# Patient Record
Sex: FEMALE | Race: WHITE | NOT HISPANIC OR LATINO | Employment: UNEMPLOYED | URBAN - METROPOLITAN AREA
[De-identification: names, ages, dates, MRNs, and addresses within clinical notes are randomized per-mention and may not be internally consistent; named-entity substitution may affect disease eponyms.]

---

## 2024-01-01 ENCOUNTER — APPOINTMENT (EMERGENCY)
Dept: RADIOLOGY | Facility: HOSPITAL | Age: 64
DRG: 252 | End: 2024-01-01
Payer: COMMERCIAL

## 2024-01-01 ENCOUNTER — APPOINTMENT (INPATIENT)
Dept: RADIOLOGY | Facility: HOSPITAL | Age: 64
DRG: 252 | End: 2024-01-01
Attending: FAMILY MEDICINE
Payer: COMMERCIAL

## 2024-01-01 ENCOUNTER — APPOINTMENT (INPATIENT)
Dept: NON INVASIVE DIAGNOSTICS | Facility: HOSPITAL | Age: 64
DRG: 252 | End: 2024-01-01
Payer: COMMERCIAL

## 2024-01-01 ENCOUNTER — APPOINTMENT (INPATIENT)
Dept: RADIOLOGY | Facility: HOSPITAL | Age: 64
DRG: 252 | End: 2024-01-01
Attending: STUDENT IN AN ORGANIZED HEALTH CARE EDUCATION/TRAINING PROGRAM
Payer: COMMERCIAL

## 2024-01-01 ENCOUNTER — APPOINTMENT (INPATIENT)
Dept: RADIOLOGY | Facility: HOSPITAL | Age: 64
DRG: 252 | End: 2024-01-01
Payer: COMMERCIAL

## 2024-01-01 ENCOUNTER — APPOINTMENT (INPATIENT)
Dept: GASTROENTEROLOGY | Facility: HOSPITAL | Age: 64
DRG: 252 | End: 2024-01-01
Payer: COMMERCIAL

## 2024-01-01 ENCOUNTER — APPOINTMENT (INPATIENT)
Dept: RADIOLOGY | Facility: HOSPITAL | Age: 64
DRG: 252 | End: 2024-01-01
Attending: INTERNAL MEDICINE
Payer: COMMERCIAL

## 2024-01-01 ENCOUNTER — HOSPITAL ENCOUNTER (INPATIENT)
Facility: HOSPITAL | Age: 64
LOS: 27 days | DRG: 252 | End: 2025-01-05
Attending: EMERGENCY MEDICINE | Admitting: INTERNAL MEDICINE
Payer: COMMERCIAL

## 2024-01-01 DIAGNOSIS — R60.1 ANASARCA: ICD-10-CM

## 2024-01-01 DIAGNOSIS — I47.20 V-TACH (HCC): ICD-10-CM

## 2024-01-01 DIAGNOSIS — Z78.9 ENCOUNTER FOR GASTROJEJUNAL (GJ) TUBE PLACEMENT: Primary | ICD-10-CM

## 2024-01-01 DIAGNOSIS — K56.609 SBO (SMALL BOWEL OBSTRUCTION) (HCC): ICD-10-CM

## 2024-01-01 DIAGNOSIS — L98.9 SKIN LESION: ICD-10-CM

## 2024-01-01 DIAGNOSIS — J90 PLEURAL EFFUSION: ICD-10-CM

## 2024-01-01 DIAGNOSIS — J96.01 ACUTE RESPIRATORY FAILURE WITH HYPOXIA (HCC): ICD-10-CM

## 2024-01-01 DIAGNOSIS — N17.9 AKI (ACUTE KIDNEY INJURY) (HCC): ICD-10-CM

## 2024-01-01 DIAGNOSIS — R10.84 GENERALIZED ABDOMINAL PAIN: ICD-10-CM

## 2024-01-01 DIAGNOSIS — C54.1 ENDOMETRIAL CANCER (HCC): ICD-10-CM

## 2024-01-01 DIAGNOSIS — B02.9 HERPES ZOSTER WITHOUT COMPLICATION: ICD-10-CM

## 2024-01-01 DIAGNOSIS — Z51.5 END OF LIFE CARE: ICD-10-CM

## 2024-01-01 DIAGNOSIS — Z78.9 ENCOUNTER FOR GASTROJEJUNAL (GJ) TUBE PLACEMENT: ICD-10-CM

## 2024-01-01 DIAGNOSIS — Z71.89 GOALS OF CARE, COUNSELING/DISCUSSION: ICD-10-CM

## 2024-01-01 DIAGNOSIS — L03.90 CELLULITIS: ICD-10-CM

## 2024-01-01 LAB
ABO GROUP BLD BPU: NORMAL
ABO GROUP BLD: NORMAL
ALBUMIN FLD-MCNC: 1.6 G/DL
ALBUMIN SERPL BCG-MCNC: 2.2 G/DL (ref 3.5–5)
ALBUMIN SERPL BCG-MCNC: 2.5 G/DL (ref 3.5–5)
ALBUMIN SERPL BCG-MCNC: 2.8 G/DL (ref 3.5–5)
ALBUMIN SERPL BCG-MCNC: 3 G/DL (ref 3.5–5)
ALP SERPL-CCNC: 42 U/L (ref 34–104)
ALP SERPL-CCNC: 45 U/L (ref 34–104)
ALP SERPL-CCNC: 63 U/L (ref 34–104)
ALP SERPL-CCNC: 66 U/L (ref 34–104)
ALP SERPL-CCNC: 66 U/L (ref 34–104)
ALP SERPL-CCNC: 70 U/L (ref 34–104)
ALT SERPL W P-5'-P-CCNC: 13 U/L (ref 7–52)
ALT SERPL W P-5'-P-CCNC: 5 U/L (ref 7–52)
ALT SERPL W P-5'-P-CCNC: 8 U/L (ref 7–52)
ANION GAP SERPL CALCULATED.3IONS-SCNC: 2 MMOL/L (ref 4–13)
ANION GAP SERPL CALCULATED.3IONS-SCNC: 3 MMOL/L (ref 4–13)
ANION GAP SERPL CALCULATED.3IONS-SCNC: 4 MMOL/L (ref 4–13)
ANION GAP SERPL CALCULATED.3IONS-SCNC: 5 MMOL/L (ref 4–13)
ANION GAP SERPL CALCULATED.3IONS-SCNC: 6 MMOL/L (ref 4–13)
ANION GAP SERPL CALCULATED.3IONS-SCNC: 7 MMOL/L (ref 4–13)
ANION GAP SERPL CALCULATED.3IONS-SCNC: 7 MMOL/L (ref 4–13)
ANION GAP SERPL CALCULATED.3IONS-SCNC: 8 MMOL/L (ref 4–13)
ANISOCYTOSIS BLD QL SMEAR: PRESENT
AORTIC ROOT: 2.7 CM
AORTIC VALVE MEAN VELOCITY: 15.9 M/S
APICAL FOUR CHAMBER EJECTION FRACTION: 63 %
APPEARANCE FLD: CLEAR
APPEARANCE FLD: CLEAR
ASCENDING AORTA: 3 CM
AST SERPL W P-5'-P-CCNC: 11 U/L (ref 13–39)
AST SERPL W P-5'-P-CCNC: 12 U/L (ref 13–39)
AST SERPL W P-5'-P-CCNC: 6 U/L (ref 13–39)
AST SERPL W P-5'-P-CCNC: 8 U/L (ref 13–39)
ATRIAL RATE: 112 BPM
ATRIAL RATE: 78 BPM
AV AREA BY CONTINUOUS VTI: 1.2 CM2
AV AREA PEAK VELOCITY: 1.2 CM2
AV LVOT MEAN GRADIENT: 1 MMHG
AV LVOT PEAK GRADIENT: 2 MMHG
AV MEAN GRADIENT: 11 MMHG
AV PEAK GRADIENT: 19 MMHG
AV VALVE AREA: 1.54 CM2
AV VELOCITY RATIO: 0.41
BACTERIA SPEC BFLD CULT: NO GROWTH
BACTERIA WND AEROBE CULT: ABNORMAL
BASE EX.OXY STD BLDV CALC-SCNC: 75.5 % (ref 60–80)
BASE EXCESS BLDA CALC-SCNC: -2 MMOL/L (ref -2–3)
BASE EXCESS BLDV CALC-SCNC: 10.3 MMOL/L
BASOPHILS # BLD AUTO: 0.01 THOUSANDS/ÂΜL (ref 0–0.1)
BASOPHILS # BLD AUTO: 0.02 THOUSANDS/ÂΜL (ref 0–0.1)
BASOPHILS # BLD AUTO: 0.02 THOUSANDS/ΜL (ref 0–0.1)
BASOPHILS # BLD AUTO: 0.05 THOUSAND/UL (ref 0–0.1)
BASOPHILS # BLD MANUAL: 0 THOUSAND/UL (ref 0–0.1)
BASOPHILS # BLD MANUAL: 0 THOUSAND/UL (ref 0–0.1)
BASOPHILS NFR BLD AUTO: 0 % (ref 0–1)
BASOPHILS NFR MAR MANUAL: 0 % (ref 0–1)
BASOPHILS NFR MAR MANUAL: 0 % (ref 0–1)
BASOPHILS NFR MAR MANUAL: 1 % (ref 0–1)
BILIRUB DIRECT SERPL-MCNC: 0.14 MG/DL (ref 0–0.2)
BILIRUB DIRECT SERPL-MCNC: 0.14 MG/DL (ref 0–0.2)
BILIRUB DIRECT SERPL-MCNC: 0.16 MG/DL (ref 0–0.2)
BILIRUB SERPL-MCNC: 0.31 MG/DL (ref 0.2–1)
BILIRUB SERPL-MCNC: 0.35 MG/DL (ref 0.2–1)
BILIRUB SERPL-MCNC: 0.36 MG/DL (ref 0.2–1)
BILIRUB SERPL-MCNC: 0.37 MG/DL (ref 0.2–1)
BILIRUB SERPL-MCNC: 0.48 MG/DL (ref 0.2–1)
BILIRUB SERPL-MCNC: 0.53 MG/DL (ref 0.2–1)
BLD GP AB SCN SERPL QL: NEGATIVE
BPU ID: NORMAL
BSA FOR ECHO PROCEDURE: 2.1 M2
BUN SERPL-MCNC: 11 MG/DL (ref 5–25)
BUN SERPL-MCNC: 14 MG/DL (ref 5–25)
BUN SERPL-MCNC: 19 MG/DL (ref 5–25)
BUN SERPL-MCNC: 21 MG/DL (ref 5–25)
BUN SERPL-MCNC: 25 MG/DL (ref 5–25)
BUN SERPL-MCNC: 27 MG/DL (ref 5–25)
BUN SERPL-MCNC: 27 MG/DL (ref 5–25)
BUN SERPL-MCNC: 28 MG/DL (ref 5–25)
BUN SERPL-MCNC: 29 MG/DL (ref 5–25)
BUN SERPL-MCNC: 29 MG/DL (ref 5–25)
BUN SERPL-MCNC: 30 MG/DL (ref 5–25)
BUN SERPL-MCNC: 31 MG/DL (ref 5–25)
BUN SERPL-MCNC: 31 MG/DL (ref 5–25)
BUN SERPL-MCNC: 32 MG/DL (ref 5–25)
BUN SERPL-MCNC: 32 MG/DL (ref 5–25)
BUN SERPL-MCNC: 35 MG/DL (ref 5–25)
BUN SERPL-MCNC: 41 MG/DL (ref 5–25)
BUN SERPL-MCNC: 47 MG/DL (ref 5–25)
BUN SERPL-MCNC: 60 MG/DL (ref 5–25)
BUN SERPL-MCNC: 63 MG/DL (ref 5–25)
BUN SERPL-MCNC: 72 MG/DL (ref 5–25)
BUN SERPL-MCNC: 75 MG/DL (ref 5–25)
BUN SERPL-MCNC: 81 MG/DL (ref 5–25)
BUN SERPL-MCNC: 86 MG/DL (ref 5–25)
BUN SERPL-MCNC: 88 MG/DL (ref 5–25)
BUN SERPL-MCNC: 92 MG/DL (ref 5–25)
CA-I BLD-SCNC: 1.27 MMOL/L (ref 1.12–1.32)
CALCIUM ALBUM COR SERPL-MCNC: 8.7 MG/DL (ref 8.3–10.1)
CALCIUM ALBUM COR SERPL-MCNC: 8.9 MG/DL (ref 8.3–10.1)
CALCIUM ALBUM COR SERPL-MCNC: 9.7 MG/DL (ref 8.3–10.1)
CALCIUM SERPL-MCNC: 7.4 MG/DL (ref 8.4–10.2)
CALCIUM SERPL-MCNC: 7.4 MG/DL (ref 8.4–10.2)
CALCIUM SERPL-MCNC: 7.5 MG/DL (ref 8.4–10.2)
CALCIUM SERPL-MCNC: 7.6 MG/DL (ref 8.4–10.2)
CALCIUM SERPL-MCNC: 7.7 MG/DL (ref 8.4–10.2)
CALCIUM SERPL-MCNC: 7.8 MG/DL (ref 8.4–10.2)
CALCIUM SERPL-MCNC: 7.8 MG/DL (ref 8.4–10.2)
CALCIUM SERPL-MCNC: 7.9 MG/DL (ref 8.4–10.2)
CALCIUM SERPL-MCNC: 8 MG/DL (ref 8.4–10.2)
CALCIUM SERPL-MCNC: 8.1 MG/DL (ref 8.4–10.2)
CALCIUM SERPL-MCNC: 8.2 MG/DL (ref 8.4–10.2)
CALCIUM SERPL-MCNC: 8.3 MG/DL (ref 8.4–10.2)
CALCIUM SERPL-MCNC: 8.4 MG/DL (ref 8.4–10.2)
CALCIUM SERPL-MCNC: 8.5 MG/DL (ref 8.4–10.2)
CALCIUM SERPL-MCNC: 8.5 MG/DL (ref 8.4–10.2)
CALCIUM SERPL-MCNC: 8.6 MG/DL (ref 8.4–10.2)
CALCIUM SERPL-MCNC: 8.8 MG/DL (ref 8.4–10.2)
CALCIUM SERPL-MCNC: 8.9 MG/DL (ref 8.4–10.2)
CALCIUM SERPL-MCNC: 9 MG/DL (ref 8.4–10.2)
CHLORIDE SERPL-SCNC: 100 MMOL/L (ref 96–108)
CHLORIDE SERPL-SCNC: 102 MMOL/L (ref 96–108)
CHLORIDE SERPL-SCNC: 102 MMOL/L (ref 96–108)
CHLORIDE SERPL-SCNC: 85 MMOL/L (ref 96–108)
CHLORIDE SERPL-SCNC: 85 MMOL/L (ref 96–108)
CHLORIDE SERPL-SCNC: 86 MMOL/L (ref 96–108)
CHLORIDE SERPL-SCNC: 88 MMOL/L (ref 96–108)
CHLORIDE SERPL-SCNC: 89 MMOL/L (ref 96–108)
CHLORIDE SERPL-SCNC: 91 MMOL/L (ref 96–108)
CHLORIDE SERPL-SCNC: 92 MMOL/L (ref 96–108)
CHLORIDE SERPL-SCNC: 92 MMOL/L (ref 96–108)
CHLORIDE SERPL-SCNC: 93 MMOL/L (ref 96–108)
CHLORIDE SERPL-SCNC: 93 MMOL/L (ref 96–108)
CHLORIDE SERPL-SCNC: 94 MMOL/L (ref 96–108)
CHLORIDE SERPL-SCNC: 95 MMOL/L (ref 96–108)
CHLORIDE SERPL-SCNC: 96 MMOL/L (ref 96–108)
CHLORIDE SERPL-SCNC: 96 MMOL/L (ref 96–108)
CHLORIDE SERPL-SCNC: 97 MMOL/L (ref 96–108)
CHLORIDE SERPL-SCNC: 98 MMOL/L (ref 96–108)
CO2 SERPL-SCNC: 22 MMOL/L (ref 21–32)
CO2 SERPL-SCNC: 23 MMOL/L (ref 21–32)
CO2 SERPL-SCNC: 24 MMOL/L (ref 21–32)
CO2 SERPL-SCNC: 25 MMOL/L (ref 21–32)
CO2 SERPL-SCNC: 25 MMOL/L (ref 21–32)
CO2 SERPL-SCNC: 26 MMOL/L (ref 21–32)
CO2 SERPL-SCNC: 27 MMOL/L (ref 21–32)
CO2 SERPL-SCNC: 28 MMOL/L (ref 21–32)
CO2 SERPL-SCNC: 28 MMOL/L (ref 21–32)
CO2 SERPL-SCNC: 29 MMOL/L (ref 21–32)
CO2 SERPL-SCNC: 29 MMOL/L (ref 21–32)
CO2 SERPL-SCNC: 32 MMOL/L (ref 21–32)
CO2 SERPL-SCNC: 33 MMOL/L (ref 21–32)
CO2 SERPL-SCNC: 35 MMOL/L (ref 21–32)
CO2 SERPL-SCNC: 36 MMOL/L (ref 21–32)
CO2 SERPL-SCNC: 39 MMOL/L (ref 21–32)
COLOR FLD: YELLOW
COLOR FLD: YELLOW
CREAT SERPL-MCNC: 0.39 MG/DL (ref 0.6–1.3)
CREAT SERPL-MCNC: 0.4 MG/DL (ref 0.6–1.3)
CREAT SERPL-MCNC: 0.5 MG/DL (ref 0.6–1.3)
CREAT SERPL-MCNC: 0.55 MG/DL (ref 0.6–1.3)
CREAT SERPL-MCNC: 0.56 MG/DL (ref 0.6–1.3)
CREAT SERPL-MCNC: 0.59 MG/DL (ref 0.6–1.3)
CREAT SERPL-MCNC: 0.6 MG/DL (ref 0.6–1.3)
CREAT SERPL-MCNC: 0.62 MG/DL (ref 0.6–1.3)
CREAT SERPL-MCNC: 0.63 MG/DL (ref 0.6–1.3)
CREAT SERPL-MCNC: 0.64 MG/DL (ref 0.6–1.3)
CREAT SERPL-MCNC: 0.67 MG/DL (ref 0.6–1.3)
CREAT SERPL-MCNC: 0.68 MG/DL (ref 0.6–1.3)
CREAT SERPL-MCNC: 0.68 MG/DL (ref 0.6–1.3)
CREAT SERPL-MCNC: 0.69 MG/DL (ref 0.6–1.3)
CREAT SERPL-MCNC: 0.7 MG/DL (ref 0.6–1.3)
CREAT SERPL-MCNC: 0.75 MG/DL (ref 0.6–1.3)
CREAT SERPL-MCNC: 0.75 MG/DL (ref 0.6–1.3)
CREAT SERPL-MCNC: 0.76 MG/DL (ref 0.6–1.3)
CREAT SERPL-MCNC: 0.76 MG/DL (ref 0.6–1.3)
CREAT SERPL-MCNC: 0.79 MG/DL (ref 0.6–1.3)
CREAT SERPL-MCNC: 0.81 MG/DL (ref 0.6–1.3)
CREAT SERPL-MCNC: 0.82 MG/DL (ref 0.6–1.3)
CREAT SERPL-MCNC: 0.83 MG/DL (ref 0.6–1.3)
CREAT SERPL-MCNC: 0.83 MG/DL (ref 0.6–1.3)
CREAT SERPL-MCNC: 0.88 MG/DL (ref 0.6–1.3)
CREAT SERPL-MCNC: 0.88 MG/DL (ref 0.6–1.3)
CREAT SERPL-MCNC: 1.06 MG/DL (ref 0.6–1.3)
CREAT SERPL-MCNC: 1.26 MG/DL (ref 0.6–1.3)
CREAT SERPL-MCNC: 1.31 MG/DL (ref 0.6–1.3)
CREAT SERPL-MCNC: 1.33 MG/DL (ref 0.6–1.3)
CREAT SERPL-MCNC: 1.43 MG/DL (ref 0.6–1.3)
CREAT UR-MCNC: 26.3 MG/DL
CROSSMATCH: NORMAL
DACRYOCYTES BLD QL SMEAR: PRESENT
DACRYOCYTES BLD QL SMEAR: PRESENT
DOP CALC AO PEAK VEL: 2.18 M/S
DOP CALC AO VTI: 56.14 CM
DOP CALC LVOT AREA: 3.46 CM2
DOP CALC LVOT CARDIAC INDEX: 2.34 L/MIN/M2
DOP CALC LVOT CARDIAC OUTPUT: 4.92 L/MIN
DOP CALC LVOT DIAMETER: 2.1 CM
DOP CALC LVOT PEAK VEL VTI: 25 CM
DOP CALC LVOT PEAK VEL: 0.9 M/S
DOP CALC LVOT STROKE INDEX: 32.9 ML/M2
DOP CALC LVOT STROKE VOLUME: 86.55 CM3
E WAVE DECELERATION TIME: 128 MS
E/A RATIO: 0.67
EOSINOPHIL # BLD AUTO: 0.04 THOUSAND/ÂΜL (ref 0–0.61)
EOSINOPHIL # BLD AUTO: 0.04 THOUSAND/ÂΜL (ref 0–0.61)
EOSINOPHIL # BLD AUTO: 0.06 THOUSAND/UL (ref 0–0.61)
EOSINOPHIL # BLD AUTO: 0.09 THOUSAND/ÂΜL (ref 0–0.61)
EOSINOPHIL # BLD AUTO: 0.13 THOUSAND/ÂΜL (ref 0–0.61)
EOSINOPHIL # BLD AUTO: 0.2 THOUSAND/ΜL (ref 0–0.61)
EOSINOPHIL # BLD MANUAL: 0 THOUSAND/UL (ref 0–0.4)
EOSINOPHIL # BLD MANUAL: 0.09 THOUSAND/UL (ref 0–0.4)
EOSINOPHIL NFR BLD AUTO: 0 % (ref 0–6)
EOSINOPHIL NFR BLD AUTO: 0 % (ref 0–6)
EOSINOPHIL NFR BLD AUTO: 2 % (ref 0–6)
EOSINOPHIL NFR BLD AUTO: 2 % (ref 0–6)
EOSINOPHIL NFR BLD AUTO: 3 % (ref 0–6)
EOSINOPHIL NFR BLD MANUAL: 0 % (ref 0–6)
EOSINOPHIL NFR BLD MANUAL: 1 % (ref 0–6)
EOSINOPHIL NFR BLD MANUAL: 1 % (ref 0–6)
ERYTHROCYTE [DISTWIDTH] IN BLOOD BY AUTOMATED COUNT: 16.7 % (ref 11.6–15.1)
ERYTHROCYTE [DISTWIDTH] IN BLOOD BY AUTOMATED COUNT: 16.8 % (ref 11.6–15.1)
ERYTHROCYTE [DISTWIDTH] IN BLOOD BY AUTOMATED COUNT: 16.9 % (ref 11.6–15.1)
ERYTHROCYTE [DISTWIDTH] IN BLOOD BY AUTOMATED COUNT: 17.1 % (ref 11.6–15.1)
ERYTHROCYTE [DISTWIDTH] IN BLOOD BY AUTOMATED COUNT: 17.2 % (ref 11.6–15.1)
ERYTHROCYTE [DISTWIDTH] IN BLOOD BY AUTOMATED COUNT: 17.2 % (ref 11.6–15.1)
ERYTHROCYTE [DISTWIDTH] IN BLOOD BY AUTOMATED COUNT: 17.6 % (ref 11.6–15.1)
ERYTHROCYTE [DISTWIDTH] IN BLOOD BY AUTOMATED COUNT: 17.6 % (ref 11.6–15.1)
ERYTHROCYTE [DISTWIDTH] IN BLOOD BY AUTOMATED COUNT: 17.7 % (ref 11.6–15.1)
ERYTHROCYTE [DISTWIDTH] IN BLOOD BY AUTOMATED COUNT: 17.9 % (ref 11.6–15.1)
ERYTHROCYTE [DISTWIDTH] IN BLOOD BY AUTOMATED COUNT: 19.6 % (ref 11.6–15.1)
ERYTHROCYTE [DISTWIDTH] IN BLOOD BY AUTOMATED COUNT: 19.7 % (ref 11.6–15.1)
ERYTHROCYTE [DISTWIDTH] IN BLOOD BY AUTOMATED COUNT: 19.8 % (ref 11.6–15.1)
ERYTHROCYTE [DISTWIDTH] IN BLOOD BY AUTOMATED COUNT: 19.8 % (ref 11.6–15.1)
ERYTHROCYTE [DISTWIDTH] IN BLOOD BY AUTOMATED COUNT: 19.9 % (ref 11.6–15.1)
FERRITIN SERPL-MCNC: 371 NG/ML (ref 11–307)
FIO2 GAS DIL.REBREATH: 80 L
FOLATE SERPL-MCNC: 4.5 NG/ML
FRACTIONAL SHORTENING: 35 (ref 28–44)
GFR SERPL CREATININE-BSD FRML MDRD: 100 ML/MIN/1.73SQ M
GFR SERPL CREATININE-BSD FRML MDRD: 100 ML/MIN/1.73SQ M
GFR SERPL CREATININE-BSD FRML MDRD: 104 ML/MIN/1.73SQ M
GFR SERPL CREATININE-BSD FRML MDRD: 111 ML/MIN/1.73SQ M
GFR SERPL CREATININE-BSD FRML MDRD: 112 ML/MIN/1.73SQ M
GFR SERPL CREATININE-BSD FRML MDRD: 39 ML/MIN/1.73SQ M
GFR SERPL CREATININE-BSD FRML MDRD: 42 ML/MIN/1.73SQ M
GFR SERPL CREATININE-BSD FRML MDRD: 43 ML/MIN/1.73SQ M
GFR SERPL CREATININE-BSD FRML MDRD: 45 ML/MIN/1.73SQ M
GFR SERPL CREATININE-BSD FRML MDRD: 56 ML/MIN/1.73SQ M
GFR SERPL CREATININE-BSD FRML MDRD: 70 ML/MIN/1.73SQ M
GFR SERPL CREATININE-BSD FRML MDRD: 70 ML/MIN/1.73SQ M
GFR SERPL CREATININE-BSD FRML MDRD: 75 ML/MIN/1.73SQ M
GFR SERPL CREATININE-BSD FRML MDRD: 75 ML/MIN/1.73SQ M
GFR SERPL CREATININE-BSD FRML MDRD: 76 ML/MIN/1.73SQ M
GFR SERPL CREATININE-BSD FRML MDRD: 78 ML/MIN/1.73SQ M
GFR SERPL CREATININE-BSD FRML MDRD: 80 ML/MIN/1.73SQ M
GFR SERPL CREATININE-BSD FRML MDRD: 84 ML/MIN/1.73SQ M
GFR SERPL CREATININE-BSD FRML MDRD: 84 ML/MIN/1.73SQ M
GFR SERPL CREATININE-BSD FRML MDRD: 85 ML/MIN/1.73SQ M
GFR SERPL CREATININE-BSD FRML MDRD: 85 ML/MIN/1.73SQ M
GFR SERPL CREATININE-BSD FRML MDRD: 93 ML/MIN/1.73SQ M
GFR SERPL CREATININE-BSD FRML MDRD: 93 ML/MIN/1.73SQ M
GFR SERPL CREATININE-BSD FRML MDRD: 94 ML/MIN/1.73SQ M
GFR SERPL CREATININE-BSD FRML MDRD: 95 ML/MIN/1.73SQ M
GFR SERPL CREATININE-BSD FRML MDRD: 96 ML/MIN/1.73SQ M
GFR SERPL CREATININE-BSD FRML MDRD: 96 ML/MIN/1.73SQ M
GFR SERPL CREATININE-BSD FRML MDRD: 98 ML/MIN/1.73SQ M
GFR SERPL CREATININE-BSD FRML MDRD: 98 ML/MIN/1.73SQ M
GIANT PLATELETS BLD QL SMEAR: PRESENT
GLUCOSE FLD-MCNC: 138 MG/DL
GLUCOSE FLD-MCNC: 142 MG/DL
GLUCOSE SERPL-MCNC: 102 MG/DL (ref 65–140)
GLUCOSE SERPL-MCNC: 109 MG/DL (ref 65–140)
GLUCOSE SERPL-MCNC: 117 MG/DL (ref 65–140)
GLUCOSE SERPL-MCNC: 121 MG/DL (ref 65–140)
GLUCOSE SERPL-MCNC: 122 MG/DL (ref 65–140)
GLUCOSE SERPL-MCNC: 122 MG/DL (ref 65–140)
GLUCOSE SERPL-MCNC: 123 MG/DL (ref 65–140)
GLUCOSE SERPL-MCNC: 125 MG/DL (ref 65–140)
GLUCOSE SERPL-MCNC: 133 MG/DL (ref 65–140)
GLUCOSE SERPL-MCNC: 133 MG/DL (ref 65–140)
GLUCOSE SERPL-MCNC: 134 MG/DL (ref 65–140)
GLUCOSE SERPL-MCNC: 135 MG/DL (ref 65–140)
GLUCOSE SERPL-MCNC: 135 MG/DL (ref 65–140)
GLUCOSE SERPL-MCNC: 137 MG/DL (ref 65–140)
GLUCOSE SERPL-MCNC: 144 MG/DL (ref 65–140)
GLUCOSE SERPL-MCNC: 145 MG/DL (ref 65–140)
GLUCOSE SERPL-MCNC: 145 MG/DL (ref 65–140)
GLUCOSE SERPL-MCNC: 146 MG/DL (ref 65–140)
GLUCOSE SERPL-MCNC: 147 MG/DL (ref 65–140)
GLUCOSE SERPL-MCNC: 148 MG/DL (ref 65–140)
GLUCOSE SERPL-MCNC: 148 MG/DL (ref 65–140)
GLUCOSE SERPL-MCNC: 150 MG/DL (ref 65–140)
GLUCOSE SERPL-MCNC: 151 MG/DL (ref 65–140)
GLUCOSE SERPL-MCNC: 152 MG/DL (ref 65–140)
GLUCOSE SERPL-MCNC: 153 MG/DL (ref 65–140)
GLUCOSE SERPL-MCNC: 155 MG/DL (ref 65–140)
GLUCOSE SERPL-MCNC: 155 MG/DL (ref 65–140)
GLUCOSE SERPL-MCNC: 158 MG/DL (ref 65–140)
GLUCOSE SERPL-MCNC: 158 MG/DL (ref 65–140)
GLUCOSE SERPL-MCNC: 159 MG/DL (ref 65–140)
GLUCOSE SERPL-MCNC: 160 MG/DL (ref 65–140)
GLUCOSE SERPL-MCNC: 160 MG/DL (ref 65–140)
GLUCOSE SERPL-MCNC: 161 MG/DL (ref 65–140)
GLUCOSE SERPL-MCNC: 162 MG/DL (ref 65–140)
GLUCOSE SERPL-MCNC: 162 MG/DL (ref 65–140)
GLUCOSE SERPL-MCNC: 164 MG/DL (ref 65–140)
GLUCOSE SERPL-MCNC: 164 MG/DL (ref 65–140)
GLUCOSE SERPL-MCNC: 165 MG/DL (ref 65–140)
GLUCOSE SERPL-MCNC: 166 MG/DL (ref 65–140)
GLUCOSE SERPL-MCNC: 168 MG/DL (ref 65–140)
GLUCOSE SERPL-MCNC: 169 MG/DL (ref 65–140)
GLUCOSE SERPL-MCNC: 171 MG/DL (ref 65–140)
GLUCOSE SERPL-MCNC: 172 MG/DL (ref 65–140)
GLUCOSE SERPL-MCNC: 173 MG/DL (ref 65–140)
GLUCOSE SERPL-MCNC: 176 MG/DL (ref 65–140)
GLUCOSE SERPL-MCNC: 177 MG/DL (ref 65–140)
GLUCOSE SERPL-MCNC: 178 MG/DL (ref 65–140)
GLUCOSE SERPL-MCNC: 179 MG/DL (ref 65–140)
GLUCOSE SERPL-MCNC: 179 MG/DL (ref 65–140)
GLUCOSE SERPL-MCNC: 185 MG/DL (ref 65–140)
GLUCOSE SERPL-MCNC: 185 MG/DL (ref 65–140)
GLUCOSE SERPL-MCNC: 186 MG/DL (ref 65–140)
GLUCOSE SERPL-MCNC: 187 MG/DL (ref 65–140)
GLUCOSE SERPL-MCNC: 190 MG/DL (ref 65–140)
GLUCOSE SERPL-MCNC: 191 MG/DL (ref 65–140)
GLUCOSE SERPL-MCNC: 195 MG/DL (ref 65–140)
GLUCOSE SERPL-MCNC: 197 MG/DL (ref 65–140)
GLUCOSE SERPL-MCNC: 198 MG/DL (ref 65–140)
GLUCOSE SERPL-MCNC: 200 MG/DL (ref 65–140)
GLUCOSE SERPL-MCNC: 200 MG/DL (ref 65–140)
GLUCOSE SERPL-MCNC: 201 MG/DL (ref 65–140)
GLUCOSE SERPL-MCNC: 201 MG/DL (ref 65–140)
GLUCOSE SERPL-MCNC: 202 MG/DL (ref 65–140)
GLUCOSE SERPL-MCNC: 202 MG/DL (ref 65–140)
GLUCOSE SERPL-MCNC: 203 MG/DL (ref 65–140)
GLUCOSE SERPL-MCNC: 203 MG/DL (ref 65–140)
GLUCOSE SERPL-MCNC: 205 MG/DL (ref 65–140)
GLUCOSE SERPL-MCNC: 205 MG/DL (ref 65–140)
GLUCOSE SERPL-MCNC: 208 MG/DL (ref 65–140)
GLUCOSE SERPL-MCNC: 209 MG/DL (ref 65–140)
GLUCOSE SERPL-MCNC: 210 MG/DL (ref 65–140)
GLUCOSE SERPL-MCNC: 210 MG/DL (ref 65–140)
GLUCOSE SERPL-MCNC: 214 MG/DL (ref 65–140)
GLUCOSE SERPL-MCNC: 215 MG/DL (ref 65–140)
GLUCOSE SERPL-MCNC: 217 MG/DL (ref 65–140)
GLUCOSE SERPL-MCNC: 217 MG/DL (ref 65–140)
GLUCOSE SERPL-MCNC: 220 MG/DL (ref 65–140)
GLUCOSE SERPL-MCNC: 222 MG/DL (ref 65–140)
GLUCOSE SERPL-MCNC: 227 MG/DL (ref 65–140)
GLUCOSE SERPL-MCNC: 228 MG/DL (ref 65–140)
GLUCOSE SERPL-MCNC: 229 MG/DL (ref 65–140)
GLUCOSE SERPL-MCNC: 231 MG/DL (ref 65–140)
GLUCOSE SERPL-MCNC: 232 MG/DL (ref 65–140)
GLUCOSE SERPL-MCNC: 232 MG/DL (ref 65–140)
GLUCOSE SERPL-MCNC: 234 MG/DL (ref 65–140)
GLUCOSE SERPL-MCNC: 234 MG/DL (ref 65–140)
GLUCOSE SERPL-MCNC: 235 MG/DL (ref 65–140)
GLUCOSE SERPL-MCNC: 236 MG/DL (ref 65–140)
GLUCOSE SERPL-MCNC: 241 MG/DL (ref 65–140)
GLUCOSE SERPL-MCNC: 244 MG/DL (ref 65–140)
GLUCOSE SERPL-MCNC: 247 MG/DL (ref 65–140)
GLUCOSE SERPL-MCNC: 248 MG/DL (ref 65–140)
GLUCOSE SERPL-MCNC: 250 MG/DL (ref 65–140)
GLUCOSE SERPL-MCNC: 251 MG/DL (ref 65–140)
GLUCOSE SERPL-MCNC: 252 MG/DL (ref 65–140)
GLUCOSE SERPL-MCNC: 253 MG/DL (ref 65–140)
GLUCOSE SERPL-MCNC: 255 MG/DL (ref 65–140)
GLUCOSE SERPL-MCNC: 258 MG/DL (ref 65–140)
GLUCOSE SERPL-MCNC: 259 MG/DL (ref 65–140)
GLUCOSE SERPL-MCNC: 268 MG/DL (ref 65–140)
GLUCOSE SERPL-MCNC: 273 MG/DL (ref 65–140)
GLUCOSE SERPL-MCNC: 276 MG/DL (ref 65–140)
GLUCOSE SERPL-MCNC: 298 MG/DL (ref 65–140)
GLUCOSE SERPL-MCNC: 299 MG/DL (ref 65–140)
GLUCOSE SERPL-MCNC: 302 MG/DL (ref 65–140)
GLUCOSE SERPL-MCNC: 306 MG/DL (ref 65–140)
GLUCOSE SERPL-MCNC: 314 MG/DL (ref 65–140)
GLUCOSE SERPL-MCNC: 314 MG/DL (ref 65–140)
GLUCOSE SERPL-MCNC: 317 MG/DL (ref 65–140)
GLUCOSE SERPL-MCNC: 335 MG/DL (ref 65–140)
GLUCOSE SERPL-MCNC: 341 MG/DL (ref 65–140)
GLUCOSE SERPL-MCNC: 345 MG/DL (ref 65–140)
GLUCOSE SERPL-MCNC: 371 MG/DL (ref 65–140)
GLUCOSE SERPL-MCNC: 88 MG/DL (ref 65–140)
GLUCOSE SERPL-MCNC: 89 MG/DL (ref 65–140)
GLUCOSE SERPL-MCNC: 89 MG/DL (ref 65–140)
GLUCOSE SERPL-MCNC: 92 MG/DL (ref 65–140)
GLUCOSE SERPL-MCNC: 96 MG/DL (ref 65–140)
GRAM STN SPEC: ABNORMAL
GRAM STN SPEC: NORMAL
GRAM STN SPEC: NORMAL
HAPTOGLOB SERPL-MCNC: 195 MG/DL (ref 37–355)
HCO3 BLDA-SCNC: 24.3 MMOL/L (ref 22–28)
HCO3 BLDV-SCNC: 36 MMOL/L (ref 24–30)
HCT VFR BLD AUTO: 20.4 % (ref 34.8–46.1)
HCT VFR BLD AUTO: 21 % (ref 34.8–46.1)
HCT VFR BLD AUTO: 21.2 % (ref 34.8–46.1)
HCT VFR BLD AUTO: 21.3 % (ref 34.8–46.1)
HCT VFR BLD AUTO: 21.9 % (ref 34.8–46.1)
HCT VFR BLD AUTO: 23.6 % (ref 34.8–46.1)
HCT VFR BLD AUTO: 23.7 % (ref 34.8–46.1)
HCT VFR BLD AUTO: 24.7 % (ref 34.8–46.1)
HCT VFR BLD AUTO: 24.8 % (ref 34.8–46.1)
HCT VFR BLD AUTO: 25.9 % (ref 34.8–46.1)
HCT VFR BLD AUTO: 26 % (ref 34.8–46.1)
HCT VFR BLD AUTO: 26 % (ref 34.8–46.1)
HCT VFR BLD AUTO: 26.8 % (ref 34.8–46.1)
HCT VFR BLD AUTO: 27 % (ref 34.8–46.1)
HCT VFR BLD AUTO: 27.5 % (ref 34.8–46.1)
HCT VFR BLD AUTO: 27.8 % (ref 34.8–46.1)
HCT VFR BLD CALC: 33 % (ref 34.8–46.1)
HGB BLD-MCNC: 6.5 G/DL (ref 11.5–15.4)
HGB BLD-MCNC: 6.5 G/DL (ref 11.5–15.4)
HGB BLD-MCNC: 6.7 G/DL (ref 11.5–15.4)
HGB BLD-MCNC: 6.7 G/DL (ref 11.5–15.4)
HGB BLD-MCNC: 6.8 G/DL (ref 11.5–15.4)
HGB BLD-MCNC: 6.9 G/DL (ref 11.5–15.4)
HGB BLD-MCNC: 6.9 G/DL (ref 11.5–15.4)
HGB BLD-MCNC: 7.4 G/DL (ref 11.5–15.4)
HGB BLD-MCNC: 7.5 G/DL (ref 11.5–15.4)
HGB BLD-MCNC: 7.7 G/DL (ref 11.5–15.4)
HGB BLD-MCNC: 8 G/DL (ref 11.5–15.4)
HGB BLD-MCNC: 8.3 G/DL (ref 11.5–15.4)
HGB BLD-MCNC: 8.6 G/DL (ref 11.5–15.4)
HGB BLD-MCNC: 8.8 G/DL (ref 11.5–15.4)
HGB BLD-MCNC: 8.9 G/DL (ref 11.5–15.4)
HGB BLDA-MCNC: 11.2 G/DL (ref 11.5–15.4)
HISTIOCYTES NFR FLD: 14 %
HYPERCHROMIA BLD QL SMEAR: PRESENT
IMM GRANULOCYTES # BLD AUTO: 0.04 THOUSAND/UL (ref 0–0.2)
IMM GRANULOCYTES # BLD AUTO: 0.1 THOUSAND/UL (ref 0–0.2)
IMM GRANULOCYTES # BLD AUTO: 0.14 THOUSAND/UL (ref 0–0.2)
IMM GRANULOCYTES # BLD AUTO: 0.17 THOUSAND/UL (ref 0–0.2)
IMM GRANULOCYTES # BLD AUTO: 0.22 THOUSAND/UL (ref 0–0.2)
IMM GRANULOCYTES NFR BLD AUTO: 1 % (ref 0–2)
IMM GRANULOCYTES NFR BLD AUTO: 2 % (ref 0–2)
INTERVENTRICULAR SEPTUM IN DIASTOLE (PARASTERNAL SHORT AXIS VIEW): 1.4 CM
INTERVENTRICULAR SEPTUM: 1.4 CM (ref 0.6–1.1)
IRON SATN MFR SERPL: 24 % (ref 15–50)
IRON SERPL-MCNC: 36 UG/DL (ref 50–212)
IVC: 19 MM
LAAS-AP2: 26.2 CM2
LAAS-AP4: 30.3 CM2
LDH FLD L TO P-CCNC: 52 U/L
LDH SERPL-CCNC: 60 U/L (ref 140–271)
LEFT ATRIUM SIZE: 4.6 CM
LEFT ATRIUM VOLUME (MOD BIPLANE): 105 ML
LEFT ATRIUM VOLUME INDEX (MOD BIPLANE): 50 ML/M2
LEFT INTERNAL DIMENSION IN SYSTOLE: 3.3 CM (ref 2.1–4)
LEFT VENTRICLE DIASTOLIC VOLUME (MOD BIPLANE): 97 ML
LEFT VENTRICLE DIASTOLIC VOLUME INDEX (MOD BIPLANE): 46.2 ML/M2
LEFT VENTRICLE SYSTOLIC VOLUME (MOD BIPLANE): 40 ML
LEFT VENTRICLE SYSTOLIC VOLUME INDEX (MOD BIPLANE): 19 ML/M2
LEFT VENTRICULAR INTERNAL DIMENSION IN DIASTOLE: 5.1 CM (ref 3.5–6)
LEFT VENTRICULAR POSTERIOR WALL IN END DIASTOLE: 1.4 CM
LEFT VENTRICULAR STROKE VOLUME: 82 ML
LG PLATELETS BLD QL SMEAR: PRESENT
LIPASE SERPL-CCNC: <6 U/L (ref 11–82)
LV EF: 59 %
LVSV (TEICH): 82 ML
LYMPHOCYTES # BLD AUTO: 0 % (ref 14–44)
LYMPHOCYTES # BLD AUTO: 0.05 THOUSAND/UL (ref 0.6–4.47)
LYMPHOCYTES # BLD AUTO: 0.06 THOUSAND/UL (ref 0.6–4.47)
LYMPHOCYTES # BLD AUTO: 0.09 THOUSAND/UL (ref 0.6–4.47)
LYMPHOCYTES # BLD AUTO: 0.36 THOUSANDS/ΜL (ref 0.6–4.47)
LYMPHOCYTES # BLD AUTO: 0.39 THOUSANDS/ÂΜL (ref 0.6–4.47)
LYMPHOCYTES # BLD AUTO: 0.42 THOUSANDS/ÂΜL (ref 0.6–4.47)
LYMPHOCYTES # BLD AUTO: 0.43 THOUSANDS/ÂΜL (ref 0.6–4.47)
LYMPHOCYTES # BLD AUTO: 0.45 THOUSANDS/ÂΜL (ref 0.6–4.47)
LYMPHOCYTES # BLD AUTO: 0.57 THOUSAND/UL (ref 0.6–4.47)
LYMPHOCYTES # BLD AUTO: 1 %
LYMPHOCYTES # BLD AUTO: 1 %
LYMPHOCYTES # BLD AUTO: 1 % (ref 14–44)
LYMPHOCYTES NFR BLD AUTO: 16 %
LYMPHOCYTES NFR BLD AUTO: 27 %
LYMPHOCYTES NFR BLD AUTO: 4 % (ref 14–44)
LYMPHOCYTES NFR BLD AUTO: 5 % (ref 14–44)
LYMPHOCYTES NFR BLD AUTO: 6 % (ref 14–44)
LYMPHOCYTES NFR BLD AUTO: 7 % (ref 14–44)
LYMPHOCYTES NFR BLD AUTO: 8 % (ref 14–44)
MACROCYTES BLD QL AUTO: PRESENT
MAGNESIUM SERPL-MCNC: 1.5 MG/DL (ref 1.9–2.7)
MAGNESIUM SERPL-MCNC: 1.8 MG/DL (ref 1.9–2.7)
MAGNESIUM SERPL-MCNC: 1.9 MG/DL (ref 1.9–2.7)
MAGNESIUM SERPL-MCNC: 1.9 MG/DL (ref 1.9–2.7)
MAGNESIUM SERPL-MCNC: 2 MG/DL (ref 1.9–2.7)
MAGNESIUM SERPL-MCNC: 2 MG/DL (ref 1.9–2.7)
MAGNESIUM SERPL-MCNC: 2.1 MG/DL (ref 1.9–2.7)
MAGNESIUM SERPL-MCNC: 2.1 MG/DL (ref 1.9–2.7)
MAGNESIUM SERPL-MCNC: 2.2 MG/DL (ref 1.9–2.7)
MAGNESIUM SERPL-MCNC: 2.4 MG/DL (ref 1.9–2.7)
MAGNESIUM SERPL-MCNC: 2.5 MG/DL (ref 1.9–2.7)
MAGNESIUM SERPL-MCNC: 2.5 MG/DL (ref 1.9–2.7)
MAGNESIUM SERPL-MCNC: 3.7 MG/DL (ref 1.9–2.7)
MCH RBC QN AUTO: 29.7 PG (ref 26.8–34.3)
MCH RBC QN AUTO: 30 PG (ref 26.8–34.3)
MCH RBC QN AUTO: 30 PG (ref 26.8–34.3)
MCH RBC QN AUTO: 30.2 PG (ref 26.8–34.3)
MCH RBC QN AUTO: 30.5 PG (ref 26.8–34.3)
MCH RBC QN AUTO: 30.7 PG (ref 26.8–34.3)
MCH RBC QN AUTO: 30.9 PG (ref 26.8–34.3)
MCH RBC QN AUTO: 31 PG (ref 26.8–34.3)
MCH RBC QN AUTO: 31.2 PG (ref 26.8–34.3)
MCH RBC QN AUTO: 31.4 PG (ref 26.8–34.3)
MCH RBC QN AUTO: 31.4 PG (ref 26.8–34.3)
MCHC RBC AUTO-ENTMCNC: 30.7 G/DL (ref 31.4–37.4)
MCHC RBC AUTO-ENTMCNC: 31 G/DL (ref 31.4–37.4)
MCHC RBC AUTO-ENTMCNC: 31 G/DL (ref 31.4–37.4)
MCHC RBC AUTO-ENTMCNC: 31.4 G/DL (ref 31.4–37.4)
MCHC RBC AUTO-ENTMCNC: 31.5 G/DL (ref 31.4–37.4)
MCHC RBC AUTO-ENTMCNC: 31.5 G/DL (ref 31.4–37.4)
MCHC RBC AUTO-ENTMCNC: 31.6 G/DL (ref 31.4–37.4)
MCHC RBC AUTO-ENTMCNC: 31.7 G/DL (ref 31.4–37.4)
MCHC RBC AUTO-ENTMCNC: 31.9 G/DL (ref 31.4–37.4)
MCHC RBC AUTO-ENTMCNC: 32 G/DL (ref 31.4–37.4)
MCHC RBC AUTO-ENTMCNC: 32.1 G/DL (ref 31.4–37.4)
MCHC RBC AUTO-ENTMCNC: 32.4 G/DL (ref 31.4–37.4)
MCHC RBC AUTO-ENTMCNC: 32.4 G/DL (ref 31.4–37.4)
MCV RBC AUTO: 101 FL (ref 82–98)
MCV RBC AUTO: 101 FL (ref 82–98)
MCV RBC AUTO: 95 FL (ref 82–98)
MCV RBC AUTO: 96 FL (ref 82–98)
MCV RBC AUTO: 97 FL (ref 82–98)
MCV RBC AUTO: 99 FL (ref 82–98)
MCV RBC AUTO: 99 FL (ref 82–98)
METAMYELOCYTES # BLD MANUAL: 0.06 THOUSAND/UL (ref 0–0.1)
METAMYELOCYTES NFR BLD MANUAL: 1 % (ref 0–1)
MICROALBUMIN UR-MCNC: 62 MG/L
MICROALBUMIN/CREAT 24H UR: 236 MG/G CREATININE (ref 0–30)
MONOCYTES # BLD AUTO: 0.1 THOUSAND/UL (ref 0–1.22)
MONOCYTES # BLD AUTO: 0.17 THOUSAND/UL (ref 0–1.22)
MONOCYTES # BLD AUTO: 0.28 THOUSAND/UL (ref 0–1.22)
MONOCYTES # BLD AUTO: 0.32 THOUSAND/ÂΜL (ref 0.17–1.22)
MONOCYTES # BLD AUTO: 0.34 THOUSAND/UL (ref 0–1.22)
MONOCYTES # BLD AUTO: 0.38 THOUSAND/ΜL (ref 0.17–1.22)
MONOCYTES # BLD AUTO: 0.54 THOUSAND/ÂΜL (ref 0.17–1.22)
MONOCYTES # BLD AUTO: 0.56 THOUSAND/ÂΜL (ref 0.17–1.22)
MONOCYTES # BLD AUTO: 0.61 THOUSAND/ÂΜL (ref 0.17–1.22)
MONOCYTES NFR BLD AUTO: 2 % (ref 4–12)
MONOCYTES NFR BLD AUTO: 3 % (ref 4–12)
MONOCYTES NFR BLD AUTO: 36 %
MONOCYTES NFR BLD AUTO: 51 %
MONOCYTES NFR BLD AUTO: 6 % (ref 4–12)
MONOCYTES NFR BLD AUTO: 7 % (ref 4–12)
MONOCYTES NFR BLD AUTO: 9 % (ref 4–12)
MONOCYTES NFR BLD: 3 % (ref 4–12)
MONOCYTES NFR BLD: 3 % (ref 4–12)
MRSA NOSE QL CULT: NORMAL
MV E'TISSUE VEL-SEP: 5 CM/S
MV PEAK A VEL: 1 M/S
MV PEAK E VEL: 67 CM/S
MV STENOSIS PRESSURE HALF TIME: 37 MS
MV VALVE AREA P 1/2 METHOD: 5.95
NEUTROPHILS # BLD AUTO: 4.35 THOUSANDS/ÂΜL (ref 1.85–7.62)
NEUTROPHILS # BLD AUTO: 4.83 THOUSANDS/ΜL (ref 1.85–7.62)
NEUTROPHILS # BLD AUTO: 5.11 THOUSANDS/ÂΜL (ref 1.85–7.62)
NEUTROPHILS # BLD AUTO: 8.08 THOUSANDS/ÂΜL (ref 1.85–7.62)
NEUTROPHILS # BLD AUTO: 8.61 THOUSANDS/ÂΜL (ref 1.85–7.62)
NEUTROPHILS # BLD MANUAL: 10.53 THOUSAND/UL (ref 1.85–7.62)
NEUTROPHILS # BLD MANUAL: 8.82 THOUSAND/UL (ref 1.85–7.62)
NEUTS BAND NFR BLD MANUAL: 1 % (ref 0–8)
NEUTS BAND NFR BLD MANUAL: 2 % (ref 0–8)
NEUTS BAND NFR BLD MANUAL: 5 % (ref 0–8)
NEUTS BAND NFR BLD MANUAL: 9 % (ref 0–8)
NEUTS SEG # BLD: 4.96 THOUSAND/UL (ref 1.81–6.82)
NEUTS SEG # BLD: 5.43 THOUSAND/UL (ref 1.81–6.82)
NEUTS SEG NFR BLD AUTO: 22 %
NEUTS SEG NFR BLD AUTO: 34 %
NEUTS SEG NFR BLD AUTO: 80 % (ref 43–75)
NEUTS SEG NFR BLD AUTO: 83 % (ref 43–75)
NEUTS SEG NFR BLD AUTO: 83 % (ref 43–75)
NEUTS SEG NFR BLD AUTO: 86 % (ref 43–75)
NEUTS SEG NFR BLD AUTO: 86 % (ref 43–75)
NEUTS SEG NFR BLD AUTO: 88 % (ref 43–75)
NEUTS SEG NFR BLD AUTO: 89 %
NEUTS SEG NFR BLD AUTO: 91 % (ref 43–75)
NEUTS SEG NFR BLD AUTO: 94 %
NRBC BLD AUTO-RTO: 0 /100 WBCS
O2 CT BLDV-SCNC: 8.6 ML/DL
OSMOLALITY UR/SERPL-RTO: 278 MMOL/KG (ref 282–298)
OSMOLALITY UR: 288 MMOL/KG (ref 250–900)
OSMOLALITY UR: 311 MMOL/KG (ref 250–900)
P AXIS: 54 DEGREES
P AXIS: 64 DEGREES
PCO2 BLD: 26 MMOL/L (ref 21–32)
PCO2 BLD: 46.8 MM HG (ref 36–44)
PCO2 BLDV: 56.5 MM HG (ref 42–50)
PH BLD: 7.32 [PH] (ref 7.35–7.45)
PH BLDV: 7.42 [PH] (ref 7.3–7.4)
PHOSPHATE SERPL-MCNC: 2.2 MG/DL (ref 2.3–4.1)
PHOSPHATE SERPL-MCNC: 2.6 MG/DL (ref 2.3–4.1)
PHOSPHATE SERPL-MCNC: 2.7 MG/DL (ref 2.3–4.1)
PHOSPHATE SERPL-MCNC: 2.9 MG/DL (ref 2.3–4.1)
PHOSPHATE SERPL-MCNC: 2.9 MG/DL (ref 2.3–4.1)
PHOSPHATE SERPL-MCNC: 3.2 MG/DL (ref 2.3–4.1)
PHOSPHATE SERPL-MCNC: 3.2 MG/DL (ref 2.3–4.1)
PHOSPHATE SERPL-MCNC: 3.3 MG/DL (ref 2.3–4.1)
PHOSPHATE SERPL-MCNC: 3.7 MG/DL (ref 2.3–4.1)
PHOSPHATE SERPL-MCNC: 4.2 MG/DL (ref 2.3–4.1)
PLATELET # BLD AUTO: 100 THOUSANDS/UL (ref 149–390)
PLATELET # BLD AUTO: 101 THOUSANDS/UL (ref 149–390)
PLATELET # BLD AUTO: 127 THOUSANDS/UL (ref 149–390)
PLATELET # BLD AUTO: 136 THOUSANDS/UL (ref 149–390)
PLATELET # BLD AUTO: 143 THOUSANDS/UL (ref 149–390)
PLATELET # BLD AUTO: 147 THOUSANDS/UL (ref 149–390)
PLATELET # BLD AUTO: 148 THOUSANDS/UL (ref 149–390)
PLATELET # BLD AUTO: 151 THOUSANDS/UL (ref 149–390)
PLATELET # BLD AUTO: 152 THOUSANDS/UL (ref 149–390)
PLATELET # BLD AUTO: 75 THOUSANDS/UL (ref 149–390)
PLATELET # BLD AUTO: 76 THOUSANDS/UL (ref 149–390)
PLATELET # BLD AUTO: 77 THOUSANDS/UL (ref 149–390)
PLATELET # BLD AUTO: 79 THOUSANDS/UL (ref 149–390)
PLATELET # BLD AUTO: 86 THOUSANDS/UL (ref 149–390)
PLATELET # BLD AUTO: 96 THOUSANDS/UL (ref 149–390)
PLATELET BLD QL SMEAR: ABNORMAL
PMV BLD AUTO: 10.1 FL (ref 8.9–12.7)
PMV BLD AUTO: 10.1 FL (ref 8.9–12.7)
PMV BLD AUTO: 10.2 FL (ref 8.9–12.7)
PMV BLD AUTO: 10.2 FL (ref 8.9–12.7)
PMV BLD AUTO: 10.5 FL (ref 8.9–12.7)
PMV BLD AUTO: 10.6 FL (ref 8.9–12.7)
PMV BLD AUTO: 10.6 FL (ref 8.9–12.7)
PMV BLD AUTO: 10.8 FL (ref 8.9–12.7)
PMV BLD AUTO: 10.9 FL (ref 8.9–12.7)
PMV BLD AUTO: 10.9 FL (ref 8.9–12.7)
PMV BLD AUTO: 11 FL (ref 8.9–12.7)
PMV BLD AUTO: 9.9 FL (ref 8.9–12.7)
PMV BLD AUTO: 9.9 FL (ref 8.9–12.7)
PO2 BLD: 59 MM HG (ref 75–129)
PO2 BLDV: 40 MM HG (ref 35–45)
POIKILOCYTOSIS BLD QL SMEAR: PRESENT
POLYCHROMASIA BLD QL SMEAR: PRESENT
POTASSIUM BLD-SCNC: 5.1 MMOL/L (ref 3.5–5.3)
POTASSIUM SERPL-SCNC: 3.8 MMOL/L (ref 3.5–5.3)
POTASSIUM SERPL-SCNC: 3.9 MMOL/L (ref 3.5–5.3)
POTASSIUM SERPL-SCNC: 4.1 MMOL/L (ref 3.5–5.3)
POTASSIUM SERPL-SCNC: 4.5 MMOL/L (ref 3.5–5.3)
POTASSIUM SERPL-SCNC: 4.6 MMOL/L (ref 3.5–5.3)
POTASSIUM SERPL-SCNC: 4.8 MMOL/L (ref 3.5–5.3)
POTASSIUM SERPL-SCNC: 4.8 MMOL/L (ref 3.5–5.3)
POTASSIUM SERPL-SCNC: 4.9 MMOL/L (ref 3.5–5.3)
POTASSIUM SERPL-SCNC: 5 MMOL/L (ref 3.5–5.3)
POTASSIUM SERPL-SCNC: 5.1 MMOL/L (ref 3.5–5.3)
POTASSIUM SERPL-SCNC: 5.2 MMOL/L (ref 3.5–5.3)
POTASSIUM SERPL-SCNC: 5.3 MMOL/L (ref 3.5–5.3)
POTASSIUM SERPL-SCNC: 5.4 MMOL/L (ref 3.5–5.3)
POTASSIUM SERPL-SCNC: 5.4 MMOL/L (ref 3.5–5.3)
POTASSIUM SERPL-SCNC: 5.5 MMOL/L (ref 3.5–5.3)
POTASSIUM SERPL-SCNC: 5.7 MMOL/L (ref 3.5–5.3)
POTASSIUM SERPL-SCNC: 5.8 MMOL/L (ref 3.5–5.3)
POTASSIUM SERPL-SCNC: 5.9 MMOL/L (ref 3.5–5.3)
PR INTERVAL: 182 MS
PR INTERVAL: 190 MS
PROT FLD-MCNC: 3.6 G/DL
PROT FLD-MCNC: <3 G/DL
PROT SERPL-MCNC: 3.9 G/DL (ref 6.4–8.4)
PROT SERPL-MCNC: 3.9 G/DL (ref 6.4–8.4)
PROT SERPL-MCNC: 4.4 G/DL (ref 6.4–8.4)
PROT SERPL-MCNC: 4.4 G/DL (ref 6.4–8.4)
PROT SERPL-MCNC: 4.5 G/DL (ref 6.4–8.4)
PROT SERPL-MCNC: 4.9 G/DL (ref 6.4–8.4)
QRS AXIS: -10 DEGREES
QRS AXIS: -2 DEGREES
QRSD INTERVAL: 86 MS
QRSD INTERVAL: 88 MS
QT INTERVAL: 320 MS
QT INTERVAL: 390 MS
QTC INTERVAL: 437 MS
QTC INTERVAL: 445 MS
RA PRESSURE ESTIMATED: 8 MMHG
RBC # BLD AUTO: 2.07 MILLION/UL (ref 3.81–5.12)
RBC # BLD AUTO: 2.15 MILLION/UL (ref 3.81–5.12)
RBC # BLD AUTO: 2.23 MILLION/UL (ref 3.81–5.12)
RBC # BLD AUTO: 2.23 MILLION/UL (ref 3.81–5.12)
RBC # BLD AUTO: 2.45 MILLION/UL (ref 3.81–5.12)
RBC # BLD AUTO: 2.5 MILLION/UL (ref 3.81–5.12)
RBC # BLD AUTO: 2.55 MILLION/UL (ref 3.81–5.12)
RBC # BLD AUTO: 2.59 MILLION/UL (ref 3.81–5.12)
RBC # BLD AUTO: 2.68 MILLION/UL (ref 3.81–5.12)
RBC # BLD AUTO: 2.7 MILLION/UL (ref 3.81–5.12)
RBC # BLD AUTO: 2.78 MILLION/UL (ref 3.81–5.12)
RBC # BLD AUTO: 2.82 MILLION/UL (ref 3.81–5.12)
RBC # BLD AUTO: 2.85 MILLION/UL (ref 3.81–5.12)
RBC MORPH BLD: PRESENT
RETICS # AUTO: ABNORMAL 10*3/UL (ref 14097–95744)
RETICS # CALC: 4.01 % (ref 0.37–1.87)
RH BLD: POSITIVE
RIGHT ATRIUM AREA SYSTOLE A4C: 13.6 CM2
RIGHT VENTRICLE ID DIMENSION: 3.7 CM
SAO2 % BLD FROM PO2: 88 % (ref 60–85)
SITE: NORMAL
SITE: NORMAL
SL CV LEFT ATRIUM LENGTH A2C: 5.9 CM
SL CV LV EF: 65
SL CV PED ECHO LEFT VENTRICLE DIASTOLIC VOLUME (MOD BIPLANE) 2D: 125 ML
SL CV PED ECHO LEFT VENTRICLE SYSTOLIC VOLUME (MOD BIPLANE) 2D: 43 ML
SODIUM 24H UR-SCNC: 61 MOL/L
SODIUM 24H UR-SCNC: 68 MOL/L
SODIUM BLD-SCNC: 128 MMOL/L (ref 136–145)
SODIUM SERPL-SCNC: 124 MMOL/L (ref 135–147)
SODIUM SERPL-SCNC: 124 MMOL/L (ref 135–147)
SODIUM SERPL-SCNC: 125 MMOL/L (ref 135–147)
SODIUM SERPL-SCNC: 126 MMOL/L (ref 135–147)
SODIUM SERPL-SCNC: 127 MMOL/L (ref 135–147)
SODIUM SERPL-SCNC: 128 MMOL/L (ref 135–147)
SODIUM SERPL-SCNC: 129 MMOL/L (ref 135–147)
SODIUM SERPL-SCNC: 130 MMOL/L (ref 135–147)
SODIUM SERPL-SCNC: 131 MMOL/L (ref 135–147)
SODIUM SERPL-SCNC: 132 MMOL/L (ref 135–147)
SODIUM SERPL-SCNC: 133 MMOL/L (ref 135–147)
SPECIMEN EXPIRATION DATE: NORMAL
SPECIMEN SOURCE: ABNORMAL
T WAVE AXIS: 49 DEGREES
T WAVE AXIS: 88 DEGREES
T4 FREE SERPL-MCNC: 0.65 NG/DL (ref 0.61–1.12)
T4 FREE SERPL-MCNC: 0.7 NG/DL (ref 0.61–1.12)
TIBC SERPL-MCNC: 152.6 UG/DL (ref 250–450)
TOTAL CELLS COUNTED SPEC: 100
TOTAL PROTEIN FLUID: 2.2 G/DL
TRANSFERRIN SERPL-MCNC: 109 MG/DL (ref 203–362)
TRICUSPID ANNULAR PLANE SYSTOLIC EXCURSION: 2.5 CM
TSH SERPL DL<=0.05 MIU/L-ACNC: 11.16 UIU/ML (ref 0.45–4.5)
TSH SERPL DL<=0.05 MIU/L-ACNC: 21.12 UIU/ML (ref 0.45–4.5)
UIBC SERPL-MCNC: 117 UG/DL (ref 155–355)
UNIT DISPENSE STATUS: NORMAL
UNIT PRODUCT CODE: NORMAL
UNIT PRODUCT VOLUME: 350 ML
UNIT RH: NORMAL
VANCOMYCIN SERPL-MCNC: 36.7 UG/ML (ref 10–20)
VANCOMYCIN SERPL-MCNC: 9.2 UG/ML (ref 10–20)
VARIANT LYMPHS # BLD AUTO: 5 %
VENTRICULAR RATE: 112 BPM
VENTRICULAR RATE: 78 BPM
VIT B12 SERPL-MCNC: 1722 PG/ML (ref 180–914)
VZV DNA SPEC QL NAA+PROBE: NEGATIVE
WBC # BLD AUTO: 11.45 THOUSAND/UL (ref 4.31–10.16)
WBC # BLD AUTO: 4.41 THOUSAND/UL (ref 4.31–10.16)
WBC # BLD AUTO: 4.82 THOUSAND/UL (ref 4.31–10.16)
WBC # BLD AUTO: 5.17 THOUSAND/UL (ref 4.31–10.16)
WBC # BLD AUTO: 5.2 THOUSAND/UL (ref 4.31–10.16)
WBC # BLD AUTO: 5.4 THOUSAND/UL (ref 4.31–10.16)
WBC # BLD AUTO: 5.78 THOUSAND/UL (ref 4.31–10.16)
WBC # BLD AUTO: 5.93 THOUSAND/UL (ref 4.31–10.16)
WBC # BLD AUTO: 6.06 THOUSAND/UL (ref 4.31–10.16)
WBC # BLD AUTO: 6.1 THOUSAND/UL (ref 4.31–10.16)
WBC # BLD AUTO: 6.26 THOUSAND/UL (ref 4.31–10.16)
WBC # BLD AUTO: 6.34 THOUSAND/UL (ref 4.31–10.16)
WBC # BLD AUTO: 9.28 THOUSAND/UL (ref 4.31–10.16)
WBC # BLD AUTO: 9.34 THOUSAND/UL (ref 4.31–10.16)
WBC # BLD AUTO: 9.87 THOUSAND/UL (ref 4.31–10.16)
WBC # FLD MANUAL: 131 /UL
WBC # FLD MANUAL: 310 /UL

## 2024-01-01 PROCEDURE — 82043 UR ALBUMIN QUANTITATIVE: CPT | Performed by: INTERNAL MEDICINE

## 2024-01-01 PROCEDURE — 82948 REAGENT STRIP/BLOOD GLUCOSE: CPT

## 2024-01-01 PROCEDURE — 85025 COMPLETE CBC W/AUTO DIFF WBC: CPT

## 2024-01-01 PROCEDURE — 43246 EGD PLACE GASTROSTOMY TUBE: CPT | Performed by: INTERNAL MEDICINE

## 2024-01-01 PROCEDURE — 97163 PT EVAL HIGH COMPLEX 45 MIN: CPT

## 2024-01-01 PROCEDURE — 83010 ASSAY OF HAPTOGLOBIN QUANT: CPT | Performed by: INTERNAL MEDICINE

## 2024-01-01 PROCEDURE — 93005 ELECTROCARDIOGRAM TRACING: CPT

## 2024-01-01 PROCEDURE — 87186 SC STD MICRODIL/AGAR DIL: CPT | Performed by: FAMILY MEDICINE

## 2024-01-01 PROCEDURE — 82746 ASSAY OF FOLIC ACID SERUM: CPT | Performed by: INTERNAL MEDICINE

## 2024-01-01 PROCEDURE — 85014 HEMATOCRIT: CPT

## 2024-01-01 PROCEDURE — 97110 THERAPEUTIC EXERCISES: CPT

## 2024-01-01 PROCEDURE — 92526 ORAL FUNCTION THERAPY: CPT

## 2024-01-01 PROCEDURE — 99232 SBSQ HOSP IP/OBS MODERATE 35: CPT | Performed by: INTERNAL MEDICINE

## 2024-01-01 PROCEDURE — 80048 BASIC METABOLIC PNL TOTAL CA: CPT | Performed by: INTERNAL MEDICINE

## 2024-01-01 PROCEDURE — 99291 CRITICAL CARE FIRST HOUR: CPT | Performed by: INTERNAL MEDICINE

## 2024-01-01 PROCEDURE — 86850 RBC ANTIBODY SCREEN: CPT | Performed by: PHYSICIAN ASSISTANT

## 2024-01-01 PROCEDURE — 87070 CULTURE OTHR SPECIMN AEROBIC: CPT | Performed by: FAMILY MEDICINE

## 2024-01-01 PROCEDURE — 82042 OTHER SOURCE ALBUMIN QUAN EA: CPT | Performed by: INTERNAL MEDICINE

## 2024-01-01 PROCEDURE — 99222 1ST HOSP IP/OBS MODERATE 55: CPT | Performed by: INTERNAL MEDICINE

## 2024-01-01 PROCEDURE — P9040 RBC LEUKOREDUCED IRRADIATED: HCPCS

## 2024-01-01 PROCEDURE — 96374 THER/PROPH/DIAG INJ IV PUSH: CPT

## 2024-01-01 PROCEDURE — 84100 ASSAY OF PHOSPHORUS: CPT | Performed by: INTERNAL MEDICINE

## 2024-01-01 PROCEDURE — 83615 LACTATE (LD) (LDH) ENZYME: CPT | Performed by: INTERNAL MEDICINE

## 2024-01-01 PROCEDURE — 99232 SBSQ HOSP IP/OBS MODERATE 35: CPT | Performed by: FAMILY MEDICINE

## 2024-01-01 PROCEDURE — 49083 ABD PARACENTESIS W/IMAGING: CPT

## 2024-01-01 PROCEDURE — 82607 VITAMIN B-12: CPT | Performed by: INTERNAL MEDICINE

## 2024-01-01 PROCEDURE — 99233 SBSQ HOSP IP/OBS HIGH 50: CPT | Performed by: INTERNAL MEDICINE

## 2024-01-01 PROCEDURE — 85007 BL SMEAR W/DIFF WBC COUNT: CPT | Performed by: INTERNAL MEDICINE

## 2024-01-01 PROCEDURE — 82945 GLUCOSE OTHER FLUID: CPT | Performed by: INTERNAL MEDICINE

## 2024-01-01 PROCEDURE — 84100 ASSAY OF PHOSPHORUS: CPT

## 2024-01-01 PROCEDURE — 93010 ELECTROCARDIOGRAM REPORT: CPT | Performed by: INTERNAL MEDICINE

## 2024-01-01 PROCEDURE — 83540 ASSAY OF IRON: CPT | Performed by: INTERNAL MEDICINE

## 2024-01-01 PROCEDURE — 83735 ASSAY OF MAGNESIUM: CPT | Performed by: INTERNAL MEDICINE

## 2024-01-01 PROCEDURE — 99233 SBSQ HOSP IP/OBS HIGH 50: CPT | Performed by: FAMILY MEDICINE

## 2024-01-01 PROCEDURE — 97530 THERAPEUTIC ACTIVITIES: CPT

## 2024-01-01 PROCEDURE — 80076 HEPATIC FUNCTION PANEL: CPT | Performed by: INTERNAL MEDICINE

## 2024-01-01 PROCEDURE — 3E0G76Z INTRODUCTION OF NUTRITIONAL SUBSTANCE INTO UPPER GI, VIA NATURAL OR ARTIFICIAL OPENING: ICD-10-PCS | Performed by: INTERNAL MEDICINE

## 2024-01-01 PROCEDURE — 74177 CT ABD & PELVIS W/CONTRAST: CPT

## 2024-01-01 PROCEDURE — 80048 BASIC METABOLIC PNL TOTAL CA: CPT | Performed by: FAMILY MEDICINE

## 2024-01-01 PROCEDURE — 99233 SBSQ HOSP IP/OBS HIGH 50: CPT

## 2024-01-01 PROCEDURE — 87077 CULTURE AEROBIC IDENTIFY: CPT | Performed by: FAMILY MEDICINE

## 2024-01-01 PROCEDURE — 88305 TISSUE EXAM BY PATHOLOGIST: CPT | Performed by: PATHOLOGY

## 2024-01-01 PROCEDURE — 85025 COMPLETE CBC W/AUTO DIFF WBC: CPT | Performed by: INTERNAL MEDICINE

## 2024-01-01 PROCEDURE — 86920 COMPATIBILITY TEST SPIN: CPT

## 2024-01-01 PROCEDURE — 80048 BASIC METABOLIC PNL TOTAL CA: CPT

## 2024-01-01 PROCEDURE — 97112 NEUROMUSCULAR REEDUCATION: CPT

## 2024-01-01 PROCEDURE — 0D20XUZ CHANGE FEEDING DEVICE IN UPPER INTESTINAL TRACT, EXTERNAL APPROACH: ICD-10-PCS | Performed by: INTERNAL MEDICINE

## 2024-01-01 PROCEDURE — 84132 ASSAY OF SERUM POTASSIUM: CPT

## 2024-01-01 PROCEDURE — 82728 ASSAY OF FERRITIN: CPT | Performed by: INTERNAL MEDICINE

## 2024-01-01 PROCEDURE — 88112 CYTOPATH CELL ENHANCE TECH: CPT | Performed by: PATHOLOGY

## 2024-01-01 PROCEDURE — 44373 SMALL BOWEL ENDOSCOPY: CPT | Performed by: INTERNAL MEDICINE

## 2024-01-01 PROCEDURE — 99285 EMERGENCY DEPT VISIT HI MDM: CPT | Performed by: EMERGENCY MEDICINE

## 2024-01-01 PROCEDURE — 99024 POSTOP FOLLOW-UP VISIT: CPT | Performed by: STUDENT IN AN ORGANIZED HEALTH CARE EDUCATION/TRAINING PROGRAM

## 2024-01-01 PROCEDURE — 85014 HEMATOCRIT: CPT | Performed by: PHYSICIAN ASSISTANT

## 2024-01-01 PROCEDURE — 94003 VENT MGMT INPAT SUBQ DAY: CPT

## 2024-01-01 PROCEDURE — 99024 POSTOP FOLLOW-UP VISIT: CPT | Performed by: SURGERY

## 2024-01-01 PROCEDURE — 86900 BLOOD TYPING SEROLOGIC ABO: CPT | Performed by: PHYSICIAN ASSISTANT

## 2024-01-01 PROCEDURE — 85018 HEMOGLOBIN: CPT | Performed by: PHYSICIAN ASSISTANT

## 2024-01-01 PROCEDURE — 97535 SELF CARE MNGMENT TRAINING: CPT

## 2024-01-01 PROCEDURE — NC001 PR NO CHARGE: Performed by: INTERNAL MEDICINE

## 2024-01-01 PROCEDURE — 85025 COMPLETE CBC W/AUTO DIFF WBC: CPT | Performed by: FAMILY MEDICINE

## 2024-01-01 PROCEDURE — 94664 DEMO&/EVAL PT USE INHALER: CPT

## 2024-01-01 PROCEDURE — 85027 COMPLETE CBC AUTOMATED: CPT | Performed by: INTERNAL MEDICINE

## 2024-01-01 PROCEDURE — 85018 HEMOGLOBIN: CPT

## 2024-01-01 PROCEDURE — 94760 N-INVAS EAR/PLS OXIMETRY 1: CPT

## 2024-01-01 PROCEDURE — 87205 SMEAR GRAM STAIN: CPT | Performed by: FAMILY MEDICINE

## 2024-01-01 PROCEDURE — 80053 COMPREHEN METABOLIC PANEL: CPT | Performed by: FAMILY MEDICINE

## 2024-01-01 PROCEDURE — 80053 COMPREHEN METABOLIC PANEL: CPT | Performed by: INTERNAL MEDICINE

## 2024-01-01 PROCEDURE — 87798 DETECT AGENT NOS DNA AMP: CPT

## 2024-01-01 PROCEDURE — 88342 IMHCHEM/IMCYTCHM 1ST ANTB: CPT | Performed by: STUDENT IN AN ORGANIZED HEALTH CARE EDUCATION/TRAINING PROGRAM

## 2024-01-01 PROCEDURE — P9016 RBC LEUKOCYTES REDUCED: HCPCS

## 2024-01-01 PROCEDURE — 88313 SPECIAL STAINS GROUP 2: CPT | Performed by: STUDENT IN AN ORGANIZED HEALTH CARE EDUCATION/TRAINING PROGRAM

## 2024-01-01 PROCEDURE — 83690 ASSAY OF LIPASE: CPT

## 2024-01-01 PROCEDURE — 88305 TISSUE EXAM BY PATHOLOGIST: CPT | Performed by: STUDENT IN AN ORGANIZED HEALTH CARE EDUCATION/TRAINING PROGRAM

## 2024-01-01 PROCEDURE — 83935 ASSAY OF URINE OSMOLALITY: CPT | Performed by: INTERNAL MEDICINE

## 2024-01-01 PROCEDURE — 84300 ASSAY OF URINE SODIUM: CPT | Performed by: INTERNAL MEDICINE

## 2024-01-01 PROCEDURE — 86901 BLOOD TYPING SEROLOGIC RH(D): CPT | Performed by: STUDENT IN AN ORGANIZED HEALTH CARE EDUCATION/TRAINING PROGRAM

## 2024-01-01 PROCEDURE — 74018 RADEX ABDOMEN 1 VIEW: CPT

## 2024-01-01 PROCEDURE — 97167 OT EVAL HIGH COMPLEX 60 MIN: CPT

## 2024-01-01 PROCEDURE — 89051 BODY FLUID CELL COUNT: CPT | Performed by: INTERNAL MEDICINE

## 2024-01-01 PROCEDURE — 85045 AUTOMATED RETICULOCYTE COUNT: CPT | Performed by: INTERNAL MEDICINE

## 2024-01-01 PROCEDURE — 80202 ASSAY OF VANCOMYCIN: CPT | Performed by: INTERNAL MEDICINE

## 2024-01-01 PROCEDURE — 82805 BLOOD GASES W/O2 SATURATION: CPT | Performed by: FAMILY MEDICINE

## 2024-01-01 PROCEDURE — 87106 FUNGI IDENTIFICATION YEAST: CPT | Performed by: FAMILY MEDICINE

## 2024-01-01 PROCEDURE — 86850 RBC ANTIBODY SCREEN: CPT | Performed by: STUDENT IN AN ORGANIZED HEALTH CARE EDUCATION/TRAINING PROGRAM

## 2024-01-01 PROCEDURE — 86850 RBC ANTIBODY SCREEN: CPT | Performed by: FAMILY MEDICINE

## 2024-01-01 PROCEDURE — 83550 IRON BINDING TEST: CPT | Performed by: INTERNAL MEDICINE

## 2024-01-01 PROCEDURE — 36415 COLL VENOUS BLD VENIPUNCTURE: CPT

## 2024-01-01 PROCEDURE — 84439 ASSAY OF FREE THYROXINE: CPT | Performed by: INTERNAL MEDICINE

## 2024-01-01 PROCEDURE — 93306 TTE W/DOPPLER COMPLETE: CPT

## 2024-01-01 PROCEDURE — 85018 HEMOGLOBIN: CPT | Performed by: INTERNAL MEDICINE

## 2024-01-01 PROCEDURE — 0W9G3ZZ DRAINAGE OF PERITONEAL CAVITY, PERCUTANEOUS APPROACH: ICD-10-PCS | Performed by: INTERNAL MEDICINE

## 2024-01-01 PROCEDURE — 86901 BLOOD TYPING SEROLOGIC RH(D): CPT | Performed by: PHYSICIAN ASSISTANT

## 2024-01-01 PROCEDURE — 83735 ASSAY OF MAGNESIUM: CPT | Performed by: FAMILY MEDICINE

## 2024-01-01 PROCEDURE — 88341 IMHCHEM/IMCYTCHM EA ADD ANTB: CPT | Performed by: STUDENT IN AN ORGANIZED HEALTH CARE EDUCATION/TRAINING PROGRAM

## 2024-01-01 PROCEDURE — 86900 BLOOD TYPING SEROLOGIC ABO: CPT | Performed by: STUDENT IN AN ORGANIZED HEALTH CARE EDUCATION/TRAINING PROGRAM

## 2024-01-01 PROCEDURE — 99223 1ST HOSP IP/OBS HIGH 75: CPT | Performed by: INTERNAL MEDICINE

## 2024-01-01 PROCEDURE — 49083 ABD PARACENTESIS W/IMAGING: CPT | Performed by: PHYSICIAN ASSISTANT

## 2024-01-01 PROCEDURE — 84157 ASSAY OF PROTEIN OTHER: CPT | Performed by: INTERNAL MEDICINE

## 2024-01-01 PROCEDURE — 94760 N-INVAS EAR/PLS OXIMETRY 1: CPT | Performed by: SOCIAL WORKER

## 2024-01-01 PROCEDURE — 83735 ASSAY OF MAGNESIUM: CPT

## 2024-01-01 PROCEDURE — 82330 ASSAY OF CALCIUM: CPT

## 2024-01-01 PROCEDURE — 89051 BODY FLUID CELL COUNT: CPT | Performed by: FAMILY MEDICINE

## 2024-01-01 PROCEDURE — 82570 ASSAY OF URINE CREATININE: CPT | Performed by: INTERNAL MEDICINE

## 2024-01-01 PROCEDURE — 92610 EVALUATE SWALLOWING FUNCTION: CPT

## 2024-01-01 PROCEDURE — 85027 COMPLETE CBC AUTOMATED: CPT | Performed by: FAMILY MEDICINE

## 2024-01-01 PROCEDURE — 87205 SMEAR GRAM STAIN: CPT | Performed by: INTERNAL MEDICINE

## 2024-01-01 PROCEDURE — 99232 SBSQ HOSP IP/OBS MODERATE 35: CPT | Performed by: PHYSICIAN ASSISTANT

## 2024-01-01 PROCEDURE — 82803 BLOOD GASES ANY COMBINATION: CPT

## 2024-01-01 PROCEDURE — 85007 BL SMEAR W/DIFF WBC COUNT: CPT

## 2024-01-01 PROCEDURE — 85027 COMPLETE CBC AUTOMATED: CPT

## 2024-01-01 PROCEDURE — 87252 VIRUS INOCULATION TISSUE: CPT

## 2024-01-01 PROCEDURE — 30233N1 TRANSFUSION OF NONAUTOLOGOUS RED BLOOD CELLS INTO PERIPHERAL VEIN, PERCUTANEOUS APPROACH: ICD-10-PCS | Performed by: FAMILY MEDICINE

## 2024-01-01 PROCEDURE — 86923 COMPATIBILITY TEST ELECTRIC: CPT

## 2024-01-01 PROCEDURE — 85014 HEMATOCRIT: CPT | Performed by: INTERNAL MEDICINE

## 2024-01-01 PROCEDURE — 94640 AIRWAY INHALATION TREATMENT: CPT

## 2024-01-01 PROCEDURE — 82947 ASSAY GLUCOSE BLOOD QUANT: CPT

## 2024-01-01 PROCEDURE — 82945 GLUCOSE OTHER FLUID: CPT | Performed by: FAMILY MEDICINE

## 2024-01-01 PROCEDURE — 87075 CULTR BACTERIA EXCEPT BLOOD: CPT | Performed by: FAMILY MEDICINE

## 2024-01-01 PROCEDURE — 0DJ08ZZ INSPECTION OF UPPER INTESTINAL TRACT, VIA NATURAL OR ARTIFICIAL OPENING ENDOSCOPIC: ICD-10-PCS | Performed by: INTERNAL MEDICINE

## 2024-01-01 PROCEDURE — 44372 SMALL BOWEL ENDOSCOPY: CPT | Performed by: INTERNAL MEDICINE

## 2024-01-01 PROCEDURE — 87070 CULTURE OTHR SPECIMN AEROBIC: CPT | Performed by: INTERNAL MEDICINE

## 2024-01-01 PROCEDURE — 86901 BLOOD TYPING SEROLOGIC RH(D): CPT | Performed by: FAMILY MEDICINE

## 2024-01-01 PROCEDURE — C1729 CATH, DRAINAGE: HCPCS | Performed by: PHYSICIAN ASSISTANT

## 2024-01-01 PROCEDURE — 84443 ASSAY THYROID STIM HORMONE: CPT | Performed by: INTERNAL MEDICINE

## 2024-01-01 PROCEDURE — 88342 IMHCHEM/IMCYTCHM 1ST ANTB: CPT | Performed by: PATHOLOGY

## 2024-01-01 PROCEDURE — NC001 PR NO CHARGE: Performed by: REGISTERED NURSE

## 2024-01-01 PROCEDURE — 87081 CULTURE SCREEN ONLY: CPT | Performed by: FAMILY MEDICINE

## 2024-01-01 PROCEDURE — 99285 EMERGENCY DEPT VISIT HI MDM: CPT

## 2024-01-01 PROCEDURE — 84157 ASSAY OF PROTEIN OTHER: CPT | Performed by: FAMILY MEDICINE

## 2024-01-01 PROCEDURE — 71045 X-RAY EXAM CHEST 1 VIEW: CPT

## 2024-01-01 PROCEDURE — 36569 INSJ PICC 5 YR+ W/O IMAGING: CPT

## 2024-01-01 PROCEDURE — 93306 TTE W/DOPPLER COMPLETE: CPT | Performed by: INTERNAL MEDICINE

## 2024-01-01 PROCEDURE — 84295 ASSAY OF SERUM SODIUM: CPT

## 2024-01-01 PROCEDURE — 80053 COMPREHEN METABOLIC PANEL: CPT

## 2024-01-01 PROCEDURE — 88341 IMHCHEM/IMCYTCHM EA ADD ANTB: CPT | Performed by: PATHOLOGY

## 2024-01-01 PROCEDURE — 94002 VENT MGMT INPAT INIT DAY: CPT | Performed by: SOCIAL WORKER

## 2024-01-01 PROCEDURE — 83930 ASSAY OF BLOOD OSMOLALITY: CPT | Performed by: INTERNAL MEDICINE

## 2024-01-01 PROCEDURE — 86900 BLOOD TYPING SEROLOGIC ABO: CPT | Performed by: FAMILY MEDICINE

## 2024-01-01 RX ORDER — CARVEDILOL 12.5 MG/1
12.5 TABLET ORAL 2 TIMES DAILY WITH MEALS
Status: DISCONTINUED | OUTPATIENT
Start: 2024-01-01 | End: 2024-01-01

## 2024-01-01 RX ORDER — ALBUMIN (HUMAN) 12.5 G/50ML
12.5 SOLUTION INTRAVENOUS 2 TIMES DAILY
Status: COMPLETED | OUTPATIENT
Start: 2024-01-01 | End: 2024-01-01

## 2024-01-01 RX ORDER — FUROSEMIDE 10 MG/ML
40 INJECTION INTRAMUSCULAR; INTRAVENOUS
Status: DISCONTINUED | OUTPATIENT
Start: 2024-01-01 | End: 2024-01-01

## 2024-01-01 RX ORDER — OXYCODONE HCL 5 MG/5 ML
2.5 SOLUTION, ORAL ORAL EVERY 4 HOURS PRN
Refills: 0 | Status: DISCONTINUED | OUTPATIENT
Start: 2024-01-01 | End: 2024-01-01

## 2024-01-01 RX ORDER — NITROGLYCERIN 0.4 MG/1
0.4 TABLET SUBLINGUAL
Status: DISCONTINUED | OUTPATIENT
Start: 2024-01-01 | End: 2025-01-01

## 2024-01-01 RX ORDER — ACETAMINOPHEN 325 MG/1
975 TABLET ORAL EVERY 8 HOURS SCHEDULED
Status: DISCONTINUED | OUTPATIENT
Start: 2024-01-01 | End: 2025-01-01

## 2024-01-01 RX ORDER — METHYLPREDNISOLONE SODIUM SUCCINATE 40 MG/ML
40 INJECTION, POWDER, LYOPHILIZED, FOR SOLUTION INTRAMUSCULAR; INTRAVENOUS EVERY 8 HOURS SCHEDULED
Status: DISCONTINUED | OUTPATIENT
Start: 2024-01-01 | End: 2024-01-01

## 2024-01-01 RX ORDER — OXYCODONE HCL 5 MG/5 ML
5 SOLUTION, ORAL ORAL EVERY 4 HOURS PRN
Refills: 0 | Status: DISCONTINUED | OUTPATIENT
Start: 2024-01-01 | End: 2025-01-01

## 2024-01-01 RX ORDER — ACETAMINOPHEN 325 MG/1
650 TABLET ORAL EVERY 6 HOURS PRN
Status: DISCONTINUED | OUTPATIENT
Start: 2024-01-01 | End: 2024-01-01

## 2024-01-01 RX ORDER — ALBUMIN (HUMAN) 12.5 G/50ML
25 SOLUTION INTRAVENOUS
Status: DISCONTINUED | OUTPATIENT
Start: 2024-01-01 | End: 2024-01-01

## 2024-01-01 RX ORDER — ONDANSETRON 2 MG/ML
4 INJECTION INTRAMUSCULAR; INTRAVENOUS EVERY 6 HOURS PRN
Status: DISCONTINUED | OUTPATIENT
Start: 2024-01-01 | End: 2025-01-01 | Stop reason: HOSPADM

## 2024-01-01 RX ORDER — HYDROMORPHONE HCL/PF 1 MG/ML
1 SYRINGE (ML) INJECTION
Status: DISCONTINUED | OUTPATIENT
Start: 2024-01-01 | End: 2024-01-01

## 2024-01-01 RX ORDER — CYANOCOBALAMIN 1000 UG/ML
1000 INJECTION, SOLUTION INTRAMUSCULAR; SUBCUTANEOUS ONCE
Status: COMPLETED | OUTPATIENT
Start: 2024-01-01 | End: 2024-01-01

## 2024-01-01 RX ORDER — FUROSEMIDE 10 MG/ML
40 INJECTION INTRAMUSCULAR; INTRAVENOUS ONCE
Status: COMPLETED | OUTPATIENT
Start: 2024-01-01 | End: 2024-01-01

## 2024-01-01 RX ORDER — BISACODYL 10 MG
10 SUPPOSITORY, RECTAL RECTAL DAILY
Status: DISCONTINUED | OUTPATIENT
Start: 2024-01-01 | End: 2024-01-01

## 2024-01-01 RX ORDER — HYDROMORPHONE HCL/PF 1 MG/ML
0.5 SYRINGE (ML) INJECTION ONCE
Refills: 0 | Status: DISCONTINUED | OUTPATIENT
Start: 2024-01-01 | End: 2024-01-01

## 2024-01-01 RX ORDER — VANCOMYCIN HYDROCHLORIDE 750 MG/150ML
750 INJECTION, SOLUTION INTRAVENOUS EVERY 12 HOURS
Status: DISCONTINUED | OUTPATIENT
Start: 2024-01-01 | End: 2024-01-01

## 2024-01-01 RX ORDER — FOLIC ACID 1 MG/1
1 TABLET ORAL DAILY
Status: DISCONTINUED | OUTPATIENT
Start: 2024-01-01 | End: 2025-01-01

## 2024-01-01 RX ORDER — OXYCODONE HYDROCHLORIDE 10 MG/1
10 TABLET ORAL EVERY 6 HOURS
Refills: 0 | Status: DISCONTINUED | OUTPATIENT
Start: 2024-01-01 | End: 2024-01-01

## 2024-01-01 RX ORDER — LIDOCAINE HYDROCHLORIDE 10 MG/ML
INJECTION, SOLUTION EPIDURAL; INFILTRATION; INTRACAUDAL; PERINEURAL AS NEEDED
Status: COMPLETED | OUTPATIENT
Start: 2024-01-01 | End: 2024-01-01

## 2024-01-01 RX ORDER — SODIUM CHLORIDE FOR INHALATION 3 %
4 VIAL, NEBULIZER (ML) INHALATION
Status: DISCONTINUED | OUTPATIENT
Start: 2024-01-01 | End: 2024-01-01

## 2024-01-01 RX ORDER — TOLVAPTAN 15 MG/1
15 TABLET ORAL ONCE
Status: COMPLETED | OUTPATIENT
Start: 2024-01-01 | End: 2024-01-01

## 2024-01-01 RX ORDER — CEFAZOLIN SODIUM 1 G/50ML
1000 SOLUTION INTRAVENOUS EVERY 8 HOURS
Status: DISCONTINUED | OUTPATIENT
Start: 2024-01-01 | End: 2024-01-01

## 2024-01-01 RX ORDER — PANTOPRAZOLE SODIUM 40 MG/1
40 TABLET, DELAYED RELEASE ORAL
Status: DISCONTINUED | OUTPATIENT
Start: 2024-01-01 | End: 2024-01-01

## 2024-01-01 RX ORDER — ASPIRIN 81 MG/1
81 TABLET ORAL DAILY
Status: DISCONTINUED | OUTPATIENT
Start: 2024-01-01 | End: 2025-01-01

## 2024-01-01 RX ORDER — MAGNESIUM SULFATE HEPTAHYDRATE 40 MG/ML
2 INJECTION, SOLUTION INTRAVENOUS ONCE
Status: COMPLETED | OUTPATIENT
Start: 2024-01-01 | End: 2024-01-01

## 2024-01-01 RX ORDER — LEVOTHYROXINE SODIUM 125 UG/1
125 TABLET ORAL DAILY
Status: DISCONTINUED | OUTPATIENT
Start: 2024-01-01 | End: 2024-01-01

## 2024-01-01 RX ORDER — AMLODIPINE BESYLATE 5 MG/1
5 TABLET ORAL DAILY
Status: DISCONTINUED | OUTPATIENT
Start: 2024-01-01 | End: 2024-01-01

## 2024-01-01 RX ORDER — SODIUM CHLORIDE, SODIUM LACTATE, POTASSIUM CHLORIDE, CALCIUM CHLORIDE 600; 310; 30; 20 MG/100ML; MG/100ML; MG/100ML; MG/100ML
125 INJECTION, SOLUTION INTRAVENOUS CONTINUOUS
Status: DISCONTINUED | OUTPATIENT
Start: 2024-01-01 | End: 2024-01-01

## 2024-01-01 RX ORDER — ALBUTEROL SULFATE 90 UG/1
2 INHALANT RESPIRATORY (INHALATION) EVERY 6 HOURS PRN
Status: DISCONTINUED | OUTPATIENT
Start: 2024-01-01 | End: 2025-01-01

## 2024-01-01 RX ORDER — OXYCODONE HCL 5 MG/5 ML
5 SOLUTION, ORAL ORAL EVERY 4 HOURS PRN
Refills: 0 | Status: DISCONTINUED | OUTPATIENT
Start: 2024-01-01 | End: 2024-01-01

## 2024-01-01 RX ORDER — ALBUMIN (HUMAN) 12.5 G/50ML
25 SOLUTION INTRAVENOUS ONCE
Status: COMPLETED | OUTPATIENT
Start: 2024-01-01 | End: 2024-01-01

## 2024-01-01 RX ORDER — ENOXAPARIN SODIUM 150 MG/ML
1 INJECTION SUBCUTANEOUS ONCE
Status: COMPLETED | OUTPATIENT
Start: 2024-01-01 | End: 2024-01-01

## 2024-01-01 RX ORDER — HYDROMORPHONE HCL IN WATER/PF 6 MG/30 ML
0.2 PATIENT CONTROLLED ANALGESIA SYRINGE INTRAVENOUS EVERY 4 HOURS PRN
Status: DISCONTINUED | OUTPATIENT
Start: 2024-01-01 | End: 2024-01-01

## 2024-01-01 RX ORDER — DICYCLOMINE HYDROCHLORIDE 10 MG/1
10 CAPSULE ORAL 2 TIMES DAILY PRN
Status: DISCONTINUED | OUTPATIENT
Start: 2024-01-01 | End: 2024-01-01

## 2024-01-01 RX ORDER — OXYCODONE HYDROCHLORIDE 5 MG/1
5 TABLET ORAL EVERY 4 HOURS PRN
Refills: 0 | Status: DISCONTINUED | OUTPATIENT
Start: 2024-01-01 | End: 2024-01-01

## 2024-01-01 RX ORDER — INSULIN LISPRO 100 [IU]/ML
5 INJECTION, SOLUTION INTRAVENOUS; SUBCUTANEOUS
Status: DISCONTINUED | OUTPATIENT
Start: 2024-01-01 | End: 2024-01-01

## 2024-01-01 RX ORDER — SODIUM CHLORIDE, SODIUM LACTATE, POTASSIUM CHLORIDE, CALCIUM CHLORIDE 600; 310; 30; 20 MG/100ML; MG/100ML; MG/100ML; MG/100ML
125 INJECTION, SOLUTION INTRAVENOUS CONTINUOUS
Status: CANCELLED | OUTPATIENT
Start: 2024-01-01

## 2024-01-01 RX ORDER — SENNOSIDES 8.8 MG/5ML
17.6 LIQUID ORAL 2 TIMES DAILY
Status: DISCONTINUED | OUTPATIENT
Start: 2024-01-01 | End: 2025-01-01

## 2024-01-01 RX ORDER — CARVEDILOL 25 MG/1
25 TABLET ORAL 2 TIMES DAILY WITH MEALS
Status: DISCONTINUED | OUTPATIENT
Start: 2024-01-01 | End: 2024-01-01

## 2024-01-01 RX ORDER — FENTANYL CITRATE/PF 50 MCG/ML
50 SYRINGE (ML) INJECTION
Status: DISCONTINUED | OUTPATIENT
Start: 2024-01-01 | End: 2024-01-01

## 2024-01-01 RX ORDER — FUROSEMIDE 40 MG/1
40 TABLET ORAL
Status: DISCONTINUED | OUTPATIENT
Start: 2024-01-01 | End: 2024-01-01

## 2024-01-01 RX ORDER — FUROSEMIDE 20 MG/1
20 TABLET ORAL DAILY
Status: DISCONTINUED | OUTPATIENT
Start: 2024-01-01 | End: 2024-01-01

## 2024-01-01 RX ORDER — VALACYCLOVIR HYDROCHLORIDE 1 G/1
1000 TABLET, FILM COATED ORAL EVERY 8 HOURS SCHEDULED
Status: DISCONTINUED | OUTPATIENT
Start: 2024-01-01 | End: 2024-01-01

## 2024-01-01 RX ORDER — LEVOTHYROXINE SODIUM 75 UG/1
150 TABLET ORAL DAILY
Status: DISCONTINUED | OUTPATIENT
Start: 2024-01-01 | End: 2024-01-01

## 2024-01-01 RX ORDER — MAGNESIUM SULFATE HEPTAHYDRATE 40 MG/ML
4 INJECTION, SOLUTION INTRAVENOUS ONCE
Status: COMPLETED | OUTPATIENT
Start: 2024-01-01 | End: 2024-01-01

## 2024-01-01 RX ORDER — AMOXICILLIN 250 MG
2 CAPSULE ORAL 2 TIMES DAILY
Status: DISCONTINUED | OUTPATIENT
Start: 2024-01-01 | End: 2024-01-01

## 2024-01-01 RX ORDER — INSULIN LISPRO 100 [IU]/ML
5 INJECTION, SOLUTION INTRAVENOUS; SUBCUTANEOUS
Status: DISCONTINUED | OUTPATIENT
Start: 2024-01-01 | End: 2025-01-01

## 2024-01-01 RX ORDER — ENOXAPARIN SODIUM 150 MG/ML
1 INJECTION SUBCUTANEOUS EVERY 12 HOURS SCHEDULED
Status: DISCONTINUED | OUTPATIENT
Start: 2024-01-01 | End: 2025-01-01

## 2024-01-01 RX ORDER — INSULIN GLARGINE 100 [IU]/ML
10 INJECTION, SOLUTION SUBCUTANEOUS
Status: DISCONTINUED | OUTPATIENT
Start: 2024-01-01 | End: 2024-01-01

## 2024-01-01 RX ORDER — HYDROMORPHONE HCL/PF 1 MG/ML
0.5 SYRINGE (ML) INJECTION EVERY 4 HOURS PRN
Refills: 0 | Status: DISCONTINUED | OUTPATIENT
Start: 2024-01-01 | End: 2024-01-01

## 2024-01-01 RX ORDER — ALBUMIN (HUMAN) 12.5 G/50ML
50 SOLUTION INTRAVENOUS ONCE
Status: COMPLETED | OUTPATIENT
Start: 2024-01-01 | End: 2024-01-01

## 2024-01-01 RX ORDER — SODIUM CHLORIDE, SODIUM GLUCONATE, SODIUM ACETATE, POTASSIUM CHLORIDE, MAGNESIUM CHLORIDE, SODIUM PHOSPHATE, DIBASIC, AND POTASSIUM PHOSPHATE .53; .5; .37; .037; .03; .012; .00082 G/100ML; G/100ML; G/100ML; G/100ML; G/100ML; G/100ML; G/100ML
75 INJECTION, SOLUTION INTRAVENOUS CONTINUOUS
Status: DISCONTINUED | OUTPATIENT
Start: 2024-01-01 | End: 2024-01-01

## 2024-01-01 RX ORDER — LEVOTHYROXINE SODIUM 75 UG/1
150 TABLET ORAL DAILY
Status: DISCONTINUED | OUTPATIENT
Start: 2024-01-01 | End: 2025-01-01

## 2024-01-01 RX ORDER — INSULIN GLARGINE 100 [IU]/ML
20 INJECTION, SOLUTION SUBCUTANEOUS
Status: DISCONTINUED | OUTPATIENT
Start: 2024-01-01 | End: 2025-01-01

## 2024-01-01 RX ORDER — HYDROMORPHONE HCL/PF 1 MG/ML
1 SYRINGE (ML) INJECTION ONCE
Status: COMPLETED | OUTPATIENT
Start: 2024-01-01 | End: 2024-01-01

## 2024-01-01 RX ORDER — TOLVAPTAN 30 MG/1
30 TABLET ORAL ONCE
Status: COMPLETED | OUTPATIENT
Start: 2024-01-01 | End: 2024-01-01

## 2024-01-01 RX ORDER — FENTANYL 12.5 UG/1
12 PATCH TRANSDERMAL
Status: DISCONTINUED | OUTPATIENT
Start: 2024-01-01 | End: 2024-01-01

## 2024-01-01 RX ORDER — HYDROMORPHONE HCL IN WATER/PF 6 MG/30 ML
0.2 PATIENT CONTROLLED ANALGESIA SYRINGE INTRAVENOUS EVERY 6 HOURS PRN
Status: DISCONTINUED | OUTPATIENT
Start: 2024-01-01 | End: 2024-01-01

## 2024-01-01 RX ORDER — ALBUMIN (HUMAN) 12.5 G/50ML
25 SOLUTION INTRAVENOUS EVERY 6 HOURS
Status: COMPLETED | OUTPATIENT
Start: 2024-01-01 | End: 2024-01-01

## 2024-01-01 RX ORDER — VANCOMYCIN HYDROCHLORIDE 1 G/200ML
1000 INJECTION, SOLUTION INTRAVENOUS EVERY 12 HOURS
Status: DISCONTINUED | OUTPATIENT
Start: 2024-01-01 | End: 2024-01-01

## 2024-01-01 RX ORDER — OXYCODONE HYDROCHLORIDE 5 MG/1
5 TABLET ORAL EVERY 4 HOURS PRN
Status: DISCONTINUED | OUTPATIENT
Start: 2024-01-01 | End: 2024-01-01

## 2024-01-01 RX ORDER — POLYETHYLENE GLYCOL 3350 17 G/17G
17 POWDER, FOR SOLUTION ORAL DAILY
Status: DISCONTINUED | OUTPATIENT
Start: 2024-01-01 | End: 2024-01-01

## 2024-01-01 RX ORDER — LEVALBUTEROL INHALATION SOLUTION 1.25 MG/3ML
1.25 SOLUTION RESPIRATORY (INHALATION)
Status: DISCONTINUED | OUTPATIENT
Start: 2024-01-01 | End: 2024-01-01

## 2024-01-01 RX ORDER — POTASSIUM CHLORIDE 1500 MG/1
20 TABLET, EXTENDED RELEASE ORAL DAILY
Status: DISCONTINUED | OUTPATIENT
Start: 2024-01-01 | End: 2024-01-01

## 2024-01-01 RX ORDER — LIDOCAINE HYDROCHLORIDE 10 MG/ML
20 INJECTION, SOLUTION EPIDURAL; INFILTRATION; INTRACAUDAL; PERINEURAL ONCE
Status: COMPLETED | OUTPATIENT
Start: 2024-01-01 | End: 2024-01-01

## 2024-01-01 RX ORDER — BUMETANIDE 0.25 MG/ML
2 INJECTION, SOLUTION INTRAMUSCULAR; INTRAVENOUS 3 TIMES DAILY
Status: DISCONTINUED | OUTPATIENT
Start: 2024-01-01 | End: 2024-01-01

## 2024-01-01 RX ORDER — LEVALBUTEROL INHALATION SOLUTION 0.63 MG/3ML
0.31 SOLUTION RESPIRATORY (INHALATION)
Status: DISCONTINUED | OUTPATIENT
Start: 2024-01-01 | End: 2024-01-01

## 2024-01-01 RX ORDER — GABAPENTIN 100 MG/1
100 CAPSULE ORAL DAILY
Status: DISCONTINUED | OUTPATIENT
Start: 2024-01-01 | End: 2024-01-01

## 2024-01-01 RX ORDER — BUMETANIDE 0.25 MG/ML
2 INJECTION, SOLUTION INTRAMUSCULAR; INTRAVENOUS 2 TIMES DAILY
Status: DISCONTINUED | OUTPATIENT
Start: 2024-01-01 | End: 2024-01-01

## 2024-01-01 RX ORDER — FENTANYL CITRATE/PF 50 MCG/ML
25 SYRINGE (ML) INJECTION
Status: DISCONTINUED | OUTPATIENT
Start: 2024-01-01 | End: 2024-01-01

## 2024-01-01 RX ORDER — BUMETANIDE 0.25 MG/ML
2 INJECTION, SOLUTION INTRAMUSCULAR; INTRAVENOUS ONCE
Status: COMPLETED | OUTPATIENT
Start: 2024-01-01 | End: 2024-01-01

## 2024-01-01 RX ORDER — HYDROMORPHONE HCL/PF 1 MG/ML
0.5 SYRINGE (ML) INJECTION
Status: DISCONTINUED | OUTPATIENT
Start: 2024-01-01 | End: 2025-01-01

## 2024-01-01 RX ORDER — CARVEDILOL 3.12 MG/1
3.12 TABLET ORAL 2 TIMES DAILY WITH MEALS
Status: DISCONTINUED | OUTPATIENT
Start: 2024-01-01 | End: 2025-01-01

## 2024-01-01 RX ORDER — BISACODYL 10 MG
10 SUPPOSITORY, RECTAL RECTAL DAILY PRN
Status: DISCONTINUED | OUTPATIENT
Start: 2024-01-01 | End: 2025-01-01 | Stop reason: HOSPADM

## 2024-01-01 RX ORDER — ALBUMIN (HUMAN) 12.5 G/50ML
12.5 SOLUTION INTRAVENOUS ONCE
Status: CANCELLED | OUTPATIENT
Start: 2024-01-01 | End: 2024-01-01

## 2024-01-01 RX ORDER — MAGNESIUM HYDROXIDE/ALUMINUM HYDROXICE/SIMETHICONE 120; 1200; 1200 MG/30ML; MG/30ML; MG/30ML
30 SUSPENSION ORAL EVERY 6 HOURS PRN
Status: DISCONTINUED | OUTPATIENT
Start: 2024-01-01 | End: 2025-01-01

## 2024-01-01 RX ORDER — POLYETHYLENE GLYCOL 3350 17 G/17G
17 POWDER, FOR SOLUTION ORAL DAILY
Status: DISCONTINUED | OUTPATIENT
Start: 2024-01-01 | End: 2025-01-01

## 2024-01-01 RX ORDER — DICYCLOMINE HYDROCHLORIDE 10 MG/1
10 CAPSULE ORAL 2 TIMES DAILY PRN
Status: DISCONTINUED | OUTPATIENT
Start: 2024-01-01 | End: 2025-01-01

## 2024-01-01 RX ORDER — ALBUMIN (HUMAN) 12.5 G/50ML
12.5 SOLUTION INTRAVENOUS 3 TIMES DAILY
Status: DISPENSED | OUTPATIENT
Start: 2024-01-01 | End: 2024-01-01

## 2024-01-01 RX ORDER — METOCLOPRAMIDE 10 MG/1
10 TABLET ORAL 4 TIMES DAILY
Status: DISPENSED | OUTPATIENT
Start: 2024-01-01 | End: 2024-01-01

## 2024-01-01 RX ORDER — HYDROMORPHONE HCL/PF 1 MG/ML
1 SYRINGE (ML) INJECTION EVERY 4 HOURS PRN
Status: DISCONTINUED | OUTPATIENT
Start: 2024-01-01 | End: 2024-01-01

## 2024-01-01 RX ORDER — LIDOCAINE HYDROCHLORIDE 10 MG/ML
50 INJECTION, SOLUTION EPIDURAL; INFILTRATION; INTRACAUDAL; PERINEURAL ONCE
Status: COMPLETED | OUTPATIENT
Start: 2024-01-01 | End: 2024-01-01

## 2024-01-01 RX ORDER — INSULIN LISPRO 100 [IU]/ML
1-5 INJECTION, SOLUTION INTRAVENOUS; SUBCUTANEOUS
Status: DISCONTINUED | OUTPATIENT
Start: 2024-01-01 | End: 2025-01-01

## 2024-01-01 RX ORDER — METHYLPREDNISOLONE SODIUM SUCCINATE 125 MG/2ML
125 INJECTION, POWDER, LYOPHILIZED, FOR SOLUTION INTRAMUSCULAR; INTRAVENOUS ONCE
Status: COMPLETED | OUTPATIENT
Start: 2024-01-01 | End: 2024-01-01

## 2024-01-01 RX ORDER — SODIUM CHLORIDE FOR INHALATION 3 %
4 VIAL, NEBULIZER (ML) INHALATION ONCE
Status: COMPLETED | OUTPATIENT
Start: 2024-01-01 | End: 2024-01-01

## 2024-01-01 RX ORDER — GABAPENTIN 300 MG/1
300 CAPSULE ORAL
Status: DISCONTINUED | OUTPATIENT
Start: 2024-01-01 | End: 2025-01-01

## 2024-01-01 RX ORDER — FENTANYL 25 UG/1
25 PATCH TRANSDERMAL
Status: DISCONTINUED | OUTPATIENT
Start: 2024-01-01 | End: 2025-01-01 | Stop reason: HOSPADM

## 2024-01-01 RX ORDER — ALBUMIN (HUMAN) 12.5 G/50ML
25 SOLUTION INTRAVENOUS EVERY 6 HOURS
Status: COMPLETED | OUTPATIENT
Start: 2024-01-01 | End: 2025-01-01

## 2024-01-01 RX ORDER — IPRATROPIUM BROMIDE AND ALBUTEROL SULFATE 2.5; .5 MG/3ML; MG/3ML
3 SOLUTION RESPIRATORY (INHALATION) ONCE
Status: COMPLETED | OUTPATIENT
Start: 2024-01-01 | End: 2024-01-01

## 2024-01-01 RX ORDER — INSULIN LISPRO 100 [IU]/ML
10 INJECTION, SOLUTION INTRAVENOUS; SUBCUTANEOUS
Status: DISCONTINUED | OUTPATIENT
Start: 2024-01-01 | End: 2024-01-01

## 2024-01-01 RX ORDER — OXYCODONE HCL 5 MG/5 ML
10 SOLUTION, ORAL ORAL EVERY 4 HOURS PRN
Refills: 0 | Status: DISCONTINUED | OUTPATIENT
Start: 2024-01-01 | End: 2025-01-01

## 2024-01-01 RX ADMIN — CARVEDILOL 25 MG: 25 TABLET, FILM COATED ORAL at 07:32

## 2024-01-01 RX ADMIN — CARVEDILOL 25 MG: 25 TABLET, FILM COATED ORAL at 15:31

## 2024-01-01 RX ADMIN — AMLODIPINE BESYLATE 5 MG: 5 TABLET ORAL at 08:12

## 2024-01-01 RX ADMIN — CEFTRIAXONE SODIUM 2000 MG: 10 INJECTION, POWDER, FOR SOLUTION INTRAVENOUS at 05:32

## 2024-01-01 RX ADMIN — ASPIRIN 81 MG: 81 TABLET, COATED ORAL at 08:07

## 2024-01-01 RX ADMIN — INSULIN LISPRO 3 UNITS: 100 INJECTION, SOLUTION INTRAVENOUS; SUBCUTANEOUS at 07:26

## 2024-01-01 RX ADMIN — OXYCODONE HYDROCHLORIDE 10 MG: 5 SOLUTION ORAL at 18:16

## 2024-01-01 RX ADMIN — HYDROMORPHONE HYDROCHLORIDE 1 MG: 1 INJECTION, SOLUTION INTRAMUSCULAR; INTRAVENOUS; SUBCUTANEOUS at 14:15

## 2024-01-01 RX ADMIN — AMLODIPINE BESYLATE 5 MG: 5 TABLET ORAL at 08:49

## 2024-01-01 RX ADMIN — ALBUMIN (HUMAN) 12.5 G: 0.25 INJECTION, SOLUTION INTRAVENOUS at 16:34

## 2024-01-01 RX ADMIN — SENNOSIDES 17.6 MG: 8.8 LIQUID ORAL at 08:30

## 2024-01-01 RX ADMIN — INSULIN GLARGINE 20 UNITS: 100 INJECTION, SOLUTION SUBCUTANEOUS at 21:14

## 2024-01-01 RX ADMIN — CEFAZOLIN SODIUM 1000 MG: 1 SOLUTION INTRAVENOUS at 03:04

## 2024-01-01 RX ADMIN — LEVOTHYROXINE SODIUM 150 MCG: 75 TABLET ORAL at 05:37

## 2024-01-01 RX ADMIN — INSULIN LISPRO 1 UNITS: 100 INJECTION, SOLUTION INTRAVENOUS; SUBCUTANEOUS at 10:41

## 2024-01-01 RX ADMIN — HYDROMORPHONE HYDROCHLORIDE 0.5 MG: 1 INJECTION, SOLUTION INTRAMUSCULAR; INTRAVENOUS; SUBCUTANEOUS at 06:12

## 2024-01-01 RX ADMIN — ACETAMINOPHEN 975 MG: 325 TABLET, FILM COATED ORAL at 05:22

## 2024-01-01 RX ADMIN — INSULIN LISPRO 1 UNITS: 100 INJECTION, SOLUTION INTRAVENOUS; SUBCUTANEOUS at 12:01

## 2024-01-01 RX ADMIN — OXYCODONE HYDROCHLORIDE 10 MG: 5 SOLUTION ORAL at 09:07

## 2024-01-01 RX ADMIN — CARVEDILOL 25 MG: 25 TABLET, FILM COATED ORAL at 16:40

## 2024-01-01 RX ADMIN — SENNOSIDES 17.6 MG: 8.8 LIQUID ORAL at 09:08

## 2024-01-01 RX ADMIN — HYDROMORPHONE HYDROCHLORIDE 0.2 MG: 0.2 INJECTION, SOLUTION INTRAMUSCULAR; INTRAVENOUS; SUBCUTANEOUS at 10:25

## 2024-01-01 RX ADMIN — CARVEDILOL 25 MG: 25 TABLET, FILM COATED ORAL at 17:23

## 2024-01-01 RX ADMIN — ACETAMINOPHEN 975 MG: 325 TABLET, FILM COATED ORAL at 13:21

## 2024-01-01 RX ADMIN — ALBUMIN (HUMAN) 12.5 G: 0.25 INJECTION, SOLUTION INTRAVENOUS at 10:10

## 2024-01-01 RX ADMIN — HYDROMORPHONE HYDROCHLORIDE 0.5 MG: 1 INJECTION, SOLUTION INTRAMUSCULAR; INTRAVENOUS; SUBCUTANEOUS at 01:21

## 2024-01-01 RX ADMIN — CEFTRIAXONE SODIUM 2000 MG: 10 INJECTION, POWDER, FOR SOLUTION INTRAVENOUS at 05:54

## 2024-01-01 RX ADMIN — OXYCODONE HYDROCHLORIDE 5 MG: 5 SOLUTION ORAL at 01:42

## 2024-01-01 RX ADMIN — VANCOMYCIN HYDROCHLORIDE 2000 MG: 1 INJECTION, POWDER, LYOPHILIZED, FOR SOLUTION INTRAVENOUS at 10:35

## 2024-01-01 RX ADMIN — ACETAMINOPHEN 975 MG: 325 TABLET, FILM COATED ORAL at 05:35

## 2024-01-01 RX ADMIN — GABAPENTIN 100 MG: 100 CAPSULE ORAL at 08:12

## 2024-01-01 RX ADMIN — INSULIN GLARGINE 10 UNITS: 100 INJECTION, SOLUTION SUBCUTANEOUS at 21:29

## 2024-01-01 RX ADMIN — GABAPENTIN 100 MG: 100 CAPSULE ORAL at 08:49

## 2024-01-01 RX ADMIN — FOLIC ACID 1 MG: 1 TABLET ORAL at 09:36

## 2024-01-01 RX ADMIN — GABAPENTIN 300 MG: 300 CAPSULE ORAL at 21:55

## 2024-01-01 RX ADMIN — ENOXAPARIN SODIUM 120 MG: 120 INJECTION SUBCUTANEOUS at 21:29

## 2024-01-01 RX ADMIN — INSULIN GLARGINE 20 UNITS: 100 INJECTION, SOLUTION SUBCUTANEOUS at 22:42

## 2024-01-01 RX ADMIN — OXYCODONE HYDROCHLORIDE 10 MG: 5 SOLUTION ORAL at 00:17

## 2024-01-01 RX ADMIN — OXYCODONE HYDROCHLORIDE 5 MG: 5 TABLET ORAL at 20:19

## 2024-01-01 RX ADMIN — CARVEDILOL 25 MG: 25 TABLET, FILM COATED ORAL at 07:35

## 2024-01-01 RX ADMIN — ALBUMIN (HUMAN) 25 G: 0.25 INJECTION, SOLUTION INTRAVENOUS at 16:26

## 2024-01-01 RX ADMIN — ACETAMINOPHEN 975 MG: 325 TABLET, FILM COATED ORAL at 15:34

## 2024-01-01 RX ADMIN — ENOXAPARIN SODIUM 120 MG: 120 INJECTION SUBCUTANEOUS at 21:22

## 2024-01-01 RX ADMIN — OXYCODONE HYDROCHLORIDE 10 MG: 5 SOLUTION ORAL at 22:16

## 2024-01-01 RX ADMIN — INSULIN LISPRO 3 UNITS: 100 INJECTION, SOLUTION INTRAVENOUS; SUBCUTANEOUS at 15:51

## 2024-01-01 RX ADMIN — HYDROMORPHONE HYDROCHLORIDE 0.5 MG: 1 INJECTION, SOLUTION INTRAMUSCULAR; INTRAVENOUS; SUBCUTANEOUS at 11:14

## 2024-01-01 RX ADMIN — CARVEDILOL 25 MG: 25 TABLET, FILM COATED ORAL at 09:46

## 2024-01-01 RX ADMIN — HYDROMORPHONE HYDROCHLORIDE 1 MG: 1 INJECTION, SOLUTION INTRAMUSCULAR; INTRAVENOUS; SUBCUTANEOUS at 07:27

## 2024-01-01 RX ADMIN — HYDROMORPHONE HYDROCHLORIDE 0.5 MG: 1 INJECTION, SOLUTION INTRAMUSCULAR; INTRAVENOUS; SUBCUTANEOUS at 17:50

## 2024-01-01 RX ADMIN — CARVEDILOL 25 MG: 25 TABLET, FILM COATED ORAL at 18:26

## 2024-01-01 RX ADMIN — CYANOCOBALAMIN TAB 500 MCG 1000 MCG: 500 TAB at 09:37

## 2024-01-01 RX ADMIN — LEVOTHYROXINE SODIUM 150 MCG: 75 TABLET ORAL at 05:35

## 2024-01-01 RX ADMIN — POTASSIUM CHLORIDE 20 MEQ: 1500 TABLET, EXTENDED RELEASE ORAL at 20:18

## 2024-01-01 RX ADMIN — MAGNESIUM SULFATE HEPTAHYDRATE 2 G: 40 INJECTION, SOLUTION INTRAVENOUS at 09:20

## 2024-01-01 RX ADMIN — SENNOSIDES 17.6 MG: 8.8 LIQUID ORAL at 08:34

## 2024-01-01 RX ADMIN — ACETAMINOPHEN 975 MG: 325 TABLET, FILM COATED ORAL at 15:06

## 2024-01-01 RX ADMIN — INSULIN GLARGINE 20 UNITS: 100 INJECTION, SOLUTION SUBCUTANEOUS at 21:45

## 2024-01-01 RX ADMIN — INSULIN LISPRO 2 UNITS: 100 INJECTION, SOLUTION INTRAVENOUS; SUBCUTANEOUS at 11:34

## 2024-01-01 RX ADMIN — OXYCODONE HYDROCHLORIDE 5 MG: 5 SOLUTION ORAL at 09:04

## 2024-01-01 RX ADMIN — Medication 40 MG: at 07:43

## 2024-01-01 RX ADMIN — VANCOMYCIN HYDROCHLORIDE 1000 MG: 1 INJECTION, SOLUTION INTRAVENOUS at 21:14

## 2024-01-01 RX ADMIN — CEFAZOLIN SODIUM 1000 MG: 1 SOLUTION INTRAVENOUS at 19:50

## 2024-01-01 RX ADMIN — INSULIN LISPRO 2 UNITS: 100 INJECTION, SOLUTION INTRAVENOUS; SUBCUTANEOUS at 18:05

## 2024-01-01 RX ADMIN — INSULIN LISPRO 1 UNITS: 100 INJECTION, SOLUTION INTRAVENOUS; SUBCUTANEOUS at 17:55

## 2024-01-01 RX ADMIN — ALBUMIN (HUMAN) 25 G: 0.25 INJECTION, SOLUTION INTRAVENOUS at 16:43

## 2024-01-01 RX ADMIN — INSULIN GLARGINE 20 UNITS: 100 INJECTION, SOLUTION SUBCUTANEOUS at 21:47

## 2024-01-01 RX ADMIN — FUROSEMIDE 40 MG: 40 TABLET ORAL at 18:00

## 2024-01-01 RX ADMIN — ENOXAPARIN SODIUM 120 MG: 120 INJECTION SUBCUTANEOUS at 21:56

## 2024-01-01 RX ADMIN — HYDROMORPHONE HYDROCHLORIDE 0.2 MG: 0.2 INJECTION, SOLUTION INTRAMUSCULAR; INTRAVENOUS; SUBCUTANEOUS at 12:42

## 2024-01-01 RX ADMIN — INSULIN LISPRO 3 UNITS: 100 INJECTION, SOLUTION INTRAVENOUS; SUBCUTANEOUS at 11:01

## 2024-01-01 RX ADMIN — LEVOTHYROXINE SODIUM 150 MCG: 75 TABLET ORAL at 06:01

## 2024-01-01 RX ADMIN — LIDOCAINE HYDROCHLORIDE 10 ML: 10 INJECTION, SOLUTION EPIDURAL; INFILTRATION; INTRACAUDAL; PERINEURAL at 11:24

## 2024-01-01 RX ADMIN — INSULIN LISPRO 10 UNITS: 100 INJECTION, SOLUTION INTRAVENOUS; SUBCUTANEOUS at 16:48

## 2024-01-01 RX ADMIN — TOLVAPTAN 45 MG: 15 TABLET ORAL at 13:06

## 2024-01-01 RX ADMIN — BUMETANIDE 2 MG: 0.25 INJECTION INTRAMUSCULAR; INTRAVENOUS at 21:28

## 2024-01-01 RX ADMIN — LEVOTHYROXINE SODIUM 150 MCG: 75 TABLET ORAL at 06:23

## 2024-01-01 RX ADMIN — OXYCODONE HYDROCHLORIDE 10 MG: 5 SOLUTION ORAL at 05:34

## 2024-01-01 RX ADMIN — VANCOMYCIN HYDROCHLORIDE 1000 MG: 1 INJECTION, SOLUTION INTRAVENOUS at 09:21

## 2024-01-01 RX ADMIN — Medication 40 MG: at 05:56

## 2024-01-01 RX ADMIN — VANCOMYCIN HYDROCHLORIDE 2000 MG: 10 INJECTION, POWDER, LYOPHILIZED, FOR SOLUTION INTRAVENOUS at 14:15

## 2024-01-01 RX ADMIN — ASPIRIN 81 MG: 81 TABLET, COATED ORAL at 09:08

## 2024-01-01 RX ADMIN — BUMETANIDE 2 MG: 0.25 INJECTION INTRAMUSCULAR; INTRAVENOUS at 18:24

## 2024-01-01 RX ADMIN — GABAPENTIN 100 MG: 100 CAPSULE ORAL at 09:21

## 2024-01-01 RX ADMIN — OXYCODONE HYDROCHLORIDE 10 MG: 5 SOLUTION ORAL at 08:39

## 2024-01-01 RX ADMIN — VALACYCLOVIR 1000 MG: 1 TABLET, FILM COATED ORAL at 21:44

## 2024-01-01 RX ADMIN — AMLODIPINE BESYLATE 5 MG: 5 TABLET ORAL at 09:03

## 2024-01-01 RX ADMIN — INSULIN LISPRO 2 UNITS: 100 INJECTION, SOLUTION INTRAVENOUS; SUBCUTANEOUS at 06:33

## 2024-01-01 RX ADMIN — INSULIN LISPRO 10 UNITS: 100 INJECTION, SOLUTION INTRAVENOUS; SUBCUTANEOUS at 09:06

## 2024-01-01 RX ADMIN — OXYCODONE HYDROCHLORIDE 10 MG: 5 SOLUTION ORAL at 13:05

## 2024-01-01 RX ADMIN — LIDOCAINE HYDROCHLORIDE 20 ML: 10 INJECTION, SOLUTION EPIDURAL; INFILTRATION; INTRACAUDAL; PERINEURAL at 17:20

## 2024-01-01 RX ADMIN — OXYCODONE HYDROCHLORIDE 10 MG: 10 TABLET ORAL at 21:35

## 2024-01-01 RX ADMIN — OXYCODONE HYDROCHLORIDE 5 MG: 5 SOLUTION ORAL at 22:19

## 2024-01-01 RX ADMIN — ACETAMINOPHEN 975 MG: 325 TABLET, FILM COATED ORAL at 21:22

## 2024-01-01 RX ADMIN — DIBASIC SODIUM PHOSPHATE, MONOBASIC POTASSIUM PHOSPHATE AND MONOBASIC SODIUM PHOSPHATE 1 TABLET: 852; 155; 130 TABLET ORAL at 17:26

## 2024-01-01 RX ADMIN — INSULIN LISPRO 5 UNITS: 100 INJECTION, SOLUTION INTRAVENOUS; SUBCUTANEOUS at 11:44

## 2024-01-01 RX ADMIN — FOLIC ACID 1 MG: 1 TABLET ORAL at 09:07

## 2024-01-01 RX ADMIN — INSULIN LISPRO 2 UNITS: 100 INJECTION, SOLUTION INTRAVENOUS; SUBCUTANEOUS at 10:51

## 2024-01-01 RX ADMIN — OXYCODONE HYDROCHLORIDE 10 MG: 5 SOLUTION ORAL at 03:24

## 2024-01-01 RX ADMIN — AMLODIPINE BESYLATE 5 MG: 5 TABLET ORAL at 09:04

## 2024-01-01 RX ADMIN — POLYETHYLENE GLYCOL 3350 17 G: 17 POWDER, FOR SOLUTION ORAL at 08:49

## 2024-01-01 RX ADMIN — OXYCODONE HYDROCHLORIDE 10 MG: 5 SOLUTION ORAL at 20:20

## 2024-01-01 RX ADMIN — CARVEDILOL 25 MG: 25 TABLET, FILM COATED ORAL at 09:21

## 2024-01-01 RX ADMIN — ACETAMINOPHEN 975 MG: 325 TABLET, FILM COATED ORAL at 16:08

## 2024-01-01 RX ADMIN — ACETAMINOPHEN 975 MG: 325 TABLET, FILM COATED ORAL at 14:00

## 2024-01-01 RX ADMIN — FOLIC ACID 1 MG: 1 TABLET ORAL at 08:13

## 2024-01-01 RX ADMIN — HYDROMORPHONE HYDROCHLORIDE 0.5 MG: 1 INJECTION, SOLUTION INTRAMUSCULAR; INTRAVENOUS; SUBCUTANEOUS at 19:39

## 2024-01-01 RX ADMIN — INSULIN LISPRO 1 UNITS: 100 INJECTION, SOLUTION INTRAVENOUS; SUBCUTANEOUS at 08:30

## 2024-01-01 RX ADMIN — OXYCODONE HYDROCHLORIDE 10 MG: 10 TABLET ORAL at 04:52

## 2024-01-01 RX ADMIN — DIBASIC SODIUM PHOSPHATE, MONOBASIC POTASSIUM PHOSPHATE AND MONOBASIC SODIUM PHOSPHATE 1 TABLET: 852; 155; 130 TABLET ORAL at 08:19

## 2024-01-01 RX ADMIN — PANTOPRAZOLE SODIUM 40 MG: 40 TABLET, DELAYED RELEASE ORAL at 05:04

## 2024-01-01 RX ADMIN — CEFTRIAXONE SODIUM 2000 MG: 10 INJECTION, POWDER, FOR SOLUTION INTRAVENOUS at 06:13

## 2024-01-01 RX ADMIN — ENOXAPARIN SODIUM 120 MG: 120 INJECTION SUBCUTANEOUS at 21:30

## 2024-01-01 RX ADMIN — OXYCODONE HYDROCHLORIDE 10 MG: 5 SOLUTION ORAL at 20:51

## 2024-01-01 RX ADMIN — HYDROMORPHONE HYDROCHLORIDE 1 MG: 1 INJECTION, SOLUTION INTRAMUSCULAR; INTRAVENOUS; SUBCUTANEOUS at 06:22

## 2024-01-01 RX ADMIN — ALBUMIN (HUMAN) 25 G: 0.25 INJECTION, SOLUTION INTRAVENOUS at 16:55

## 2024-01-01 RX ADMIN — ALBUMIN (HUMAN) 12.5 G: 0.25 INJECTION, SOLUTION INTRAVENOUS at 09:41

## 2024-01-01 RX ADMIN — CARVEDILOL 25 MG: 25 TABLET, FILM COATED ORAL at 15:36

## 2024-01-01 RX ADMIN — GABAPENTIN 100 MG: 100 CAPSULE ORAL at 09:04

## 2024-01-01 RX ADMIN — GABAPENTIN 300 MG: 300 CAPSULE ORAL at 21:39

## 2024-01-01 RX ADMIN — FOLIC ACID 1 MG: 1 TABLET ORAL at 08:12

## 2024-01-01 RX ADMIN — CARVEDILOL 3.12 MG: 3.12 TABLET, FILM COATED ORAL at 16:52

## 2024-01-01 RX ADMIN — FUROSEMIDE 40 MG: 10 INJECTION, SOLUTION INTRAMUSCULAR; INTRAVENOUS at 12:03

## 2024-01-01 RX ADMIN — LEVOTHYROXINE SODIUM 150 MCG: 75 TABLET ORAL at 05:22

## 2024-01-01 RX ADMIN — CARVEDILOL 25 MG: 25 TABLET, FILM COATED ORAL at 07:51

## 2024-01-01 RX ADMIN — OXYCODONE HYDROCHLORIDE 10 MG: 5 SOLUTION ORAL at 03:18

## 2024-01-01 RX ADMIN — FUROSEMIDE 40 MG: 10 INJECTION, SOLUTION INTRAMUSCULAR; INTRAVENOUS at 05:46

## 2024-01-01 RX ADMIN — ACETAMINOPHEN 975 MG: 325 TABLET, FILM COATED ORAL at 21:14

## 2024-01-01 RX ADMIN — CARVEDILOL 25 MG: 25 TABLET, FILM COATED ORAL at 16:47

## 2024-01-01 RX ADMIN — INSULIN LISPRO 2 UNITS: 100 INJECTION, SOLUTION INTRAVENOUS; SUBCUTANEOUS at 13:12

## 2024-01-01 RX ADMIN — CARVEDILOL 25 MG: 25 TABLET, FILM COATED ORAL at 17:18

## 2024-01-01 RX ADMIN — ENOXAPARIN SODIUM 120 MG: 120 INJECTION SUBCUTANEOUS at 09:43

## 2024-01-01 RX ADMIN — OXYCODONE HYDROCHLORIDE 10 MG: 5 SOLUTION ORAL at 21:14

## 2024-01-01 RX ADMIN — HYDROMORPHONE HYDROCHLORIDE 1 MG: 1 INJECTION, SOLUTION INTRAMUSCULAR; INTRAVENOUS; SUBCUTANEOUS at 21:34

## 2024-01-01 RX ADMIN — INSULIN LISPRO 10 UNITS: 100 INJECTION, SOLUTION INTRAVENOUS; SUBCUTANEOUS at 11:35

## 2024-01-01 RX ADMIN — OXYCODONE HYDROCHLORIDE 10 MG: 5 SOLUTION ORAL at 13:40

## 2024-01-01 RX ADMIN — FUROSEMIDE 20 MG: 20 TABLET ORAL at 12:51

## 2024-01-01 RX ADMIN — DICYCLOMINE HYDROCHLORIDE 10 MG: 10 CAPSULE ORAL at 12:06

## 2024-01-01 RX ADMIN — FUROSEMIDE 20 MG: 20 TABLET ORAL at 20:18

## 2024-01-01 RX ADMIN — OXYCODONE HYDROCHLORIDE 5 MG: 5 SOLUTION ORAL at 04:14

## 2024-01-01 RX ADMIN — METHYLPREDNISOLONE SODIUM SUCCINATE 125 MG: 125 INJECTION, POWDER, FOR SOLUTION INTRAMUSCULAR; INTRAVENOUS at 14:15

## 2024-01-01 RX ADMIN — ENOXAPARIN SODIUM 120 MG: 120 INJECTION SUBCUTANEOUS at 08:08

## 2024-01-01 RX ADMIN — OXYCODONE HYDROCHLORIDE 10 MG: 5 SOLUTION ORAL at 17:36

## 2024-01-01 RX ADMIN — HYDROMORPHONE HYDROCHLORIDE 0.5 MG: 1 INJECTION, SOLUTION INTRAMUSCULAR; INTRAVENOUS; SUBCUTANEOUS at 03:25

## 2024-01-01 RX ADMIN — GABAPENTIN 300 MG: 300 CAPSULE ORAL at 22:41

## 2024-01-01 RX ADMIN — FUROSEMIDE 40 MG: 10 INJECTION, SOLUTION INTRAMUSCULAR; INTRAVENOUS at 16:38

## 2024-01-01 RX ADMIN — CEFAZOLIN SODIUM 1000 MG: 1 SOLUTION INTRAVENOUS at 11:10

## 2024-01-01 RX ADMIN — GABAPENTIN 100 MG: 100 CAPSULE ORAL at 08:18

## 2024-01-01 RX ADMIN — OXYCODONE HYDROCHLORIDE 10 MG: 5 SOLUTION ORAL at 22:40

## 2024-01-01 RX ADMIN — INSULIN LISPRO 2 UNITS: 100 INJECTION, SOLUTION INTRAVENOUS; SUBCUTANEOUS at 12:46

## 2024-01-01 RX ADMIN — Medication 40 MG: at 05:32

## 2024-01-01 RX ADMIN — ENOXAPARIN SODIUM 120 MG: 120 INJECTION SUBCUTANEOUS at 08:26

## 2024-01-01 RX ADMIN — OXYCODONE HYDROCHLORIDE 10 MG: 5 SOLUTION ORAL at 00:52

## 2024-01-01 RX ADMIN — FENTANYL CITRATE 25 MCG: 50 INJECTION INTRAMUSCULAR; INTRAVENOUS at 16:15

## 2024-01-01 RX ADMIN — FUROSEMIDE 40 MG: 10 INJECTION, SOLUTION INTRAMUSCULAR; INTRAVENOUS at 05:37

## 2024-01-01 RX ADMIN — ASPIRIN 81 MG: 81 TABLET, COATED ORAL at 09:21

## 2024-01-01 RX ADMIN — ENOXAPARIN SODIUM 120 MG: 120 INJECTION SUBCUTANEOUS at 21:03

## 2024-01-01 RX ADMIN — INSULIN LISPRO 5 UNITS: 100 INJECTION, SOLUTION INTRAVENOUS; SUBCUTANEOUS at 12:07

## 2024-01-01 RX ADMIN — OXYCODONE HYDROCHLORIDE 10 MG: 5 SOLUTION ORAL at 05:55

## 2024-01-01 RX ADMIN — BUMETANIDE 2 MG: 0.25 INJECTION INTRAMUSCULAR; INTRAVENOUS at 17:56

## 2024-01-01 RX ADMIN — Medication 40 MG: at 08:25

## 2024-01-01 RX ADMIN — HYDROMORPHONE HYDROCHLORIDE 0.5 MG: 1 INJECTION, SOLUTION INTRAMUSCULAR; INTRAVENOUS; SUBCUTANEOUS at 20:22

## 2024-01-01 RX ADMIN — HYDROMORPHONE HYDROCHLORIDE 0.2 MG: 0.2 INJECTION, SOLUTION INTRAMUSCULAR; INTRAVENOUS; SUBCUTANEOUS at 16:45

## 2024-01-01 RX ADMIN — LEVOTHYROXINE SODIUM 150 MCG: 75 TABLET ORAL at 06:05

## 2024-01-01 RX ADMIN — HYDROMORPHONE HYDROCHLORIDE 1 MG: 1 INJECTION, SOLUTION INTRAMUSCULAR; INTRAVENOUS; SUBCUTANEOUS at 00:40

## 2024-01-01 RX ADMIN — INSULIN GLARGINE 20 UNITS: 100 INJECTION, SOLUTION SUBCUTANEOUS at 21:55

## 2024-01-01 RX ADMIN — Medication 40 MG: at 05:49

## 2024-01-01 RX ADMIN — INSULIN LISPRO 10 UNITS: 100 INJECTION, SOLUTION INTRAVENOUS; SUBCUTANEOUS at 08:28

## 2024-01-01 RX ADMIN — HYDROMORPHONE HYDROCHLORIDE 1 MG: 1 INJECTION, SOLUTION INTRAMUSCULAR; INTRAVENOUS; SUBCUTANEOUS at 20:26

## 2024-01-01 RX ADMIN — OXYCODONE HYDROCHLORIDE 10 MG: 5 SOLUTION ORAL at 18:04

## 2024-01-01 RX ADMIN — VALACYCLOVIR 1000 MG: 1 TABLET, FILM COATED ORAL at 21:47

## 2024-01-01 RX ADMIN — VANCOMYCIN HYDROCHLORIDE 1000 MG: 1 INJECTION, SOLUTION INTRAVENOUS at 14:20

## 2024-01-01 RX ADMIN — OXYCODONE HYDROCHLORIDE 10 MG: 5 SOLUTION ORAL at 21:29

## 2024-01-01 RX ADMIN — VANCOMYCIN HYDROCHLORIDE 1000 MG: 1 INJECTION, SOLUTION INTRAVENOUS at 02:22

## 2024-01-01 RX ADMIN — DICYCLOMINE HYDROCHLORIDE 10 MG: 10 CAPSULE ORAL at 08:18

## 2024-01-01 RX ADMIN — GABAPENTIN 100 MG: 100 CAPSULE ORAL at 09:03

## 2024-01-01 RX ADMIN — FUROSEMIDE 40 MG: 10 INJECTION, SOLUTION INTRAMUSCULAR; INTRAVENOUS at 08:22

## 2024-01-01 RX ADMIN — IOHEXOL 100 ML: 350 INJECTION, SOLUTION INTRAVENOUS at 21:55

## 2024-01-01 RX ADMIN — OXYCODONE HYDROCHLORIDE 5 MG: 5 SOLUTION ORAL at 02:40

## 2024-01-01 RX ADMIN — HYDROMORPHONE HYDROCHLORIDE 0.5 MG: 1 INJECTION, SOLUTION INTRAMUSCULAR; INTRAVENOUS; SUBCUTANEOUS at 12:15

## 2024-01-01 RX ADMIN — Medication 40 MG: at 05:13

## 2024-01-01 RX ADMIN — HYDROMORPHONE HYDROCHLORIDE 0.5 MG: 1 INJECTION, SOLUTION INTRAMUSCULAR; INTRAVENOUS; SUBCUTANEOUS at 18:47

## 2024-01-01 RX ADMIN — INSULIN LISPRO 1 UNITS: 100 INJECTION, SOLUTION INTRAVENOUS; SUBCUTANEOUS at 11:54

## 2024-01-01 RX ADMIN — INSULIN GLARGINE 20 UNITS: 100 INJECTION, SOLUTION SUBCUTANEOUS at 23:00

## 2024-01-01 RX ADMIN — CYANOCOBALAMIN TAB 500 MCG 1000 MCG: 500 TAB at 09:35

## 2024-01-01 RX ADMIN — FOLIC ACID 1 MG: 1 TABLET ORAL at 08:31

## 2024-01-01 RX ADMIN — IRON SUCROSE 200 MG: 20 INJECTION, SOLUTION INTRAVENOUS at 21:52

## 2024-01-01 RX ADMIN — HYDROMORPHONE HYDROCHLORIDE 0.5 MG: 1 INJECTION, SOLUTION INTRAMUSCULAR; INTRAVENOUS; SUBCUTANEOUS at 19:43

## 2024-01-01 RX ADMIN — FUROSEMIDE 40 MG: 10 INJECTION, SOLUTION INTRAMUSCULAR; INTRAVENOUS at 11:53

## 2024-01-01 RX ADMIN — ENOXAPARIN SODIUM 120 MG: 120 INJECTION SUBCUTANEOUS at 08:28

## 2024-01-01 RX ADMIN — LEVALBUTEROL HYDROCHLORIDE 1.25 MG: 1.25 SOLUTION RESPIRATORY (INHALATION) at 02:09

## 2024-01-01 RX ADMIN — Medication 40 MG: at 06:02

## 2024-01-01 RX ADMIN — IRON SUCROSE 200 MG: 20 INJECTION, SOLUTION INTRAVENOUS at 08:39

## 2024-01-01 RX ADMIN — Medication 40 MG: at 06:22

## 2024-01-01 RX ADMIN — ALBUMIN (HUMAN) 25 G: 0.25 INJECTION, SOLUTION INTRAVENOUS at 07:45

## 2024-01-01 RX ADMIN — ACETAMINOPHEN 975 MG: 325 TABLET, FILM COATED ORAL at 05:13

## 2024-01-01 RX ADMIN — CYANOCOBALAMIN TAB 500 MCG 1000 MCG: 500 TAB at 09:03

## 2024-01-01 RX ADMIN — LEVOTHYROXINE SODIUM 150 MCG: 75 TABLET ORAL at 06:18

## 2024-01-01 RX ADMIN — HYDROMORPHONE HYDROCHLORIDE 1 MG: 1 INJECTION, SOLUTION INTRAMUSCULAR; INTRAVENOUS; SUBCUTANEOUS at 07:51

## 2024-01-01 RX ADMIN — ENOXAPARIN SODIUM 120 MG: 120 INJECTION SUBCUTANEOUS at 08:35

## 2024-01-01 RX ADMIN — TOLVAPTAN 30 MG: 30 TABLET ORAL at 11:46

## 2024-01-01 RX ADMIN — ALBUMIN (HUMAN) 25 G: 0.25 INJECTION, SOLUTION INTRAVENOUS at 11:08

## 2024-01-01 RX ADMIN — FUROSEMIDE 40 MG: 10 INJECTION, SOLUTION INTRAMUSCULAR; INTRAVENOUS at 21:30

## 2024-01-01 RX ADMIN — Medication 40 MG: at 05:22

## 2024-01-01 RX ADMIN — OXYCODONE HYDROCHLORIDE 10 MG: 5 SOLUTION ORAL at 07:42

## 2024-01-01 RX ADMIN — FUROSEMIDE 40 MG: 40 TABLET ORAL at 07:36

## 2024-01-01 RX ADMIN — APIXABAN 5 MG: 5 TABLET, FILM COATED ORAL at 08:19

## 2024-01-01 RX ADMIN — HYDROMORPHONE HYDROCHLORIDE 0.5 MG: 1 INJECTION, SOLUTION INTRAMUSCULAR; INTRAVENOUS; SUBCUTANEOUS at 10:06

## 2024-01-01 RX ADMIN — AMLODIPINE BESYLATE 5 MG: 5 TABLET ORAL at 09:07

## 2024-01-01 RX ADMIN — HYDROMORPHONE HYDROCHLORIDE 0.5 MG: 1 INJECTION, SOLUTION INTRAMUSCULAR; INTRAVENOUS; SUBCUTANEOUS at 06:26

## 2024-01-01 RX ADMIN — ENOXAPARIN SODIUM 120 MG: 120 INJECTION SUBCUTANEOUS at 09:29

## 2024-01-01 RX ADMIN — SENNOSIDES AND DOCUSATE SODIUM 2 TABLET: 50; 8.6 TABLET ORAL at 17:26

## 2024-01-01 RX ADMIN — GABAPENTIN 100 MG: 100 CAPSULE ORAL at 08:24

## 2024-01-01 RX ADMIN — HYDROMORPHONE HYDROCHLORIDE 0.5 MG: 1 INJECTION, SOLUTION INTRAMUSCULAR; INTRAVENOUS; SUBCUTANEOUS at 07:16

## 2024-01-01 RX ADMIN — HYDROMORPHONE HYDROCHLORIDE 1 MG: 1 INJECTION, SOLUTION INTRAMUSCULAR; INTRAVENOUS; SUBCUTANEOUS at 15:15

## 2024-01-01 RX ADMIN — FUROSEMIDE 40 MG: 10 INJECTION, SOLUTION INTRAMUSCULAR; INTRAVENOUS at 14:13

## 2024-01-01 RX ADMIN — OXYCODONE HYDROCHLORIDE 10 MG: 5 SOLUTION ORAL at 13:39

## 2024-01-01 RX ADMIN — ENOXAPARIN SODIUM 120 MG: 120 INJECTION SUBCUTANEOUS at 20:51

## 2024-01-01 RX ADMIN — ENOXAPARIN SODIUM 120 MG: 120 INJECTION SUBCUTANEOUS at 09:39

## 2024-01-01 RX ADMIN — FUROSEMIDE 40 MG: 10 INJECTION, SOLUTION INTRAMUSCULAR; INTRAVENOUS at 18:19

## 2024-01-01 RX ADMIN — ALBUMIN (HUMAN) 50 G: 0.25 INJECTION, SOLUTION INTRAVENOUS at 17:22

## 2024-01-01 RX ADMIN — HYDROMORPHONE HYDROCHLORIDE 0.5 MG: 1 INJECTION, SOLUTION INTRAMUSCULAR; INTRAVENOUS; SUBCUTANEOUS at 14:47

## 2024-01-01 RX ADMIN — APIXABAN 5 MG: 5 TABLET, FILM COATED ORAL at 17:13

## 2024-01-01 RX ADMIN — OXYCODONE HYDROCHLORIDE 5 MG: 5 SOLUTION ORAL at 08:18

## 2024-01-01 RX ADMIN — HYDROMORPHONE HYDROCHLORIDE 0.2 MG: 0.2 INJECTION, SOLUTION INTRAMUSCULAR; INTRAVENOUS; SUBCUTANEOUS at 20:52

## 2024-01-01 RX ADMIN — BUMETANIDE 2 MG: 0.25 INJECTION INTRAMUSCULAR; INTRAVENOUS at 09:21

## 2024-01-01 RX ADMIN — ACETAMINOPHEN 975 MG: 325 TABLET, FILM COATED ORAL at 21:29

## 2024-01-01 RX ADMIN — BUMETANIDE 2 MG: 0.25 INJECTION INTRAMUSCULAR; INTRAVENOUS at 15:31

## 2024-01-01 RX ADMIN — ACETAMINOPHEN 975 MG: 325 TABLET, FILM COATED ORAL at 07:29

## 2024-01-01 RX ADMIN — ACETAMINOPHEN 975 MG: 325 TABLET, FILM COATED ORAL at 13:07

## 2024-01-01 RX ADMIN — OXYCODONE HYDROCHLORIDE 10 MG: 5 SOLUTION ORAL at 23:23

## 2024-01-01 RX ADMIN — HYDROMORPHONE HYDROCHLORIDE 0.5 MG: 1 INJECTION, SOLUTION INTRAMUSCULAR; INTRAVENOUS; SUBCUTANEOUS at 07:23

## 2024-01-01 RX ADMIN — OXYCODONE HYDROCHLORIDE 10 MG: 5 SOLUTION ORAL at 08:22

## 2024-01-01 RX ADMIN — OXYCODONE HYDROCHLORIDE 10 MG: 10 TABLET ORAL at 03:35

## 2024-01-01 RX ADMIN — INSULIN LISPRO 3 UNITS: 100 INJECTION, SOLUTION INTRAVENOUS; SUBCUTANEOUS at 07:34

## 2024-01-01 RX ADMIN — CYANOCOBALAMIN TAB 500 MCG 1000 MCG: 500 TAB at 08:12

## 2024-01-01 RX ADMIN — ALBUMIN (HUMAN) 25 G: 0.25 INJECTION, SOLUTION INTRAVENOUS at 12:43

## 2024-01-01 RX ADMIN — ASPIRIN 81 MG: 81 TABLET, COATED ORAL at 08:34

## 2024-01-01 RX ADMIN — GABAPENTIN 100 MG: 100 CAPSULE ORAL at 09:40

## 2024-01-01 RX ADMIN — ACETAMINOPHEN 975 MG: 325 TABLET, FILM COATED ORAL at 21:55

## 2024-01-01 RX ADMIN — INSULIN LISPRO 10 UNITS: 100 INJECTION, SOLUTION INTRAVENOUS; SUBCUTANEOUS at 11:03

## 2024-01-01 RX ADMIN — INSULIN LISPRO 2 UNITS: 100 INJECTION, SOLUTION INTRAVENOUS; SUBCUTANEOUS at 16:38

## 2024-01-01 RX ADMIN — ASPIRIN 81 MG: 81 TABLET, COATED ORAL at 08:12

## 2024-01-01 RX ADMIN — ALBUMIN (HUMAN) 12.5 G: 0.25 INJECTION, SOLUTION INTRAVENOUS at 21:28

## 2024-01-01 RX ADMIN — FUROSEMIDE 40 MG: 10 INJECTION, SOLUTION INTRAMUSCULAR; INTRAVENOUS at 11:38

## 2024-01-01 RX ADMIN — METHYLPREDNISOLONE SODIUM SUCCINATE 40 MG: 40 INJECTION, POWDER, FOR SOLUTION INTRAMUSCULAR; INTRAVENOUS at 06:13

## 2024-01-01 RX ADMIN — OXYCODONE HYDROCHLORIDE 5 MG: 5 SOLUTION ORAL at 21:21

## 2024-01-01 RX ADMIN — ASPIRIN 81 MG: 81 TABLET, COATED ORAL at 09:40

## 2024-01-01 RX ADMIN — ENOXAPARIN SODIUM 120 MG: 120 INJECTION SUBCUTANEOUS at 20:14

## 2024-01-01 RX ADMIN — ALBUMIN (HUMAN) 25 G: 0.25 INJECTION, SOLUTION INTRAVENOUS at 08:46

## 2024-01-01 RX ADMIN — VALACYCLOVIR 1000 MG: 1 TABLET, FILM COATED ORAL at 21:03

## 2024-01-01 RX ADMIN — OXYCODONE HYDROCHLORIDE 10 MG: 10 TABLET ORAL at 09:28

## 2024-01-01 RX ADMIN — ACETAMINOPHEN 975 MG: 325 TABLET, FILM COATED ORAL at 05:44

## 2024-01-01 RX ADMIN — METHYLPREDNISOLONE SODIUM SUCCINATE 40 MG: 40 INJECTION, POWDER, FOR SOLUTION INTRAMUSCULAR; INTRAVENOUS at 21:35

## 2024-01-01 RX ADMIN — FUROSEMIDE 40 MG: 10 INJECTION, SOLUTION INTRAMUSCULAR; INTRAVENOUS at 13:06

## 2024-01-01 RX ADMIN — MAGNESIUM SULFATE HEPTAHYDRATE 4 G: 40 INJECTION, SOLUTION INTRAVENOUS at 10:32

## 2024-01-01 RX ADMIN — VALACYCLOVIR 1000 MG: 1 TABLET, FILM COATED ORAL at 21:22

## 2024-01-01 RX ADMIN — INSULIN LISPRO 5 UNITS: 100 INJECTION, SOLUTION INTRAVENOUS; SUBCUTANEOUS at 16:39

## 2024-01-01 RX ADMIN — ACETAMINOPHEN 975 MG: 325 TABLET, FILM COATED ORAL at 06:01

## 2024-01-01 RX ADMIN — Medication 40 MG: at 05:36

## 2024-01-01 RX ADMIN — HYDROMORPHONE HYDROCHLORIDE 0.5 MG: 1 INJECTION, SOLUTION INTRAMUSCULAR; INTRAVENOUS; SUBCUTANEOUS at 13:54

## 2024-01-01 RX ADMIN — INSULIN GLARGINE 20 UNITS: 100 INJECTION, SOLUTION SUBCUTANEOUS at 21:21

## 2024-01-01 RX ADMIN — FOLIC ACID 1 MG: 1 TABLET ORAL at 08:07

## 2024-01-01 RX ADMIN — HYDROMORPHONE HYDROCHLORIDE 0.5 MG: 1 INJECTION, SOLUTION INTRAMUSCULAR; INTRAVENOUS; SUBCUTANEOUS at 06:15

## 2024-01-01 RX ADMIN — FENTANYL 12 MCG: 12 PATCH TRANSDERMAL at 12:44

## 2024-01-01 RX ADMIN — OXYCODONE HYDROCHLORIDE 10 MG: 5 SOLUTION ORAL at 03:53

## 2024-01-01 RX ADMIN — INSULIN GLARGINE 20 UNITS: 100 INJECTION, SOLUTION SUBCUTANEOUS at 21:09

## 2024-01-01 RX ADMIN — INSULIN LISPRO 5 UNITS: 100 INJECTION, SOLUTION INTRAVENOUS; SUBCUTANEOUS at 16:38

## 2024-01-01 RX ADMIN — OXYCODONE HYDROCHLORIDE 5 MG: 5 SOLUTION ORAL at 17:26

## 2024-01-01 RX ADMIN — CEFAZOLIN SODIUM 1000 MG: 1 SOLUTION INTRAVENOUS at 20:27

## 2024-01-01 RX ADMIN — INSULIN LISPRO 2 UNITS: 100 INJECTION, SOLUTION INTRAVENOUS; SUBCUTANEOUS at 16:31

## 2024-01-01 RX ADMIN — CYANOCOBALAMIN TAB 500 MCG 1000 MCG: 500 TAB at 08:14

## 2024-01-01 RX ADMIN — GABAPENTIN 100 MG: 100 CAPSULE ORAL at 08:22

## 2024-01-01 RX ADMIN — ONDANSETRON 4 MG: 2 INJECTION INTRAMUSCULAR; INTRAVENOUS at 23:34

## 2024-01-01 RX ADMIN — HYDROMORPHONE HYDROCHLORIDE 0.5 MG: 1 INJECTION, SOLUTION INTRAMUSCULAR; INTRAVENOUS; SUBCUTANEOUS at 16:08

## 2024-01-01 RX ADMIN — CARVEDILOL 25 MG: 25 TABLET, FILM COATED ORAL at 09:28

## 2024-01-01 RX ADMIN — ACETAMINOPHEN 975 MG: 325 TABLET, FILM COATED ORAL at 21:03

## 2024-01-01 RX ADMIN — INSULIN LISPRO 1 UNITS: 100 INJECTION, SOLUTION INTRAVENOUS; SUBCUTANEOUS at 14:19

## 2024-01-01 RX ADMIN — INSULIN LISPRO 1 UNITS: 100 INJECTION, SOLUTION INTRAVENOUS; SUBCUTANEOUS at 07:50

## 2024-01-01 RX ADMIN — OXYCODONE HYDROCHLORIDE 10 MG: 5 SOLUTION ORAL at 08:14

## 2024-01-01 RX ADMIN — INSULIN GLARGINE 20 UNITS: 100 INJECTION, SOLUTION SUBCUTANEOUS at 21:30

## 2024-01-01 RX ADMIN — HYDROMORPHONE HYDROCHLORIDE 1 MG: 1 INJECTION, SOLUTION INTRAMUSCULAR; INTRAVENOUS; SUBCUTANEOUS at 03:24

## 2024-01-01 RX ADMIN — CYANOCOBALAMIN TAB 500 MCG 1000 MCG: 500 TAB at 08:19

## 2024-01-01 RX ADMIN — INSULIN LISPRO 5 UNITS: 100 INJECTION, SOLUTION INTRAVENOUS; SUBCUTANEOUS at 13:07

## 2024-01-01 RX ADMIN — LIDOCAINE HYDROCHLORIDE 10 ML: 10 INJECTION, SOLUTION EPIDURAL; INFILTRATION; INTRACAUDAL; PERINEURAL at 16:25

## 2024-01-01 RX ADMIN — ENOXAPARIN SODIUM 120 MG: 120 INJECTION SUBCUTANEOUS at 22:09

## 2024-01-01 RX ADMIN — OXYCODONE HYDROCHLORIDE 5 MG: 5 TABLET ORAL at 21:57

## 2024-01-01 RX ADMIN — ENOXAPARIN SODIUM 120 MG: 120 INJECTION SUBCUTANEOUS at 23:00

## 2024-01-01 RX ADMIN — ACETAMINOPHEN 975 MG: 325 TABLET, FILM COATED ORAL at 06:05

## 2024-01-01 RX ADMIN — OXYCODONE HYDROCHLORIDE 10 MG: 5 SOLUTION ORAL at 03:14

## 2024-01-01 RX ADMIN — VANCOMYCIN HYDROCHLORIDE 1000 MG: 1 INJECTION, SOLUTION INTRAVENOUS at 21:30

## 2024-01-01 RX ADMIN — HYDROMORPHONE HYDROCHLORIDE 0.5 MG: 1 INJECTION, SOLUTION INTRAMUSCULAR; INTRAVENOUS; SUBCUTANEOUS at 14:50

## 2024-01-01 RX ADMIN — INSULIN LISPRO 2 UNITS: 100 INJECTION, SOLUTION INTRAVENOUS; SUBCUTANEOUS at 08:09

## 2024-01-01 RX ADMIN — INSULIN LISPRO 2 UNITS: 100 INJECTION, SOLUTION INTRAVENOUS; SUBCUTANEOUS at 06:32

## 2024-01-01 RX ADMIN — ACETAMINOPHEN 975 MG: 325 TABLET, FILM COATED ORAL at 21:45

## 2024-01-01 RX ADMIN — INSULIN LISPRO 2 UNITS: 100 INJECTION, SOLUTION INTRAVENOUS; SUBCUTANEOUS at 08:28

## 2024-01-01 RX ADMIN — OXYCODONE HYDROCHLORIDE 10 MG: 5 SOLUTION ORAL at 15:13

## 2024-01-01 RX ADMIN — ALBUMIN (HUMAN) 12.5 G: 0.25 INJECTION, SOLUTION INTRAVENOUS at 08:28

## 2024-01-01 RX ADMIN — AMLODIPINE BESYLATE 5 MG: 5 TABLET ORAL at 09:21

## 2024-01-01 RX ADMIN — INSULIN LISPRO 1 UNITS: 100 INJECTION, SOLUTION INTRAVENOUS; SUBCUTANEOUS at 16:52

## 2024-01-01 RX ADMIN — HYDROMORPHONE HYDROCHLORIDE 0.5 MG: 1 INJECTION, SOLUTION INTRAMUSCULAR; INTRAVENOUS; SUBCUTANEOUS at 18:18

## 2024-01-01 RX ADMIN — ASPIRIN 81 MG: 81 TABLET, COATED ORAL at 09:36

## 2024-01-01 RX ADMIN — ACETAMINOPHEN 975 MG: 325 TABLET, FILM COATED ORAL at 05:53

## 2024-01-01 RX ADMIN — INSULIN LISPRO 10 UNITS: 100 INJECTION, SOLUTION INTRAVENOUS; SUBCUTANEOUS at 18:28

## 2024-01-01 RX ADMIN — ONDANSETRON 4 MG: 2 INJECTION INTRAMUSCULAR; INTRAVENOUS at 05:00

## 2024-01-01 RX ADMIN — INSULIN LISPRO 5 UNITS: 100 INJECTION, SOLUTION INTRAVENOUS; SUBCUTANEOUS at 12:52

## 2024-01-01 RX ADMIN — BUMETANIDE 2 MG: 0.25 INJECTION INTRAMUSCULAR; INTRAVENOUS at 09:43

## 2024-01-01 RX ADMIN — CARVEDILOL 25 MG: 25 TABLET, FILM COATED ORAL at 08:22

## 2024-01-01 RX ADMIN — LEVALBUTEROL HYDROCHLORIDE 1.25 MG: 1.25 SOLUTION RESPIRATORY (INHALATION) at 20:13

## 2024-01-01 RX ADMIN — LEVOTHYROXINE SODIUM 150 MCG: 75 TABLET ORAL at 05:54

## 2024-01-01 RX ADMIN — CEFTRIAXONE SODIUM 2000 MG: 10 INJECTION, POWDER, FOR SOLUTION INTRAVENOUS at 06:11

## 2024-01-01 RX ADMIN — OXYCODONE HYDROCHLORIDE 10 MG: 5 SOLUTION ORAL at 09:46

## 2024-01-01 RX ADMIN — IPRATROPIUM BROMIDE AND ALBUTEROL SULFATE 3 ML: .5; 3 SOLUTION RESPIRATORY (INHALATION) at 11:39

## 2024-01-01 RX ADMIN — ASPIRIN 81 MG: 81 TABLET, COATED ORAL at 09:03

## 2024-01-01 RX ADMIN — VANCOMYCIN HYDROCHLORIDE 2000 MG: 10 INJECTION, POWDER, LYOPHILIZED, FOR SOLUTION INTRAVENOUS at 09:53

## 2024-01-01 RX ADMIN — INSULIN LISPRO 2 UNITS: 100 INJECTION, SOLUTION INTRAVENOUS; SUBCUTANEOUS at 08:37

## 2024-01-01 RX ADMIN — INSULIN LISPRO 2 UNITS: 100 INJECTION, SOLUTION INTRAVENOUS; SUBCUTANEOUS at 11:43

## 2024-01-01 RX ADMIN — OXYCODONE HYDROCHLORIDE 5 MG: 5 SOLUTION ORAL at 13:13

## 2024-01-01 RX ADMIN — HYDROMORPHONE HYDROCHLORIDE 0.2 MG: 0.2 INJECTION, SOLUTION INTRAMUSCULAR; INTRAVENOUS; SUBCUTANEOUS at 06:14

## 2024-01-01 RX ADMIN — INSULIN LISPRO 2 UNITS: 100 INJECTION, SOLUTION INTRAVENOUS; SUBCUTANEOUS at 13:06

## 2024-01-01 RX ADMIN — INSULIN LISPRO 1 UNITS: 100 INJECTION, SOLUTION INTRAVENOUS; SUBCUTANEOUS at 08:24

## 2024-01-01 RX ADMIN — METHYLPREDNISOLONE SODIUM SUCCINATE 40 MG: 40 INJECTION, POWDER, FOR SOLUTION INTRAMUSCULAR; INTRAVENOUS at 23:04

## 2024-01-01 RX ADMIN — CYANOCOBALAMIN TAB 500 MCG 1000 MCG: 500 TAB at 08:34

## 2024-01-01 RX ADMIN — ACETAMINOPHEN 975 MG: 325 TABLET, FILM COATED ORAL at 22:47

## 2024-01-01 RX ADMIN — HYDROMORPHONE HYDROCHLORIDE 0.5 MG: 1 INJECTION, SOLUTION INTRAMUSCULAR; INTRAVENOUS; SUBCUTANEOUS at 11:38

## 2024-01-01 RX ADMIN — CARVEDILOL 25 MG: 25 TABLET, FILM COATED ORAL at 08:25

## 2024-01-01 RX ADMIN — ALBUMIN (HUMAN) 12.5 G: 0.25 INJECTION, SOLUTION INTRAVENOUS at 18:23

## 2024-01-01 RX ADMIN — INSULIN GLARGINE 20 UNITS: 100 INJECTION, SOLUTION SUBCUTANEOUS at 21:44

## 2024-01-01 RX ADMIN — CARVEDILOL 25 MG: 25 TABLET, FILM COATED ORAL at 08:26

## 2024-01-01 RX ADMIN — HYDROMORPHONE HYDROCHLORIDE 0.2 MG: 0.2 INJECTION, SOLUTION INTRAMUSCULAR; INTRAVENOUS; SUBCUTANEOUS at 13:04

## 2024-01-01 RX ADMIN — VANCOMYCIN HYDROCHLORIDE 1000 MG: 1 INJECTION, SOLUTION INTRAVENOUS at 15:19

## 2024-01-01 RX ADMIN — HYDROMORPHONE HYDROCHLORIDE 0.5 MG: 1 INJECTION, SOLUTION INTRAMUSCULAR; INTRAVENOUS; SUBCUTANEOUS at 14:34

## 2024-01-01 RX ADMIN — INSULIN LISPRO 1 UNITS: 100 INJECTION, SOLUTION INTRAVENOUS; SUBCUTANEOUS at 09:50

## 2024-01-01 RX ADMIN — OXYCODONE HYDROCHLORIDE 10 MG: 10 TABLET ORAL at 16:40

## 2024-01-01 RX ADMIN — OXYCODONE HYDROCHLORIDE 10 MG: 5 SOLUTION ORAL at 09:48

## 2024-01-01 RX ADMIN — INSULIN LISPRO 2 UNITS: 100 INJECTION, SOLUTION INTRAVENOUS; SUBCUTANEOUS at 10:59

## 2024-01-01 RX ADMIN — HYDROMORPHONE HYDROCHLORIDE 0.5 MG: 1 INJECTION, SOLUTION INTRAMUSCULAR; INTRAVENOUS; SUBCUTANEOUS at 08:11

## 2024-01-01 RX ADMIN — OXYCODONE HYDROCHLORIDE 10 MG: 5 SOLUTION ORAL at 14:57

## 2024-01-01 RX ADMIN — Medication 40 MG: at 06:05

## 2024-01-01 RX ADMIN — FUROSEMIDE 40 MG: 40 TABLET ORAL at 17:18

## 2024-01-01 RX ADMIN — INSULIN LISPRO 1 UNITS: 100 INJECTION, SOLUTION INTRAVENOUS; SUBCUTANEOUS at 13:08

## 2024-01-01 RX ADMIN — ONDANSETRON 4 MG: 2 INJECTION INTRAMUSCULAR; INTRAVENOUS at 02:42

## 2024-01-01 RX ADMIN — HYDROMORPHONE HYDROCHLORIDE 0.5 MG: 1 INJECTION, SOLUTION INTRAMUSCULAR; INTRAVENOUS; SUBCUTANEOUS at 00:35

## 2024-01-01 RX ADMIN — INSULIN LISPRO 1 UNITS: 100 INJECTION, SOLUTION INTRAVENOUS; SUBCUTANEOUS at 06:45

## 2024-01-01 RX ADMIN — MAGNESIUM SULFATE HEPTAHYDRATE 2 G: 40 INJECTION, SOLUTION INTRAVENOUS at 09:26

## 2024-01-01 RX ADMIN — HYDROMORPHONE HYDROCHLORIDE 0.5 MG: 1 INJECTION, SOLUTION INTRAMUSCULAR; INTRAVENOUS; SUBCUTANEOUS at 16:11

## 2024-01-01 RX ADMIN — ACETAMINOPHEN 975 MG: 325 TABLET, FILM COATED ORAL at 14:47

## 2024-01-01 RX ADMIN — CEFAZOLIN SODIUM 1000 MG: 1 SOLUTION INTRAVENOUS at 11:35

## 2024-01-01 RX ADMIN — CEFTRIAXONE SODIUM 2000 MG: 10 INJECTION, POWDER, FOR SOLUTION INTRAVENOUS at 06:22

## 2024-01-01 RX ADMIN — ENOXAPARIN SODIUM 120 MG: 120 INJECTION SUBCUTANEOUS at 21:14

## 2024-01-01 RX ADMIN — VALACYCLOVIR 1000 MG: 1 TABLET, FILM COATED ORAL at 13:54

## 2024-01-01 RX ADMIN — CARVEDILOL 25 MG: 25 TABLET, FILM COATED ORAL at 08:12

## 2024-01-01 RX ADMIN — AMLODIPINE BESYLATE 5 MG: 5 TABLET ORAL at 09:40

## 2024-01-01 RX ADMIN — LEVOTHYROXINE SODIUM 150 MCG: 75 TABLET ORAL at 06:16

## 2024-01-01 RX ADMIN — ALBUMIN (HUMAN) 25 G: 0.25 INJECTION, SOLUTION INTRAVENOUS at 16:38

## 2024-01-01 RX ADMIN — HYDROMORPHONE HYDROCHLORIDE 0.5 MG: 1 INJECTION, SOLUTION INTRAMUSCULAR; INTRAVENOUS; SUBCUTANEOUS at 19:05

## 2024-01-01 RX ADMIN — ASPIRIN 81 MG: 81 TABLET, COATED ORAL at 09:37

## 2024-01-01 RX ADMIN — INSULIN LISPRO 1 UNITS: 100 INJECTION, SOLUTION INTRAVENOUS; SUBCUTANEOUS at 07:41

## 2024-01-01 RX ADMIN — OXYCODONE HYDROCHLORIDE 10 MG: 10 TABLET ORAL at 05:44

## 2024-01-01 RX ADMIN — INSULIN LISPRO 1 UNITS: 100 INJECTION, SOLUTION INTRAVENOUS; SUBCUTANEOUS at 16:12

## 2024-01-01 RX ADMIN — INSULIN LISPRO 1 UNITS: 100 INJECTION, SOLUTION INTRAVENOUS; SUBCUTANEOUS at 06:09

## 2024-01-01 RX ADMIN — Medication 40 MG: at 09:21

## 2024-01-01 RX ADMIN — ALBUMIN (HUMAN) 25 G: 0.25 INJECTION, SOLUTION INTRAVENOUS at 14:11

## 2024-01-01 RX ADMIN — Medication 40 MG: at 06:26

## 2024-01-01 RX ADMIN — OXYCODONE HYDROCHLORIDE 5 MG: 5 SOLUTION ORAL at 09:03

## 2024-01-01 RX ADMIN — OXYCODONE HYDROCHLORIDE 5 MG: 5 TABLET ORAL at 05:04

## 2024-01-01 RX ADMIN — APIXABAN 5 MG: 5 TABLET, FILM COATED ORAL at 07:36

## 2024-01-01 RX ADMIN — CEFAZOLIN SODIUM 1000 MG: 1 SOLUTION INTRAVENOUS at 11:51

## 2024-01-01 RX ADMIN — INSULIN LISPRO 5 UNITS: 100 INJECTION, SOLUTION INTRAVENOUS; SUBCUTANEOUS at 11:11

## 2024-01-01 RX ADMIN — LEVOTHYROXINE SODIUM 150 MCG: 75 TABLET ORAL at 05:13

## 2024-01-01 RX ADMIN — LEVOTHYROXINE SODIUM 150 MCG: 75 TABLET ORAL at 07:29

## 2024-01-01 RX ADMIN — ACETAMINOPHEN 975 MG: 325 TABLET, FILM COATED ORAL at 14:11

## 2024-01-01 RX ADMIN — ASPIRIN 81 MG: 81 TABLET, COATED ORAL at 09:04

## 2024-01-01 RX ADMIN — HYDROMORPHONE HYDROCHLORIDE 0.2 MG: 0.2 INJECTION, SOLUTION INTRAMUSCULAR; INTRAVENOUS; SUBCUTANEOUS at 17:27

## 2024-01-01 RX ADMIN — APIXABAN 5 MG: 5 TABLET, FILM COATED ORAL at 09:03

## 2024-01-01 RX ADMIN — SENNOSIDES 17.6 MG: 8.8 LIQUID ORAL at 09:38

## 2024-01-01 RX ADMIN — CARVEDILOL 25 MG: 25 TABLET, FILM COATED ORAL at 16:10

## 2024-01-01 RX ADMIN — FOLIC ACID 1 MG: 1 TABLET ORAL at 08:34

## 2024-01-01 RX ADMIN — OXYCODONE HYDROCHLORIDE 10 MG: 5 SOLUTION ORAL at 05:18

## 2024-01-01 RX ADMIN — SODIUM CHLORIDE 1000 ML: 0.9 INJECTION, SOLUTION INTRAVENOUS at 21:46

## 2024-01-01 RX ADMIN — HYDROMORPHONE HYDROCHLORIDE 1 MG: 1 INJECTION, SOLUTION INTRAMUSCULAR; INTRAVENOUS; SUBCUTANEOUS at 03:58

## 2024-01-01 RX ADMIN — METOCLOPRAMIDE 10 MG: 10 TABLET ORAL at 22:01

## 2024-01-01 RX ADMIN — INSULIN LISPRO 10 UNITS: 100 INJECTION, SOLUTION INTRAVENOUS; SUBCUTANEOUS at 16:38

## 2024-01-01 RX ADMIN — ACETAMINOPHEN 975 MG: 325 TABLET, FILM COATED ORAL at 06:24

## 2024-01-01 RX ADMIN — METHYLPREDNISOLONE SODIUM SUCCINATE 40 MG: 40 INJECTION, POWDER, FOR SOLUTION INTRAMUSCULAR; INTRAVENOUS at 05:41

## 2024-01-01 RX ADMIN — MAGNESIUM SULFATE HEPTAHYDRATE 4 G: 40 INJECTION, SOLUTION INTRAVENOUS at 14:29

## 2024-01-01 RX ADMIN — CEFAZOLIN SODIUM 1000 MG: 1 SOLUTION INTRAVENOUS at 03:51

## 2024-01-01 RX ADMIN — SODIUM CHLORIDE SOLN NEBU 3% 4 ML: 3 NEBU SOLN at 20:13

## 2024-01-01 RX ADMIN — BUMETANIDE 2 MG: 0.25 INJECTION INTRAMUSCULAR; INTRAVENOUS at 09:07

## 2024-01-01 RX ADMIN — ASPIRIN 81 MG: 81 TABLET, COATED ORAL at 08:22

## 2024-01-01 RX ADMIN — OXYCODONE HYDROCHLORIDE 10 MG: 10 TABLET ORAL at 09:35

## 2024-01-01 RX ADMIN — CARVEDILOL 25 MG: 25 TABLET, FILM COATED ORAL at 17:26

## 2024-01-01 RX ADMIN — FOLIC ACID 1 MG: 1 TABLET ORAL at 09:41

## 2024-01-01 RX ADMIN — BUMETANIDE 2 MG: 0.25 INJECTION INTRAMUSCULAR; INTRAVENOUS at 23:03

## 2024-01-01 RX ADMIN — INSULIN LISPRO 5 UNITS: 100 INJECTION, SOLUTION INTRAVENOUS; SUBCUTANEOUS at 09:50

## 2024-01-01 RX ADMIN — AMLODIPINE BESYLATE 5 MG: 5 TABLET ORAL at 08:25

## 2024-01-01 RX ADMIN — LIDOCAINE HYDROCHLORIDE 30 ML: 10 INJECTION, SOLUTION EPIDURAL; INFILTRATION; INTRACAUDAL; PERINEURAL at 17:20

## 2024-01-01 RX ADMIN — CYANOCOBALAMIN TAB 500 MCG 1000 MCG: 500 TAB at 09:21

## 2024-01-01 RX ADMIN — VANCOMYCIN HYDROCHLORIDE 750 MG: 750 INJECTION, SOLUTION INTRAVENOUS at 02:54

## 2024-01-01 RX ADMIN — ENOXAPARIN SODIUM 120 MG: 120 INJECTION SUBCUTANEOUS at 09:05

## 2024-01-01 RX ADMIN — ACETAMINOPHEN 975 MG: 325 TABLET, FILM COATED ORAL at 21:09

## 2024-01-01 RX ADMIN — CARVEDILOL 25 MG: 25 TABLET, FILM COATED ORAL at 08:07

## 2024-01-01 RX ADMIN — Medication 40 MG: at 05:45

## 2024-01-01 RX ADMIN — OXYCODONE HYDROCHLORIDE 10 MG: 10 TABLET ORAL at 05:36

## 2024-01-01 RX ADMIN — HYDROMORPHONE HYDROCHLORIDE 0.5 MG: 1 INJECTION, SOLUTION INTRAMUSCULAR; INTRAVENOUS; SUBCUTANEOUS at 10:18

## 2024-01-01 RX ADMIN — GABAPENTIN 100 MG: 100 CAPSULE ORAL at 09:36

## 2024-01-01 RX ADMIN — ACETAMINOPHEN 975 MG: 325 TABLET, FILM COATED ORAL at 21:30

## 2024-01-01 RX ADMIN — Medication 40 MG: at 06:00

## 2024-01-01 RX ADMIN — CARVEDILOL 25 MG: 25 TABLET, FILM COATED ORAL at 09:47

## 2024-01-01 RX ADMIN — CARVEDILOL 25 MG: 25 TABLET, FILM COATED ORAL at 16:55

## 2024-01-01 RX ADMIN — OXYCODONE HYDROCHLORIDE 10 MG: 5 SOLUTION ORAL at 17:47

## 2024-01-01 RX ADMIN — INSULIN LISPRO 10 UNITS: 100 INJECTION, SOLUTION INTRAVENOUS; SUBCUTANEOUS at 17:23

## 2024-01-01 RX ADMIN — ACETAMINOPHEN 975 MG: 325 TABLET, FILM COATED ORAL at 13:04

## 2024-01-01 RX ADMIN — APIXABAN 5 MG: 5 TABLET, FILM COATED ORAL at 17:26

## 2024-01-01 RX ADMIN — MAGNESIUM SULFATE HEPTAHYDRATE 2 G: 40 INJECTION, SOLUTION INTRAVENOUS at 12:49

## 2024-01-01 RX ADMIN — HYDROMORPHONE HYDROCHLORIDE 0.5 MG: 1 INJECTION, SOLUTION INTRAMUSCULAR; INTRAVENOUS; SUBCUTANEOUS at 17:24

## 2024-01-01 RX ADMIN — HYDROMORPHONE HYDROCHLORIDE 0.5 MG: 1 INJECTION, SOLUTION INTRAMUSCULAR; INTRAVENOUS; SUBCUTANEOUS at 08:25

## 2024-01-01 RX ADMIN — ALBUMIN (HUMAN) 25 G: 0.25 INJECTION, SOLUTION INTRAVENOUS at 08:25

## 2024-01-01 RX ADMIN — INSULIN GLARGINE 10 UNITS: 100 INJECTION, SOLUTION SUBCUTANEOUS at 21:39

## 2024-01-01 RX ADMIN — HYDROMORPHONE HYDROCHLORIDE 0.5 MG: 1 INJECTION, SOLUTION INTRAMUSCULAR; INTRAVENOUS; SUBCUTANEOUS at 15:17

## 2024-01-01 RX ADMIN — HYDROMORPHONE HYDROCHLORIDE 0.5 MG: 1 INJECTION, SOLUTION INTRAMUSCULAR; INTRAVENOUS; SUBCUTANEOUS at 18:42

## 2024-01-01 RX ADMIN — DICYCLOMINE HYDROCHLORIDE 10 MG: 10 CAPSULE ORAL at 17:27

## 2024-01-01 RX ADMIN — CEFAZOLIN SODIUM 1000 MG: 1 SOLUTION INTRAVENOUS at 04:18

## 2024-01-01 RX ADMIN — FOLIC ACID 1 MG: 1 TABLET ORAL at 09:37

## 2024-01-01 RX ADMIN — INSULIN GLARGINE 20 UNITS: 100 INJECTION, SOLUTION SUBCUTANEOUS at 21:03

## 2024-01-01 RX ADMIN — INSULIN GLARGINE 20 UNITS: 100 INJECTION, SOLUTION SUBCUTANEOUS at 21:28

## 2024-01-01 RX ADMIN — POLYETHYLENE GLYCOL 3350 17 G: 17 POWDER, FOR SOLUTION ORAL at 09:40

## 2024-01-01 RX ADMIN — CARVEDILOL 25 MG: 25 TABLET, FILM COATED ORAL at 07:23

## 2024-01-01 RX ADMIN — SENNOSIDES 17.6 MG: 8.8 LIQUID ORAL at 17:21

## 2024-01-01 RX ADMIN — OXYCODONE HYDROCHLORIDE 5 MG: 5 TABLET ORAL at 17:19

## 2024-01-01 RX ADMIN — INSULIN LISPRO 2 UNITS: 100 INJECTION, SOLUTION INTRAVENOUS; SUBCUTANEOUS at 18:31

## 2024-01-01 RX ADMIN — ACETAMINOPHEN 975 MG: 325 TABLET, FILM COATED ORAL at 06:15

## 2024-01-01 RX ADMIN — ACETAMINOPHEN 975 MG: 325 TABLET, FILM COATED ORAL at 21:47

## 2024-01-01 RX ADMIN — HYDROMORPHONE HYDROCHLORIDE 0.5 MG: 1 INJECTION, SOLUTION INTRAMUSCULAR; INTRAVENOUS; SUBCUTANEOUS at 07:04

## 2024-01-01 RX ADMIN — OXYCODONE HYDROCHLORIDE 10 MG: 10 TABLET ORAL at 10:31

## 2024-01-01 RX ADMIN — ACETAMINOPHEN 975 MG: 325 TABLET, FILM COATED ORAL at 22:07

## 2024-01-01 RX ADMIN — ENOXAPARIN SODIUM 120 MG: 120 INJECTION SUBCUTANEOUS at 21:39

## 2024-01-01 RX ADMIN — VALACYCLOVIR 1000 MG: 1 TABLET, FILM COATED ORAL at 13:04

## 2024-01-01 RX ADMIN — LEVOTHYROXINE SODIUM 150 MCG: 75 TABLET ORAL at 06:10

## 2024-01-01 RX ADMIN — ALBUMIN (HUMAN) 25 G: 0.25 INJECTION, SOLUTION INTRAVENOUS at 18:18

## 2024-01-01 RX ADMIN — OXYCODONE HYDROCHLORIDE 10 MG: 5 SOLUTION ORAL at 15:31

## 2024-01-01 RX ADMIN — OXYCODONE HYDROCHLORIDE 5 MG: 5 SOLUTION ORAL at 06:02

## 2024-01-01 RX ADMIN — INSULIN LISPRO 1 UNITS: 100 INJECTION, SOLUTION INTRAVENOUS; SUBCUTANEOUS at 06:17

## 2024-01-01 RX ADMIN — ENOXAPARIN SODIUM 120 MG: 120 INJECTION SUBCUTANEOUS at 23:03

## 2024-01-01 RX ADMIN — ASPIRIN 81 MG: 81 TABLET, COATED ORAL at 07:36

## 2024-01-01 RX ADMIN — HYDROMORPHONE HYDROCHLORIDE 0.2 MG: 0.2 INJECTION, SOLUTION INTRAMUSCULAR; INTRAVENOUS; SUBCUTANEOUS at 06:09

## 2024-01-01 RX ADMIN — INSULIN LISPRO 2 UNITS: 100 INJECTION, SOLUTION INTRAVENOUS; SUBCUTANEOUS at 17:23

## 2024-01-01 RX ADMIN — INSULIN LISPRO 5 UNITS: 100 INJECTION, SOLUTION INTRAVENOUS; SUBCUTANEOUS at 08:09

## 2024-01-01 RX ADMIN — CARVEDILOL 25 MG: 25 TABLET, FILM COATED ORAL at 17:56

## 2024-01-01 RX ADMIN — GABAPENTIN 300 MG: 300 CAPSULE ORAL at 21:14

## 2024-01-01 RX ADMIN — Medication 40 MG: at 06:18

## 2024-01-01 RX ADMIN — ENOXAPARIN SODIUM 120 MG: 120 INJECTION SUBCUTANEOUS at 22:42

## 2024-01-01 RX ADMIN — CYANOCOBALAMIN TAB 500 MCG 1000 MCG: 500 TAB at 09:40

## 2024-01-01 RX ADMIN — INSULIN LISPRO 1 UNITS: 100 INJECTION, SOLUTION INTRAVENOUS; SUBCUTANEOUS at 16:47

## 2024-01-01 RX ADMIN — ENOXAPARIN SODIUM 120 MG: 120 INJECTION SUBCUTANEOUS at 18:30

## 2024-01-01 RX ADMIN — CEFAZOLIN SODIUM 1000 MG: 1 SOLUTION INTRAVENOUS at 18:45

## 2024-01-01 RX ADMIN — ALBUMIN (HUMAN) 25 G: 0.25 INJECTION, SOLUTION INTRAVENOUS at 16:46

## 2024-01-01 RX ADMIN — CARVEDILOL 25 MG: 25 TABLET, FILM COATED ORAL at 15:12

## 2024-01-01 RX ADMIN — INSULIN LISPRO 1 UNITS: 100 INJECTION, SOLUTION INTRAVENOUS; SUBCUTANEOUS at 16:48

## 2024-01-01 RX ADMIN — INSULIN LISPRO 2 UNITS: 100 INJECTION, SOLUTION INTRAVENOUS; SUBCUTANEOUS at 17:15

## 2024-01-01 RX ADMIN — VANCOMYCIN HYDROCHLORIDE 1250 MG: 10 INJECTION, POWDER, LYOPHILIZED, FOR SOLUTION INTRAVENOUS at 12:08

## 2024-01-01 RX ADMIN — CYANOCOBALAMIN 1000 MCG: 1000 INJECTION, SOLUTION INTRAMUSCULAR; SUBCUTANEOUS at 13:56

## 2024-01-01 RX ADMIN — OXYCODONE HYDROCHLORIDE 5 MG: 5 SOLUTION ORAL at 15:55

## 2024-01-01 RX ADMIN — OXYCODONE HYDROCHLORIDE 10 MG: 5 SOLUTION ORAL at 09:52

## 2024-01-01 RX ADMIN — SENNOSIDES 17.6 MG: 8.8 LIQUID ORAL at 09:58

## 2024-01-01 RX ADMIN — CYANOCOBALAMIN TAB 500 MCG 1000 MCG: 500 TAB at 08:27

## 2024-01-01 RX ADMIN — OXYCODONE HYDROCHLORIDE 10 MG: 10 TABLET ORAL at 21:30

## 2024-01-01 RX ADMIN — BUMETANIDE 2 MG: 0.25 INJECTION INTRAMUSCULAR; INTRAVENOUS at 08:14

## 2024-01-01 RX ADMIN — POLYETHYLENE GLYCOL 3350 17 G: 17 POWDER, FOR SOLUTION ORAL at 09:52

## 2024-01-01 RX ADMIN — TOLVAPTAN 15 MG: 15 TABLET ORAL at 12:03

## 2024-01-01 RX ADMIN — ACETAMINOPHEN 975 MG: 325 TABLET, FILM COATED ORAL at 22:41

## 2024-01-01 RX ADMIN — FOLIC ACID 1 MG: 1 TABLET ORAL at 08:25

## 2024-01-01 RX ADMIN — OXYCODONE HYDROCHLORIDE 10 MG: 5 SOLUTION ORAL at 13:20

## 2024-01-01 RX ADMIN — OXYCODONE HYDROCHLORIDE 10 MG: 5 SOLUTION ORAL at 12:17

## 2024-01-01 RX ADMIN — INSULIN LISPRO 1 UNITS: 100 INJECTION, SOLUTION INTRAVENOUS; SUBCUTANEOUS at 07:16

## 2024-01-01 RX ADMIN — INSULIN LISPRO 5 UNITS: 100 INJECTION, SOLUTION INTRAVENOUS; SUBCUTANEOUS at 07:16

## 2024-01-01 RX ADMIN — ACETAMINOPHEN 975 MG: 325 TABLET, FILM COATED ORAL at 21:41

## 2024-01-01 RX ADMIN — LEVOTHYROXINE SODIUM 150 MCG: 75 TABLET ORAL at 05:53

## 2024-01-01 RX ADMIN — HYDROMORPHONE HYDROCHLORIDE 0.5 MG: 1 INJECTION, SOLUTION INTRAMUSCULAR; INTRAVENOUS; SUBCUTANEOUS at 23:34

## 2024-01-01 RX ADMIN — BUMETANIDE 2 MG: 0.25 INJECTION INTRAMUSCULAR; INTRAVENOUS at 16:51

## 2024-01-01 RX ADMIN — FUROSEMIDE 40 MG: 10 INJECTION, SOLUTION INTRAMUSCULAR; INTRAVENOUS at 08:38

## 2024-01-01 RX ADMIN — GABAPENTIN 300 MG: 300 CAPSULE ORAL at 22:47

## 2024-01-01 RX ADMIN — GABAPENTIN 300 MG: 300 CAPSULE ORAL at 21:29

## 2024-01-01 RX ADMIN — CEFAZOLIN SODIUM 1000 MG: 1 SOLUTION INTRAVENOUS at 03:27

## 2024-01-01 RX ADMIN — FUROSEMIDE 40 MG: 10 INJECTION, SOLUTION INTRAMUSCULAR; INTRAVENOUS at 15:12

## 2024-01-01 RX ADMIN — AMLODIPINE BESYLATE 5 MG: 5 TABLET ORAL at 08:22

## 2024-01-01 RX ADMIN — INSULIN LISPRO 5 UNITS: 100 INJECTION, SOLUTION INTRAVENOUS; SUBCUTANEOUS at 11:54

## 2024-01-01 RX ADMIN — CYANOCOBALAMIN TAB 500 MCG 1000 MCG: 500 TAB at 09:04

## 2024-01-01 RX ADMIN — INSULIN GLARGINE 20 UNITS: 100 INJECTION, SOLUTION SUBCUTANEOUS at 21:29

## 2024-01-01 RX ADMIN — LEVOTHYROXINE SODIUM 150 MCG: 75 TABLET ORAL at 05:31

## 2024-01-01 RX ADMIN — ACETAMINOPHEN 975 MG: 325 TABLET, FILM COATED ORAL at 06:10

## 2024-01-01 RX ADMIN — AMLODIPINE BESYLATE 5 MG: 5 TABLET ORAL at 08:26

## 2024-01-01 RX ADMIN — HYDROMORPHONE HYDROCHLORIDE 0.5 MG: 1 INJECTION, SOLUTION INTRAMUSCULAR; INTRAVENOUS; SUBCUTANEOUS at 11:51

## 2024-01-01 RX ADMIN — HYDROMORPHONE HYDROCHLORIDE 0.5 MG: 1 INJECTION, SOLUTION INTRAMUSCULAR; INTRAVENOUS; SUBCUTANEOUS at 10:31

## 2024-01-01 RX ADMIN — SENNOSIDES AND DOCUSATE SODIUM 2 TABLET: 50; 8.6 TABLET ORAL at 08:20

## 2024-01-01 RX ADMIN — ASPIRIN 81 MG: 81 TABLET, COATED ORAL at 08:13

## 2024-01-01 RX ADMIN — CARVEDILOL 25 MG: 25 TABLET, FILM COATED ORAL at 07:16

## 2024-01-01 RX ADMIN — OXYCODONE HYDROCHLORIDE 5 MG: 5 SOLUTION ORAL at 03:39

## 2024-01-01 RX ADMIN — CARVEDILOL 25 MG: 25 TABLET, FILM COATED ORAL at 16:51

## 2024-01-01 RX ADMIN — OXYCODONE HYDROCHLORIDE 10 MG: 10 TABLET ORAL at 16:46

## 2024-01-01 RX ADMIN — VALACYCLOVIR 1000 MG: 1 TABLET, FILM COATED ORAL at 15:13

## 2024-01-01 RX ADMIN — HYDROMORPHONE HYDROCHLORIDE 0.5 MG: 1 INJECTION, SOLUTION INTRAMUSCULAR; INTRAVENOUS; SUBCUTANEOUS at 10:16

## 2024-01-01 RX ADMIN — OXYCODONE HYDROCHLORIDE 5 MG: 5 SOLUTION ORAL at 12:06

## 2024-01-01 RX ADMIN — ALBUMIN (HUMAN) 12.5 G: 0.25 INJECTION, SOLUTION INTRAVENOUS at 14:37

## 2024-01-01 RX ADMIN — FUROSEMIDE 40 MG: 10 INJECTION, SOLUTION INTRAMUSCULAR; INTRAVENOUS at 06:07

## 2024-01-01 RX ADMIN — ONDANSETRON 4 MG: 2 INJECTION INTRAMUSCULAR; INTRAVENOUS at 07:42

## 2024-01-01 RX ADMIN — OXYCODONE HYDROCHLORIDE 10 MG: 10 TABLET ORAL at 22:07

## 2024-01-01 RX ADMIN — ALBUMIN (HUMAN) 12.5 G: 0.25 INJECTION, SOLUTION INTRAVENOUS at 17:57

## 2024-01-01 RX ADMIN — HYDROMORPHONE HYDROCHLORIDE 1 MG: 1 INJECTION, SOLUTION INTRAMUSCULAR; INTRAVENOUS; SUBCUTANEOUS at 19:28

## 2024-01-01 RX ADMIN — INSULIN LISPRO 1 UNITS: 100 INJECTION, SOLUTION INTRAVENOUS; SUBCUTANEOUS at 11:08

## 2024-01-01 RX ADMIN — OXYCODONE HYDROCHLORIDE 10 MG: 10 TABLET ORAL at 15:31

## 2024-01-01 RX ADMIN — LEVOTHYROXINE SODIUM 125 MCG: 125 TABLET ORAL at 06:21

## 2024-01-01 RX ADMIN — INSULIN LISPRO 10 UNITS: 100 INJECTION, SOLUTION INTRAVENOUS; SUBCUTANEOUS at 08:29

## 2024-01-01 RX ADMIN — INSULIN GLARGINE 20 UNITS: 100 INJECTION, SOLUTION SUBCUTANEOUS at 22:47

## 2024-01-01 RX ADMIN — BUMETANIDE 2 MG: 0.25 INJECTION INTRAMUSCULAR; INTRAVENOUS at 18:00

## 2024-01-01 RX ADMIN — INSULIN LISPRO 1 UNITS: 100 INJECTION, SOLUTION INTRAVENOUS; SUBCUTANEOUS at 15:39

## 2024-01-01 RX ADMIN — OXYCODONE HYDROCHLORIDE 10 MG: 5 SOLUTION ORAL at 12:03

## 2024-01-01 RX ADMIN — OXYCODONE HYDROCHLORIDE 10 MG: 5 SOLUTION ORAL at 20:12

## 2024-01-01 RX ADMIN — INSULIN LISPRO 10 UNITS: 100 INJECTION, SOLUTION INTRAVENOUS; SUBCUTANEOUS at 12:01

## 2024-01-01 RX ADMIN — ENOXAPARIN SODIUM 120 MG: 120 INJECTION SUBCUTANEOUS at 09:11

## 2024-01-01 RX ADMIN — HYDROMORPHONE HYDROCHLORIDE 1 MG: 1 INJECTION, SOLUTION INTRAMUSCULAR; INTRAVENOUS; SUBCUTANEOUS at 10:39

## 2024-01-01 RX ADMIN — ENOXAPARIN SODIUM 120 MG: 120 INJECTION SUBCUTANEOUS at 08:38

## 2024-01-01 RX ADMIN — BUMETANIDE 2 MG: 0.25 INJECTION INTRAMUSCULAR; INTRAVENOUS at 13:21

## 2024-01-01 RX ADMIN — SENNOSIDES AND DOCUSATE SODIUM 2 TABLET: 50; 8.6 TABLET ORAL at 10:58

## 2024-01-01 RX ADMIN — INSULIN LISPRO 1 UNITS: 100 INJECTION, SOLUTION INTRAVENOUS; SUBCUTANEOUS at 11:18

## 2024-01-01 RX ADMIN — HYDROMORPHONE HYDROCHLORIDE 0.2 MG: 0.2 INJECTION, SOLUTION INTRAMUSCULAR; INTRAVENOUS; SUBCUTANEOUS at 06:08

## 2024-01-01 RX ADMIN — OXYCODONE HYDROCHLORIDE 5 MG: 5 SOLUTION ORAL at 10:41

## 2024-01-01 RX ADMIN — ENOXAPARIN SODIUM 120 MG: 120 INJECTION SUBCUTANEOUS at 09:52

## 2024-01-01 RX ADMIN — VALACYCLOVIR 1000 MG: 1 TABLET, FILM COATED ORAL at 14:15

## 2024-01-01 RX ADMIN — CEFTRIAXONE SODIUM 1000 MG: 10 INJECTION, POWDER, FOR SOLUTION INTRAVENOUS at 13:14

## 2024-01-01 RX ADMIN — IOHEXOL 100 ML: 350 INJECTION, SOLUTION INTRAVENOUS at 13:50

## 2024-01-01 RX ADMIN — FENTANYL 25 MCG: 25 PATCH TRANSDERMAL at 12:45

## 2024-01-01 RX ADMIN — ONDANSETRON 4 MG: 2 INJECTION INTRAMUSCULAR; INTRAVENOUS at 07:41

## 2024-01-01 RX ADMIN — HYDROMORPHONE HYDROCHLORIDE 0.5 MG: 1 INJECTION, SOLUTION INTRAMUSCULAR; INTRAVENOUS; SUBCUTANEOUS at 19:28

## 2024-01-01 RX ADMIN — ACETAMINOPHEN 975 MG: 325 TABLET, FILM COATED ORAL at 13:06

## 2024-01-01 RX ADMIN — SODIUM CHLORIDE, SODIUM GLUCONATE, SODIUM ACETATE, POTASSIUM CHLORIDE, MAGNESIUM CHLORIDE, SODIUM PHOSPHATE, DIBASIC, AND POTASSIUM PHOSPHATE 75 ML/HR: .53; .5; .37; .037; .03; .012; .00082 INJECTION, SOLUTION INTRAVENOUS at 10:29

## 2024-01-01 RX ADMIN — METHYLPREDNISOLONE SODIUM SUCCINATE 40 MG: 40 INJECTION, POWDER, FOR SOLUTION INTRAMUSCULAR; INTRAVENOUS at 14:28

## 2024-01-01 RX ADMIN — ACETAMINOPHEN 975 MG: 325 TABLET, FILM COATED ORAL at 15:31

## 2024-01-01 RX ADMIN — FUROSEMIDE 40 MG: 10 INJECTION, SOLUTION INTRAMUSCULAR; INTRAVENOUS at 17:41

## 2024-01-01 RX ADMIN — CYANOCOBALAMIN TAB 500 MCG 1000 MCG: 500 TAB at 08:50

## 2024-01-01 RX ADMIN — IOHEXOL 100 ML: 350 INJECTION, SOLUTION INTRAVENOUS at 17:23

## 2024-01-01 RX ADMIN — POLYETHYLENE GLYCOL 3350 17 G: 17 POWDER, FOR SOLUTION ORAL at 11:36

## 2024-01-01 RX ADMIN — VANCOMYCIN HYDROCHLORIDE 1000 MG: 1 INJECTION, SOLUTION INTRAVENOUS at 09:01

## 2024-01-01 RX ADMIN — INSULIN LISPRO 1 UNITS: 100 INJECTION, SOLUTION INTRAVENOUS; SUBCUTANEOUS at 11:22

## 2024-01-01 RX ADMIN — VANCOMYCIN HYDROCHLORIDE 1000 MG: 1 INJECTION, SOLUTION INTRAVENOUS at 02:37

## 2024-01-01 RX ADMIN — HYDROMORPHONE HYDROCHLORIDE 1 MG: 1 INJECTION, SOLUTION INTRAMUSCULAR; INTRAVENOUS; SUBCUTANEOUS at 17:48

## 2024-01-01 RX ADMIN — INSULIN LISPRO 5 UNITS: 100 INJECTION, SOLUTION INTRAVENOUS; SUBCUTANEOUS at 07:35

## 2024-01-01 RX ADMIN — INSULIN LISPRO 5 UNITS: 100 INJECTION, SOLUTION INTRAVENOUS; SUBCUTANEOUS at 17:24

## 2024-01-01 RX ADMIN — AMLODIPINE BESYLATE 5 MG: 5 TABLET ORAL at 09:35

## 2024-01-01 RX ADMIN — FUROSEMIDE 40 MG: 10 INJECTION, SOLUTION INTRAMUSCULAR; INTRAVENOUS at 08:33

## 2024-01-01 RX ADMIN — CYANOCOBALAMIN TAB 500 MCG 1000 MCG: 500 TAB at 08:07

## 2024-01-01 RX ADMIN — INSULIN LISPRO 1 UNITS: 100 INJECTION, SOLUTION INTRAVENOUS; SUBCUTANEOUS at 08:00

## 2024-01-01 RX ADMIN — INSULIN LISPRO 10 UNITS: 100 INJECTION, SOLUTION INTRAVENOUS; SUBCUTANEOUS at 08:26

## 2024-01-01 RX ADMIN — FUROSEMIDE 40 MG: 40 TABLET ORAL at 09:05

## 2024-01-01 RX ADMIN — INSULIN GLARGINE 10 UNITS: 100 INJECTION, SOLUTION SUBCUTANEOUS at 23:30

## 2024-01-01 RX ADMIN — LEVOTHYROXINE SODIUM 150 MCG: 75 TABLET ORAL at 05:44

## 2024-01-01 RX ADMIN — HYDROMORPHONE HYDROCHLORIDE 0.5 MG: 1 INJECTION, SOLUTION INTRAMUSCULAR; INTRAVENOUS; SUBCUTANEOUS at 23:03

## 2024-01-01 RX ADMIN — MAGNESIUM SULFATE HEPTAHYDRATE 2 G: 40 INJECTION, SOLUTION INTRAVENOUS at 09:46

## 2024-01-01 RX ADMIN — BUMETANIDE 2 MG: 0.25 INJECTION INTRAMUSCULAR; INTRAVENOUS at 21:48

## 2024-01-01 RX ADMIN — APIXABAN 5 MG: 5 TABLET, FILM COATED ORAL at 16:55

## 2024-01-01 RX ADMIN — ALBUMIN (HUMAN) 12.5 G: 0.25 INJECTION, SOLUTION INTRAVENOUS at 17:09

## 2024-01-01 RX ADMIN — OXYCODONE HYDROCHLORIDE 10 MG: 5 SOLUTION ORAL at 16:55

## 2024-01-01 RX ADMIN — OXYCODONE HYDROCHLORIDE 10 MG: 5 SOLUTION ORAL at 08:34

## 2024-01-01 RX ADMIN — ALBUMIN (HUMAN) 12.5 G: 0.25 INJECTION, SOLUTION INTRAVENOUS at 20:36

## 2024-01-01 RX ADMIN — ACETAMINOPHEN 975 MG: 325 TABLET, FILM COATED ORAL at 17:30

## 2024-01-01 RX ADMIN — POLYETHYLENE GLYCOL 3350 17 G: 17 POWDER, FOR SOLUTION ORAL at 08:19

## 2024-01-01 RX ADMIN — ACETAMINOPHEN 975 MG: 325 TABLET, FILM COATED ORAL at 05:36

## 2024-01-01 RX ADMIN — HYDROMORPHONE HYDROCHLORIDE 0.5 MG: 1 INJECTION, SOLUTION INTRAMUSCULAR; INTRAVENOUS; SUBCUTANEOUS at 16:39

## 2024-01-01 RX ADMIN — SODIUM CHLORIDE SOLN NEBU 3% 4 ML: 3 NEBU SOLN at 12:04

## 2024-01-01 RX ADMIN — FOLIC ACID 1 MG: 1 TABLET ORAL at 17:36

## 2024-01-01 RX ADMIN — FENTANYL 25 MCG: 25 PATCH TRANSDERMAL at 12:11

## 2024-01-01 RX ADMIN — CARVEDILOL 25 MG: 25 TABLET, FILM COATED ORAL at 09:05

## 2024-01-01 RX ADMIN — ENOXAPARIN SODIUM 120 MG: 120 INJECTION SUBCUTANEOUS at 21:09

## 2024-01-01 RX ADMIN — ENOXAPARIN SODIUM 120 MG: 120 INJECTION SUBCUTANEOUS at 09:08

## 2024-01-01 RX ADMIN — Medication 40 MG: at 06:17

## 2024-01-01 RX ADMIN — POLYETHYLENE GLYCOL 3350 17 G: 17 POWDER, FOR SOLUTION ORAL at 08:28

## 2024-01-01 RX ADMIN — VALACYCLOVIR 1000 MG: 1 TABLET, FILM COATED ORAL at 06:17

## 2024-01-01 RX ADMIN — DICYCLOMINE HYDROCHLORIDE 10 MG: 10 CAPSULE ORAL at 09:47

## 2024-01-01 RX ADMIN — ACETAMINOPHEN 975 MG: 325 TABLET, FILM COATED ORAL at 21:35

## 2024-01-01 RX ADMIN — FUROSEMIDE 40 MG: 10 INJECTION, SOLUTION INTRAMUSCULAR; INTRAVENOUS at 09:47

## 2024-01-01 RX ADMIN — INSULIN LISPRO 5 UNITS: 100 INJECTION, SOLUTION INTRAVENOUS; SUBCUTANEOUS at 16:32

## 2024-01-01 RX ADMIN — HYDROMORPHONE HYDROCHLORIDE 1 MG: 1 INJECTION, SOLUTION INTRAMUSCULAR; INTRAVENOUS; SUBCUTANEOUS at 11:16

## 2024-01-01 RX ADMIN — TOLVAPTAN 30 MG: 30 TABLET ORAL at 12:04

## 2024-01-01 RX ADMIN — CYANOCOBALAMIN TAB 500 MCG 1000 MCG: 500 TAB at 08:25

## 2024-01-01 RX ADMIN — OXYCODONE HYDROCHLORIDE 5 MG: 5 SOLUTION ORAL at 14:48

## 2024-01-01 RX ADMIN — CEFAZOLIN SODIUM 1000 MG: 1 SOLUTION INTRAVENOUS at 12:07

## 2024-01-01 RX ADMIN — SODIUM CHLORIDE SOLN NEBU 3% 4 ML: 3 NEBU SOLN at 02:09

## 2024-01-01 RX ADMIN — FUROSEMIDE 40 MG: 10 INJECTION, SOLUTION INTRAMUSCULAR; INTRAVENOUS at 16:50

## 2024-01-01 RX ADMIN — VALACYCLOVIR 1000 MG: 1 TABLET, FILM COATED ORAL at 06:15

## 2024-01-01 RX ADMIN — ALTEPLASE 2 MG: 2.2 INJECTION, POWDER, LYOPHILIZED, FOR SOLUTION INTRAVENOUS at 07:54

## 2024-01-01 RX ADMIN — ENOXAPARIN SODIUM 120 MG: 120 INJECTION SUBCUTANEOUS at 08:15

## 2024-01-01 RX ADMIN — HYDROMORPHONE HYDROCHLORIDE 0.5 MG: 1 INJECTION, SOLUTION INTRAMUSCULAR; INTRAVENOUS; SUBCUTANEOUS at 14:28

## 2024-01-01 RX ADMIN — CARVEDILOL 25 MG: 25 TABLET, FILM COATED ORAL at 16:38

## 2024-01-01 RX ADMIN — OXYCODONE HYDROCHLORIDE 10 MG: 5 SOLUTION ORAL at 12:33

## 2024-01-01 RX ADMIN — Medication 40 MG: at 07:33

## 2024-01-01 RX ADMIN — CYANOCOBALAMIN TAB 500 MCG 1000 MCG: 500 TAB at 09:07

## 2024-01-01 RX ADMIN — ALBUMIN (HUMAN) 12.5 G: 0.25 INJECTION, SOLUTION INTRAVENOUS at 07:52

## 2024-01-01 RX ADMIN — GABAPENTIN 300 MG: 300 CAPSULE ORAL at 21:45

## 2024-01-01 RX ADMIN — ACETAMINOPHEN 975 MG: 325 TABLET, FILM COATED ORAL at 15:13

## 2024-01-01 RX ADMIN — OXYCODONE HYDROCHLORIDE 10 MG: 5 SOLUTION ORAL at 16:08

## 2024-01-01 RX ADMIN — ASPIRIN 81 MG: 81 TABLET, COATED ORAL at 08:27

## 2024-01-01 RX ADMIN — FOLIC ACID 1 MG: 1 TABLET ORAL at 08:22

## 2024-01-01 RX ADMIN — HYDROMORPHONE HYDROCHLORIDE 1 MG: 1 INJECTION, SOLUTION INTRAMUSCULAR; INTRAVENOUS; SUBCUTANEOUS at 17:06

## 2024-01-01 RX ADMIN — VALACYCLOVIR 1000 MG: 1 TABLET, FILM COATED ORAL at 06:11

## 2024-01-01 RX ADMIN — ALBUMIN (HUMAN) 12.5 G: 0.25 INJECTION, SOLUTION INTRAVENOUS at 21:27

## 2024-01-01 RX ADMIN — VALACYCLOVIR 1000 MG: 1 TABLET, FILM COATED ORAL at 05:13

## 2024-01-01 RX ADMIN — ASPIRIN 81 MG: 81 TABLET, COATED ORAL at 08:25

## 2024-01-01 RX ADMIN — CEFTRIAXONE SODIUM 2000 MG: 10 INJECTION, POWDER, FOR SOLUTION INTRAVENOUS at 06:09

## 2024-01-01 RX ADMIN — OXYCODONE HYDROCHLORIDE 10 MG: 5 SOLUTION ORAL at 19:47

## 2024-01-01 RX ADMIN — CEFAZOLIN SODIUM 1000 MG: 1 SOLUTION INTRAVENOUS at 19:41

## 2024-01-01 RX ADMIN — OXYCODONE HYDROCHLORIDE 10 MG: 10 TABLET ORAL at 17:56

## 2024-01-01 RX ADMIN — VANCOMYCIN HYDROCHLORIDE 750 MG: 750 INJECTION, SOLUTION INTRAVENOUS at 15:10

## 2024-01-01 RX ADMIN — APIXABAN 5 MG: 5 TABLET, FILM COATED ORAL at 20:18

## 2024-01-01 RX ADMIN — OXYCODONE HYDROCHLORIDE 10 MG: 10 TABLET ORAL at 21:39

## 2024-01-01 RX ADMIN — INSULIN GLARGINE 20 UNITS: 100 INJECTION, SOLUTION SUBCUTANEOUS at 21:39

## 2024-01-01 RX ADMIN — ALBUMIN (HUMAN) 12.5 G: 0.25 INJECTION, SOLUTION INTRAVENOUS at 08:31

## 2024-01-01 RX ADMIN — POLYETHYLENE GLYCOL 3350 17 G: 17 POWDER, FOR SOLUTION ORAL at 08:14

## 2024-01-01 RX ADMIN — CYANOCOBALAMIN TAB 500 MCG 1000 MCG: 500 TAB at 08:22

## 2024-11-05 ENCOUNTER — APPOINTMENT (EMERGENCY)
Dept: CT IMAGING | Facility: HOSPITAL | Age: 64
DRG: 247 | End: 2024-11-05
Payer: COMMERCIAL

## 2024-11-05 ENCOUNTER — HOSPITAL ENCOUNTER (INPATIENT)
Facility: HOSPITAL | Age: 64
LOS: 7 days | Discharge: HOME HEALTH | DRG: 247 | End: 2024-11-12
Attending: EMERGENCY MEDICINE | Admitting: SURGERY
Payer: COMMERCIAL

## 2024-11-05 ENCOUNTER — APPOINTMENT (INPATIENT)
Dept: RADIOLOGY | Facility: HOSPITAL | Age: 64
DRG: 247 | End: 2024-11-05
Payer: COMMERCIAL

## 2024-11-05 DIAGNOSIS — K59.00 CONSTIPATION, UNSPECIFIED CONSTIPATION TYPE: ICD-10-CM

## 2024-11-05 DIAGNOSIS — K56.609 SBO (SMALL BOWEL OBSTRUCTION) (HCC): ICD-10-CM

## 2024-11-05 DIAGNOSIS — I10 HYPERTENSION, UNSPECIFIED TYPE: ICD-10-CM

## 2024-11-05 DIAGNOSIS — E11.9 TYPE 2 DIABETES MELLITUS WITHOUT COMPLICATION, UNSPECIFIED WHETHER LONG TERM INSULIN USE (HCC): ICD-10-CM

## 2024-11-05 DIAGNOSIS — K56.609 SMALL BOWEL OBSTRUCTION (HCC): Primary | ICD-10-CM

## 2024-11-05 DIAGNOSIS — K31.84 GASTROPARESIS: ICD-10-CM

## 2024-11-05 LAB
ALBUMIN SERPL BCG-MCNC: 3.2 G/DL (ref 3.5–5)
ALP SERPL-CCNC: 80 U/L (ref 34–104)
ALT SERPL W P-5'-P-CCNC: 9 U/L (ref 7–52)
ANION GAP SERPL CALCULATED.3IONS-SCNC: 6 MMOL/L (ref 4–13)
AST SERPL W P-5'-P-CCNC: 11 U/L (ref 13–39)
BASOPHILS # BLD AUTO: 0.02 THOUSANDS/ÂΜL (ref 0–0.1)
BASOPHILS NFR BLD AUTO: 0 % (ref 0–1)
BILIRUB SERPL-MCNC: 0.42 MG/DL (ref 0.2–1)
BUN SERPL-MCNC: 22 MG/DL (ref 5–25)
CALCIUM ALBUM COR SERPL-MCNC: 8.8 MG/DL (ref 8.3–10.1)
CALCIUM SERPL-MCNC: 8.2 MG/DL (ref 8.4–10.2)
CHLORIDE SERPL-SCNC: 100 MMOL/L (ref 96–108)
CO2 SERPL-SCNC: 25 MMOL/L (ref 21–32)
CREAT SERPL-MCNC: 0.79 MG/DL (ref 0.6–1.3)
EOSINOPHIL # BLD AUTO: 0.13 THOUSAND/ÂΜL (ref 0–0.61)
EOSINOPHIL NFR BLD AUTO: 2 % (ref 0–6)
ERYTHROCYTE [DISTWIDTH] IN BLOOD BY AUTOMATED COUNT: 15.9 % (ref 11.6–15.1)
EST. AVERAGE GLUCOSE BLD GHB EST-MCNC: 151 MG/DL
GFR SERPL CREATININE-BSD FRML MDRD: 81 ML/MIN/1.73SQ M
GLUCOSE SERPL-MCNC: 132 MG/DL (ref 65–140)
GLUCOSE SERPL-MCNC: 162 MG/DL (ref 65–140)
GLUCOSE SERPL-MCNC: 177 MG/DL (ref 65–140)
HBA1C MFR BLD: 6.9 %
HCT VFR BLD AUTO: 29.5 % (ref 34.8–46.1)
HGB BLD-MCNC: 9.5 G/DL (ref 11.5–15.4)
IMM GRANULOCYTES # BLD AUTO: 0.04 THOUSAND/UL (ref 0–0.2)
IMM GRANULOCYTES NFR BLD AUTO: 1 % (ref 0–2)
LIPASE SERPL-CCNC: <6 U/L (ref 11–82)
LYMPHOCYTES # BLD AUTO: 0.57 THOUSANDS/ÂΜL (ref 0.6–4.47)
LYMPHOCYTES NFR BLD AUTO: 10 % (ref 14–44)
MCH RBC QN AUTO: 30.1 PG (ref 26.8–34.3)
MCHC RBC AUTO-ENTMCNC: 32.2 G/DL (ref 31.4–37.4)
MCV RBC AUTO: 93 FL (ref 82–98)
MONOCYTES # BLD AUTO: 0.54 THOUSAND/ÂΜL (ref 0.17–1.22)
MONOCYTES NFR BLD AUTO: 9 % (ref 4–12)
NEUTROPHILS # BLD AUTO: 4.61 THOUSANDS/ÂΜL (ref 1.85–7.62)
NEUTS SEG NFR BLD AUTO: 78 % (ref 43–75)
NRBC BLD AUTO-RTO: 0 /100 WBCS
PLATELET # BLD AUTO: 94 THOUSANDS/UL (ref 149–390)
PMV BLD AUTO: 9.6 FL (ref 8.9–12.7)
POTASSIUM SERPL-SCNC: 4.1 MMOL/L (ref 3.5–5.3)
PROT SERPL-MCNC: 5.4 G/DL (ref 6.4–8.4)
RBC # BLD AUTO: 3.16 MILLION/UL (ref 3.81–5.12)
SODIUM SERPL-SCNC: 131 MMOL/L (ref 135–147)
WBC # BLD AUTO: 5.91 THOUSAND/UL (ref 4.31–10.16)

## 2024-11-05 PROCEDURE — 83690 ASSAY OF LIPASE: CPT | Performed by: EMERGENCY MEDICINE

## 2024-11-05 PROCEDURE — 96376 TX/PRO/DX INJ SAME DRUG ADON: CPT

## 2024-11-05 PROCEDURE — 99223 1ST HOSP IP/OBS HIGH 75: CPT

## 2024-11-05 PROCEDURE — 99285 EMERGENCY DEPT VISIT HI MDM: CPT | Performed by: EMERGENCY MEDICINE

## 2024-11-05 PROCEDURE — 85025 COMPLETE CBC W/AUTO DIFF WBC: CPT | Performed by: EMERGENCY MEDICINE

## 2024-11-05 PROCEDURE — NC001 PR NO CHARGE

## 2024-11-05 PROCEDURE — 36415 COLL VENOUS BLD VENIPUNCTURE: CPT | Performed by: EMERGENCY MEDICINE

## 2024-11-05 PROCEDURE — 82948 REAGENT STRIP/BLOOD GLUCOSE: CPT

## 2024-11-05 PROCEDURE — 71045 X-RAY EXAM CHEST 1 VIEW: CPT

## 2024-11-05 PROCEDURE — 96374 THER/PROPH/DIAG INJ IV PUSH: CPT

## 2024-11-05 PROCEDURE — 83036 HEMOGLOBIN GLYCOSYLATED A1C: CPT

## 2024-11-05 PROCEDURE — 99284 EMERGENCY DEPT VISIT MOD MDM: CPT

## 2024-11-05 PROCEDURE — 74177 CT ABD & PELVIS W/CONTRAST: CPT

## 2024-11-05 PROCEDURE — 96375 TX/PRO/DX INJ NEW DRUG ADDON: CPT

## 2024-11-05 PROCEDURE — 80053 COMPREHEN METABOLIC PANEL: CPT | Performed by: EMERGENCY MEDICINE

## 2024-11-05 RX ORDER — ALBUTEROL SULFATE 90 UG/1
2 INHALANT RESPIRATORY (INHALATION) EVERY 6 HOURS PRN
Status: ON HOLD | COMMUNITY

## 2024-11-05 RX ORDER — TIRZEPATIDE 2.5 MG/.5ML
2.5 INJECTION, SOLUTION SUBCUTANEOUS
COMMUNITY
Start: 2024-08-20 | End: 2024-11-12

## 2024-11-05 RX ORDER — DIAZEPAM 10 MG/2ML
2.5 INJECTION, SOLUTION INTRAMUSCULAR; INTRAVENOUS ONCE
Status: COMPLETED | OUTPATIENT
Start: 2024-11-05 | End: 2024-11-05

## 2024-11-05 RX ORDER — NITROGLYCERIN 0.4 MG/1
0.4 TABLET SUBLINGUAL
Status: ON HOLD | COMMUNITY

## 2024-11-05 RX ORDER — METRONIDAZOLE 500 MG/1
500 TABLET ORAL
COMMUNITY
Start: 2024-09-24 | End: 2024-11-12

## 2024-11-05 RX ORDER — INSULIN LISPRO 100 [IU]/ML
1-6 INJECTION, SOLUTION INTRAVENOUS; SUBCUTANEOUS EVERY 6 HOURS SCHEDULED
Status: DISCONTINUED | OUTPATIENT
Start: 2024-11-05 | End: 2024-11-07

## 2024-11-05 RX ORDER — ENOXAPARIN SODIUM 100 MG/ML
40 INJECTION SUBCUTANEOUS DAILY
Status: DISCONTINUED | OUTPATIENT
Start: 2024-11-06 | End: 2024-11-12 | Stop reason: HOSPADM

## 2024-11-05 RX ORDER — CIPROFLOXACIN 500 MG/1
500 TABLET, FILM COATED ORAL
COMMUNITY
Start: 2024-09-24 | End: 2024-11-12

## 2024-11-05 RX ORDER — PANTOPRAZOLE SODIUM 40 MG/10ML
40 INJECTION, POWDER, LYOPHILIZED, FOR SOLUTION INTRAVENOUS
Status: DISCONTINUED | OUTPATIENT
Start: 2024-11-06 | End: 2024-11-09

## 2024-11-05 RX ORDER — LEVOTHYROXINE SODIUM 125 UG/1
125 TABLET ORAL DAILY
Status: ON HOLD | COMMUNITY
Start: 2024-09-25

## 2024-11-05 RX ORDER — GABAPENTIN 100 MG/1
100 CAPSULE ORAL
Status: ON HOLD | COMMUNITY
Start: 2024-09-25

## 2024-11-05 RX ORDER — ONDANSETRON 2 MG/ML
4 INJECTION INTRAMUSCULAR; INTRAVENOUS ONCE
Status: COMPLETED | OUTPATIENT
Start: 2024-11-05 | End: 2024-11-05

## 2024-11-05 RX ORDER — POTASSIUM CHLORIDE 1500 MG/1
1 TABLET, EXTENDED RELEASE ORAL DAILY
Status: ON HOLD | COMMUNITY
Start: 2024-09-15

## 2024-11-05 RX ORDER — HYDROMORPHONE HCL IN WATER/PF 6 MG/30 ML
0.2 PATIENT CONTROLLED ANALGESIA SYRINGE INTRAVENOUS EVERY 4 HOURS PRN
Status: DISCONTINUED | OUTPATIENT
Start: 2024-11-05 | End: 2024-11-09

## 2024-11-05 RX ORDER — ACETAMINOPHEN 10 MG/ML
1000 INJECTION, SOLUTION INTRAVENOUS EVERY 6 HOURS SCHEDULED
Status: DISCONTINUED | OUTPATIENT
Start: 2024-11-05 | End: 2024-11-09

## 2024-11-05 RX ORDER — ALPRAZOLAM 0.5 MG
0.5 TABLET ORAL 3 TIMES DAILY PRN
COMMUNITY
Start: 2024-05-10 | End: 2024-11-12

## 2024-11-05 RX ORDER — HYDROMORPHONE HCL/PF 1 MG/ML
1 SYRINGE (ML) INJECTION ONCE
Status: COMPLETED | OUTPATIENT
Start: 2024-11-05 | End: 2024-11-05

## 2024-11-05 RX ORDER — GLUCAGON 3 MG/1
3 POWDER NASAL
COMMUNITY
Start: 2024-08-20 | End: 2024-11-12

## 2024-11-05 RX ORDER — ASPIRIN 81 MG/1
81 TABLET ORAL
Status: ON HOLD | COMMUNITY

## 2024-11-05 RX ORDER — MECLIZINE HYDROCHLORIDE 25 MG/1
25 TABLET ORAL 3 TIMES DAILY PRN
COMMUNITY
Start: 2024-10-10 | End: 2024-11-12

## 2024-11-05 RX ORDER — METHOCARBAMOL 500 MG/1
750 TABLET, FILM COATED ORAL 4 TIMES DAILY PRN
COMMUNITY
Start: 2024-11-03 | End: 2024-11-12

## 2024-11-05 RX ORDER — HYDROMORPHONE HCL/PF 1 MG/ML
0.5 SYRINGE (ML) INJECTION EVERY 4 HOURS PRN
Status: DISCONTINUED | OUTPATIENT
Start: 2024-11-05 | End: 2024-11-10

## 2024-11-05 RX ORDER — PANTOPRAZOLE SODIUM 40 MG/1
40 TABLET, DELAYED RELEASE ORAL
COMMUNITY
Start: 2024-09-25 | End: 2024-11-12

## 2024-11-05 RX ORDER — LANOLIN ALCOHOL/MO/W.PET/CERES
1000 CREAM (GRAM) TOPICAL DAILY
Status: ON HOLD | COMMUNITY
Start: 2024-10-10

## 2024-11-05 RX ORDER — ONDANSETRON 2 MG/ML
4 INJECTION INTRAMUSCULAR; INTRAVENOUS EVERY 6 HOURS PRN
Status: DISCONTINUED | OUTPATIENT
Start: 2024-11-05 | End: 2024-11-12 | Stop reason: HOSPADM

## 2024-11-05 RX ORDER — LIDOCAINE 50 MG/G
1 PATCH TOPICAL EVERY 12 HOURS
COMMUNITY
Start: 2023-12-06 | End: 2024-11-12

## 2024-11-05 RX ORDER — PROCHLORPERAZINE MALEATE 5 MG/1
1 TABLET ORAL 4 TIMES DAILY
COMMUNITY
Start: 2024-09-24 | End: 2024-11-12

## 2024-11-05 RX ORDER — DAPAGLIFLOZIN 10 MG/1
10 TABLET, FILM COATED ORAL
Status: ON HOLD | COMMUNITY

## 2024-11-05 RX ORDER — OXYCODONE HYDROCHLORIDE 5 MG/1
5 TABLET ORAL EVERY 4 HOURS PRN
COMMUNITY
Start: 2024-10-10 | End: 2024-11-12

## 2024-11-05 RX ORDER — DIPHENHYDRAMINE HCL 25 MG
25 CAPSULE ORAL EVERY 6 HOURS PRN
Status: ON HOLD | COMMUNITY

## 2024-11-05 RX ORDER — FUROSEMIDE 20 MG/1
1 TABLET ORAL DAILY
Status: ON HOLD | COMMUNITY
Start: 2024-09-15

## 2024-11-05 RX ORDER — DICYCLOMINE HYDROCHLORIDE 10 MG/1
10 CAPSULE ORAL 2 TIMES DAILY PRN
Status: ON HOLD | COMMUNITY
Start: 2021-04-10 | End: 2025-03-12

## 2024-11-05 RX ORDER — PANTOPRAZOLE SODIUM 40 MG/10ML
40 INJECTION, POWDER, LYOPHILIZED, FOR SOLUTION INTRAVENOUS ONCE
Status: COMPLETED | OUTPATIENT
Start: 2024-11-05 | End: 2024-11-05

## 2024-11-05 RX ORDER — HYDROMORPHONE HCL/PF 1 MG/ML
0.5 SYRINGE (ML) INJECTION EVERY 4 HOURS PRN
Status: DISCONTINUED | OUTPATIENT
Start: 2024-11-05 | End: 2024-11-09

## 2024-11-05 RX ORDER — CEPHALEXIN 500 MG/1
500 CAPSULE ORAL
COMMUNITY
Start: 2024-10-10 | End: 2024-11-12

## 2024-11-05 RX ORDER — SODIUM CHLORIDE, SODIUM GLUCONATE, SODIUM ACETATE, POTASSIUM CHLORIDE, MAGNESIUM CHLORIDE, SODIUM PHOSPHATE, DIBASIC, AND POTASSIUM PHOSPHATE .53; .5; .37; .037; .03; .012; .00082 G/100ML; G/100ML; G/100ML; G/100ML; G/100ML; G/100ML; G/100ML
125 INJECTION, SOLUTION INTRAVENOUS CONTINUOUS
Status: DISCONTINUED | OUTPATIENT
Start: 2024-11-05 | End: 2024-11-08

## 2024-11-05 RX ADMIN — ACETAMINOPHEN 1000 MG: 10 INJECTION INTRAVENOUS at 23:11

## 2024-11-05 RX ADMIN — ACETAMINOPHEN 1000 MG: 10 INJECTION INTRAVENOUS at 18:24

## 2024-11-05 RX ADMIN — HYDROMORPHONE HYDROCHLORIDE 1 MG: 1 INJECTION, SOLUTION INTRAMUSCULAR; INTRAVENOUS; SUBCUTANEOUS at 14:11

## 2024-11-05 RX ADMIN — SODIUM CHLORIDE, SODIUM GLUCONATE, SODIUM ACETATE, POTASSIUM CHLORIDE, MAGNESIUM CHLORIDE, SODIUM PHOSPHATE, DIBASIC, AND POTASSIUM PHOSPHATE 125 ML/HR: .53; .5; .37; .037; .03; .012; .00082 INJECTION, SOLUTION INTRAVENOUS at 18:41

## 2024-11-05 RX ADMIN — PANTOPRAZOLE SODIUM 40 MG: 40 INJECTION, POWDER, LYOPHILIZED, FOR SOLUTION INTRAVENOUS at 12:48

## 2024-11-05 RX ADMIN — Medication 1 SPRAY: at 19:51

## 2024-11-05 RX ADMIN — ONDANSETRON 4 MG: 2 INJECTION INTRAMUSCULAR; INTRAVENOUS at 12:42

## 2024-11-05 RX ADMIN — IOHEXOL 100 ML: 350 INJECTION, SOLUTION INTRAVENOUS at 15:05

## 2024-11-05 RX ADMIN — DIAZEPAM 2.5 MG: 10 INJECTION, SOLUTION INTRAMUSCULAR; INTRAVENOUS at 17:36

## 2024-11-05 RX ADMIN — INSULIN LISPRO 1 UNITS: 100 INJECTION, SOLUTION INTRAVENOUS; SUBCUTANEOUS at 18:44

## 2024-11-05 RX ADMIN — ONDANSETRON 4 MG: 2 INJECTION INTRAMUSCULAR; INTRAVENOUS at 22:03

## 2024-11-05 RX ADMIN — HYDROMORPHONE HYDROCHLORIDE 1 MG: 1 INJECTION, SOLUTION INTRAMUSCULAR; INTRAVENOUS; SUBCUTANEOUS at 12:44

## 2024-11-05 RX ADMIN — HYDROMORPHONE HYDROCHLORIDE 1 MG: 1 INJECTION, SOLUTION INTRAMUSCULAR; INTRAVENOUS; SUBCUTANEOUS at 17:03

## 2024-11-05 RX ADMIN — HYDROMORPHONE HYDROCHLORIDE 0.5 MG: 1 INJECTION, SOLUTION INTRAMUSCULAR; INTRAVENOUS; SUBCUTANEOUS at 19:58

## 2024-11-05 NOTE — ED PROVIDER NOTES
Time reflects when diagnosis was documented in both MDM as applicable and the Disposition within this note       Time User Action Codes Description Comment    11/5/2024  4:28 PM Alexis Carl Add [K56.609] Small bowel obstruction (HCC)     11/5/2024  5:22 PM DayoOrlando Add [E11.9] Type 2 diabetes mellitus without complication, unspecified whether long term insulin use (HCC)     11/6/2024  8:21 AM Alfredo Wick Add [K56.609] SBO (small bowel obstruction) (HCC)           ED Disposition       ED Disposition   Admit    Condition   Stable    Date/Time   Tue Nov 5, 2024  4:49 PM    Comment   Case was discussed with Dr. Senior and the patient's admission status was agreed to be Admission Status: inpatient status to the service of Dr. Senior .               Assessment & Plan       Medical Decision Making  Amount and/or Complexity of Data Reviewed  Labs: ordered. Decision-making details documented in ED Course.  Radiology: ordered. Decision-making details documented in ED Course.    Risk  Prescription drug management.  Decision regarding hospitalization.        ED Course as of 11/06/24 1510   Tue Nov 05, 2024   1349 Hemoglobin(!): 9.5  Stable from recent baseline at [facility]   1627 CT abdomen pelvis with contrast  Imaging reviewed and interpreted myself and concur with radiologist:   called by Dr. Baptiste to report SBO.     1627 Reviewed results with patient at bedside and updated on the plan.  Pain has been improved, mild vomiting, also improving.  I have consult to general surgery       Medications   multi-electrolyte (PLASMALYTE-A/ISOLYTE-S PH 7.4) IV solution (has no administration in time range)   enoxaparin (LOVENOX) subcutaneous injection 40 mg (has no administration in time range)   ondansetron (ZOFRAN) injection 4 mg (has no administration in time range)   phenol (CHLORASEPTIC) 1.4 % mucosal liquid 1 spray (has no administration in time range)   acetaminophen (Ofirmev) injection 1,000 mg (has no  administration in time range)   HYDROmorphone HCl (DILAUDID) injection 0.2 mg (has no administration in time range)   HYDROmorphone (DILAUDID) injection 0.5 mg (has no administration in time range)   HYDROmorphone (DILAUDID) injection 0.5 mg (has no administration in time range)   insulin lispro (HumALOG/ADMELOG) 100 units/mL subcutaneous injection 1-6 Units (has no administration in time range)   pantoprazole (PROTONIX) injection 40 mg (has no administration in time range)   pantoprazole (PROTONIX) injection 40 mg (40 mg Intravenous Given 11/5/24 1248)   HYDROmorphone (DILAUDID) injection 1 mg (1 mg Intravenous Given 11/5/24 1244)   ondansetron (ZOFRAN) injection 4 mg (4 mg Intravenous Given 11/5/24 1242)   HYDROmorphone (DILAUDID) injection 1 mg (1 mg Intravenous Given 11/5/24 1411)   iohexol (OMNIPAQUE) 350 MG/ML injection (SINGLE-DOSE) 100 mL (100 mL Intravenous Given 11/5/24 1505)   HYDROmorphone (DILAUDID) injection 1 mg (1 mg Intravenous Given 11/5/24 1703)   diazepam (VALIUM) injection 2.5 mg (2.5 mg Intravenous Given 11/5/24 1736)       ED Risk Strat Scores                           SBIRT 20yo+      Flowsheet Row Most Recent Value   Initial Alcohol Screen: US AUDIT-C     1. How often do you have a drink containing alcohol? 0 Filed at: 11/05/2024 1206   2. How many drinks containing alcohol do you have on a typical day you are drinking?  0 Filed at: 11/05/2024 1206   3a. Male UNDER 65: How often do you have five or more drinks on one occasion? 0 Filed at: 11/05/2024 1206   3b. FEMALE Any Age, or MALE 65+: How often do you have 4 or more drinks on one occassion? 0 Filed at: 11/05/2024 1206   Audit-C Score 0 Filed at: 11/05/2024 1206   SAMIR: How many times in the past year have you...    Used an illegal drug or used a prescription medication for non-medical reasons? Never Filed at: 11/05/2024 1206                            History of Present Illness       Chief Complaint   Patient presents with    Abdominal  "Pain     Pt arrives via ems, from home. Pt reports LLQ 10/10 pain happening this morning, states feels like a SBO again. Pt was discharged from [facility] for a SBO yesterday. Took oxycodone half an hr ago.        Past Medical History:   Diagnosis Date    Diverticulitis     Endometrial cancer (HCC)     History of shingles     Right hemiabdomen (inactive)    Hypertension     IBS (irritable bowel syndrome)     Obesity     Small bowel obstruction (HCC)     Type 2 diabetes mellitus (HCC)       Past Surgical History:   Procedure Laterality Date    APPENDECTOMY      CHOLECYSTECTOMY      SIGMOIDECTOMY N/A       History reviewed. No pertinent family history.   Social History     Tobacco Use    Smoking status: Never    Smokeless tobacco: Never   Substance Use Topics    Alcohol use: Not Currently    Drug use: Never      E-Cigarette/Vaping      E-Cigarette/Vaping Substances      I have reviewed and agree with the history as documented.     62 yo F h/o HTN, CAD with stent, endometrial cancer, IBS   brought by ambulance from home, c/o abdominal pain, which she affirms she has been dealing with since \"end of September\", which she over the interval has been treated for diverticulitis, an omental infarct, and was just discharged from [facility] yesterday after being admitted for recurrent abdominal pain 10/18-11/4, attributed to SBO.  She denies requiring any surgery.  She said she was discharged but expressed frustration, \"they weren't listening to me about the pain.\"  She says pain today feels as bad it did since onset weeks ago.  She c/o recurrent vomiting, no diarrhea.  She denies fever.  She has h/o endometrial carcinoma, treated with S/P FLORECITA/BSO and XRT, and h/o appendectomy, cholecystectomy, and sigmoidectomy for past h/o diverticular disease.      I have independently reviewed external records are available to me to the level of detail possible within the time constraints of my patient care responsibilities in the ED .    [facility] 9/21 " "ascending diverticulitis, zoster on pannus documented, treated with cipro/flagyl  [facility] 10/3 recurrent abdominal pain, CT:  \"showing prominent abdominal wall pannus, with subcutaneous soft tissue stranding within soft tissues (on the right), as well as a 1.6 x 2.8 cm peripherally enhancing focus with central fat density, which could represent omental infarction (on the left).\", and CT angio \"CT angiogram of abdomen/pelvis (10/05): Which revealed no significant change in omental infarction, with diffuse mesenteric edema with trace fluid in the pelvis. Also noted to moderate to marked edema in the subcutaneous tissues of the right lateral abdominal wall. She was noted to have approximately 50% narrowing in the proximal aspect of the celiac axes with somewhat kinked appearance on sagittal imaging, which could be secondary to median arcuate ligament syndrome. Otherwise no significant stenosis\"  Pain treated with opioids, which were eventually weaned.  [facility] \"Evidence of a 25 cm dilated small bowel segments which can be a closed loop   small bowel obstruction\".  She had several CTs, 11/2 \"partial SBO\".  Documented to have return of bowel function and resolution of symptoms 11/4, and she was discharged.            Review of Systems        Objective       ED Triage Vitals [11/05/24 1202]   Temperature Pulse Blood Pressure Respirations SpO2 Patient Position - Orthostatic VS   97.5 °F (36.4 °C) 90 146/66 22 100 % Lying      Temp Source Heart Rate Source BP Location FiO2 (%) Pain Score    Oral Monitor Left arm -- 10 - Worst Possible Pain      Vitals      Date and Time Temp Pulse SpO2 Resp BP Pain Score FACES Pain Rating User   11/06/24 1101 98.1 °F (36.7 °C) 81 90 % 18 116/51 -- -- DII   11/06/24 1014 -- -- -- -- -- 9 -- DR   11/06/24 1013 -- -- -- -- -- 9 -- HD   11/06/24 0902 -- -- -- -- -- No Pain -- BM   11/06/24 0837 -- -- -- -- -- 8 -- NR   11/06/24 0732 -- -- -- 18 -- -- -- NR   11/06/24 0732 98.2 " °F (36.8 °C) 87 90 % -- 161/61 -- -- DII   11/06/24 0731 -- -- -- -- -- 10 - Worst Possible Pain -- NR   11/06/24 0242 97.7 °F (36.5 °C) 88 91 % 18 104/54 -- -- DII   11/06/24 0155 -- -- -- -- -- 9 -- SC   11/06/24 0008 -- -- -- -- -- 9 -- ES   11/05/24 2325 -- -- -- 18 -- 7 -- SC   11/05/24 2325 97.9 °F (36.6 °C) 89 94 % -- 140/59 -- -- DII   11/05/24 2324 97.9 °F (36.6 °C) 88 92 % -- 140/59 -- -- DII   11/05/24 1958 -- -- -- -- -- 8 -- SC   11/05/24 1905 98.2 °F (36.8 °C) 85 94 % 18 111/58 -- -- DII   11/05/24 1812 -- -- -- -- -- 10 - Worst Possible Pain -- AP   11/05/24 1809 97.8 °F (36.6 °C) 89 95 % 18 134/60 -- -- DII   11/05/24 1730 -- 91 95 % 16 141/63 -- -- CF   11/05/24 1703 -- -- -- -- -- 9 -- CF   11/05/24 1630 -- 92 95 % 16 170/72 -- -- CF   11/05/24 1600 -- 88 94 % 16 139/61 -- -- CF   11/05/24 1530 -- 88 94 % 16 142/67 -- -- CF   11/05/24 1430 -- 89 94 % 16 153/68 -- -- CF   11/05/24 1400 -- 90 99 % 16 169/71 -- -- CO   11/05/24 1330 -- 87 98 % 22 167/77 -- -- CF   11/05/24 1300 -- 89 97 % 22 140/70 -- -- CF   11/05/24 1244 -- -- -- -- -- 10 - Worst Possible Pain -- CF   11/05/24 1202 97.5 °F (36.4 °C) 90 100 % 22 146/66 10 - Worst Possible Pain -- SB            Physical Exam  Vitals and nursing note reviewed.   Constitutional:       General: She is not in acute distress.     Appearance: She is well-developed. She is not ill-appearing or diaphoretic.   HENT:      Head: Normocephalic and atraumatic.      Right Ear: External ear normal.      Left Ear: External ear normal.      Nose: Nose normal.      Mouth/Throat:      Mouth: Mucous membranes are moist.   Eyes:      Conjunctiva/sclera: Conjunctivae normal.      Pupils: Pupils are equal, round, and reactive to light.   Cardiovascular:      Rate and Rhythm: Normal rate and regular rhythm.      Pulses:           Dorsalis pedis pulses are 2+ on the right side.        Posterior tibial pulses are 2+ on the right side and 2+ on the left side.   Pulmonary:       Effort: Pulmonary effort is normal.   Abdominal:      General: Abdomen is protuberant.      Palpations: Abdomen is soft.      Tenderness: There is generalized abdominal tenderness.      Comments: Large protruberant abdominal pannus which she c/o diffuse pain.  There is a large red, crusting patch on the right lower pannus that was an area of shingles lesions that have been resolving for several weeks.     Musculoskeletal:         General: Normal range of motion.      Cervical back: Normal range of motion and neck supple.      Right lower leg: Edema present.      Left lower leg: Edema present.   Feet:      Right foot:      Skin integrity: No skin breakdown.      Left foot:      Skin integrity: No skin breakdown.   Skin:     General: Skin is warm and dry.      Capillary Refill: Capillary refill takes less than 2 seconds.      Findings: No rash.   Neurological:      Mental Status: She is alert and oriented to person, place, and time.      Gait: Gait normal.   Psychiatric:         Mood and Affect: Mood is anxious.         Behavior: Behavior normal.         Thought Content: Thought content normal.         Judgment: Judgment normal.         Results Reviewed       Procedure Component Value Units Date/Time    Hemoglobin A1c w/EAG Estimation (Prechecked if no A1C within 90 days) [[id number]]  (Abnormal) Collected: 11/05/24 1241    Lab Status: Final result Specimen: Blood from Arm, Left Updated: 11/05/24 2059     Hemoglobin A1C 6.9 %       mg/dl     Comprehensive metabolic panel [[id number]]  (Abnormal) Collected: 11/05/24 1241    Lab Status: Final result Specimen: Blood from Arm, Left Updated: 11/05/24 1314     Sodium 131 mmol/L      Potassium 4.1 mmol/L      Chloride 100 mmol/L      CO2 25 mmol/L      ANION GAP 6 mmol/L      BUN 22 mg/dL      Creatinine 0.79 mg/dL      Glucose 162 mg/dL      Calcium 8.2 mg/dL      Corrected Calcium 8.8 mg/dL      AST 11 U/L      ALT 9 U/L      Alkaline Phosphatase 80 U/L      Total  Protein 5.4 g/dL      Albumin 3.2 g/dL      Total Bilirubin 0.42 mg/dL      eGFR 81 ml/min/1.73sq m     Narrative:      National Kidney Disease Foundation guidelines for Chronic Kidney Disease (CKD):     Stage 1 with normal or high GFR (GFR > 90 mL/min/1.73 square meters)    Stage 2 Mild CKD (GFR = 60-89 mL/min/1.73 square meters)    Stage 3A Moderate CKD (GFR = 45-59 mL/min/1.73 square meters)    Stage 3B Moderate CKD (GFR = 30-44 mL/min/1.73 square meters)    Stage 4 Severe CKD (GFR = 15-29 mL/min/1.73 square meters)    Stage 5 End Stage CKD (GFR <15 mL/min/1.73 square meters)  Note: GFR calculation is accurate only with a steady state creatinine    Lipase [[id number]]  (Abnormal) Collected: 11/05/24 1241    Lab Status: Final result Specimen: Blood from Arm, Left Updated: 11/05/24 1314     Lipase <6 u/L     CBC and differential [[id number]]  (Abnormal) Collected: 11/05/24 1241    Lab Status: Final result Specimen: Blood from Arm, Left Updated: 11/05/24 1302     WBC 5.91 Thousand/uL      RBC 3.16 Million/uL      Hemoglobin 9.5 g/dL      Hematocrit 29.5 %      MCV 93 fL      MCH 30.1 pg      MCHC 32.2 g/dL      RDW 15.9 %      MPV 9.6 fL      Platelets 94 Thousands/uL      nRBC 0 /100 WBCs      Segmented % 78 %      Immature Grans % 1 %      Lymphocytes % 10 %      Monocytes % 9 %      Eosinophils Relative 2 %      Basophils Relative 0 %      Absolute Neutrophils 4.61 Thousands/µL      Absolute Immature Grans 0.04 Thousand/uL      Absolute Lymphocytes 0.57 Thousands/µL      Absolute Monocytes 0.54 Thousand/µL      Eosinophils Absolute 0.13 Thousand/µL      Basophils Absolute 0.02 Thousands/µL             XR chest portable   Final Interpretation by Andrea Anderson MD (11/06 1145)      Tip of enteric tube projects over the stomach.   No focal consolidation, pleural effusion or pneumothorax.            Resident: NELIA Mack I, the attending radiologist, have reviewed the images and agree with the final report above.       Workstation performed: [id number]         XR chest portable   Final Interpretation by Lorne Underwood MD (11/06 0749)   NG tube position as above.   No acute cardiopulmonary disease.            Workstation performed: [id number]         CT abdomen pelvis with contrast   Final Interpretation by Thee Baptiste MD (11/05 1606)      Proximal small bowel obstruction. Similar findings reported on prior outside CT of 11/2/2024.      Findings suggestive of small splenic infarct. Similar findings reported on prior outside CT of 11/2/2024.      Hepatosplenomegaly.      I personally discussed this study with WES ACEVEDO on 11/5/2024 4:06 PM.                  Workstation performed: [id number]         XR abdomen complete inc upright and/or decubitus    (Results Pending)       Procedures    ED Medication and Procedure Management   Prior to Admission Medications   Prescriptions Last Dose Informant Patient Reported? Taking?   ALPRAZolam (XANAX) 0.5 mg tablet   Yes Yes   Sig: Take 0.5 mg by mouth Three times daily as needed   Acetaminophen 325 MG CAPS   Yes No   Sig: Take 325 mg by mouth every 6 (six) hours as needed   Dulaglutide (TRULICITY SC)   Yes No   Sig: Inject 4.5 mg under the skin   Glucagon (Baqsimi One Pack) 3 MG/DOSE POWD   Yes Yes   Sig: 3 mg   Klor-Con M20 20 MEQ tablet   Yes Yes   Sig: Take 1 tablet by mouth in the morning   Tirzepatide (Mounjaro) 2.5 MG/0.5ML SOAJ   Yes Yes   Sig: Inject 2.5 mg under the skin   albuterol (PROVENTIL HFA,VENTOLIN HFA) 90 mcg/act inhaler   Yes No   Sig: Inhale 2 puffs every 6 (six) hours as needed   apixaban (ELIQUIS) 5 mg   Yes Yes   Sig: Take 5 mg by mouth 2 (two) times a day   aspirin (ECOTRIN LOW STRENGTH) 81 mg EC tablet   Yes No   Sig: Take 81 mg by mouth   cephalexin (KEFLEX) 500 mg capsule   Yes Yes   Sig: Take 500 mg by mouth   ciprofloxacin (CIPRO) 500 mg tablet   Yes Yes   Sig: Take 500 mg by mouth   dapagliflozin (Farxiga) 10 MG tablet   Yes No   Sig: Take  10 mg by mouth   dicyclomine (BENTYL) 10 mg capsule   Yes Yes   Sig: Take 10 mg by mouth 2 (two) times a day as needed   diphenhydrAMINE (BENADRYL) 25 mg capsule   Yes No   Sig: Take 25 mg by mouth every 6 (six) hours as needed   furosemide (LASIX) 20 mg tablet   Yes Yes   Sig: Take 1 tablet by mouth in the morning   gabapentin (NEURONTIN) 100 mg capsule   Yes Yes   Sig: Take 100 mg by mouth   levothyroxine 125 mcg tablet   Yes Yes   Sig: Take 125 mcg by mouth daily   lidocaine (LIDODERM) 5 %   Yes Yes   Sig: Place 1 patch on the skin every 12 (twelve) hours   meclizine (ANTIVERT) 25 mg tablet   Yes Yes   Sig: Take 25 mg by mouth Three times daily as needed   methocarbamol (ROBAXIN) 500 mg tablet   Yes Yes   Sig: Take 750 mg by mouth 4 (four) times a day as needed   metroNIDAZOLE (FLAGYL) 500 mg tablet   Yes Yes   Sig: Take 500 mg by mouth   nitroglycerin (NITROSTAT) 0.4 mg SL tablet   Yes No   Sig: Place 0.4 mg under the tongue   oxyCODONE (ROXICODONE) 5 immediate release tablet   Yes Yes   Sig: Take 5 mg by mouth every 4 (four) hours as needed   pantoprazole (PROTONIX) 40 mg tablet   Yes Yes   Sig: Take 40 mg by mouth   prochlorperazine (COMPAZINE) 5 mg tablet   Yes Yes   Sig: Take 1 tablet by mouth 4 times a day   vitamin B-12 (VITAMIN B-12) 1,000 mcg tablet   Yes Yes   Sig: Take 1,000 mcg by mouth daily      Facility-Administered Medications: None     Current Discharge Medication List        CONTINUE these medications which have NOT CHANGED    Details   ALPRAZolam (XANAX) 0.5 mg tablet Take 0.5 mg by mouth Three times daily as needed      apixaban (ELIQUIS) 5 mg Take 5 mg by mouth 2 (two) times a day      cephalexin (KEFLEX) 500 mg capsule Take 500 mg by mouth      ciprofloxacin (CIPRO) 500 mg tablet Take 500 mg by mouth      dicyclomine (BENTYL) 10 mg capsule Take 10 mg by mouth 2 (two) times a day as needed      furosemide (LASIX) 20 mg tablet Take 1 tablet by mouth in the morning      gabapentin (NEURONTIN)  100 mg capsule Take 100 mg by mouth      Glucagon (Baqsimi One Pack) 3 MG/DOSE POWD 3 mg      Klor-Con M20 20 MEQ tablet Take 1 tablet by mouth in the morning      levothyroxine 125 mcg tablet Take 125 mcg by mouth daily      lidocaine (LIDODERM) 5 % Place 1 patch on the skin every 12 (twelve) hours      meclizine (ANTIVERT) 25 mg tablet Take 25 mg by mouth Three times daily as needed      methocarbamol (ROBAXIN) 500 mg tablet Take 750 mg by mouth 4 (four) times a day as needed      metroNIDAZOLE (FLAGYL) 500 mg tablet Take 500 mg by mouth      oxyCODONE (ROXICODONE) 5 immediate release tablet Take 5 mg by mouth every 4 (four) hours as needed      pantoprazole (PROTONIX) 40 mg tablet Take 40 mg by mouth      prochlorperazine (COMPAZINE) 5 mg tablet Take 1 tablet by mouth 4 times a day      Tirzepatide (Mounjaro) 2.5 MG/0.5ML SOAJ Inject 2.5 mg under the skin      vitamin B-12 (VITAMIN B-12) 1,000 mcg tablet Take 1,000 mcg by mouth daily      Acetaminophen 325 MG CAPS Take 325 mg by mouth every 6 (six) hours as needed      albuterol (PROVENTIL HFA,VENTOLIN HFA) 90 mcg/act inhaler Inhale 2 puffs every 6 (six) hours as needed      aspirin (ECOTRIN LOW STRENGTH) 81 mg EC tablet Take 81 mg by mouth      dapagliflozin (Farxiga) 10 MG tablet Take 10 mg by mouth      diphenhydrAMINE (BENADRYL) 25 mg capsule Take 25 mg by mouth every 6 (six) hours as needed      Dulaglutide (TRULICITY SC) Inject 4.5 mg under the skin      nitroglycerin (NITROSTAT) 0.4 mg SL tablet Place 0.4 mg under the tongue           No discharge procedures on file.  ED SEPSIS DOCUMENTATION   Time reflects when diagnosis was documented in both MDM as applicable and the Disposition within this note       Time User Action Codes Description Comment    11/5/2024  4:28 PM Alexis Carl Add [K56.609] Small bowel obstruction (HCC)     11/5/2024  5:22 PM Orlando Oshea Add [E11.9] Type 2 diabetes mellitus without complication, unspecified whether long term  insulin use ([facility])     11/6/2024  8:21 AM Alfredo Wick Add [K56.609] SBO (small bowel obstruction) ([facility])                  Alexis Carl MD  11/06/24 6045

## 2024-11-05 NOTE — ED NOTES
Pt was stuck 3x times for an IV without success. KARLA Torres attempting for a line with ultrasound at the moment.     Christiane Lentz RN  11/05/24 4784

## 2024-11-05 NOTE — CASE MANAGEMENT
Case Management Assessment & Discharge Planning Note    Patient name Ltey Botello  Location ED-03/ED-03 MRN [id number]  : 1960 Date 2024       Current Admission Date: 2024  Current Admission Diagnosis:Abdominal pain   There are no problems to display for this patient.     LOS (days): 0  Geometric Mean LOS (GMLOS) (days):   Days to GMLOS:     OBJECTIVE:              Current admission status: Inpatient       Preferred Pharmacy: No Pharmacies Listed  Primary Care Provider: No primary care provider on file.    Primary Insurance: POPPY TSANG  Secondary Insurance:     ASSESSMENT:  Active Health Care Proxies    There are no active Health Care Proxies on file.       Advance Directives  Does patient have a Health Care POA?: No  Does patient have Advance Directives?: No         Readmission Root Cause  30 Day Readmission: No    Patient Information  Mental Status: Alert  During Assessment patient was accompanied by: Not accompanied during assessment  Assessment information provided by:: Patient  Primary Caregiver: Self  Support Systems: Self, Other (Comment) (Roommates )  County of Residence: [place]  What city do you live in?: [facility]  Home entry access options. Select all that apply.: Stairs  Number of steps to enter home.:  (2 flights )  Do the steps have railings?: Yes  Type of Current Residence: [address] home  Upon entering residence, is there a bedroom on the main floor (no further steps)?: No  A bedroom is located on the following floor levels of residence (select all that apply):: 2nd Floor  Upon entering residence, is there a bathroom on the main floor (no further steps)?: No  Indicate which floors of current residence have a bathroom (select all the apply):: 2nd Floor  Number of steps to 2nd floor from main floor: One Flight  Living Arrangements: Other (Comment) (Roommates )  Is patient a ?: No    Activities of Daily Living Prior to Admission  Functional Status:  Independent  Completes ADLs independently?: Yes  Ambulates independently?: Yes  Does patient use assisted devices?: No  Does patient currently own DME?: Yes  What DME does the patient currently own?: [employer]  Does patient have a history of Outpatient Therapy (PT/OT)?: Yes (For R hip LVN at [facility] years ago)  Does the patient have a history of Short-Term Rehab?: No  Does patient have a history of HHC?: Yes  Does patient currently have HHC?: No         Patient Information Continued  Does patient have prescription coverage?: Yes  Does patient receive dialysis treatments?: No  Does patient have a history of substance abuse?: No  Does patient have a history of Mental Health Diagnosis?: Yes (Anxiety and depression )  Is patient receiving treatment for mental health?: Yes (Medication)  Has patient received inpatient treatment related to mental health in the last 2 years?: No         Means of Transportation  Means of Transport to Appts:: Public Transportation - [employer]          DISCHARGE DETAILS:    Discharge planning discussed with:: Pt           Were Treatment Team discharge recommendations reviewed with patient/caregiver?: Yes

## 2024-11-06 ENCOUNTER — APPOINTMENT (INPATIENT)
Dept: RADIOLOGY | Facility: HOSPITAL | Age: 64
DRG: 247 | End: 2024-11-06
Payer: COMMERCIAL

## 2024-11-06 PROBLEM — J45.20 MILD INTERMITTENT ASTHMA: Status: ACTIVE | Noted: 2024-11-06

## 2024-11-06 PROBLEM — Z86.711 HISTORY OF PULMONARY EMBOLISM: Status: ACTIVE | Noted: 2024-11-06

## 2024-11-06 PROBLEM — E03.9 HYPOTHYROIDISM: Status: ACTIVE | Noted: 2024-11-06

## 2024-11-06 PROBLEM — E87.1 HYPONATREMIA: Status: ACTIVE | Noted: 2024-11-06

## 2024-11-06 LAB
ANION GAP SERPL CALCULATED.3IONS-SCNC: 6 MMOL/L (ref 4–13)
BASOPHILS # BLD AUTO: 0.04 THOUSANDS/ÂΜL (ref 0–0.1)
BASOPHILS NFR BLD AUTO: 1 % (ref 0–1)
BUN SERPL-MCNC: 22 MG/DL (ref 5–25)
CALCIUM SERPL-MCNC: 8.2 MG/DL (ref 8.4–10.2)
CHLORIDE SERPL-SCNC: 101 MMOL/L (ref 96–108)
CO2 SERPL-SCNC: 23 MMOL/L (ref 21–32)
CREAT SERPL-MCNC: 0.86 MG/DL (ref 0.6–1.3)
EOSINOPHIL # BLD AUTO: 0.1 THOUSAND/ÂΜL (ref 0–0.61)
EOSINOPHIL NFR BLD AUTO: 2 % (ref 0–6)
ERYTHROCYTE [DISTWIDTH] IN BLOOD BY AUTOMATED COUNT: 16 % (ref 11.6–15.1)
GFR SERPL CREATININE-BSD FRML MDRD: 73 ML/MIN/1.73SQ M
GLUCOSE SERPL-MCNC: 135 MG/DL (ref 65–140)
GLUCOSE SERPL-MCNC: 144 MG/DL (ref 65–140)
GLUCOSE SERPL-MCNC: 153 MG/DL (ref 65–140)
GLUCOSE SERPL-MCNC: 177 MG/DL (ref 65–140)
GLUCOSE SERPL-MCNC: 182 MG/DL (ref 65–140)
HCT VFR BLD AUTO: 30.6 % (ref 34.8–46.1)
HGB BLD-MCNC: 10 G/DL (ref 11.5–15.4)
IMM GRANULOCYTES # BLD AUTO: 0.04 THOUSAND/UL (ref 0–0.2)
IMM GRANULOCYTES NFR BLD AUTO: 1 % (ref 0–2)
LYMPHOCYTES # BLD AUTO: 0.5 THOUSANDS/ÂΜL (ref 0.6–4.47)
LYMPHOCYTES NFR BLD AUTO: 8 % (ref 14–44)
MAGNESIUM SERPL-MCNC: 1.7 MG/DL (ref 1.9–2.7)
MCH RBC QN AUTO: 30.4 PG (ref 26.8–34.3)
MCHC RBC AUTO-ENTMCNC: 32.7 G/DL (ref 31.4–37.4)
MCV RBC AUTO: 93 FL (ref 82–98)
MONOCYTES # BLD AUTO: 0.55 THOUSAND/ÂΜL (ref 0.17–1.22)
MONOCYTES NFR BLD AUTO: 9 % (ref 4–12)
NEUTROPHILS # BLD AUTO: 4.99 THOUSANDS/ÂΜL (ref 1.85–7.62)
NEUTS SEG NFR BLD AUTO: 79 % (ref 43–75)
NRBC BLD AUTO-RTO: 0 /100 WBCS
PHOSPHATE SERPL-MCNC: 3.5 MG/DL (ref 2.3–4.1)
PLATELET # BLD AUTO: 91 THOUSANDS/UL (ref 149–390)
PMV BLD AUTO: 10.6 FL (ref 8.9–12.7)
POTASSIUM SERPL-SCNC: 4.3 MMOL/L (ref 3.5–5.3)
RBC # BLD AUTO: 3.29 MILLION/UL (ref 3.81–5.12)
SODIUM SERPL-SCNC: 130 MMOL/L (ref 135–147)
WBC # BLD AUTO: 6.22 THOUSAND/UL (ref 4.31–10.16)

## 2024-11-06 PROCEDURE — 80048 BASIC METABOLIC PNL TOTAL CA: CPT

## 2024-11-06 PROCEDURE — 84100 ASSAY OF PHOSPHORUS: CPT

## 2024-11-06 PROCEDURE — 85025 COMPLETE CBC W/AUTO DIFF WBC: CPT

## 2024-11-06 PROCEDURE — 83735 ASSAY OF MAGNESIUM: CPT

## 2024-11-06 PROCEDURE — 99221 1ST HOSP IP/OBS SF/LOW 40: CPT | Performed by: INTERNAL MEDICINE

## 2024-11-06 PROCEDURE — 71045 X-RAY EXAM CHEST 1 VIEW: CPT

## 2024-11-06 PROCEDURE — 82948 REAGENT STRIP/BLOOD GLUCOSE: CPT

## 2024-11-06 PROCEDURE — 97167 OT EVAL HIGH COMPLEX 60 MIN: CPT

## 2024-11-06 PROCEDURE — 74019 RADEX ABDOMEN 2 VIEWS: CPT

## 2024-11-06 PROCEDURE — 97163 PT EVAL HIGH COMPLEX 45 MIN: CPT

## 2024-11-06 PROCEDURE — 99233 SBSQ HOSP IP/OBS HIGH 50: CPT | Performed by: SURGERY

## 2024-11-06 RX ORDER — HYDROMORPHONE HCL/PF 1 MG/ML
0.5 SYRINGE (ML) INJECTION ONCE
Status: COMPLETED | OUTPATIENT
Start: 2024-11-06 | End: 2024-11-06

## 2024-11-06 RX ORDER — DIATRIZOATE MEGLUMINE AND DIATRIZOATE SODIUM 660; 100 MG/ML; MG/ML
50 SOLUTION ORAL; RECTAL
Status: DISCONTINUED | OUTPATIENT
Start: 2024-11-06 | End: 2024-11-07

## 2024-11-06 RX ORDER — ALBUTEROL SULFATE 90 UG/1
2 INHALANT RESPIRATORY (INHALATION) EVERY 4 HOURS PRN
Status: DISCONTINUED | OUTPATIENT
Start: 2024-11-06 | End: 2024-11-12 | Stop reason: HOSPADM

## 2024-11-06 RX ADMIN — PANTOPRAZOLE SODIUM 40 MG: 40 INJECTION, POWDER, LYOPHILIZED, FOR SOLUTION INTRAVENOUS at 08:20

## 2024-11-06 RX ADMIN — HYDROMORPHONE HYDROCHLORIDE 0.5 MG: 1 INJECTION, SOLUTION INTRAMUSCULAR; INTRAVENOUS; SUBCUTANEOUS at 17:55

## 2024-11-06 RX ADMIN — ACETAMINOPHEN 1000 MG: 10 INJECTION INTRAVENOUS at 05:03

## 2024-11-06 RX ADMIN — HYDROMORPHONE HYDROCHLORIDE 0.5 MG: 1 INJECTION, SOLUTION INTRAMUSCULAR; INTRAVENOUS; SUBCUTANEOUS at 01:55

## 2024-11-06 RX ADMIN — Medication 1 SPRAY: at 10:04

## 2024-11-06 RX ADMIN — ENOXAPARIN SODIUM 40 MG: 40 INJECTION SUBCUTANEOUS at 08:21

## 2024-11-06 RX ADMIN — INSULIN LISPRO 1 UNITS: 100 INJECTION, SOLUTION INTRAVENOUS; SUBCUTANEOUS at 05:44

## 2024-11-06 RX ADMIN — Medication 1 SPRAY: at 05:44

## 2024-11-06 RX ADMIN — MAGNESIUM SULFATE HEPTAHYDRATE 3 G: 500 INJECTION, SOLUTION INTRAMUSCULAR; INTRAVENOUS at 08:21

## 2024-11-06 RX ADMIN — ACETAMINOPHEN 1000 MG: 10 INJECTION INTRAVENOUS at 17:27

## 2024-11-06 RX ADMIN — ONDANSETRON 4 MG: 2 INJECTION INTRAMUSCULAR; INTRAVENOUS at 11:08

## 2024-11-06 RX ADMIN — HYDROMORPHONE HYDROCHLORIDE 0.5 MG: 1 INJECTION, SOLUTION INTRAMUSCULAR; INTRAVENOUS; SUBCUTANEOUS at 08:37

## 2024-11-06 RX ADMIN — Medication 1 SPRAY: at 18:33

## 2024-11-06 RX ADMIN — SODIUM CHLORIDE, SODIUM GLUCONATE, SODIUM ACETATE, POTASSIUM CHLORIDE, MAGNESIUM CHLORIDE, SODIUM PHOSPHATE, DIBASIC, AND POTASSIUM PHOSPHATE 125 ML/HR: .53; .5; .37; .037; .03; .012; .00082 INJECTION, SOLUTION INTRAVENOUS at 17:28

## 2024-11-06 RX ADMIN — ACETAMINOPHEN 1000 MG: 10 INJECTION INTRAVENOUS at 11:09

## 2024-11-06 RX ADMIN — Medication 1 SPRAY: at 15:34

## 2024-11-06 RX ADMIN — HYDROMORPHONE HYDROCHLORIDE 0.5 MG: 1 INJECTION, SOLUTION INTRAMUSCULAR; INTRAVENOUS; SUBCUTANEOUS at 22:40

## 2024-11-06 RX ADMIN — HYDROMORPHONE HYDROCHLORIDE 0.5 MG: 1 INJECTION, SOLUTION INTRAMUSCULAR; INTRAVENOUS; SUBCUTANEOUS at 00:08

## 2024-11-06 RX ADMIN — ONDANSETRON 4 MG: 2 INJECTION INTRAMUSCULAR; INTRAVENOUS at 05:02

## 2024-11-06 RX ADMIN — SODIUM CHLORIDE, SODIUM GLUCONATE, SODIUM ACETATE, POTASSIUM CHLORIDE, MAGNESIUM CHLORIDE, SODIUM PHOSPHATE, DIBASIC, AND POTASSIUM PHOSPHATE 125 ML/HR: .53; .5; .37; .037; .03; .012; .00082 INJECTION, SOLUTION INTRAVENOUS at 05:04

## 2024-11-06 RX ADMIN — HYDROMORPHONE HYDROCHLORIDE 0.5 MG: 1 INJECTION, SOLUTION INTRAMUSCULAR; INTRAVENOUS; SUBCUTANEOUS at 07:31

## 2024-11-06 RX ADMIN — HYDROMORPHONE HYDROCHLORIDE 0.5 MG: 1 INJECTION, SOLUTION INTRAMUSCULAR; INTRAVENOUS; SUBCUTANEOUS at 15:28

## 2024-11-06 NOTE — ASSESSMENT & PLAN NOTE
Lab Results   Component Value Date    HGBA1C 6.9 (H) 11/05/2024       Recent Labs     11/05/24  2313 11/06/24  0541 11/06/24  1151 11/06/24  1755   POCGLU 132 177* 153* 144*       Blood Sugar Average: Last 72 hrs:  (P) 156.6  Home regimen is glargine 60 units in the morning and Humalog sliding scale.  CIGA 10 mg daily  Patient is currently n.p.o., continue insulin sliding scale every 6 hours

## 2024-11-06 NOTE — ASSESSMENT & PLAN NOTE
History of endometrial cancer grade 1 stage Ia s/p/BSO 6/2020 with recurrent disease in 12/2023 when PET scan revealed enlarged retroperitoneal nodes, underwent radiation which she completed 3/2024  Repeat CT scan in August showed no recurrence of disease  Follows [facility]

## 2024-11-06 NOTE — PLAN OF CARE
Problem: PHYSICAL THERAPY ADULT  Goal: Performs mobility at highest level of function for planned discharge setting.  See evaluation for individualized goals.  Description: Treatment/Interventions: Functional transfer training, LE strengthening/ROM, Elevations, Therapeutic exercise, Endurance training, Patient/family training, Bed mobility, Gait training, Spoke to nursing, OT  Equipment Recommended: Walker (pt has)       See flowsheet documentation for full assessment, interventions and recommendations.  Note: Prognosis: Good  Problem List: Decreased strength, Decreased endurance, Impaired balance, Decreased mobility, Pain  Assessment: Pt. 63 y.o.female admitted for SBO (small bowel obstruction) (HCC) w/ abdominal pain. (+) NGT.  Pt referred to PT for mobility assessment & D/C planning. Please see above for information re: home set-up & PLOF as well as objective findings during PT assessment. PTA, pt reports being I w/o AD inside home & w/ rollator when in the community. On eval, pt functioning below baseline hence will continue skilled PT to improve function & safety. Pt require minAx1 for bed mobility & S for transfers & amb w/ RW + cues for techniques & safety. Gait deviations as above but no gross LOB noted. Dec amb tolerance 2* to nausea & vomiting as well as limited to NGT line. (+) vomiting, RN aware. The patient's AM-PAC Basic Mobility Inpatient Short Form Raw Score is 18. A Raw score of greater than 16 suggests the patient may benefit from discharge to home. Please also refer to the recommendation of the Physical Therapist for safe discharge planning. From PT standpoint, will recommend Level III (minimum resource intensity) rehab services at D/C, pending progress. No SOB & dizziness reported t/o session. Nsg staff most recent vital signs as follows: /51   Pulse 81   Temp 98.1 °F (36.7 °C)   Resp 18   Ht 5' (1.524 m)   Wt 107 kg (236 lb 12.4 oz)   SpO2 90%   BMI 46.24 kg/m² . At end of  session, pt back in bed in stable condition, call bell & phone in reach, bed alarm activated, all lines intact. Fall precautions reinforced w/ good understanding. CM to follow. Nsg staff to continue to mobilized pt (OOB in chair for all meals & ambulate in room/unit) as tolerated to prevent further decline in function. Will also recommend [facility] for daily amb &/or daily OOB in chair as appropriate. Nsg notified. Co-eval was necessary to complete this PT eval for the pt's best interest given pt's medical acuity & complexity.        Rehab Resource Intensity Level, PT: III (Minimum Resource Intensity) (pending progress)    See flowsheet documentation for full assessment.

## 2024-11-06 NOTE — PLAN OF CARE
Problem: Potential for Falls  Goal: Patient will remain free of falls  Description: INTERVENTIONS:  - Educate patient/family on patient safety including physical limitations  - Instruct patient to call for assistance with activity   - Consult OT/PT to assist with strengthening/mobility   - Keep Call bell within reach  - Keep bed low and locked with side rails adjusted as appropriate  - Keep care items and personal belongings within reach  - Initiate and maintain comfort rounds  - Make Fall Risk Sign visible to staff  - Offer Toileting every 2 Hours, in advance of need  - Initiate/Maintain bedalarm  - Obtain necessary fall risk management equipment  - Apply yellow socks and bracelet for high fall risk patients  - Consider moving patient to room near nurses station  Outcome: Progressing     Problem: PAIN - ADULT  Goal: Verbalizes/displays adequate comfort level or baseline comfort level  Description: Interventions:  - Encourage patient to monitor pain and request assistance  - Assess pain using appropriate pain scale  - Administer analgesics based on type and severity of pain and evaluate response  - Implement non-pharmacological measures as appropriate and evaluate response  - Consider cultural and social influences on pain and pain management  - Notify physician/advanced practitioner if interventions unsuccessful or patient reports new pain  Outcome: Progressing     Problem: INFECTION - ADULT  Goal: Absence or prevention of progression during hospitalization  Description: INTERVENTIONS:  - Assess and monitor for signs and symptoms of infection  - Monitor lab/diagnostic results  - Monitor all insertion sites, i.e. indwelling lines, tubes, and drains  - Monitor endotracheal if appropriate and nasal secretions for changes in amount and color  - Burns appropriate cooling/warming therapies per order  - Administer medications as ordered  - Instruct and encourage patient and family to use good hand hygiene  technique  - Identify and instruct in appropriate isolation precautions for identified infection/condition  Outcome: Progressing  Goal: Absence of fever/infection during neutropenic period  Description: INTERVENTIONS:  - Monitor WBC    Outcome: Progressing     Problem: SAFETY ADULT  Goal: Patient will remain free of falls  Description: INTERVENTIONS:  - Educate patient/family on patient safety including physical limitations  - Instruct patient to call for assistance with activity   - Consult OT/PT to assist with strengthening/mobility   - Keep Call bell within reach  - Keep bed low and locked with side rails adjusted as appropriate  - Keep care items and personal belongings within reach  - Initiate and maintain comfort rounds  - Make Fall Risk Sign visible to staff  - Offer Toileting every 2 Hours, in advance of need  - Initiate/Maintain bedalarm  - Obtain necessary fall risk management equipment  - Apply yellow socks and bracelet for high fall risk patients  - Consider moving patient to room near nurses station  Outcome: Progressing  Goal: Maintain or return to baseline ADL function  Description: INTERVENTIONS:  -  Assess patient's ability to carry out ADLs; assess patient's baseline for ADL function and identify physical deficits which impact ability to perform ADLs (bathing, care of mouth/teeth, toileting, grooming, dressing, etc.)  - Assess/evaluate cause of self-care deficits   - Assess range of motion  - Assess patient's mobility; develop plan if impaired  - Assess patient's need for assistive devices and provide as appropriate  - Encourage maximum independence but intervene and supervise when necessary  - Involve family in performance of ADLs  - Assess for home care needs following discharge   - Consider OT consult to assist with ADL evaluation and planning for discharge  - Provide patient education as appropriate  Outcome: Progressing  Goal: Maintains/Returns to pre admission functional level  Description:  INTERVENTIONS:  - Perform AM-PAC 6 Click Basic Mobility/ Daily Activity assessment daily.  - Set and communicate daily mobility goal to care team and patient/family/caregiver.   - Collaborate with rehabilitation services on mobility goals if consulted  - Perform Range of Motion 3 times a day.  - Reposition patient every 2 hours.  - Dangle patient 3 times a day  - Stand patient 3 times a day  - Ambulate patient 3 times a day  - Out of bed to chair 3 times a day   - Out of bed for meals 3 times a day  - Out of bed for toileting  - Record patient progress and toleration of activity level   Outcome: Progressing     Problem: DISCHARGE PLANNING  Goal: Discharge to home or other facility with appropriate resources  Description: INTERVENTIONS:  - Identify barriers to discharge w/patient and caregiver  - Arrange for needed discharge resources and transportation as appropriate  - Identify discharge learning needs (meds, wound care, etc.)  - Arrange for interpretive services to assist at discharge as needed  - Refer to Case Management Department for coordinating discharge planning if the patient needs post-hospital services based on physician/advanced practitioner order or complex needs related to functional status, cognitive ability, or social support system  Outcome: Progressing     Problem: Knowledge Deficit  Goal: Patient/family/caregiver demonstrates understanding of disease process, treatment plan, medications, and discharge instructions  Description: Complete learning assessment and assess knowledge base.  Interventions:  - Provide teaching at level of understanding  - Provide teaching via preferred learning methods  Outcome: Progressing     Problem: Prexisting or High Potential for Compromised Skin Integrity  Goal: Skin integrity is maintained or improved  Description: INTERVENTIONS:  - Identify patients at risk for skin breakdown  - Assess and monitor skin integrity  - Assess and monitor nutrition and hydration  status  - Monitor labs   - Assess for incontinence   - Turn and reposition patient  - Assist with mobility/ambulation  - Relieve pressure over bony prominences  - Avoid friction and shearing  - Provide appropriate hygiene as needed including keeping skin clean and dry  - Evaluate need for skin moisturizer/barrier cream  - Collaborate with interdisciplinary team   - Patient/family teaching  - Consider wound care consult   Outcome: Progressing

## 2024-11-06 NOTE — CONSULTS
Consultation - Surgery-General   Name: Lety Botello 63 y.o. female I MRN: [id number]  Unit/Bed#: E5 -01 I Date of Admission: 11/5/2024   Date of Service: 11/5/2024 I Hospital Day: 0   Consult to surgery general  Consult performed by: Perry Granger PA-C  Consult ordered by: Alexis Carl MD        Physician Requesting Evaluation: Andrea Seinor MD   Reason for Evaluation / Principal Problem: SBO    Assessment & Plan  SBO (small bowel obstruction) (HCC)  63-year-old female with multiple comorbidities and abdominal surgeries with recurrent SBO.  Patient recently treated at [facility] without resolution of SBO, now returns to ED with similar abdominal symptoms.  CT scan of abdomen and pelvis showing proximal small bowel obstruction similar to findings reported on prior outside CT on 11/2.     Plan:  -NG tube  -N.p.o.  -Pain and nausea control as needed  -Maintenance IV fluids  -Encourage OOB, ambulation  -Continue to monitor strict I's and O's  -Follow-up CBC and CMP  -DVT prophylaxis  -Encourage incentive spirometry use  -If abdomen worsens, then discussed possibility of exploratory/diagnostic operation of abdomen.  Please contact the SecureChat role for the Surgery-General service with any questions/concerns.    History of Present Illness   Lety Botello is a 63 y.o. female who presents with x 3 weeks of abdominal pain.  Patient with past medical history of diverticulitis, endometrial cancer, hypertension, type 2 diabetes, morbid obesity and inactve shingles of right hemiabdomen, with extensive surgical history including appendectomy, sigmoidectomy, cholecystectomy presents with recurrent SBO.  Patient was previously treated at [facility] where she was trialed with nonoperative management due to multiple comorbidities and extensive surgical history.  Prior to discharge she felt better, but still had abdominal discomfort.  At the time, patient was tolerating diet, having BMs, and passing  flatus.  Upon arrival to the ED she describes her pain as sharp throughout the abdomen without radiation to her flanks.  Currently her pain level is a 6 out of 10 without any focal point tenderness.  Patient endorses nausea without vomiting.  She continues to have loose/diarrhea-like stools since this morning and passed gas.  Patient denies any fevers, chills, shortness of breath, or chest pain.  Patient states that she was deemed a nonoperative patient due to multiple comorbidities and extensive surgical history by [facility] and would not operate within the abdomen.    Review of Systems   Constitutional:  Negative for activity change, chills, fatigue, fever and unexpected weight change.   Respiratory:  Negative for apnea, cough and shortness of breath.    Cardiovascular:  Negative for chest pain, palpitations and leg swelling.   Gastrointestinal:  Positive for diarrhea and nausea. Negative for blood in stool, constipation and vomiting.   Genitourinary:  Negative for dysuria, flank pain and pelvic pain.   Neurological:  Negative for dizziness, light-headedness, numbness and headaches.     I have reviewed the patient's PMH, PSH, Social History, Family History, Meds, and Allergies  Historical Information   Past Medical History:   Diagnosis Date    Diverticulitis     Endometrial cancer (HCC)     History of shingles     Right hemiabdomen (inactive)    Hypertension     IBS (irritable bowel syndrome)     Obesity     Small bowel obstruction (HCC)     Type 2 diabetes mellitus (HCC)      Past Surgical History:   Procedure Laterality Date    APPENDECTOMY      CHOLECYSTECTOMY      SIGMOIDECTOMY N/A      Social History     Tobacco Use    Smoking status: Never    Smokeless tobacco: Never   Substance and Sexual Activity    Alcohol use: Not Currently    Drug use: Never    Sexual activity: Not on file     E-Cigarette/Vaping     E-Cigarette/Vaping Substances     History reviewed. No pertinent family history.  Social History     Tobacco  Use    Smoking status: Never    Smokeless tobacco: Never   Substance and Sexual Activity    Alcohol use: Not Currently    Drug use: Never    Sexual activity: Not on file       Current Facility-Administered Medications:     acetaminophen (Ofirmev) injection 1,000 mg, Q6H SAM, Last Rate: 1,000 mg (11/05/24 1824)    [START ON 11/6/2024] enoxaparin (LOVENOX) subcutaneous injection 40 mg, Daily    HYDROmorphone (DILAUDID) injection 0.5 mg, Q4H PRN    HYDROmorphone (DILAUDID) injection 0.5 mg, Q4H PRN    HYDROmorphone HCl (DILAUDID) injection 0.2 mg, Q4H PRN    insulin lispro (HumALOG/ADMELOG) 100 units/mL subcutaneous injection 1-6 Units, Q6H SAM **AND** Fingerstick Glucose (POCT), Q6H    multi-electrolyte (PLASMALYTE-A/ISOLYTE-S PH 7.4) IV solution, Continuous, Last Rate: 125 mL/hr (11/05/24 1841)    ondansetron (ZOFRAN) injection 4 mg, Q6H PRN    [START ON 11/6/2024] pantoprazole (PROTONIX) injection 40 mg, Q24H SAM    phenol (CHLORASEPTIC) 1.4 % mucosal liquid 1 spray, Q2H PRN  Prior to Admission Medications   Prescriptions Last Dose Informant Patient Reported? Taking?   ALPRAZolam (XANAX) 0.5 mg tablet   Yes Yes   Sig: Take 0.5 mg by mouth Three times daily as needed   Acetaminophen 325 MG CAPS   Yes No   Sig: Take 325 mg by mouth every 6 (six) hours as needed   Dulaglutide (TRULICITY SC)   Yes No   Sig: Inject 4.5 mg under the skin   Glucagon (Baqsimi One Pack) 3 MG/DOSE POWD   Yes Yes   Sig: 3 mg   Klor-Con M20 20 MEQ tablet   Yes Yes   Sig: Take 1 tablet by mouth in the morning   Tirzepatide (Mounjaro) 2.5 MG/0.5ML SOAJ   Yes Yes   Sig: Inject 2.5 mg under the skin   albuterol (PROVENTIL HFA,VENTOLIN HFA) 90 mcg/act inhaler   Yes No   Sig: Inhale 2 puffs every 6 (six) hours as needed   apixaban (ELIQUIS) 5 mg   Yes Yes   Sig: Take 5 mg by mouth 2 (two) times a day   aspirin (ECOTRIN LOW STRENGTH) 81 mg EC tablet   Yes No   Sig: Take 81 mg by mouth   cephalexin (KEFLEX) 500 mg capsule   Yes Yes   Sig: Take 500 mg  by mouth   ciprofloxacin (CIPRO) 500 mg tablet   Yes Yes   Sig: Take 500 mg by mouth   dapagliflozin (Farxiga) 10 MG tablet   Yes No   Sig: Take 10 mg by mouth   dicyclomine (BENTYL) 10 mg capsule   Yes Yes   Sig: Take 10 mg by mouth 2 (two) times a day as needed   diphenhydrAMINE (BENADRYL) 25 mg capsule   Yes No   Sig: Take 25 mg by mouth every 6 (six) hours as needed   furosemide (LASIX) 20 mg tablet   Yes Yes   Sig: Take 1 tablet by mouth in the morning   gabapentin (NEURONTIN) 100 mg capsule   Yes Yes   Sig: Take 100 mg by mouth   levothyroxine 125 mcg tablet   Yes Yes   Sig: Take 125 mcg by mouth daily   lidocaine (LIDODERM) 5 %   Yes Yes   Sig: Place 1 patch on the skin every 12 (twelve) hours   meclizine (ANTIVERT) 25 mg tablet   Yes Yes   Sig: Take 25 mg by mouth Three times daily as needed   methocarbamol (ROBAXIN) 500 mg tablet   Yes Yes   Sig: Take 750 mg by mouth 4 (four) times a day as needed   metroNIDAZOLE (FLAGYL) 500 mg tablet   Yes Yes   Sig: Take 500 mg by mouth   nitroglycerin (NITROSTAT) 0.4 mg SL tablet   Yes No   Sig: Place 0.4 mg under the tongue   oxyCODONE (ROXICODONE) 5 immediate release tablet   Yes Yes   Sig: Take 5 mg by mouth every 4 (four) hours as needed   pantoprazole (PROTONIX) 40 mg tablet   Yes Yes   Sig: Take 40 mg by mouth   prochlorperazine (COMPAZINE) 5 mg tablet   Yes Yes   Sig: Take 1 tablet by mouth 4 times a day   vitamin B-12 (VITAMIN B-12) 1,000 mcg tablet   Yes Yes   Sig: Take 1,000 mcg by mouth daily      Facility-Administered Medications: None     Bee pollen, Azithromycin, Gluten meal - food allergy, Metformin, Other, Pollen extract, Sulfamethizole, and Sulfamethoxazole-trimethoprim    Objective :  Temp:  [97.5 °F (36.4 °C)-98.2 °F (36.8 °C)] 98.2 °F (36.8 °C)  HR:  [85-92] 85  BP: (111-170)/(58-77) 111/58  Resp:  [16-22] 18  SpO2:  [94 %-100 %] 94 %  O2 Device: None (Room air)      Physical Exam  Constitutional:       General: She is not in acute distress.      Appearance: Normal appearance. She is obese. She is not ill-appearing, toxic-appearing or diaphoretic.   Cardiovascular:      Rate and Rhythm: Normal rate and regular rhythm.      Pulses: Normal pulses.      Heart sounds: Normal heart sounds. No murmur heard.     No gallop.   Pulmonary:      Effort: Pulmonary effort is normal. No respiratory distress.      Breath sounds: Normal breath sounds.   Abdominal:      General: There is no distension.      Palpations: Abdomen is soft. There is no mass.      Tenderness: There is abdominal tenderness (Diffuse tenderness to light and deep palpation throughout abdomen.  No rebound or guarding.). There is no right CVA tenderness, left CVA tenderness, guarding or rebound.      Hernia: No hernia is present.   Skin:     General: Skin is warm and dry.   Neurological:      General: No focal deficit present.      Mental Status: She is alert and oriented to person, place, and time.         Lab Results: I have reviewed the following results:  Recent Labs     11/04/24  0333 11/05/24  1241   WBC  --  5.91   HGB  --  9.5*   HCT  --  29.5*   PLT  --  94*   SODIUM 132* 131*   K 4.7 4.1    100   CO2 24 25   BUN 19 22   CREATININE 0.76 0.79   GLUC 98 162*   MG 1.8  --    PHOS 3.9  --    AST 9 11*   ALT 6 9   ALB 3.1* 3.2*   TBILI 0.4 0.42   ALKPHOS 71 80     11/5 CT abdomen and pelvis: Proximal small bowel obstruction. Similar findings reported on prior outside CT of 11/2/2024.     Other Study Results Review: No additional pertinent studies reviewed.    VTE Pharmacologic Prophylaxis: VTE covered by:  [START ON 11/6/2024] enoxaparin, Subcutaneous     VTE Mechanical Prophylaxis: sequential compression device    Administrative Statements   I have spent a total time of 30 minutes in caring for this patient on the day of the visit/encounter including Instructions for management, Reviewing / ordering tests, medicine, procedures  , Obtaining or reviewing history  , and Communicating with other  healthcare professionals .

## 2024-11-06 NOTE — NURSING NOTE
Called to room by primary RN. NGT not draining, was found to be at 35cm where it is marked at 65cm, advanced NGT and confirmed with auscultation. Advised primary RN Debi and Rene to notify surgery of misplacement and inquire if they would like a larger size ngt for decompression as output is very thick and bilious.

## 2024-11-06 NOTE — PLAN OF CARE
Problem: Potential for Falls  Goal: Patient will remain free of falls  Description: INTERVENTIONS:  - Educate patient/family on patient safety including physical limitations  - Instruct patient to call for assistance with activity   - Consult OT/PT to assist with strengthening/mobility   - Keep Call bell within reach  - Keep bed low and locked with side rails adjusted as appropriate  - Keep care items and personal belongings within reach  - Initiate and maintain comfort rounds  - Make Fall Risk Sign visible to staff  - Offer Toileting every 2 Hours, in advance of need  - Initiate/Maintain bedalarm  - Obtain necessary fall risk management equipment  - Apply yellow socks and bracelet for high fall risk patients  - Consider moving patient to room near nurses station  11/5/2024 2112 by Debi Lauren RN  Outcome: Progressing  11/5/2024 2111 by Debi Lauren RN  Outcome: Progressing  11/5/2024 2111 by Debi Lauren RN  Outcome: Progressing     Problem: PAIN - ADULT  Goal: Verbalizes/displays adequate comfort level or baseline comfort level  Description: Interventions:  - Encourage patient to monitor pain and request assistance  - Assess pain using appropriate pain scale  - Administer analgesics based on type and severity of pain and evaluate response  - Implement non-pharmacological measures as appropriate and evaluate response  - Consider cultural and social influences on pain and pain management  - Notify physician/advanced practitioner if interventions unsuccessful or patient reports new pain  11/5/2024 2112 by Debi Lauren RN  Outcome: Progressing  11/5/2024 2111 by Debi Lauren RN  Outcome: Progressing  11/5/2024 2111 by Debi Lauren RN  Outcome: Progressing     Problem: INFECTION - ADULT  Goal: Absence or prevention of progression during hospitalization  Description: INTERVENTIONS:  - Assess and monitor for signs and symptoms of infection  - Monitor lab/diagnostic results  - Monitor all insertion sites, i.e.  indwelling lines, tubes, and drains  - Monitor endotracheal if appropriate and nasal secretions for changes in amount and color  - [place] appropriate cooling/warming therapies per order  - Administer medications as ordered  - Instruct and encourage patient and family to use good hand hygiene technique  - Identify and instruct in appropriate isolation precautions for identified infection/condition  11/5/2024 2112 by Debi Lauren RN  Outcome: Progressing  11/5/2024 2111 by Debi Lauren RN  Outcome: Progressing  11/5/2024 2111 by Debi Lauren RN  Outcome: Progressing  Goal: Absence of fever/infection during neutropenic period  Description: INTERVENTIONS:  - Monitor WBC    11/5/2024 2112 by Debi Lauren RN  Outcome: Progressing  11/5/2024 2111 by Debi Lauren RN  Outcome: Progressing  11/5/2024 2111 by Debi Lauren RN  Outcome: Progressing     Problem: SAFETY ADULT  Goal: Patient will remain free of falls  Description: INTERVENTIONS:  - Educate patient/family on patient safety including physical limitations  - Instruct patient to call for assistance with activity   - Consult OT/PT to assist with strengthening/mobility   - Keep Call bell within reach  - Keep bed low and locked with side rails adjusted as appropriate  - Keep care items and personal belongings within reach  - Initiate and maintain comfort rounds  - Make Fall Risk Sign visible to staff  - Offer Toileting every 2 Hours, in advance of need  - Initiate/Maintain bedalarm  - Obtain necessary fall risk management equipment  - Apply yellow socks and bracelet for high fall risk patients  - Consider moving patient to room near nurses station  11/5/2024 2112 by Debi Lauren RN  Outcome: Progressing  11/5/2024 2111 by Debi Lauren RN  Outcome: Progressing  11/5/2024 2111 by Debi Lauren RN  Outcome: Progressing  Goal: Maintain or return to baseline ADL function  Description: INTERVENTIONS:  -  Assess patient's ability to carry out ADLs; assess patient's  baseline for ADL function and identify physical deficits which impact ability to perform ADLs (bathing, care of mouth/teeth, toileting, grooming, dressing, etc.)  - Assess/evaluate cause of self-care deficits   - Assess range of motion  - Assess patient's mobility; develop plan if impaired  - Assess patient's need for assistive devices and provide as appropriate  - Encourage maximum independence but intervene and supervise when necessary  - Involve family in performance of ADLs  - Assess for home care needs following discharge   - Consider OT consult to assist with ADL evaluation and planning for discharge  - Provide patient education as appropriate  11/5/2024 2112 by Debi Lauren RN  Outcome: Progressing  11/5/2024 2111 by Debi Lauren RN  Outcome: Progressing  11/5/2024 2111 by Debi Lauren RN  Outcome: Progressing  Goal: Maintains/Returns to pre admission functional level  Description: INTERVENTIONS:  - Perform AM-PAC 6 Click Basic Mobility/ Daily Activity assessment daily.  - Set and communicate daily mobility goal to care team and patient/family/caregiver.   - Collaborate with rehabilitation services on mobility goals if consulted  - Perform Range of Motion 3 times a day.  - Reposition patient every 2 hours.  - Dangle patient 3 times a day  - Stand patient 3 times a day  - Ambulate patient 3 times a day  - Out of bed to chair 3 times a day   - Out of bed for meals 3 times a day  - Out of bed for toileting  - Record patient progress and toleration of activity level   11/5/2024 2112 by Debi Lauren RN  Outcome: Progressing  11/5/2024 2111 by Debi Lauren, RN  Outcome: Progressing  11/5/2024 2111 by Debi Lauren RN  Outcome: Progressing     Problem: DISCHARGE PLANNING  Goal: Discharge to home or other facility with appropriate resources  Description: INTERVENTIONS:  - Identify barriers to discharge w/patient and caregiver  - Arrange for needed discharge resources and transportation as appropriate  - Identify  discharge learning needs (meds, wound care, etc.)  - Arrange for interpretive services to assist at discharge as needed  - Refer to Case Management Department for coordinating discharge planning if the patient needs post-hospital services based on physician/advanced practitioner order or complex needs related to functional status, cognitive ability, or social support system  11/5/2024 2112 by Debi Lauren RN  Outcome: Progressing  11/5/2024 2111 by Debi Lauren RN  Outcome: Progressing  11/5/2024 2111 by Debi Lauren RN  Outcome: Progressing     Problem: Knowledge Deficit  Goal: Patient/family/caregiver demonstrates understanding of disease process, treatment plan, medications, and discharge instructions  Description: Complete learning assessment and assess knowledge base.  Interventions:  - Provide teaching at level of understanding  - Provide teaching via preferred learning methods  11/5/2024 2112 by Debi Lauren RN  Outcome: Progressing  11/5/2024 2111 by Debi Lauren RN  Outcome: Progressing  11/5/2024 2111 by Debi Lauren RN  Outcome: Progressing     Problem: Prexisting or High Potential for Compromised Skin Integrity  Goal: Skin integrity is maintained or improved  Description: INTERVENTIONS:  - Identify patients at risk for skin breakdown  - Assess and monitor skin integrity  - Assess and monitor nutrition and hydration status  - Monitor labs   - Assess for incontinence   - Turn and reposition patient  - Assist with mobility/ambulation  - Relieve pressure over bony prominences  - Avoid friction and shearing  - Provide appropriate hygiene as needed including keeping skin clean and dry  - Evaluate need for skin moisturizer/barrier cream  - Collaborate with interdisciplinary team   - Patient/family teaching  - Consider wound care consult   11/5/2024 2112 by Debi Lauren RN  Outcome: Progressing  11/5/2024 2111 by Debi Lauren RN  Outcome: Progressing  11/5/2024 2111 by Debi Lauren RN  Outcome:  Progressing

## 2024-11-06 NOTE — ASSESSMENT & PLAN NOTE
Patient admitted to surgery service for recurrent small bowel obstruction  CT showed proximal small bowel obstruction, similar findings reported on prior outside CT on 11/2/2024.  Findings suggestive of small splenic infarct, similar findings reported on prior outside CT on 11/2/2024.  Hepatosplenomegaly  Continue NG tube  N.p.o.  IV fluids  Antiemetics and pain medications  Possible expiratory/diagnostic surgical intervention tomorrow

## 2024-11-06 NOTE — PLAN OF CARE
Problem: OCCUPATIONAL THERAPY ADULT  Goal: Performs self-care activities at highest level of function for planned discharge setting.  See evaluation for individualized goals.  Description: Treatment Interventions: ADL retraining, Functional transfer training, UE strengthening/ROM, Endurance training, Cognitive reorientation, Patient/family training, Equipment evaluation/education, Neuromuscular reeducation, Compensatory technique education, Continued evaluation, Energy conservation, Activityengagement          See flowsheet documentation for full assessment, interventions and recommendations.   Note: Limitation: Decreased ADL status, Decreased UE strength, Decreased Safe judgement during ADL, Decreased endurance, Decreased self-care trans, Decreased high-level ADLs (dec balance, functional reach, coordination)  Prognosis: Good  Assessment: Patient is a 63 y.o. year old female seen for OT eval s/p admit to [facility] on 11/5/2024 with SBO.  OT consulted to assess ADLs/IADLs/functional mobility and assist w/ D/C planning.  Patient demonstrates the following deficits impacting occupational performance: decreased strength , decreased balance, decreased activity tolerance, limited functional reach, decreased safety awareness, mood/behavioral limitations , increased body habitus , impaired coordination, decreased cardiovascular endurance, and decreased skin integrity . These impairments, as well at pt’s [place] home environment, steps within home environment, limited home support, difficulty performing ADLs, difficulty performing IADLs, difficulty performing transfers/mobility, limited insight into deficits, fall risk , functional decline , increased reliance on DME , and multiple admissions , limit pt’s ability to safely engage in all baseline areas of occupation. Pt's CLOF as follows: eating/grooming: Lucina and supervision , UB ADLs: supervision , LB ADLs: Darrell and modA, toileting: Darrell, bed mobility: Darrell, functional transfers:  supervision , functional mobility: supervision , sitting/standing tolerance: ~4 min. Pt would benefit from continued skilled OT while in acute setting to address deficits as defined above and to maximize (I) w/ ADLs/functional mobility. Occupational performance areas to address include: grooming, bathing/shower, toilet hygiene, dressing, medication management, health maintenance, functional mobility, community mobility, clothing management, cleaning, meal prep, and household maintenance. Based on the aforementioned evaluation, functional performance deficits, and assessments, pt has been identified as a high complexity evaluation. At this time, recommendation for pt to receive post-acute rehabilitation services at a Level III (minimum resource intensity) due to above deficits and CLOF. OT will continue to follow pt 2-5x/wk to address the goals listed below to  w/in 10-14 days.     Rehab Resource Intensity Level, OT: III (Minimum Resource Intensity)

## 2024-11-06 NOTE — UTILIZATION REVIEW
Initial Clinical Review    Admission: Date/Time/Statement:   Admission Orders (From admission, onward)       Ordered        11/05/24 1650  INPATIENT ADMISSION  Once                          Orders Placed This Encounter   Procedures    INPATIENT ADMISSION     Standing Status:   Standing     Number of Occurrences:   1     Order Specific Question:   Level of Care     Answer:   Med Surg [16]     Order Specific Question:   Estimated length of stay     Answer:   More than 2 Midnights     Order Specific Question:   Certification     Answer:   I certify that inpatient services are medically necessary for this patient for a duration of greater than two midnights. See H&P and MD Progress Notes for additional information about the patient's course of treatment.     ED Arrival Information       Expected   -    Arrival   11/5/2024 11:59    Acuity   Urgent              Means of arrival   Ambulance    Escorted by   [facility] ([facility])    Service   Surgery-General    Admission type   Emergency              Arrival complaint   abd pain             Chief Complaint   Patient presents with    Abdominal Pain     Pt arrives via ems, from home. Pt reports LLQ 10/10 pain happening this morning, states feels like a SBO again. Pt was discharged from [facility] for a SBO yesterday. Took oxycodone half an hr ago.        Initial Presentation: 63 y.o. female presents to ED via  EMS  from home  with abdominal pain  for past  3 weeks.  PMH  is  diverticulitis, endometrial cancer,  HTN,  DM2,  inactve shingles of right hemiabdomen, with extensive surgical history including appendectomy, sigmoidectomy, cholecystectomy presents with recurrent SBO. Pain level  6/10  on ED arrival.  + nausea   - vomiting   Has loose, diarrhea like stools since the  morning of admission.  Recently  treated at  [facility], nonoperative management. Admit  Ip with  SBO  and plan is  NPO, NGT, IVF, pain  control, antiemetics, strict  I & O  and  possibly  surgical  intervention if pain worsens.      Date:   11/6   Day 2:   NGT remains intact.   Very high  surgical risk.   May need gastrograffin  challenge.   No further nausea or vomiting.   Abdominal pain  9/10 throughout  entire abdomen.  NGT  drained   600 cc  light yellow bilious  output/24 hours.   Continue pain control.     ED Treatment-Medication Administration from 11/05/2024 1159 to 11/05/2024 1808         Date/Time Order Dose Route Action     11/05/2024 1248 pantoprazole (PROTONIX) injection 40 mg 40 mg Intravenous Given     11/05/2024 1244 HYDROmorphone (DILAUDID) injection 1 mg 1 mg Intravenous Given     11/05/2024 1242 ondansetron (ZOFRAN) injection 4 mg 4 mg Intravenous Given     11/05/2024 1411 HYDROmorphone (DILAUDID) injection 1 mg 1 mg Intravenous Given     11/05/2024 1505 iohexol (OMNIPAQUE) 350 MG/ML injection (SINGLE-DOSE) 100 mL 100 mL Intravenous Given     11/05/2024 1703 HYDROmorphone (DILAUDID) injection 1 mg 1 mg Intravenous Given     11/05/2024 1736 diazepam (VALIUM) injection 2.5 mg 2.5 mg Intravenous Given            Scheduled Medications:  acetaminophen, 1,000 mg, Intravenous, Q6H SAM  enoxaparin, 40 mg, Subcutaneous, Daily  insulin lispro, 1-6 Units, Subcutaneous, Q6H SAM  pantoprazole, 40 mg, Intravenous, Q24H SAM      Continuous IV Infusions:  multi-electrolyte, 125 mL/hr, Intravenous, Continuous      PRN Meds:  HYDROmorphone, 0.5 mg, Intravenous, Q4H PRN  ( x 1 11/5 and  X 2  11/6 thus far)    HYDROmorphone, 0.5 mg, Intravenous, Q4H PRN  HYDROmorphone, 0.2 mg, Intravenous, Q4H PRN  ondansetron, 4 mg, Intravenous, Q6H PRN  ( x1  11/5 and X 2  11/6 thus far)    phenol, 1 spray, Mouth/Throat, Q2H PRN      ED Triage Vitals [11/05/24 1202]   Temperature Pulse Respirations Blood Pressure SpO2 Pain Score   97.5 °F (36.4 °C) 90 22 146/66 100 % 10 - Worst Possible Pain     Weight (last 2 days)       Date/Time Weight    11/05/24 1202 107 (236.77)            Vital Signs (last 3 days)       Date/Time Temp  Pulse Resp BP MAP (mmHg) SpO2 O2 Device Patient Position - Orthostatic VS Pain    11/06/24 11:01:06 98.1 °F (36.7 °C) 81 18 116/51 73 90 % -- -- --    11/06/24 1013 -- -- -- -- -- -- -- -- 9    11/06/24 0902 -- -- -- -- -- -- -- -- No Pain    11/06/24 0837 -- -- -- -- -- -- -- -- 8    11/06/24 07:32:57 98.2 °F (36.8 °C) 87 18 161/61 94 90 % None (Room air) Lying --    11/06/24 0731 -- -- -- -- -- -- -- -- 10 - Worst Possible Pain    11/06/24 02:42:54 97.7 °F (36.5 °C) 88 18 104/54 71 91 % None (Room air) Lying --    11/06/24 0155 -- -- -- -- -- -- -- -- 9    11/06/24 0008 -- -- -- -- -- -- -- -- 9    11/05/24 23:25:17 97.9 °F (36.6 °C) 89 18 140/59 86 94 % None (Room air) Lying 7    11/05/24 23:24:02 97.9 °F (36.6 °C) 88 -- 140/59 86 92 % -- -- --    11/05/24 1958 -- -- -- -- -- -- -- -- 8    11/05/24 19:05:57 98.2 °F (36.8 °C) 85 18 111/58 76 94 % -- -- --    11/05/24 1812 -- -- -- -- -- -- -- -- 10 - Worst Possible Pain    11/05/24 18:09:58 97.8 °F (36.6 °C) 89 18 134/60 85 95 % -- -- --    11/05/24 1730 -- 91 16 141/63 91 95 % None (Room air) Lying --    11/05/24 1703 -- -- -- -- -- -- -- -- 9    11/05/24 1630 -- 92 16 170/72 103 95 % None (Room air) Lying --    11/05/24 1600 -- 88 16 139/61 88 94 % None (Room air) Lying --    11/05/24 1530 -- 88 16 142/67 96 94 % None (Room air) Lying --    11/05/24 1430 -- 89 16 153/68 98 94 % None (Room air) Lying --    11/05/24 1400 -- 90 16 169/71 102 99 % None (Room air) Lying --    11/05/24 1330 -- 87 22 167/77 111 98 % None (Room air) Lying --    11/05/24 1300 -- 89 22 140/70 99 97 % None (Room air) Lying --    11/05/24 1244 -- -- -- -- -- -- -- -- 10 - Worst Possible Pain    11/05/24 1202 97.5 °F (36.4 °C) 90 22 146/66 -- 100 % None (Room air) Lying 10 - Worst Possible Pain              Pertinent Labs/Diagnostic Test Results:   Radiology:  XR chest portable   Final Interpretation by Lorne Underwood MD (11/06 0749)   NG tube position as above.   No acute cardiopulmonary  disease.            Workstation performed: [id number]         CT abdomen pelvis with contrast   Final Interpretation by Thee Baptiste MD (11/05 1606)      Proximal small bowel obstruction. Similar findings reported on prior outside CT of 11/2/2024.      Findings suggestive of small splenic infarct. Similar findings reported on prior outside CT of 11/2/2024.      Hepatosplenomegaly.      I personally discussed this study with WES ACEVEDO on 11/5/2024 4:06 PM.                  Workstation performed: [id number]         XR chest portable    (Results Pending)         Results from last 7 days   Lab Units 11/06/24  0520 11/05/24  1241   WBC Thousand/uL 6.22 5.91   HEMOGLOBIN g/dL 10.0* 9.5*   HEMATOCRIT % 30.6* 29.5*   PLATELETS Thousands/uL 91* 94*   TOTAL NEUT ABS Thousands/µL 4.99 4.61         Results from last 7 days   Lab Units 11/06/24  0520 11/05/24  1241 11/04/24  0333 11/03/24 0412 11/02/24 0458 11/01/24  0343   SODIUM mmol/L 130* 131* 132* 130* 130* 133*   POTASSIUM mmol/L 4.3 4.1 4.7 4.1 4.3 4.3   CHLORIDE mmol/L 101 100 101 100 99* 99*   CO2 mmol/L 23 25 24 25 25 28   ANION GAP mmol/L 6 6 7 5 6 6   BUN mg/dL 22 22 19 17 15 13   CREATININE mg/dL 0.86 0.79 0.76 0.73 0.69 0.73   EGFR ml/min/1.73sq m 73 81 89 93 98 93   CALCIUM mg/dL 8.2* 8.2* 8.6 8.1* 8.5 9.0   XMAGNESIUM mg/dL  --   --  1.8 1.7 1.8 1.8   MAGNESIUM mg/dL 1.7*  --   --   --   --   --    PHOSPHORUS mg/dL 3.5  --  3.9 3.9 3.9 3.7     Results from last 7 days   Lab Units 11/05/24  1241 11/04/24  0333 11/03/24 0412 11/02/24 0458 11/01/24  0343   AST U/L 11* 9 10 17 10   ALT U/L 9 6 7 7 6   ALK PHOS U/L 80 71 72 80 74   TOTAL PROTEIN g/dL 5.4* 5.4* 5.0* 5.4* 5.9*   ALBUMIN g/dL 3.2* 3.1* 3.0* 3.0* 3.3*   TOTAL BILIRUBIN mg/dL 0.42 0.4 0.4 0.5 0.6     Results from last 7 days   Lab Units 11/06/24  0541 11/05/24  2313 11/05/24  1834   POC GLUCOSE mg/dl 177* 132 177*     Results from last 7 days   Lab Units 11/06/24  0520  11/05/24  1241 11/04/24  0333 11/03/24  0412 11/02/24  0458 11/01/24  0343 10/31/24  0435   GLUCOSE RANDOM mg/dL 182* 162* 98 73 113* 59* 98         Results from last 7 days   Lab Units 11/05/24  1241   HEMOGLOBIN A1C % 6.9*   EAG mg/dl 151           Results from last 7 days   Lab Units 11/05/24  1241   LIPASE u/L <6*                   Present on Admission:   SBO (small bowel obstruction) ([facility])      Admitting Diagnosis: Small bowel obstruction (HCC) [K56.609]  Abdominal pain [R10.9]  Type 2 diabetes mellitus without complication, unspecified whether long term insulin use (HCC) [E11.9]  Age/Sex: 63 y.o. female    Network Utilization Review Department  ATTENTION: Please call with any questions or concerns to [phone] and carefully listen to the prompts so that you are directed to the right person. All voicemails are confidential.   For Discharge needs, contact Care Management DC Support Team at [phone] opt. 2  Send all requests for admission clinical reviews, approved or denied determinations and any other requests to dedicated fax number below belonging to the campus where the patient is receiving treatment. List of dedicated fax numbers for the Facilities:  FACILITY NAME UR FAX NUMBER   ADMISSION DENIALS (Administrative/Medical Necessity) [phone]   DISCHARGE SUPPORT TEAM (NETWORK) [phone]   PARENT CHILD HEALTH (Maternity/NICU/Pediatrics) [phone]   [facility] [phone]   [facility] [phone]   [facility] [phone]   [facility] [phone]   [facility] [phone]   [facility] [phone]   [facility] [phone]   [facility] [phone]   [facility] [phone]   [facility] [phone]   [facility]  [facility] [phone]   [facility] [phone]

## 2024-11-06 NOTE — ASSESSMENT & PLAN NOTE
Home regimen is Coreg 12.5 mg twice daily, lisinopril 10 mg daily--hold she is n.p.o.  Blood pressure intermittently elevated due to pain and discomfort  Will treat pain

## 2024-11-06 NOTE — ASSESSMENT & PLAN NOTE
63-year-old female with multiple comorbidities and abdominal surgeries with recurrent SBO.  Patient recently treated at [facility] without resolution of SBO, now returns to ED with similar abdominal symptoms.  CT scan of abdomen and pelvis showing proximal small bowel obstruction similar to findings reported on prior outside CT on 11/2.  Patient with episode of vomiting overnight.  NG tube was replaced with larger 18 French NG tube.  24-hour NG tube output: 600 cc light yellow bilious.    Plan:  -Continue NG tube  -N.p.o.  -PO and IV CT scan today of abdomen   -Pain and nausea control as needed  -Maintenance IV fluids  -Encourage OOB, ambulation  -Continue to monitor strict I's and O's  -Follow-up CBC and CMP  -DVT prophylaxis  -Encourage incentive spirometry use  -If abdomen worsens, then discussed possibility of exploratory/diagnostic operation of abdomen.

## 2024-11-06 NOTE — OCCUPATIONAL THERAPY NOTE
Occupational Therapy Evaluation     Patient Name: Lety Botello  Today's Date: 11/6/2024  Problem List  Principal Problem:    SBO (small bowel obstruction) (HCC)    Past Medical History  Past Medical History:   Diagnosis Date    Diverticulitis     Endometrial cancer (HCC)     History of shingles     Right hemiabdomen (inactive)    Hypertension     IBS (irritable bowel syndrome)     Obesity     Small bowel obstruction (HCC)     Type 2 diabetes mellitus (HCC)      Past Surgical History  Past Surgical History:   Procedure Laterality Date    APPENDECTOMY      CHOLECYSTECTOMY      SIGMOIDECTOMY N/A          11/06/24 1013   OT Last Visit   OT Visit Date 11/06/24   Note Type   Note type Evaluation   Pain Assessment   Pain Assessment Tool 0-10   Pain Score 9   Pain Location/Orientation Location: Abdomen   Hospital Pain Intervention(s) Repositioned;Ambulation/increased activity;Emotional support;Rest   Restrictions/Precautions   Weight Bearing Precautions Per Order No   Other Precautions Multiple lines;Fall Risk;Pain  (NGT)   Home Living   Type of Home House   Home Layout Multi-level;Bed/bath upstairs;Stairs to enter with rails  (6 VIRA w/ b/l HR)   Bathroom Shower/Tub Tub/shower unit   Bathroom Toilet Standard   Home Equipment Walker   Additional Comments RW in community, no AD in home.   Prior Function   Level of [place] Independent with ADLs;Independent with functional mobility;Independent with IADLS   Lives With Other (Comment)  (2 roommates)   Receives Help From Family   IADLs Independent with driving;Independent with medication management;Independent with meal prep  ([employer] meals.)   Falls in the last 6 months 0   Vocational On disability   Lifestyle   Autonomy PTA, was (I) with ADLs and was (I) with IADLs. Patient lives in a multi-story home w/ 6 VIRA and b/l HR. Bed/bath on second floor w tub/shower and standard toilet. Lives w/ 2 roommates. Denies falls. RW use in community, no AD use in home.   Reciprocal  "Relationships Roommates   General   Additional Pertinent History Comorbidities affecting pt’s functional performance include a significant PMH of: cardiomyopathy, CHF, CAD, DM, HTN, HLD, DVT; h/o appendectomy, cholecystectomy, ventral hernia repair, diverticulitis s/p sigmoidectomy, endometrial CA s/p FLORECITA/BSO.  Patient with active OT orders.   Family/Caregiver Present No   Subjective   Subjective \"Was that black? (referring to vomit) I'm f*cked\" Agreeable to OT/PT co-eval.   ADL   Where Assessed Edge of bed   Eating Assistance 6  Modified independent   Grooming Assistance 5  Supervision/Setup   UB Bathing Assistance 5  Supervision/Setup   LB Bathing Assistance 4  Minimal Assistance   UB Dressing Assistance 5  Supervision/Setup   LB Dressing Assistance 3  Moderate Assistance   Toileting Assistance  4  Minimal Assistance   Bed Mobility   Supine to Sit 4  Minimal assistance   Additional items Assist x 1;HOB elevated;Bedrails;Increased time required;Verbal cues;LE management   Transfers   Sit to Stand 5  Supervision   Additional items Increased time required;Verbal cues;Bedrails;Other  (RW)   Stand to Sit 5  Supervision   Additional items Increased time required;Verbal cues;Bedrails;Other  (RW)   Toilet transfer 5  Supervision   Additional items Increased time required;Verbal cues;Standard toilet;Other  (grab bar, RW)   Functional Mobility   Functional Mobility 5  Supervision   Additional Comments Ax1; limited household distance due to episode of vomiting.   Additional items Rolling walker   Balance   Static Sitting Good   Dynamic Sitting Fair +   Static Standing Fair   Dynamic Standing Fair -   Ambulatory Fair -   Activity Tolerance   Activity Tolerance Patient tolerated treatment well;Treatment limited secondary to medical complications (Comment)  (Vomiting episodes)   Medical Staff Made Aware Ravi PT   Nurse Made Aware Yes   RUE Assessment   RUE Assessment WFL   LUE Assessment   LUE Assessment WFL   Hand Function "   Gross Motor Coordination Functional   Fine Motor Coordination Functional   Sensation   Light Touch No apparent deficits   Proprioception   Proprioception No apparent deficits   Vision-Basic Assessment   Current Vision No visual deficits   Vision - Complex Assessment   Ocular Range of Motion Intact   Acuity Able to read clock/calendar on wall without difficulty;Able to read employee name badge without difficulty   Perception   Inattention/Neglect Appears intact   Cognition   Overall Cognitive Status WFL   Arousal/Participation Alert;Responsive;Cooperative   Attention Within functional limits   Orientation Level Oriented to person;Oriented to place;Oriented to time   Memory Within functional limits   Following Commands Follows one step commands without difficulty   Assessment   Limitation Decreased ADL status;Decreased UE strength;Decreased Safe judgement during ADL;Decreased endurance;Decreased self-care trans;Decreased high-level ADLs  (dec balance, functional reach, coordination)   Prognosis Good   Assessment Patient is a 63 y.o. year old female seen for OT eval s/p admit to [facility] on 11/5/2024 with SBO.  OT consulted to assess ADLs/IADLs/functional mobility and assist w/ D/C planning.  Patient demonstrates the following deficits impacting occupational performance: decreased strength , decreased balance, decreased activity tolerance, limited functional reach, decreased safety awareness, mood/behavioral limitations , increased body habitus , impaired coordination, decreased cardiovascular endurance, and decreased skin integrity . These impairments, as well at pt’s VIRA home environment, steps within home environment, limited home support, difficulty performing ADLs, difficulty performing IADLs, difficulty performing transfers/mobility, limited insight into deficits, fall risk , functional decline , increased reliance on DME , and multiple admissions , limit pt’s ability to safely engage in all baseline areas of  occupation. Pt's CLOF as follows: eating/grooming: Lucina and supervision , UB ADLs: supervision , LB ADLs: Darrell and modA, toileting: Darrell, bed mobility: Darrell, functional transfers: supervision , functional mobility: supervision , sitting/standing tolerance: ~4 min. Pt would benefit from continued skilled OT while in acute setting to address deficits as defined above and to maximize (I) w/ ADLs/functional mobility. Occupational performance areas to address include: grooming, bathing/shower, toilet hygiene, dressing, medication management, health maintenance, functional mobility, community mobility, clothing management, cleaning, meal prep, and household maintenance. Based on the aforementioned evaluation, functional performance deficits, and assessments, pt has been identified as a high complexity evaluation. At this time, recommendation for pt to receive post-acute rehabilitation services at a Level III (minimum resource intensity) due to above deficits and CLOF. OT will continue to follow pt 2-5x/wk to address the goals listed below to  w/in 10-14 days.   Goals   Patient Goals feel better   LTG Time Frame 10-14   Plan   Treatment Interventions ADL retraining;Functional transfer training;UE strengthening/ROM;Endurance training;Cognitive reorientation;Patient/family training;Equipment evaluation/education;Neuromuscular reeducation;Compensatory technique education;Continued evaluation;Energy conservation;Activityengagement   Goal Expiration Date 24   OT Treatment Day 0   OT Frequency 3-5x/wk   Discharge Recommendation   Rehab Resource Intensity Level, OT III (Minimum Resource Intensity)   Additional Comments  Co treatment with PT secondary to complex medical condition of pt, possible A of 2 required to achieve and maintain transitional movements, requiring the need of skilled therapeutic intervention of 2 therapists to achieve delivery of services.   AM-PAC Daily Activity Inpatient   Lower Body Dressing 2    Bathing 3   Toileting 3   Upper Body Dressing 3   Grooming 3   Eating 4   Daily Activity Raw Score 18   Daily Activity Standardized Score (Calc for Raw Score >=11) 38.66   AM-PAC Applied Cognition Inpatient   Following a Speech/Presentation 4   Understanding Ordinary Conversation 4   Taking Medications 4   Remembering Where Things Are Placed or Put Away 4   Remembering List of 4-5 Errands 4   Taking Care of Complicated Tasks 4   Applied Cognition Raw Score 24   Applied Cognition Standardized Score 62.21     Occupational Therapy goals: In 7-14 days:     1- Patient will verbalize and demonstrate use of energy conservation/deep breathing technique and work simplification skills during functional activity with no verbal cues.   2- Patient will verbalize and demonstrate good body mechanics and joint protection techniques during ADLs/IADLs with no verbal cues   3- Pt will complete bed mobility at a Mod I level w/ G balance/safety demonstrated to decrease caregiver assistance required   4- Patient will increase OOB/ sitting tolerance to 2-4 hours per day for increased participation in self care and leisure tasks with no s/s of exertion.   5-Patient will increase standing tolerance time to 5 minutes with unilateral UE support to complete sink level ADLs@ mod I level    6- Pt will improve functional transfers to Mod I on/off all surfaces using DME as needed w/ G balance/safety   7- Patient will complete UB ADLs with Silvia utilizing appropriate DME/AE PRN   8- Patient will complete LB ADLs with Silvia utilizing appropriate DME/AE PRN   9- Patient will complete toileting tasks with Silvia with G hygiene/thoroughness utilizing appropriate DME/AE PRN   10- Pt will improve functional mobility during ADL/IADL/leisure tasks to Mod I using DME as needed w/ G balance/safety    11- Pt will be attentive 100% of the time during ongoing cognitive assessment w/ G participation to assist w/ safe d/c planning/recommendations   12- Pt will  participate in simulated IADL management task to increase independence to Mod I w/ G safety and endurance   13- Pt will increase BUE strength by 1MM grade via AROM/AAROM/PROM exercises to increase independence in ADLs and transfers       Kena Roche, OTR/L

## 2024-11-06 NOTE — PHYSICAL THERAPY NOTE
PT EVALUATION    Pt. Name: Lety Botello  Pt. Age: 63 y.o.  MRN: [id number]  LENGTH OF STAY: 1      Admitting Diagnoses:   Small bowel obstruction (HCC) [K56.609]  Abdominal pain [R10.9]  Type 2 diabetes mellitus without complication, unspecified whether long term insulin use (HCC) [E11.9]    Past Medical History:   Diagnosis Date    Diverticulitis     Endometrial cancer (HCC)     History of shingles     Right hemiabdomen (inactive)    Hypertension     IBS (irritable bowel syndrome)     Obesity     Small bowel obstruction (HCC)     Type 2 diabetes mellitus (HCC)        Past Surgical History:   Procedure Laterality Date    APPENDECTOMY      CHOLECYSTECTOMY      SIGMOIDECTOMY N/A        Imaging Studies:  XR chest portable   Final Result by Andrea Anderson MD (11/06 1145)      Tip of enteric tube projects over the stomach.   No focal consolidation, pleural effusion or pneumothorax.            Resident: NELIA Mack I, the attending radiologist, have reviewed the images and agree with the final report above.      Workstation performed: [id number]         XR chest portable   Final Result by Lorne Underwood MD (11/06 0787)   NG tube position as above.   No acute cardiopulmonary disease.            Workstation performed: [id number]         CT abdomen pelvis with contrast   Final Result by Thee Baptiste MD (11/05 1606)      Proximal small bowel obstruction. Similar findings reported on prior outside CT of 11/2/2024.      Findings suggestive of small splenic infarct. Similar findings reported on prior outside CT of 11/2/2024.      Hepatosplenomegaly.      I personally discussed this study with WES ACEVEDO on 11/5/2024 4:06 PM.                  Workstation performed: [id number]         XR abdomen complete inc upright and/or decubitus    (Results Pending)         11/06/24 1014   PT Last Visit   PT Visit Date 11/06/24   Note Type   Note type Evaluation   Pain Assessment   Pain Score 9   Pain  Location/Orientation Orientation: Lower;Location: Abdomen   Hospital Pain Intervention(s) Repositioned;Ambulation/increased activity;Emotional support;Rest   Restrictions/Precautions   Weight Bearing Precautions Per Order No   Other Precautions Chair Alarm;Bed Alarm;Multiple lines;Fall Risk;Pain  (NGT)   Home Living   Type of Home House   Home Layout Two level;Bed/bath upstairs;Stairs to enter with rails  (6STE w/ B/L HR; FOS to 2nd floor bed/bath)   Bathroom Shower/Tub Tub/shower unit   Bathroom Toilet Standard   Home Equipment Walker  (rollator)   Prior Function   Level of [place] Independent with functional mobility;Independent with ADLs;Independent with IADLS  (w/o AD inside home; w/ rollator in the community)   Lives With Other (Comment)  (2 roommates)   Falls in the last 6 months 0   Comments (-)    General   Family/Caregiver Present No   Cognition   Overall Cognitive Status WFL   Arousal/Participation Alert   Orientation Level Oriented to person;Oriented to place;Oriented to time   Following Commands Follows one step commands without difficulty   Comments pleasant & cooperative; pt had difficulty verbalizing 2* to NGT & pain in throat   Subjective   Subjective Pt agreeable to PT/OT evals.   RUE Assessment   RUE Assessment   (refer to OT)   LUE Assessment   LUE Assessment   (refer to OT)   RLE Assessment   RLE Assessment WFL  (as observed functionally)   LLE Assessment   LLE Assessment WFL  (as observed functionally)        11/06/24 1100   Bed Mobility   Supine to Sit 4  Minimal assistance   Additional items Assist x 1;HOB elevated;Bedrails;Increased time required;Verbal cues;LE management   Additional Comments cues for techniques & safety   Transfers   Sit to Stand 5  Supervision   Additional items Increased time required;Verbal cues;Bedrails   Stand to Sit 5  Supervision   Additional items Armrests;Increased time required;Verbal cues   Toilet transfer 5  Supervision   Additional items Increased  time required;Verbal cues;Standard toilet;Other  (grab bar)   Additional Comments cues for techniques & safety   Ambulation/Elevation   Gait pattern Wide DA;Decreased foot clearance;Excessively slow   Gait Assistance 5  Supervision   Additional items Verbal cues   Assistive Device Rolling walker   Distance 10'x2   Ambulation/Elevation Additional Comments no gross LOB noted; dec amb tolerance 2* to nausea + limited to NGT line   Balance   Static Sitting Good   Dynamic Sitting Fair +   Static Standing Fair  (w/ RW)   Dynamic Standing Fair -  (w/. RW)   Ambulatory Fair -  (w/ RW)   Activity Tolerance   Activity Tolerance Patient tolerated treatment well   Medical Staff Made Aware OTR Kena   Nurse Made Aware yes   Assessment   Prognosis Good   Problem List Decreased strength;Decreased endurance;Impaired balance;Decreased mobility;Pain   Assessment Pt. 63 y.o.female admitted for SBO (small bowel obstruction) (HCC) w/ abdominal pain. (+) NGT.  Pt referred to PT for mobility assessment & D/C planning. Please see above for information re: home set-up & PLOF as well as objective findings during PT assessment. PTA, pt reports being I w/o AD inside home & w/ rollator when in the community. On eval, pt functioning below baseline hence will continue skilled PT to improve function & safety. Pt require minAx1 for bed mobility & S for transfers & amb w/ RW + cues for techniques & safety. Gait deviations as above but no gross LOB noted. Dec amb tolerance 2* to nausea & vomiting as well as limited to NGT line. (+) vomiting, RN aware. The patient's AM-PAC Basic Mobility Inpatient Short Form Raw Score is 18. A Raw score of greater than 16 suggests the patient may benefit from discharge to home. Please also refer to the recommendation of the Physical Therapist for safe discharge planning. From PT standpoint, will recommend Level III (minimum resource intensity) rehab services at D/C, pending progress. No SOB & dizziness reported t/o  session. Nsg staff most recent vital signs as follows: /51   Pulse 81   Temp 98.1 °F (36.7 °C)   Resp 18   Ht 5' (1.524 m)   Wt 107 kg (236 lb 12.4 oz)   SpO2 90%   BMI 46.24 kg/m² . At end of session, pt back in bed in stable condition, call bell & phone in reach, bed alarm activated, all lines intact. Fall precautions reinforced w/ good understanding. CM to follow. Nsg staff to continue to mobilized pt (OOB in chair for all meals & ambulate in room/unit) as tolerated to prevent further decline in function. Will also recommend Restorative for daily amb &/or daily OOB in chair as appropriate. Nsg notified. Co-eval was necessary to complete this PT eval for the pt's best interest given pt's medical acuity & complexity.   Goals   Patient Goals to get better   STG Expiration Date 11/16/24   Short Term Goal #1 Goals to be met in 10 days; pt will be able to: 1) inc strength & balance by 1/2 grade to improve overall functional mobility & dec fall risk; 2) inc bed mobility to modified I for pt to be able to get in/OOB safely w/ proper techniques 100% of the time, to dec caregiver burden & safely function at home; 3) inc transfers to modified I for pt to transition safely from one surface to another w/o % of the time, to dec caregiver burden & safely function at home; 4) inc amb w/ RW approx. >150' w/ modified I for pt to ambulate household distances w/o any % of the time, to dec caregiver burden & safely function at home; 5) negotiate stairs w/ modified I for inc safety during stair mgt inside/outside of home & dec caregiver burden; 6) pt/caregiver ed   PT Treatment Day 0   Plan   Treatment/Interventions Functional transfer training;LE strengthening/ROM;Elevations;Therapeutic exercise;Endurance training;Patient/family training;Bed mobility;Gait training;Spoke to nursing;OT   PT Frequency 3-5x/wk   Discharge Recommendation   Rehab Resource Intensity Level, PT III (Minimum Resource  Intensity)  (pending progress)   Equipment Recommended Walker  (pt has)   Additional Comments restorative for daily amb   AM-PAC Basic Mobility Inpatient   Turning in Flat Bed Without Bedrails 3   Lying on Back to Sitting on Edge of Flat Bed Without Bedrails 3   Moving Bed to Chair 3   Standing Up From Chair Using Arms 3   Walk in Room 3   Climb 3-5 Stairs With Railing 3   Basic Mobility Inpatient Raw Score 18   Basic Mobility Standardized Score 41.05   [facility] Highest Level Of Mobility   -HL Goal 6: Walk 10 steps or more   -HL Achieved 6: Walk 10 steps or more   End of Consult   Patient Position at End of Consult Supine;Bed/Chair alarm activated;All needs within reach   End of Consult Comments Pt in stable condition. All needs in reach. All lines intact. Bed alarm activated.     Hx/personal factors: co-morbidities, inaccessible home, dec caregiver support, mutliple lines, use of AD, pain, fall risk, assist w/ ADL's, and NGT  Examination: dec mobility, dec balance, dec endurance, dec amb, risk for falls, pain  Clinical: unpredictable (ongoing medical status, abnormal lab values, risk for falls, and pain mgt)  Complexity: high    Ravi Jade

## 2024-11-06 NOTE — PROGRESS NOTES
Progress Note - Surgery-General   Name: Lety Botello 63 y.o. female I MRN: [id number]  Unit/Bed#: E5 -01 I Date of Admission: 11/5/2024   Date of Service: 11/5/2024 I Hospital Day: 0     Assessment & Plan  SBO (small bowel obstruction) (HCC)  63-year-old female with multiple comorbidities and abdominal surgeries with recurrent SBO.  Patient recently treated at [facility] without resolution of SBO, now returns to ED with similar abdominal symptoms.  CT scan of abdomen and pelvis showing proximal small bowel obstruction similar to findings reported on prior outside CT on 11/2.  Patient with episode of vomiting overnight.  NG tube was replaced with larger 18 French NG tube.  24-hour NG tube output: 600 cc light yellow bilious.    Plan:  -Continue NG tube  -N.p.o.  -PO and IV CT scan today of abdomen   -Pain and nausea control as needed  -Maintenance IV fluids  -Encourage OOB, ambulation  -Continue to monitor strict I's and O's  -Follow-up CBC and CMP  -DVT prophylaxis  -Encourage incentive spirometry use  -If abdomen worsens, then discussed possibility of exploratory/diagnostic operation of abdomen.    24 Hour Events : Patient had nausea and an episode of emesis overnight.  Subjective : This morning patient feels better without any nausea or vomiting.  Patient states her abdominal pain right now is a 9 out of 10 throughout her whole abdomen.  She denies any fevers or chills.    Objective :  Visit Vitals  /54 (BP Location: Right arm)   Pulse 88   Temp 97.7 °F (36.5 °C) (Oral)   Resp 18   Ht 5' (1.524 m)   Wt 107 kg (236 lb 12.4 oz)   SpO2 91%   BMI 46.24 kg/m²   Smoking Status Never   BSA 2 m²        Physical Exam  Constitutional:       Appearance: Normal appearance. She is obese. She is not ill-appearing or diaphoretic.   Cardiovascular:      Rate and Rhythm: Normal rate and regular rhythm.      Pulses: Normal pulses.      Heart sounds: Normal heart sounds. No murmur heard.     No gallop.    Pulmonary:      Effort: Pulmonary effort is normal. No respiratory distress.      Breath sounds: Normal breath sounds. No wheezing.   Abdominal:      General: There is distension.      Palpations: Abdomen is soft. There is no mass.      Tenderness: There is abdominal tenderness (Diffuse abdominal tenderness to palpation). There is no right CVA tenderness, left CVA tenderness, guarding or rebound.      Hernia: No hernia is present.   Neurological:      Mental Status: She is alert.         Recent Labs     11/04/24  0333 11/05/24  1241 11/06/24  0520   WBC  --  5.91 6.22   HGB  --  9.5* 10.0*   PLT  --  94* 91*   SODIUM 132* 131* 130*   K 4.7 4.1 4.3    100 101   CO2 24 25 23   BUN 19 22 22   CREATININE 0.76 0.79 0.86   GLUC 98 162* 182*   CALCIUM 8.6 8.2* 8.2*   MG 1.8  --  1.7*   PHOS 3.9  --  3.5   AST 9 11*  --    ALT 6 9  --    ALKPHOS 71 80  --    TBILI 0.4 0.42  --            Imaging Results Review: No pertinent imaging studies reviewed.  Other Study Results Review: No additional pertinent studies reviewed.    VTE Pharmacologic Prophylaxis: VTE covered by:  [START ON 11/6/2024] enoxaparin, Subcutaneous     VTE Mechanical Prophylaxis: sequential compression device

## 2024-11-06 NOTE — ASSESSMENT & PLAN NOTE
63-year-old female with multiple comorbidities and abdominal surgeries with recurrent SBO.  Patient recently treated at [facility] without resolution of SBO, now returns to ED with similar abdominal symptoms.  CT scan of abdomen and pelvis showing proximal small bowel obstruction similar to findings reported on prior outside CT on 11/2.     Plan:  -NG tube  -N.p.o.  -Pain and nausea control as needed  -Maintenance IV fluids  -Encourage OOB, ambulation  -Continue to monitor strict I's and O's  -Follow-up CBC and CMP  -DVT prophylaxis  -Encourage incentive spirometry use  -If abdomen worsens, then discussed possibility of exploratory/diagnostic operation of abdomen.

## 2024-11-06 NOTE — CASE MANAGEMENT
Case Management Discharge Planning Note    Patient name Lety Botello  Location [facility] /E5 -* MRN [id number]  : 1960 Date 2024       Current Admission Date: 2024  Current Admission Diagnosis:SBO (small bowel obstruction) (HCC)   Patient Active Problem List    Diagnosis Date Noted Date Diagnosed    SBO (small bowel obstruction) (HCC) 2024       LOS (days): 1  Geometric Mean LOS (GMLOS) (days): 3  Days to GMLOS:2.1     OBJECTIVE:  Risk of Unplanned Readmission Score: 16.07         Current admission status: Inpatient   Preferred Pharmacy:   [id number] [place] - [address]  [address] [address]  Phone: [phone] Fax: [phone]    Primary Care Provider: No primary care provider on file.    Primary Insurance: [employer] [place] GABE  Secondary Insurance:     DISCHARGE DETAILS:    Requested Home Health Care         Is the patient interested in HHC at discharge?: Yes  Home Health Discipline requested:: Nursing, Occupational Therapy, Physical Therapy  Home Health Follow-Up Provider:: PCP  Home Health Services Needed:: Evaluate Functional Status and Safety, Gait/ADL Training, Strengthening/Theraputic Exercises to Improve Function  Homebound Criteria Met:: Uses an Assist Device (i.e. cane, walker, etc)  Supporting Clincal Findings:: Limited Endurance    Other Referral/Resources/Interventions Provided:  Interventions: HHC  Referral Comments: HHC referrals sent via [facility].    Additional Comments: CM met with pt at bedside to review level 3 recommendation by therapy, pt requesting referrals for HHC at this time. Referrals placed via [facility], [facility] department to follow for accepting agencies.

## 2024-11-07 PROBLEM — K31.89 GASTRIC DILATION: Status: ACTIVE | Noted: 2024-11-07

## 2024-11-07 PROBLEM — K31.84 GASTROPARESIS: Status: ACTIVE | Noted: 2024-11-07

## 2024-11-07 LAB
ANION GAP SERPL CALCULATED.3IONS-SCNC: 8 MMOL/L (ref 4–13)
ATRIAL RATE: 80 BPM
BASOPHILS # BLD AUTO: 0.02 THOUSANDS/ÂΜL (ref 0–0.1)
BASOPHILS NFR BLD AUTO: 1 % (ref 0–1)
BUN SERPL-MCNC: 27 MG/DL (ref 5–25)
CALCIUM SERPL-MCNC: 8 MG/DL (ref 8.4–10.2)
CHLORIDE SERPL-SCNC: 102 MMOL/L (ref 96–108)
CO2 SERPL-SCNC: 22 MMOL/L (ref 21–32)
CREAT SERPL-MCNC: 0.9 MG/DL (ref 0.6–1.3)
EOSINOPHIL # BLD AUTO: 0.1 THOUSAND/ÂΜL (ref 0–0.61)
EOSINOPHIL NFR BLD AUTO: 3 % (ref 0–6)
ERYTHROCYTE [DISTWIDTH] IN BLOOD BY AUTOMATED COUNT: 16 % (ref 11.6–15.1)
GFR SERPL CREATININE-BSD FRML MDRD: 69 ML/MIN/1.73SQ M
GLUCOSE SERPL-MCNC: 111 MG/DL (ref 65–140)
GLUCOSE SERPL-MCNC: 130 MG/DL (ref 65–140)
GLUCOSE SERPL-MCNC: 132 MG/DL (ref 65–140)
GLUCOSE SERPL-MCNC: 150 MG/DL (ref 65–140)
GLUCOSE SERPL-MCNC: 158 MG/DL (ref 65–140)
HCT VFR BLD AUTO: 28.2 % (ref 34.8–46.1)
HGB BLD-MCNC: 8.9 G/DL (ref 11.5–15.4)
IMM GRANULOCYTES # BLD AUTO: 0.02 THOUSAND/UL (ref 0–0.2)
IMM GRANULOCYTES NFR BLD AUTO: 1 % (ref 0–2)
LYMPHOCYTES # BLD AUTO: 0.54 THOUSANDS/ÂΜL (ref 0.6–4.47)
LYMPHOCYTES NFR BLD AUTO: 13 % (ref 14–44)
MAGNESIUM SERPL-MCNC: 2.7 MG/DL (ref 1.9–2.7)
MCH RBC QN AUTO: 29.8 PG (ref 26.8–34.3)
MCHC RBC AUTO-ENTMCNC: 31.6 G/DL (ref 31.4–37.4)
MCV RBC AUTO: 94 FL (ref 82–98)
MONOCYTES # BLD AUTO: 0.53 THOUSAND/ÂΜL (ref 0.17–1.22)
MONOCYTES NFR BLD AUTO: 13 % (ref 4–12)
NEUTROPHILS # BLD AUTO: 2.83 THOUSANDS/ÂΜL (ref 1.85–7.62)
NEUTS SEG NFR BLD AUTO: 69 % (ref 43–75)
NRBC BLD AUTO-RTO: 0 /100 WBCS
P AXIS: 48 DEGREES
PHOSPHATE SERPL-MCNC: 3.9 MG/DL (ref 2.3–4.1)
PLATELET # BLD AUTO: 84 THOUSANDS/UL (ref 149–390)
PMV BLD AUTO: 10 FL (ref 8.9–12.7)
POTASSIUM SERPL-SCNC: 4.2 MMOL/L (ref 3.5–5.3)
PR INTERVAL: 188 MS
QRS AXIS: -30 DEGREES
QRSD INTERVAL: 104 MS
QT INTERVAL: 400 MS
QTC INTERVAL: 462 MS
RBC # BLD AUTO: 2.99 MILLION/UL (ref 3.81–5.12)
SODIUM SERPL-SCNC: 132 MMOL/L (ref 135–147)
T WAVE AXIS: 85 DEGREES
VENTRICULAR RATE: 80 BPM
WBC # BLD AUTO: 4.04 THOUSAND/UL (ref 4.31–10.16)

## 2024-11-07 PROCEDURE — 85025 COMPLETE CBC W/AUTO DIFF WBC: CPT

## 2024-11-07 PROCEDURE — 93010 ELECTROCARDIOGRAM REPORT: CPT | Performed by: STUDENT IN AN ORGANIZED HEALTH CARE EDUCATION/TRAINING PROGRAM

## 2024-11-07 PROCEDURE — 93005 ELECTROCARDIOGRAM TRACING: CPT

## 2024-11-07 PROCEDURE — 82948 REAGENT STRIP/BLOOD GLUCOSE: CPT

## 2024-11-07 PROCEDURE — 99232 SBSQ HOSP IP/OBS MODERATE 35: CPT | Performed by: SURGERY

## 2024-11-07 PROCEDURE — 99232 SBSQ HOSP IP/OBS MODERATE 35: CPT | Performed by: INTERNAL MEDICINE

## 2024-11-07 PROCEDURE — 99222 1ST HOSP IP/OBS MODERATE 55: CPT | Performed by: INTERNAL MEDICINE

## 2024-11-07 PROCEDURE — 84100 ASSAY OF PHOSPHORUS: CPT

## 2024-11-07 PROCEDURE — 83735 ASSAY OF MAGNESIUM: CPT

## 2024-11-07 PROCEDURE — 80048 BASIC METABOLIC PNL TOTAL CA: CPT

## 2024-11-07 RX ORDER — INSULIN LISPRO 100 [IU]/ML
1-6 INJECTION, SOLUTION INTRAVENOUS; SUBCUTANEOUS
Status: DISCONTINUED | OUTPATIENT
Start: 2024-11-07 | End: 2024-11-12 | Stop reason: HOSPADM

## 2024-11-07 RX ORDER — METOCLOPRAMIDE HYDROCHLORIDE 5 MG/ML
10 INJECTION INTRAMUSCULAR; INTRAVENOUS EVERY 6 HOURS SCHEDULED
Status: DISCONTINUED | OUTPATIENT
Start: 2024-11-07 | End: 2024-11-10

## 2024-11-07 RX ORDER — SODIUM CHLORIDE, SODIUM LACTATE, POTASSIUM CHLORIDE, CALCIUM CHLORIDE 600; 310; 30; 20 MG/100ML; MG/100ML; MG/100ML; MG/100ML
125 INJECTION, SOLUTION INTRAVENOUS CONTINUOUS
Status: CANCELLED | OUTPATIENT
Start: 2024-11-07

## 2024-11-07 RX ADMIN — HYDROMORPHONE HYDROCHLORIDE 0.5 MG: 1 INJECTION, SOLUTION INTRAMUSCULAR; INTRAVENOUS; SUBCUTANEOUS at 07:31

## 2024-11-07 RX ADMIN — ACETAMINOPHEN 1000 MG: 10 INJECTION INTRAVENOUS at 06:09

## 2024-11-07 RX ADMIN — HYDROMORPHONE HYDROCHLORIDE 0.5 MG: 1 INJECTION, SOLUTION INTRAMUSCULAR; INTRAVENOUS; SUBCUTANEOUS at 09:57

## 2024-11-07 RX ADMIN — METOCLOPRAMIDE 10 MG: 5 INJECTION, SOLUTION INTRAMUSCULAR; INTRAVENOUS at 07:31

## 2024-11-07 RX ADMIN — HYDROMORPHONE HYDROCHLORIDE 0.5 MG: 1 INJECTION, SOLUTION INTRAMUSCULAR; INTRAVENOUS; SUBCUTANEOUS at 01:15

## 2024-11-07 RX ADMIN — METOCLOPRAMIDE 10 MG: 5 INJECTION, SOLUTION INTRAMUSCULAR; INTRAVENOUS at 11:33

## 2024-11-07 RX ADMIN — HYDROMORPHONE HYDROCHLORIDE 0.5 MG: 1 INJECTION, SOLUTION INTRAMUSCULAR; INTRAVENOUS; SUBCUTANEOUS at 03:24

## 2024-11-07 RX ADMIN — ACETAMINOPHEN 1000 MG: 10 INJECTION INTRAVENOUS at 17:29

## 2024-11-07 RX ADMIN — METOCLOPRAMIDE 10 MG: 5 INJECTION, SOLUTION INTRAMUSCULAR; INTRAVENOUS at 17:29

## 2024-11-07 RX ADMIN — ACETAMINOPHEN 1000 MG: 10 INJECTION INTRAVENOUS at 01:15

## 2024-11-07 RX ADMIN — SODIUM CHLORIDE, SODIUM GLUCONATE, SODIUM ACETATE, POTASSIUM CHLORIDE, MAGNESIUM CHLORIDE, SODIUM PHOSPHATE, DIBASIC, AND POTASSIUM PHOSPHATE 125 ML/HR: .53; .5; .37; .037; .03; .012; .00082 INJECTION, SOLUTION INTRAVENOUS at 21:01

## 2024-11-07 RX ADMIN — PANTOPRAZOLE SODIUM 40 MG: 40 INJECTION, POWDER, LYOPHILIZED, FOR SOLUTION INTRAVENOUS at 09:36

## 2024-11-07 RX ADMIN — HYDROMORPHONE HYDROCHLORIDE 0.5 MG: 1 INJECTION, SOLUTION INTRAMUSCULAR; INTRAVENOUS; SUBCUTANEOUS at 21:01

## 2024-11-07 RX ADMIN — ACETAMINOPHEN 1000 MG: 10 INJECTION INTRAVENOUS at 11:32

## 2024-11-07 RX ADMIN — SODIUM CHLORIDE, SODIUM GLUCONATE, SODIUM ACETATE, POTASSIUM CHLORIDE, MAGNESIUM CHLORIDE, SODIUM PHOSPHATE, DIBASIC, AND POTASSIUM PHOSPHATE 125 ML/HR: .53; .5; .37; .037; .03; .012; .00082 INJECTION, SOLUTION INTRAVENOUS at 11:37

## 2024-11-07 RX ADMIN — SODIUM CHLORIDE, SODIUM GLUCONATE, SODIUM ACETATE, POTASSIUM CHLORIDE, MAGNESIUM CHLORIDE, SODIUM PHOSPHATE, DIBASIC, AND POTASSIUM PHOSPHATE 125 ML/HR: .53; .5; .37; .037; .03; .012; .00082 INJECTION, SOLUTION INTRAVENOUS at 03:10

## 2024-11-07 RX ADMIN — INSULIN LISPRO 1 UNITS: 100 INJECTION, SOLUTION INTRAVENOUS; SUBCUTANEOUS at 06:30

## 2024-11-07 RX ADMIN — ENOXAPARIN SODIUM 40 MG: 40 INJECTION SUBCUTANEOUS at 09:36

## 2024-11-07 RX ADMIN — HYDROMORPHONE HYDROCHLORIDE 0.5 MG: 1 INJECTION, SOLUTION INTRAMUSCULAR; INTRAVENOUS; SUBCUTANEOUS at 14:10

## 2024-11-07 RX ADMIN — ONDANSETRON 4 MG: 2 INJECTION INTRAMUSCULAR; INTRAVENOUS at 03:24

## 2024-11-07 NOTE — ASSESSMENT & PLAN NOTE
Previously and being managed for suspected SBO, with admission to [facility] leading up to this hospitalization  GGC showed gastroparesis with EGD and colon  Multiple BMs overnight  Glycemic index well-controlled this hospitalization.  Recent A1c 6.9  Continue NGT with high bilious output (550 in last 24 hours)  Consider consult of GI

## 2024-11-07 NOTE — PROGRESS NOTES
Progress Note - Surgery-General   Name: Lety Botello 63 y.o. female I MRN: [id number]  Unit/Bed#: E5 -01 I Date of Admission: 11/5/2024   Date of Service: 11/7/2024 I Hospital Day: 2    Assessment & Plan  Gastroparesis  Previously and being managed for suspected SBO, with admission to [facility] leading up to this hospitalization  GGC showed gastroparesis with EGD and colon  Multiple BMs overnight  Glycemic index well-controlled this hospitalization.  Recent A1c 6.9  Continue NGT with high bilious output (550 in last 24 hours)  Consider consult of GI  SBO (small bowel obstruction) (HCC)  GGC through colon 11/6  Multiple comorbidities and abdominal surgeries with recurrent SBO.  Patient recently [facility] without resolution of SBO, now returns to ED with similar abdominal symptoms.  CT scan of abdomen and pelvis showing proximal small bowel obstruction similar to findings reported on prior outside CT on 11/2.  Patient with episode of vomiting overnight.  NG tube was replaced with larger 18 Uzbek NG tube.  24-hour NG tube output: 600 cc light yellow bilious.    Hypertension    Type 2 diabetes mellitus (HCC)  Lab Results   Component Value Date    HGBA1C 6.9 (H) 11/05/2024       Recent Labs     11/06/24  0541 11/06/24  1151 11/06/24  1755 11/06/24  2354   POCGLU 177* 153* 144* 135       Blood Sugar Average: Last 72 hrs:  (P) 153    Endometrial cancer (HCC)    Obesity    History of pulmonary embolism    Hyponatremia    Hypothyroidism    Mild intermittent asthma      Subjective   Feeling pain overnight, reports nausea, no vomiting.  Multiple odorous bowel movements, post GGC. Bilious NGT output    Objective :  Temp:  [96 °F (35.6 °C)-98.2 °F (36.8 °C)] 97.9 °F (36.6 °C)  HR:  [81-87] 84  BP: (108-161)/(51-66) 108/62  Resp:  [16-18] 16  SpO2:  [90 %-94 %] 91 %  O2 Device: None (Room air)    I/O         11/05 0701 11/06 0700 11/06 0701 11/07 0700    NG/GT  130    Total Intake(mL/kg)   130 (1.2)    Urine (mL/kg/hr) 0 100 (0)    Emesis/NG output 600 600    Stool  0    Total Output 600 700    Net -600 -570          Unmeasured Urine Occurrence 2 x     Unmeasured Stool Occurrence  2 x          Lines/Drains/Airways       Active Status       Name Placement date Placement time Site Days    NG/OG/Enteral Tube Nasogastric 18 Fr Left nare 11/06/24  0216  Left nare  1                  Physical Exam  Constitutional:       Appearance: Normal appearance.   HENT:      Head: Normocephalic and atraumatic.      Mouth/Throat:      Mouth: Mucous membranes are moist.   Eyes:      Extraocular Movements: Extraocular movements intact.   Cardiovascular:      Rate and Rhythm: Normal rate and regular rhythm.   Pulmonary:      Effort: Pulmonary effort is normal.   Abdominal:      General: Abdomen is flat. There is no distension.      Palpations: Abdomen is soft.      Tenderness: There is abdominal tenderness. There is no guarding or rebound.      Comments: Suprapubic tenderness   Musculoskeletal:         General: Normal range of motion.      Cervical back: Normal range of motion and neck supple.   Skin:     General: Skin is warm and dry.   Neurological:      General: No focal deficit present.      Mental Status: She is alert and oriented to person, place, and time.

## 2024-11-07 NOTE — ASSESSMENT & PLAN NOTE
Multiple admissions for small bowel obstruction in the past.  Currently admitted for recurrent SBO.  Status post NG tube placement.  NG tube removed this morning.  Additional management per surgical team.

## 2024-11-07 NOTE — CONSULTS
Consultation - Gastroenterology   Name: Lety Botello 63 y.o. female I MRN: [id number]  Unit/Bed#: E5 -01 I Date of Admission: 11/5/2024   Date of Service: 11/7/2024 I Hospital Day: 2       Inpatient consult to gastroenterology     Date/Time  11/5/2024 11:59 AM     Performed by  Crissy Bowling DO   Authorized by  Orlando Oshea MD           Physician Requesting Evaluation: Andrea Senior MD   Reason for Evaluation / Principal Problem: Gastroparesis  Assessment & Plan  Gastric dilation  Patient presenting with abdominal pain accompanied by nausea/vomiting.  Multiple admissions in the past due to small bowel obstruction which have been primarily managed with conservative management.  Does have extensive surgical history.  Initially presented with small bowel obstruction that was treated with NG tube placement.  Follow-up imaging now demonstrating improvement in small bowel dilation but stomach continues to remain significantly distended.  Unclear etiology.  Possible that patient may have underlying gastroparesis given diabetes and opioid use.  Unable to fully rule out intrinsic obstruction/narrowing secondary to stricture, ulcer, or malignancy.  For this reason, we will plan to further evaluate with push enteroscopy tomorrow.  In the interim, continue ongoing plan per surgical team.    Plan:  Will plan for EGD with push tomorrow to further evaluate  Okay for diet today per surgical team, NPO at midnight  Okay to continue on Reglan 10 mg every 6 hours as ordered  Okay to continue on Protonix 40 mg every 24 hours as ordered  Strongly recommend avoidance of opioids and central modulators  Recommend increase oral hydration and correction of electrolyte abnormalities  Consider outpatient gastric emptying study after discharge  Lifestyle modifications:   Recommend small, frequent low fiber/residue meals  Encouraged increased daily activity and exercise  Optimize glycemic control; avoid acute  hyperglycemic episodes    SBO (small bowel obstruction) (HCC)  Multiple admissions for small bowel obstruction in the past.  Currently admitted for recurrent SBO.  Status post NG tube placement.  NG tube removed this morning.  Additional management per surgical team.      History of Present Illness   HPI:  Lety Botello is a 63 y.o. female with a past medical history of diabetes mellitus type 2, hypertension, hyperlipidemia, CAD status post PCI, history of DVT/PE, history of omental infarct, endometrial carcinoma status post FLORECITA/SBO with recurrence and status post RT to LAD, heart failure, IBS, and recurrent diverticulitis status post sigmoid resection. Patient presents with abdominal pain and was admitted for recurrent SBO.  GI now consulted for concern for gastroparesis.    Patient with multiple admissions in the past due to small bowel obstruction.  Patient previously admitted from 10/18 to 11/4 at [facility].  She was admitted for small bowel obstruction at that time was treated with bowel rest and NG tube decompression.  She did well and was discharged after tolerating diet and return of bowel function.  However, following discharge, she redeveloped pain prompting admission to [facility] on 11/5.  Imaging at that time revealed acute distention of the stomach and moderate diffuse distention of the proximal small bowel with a transition noted in the posterior lower abdomen.  Findings overall concerning for small bowel obstruction.  Patient treated with NG tube placement to allow for decompression.  Follow-up x-ray revealed improvement in small bowel dilation but continued to show significant gastric distention concerning for gastroparesis.    Patient seen evaluated bedside.  Sitting in bed comfortably no acute distress or visible discomfort.  NG tube removed early this morning.  Currently without nausea/vomiting.  Admits to abdominal pain but states that this is chronic.  Currently passing stool.  Offers no complaints  at this time.  States that she has never had an EGD but has had colonoscopies in the past.    Review of Systems   Constitutional:  Negative for chills, fatigue and fever.   HENT:  Negative for trouble swallowing.    Respiratory:  Negative for chest tightness, shortness of breath and wheezing.    Cardiovascular:  Negative for chest pain and palpitations.   Gastrointestinal:  Negative for abdominal pain, constipation, diarrhea, nausea and vomiting.   Endocrine: Negative for polydipsia, polyphagia and polyuria.   Musculoskeletal:  Negative for back pain, gait problem and joint swelling.   Neurological:  Negative for dizziness, weakness, light-headedness and headaches.   Psychiatric/Behavioral:  Negative for agitation and confusion.      I have reviewed the patient's PMH, PSH, Social History, Family History, Meds, and Allergies    Objective :  Temp:  [96 °F (35.6 °C)-98.6 °F (37 °C)] 98.5 °F (36.9 °C)  HR:  [83-85] 83  BP: (108-139)/(47-63) 116/47  Resp:  [14-18] 14  SpO2:  [90 %-94 %] 93 %  O2 Device: None (Room air)    Physical Exam  Constitutional:       General: She is not in acute distress.     Appearance: She is normal weight. She is not ill-appearing or toxic-appearing.   HENT:      Head: Normocephalic and atraumatic.      Nose: Nose normal.      Mouth/Throat:      Mouth: Mucous membranes are moist.   Eyes:      General: No scleral icterus.  Cardiovascular:      Rate and Rhythm: Normal rate.      Pulses: Normal pulses.   Pulmonary:      Effort: Pulmonary effort is normal. No respiratory distress.      Breath sounds: No wheezing.   Abdominal:      General: Abdomen is flat. Bowel sounds are normal. There is no distension.      Tenderness: There is no abdominal tenderness.      Hernia: No hernia is present.   Musculoskeletal:         General: Normal range of motion.      Cervical back: Normal range of motion.   Skin:     General: Skin is warm.      Coloration: Skin is not pale.   Neurological:      Mental Status:  She is alert and oriented to person, place, and time.   Psychiatric:         Mood and Affect: Mood normal.         Behavior: Behavior normal.           Lab Results: I have reviewed the following results:    Imaging Results Review: I reviewed radiology reports from this admission including: CT abdomen/pelvis and xray(s).  Other Study Results Review: No additional pertinent studies reviewed.

## 2024-11-07 NOTE — ASSESSMENT & PLAN NOTE
Patient admitted to surgery service for recurrent small bowel obstruction, also has a history of gastroparesis  CT showed proximal small bowel obstruction, similar findings reported on prior outside CT on 11/2/2024.  Findings suggestive of small splenic infarct, similar findings reported on prior outside CT on 11/2/2024.  Hepatosplenomegaly  NG tube was removed by surgery today, patient multiple bowel movements  Start diet once cleared by surgery  Pain meds, antiemetics  GI consult was placed by surgery

## 2024-11-07 NOTE — ASSESSMENT & PLAN NOTE
Lab Results   Component Value Date    HGBA1C 6.9 (H) 11/05/2024       Recent Labs     11/06/24  1151 11/06/24  1755 11/06/24  2354 11/07/24  0615   POCGLU 153* 144* 135 158*       Blood Sugar Average: Last 72 hrs:  (P) 153.3877067466068165  Home regimen is glargine 60 units in the morning and Humalog sliding scale.  Farxiga 10 mg daily  Patient is currently n.p.o., continue insulin sliding scale every 6 hours

## 2024-11-07 NOTE — ASSESSMENT & PLAN NOTE
Lab Results   Component Value Date    HGBA1C 6.9 (H) 11/05/2024       Recent Labs     11/06/24  0541 11/06/24  1151 11/06/24  1755 11/06/24  2354   POCGLU 177* 153* 144* 135       Blood Sugar Average: Last 72 hrs:  (P) 153

## 2024-11-07 NOTE — ASSESSMENT & PLAN NOTE
Patient was diagnosed with right leg DVT and pulmonary embolism 6 months ago in May 2024  Currently on Eliquis, restart once cleared by surgery

## 2024-11-07 NOTE — PHYSICAL THERAPY NOTE
Physical Therapy Cancellation Note      PT session cancelled as pt. Declined politely reporting she is very tired. Will continue to follow as able and appropriate.

## 2024-11-07 NOTE — CONSULTS
Consultation - Hospitalist   Name: Lety Botello 63 y.o. female I MRN: [id number]  Unit/Bed#: E5 -01 I Date of Admission: 11/5/2024   Date of Service: 11/6/2024 I Hospital Day: 1   Inpatient consult to Internal Medicine  Consult performed by: Josiane Rice MD  Consult ordered by: Orlando Oshea MD        Physician Requesting Evaluation: Andrea Senior MD   Reason for Evaluation / Principal Problem: Medical management    Assessment & Plan  SBO (small bowel obstruction) (HCC)  Patient admitted to surgery service for recurrent small bowel obstruction  CT showed proximal small bowel obstruction, similar findings reported on prior outside CT on 11/2/2024.  Findings suggestive of small splenic infarct, similar findings reported on prior outside CT on 11/2/2024.  Hepatosplenomegaly  Continue NG tube  N.p.o.  IV fluids  Antiemetics and pain medications  Possible expiratory/diagnostic surgical intervention tomorrow  Hypertension  Home regimen is Coreg 12.5 mg twice daily, lisinopril 10 mg daily--hold she is n.p.o.  Blood pressure intermittently elevated due to pain and discomfort  Will treat pain  Type 2 diabetes mellitus (HCC)  Lab Results   Component Value Date    HGBA1C 6.9 (H) 11/05/2024       Recent Labs     11/05/24  2313 11/06/24  0541 11/06/24  1151 11/06/24  1755   POCGLU 132 177* 153* 144*       Blood Sugar Average: Last 72 hrs:  (P) 156.6  Home regimen is glargine 60 units in the morning and Humalog sliding scale.  CIGA 10 mg daily  Patient is currently n.p.o., continue insulin sliding scale every 6 hours  Endometrial cancer (HCC)  History of endometrial cancer grade 1 stage Ia s/p/BSO 6/2020 with recurrent disease in 12/2023 when PET scan revealed enlarged retroperitoneal nodes, underwent radiation which she completed 3/2024  Repeat CT scan in August showed no recurrence of disease  Follows [facility] oncology  Obesity  Lifestyle modification  History of pulmonary embolism  Patient was  diagnosed with right leg DVT and pulmonary embolism 6 months ago in May 2024  Currently on Eliquis, restart once cleared by surgery    Hyponatremia  Chronic mild hyponatremia, sodium 130 today  Unclear cause  Continue IV fluids for now  Hypothyroidism  Home regimen levothyroxine 125 mcg daily, restart when able to take po  Mild intermittent asthma  Not in acute exacerbation  Continue albuterol inhaler as needed  Please contact the SecureChat role for the Hospitalist service with any questions/concerns.      VTE Pharmacologic Prophylaxis:   Moderate Risk (Score 3-4) - Pharmacological DVT Prophylaxis Ordered: enoxaparin (Lovenox).  Code Status: Level 1 - Full Code   Discussion with family:  patient.     Anticipated Length of Stay: Patient will be admitted on an inpatient basis with an anticipated length of stay of greater than 2 midnights secondary to small bowel obstruction.    History of Present Illness   Chief Complaint: Abdominal pain    Lety Botello is a 63 y.o. female with a PMH of hypertension, insulin-dependent type 2 diabetes, hypothyroidism, asthma, hyponatremia, recurrent small bowel obstruction, history of endometrial cancer, history of DVT and PE who presents with abdominal pain.  Patient has a history of multiple abdominal surgeries, she was recently admitted to [facility] with recurrent small bowel obstruction treated nonoperatively due to multiple comorbidities and multiple abdominal surgeries.  Initially she felt better but then she developed sharp pain nausea and vomiting at home and came to the ED    Review of Systems   Constitutional:  Positive for activity change and appetite change. Negative for fatigue.   Respiratory:  Negative for shortness of breath.    Cardiovascular:  Negative for chest pain, palpitations and leg swelling.   Gastrointestinal:  Positive for abdominal pain and nausea.   Genitourinary:  Negative for dysuria.   Musculoskeletal: Negative.    Neurological:  Negative  for dizziness.   Hematological: Negative.    Psychiatric/Behavioral: Negative.         Historical Information   Past Medical History:   Diagnosis Date    Diverticulitis     Endometrial cancer (HCC)     History of shingles     Right hemiabdomen (inactive)    Hypertension     IBS (irritable bowel syndrome)     Obesity     Small bowel obstruction (HCC)     Type 2 diabetes mellitus (HCC)      Past Surgical History:   Procedure Laterality Date    APPENDECTOMY      CHOLECYSTECTOMY      SIGMOIDECTOMY N/A      Social History     Tobacco Use    Smoking status: Never    Smokeless tobacco: Never   Substance and Sexual Activity    Alcohol use: Not Currently    Drug use: Never    Sexual activity: Not on file     E-Cigarette/Vaping     E-Cigarette/Vaping Substances     History reviewed. No pertinent family history.  Social History:  Marital Status: Unknown     Meds/Allergies   I have reviewed home medications with patient personally.  Prior to Admission medications    Medication Sig Start Date End Date Taking? Authorizing Provider   ALPRAZolam (XANAX) 0.5 mg tablet Take 0.5 mg by mouth Three times daily as needed 5/10/24 11/6/24 Yes Historical Provider, MD   apixaban (ELIQUIS) 5 mg Take 5 mg by mouth 2 (two) times a day 8/14/24  Yes Historical Provider, MD   cephalexin (KEFLEX) 500 mg capsule Take 500 mg by mouth 10/10/24  Yes Historical Provider, MD   ciprofloxacin (CIPRO) 500 mg tablet Take 500 mg by mouth 9/24/24  Yes Historical Provider, MD   dicyclomine (BENTYL) 10 mg capsule Take 10 mg by mouth 2 (two) times a day as needed 4/10/21 3/12/25 Yes Historical Provider, MD   furosemide (LASIX) 20 mg tablet Take 1 tablet by mouth in the morning 9/15/24  Yes Historical Provider, MD   gabapentin (NEURONTIN) 100 mg capsule Take 100 mg by mouth 9/25/24  Yes Historical Provider, MD   Glucagon (Baqsimi One Pack) 3 MG/DOSE POWD 3 mg 8/20/24  Yes Historical Provider, MD   Klor-Con M20 20 MEQ tablet Take 1 tablet by mouth in the morning  9/15/24  Yes Historical Provider, MD   levothyroxine 125 mcg tablet Take 125 mcg by mouth daily 9/25/24  Yes Historical Provider, MD   lidocaine (LIDODERM) 5 % Place 1 patch on the skin every 12 (twelve) hours 12/6/23 12/5/24 Yes Historical Provider, MD   meclizine (ANTIVERT) 25 mg tablet Take 25 mg by mouth Three times daily as needed 10/10/24 10/10/25 Yes Historical Provider, MD   methocarbamol (ROBAXIN) 500 mg tablet Take 750 mg by mouth 4 (four) times a day as needed 11/3/24 11/8/24 Yes Historical Provider, MD   metroNIDAZOLE (FLAGYL) 500 mg tablet Take 500 mg by mouth 9/24/24  Yes Historical Provider, MD   oxyCODONE (ROXICODONE) 5 immediate release tablet Take 5 mg by mouth every 4 (four) hours as needed 10/10/24  Yes Historical Provider, MD   pantoprazole (PROTONIX) 40 mg tablet Take 40 mg by mouth 9/25/24  Yes Historical Provider, MD   prochlorperazine (COMPAZINE) 5 mg tablet Take 1 tablet by mouth 4 times a day 9/24/24  Yes Historical Provider, MD   Tirzepatide (Mounjaro) 2.5 MG/0.5ML SOAJ Inject 2.5 mg under the skin 8/20/24  Yes Historical Provider, MD   vitamin B-12 (VITAMIN B-12) 1,000 mcg tablet Take 1,000 mcg by mouth daily 10/10/24  Yes Historical Provider, MD   Acetaminophen 325 MG CAPS Take 325 mg by mouth every 6 (six) hours as needed    Historical Provider, MD   albuterol (PROVENTIL HFA,VENTOLIN HFA) 90 mcg/act inhaler Inhale 2 puffs every 6 (six) hours as needed    Historical Provider, MD   aspirin (ECOTRIN LOW STRENGTH) 81 mg EC tablet Take 81 mg by mouth    Historical Provider, MD   dapagliflozin (Farxiga) 10 MG tablet Take 10 mg by mouth    Historical Provider, MD   diphenhydrAMINE (BENADRYL) 25 mg capsule Take 25 mg by mouth every 6 (six) hours as needed    Historical Provider, MD   Dulaglutide (TRULICITY SC) Inject 4.5 mg under the skin    Historical Provider, MD   nitroglycerin (NITROSTAT) 0.4 mg SL tablet Place 0.4 mg under the tongue    Historical Provider, MD     Allergies   Allergen  Reactions    Bee Pollen Anaphylaxis    Azithromycin Hives    Gluten Meal - Food Allergy Abdominal Pain     abd pain, diarrhea    Metformin Diarrhea    Other Other (See Comments)     hayfever with inhaler   hayfever with inhaler    Pollen Extract Other (See Comments)     hayfever with inhaler    Sulfamethizole Hives    Sulfamethoxazole-Trimethoprim Hives       Objective :  Temp:  [97.7 °F (36.5 °C)-98.2 °F (36.8 °C)] 98.1 °F (36.7 °C)  HR:  [81-89] 81  BP: (104-161)/(51-66) 141/66  Resp:  [18] 18  SpO2:  [90 %-94 %] 90 %  O2 Device: None (Room air)    Physical Exam  Constitutional:       General: She is not in acute distress.     Appearance: She is obese.   HENT:      Head: Atraumatic.      Nose:      Comments: NG tube  Cardiovascular:      Rate and Rhythm: Normal rate and regular rhythm.      Heart sounds: No murmur heard.  Pulmonary:      Effort: Pulmonary effort is normal. No respiratory distress.      Breath sounds: Normal breath sounds. No wheezing.   Abdominal:      General: There is distension.      Palpations: Abdomen is soft.      Tenderness: There is abdominal tenderness.      Comments: Decreased bowel sounds   Musculoskeletal:         General: No swelling.   Skin:     General: Skin is warm and dry.   Neurological:      General: No focal deficit present.      Mental Status: She is alert.   Psychiatric:         Mood and Affect: Mood normal.          Lines/Drains:  Lines/Drains/Airways       Active Status       Name Placement date Placement time Site Days    NG/OG/Enteral Tube Nasogastric 18 Fr Left nare 11/06/24  0216  Left nare  less than 1                          Lab Results: I have reviewed the following results:  Results from last 7 days   Lab Units 11/06/24  0520   WBC Thousand/uL 6.22   HEMOGLOBIN g/dL 10.0*   HEMATOCRIT % 30.6*   PLATELETS Thousands/uL 91*   SEGS PCT % 79*   LYMPHO PCT % 8*   MONO PCT % 9   EOS PCT % 2     Results from last 7 days   Lab Units 11/06/24  0520 11/05/24  1241   SODIUM  mmol/L 130* 131*   POTASSIUM mmol/L 4.3 4.1   CHLORIDE mmol/L 101 100   CO2 mmol/L 23 25   BUN mg/dL 22 22   CREATININE mg/dL 0.86 0.79   ANION GAP mmol/L 6 6   CALCIUM mg/dL 8.2* 8.2*   ALBUMIN g/dL  --  3.2*   TOTAL BILIRUBIN mg/dL  --  0.42   ALK PHOS U/L  --  80   ALT U/L  --  9   AST U/L  --  11*   GLUCOSE RANDOM mg/dL 182* 162*         Results from last 7 days   Lab Units 11/06/24  1755 11/06/24  1151 11/06/24  0541 11/05/24  2313 11/05/24  1834   POC GLUCOSE mg/dl 144* 153* 177* 132 177*     Lab Results   Component Value Date    HGBA1C 6.9 (H) 11/05/2024    HGBA1C 9.7 (H) 09/21/2024    HGBA1C 10.6 (H) 05/07/2024           Imaging Results Review: I reviewed radiology reports from this admission including: CT abdomen/pelvis.  Other Study Results Review: No additional pertinent studies reviewed.    Administrative Statements       ** Please Note: This note has been constructed using a voice recognition system. **

## 2024-11-07 NOTE — PLAN OF CARE
Problem: Potential for Falls  Goal: Patient will remain free of falls  Description: INTERVENTIONS:  - Educate patient/family on patient safety including physical limitations  - Instruct patient to call for assistance with activity   - Consult OT/PT to assist with strengthening/mobility   - Keep Call bell within reach  - Keep bed low and locked with side rails adjusted as appropriate  - Keep care items and personal belongings within reach  - Initiate and maintain comfort rounds  - Make Fall Risk Sign visible to staff  - Offer Toileting every 2 Hours, in advance of need  - Initiate/Maintain bedalarm  - Obtain necessary fall risk management equipment  - Apply yellow socks and bracelet for high fall risk patients  - Consider moving patient to room near nurses station  Outcome: Progressing     Problem: PAIN - ADULT  Goal: Verbalizes/displays adequate comfort level or baseline comfort level  Description: Interventions:  - Encourage patient to monitor pain and request assistance  - Assess pain using appropriate pain scale  - Administer analgesics based on type and severity of pain and evaluate response  - Implement non-pharmacological measures as appropriate and evaluate response  - Consider cultural and social influences on pain and pain management  - Notify physician/advanced practitioner if interventions unsuccessful or patient reports new pain  Outcome: Progressing     Problem: INFECTION - ADULT  Goal: Absence or prevention of progression during hospitalization  Description: INTERVENTIONS:  - Assess and monitor for signs and symptoms of infection  - Monitor lab/diagnostic results  - Monitor all insertion sites, i.e. indwelling lines, tubes, and drains  - Monitor endotracheal if appropriate and nasal secretions for changes in amount and color  - Gonzales appropriate cooling/warming therapies per order  - Administer medications as ordered  - Instruct and encourage patient and family to use good hand hygiene  technique  - Identify and instruct in appropriate isolation precautions for identified infection/condition  Outcome: Progressing  Goal: Absence of fever/infection during neutropenic period  Description: INTERVENTIONS:  - Monitor WBC    Outcome: Progressing     Problem: SAFETY ADULT  Goal: Patient will remain free of falls  Description: INTERVENTIONS:  - Educate patient/family on patient safety including physical limitations  - Instruct patient to call for assistance with activity   - Consult OT/PT to assist with strengthening/mobility   - Keep Call bell within reach  - Keep bed low and locked with side rails adjusted as appropriate  - Keep care items and personal belongings within reach  - Initiate and maintain comfort rounds  - Make Fall Risk Sign visible to staff  - Offer Toileting every 2 Hours, in advance of need  - Initiate/Maintain bedalarm  - Obtain necessary fall risk management equipment  - Apply yellow socks and bracelet for high fall risk patients  - Consider moving patient to room near nurses station  Outcome: Progressing  Goal: Maintain or return to baseline ADL function  Description: INTERVENTIONS:  -  Assess patient's ability to carry out ADLs; assess patient's baseline for ADL function and identify physical deficits which impact ability to perform ADLs (bathing, care of mouth/teeth, toileting, grooming, dressing, etc.)  - Assess/evaluate cause of self-care deficits   - Assess range of motion  - Assess patient's mobility; develop plan if impaired  - Assess patient's need for assistive devices and provide as appropriate  - Encourage maximum independence but intervene and supervise when necessary  - Involve family in performance of ADLs  - Assess for home care needs following discharge   - Consider OT consult to assist with ADL evaluation and planning for discharge  - Provide patient education as appropriate  Outcome: Progressing  Goal: Maintains/Returns to pre admission functional level  Description:  INTERVENTIONS:  - Perform AM-PAC 6 Click Basic Mobility/ Daily Activity assessment daily.  - Set and communicate daily mobility goal to care team and patient/family/caregiver.   - Collaborate with rehabilitation services on mobility goals if consulted  - Perform Range of Motion 3 times a day.  - Reposition patient every 2 hours.  - Dangle patient 3 times a day  - Stand patient 3 times a day  - Ambulate patient 3 times a day  - Out of bed to chair 3 times a day   - Out of bed for meals 3 times a day  - Out of bed for toileting  - Record patient progress and toleration of activity level   Outcome: Progressing     Problem: DISCHARGE PLANNING  Goal: Discharge to home or other facility with appropriate resources  Description: INTERVENTIONS:  - Identify barriers to discharge w/patient and caregiver  - Arrange for needed discharge resources and transportation as appropriate  - Identify discharge learning needs (meds, wound care, etc.)  - Arrange for interpretive services to assist at discharge as needed  - Refer to Case Management Department for coordinating discharge planning if the patient needs post-hospital services based on physician/advanced practitioner order or complex needs related to functional status, cognitive ability, or social support system  Outcome: Progressing     Problem: Knowledge Deficit  Goal: Patient/family/caregiver demonstrates understanding of disease process, treatment plan, medications, and discharge instructions  Description: Complete learning assessment and assess knowledge base.  Interventions:  - Provide teaching at level of understanding  - Provide teaching via preferred learning methods  Outcome: Progressing     Problem: Prexisting or High Potential for Compromised Skin Integrity  Goal: Skin integrity is maintained or improved  Description: INTERVENTIONS:  - Identify patients at risk for skin breakdown  - Assess and monitor skin integrity  - Assess and monitor nutrition and hydration  status  - Monitor labs   - Assess for incontinence   - Turn and reposition patient  - Assist with mobility/ambulation  - Relieve pressure over bony prominences  - Avoid friction and shearing  - Provide appropriate hygiene as needed including keeping skin clean and dry  - Evaluate need for skin moisturizer/barrier cream  - Collaborate with interdisciplinary team   - Patient/family teaching  - Consider wound care consult   Outcome: Progressing

## 2024-11-07 NOTE — PROGRESS NOTES
Progress Note - Hospitalist   Name: Lety Botello 63 y.o. female I MRN: [id number]  Unit/Bed#: E5 -01 I Date of Admission: 11/5/2024   Date of Service: 11/7/2024 I Hospital Day: 2    Assessment & Plan  SBO (small bowel obstruction) (HCC)  Patient admitted to surgery service for recurrent small bowel obstruction, also has a history of gastroparesis  CT showed proximal small bowel obstruction, similar findings reported on prior outside CT on 11/2/2024.  Findings suggestive of small splenic infarct, similar findings reported on prior outside CT on 11/2/2024.  Hepatosplenomegaly  NG tube was removed by surgery today, patient multiple bowel movements  Start diet once cleared by surgery  Pain meds, antiemetics  GI consult was placed by surgery  Hypertension  Home regimen is Coreg 12.5 mg twice daily, lisinopril 10 mg daily--hold she is n.p.o.  Blood pressure intermittently elevated due to pain and discomfort  Will treat pain  Type 2 diabetes mellitus (HCC)  Lab Results   Component Value Date    HGBA1C 6.9 (H) 11/05/2024       Recent Labs     11/06/24  1151 11/06/24  1755 11/06/24  2354 11/07/24  0615   POCGLU 153* 144* 135 158*       Blood Sugar Average: Last 72 hrs:  (P) 153.3317373021617573  Home regimen is glargine 60 units in the morning and Humalog sliding scale.  Farxiga 10 mg daily  Patient is currently n.p.o., continue insulin sliding scale every 6 hours  Endometrial cancer (HCC)  History of endometrial cancer grade 1 stage Ia s/p/BSO 6/2020 with recurrent disease in 12/2023 when PET scan revealed enlarged retroperitoneal nodes, underwent radiation which she completed 3/2024  Repeat CT scan in August showed no recurrence of disease  Follows [facility] oncology  Obesity  Lifestyle modification  History of pulmonary embolism  Patient was diagnosed with right leg DVT and pulmonary embolism 6 months ago in May 2024  Currently on Eliquis, restart once cleared by surgery    Hyponatremia  Chronic mild  hyponatremia, sodium 132 today  Unclear cause  Continue IV fluids for now  Hypothyroidism  Home regimen levothyroxine 125 mcg daily, restart when able to take po  Mild intermittent asthma  Not in acute exacerbation  Continue albuterol inhaler as needed  Gastroparesis  GI consult    VTE Pharmacologic Prophylaxis:   Moderate Risk (Score 3-4) - Pharmacological DVT Prophylaxis Ordered: enoxaparin (Lovenox).    Mobility:   Basic Mobility Inpatient Raw Score: 18  JH-HLM Goal: 6: Walk 10 steps or more  JH-HLM Achieved: 6: Walk 10 steps or more  JH-HLM Goal achieved. Continue to encourage appropriate mobility.    Patient Centered Rounds:  discussed with nursing    Discussions with Specialists or Other Care Team Provider: nursing, cm    Education and Discussions with Family / Patient:  patient.     Current Length of Stay: 2 day(s)  Current Patient Status: Inpatient   Certification Statement: The patient will continue to require additional inpatient hospital stay due to SBO, gastroparesis  Discharge Plan: [facility] is following this patient on consult. They are not yet medically stable for discharge secondary to SBO, gastroparesis.    Code Status: Level 1 - Full Code    Subjective   Patient was seen evaluated bedside, very tearful and thankful that she is feeling much better    Objective :  Temp:  [96 °F (35.6 °C)-98.1 °F (36.7 °C)] 97.4 °F (36.3 °C)  HR:  [81-85] 83  BP: (108-141)/(51-66) 139/63  Resp:  [16-18] 17  SpO2:  [90 %-94 %] 93 %  O2 Device: None (Room air)    Body mass index is 46.24 kg/m².     Input and Output Summary (last 24 hours):     Intake/Output Summary (Last 24 hours) at 11/7/2024 1042  Last data filed at 11/7/2024 0856  Gross per 24 hour   Intake 90 ml   Output 975 ml   Net -885 ml       Physical Exam  Constitutional:       General: She is not in acute distress.     Appearance: She is obese.   HENT:      Head: Atraumatic.   Cardiovascular:      Rate and Rhythm: Normal rate and regular rhythm.      Heart  sounds: No murmur heard.  Pulmonary:      Effort: Pulmonary effort is normal. No respiratory distress.      Breath sounds: Normal breath sounds. No wheezing.   Abdominal:      General: Bowel sounds are normal.      Palpations: Abdomen is soft.      Tenderness: There is abdominal tenderness.      Comments: Decreased breath sounds   Musculoskeletal:         General: No swelling.      Cervical back: Neck supple.   Skin:     General: Skin is warm and dry.   Neurological:      General: No focal deficit present.      Mental Status: She is alert.   Psychiatric:         Mood and Affect: Mood normal.           Lines/Drains:              Lab Results: I have reviewed the following results:   Results from last 7 days   Lab Units 11/07/24  0334   WBC Thousand/uL 4.04*   HEMOGLOBIN g/dL 8.9*   HEMATOCRIT % 28.2*   PLATELETS Thousands/uL 84*   SEGS PCT % 69   LYMPHO PCT % 13*   MONO PCT % 13*   EOS PCT % 3     Results from last 7 days   Lab Units 11/07/24  0334 11/06/24  0520 11/05/24  1241   SODIUM mmol/L 132*   < > 131*   POTASSIUM mmol/L 4.2   < > 4.1   CHLORIDE mmol/L 102   < > 100   CO2 mmol/L 22   < > 25   BUN mg/dL 27*   < > 22   CREATININE mg/dL 0.90   < > 0.79   ANION GAP mmol/L 8   < > 6   CALCIUM mg/dL 8.0*   < > 8.2*   ALBUMIN g/dL  --   --  3.2*   TOTAL BILIRUBIN mg/dL  --   --  0.42   ALK PHOS U/L  --   --  80   ALT U/L  --   --  9   AST U/L  --   --  11*   GLUCOSE RANDOM mg/dL 150*   < > 162*    < > = values in this interval not displayed.         Results from last 7 days   Lab Units 11/07/24  0615 11/06/24  2354 11/06/24  1755 11/06/24  1151 11/06/24  0541 11/05/24  2313 11/05/24  1834   POC GLUCOSE mg/dl 158* 135 144* 153* 177* 132 177*     Results from last 7 days   Lab Units 11/05/24  1241   HEMOGLOBIN A1C % 6.9*           Recent Cultures (last 7 days):         Imaging Results Review: I reviewed radiology reports from this admission including: xray(s).  Other Study Results Review: EKG was reviewed.     Last 24  Hours Medication List:     Current Facility-Administered Medications:     acetaminophen (Ofirmev) injection 1,000 mg, Q6H SAM, Last Rate: 1,000 mg (11/07/24 0609)    albuterol (PROVENTIL HFA,VENTOLIN HFA) inhaler 2 puff, Q4H PRN    enoxaparin (LOVENOX) subcutaneous injection 40 mg, Daily    HYDROmorphone (DILAUDID) injection 0.5 mg, Q4H PRN    HYDROmorphone (DILAUDID) injection 0.5 mg, Q4H PRN    HYDROmorphone HCl (DILAUDID) injection 0.2 mg, Q4H PRN    insulin lispro (HumALOG/ADMELOG) 100 units/mL subcutaneous injection 1-6 Units, Q6H SAM **AND** Fingerstick Glucose (POCT), Q6H    metoclopramide (REGLAN) injection 10 mg, Q6H SAM    multi-electrolyte (PLASMALYTE-A/ISOLYTE-S PH 7.4) IV solution, Continuous, Last Rate: 125 mL/hr (11/07/24 0310)    ondansetron (ZOFRAN) injection 4 mg, Q6H PRN    pantoprazole (PROTONIX) injection 40 mg, Q24H SAM    phenol (CHLORASEPTIC) 1.4 % mucosal liquid 1 spray, Q2H PRN    Administrative Statements   Today, Patient Was Seen By: Josiane Rice MD      **Please Note: This note may have been constructed using a voice recognition system.**

## 2024-11-07 NOTE — ASSESSMENT & PLAN NOTE
History of endometrial cancer grade 1 stage Ia s/p/BSO 6/2020 with recurrent disease in 12/2023 when PET scan revealed enlarged retroperitoneal nodes, underwent radiation which she completed 3/2024  Repeat CT scan in August showed no recurrence of disease  Follows [facility]

## 2024-11-07 NOTE — PLAN OF CARE
Problem: Potential for Falls  Goal: Patient will remain free of falls  Description: INTERVENTIONS:  - Educate patient/family on patient safety including physical limitations  - Instruct patient to call for assistance with activity   - Consult OT/PT to assist with strengthening/mobility   - Keep Call bell within reach  - Keep bed low and locked with side rails adjusted as appropriate  - Keep care items and personal belongings within reach  - Initiate and maintain comfort rounds  - Make Fall Risk Sign visible to staff  - Offer Toileting every 2 Hours, in advance of need  - Initiate/Maintain bedalarm  - Obtain necessary fall risk management equipment  - Apply yellow socks and bracelet for high fall risk patients  - Consider moving patient to room near nurses station  Outcome: Progressing     Problem: PAIN - ADULT  Goal: Verbalizes/displays adequate comfort level or baseline comfort level  Description: Interventions:  - Encourage patient to monitor pain and request assistance  - Assess pain using appropriate pain scale  - Administer analgesics based on type and severity of pain and evaluate response  - Implement non-pharmacological measures as appropriate and evaluate response  - Consider cultural and social influences on pain and pain management  - Notify physician/advanced practitioner if interventions unsuccessful or patient reports new pain  Outcome: Progressing     Problem: INFECTION - ADULT  Goal: Absence or prevention of progression during hospitalization  Description: INTERVENTIONS:  - Assess and monitor for signs and symptoms of infection  - Monitor lab/diagnostic results  - Monitor all insertion sites, i.e. indwelling lines, tubes, and drains  - Monitor endotracheal if appropriate and nasal secretions for changes in amount and color  - Maxbass appropriate cooling/warming therapies per order  - Administer medications as ordered  - Instruct and encourage patient and family to use good hand hygiene  technique  - Identify and instruct in appropriate isolation precautions for identified infection/condition  Outcome: Progressing  Goal: Absence of fever/infection during neutropenic period  Description: INTERVENTIONS:  - Monitor WBC    Outcome: Progressing     Problem: SAFETY ADULT  Goal: Patient will remain free of falls  Description: INTERVENTIONS:  - Educate patient/family on patient safety including physical limitations  - Instruct patient to call for assistance with activity   - Consult OT/PT to assist with strengthening/mobility   - Keep Call bell within reach  - Keep bed low and locked with side rails adjusted as appropriate  - Keep care items and personal belongings within reach  - Initiate and maintain comfort rounds  - Make Fall Risk Sign visible to staff  - Offer Toileting every 2 Hours, in advance of need  - Initiate/Maintain bedalarm  - Obtain necessary fall risk management equipment  - Apply yellow socks and bracelet for high fall risk patients  - Consider moving patient to room near nurses station  Outcome: Progressing  Goal: Maintain or return to baseline ADL function  Description: INTERVENTIONS:  -  Assess patient's ability to carry out ADLs; assess patient's baseline for ADL function and identify physical deficits which impact ability to perform ADLs (bathing, care of mouth/teeth, toileting, grooming, dressing, etc.)  - Assess/evaluate cause of self-care deficits   - Assess range of motion  - Assess patient's mobility; develop plan if impaired  - Assess patient's need for assistive devices and provide as appropriate  - Encourage maximum independence but intervene and supervise when necessary  - Involve family in performance of ADLs  - Assess for home care needs following discharge   - Consider OT consult to assist with ADL evaluation and planning for discharge  - Provide patient education as appropriate  Outcome: Progressing  Goal: Maintains/Returns to pre admission functional level  Description:  INTERVENTIONS:  - Perform AM-PAC 6 Click Basic Mobility/ Daily Activity assessment daily.  - Set and communicate daily mobility goal to care team and patient/family/caregiver.   - Collaborate with rehabilitation services on mobility goals if consulted  - Perform Range of Motion 3 times a day.  - Reposition patient every 2 hours.  - Dangle patient 3 times a day  - Stand patient 3 times a day  - Ambulate patient 3 times a day  - Out of bed to chair 3 times a day   - Out of bed for meals 3 times a day  - Out of bed for toileting  - Record patient progress and toleration of activity level   Outcome: Progressing     Problem: DISCHARGE PLANNING  Goal: Discharge to home or other facility with appropriate resources  Description: INTERVENTIONS:  - Identify barriers to discharge w/patient and caregiver  - Arrange for needed discharge resources and transportation as appropriate  - Identify discharge learning needs (meds, wound care, etc.)  - Arrange for interpretive services to assist at discharge as needed  - Refer to Case Management Department for coordinating discharge planning if the patient needs post-hospital services based on physician/advanced practitioner order or complex needs related to functional status, cognitive ability, or social support system  Outcome: Progressing     Problem: Knowledge Deficit  Goal: Patient/family/caregiver demonstrates understanding of disease process, treatment plan, medications, and discharge instructions  Description: Complete learning assessment and assess knowledge base.  Interventions:  - Provide teaching at level of understanding  - Provide teaching via preferred learning methods  Outcome: Progressing     Problem: Prexisting or High Potential for Compromised Skin Integrity  Goal: Skin integrity is maintained or improved  Description: INTERVENTIONS:  - Identify patients at risk for skin breakdown  - Assess and monitor skin integrity  - Assess and monitor nutrition and hydration  status  - Monitor labs   - Assess for incontinence   - Turn and reposition patient  - Assist with mobility/ambulation  - Relieve pressure over bony prominences  - Avoid friction and shearing  - Provide appropriate hygiene as needed including keeping skin clean and dry  - Evaluate need for skin moisturizer/barrier cream  - Collaborate with interdisciplinary team   - Patient/family teaching  - Consider wound care consult   Outcome: Progressing

## 2024-11-07 NOTE — CASE MANAGEMENT
Case Management Discharge Planning Note    Patient name Lety Botello  Location [facility] /E5 -* MRN [id number]  : 1960 Date 2024       Current Admission Date: 2024  Current Admission Diagnosis:Gastroparesis   Patient Active Problem List    Diagnosis Date Noted Date Diagnosed    Gastroparesis 2024     History of pulmonary embolism 2024     Hyponatremia 2024     Hypothyroidism 2024     Mild intermittent asthma 2024     Hypertension      Type 2 diabetes mellitus (HCC)      Endometrial cancer (HCC)      Obesity      SBO (small bowel obstruction) (HCC) 2024       LOS (days): 2  Geometric Mean LOS (GMLOS) (days): 3  Days to GMLOS:1.3     OBJECTIVE:  Risk of Unplanned Readmission Score: 27.09         Current admission status: Inpatient   Preferred Pharmacy:   [id number] [place] [place] [facility] [address]  [address] [address]  Phone: [phone] Fax: [phone]    Primary Care Provider: No primary care provider on file.    Primary Insurance: [employer] [place] GABE  Secondary Insurance:     DISCHARGE DETAILS:    Discharge planning discussed with:: patient  Freedom of Choice: Yes  Comments - Freedom of Choice: chose [facility]     Requested Home Health Care         Is the patient interested in HHC at discharge?: Yes  Home Health Discipline requested:: Nursing, Occupational Therapy, Physical Therapy  Home Health Agency Name:: [facility]  Home Health Follow-Up Provider:: PCP  Home Health Services Needed:: Evaluate Functional Status and Safety, Strengthening/Theraputic Exercises to Improve Function, Gait/ADL Training, Diabetes Management  Homebound Criteria Met:: Uses an Assist Device (i.e. cane, walker, etc)  Supporting Clincal Findings:: Limited Endurance    DME Referral Provided  Referral made for DME?: No    Treatment Team Recommendation: Home with Home Health Care  Discharge Destination Plan:: Home with Home Health Care

## 2024-11-07 NOTE — ASSESSMENT & PLAN NOTE
GGC through colon 11/6  Multiple comorbidities and abdominal surgeries with recurrent SBO.  Patient recently treated at [facility] without resolution of SBO, now returns to ED with similar abdominal symptoms.  CT scan of abdomen and pelvis showing proximal small bowel obstruction similar to findings reported on prior outside CT on 11/2.  Patient with episode of vomiting overnight.  NG tube was replaced with larger 18 [place] NG tube.  24-hour NG tube output: 600 cc light yellow bilious.

## 2024-11-07 NOTE — ASSESSMENT & PLAN NOTE
Patient presenting with abdominal pain accompanied by nausea/vomiting.  Multiple admissions in the past due to small bowel obstruction which have been primarily managed with conservative management.  Does have extensive surgical history.  Initially presented with small bowel obstruction that was treated with NG tube placement.  Follow-up imaging now demonstrating improvement in small bowel dilation but stomach continues to remain significantly distended.  Unclear etiology.  Possible that patient may have underlying gastroparesis given diabetes and opioid use.  Unable to fully rule out intrinsic obstruction/narrowing secondary to stricture, ulcer, or malignancy.  For this reason, we will plan to further evaluate with push enteroscopy tomorrow.  In the interim, continue ongoing plan per surgical team.    Plan:  Will plan for EGD with push tomorrow to further evaluate  Okay for diet today per surgical team, NPO at midnight  Okay to continue on Reglan 10 mg every 6 hours as ordered  Okay to continue on Protonix 40 mg every 24 hours as ordered  Strongly recommend avoidance of opioids and central modulators  Recommend increase oral hydration and correction of electrolyte abnormalities  Consider outpatient gastric emptying study after discharge  Lifestyle modifications:   Recommend small, frequent low fiber/residue meals  Encouraged increased daily activity and exercise  Optimize glycemic control; avoid acute hyperglycemic episodes

## 2024-11-08 ENCOUNTER — ANESTHESIA (INPATIENT)
Dept: PERIOP | Facility: HOSPITAL | Age: 64
DRG: 247 | End: 2024-11-08
Payer: COMMERCIAL

## 2024-11-08 ENCOUNTER — APPOINTMENT (OUTPATIENT)
Dept: PERIOP | Facility: HOSPITAL | Age: 64
DRG: 247 | End: 2024-11-08
Payer: COMMERCIAL

## 2024-11-08 ENCOUNTER — ANESTHESIA EVENT (INPATIENT)
Dept: PERIOP | Facility: HOSPITAL | Age: 64
DRG: 247 | End: 2024-11-08
Payer: COMMERCIAL

## 2024-11-08 PROBLEM — E66.01 MORBID OBESITY WITH BMI OF 45.0-49.9, ADULT (HCC): Status: ACTIVE | Noted: 2024-11-08

## 2024-11-08 LAB
GLUCOSE SERPL-MCNC: 119 MG/DL (ref 65–140)
GLUCOSE SERPL-MCNC: 152 MG/DL (ref 65–140)
GLUCOSE SERPL-MCNC: 186 MG/DL (ref 65–140)
GLUCOSE SERPL-MCNC: 226 MG/DL (ref 65–140)

## 2024-11-08 PROCEDURE — 99232 SBSQ HOSP IP/OBS MODERATE 35: CPT | Performed by: SURGERY

## 2024-11-08 PROCEDURE — 44360 SMALL BOWEL ENDOSCOPY: CPT | Performed by: INTERNAL MEDICINE

## 2024-11-08 PROCEDURE — 0DJ08ZZ INSPECTION OF UPPER INTESTINAL TRACT, VIA NATURAL OR ARTIFICIAL OPENING ENDOSCOPIC: ICD-10-PCS | Performed by: INTERNAL MEDICINE

## 2024-11-08 PROCEDURE — 97530 THERAPEUTIC ACTIVITIES: CPT

## 2024-11-08 PROCEDURE — 82948 REAGENT STRIP/BLOOD GLUCOSE: CPT

## 2024-11-08 PROCEDURE — 99232 SBSQ HOSP IP/OBS MODERATE 35: CPT | Performed by: INTERNAL MEDICINE

## 2024-11-08 PROCEDURE — 97116 GAIT TRAINING THERAPY: CPT

## 2024-11-08 RX ORDER — SODIUM CHLORIDE 9 MG/ML
125 INJECTION, SOLUTION INTRAVENOUS CONTINUOUS
Status: CANCELLED | OUTPATIENT
Start: 2024-11-08

## 2024-11-08 RX ORDER — PROPOFOL 10 MG/ML
INJECTION, EMULSION INTRAVENOUS AS NEEDED
Status: DISCONTINUED | OUTPATIENT
Start: 2024-11-08 | End: 2024-11-08

## 2024-11-08 RX ORDER — DEXAMETHASONE SODIUM PHOSPHATE 10 MG/ML
INJECTION, SOLUTION INTRAMUSCULAR; INTRAVENOUS AS NEEDED
Status: DISCONTINUED | OUTPATIENT
Start: 2024-11-08 | End: 2024-11-08

## 2024-11-08 RX ORDER — LEVOTHYROXINE SODIUM 125 UG/1
125 TABLET ORAL
Status: DISCONTINUED | OUTPATIENT
Start: 2024-11-09 | End: 2024-11-12 | Stop reason: HOSPADM

## 2024-11-08 RX ORDER — SUCCINYLCHOLINE/SOD CL,ISO/PF 100 MG/5ML
SYRINGE (ML) INTRAVENOUS AS NEEDED
Status: DISCONTINUED | OUTPATIENT
Start: 2024-11-08 | End: 2024-11-08

## 2024-11-08 RX ORDER — SODIUM CHLORIDE 9 MG/ML
INJECTION, SOLUTION INTRAVENOUS CONTINUOUS PRN
Status: DISCONTINUED | OUTPATIENT
Start: 2024-11-08 | End: 2024-11-08

## 2024-11-08 RX ORDER — MIDAZOLAM HYDROCHLORIDE 2 MG/2ML
INJECTION, SOLUTION INTRAMUSCULAR; INTRAVENOUS AS NEEDED
Status: DISCONTINUED | OUTPATIENT
Start: 2024-11-08 | End: 2024-11-08

## 2024-11-08 RX ORDER — ONDANSETRON 2 MG/ML
4 INJECTION INTRAMUSCULAR; INTRAVENOUS EVERY 6 HOURS PRN
Status: DISCONTINUED | OUTPATIENT
Start: 2024-11-08 | End: 2024-11-08 | Stop reason: HOSPADM

## 2024-11-08 RX ORDER — LIDOCAINE HCL/PF 100 MG/5ML
SYRINGE (ML) INJECTION AS NEEDED
Status: DISCONTINUED | OUTPATIENT
Start: 2024-11-08 | End: 2024-11-08

## 2024-11-08 RX ORDER — SODIUM CHLORIDE, SODIUM GLUCONATE, SODIUM ACETATE, POTASSIUM CHLORIDE, MAGNESIUM CHLORIDE, SODIUM PHOSPHATE, DIBASIC, AND POTASSIUM PHOSPHATE .53; .5; .37; .037; .03; .012; .00082 G/100ML; G/100ML; G/100ML; G/100ML; G/100ML; G/100ML; G/100ML
125 INJECTION, SOLUTION INTRAVENOUS CONTINUOUS
Status: DISCONTINUED | OUTPATIENT
Start: 2024-11-08 | End: 2024-11-09

## 2024-11-08 RX ORDER — ONDANSETRON 2 MG/ML
INJECTION INTRAMUSCULAR; INTRAVENOUS AS NEEDED
Status: DISCONTINUED | OUTPATIENT
Start: 2024-11-08 | End: 2024-11-08

## 2024-11-08 RX ADMIN — ONDANSETRON 4 MG: 2 INJECTION INTRAMUSCULAR; INTRAVENOUS at 03:33

## 2024-11-08 RX ADMIN — ACETAMINOPHEN 1000 MG: 10 INJECTION INTRAVENOUS at 00:24

## 2024-11-08 RX ADMIN — DEXAMETHASONE SODIUM PHOSPHATE 10 MG: 10 INJECTION INTRAMUSCULAR; INTRAVENOUS at 10:35

## 2024-11-08 RX ADMIN — HYDROMORPHONE HYDROCHLORIDE 0.5 MG: 1 INJECTION, SOLUTION INTRAMUSCULAR; INTRAVENOUS; SUBCUTANEOUS at 14:34

## 2024-11-08 RX ADMIN — ENOXAPARIN SODIUM 40 MG: 40 INJECTION SUBCUTANEOUS at 08:09

## 2024-11-08 RX ADMIN — Medication 100 MG: at 10:35

## 2024-11-08 RX ADMIN — MIDAZOLAM 2 MG: 1 INJECTION INTRAMUSCULAR; INTRAVENOUS at 10:22

## 2024-11-08 RX ADMIN — ACETAMINOPHEN 1000 MG: 10 INJECTION INTRAVENOUS at 17:01

## 2024-11-08 RX ADMIN — HYDROMORPHONE HYDROCHLORIDE 0.5 MG: 1 INJECTION, SOLUTION INTRAMUSCULAR; INTRAVENOUS; SUBCUTANEOUS at 03:32

## 2024-11-08 RX ADMIN — METOCLOPRAMIDE 10 MG: 5 INJECTION, SOLUTION INTRAMUSCULAR; INTRAVENOUS at 12:24

## 2024-11-08 RX ADMIN — ACETAMINOPHEN 1000 MG: 10 INJECTION INTRAVENOUS at 05:04

## 2024-11-08 RX ADMIN — INSULIN LISPRO 1 UNITS: 100 INJECTION, SOLUTION INTRAVENOUS; SUBCUTANEOUS at 08:09

## 2024-11-08 RX ADMIN — HYDROMORPHONE HYDROCHLORIDE 0.5 MG: 1 INJECTION, SOLUTION INTRAMUSCULAR; INTRAVENOUS; SUBCUTANEOUS at 08:21

## 2024-11-08 RX ADMIN — ACETAMINOPHEN 1000 MG: 10 INJECTION INTRAVENOUS at 12:24

## 2024-11-08 RX ADMIN — METOCLOPRAMIDE 10 MG: 5 INJECTION, SOLUTION INTRAMUSCULAR; INTRAVENOUS at 17:02

## 2024-11-08 RX ADMIN — METOCLOPRAMIDE 10 MG: 5 INJECTION, SOLUTION INTRAMUSCULAR; INTRAVENOUS at 00:26

## 2024-11-08 RX ADMIN — HYDROMORPHONE HYDROCHLORIDE 0.5 MG: 1 INJECTION, SOLUTION INTRAMUSCULAR; INTRAVENOUS; SUBCUTANEOUS at 00:25

## 2024-11-08 RX ADMIN — LIDOCAINE HYDROCHLORIDE 100 MG: 20 INJECTION INTRAVENOUS at 10:35

## 2024-11-08 RX ADMIN — SODIUM CHLORIDE, SODIUM GLUCONATE, SODIUM ACETATE, POTASSIUM CHLORIDE, MAGNESIUM CHLORIDE, SODIUM PHOSPHATE, DIBASIC, AND POTASSIUM PHOSPHATE 125 ML/HR: .53; .5; .37; .037; .03; .012; .00082 INJECTION, SOLUTION INTRAVENOUS at 08:12

## 2024-11-08 RX ADMIN — METOCLOPRAMIDE 10 MG: 5 INJECTION, SOLUTION INTRAMUSCULAR; INTRAVENOUS at 05:04

## 2024-11-08 RX ADMIN — PANTOPRAZOLE SODIUM 40 MG: 40 INJECTION, POWDER, LYOPHILIZED, FOR SOLUTION INTRAVENOUS at 08:09

## 2024-11-08 RX ADMIN — PHENYLEPHRINE HYDROCHLORIDE 50 MCG/MIN: 50 INJECTION INTRAVENOUS at 10:42

## 2024-11-08 RX ADMIN — PROPOFOL 170 MG: 10 INJECTION, EMULSION INTRAVENOUS at 10:35

## 2024-11-08 RX ADMIN — ONDANSETRON 4 MG: 2 INJECTION INTRAMUSCULAR; INTRAVENOUS at 10:35

## 2024-11-08 RX ADMIN — SODIUM CHLORIDE: 0.9 INJECTION, SOLUTION INTRAVENOUS at 10:19

## 2024-11-08 RX ADMIN — SODIUM CHLORIDE, SODIUM GLUCONATE, SODIUM ACETATE, POTASSIUM CHLORIDE, MAGNESIUM CHLORIDE, SODIUM PHOSPHATE, DIBASIC, AND POTASSIUM PHOSPHATE 125 ML/HR: .53; .5; .37; .037; .03; .012; .00082 INJECTION, SOLUTION INTRAVENOUS at 20:09

## 2024-11-08 RX ADMIN — SODIUM CHLORIDE, SODIUM GLUCONATE, SODIUM ACETATE, POTASSIUM CHLORIDE, MAGNESIUM CHLORIDE, SODIUM PHOSPHATE, DIBASIC, AND POTASSIUM PHOSPHATE 125 ML/HR: .53; .5; .37; .037; .03; .012; .00082 INJECTION, SOLUTION INTRAVENOUS at 05:04

## 2024-11-08 RX ADMIN — INSULIN LISPRO 1 UNITS: 100 INJECTION, SOLUTION INTRAVENOUS; SUBCUTANEOUS at 22:20

## 2024-11-08 RX ADMIN — INSULIN LISPRO 2 UNITS: 100 INJECTION, SOLUTION INTRAVENOUS; SUBCUTANEOUS at 17:02

## 2024-11-08 RX ADMIN — HYDROMORPHONE HYDROCHLORIDE 0.5 MG: 1 INJECTION, SOLUTION INTRAMUSCULAR; INTRAVENOUS; SUBCUTANEOUS at 20:09

## 2024-11-08 NOTE — PROGRESS NOTES
Progress Note - Hospitalist   Name: Lety Botello 63 y.o. female I MRN: [id number]  Unit/Bed#: E5 -01 I Date of Admission: 11/5/2024   Date of Service: 11/8/2024 I Hospital Day: 3    Assessment & Plan  SBO (small bowel obstruction) (HCC)  Patient admitted to surgery service for recurrent small bowel obstruction, also has a history of gastroparesis  CT showed proximal small bowel obstruction, similar findings reported on prior outside CT on 11/2/2024.  Findings suggestive of small splenic infarct, similar findings reported on prior outside CT on 11/2/2024.  Hepatosplenomegaly  NG tube was removed by surgery 11/7, patient had multiple bowel movements  Evaluated by GI underwent EGD with push enteroscopy 11/8.  EGD with push enteroscopy showed normal esophagus.  Showed gastritis. Duodenum appeared normal. Proximal  jejunum and middle jejunum normal. Large amount of liquid and debris throughout the duodenum and proximal jejunum that was suctioned out, no narrowing or increasing stricture identified finding could be concerning for ongoing partial small bowel obstruction  Continue pain meds, antiemetics  Diet per surgery  Gastroparesis  Underwent EGD with push enteroscopy today, it showed gastritis.  Large amount of liquid and impress throughout the duodenum and proximal jejunum which was suctioned out, no narrowing or intrinsic stricture identified but findings concerning for ongoing partial small bowel obstruction.  Continue Reglan 10 mg 3 times daily  Consider outpatient gastric emptying study when off opioids  Hypertension  Home regimen is Coreg 12.5 mg twice daily, lisinopril 10 mg daily--hold she is n.p.o.  Blood pressure intermittently elevated due to pain and discomfort  Will treat pain  Type 2 diabetes mellitus (HCC)  Lab Results   Component Value Date    HGBA1C 6.9 (H) 11/05/2024       Recent Labs     11/07/24  1734 11/07/24  2051 11/08/24  0758 11/08/24  1141   POCGLU 111 132 152* 119       Blood Sugar  Average: Last 72 hrs:  (P) 143.7011125129839854  Home regimen is glargine 60 units in the morning and Humalog sliding scale.  Farxiga 10 mg daily  Currently on clear liquid diet, continue sliding scale  Endometrial cancer (HCC)  History of endometrial cancer grade 1 stage Ia s/p/BSO 6/2020 with recurrent disease in 12/2023 when PET scan revealed enlarged retroperitoneal nodes, underwent radiation which she completed 3/2024  Repeat CT scan in August showed no recurrence of disease  Follows [facility] oncology  Obesity  Lifestyle modification  History of pulmonary embolism  Patient was diagnosed with right leg DVT and pulmonary embolism 6 months ago in May 2024  Currently on Eliquis, restart once cleared by surgery    Hyponatremia  Chronic mild hyponatremia, sodium 132 today  Unclear cause  Continue IV fluids for now  Hypothyroidism  Home regimen levothyroxine 125 mcg daily, restart  Mild intermittent asthma  Not in acute exacerbation  Continue albuterol inhaler as needed  Gastric dilation    Morbid obesity with BMI of 45.0-49.9, adult (HCC)      VTE Pharmacologic Prophylaxis:   Moderate Risk (Score 3-4) - Pharmacological DVT Prophylaxis Ordered: enoxaparin (Lovenox).    Mobility:   Basic Mobility Inpatient Raw Score: 18  JH-HLM Goal: 6: Walk 10 steps or more  JH-HLM Achieved: 7: Walk 25 feet or more  JH-HLM Goal achieved. Continue to encourage appropriate mobility.    Patient Centered Rounds:  discussed with nursing    Discussions with Specialists or Other Care Team Provider: nursing, cm    Education and Discussions with Family / Patient: Patient declined call to .     Current Length of Stay: 3 day(s)  Current Patient Status: Inpatient   Certification Statement: The patient will continue to require additional inpatient hospital stay due to small bowel obstruction, gastroparesis  Discharge Plan: [facility] is following this patient on consult. They are not yet medically stable for discharge secondary to  small bowel obstruction.    Code Status: Level 1 - Full Code    Subjective   Patient seen evaluated bedside, feeling better, still has abdominal pain.  Passing flatus    Objective :  Temp:  [97.2 °F (36.2 °C)-98.8 °F (37.1 °C)] 97.9 °F (36.6 °C)  HR:  [72-92] 90  BP: (116-149)/(47-77) 120/57  Resp:  [14-23] 23  SpO2:  [93 %-99 %] 93 %  O2 Device: None (Room air)  Nasal Cannula O2 Flow Rate (L/min):  [2 L/min] 2 L/min    Body mass index is 46.24 kg/m².     Input and Output Summary (last 24 hours):     Intake/Output Summary (Last 24 hours) at 11/8/2024 1439  Last data filed at 11/8/2024 1113  Gross per 24 hour   Intake 700 ml   Output 51 ml   Net 649 ml       Physical Exam  Constitutional:       General: She is not in acute distress.     Appearance: She is obese.   HENT:      Head: Atraumatic.   Cardiovascular:      Rate and Rhythm: Normal rate and regular rhythm.      Heart sounds: No murmur heard.  Pulmonary:      Effort: Pulmonary effort is normal. No respiratory distress.      Breath sounds: Normal breath sounds. No wheezing.   Abdominal:      General: There is distension.      Palpations: Abdomen is soft.      Tenderness: There is abdominal tenderness.      Comments: Decreased bowel sounds   Musculoskeletal:         General: No swelling.      Cervical back: Neck supple.   Skin:     General: Skin is warm and dry.   Neurological:      General: No focal deficit present.      Mental Status: She is alert.   Psychiatric:         Mood and Affect: Mood normal.           Lines/Drains:              Lab Results: I have reviewed the following results:   Results from last 7 days   Lab Units 11/07/24  0334   WBC Thousand/uL 4.04*   HEMOGLOBIN g/dL 8.9*   HEMATOCRIT % 28.2*   PLATELETS Thousands/uL 84*   SEGS PCT % 69   LYMPHO PCT % 13*   MONO PCT % 13*   EOS PCT % 3     Results from last 7 days   Lab Units 11/07/24  0334 11/06/24  0520 11/05/24  1241   SODIUM mmol/L 132*   < > 131*   POTASSIUM mmol/L 4.2   < > 4.1   CHLORIDE  mmol/L 102   < > 100   CO2 mmol/L 22   < > 25   BUN mg/dL 27*   < > 22   CREATININE mg/dL 0.90   < > 0.79   ANION GAP mmol/L 8   < > 6   CALCIUM mg/dL 8.0*   < > 8.2*   ALBUMIN g/dL  --   --  3.2*   TOTAL BILIRUBIN mg/dL  --   --  0.42   ALK PHOS U/L  --   --  80   ALT U/L  --   --  9   AST U/L  --   --  11*   GLUCOSE RANDOM mg/dL 150*   < > 162*    < > = values in this interval not displayed.         Results from last 7 days   Lab Units 11/08/24  1141 11/08/24  0758 11/07/24  2051 11/07/24  1734 11/07/24  1211 11/07/24  0615 11/06/24  2354 11/06/24  1755 11/06/24  1151 11/06/24  0541 11/05/24  2313 11/05/24  1834   POC GLUCOSE mg/dl 119 152* 132 111 130 158* 135 144* 153* 177* 132 177*     Results from last 7 days   Lab Units 11/05/24  1241   HEMOGLOBIN A1C % 6.9*           Recent Cultures (last 7 days):         Imaging Results Review: I reviewed radiology reports from this admission including: CT abdomen/pelvis, xray(s), and procedure reports.  Other Study Results Review: No additional pertinent studies reviewed.    Last 24 Hours Medication List:     Current Facility-Administered Medications:     acetaminophen (Ofirmev) injection 1,000 mg, Q6H SAM, Last Rate: 1,000 mg (11/08/24 1224)    albuterol (PROVENTIL HFA,VENTOLIN HFA) inhaler 2 puff, Q4H PRN    enoxaparin (LOVENOX) subcutaneous injection 40 mg, Daily    HYDROmorphone (DILAUDID) injection 0.5 mg, Q4H PRN    HYDROmorphone (DILAUDID) injection 0.5 mg, Q4H PRN    HYDROmorphone HCl (DILAUDID) injection 0.2 mg, Q4H PRN    insulin lispro (HumALOG/ADMELOG) 100 units/mL subcutaneous injection 1-6 Units, 4x Daily (AC & HS) **AND** Fingerstick Glucose (POCT), 4x Daily AC and at bedtime    [START ON 11/9/2024] levothyroxine tablet 125 mcg, Early Morning    metoclopramide (REGLAN) injection 10 mg, Q6H SAM    multi-electrolyte (PLASMALYTE-A/ISOLYTE-S PH 7.4) IV solution, Continuous, Last Rate: 125 mL/hr (11/08/24 0812)    ondansetron (ZOFRAN) injection 4 mg, Q6H PRN     pantoprazole (PROTONIX) injection 40 mg, Q24H SAM    phenol (CHLORASEPTIC) 1.4 % mucosal liquid 1 spray, Q2H PRN    Administrative Statements   Today, Patient Was Seen By: Josiane Rice MD      **Please Note: This note may have been constructed using a voice recognition system.**

## 2024-11-08 NOTE — ASSESSMENT & PLAN NOTE
Lab Results   Component Value Date    HGBA1C 6.9 (H) 11/05/2024       Recent Labs     11/07/24  1734 11/07/24  2051 11/08/24  0758 11/08/24  1141   POCGLU 111 132 152* 119       Blood Sugar Average: Last 72 hrs:  (P) 143.1119718537216810  Home regimen is glargine 60 units in the morning and Humalog sliding scale.  Farxiga 10 mg daily  Currently on clear liquid diet, continue sliding scale

## 2024-11-08 NOTE — PROGRESS NOTES
Progress Note - Surgery-General   Name: Lety Botello 63 y.o. female I MRN: [id number]  Unit/Bed#: E5 -01 I Date of Admission: 11/5/2024   Date of Service: 11/8/2024 I Hospital Day: 3    Assessment & Plan  Gastroparesis  Previously and being managed for suspected SBO, with admission to [facility] leading up to this hospitalization  GGC showed gastroparesis with EGD and colon  Multiple BMs overnight  Glycemic index well-controlled this hospitalization.  Recent A1c 6.9  GI consulted, appreciate recs, will follow up results of EGD  Scheduled reglan and protonix  Diet following GI EGD    Type 2 diabetes mellitus (HCC)  Lab Results   Component Value Date    HGBA1C 6.9 (H) 11/05/2024       Recent Labs     11/07/24  1211 11/07/24  1734 11/07/24 2051 11/08/24  0758   POCGLU 130 111 132 152*       Blood Sugar Average: Last 72 hrs:  (P) 145.3937133466467582      Please contact the SecureChat role for the Surgery-General service with any questions/concerns.    Subjective   No acute events overnight. Afebrile, hemodynamically stable. Endorsing nausea with one episode of emesis. Still passing flatus and having bowel movements.      Objective :  Temp:  [97.4 °F (36.3 °C)-98.8 °F (37.1 °C)] 97.9 °F (36.6 °C)  HR:  [72-76] 76  BP: (108-139)/(47-77) 126/69  Resp:  [14-19] 18  SpO2:  [96 %] 96 %  O2 Device: None (Room air)    I/O         11/06 0701 11/07 0700 11/07 0701 11/08 0700 11/08 0701 11/09 0700    NG/      Total Intake(mL/kg) 130 (1.2)      Urine (mL/kg/hr) 100 (0) 1 (0)     Emesis/NG output 825 100     Stool 0 0     Total Output 925 101     Net -795 -101            Unmeasured Urine Occurrence  1 x 1 x    Unmeasured Stool Occurrence 3 x 5 x 1 x              Physical Exam:  General: No acute distress, alert and oriented  CV: Well perfused, regular rate and rhythm  Lungs: Normal work of breathing, no increased respiratory effort  Abdomen: Soft, mildly-tender, mildly-distended.  Extremities: No edema, clubbing  or cyanosis  Skin: Warm, dry      Lab Results: I have reviewed the following results:  Recent Labs     11/05/24  1241 11/06/24  0520 11/07/24  0334   WBC 5.91   < > 4.04*   HGB 9.5*   < > 8.9*   HCT 29.5*   < > 28.2*   PLT 94*   < > 84*   SODIUM 131*   < > 132*   K 4.1   < > 4.2      < > 102   CO2 25   < > 22   BUN 22   < > 27*   CREATININE 0.79   < > 0.90   GLUC 162*   < > 150*   MG  --    < > 2.7   PHOS  --    < > 3.9   AST 11*  --   --    ALT 9  --   --    ALB 3.2*  --   --    TBILI 0.42  --   --    ALKPHOS 80  --   --     < > = values in this interval not displayed.       Imaging Results Review: No pertinent imaging studies reviewed.  Other Study Results Review: No additional pertinent studies reviewed.    VTE Pharmacologic Prophylaxis: VTE covered by:  enoxaparin, Subcutaneous, 40 mg at 11/08/24 0809     VTE Mechanical Prophylaxis: sequential compression device    Orlando Oshea MD  General Surgery   11/08/24

## 2024-11-08 NOTE — ANESTHESIA PREPROCEDURE EVALUATION
Procedure:  EGD WITH PUSH ENTEROSCOPY    Relevant Problems   CARDIO   (+) Hypertension      ENDO   (+) Hypothyroidism   (+) Type 2 diabetes mellitus (HCC)      GI/HEPATIC   (+) SBO (small bowel obstruction) (HCC)      GYN   (+) Endometrial cancer (HCC)      PULMONARY   (+) Mild intermittent asthma        Physical Exam    Airway    Mallampati score: I  TM Distance: >3 FB  Neck ROM: full     Dental       Cardiovascular  Cardiovascular exam normal    Pulmonary  Pulmonary exam normal     Other Findings  post-pubertal.      Anesthesia Plan  ASA Score- 3     Anesthesia Type- general with ASA Monitors.         Additional Monitors:     Airway Plan: ETT.           Plan Factors-Exercise tolerance (METS): >4 METS.    Chart reviewed.   Existing labs reviewed. Patient summary reviewed.    Patient is not a current smoker.      Obstructive sleep apnea risk education given perioperatively.        Induction- intravenous and rapid sequence induction.    Postoperative Plan- Plan for postoperative opioid use.     Perioperative Resuscitation Plan - Level 1 - Full Code.       Informed Consent- Anesthetic plan and risks discussed with patient.

## 2024-11-08 NOTE — ASSESSMENT & PLAN NOTE
Underwent EGD with push enteroscopy today, it showed gastritis.  Large amount of liquid and impress throughout the duodenum and proximal jejunum which was suctioned out, no narrowing or intrinsic stricture identified but findings concerning for ongoing partial small bowel obstruction.  Continue Reglan 10 mg 3 times daily  Consider outpatient gastric emptying study when off opioids

## 2024-11-08 NOTE — ANESTHESIA POSTPROCEDURE EVALUATION
Post-Op Assessment Note    CV Status:  Stable    Pain management: adequate       Mental Status:  Awake and sleepy   Hydration Status:  Euvolemic   PONV Controlled:  Controlled   Airway Patency:  Patent     Post Op Vitals Reviewed: Yes    No anethesia notable event occurred.    Staff: CRNA           Last Filed PACU Vitals:  Vitals Value Taken Time   Temp 98 °F (36.7 °C) 11/08/24 1126   Pulse 92 11/08/24 1128   /63 11/08/24 1126   Resp 30 11/08/24 1128   SpO2 97 % 11/08/24 1128   Vitals shown include unfiled device data.    Modified Fozia:  No data recorded

## 2024-11-08 NOTE — PHYSICAL THERAPY NOTE
Physical Therapy Treatment Note         11/08/24 0921   PT Last Visit   PT Visit Date 11/08/24   Note Type   Note Type Treatment   Pain Assessment   Pain Assessment Tool 0-10   Pain Location/Orientation Location: Abdomen   Restrictions/Precautions   Weight Bearing Precautions Per Order No   Other Precautions Fall Risk;Pain;Multiple lines   General   Chart Reviewed Yes   Family/Caregiver Present No   Subjective   Subjective Pt. agreeable to PT   Bed Mobility   Supine to Sit 4  Minimal assistance   Additional items Assist x 1;Increased time required;LE management;Verbal cues;HOB elevated;Bedrails   Transfers   Sit to Stand 5  Supervision   Additional items Increased time required   Stand to Sit 5  Supervision   Additional items Increased time required   Stand pivot 5  Supervision   Additional items Increased time required   Toilet transfer 5  Supervision   Additional items Increased time required;Standard toilet   Ambulation/Elevation   Gait pattern Decreased foot clearance;Short stride;Excessively slow   Gait Assistance 5  Supervision   Additional items Verbal cues   Assistive Device Rolling walker   Distance 15ft, 80ft   Balance   Static Sitting Good   Dynamic Sitting Fair +   Static Standing Fair   Dynamic Standing Fair -   Ambulatory Fair -   Activity Tolerance   Activity Tolerance Patient tolerated treatment well   Nurse Made Aware yes   Assessment   Prognosis Good   Problem List Decreased mobility;Decreased endurance;Obesity   Assessment Pt. noted with no LOB t/o session. Pt. needed Tylor for supine to sit transfer. Pt. able to ambualte longer distance this session however pt. became very emotional and was crying when in the hallway and wanted to go abck to the room. pt. very upset about the outcome of the  procedure in the p.m. Emotional support provided. pt. declined to ambualte anyu further or participate in activities. Will continue to follow per PT POC. Pt. seated on chair post session with all needs within  reach.   Barriers to Discharge None   Goals   Patient Goals None reproted   STG Expiration Date 11/16/24   PT Treatment Day 1   Plan   Treatment/Interventions Functional transfer training;Spoke to nursing;Gait training;Bed mobility;Equipment eval/education;Patient/family training   Progress Progressing toward goals   PT Frequency 3-5x/wk   Discharge Recommendation   Rehab Resource Intensity Level, PT No post-acute rehabilitation needs   Equipment Recommended Walker   AM-PAC Basic Mobility Inpatient   Turning in Flat Bed Without Bedrails 3   Lying on Back to Sitting on Edge of Flat Bed Without Bedrails 3   Moving Bed to Chair 4   Standing Up From Chair Using Arms 4   Walk in Room 4   Climb 3-5 Stairs With Railing 3   Basic Mobility Inpatient Raw Score 21   Basic Mobility Standardized Score 45.55   [facility] Highest Level Of Mobility   -HLM Goal 6: Walk 10 steps or more   -HLM Achieved 7: Walk 25 feet or more       Chinyere Driscoll PTA    An AM-PAC basic mobility standardized score less than 42.9 suggest the patient may benefit from discharge to post-acute rehab services.

## 2024-11-08 NOTE — PLAN OF CARE
Problem: PHYSICAL THERAPY ADULT  Goal: Performs mobility at highest level of function for planned discharge setting.  See evaluation for individualized goals.  Description: Treatment/Interventions: Functional transfer training, LE strengthening/ROM, Elevations, Therapeutic exercise, Endurance training, Patient/family training, Bed mobility, Gait training, Spoke to nursing, OT  Equipment Recommended: Walker (pt has)       See flowsheet documentation for full assessment, interventions and recommendations.  Outcome: Progressing  Note: Prognosis: Good  Problem List: Decreased mobility, Decreased endurance, Obesity  Assessment: Pt. noted with no LOB t/o session. Pt. needed Tylor for supine to sit transfer. Pt. able to ambualte longer distance this session however pt. became very emotional and was crying when in the hallway and wanted to go abck to the room. pt. very upset about the outcome of the  procedure in the p.m. Emotional support provided. pt. declined to ambualte anyu further or participate in activities. Will continue to follow per PT POC. Pt. seated on chair post session with all needs within reach.  Barriers to Discharge: None     Rehab Resource Intensity Level, PT: No post-acute rehabilitation needs    See flowsheet documentation for full assessment.

## 2024-11-08 NOTE — ASSESSMENT & PLAN NOTE
Patient admitted to surgery service for recurrent small bowel obstruction, also has a history of gastroparesis  CT showed proximal small bowel obstruction, similar findings reported on prior outside CT on 11/2/2024.  Findings suggestive of small splenic infarct, similar findings reported on prior outside CT on 11/2/2024.  Hepatosplenomegaly  NG tube was removed by surgery 11/7, patient had multiple bowel movements  Evaluated by GI underwent EGD with push enteroscopy 11/8.  EGD with push enteroscopy showed normal esophagus.  Showed gastritis. Duodenum appeared normal. Proximal  jejunum and middle jejunum normal. Large amount of liquid and debris throughout the duodenum and proximal jejunum that was suctioned out, no narrowing or increasing stricture identified finding could be concerning for ongoing partial small bowel obstruction  Continue pain meds, antiemetics  Diet per surgery

## 2024-11-08 NOTE — PLAN OF CARE
Problem: Potential for Falls  Goal: Patient will remain free of falls  Description: INTERVENTIONS:  - Educate patient/family on patient safety including physical limitations  - Instruct patient to call for assistance with activity   - Consult OT/PT to assist with strengthening/mobility   - Keep Call bell within reach  - Keep bed low and locked with side rails adjusted as appropriate  - Keep care items and personal belongings within reach  - Initiate and maintain comfort rounds  - Make Fall Risk Sign visible to staff  - Offer Toileting every 2 Hours, in advance of need  - Initiate/Maintain bedalarm  - Obtain necessary fall risk management equipment  - Apply yellow socks and bracelet for high fall risk patients  - Consider moving patient to room near nurses station  Outcome: Progressing     Problem: PAIN - ADULT  Goal: Verbalizes/displays adequate comfort level or baseline comfort level  Description: Interventions:  - Encourage patient to monitor pain and request assistance  - Assess pain using appropriate pain scale  - Administer analgesics based on type and severity of pain and evaluate response  - Implement non-pharmacological measures as appropriate and evaluate response  - Consider cultural and social influences on pain and pain management  - Notify physician/advanced practitioner if interventions unsuccessful or patient reports new pain  Outcome: Progressing     Problem: INFECTION - ADULT  Goal: Absence or prevention of progression during hospitalization  Description: INTERVENTIONS:  - Assess and monitor for signs and symptoms of infection  - Monitor lab/diagnostic results  - Monitor all insertion sites, i.e. indwelling lines, tubes, and drains  - Monitor endotracheal if appropriate and nasal secretions for changes in amount and color  - [place] appropriate cooling/warming therapies per order  - Administer medications as ordered  - Instruct and encourage patient and family to use good hand hygiene  technique  - Identify and instruct in appropriate isolation precautions for identified infection/condition  Outcome: Progressing  Goal: Absence of fever/infection during neutropenic period  Description: INTERVENTIONS:  - Monitor WBC    Outcome: Progressing     Problem: SAFETY ADULT  Goal: Patient will remain free of falls  Description: INTERVENTIONS:  - Educate patient/family on patient safety including physical limitations  - Instruct patient to call for assistance with activity   - Consult OT/PT to assist with strengthening/mobility   - Keep Call bell within reach  - Keep bed low and locked with side rails adjusted as appropriate  - Keep care items and personal belongings within reach  - Initiate and maintain comfort rounds  - Make Fall Risk Sign visible to staff  - Offer Toileting every 2 Hours, in advance of need  - Initiate/Maintain bedalarm  - Obtain necessary fall risk management equipment  - Apply yellow socks and bracelet for high fall risk patients  - Consider moving patient to room near nurses station  Outcome: Progressing  Goal: Maintain or return to baseline ADL function  Description: INTERVENTIONS:  -  Assess patient's ability to carry out ADLs; assess patient's baseline for ADL function and identify physical deficits which impact ability to perform ADLs (bathing, care of mouth/teeth, toileting, grooming, dressing, etc.)  - Assess/evaluate cause of self-care deficits   - Assess range of motion  - Assess patient's mobility; develop plan if impaired  - Assess patient's need for assistive devices and provide as appropriate  - Encourage maximum independence but intervene and supervise when necessary  - Involve family in performance of ADLs  - Assess for home care needs following discharge   - Consider OT consult to assist with ADL evaluation and planning for discharge  - Provide patient education as appropriate  Outcome: Progressing  Goal: Maintains/Returns to pre admission functional level  Description:  INTERVENTIONS:  - Perform AM-PAC 6 Click Basic Mobility/ Daily Activity assessment daily.  - Set and communicate daily mobility goal to care team and patient/family/caregiver.   - Collaborate with rehabilitation services on mobility goals if consulted  - Perform Range of Motion 3 times a day.  - Reposition patient every 2 hours.  - Dangle patient 3 times a day  - Stand patient 3 times a day  - Ambulate patient 3 times a day  - Out of bed to chair 3 times a day   - Out of bed for meals 3 times a day  - Out of bed for toileting  - Record patient progress and toleration of activity level   Outcome: Progressing     Problem: DISCHARGE PLANNING  Goal: Discharge to home or other facility with appropriate resources  Description: INTERVENTIONS:  - Identify barriers to discharge w/patient and caregiver  - Arrange for needed discharge resources and transportation as appropriate  - Identify discharge learning needs (meds, wound care, etc.)  - Arrange for interpretive services to assist at discharge as needed  - Refer to Case Management Department for coordinating discharge planning if the patient needs post-hospital services based on physician/advanced practitioner order or complex needs related to functional status, cognitive ability, or social support system  Outcome: Progressing     Problem: Knowledge Deficit  Goal: Patient/family/caregiver demonstrates understanding of disease process, treatment plan, medications, and discharge instructions  Description: Complete learning assessment and assess knowledge base.  Interventions:  - Provide teaching at level of understanding  - Provide teaching via preferred learning methods  Outcome: Progressing     Problem: Prexisting or High Potential for Compromised Skin Integrity  Goal: Skin integrity is maintained or improved  Description: INTERVENTIONS:  - Identify patients at risk for skin breakdown  - Assess and monitor skin integrity  - Assess and monitor nutrition and hydration  status  - Monitor labs   - Assess for incontinence   - Turn and reposition patient  - Assist with mobility/ambulation  - Relieve pressure over bony prominences  - Avoid friction and shearing  - Provide appropriate hygiene as needed including keeping skin clean and dry  - Evaluate need for skin moisturizer/barrier cream  - Collaborate with interdisciplinary team   - Patient/family teaching  - Consider wound care consult   Outcome: Progressing     Problem: Nutrition/Hydration-ADULT  Goal: Nutrient/Hydration intake appropriate for improving, restoring or maintaining nutritional needs  Description: Monitor and assess patient's nutrition/hydration status for malnutrition. Collaborate with interdisciplinary team and initiate plan and interventions as ordered.  Monitor patient's weight and dietary intake as ordered or per policy. Utilize nutrition screening tool and intervene as necessary. Determine patient's food preferences and provide high-protein, high-caloric foods as appropriate.     INTERVENTIONS:  - Monitor oral intake, urinary output, labs, and treatment plans  - Assess nutrition and hydration status and recommend course of action  - Evaluate amount of meals eaten  - Assist patient with eating if necessary   - Allow adequate time for meals  - Recommend/ encourage appropriate diets, oral nutritional supplements, and vitamin/mineral supplements  - Order, calculate, and assess calorie counts as needed  - Recommend, monitor, and adjust tube feedings and TPN/PPN based on assessed needs  - Assess need for intravenous fluids  - Provide specific nutrition/hydration education as appropriate  - Include patient/family/caregiver in decisions related to nutrition  Outcome: Progressing

## 2024-11-08 NOTE — OCCUPATIONAL THERAPY NOTE
Occupational Therapy         Patient Name: Lety Botello  Today's Date: 11/8/2024 11/08/24 1058   OT Last Visit   OT Visit Date 11/08/24   Note Type   Note type Cancelled Session   Cancel Reasons Patient to operating room     Kena Roche

## 2024-11-08 NOTE — RESTORATIVE TECHNICIAN NOTE
Restorative Technician Note      Patient Name: Lety Botello     Restorative Tech Visit Date: 11/08/24  Note Type: Mobility  Patient Position Upon Consult: Supine  Activity Performed: Ambulated  Assistive Device: Roller walker  Patient Position at End of Consult: Bedside chair; All needs within reach            ns

## 2024-11-08 NOTE — ASSESSMENT & PLAN NOTE
History of endometrial cancer grade 1 stage Ia s/p/BSO 6/2020 with recurrent disease in 12/2023 when PET scan revealed enlarged retroperitoneal nodes, underwent radiation which she completed 3/2024  Repeat CT scan in August showed no recurrence of disease  Follows [facility]

## 2024-11-08 NOTE — NURSING NOTE
Patient had an episode of throwing up. Messaged surgery and had no response yet.  I gave patient Zofran for nausea.

## 2024-11-08 NOTE — ASSESSMENT & PLAN NOTE
Previously and being managed for suspected SBO, with admission to [facility] leading up to this hospitalization  GGC showed gastroparesis with EGD and colon  Multiple BMs overnight  Glycemic index well-controlled this hospitalization.  Recent A1c 6.9  GI consulted, appreciate recs, will follow up results of EGD  Scheduled reglan and protonix  Diet following GI EGD

## 2024-11-08 NOTE — ANESTHESIA POSTPROCEDURE EVALUATION
Post-Op Assessment Note    CV Status:  Stable    Pain management: adequate       Mental Status:  Alert and awake   Hydration Status:  Euvolemic   PONV Controlled:  Controlled   Airway Patency:  Patent     Post Op Vitals Reviewed: Yes    No anethesia notable event occurred.    Staff: Anesthesiologist           Last Filed PACU Vitals:  Vitals Value Taken Time   Temp 97.6 °F (36.4 °C) 11/08/24 1156   Pulse 88 11/08/24 1207   /65 11/08/24 1156   Resp 23 11/08/24 1207   SpO2 92 % 11/08/24 1207   Vitals shown include unfiled device data.    Modified Fozia:  Activity: 2 (11/8/2024 11:41 AM)  Respiration: 2 (11/8/2024 11:41 AM)  Circulation: 2 (11/8/2024 11:41 AM)  Consciousness: 2 (11/8/2024 11:41 AM)  Oxygen Saturation: 2 (11/8/2024 11:41 AM)  Modified Fozia Score: 10 (11/8/2024 11:41 AM)

## 2024-11-08 NOTE — ASSESSMENT & PLAN NOTE
Lab Results   Component Value Date    HGBA1C 6.9 (H) 11/05/2024       Recent Labs     11/07/24  1211 11/07/24  1734 11/07/24 2051 11/08/24  0758   POCGLU 130 111 132 152*       Blood Sugar Average: Last 72 hrs:  (P) [id number]

## 2024-11-08 NOTE — PLAN OF CARE
Problem: Potential for Falls  Goal: Patient will remain free of falls  Description: INTERVENTIONS:  - Educate patient/family on patient safety including physical limitations  - Instruct patient to call for assistance with activity   - Consult OT/PT to assist with strengthening/mobility   - Keep Call bell within reach  - Keep bed low and locked with side rails adjusted as appropriate  - Keep care items and personal belongings within reach  - Initiate and maintain comfort rounds  - Make Fall Risk Sign visible to staff  - Offer Toileting every 2 Hours, in advance of need  - Initiate/Maintain bedalarm  - Obtain necessary fall risk management equipment  - Apply yellow socks and bracelet for high fall risk patients  - Consider moving patient to room near nurses station  Outcome: Progressing     Problem: PAIN - ADULT  Goal: Verbalizes/displays adequate comfort level or baseline comfort level  Description: Interventions:  - Encourage patient to monitor pain and request assistance  - Assess pain using appropriate pain scale  - Administer analgesics based on type and severity of pain and evaluate response  - Implement non-pharmacological measures as appropriate and evaluate response  - Consider cultural and social influences on pain and pain management  - Notify physician/advanced practitioner if interventions unsuccessful or patient reports new pain  Outcome: Progressing     Problem: INFECTION - ADULT  Goal: Absence or prevention of progression during hospitalization  Description: INTERVENTIONS:  - Assess and monitor for signs and symptoms of infection  - Monitor lab/diagnostic results  - Monitor all insertion sites, i.e. indwelling lines, tubes, and drains  - Monitor endotracheal if appropriate and nasal secretions for changes in amount and color  - [place] appropriate cooling/warming therapies per order  - Administer medications as ordered  - Instruct and encourage patient and family to use good hand hygiene  technique  - Identify and instruct in appropriate isolation precautions for identified infection/condition  Outcome: Progressing  Goal: Absence of fever/infection during neutropenic period  Description: INTERVENTIONS:  - Monitor WBC    Outcome: Progressing     Problem: SAFETY ADULT  Goal: Patient will remain free of falls  Description: INTERVENTIONS:  - Educate patient/family on patient safety including physical limitations  - Instruct patient to call for assistance with activity   - Consult OT/PT to assist with strengthening/mobility   - Keep Call bell within reach  - Keep bed low and locked with side rails adjusted as appropriate  - Keep care items and personal belongings within reach  - Initiate and maintain comfort rounds  - Make Fall Risk Sign visible to staff  - Offer Toileting every 2 Hours, in advance of need  - Initiate/Maintain bedalarm  - Obtain necessary fall risk management equipment  - Apply yellow socks and bracelet for high fall risk patients  - Consider moving patient to room near nurses station  Outcome: Progressing  Goal: Maintain or return to baseline ADL function  Description: INTERVENTIONS:  -  Assess patient's ability to carry out ADLs; assess patient's baseline for ADL function and identify physical deficits which impact ability to perform ADLs (bathing, care of mouth/teeth, toileting, grooming, dressing, etc.)  - Assess/evaluate cause of self-care deficits   - Assess range of motion  - Assess patient's mobility; develop plan if impaired  - Assess patient's need for assistive devices and provide as appropriate  - Encourage maximum independence but intervene and supervise when necessary  - Involve family in performance of ADLs  - Assess for home care needs following discharge   - Consider OT consult to assist with ADL evaluation and planning for discharge  - Provide patient education as appropriate  Outcome: Progressing  Goal: Maintains/Returns to pre admission functional level  Description:  INTERVENTIONS:  - Perform AM-PAC 6 Click Basic Mobility/ Daily Activity assessment daily.  - Set and communicate daily mobility goal to care team and patient/family/caregiver.   - Collaborate with rehabilitation services on mobility goals if consulted  - Perform Range of Motion 3 times a day.  - Reposition patient every 2 hours.  - Dangle patient 3 times a day  - Stand patient 3 times a day  - Ambulate patient 3 times a day  - Out of bed to chair 3 times a day   - Out of bed for meals 3 times a day  - Out of bed for toileting  - Record patient progress and toleration of activity level   Outcome: Progressing     Problem: DISCHARGE PLANNING  Goal: Discharge to home or other facility with appropriate resources  Description: INTERVENTIONS:  - Identify barriers to discharge w/patient and caregiver  - Arrange for needed discharge resources and transportation as appropriate  - Identify discharge learning needs (meds, wound care, etc.)  - Arrange for interpretive services to assist at discharge as needed  - Refer to Case Management Department for coordinating discharge planning if the patient needs post-hospital services based on physician/advanced practitioner order or complex needs related to functional status, cognitive ability, or social support system  Outcome: Progressing     Problem: Knowledge Deficit  Goal: Patient/family/caregiver demonstrates understanding of disease process, treatment plan, medications, and discharge instructions  Description: Complete learning assessment and assess knowledge base.  Interventions:  - Provide teaching at level of understanding  - Provide teaching via preferred learning methods  Outcome: Progressing     Problem: Prexisting or High Potential for Compromised Skin Integrity  Goal: Skin integrity is maintained or improved  Description: INTERVENTIONS:  - Identify patients at risk for skin breakdown  - Assess and monitor skin integrity  - Assess and monitor nutrition and hydration  status  - Monitor labs   - Assess for incontinence   - Turn and reposition patient  - Assist with mobility/ambulation  - Relieve pressure over bony prominences  - Avoid friction and shearing  - Provide appropriate hygiene as needed including keeping skin clean and dry  - Evaluate need for skin moisturizer/barrier cream  - Collaborate with interdisciplinary team   - Patient/family teaching  - Consider wound care consult   Outcome: Progressing     Problem: Nutrition/Hydration-ADULT  Goal: Nutrient/Hydration intake appropriate for improving, restoring or maintaining nutritional needs  Description: Monitor and assess patient's nutrition/hydration status for malnutrition. Collaborate with interdisciplinary team and initiate plan and interventions as ordered.  Monitor patient's weight and dietary intake as ordered or per policy. Utilize nutrition screening tool and intervene as necessary. Determine patient's food preferences and provide high-protein, high-caloric foods as appropriate.     INTERVENTIONS:  - Monitor oral intake, urinary output, labs, and treatment plans  - Assess nutrition and hydration status and recommend course of action  - Evaluate amount of meals eaten  - Assist patient with eating if necessary   - Allow adequate time for meals  - Recommend/ encourage appropriate diets, oral nutritional supplements, and vitamin/mineral supplements  - Order, calculate, and assess calorie counts as needed  - Recommend, monitor, and adjust tube feedings and TPN/PPN based on assessed needs  - Assess need for intravenous fluids  - Provide specific nutrition/hydration education as appropriate  - Include patient/family/caregiver in decisions related to nutrition  Outcome: Progressing

## 2024-11-09 LAB
ANION GAP SERPL CALCULATED.3IONS-SCNC: 6 MMOL/L (ref 4–13)
BUN SERPL-MCNC: 18 MG/DL (ref 5–25)
CALCIUM SERPL-MCNC: 7.4 MG/DL (ref 8.4–10.2)
CHLORIDE SERPL-SCNC: 103 MMOL/L (ref 96–108)
CO2 SERPL-SCNC: 25 MMOL/L (ref 21–32)
CREAT SERPL-MCNC: 0.73 MG/DL (ref 0.6–1.3)
ERYTHROCYTE [DISTWIDTH] IN BLOOD BY AUTOMATED COUNT: 15.6 % (ref 11.6–15.1)
GFR SERPL CREATININE-BSD FRML MDRD: 89 ML/MIN/1.73SQ M
GLUCOSE SERPL-MCNC: 118 MG/DL (ref 65–140)
GLUCOSE SERPL-MCNC: 124 MG/DL (ref 65–140)
GLUCOSE SERPL-MCNC: 124 MG/DL (ref 65–140)
GLUCOSE SERPL-MCNC: 131 MG/DL (ref 65–140)
GLUCOSE SERPL-MCNC: 135 MG/DL (ref 65–140)
HCT VFR BLD AUTO: 25.1 % (ref 34.8–46.1)
HGB BLD-MCNC: 8 G/DL (ref 11.5–15.4)
MCH RBC QN AUTO: 30.3 PG (ref 26.8–34.3)
MCHC RBC AUTO-ENTMCNC: 31.9 G/DL (ref 31.4–37.4)
MCV RBC AUTO: 95 FL (ref 82–98)
PLATELET # BLD AUTO: 94 THOUSANDS/UL (ref 149–390)
PMV BLD AUTO: 9.7 FL (ref 8.9–12.7)
POTASSIUM SERPL-SCNC: 4 MMOL/L (ref 3.5–5.3)
RBC # BLD AUTO: 2.64 MILLION/UL (ref 3.81–5.12)
SODIUM SERPL-SCNC: 134 MMOL/L (ref 135–147)
WBC # BLD AUTO: 3.66 THOUSAND/UL (ref 4.31–10.16)

## 2024-11-09 PROCEDURE — 80048 BASIC METABOLIC PNL TOTAL CA: CPT | Performed by: INTERNAL MEDICINE

## 2024-11-09 PROCEDURE — 85027 COMPLETE CBC AUTOMATED: CPT | Performed by: INTERNAL MEDICINE

## 2024-11-09 PROCEDURE — NC001 PR NO CHARGE: Performed by: SURGERY

## 2024-11-09 PROCEDURE — 99232 SBSQ HOSP IP/OBS MODERATE 35: CPT | Performed by: INTERNAL MEDICINE

## 2024-11-09 PROCEDURE — 82948 REAGENT STRIP/BLOOD GLUCOSE: CPT

## 2024-11-09 RX ORDER — PANTOPRAZOLE SODIUM 40 MG/1
40 TABLET, DELAYED RELEASE ORAL
Status: DISCONTINUED | OUTPATIENT
Start: 2024-11-10 | End: 2024-11-12 | Stop reason: HOSPADM

## 2024-11-09 RX ORDER — ACETAMINOPHEN 325 MG/1
975 TABLET ORAL EVERY 8 HOURS SCHEDULED
Status: DISCONTINUED | OUTPATIENT
Start: 2024-11-09 | End: 2024-11-12 | Stop reason: HOSPADM

## 2024-11-09 RX ORDER — CARVEDILOL 12.5 MG/1
12.5 TABLET ORAL 2 TIMES DAILY WITH MEALS
Status: DISCONTINUED | OUTPATIENT
Start: 2024-11-09 | End: 2024-11-12 | Stop reason: HOSPADM

## 2024-11-09 RX ORDER — OXYCODONE HYDROCHLORIDE 5 MG/1
5 TABLET ORAL EVERY 4 HOURS PRN
Status: DISCONTINUED | OUTPATIENT
Start: 2024-11-09 | End: 2024-11-12 | Stop reason: HOSPADM

## 2024-11-09 RX ORDER — BISACODYL 10 MG
10 SUPPOSITORY, RECTAL RECTAL DAILY
Status: DISCONTINUED | OUTPATIENT
Start: 2024-11-09 | End: 2024-11-12 | Stop reason: HOSPADM

## 2024-11-09 RX ADMIN — PANTOPRAZOLE SODIUM 40 MG: 40 INJECTION, POWDER, LYOPHILIZED, FOR SOLUTION INTRAVENOUS at 08:53

## 2024-11-09 RX ADMIN — ACETAMINOPHEN 1000 MG: 10 INJECTION INTRAVENOUS at 00:18

## 2024-11-09 RX ADMIN — HYDROMORPHONE HYDROCHLORIDE 0.5 MG: 1 INJECTION, SOLUTION INTRAMUSCULAR; INTRAVENOUS; SUBCUTANEOUS at 15:53

## 2024-11-09 RX ADMIN — HYDROMORPHONE HYDROCHLORIDE 0.5 MG: 1 INJECTION, SOLUTION INTRAMUSCULAR; INTRAVENOUS; SUBCUTANEOUS at 04:30

## 2024-11-09 RX ADMIN — ENOXAPARIN SODIUM 40 MG: 40 INJECTION SUBCUTANEOUS at 08:52

## 2024-11-09 RX ADMIN — HYDROMORPHONE HYDROCHLORIDE 0.5 MG: 1 INJECTION, SOLUTION INTRAMUSCULAR; INTRAVENOUS; SUBCUTANEOUS at 00:21

## 2024-11-09 RX ADMIN — SODIUM CHLORIDE, SODIUM GLUCONATE, SODIUM ACETATE, POTASSIUM CHLORIDE, MAGNESIUM CHLORIDE, SODIUM PHOSPHATE, DIBASIC, AND POTASSIUM PHOSPHATE 125 ML/HR: .53; .5; .37; .037; .03; .012; .00082 INJECTION, SOLUTION INTRAVENOUS at 04:59

## 2024-11-09 RX ADMIN — METOCLOPRAMIDE 10 MG: 5 INJECTION, SOLUTION INTRAMUSCULAR; INTRAVENOUS at 00:18

## 2024-11-09 RX ADMIN — LEVOTHYROXINE SODIUM 125 MCG: 125 TABLET ORAL at 05:01

## 2024-11-09 RX ADMIN — ACETAMINOPHEN 1000 MG: 10 INJECTION INTRAVENOUS at 17:37

## 2024-11-09 RX ADMIN — BISACODYL 10 MG: 10 SUPPOSITORY RECTAL at 11:53

## 2024-11-09 RX ADMIN — CARVEDILOL 12.5 MG: 12.5 TABLET, FILM COATED ORAL at 17:36

## 2024-11-09 RX ADMIN — METOCLOPRAMIDE 10 MG: 5 INJECTION, SOLUTION INTRAMUSCULAR; INTRAVENOUS at 11:53

## 2024-11-09 RX ADMIN — ACETAMINOPHEN 1000 MG: 10 INJECTION INTRAVENOUS at 12:40

## 2024-11-09 RX ADMIN — HYDROMORPHONE HYDROCHLORIDE 0.5 MG: 1 INJECTION, SOLUTION INTRAMUSCULAR; INTRAVENOUS; SUBCUTANEOUS at 12:03

## 2024-11-09 RX ADMIN — HYDROMORPHONE HYDROCHLORIDE 0.5 MG: 1 INJECTION, SOLUTION INTRAMUSCULAR; INTRAVENOUS; SUBCUTANEOUS at 19:55

## 2024-11-09 RX ADMIN — HYDROMORPHONE HYDROCHLORIDE 0.5 MG: 1 INJECTION, SOLUTION INTRAMUSCULAR; INTRAVENOUS; SUBCUTANEOUS at 08:53

## 2024-11-09 RX ADMIN — METOCLOPRAMIDE 10 MG: 5 INJECTION, SOLUTION INTRAMUSCULAR; INTRAVENOUS at 17:37

## 2024-11-09 RX ADMIN — HYDROMORPHONE HYDROCHLORIDE 0.5 MG: 1 INJECTION, SOLUTION INTRAMUSCULAR; INTRAVENOUS; SUBCUTANEOUS at 21:47

## 2024-11-09 RX ADMIN — METOCLOPRAMIDE 10 MG: 5 INJECTION, SOLUTION INTRAMUSCULAR; INTRAVENOUS at 05:01

## 2024-11-09 RX ADMIN — ACETAMINOPHEN 1000 MG: 10 INJECTION INTRAVENOUS at 05:12

## 2024-11-09 NOTE — PROGRESS NOTES
Progress Note - Hospitalist   Name: Lety Botello 63 y.o. female I MRN: [id number]  Unit/Bed#: E5 -01 I Date of Admission: 11/5/2024   Date of Service: 11/9/2024 I Hospital Day: 4    Assessment & Plan  SBO (small bowel obstruction) (HCC)  Patient admitted to surgery service for recurrent small bowel obstruction, also has a history of gastroparesis  CT showed proximal small bowel obstruction, similar findings reported on prior outside CT on 11/2/2024.  Findings suggestive of small splenic infarct, similar findings reported on prior outside CT on 11/2/2024.  Hepatosplenomegaly  NG tube was removed by surgery 11/7, patient had multiple bowel movements  Evaluated by GI underwent EGD with push enteroscopy 11/8.  EGD with push enteroscopy showed normal esophagus.  Showed gastritis. Duodenum appeared normal. Proximal  jejunum and middle jejunum normal. Large amount of liquid and debris throughout the duodenum and proximal jejunum that was suctioned out, no narrowing or increasing stricture identified finding could be concerning for ongoing partial small bowel obstruction  Continue pain meds, antiemetics  Diet per surgery  Passed flatus, no BM today.  Last BM yesterday  Gastroparesis  Underwent EGD with push enteroscopy today, it showed gastritis.  Large amount of liquid and impress throughout the duodenum and proximal jejunum which was suctioned out, no narrowing or intrinsic stricture identified but findings concerning for ongoing partial small bowel obstruction.  Continue Reglan 10 mg 3 times daily  Consider outpatient gastric emptying study when off opioids  Hypertension  Home regimen is Coreg 12.5 mg twice daily, lisinopril 10 mg daily--was on hold since admission, she is n.p.o.  Blood pressure intermittently elevated due to pain and discomfort  Restart Coreg 12.5 mg twice daily  Type 2 diabetes mellitus (HCC)  Lab Results   Component Value Date    HGBA1C 6.9 (H) 11/05/2024       Recent Labs     11/08/24  1141  11/08/24  1629 11/08/24 2054 11/09/24  0737   POCGLU 119 226* 186* 124       Blood Sugar Average: Last 72 hrs:  (P) [id number]  Home regimen is glargine 60 units in the morning and Humalog sliding scale.  Farxiga 10 mg daily  Currently on clear liquid diet, continue sliding scale  Endometrial cancer (HCC)  History of endometrial cancer grade 1 stage Ia s/p/BSO 6/2020 with recurrent disease in 12/2023 when PET scan revealed enlarged retroperitoneal nodes, underwent radiation which she completed 3/2024  Repeat CT scan in August showed no recurrence of disease  Follows [facility]  Obesity  Lifestyle modification  History of pulmonary embolism  Patient was diagnosed with right leg DVT and pulmonary embolism 6 months ago in May 2024  Currently on Eliquis, restart once cleared by surgery    Hyponatremia  Chronic mild hyponatremia, sodium 134 today  Unclear cause  Continue IV fluids for now  Hypothyroidism  Home regimen levothyroxine 125 mcg daily, restarted  Mild intermittent asthma  Not in acute exacerbation  Continue albuterol inhaler as needed  Gastric dilation    Morbid obesity with BMI of 45.0-49.9, adult (HCC)    Anemia: Reviewing care everywhere her recent baseline hemoglobin has been in the range of 8 and 9.  Hemoglobin 8.0 this morning, continue to monitor    VTE Pharmacologic Prophylaxis:   Moderate Risk (Score 3-4) - Pharmacological DVT Prophylaxis Ordered: enoxaparin (Lovenox).    Mobility:   Basic Mobility Inpatient Raw Score: 21  JH-HLM Goal: 6: Walk 10 steps or more  JH-HLM Achieved: 6: Walk 10 steps or more  JH-HLM Goal achieved. Continue to encourage appropriate mobility.    Patient Centered Rounds:  discussed with nursing    Discussions with Specialists or Other Care Team Provider: Surgery, nursing    Education and Discussions with Family / Patient:  Patient.     Current Length of Stay: 4 day(s)  Current Patient Status: Inpatient   Certification Statement: The patient will continue  to require additional inpatient hospital stay due to small bowel obstruction  Discharge Plan: [facility] is following this patient on consult. They are not yet medically stable for discharge secondary to small bowel obstruction.    Code Status: Level 1 - Full Code    Subjective   Patient seen evaluated bedside, passing gas, no BM today.  Last BM yesterday    Objective :  Temp:  [97.2 °F (36.2 °C)-98.7 °F (37.1 °C)] 98 °F (36.7 °C)  HR:  [] 108  BP: (116-160)/(44-66) 140/66  Resp:  [15-23] 17  SpO2:  [93 %-99 %] 96 %  O2 Device: None (Room air)  Nasal Cannula O2 Flow Rate (L/min):  [2 L/min] 2 L/min    Body mass index is 46.24 kg/m².     Input and Output Summary (last 24 hours):     Intake/Output Summary (Last 24 hours) at 11/9/2024 0959  Last data filed at 11/9/2024 0723  Gross per 24 hour   Intake 700 ml   Output 1200 ml   Net -500 ml       Physical Exam  Constitutional:       General: She is not in acute distress.     Appearance: She is obese.   HENT:      Head: Atraumatic.   Cardiovascular:      Rate and Rhythm: Normal rate and regular rhythm.      Heart sounds: No murmur heard.  Pulmonary:      Effort: Pulmonary effort is normal. No respiratory distress.      Breath sounds: Normal breath sounds. No wheezing.   Abdominal:      General: There is no distension.      Palpations: Abdomen is soft.      Tenderness: There is abdominal tenderness.      Comments: Decreased bowel sounds   Musculoskeletal:         General: No swelling.      Cervical back: Neck supple.   Skin:     General: Skin is warm and dry.   Neurological:      General: No focal deficit present.      Mental Status: She is alert.   Psychiatric:         Mood and Affect: Mood normal.           Lines/Drains:              Lab Results: I have reviewed the following results:   Results from last 7 days   Lab Units 11/09/24  0424 11/07/24  0334   WBC Thousand/uL 3.66* 4.04*   HEMOGLOBIN g/dL 8.0* 8.9*   HEMATOCRIT % 25.1* 28.2*   PLATELETS Thousands/uL 94* 84*    SEGS PCT %  --  69   LYMPHO PCT %  --  13*   MONO PCT %  --  13*   EOS PCT %  --  3     Results from last 7 days   Lab Units 11/09/24  0424 11/06/24  0520 11/05/24  1241   SODIUM mmol/L 134*   < > 131*   POTASSIUM mmol/L 4.0   < > 4.1   CHLORIDE mmol/L 103   < > 100   CO2 mmol/L 25   < > 25   BUN mg/dL 18   < > 22   CREATININE mg/dL 0.73   < > 0.79   ANION GAP mmol/L 6   < > 6   CALCIUM mg/dL 7.4*   < > 8.2*   ALBUMIN g/dL  --   --  3.2*   TOTAL BILIRUBIN mg/dL  --   --  0.42   ALK PHOS U/L  --   --  80   ALT U/L  --   --  9   AST U/L  --   --  11*   GLUCOSE RANDOM mg/dL 135   < > 162*    < > = values in this interval not displayed.         Results from last 7 days   Lab Units 11/09/24  0737 11/08/24  2054 11/08/24  1629 11/08/24  1141 11/08/24  0758 11/07/24  2051 11/07/24  1734 11/07/24  1211 11/07/24  0615 11/06/24  2354 11/06/24  1755 11/06/24  1151   POC GLUCOSE mg/dl 124 186* 226* 119 152* 132 111 130 158* 135 144* 153*     Results from last 7 days   Lab Units 11/05/24  1241   HEMOGLOBIN A1C % 6.9*           Recent Cultures (last 7 days):         Imaging Results Review: I reviewed radiology reports from this admission including: xray(s).  Other Study Results Review: No additional pertinent studies reviewed.    Last 24 Hours Medication List:     Current Facility-Administered Medications:     acetaminophen (Ofirmev) injection 1,000 mg, Q6H SAM, Last Rate: 1,000 mg (11/09/24 0512)    albuterol (PROVENTIL HFA,VENTOLIN HFA) inhaler 2 puff, Q4H PRN    carvedilol (COREG) tablet 12.5 mg, BID With Meals    enoxaparin (LOVENOX) subcutaneous injection 40 mg, Daily    HYDROmorphone (DILAUDID) injection 0.5 mg, Q4H PRN    HYDROmorphone (DILAUDID) injection 0.5 mg, Q4H PRN    HYDROmorphone HCl (DILAUDID) injection 0.2 mg, Q4H PRN    insulin lispro (HumALOG/ADMELOG) 100 units/mL subcutaneous injection 1-6 Units, 4x Daily (AC & HS) **AND** Fingerstick Glucose (POCT), 4x Daily AC and at bedtime    levothyroxine tablet 125  mcg, Early Morning    metoclopramide (REGLAN) injection 10 mg, Q6H SAM    ondansetron (ZOFRAN) injection 4 mg, Q6H PRN    pantoprazole (PROTONIX) injection 40 mg, Q24H SAM    phenol (CHLORASEPTIC) 1.4 % mucosal liquid 1 spray, Q2H PRN    Administrative Statements   Today, Patient Was Seen By: Josiane Rice MD      **Please Note: This note may have been constructed using a voice recognition system.**

## 2024-11-09 NOTE — ASSESSMENT & PLAN NOTE
Previously and being managed for suspected SBO, with admission to [facility] leading up to this hospitalization  GGC showed gastroparesis with EGD and colon  Multiple BMs overnight  Glycemic index well-controlled this hospitalization.  Recent A1c 6.9  GI consulted, appreciate recs, will follow up results of EGD  Scheduled reglan and protonix  Diet following GI EGD  Progression, with tolerating diet, improved appetite, resolved nausea and vomiting

## 2024-11-09 NOTE — PROGRESS NOTES
Patient Name: Lety Botello  Patient MRN: [id number]  Date: 11/09/24  Service: [facility]    Subjective   +bm yesterday; liquidy  +flatus today  Tolerated clears today  Still with lower abd pain    Vitals  Blood pressure 140/66, pulse (!) 108, temperature 98 °F (36.7 °C), temperature source Oral, resp. rate 17, height 5' (1.524 m), weight 107 kg (236 lb 12.4 oz), SpO2 96%.  No distress  Appears older than stated age  Abd is obese.+bs. lower abd pain no rebound.     Laboratory Studies  Results from last 7 days   Lab Units 11/09/24 0424 11/07/24  0334 11/06/24  0520 11/05/24  1241   WBC Thousand/uL 3.66* 4.04* 6.22 5.91   HEMOGLOBIN g/dL 8.0* 8.9* 10.0* 9.5*   HEMATOCRIT % 25.1* 28.2* 30.6* 29.5*   PLATELETS Thousands/uL 94* 84* 91* 94*     Results from last 7 days   Lab Units 11/09/24 0424 11/07/24 0334 11/06/24  0520 11/05/24  1241 11/04/24  0333 11/03/24  0412   SODIUM mmol/L 134* 132* 130* 131* 132* 130*   POTASSIUM mmol/L 4.0 4.2 4.3 4.1 4.7 4.1   CHLORIDE mmol/L 103 102 101 100 101 100   CO2 mmol/L 25 22 23 25 24 25   BUN mg/dL 18 27* 22 22 19 17   CREATININE mg/dL 0.73 0.90 0.86 0.79 0.76 0.73   CALCIUM mg/dL 7.4* 8.0* 8.2* 8.2* 8.6 8.1*   ALBUMIN g/dL  --   --   --  3.2* 3.1* 3.0*   TOTAL BILIRUBIN mg/dL  --   --   --  0.42 0.4 0.4   ALK PHOS U/L  --   --   --  80 71 72   ALT U/L  --   --   --  9 6 7   AST U/L  --   --   --  11* 9 10         Imaging and Other Studies  Egd/enteroscopy reviewed.    Inhouse Medications     Current Facility-Administered Medications:     acetaminophen (Ofirmev) injection 1,000 mg, 1,000 mg, Intravenous, Q6H SAM, 1,000 mg at 11/09/24 0512    albuterol (PROVENTIL HFA,VENTOLIN HFA) inhaler 2 puff, 2 puff, Inhalation, Q4H PRN    carvedilol (COREG) tablet 12.5 mg, 12.5 mg, Oral, BID With Meals    enoxaparin (LOVENOX) subcutaneous injection 40 mg, 40 mg, Subcutaneous, Daily, 40 mg at 11/09/24 0852    HYDROmorphone (DILAUDID) injection 0.5 mg, 0.5 mg, Intravenous, Q4H  PRN, 0.5 mg at 11/09/24 0430    HYDROmorphone (DILAUDID) injection 0.5 mg, 0.5 mg, Intravenous, Q4H PRN, 0.5 mg at 11/09/24 0853    HYDROmorphone HCl (DILAUDID) injection 0.2 mg, 0.2 mg, Intravenous, Q4H PRN    insulin lispro (HumALOG/ADMELOG) 100 units/mL subcutaneous injection 1-6 Units, 1-6 Units, Subcutaneous, 4x Daily (AC & HS), 1 Units at 11/08/24 2220 **AND** Fingerstick Glucose (POCT), , , 4x Daily AC and at bedtime    levothyroxine tablet 125 mcg, 125 mcg, Oral, Early Morning, 125 mcg at 11/09/24 0501    metoclopramide (REGLAN) injection 10 mg, 10 mg, Intravenous, Q6H SAM, 10 mg at 11/09/24 0501    ondansetron (ZOFRAN) injection 4 mg, 4 mg, Intravenous, Q6H PRN, 4 mg at 11/08/24 0333    pantoprazole (PROTONIX) injection 40 mg, 40 mg, Intravenous, Q24H SAM, 40 mg at 11/09/24 0853    phenol (CHLORASEPTIC) 1.4 % mucosal liquid 1 spray, 1 spray, Mouth/Throat, Q2H PRN, 1 spray at 11/06/24 1833      Assessment/Plan:    Pt with pSBO ; suspect from adhesive disease; she has responded to NGT suction; recent egd/enteroscopy did not discover an obstructive lesion but fluid filled bowel suggestive of partial blockage; she is tolerating clears     Continue with clears advance to soft mechanical diet  Antiemetics  If pt continues to have obstructive issues, will ask surgery to give input re: surgical approach (pt has had several prior abd surgeries in years past).  Hgb 8; drifting down but no evidence for bleeding per discussion          Magen Pryor MD

## 2024-11-09 NOTE — ASSESSMENT & PLAN NOTE
History of endometrial cancer grade 1 stage Ia s/p/BSO 6/2020 with recurrent disease in 12/2023 when PET scan revealed enlarged retroperitoneal nodes, underwent radiation which she completed 3/2024  Repeat CT scan in August showed no recurrence of disease  Follows [facility]

## 2024-11-09 NOTE — PLAN OF CARE
Problem: Potential for Falls  Goal: Patient will remain free of falls  Description: INTERVENTIONS:  - Educate patient/family on patient safety including physical limitations  - Instruct patient to call for assistance with activity   - Consult OT/PT to assist with strengthening/mobility   - Keep Call bell within reach  - Keep bed low and locked with side rails adjusted as appropriate  - Keep care items and personal belongings within reach  - Initiate and maintain comfort rounds  - Make Fall Risk Sign visible to staff  - Offer Toileting every 2 Hours, in advance of need  - Initiate/Maintain bedalarm  - Obtain necessary fall risk management equipment  - Apply yellow socks and bracelet for high fall risk patients  - Consider moving patient to room near nurses station  Outcome: Progressing     Problem: PAIN - ADULT  Goal: Verbalizes/displays adequate comfort level or baseline comfort level  Description: Interventions:  - Encourage patient to monitor pain and request assistance  - Assess pain using appropriate pain scale  - Administer analgesics based on type and severity of pain and evaluate response  - Implement non-pharmacological measures as appropriate and evaluate response  - Consider cultural and social influences on pain and pain management  - Notify physician/advanced practitioner if interventions unsuccessful or patient reports new pain  Outcome: Progressing     Problem: INFECTION - ADULT  Goal: Absence or prevention of progression during hospitalization  Description: INTERVENTIONS:  - Assess and monitor for signs and symptoms of infection  - Monitor lab/diagnostic results  - Monitor all insertion sites, i.e. indwelling lines, tubes, and drains  - Monitor endotracheal if appropriate and nasal secretions for changes in amount and color  - [place] appropriate cooling/warming therapies per order  - Administer medications as ordered  - Instruct and encourage patient and family to use good hand hygiene  technique  - Identify and instruct in appropriate isolation precautions for identified infection/condition  Outcome: Progressing  Goal: Absence of fever/infection during neutropenic period  Description: INTERVENTIONS:  - Monitor WBC    Outcome: Progressing     Problem: SAFETY ADULT  Goal: Patient will remain free of falls  Description: INTERVENTIONS:  - Educate patient/family on patient safety including physical limitations  - Instruct patient to call for assistance with activity   - Consult OT/PT to assist with strengthening/mobility   - Keep Call bell within reach  - Keep bed low and locked with side rails adjusted as appropriate  - Keep care items and personal belongings within reach  - Initiate and maintain comfort rounds  - Make Fall Risk Sign visible to staff  - Offer Toileting every 2 Hours, in advance of need  - Initiate/Maintain bedalarm  - Obtain necessary fall risk management equipment  - Apply yellow socks and bracelet for high fall risk patients  - Consider moving patient to room near nurses station  Outcome: Progressing  Goal: Maintain or return to baseline ADL function  Description: INTERVENTIONS:  -  Assess patient's ability to carry out ADLs; assess patient's baseline for ADL function and identify physical deficits which impact ability to perform ADLs (bathing, care of mouth/teeth, toileting, grooming, dressing, etc.)  - Assess/evaluate cause of self-care deficits   - Assess range of motion  - Assess patient's mobility; develop plan if impaired  - Assess patient's need for assistive devices and provide as appropriate  - Encourage maximum independence but intervene and supervise when necessary  - Involve family in performance of ADLs  - Assess for home care needs following discharge   - Consider OT consult to assist with ADL evaluation and planning for discharge  - Provide patient education as appropriate  Outcome: Progressing  Goal: Maintains/Returns to pre admission functional level  Description:  INTERVENTIONS:  - Perform AM-PAC 6 Click Basic Mobility/ Daily Activity assessment daily.  - Set and communicate daily mobility goal to care team and patient/family/caregiver.   - Collaborate with rehabilitation services on mobility goals if consulted  - Perform Range of Motion 3 times a day.  - Reposition patient every 2 hours.  - Dangle patient 3 times a day  - Stand patient 3 times a day  - Ambulate patient 3 times a day  - Out of bed to chair 3 times a day   - Out of bed for meals 3 times a day  - Out of bed for toileting  - Record patient progress and toleration of activity level   Outcome: Progressing     Problem: DISCHARGE PLANNING  Goal: Discharge to home or other facility with appropriate resources  Description: INTERVENTIONS:  - Identify barriers to discharge w/patient and caregiver  - Arrange for needed discharge resources and transportation as appropriate  - Identify discharge learning needs (meds, wound care, etc.)  - Arrange for interpretive services to assist at discharge as needed  - Refer to Case Management Department for coordinating discharge planning if the patient needs post-hospital services based on physician/advanced practitioner order or complex needs related to functional status, cognitive ability, or social support system  Outcome: Progressing     Problem: Knowledge Deficit  Goal: Patient/family/caregiver demonstrates understanding of disease process, treatment plan, medications, and discharge instructions  Description: Complete learning assessment and assess knowledge base.  Interventions:  - Provide teaching at level of understanding  - Provide teaching via preferred learning methods  Outcome: Progressing     Problem: Prexisting or High Potential for Compromised Skin Integrity  Goal: Skin integrity is maintained or improved  Description: INTERVENTIONS:  - Identify patients at risk for skin breakdown  - Assess and monitor skin integrity  - Assess and monitor nutrition and hydration  status  - Monitor labs   - Assess for incontinence   - Turn and reposition patient  - Assist with mobility/ambulation  - Relieve pressure over bony prominences  - Avoid friction and shearing  - Provide appropriate hygiene as needed including keeping skin clean and dry  - Evaluate need for skin moisturizer/barrier cream  - Collaborate with interdisciplinary team   - Patient/family teaching  - Consider wound care consult   Outcome: Progressing     Problem: Nutrition/Hydration-ADULT  Goal: Nutrient/Hydration intake appropriate for improving, restoring or maintaining nutritional needs  Description: Monitor and assess patient's nutrition/hydration status for malnutrition. Collaborate with interdisciplinary team and initiate plan and interventions as ordered.  Monitor patient's weight and dietary intake as ordered or per policy. Utilize nutrition screening tool and intervene as necessary. Determine patient's food preferences and provide high-protein, high-caloric foods as appropriate.     INTERVENTIONS:  - Monitor oral intake, urinary output, labs, and treatment plans  - Assess nutrition and hydration status and recommend course of action  - Evaluate amount of meals eaten  - Assist patient with eating if necessary   - Allow adequate time for meals  - Recommend/ encourage appropriate diets, oral nutritional supplements, and vitamin/mineral supplements  - Order, calculate, and assess calorie counts as needed  - Recommend, monitor, and adjust tube feedings and TPN/PPN based on assessed needs  - Assess need for intravenous fluids  - Provide specific nutrition/hydration education as appropriate  - Include patient/family/caregiver in decisions related to nutrition  Outcome: Progressing

## 2024-11-09 NOTE — ASSESSMENT & PLAN NOTE
Home regimen is Coreg 12.5 mg twice daily, lisinopril 10 mg daily--was on hold since admission, she is n.p.o.  Blood pressure intermittently elevated due to pain and discomfort  Restart Coreg 12.5 mg twice daily

## 2024-11-09 NOTE — ASSESSMENT & PLAN NOTE
Patient admitted to surgery service for recurrent small bowel obstruction, also has a history of gastroparesis  CT showed proximal small bowel obstruction, similar findings reported on prior outside CT on 11/2/2024.  Findings suggestive of small splenic infarct, similar findings reported on prior outside CT on 11/2/2024.  Hepatosplenomegaly  NG tube was removed by surgery 11/7, patient had multiple bowel movements  Evaluated by GI underwent EGD with push enteroscopy 11/8.  EGD with push enteroscopy showed normal esophagus.  Showed gastritis. Duodenum appeared normal. Proximal  jejunum and middle jejunum normal. Large amount of liquid and debris throughout the duodenum and proximal jejunum that was suctioned out, no narrowing or increasing stricture identified finding could be concerning for ongoing partial small bowel obstruction  Continue pain meds, antiemetics  Diet per surgery  Passed flatus, no BM today.  Last BM yesterday

## 2024-11-09 NOTE — ASSESSMENT & PLAN NOTE
Lab Results   Component Value Date    HGBA1C 6.9 (H) 11/05/2024       Recent Labs     11/08/24  1141 11/08/24  1629 11/08/24 2054 11/09/24  0737   POCGLU 119 226* 186* 124       Blood Sugar Average: Last 72 hrs:  (P) 149.4430136310816416  Home regimen is glargine 60 units in the morning and Humalog sliding scale.  Farxiga 10 mg daily  Currently on clear liquid diet, continue sliding scale

## 2024-11-09 NOTE — ASSESSMENT & PLAN NOTE
Lab Results   Component Value Date    HGBA1C 6.9 (H) 11/05/2024       Recent Labs     11/07/24  2051 11/08/24  0758 11/08/24  1141 11/08/24  1629   POCGLU 132 152* 119 226*       Blood Sugar Average: Last 72 hrs:  (P) 149.3451724460548552

## 2024-11-09 NOTE — PLAN OF CARE
Problem: Potential for Falls  Goal: Patient will remain free of falls  Description: INTERVENTIONS:  - Educate patient/family on patient safety including physical limitations  - Instruct patient to call for assistance with activity   - Consult OT/PT to assist with strengthening/mobility   - Keep Call bell within reach  - Keep bed low and locked with side rails adjusted as appropriate  - Keep care items and personal belongings within reach  - Initiate and maintain comfort rounds  - Make Fall Risk Sign visible to staff  - Offer Toileting every 2 Hours, in advance of need  - Initiate/Maintain bedalarm  - Obtain necessary fall risk management equipment  - Apply yellow socks and bracelet for high fall risk patients  - Consider moving patient to room near nurses station  Outcome: Progressing     Problem: PAIN - ADULT  Goal: Verbalizes/displays adequate comfort level or baseline comfort level  Description: Interventions:  - Encourage patient to monitor pain and request assistance  - Assess pain using appropriate pain scale  - Administer analgesics based on type and severity of pain and evaluate response  - Implement non-pharmacological measures as appropriate and evaluate response  - Consider cultural and social influences on pain and pain management  - Notify physician/advanced practitioner if interventions unsuccessful or patient reports new pain  Outcome: Progressing     Problem: INFECTION - ADULT  Goal: Absence or prevention of progression during hospitalization  Description: INTERVENTIONS:  - Assess and monitor for signs and symptoms of infection  - Monitor lab/diagnostic results  - Monitor all insertion sites, i.e. indwelling lines, tubes, and drains  - Monitor endotracheal if appropriate and nasal secretions for changes in amount and color  - Valentine appropriate cooling/warming therapies per order  - Administer medications as ordered  - Instruct and encourage patient and family to use good hand hygiene  technique  - Identify and instruct in appropriate isolation precautions for identified infection/condition  Outcome: Progressing  Goal: Absence of fever/infection during neutropenic period  Description: INTERVENTIONS:  - Monitor WBC    Outcome: Progressing     Problem: SAFETY ADULT  Goal: Patient will remain free of falls  Description: INTERVENTIONS:  - Educate patient/family on patient safety including physical limitations  - Instruct patient to call for assistance with activity   - Consult OT/PT to assist with strengthening/mobility   - Keep Call bell within reach  - Keep bed low and locked with side rails adjusted as appropriate  - Keep care items and personal belongings within reach  - Initiate and maintain comfort rounds  - Make Fall Risk Sign visible to staff  - Offer Toileting every 2 Hours, in advance of need  - Initiate/Maintain bedalarm  - Obtain necessary fall risk management equipment  - Apply yellow socks and bracelet for high fall risk patients  - Consider moving patient to room near nurses station  Outcome: Progressing  Goal: Maintain or return to baseline ADL function  Description: INTERVENTIONS:  -  Assess patient's ability to carry out ADLs; assess patient's baseline for ADL function and identify physical deficits which impact ability to perform ADLs (bathing, care of mouth/teeth, toileting, grooming, dressing, etc.)  - Assess/evaluate cause of self-care deficits   - Assess range of motion  - Assess patient's mobility; develop plan if impaired  - Assess patient's need for assistive devices and provide as appropriate  - Encourage maximum independence but intervene and supervise when necessary  - Involve family in performance of ADLs  - Assess for home care needs following discharge   - Consider OT consult to assist with ADL evaluation and planning for discharge  - Provide patient education as appropriate  Outcome: Progressing  Goal: Maintains/Returns to pre admission functional level  Description:  INTERVENTIONS:  - Perform AM-PAC 6 Click Basic Mobility/ Daily Activity assessment daily.  - Set and communicate daily mobility goal to care team and patient/family/caregiver.   - Collaborate with rehabilitation services on mobility goals if consulted  - Perform Range of Motion 3 times a day.  - Reposition patient every 2 hours.  - Dangle patient 3 times a day  - Stand patient 3 times a day  - Ambulate patient 3 times a day  - Out of bed to chair 3 times a day   - Out of bed for meals 3 times a day  - Out of bed for toileting  - Record patient progress and toleration of activity level   Outcome: Progressing     Problem: DISCHARGE PLANNING  Goal: Discharge to home or other facility with appropriate resources  Description: INTERVENTIONS:  - Identify barriers to discharge w/patient and caregiver  - Arrange for needed discharge resources and transportation as appropriate  - Identify discharge learning needs (meds, wound care, etc.)  - Arrange for interpretive services to assist at discharge as needed  - Refer to Case Management Department for coordinating discharge planning if the patient needs post-hospital services based on physician/advanced practitioner order or complex needs related to functional status, cognitive ability, or social support system  Outcome: Progressing     Problem: Knowledge Deficit  Goal: Patient/family/caregiver demonstrates understanding of disease process, treatment plan, medications, and discharge instructions  Description: Complete learning assessment and assess knowledge base.  Interventions:  - Provide teaching at level of understanding  - Provide teaching via preferred learning methods  Outcome: Progressing     Problem: Prexisting or High Potential for Compromised Skin Integrity  Goal: Skin integrity is maintained or improved  Description: INTERVENTIONS:  - Identify patients at risk for skin breakdown  - Assess and monitor skin integrity  - Assess and monitor nutrition and hydration  status  - Monitor labs   - Assess for incontinence   - Turn and reposition patient  - Assist with mobility/ambulation  - Relieve pressure over bony prominences  - Avoid friction and shearing  - Provide appropriate hygiene as needed including keeping skin clean and dry  - Evaluate need for skin moisturizer/barrier cream  - Collaborate with interdisciplinary team   - Patient/family teaching  - Consider wound care consult   Outcome: Progressing     Problem: Nutrition/Hydration-ADULT  Goal: Nutrient/Hydration intake appropriate for improving, restoring or maintaining nutritional needs  Description: Monitor and assess patient's nutrition/hydration status for malnutrition. Collaborate with interdisciplinary team and initiate plan and interventions as ordered.  Monitor patient's weight and dietary intake as ordered or per policy. Utilize nutrition screening tool and intervene as necessary. Determine patient's food preferences and provide high-protein, high-caloric foods as appropriate.     INTERVENTIONS:  - Monitor oral intake, urinary output, labs, and treatment plans  - Assess nutrition and hydration status and recommend course of action  - Evaluate amount of meals eaten  - Assist patient with eating if necessary   - Allow adequate time for meals  - Recommend/ encourage appropriate diets, oral nutritional supplements, and vitamin/mineral supplements  - Order, calculate, and assess calorie counts as needed  - Recommend, monitor, and adjust tube feedings and TPN/PPN based on assessed needs  - Assess need for intravenous fluids  - Provide specific nutrition/hydration education as appropriate  - Include patient/family/caregiver in decisions related to nutrition  Outcome: Progressing

## 2024-11-09 NOTE — PROGRESS NOTES
The pantoprazole IV has / have been converted to Oral per [facility] IV-to-PO Auto-Conversion Protocol for Adults as approved by the Pharmacy and Therapeutics Committee. The patient met all eligible criteria:  1) Age = > 18 years old   2) Received at least one dose of the IV form   3) Receiving at least one other scheduled oral/enteral medication   4) Tolerating an oral/enteral diet   and did not have any exclusions:   1) Critical care patient   2) Active GI bleed (IF assessing H2RAs or PPIs)   3) Continuous tube feeding (IF assessing cipro, doxycycline, levofloxacin, minocycline, rifampin, or voriconazole)   4) Receiving PO vancomycin (IF assessing metronidazole)   5) Persistent nausea and/or vomiting   6) Ileus or gastrointestinal obstruction   7) Alexus/nasogastric tube set for continuous suction   8) Specific order not to automatically convert to PO (in the order's comments or if discussed in the most recent Infectious Disease or primary team's progress notes).

## 2024-11-09 NOTE — PROGRESS NOTES
Progress Note - Surgery-General   Name: Lety Botello 63 y.o. female I MRN: [id number]  Unit/Bed#: E5 -01 I Date of Admission: 11/5/2024   Date of Service: 11/8/2024 I Hospital Day: 3    Assessment & Plan  Gastroparesis  Previously and being managed for suspected SBO, with admission to [facility] leading up to this hospitalization  GGC showed gastroparesis with EGD and colon  Multiple BMs overnight  Glycemic index well-controlled this hospitalization.  Recent A1c 6.9  GI consulted, appreciate recs, will follow up results of EGD  Scheduled reglan and protonix  Diet following GI EGD  Progression, with tolerating diet, improved appetite, resolved nausea and vomiting    Type 2 diabetes mellitus (HCC)  Lab Results   Component Value Date    HGBA1C 6.9 (H) 11/05/2024       Recent Labs     11/07/24  2051 11/08/24  0758 11/08/24  1141 11/08/24  1629   POCGLU 132 152* 119 226*       Blood Sugar Average: Last 72 hrs:  (P) [id number]    SBO (small bowel obstruction) (HCC)  GGC through colon 11/6  Multiple comorbidities and abdominal surgeries with recurrent SBO.  Patient recently treated at [facility] without resolution of SBO, now returns to ED with similar abdominal symptoms.  CT scan of abdomen and pelvis showing proximal small bowel obstruction similar to findings reported on prior outside CT on 11/2.  Patient with episode of vomiting overnight.  NG tube was replaced with larger 18 Latvian NG tube.  24-hour NG tube output: 600 cc light yellow bilious.    Hypertension    Endometrial cancer (HCC)    Obesity    History of pulmonary embolism    Hyponatremia    Hypothyroidism    Mild intermittent asthma    Gastric dilation    Morbid obesity with BMI of 45.0-49.9, adult (HCC)      Subjective   No nausea or vomiting.  Has an appetite.  Passing gas.  Walking the halls.    Objective :  Temp:  [97.2 °F (36.2 °C)-98.8 °F (37.1 °C)] 98.7 °F (37.1 °C)  HR:  [76-92] 90  BP: (120-150)/(53-77)  135/53  Resp:  [17-23] 17  SpO2:  [93 %-99 %] 93 %  O2 Device: None (Room air)  Nasal Cannula O2 Flow Rate (L/min):  [2 L/min] 2 L/min    I/O         11/07 0701 11/08 0700 11/08 0701 11/09 0700    I.V. (mL/kg)  700 (6.5)    NG/GT      Total Intake(mL/kg)  700 (6.5)    Urine (mL/kg/hr) 1 (0)     Emesis/NG output 100     Stool 0     Total Output 101     Net -101 +700          Unmeasured Urine Occurrence 1 x 1 x    Unmeasured Stool Occurrence 5 x 1 x            Physical Exam  Constitutional:       Appearance: Normal appearance.   HENT:      Head: Normocephalic and atraumatic.      Mouth/Throat:      Mouth: Mucous membranes are moist.   Eyes:      Extraocular Movements: Extraocular movements intact.   Cardiovascular:      Rate and Rhythm: Normal rate and regular rhythm.   Pulmonary:      Effort: Pulmonary effort is normal.   Abdominal:      General: Abdomen is flat.      Palpations: Abdomen is soft.      Tenderness: There is abdominal tenderness.      Comments: Mild suprapubic tenderness   Musculoskeletal:         General: Normal range of motion.      Cervical back: Normal range of motion and neck supple.   Skin:     General: Skin is warm and dry.   Neurological:      General: No focal deficit present.      Mental Status: She is alert and oriented to person, place, and time.

## 2024-11-09 NOTE — ASSESSMENT & PLAN NOTE
Anemia: Reviewing care everywhere her recent baseline hemoglobin has been in the range of 8 and 9.  Hemoglobin 8.0 this morning, continue to monitor

## 2024-11-09 NOTE — ASSESSMENT & PLAN NOTE
GGC through colon 11/6  Multiple comorbidities and abdominal surgeries with recurrent SBO.  Patient recently treated at [facility] without resolution of SBO, now returns to ED with similar abdominal symptoms.  CT scan of abdomen and pelvis showing proximal small bowel obstruction similar to findings reported on prior outside CT on 11/2.  Patient with episode of vomiting overnight.  NG tube was replaced with larger 18 Bangladeshi NG tube.  24-hour NG tube output: 600 cc light yellow bilious.

## 2024-11-10 PROBLEM — K59.00 CONSTIPATION: Status: ACTIVE | Noted: 2024-11-10

## 2024-11-10 LAB
ANION GAP SERPL CALCULATED.3IONS-SCNC: 4 MMOL/L (ref 4–13)
BASOPHILS # BLD AUTO: 0.02 THOUSANDS/ÂΜL (ref 0–0.1)
BASOPHILS NFR BLD AUTO: 0 % (ref 0–1)
BUN SERPL-MCNC: 13 MG/DL (ref 5–25)
CALCIUM SERPL-MCNC: 7.5 MG/DL (ref 8.4–10.2)
CHLORIDE SERPL-SCNC: 104 MMOL/L (ref 96–108)
CO2 SERPL-SCNC: 25 MMOL/L (ref 21–32)
CREAT SERPL-MCNC: 0.61 MG/DL (ref 0.6–1.3)
EOSINOPHIL # BLD AUTO: 0.1 THOUSAND/ÂΜL (ref 0–0.61)
EOSINOPHIL NFR BLD AUTO: 2 % (ref 0–6)
ERYTHROCYTE [DISTWIDTH] IN BLOOD BY AUTOMATED COUNT: 16 % (ref 11.6–15.1)
GFR SERPL CREATININE-BSD FRML MDRD: 98 ML/MIN/1.73SQ M
GLUCOSE SERPL-MCNC: 129 MG/DL (ref 65–140)
GLUCOSE SERPL-MCNC: 136 MG/DL (ref 65–140)
GLUCOSE SERPL-MCNC: 148 MG/DL (ref 65–140)
GLUCOSE SERPL-MCNC: 158 MG/DL (ref 65–140)
GLUCOSE SERPL-MCNC: 182 MG/DL (ref 65–140)
HCT VFR BLD AUTO: 27.9 % (ref 34.8–46.1)
HGB BLD-MCNC: 9 G/DL (ref 11.5–15.4)
IMM GRANULOCYTES # BLD AUTO: 0.05 THOUSAND/UL (ref 0–0.2)
IMM GRANULOCYTES NFR BLD AUTO: 1 % (ref 0–2)
LYMPHOCYTES # BLD AUTO: 0.42 THOUSANDS/ÂΜL (ref 0.6–4.47)
LYMPHOCYTES NFR BLD AUTO: 9 % (ref 14–44)
MAGNESIUM SERPL-MCNC: 2 MG/DL (ref 1.9–2.7)
MCH RBC QN AUTO: 31 PG (ref 26.8–34.3)
MCHC RBC AUTO-ENTMCNC: 32.3 G/DL (ref 31.4–37.4)
MCV RBC AUTO: 96 FL (ref 82–98)
MONOCYTES # BLD AUTO: 0.39 THOUSAND/ÂΜL (ref 0.17–1.22)
MONOCYTES NFR BLD AUTO: 9 % (ref 4–12)
NEUTROPHILS # BLD AUTO: 3.6 THOUSANDS/ÂΜL (ref 1.85–7.62)
NEUTS SEG NFR BLD AUTO: 79 % (ref 43–75)
NRBC BLD AUTO-RTO: 0 /100 WBCS
PLATELET # BLD AUTO: 100 THOUSANDS/UL (ref 149–390)
PMV BLD AUTO: 9.4 FL (ref 8.9–12.7)
POTASSIUM SERPL-SCNC: 3.7 MMOL/L (ref 3.5–5.3)
RBC # BLD AUTO: 2.9 MILLION/UL (ref 3.81–5.12)
SODIUM SERPL-SCNC: 133 MMOL/L (ref 135–147)
WBC # BLD AUTO: 4.58 THOUSAND/UL (ref 4.31–10.16)

## 2024-11-10 PROCEDURE — 85025 COMPLETE CBC W/AUTO DIFF WBC: CPT

## 2024-11-10 PROCEDURE — NC001 PR NO CHARGE: Performed by: SURGERY

## 2024-11-10 PROCEDURE — 99232 SBSQ HOSP IP/OBS MODERATE 35: CPT | Performed by: INTERNAL MEDICINE

## 2024-11-10 PROCEDURE — 82948 REAGENT STRIP/BLOOD GLUCOSE: CPT

## 2024-11-10 PROCEDURE — 83735 ASSAY OF MAGNESIUM: CPT

## 2024-11-10 PROCEDURE — 80048 BASIC METABOLIC PNL TOTAL CA: CPT

## 2024-11-10 RX ORDER — ONDANSETRON 4 MG/1
4 TABLET, FILM COATED ORAL EVERY 8 HOURS PRN
Qty: 20 TABLET | Refills: 0 | Status: ON HOLD | OUTPATIENT
Start: 2024-11-10

## 2024-11-10 RX ORDER — OXYCODONE HYDROCHLORIDE 5 MG/1
5 TABLET ORAL EVERY 4 HOURS PRN
Qty: 8 TABLET | Refills: 0 | Status: ON HOLD | OUTPATIENT
Start: 2024-11-10 | End: 2024-11-20

## 2024-11-10 RX ORDER — PANTOPRAZOLE SODIUM 40 MG/1
40 TABLET, DELAYED RELEASE ORAL
Qty: 90 TABLET | Refills: 0 | Status: ON HOLD | OUTPATIENT
Start: 2024-11-11 | End: 2025-02-09

## 2024-11-10 RX ORDER — BISACODYL 10 MG
10 SUPPOSITORY, RECTAL RECTAL DAILY
Qty: 12 SUPPOSITORY | Refills: 0 | Status: ON HOLD | OUTPATIENT
Start: 2024-11-11

## 2024-11-10 RX ORDER — POTASSIUM CHLORIDE 1500 MG/1
40 TABLET, EXTENDED RELEASE ORAL ONCE
Status: COMPLETED | OUTPATIENT
Start: 2024-11-10 | End: 2024-11-10

## 2024-11-10 RX ORDER — METOCLOPRAMIDE 10 MG/1
10 TABLET ORAL 4 TIMES DAILY
Qty: 120 TABLET | Refills: 0 | Status: ON HOLD | OUTPATIENT
Start: 2024-11-10 | End: 2024-12-10

## 2024-11-10 RX ORDER — CARVEDILOL 12.5 MG/1
12.5 TABLET ORAL 2 TIMES DAILY WITH MEALS
Qty: 120 TABLET | Refills: 0 | Status: ON HOLD | OUTPATIENT
Start: 2024-11-10 | End: 2025-01-09

## 2024-11-10 RX ORDER — METOCLOPRAMIDE 10 MG/1
10 TABLET ORAL
Status: DISCONTINUED | OUTPATIENT
Start: 2024-11-10 | End: 2024-11-12 | Stop reason: HOSPADM

## 2024-11-10 RX ADMIN — ACETAMINOPHEN 975 MG: 325 TABLET, FILM COATED ORAL at 13:39

## 2024-11-10 RX ADMIN — METOCLOPRAMIDE 10 MG: 10 TABLET ORAL at 16:37

## 2024-11-10 RX ADMIN — PANTOPRAZOLE SODIUM 40 MG: 40 TABLET, DELAYED RELEASE ORAL at 05:33

## 2024-11-10 RX ADMIN — ENOXAPARIN SODIUM 40 MG: 40 INJECTION SUBCUTANEOUS at 08:04

## 2024-11-10 RX ADMIN — OXYCODONE HYDROCHLORIDE 5 MG: 5 TABLET ORAL at 11:59

## 2024-11-10 RX ADMIN — OXYCODONE HYDROCHLORIDE 5 MG: 5 TABLET ORAL at 00:37

## 2024-11-10 RX ADMIN — INSULIN LISPRO 1 UNITS: 100 INJECTION, SOLUTION INTRAVENOUS; SUBCUTANEOUS at 16:37

## 2024-11-10 RX ADMIN — ACETAMINOPHEN 975 MG: 325 TABLET, FILM COATED ORAL at 21:10

## 2024-11-10 RX ADMIN — METOCLOPRAMIDE 10 MG: 5 INJECTION, SOLUTION INTRAMUSCULAR; INTRAVENOUS at 00:10

## 2024-11-10 RX ADMIN — OXYCODONE HYDROCHLORIDE 5 MG: 5 TABLET ORAL at 16:38

## 2024-11-10 RX ADMIN — INSULIN LISPRO 1 UNITS: 100 INJECTION, SOLUTION INTRAVENOUS; SUBCUTANEOUS at 11:59

## 2024-11-10 RX ADMIN — CARVEDILOL 12.5 MG: 12.5 TABLET, FILM COATED ORAL at 16:37

## 2024-11-10 RX ADMIN — LEVOTHYROXINE SODIUM 125 MCG: 125 TABLET ORAL at 05:33

## 2024-11-10 RX ADMIN — CARVEDILOL 12.5 MG: 12.5 TABLET, FILM COATED ORAL at 08:03

## 2024-11-10 RX ADMIN — METOCLOPRAMIDE 10 MG: 5 INJECTION, SOLUTION INTRAMUSCULAR; INTRAVENOUS at 05:33

## 2024-11-10 RX ADMIN — OXYCODONE HYDROCHLORIDE 5 MG: 5 TABLET ORAL at 08:03

## 2024-11-10 RX ADMIN — OXYCODONE HYDROCHLORIDE 5 MG: 5 TABLET ORAL at 04:01

## 2024-11-10 RX ADMIN — ACETAMINOPHEN 975 MG: 325 TABLET, FILM COATED ORAL at 05:32

## 2024-11-10 RX ADMIN — POTASSIUM CHLORIDE 40 MEQ: 1500 TABLET, EXTENDED RELEASE ORAL at 08:04

## 2024-11-10 RX ADMIN — METOCLOPRAMIDE 10 MG: 10 TABLET ORAL at 11:59

## 2024-11-10 NOTE — ASSESSMENT & PLAN NOTE
History of endometrial cancer grade 1 stage Ia s/p/BSO 6/2020 with recurrent disease in 12/2023 when PET scan revealed enlarged retroperitoneal nodes, underwent radiation which she completed 3/2024  Repeat CT scan in August showed no recurrence of disease  Follows [facility]

## 2024-11-10 NOTE — ASSESSMENT & PLAN NOTE
Previously managed for partial SBO.  Subsequently found to have gastroparesis.  GLP-1 was held, scheduled Reglan and Protonix were started  Outpatient gastric emptying study  Follow-up with GI and PCP  Plan was for DC yesterday. However, had weakness concerns for being able to navigate stairs to get generally. Unable to complete PT re-eval for stairs necessary to get into her home. Will have PT re-eval today  Resume eliquis (previous PE) on DC

## 2024-11-10 NOTE — PROGRESS NOTES
"Patient Name: Lety Botello  Patient MRN: [id number]  Date: 11/10/24  Service: [facility]    Subjective   Pt tolerating intake.  Sitting in bedside chair  Not wanting to go home today; per phyical therapy note; 'no barriers to d/c home\"    Vitals  Blood pressure 113/50, pulse 76, temperature 98.3 °F (36.8 °C), temperature source Oral, resp. rate 16, height 5' (1.524 m), weight 107 kg (236 lb 12.4 oz), SpO2 93%.  Somewhat upset with potentially going home today  No distress but tearful when talking about her son (getting treatment for addiction)  Abd is benign with obesity. And no significant tenderness.    Laboratory Studies  Results from last 7 days   Lab Units 11/10/24  0453 11/09/24  0424 11/07/24  0334 11/06/24  0520 11/05/24  1241   WBC Thousand/uL 4.58 3.66* 4.04* 6.22 5.91   HEMOGLOBIN g/dL 9.0* 8.0* 8.9* 10.0* 9.5*   HEMATOCRIT % 27.9* 25.1* 28.2* 30.6* 29.5*   PLATELETS Thousands/uL 100* 94* 84* 91* 94*     Results from last 7 days   Lab Units 11/10/24  0453 11/09/24  0424 11/07/24  0334 11/06/24  0520 11/05/24  1241 11/04/24  0333   SODIUM mmol/L 133* 134* 132* 130* 131* 132*   POTASSIUM mmol/L 3.7 4.0 4.2 4.3 4.1 4.7   CHLORIDE mmol/L 104 103 102 101 100 101   CO2 mmol/L 25 25 22 23 25 24   BUN mg/dL 13 18 27* 22 22 19   CREATININE mg/dL 0.61 0.73 0.90 0.86 0.79 0.76   CALCIUM mg/dL 7.5* 7.4* 8.0* 8.2* 8.2* 8.6   ALBUMIN g/dL  --   --   --   --  3.2* 3.1*   TOTAL BILIRUBIN mg/dL  --   --   --   --  0.42 0.4   ALK PHOS U/L  --   --   --   --  80 71   ALT U/L  --   --   --   --  9 6   AST U/L  --   --   --   --  11* 9         Imaging and Other Studies      Inhouse Medications     Current Facility-Administered Medications:     acetaminophen (TYLENOL) tablet 975 mg, 975 mg, Oral, Q8H SAM, 975 mg at 11/10/24 1339    albuterol (PROVENTIL HFA,VENTOLIN HFA) inhaler 2 puff, 2 puff, Inhalation, Q4H PRN    bisacodyl (DULCOLAX) rectal suppository 10 mg, 10 mg, Rectal, Daily, 10 mg at 11/09/24 1153    " carvedilol (COREG) tablet 12.5 mg, 12.5 mg, Oral, BID With Meals, 12.5 mg at 11/10/24 0803    enoxaparin (LOVENOX) subcutaneous injection 40 mg, 40 mg, Subcutaneous, Daily, 40 mg at 11/10/24 0804    insulin lispro (HumALOG/ADMELOG) 100 units/mL subcutaneous injection 1-6 Units, 1-6 Units, Subcutaneous, 4x Daily (AC & HS), 1 Units at 11/10/24 1159 **AND** Fingerstick Glucose (POCT), , , 4x Daily AC and at bedtime    levothyroxine tablet 125 mcg, 125 mcg, Oral, Early Morning, 125 mcg at 11/10/24 0533    metoclopramide (REGLAN) tablet 10 mg, 10 mg, Oral, TID AC, 10 mg at 11/10/24 1159    ondansetron (ZOFRAN) injection 4 mg, 4 mg, Intravenous, Q6H PRN, 4 mg at 11/08/24 0333    oxyCODONE (ROXICODONE) IR tablet 5 mg, 5 mg, Oral, Q4H PRN, 5 mg at 11/10/24 1159    oxyCODONE (ROXICODONE) split tablet 2.5 mg, 2.5 mg, Oral, Q4H PRN    pantoprazole (PROTONIX) EC tablet 40 mg, 40 mg, Oral, Early Morning, 40 mg at 11/10/24 0533      Assessment/Plan:    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 ; low residue diet suggested.  Discharge home suggested by surgery service; hopefully does not surgery for this going forward  Will sign off          Magen Pryor MD

## 2024-11-10 NOTE — PLAN OF CARE
Problem: Potential for Falls  Goal: Patient will remain free of falls  Description: INTERVENTIONS:  - Educate patient/family on patient safety including physical limitations  - Instruct patient to call for assistance with activity   - Consult OT/PT to assist with strengthening/mobility   - Keep Call bell within reach  - Keep bed low and locked with side rails adjusted as appropriate  - Keep care items and personal belongings within reach  - Initiate and maintain comfort rounds  - Make Fall Risk Sign visible to staff  - Offer Toileting every 2 Hours, in advance of need  - Initiate/Maintain bedalarm  - Obtain necessary fall risk management equipment  - Apply yellow socks and bracelet for high fall risk patients  - Consider moving patient to room near nurses station  Outcome: Progressing     Problem: PAIN - ADULT  Goal: Verbalizes/displays adequate comfort level or baseline comfort level  Description: Interventions:  - Encourage patient to monitor pain and request assistance  - Assess pain using appropriate pain scale  - Administer analgesics based on type and severity of pain and evaluate response  - Implement non-pharmacological measures as appropriate and evaluate response  - Consider cultural and social influences on pain and pain management  - Notify physician/advanced practitioner if interventions unsuccessful or patient reports new pain  Outcome: Progressing     Problem: INFECTION - ADULT  Goal: Absence or prevention of progression during hospitalization  Description: INTERVENTIONS:  - Assess and monitor for signs and symptoms of infection  - Monitor lab/diagnostic results  - Monitor all insertion sites, i.e. indwelling lines, tubes, and drains  - Monitor endotracheal if appropriate and nasal secretions for changes in amount and color  - [place] appropriate cooling/warming therapies per order  - Administer medications as ordered  - Instruct and encourage patient and family to use good hand hygiene  technique  - Identify and instruct in appropriate isolation precautions for identified infection/condition  Outcome: Progressing  Goal: Absence of fever/infection during neutropenic period  Description: INTERVENTIONS:  - Monitor WBC    Outcome: Progressing     Problem: SAFETY ADULT  Goal: Patient will remain free of falls  Description: INTERVENTIONS:  - Educate patient/family on patient safety including physical limitations  - Instruct patient to call for assistance with activity   - Consult OT/PT to assist with strengthening/mobility   - Keep Call bell within reach  - Keep bed low and locked with side rails adjusted as appropriate  - Keep care items and personal belongings within reach  - Initiate and maintain comfort rounds  - Make Fall Risk Sign visible to staff  - Offer Toileting every 2 Hours, in advance of need  - Initiate/Maintain bedalarm  - Obtain necessary fall risk management equipment  - Apply yellow socks and bracelet for high fall risk patients  - Consider moving patient to room near nurses station  Outcome: Progressing  Goal: Maintain or return to baseline ADL function  Description: INTERVENTIONS:  -  Assess patient's ability to carry out ADLs; assess patient's baseline for ADL function and identify physical deficits which impact ability to perform ADLs (bathing, care of mouth/teeth, toileting, grooming, dressing, etc.)  - Assess/evaluate cause of self-care deficits   - Assess range of motion  - Assess patient's mobility; develop plan if impaired  - Assess patient's need for assistive devices and provide as appropriate  - Encourage maximum independence but intervene and supervise when necessary  - Involve family in performance of ADLs  - Assess for home care needs following discharge   - Consider OT consult to assist with ADL evaluation and planning for discharge  - Provide patient education as appropriate  Outcome: Progressing  Goal: Maintains/Returns to pre admission functional level  Description:  INTERVENTIONS:  - Perform AM-PAC 6 Click Basic Mobility/ Daily Activity assessment daily.  - Set and communicate daily mobility goal to care team and patient/family/caregiver.   - Collaborate with rehabilitation services on mobility goals if consulted  - Perform Range of Motion 3 times a day.  - Reposition patient every 2 hours.  - Dangle patient 3 times a day  - Stand patient 3 times a day  - Ambulate patient 3 times a day  - Out of bed to chair 3 times a day   - Out of bed for meals 3 times a day  - Out of bed for toileting  - Record patient progress and toleration of activity level   Outcome: Progressing     Problem: DISCHARGE PLANNING  Goal: Discharge to home or other facility with appropriate resources  Description: INTERVENTIONS:  - Identify barriers to discharge w/patient and caregiver  - Arrange for needed discharge resources and transportation as appropriate  - Identify discharge learning needs (meds, wound care, etc.)  - Arrange for interpretive services to assist at discharge as needed  - Refer to Case Management Department for coordinating discharge planning if the patient needs post-hospital services based on physician/advanced practitioner order or complex needs related to functional status, cognitive ability, or social support system  Outcome: Progressing     Problem: Knowledge Deficit  Goal: Patient/family/caregiver demonstrates understanding of disease process, treatment plan, medications, and discharge instructions  Description: Complete learning assessment and assess knowledge base.  Interventions:  - Provide teaching at level of understanding  - Provide teaching via preferred learning methods  Outcome: Progressing     Problem: Prexisting or High Potential for Compromised Skin Integrity  Goal: Skin integrity is maintained or improved  Description: INTERVENTIONS:  - Identify patients at risk for skin breakdown  - Assess and monitor skin integrity  - Assess and monitor nutrition and hydration  status  - Monitor labs   - Assess for incontinence   - Turn and reposition patient  - Assist with mobility/ambulation  - Relieve pressure over bony prominences  - Avoid friction and shearing  - Provide appropriate hygiene as needed including keeping skin clean and dry  - Evaluate need for skin moisturizer/barrier cream  - Collaborate with interdisciplinary team   - Patient/family teaching  - Consider wound care consult   Outcome: Progressing     Problem: Nutrition/Hydration-ADULT  Goal: Nutrient/Hydration intake appropriate for improving, restoring or maintaining nutritional needs  Description: Monitor and assess patient's nutrition/hydration status for malnutrition. Collaborate with interdisciplinary team and initiate plan and interventions as ordered.  Monitor patient's weight and dietary intake as ordered or per policy. Utilize nutrition screening tool and intervene as necessary. Determine patient's food preferences and provide high-protein, high-caloric foods as appropriate.     INTERVENTIONS:  - Monitor oral intake, urinary output, labs, and treatment plans  - Assess nutrition and hydration status and recommend course of action  - Evaluate amount of meals eaten  - Assist patient with eating if necessary   - Allow adequate time for meals  - Recommend/ encourage appropriate diets, oral nutritional supplements, and vitamin/mineral supplements  - Order, calculate, and assess calorie counts as needed  - Recommend, monitor, and adjust tube feedings and TPN/PPN based on assessed needs  - Assess need for intravenous fluids  - Provide specific nutrition/hydration education as appropriate  - Include patient/family/caregiver in decisions related to nutrition  Outcome: Progressing

## 2024-11-10 NOTE — ASSESSMENT & PLAN NOTE
Patient admitted to surgery service for recurrent small bowel obstruction, also has a history of gastroparesis  CT showed proximal small bowel obstruction, similar findings reported on prior outside CT on 11/2/2024.  Findings suggestive of small splenic infarct, similar findings reported on prior outside CT on 11/2/2024.  Hepatosplenomegaly  NG tube was removed by surgery 11/7, patient had multiple bowel movements  Evaluated by GI underwent EGD with push enteroscopy 11/8.  EGD with push enteroscopy showed normal esophagus.  Showed gastritis. Duodenum appeared normal. Proximal  jejunum and middle jejunum normal. Large amount of liquid and debris throughout the duodenum and proximal jejunum that was suctioned out, no narrowing or increasing stricture identified finding could be concerning for ongoing partial small bowel obstruction  Continue pain meds, antiemetics  Diet per surgery  Passing flatus, had BM

## 2024-11-10 NOTE — DISCHARGE SUMMARY
Discharge Summary - Surgery-General   Name: Lety Jaimes y.o. female I MRN: [id number]  Unit/Bed#: E5 -01 I Date of Admission: 11/5/2024   Date of Service: 11/10/2024 I Hospital Day: 5        Discharge Summary - Surgery  Lety Jaimes y.o. female MRN: [id number]  Unit/Bed#: E5 -01 Encounter: [id number]    Admission Date: 11/5/2024     Discharge Date: 11/10/24    Admitting Diagnosis: Small bowel obstruction (HCC) [K56.609]  Abdominal pain [R10.9]  Type 2 diabetes mellitus without complication, unspecified whether long term insulin use (HCC) [E11.9]    Discharge Diagnosis: Principal Problem:    Gastroparesis  Active Problems:    SBO (small bowel obstruction) (HCC)    Hypertension    Type 2 diabetes mellitus (HCC)    Endometrial cancer (HCC)    Obesity    History of pulmonary embolism    Hyponatremia    Hypothyroidism    Mild intermittent asthma    Gastric dilation    Morbid obesity with BMI of 45.0-49.9, adult (HCC)  Resolved Problems:    * No resolved hospital problems. *      Attending and Service:   Andrea Senior MD;    Procedures Performed:   none    Hospital Course:   Lety Jaimes y.o. female with a hx of DM2, multiple SBOs, multiple abdominal surgeries including appendectomy, sigmoidectomy, cholecystectomy, radiation of endometrial cancer, who presented with a partial SBO. Her partial SBO resolved spontaneously and she was able to pass a gastrografin challenge. However, she was noted to have gastroparesis with intermittent abdominal symptoms. She will need a follow up with a gastric emptying study and further GI and primary care follow up.    Patient was discharged on HD#5. On the day of discharge, the patient was voiding spontaneously, tolerating nutrition, and pain was well controlled, no longer requiring inpatient hospitalization. She understood all instructions for discharge. She was also given the names and numbers of the providers as well as instructions for follow up  appointments.    Condition at Discharge: good    Discharge instructions/Information to patient and family:   See after visit summary for information provided to patient and family.    Provisions for Follow-Up Care:  See after visit summary for information related to follow-up care and any pertinent home health orders.      Disposition: home    Planned Readmission: No    Discharge Statement   I spent 30 minutes discharging the patient. This time was spent on the day of discharge. I had direct contact with the patient on the day of discharge. Additional documentation is required if more than 30 minutes were spent on discharge.     Discharge Medications:  See after visit summary for reconciled discharge medications provided to patient and family.      Arslan Glasgow MD  11/10/2024  9:28 AM

## 2024-11-10 NOTE — ASSESSMENT & PLAN NOTE
Underwent EGD with push enteroscopy today, it showed gastritis.  Large amount of liquid and impress throughout the duodenum and proximal jejunum which was suctioned out, no narrowing or intrinsic stricture identified but findings concerning for ongoing partial small bowel obstruction.  Continue Reglan 10 mg 3-4 times daily  Outpatient gastric emptying study

## 2024-11-10 NOTE — ASSESSMENT & PLAN NOTE
Lab Results   Component Value Date    HGBA1C 6.9 (H) 11/05/2024       Recent Labs     11/09/24 2057 11/10/24  0758 11/10/24  1104 11/10/24  1628   POCGLU 131 136 158* 182*       Blood Sugar Average: Last 72 hrs:  (P) 145.8

## 2024-11-10 NOTE — ASSESSMENT & PLAN NOTE
Home regimen is Coreg 12.5 mg twice daily, lisinopril 10 mg daily--continue on discharge  Recommend to check blood pressure daily and keep a blood pressure log and follow-up with PCP

## 2024-11-10 NOTE — ASSESSMENT & PLAN NOTE
Lab Results   Component Value Date    HGBA1C 6.9 (H) 11/05/2024       Recent Labs     11/09/24  1125 11/09/24  1514 11/09/24  2057 11/10/24  0758   POCGLU 118 124 131 136       Blood Sugar Average: Last 72 hrs:  (P) 142.8842821048781621  Home regimen is glargine 60 units in the morning and Humalog sliding scale.  Farxiga 10 mg daily  No GLP-1 on discharge.  On home medication list there is Trulicity and Mounjaro both discontinued  Currently patient is on sliding scale.  Patient states she knows how to manage her diabetes she has been a diabetic for a long time.  Continue sliding scale for now until oral intake improves.  Recommend to check blood sugar 4 times a day, if blood sugars above 180 slowly restart Lantus  Outpatient follow-up with endocrinology

## 2024-11-10 NOTE — PROGRESS NOTES
Progress Note - Surgery-General   Name: Lety Botello 63 y.o. female I MRN: [id number]  Unit/Bed#: E5 -01 I Date of Admission: 11/5/2024   Date of Service: 11/10/2024 I Hospital Day: 5     Assessment & Plan  Gastroparesis  Previously and being managed for suspected SBO, with admission to [facility] leading up to this hospitalization  GGC with evidence of gastroparesis  - Scheduled reglan and protonix  - Advance to post-gastrectomy diet  - GI consulted, appreciate recs  - Gastric emptying study  - Okay to resume eliquis    Type 2 diabetes mellitus (HCC)  Lab Results   Component Value Date    HGBA1C 6.9 (H) 11/05/2024       Recent Labs     11/09/24  1125 11/09/24  1514 11/09/24  2057 11/10/24  0758   POCGLU 118 124 131 136       Blood Sugar Average: Last 72 hrs:  (P) 142.6906302928418920    SBO (small bowel obstruction) (HCC)  - Resolved    History of pulmonary embolism  - Restart DOAC    Please contact the SecureChat role for the Surgery-General service with any questions/concerns.    Subjective   No acute events overnight. Afebrile, hemodynamically stable. Tolerating dinner. No nausea, or vomiting, fevers or chills over night.      Objective :  Temp:  [97.9 °F (36.6 °C)-98.3 °F (36.8 °C)] 98.3 °F (36.8 °C)  HR:  [73-80] 78  BP: (112-143)/(49-93) 112/49  Resp:  [15-18] 18  SpO2:  [91 %-97 %] 93 %  O2 Device: None (Room air)  Nasal Cannula O2 Flow Rate (L/min):  [2 L/min] 2 L/min    I/O         11/08 0701  11/09 0700 11/09 0701  11/10 0700 11/10 0701  11/11 0700    P.O.  340 240    I.V. (mL/kg) 700 (6.5)      Total Intake(mL/kg) 700 (6.5) 340 (3.2) 240 (2.2)    Urine (mL/kg/hr) 600 (0.2) 700 (0.3)     Emesis/NG output       Stool  0     Total Output 600 700     Net +100 -360 +240           Unmeasured Urine Occurrence 1 x 3 x     Unmeasured Stool Occurrence 1 x 3 x               Physical Exam:  General: No acute distress, alert and oriented  CV: Well perfused, regular rate and rhythm  Lungs: Normal work of  breathing, no increased respiratory effort  Abdomen: Soft, mildly tender, non-distended  Extremities: No edema, clubbing or cyanosis  Skin: Warm, dry      Lab Results: I have reviewed the following results:  Recent Labs     11/10/24  0453   WBC 4.58   HGB 9.0*   HCT 27.9*   *   SODIUM 133*   K 3.7      CO2 25   BUN 13   CREATININE 0.61   GLUC 129   MG 2.0       Imaging Results Review: No pertinent imaging studies reviewed.  Other Study Results Review: No additional pertinent studies reviewed.    VTE Pharmacologic Prophylaxis: VTE covered by:  enoxaparin, Subcutaneous, 40 mg at 11/10/24 0804     VTE Mechanical Prophylaxis: sequential compression device    Orlando Oshea MD  General Surgery   11/10/24

## 2024-11-10 NOTE — ASSESSMENT & PLAN NOTE
Patient was diagnosed with right leg DVT and pulmonary embolism 6 months ago in May 2024  Restart Eliquis 5 mg twice daily

## 2024-11-10 NOTE — ASSESSMENT & PLAN NOTE
Previously and being managed for suspected SBO, with admission to [facility] leading up to this hospitalization  GGC with evidence of gastroparesis  - Scheduled reglan and protonix  - Advance to post-gastrectomy diet  - GI consulted, appreciate recs  - Gastric emptying study  - Okay to resume eliquis

## 2024-11-10 NOTE — DISCHARGE INSTR - AVS FIRST PAGE
Acute Care Surgery Discharge Instructions    Please follow-up as instructed or on an as needed basis. If you need a follow-up appointment, please call the office when you leave to schedule an appointment.    Activity:  - You may resume activity as tolerated.    Return to work:    - You are clear to return to work on discharge.    Diet:    - You may resume your normal diet.    Medications:  - You should continue your current medication regimenafter discharge unless otherwise instructed. Please refer to your discharge medication list for further details.  - Please take the pain medications as directed.  - You are encouraged to use non-narcotic pain medications first and whenever possible. Reserve the use of narcotic pain medication for moderate to severe pain not controlled by non-narcotic medications.  - No driving while taking narcotic pain medications.  - You may become constipated, especially if taking pain medications. You may take any over the counter stool softeners or laxatives as needed. Examples: Milk of Magnesia, Colace, Senna.    Additional Instructions:  - May shower daily and/or bathe normally.  - If you have any questions or concerns after discharge please call the office.  - Call office or return to ER if fever greater than 101, chills, persistent nausea/vomiting, and/or worsening/uncontrollable pain.      For history of DVT and pulmonary embolism continue Eliquis 5 mg twice daily.  For blood pressure continue Coreg 12.5 mg twice daily and lisinopril 10 mg daily.  Recommend to check your blood pressure on a daily basis, keep a blood pressure log and follow-up with your primary care doctor.  Continue levothyroxine 125 mcg daily for hypothyroidism  Your home insulin regimen is Lantus 60 units at bedtime with Humalog 8 units 3 times a day with meal.  You were only on sliding scale while you were in the hospital.  On discharge please continue sliding scale for now, check your blood sugar 4 times a day.  If  your blood sugar is above 180 slowly restart Lantus 10 units and then increase by 10 units every day.  If your blood sugar is much higher in high 200s and 300s increase your Lantus more rapidly.  Please keep a blood sugar log and call your primary care doctor or endocrinologist for further adjustments.  Please do not take Trulicity or Mounjaro--since both can worsen your gastroparesis.  Please call your endocrinologist tomorrow and make a follow-up appointment

## 2024-11-10 NOTE — ASSESSMENT & PLAN NOTE
Lab Results   Component Value Date    HGBA1C 6.9 (H) 11/05/2024       Recent Labs     11/09/24  1125 11/09/24  1514 11/09/24  2057 11/10/24  0758   POCGLU 118 124 131 136       Blood Sugar Average: Last 72 hrs:  (P) 142.6917772779913956

## 2024-11-10 NOTE — RESTORATIVE TECHNICIAN NOTE
Restorative Technician Note      Patient Name: Lety Botello     Restorative Tech Visit Date: 11/10/24  Note Type: Mobility  Patient Position Upon Consult: Bedside chair  Activity Performed: Ambulated  Assistive Device: Roller walker  Patient Position at End of Consult: All needs within reach; Supine

## 2024-11-10 NOTE — PROGRESS NOTES
Progress Note - Hospitalist   Name: Lety Botello 63 y.o. female I MRN: [id number]  Unit/Bed#: E5 -01 I Date of Admission: 11/5/2024   Date of Service: 11/10/2024 I Hospital Day: 5    Assessment & Plan  SBO (small bowel obstruction) (HCC)  Patient admitted to surgery service for recurrent small bowel obstruction, also has a history of gastroparesis  CT showed proximal small bowel obstruction, similar findings reported on prior outside CT on 11/2/2024.  Findings suggestive of small splenic infarct, similar findings reported on prior outside CT on 11/2/2024.  Hepatosplenomegaly  NG tube was removed by surgery 11/7, patient had multiple bowel movements  Evaluated by GI underwent EGD with push enteroscopy 11/8.  EGD with push enteroscopy showed normal esophagus.  Showed gastritis. Duodenum appeared normal. Proximal  jejunum and middle jejunum normal. Large amount of liquid and debris throughout the duodenum and proximal jejunum that was suctioned out, no narrowing or increasing stricture identified finding could be concerning for ongoing partial small bowel obstruction  Continue pain meds, antiemetics  Diet per surgery  Passing flatus, had BM  Gastroparesis  Underwent EGD with push enteroscopy today, it showed gastritis.  Large amount of liquid and impress throughout the duodenum and proximal jejunum which was suctioned out, no narrowing or intrinsic stricture identified but findings concerning for ongoing partial small bowel obstruction.  Continue Reglan 10 mg 3-4 times daily  Outpatient gastric emptying study   Hypertension  Home regimen is Coreg 12.5 mg twice daily, lisinopril 10 mg daily--continue on discharge  Recommend to check blood pressure daily and keep a blood pressure log and follow-up with PCP   Type 2 diabetes mellitus (HCC)  Lab Results   Component Value Date    HGBA1C 6.9 (H) 11/05/2024       Recent Labs     11/09/24  1125 11/09/24  1514 11/09/24 2057 11/10/24  0758   POCGLU 118 124 131 136        Blood Sugar Average: Last 72 hrs:  (P) [id number]  Home regimen is glargine 60 units in the morning and Humalog sliding scale.  Farxiga 10 mg daily  No GLP-1 on discharge.  On home medication list there is Trulicity and Mounjaro both discontinued  Currently patient is on sliding scale.  Patient states she knows how to manage her diabetes she has been a diabetic for a long time.  Continue sliding scale for now until oral intake improves.  Recommend to check blood sugar 4 times a day, if blood sugars above 180 slowly restart Lantus  Outpatient follow-up with endocrinology    Endometrial cancer (HCC)  History of endometrial cancer grade 1 stage Ia s/p/BSO 6/2020 with recurrent disease in 12/2023 when PET scan revealed enlarged retroperitoneal nodes, underwent radiation which she completed 3/2024  Repeat CT scan in August showed no recurrence of disease  Follows [facility] oncology  Obesity  Lifestyle modification  History of pulmonary embolism  Patient was diagnosed with right leg DVT and pulmonary embolism 6 months ago in May 2024  Restart Eliquis 5 mg twice daily    Hyponatremia  Chronic mild hyponatremia, sodium 133  Encourage oral intake  Hypothyroidism  Home regimen levothyroxine 125 mcg daily, continue  Mild intermittent asthma  Not in acute exacerbation  Continue albuterol inhaler as needed  Gastric dilation    Constipation      VTE Pharmacologic Prophylaxis:   Moderate Risk (Score 3-4) - Pharmacological DVT Prophylaxis Ordered: enoxaparin (Lovenox).    Mobility:   Basic Mobility Inpatient Raw Score: 21  JH-HLM Goal: 6: Walk 10 steps or more  JH-HLM Achieved: 4: Move to chair/commode  JH-HLM Goal achieved. Continue to encourage appropriate mobility.    Patient Centered Rounds:  discussed with nursing    Discussions with Specialists or Other Care Team Provider: Surgery, nursing    Education and Discussions with Family / Patient:  Nursing.     Current Length of Stay: 5 day(s)  Current Patient  Status: Inpatient   Certification Statement:  Stable for discharge from medicine standpoint  Discharge Plan: [facility] is following this patient on consult. They are medically stable for discharge when deemed appropriate by primary service.    Code Status: Level 1 - Full Code    Subjective   Patient seen evaluated bedside, feeling better than yesterday, had a bowel movement    Objective :  Temp:  [97.9 °F (36.6 °C)-98.3 °F (36.8 °C)] 98.3 °F (36.8 °C)  HR:  [73-80] 78  BP: (112-143)/(49-93) 112/49  Resp:  [15-18] 18  SpO2:  [91 %-97 %] 93 %  O2 Device: None (Room air)  Nasal Cannula O2 Flow Rate (L/min):  [2 L/min] 2 L/min    Body mass index is 46.24 kg/m².     Input and Output Summary (last 24 hours):     Intake/Output Summary (Last 24 hours) at 11/10/2024 1023  Last data filed at 11/10/2024 0859  Gross per 24 hour   Intake 580 ml   Output 100 ml   Net 480 ml       Physical Exam  Constitutional:       General: She is not in acute distress.     Appearance: She is obese.   HENT:      Head: Atraumatic.   Cardiovascular:      Rate and Rhythm: Normal rate and regular rhythm.      Heart sounds: No murmur heard.  Pulmonary:      Effort: Pulmonary effort is normal.      Breath sounds: Normal breath sounds.   Abdominal:      General: Bowel sounds are normal. There is no distension.      Palpations: Abdomen is soft.      Tenderness: There is no abdominal tenderness.   Musculoskeletal:         General: No swelling.      Cervical back: Neck supple.   Skin:     General: Skin is warm and dry.   Neurological:      General: No focal deficit present.      Mental Status: She is alert.   Psychiatric:         Mood and Affect: Mood normal.           Lines/Drains:              Lab Results: I have reviewed the following results:   Results from last 7 days   Lab Units 11/10/24  0453   WBC Thousand/uL 4.58   HEMOGLOBIN g/dL 9.0*   HEMATOCRIT % 27.9*   PLATELETS Thousands/uL 100*   SEGS PCT % 79*   LYMPHO PCT % 9*   MONO PCT % 9   EOS PCT % 2      Results from last 7 days   Lab Units 11/10/24  0453 11/06/24  0520 11/05/24  1241   SODIUM mmol/L 133*   < > 131*   POTASSIUM mmol/L 3.7   < > 4.1   CHLORIDE mmol/L 104   < > 100   CO2 mmol/L 25   < > 25   BUN mg/dL 13   < > 22   CREATININE mg/dL 0.61   < > 0.79   ANION GAP mmol/L 4   < > 6   CALCIUM mg/dL 7.5*   < > 8.2*   ALBUMIN g/dL  --   --  3.2*   TOTAL BILIRUBIN mg/dL  --   --  0.42   ALK PHOS U/L  --   --  80   ALT U/L  --   --  9   AST U/L  --   --  11*   GLUCOSE RANDOM mg/dL 129   < > 162*    < > = values in this interval not displayed.         Results from last 7 days   Lab Units 11/10/24  0758 11/09/24 2057 11/09/24  1514 11/09/24  1125 11/09/24  0737 11/08/24  2054 11/08/24  1629 11/08/24  1141 11/08/24  0758 11/07/24  2051 11/07/24  1734 11/07/24  1211   POC GLUCOSE mg/dl 136 131 124 118 124 186* 226* 119 152* 132 111 130     Results from last 7 days   Lab Units 11/05/24  1241   HEMOGLOBIN A1C % 6.9*           Recent Cultures (last 7 days):         Imaging Results Review: I reviewed radiology reports from this admission including: CT abdomen/pelvis and xray(s).  Other Study Results Review: No additional pertinent studies reviewed.    Last 24 Hours Medication List:     Current Facility-Administered Medications:     acetaminophen (TYLENOL) tablet 975 mg, Q8H SAM    albuterol (PROVENTIL HFA,VENTOLIN HFA) inhaler 2 puff, Q4H PRN    bisacodyl (DULCOLAX) rectal suppository 10 mg, Daily    carvedilol (COREG) tablet 12.5 mg, BID With Meals    enoxaparin (LOVENOX) subcutaneous injection 40 mg, Daily    HYDROmorphone (DILAUDID) injection 0.5 mg, Q4H PRN    insulin lispro (HumALOG/ADMELOG) 100 units/mL subcutaneous injection 1-6 Units, 4x Daily (AC & HS) **AND** Fingerstick Glucose (POCT), 4x Daily AC and at bedtime    levothyroxine tablet 125 mcg, Early Morning    metoclopramide (REGLAN) injection 10 mg, Q6H SAM    ondansetron (ZOFRAN) injection 4 mg, Q6H PRN    oxyCODONE (ROXICODONE) IR tablet 5 mg, Q4H  PRN    oxyCODONE (ROXICODONE) split tablet 2.5 mg, Q4H PRN    pantoprazole (PROTONIX) EC tablet 40 mg, Early Morning    Administrative Statements   Today, Patient Was Seen By: Josiane Rice MD      **Please Note: This note may have been constructed using a voice recognition system.**

## 2024-11-10 NOTE — CASE MANAGEMENT
Case Management Discharge Planning Note    Patient name Lety Botello  Location [facility] /E5 -* MRN [id number]  : 1960 Date 11/10/2024       Current Admission Date: 2024  Current Admission Diagnosis:Gastroparesis   Patient Active Problem List    Diagnosis Date Noted Date Diagnosed    Constipation 11/10/2024     Morbid obesity with BMI of 45.0-49.9, adult (HCC) 2024     Gastroparesis 2024     Gastric dilation 2024     History of pulmonary embolism 2024     Hyponatremia 2024     Hypothyroidism 2024     Mild intermittent asthma 2024     Hypertension      Type 2 diabetes mellitus (HCC)      Endometrial cancer (HCC)      Obesity      SBO (small bowel obstruction) ([facility]) 2024       LOS (days): 5  Geometric Mean LOS (GMLOS) (days): 3  Days to GMLOS:-2     OBJECTIVE:  Risk of Unplanned Readmission Score: 24.59         Current admission status: Inpatient   Preferred Pharmacy:   [id number] [address] [address]  Phone: [phone] Fax: [phone]    Primary Care Provider: No primary care provider on file.    Primary Insurance: POPPY TSANG  Secondary Insurance:     DISCHARGE DETAILS:                                                          Transport at Discharge : Ride Share                                      Additional Comments: CM met with the pt.  She stated that she will be discharged in the am.  she is aware that [facility] will be following after she is discharged to home.  She stated that she will need assistance with transportation home.  she agreed to [employer] ride.

## 2024-11-10 NOTE — PROGRESS NOTES
Progress Note - Surgery-General   Name: Lety Botello 63 y.o. female I MRN: [id number]  Unit/Bed#: E5 -01 I Date of Admission: 11/5/2024   Date of Service: 11/10/2024 I Hospital Day: 5    Assessment & Plan  Gastroparesis  Previously managed for partial SBO.  Subsequently found to have gastroparesis.  GLP-1 was held, scheduled Reglan and Protonix were started  Outpatient gastric emptying study  Follow-up with GI and PCP  Plan was for DC yesterday. However, had weakness concerns for being able to navigate stairs to get generally. Unable to complete PT re-eval for stairs necessary to get into her home. Will have PT re-eval today  Resume eliquis (previous PE) on DC  Type 2 diabetes mellitus (HCC)  Lab Results   Component Value Date    HGBA1C 6.9 (H) 11/05/2024       Recent Labs     11/09/24  2057 11/10/24  0758 11/10/24  1104 11/10/24  1628   POCGLU 131 136 158* 182*       Blood Sugar Average: Last 72 hrs:  (P) 145.8    SBO (small bowel obstruction) (HCC)  - Resolved    History of pulmonary embolism  - Restart DOAC  Hypertension    Endometrial cancer (HCC)    Obesity    Hyponatremia    Hypothyroidism    Mild intermittent asthma    Gastric dilation    Constipation      Subjective   Feels some pain and nausea this morning.  Ate a feel full meal at lunch yesterday, had nausea after this with 1 episode of vomiting.  Discussed small meals spread out throughout the day, not eating the whole tray at once in the setting of gastroparesis. Reports no bowel function yesterday.    Objective :  Temp:  [98 °F (36.7 °C)-98.3 °F (36.8 °C)] 98.1 °F (36.7 °C)  HR:  [73-78] 76  BP: (112-143)/(49-93) 139/69  Resp:  [15-18] 16  SpO2:  [91 %-97 %] 97 %  O2 Device: None (Room air)  Nasal Cannula O2 Flow Rate (L/min):  [2 L/min] 2 L/min    I/O         11/08 0701  11/09 0700 11/09 0701  11/10 0700 11/10 0701  11/11 0700    P.O.  340 240    I.V. (mL/kg) 700 (6.5)      Total Intake(mL/kg) 700 (6.5) 340 (3.2) 240 (2.2)    Urine (mL/kg/hr) 600  (0.2) 700 (0.3)     Emesis/NG output       Stool  0     Total Output 600 700     Net +100 -360 +240           Unmeasured Urine Occurrence 1 x 3 x     Unmeasured Stool Occurrence 1 x 3 x             Physical Exam  Constitutional:       Appearance: Normal appearance.   HENT:      Head: Normocephalic and atraumatic.      Mouth/Throat:      Mouth: Mucous membranes are moist.   Eyes:      Extraocular Movements: Extraocular movements intact.   Cardiovascular:      Rate and Rhythm: Normal rate and regular rhythm.   Pulmonary:      Effort: Pulmonary effort is normal.   Abdominal:      General: Abdomen is flat. There is no distension.      Palpations: Abdomen is soft.      Tenderness: There is abdominal tenderness. There is no guarding or rebound.      Comments: Mild generalized abdominal tenderness   Musculoskeletal:         General: Normal range of motion.      Cervical back: Normal range of motion and neck supple.   Skin:     General: Skin is warm and dry.   Neurological:      General: No focal deficit present.      Mental Status: She is alert and oriented to person, place, and time.

## 2024-11-11 LAB
ANION GAP SERPL CALCULATED.3IONS-SCNC: 4 MMOL/L (ref 4–13)
BUN SERPL-MCNC: 13 MG/DL (ref 5–25)
CALCIUM SERPL-MCNC: 7.7 MG/DL (ref 8.4–10.2)
CHLORIDE SERPL-SCNC: 104 MMOL/L (ref 96–108)
CO2 SERPL-SCNC: 24 MMOL/L (ref 21–32)
CREAT SERPL-MCNC: 0.54 MG/DL (ref 0.6–1.3)
GFR SERPL CREATININE-BSD FRML MDRD: 102 ML/MIN/1.73SQ M
GLUCOSE SERPL-MCNC: 124 MG/DL (ref 65–140)
GLUCOSE SERPL-MCNC: 142 MG/DL (ref 65–140)
GLUCOSE SERPL-MCNC: 164 MG/DL (ref 65–140)
GLUCOSE SERPL-MCNC: 168 MG/DL (ref 65–140)
GLUCOSE SERPL-MCNC: 174 MG/DL (ref 65–140)
MAGNESIUM SERPL-MCNC: 2 MG/DL (ref 1.9–2.7)
PHOSPHATE SERPL-MCNC: 1.8 MG/DL (ref 2.3–4.1)
POTASSIUM SERPL-SCNC: 4.3 MMOL/L (ref 3.5–5.3)
SODIUM SERPL-SCNC: 132 MMOL/L (ref 135–147)

## 2024-11-11 PROCEDURE — 99233 SBSQ HOSP IP/OBS HIGH 50: CPT

## 2024-11-11 PROCEDURE — 82948 REAGENT STRIP/BLOOD GLUCOSE: CPT

## 2024-11-11 PROCEDURE — 97116 GAIT TRAINING THERAPY: CPT

## 2024-11-11 PROCEDURE — 97530 THERAPEUTIC ACTIVITIES: CPT

## 2024-11-11 PROCEDURE — NC001 PR NO CHARGE: Performed by: SURGERY

## 2024-11-11 PROCEDURE — 84100 ASSAY OF PHOSPHORUS: CPT

## 2024-11-11 PROCEDURE — 83735 ASSAY OF MAGNESIUM: CPT

## 2024-11-11 PROCEDURE — 80048 BASIC METABOLIC PNL TOTAL CA: CPT

## 2024-11-11 RX ADMIN — METOCLOPRAMIDE 10 MG: 10 TABLET ORAL at 11:42

## 2024-11-11 RX ADMIN — METOCLOPRAMIDE 10 MG: 10 TABLET ORAL at 16:27

## 2024-11-11 RX ADMIN — OXYCODONE HYDROCHLORIDE 5 MG: 5 TABLET ORAL at 11:44

## 2024-11-11 RX ADMIN — ENOXAPARIN SODIUM 40 MG: 40 INJECTION SUBCUTANEOUS at 07:55

## 2024-11-11 RX ADMIN — CARVEDILOL 12.5 MG: 12.5 TABLET, FILM COATED ORAL at 16:30

## 2024-11-11 RX ADMIN — INSULIN LISPRO 1 UNITS: 100 INJECTION, SOLUTION INTRAVENOUS; SUBCUTANEOUS at 07:00

## 2024-11-11 RX ADMIN — POTASSIUM & SODIUM PHOSPHATES POWDER PACK 280-160-250 MG 2 PACKET: 280-160-250 PACK at 09:43

## 2024-11-11 RX ADMIN — METOCLOPRAMIDE 10 MG: 10 TABLET ORAL at 06:30

## 2024-11-11 RX ADMIN — ONDANSETRON 4 MG: 2 INJECTION INTRAMUSCULAR; INTRAVENOUS at 20:04

## 2024-11-11 RX ADMIN — CARVEDILOL 12.5 MG: 12.5 TABLET, FILM COATED ORAL at 07:55

## 2024-11-11 RX ADMIN — LEVOTHYROXINE SODIUM 125 MCG: 125 TABLET ORAL at 04:38

## 2024-11-11 RX ADMIN — ACETAMINOPHEN 975 MG: 325 TABLET, FILM COATED ORAL at 14:11

## 2024-11-11 RX ADMIN — OXYCODONE HYDROCHLORIDE 5 MG: 5 TABLET ORAL at 04:38

## 2024-11-11 RX ADMIN — ACETAMINOPHEN 975 MG: 325 TABLET, FILM COATED ORAL at 23:11

## 2024-11-11 RX ADMIN — OXYCODONE HYDROCHLORIDE 5 MG: 5 TABLET ORAL at 16:31

## 2024-11-11 RX ADMIN — INSULIN LISPRO 1 UNITS: 100 INJECTION, SOLUTION INTRAVENOUS; SUBCUTANEOUS at 11:41

## 2024-11-11 RX ADMIN — PANTOPRAZOLE SODIUM 40 MG: 40 TABLET, DELAYED RELEASE ORAL at 04:37

## 2024-11-11 RX ADMIN — ACETAMINOPHEN 975 MG: 325 TABLET, FILM COATED ORAL at 04:37

## 2024-11-11 NOTE — ASSESSMENT & PLAN NOTE
Patient was diagnosed with right leg DVT and pulmonary embolism 6 months ago in May 2024  Restart Eliquis 5 mg twice daily-on hold by surgery with recommendation to restart on discharge

## 2024-11-11 NOTE — PLAN OF CARE
Problem: PHYSICAL THERAPY ADULT  Goal: Performs mobility at highest level of function for planned discharge setting.  See evaluation for individualized goals.  Description: Treatment/Interventions: Functional transfer training, LE strengthening/ROM, Elevations, Therapeutic exercise, Endurance training, Patient/family training, Bed mobility, Gait training, Spoke to nursing, OT  Equipment Recommended: Walker (pt has)       See flowsheet documentation for full assessment, interventions and recommendations.  Outcome: Progressing  Note: Prognosis: Good  Problem List: Decreased mobility, Decreased skin integrity, Pain  Assessment: Pt. progressing well with overall mobility. Pt. needed no hands on A for mobilty. Extended time taken to complete all tasks by patient. SpO2 stats noted to be 97-98% and HR 76-89 bpm during ambulation. Seated rests between ambulation reproting fatigue. Pt. noted with some self limiting behaviors. Pt. back in bed bed in semi reclined positioned post session. All needs within reach and bed alarm engaged. Will continue to follow per PT POC.  Barriers to Discharge: None     Rehab Resource Intensity Level, PT: No post-acute rehabilitation needs    See flowsheet documentation for full assessment.

## 2024-11-11 NOTE — UTILIZATION REVIEW
Continued Stay Review    Date:     for  11/9                          Current Patient Class: Inpatient  Current Level of Care:   med surg    HPI:63 y.o. female initially admitted on   11/5/24     Gastrograffin challenge showed gastroparesis    11/7   EGD    IMPRESSION:  The esophagus appeared normal.  Moderate erythematous mucosa with loss of vascular pattern, consistent with gastritis in the cardia, fundus of the stomach, body of the stomach, greater curve of the stomach, lesser curve of the stomach, incisura, antrum and prepyloric region  The duodenum appeared normal.  The proximal jejunum and middle jejunum appeared normal.  300 cc suctioned from the stomach. Large amount of liquid and debris throughout the duodenum and proximal jejunum that was meticulously suctioned out. No narrowing or intrinsic stricture identified but findings concerning for ongoing partial small bowel obstruction.    Assessment/Plan:   11/9      NGT     removed  11/7, had multiple  BM's  and tolerating cl liq diet.        +  flatus    Last  BM  11/8.    Remains on  O2  2L  NC.  Has mild  suprapubic tenderness.   Hemoglobin  8 this am.  Continue current meds.    Medications:   Scheduled Medications:  acetaminophen, 975 mg, Oral, Q8H SAM  bisacodyl, 10 mg, Rectal, Daily  carvedilol, 12.5 mg, Oral, BID With Meals  enoxaparin, 40 mg, Subcutaneous, Daily  insulin lispro, 1-6 Units, Subcutaneous, 4x Daily (AC & HS)  levothyroxine, 125 mcg, Oral, Early Morning  metoclopramide, 10 mg, Oral, TID AC  pantoprazole, 40 mg, Oral, Early Morning      Continuous IV Infusions:     PRN Meds:  albuterol, 2 puff, Inhalation, Q4H PRN  ondansetron, 4 mg, Intravenous, Q6H PRN  oxyCODONE, 5 mg, Oral, Q4H PRN  oxyCODONE, 2.5 mg, Oral, Q4H PRN      Discharge Plan:   TBD    Vital Signs (last 3 days)       Date/Time Temp Pulse Resp BP MAP (mmHg) SpO2 Calculated FIO2 (%) - Nasal Cannula Nasal Cannula O2 Flow Rate (L/min) O2 Device Patient Position - Orthostatic VS  Pain    11/11/24 0800 -- -- -- -- -- -- -- -- -- -- No Pain    11/11/24 07:03:19 98.3 °F (36.8 °C) 81 16 137/58 84 91 % -- -- None (Room air) Lying --    11/11/24 0437 -- -- -- -- -- -- -- -- -- -- 9    11/10/24 22:37:53 97.8 °F (36.6 °C) 79 -- 132/60 -- 93 % -- -- None (Room air) -- --    11/10/24 2110 -- -- -- -- -- -- -- -- -- -- No Pain    11/10/24 18:45:46 97.7 °F (36.5 °C) 74 -- 133/64 -- 93 % -- -- None (Room air) -- --    11/10/24 1638 -- -- -- -- -- -- -- -- -- -- 8    11/10/24 15:11:44 98.1 °F (36.7 °C) 76 -- 139/69 -- 97 % -- -- None (Room air) -- --    11/10/24 1339 -- -- -- -- -- -- -- -- -- -- 9    11/10/24 1159 -- -- -- -- -- -- -- -- -- -- 6    11/10/24 1109 98.3 °F (36.8 °C) 76 16 113/50 71 93 % -- -- None (Room air) Sitting --    11/10/24 0803 -- -- -- -- -- -- -- -- -- -- 6    11/10/24 0800 98.3 °F (36.8 °C) 78 18 112/49 70 93 % -- -- None (Room air) Sitting --    11/10/24 0532 -- -- -- -- -- -- -- -- -- -- 5    11/10/24 04:01:14 -- -- -- 141/63 89 -- -- -- -- -- --    11/10/24 0401 -- -- -- -- -- -- -- -- -- -- 8    11/10/24 0037 -- -- -- -- -- -- -- -- -- -- 9    11/10/24 00:34:08 -- -- -- 133/57 82 -- -- -- -- -- --    11/09/24 22:08:02 98.1 °F (36.7 °C) 73 15 116/54 75 97 % 28 2 L/min Nasal cannula Lying --    11/09/24 2156 -- -- -- -- -- 95 % 28 2 L/min Nasal cannula -- --    11/09/24 21:48:07 -- -- -- 126/51 76 -- -- -- -- -- --    11/09/24 2147 -- -- -- -- -- -- -- -- -- -- 9 11/09/24 2145 -- -- -- -- -- 91 % -- -- None (Room air) -- --    11/09/24 19:55:05 -- -- -- 118/55 76 -- -- -- -- -- --    11/09/24 1955 -- -- -- -- -- 95 % -- -- None (Room air) -- 8 11/09/24 1921 -- -- -- -- -- -- -- -- None (Room air) -- --    11/09/24 18:57:15 98 °F (36.7 °C) 77 17 143/93 110 94 % -- -- None (Room air) Lying --    11/09/24 1553 -- -- -- -- -- -- -- -- -- -- 10 - Worst Possible Pain    11/09/24 15:15:12 97.9 °F (36.6 °C) 80 16 132/49 77 97 % -- -- None (Room air) Lying --    11/09/24 1204 --  -- -- -- -- -- -- -- -- -- 10 - Worst Possible Pain    11/09/24 1203 -- -- -- -- -- -- -- -- -- -- 10 - Worst Possible Pain    11/09/24 11:21:05 98.2 °F (36.8 °C) -- 17 128/52 77 -- -- -- -- -- --    11/09/24 0853 -- -- -- -- -- -- -- -- -- -- 9    11/09/24 07:23:24 98 °F (36.7 °C) 108 17 140/66 91 96 % -- -- None (Room air) Lying --    11/09/24 05:04:32 98.4 °F (36.9 °C) 115 -- -- -- 97 % -- -- None (Room air) -- --    11/09/24 0430 -- -- -- -- -- -- -- -- -- -- 8    11/09/24 04:24:08 -- -- -- 160/66 97 -- -- -- -- -- --    11/09/24 0021 -- -- -- -- -- -- -- -- -- -- 7    11/09/24 00:18:39 -- -- -- 117/47 70 -- -- -- -- -- --    11/08/24 23:46:26 97.8 °F (36.6 °C) -- 15 116/44 68 -- -- -- None (Room air) Lying --    11/08/24 20:10:08 -- -- -- 135/53 80 -- -- -- -- -- --    11/08/24 2009 -- -- -- -- -- -- -- -- -- -- 10 - Worst Possible Pain    11/08/24 16:29:14 98.7 °F (37.1 °C) -- 17 150/64 93 -- -- -- None (Room air) Lying --    11/08/24 1434 -- -- -- -- -- -- -- -- -- -- 9    11/08/24 12:18:11 97.9 °F (36.6 °C) -- -- 120/57 78 -- -- -- -- -- --    11/08/24 1156 97.6 °F (36.4 °C) 90 23 149/65 94 93 % -- -- -- -- No Pain    11/08/24 1141 97.2 °F (36.2 °C) 86 23 122/57 85 95 % -- -- None (Room air) -- No Pain    11/08/24 1126 98 °F (36.7 °C) 92 21 135/63 90 99 % 28 2 L/min Nasal cannula -- No Pain    11/08/24 0821 -- -- -- -- -- -- -- -- -- -- 8    11/08/24 07:43:51 -- -- -- 126/69 88 -- -- -- -- -- --    11/08/24 0332 -- -- -- -- -- -- -- -- -- -- 10 - Worst Possible Pain    11/08/24 02:47:18 97.9 °F (36.6 °C) -- 18 132/77 95 -- -- -- -- -- --    11/08/24 0025 -- -- -- -- -- -- -- -- -- -- 10 - Worst Possible Pain          Pertinent Labs/Diagnostic Results:   Radiology:  XR abdomen complete inc upright and/or decubitus   Final Interpretation by Kimberly Graham MD (11/06 1921)   No findings to suspect small bowel obstruction.   Persistent dilatation of the stomach concerning for gastroparesis.         Workstation  performed: [id number]         XR chest portable   Final Interpretation by Andrea Anderson MD (11/06 5625)      Tip of enteric tube projects over the stomach.   No focal consolidation, pleural effusion or pneumothorax.            Resident: NELIA Mack I, the attending radiologist, have reviewed the images and agree with the final report above.      Workstation performed: [id number]         XR chest portable   Final Interpretation by Lorne Underwood MD (11/06 3815)   NG tube position as above.   No acute cardiopulmonary disease.            Workstation performed: [id number]         CT abdomen pelvis with contrast   Final Interpretation by Thee Baptiste MD (11/05 7797)      Proximal small bowel obstruction. Similar findings reported on prior outside CT of 11/2/2024.      Findings suggestive of small splenic infarct. Similar findings reported on prior outside CT of 11/2/2024.      Hepatosplenomegaly.      I personally discussed this study with WES ACEVEDO on 11/5/2024 4:06 PM.                  Workstation performed: [id number]         NM gastric emptying    (Results Pending)     Cardiology:  ECG 12 lead   Final Result by Riki Roach MD (11/07 0835)   Normal sinus rhythm   Minimal voltage criteria for LVH, may be normal variant   Borderline ECG   No previous ECGs available   Confirmed by Riki Roach ([id number]) on 11/7/2024 8:35:12 AM        GI:  EGD with Push Enteroscopy   Final Result by Darryn Herrera MD (11/08 2207)   The esophagus appeared normal.   Moderate erythematous mucosa with loss of vascular pattern, consistent    with gastritis in the cardia, fundus of the stomach, body of the stomach,    greater curve of the stomach, lesser curve of the stomach, incisura,    antrum and prepyloric region   The duodenum appeared normal.   The proximal jejunum and middle jejunum appeared normal.   300 cc suctioned from the stomach. Large amount of liquid and debris    throughout the duodenum  and proximal jejunum that was meticulously    suctioned out. No narrowing or intrinsic stricture identified but findings    concerning for ongoing partial small bowel obstruction.         RECOMMENDATION:   Findings concerning for possible ongoing partial small bowel obstruction   Return to medical surgical floors   Diet per surgery but would begin on clear liquids    Reinsert NG tube if patient with recurrent nausea/vomiting   Recommend repeat imaging if unable to tolerate diet   Continue on Reglan 10 mg TID   Strongly recommend avoidance of opioid therapy and GLP-1 medication   Can consider outpatient NM gastric emptying study when off opioid therapy    Additional management per primary surgical team                         Darryn Herrera MD               Results from last 7 days   Lab Units 11/10/24  0453 11/09/24  0424 11/07/24  0334 11/06/24  0520 11/05/24  1241   WBC Thousand/uL 4.58 3.66* 4.04* 6.22 5.91   HEMOGLOBIN g/dL 9.0* 8.0* 8.9* 10.0* 9.5*   HEMATOCRIT % 27.9* 25.1* 28.2* 30.6* 29.5*   PLATELETS Thousands/uL 100* 94* 84* 91* 94*   TOTAL NEUT ABS Thousands/µL 3.60  --  2.83 4.99 4.61         Results from last 7 days   Lab Units 11/11/24  0439 11/10/24  0453 11/09/24  0424 11/07/24  0334 11/06/24  0520   SODIUM mmol/L 132* 133* 134* 132* 130*   POTASSIUM mmol/L 4.3 3.7 4.0 4.2 4.3   CHLORIDE mmol/L 104 104 103 102 101   CO2 mmol/L 24 25 25 22 23   ANION GAP mmol/L 4 4 6 8 6   BUN mg/dL 13 13 18 27* 22   CREATININE mg/dL 0.54* 0.61 0.73 0.90 0.86   EGFR ml/min/1.73sq m 102 98 89 69 73   CALCIUM mg/dL 7.7* 7.5* 7.4* 8.0* 8.2*   MAGNESIUM mg/dL 2.0 2.0  --  2.7 1.7*   PHOSPHORUS mg/dL 1.8*  --   --  3.9 3.5     Results from last 7 days   Lab Units 11/05/24  1241   AST U/L 11*   ALT U/L 9   ALK PHOS U/L 80   TOTAL PROTEIN g/dL 5.4*   ALBUMIN g/dL 3.2*   TOTAL BILIRUBIN mg/dL 0.42     Results from last 7 days   Lab Units 11/11/24  0704 11/10/24  2107 11/10/24  1628 11/10/24  1104 11/10/24  0758  11/09/24  2057 11/09/24  1514 11/09/24  1125 11/09/24  0737 11/08/24  2054 11/08/24  1629 11/08/24  1141   POC GLUCOSE mg/dl 174* 148* 182* 158* 136 131 124 118 124 186* 226* 119     Results from last 7 days   Lab Units 11/11/24  0439 11/10/24  0453 11/09/24  0424 11/07/24  0334 11/06/24  0520 11/05/24  1241   GLUCOSE RANDOM mg/dL 164* 129 135 150* 182* 162*         Results from last 7 days   Lab Units 11/05/24  1241   HEMOGLOBIN A1C % 6.9*   EAG mg/dl 151       Results from last 7 days   Lab Units 11/05/24  1241   LIPASE u/L <6*             Network Utilization Review Department  ATTENTION: Please call with any questions or concerns to [phone] and carefully listen to the prompts so that you are directed to the right person. All voicemails are confidential.   For Discharge needs, contact Care Management DC Support Team at [phone] opt. [phone]  Send all requests for admission clinical reviews, approved or denied determinations and any other requests to dedicated fax number below belonging to the campus where the patient is receiving treatment. List of dedicated fax numbers for the Facilities:  FACILITY NAME UR FAX NUMBER   ADMISSION DENIALS (Administrative/Medical Necessity) [phone]   DISCHARGE SUPPORT TEAM (NETWORK) [phone]   PARENT CHILD HEALTH (Maternity/NICU/Pediatrics) [phone]   [facility] [phone]   [facility] [phone]   [facility] [phone]   [facility] [phone]   [facility] [phone]   [facility] [phone]   [facility] [phone]   [facility] [phone]   [facility] [phone]   [facility] [phone]   [facility] [phone]   [facility]  [facility] [phone]

## 2024-11-11 NOTE — PLAN OF CARE
Problem: Potential for Falls  Goal: Patient will remain free of falls  Description: INTERVENTIONS:  - Educate patient/family on patient safety including physical limitations  - Instruct patient to call for assistance with activity   - Consult OT/PT to assist with strengthening/mobility   - Keep Call bell within reach  - Keep bed low and locked with side rails adjusted as appropriate  - Keep care items and personal belongings within reach  - Initiate and maintain comfort rounds  - Make Fall Risk Sign visible to staff  - Offer Toileting every 2 Hours, in advance of need  - Initiate/Maintain bedalarm  - Obtain necessary fall risk management equipment  - Apply yellow socks and bracelet for high fall risk patients  - Consider moving patient to room near nurses station  Outcome: Progressing     Problem: PAIN - ADULT  Goal: Verbalizes/displays adequate comfort level or baseline comfort level  Description: Interventions:  - Encourage patient to monitor pain and request assistance  - Assess pain using appropriate pain scale  - Administer analgesics based on type and severity of pain and evaluate response  - Implement non-pharmacological measures as appropriate and evaluate response  - Consider cultural and social influences on pain and pain management  - Notify physician/advanced practitioner if interventions unsuccessful or patient reports new pain  Outcome: Progressing     Problem: INFECTION - ADULT  Goal: Absence or prevention of progression during hospitalization  Description: INTERVENTIONS:  - Assess and monitor for signs and symptoms of infection  - Monitor lab/diagnostic results  - Monitor all insertion sites, i.e. indwelling lines, tubes, and drains  - Monitor endotracheal if appropriate and nasal secretions for changes in amount and color  - [place] appropriate cooling/warming therapies per order  - Administer medications as ordered  - Instruct and encourage patient and family to use good hand hygiene  technique  - Identify and instruct in appropriate isolation precautions for identified infection/condition  Outcome: Progressing  Goal: Absence of fever/infection during neutropenic period  Description: INTERVENTIONS:  - Monitor WBC    Outcome: Progressing     Problem: SAFETY ADULT  Goal: Patient will remain free of falls  Description: INTERVENTIONS:  - Educate patient/family on patient safety including physical limitations  - Instruct patient to call for assistance with activity   - Consult OT/PT to assist with strengthening/mobility   - Keep Call bell within reach  - Keep bed low and locked with side rails adjusted as appropriate  - Keep care items and personal belongings within reach  - Initiate and maintain comfort rounds  - Make Fall Risk Sign visible to staff  - Offer Toileting every 2 Hours, in advance of need  - Initiate/Maintain bedalarm  - Obtain necessary fall risk management equipment  - Apply yellow socks and bracelet for high fall risk patients  - Consider moving patient to room near nurses station  Outcome: Progressing  Goal: Maintain or return to baseline ADL function  Description: INTERVENTIONS:  -  Assess patient's ability to carry out ADLs; assess patient's baseline for ADL function and identify physical deficits which impact ability to perform ADLs (bathing, care of mouth/teeth, toileting, grooming, dressing, etc.)  - Assess/evaluate cause of self-care deficits   - Assess range of motion  - Assess patient's mobility; develop plan if impaired  - Assess patient's need for assistive devices and provide as appropriate  - Encourage maximum independence but intervene and supervise when necessary  - Involve family in performance of ADLs  - Assess for home care needs following discharge   - Consider OT consult to assist with ADL evaluation and planning for discharge  - Provide patient education as appropriate  Outcome: Progressing  Goal: Maintains/Returns to pre admission functional level  Description:  INTERVENTIONS:  - Perform AM-PAC 6 Click Basic Mobility/ Daily Activity assessment daily.  - Set and communicate daily mobility goal to care team and patient/family/caregiver.   - Collaborate with rehabilitation services on mobility goals if consulted  - Perform Range of Motion 3 times a day.  - Reposition patient every 2 hours.  - Dangle patient 3 times a day  - Stand patient 3 times a day  - Ambulate patient 3 times a day  - Out of bed to chair 3 times a day   - Out of bed for meals 3 times a day  - Out of bed for toileting  - Record patient progress and toleration of activity level   Outcome: Progressing     Problem: DISCHARGE PLANNING  Goal: Discharge to home or other facility with appropriate resources  Description: INTERVENTIONS:  - Identify barriers to discharge w/patient and caregiver  - Arrange for needed discharge resources and transportation as appropriate  - Identify discharge learning needs (meds, wound care, etc.)  - Arrange for interpretive services to assist at discharge as needed  - Refer to Case Management Department for coordinating discharge planning if the patient needs post-hospital services based on physician/advanced practitioner order or complex needs related to functional status, cognitive ability, or social support system  Outcome: Progressing     Problem: Knowledge Deficit  Goal: Patient/family/caregiver demonstrates understanding of disease process, treatment plan, medications, and discharge instructions  Description: Complete learning assessment and assess knowledge base.  Interventions:  - Provide teaching at level of understanding  - Provide teaching via preferred learning methods  Outcome: Progressing     Problem: Prexisting or High Potential for Compromised Skin Integrity  Goal: Skin integrity is maintained or improved  Description: INTERVENTIONS:  - Identify patients at risk for skin breakdown  - Assess and monitor skin integrity  - Assess and monitor nutrition and hydration  status  - Monitor labs   - Assess for incontinence   - Turn and reposition patient  - Assist with mobility/ambulation  - Relieve pressure over bony prominences  - Avoid friction and shearing  - Provide appropriate hygiene as needed including keeping skin clean and dry  - Evaluate need for skin moisturizer/barrier cream  - Collaborate with interdisciplinary team   - Patient/family teaching  - Consider wound care consult   Outcome: Progressing     Problem: Nutrition/Hydration-ADULT  Goal: Nutrient/Hydration intake appropriate for improving, restoring or maintaining nutritional needs  Description: Monitor and assess patient's nutrition/hydration status for malnutrition. Collaborate with interdisciplinary team and initiate plan and interventions as ordered.  Monitor patient's weight and dietary intake as ordered or per policy. Utilize nutrition screening tool and intervene as necessary. Determine patient's food preferences and provide high-protein, high-caloric foods as appropriate.     INTERVENTIONS:  - Monitor oral intake, urinary output, labs, and treatment plans  - Assess nutrition and hydration status and recommend course of action  - Evaluate amount of meals eaten  - Assist patient with eating if necessary   - Allow adequate time for meals  - Recommend/ encourage appropriate diets, oral nutritional supplements, and vitamin/mineral supplements  - Order, calculate, and assess calorie counts as needed  - Recommend, monitor, and adjust tube feedings and TPN/PPN based on assessed needs  - Assess need for intravenous fluids  - Provide specific nutrition/hydration education as appropriate  - Include patient/family/caregiver in decisions related to nutrition  Outcome: Progressing

## 2024-11-11 NOTE — PLAN OF CARE
Problem: Potential for Falls  Goal: Patient will remain free of falls  Description: INTERVENTIONS:  - Educate patient/family on patient safety including physical limitations  - Instruct patient to call for assistance with activity   - Consult OT/PT to assist with strengthening/mobility   - Keep Call bell within reach  - Keep bed low and locked with side rails adjusted as appropriate  - Keep care items and personal belongings within reach  - Initiate and maintain comfort rounds  - Make Fall Risk Sign visible to staff  - Offer Toileting every 2 Hours, in advance of need  - Initiate/Maintain bedalarm  - Obtain necessary fall risk management equipment  - Apply yellow socks and bracelet for high fall risk patients  - Consider moving patient to room near nurses station  Outcome: Progressing     Problem: PAIN - ADULT  Goal: Verbalizes/displays adequate comfort level or baseline comfort level  Description: Interventions:  - Encourage patient to monitor pain and request assistance  - Assess pain using appropriate pain scale  - Administer analgesics based on type and severity of pain and evaluate response  - Implement non-pharmacological measures as appropriate and evaluate response  - Consider cultural and social influences on pain and pain management  - Notify physician/advanced practitioner if interventions unsuccessful or patient reports new pain  Outcome: Progressing     Problem: INFECTION - ADULT  Goal: Absence or prevention of progression during hospitalization  Description: INTERVENTIONS:  - Assess and monitor for signs and symptoms of infection  - Monitor lab/diagnostic results  - Monitor all insertion sites, i.e. indwelling lines, tubes, and drains  - Monitor endotracheal if appropriate and nasal secretions for changes in amount and color  - [place] appropriate cooling/warming therapies per order  - Administer medications as ordered  - Instruct and encourage patient and family to use good hand hygiene  technique  - Identify and instruct in appropriate isolation precautions for identified infection/condition  Outcome: Progressing  Goal: Absence of fever/infection during neutropenic period  Description: INTERVENTIONS:  - Monitor WBC    Outcome: Progressing     Problem: SAFETY ADULT  Goal: Patient will remain free of falls  Description: INTERVENTIONS:  - Educate patient/family on patient safety including physical limitations  - Instruct patient to call for assistance with activity   - Consult OT/PT to assist with strengthening/mobility   - Keep Call bell within reach  - Keep bed low and locked with side rails adjusted as appropriate  - Keep care items and personal belongings within reach  - Initiate and maintain comfort rounds  - Make Fall Risk Sign visible to staff  - Offer Toileting every 2 Hours, in advance of need  - Initiate/Maintain bedalarm  - Obtain necessary fall risk management equipment  - Apply yellow socks and bracelet for high fall risk patients  - Consider moving patient to room near nurses station  Outcome: Progressing  Goal: Maintain or return to baseline ADL function  Description: INTERVENTIONS:  -  Assess patient's ability to carry out ADLs; assess patient's baseline for ADL function and identify physical deficits which impact ability to perform ADLs (bathing, care of mouth/teeth, toileting, grooming, dressing, etc.)  - Assess/evaluate cause of self-care deficits   - Assess range of motion  - Assess patient's mobility; develop plan if impaired  - Assess patient's need for assistive devices and provide as appropriate  - Encourage maximum independence but intervene and supervise when necessary  - Involve family in performance of ADLs  - Assess for home care needs following discharge   - Consider OT consult to assist with ADL evaluation and planning for discharge  - Provide patient education as appropriate  Outcome: Progressing  Goal: Maintains/Returns to pre admission functional level  Description:  INTERVENTIONS:  - Perform AM-PAC 6 Click Basic Mobility/ Daily Activity assessment daily.  - Set and communicate daily mobility goal to care team and patient/family/caregiver.   - Collaborate with rehabilitation services on mobility goals if consulted  - Perform Range of Motion 3 times a day.  - Reposition patient every 2 hours.  - Dangle patient 3 times a day  - Stand patient 3 times a day  - Ambulate patient 3 times a day  - Out of bed to chair 3 times a day   - Out of bed for meals 3 times a day  - Out of bed for toileting  - Record patient progress and toleration of activity level   Outcome: Progressing     Problem: DISCHARGE PLANNING  Goal: Discharge to home or other facility with appropriate resources  Description: INTERVENTIONS:  - Identify barriers to discharge w/patient and caregiver  - Arrange for needed discharge resources and transportation as appropriate  - Identify discharge learning needs (meds, wound care, etc.)  - Arrange for interpretive services to assist at discharge as needed  - Refer to Case Management Department for coordinating discharge planning if the patient needs post-hospital services based on physician/advanced practitioner order or complex needs related to functional status, cognitive ability, or social support system  Outcome: Progressing     Problem: Knowledge Deficit  Goal: Patient/family/caregiver demonstrates understanding of disease process, treatment plan, medications, and discharge instructions  Description: Complete learning assessment and assess knowledge base.  Interventions:  - Provide teaching at level of understanding  - Provide teaching via preferred learning methods  Outcome: Progressing     Problem: Prexisting or High Potential for Compromised Skin Integrity  Goal: Skin integrity is maintained or improved  Description: INTERVENTIONS:  - Identify patients at risk for skin breakdown  - Assess and monitor skin integrity  - Assess and monitor nutrition and hydration  status  - Monitor labs   - Assess for incontinence   - Turn and reposition patient  - Assist with mobility/ambulation  - Relieve pressure over bony prominences  - Avoid friction and shearing  - Provide appropriate hygiene as needed including keeping skin clean and dry  - Evaluate need for skin moisturizer/barrier cream  - Collaborate with interdisciplinary team   - Patient/family teaching  - Consider wound care consult   Outcome: Progressing     Problem: Nutrition/Hydration-ADULT  Goal: Nutrient/Hydration intake appropriate for improving, restoring or maintaining nutritional needs  Description: Monitor and assess patient's nutrition/hydration status for malnutrition. Collaborate with interdisciplinary team and initiate plan and interventions as ordered.  Monitor patient's weight and dietary intake as ordered or per policy. Utilize nutrition screening tool and intervene as necessary. Determine patient's food preferences and provide high-protein, high-caloric foods as appropriate.     INTERVENTIONS:  - Monitor oral intake, urinary output, labs, and treatment plans  - Assess nutrition and hydration status and recommend course of action  - Evaluate amount of meals eaten  - Assist patient with eating if necessary   - Allow adequate time for meals  - Recommend/ encourage appropriate diets, oral nutritional supplements, and vitamin/mineral supplements  - Order, calculate, and assess calorie counts as needed  - Recommend, monitor, and adjust tube feedings and TPN/PPN based on assessed needs  - Assess need for intravenous fluids  - Provide specific nutrition/hydration education as appropriate  - Include patient/family/caregiver in decisions related to nutrition  Outcome: Progressing

## 2024-11-11 NOTE — ASSESSMENT & PLAN NOTE
Lab Results   Component Value Date    HGBA1C 6.9 (H) 11/05/2024       Recent Labs     11/10/24  1628 11/10/24  2107 11/11/24  0704 11/11/24  1104   POCGLU 182* 148* 174* 168*       Blood Sugar Average: Last 72 hrs:  (P) 153.8785373300388370  Home regimen is glargine 60 units in the morning and Humalog sliding scale.  Farxiga 10 mg daily  No GLP-1 on discharge.  On home medication list there is Trulicity and Mounjaro both discontinued  Currently patient is on sliding scale.  Patient states she knows how to manage her diabetes she has been a diabetic for a long time.  Continue sliding scale for now until oral intake improves.  Recommend to check blood sugar 4 times a day, if blood sugars above 180 slowly restart Lantus  Outpatient follow-up with endocrinology

## 2024-11-11 NOTE — PROGRESS NOTES
Progress Note - Hospitalist   Name: Lety Botello 63 y.o. female I MRN: [id number]  Unit/Bed#: E5 -01 I Date of Admission: 11/5/2024   Date of Service: 11/11/2024 I Hospital Day: 6    Assessment & Plan  SBO (small bowel obstruction) (HCC)  Patient admitted to surgery service for recurrent small bowel obstruction, also has a history of gastroparesis    CT showed proximal small bowel obstruction, similar findings reported on prior outside CT on 11/2/2024.  Findings suggestive of small splenic infarct, similar findings reported on prior outside CT on 11/2/2024.  Hepatosplenomegaly  NG tube was removed by surgery 11/7, patient had multiple bowel movements  Evaluated by GI underwent EGD with push enteroscopy 11/8.  EGD with push enteroscopy showed normal esophagus.  Showed gastritis. Duodenum appeared normal. Proximal  jejunum and middle jejunum normal. Large amount of liquid and debris throughout the duodenum and proximal jejunum that was suctioned out, no narrowing or increasing stricture identified finding could be concerning for ongoing partial small bowel obstruction  Continue pain meds, antiemetics  Diet per surgery  Passing flatus, had BM  Gastroparesis  Underwent EGD with push enteroscopy today, it showed gastritis.  Large amount of liquid and impress throughout the duodenum and proximal jejunum which was suctioned out, no narrowing or intrinsic stricture identified but findings concerning for ongoing partial small bowel obstruction.  Continue Reglan 10 mg 3-4 times daily  Outpatient gastric emptying study   Hypertension  Home regimen is Coreg 12.5 mg twice daily, lisinopril 10 mg daily--continue on discharge  Recommend to check blood pressure daily and keep a blood pressure log and follow-up with PCP   Type 2 diabetes mellitus (HCC)  Lab Results   Component Value Date    HGBA1C 6.9 (H) 11/05/2024       Recent Labs     11/10/24  1628 11/10/24  2107 11/11/24  0704 11/11/24  1104   POCGLU 182* 148* 174* 168*        Blood Sugar Average: Last 72 hrs:  (P) 153.3953845811126016  Home regimen is glargine 60 units in the morning and Humalog sliding scale.  Farxiga 10 mg daily  No GLP-1 on discharge.  On home medication list there is Trulicity and Mounjaro both discontinued  Currently patient is on sliding scale.  Patient states she knows how to manage her diabetes she has been a diabetic for a long time.  Continue sliding scale for now until oral intake improves.  Recommend to check blood sugar 4 times a day, if blood sugars above 180 slowly restart Lantus  Outpatient follow-up with endocrinology    Endometrial cancer (HCC)  History of endometrial cancer grade 1 stage Ia s/p/BSO 6/2020 with recurrent disease in 12/2023 when PET scan revealed enlarged retroperitoneal nodes, underwent radiation which she completed 3/2024  Repeat CT scan in August showed no recurrence of disease  Follows [facility] oncology  Obesity  Lifestyle modification  History of pulmonary embolism  Patient was diagnosed with right leg DVT and pulmonary embolism 6 months ago in May 2024  Restart Eliquis 5 mg twice daily-on hold by surgery with recommendation to restart on discharge    Hyponatremia  Chronic mild hyponatremia,   Encourage oral intake  Hypothyroidism  Home regimen levothyroxine 125 mcg daily, continue  Mild intermittent asthma  Not in acute exacerbation  Continue albuterol inhaler as needed    VTE Pharmacologic Prophylaxis:    Enoxaparin    Mobility:   Basic Mobility Inpatient Raw Score: 18  JH-HLM Goal: 6: Walk 10 steps or more  JH-HLM Achieved: 3: Sit at edge of bed        Education and Discussions with Family / Patient: Patient declined call to .     Current Length of Stay: 6 day(s)  Current Patient Status: Inpatient     Discharge Plan:  As per primary surgery team    Code Status: Level 1 - Full Code    Subjective   Patient was seen and examined bedside.  Patient stated that she is overall feeling okay, slightly neurology  but no abdominal pain nausea vomiting.  Patient stated that she feels that she is able to tolerate her diet well.    No acute concerns today.    Objective :  Temp:  [97.7 °F (36.5 °C)-98.3 °F (36.8 °C)] 98.3 °F (36.8 °C)  HR:  [74-81] 81  BP: (132-139)/(58-69) 137/58  Resp:  [16] 16  SpO2:  [91 %-97 %] 91 %  O2 Device: None (Room air)    Body mass index is 46.24 kg/m².     Input and Output Summary (last 24 hours):     Intake/Output Summary (Last 24 hours) at 11/11/2024 1436  Last data filed at 11/11/2024 0828  Gross per 24 hour   Intake 20 ml   Output 100 ml   Net -80 ml       Physical Exam  Vitals and nursing note reviewed.   Constitutional:       General: She is not in acute distress.     Appearance: She is well-developed. She is obese.   HENT:      Head: Normocephalic and atraumatic.   Eyes:      Conjunctiva/sclera: Conjunctivae normal.   Cardiovascular:      Rate and Rhythm: Normal rate and regular rhythm.      Heart sounds: No murmur heard.  Pulmonary:      Effort: Pulmonary effort is normal. No respiratory distress.      Breath sounds: Normal breath sounds.   Abdominal:      General: Bowel sounds are normal. There is distension.      Palpations: Abdomen is soft.      Tenderness: There is no abdominal tenderness.   Musculoskeletal:         General: No swelling.      Cervical back: Neck supple.   Skin:     General: Skin is warm and dry.      Capillary Refill: Capillary refill takes less than 2 seconds.   Neurological:      Mental Status: She is alert and oriented to person, place, and time.   Psychiatric:         Mood and Affect: Mood normal.           Lines/Drains:              Lab Results: I have reviewed the following results:   Results from last 7 days   Lab Units 11/10/24  0453   WBC Thousand/uL 4.58   HEMOGLOBIN g/dL 9.0*   HEMATOCRIT % 27.9*   PLATELETS Thousands/uL 100*   SEGS PCT % 79*   LYMPHO PCT % 9*   MONO PCT % 9   EOS PCT % 2     Results from last 7 days   Lab Units 11/11/24  0439 11/06/24  0520  11/05/24  1241   SODIUM mmol/L 132*   < > 131*   POTASSIUM mmol/L 4.3   < > 4.1   CHLORIDE mmol/L 104   < > 100   CO2 mmol/L 24   < > 25   BUN mg/dL 13   < > 22   CREATININE mg/dL 0.54*   < > 0.79   ANION GAP mmol/L 4   < > 6   CALCIUM mg/dL 7.7*   < > 8.2*   ALBUMIN g/dL  --   --  3.2*   TOTAL BILIRUBIN mg/dL  --   --  0.42   ALK PHOS U/L  --   --  80   ALT U/L  --   --  9   AST U/L  --   --  11*   GLUCOSE RANDOM mg/dL 164*   < > 162*    < > = values in this interval not displayed.         Results from last 7 days   Lab Units 11/11/24  1104 11/11/24  0704 11/10/24  2107 11/10/24  1628 11/10/24  1104 11/10/24  0758 11/09/24  2057 11/09/24  1514 11/09/24  1125 11/09/24  0737 11/08/24  2054 11/08/24  1629   POC GLUCOSE mg/dl 168* 174* 148* 182* 158* 136 131 124 118 124 186* 226*     Results from last 7 days   Lab Units 11/05/24  1241   HEMOGLOBIN A1C % 6.9*           Recent Cultures (last 7 days):         EGD with Push Enteroscopy    Result Date: 11/8/2024  Impression: The esophagus appeared normal. Moderate erythematous mucosa with loss of vascular pattern, consistent with gastritis in the cardia, fundus of the stomach, body of the stomach, greater curve of the stomach, lesser curve of the stomach, incisura, antrum and prepyloric region The duodenum appeared normal. The proximal jejunum and middle jejunum appeared normal. 300 cc suctioned from the stomach. Large amount of liquid and debris throughout the duodenum and proximal jejunum that was meticulously suctioned out. No narrowing or intrinsic stricture identified but findings concerning for ongoing partial small bowel obstruction. RECOMMENDATION: Findings concerning for possible ongoing partial small bowel obstruction Return to medical surgical floors Diet per surgery but would begin on clear liquids Reinsert NG tube if patient with recurrent nausea/vomiting Recommend repeat imaging if unable to tolerate diet Continue on Reglan 10 mg TID Strongly recommend  avoidance of opioid therapy and GLP-1 medication Can consider outpatient NM gastric emptying study when off opioid therapy Additional management per primary surgical team    Darryn Herrera MD     XR abdomen complete inc upright and/or decubitus    Result Date: 11/6/2024  Impression: No findings to suspect small bowel obstruction. Persistent dilatation of the stomach concerning for gastroparesis. Workstation performed: [id number]     XR chest portable    Result Date: 11/6/2024  Impression: Tip of enteric tube projects over the stomach. No focal consolidation, pleural effusion or pneumothorax. Resident: NELIA Mack I, the attending radiologist, have reviewed the images and agree with the final report above. Workstation performed: [id number]     XR chest portable    Result Date: 11/6/2024  Impression: NG tube position as above. No acute cardiopulmonary disease. Workstation performed: [id number]     CT abdomen pelvis with contrast    Result Date: 11/5/2024  Impression: Proximal small bowel obstruction. Similar findings reported on prior outside CT of 11/2/2024. Findings suggestive of small splenic infarct. Similar findings reported on prior outside CT of 11/2/2024. Hepatosplenomegaly. I personally discussed this study with EWS ACEVEDO on 11/5/2024 4:06 PM. Workstation performed: [id number]      Last 24 Hours Medication List:     Current Facility-Administered Medications:     acetaminophen (TYLENOL) tablet 975 mg, Q8H SAM    albuterol (PROVENTIL HFA,VENTOLIN HFA) inhaler 2 puff, Q4H PRN    bisacodyl (DULCOLAX) rectal suppository 10 mg, Daily    carvedilol (COREG) tablet 12.5 mg, BID With Meals    enoxaparin (LOVENOX) subcutaneous injection 40 mg, Daily    insulin lispro (HumALOG/ADMELOG) 100 units/mL subcutaneous injection 1-6 Units, 4x Daily (AC & HS) **AND** Fingerstick Glucose (POCT), 4x Daily AC and at bedtime    levothyroxine tablet 125 mcg, Early Morning    metoclopramide (REGLAN) tablet 10 mg, TID AC     ondansetron (ZOFRAN) injection 4 mg, Q6H PRN    oxyCODONE (ROXICODONE) IR tablet 5 mg, Q4H PRN    oxyCODONE (ROXICODONE) split tablet 2.5 mg, Q4H PRN    pantoprazole (PROTONIX) EC tablet 40 mg, Early Morning    Administrative Statements   Today, Patient Was Seen By: Shyanne Franks MD  I have spent a total time of >35 minutes in caring for this patient on the day of the visit/encounter including Diagnostic results, Prognosis, Risks and benefits of tx options, Instructions for management, Patient and family education, Importance of tx compliance, Risk factor reductions, Impressions, Counseling / Coordination of care, Documenting in the medical record, Reviewing / ordering tests, medicine, procedures  , Obtaining or reviewing history  , and Communicating with other healthcare professionals .    **Please Note: This note may have been constructed using a voice recognition system.**

## 2024-11-11 NOTE — PHYSICAL THERAPY NOTE
Physical Therapy Treatment Note     11/11/24 1436   PT Last Visit   PT Visit Date 11/11/24   Note Type   Note Type Treatment   Pain Assessment   Pain Assessment Tool 0-10   Pain Score 7   Pain Location/Orientation Location: Abdomen   Restrictions/Precautions   Weight Bearing Precautions Per Order No   Other Precautions Fall Risk;Pain;Telemetry   General   Chart Reviewed Yes   Family/Caregiver Present No   Subjective   Subjective Pt. agreeable to PT   Bed Mobility   Supine to Sit 5  Supervision   Additional items HOB elevated;Bedrails;Increased time required   Sit to Supine 5  Supervision   Additional items Bedrails;Increased time required   Transfers   Sit to Stand 6  Modified independent   Additional items Armrests;Increased time required   Stand to Sit 6  Modified independent   Additional items Armrests;Increased time required   Stand pivot 6  Modified independent   Ambulation/Elevation   Gait pattern Decreased foot clearance;Excessively slow;Short stride   Gait Assistance 5  Supervision   Additional items Assist x 1;Verbal cues   Assistive Device Rolling walker   Distance 110ft, 40ft, 100ft, 50ft   Stair Management Assistance 5  Supervision   Additional items Assist x 1;Increased time required   Stair Management Technique Two rails;Step to pattern;Nonreciprocal;Foreward   Number of Stairs 5   Balance   Static Sitting Good   Dynamic Sitting Fair +   Static Standing Fair   Dynamic Standing Fair -   Ambulatory Fair -   Activity Tolerance   Activity Tolerance Patient tolerated treatment well   Nurse Made Aware yes   Assessment   Prognosis Good   Problem List Decreased mobility;Decreased skin integrity;Pain   Assessment Pt. progressing well with overall mobility. Pt. needed no hands on A for mobilty. Extended time taken to complete all tasks by patient. SpO2 stats noted to be 97-98% and HR 76-89 bpm during ambulation. Seated rests between ambulation reproting fatigue. Pt. noted with some self limiting behaviors. Pt.  back in bed bed in semi reclined positioned post session. All needs within reach and bed alarm engaged. Will continue to follow per PT POC.   Barriers to Discharge None   Goals   Patient Goals None reported   STG Expiration Date 11/16/24   PT Treatment Day 2   Plan   Treatment/Interventions Functional transfer training;Elevations;Spoke to nursing;Bed mobility;Gait training;Equipment eval/education;Patient/family training;Spoke to case management   Progress Progressing toward goals   PT Frequency 3-5x/wk   Discharge Recommendation   Rehab Resource Intensity Level, PT No post-acute rehabilitation needs   Equipment Recommended Walker   AM-PAC Basic Mobility Inpatient   Turning in Flat Bed Without Bedrails 4   Lying on Back to Sitting on Edge of Flat Bed Without Bedrails 4   Moving Bed to Chair 4   Standing Up From Chair Using Arms 4   Walk in Room 4   Climb 3-5 Stairs With Railing 4   Basic Mobility Inpatient Raw Score 24   Basic Mobility Standardized Score 57.68   [facility] Highest Level Of Mobility   -HLM Goal 8: Walk 250 feet or more   JH-HLM Achieved 7: Walk 25 feet or more           Chinyere Driscoll PTA    An AM-PAC basic mobility standardized score less than 42.9 suggest the patient may benefit from discharge to post-acute rehab services.

## 2024-11-11 NOTE — ASSESSMENT & PLAN NOTE
History of endometrial cancer grade 1 stage Ia s/p/BSO 6/2020 with recurrent disease in 12/2023 when PET scan revealed enlarged retroperitoneal nodes, underwent radiation which she completed 3/2024  Repeat CT scan in August showed no recurrence of disease  Follow[facility]

## 2024-11-12 VITALS
SYSTOLIC BLOOD PRESSURE: 139 MMHG | HEIGHT: 60 IN | DIASTOLIC BLOOD PRESSURE: 61 MMHG | BODY MASS INDEX: 46.49 KG/M2 | HEART RATE: 85 BPM | OXYGEN SATURATION: 96 % | WEIGHT: 236.77 LBS | RESPIRATION RATE: 18 BRPM | TEMPERATURE: 97.5 F

## 2024-11-12 LAB
ANION GAP SERPL CALCULATED.3IONS-SCNC: 5 MMOL/L (ref 4–13)
BUN SERPL-MCNC: 12 MG/DL (ref 5–25)
CALCIUM SERPL-MCNC: 7.7 MG/DL (ref 8.4–10.2)
CHLORIDE SERPL-SCNC: 103 MMOL/L (ref 96–108)
CO2 SERPL-SCNC: 24 MMOL/L (ref 21–32)
CREAT SERPL-MCNC: 0.55 MG/DL (ref 0.6–1.3)
ERYTHROCYTE [DISTWIDTH] IN BLOOD BY AUTOMATED COUNT: 15.6 % (ref 11.6–15.1)
GFR SERPL CREATININE-BSD FRML MDRD: 101 ML/MIN/1.73SQ M
GLUCOSE SERPL-MCNC: 153 MG/DL (ref 65–140)
GLUCOSE SERPL-MCNC: 167 MG/DL (ref 65–140)
HCT VFR BLD AUTO: 29.1 % (ref 34.8–46.1)
HGB BLD-MCNC: 9.7 G/DL (ref 11.5–15.4)
MAGNESIUM SERPL-MCNC: 1.7 MG/DL (ref 1.9–2.7)
MCH RBC QN AUTO: 31.5 PG (ref 26.8–34.3)
MCHC RBC AUTO-ENTMCNC: 33.3 G/DL (ref 31.4–37.4)
MCV RBC AUTO: 95 FL (ref 82–98)
PHOSPHATE SERPL-MCNC: 2.5 MG/DL (ref 2.3–4.1)
PLATELET # BLD AUTO: 100 THOUSANDS/UL (ref 149–390)
PMV BLD AUTO: 10.2 FL (ref 8.9–12.7)
POTASSIUM SERPL-SCNC: 4.3 MMOL/L (ref 3.5–5.3)
RBC # BLD AUTO: 3.08 MILLION/UL (ref 3.81–5.12)
SODIUM SERPL-SCNC: 132 MMOL/L (ref 135–147)
WBC # BLD AUTO: 5.69 THOUSAND/UL (ref 4.31–10.16)

## 2024-11-12 PROCEDURE — 99232 SBSQ HOSP IP/OBS MODERATE 35: CPT

## 2024-11-12 PROCEDURE — 83735 ASSAY OF MAGNESIUM: CPT

## 2024-11-12 PROCEDURE — 99233 SBSQ HOSP IP/OBS HIGH 50: CPT

## 2024-11-12 PROCEDURE — 84100 ASSAY OF PHOSPHORUS: CPT

## 2024-11-12 PROCEDURE — 85027 COMPLETE CBC AUTOMATED: CPT

## 2024-11-12 PROCEDURE — 82948 REAGENT STRIP/BLOOD GLUCOSE: CPT

## 2024-11-12 PROCEDURE — 80048 BASIC METABOLIC PNL TOTAL CA: CPT

## 2024-11-12 RX ADMIN — INSULIN LISPRO 1 UNITS: 100 INJECTION, SOLUTION INTRAVENOUS; SUBCUTANEOUS at 08:11

## 2024-11-12 RX ADMIN — ONDANSETRON 4 MG: 2 INJECTION INTRAMUSCULAR; INTRAVENOUS at 06:27

## 2024-11-12 RX ADMIN — PANTOPRAZOLE SODIUM 40 MG: 40 TABLET, DELAYED RELEASE ORAL at 06:28

## 2024-11-12 RX ADMIN — ACETAMINOPHEN 975 MG: 325 TABLET, FILM COATED ORAL at 06:27

## 2024-11-12 RX ADMIN — ENOXAPARIN SODIUM 40 MG: 40 INJECTION SUBCUTANEOUS at 08:11

## 2024-11-12 RX ADMIN — LEVOTHYROXINE SODIUM 125 MCG: 125 TABLET ORAL at 06:27

## 2024-11-12 RX ADMIN — CARVEDILOL 12.5 MG: 12.5 TABLET, FILM COATED ORAL at 08:11

## 2024-11-12 RX ADMIN — METOCLOPRAMIDE 10 MG: 10 TABLET ORAL at 06:27

## 2024-11-12 NOTE — PLAN OF CARE
Problem: Potential for Falls  Goal: Patient will remain free of falls  Description: INTERVENTIONS:  - Educate patient/family on patient safety including physical limitations  - Instruct patient to call for assistance with activity   - Consult OT/PT to assist with strengthening/mobility   - Keep Call bell within reach  - Keep bed low and locked with side rails adjusted as appropriate  - Keep care items and personal belongings within reach  - Initiate and maintain comfort rounds  - Make Fall Risk Sign visible to staff  - Offer Toileting every 2 Hours, in advance of need  - Initiate/Maintain bedalarm  - Obtain necessary fall risk management equipment  - Apply yellow socks and bracelet for high fall risk patients  - Consider moving patient to room near nurses station  11/12/2024 0936 by Jaylin Vanessa RN  Outcome: Adequate for Discharge  11/12/2024 0724 by Jaylin Vanessa RN  Outcome: Progressing     Problem: PAIN - ADULT  Goal: Verbalizes/displays adequate comfort level or baseline comfort level  Description: Interventions:  - Encourage patient to monitor pain and request assistance  - Assess pain using appropriate pain scale  - Administer analgesics based on type and severity of pain and evaluate response  - Implement non-pharmacological measures as appropriate and evaluate response  - Consider cultural and social influences on pain and pain management  - Notify physician/advanced practitioner if interventions unsuccessful or patient reports new pain  11/12/2024 0936 by Jaylin Vanessa RN  Outcome: Adequate for Discharge  11/12/2024 0724 by Jaylin Vanessa RN  Outcome: Progressing     Problem: INFECTION - ADULT  Goal: Absence or prevention of progression during hospitalization  Description: INTERVENTIONS:  - Assess and monitor for signs and symptoms of infection  - Monitor lab/diagnostic results  - Monitor all insertion sites, i.e. indwelling lines, tubes, and drains  - Monitor endotracheal if appropriate  and nasal secretions for changes in amount and color  - [place] appropriate cooling/warming therapies per order  - Administer medications as ordered  - Instruct and encourage patient and family to use good hand hygiene technique  - Identify and instruct in appropriate isolation precautions for identified infection/condition  11/12/2024 0936 by Jaylin Vanessa RN  Outcome: Adequate for Discharge  11/12/2024 0724 by Jaylin Vanessa RN  Outcome: Progressing     Problem: SAFETY ADULT  Goal: Patient will remain free of falls  Description: INTERVENTIONS:  - Educate patient/family on patient safety including physical limitations  - Instruct patient to call for assistance with activity   - Consult OT/PT to assist with strengthening/mobility   - Keep Call bell within reach  - Keep bed low and locked with side rails adjusted as appropriate  - Keep care items and personal belongings within reach  - Initiate and maintain comfort rounds  - Make Fall Risk Sign visible to staff  - Offer Toileting every 2 Hours, in advance of need  - Initiate/Maintain bedalarm  - Obtain necessary fall risk management equipment  - Apply yellow socks and bracelet for high fall risk patients  - Consider moving patient to room near nurses station  11/12/2024 0936 by Jaylin Vanessa RN  Outcome: Adequate for Discharge  11/12/2024 0724 by Jaylin Vanessa RN  Outcome: Progressing  Goal: Maintain or return to baseline ADL function  Description: INTERVENTIONS:  -  Assess patient's ability to carry out ADLs; assess patient's baseline for ADL function and identify physical deficits which impact ability to perform ADLs (bathing, care of mouth/teeth, toileting, grooming, dressing, etc.)  - Assess/evaluate cause of self-care deficits   - Assess range of motion  - Assess patient's mobility; develop plan if impaired  - Assess patient's need for assistive devices and provide as appropriate  - Encourage maximum independence but intervene and supervise when  necessary  - Involve family in performance of ADLs  - Assess for home care needs following discharge   - Consider OT consult to assist with ADL evaluation and planning for discharge  - Provide patient education as appropriate  11/12/2024 0936 by Jaylin Vanessa RN  Outcome: Adequate for Discharge  11/12/2024 0724 by Jaylin Vanessa RN  Outcome: Progressing  Goal: Maintains/Returns to pre admission functional level  Description: INTERVENTIONS:  - Perform AM-PAC 6 Click Basic Mobility/ Daily Activity assessment daily.  - Set and communicate daily mobility goal to care team and patient/family/caregiver.   - Collaborate with rehabilitation services on mobility goals if consulted  - Perform Range of Motion 3 times a day.  - Reposition patient every 2 hours.  - Dangle patient 3 times a day  - Stand patient 3 times a day  - Ambulate patient 3 times a day  - Out of bed to chair 3 times a day   - Out of bed for meals 3 times a day  - Out of bed for toileting  - Record patient progress and toleration of activity level   11/12/2024 0936 by Jaylin Vanessa RN  Outcome: Adequate for Discharge  11/12/2024 0724 by Jaylin Vanessa RN  Outcome: Progressing     Problem: DISCHARGE PLANNING  Goal: Discharge to home or other facility with appropriate resources  Description: INTERVENTIONS:  - Identify barriers to discharge w/patient and caregiver  - Arrange for needed discharge resources and transportation as appropriate  - Identify discharge learning needs (meds, wound care, etc.)  - Arrange for interpretive services to assist at discharge as needed  - Refer to Case Management Department for coordinating discharge planning if the patient needs post-hospital services based on physician/advanced practitioner order or complex needs related to functional status, cognitive ability, or social support system  11/12/2024 0936 by Jaylin Vanessa RN  Outcome: Adequate for Discharge  11/12/2024 0724 by Jaylin Vanessa RN  Outcome:  Progressing     Problem: Knowledge Deficit  Goal: Patient/family/caregiver demonstrates understanding of disease process, treatment plan, medications, and discharge instructions  Description: Complete learning assessment and assess knowledge base.  Interventions:  - Provide teaching at level of understanding  - Provide teaching via preferred learning methods  11/12/2024 0936 by Jaylin Vanessa RN  Outcome: Adequate for Discharge  11/12/2024 0724 by Jaylin Vanessa RN  Outcome: Progressing     Problem: Prexisting or High Potential for Compromised Skin Integrity  Goal: Skin integrity is maintained or improved  Description: INTERVENTIONS:  - Identify patients at risk for skin breakdown  - Assess and monitor skin integrity  - Assess and monitor nutrition and hydration status  - Monitor labs   - Assess for incontinence   - Turn and reposition patient  - Assist with mobility/ambulation  - Relieve pressure over bony prominences  - Avoid friction and shearing  - Provide appropriate hygiene as needed including keeping skin clean and dry  - Evaluate need for skin moisturizer/barrier cream  - Collaborate with interdisciplinary team   - Patient/family teaching  - Consider wound care consult   11/12/2024 0936 by Jaylin Vanessa RN  Outcome: Adequate for Discharge  11/12/2024 0724 by Jaylin Vanessa RN  Outcome: Progressing     Problem: PHYSICAL THERAPY ADULT  Goal: Performs mobility at highest level of function for planned discharge setting.  See evaluation for individualized goals.  Description: Treatment/Interventions: Functional transfer training, LE strengthening/ROM, Elevations, Therapeutic exercise, Endurance training, Patient/family training, Bed mobility, Gait training, Spoke to nursing, OT  Equipment Recommended: Walker (pt has)       See flowsheet documentation for full assessment, interventions and recommendations.  Outcome: Adequate for Discharge     Problem: OCCUPATIONAL THERAPY ADULT  Goal: Performs  self-care activities at highest level of function for planned discharge setting.  See evaluation for individualized goals.  Description:   Outcome: Adequate for Discharge     Problem: Nutrition/Hydration-ADULT  Goal: Nutrient/Hydration intake appropriate for improving, restoring or maintaining nutritional needs  Description: Monitor and assess patient's nutrition/hydration status for malnutrition. Collaborate with interdisciplinary team and initiate plan and interventions as ordered.  Monitor patient's weight and dietary intake as ordered or per policy. Utilize nutrition screening tool and intervene as necessary. Determine patient's food preferences and provide high-protein, high-caloric foods as appropriate.     INTERVENTIONS:  - Monitor oral intake, urinary output, labs, and treatment plans  - Assess nutrition and hydration status and recommend course of action  - Evaluate amount of meals eaten  - Assist patient with eating if necessary   - Allow adequate time for meals  - Recommend/ encourage appropriate diets, oral nutritional supplements, and vitamin/mineral supplements  - Order, calculate, and assess calorie counts as needed  - Recommend, monitor, and adjust tube feedings and TPN/PPN based on assessed needs  - Assess need for intravenous fluids  - Provide specific nutrition/hydration education as appropriate  - Include patient/family/caregiver in decisions related to nutrition  11/12/2024 0936 by Jaylin Vanessa, RN  Outcome: Adequate for Discharge  11/12/2024 0724 by Jaylin Vanessa, RN  Outcome: Progressing

## 2024-11-12 NOTE — ASSESSMENT & PLAN NOTE
Lab Results   Component Value Date    HGBA1C 6.9 (H) 11/05/2024       Recent Labs     11/11/24  1104 11/11/24  1620 11/11/24  2123 11/12/24  0743   POCGLU 168* 142* 124 167*       Blood Sugar Average: Last 72 hrs:  (P) [id number]  Home regimen is glargine 60 units in the morning and Humalog sliding scale.  Farxiga 10 mg daily  No GLP-1 on discharge.  On home medication list there is Trulicity and Mounjaro both discontinued  Currently patient is on sliding scale.  Patient states she knows how to manage her diabetes she has been a diabetic for a long time.  Continue sliding scale for now until oral intake improves.  Recommend to check blood sugar 4 times a day, if blood sugars above 180 slowly restart Lantus  Outpatient follow-up with endocrinology

## 2024-11-12 NOTE — ASSESSMENT & PLAN NOTE
63 year old female previously managed for partial SBO.  Subsequently found to have gastroparesis.  GLP-1 was held, scheduled Reglan and Protonix were started.    Plan:  - Outpatient gastric emptying study  - Follow-up with GI and PCP  - Appreciate PT recs - no post-acute rehab needs  - Resume eliquis (previous PE) on DC  - D/C home today

## 2024-11-12 NOTE — PLAN OF CARE
Problem: Potential for Falls  Goal: Patient will remain free of falls  Description: INTERVENTIONS:  - Educate patient/family on patient safety including physical limitations  - Instruct patient to call for assistance with activity   - Consult OT/PT to assist with strengthening/mobility   - Keep Call bell within reach  - Keep bed low and locked with side rails adjusted as appropriate  - Keep care items and personal belongings within reach  - Initiate and maintain comfort rounds  - Make Fall Risk Sign visible to staff  - Offer Toileting every 2 Hours, in advance of need  - Initiate/Maintain bedalarm  - Obtain necessary fall risk management equipment  - Apply yellow socks and bracelet for high fall risk patients  - Consider moving patient to room near nurses station  Outcome: Progressing     Problem: PAIN - ADULT  Goal: Verbalizes/displays adequate comfort level or baseline comfort level  Description: Interventions:  - Encourage patient to monitor pain and request assistance  - Assess pain using appropriate pain scale  - Administer analgesics based on type and severity of pain and evaluate response  - Implement non-pharmacological measures as appropriate and evaluate response  - Consider cultural and social influences on pain and pain management  - Notify physician/advanced practitioner if interventions unsuccessful or patient reports new pain  Outcome: Progressing     Problem: INFECTION - ADULT  Goal: Absence or prevention of progression during hospitalization  Description: INTERVENTIONS:  - Assess and monitor for signs and symptoms of infection  - Monitor lab/diagnostic results  - Monitor all insertion sites, i.e. indwelling lines, tubes, and drains  - Monitor endotracheal if appropriate and nasal secretions for changes in amount and color  - [place] appropriate cooling/warming therapies per order  - Administer medications as ordered  - Instruct and encourage patient and family to use good hand hygiene  technique  - Identify and instruct in appropriate isolation precautions for identified infection/condition  Outcome: Progressing     Problem: SAFETY ADULT  Goal: Patient will remain free of falls  Description: INTERVENTIONS:  - Educate patient/family on patient safety including physical limitations  - Instruct patient to call for assistance with activity   - Consult OT/PT to assist with strengthening/mobility   - Keep Call bell within reach  - Keep bed low and locked with side rails adjusted as appropriate  - Keep care items and personal belongings within reach  - Initiate and maintain comfort rounds  - Make Fall Risk Sign visible to staff  - Offer Toileting every 2 Hours, in advance of need  - Initiate/Maintain bedalarm  - Obtain necessary fall risk management equipment  - Apply yellow socks and bracelet for high fall risk patients  - Consider moving patient to room near nurses station  Outcome: Progressing  Goal: Maintain or return to baseline ADL function  Description: INTERVENTIONS:  -  Assess patient's ability to carry out ADLs; assess patient's baseline for ADL function and identify physical deficits which impact ability to perform ADLs (bathing, care of mouth/teeth, toileting, grooming, dressing, etc.)  - Assess/evaluate cause of self-care deficits   - Assess range of motion  - Assess patient's mobility; develop plan if impaired  - Assess patient's need for assistive devices and provide as appropriate  - Encourage maximum independence but intervene and supervise when necessary  - Involve family in performance of ADLs  - Assess for home care needs following discharge   - Consider OT consult to assist with ADL evaluation and planning for discharge  - Provide patient education as appropriate  Outcome: Progressing  Goal: Maintains/Returns to pre admission functional level  Description: INTERVENTIONS:  - Perform AM-PAC 6 Click Basic Mobility/ Daily Activity assessment daily.  - Set and communicate daily mobility  goal to care team and patient/family/caregiver.   - Collaborate with rehabilitation services on mobility goals if consulted  - Perform Range of Motion 3 times a day.  - Reposition patient every 2 hours.  - Dangle patient 3 times a day  - Stand patient 3 times a day  - Ambulate patient 3 times a day  - Out of bed to chair 3 times a day   - Out of bed for meals 3 times a day  - Out of bed for toileting  - Record patient progress and toleration of activity level   Outcome: Progressing     Problem: DISCHARGE PLANNING  Goal: Discharge to home or other facility with appropriate resources  Description: INTERVENTIONS:  - Identify barriers to discharge w/patient and caregiver  - Arrange for needed discharge resources and transportation as appropriate  - Identify discharge learning needs (meds, wound care, etc.)  - Arrange for interpretive services to assist at discharge as needed  - Refer to Case Management Department for coordinating discharge planning if the patient needs post-hospital services based on physician/advanced practitioner order or complex needs related to functional status, cognitive ability, or social support system  Outcome: Progressing     Problem: Knowledge Deficit  Goal: Patient/family/caregiver demonstrates understanding of disease process, treatment plan, medications, and discharge instructions  Description: Complete learning assessment and assess knowledge base.  Interventions:  - Provide teaching at level of understanding  - Provide teaching via preferred learning methods  Outcome: Progressing     Problem: Prexisting or High Potential for Compromised Skin Integrity  Goal: Skin integrity is maintained or improved  Description: INTERVENTIONS:  - Identify patients at risk for skin breakdown  - Assess and monitor skin integrity  - Assess and monitor nutrition and hydration status  - Monitor labs   - Assess for incontinence   - Turn and reposition patient  - Assist with mobility/ambulation  - Relieve  pressure over bony prominences  - Avoid friction and shearing  - Provide appropriate hygiene as needed including keeping skin clean and dry  - Evaluate need for skin moisturizer/barrier cream  - Collaborate with interdisciplinary team   - Patient/family teaching  - Consider wound care consult   Outcome: Progressing     Problem: Nutrition/Hydration-ADULT  Goal: Nutrient/Hydration intake appropriate for improving, restoring or maintaining nutritional needs  Description: Monitor and assess patient's nutrition/hydration status for malnutrition. Collaborate with interdisciplinary team and initiate plan and interventions as ordered.  Monitor patient's weight and dietary intake as ordered or per policy. Utilize nutrition screening tool and intervene as necessary. Determine patient's food preferences and provide high-protein, high-caloric foods as appropriate.     INTERVENTIONS:  - Monitor oral intake, urinary output, labs, and treatment plans  - Assess nutrition and hydration status and recommend course of action  - Evaluate amount of meals eaten  - Assist patient with eating if necessary   - Allow adequate time for meals  - Recommend/ encourage appropriate diets, oral nutritional supplements, and vitamin/mineral supplements  - Order, calculate, and assess calorie counts as needed  - Recommend, monitor, and adjust tube feedings and TPN/PPN based on assessed needs  - Assess need for intravenous fluids  - Provide specific nutrition/hydration education as appropriate  - Include patient/family/caregiver in decisions related to nutrition  Outcome: Progressing

## 2024-11-12 NOTE — PROGRESS NOTES
Progress Note - Hospitalist   Name: Lety Botello 63 y.o. female I MRN: [id number]  Unit/Bed#: E5 -01 I Date of Admission: 11/5/2024   Date of Service: 11/12/2024 I Hospital Day: 7    Assessment & Plan  SBO (small bowel obstruction) (HCC)  Patient admitted to surgery service for recurrent small bowel obstruction, also has a history of gastroparesis    CT showed proximal small bowel obstruction, similar findings reported on prior outside CT on 11/2/2024.  Findings suggestive of small splenic infarct, similar findings reported on prior outside CT on 11/2/2024.  Hepatosplenomegaly  NG tube was removed by surgery 11/7, patient had multiple bowel movements  Evaluated by GI underwent EGD with push enteroscopy 11/8.  EGD with push enteroscopy showed normal esophagus.  Showed gastritis. Duodenum appeared normal. Proximal  jejunum and middle jejunum normal. Large amount of liquid and debris throughout the duodenum and proximal jejunum that was suctioned out, no narrowing or increasing stricture identified finding could be concerning for ongoing partial small bowel obstruction  Passing flatus, had BM  Pt getting discharged today as per primary team  Gastroparesis  Underwent EGD with push enteroscopy today, it showed gastritis.  Large amount of liquid and impress throughout the duodenum and proximal jejunum which was suctioned out, no narrowing or intrinsic stricture identified but findings concerning for ongoing partial small bowel obstruction.  Continue Reglan 10 mg 3-4 times daily  Outpatient gastric emptying study   Hypertension  Home regimen is Coreg 12.5 mg twice daily, lisinopril 10 mg daily--continue on discharge  Recommend to check blood pressure daily and keep a blood pressure log and follow-up with PCP   Type 2 diabetes mellitus (HCC)  Lab Results   Component Value Date    HGBA1C 6.9 (H) 11/05/2024       Recent Labs     11/11/24  1104 11/11/24  1620 11/11/24  2123 11/12/24  0743   POCGLU 168* 142* 124 167*        Blood Sugar Average: Last 72 hrs:  (P) 145.2522346107028379  Home regimen is glargine 60 units in the morning and Humalog sliding scale.  Farxiga 10 mg daily  No GLP-1 on discharge.  On home medication list there is Trulicity and Mounjaro both discontinued  Currently patient is on sliding scale.  Patient states she knows how to manage her diabetes she has been a diabetic for a long time.  Continue sliding scale for now until oral intake improves.  Recommend to check blood sugar 4 times a day, if blood sugars above 180 slowly restart Lantus  Outpatient follow-up with endocrinology    Endometrial cancer (HCC)  History of endometrial cancer grade 1 stage Ia s/p/BSO 6/2020 with recurrent disease in 12/2023 when PET scan revealed enlarged retroperitoneal nodes, underwent radiation which she completed 3/2024  Repeat CT scan in August showed no recurrence of disease  Follows [facility] oncology  Obesity  Lifestyle modification  History of pulmonary embolism  Patient was diagnosed with right leg DVT and pulmonary embolism 6 months ago in May 2024  Restart Eliquis 5 mg twice daily-on hold by surgery with recommendation to restart on discharge    Hyponatremia  Chronic mild hyponatremia,   Encourage oral intake  Hypothyroidism  Home regimen levothyroxine 125 mcg daily, continue  Mild intermittent asthma  Not in acute exacerbation  Continue albuterol inhaler as needed    VTE Pharmacologic Prophylaxis:    enoxaparin    Mobility:   Basic Mobility Inpatient Raw Score: 24  JH-HLM Goal: 8: Walk 250 feet or more  JH-HLM Achieved: 8: Walk 250 feet ot more        Current Length of Stay: 7 day(s)  Current Patient Status: Inpatient     Discharge Plan:  today    Code Status: Level 1 - Full Code    Subjective   Pt was seen and examined at bedside, stated that she feels much better and has no active concerns.    Objective :  Temp:  [97.5 °F (36.4 °C)-97.9 °F (36.6 °C)] 97.5 °F (36.4 °C)  HR:  [80-88] 85  BP: (128-156)/(61-69)  139/61  Resp:  [16-20] 18  SpO2:  [94 %-96 %] 96 %  O2 Device: None (Room air)    Body mass index is 46.24 kg/m².     Input and Output Summary (last 24 hours):   No intake or output data in the 24 hours ending 11/12/24 1019    Physical Exam  Vitals and nursing note reviewed.   Constitutional:       General: She is not in acute distress.     Appearance: She is well-developed. She is obese.   HENT:      Head: Normocephalic and atraumatic.   Eyes:      Conjunctiva/sclera: Conjunctivae normal.   Cardiovascular:      Rate and Rhythm: Normal rate and regular rhythm.      Heart sounds: No murmur heard.  Pulmonary:      Effort: Pulmonary effort is normal. No respiratory distress.      Breath sounds: Normal breath sounds.   Abdominal:      General: Bowel sounds are normal. There is distension.      Palpations: Abdomen is soft.      Tenderness: There is no abdominal tenderness.   Musculoskeletal:         General: No swelling.      Cervical back: Neck supple.   Skin:     General: Skin is warm and dry.      Capillary Refill: Capillary refill takes less than 2 seconds.   Neurological:      Mental Status: She is alert and oriented to person, place, and time.   Psychiatric:         Mood and Affect: Mood normal.           Lines/Drains:              Lab Results: I have reviewed the following results:   Results from last 7 days   Lab Units 11/12/24  0443 11/10/24  0453   WBC Thousand/uL 5.69 4.58   HEMOGLOBIN g/dL 9.7* 9.0*   HEMATOCRIT % 29.1* 27.9*   PLATELETS Thousands/uL 100* 100*   SEGS PCT %  --  79*   LYMPHO PCT %  --  9*   MONO PCT %  --  9   EOS PCT %  --  2     Results from last 7 days   Lab Units 11/12/24  0443 11/06/24  0520 11/05/24  1241   SODIUM mmol/L 132*   < > 131*   POTASSIUM mmol/L 4.3   < > 4.1   CHLORIDE mmol/L 103   < > 100   CO2 mmol/L 24   < > 25   BUN mg/dL 12   < > 22   CREATININE mg/dL 0.55*   < > 0.79   ANION GAP mmol/L 5   < > 6   CALCIUM mg/dL 7.7*   < > 8.2*   ALBUMIN g/dL  --   --  3.2*   TOTAL  BILIRUBIN mg/dL  --   --  0.42   ALK PHOS U/L  --   --  80   ALT U/L  --   --  9   AST U/L  --   --  11*   GLUCOSE RANDOM mg/dL 153*   < > 162*    < > = values in this interval not displayed.         Results from last 7 days   Lab Units 11/12/24  0743 11/11/24  2123 11/11/24  1620 11/11/24  1104 11/11/24  0704 11/10/24  2107 11/10/24  1628 11/10/24  1104 11/10/24  0758 11/09/24  2057 11/09/24  1514 11/09/24  1125   POC GLUCOSE mg/dl 167* 124 142* 168* 174* 148* 182* 158* 136 131 124 118     Results from last 7 days   Lab Units 11/05/24  1241   HEMOGLOBIN A1C % 6.9*           Recent Cultures (last 7 days):         EGD with Push Enteroscopy    Result Date: 11/8/2024  Impression: The esophagus appeared normal. Moderate erythematous mucosa with loss of vascular pattern, consistent with gastritis in the cardia, fundus of the stomach, body of the stomach, greater curve of the stomach, lesser curve of the stomach, incisura, antrum and prepyloric region The duodenum appeared normal. The proximal jejunum and middle jejunum appeared normal. 300 cc suctioned from the stomach. Large amount of liquid and debris throughout the duodenum and proximal jejunum that was meticulously suctioned out. No narrowing or intrinsic stricture identified but findings concerning for ongoing partial small bowel obstruction. RECOMMENDATION: Findings concerning for possible ongoing partial small bowel obstruction Return to medical surgical floors Diet per surgery but would begin on clear liquids Reinsert NG tube if patient with recurrent nausea/vomiting Recommend repeat imaging if unable to tolerate diet Continue on Reglan 10 mg TID Strongly recommend avoidance of opioid therapy and GLP-1 medication Can consider outpatient NM gastric emptying study when off opioid therapy Additional management per primary surgical team    Darryn Herrera MD     XR abdomen complete inc upright and/or decubitus    Result Date: 11/6/2024  Impression: No findings to  suspect small bowel obstruction. Persistent dilatation of the stomach concerning for gastroparesis. Workstation performed: [id number]     XR chest portable    Result Date: 11/6/2024  Impression: Tip of enteric tube projects over the stomach. No focal consolidation, pleural effusion or pneumothorax. Resident: NELIA Mack I, the attending radiologist, have reviewed the images and agree with the final report above. Workstation performed: [id number]     XR chest portable    Result Date: 11/6/2024  Impression: NG tube position as above. No acute cardiopulmonary disease. Workstation performed: [id number]     CT abdomen pelvis with contrast    Result Date: 11/5/2024  Impression: Proximal small bowel obstruction. Similar findings reported on prior outside CT of 11/2/2024. Findings suggestive of small splenic infarct. Similar findings reported on prior outside CT of 11/2/2024. Hepatosplenomegaly. I personally discussed this study with WES ACEVEDO on 11/5/2024 4:06 PM. Workstation performed: [id number]          Last 24 Hours Medication List:     Current Facility-Administered Medications:     acetaminophen (TYLENOL) tablet 975 mg, Q8H SAM    albuterol (PROVENTIL HFA,VENTOLIN HFA) inhaler 2 puff, Q4H PRN    bisacodyl (DULCOLAX) rectal suppository 10 mg, Daily    carvedilol (COREG) tablet 12.5 mg, BID With Meals    enoxaparin (LOVENOX) subcutaneous injection 40 mg, Daily    insulin lispro (HumALOG/ADMELOG) 100 units/mL subcutaneous injection 1-6 Units, 4x Daily (AC & HS) **AND** Fingerstick Glucose (POCT), 4x Daily AC and at bedtime    levothyroxine tablet 125 mcg, Early Morning    metoclopramide (REGLAN) tablet 10 mg, TID AC    ondansetron (ZOFRAN) injection 4 mg, Q6H PRN    oxyCODONE (ROXICODONE) IR tablet 5 mg, Q4H PRN    oxyCODONE (ROXICODONE) split tablet 2.5 mg, Q4H PRN    pantoprazole (PROTONIX) EC tablet 40 mg, Early Morning    Administrative Statements   Today, Patient Was Seen By: Shyanne Franks MD  I have  spent a total time of >35 minutes in caring for this patient on the day of the visit/encounter including Risks and benefits of tx options, Instructions for management, Patient and family education, and Importance of tx compliance.    **Please Note: This note may have been constructed using a voice recognition system.**

## 2024-11-12 NOTE — ASSESSMENT & PLAN NOTE
Patient admitted to surgery service for recurrent small bowel obstruction, also has a history of gastroparesis    CT showed proximal small bowel obstruction, similar findings reported on prior outside CT on 11/2/2024.  Findings suggestive of small splenic infarct, similar findings reported on prior outside CT on 11/2/2024.  Hepatosplenomegaly  NG tube was removed by surgery 11/7, patient had multiple bowel movements  Evaluated by GI underwent EGD with push enteroscopy 11/8.  EGD with push enteroscopy showed normal esophagus.  Showed gastritis. Duodenum appeared normal. Proximal  jejunum and middle jejunum normal. Large amount of liquid and debris throughout the duodenum and proximal jejunum that was suctioned out, no narrowing or increasing stricture identified finding could be concerning for ongoing partial small bowel obstruction  Passing flatus, had BM  Pt getting discharged today as per primary team

## 2024-11-12 NOTE — PROGRESS NOTES
Progress Note - Surgery-General   Name: Lety Botello 63 y.o. female I MRN: [id number]  Unit/Bed#: E5 -01 I Date of Admission: 11/5/2024   Date of Service: 11/11/2024 I Hospital Day: 6     Assessment & Plan  Gastroparesis  63 year old female previously managed for partial SBO.  Subsequently found to have gastroparesis.  GLP-1 was held, scheduled Reglan and Protonix were started.    Plan:  - Outpatient gastric emptying study  - Follow-up with GI and PCP  - Appreciate PT recs - no post-acute rehab needs  - Resume eliquis (previous PE) on DC  - D/C home today   SBO (small bowel obstruction) (HCC)  - Resolved      24 Hour Events : No acute overnight events  Subjective : Patient continues to have 7 out of 10 abdominal pain located diffusely throughout.  Patient continues to have bowel movements and pass gas.  She endorses some nausea and 2 episodes of vomiting overnight.  Currently she is doing fine without any nausea, vomiting, fevers or chills.    Objective :  Visit Vitals  /69   Pulse 80   Temp 97.6 °F (36.4 °C)   Resp 20   Ht 5' (1.524 m)   Wt 107 kg (236 lb 12.4 oz)   SpO2 95%   BMI 46.24 kg/m²   Smoking Status Never   BSA 2 m²        Physical Exam  Constitutional:       Appearance: Normal appearance. She is obese. She is not ill-appearing or diaphoretic.   Cardiovascular:      Rate and Rhythm: Normal rate and regular rhythm.      Pulses: Normal pulses.      Heart sounds: Normal heart sounds. No murmur heard.  Pulmonary:      Effort: Pulmonary effort is normal. No respiratory distress.      Breath sounds: Normal breath sounds.   Abdominal:      General: Bowel sounds are normal. There is no distension.      Palpations: Abdomen is soft. There is no mass.      Tenderness: There is no abdominal tenderness. There is no guarding or rebound.      Hernia: No hernia is present.   Skin:     General: Skin is warm and dry.   Neurological:      General: No focal deficit present.      Mental Status: She is alert and  oriented to person, place, and time.         Recent Labs     11/10/24  0453 11/11/24  0439 11/12/24  0443   WBC 4.58  --  5.69   HGB 9.0*  --  9.7*   *  --  100*   SODIUM 133* 132*  --    K 3.7 4.3  --     104  --    CO2 25 24  --    BUN 13 13  --    CREATININE 0.61 0.54*  --    GLUC 129 164*  --    CALCIUM 7.5* 7.7*  --    MG 2.0 2.0  --    PHOS  --  1.8*  --           VTE Pharmacologic Prophylaxis: VTE covered by:  enoxaparin, Subcutaneous, 40 mg at 11/11/24 0755     VTE Mechanical Prophylaxis: sequential compression device

## 2024-11-12 NOTE — PLAN OF CARE
Problem: Potential for Falls  Goal: Patient will remain free of falls  Description: INTERVENTIONS:  - Educate patient/family on patient safety including physical limitations  - Instruct patient to call for assistance with activity   - Consult OT/PT to assist with strengthening/mobility   - Keep Call bell within reach  - Keep bed low and locked with side rails adjusted as appropriate  - Keep care items and personal belongings within reach  - Initiate and maintain comfort rounds  - Make Fall Risk Sign visible to staff  - Offer Toileting every 2 Hours, in advance of need  - Initiate/Maintain bedalarm  - Obtain necessary fall risk management equipment  - Apply yellow socks and bracelet for high fall risk patients  - Consider moving patient to room near nurses station  Outcome: Progressing     Problem: PAIN - ADULT  Goal: Verbalizes/displays adequate comfort level or baseline comfort level  Description: Interventions:  - Encourage patient to monitor pain and request assistance  - Assess pain using appropriate pain scale  - Administer analgesics based on type and severity of pain and evaluate response  - Implement non-pharmacological measures as appropriate and evaluate response  - Consider cultural and social influences on pain and pain management  - Notify physician/advanced practitioner if interventions unsuccessful or patient reports new pain  Outcome: Progressing     Problem: INFECTION - ADULT  Goal: Absence or prevention of progression during hospitalization  Description: INTERVENTIONS:  - Assess and monitor for signs and symptoms of infection  - Monitor lab/diagnostic results  - Monitor all insertion sites, i.e. indwelling lines, tubes, and drains  - Monitor endotracheal if appropriate and nasal secretions for changes in amount and color  - Juliette appropriate cooling/warming therapies per order  - Administer medications as ordered  - Instruct and encourage patient and family to use good hand hygiene  technique  - Identify and instruct in appropriate isolation precautions for identified infection/condition  Outcome: Progressing  Goal: Absence of fever/infection during neutropenic period  Description: INTERVENTIONS:  - Monitor WBC    Outcome: Progressing     Problem: SAFETY ADULT  Goal: Patient will remain free of falls  Description: INTERVENTIONS:  - Educate patient/family on patient safety including physical limitations  - Instruct patient to call for assistance with activity   - Consult OT/PT to assist with strengthening/mobility   - Keep Call bell within reach  - Keep bed low and locked with side rails adjusted as appropriate  - Keep care items and personal belongings within reach  - Initiate and maintain comfort rounds  - Make Fall Risk Sign visible to staff  - Offer Toileting every 2 Hours, in advance of need  - Initiate/Maintain bedalarm  - Obtain necessary fall risk management equipment  - Apply yellow socks and bracelet for high fall risk patients  - Consider moving patient to room near nurses station  Outcome: Progressing  Goal: Maintain or return to baseline ADL function  Description: INTERVENTIONS:  -  Assess patient's ability to carry out ADLs; assess patient's baseline for ADL function and identify physical deficits which impact ability to perform ADLs (bathing, care of mouth/teeth, toileting, grooming, dressing, etc.)  - Assess/evaluate cause of self-care deficits   - Assess range of motion  - Assess patient's mobility; develop plan if impaired  - Assess patient's need for assistive devices and provide as appropriate  - Encourage maximum independence but intervene and supervise when necessary  - Involve family in performance of ADLs  - Assess for home care needs following discharge   - Consider OT consult to assist with ADL evaluation and planning for discharge  - Provide patient education as appropriate  Outcome: Progressing  Goal: Maintains/Returns to pre admission functional level  Description:  INTERVENTIONS:  - Perform AM-PAC 6 Click Basic Mobility/ Daily Activity assessment daily.  - Set and communicate daily mobility goal to care team and patient/family/caregiver.   - Collaborate with rehabilitation services on mobility goals if consulted  - Perform Range of Motion 3 times a day.  - Reposition patient every 2 hours.  - Dangle patient 3 times a day  - Stand patient 3 times a day  - Ambulate patient 3 times a day  - Out of bed to chair 3 times a day   - Out of bed for meals 3 times a day  - Out of bed for toileting  - Record patient progress and toleration of activity level   Outcome: Progressing     Problem: DISCHARGE PLANNING  Goal: Discharge to home or other facility with appropriate resources  Description: INTERVENTIONS:  - Identify barriers to discharge w/patient and caregiver  - Arrange for needed discharge resources and transportation as appropriate  - Identify discharge learning needs (meds, wound care, etc.)  - Arrange for interpretive services to assist at discharge as needed  - Refer to Case Management Department for coordinating discharge planning if the patient needs post-hospital services based on physician/advanced practitioner order or complex needs related to functional status, cognitive ability, or social support system  Outcome: Progressing     Problem: Knowledge Deficit  Goal: Patient/family/caregiver demonstrates understanding of disease process, treatment plan, medications, and discharge instructions  Description: Complete learning assessment and assess knowledge base.  Interventions:  - Provide teaching at level of understanding  - Provide teaching via preferred learning methods  Outcome: Progressing     Problem: Prexisting or High Potential for Compromised Skin Integrity  Goal: Skin integrity is maintained or improved  Description: INTERVENTIONS:  - Identify patients at risk for skin breakdown  - Assess and monitor skin integrity  - Assess and monitor nutrition and hydration  status  - Monitor labs   - Assess for incontinence   - Turn and reposition patient  - Assist with mobility/ambulation  - Relieve pressure over bony prominences  - Avoid friction and shearing  - Provide appropriate hygiene as needed including keeping skin clean and dry  - Evaluate need for skin moisturizer/barrier cream  - Collaborate with interdisciplinary team   - Patient/family teaching  - Consider wound care consult   Outcome: Progressing     Problem: Nutrition/Hydration-ADULT  Goal: Nutrient/Hydration intake appropriate for improving, restoring or maintaining nutritional needs  Description: Monitor and assess patient's nutrition/hydration status for malnutrition. Collaborate with interdisciplinary team and initiate plan and interventions as ordered.  Monitor patient's weight and dietary intake as ordered or per policy. Utilize nutrition screening tool and intervene as necessary. Determine patient's food preferences and provide high-protein, high-caloric foods as appropriate.     INTERVENTIONS:  - Monitor oral intake, urinary output, labs, and treatment plans  - Assess nutrition and hydration status and recommend course of action  - Evaluate amount of meals eaten  - Assist patient with eating if necessary   - Allow adequate time for meals  - Recommend/ encourage appropriate diets, oral nutritional supplements, and vitamin/mineral supplements  - Order, calculate, and assess calorie counts as needed  - Recommend, monitor, and adjust tube feedings and TPN/PPN based on assessed needs  - Assess need for intravenous fluids  - Provide specific nutrition/hydration education as appropriate  - Include patient/family/caregiver in decisions related to nutrition  Outcome: Progressing

## 2024-11-12 NOTE — ASSESSMENT & PLAN NOTE
History of endometrial cancer grade 1 stage Ia s/p/BSO 6/2020 with recurrent disease in 12/2023 when PET scan revealed enlarged retroperitoneal nodes, underwent radiation which she completed 3/2024  Repeat CT scan in August showed no recurrence of disease  Follows [facility]

## 2024-11-13 NOTE — UTILIZATION REVIEW
NOTIFICATION OF ADMISSION DISCHARGE   This is a Notification of Discharge from [facility]. Please be advised that this patient has been discharge from our facility. Below you will find the admission and discharge date and time including the patient’s disposition.   UTILIZATION REVIEW CONTACT:  Tova Whittaker  Utilization   Network Utilization Review Department  Phone: [phone] x carefully listen to the prompts. All voicemails are confidential.  Email: [email or web address]     ADMISSION INFORMATION  PRESENTATION DATE: 11/5/2024 11:59 AM  OBERVATION ADMISSION DATE: N/A  INPATIENT ADMISSION DATE: 11/5/24  4:50 PM   DISCHARGE DATE: 11/12/2024 10:44 AM   DISPOSITION:Home with Home Health Care    Network Utilization Review Department  ATTENTION: Please call with any questions or concerns to [phone] and carefully listen to the prompts so that you are directed to the right person. All voicemails are confidential.   For Discharge needs, contact Care Management DC Support Team at [phone] opt. 2  Send all requests for admission clinical reviews, approved or denied determinations and any other requests to dedicated fax number below belonging to the campus where the patient is receiving treatment. List of dedicated fax numbers for the Facilities:  FACILITY NAME UR FAX NUMBER   ADMISSION DENIALS (Administrative/Medical Necessity) [phone]   DISCHARGE SUPPORT TEAM ([facility]) [phone]   PARENT CHILD HEALTH (Maternity/NICU/Pediatrics) [phone]   [facility] [phone]   [facility] [phone]   [facility] [phone]   [facility] [phone]   [facility] [phone]   [facility] [phone]   [facility] [phone]   [facility]  [phone]   [facility] [phone]   [facility] [phone]   [facility] [phone]   [facility] [phone]

## 2024-11-14 ENCOUNTER — APPOINTMENT (EMERGENCY)
Dept: CT IMAGING | Facility: HOSPITAL | Age: 64
DRG: 230 | End: 2024-11-14
Payer: COMMERCIAL

## 2024-11-14 ENCOUNTER — HOSPITAL ENCOUNTER (INPATIENT)
Facility: HOSPITAL | Age: 64
LOS: 21 days | Discharge: NURSING, REHAB OR LONG-TERM CARE | DRG: 230 | End: 2024-12-05
Attending: EMERGENCY MEDICINE | Admitting: SPECIALIST
Payer: COMMERCIAL

## 2024-11-14 ENCOUNTER — HOME CARE VISIT (OUTPATIENT)
Dept: HOME HEALTH SERVICES | Facility: HOME HEALTHCARE | Age: 64
End: 2024-11-14

## 2024-11-14 DIAGNOSIS — Z79.4 TYPE 2 DIABETES MELLITUS WITH OTHER SPECIFIED COMPLICATION, WITH LONG-TERM CURRENT USE OF INSULIN (HCC): ICD-10-CM

## 2024-11-14 DIAGNOSIS — K56.609 SBO (SMALL BOWEL OBSTRUCTION) (HCC): ICD-10-CM

## 2024-11-14 DIAGNOSIS — R10.9 ABDOMINAL PAIN: ICD-10-CM

## 2024-11-14 DIAGNOSIS — E11.69 TYPE 2 DIABETES MELLITUS WITH OTHER SPECIFIED COMPLICATION, WITH LONG-TERM CURRENT USE OF INSULIN (HCC): ICD-10-CM

## 2024-11-14 DIAGNOSIS — C54.1 ENDOMETRIAL CANCER (HCC): ICD-10-CM

## 2024-11-14 DIAGNOSIS — R11.2 NAUSEA AND VOMITING, UNSPECIFIED VOMITING TYPE: ICD-10-CM

## 2024-11-14 DIAGNOSIS — K56.609 BOWEL OBSTRUCTION (HCC): Primary | ICD-10-CM

## 2024-11-14 DIAGNOSIS — K31.89: ICD-10-CM

## 2024-11-14 DIAGNOSIS — E11.69 TYPE 2 DIABETES MELLITUS WITH OTHER SPECIFIED COMPLICATION, UNSPECIFIED WHETHER LONG TERM INSULIN USE (HCC): ICD-10-CM

## 2024-11-14 DIAGNOSIS — K31.84 GASTROPARESIS: ICD-10-CM

## 2024-11-14 DIAGNOSIS — Z78.9 ON TOTAL PARENTERAL NUTRITION (TPN): ICD-10-CM

## 2024-11-14 LAB
ALBUMIN SERPL BCG-MCNC: 3.2 G/DL (ref 3.5–5)
ALP SERPL-CCNC: 86 U/L (ref 34–104)
ALT SERPL W P-5'-P-CCNC: 8 U/L (ref 7–52)
ANION GAP SERPL CALCULATED.3IONS-SCNC: 7 MMOL/L (ref 4–13)
ANION GAP SERPL CALCULATED.3IONS-SCNC: 9 MMOL/L (ref 4–13)
APTT PPP: 25 SECONDS (ref 23–34)
AST SERPL W P-5'-P-CCNC: 8 U/L (ref 13–39)
BASOPHILS # BLD AUTO: 0.04 THOUSANDS/ÂΜL (ref 0–0.1)
BASOPHILS NFR BLD AUTO: 0 % (ref 0–1)
BILIRUB SERPL-MCNC: 0.51 MG/DL (ref 0.2–1)
BUN SERPL-MCNC: 12 MG/DL (ref 5–25)
BUN SERPL-MCNC: 13 MG/DL (ref 5–25)
CALCIUM ALBUM COR SERPL-MCNC: 9 MG/DL (ref 8.3–10.1)
CALCIUM SERPL-MCNC: 7.9 MG/DL (ref 8.4–10.2)
CALCIUM SERPL-MCNC: 8.4 MG/DL (ref 8.4–10.2)
CHLORIDE SERPL-SCNC: 101 MMOL/L (ref 96–108)
CHLORIDE SERPL-SCNC: 101 MMOL/L (ref 96–108)
CO2 SERPL-SCNC: 23 MMOL/L (ref 21–32)
CO2 SERPL-SCNC: 24 MMOL/L (ref 21–32)
CREAT SERPL-MCNC: 0.59 MG/DL (ref 0.6–1.3)
CREAT SERPL-MCNC: 0.62 MG/DL (ref 0.6–1.3)
EOSINOPHIL # BLD AUTO: 0.11 THOUSAND/ÂΜL (ref 0–0.61)
EOSINOPHIL NFR BLD AUTO: 1 % (ref 0–6)
ERYTHROCYTE [DISTWIDTH] IN BLOOD BY AUTOMATED COUNT: 15.9 % (ref 11.6–15.1)
GFR SERPL CREATININE-BSD FRML MDRD: 97 ML/MIN/1.73SQ M
GFR SERPL CREATININE-BSD FRML MDRD: 99 ML/MIN/1.73SQ M
GLUCOSE SERPL-MCNC: 123 MG/DL (ref 65–140)
GLUCOSE SERPL-MCNC: 127 MG/DL (ref 65–140)
GLUCOSE SERPL-MCNC: 139 MG/DL (ref 65–140)
GLUCOSE SERPL-MCNC: 165 MG/DL (ref 65–140)
HCT VFR BLD AUTO: 35.6 % (ref 34.8–46.1)
HGB BLD-MCNC: 11.5 G/DL (ref 11.5–15.4)
IMM GRANULOCYTES # BLD AUTO: 0.14 THOUSAND/UL (ref 0–0.2)
IMM GRANULOCYTES NFR BLD AUTO: 2 % (ref 0–2)
INR PPP: 1.18 (ref 0.85–1.19)
LACTATE SERPL-SCNC: 1.5 MMOL/L (ref 0.5–2)
LIPASE SERPL-CCNC: <6 U/L (ref 11–82)
LYMPHOCYTES # BLD AUTO: 0.96 THOUSANDS/ÂΜL (ref 0.6–4.47)
LYMPHOCYTES NFR BLD AUTO: 10 % (ref 14–44)
MAGNESIUM SERPL-MCNC: 1.7 MG/DL (ref 1.9–2.7)
MCH RBC QN AUTO: 30.1 PG (ref 26.8–34.3)
MCHC RBC AUTO-ENTMCNC: 32.3 G/DL (ref 31.4–37.4)
MCV RBC AUTO: 93 FL (ref 82–98)
MONOCYTES # BLD AUTO: 0.74 THOUSAND/ÂΜL (ref 0.17–1.22)
MONOCYTES NFR BLD AUTO: 8 % (ref 4–12)
NEUTROPHILS # BLD AUTO: 7.44 THOUSANDS/ÂΜL (ref 1.85–7.62)
NEUTS SEG NFR BLD AUTO: 79 % (ref 43–75)
NRBC BLD AUTO-RTO: 0 /100 WBCS
PHOSPHATE SERPL-MCNC: 3.3 MG/DL (ref 2.3–4.1)
PLATELET # BLD AUTO: 100 THOUSANDS/UL (ref 149–390)
PMV BLD AUTO: 10.9 FL (ref 8.9–12.7)
POTASSIUM SERPL-SCNC: 3.9 MMOL/L (ref 3.5–5.3)
POTASSIUM SERPL-SCNC: 4.1 MMOL/L (ref 3.5–5.3)
PROT SERPL-MCNC: 5.5 G/DL (ref 6.4–8.4)
PROTHROMBIN TIME: 15.2 SECONDS (ref 12.3–15)
RBC # BLD AUTO: 3.82 MILLION/UL (ref 3.81–5.12)
SODIUM SERPL-SCNC: 132 MMOL/L (ref 135–147)
SODIUM SERPL-SCNC: 133 MMOL/L (ref 135–147)
WBC # BLD AUTO: 9.43 THOUSAND/UL (ref 4.31–10.16)

## 2024-11-14 PROCEDURE — 83605 ASSAY OF LACTIC ACID: CPT

## 2024-11-14 PROCEDURE — 85730 THROMBOPLASTIN TIME PARTIAL: CPT

## 2024-11-14 PROCEDURE — 83690 ASSAY OF LIPASE: CPT

## 2024-11-14 PROCEDURE — 96361 HYDRATE IV INFUSION ADD-ON: CPT

## 2024-11-14 PROCEDURE — 85025 COMPLETE CBC W/AUTO DIFF WBC: CPT

## 2024-11-14 PROCEDURE — 80053 COMPREHEN METABOLIC PANEL: CPT

## 2024-11-14 PROCEDURE — 99223 1ST HOSP IP/OBS HIGH 75: CPT | Performed by: SPECIALIST

## 2024-11-14 PROCEDURE — 36415 COLL VENOUS BLD VENIPUNCTURE: CPT

## 2024-11-14 PROCEDURE — 96376 TX/PRO/DX INJ SAME DRUG ADON: CPT

## 2024-11-14 PROCEDURE — 83735 ASSAY OF MAGNESIUM: CPT

## 2024-11-14 PROCEDURE — NC001 PR NO CHARGE: Performed by: PHYSICIAN ASSISTANT

## 2024-11-14 PROCEDURE — 85610 PROTHROMBIN TIME: CPT

## 2024-11-14 PROCEDURE — 84100 ASSAY OF PHOSPHORUS: CPT

## 2024-11-14 PROCEDURE — 96375 TX/PRO/DX INJ NEW DRUG ADDON: CPT

## 2024-11-14 PROCEDURE — 93005 ELECTROCARDIOGRAM TRACING: CPT

## 2024-11-14 PROCEDURE — 99222 1ST HOSP IP/OBS MODERATE 55: CPT | Performed by: INTERNAL MEDICINE

## 2024-11-14 PROCEDURE — 82948 REAGENT STRIP/BLOOD GLUCOSE: CPT

## 2024-11-14 PROCEDURE — 74177 CT ABD & PELVIS W/CONTRAST: CPT

## 2024-11-14 PROCEDURE — 99285 EMERGENCY DEPT VISIT HI MDM: CPT

## 2024-11-14 PROCEDURE — 96374 THER/PROPH/DIAG INJ IV PUSH: CPT

## 2024-11-14 PROCEDURE — 80048 BASIC METABOLIC PNL TOTAL CA: CPT

## 2024-11-14 RX ORDER — INSULIN LISPRO 100 [IU]/ML
1-6 INJECTION, SOLUTION INTRAVENOUS; SUBCUTANEOUS EVERY 6 HOURS SCHEDULED
Status: DISCONTINUED | OUTPATIENT
Start: 2024-11-14 | End: 2024-11-20

## 2024-11-14 RX ORDER — MAGNESIUM SULFATE HEPTAHYDRATE 40 MG/ML
2 INJECTION, SOLUTION INTRAVENOUS ONCE
Status: CANCELLED | OUTPATIENT
Start: 2024-11-14 | End: 2024-11-14

## 2024-11-14 RX ORDER — HYDROMORPHONE HCL IN WATER/PF 6 MG/30 ML
0.2 PATIENT CONTROLLED ANALGESIA SYRINGE INTRAVENOUS
Status: DISCONTINUED | OUTPATIENT
Start: 2024-11-14 | End: 2024-11-14

## 2024-11-14 RX ORDER — HYDROMORPHONE HCL IN WATER/PF 6 MG/30 ML
0.2 PATIENT CONTROLLED ANALGESIA SYRINGE INTRAVENOUS
Status: DISCONTINUED | OUTPATIENT
Start: 2024-11-14 | End: 2024-11-22

## 2024-11-14 RX ORDER — INSULIN GLARGINE 300 U/ML
60 INJECTION, SOLUTION SUBCUTANEOUS DAILY
Status: ON HOLD | COMMUNITY
End: 2024-11-14 | Stop reason: CLARIF

## 2024-11-14 RX ORDER — METOPROLOL TARTRATE 1 MG/ML
2.5 INJECTION, SOLUTION INTRAVENOUS EVERY 6 HOURS
Status: DISPENSED | OUTPATIENT
Start: 2024-11-14 | End: 2024-11-26

## 2024-11-14 RX ORDER — ONDANSETRON 2 MG/ML
1 INJECTION INTRAMUSCULAR; INTRAVENOUS ONCE
Status: COMPLETED | OUTPATIENT
Start: 2024-11-14 | End: 2024-11-14

## 2024-11-14 RX ORDER — ONDANSETRON 2 MG/ML
4 INJECTION INTRAMUSCULAR; INTRAVENOUS EVERY 6 HOURS PRN
Status: DISCONTINUED | OUTPATIENT
Start: 2024-11-14 | End: 2024-11-22

## 2024-11-14 RX ORDER — MAGNESIUM SULFATE HEPTAHYDRATE 40 MG/ML
2 INJECTION, SOLUTION INTRAVENOUS ONCE
Status: COMPLETED | OUTPATIENT
Start: 2024-11-14 | End: 2024-11-15

## 2024-11-14 RX ORDER — HYDROMORPHONE HCL/PF 1 MG/ML
0.5 SYRINGE (ML) INJECTION EVERY 4 HOURS PRN
Refills: 0 | Status: DISCONTINUED | OUTPATIENT
Start: 2024-11-14 | End: 2024-11-14

## 2024-11-14 RX ORDER — KETOROLAC TROMETHAMINE 30 MG/ML
15 INJECTION, SOLUTION INTRAMUSCULAR; INTRAVENOUS ONCE
Status: COMPLETED | OUTPATIENT
Start: 2024-11-14 | End: 2024-11-14

## 2024-11-14 RX ORDER — HYDROMORPHONE HCL/PF 1 MG/ML
0.5 SYRINGE (ML) INJECTION EVERY 4 HOURS PRN
Status: DISCONTINUED | OUTPATIENT
Start: 2024-11-14 | End: 2024-11-14

## 2024-11-14 RX ORDER — HYDROMORPHONE HCL IN WATER/PF 6 MG/30 ML
0.2 PATIENT CONTROLLED ANALGESIA SYRINGE INTRAVENOUS ONCE
Status: COMPLETED | OUTPATIENT
Start: 2024-11-14 | End: 2024-11-14

## 2024-11-14 RX ORDER — HEPARIN SODIUM 5000 [USP'U]/ML
5000 INJECTION, SOLUTION INTRAVENOUS; SUBCUTANEOUS EVERY 8 HOURS SCHEDULED
Status: DISCONTINUED | OUTPATIENT
Start: 2024-11-14 | End: 2024-11-23

## 2024-11-14 RX ORDER — SODIUM CHLORIDE, SODIUM GLUCONATE, SODIUM ACETATE, POTASSIUM CHLORIDE, MAGNESIUM CHLORIDE, SODIUM PHOSPHATE, DIBASIC, AND POTASSIUM PHOSPHATE .53; .5; .37; .037; .03; .012; .00082 G/100ML; G/100ML; G/100ML; G/100ML; G/100ML; G/100ML; G/100ML
125 INJECTION, SOLUTION INTRAVENOUS CONTINUOUS
Status: DISCONTINUED | OUTPATIENT
Start: 2024-11-14 | End: 2024-11-17

## 2024-11-14 RX ORDER — HYDROMORPHONE HCL/PF 1 MG/ML
0.5 SYRINGE (ML) INJECTION
Status: DISCONTINUED | OUTPATIENT
Start: 2024-11-14 | End: 2024-11-28

## 2024-11-14 RX ORDER — ONDANSETRON 2 MG/ML
4 INJECTION INTRAMUSCULAR; INTRAVENOUS ONCE
Status: COMPLETED | OUTPATIENT
Start: 2024-11-14 | End: 2024-11-14

## 2024-11-14 RX ADMIN — METOROPROLOL TARTRATE 2.5 MG: 5 INJECTION, SOLUTION INTRAVENOUS at 17:20

## 2024-11-14 RX ADMIN — HYDROMORPHONE HYDROCHLORIDE 0.2 MG: 0.2 INJECTION, SOLUTION INTRAMUSCULAR; INTRAVENOUS; SUBCUTANEOUS at 13:15

## 2024-11-14 RX ADMIN — Medication 1 SPRAY: at 23:18

## 2024-11-14 RX ADMIN — ONDANSETRON 4 MG: 2 INJECTION INTRAMUSCULAR; INTRAVENOUS at 13:17

## 2024-11-14 RX ADMIN — IOHEXOL 50 ML: 240 INJECTION, SOLUTION INTRATHECAL; INTRAVASCULAR; INTRAVENOUS; ORAL at 13:46

## 2024-11-14 RX ADMIN — HYDROMORPHONE HYDROCHLORIDE 0.2 MG: 0.2 INJECTION, SOLUTION INTRAMUSCULAR; INTRAVENOUS; SUBCUTANEOUS at 09:05

## 2024-11-14 RX ADMIN — HEPARIN SODIUM 5000 UNITS: 5000 INJECTION INTRAVENOUS; SUBCUTANEOUS at 17:20

## 2024-11-14 RX ADMIN — KETOROLAC TROMETHAMINE 15 MG: 30 INJECTION, SOLUTION INTRAMUSCULAR; INTRAVENOUS at 08:10

## 2024-11-14 RX ADMIN — MAGNESIUM SULFATE HEPTAHYDRATE 2 G: 40 INJECTION, SOLUTION INTRAVENOUS at 23:15

## 2024-11-14 RX ADMIN — METOROPROLOL TARTRATE 2.5 MG: 5 INJECTION, SOLUTION INTRAVENOUS at 21:57

## 2024-11-14 RX ADMIN — HEPARIN SODIUM 5000 UNITS: 5000 INJECTION INTRAVENOUS; SUBCUTANEOUS at 21:55

## 2024-11-14 RX ADMIN — HYDROMORPHONE HYDROCHLORIDE 0.5 MG: 1 INJECTION, SOLUTION INTRAMUSCULAR; INTRAVENOUS; SUBCUTANEOUS at 17:21

## 2024-11-14 RX ADMIN — SODIUM CHLORIDE 1000 ML: 0.9 INJECTION, SOLUTION INTRAVENOUS at 08:10

## 2024-11-14 RX ADMIN — SODIUM CHLORIDE, SODIUM GLUCONATE, SODIUM ACETATE, POTASSIUM CHLORIDE, MAGNESIUM CHLORIDE, SODIUM PHOSPHATE, DIBASIC, AND POTASSIUM PHOSPHATE 125 ML/HR: .53; .5; .37; .037; .03; .012; .00082 INJECTION, SOLUTION INTRAVENOUS at 16:09

## 2024-11-14 RX ADMIN — IOHEXOL 100 ML: 350 INJECTION, SOLUTION INTRAVENOUS at 13:46

## 2024-11-14 RX ADMIN — HYDROMORPHONE HYDROCHLORIDE 0.5 MG: 1 INJECTION, SOLUTION INTRAMUSCULAR; INTRAVENOUS; SUBCUTANEOUS at 21:29

## 2024-11-14 NOTE — ASSESSMENT & PLAN NOTE
Lab Results   Component Value Date    HGBA1C 6.9 (H) 11/05/2024     Recent Labs     11/11/24  2123 11/12/24  0743 11/14/24  1621 11/14/24  1757   POCGLU 124 167* 123 127     Previously on toujeo 60 units with lispro 8 units 3 times daily  Patient reports now only on sliding scale insulin.  Ordered every 6 hours.

## 2024-11-14 NOTE — PLAN OF CARE
Problem: Potential for Falls  Goal: Patient will remain free of falls  Description: INTERVENTIONS:  - Educate patient/family on patient safety including physical limitations  - Instruct patient to call for assistance with activity   - Consult OT/PT to assist with strengthening/mobility   - Keep Call bell within reach  - Keep bed low and locked with side rails adjusted as appropriate  - Keep care items and personal belongings within reach  - Initiate and maintain comfort rounds  - Make Fall Risk Sign visible to staff  - Offer Toileting every 3 Hours, in advance of need  - Initiate/Maintain bed alarm  - Obtain necessary fall risk management equipment  - Apply yellow socks and bracelet for high fall risk patients  - Consider moving patient to room near nurses station  Outcome: Progressing     Problem: PAIN - ADULT  Goal: Verbalizes/displays adequate comfort level or baseline comfort level  Description: Interventions:  - Encourage patient to monitor pain and request assistance  - Assess pain using appropriate pain scale  - Administer analgesics based on type and severity of pain and evaluate response  - Implement non-pharmacological measures as appropriate and evaluate response  - Consider cultural and social influences on pain and pain management  - Notify physician/advanced practitioner if interventions unsuccessful or patient reports new pain  Outcome: Progressing     Problem: SAFETY ADULT  Goal: Patient will remain free of falls  Description: INTERVENTIONS:  - Educate patient/family on patient safety including physical limitations  - Instruct patient to call for assistance with activity   - Consult OT/PT to assist with strengthening/mobility   - Keep Call bell within reach  - Keep bed low and locked with side rails adjusted as appropriate  - Keep care items and personal belongings within reach  - Initiate and maintain comfort rounds  - Make Fall Risk Sign visible to staff  - Offer Toileting every 3 Hours, in  advance of need  - Initiate/Maintain bed alarm  - Obtain necessary fall risk management equipment  - Apply yellow socks and bracelet for high fall risk patients  - Consider moving patient to room near nurses station  Outcome: Progressing  Goal: Maintain or return to baseline ADL function  Description: INTERVENTIONS:  -  Assess patient's ability to carry out ADLs; assess patient's baseline for ADL function and identify physical deficits which impact ability to perform ADLs (bathing, care of mouth/teeth, toileting, grooming, dressing, etc.)  - Assess/evaluate cause of self-care deficits   - Assess range of motion  - Assess patient's mobility; develop plan if impaired  - Assess patient's need for assistive devices and provide as appropriate  - Encourage maximum independence but intervene and supervise when necessary  - Involve family in performance of ADLs  - Assess for home care needs following discharge   - Consider OT consult to assist with ADL evaluation and planning for discharge  - Provide patient education as appropriate  Outcome: Progressing  Goal: Maintains/Returns to pre admission functional level  Description: INTERVENTIONS:  - Perform AM-PAC 6 Click Basic Mobility/ Daily Activity assessment daily.  - Set and communicate daily mobility goal to care team and patient/family/caregiver.   - Collaborate with rehabilitation services on mobility goals if consulted  - Perform Range of Motion 3 times a day.  - Reposition patient every 3 hours.  - Dangle patient 3 times a day  - Stand patient 3 times a day  - Ambulate patient 3 times a day  - Out of bed to chair 3 times a day   - Out of bed for meals 3 times a day  - Out of bed for toileting  - Record patient progress and toleration of activity level   Outcome: Progressing     Problem: DISCHARGE PLANNING  Goal: Discharge to home or other facility with appropriate resources  Description: INTERVENTIONS:  - Identify barriers to discharge w/patient and caregiver  -  Arrange for needed discharge resources and transportation as appropriate  - Identify discharge learning needs (meds, wound care, etc.)  - Arrange for interpretive services to assist at discharge as needed  - Refer to Case Management Department for coordinating discharge planning if the patient needs post-hospital services based on physician/advanced practitioner order or complex needs related to functional status, cognitive ability, or social support system  Outcome: Progressing     Problem: GASTROINTESTINAL - ADULT  Goal: Minimal or absence of nausea and/or vomiting  Description: INTERVENTIONS:  - Administer IV fluids if ordered to ensure adequate hydration  - Maintain NPO status until nausea and vomiting are resolved  - Nasogastric tube if ordered  - Administer ordered antiemetic medications as needed  - Provide nonpharmacologic comfort measures as appropriate  - Advance diet as tolerated, if ordered  - Consider nutrition services referral to assist patient with adequate nutrition and appropriate food choices  Outcome: Progressing  Goal: Maintains or returns to baseline bowel function  Description: INTERVENTIONS:  - Assess bowel function  - Encourage oral fluids to ensure adequate hydration  - Administer IV fluids if ordered to ensure adequate hydration  - Administer ordered medications as needed  - Encourage mobilization and activity  - Consider nutritional services referral to assist patient with adequate nutrition and appropriate food choices  Outcome: Progressing  Goal: Maintains adequate nutritional intake  Description: INTERVENTIONS:  - Monitor percentage of each meal consumed  - Identify factors contributing to decreased intake, treat as appropriate  - Assist with meals as needed  - Monitor I&O, weight, and lab values if indicated  - Obtain nutrition services referral as needed  Outcome: Progressing  Goal: Establish and maintain optimal ostomy function  Description: INTERVENTIONS:  - Assess bowel  function  - Encourage oral fluids to ensure adequate hydration  - Administer IV fluids if ordered to ensure adequate hydration   - Administer ordered medications as needed  - Encourage mobilization and activity  - Nutrition services referral to assist patient with appropriate food choices  - Assess stoma site  - Consider wound care consult   Outcome: Progressing  Goal: Oral mucous membranes remain intact  Description: INTERVENTIONS  - Assess oral mucosa and hygiene practices  - Implement preventative oral hygiene regimen  - Implement oral medicated treatments as ordered  - Initiate Nutrition services referral as needed  Outcome: Progressing

## 2024-11-14 NOTE — ASSESSMENT & PLAN NOTE
With associated abdominal pain nausea and vomiting    Afebrile, vital signs stable  No leukocytosis, WBC 9.43  Hemoglobin 11.5 from 9.7 on discharge 11/12  Creatinine at apparent baseline, 0.59  Lipase and lactic acid within normal limits    CTAP with IV and PO contrast and 4 hour imaging delay - read pending, but looks obstructed    On exam, abdomen is obese though nondistended, soft and moderately tender in the left hemiabdomen, no point tenderness or guarding or rigidity    Barring any significant findings which necessitate emergent surgical intervention on this pending CT abdomen pelvis, we have no plans to operate on this patient. But surgical service will manage with SLIM consultation for medical comorbidities.    Admit to surgical service under Dr. Langston  Consult SLIM  NPO/NGT 18 Fr  IVF  Analgesics and antiemetics as needed  Serial exams  Will plan for Gastrografin challenge in the next 24 to 48 hours

## 2024-11-14 NOTE — ED PROVIDER NOTES
Time reflects when diagnosis was documented in both MDM as applicable and the Disposition within this note       Time User Action Codes Description Comment    11/14/2024 12:31 PM Tai Villanueva [R10.9] Abdominal pain     11/14/2024  3:00 PM Tai Villanueva [K56.609] Bowel obstruction (HCC)     11/14/2024  3:03 PM Ruddyjarrett Jennifer Add [E11.69,  Z79.4] Type 2 diabetes mellitus with other specified complication, with long-term current use of insulin (HCC)           ED Disposition       ED Disposition   Admit    Condition   Stable    Date/Time   Thu Nov 14, 2024  3:02 PM    Comment   Case was discussed with Dr. Langston and the patient's admission status was agreed to be Admission Status: inpatient status to the service of Dr. Langston .               Assessment & Plan       Medical Decision Making  63-year-old female presented ED with a chief complaint of abdominal pain.  On exam tenderness over the left lower quadrant along with generalized tenderness over the abdomen.  Vitals unremarkable on presentation.  She has a significant history of multiple bowel obstructions.  She stated that this does feel similar.  Patient had nausea vomiting starting at 4:00 in the morning.  Patient nausea in ED.  Dilaudid Toradol and Zofran for symptom relief.  Baseline labs CBC unremarkable, slight hyponatremia on CMP patient receiving fluids.  Lactic acid is normal.  Reach out to general surgery as patient was recently admitted for this discharge on Tuesday.  Per general surgery p.o. IV contrast 4-hour delayed study.  Patient monitored during this time.  Vitals remained stable and being taken every 1/2 hour.  Before CT scan patient did vomit.  Zofran given again.  CT scan resulted.  On initial examination of CT scan there is to be some breaks throughout the bowel.  Reach out to general surgery who again agreed and advised admitting to Dr. Langston service.  NG tube placed before admitting.  Patient admitted to general surgery service in  "stable condition.  I advised patient of further hospital course in which she understood.  Patient remained stable underneath my care in the ED.  When CT scan did result did show small bowel obstruction.  Similar to previous studies.    Ddx-bowel obstruction, small bowel obstruction, diverticulitis, gastroenteritis, C. difficile, pancreatitis, bowel ischemia    Portions of the record may have been created with voice recognition software. Occasional wrong word or \"sound a like\" substitutions may have occurred due to the inherent limitations of voice recognition software. Read the chart carefully and recognize, using context, where substitutions have occurred.        Amount and/or Complexity of Data Reviewed  Labs: ordered.     Details: See MDM  Radiology: ordered.     Details: See MDM  Discussion of management or test interpretation with external provider(s): General surgery consulted.  Advised 4-hour delay study.  Agreed with bowel obstruction on preliminary read.  General surgery did see and evaluate the patient.  Patient admitted to general surgery service in stable condition.    Risk  Prescription drug management.  Decision regarding hospitalization.        ED Course as of 11/14/24 1630   Thu Nov 14, 2024   0938 Spoke with general surgery.  Advised to forward our delay study on contrast       Medications   multi-electrolyte (PLASMALYTE-A/ISOLYTE-S PH 7.4) IV solution (has no administration in time range)   ondansetron (ZOFRAN) injection 4 mg (has no administration in time range)   heparin (porcine) subcutaneous injection 5,000 Units (has no administration in time range)   HYDROmorphone (DILAUDID) injection 0.5 mg (has no administration in time range)   ondansetron (FOR EMS ONLY) (ZOFRAN) 4 mg/2 mL injection 4 mg (0 mg Does not apply Given to EMS 11/14/24 1126)   sodium chloride 0.9 % bolus 1,000 mL (0 mL Intravenous Stopped 11/14/24 0943)   ketorolac (TORADOL) injection 15 mg (15 mg Intravenous Given 11/14/24 " 0810)   HYDROmorphone HCl (DILAUDID) injection 0.2 mg (0.2 mg Intravenous Given 11/14/24 0905)   ondansetron (ZOFRAN) injection 4 mg (4 mg Intravenous Given 11/14/24 1317)   HYDROmorphone HCl (DILAUDID) injection 0.2 mg (0.2 mg Intravenous Given 11/14/24 1315)   iohexol (OMNIPAQUE) 350 MG/ML injection (SINGLE-DOSE) 100 mL (100 mL Intravenous Given 11/14/24 1346)   iohexol (OMNIPAQUE) 240 MG/ML solution 50 mL (50 mL Oral Given 11/14/24 1346)       ED Risk Strat Scores                           SBIRT 20yo+      Flowsheet Row Most Recent Value   Initial Alcohol Screen: US AUDIT-C     1. How often do you have a drink containing alcohol? 0 Filed at: 11/14/2024 0726   2. How many drinks containing alcohol do you have on a typical day you are drinking?  0 Filed at: 11/14/2024 0726   3b. FEMALE Any Age, or MALE 65+: How often do you have 4 or more drinks on one occassion? 0 Filed at: 11/14/2024 0726   Audit-C Score 0 Filed at: 11/14/2024 0726   SAMIR: How many times in the past year have you...    Used an illegal drug or used a prescription medication for non-medical reasons? Never Filed at: 11/14/2024 0726                            History of Present Illness       Chief Complaint   Patient presents with    Abdominal Pain     Pt reports 9/10 abdominal pain. Woke up at 0400 with N/V/D, feels lightheaded as well. Has a hx of SBO and reports symptoms feel the same as last time. Was seen last week for SBO, ate solid foods for first time yesterday.        Past Medical History:   Diagnosis Date    Diverticulitis     Endometrial cancer (HCC)     History of shingles     Right hemiabdomen (inactive)    Hypertension     IBS (irritable bowel syndrome)     Obesity     Small bowel obstruction (HCC)     Type 2 diabetes mellitus (HCC)       Past Surgical History:   Procedure Laterality Date    APPENDECTOMY      CHOLECYSTECTOMY      SIGMOIDECTOMY N/A       History reviewed. No pertinent family history.   Social History     Tobacco Use     Smoking status: Never    Smokeless tobacco: Never   Substance Use Topics    Alcohol use: Not Currently    Drug use: Never      E-Cigarette/Vaping      E-Cigarette/Vaping Substances      I have reviewed and agree with the history as documented.     63-year-old female presenting to the ED with a chief complaint of abdominal pain.  Patient recently discharged after small bowel obstruction.  She was discharged from the hospital on Tuesday.  Stated that she was feeling better.  States that this morning around 4:00 she had intense nausea vomiting and went to diarrhea.  She stated that the nausea and vomiting has continued.  Endorses left lower quadrant pain also generalized abdominal tenderness.  Stated that she has been unable to eat anything since the incident occurred at 4:00.  She denies any fevers chills diaphoresis.  Patient stated that up until this point she was feeling normal.  She does have a history of multiple abdominal surgeries along with multiple bowel obstructions.Patient denies any chest pain, shortness of breath, chills, diaphoresis, fevers, loss of consciousness, syncope, urinary, visual symptoms, vision loss, loss of function, loss of sensation, decreased oral intake, hemoptysis, hematochezia, hematemesis, melena, confusion.         Review of Systems   Constitutional:  Negative for activity change, appetite change, chills, diaphoresis, fatigue and fever.   HENT:  Negative for congestion, ear discharge, ear pain, postnasal drip, rhinorrhea, sinus pressure, sinus pain and sore throat.    Eyes:  Negative for photophobia and visual disturbance.   Respiratory:  Negative for cough, chest tightness, shortness of breath, wheezing and stridor.    Cardiovascular:  Negative for chest pain and palpitations.   Gastrointestinal:  Positive for abdominal distention, abdominal pain, diarrhea, nausea and vomiting. Negative for constipation.   Genitourinary:  Negative for difficulty urinating, dysuria, flank pain,  frequency and hematuria.   Musculoskeletal:  Negative for arthralgias, back pain, joint swelling, myalgias, neck pain and neck stiffness.   Skin:  Negative for rash and wound.   Neurological:  Negative for dizziness, tremors, syncope, facial asymmetry, weakness, light-headedness, numbness and headaches.           Objective       ED Triage Vitals   Temperature Pulse Blood Pressure Respirations SpO2 Patient Position - Orthostatic VS   11/14/24 0718 11/14/24 0718 11/14/24 0718 11/14/24 0718 11/14/24 0718 11/14/24 0718   98 °F (36.7 °C) 98 138/68 18 98 % Lying      Temp Source Heart Rate Source BP Location FiO2 (%) Pain Score    11/14/24 0718 11/14/24 0718 11/14/24 0718 -- 11/14/24 0720    Oral Monitor Right arm  9      Vitals      Date and Time Temp Pulse SpO2 Resp BP Pain Score FACES Pain Rating User   11/14/24 1608 -- -- -- -- -- 9 -- TG   11/14/24 1530 97.8 °F (36.6 °C) 104 97 % 16 111/51 -- -- TG   11/14/24 1445 -- 102 95 % -- 120/57 -- -- MLR   11/14/24 1330 -- 107 96 % -- 165/74 -- -- MLR   11/14/24 1245 -- 102 97 % 19 137/75 -- -- BA   11/14/24 1130 -- 101 98 % -- 129/80 -- -- MLR   11/14/24 1100 -- 95 96 % -- 143/68 -- -- MLR   11/14/24 1030 -- 98 98 % -- 152/75 -- -- MLR   11/14/24 1015 -- 95 98 % -- 157/75 -- -- MLR   11/14/24 1000 -- 92 98 % -- 169/79 -- -- MLR   11/14/24 0952 -- 90 99 % -- 174/78 -- -- MLR   11/14/24 0845 -- -- -- -- 149/72 -- -- MLR   11/14/24 0720 -- -- -- -- -- 9 -- MLR   11/14/24 0718 98 °F (36.7 °C) 98 98 % 18 138/68 -- -- MLR            Physical Exam  Constitutional:       General: She is in acute distress.      Appearance: Normal appearance. She is ill-appearing. She is not toxic-appearing or diaphoretic.   HENT:      Head: Normocephalic.      Right Ear: Tympanic membrane, ear canal and external ear normal.      Left Ear: Tympanic membrane, ear canal and external ear normal.      Nose: Nose normal. No congestion or rhinorrhea.      Mouth/Throat:      Mouth: Mucous membranes are  moist.      Pharynx: Oropharynx is clear. No oropharyngeal exudate or posterior oropharyngeal erythema.   Eyes:      General:         Right eye: No discharge.         Left eye: No discharge.      Extraocular Movements: Extraocular movements intact.      Conjunctiva/sclera: Conjunctivae normal.      Pupils: Pupils are equal, round, and reactive to light.   Cardiovascular:      Rate and Rhythm: Normal rate and regular rhythm.      Pulses: Normal pulses.   Pulmonary:      Effort: Pulmonary effort is normal. No respiratory distress.      Breath sounds: Normal breath sounds. No stridor. No wheezing, rhonchi or rales.   Chest:      Chest wall: No tenderness.   Abdominal:      General: Bowel sounds are normal. There is distension.      Palpations: Abdomen is soft. There is no mass.      Tenderness: There is abdominal tenderness. There is no right CVA tenderness, left CVA tenderness, guarding or rebound.      Hernia: No hernia is present.      Comments: Generalized tenderness over the abdomen patient guarding over the left lower quadrant.  Abdomen is distended.  Due to body habitus exam is limited.  No bowel sounds heard on exam.  Reaching out to surgery for further management as patient has had 4 CTs in the past month and a half.   Musculoskeletal:         General: No tenderness. Normal range of motion.      Cervical back: Neck supple. No rigidity or tenderness.   Lymphadenopathy:      Cervical: No cervical adenopathy.   Skin:     General: Skin is warm and dry.      Capillary Refill: Capillary refill takes less than 2 seconds.      Findings: No rash.   Neurological:      Mental Status: She is alert and oriented to person, place, and time.      Sensory: No sensory deficit.   Psychiatric:         Mood and Affect: Mood normal.         Results Reviewed       Procedure Component Value Units Date/Time    Platelet count [[id number]]     Lab Status: No result Specimen: Blood     Protime-INR [[id number]]  (Abnormal) Collected:  11/14/24 0854    Lab Status: Final result Specimen: Blood from Arm, Right Updated: 11/14/24 0919     Protime 15.2 seconds      INR 1.18    Narrative:      INR Therapeutic Range    Indication                                             INR Range      Atrial Fibrillation                                               2.0-3.0  Hypercoagulable State                                    2.0.2.3  Left Ventricular Asist Device                            2.0-3.0  Mechanical Heart Valve                                  -    Aortic(with afib, MI, embolism, HF, LA enlargement,    and/or coagulopathy)                                     2.0-3.0 (2.5-3.5)     Mitral                                                             2.5-3.5  Prosthetic/Bioprosthetic Heart Valve               2.0-3.0  Venous thromboembolism (VTE: VT, PE        2.0-3.0    APTT [[id number]]  (Normal) Collected: 11/14/24 0854    Lab Status: Final result Specimen: Blood from Arm, Right Updated: 11/14/24 0919     PTT 25 seconds     Comprehensive metabolic panel [[id number]]  (Abnormal) Collected: 11/14/24 0821    Lab Status: Final result Specimen: Blood from Arm, Left Updated: 11/14/24 0853     Sodium 132 mmol/L      Potassium 4.1 mmol/L      Chloride 101 mmol/L      CO2 24 mmol/L      ANION GAP 7 mmol/L      BUN 12 mg/dL      Creatinine 0.59 mg/dL      Glucose 165 mg/dL      Calcium 8.4 mg/dL      Corrected Calcium 9.0 mg/dL      AST 8 U/L      ALT 8 U/L      Alkaline Phosphatase 86 U/L      Total Protein 5.5 g/dL      Albumin 3.2 g/dL      Total Bilirubin 0.51 mg/dL      eGFR 99 ml/min/1.73sq m     Narrative:      [employer] guidelines for Chronic Kidney Disease (CKD):     Stage 1 with normal or high GFR (GFR > 90 mL/min/1.73 square meters)    Stage 2 Mild CKD (GFR = 60-89 mL/min/1.73 square meters)    Stage 3A Moderate CKD (GFR = 45-59 mL/min/1.73 square meters)    Stage 3B Moderate CKD (GFR = 30-44 mL/min/1.73 square meters)    Stage 4  Severe CKD (GFR = 15-29 mL/min/1.73 square meters)    Stage 5 End Stage CKD (GFR <15 mL/min/1.73 square meters)  Note: GFR calculation is accurate only with a steady state creatinine    Lipase [[id number]]  (Abnormal) Collected: 11/14/24 0821    Lab Status: Final result Specimen: Blood from Arm, Left Updated: 11/14/24 0853     Lipase <6 u/L     Lactic acid, plasma (w/reflex if result > 2.0) [[id number]]  (Normal) Collected: 11/14/24 0821    Lab Status: Final result Specimen: Blood from Arm, Left Updated: 11/14/24 0850     LACTIC ACID 1.5 mmol/L     Narrative:      Result may be elevated if tourniquet was used during collection.    CBC and differential [[id number]]  (Abnormal) Collected: 11/14/24 0821    Lab Status: Final result Specimen: Blood from Arm, Left Updated: 11/14/24 0832     WBC 9.43 Thousand/uL      RBC 3.82 Million/uL      Hemoglobin 11.5 g/dL      Hematocrit 35.6 %      MCV 93 fL      MCH 30.1 pg      MCHC 32.3 g/dL      RDW 15.9 %      MPV 10.9 fL      Platelets 100 Thousands/uL      nRBC 0 /100 WBCs      Segmented % 79 %      Immature Grans % 2 %      Lymphocytes % 10 %      Monocytes % 8 %      Eosinophils Relative 1 %      Basophils Relative 0 %      Absolute Neutrophils 7.44 Thousands/µL      Absolute Immature Grans 0.14 Thousand/uL      Absolute Lymphocytes 0.96 Thousands/µL      Absolute Monocytes 0.74 Thousand/µL      Eosinophils Absolute 0.11 Thousand/µL      Basophils Absolute 0.04 Thousands/µL             CT abdomen pelvis with contrast   Final Interpretation by E. Alec Schoenberger, MD (11/14 1673)   Small bowel obstruction with transition in the pelvis is again seen.   New small volume of ascites   Other stable findings as above.         Workstation performed: [id number]             Procedures    ED Medication and Procedure Management   Prior to Admission Medications   Prescriptions Last Dose Informant Patient Reported? Taking?   Acetaminophen 325 MG CAPS Unknown  Yes No   Sig: Take 325 mg by  mouth every 6 (six) hours as needed   Klor-Con M20 20 MEQ tablet Unknown  Yes No   Sig: Take 1 tablet by mouth in the morning   albuterol (PROVENTIL HFA,VENTOLIN HFA) 90 mcg/act inhaler Unknown  Yes No   Sig: Inhale 2 puffs every 6 (six) hours as needed   apixaban (ELIQUIS) 5 mg Unknown  Yes No   Sig: Take 5 mg by mouth 2 (two) times a day   aspirin (ECOTRIN LOW STRENGTH) 81 mg EC tablet Unknown  Yes No   Sig: Take 81 mg by mouth   bisacodyl (DULCOLAX) 10 mg suppository Unknown  No No   Sig: Insert 1 suppository (10 mg total) into the rectum daily   carvedilol (COREG) 12.5 mg tablet Unknown  No No   Sig: Take 1 tablet (12.5 mg total) by mouth 2 (two) times a day with meals   dapagliflozin (Farxiga) 10 MG tablet Unknown  Yes No   Sig: Take 10 mg by mouth   dicyclomine (BENTYL) 10 mg capsule Unknown  Yes No   Sig: Take 10 mg by mouth 2 (two) times a day as needed   diphenhydrAMINE (BENADRYL) 25 mg capsule Unknown  Yes No   Sig: Take 25 mg by mouth every 6 (six) hours as needed   furosemide (LASIX) 20 mg tablet Unknown  Yes No   Sig: Take 1 tablet by mouth in the morning   gabapentin (NEURONTIN) 100 mg capsule Unknown  Yes No   Sig: Take 100 mg by mouth   levothyroxine 125 mcg tablet Unknown  Yes No   Sig: Take 125 mcg by mouth daily   metoclopramide (Reglan) 10 mg tablet Unknown  No No   Sig: Take 1 tablet (10 mg total) by mouth 4 (four) times a day   nitroglycerin (NITROSTAT) 0.4 mg SL tablet Unknown  Yes No   Sig: Place 0.4 mg under the tongue   ondansetron (ZOFRAN) 4 mg tablet Unknown  No No   Sig: Take 1 tablet (4 mg total) by mouth every 8 (eight) hours as needed for nausea or vomiting   oxyCODONE (ROXICODONE) 5 immediate release tablet Unknown  No No   Sig: Take 1 tablet (5 mg total) by mouth every 4 (four) hours as needed for severe pain for up to 10 days Max Daily Amount: 30 mg   pantoprazole (PROTONIX) 40 mg tablet Unknown  No No   Sig: Take 1 tablet (40 mg total) by mouth daily in the early morning   vitamin  B-12 (VITAMIN B-12) 1,000 mcg tablet Unknown  Yes No   Sig: Take 1,000 mcg by mouth daily      Facility-Administered Medications: None     Current Discharge Medication List        CONTINUE these medications which have NOT CHANGED    Details   Acetaminophen 325 MG CAPS Take 325 mg by mouth every 6 (six) hours as needed      albuterol (PROVENTIL HFA,VENTOLIN HFA) 90 mcg/act inhaler Inhale 2 puffs every 6 (six) hours as needed      apixaban (ELIQUIS) 5 mg Take 5 mg by mouth 2 (two) times a day      aspirin (ECOTRIN LOW STRENGTH) 81 mg EC tablet Take 81 mg by mouth      bisacodyl (DULCOLAX) 10 mg suppository Insert 1 suppository (10 mg total) into the rectum daily  Qty: 12 suppository, Refills: 0    Associated Diagnoses: Constipation, unspecified constipation type      carvedilol (COREG) 12.5 mg tablet Take 1 tablet (12.5 mg total) by mouth 2 (two) times a day with meals  Qty: 120 tablet, Refills: 0    Associated Diagnoses: Hypertension, unspecified type      dapagliflozin (Farxiga) 10 MG tablet Take 10 mg by mouth      dicyclomine (BENTYL) 10 mg capsule Take 10 mg by mouth 2 (two) times a day as needed      diphenhydrAMINE (BENADRYL) 25 mg capsule Take 25 mg by mouth every 6 (six) hours as needed      furosemide (LASIX) 20 mg tablet Take 1 tablet by mouth in the morning      gabapentin (NEURONTIN) 100 mg capsule Take 100 mg by mouth      Klor-Con M20 20 MEQ tablet Take 1 tablet by mouth in the morning      levothyroxine 125 mcg tablet Take 125 mcg by mouth daily      metoclopramide (Reglan) 10 mg tablet Take 1 tablet (10 mg total) by mouth 4 (four) times a day  Qty: 120 tablet, Refills: 0    Associated Diagnoses: Gastroparesis      nitroglycerin (NITROSTAT) 0.4 mg SL tablet Place 0.4 mg under the tongue      ondansetron (ZOFRAN) 4 mg tablet Take 1 tablet (4 mg total) by mouth every 8 (eight) hours as needed for nausea or vomiting  Qty: 20 tablet, Refills: 0    Associated Diagnoses: Gastroparesis      oxyCODONE  (ROXICODONE) 5 immediate release tablet Take 1 tablet (5 mg total) by mouth every 4 (four) hours as needed for severe pain for up to 10 days Max Daily Amount: 30 mg  Qty: 8 tablet, Refills: 0    Associated Diagnoses: Small bowel obstruction (HCC)      pantoprazole (PROTONIX) 40 mg tablet Take 1 tablet (40 mg total) by mouth daily in the early morning  Qty: 90 tablet, Refills: 0    Associated Diagnoses: Gastroparesis      vitamin B-12 (VITAMIN B-12) 1,000 mcg tablet Take 1,000 mcg by mouth daily           No discharge procedures on file.  ED SEPSIS DOCUMENTATION   Time reflects when diagnosis was documented in both MDM as applicable and the Disposition within this note       Time User Action Codes Description Comment    11/14/2024 12:31 PM Tai Villanueva [R10.9] Abdominal pain     11/14/2024  3:00 PM Tai Villanueva [K56.609] Bowel obstruction (HCC)     11/14/2024  3:03 PM Jennifer Bearden [E11.69,  Z79.4] Type 2 diabetes mellitus with other specified complication, with long-term current use of insulin (HCC)                  Tai Villanueva PA-C  11/14/24 1720

## 2024-11-14 NOTE — ASSESSMENT & PLAN NOTE
History of DVT/PE May 2024.  Prior to admission on Eliquis.  Continue heparin for prophylaxis while NPO

## 2024-11-14 NOTE — CONSULTS
Consultation - Surgery-General   Name: Lety Botello 63 y.o. female I MRN: [id number]  Unit/Bed#: ED-26 I Date of Admission: 2024   Date of Service: 2024 I Hospital Day: 0   Consult to surgery general  Consult performed by: Jennifer Bearden PA-C  Consult ordered by: Tai Villanueva PA-C        Physician Requesting Evaluation: Patti Gambino DO   Reason for Evaluation / Principal Problem: abd pain, n/v, recent surgery admission    Assessment & Plan  SBO (small bowel obstruction) (HCC)    Gastroparesis  With associated abdominal pain nausea and vomiting    Afebrile, vital signs stable  No leukocytosis, WBC 9.43  Hemoglobin 11.5 from 9.7 on discharge   Creatinine at apparent baseline, 0.59  Lipase and lactic acid within normal limits    CTAP with IV and PO contrast and 4 hour imaging delay - read pending, but looks obstructed    On exam, abdomen is obese though nondistended, soft and moderately tender in the left hemiabdomen, no point tenderness or guarding or rigidity    Barring any significant findings which necessitate emergent surgical intervention on this pending CT abdomen pelvis, we have no plans to operate on this patient. But surgical service will manage with SLIM consultation for medical comorbidities.    Admit to surgical service under Dr. Langston  Consult SLIM  NPO/NGT 18 Fr  IVF  Analgesics and antiemetics as needed  Serial exams  Will plan for Gastrografin challenge in the next 24 to 48 hours    Abdominal pain  See above  Nausea & vomiting  See above      HPI:    Lety Botello is a 63 y.o. female with medical comorbidities including endometrial cancer, IBS, shingles, anxiety, GLP-1 induced gastroparesis, diabetes, hypertension with surgical history including appendectomy, cholecystectomy, sigmoidectomy, and multiple  sections who presents with abdominal pain, nausea and vomiting.  Pain present for 1 day, nonbloody nonbilious vomiting, multiple episodes, most recently at present in the  ED. Patient had recent admission (11/5/2024 -11/12/2024) for presumed partial small bowel obstruction which was ruled out by Gastrografin challenge.  Patient had EGD by GI only revealed gastritis and some retained stomach and duodenal contents, no narrowing or stricture was identified.  Patient had been taking a GLP-1 agonist for weight loss, which have known issues with nausea and vomiting gastroparesis.  She was recommended to stop taking this medication indefinitely.  Diet was slowly advanced as tolerated and patient was discharged home with a gastric emptying study ordered as an outpatient.    Patient returned this morning after abdominal pain began yesterday after eating a lunch of 8 pieces of steamed shrimp.  The abdominal pain was generalized, came and went, but persisted throughout the night.  At 4:00 in the morning she began vomiting a light pink-tinged emesis, no BRB or coffee ground emesis.  Endorses diarrhea without hematochezia or melena.  She denies fevers, chills, chest pain, shortness of breath.  Patient is aware that she is a very poor surgical candidate, she is very frustrated that the symptoms keep returning and is anxious to find a longer-lasting solution to her problems.    Review of Systems:  History obtained from chart review and the patient  Gastrointestinal ROS: see above   All other 12-point ROS negative    Historical Information   Past Medical History:   Diagnosis Date    Diverticulitis     Endometrial cancer (HCC)     History of shingles     Right hemiabdomen (inactive)    Hypertension     IBS (irritable bowel syndrome)     Obesity     Small bowel obstruction (HCC)     Type 2 diabetes mellitus (HCC)      Past Surgical History:   Procedure Laterality Date    APPENDECTOMY      CHOLECYSTECTOMY      SIGMOIDECTOMY N/A      Social History   Social History     Substance and Sexual Activity   Alcohol Use Not Currently     Social History     Substance and Sexual Activity   Drug Use Never      Social History     Tobacco Use   Smoking Status Never   Smokeless Tobacco Never     History reviewed. No pertinent family history.    Medications:  No current facility-administered medications for this encounter.    Allergies   Allergen Reactions    Bee Pollen Anaphylaxis    Azithromycin Hives    Metformin Diarrhea    Other Other (See Comments)     hayfever with inhaler   hayfever with inhaler    Pollen Extract Other (See Comments)     hayfever with inhaler    Sulfamethizole Hives    Sulfamethoxazole-Trimethoprim Hives       Physical Examination:  Vitals:   Vitals:    11/14/24 1030 11/14/24 1100 11/14/24 1130 11/14/24 1245   BP: 152/75 143/68 129/80 137/75   BP Location:    Right arm   Pulse: 98 95 101 102   Resp:    19   Temp:       TempSrc:       SpO2: 98% 96% 98% 97%   Weight:         Temp  Min: 98 °F (36.7 °C)  Max: 98 °F (36.7 °C)         General appearance: alert, cooperative, and tearful  Head: Normocephalic, without obvious abnormality, atraumatic  Eyes: Sclerae anicteric  Neck: supple, symmetrical, trachea midline  Lungs: clear to auscultation bilaterally  Heart: regular rate and rhythm, S1, S2 normal, no murmur, click, rub or gallop  Abdomen: As above  Extremities: extremities normal, warm and well-perfused; no cyanosis, clubbing, or edema  Skin: Skin color, texture, turgor normal. No rashes or lesions  Neurologic: Grossly normal    Diagnostic Data:  Lab: I have personally reviewed pertinent lab results., CBC:   Lab Results   Component Value Date    WBC 9.43 11/14/2024    HGB 11.5 11/14/2024    HCT 35.6 11/14/2024    MCV 93 11/14/2024     (L) 11/14/2024    RBC 3.82 11/14/2024    MCH 30.1 11/14/2024    MCHC 32.3 11/14/2024    RDW 15.9 (H) 11/14/2024    MPV 10.9 11/14/2024    NRBC 0 11/14/2024   , CMP:   Lab Results   Component Value Date    SODIUM 132 (L) 11/14/2024    K 4.1 11/14/2024     11/14/2024    CO2 24 11/14/2024    BUN 12 11/14/2024    CREATININE 0.59 (L) 11/14/2024    CALCIUM 8.4  11/14/2024    AST 8 (L) 11/14/2024    ALT 8 11/14/2024    ALKPHOS 86 11/14/2024    EGFR 99 11/14/2024   , PT/INR:   Lab Results   Component Value Date    INR 1.18 11/14/2024     Results from last 7 days   Lab Units 11/14/24  0821   WBC Thousand/uL 9.43   HEMOGLOBIN g/dL 11.5   HEMATOCRIT % 35.6   PLATELETS Thousands/uL 100*     Results from last 7 days   Lab Units 11/14/24  0821   POTASSIUM mmol/L 4.1   CHLORIDE mmol/L 101   CO2 mmol/L 24   BUN mg/dL 12   CREATININE mg/dL 0.59*   CALCIUM mg/dL 8.4   ALK PHOS U/L 86   ALT U/L 8   AST U/L 8*       Imaging: I have personally reviewed the pertinent imaging studies on the PACS system  No results found.    Active medications:  The patients active medications were reviewed and modified as appropriate      Code Status: Prior      Counseling / Coordination of Care  Total floor / unit time spent today 30 minutes. Greater than 50% of total time was spent with the patient and / or family counseling and / or coordination of care.       Jennifer Bearden PA-C   11/14/24

## 2024-11-14 NOTE — CASE MANAGEMENT
Case Management Assessment & Discharge Planning Note    Patient name Lety Botello  Location ED-26/ED-26 MRN [id number]  : 1960 Date 2024       Current Admission Date: 2024  Current Admission Diagnosis:SBO (small bowel obstruction) (HCC)   Patient Active Problem List    Diagnosis Date Noted Date Diagnosed    Abdominal pain 2024     Nausea & vomiting 2024     Constipation 11/10/2024     Morbid obesity with BMI of 45.0-49.9, adult (HCC) 2024     Gastroparesis 2024     Gastric dilation 2024     History of pulmonary embolism 2024     Hyponatremia 2024     Hypothyroidism 2024     Mild intermittent asthma 2024     Hypertension      Type 2 diabetes mellitus (HCC)      Endometrial cancer (HCC)      Obesity      SBO (small bowel obstruction) (HCC) 2024       LOS (days): 0  Geometric Mean LOS (GMLOS) (days):   Days to GMLOS:     OBJECTIVE:  PATIENT READMITTED TO HOSPITAL            Current admission status: Inpatient       Preferred Pharmacy:   [id number] - [place], [place] - [address] [place] [address]  Phone: [phone] Fax: [phone]    Primary Care Provider: Tai Martinez    Primary Insurance: POPPY TSANG  Secondary Insurance:     ASSESSMENT:  Active Health Care Proxies    There are no active Health Care Proxies on file.       Advance Directives  Does patient have a Health Care POA?: No  Does patient have Advance Directives?: No         Readmission Root Cause  30 Day Readmission: Yes  During your hospital stay, did someone (provider, nurse, ) explain your care to you in a way you could understand?: Yes  Did you feel medically stable to leave the hospital?: No  Were you able to pay for your medication at the pharmacy?: Yes  Did you have reliable transportation to take you to your appointments?: Yes  During previous admission, was a post-acute recommendation made?: Yes  What  post-acute resources were offered?: HHC  Patient was readmitted due to: SBO, gastroparesis with abdominal pain and n/v  Action Plan: Medical management, surgery following    Patient Information  Admitted from:: Home  Mental Status: Alert  During Assessment patient was accompanied by: Not accompanied during assessment  Assessment information provided by:: Patient  Primary Caregiver: Self  Support Systems: Self, Other (Comment) (Roommates)  County of Residence: [place]  What city do you live in?: [place]  Home entry access options. Select all that apply.: Stairs  Number of steps to enter home.: One Flight (2 flights)  Do the steps have railings?: Yes  Type of Current Residence: [address] home  Upon entering residence, is there a bedroom on the main floor (no further steps)?: No  A bedroom is located on the following floor levels of residence (select all that apply):: 2nd Floor  Number of steps to 2nd floor from main floor: One Flight  Living Arrangements: Other (Comment) (roommates)    Activities of Daily Living Prior to Admission  Functional Status: Independent  Completes ADLs independently?: Yes  Ambulates independently?: Yes  Does patient use assisted devices?: No  Does patient currently own DME?: Yes  What DME does the patient currently own?: Walker  Does patient have a history of Outpatient Therapy (PT/OT)?: Yes (R hip pain w LVN several years ago)  Does the patient have a history of Short-Term Rehab?: No  Does patient have a history of HHC?: Yes  Does patient currently have HHC?: Yes    Current Home Health Care  Type of Current Home Care Services: Home PT, Home OT, Nurse visit  [facility]  Current Home Health Follow-Up Provider:: PCP    Patient Information Continued  Does patient have prescription coverage?: Yes  Does patient receive dialysis treatments?: No  Does patient have a history of substance abuse?: No  Does patient have a history of Mental Health Diagnosis?: Yes (Depression  and anxiety)  Is patient receiving treatment for mental health?: Yes (Medication)  Has patient received inpatient treatment related to mental health in the last 2 years?: No         Means of Transportation  [facility] [employer]          DISCHARGE DETAILS:    Discharge planning discussed with:: Pt  Freedom of Choice: Yes  Comments - Freedom of Choice: ROLANDO HOLLINS  CM contacted family/caregiver?: No- see comments (Pt [id number])  Were Treatment Team discharge recommendations reviewed with patient/caregiver?: Yes  Did patient/caregiver verbalize understanding of patient care needs?: Yes            Requested Home Health Care         Is the patient interested in HHC at discharge?: Yes  Home Health Discipline requested:: Nursing, Occupational Therapy, Physical Therapy  Home Health Agency Name:: [facility]  Home Health Follow-Up Provider:: ZEFERINO  Home Health Services Needed:: Diabetes Management, Strengthening/Theraputic Exercises to Improve Function, Evaluate Functional Status and Safety, Gait/ADL Training  Homebound Criteria Met:: Uses an Assist Device (i.e. cane, walker, etc)  Supporting Clincal Findings:: Limited Endurance    DME Referral Provided  Referral made for DME?: No    Other Referral/Resources/Interventions Provided:  Interventions: HHC  Referral Comments: ROLANDO HOLLINS         Treatment Team Recommendation: Home with Home Health Care  Discharge Destination Plan:: Home with Home Health Care            Additional Comments: Assessment completed. Referral sent to [facility] for resumption of care (SN, PT, OT) via [facility]. CM continues following for additional needs.

## 2024-11-14 NOTE — ASSESSMENT & PLAN NOTE
Prior to admission on carvedilol  While n.p.o. we will schedule IV metoprolol 2.5 mg every 6 hours

## 2024-11-14 NOTE — CONSULTS
Consultation - Hospitalist   Name: Lety Botello 63 y.o. female I MRN: [id number]  Unit/Bed#: E5 -01 I Date of Admission: 11/14/2024   Date of Service: 11/14/2024 I Hospital Day: 0   Inpatient consult to Internal Medicine  Consult performed by: Obinna Wilson DO  Consult ordered by: Jennifer Bearden PA-C        Physician Requesting Evaluation: Riki Langston MD   Reason for Evaluation / Principal Problem: Medical management diabetes hypertension DVT/PE    Assessment & Plan  SBO (small bowel obstruction) (HCC)  History of endometrial cancer pulmonary embolism DVT hypothyroidism insulin-dependent diabetes hypertension and recent admission for pSBO presents to the hospital with abdominal pain nausea vomiting and diarrhea again  Admitted to surgical service after finding of pSBO again  Supportive care with bowel rest and NG tube decompression and IV fluids  Medical management of diabetes and hypertension as below.  Hypertension  Prior to admission on carvedilol  While n.p.o. we will schedule IV metoprolol 2.5 mg every 6 hours  Type 2 diabetes mellitus (HCC)  Lab Results   Component Value Date    HGBA1C 6.9 (H) 11/05/2024     Recent Labs     11/11/24  2123 11/12/24  0743 11/14/24  1621 11/14/24  1757   POCGLU 124 167* 123 127     Previously on toujeo 60 units with lispro 8 units 3 times daily  Patient reports now only on sliding scale insulin.  Ordered every 6 hours.  History of pulmonary embolism  History of DVT/PE May 2024.  Prior to admission on Eliquis.  Continue heparin for prophylaxis while NPO  Hypothyroidism  Prior to admission levothyroxine.  Held while NPO  Mild intermittent asthma  Prior to admission on albuterol only as needed.  No exacerbations    VTE Pharmacologic Prophylaxis:   Moderate Risk (Score 3-4) - Pharmacological DVT Prophylaxis Ordered: heparin.  Code Status: Level 1 - Full Code   Discussion with family:     Chief Complaint:     Abdominal Pain (Pt reports 9/10 abdominal pain. Woke up at 0400  with N/V/D, feels lightheaded as well. Has a hx of SBO and reports symptoms feel the same as last time. Was seen last week for SBO, ate solid foods for first time yesterday. )    History of Present Illness  Lety Botello is a 63 y.o. female with a PMH of hypothyroidism pulmonary embolism insulin-dependent diabetes hypertension and recent admission for SBO who presents with abdominal pain nausea vomiting and diarrhea again.  The patient was recently hospitalized and discharged here on 11/12/2024 for PSBO.  Her GLP-1 agonist was discontinued.  Yesterday was her first day eating solids and she had some shrimp.  Overnight she had nausea vomiting without blood.  In the ED she was found to have pSBO again and admitted to surgical service.  Due to medical comorbidities, internal medicine consulted.  On exam she denies any chest pain or shortness of breath.    Review of Systems   Constitutional:  Negative for fever.   HENT:  Negative for facial swelling.    Eyes:  Negative for visual disturbance.   Respiratory:  Negative for shortness of breath.    Cardiovascular:  Negative for chest pain and palpitations.   Gastrointestinal:  Positive for abdominal pain, diarrhea, nausea and vomiting.   Genitourinary:  Negative for hematuria.   Musculoskeletal:  Negative for myalgias.   Skin:  Negative for rash.   Neurological:  Negative for seizures and speech difficulty.   Psychiatric/Behavioral:  The patient is not nervous/anxious.    All other systems reviewed and are negative.      Past Medical and Surgical History:   Past Medical History:   Diagnosis Date    Diverticulitis     Endometrial cancer (HCC)     History of shingles     Right hemiabdomen (inactive)    Hypertension     IBS (irritable bowel syndrome)     Obesity     Small bowel obstruction (HCC)     Type 2 diabetes mellitus (HCC)      Past Surgical History:   Procedure Laterality Date    APPENDECTOMY      CHOLECYSTECTOMY      SIGMOIDECTOMY N/A      Meds/Allergies:  Allergies:    Allergies   Allergen Reactions    Bee Pollen Anaphylaxis    Azithromycin Hives    Metformin Diarrhea    Other Other (See Comments)     hayfever with inhaler   hayfever with inhaler    Pollen Extract Other (See Comments)     hayfever with inhaler    Sulfamethizole Hives    Sulfamethoxazole-Trimethoprim Hives     Prior to Admission Medications   Prescriptions Last Dose Informant Patient Reported? Taking?   Acetaminophen 325 MG CAPS Unknown  Yes No   Sig: Take 325 mg by mouth every 6 (six) hours as needed   Insulin Lispro (HUMALOG PEN SC)   Yes Yes   Sig: Inject under the skin 3 (three) times a day before meals Sliding scale.   Klor-Con M20 20 MEQ tablet Unknown  Yes No   Sig: Take 1 tablet by mouth in the morning   albuterol (PROVENTIL HFA,VENTOLIN HFA) 90 mcg/act inhaler Unknown  Yes No   Sig: Inhale 2 puffs every 6 (six) hours as needed   apixaban (ELIQUIS) 5 mg Unknown  Yes No   Sig: Take 5 mg by mouth 2 (two) times a day   aspirin (ECOTRIN LOW STRENGTH) 81 mg EC tablet Unknown  Yes No   Sig: Take 81 mg by mouth   bisacodyl (DULCOLAX) 10 mg suppository Unknown  No No   Sig: Insert 1 suppository (10 mg total) into the rectum daily   carvedilol (COREG) 12.5 mg tablet Unknown  No No   Sig: Take 1 tablet (12.5 mg total) by mouth 2 (two) times a day with meals   dapagliflozin (Farxiga) 10 MG tablet Unknown  Yes No   Sig: Take 10 mg by mouth   dicyclomine (BENTYL) 10 mg capsule Unknown  Yes No   Sig: Take 10 mg by mouth 2 (two) times a day as needed   diphenhydrAMINE (BENADRYL) 25 mg capsule Unknown  Yes No   Sig: Take 25 mg by mouth every 6 (six) hours as needed   furosemide (LASIX) 20 mg tablet Unknown  Yes No   Sig: Take 1 tablet by mouth in the morning   gabapentin (NEURONTIN) 100 mg capsule Unknown  Yes No   Sig: Take 100 mg by mouth   levothyroxine 125 mcg tablet Unknown  Yes No   Sig: Take 125 mcg by mouth daily   metoclopramide (Reglan) 10 mg tablet Unknown  No No   Sig: Take 1 tablet (10 mg total) by mouth 4  (four) times a day   nitroglycerin (NITROSTAT) 0.4 mg SL tablet Unknown  Yes No   Sig: Place 0.4 mg under the tongue   ondansetron (ZOFRAN) 4 mg tablet Unknown  No No   Sig: Take 1 tablet (4 mg total) by mouth every 8 (eight) hours as needed for nausea or vomiting   oxyCODONE (ROXICODONE) 5 immediate release tablet Unknown  No No   Sig: Take 1 tablet (5 mg total) by mouth every 4 (four) hours as needed for severe pain for up to 10 days Max Daily Amount: 30 mg   pantoprazole (PROTONIX) 40 mg tablet Unknown  No No   Sig: Take 1 tablet (40 mg total) by mouth daily in the early morning   vitamin B-12 (VITAMIN B-12) 1,000 mcg tablet Unknown  Yes No   Sig: Take 1,000 mcg by mouth daily      Facility-Administered Medications: None     Social History:     Social History     Socioeconomic History    Marital status: Unknown     Spouse name: Not on file    Number of children: Not on file    Years of education: Not on file    Highest education level: Not on file   Occupational History    Not on file   Tobacco Use    Smoking status: Never    Smokeless tobacco: Never   Substance and Sexual Activity    Alcohol use: Not Currently    Drug use: Never    Sexual activity: Not on file   Other Topics Concern    Not on file   Social History Narrative    Not on file     Social Drivers of Health     Financial Resource Strain: Medium Risk (10/18/2024)    Received from [facility]    Overall Financial Resource Strain (CARDIA)     Difficulty of Paying Living Expenses: Somewhat hard   Food Insecurity: Food Insecurity Present (11/14/2024)    Nursing - Inadequate Food Risk Classification     Worried About Running Out of Food in the Last Year: Not on file     Ran Out of Food in the Last Year: Not on file     Ran Out of Food in the Last Year: 2   Transportation Needs: No Transportation Needs (11/14/2024)    Nursing - Transportation Risk Classification     Lack of Transportation: Not on file     Lack of Transportation: 2   Recent  Concern: Transportation Needs - Unmet Transportation Needs (10/18/2024)    Received from [facility]    PRAPARE - Transportation     Lack of Transportation (Medical): Yes     Lack of Transportation (Non-Medical): Yes   Physical Activity: Not on file   Stress: Not on file   Social Connections: Unknown (2024)    Received from [facility]     How often do you feel lonely or isolated from those around you? (Adult - for ages 18 years and over): Not on file   Intimate Partner Violence: Unknown (2024)    Nursing IPS     Feels Physically and Emotionally Safe: Not on file     Physically Hurt by Someone: Not on file     Humiliated or Emotionally Abused by Someone: Not on file     Physically Hurt by Someone: 2     Hurt or Threatened by Someone: 2   Housing Stability: Unknown (2024)    Nursing: Inadequate Housing Risk Classification     Has Housing: Not on file     Worried About Losing Housing: Not on file     Unable to Get Utilities: Not on file     Unable to Pay for Housing in the Last Year: 2     Has Housin   Recent Concern: Housing Stability - High Risk (10/4/2024)    Received from [facility]    Housing Stability Vital Sign     Unable to Pay for Housing in the Last Year: Yes     Number of Times Moved in the Last Year: 0     Homeless in the Last Year: No     Patient Pre-hospital Living Situation:   Patient Pre-hospital Level of Mobility:   Patient Pre-hospital Diet Restrictions:     Objective   Vitals:   Blood Pressure: 111/51 (24 1530)  Pulse: 104 (24 1530)  Temperature: 97.8 °F (36.6 °C) (24 153)  Temp Source: Oral (24 1530)  Respirations: 16 (24 1530)  Height: 5' (152.4 cm) (24 1606)  Weight - Scale: 104 kg (230 lb) (24 1606)  SpO2: 97 % (24 1530)    Physical Exam  Vitals reviewed.   Constitutional:       General: She is not in acute distress.     Appearance: Normal appearance. She is obese.   HENT:       Head: Atraumatic.   Eyes:      General: No scleral icterus.     Conjunctiva/sclera: Conjunctivae normal.   Cardiovascular:      Rate and Rhythm: Regular rhythm.      Heart sounds: Normal heart sounds.   Pulmonary:      Breath sounds: Decreased breath sounds present. No wheezing.   Abdominal:      General: Bowel sounds are decreased.      Palpations: Abdomen is soft.      Tenderness: There is no guarding.   Musculoskeletal:         General: No swelling.   Skin:     General: Skin is warm.   Neurological:      General: No focal deficit present.      Mental Status: She is alert.      Motor: No weakness.   Psychiatric:         Mood and Affect: Mood normal.         Additional Data:   Lab Results: I have reviewed the following results:  Results from last 7 days   Lab Units 11/14/24  0821   WBC Thousand/uL 9.43   HEMOGLOBIN g/dL 11.5   HEMATOCRIT % 35.6   PLATELETS Thousands/uL 100*   SEGS PCT % 79*   LYMPHO PCT % 10*   MONO PCT % 8   EOS PCT % 1     Results from last 7 days   Lab Units 11/14/24  0821 11/12/24  0443 11/11/24  0439 11/10/24  0453 11/09/24  0424   SODIUM mmol/L 132* 132* 132* 133* 134*   POTASSIUM mmol/L 4.1 4.3 4.3 3.7 4.0   CHLORIDE mmol/L 101 103 104 104 103   CO2 mmol/L 24 24 24 25 25   ANION GAP mmol/L 7 5 4 4 6   BUN mg/dL 12 12 13 13 18   CREATININE mg/dL 0.59* 0.55* 0.54* 0.61 0.73   CALCIUM mg/dL 8.4 7.7* 7.7* 7.5* 7.4*   ALBUMIN g/dL 3.2*  --   --   --   --    TOTAL BILIRUBIN mg/dL 0.51  --   --   --   --    ALK PHOS U/L 86  --   --   --   --    ALT U/L 8  --   --   --   --    AST U/L 8*  --   --   --   --    EGFR ml/min/1.73sq m 99 101 102 98 89   GLUCOSE RANDOM mg/dL 165* 153* 164* 129 135     Results from last 7 days   Lab Units 11/14/24  0854   INR  1.18             Results from last 7 days   Lab Units 11/14/24  0821   LACTIC ACID mmol/L 1.5                              Lines/Drains  Invasive Devices       Peripheral Intravenous Line  Duration             Peripheral IV 11/14/24 Left;Ventral  (anterior) Forearm <1 day    Peripheral IV 11/14/24 Right;Ventral (anterior) Forearm <1 day              Drain  Duration             NG/OG/Enteral Tube Nasogastric 18 Fr Left nare <1 day                    Imaging:   Personally reviewed the following image studies in PACS and associated radiology reports:  CT abdomen pelvis with contrast  Result Date: 11/14/2024  Impression: Small bowel obstruction with transition in the pelvis is again seen. New small volume of ascites Other stable findings as above. Workstation performed: [id number]       EKG, Pathology, and Other Studies Reviewed on Admission:   Reviewed previous records and hospitalization    Administrative Statements       ** Please Note: This note has been constructed using a voice recognition system. **

## 2024-11-14 NOTE — ASSESSMENT & PLAN NOTE
History of endometrial cancer pulmonary embolism DVT hypothyroidism insulin-dependent diabetes hypertension and recent admission for pSBO presents to the hospital with abdominal pain nausea vomiting and diarrhea again  Admitted to surgical service after finding of pSBO again  Supportive care with bowel rest and NG tube decompression and IV fluids  Medical management of diabetes and hypertension as below.

## 2024-11-15 ENCOUNTER — APPOINTMENT (INPATIENT)
Dept: RADIOLOGY | Facility: HOSPITAL | Age: 64
DRG: 230 | End: 2024-11-15
Payer: COMMERCIAL

## 2024-11-15 LAB
ANION GAP SERPL CALCULATED.3IONS-SCNC: 6 MMOL/L (ref 4–13)
ATRIAL RATE: 122 BPM
BASOPHILS # BLD AUTO: 0.01 THOUSANDS/ÂΜL (ref 0–0.1)
BASOPHILS NFR BLD AUTO: 0 % (ref 0–1)
BUN SERPL-MCNC: 15 MG/DL (ref 5–25)
CALCIUM SERPL-MCNC: 8.3 MG/DL (ref 8.4–10.2)
CHLORIDE SERPL-SCNC: 99 MMOL/L (ref 96–108)
CO2 SERPL-SCNC: 25 MMOL/L (ref 21–32)
CREAT SERPL-MCNC: 0.7 MG/DL (ref 0.6–1.3)
EOSINOPHIL # BLD AUTO: 0.09 THOUSAND/ÂΜL (ref 0–0.61)
EOSINOPHIL NFR BLD AUTO: 1 % (ref 0–6)
ERYTHROCYTE [DISTWIDTH] IN BLOOD BY AUTOMATED COUNT: 16.2 % (ref 11.6–15.1)
GFR SERPL CREATININE-BSD FRML MDRD: 93 ML/MIN/1.73SQ M
GLUCOSE SERPL-MCNC: 108 MG/DL (ref 65–140)
GLUCOSE SERPL-MCNC: 139 MG/DL (ref 65–140)
GLUCOSE SERPL-MCNC: 143 MG/DL (ref 65–140)
GLUCOSE SERPL-MCNC: 144 MG/DL (ref 65–140)
GLUCOSE SERPL-MCNC: 146 MG/DL (ref 65–140)
HCT VFR BLD AUTO: 30.3 % (ref 34.8–46.1)
HGB BLD-MCNC: 9.9 G/DL (ref 11.5–15.4)
IMM GRANULOCYTES # BLD AUTO: 0.05 THOUSAND/UL (ref 0–0.2)
IMM GRANULOCYTES NFR BLD AUTO: 1 % (ref 0–2)
LYMPHOCYTES # BLD AUTO: 0.62 THOUSANDS/ÂΜL (ref 0.6–4.47)
LYMPHOCYTES NFR BLD AUTO: 8 % (ref 14–44)
MAGNESIUM SERPL-MCNC: 2.4 MG/DL (ref 1.9–2.7)
MCH RBC QN AUTO: 30.7 PG (ref 26.8–34.3)
MCHC RBC AUTO-ENTMCNC: 32.7 G/DL (ref 31.4–37.4)
MCV RBC AUTO: 94 FL (ref 82–98)
MONOCYTES # BLD AUTO: 0.53 THOUSAND/ÂΜL (ref 0.17–1.22)
MONOCYTES NFR BLD AUTO: 7 % (ref 4–12)
NEUTROPHILS # BLD AUTO: 6.58 THOUSANDS/ÂΜL (ref 1.85–7.62)
NEUTS SEG NFR BLD AUTO: 83 % (ref 43–75)
NRBC BLD AUTO-RTO: 0 /100 WBCS
P AXIS: 6 DEGREES
PHOSPHATE SERPL-MCNC: 3.7 MG/DL (ref 2.3–4.1)
PLATELET # BLD AUTO: 85 THOUSANDS/UL (ref 149–390)
PMV BLD AUTO: 10.1 FL (ref 8.9–12.7)
POTASSIUM SERPL-SCNC: 3.9 MMOL/L (ref 3.5–5.3)
PR INTERVAL: 170 MS
QRS AXIS: -35 DEGREES
QRSD INTERVAL: 86 MS
QT INTERVAL: 314 MS
QTC INTERVAL: 448 MS
RBC # BLD AUTO: 3.22 MILLION/UL (ref 3.81–5.12)
SODIUM SERPL-SCNC: 130 MMOL/L (ref 135–147)
T WAVE AXIS: 77 DEGREES
VENTRICULAR RATE: 122 BPM
WBC # BLD AUTO: 7.88 THOUSAND/UL (ref 4.31–10.16)

## 2024-11-15 PROCEDURE — 85025 COMPLETE CBC W/AUTO DIFF WBC: CPT | Performed by: PHYSICIAN ASSISTANT

## 2024-11-15 PROCEDURE — 84100 ASSAY OF PHOSPHORUS: CPT

## 2024-11-15 PROCEDURE — 71045 X-RAY EXAM CHEST 1 VIEW: CPT

## 2024-11-15 PROCEDURE — 74018 RADEX ABDOMEN 1 VIEW: CPT

## 2024-11-15 PROCEDURE — 80048 BASIC METABOLIC PNL TOTAL CA: CPT | Performed by: PHYSICIAN ASSISTANT

## 2024-11-15 PROCEDURE — 99232 SBSQ HOSP IP/OBS MODERATE 35: CPT | Performed by: INTERNAL MEDICINE

## 2024-11-15 PROCEDURE — 82948 REAGENT STRIP/BLOOD GLUCOSE: CPT

## 2024-11-15 PROCEDURE — 83735 ASSAY OF MAGNESIUM: CPT

## 2024-11-15 PROCEDURE — 93010 ELECTROCARDIOGRAM REPORT: CPT | Performed by: INTERNAL MEDICINE

## 2024-11-15 PROCEDURE — 99233 SBSQ HOSP IP/OBS HIGH 50: CPT | Performed by: SPECIALIST

## 2024-11-15 RX ADMIN — HEPARIN SODIUM 5000 UNITS: 5000 INJECTION INTRAVENOUS; SUBCUTANEOUS at 22:07

## 2024-11-15 RX ADMIN — METOROPROLOL TARTRATE 2.5 MG: 5 INJECTION, SOLUTION INTRAVENOUS at 22:07

## 2024-11-15 RX ADMIN — HYDROMORPHONE HYDROCHLORIDE 0.5 MG: 1 INJECTION, SOLUTION INTRAMUSCULAR; INTRAVENOUS; SUBCUTANEOUS at 03:08

## 2024-11-15 RX ADMIN — Medication 1 SPRAY: at 12:26

## 2024-11-15 RX ADMIN — HEPARIN SODIUM 5000 UNITS: 5000 INJECTION INTRAVENOUS; SUBCUTANEOUS at 13:28

## 2024-11-15 RX ADMIN — HYDROMORPHONE HYDROCHLORIDE 0.5 MG: 1 INJECTION, SOLUTION INTRAMUSCULAR; INTRAVENOUS; SUBCUTANEOUS at 13:28

## 2024-11-15 RX ADMIN — METOROPROLOL TARTRATE 2.5 MG: 5 INJECTION, SOLUTION INTRAVENOUS at 16:47

## 2024-11-15 RX ADMIN — METOROPROLOL TARTRATE 2.5 MG: 5 INJECTION, SOLUTION INTRAVENOUS at 09:08

## 2024-11-15 RX ADMIN — Medication 1 SPRAY: at 03:01

## 2024-11-15 RX ADMIN — SODIUM CHLORIDE, SODIUM GLUCONATE, SODIUM ACETATE, POTASSIUM CHLORIDE, MAGNESIUM CHLORIDE, SODIUM PHOSPHATE, DIBASIC, AND POTASSIUM PHOSPHATE 125 ML/HR: .53; .5; .37; .037; .03; .012; .00082 INJECTION, SOLUTION INTRAVENOUS at 09:37

## 2024-11-15 RX ADMIN — Medication 1 SPRAY: at 22:10

## 2024-11-15 RX ADMIN — SODIUM CHLORIDE, SODIUM GLUCONATE, SODIUM ACETATE, POTASSIUM CHLORIDE, MAGNESIUM CHLORIDE, SODIUM PHOSPHATE, DIBASIC, AND POTASSIUM PHOSPHATE 125 ML/HR: .53; .5; .37; .037; .03; .012; .00082 INJECTION, SOLUTION INTRAVENOUS at 17:37

## 2024-11-15 RX ADMIN — METOROPROLOL TARTRATE 2.5 MG: 5 INJECTION, SOLUTION INTRAVENOUS at 03:13

## 2024-11-15 RX ADMIN — HEPARIN SODIUM 5000 UNITS: 5000 INJECTION INTRAVENOUS; SUBCUTANEOUS at 05:13

## 2024-11-15 RX ADMIN — SODIUM CHLORIDE, SODIUM GLUCONATE, SODIUM ACETATE, POTASSIUM CHLORIDE, MAGNESIUM CHLORIDE, SODIUM PHOSPHATE, DIBASIC, AND POTASSIUM PHOSPHATE 125 ML/HR: .53; .5; .37; .037; .03; .012; .00082 INJECTION, SOLUTION INTRAVENOUS at 01:36

## 2024-11-15 RX ADMIN — ONDANSETRON 4 MG: 2 INJECTION INTRAMUSCULAR; INTRAVENOUS at 13:29

## 2024-11-15 RX ADMIN — HYDROMORPHONE HYDROCHLORIDE 0.5 MG: 1 INJECTION, SOLUTION INTRAMUSCULAR; INTRAVENOUS; SUBCUTANEOUS at 09:01

## 2024-11-15 RX ADMIN — HYDROMORPHONE HYDROCHLORIDE 0.2 MG: 0.2 INJECTION, SOLUTION INTRAMUSCULAR; INTRAVENOUS; SUBCUTANEOUS at 20:32

## 2024-11-15 RX ADMIN — ONDANSETRON 4 MG: 2 INJECTION INTRAMUSCULAR; INTRAVENOUS at 03:09

## 2024-11-15 RX ADMIN — HYDROMORPHONE HYDROCHLORIDE 0.2 MG: 0.2 INJECTION, SOLUTION INTRAMUSCULAR; INTRAVENOUS; SUBCUTANEOUS at 16:47

## 2024-11-15 NOTE — ASSESSMENT & PLAN NOTE
Lab Results   Component Value Date    HGBA1C 6.9 (H) 11/05/2024       Recent Labs     11/14/24  1621 11/14/24  1757 11/15/24  0001 11/15/24  0559   POCGLU 123 127 139 146*       Blood Sugar Average: Last 72 hrs:  (P) 133.75    Plan  -SSI  -Blood glucose goal 140-180

## 2024-11-15 NOTE — ASSESSMENT & PLAN NOTE
Lab Results   Component Value Date    HGBA1C 6.9 (H) 11/05/2024     Recent Labs     11/14/24  1757 11/15/24  0001 11/15/24  0559 11/15/24  1119   POCGLU 127 139 146* 143*     Previously on toujeo 60 units with lispro 8 units 3 times daily  Patient reports now only on sliding scale insulin and no longer on toujeo.    Monitor ordered every 6 hours.

## 2024-11-15 NOTE — ASSESSMENT & PLAN NOTE
Prior to admission on carvedilol  While n.p.o. continue scheduled IV metoprolol 2.5 mg every 6 hours

## 2024-11-15 NOTE — PROGRESS NOTES
Patient:    MRN:  [id number]    Dharmesh Request ID:  [id number]    Level of care reserved:  Home Health Agency    Partner Reserved:  [facility], [place] [address] [phone]    Clinical needs requested:    Geography searched:  [phone]    Start of Service:    Request sent:  3:41pm EST on 11/14/2024 by Mayco Haro    Partner reserved:  10:38am EST on 11/15/2024 by Paty Nam    Choice list shared:  10:38am EST on 11/15/2024 by Paty Nam

## 2024-11-15 NOTE — ASSESSMENT & PLAN NOTE
Lab Results   Component Value Date    HGBA1C 6.9 (H) 11/05/2024       Recent Labs     11/14/24  1757 11/15/24  0001 11/15/24  0559 11/15/24  1119   POCGLU 127 139 146* 143*       Blood Sugar Average: Last 72 hrs:  (P) 135.6    Plan  -SSI  -Blood glucose goal 140-180

## 2024-11-15 NOTE — PROGRESS NOTES
Progress Note - Surgery-General   Name: Lety Botello 63 y.o. female I MRN: [id number]  Unit/Bed#: E5 -01 I Date of Admission: 11/14/2024   Date of Service: 11/15/2024 I Hospital Day: 1     Assessment & Plan  SBO (small bowel obstruction) (HCC)  Presenting with recurrent SBO  11/14 CT A/P: Small bowel obstruction with transition in the pelvis is again seen  Physical Exam: soft, non distended, tenderness in the LLQ    Afebrile, tachycardia overnight, now improved     WBC 7.88 from 9.4  Hemoglobin 9.9 from 11.5  Creatinine 0.7 from 0.62    Plan  - NPO  - NG tube to low continuous suction   - Monitor NG output   - Gastrografin challenge at 24 hours   - Multimodal pain regimen   - Encourage ambulation/ out of bed three times daily   -Medicine onboard, appreciate recommendations     Gastroparesis  With associated abdominal pain nausea and vomiting  Abdominal pain  See above  Nausea & vomiting  See above  Type 2 diabetes mellitus (HCC)  Lab Results   Component Value Date    HGBA1C 6.9 (H) 11/05/2024       Recent Labs     11/14/24  1621 11/14/24  1757 11/15/24  0001 11/15/24  0559   POCGLU 123 127 139 146*       Blood Sugar Average: Last 72 hrs:  (P) 133.75    Plan  -SSI  -Blood glucose goal 140-180    Surgery-General service will follow.    Subjective   Patient seen and examined at bedside. Patient reports abdominal pain. Patient denies nausea, fevers and chills. No bowel function.     Objective :  Temp:  [97.6 °F (36.4 °C)-98.8 °F (37.1 °C)] 98.8 °F (37.1 °C)  HR:  [] 84  BP: (107-165)/(51-80) 135/70  Resp:  [16-20] 20  SpO2:  [93 %-98 %] 93 %  O2 Device: None (Room air)    I/O         11/13 0701 11/14 0700 11/14 0701  11/15 0700 11/15 0701 11/16 0700    I.V. (mL/kg)  1181.3 (11.4)     IV Piggyback  1000     Total Intake(mL/kg)  2181.3 (21)     Emesis/NG output  900     Total Output  900     Net  +1281.3                  Lines/Drains/Airways       Active Status       Name Placement date Placement time Site  Days    NG/OG/Enteral Tube Nasogastric 16 Fr Right nare 11/15/24  0357  Right nare  less than 1                  Physical Exam  Vitals and nursing note reviewed.   Constitutional:       Appearance: Normal appearance.   HENT:      Head: Normocephalic and atraumatic.      Nose: Nose normal.   Eyes:      General: No scleral icterus.     Conjunctiva/sclera: Conjunctivae normal.   Cardiovascular:      Rate and Rhythm: Normal rate.   Pulmonary:      Effort: Pulmonary effort is normal. No respiratory distress.   Abdominal:      General: Abdomen is flat. There is no distension.      Palpations: Abdomen is soft.      Tenderness: There is no abdominal tenderness. There is no guarding or rebound.   Musculoskeletal:      Right lower leg: Edema present.      Left lower leg: Edema present.   Skin:     General: Skin is warm and dry.      Capillary Refill: Capillary refill takes less than 2 seconds.   Neurological:      Mental Status: She is alert and oriented to person, place, and time.   Psychiatric:         Mood and Affect: Mood normal.         Behavior: Behavior normal.         Thought Content: Thought content normal.           Lab Results: I have reviewed the following results:  Recent Labs     11/14/24  0821 11/14/24  0854 11/14/24  2226 11/15/24  0837   WBC 9.43  --   --  7.88   HGB 11.5  --   --  9.9*   HCT 35.6  --   --  30.3*   *  --   --  85*   SODIUM 132*  --    < > 130*   K 4.1  --    < > 3.9     --    < > 99   CO2 24  --    < > 25   BUN 12  --    < > 15   CREATININE 0.59*  --    < > 0.70   GLUC 165*  --    < > 144*   MG  --   --    < > 2.4   PHOS  --   --    < > 3.7   AST 8*  --   --   --    ALT 8  --   --   --    ALB 3.2*  --   --   --    TBILI 0.51  --   --   --    ALKPHOS 86  --   --   --    PTT  --  25  --   --    INR  --  1.18  --   --    LACTICACID 1.5  --   --   --     < > = values in this interval not displayed.       Imaging Results Review: No pertinent imaging studies reviewed.  Other Study  Results Review: No additional pertinent studies reviewed.    VTE Pharmacologic Prophylaxis: VTE covered by:  heparin (porcine), Subcutaneous, 5,000 Units at 11/15/24 0527     VTE Mechanical Prophylaxis: sequential compression device

## 2024-11-15 NOTE — ASSESSMENT & PLAN NOTE
Presenting with recurrent SBO  11/14 CT A/P: Small bowel obstruction with transition in the pelvis is again seen  Physical Exam: soft, non distended, tenderness in the LLQ    Afebrile, tachycardia overnight, now improved     WBC 7.88 from 9.4  Hemoglobin 9.9 from 11.5  Creatinine 0.7 from 0.62    Plan  - NPO  - NG tube to low continuous suction   - Monitor NG output   - Gastrografin challenge at 24 hours   - Multimodal pain regimen   - Encourage ambulation/ out of bed three times daily   -Medicine onboard, appreciate recommendations

## 2024-11-15 NOTE — UTILIZATION REVIEW
Initial Clinical Review    Admission: Date/Time/Statement:   Admission Orders (From admission, onward)       Ordered        11/14/24 1505  Inpatient Admission  Once            11/14/24 1502  INPATIENT ADMISSION  Once                          Orders Placed This Encounter   Procedures    INPATIENT ADMISSION     Standing Status:   Standing     Number of Occurrences:   1     Level of Care:   Med Surg [16]     Estimated length of stay:   More than 2 Midnights     Certification:   I certify that inpatient services are medically necessary for this patient for a duration of greater than two midnights. See H&P and MD Progress Notes for additional information about the patient's course of treatment.    Inpatient Admission     Standing Status:   Standing     Number of Occurrences:   1     Level of Care:   Med Surg [16]     Estimated length of stay:   More than 2 Midnights     Certification:   I certify that inpatient services are medically necessary for this patient for a duration of greater than two midnights. See H&P and MD Progress Notes for additional information about the patient's course of treatment.     ED Arrival Information       Expected   -    Arrival   11/14/2024 07:16    Acuity   Emergent              Means of arrival   Ambulance    Escorted by   [facility] ([facility])    Service   Surgery-General    Admission type   Emergency              Arrival complaint   abd pain             Chief Complaint   Patient presents with    Abdominal Pain     Pt reports 9/10 abdominal pain. Woke up at 0400 with N/V/D, feels lightheaded as well. Has a hx of SBO and reports symptoms feel the same as last time. Was seen last week for SBO, ate solid foods for first time yesterday.        Initial Presentation: 63 y.o. female presents to ED via  EMS from home  with multiple episodes on  NBNB  vomiting, abdominal pain for 1 day.   Patient had recent admission (11/5/2024 -11/12/2024) for presumed partial small bowel obstruction  which was ruled out by Gastrografin challenge.    EGD   showed  gastritis.   Patient had been taking a GLP-1 agonist for weight loss, which have known issues with nausea and vomiting gastroparesis.  She was recommended to stop taking this medication indefinitely.    Gastric  emptying study planned  OP.  PMH  is   endometrial cancer, IBS, shingles, anxiety, GLP-1 induced gastroparesis, diabetes, hypertension with surgical history including appendectomy, cholecystectomy, sigmoidectomy, and multiple  sections.  Very frustrated with ongoing symptoms.  Ct abdomen shows  likely  obstruction.  Admit  Ip with SBO, Gastroparesis  and plan is   NPO/NGT, serial abdominal exams, IVF, pain  control, antiemetics as eneded  and  GGC.     Date:   11/15   Day 2:   Tachycardic  overnight, but now improved.  NGT intact and monitor output.  Remains  NPO.  Continue pain  control as needed.  No bowel function.    ED Treatment-Medication Administration from 2024 0716 to 2024 1545         Date/Time Order Dose Route Action     2024 0726 ondansetron (FOR EMS ONLY) (ZOFRAN) 4 mg/2 mL injection 4 mg 0 mg Does not apply Given to EMS     2024 0810 sodium chloride 0.9 % bolus 1,000 mL 1,000 mL Intravenous New Bag     2024 0810 ketorolac (TORADOL) injection 15 mg 15 mg Intravenous Given     2024 0905 HYDROmorphone HCl (DILAUDID) injection 0.2 mg 0.2 mg Intravenous Given     2024 1317 ondansetron (ZOFRAN) injection 4 mg 4 mg Intravenous Given     2024 1315 HYDROmorphone HCl (DILAUDID) injection 0.2 mg 0.2 mg Intravenous Given     2024 1346 iohexol (OMNIPAQUE) 350 MG/ML injection (SINGLE-DOSE) 100 mL 100 mL Intravenous Given     2024 1346 iohexol (OMNIPAQUE) 240 MG/ML solution 50 mL 50 mL Oral Given            Scheduled Medications:  heparin (porcine), 5,000 Units, Subcutaneous, Q8H SAM  insulin lispro, 1-6 Units, Subcutaneous, Q6H SAM  metoprolol, 2.5 mg, Intravenous,  Q6H      Continuous IV Infusions:  multi-electrolyte, 125 mL/hr, Intravenous, Continuous      PRN Meds:  HYDROmorphone, 0.5 mg, Intravenous, Q3H PRN  (   X 2  11/14  and  x3  11/15 thus far)    HYDROmorphone, 0.2 mg, Intravenous, Q3H PRN  ondansetron, 4 mg, Intravenous, Q6H PRN  ( x2  11/15 thus far)   phenol, 1 spray, Mouth/Throat, Q2H PRN  trimethobenzamide, 200 mg, Intramuscular, Q6H PRN      ED Triage Vitals   Temperature Pulse Respirations Blood Pressure SpO2 Pain Score   11/14/24 0718 11/14/24 0718 11/14/24 0718 11/14/24 0718 11/14/24 0718 11/14/24 0720   98 °F (36.7 °C) 98 18 138/68 98 % 9     Weight (last 2 days)       Date/Time Weight    11/14/24 1606 104 (230)    11/14/24 0720 104 (230.16)            Vital Signs (last 3 days)       Date/Time Temp Pulse Resp BP MAP (mmHg) SpO2 O2 Device Patient Position - Orthostatic VS Pain    11/15/24 1328 -- -- -- -- -- -- -- -- 10 - Worst Possible Pain    11/15/24 09:04:43 -- 84 -- 135/70 92 93 % -- -- --    11/15/24 0901 -- -- -- -- -- -- -- -- 10 - Worst Possible Pain    11/15/24 07:45:06 98.8 °F (37.1 °C) 82 20 127/67 87 95 % -- Lying --    11/15/24 03:09:21 -- 89 -- 138/63 88 94 % -- -- --    11/15/24 0308 -- -- -- -- -- -- -- -- 9    11/14/24 23:13:17 97.6 °F (36.4 °C) 118 17 115/73 87 95 % -- -- --    11/14/24 22:47:40 -- 116 -- 107/74 85 93 % -- -- --    11/14/24 2129 -- -- -- -- -- -- -- -- 9 11/14/24 21:23:19 98 °F (36.7 °C) 123 -- 134/77 96 95 % -- -- --    11/14/24 1945 -- -- -- -- -- -- -- -- 9 11/14/24 1608 -- -- -- -- -- -- -- -- 9 11/14/24 1530 97.8 °F (36.6 °C) 104 16 111/51 78 97 % None (Room air) Lying --    11/14/24 1445 -- 102 -- 120/57 82 95 % -- -- --    11/14/24 1330 -- 107 -- 165/74 106 96 % -- -- --    11/14/24 1245 -- 102 19 137/75 100 97 % None (Room air) Lying --    11/14/24 1130 -- 101 -- 129/80 102 98 % -- -- --    11/14/24 1100 -- 95 -- 143/68 98 96 % -- -- --    11/14/24 1030 -- 98 -- 152/75 106 98 % -- -- --    11/14/24 1015 --  95 -- 157/75 108 98 % -- -- --    11/14/24 1000 -- 92 -- 169/79 114 98 % -- -- --    11/14/24 0952 -- 90 -- 174/78 112 99 % -- -- --    11/14/24 0845 -- -- -- 149/72 104 -- -- -- --    11/14/24 0720 -- -- -- -- -- -- -- -- 9    11/14/24 0718 98 °F (36.7 °C) 98 18 138/68 95 98 % None (Room air) Lying --              Pertinent Labs/Diagnostic Test Results:   Radiology:  XR chest portable   Final Interpretation by Kalie Nova MD (11/15 0845)      Distal aspect of NG tube is coursing below the left hemidiaphragm.            Workstation performed: [id number]         CT abdomen pelvis with contrast   Final Interpretation by E. Alec Schoenberger, MD (11/14 2437)   Small bowel obstruction with transition in the pelvis is again seen.   New small volume of ascites   Other stable findings as above.         Workstation performed: [id number]         XR abdomen 1 view kub    (Results Pending)     Cardiology:  ECG 12 lead   Final Result by Eleazar Blanco DO (11/15 0702)   Sinus tachycardia   Left axis deviation   Minimal voltage criteria for LVH, may be normal variant   Abnormal ECG   When compared with ECG of 07-Nov-2024 08:00,   Vent. rate has increased by  42 bpm   Confirmed by Eleazar Blanco ([id number]) on 11/15/2024 7:02:14 AM          Results from last 7 days   Lab Units 11/15/24  0837 11/14/24  0821 11/12/24  0443 11/10/24  0453 11/09/24  0424   WBC Thousand/uL 7.88 9.43 5.69 4.58 3.66*   HEMOGLOBIN g/dL 9.9* 11.5 9.7* 9.0* 8.0*   HEMATOCRIT % 30.3* 35.6 29.1* 27.9* 25.1*   PLATELETS Thousands/uL 85* 100* 100* 100* 94*   TOTAL NEUT ABS Thousands/µL 6.58 7.44  --  3.60  --          Results from last 7 days   Lab Units 11/15/24  0837 11/14/24  2226 11/14/24  0821 11/12/24  0443 11/11/24  0439 11/10/24  0453   SODIUM mmol/L 130* 133* 132* 132* 132* 133*   POTASSIUM mmol/L 3.9 3.9 4.1 4.3 4.3 3.7   CHLORIDE mmol/L 99 101 101 103 104 104   CO2 mmol/L 25 23 24 24 24 25   ANION GAP mmol/L 6 9 7 5 4 4   BUN mg/dL 15 13 12  12 13 13   CREATININE mg/dL 0.70 0.62 0.59* 0.55* 0.54* 0.61   EGFR ml/min/1.73sq m 93 97 99 101 102 98   CALCIUM mg/dL 8.3* 7.9* 8.4 7.7* 7.7* 7.5*   MAGNESIUM mg/dL 2.4 1.7*  --  1.7* 2.0 2.0   PHOSPHORUS mg/dL 3.7 3.3  --  2.5 1.8*  --      Results from last 7 days   Lab Units 11/14/24  0821   AST U/L 8*   ALT U/L 8   ALK PHOS U/L 86   TOTAL PROTEIN g/dL 5.5*   ALBUMIN g/dL 3.2*   TOTAL BILIRUBIN mg/dL 0.51     Results from last 7 days   Lab Units 11/15/24  1119 11/15/24  0559 11/15/24  0001 11/14/24  1757 11/14/24  1621 11/12/24  0743 11/11/24  2123 11/11/24  1620 11/11/24  1104 11/11/24  0704 11/10/24  2107 11/10/24  1628   POC GLUCOSE mg/dl 143* 146* 139 127 123 167* 124 142* 168* 174* 148* 182*     Results from last 7 days   Lab Units 11/15/24  0837 11/14/24  2226 11/14/24  0821 11/12/24  0443 11/11/24  0439 11/10/24  0453 11/09/24  0424   GLUCOSE RANDOM mg/dL 144* 139 165* 153* 164* 129 135           Results from last 7 days   Lab Units 11/14/24  0854   PROTIME seconds 15.2*   INR  1.18   PTT seconds 25             Results from last 7 days   Lab Units 11/14/24  0821   LACTIC ACID mmol/L 1.5           Results from last 7 days   Lab Units 11/14/24  0821   LIPASE u/L <6*               Present on Admission:   Gastroparesis   Abdominal pain   Nausea & vomiting   SBO (small bowel obstruction) (HCC)   History of pulmonary embolism   Hypertension   Hypothyroidism   Mild intermittent asthma   Type 2 diabetes mellitus (HCC)   Hyponatremia      Admitting Diagnosis: Bowel obstruction (HCC) [K56.609]  Vomiting [R11.10]  Abdominal pain [R10.9]  Type 2 diabetes mellitus with other specified complication, with long-term current use of insulin (HCC) [E11.69, Z79.4]  Age/Sex: 63 y.o. female    Network Utilization Review Department  ATTENTION: Please call with any questions or concerns to [phone] and carefully listen to the prompts so that you are directed to the right person. All voicemails are confidential.   For  Discharge needs, contact Care Management DC Support Team at [phone] opt. [phone]  Send all requests for admission clinical reviews, approved or denied determinations and any other requests to dedicated fax number below belonging to the campus where the patient is receiving treatment. List of dedicated fax numbers for the Facilities:  FACILITY NAME UR FAX NUMBER   ADMISSION DENIALS (Administrative/Medical Necessity) [phone]   DISCHARGE SUPPORT TEAM (NETWORK) [phone]   PARENT CHILD HEALTH (Maternity/NICU/Pediatrics) [phone]   [facility] [phone]   [facility] [phone]   [facility] [phone]   [facility] [phone]   [facility] [phone]   [facility] [phone]   [facility] [phone]   [facility] [phone]   [facility] [phone]   [facility] [phone]   [facility] [phone]   [facility] [phone]

## 2024-11-15 NOTE — ASSESSMENT & PLAN NOTE
Presenting with recurrent SBO  11/14 CT A/P: Small bowel obstruction with transition in the pelvis is again seen    Plan  - NPO  - NG tube to low continuous suction  - Insert PICC for TPN  - May require operative intervention

## 2024-11-15 NOTE — ASSESSMENT & PLAN NOTE
History of endometrial cancer pulmonary embolism DVT hypothyroidism insulin-dependent diabetes hypertension and recent admission for pSBO presents to the hospital with abdominal pain nausea vomiting and diarrhea again  Admitted to surgical service after finding of pSBO again  Supportive care with bowel rest and NG tube decompression and IV fluids

## 2024-11-15 NOTE — PLAN OF CARE
Problem: Potential for Falls  Goal: Patient will remain free of falls  Description: INTERVENTIONS:  - Educate patient/family on patient safety including physical limitations  - Instruct patient to call for assistance with activity   - Consult OT/PT to assist with strengthening/mobility   - Keep Call bell within reach  - Keep bed low and locked with side rails adjusted as appropriate  - Keep care items and personal belongings within reach  - Initiate and maintain comfort rounds  - Make Fall Risk Sign visible to staff  - Offer Toileting every 3 Hours, in advance of need  - Initiate/Maintain bed alarm  - Obtain necessary fall risk management equipment  - Apply yellow socks and bracelet for high fall risk patients  - Consider moving patient to room near nurses station  Outcome: Progressing     Problem: PAIN - ADULT  Goal: Verbalizes/displays adequate comfort level or baseline comfort level  Description: Interventions:  - Encourage patient to monitor pain and request assistance  - Assess pain using appropriate pain scale  - Administer analgesics based on type and severity of pain and evaluate response  - Implement non-pharmacological measures as appropriate and evaluate response  - Consider cultural and social influences on pain and pain management  - Notify physician/advanced practitioner if interventions unsuccessful or patient reports new pain  Outcome: Progressing     Problem: SAFETY ADULT  Goal: Patient will remain free of falls  Description: INTERVENTIONS:  - Educate patient/family on patient safety including physical limitations  - Instruct patient to call for assistance with activity   - Consult OT/PT to assist with strengthening/mobility   - Keep Call bell within reach  - Keep bed low and locked with side rails adjusted as appropriate  - Keep care items and personal belongings within reach  - Initiate and maintain comfort rounds  - Make Fall Risk Sign visible to staff  - Offer Toileting every 3 Hours, in  advance of need  - Initiate/Maintain bed alarm  - Obtain necessary fall risk management equipment  - Apply yellow socks and bracelet for high fall risk patients  - Consider moving patient to room near nurses station  Outcome: Progressing  Goal: Maintain or return to baseline ADL function  Description: INTERVENTIONS:  -  Assess patient's ability to carry out ADLs; assess patient's baseline for ADL function and identify physical deficits which impact ability to perform ADLs (bathing, care of mouth/teeth, toileting, grooming, dressing, etc.)  - Assess/evaluate cause of self-care deficits   - Assess range of motion  - Assess patient's mobility; develop plan if impaired  - Assess patient's need for assistive devices and provide as appropriate  - Encourage maximum independence but intervene and supervise when necessary  - Involve family in performance of ADLs  - Assess for home care needs following discharge   - Consider OT consult to assist with ADL evaluation and planning for discharge  - Provide patient education as appropriate  Outcome: Progressing  Goal: Maintains/Returns to pre admission functional level  Description: INTERVENTIONS:  - Perform AM-PAC 6 Click Basic Mobility/ Daily Activity assessment daily.  - Set and communicate daily mobility goal to care team and patient/family/caregiver.   - Collaborate with rehabilitation services on mobility goals if consulted  - Perform Range of Motion 3 times a day.  - Reposition patient every 3 hours.  - Dangle patient 3 times a day  - Stand patient 3 times a day  - Ambulate patient 3 times a day  - Out of bed to chair 3 times a day   - Out of bed for meals 3 times a day  - Out of bed for toileting  - Record patient progress and toleration of activity level   Outcome: Progressing     Problem: DISCHARGE PLANNING  Goal: Discharge to home or other facility with appropriate resources  Description: INTERVENTIONS:  - Identify barriers to discharge w/patient and caregiver  -  Arrange for needed discharge resources and transportation as appropriate  - Identify discharge learning needs (meds, wound care, etc.)  - Arrange for interpretive services to assist at discharge as needed  - Refer to Case Management Department for coordinating discharge planning if the patient needs post-hospital services based on physician/advanced practitioner order or complex needs related to functional status, cognitive ability, or social support system  Outcome: Progressing     Problem: GASTROINTESTINAL - ADULT  Goal: Minimal or absence of nausea and/or vomiting  Description: INTERVENTIONS:  - Administer IV fluids if ordered to ensure adequate hydration  - Maintain NPO status until nausea and vomiting are resolved  - Nasogastric tube if ordered  - Administer ordered antiemetic medications as needed  - Provide nonpharmacologic comfort measures as appropriate  - Advance diet as tolerated, if ordered  - Consider nutrition services referral to assist patient with adequate nutrition and appropriate food choices  Outcome: Progressing  Goal: Maintains or returns to baseline bowel function  Description: INTERVENTIONS:  - Assess bowel function  - Encourage oral fluids to ensure adequate hydration  - Administer IV fluids if ordered to ensure adequate hydration  - Administer ordered medications as needed  - Encourage mobilization and activity  - Consider nutritional services referral to assist patient with adequate nutrition and appropriate food choices  Outcome: Progressing  Goal: Maintains adequate nutritional intake  Description: INTERVENTIONS:  - Monitor percentage of each meal consumed  - Identify factors contributing to decreased intake, treat as appropriate  - Assist with meals as needed  - Monitor I&O, weight, and lab values if indicated  - Obtain nutrition services referral as needed  Outcome: Progressing  Goal: Establish and maintain optimal ostomy function  Description: INTERVENTIONS:  - Assess bowel  function  - Encourage oral fluids to ensure adequate hydration  - Administer IV fluids if ordered to ensure adequate hydration   - Administer ordered medications as needed  - Encourage mobilization and activity  - Nutrition services referral to assist patient with appropriate food choices  - Assess stoma site  - Consider wound care consult   Outcome: Progressing  Goal: Oral mucous membranes remain intact  Description: INTERVENTIONS  - Assess oral mucosa and hygiene practices  - Implement preventative oral hygiene regimen  - Implement oral medicated treatments as ordered  - Initiate Nutrition services referral as needed  Outcome: Progressing     Problem: Prexisting or High Potential for Compromised Skin Integrity  Goal: Skin integrity is maintained or improved  Description: INTERVENTIONS:  - Identify patients at risk for skin breakdown  - Assess and monitor skin integrity  - Assess and monitor nutrition and hydration status  - Monitor labs   - Assess for incontinence   - Turn and reposition patient  - Assist with mobility/ambulation  - Relieve pressure over bony prominences  - Avoid friction and shearing  - Provide appropriate hygiene as needed including keeping skin clean and dry  - Evaluate need for skin moisturizer/barrier cream  - Collaborate with interdisciplinary team   - Patient/family teaching  - Consider wound care consult   Outcome: Progressing

## 2024-11-15 NOTE — PROGRESS NOTES
Progress Note - Surgery-General   Name: Lety Botello 63 y.o. female I MRN: [id number]  Unit/Bed#: E5 -01 I Date of Admission: 11/14/2024   Date of Service: 11/15/2024 I Hospital Day: 1    Assessment & Plan  SBO (small bowel obstruction) (HCC)  Presenting with recurrent SBO  11/14 CT A/P: Small bowel obstruction with transition in the pelvis is again seen    Plan  - NPO  - NG tube to low continuous suction  - Insert PICC for TPN  - May require operative intervention    Gastroparesis  With associated abdominal pain nausea and vomiting  Abdominal pain  See above  Nausea & vomiting  See above  Type 2 diabetes mellitus (HCC)  Lab Results   Component Value Date    HGBA1C 6.9 (H) 11/05/2024       Recent Labs     11/14/24  1757 11/15/24  0001 11/15/24  0559 11/15/24  1119   POCGLU 127 139 146* 143*       Blood Sugar Average: Last 72 hrs:  (P) 135.6    Plan  -SSI  -Blood glucose goal 140-180  Hypertension    History of pulmonary embolism    Hyponatremia    Hypothyroidism    Mild intermittent asthma    Morbid obesity (HCC)      Subjective   Feels ill, with N/V. Not tolerating diet. Feels weak. Not tolerating diet.    Objective :  Temp:  [97.6 °F (36.4 °C)-98.8 °F (37.1 °C)] 98.8 °F (37.1 °C)  HR:  [] 80  BP: (107-138)/(62-77) 117/62  Resp:  [17-20] 20  SpO2:  [93 %-95 %] 95 %    I/O         11/13 0701  11/14 0700 11/14 0701  11/15 0700 11/15 0701 11/16 0700    I.V. (mL/kg)  1181.3 (11.4)     NG/GT   0    IV Piggyback  1000     Total Intake(mL/kg)  2181.3 (21) 0 (0)    Emesis/NG output  900 150    Total Output  900 150    Net  +1281.3 -150                 Lines/Drains/Airways       Active Status       Name Placement date Placement time Site Days    NG/OG/Enteral Tube Nasogastric 16 Fr Right nare 11/15/24  0357  Right nare  less than 1                  Physical Exam  Constitutional:       Appearance: Normal appearance.   HENT:      Head: Normocephalic and atraumatic.      Mouth/Throat:      Mouth: Mucous membranes  are moist.   Eyes:      Extraocular Movements: Extraocular movements intact.   Cardiovascular:      Rate and Rhythm: Normal rate and regular rhythm.   Pulmonary:      Effort: Pulmonary effort is normal.   Abdominal:      General: Abdomen is flat.      Palpations: Abdomen is soft.      Tenderness: There is abdominal tenderness.      Comments: suprapubic   Musculoskeletal:         General: Normal range of motion.      Cervical back: Normal range of motion and neck supple.   Skin:     General: Skin is warm and dry.   Neurological:      General: No focal deficit present.      Mental Status: She is alert and oriented to person, place, and time.

## 2024-11-15 NOTE — ASSESSMENT & PLAN NOTE
Related to bowel obstruction/n.p.o. status.  Continue IV fluids    Results from last 7 days   Lab Units 11/15/24  0837 11/14/24  2226 11/14/24  0821 11/12/24  0443   SODIUM mmol/L 130* 133* 132* 132*

## 2024-11-15 NOTE — PROGRESS NOTES
Progress Note - Hospitalist   Name: Lety Botello 63 y.o. female I MRN: [id number]  Unit/Bed#: E5 -01 I Date of Admission: 11/14/2024   Date of Service: 11/15/2024 I Hospital Day: 1    Assessment & Plan  SBO (small bowel obstruction) (HCC)  History of endometrial cancer pulmonary embolism DVT hypothyroidism insulin-dependent diabetes hypertension and recent admission for pSBO presents to the hospital with abdominal pain nausea vomiting and diarrhea again  Admitted to surgical service after finding of pSBO again  Supportive care with bowel rest and NG tube decompression and IV fluids  Hypertension  Prior to admission on carvedilol  While n.p.o. continue scheduled IV metoprolol 2.5 mg every 6 hours  Type 2 diabetes mellitus (HCC)  Lab Results   Component Value Date    HGBA1C 6.9 (H) 11/05/2024     Recent Labs     11/14/24  1757 11/15/24  0001 11/15/24  0559 11/15/24  1119   POCGLU 127 139 146* 143*     Previously on toujeo 60 units with lispro 8 units 3 times daily  Patient reports now only on sliding scale insulin and no longer on toujeo.    Monitor ordered every 6 hours.  History of pulmonary embolism  History of DVT/PE May 2024.  Prior to admission on Eliquis.  Continue heparin for prophylaxis while NPO  Hypothyroidism  Prior to admission levothyroxine.  Held while NPO  Mild intermittent asthma  Prior to admission on albuterol only as needed.  No exacerbations  Hyponatremia  Related to bowel obstruction/n.p.o. status.  Continue IV fluids    Results from last 7 days   Lab Units 11/15/24  0837 11/14/24  2226 11/14/24  0821 11/12/24  0443   SODIUM mmol/L 130* 133* 132* 132*     Morbid obesity (HCC)  Body mass index is 44.92 kg/m².    VTE Pharmacologic Prophylaxis:   Moderate Risk (Score 3-4) - Pharmacological DVT Prophylaxis Ordered: heparin.    Mobility:   Basic Mobility Inpatient Raw Score: 18  JH-HLM Goal: 6: Walk 10 steps or more  JH-HLM Achieved: 2: Bed activities/Dependent transfer  JH-HLM Goal NOT  achieved. Continue with multidisciplinary rounding and encourage appropriate mobility to improve upon -[facility] goals.    Patient Centered Rounds: I have performed bedside rounds with nursing staff today.  Discussions with Specialists or Other Care Team Provider:     Education and Discussions with Family / Patient:     Current Length of Stay: 1 day(s)  Current Patient Status: Inpatient   Certification Statement:   Discharge Plan: [facility] is following this patient on consult. They are not yet medically stable for discharge secondary to NG tube/SBO.    Code Status: Level 1 - Full Code    Subjective   Patient seen and examined.  No new complaints.  Much output from NG tube.    Objective   Vitals:   Temp (24hrs), Av.1 °F (36.7 °C), Min:97.6 °F (36.4 °C), Max:98.8 °F (37.1 °C)    Temp:  [97.6 °F (36.4 °C)-98.8 °F (37.1 °C)] 98.8 °F (37.1 °C)  HR:  [] 84  Resp:  [16-20] 20  BP: (107-138)/(51-77) 135/70  SpO2:  [93 %-97 %] 93 %  Body mass index is 44.92 kg/m².     Input and Output Summary (last 24 hours):     Intake/Output Summary (Last 24 hours) at 11/15/2024 1414  Last data filed at 11/15/2024 1000  Gross per 24 hour   Intake 1181.25 ml   Output 1050 ml   Net 131.25 ml       Physical Exam  Vitals reviewed.   Constitutional:       General: She is not in acute distress.     Appearance: Normal appearance. She is obese.   HENT:      Head: Atraumatic.   Cardiovascular:      Rate and Rhythm: Regular rhythm.   Pulmonary:      Breath sounds: Decreased breath sounds present. No wheezing.   Abdominal:      General: Bowel sounds are normal.      Palpations: Abdomen is soft.      Tenderness: There is no guarding.   Musculoskeletal:         General: No swelling.   Skin:     General: Skin is warm.   Neurological:      General: No focal deficit present.      Mental Status: She is alert.      Motor: No weakness.   Psychiatric:         Mood and Affect: Affect is flat.       Lines/Drains:  Invasive Devices       Peripheral Intravenous  Line  Duration             Peripheral IV 11/14/24 Left;Ventral (anterior) Forearm 1 day    Peripheral IV 11/14/24 Right;Ventral (anterior) Forearm 1 day              Drain  Duration             NG/OG/Enteral Tube Nasogastric 16 Fr Right nare <1 day                            Lab Results: I have reviewed the following results:   Results from last 7 days   Lab Units 11/15/24  0837 11/14/24  0854 11/14/24  0821 11/12/24  0443   WBC Thousand/uL 7.88  --  9.43 5.69   HEMOGLOBIN g/dL 9.9*  --  11.5 9.7*   PLATELETS Thousands/uL 85*  --  100* 100*   MCV fL 94  --  93 95   INR   --  1.18  --   --      Results from last 7 days   Lab Units 11/15/24  0837 11/14/24 2226 11/14/24 0821   SODIUM mmol/L 130* 133* 132*   POTASSIUM mmol/L 3.9 3.9 4.1   CHLORIDE mmol/L 99 101 101   CO2 mmol/L 25 23 24   ANION GAP mmol/L 6 9 7   BUN mg/dL 15 13 12   CREATININE mg/dL 0.70 0.62 0.59*   CALCIUM mg/dL 8.3* 7.9* 8.4   ALBUMIN g/dL  --   --  3.2*   TOTAL BILIRUBIN mg/dL  --   --  0.51   ALK PHOS U/L  --   --  86   ALT U/L  --   --  8   AST U/L  --   --  8*   EGFR ml/min/1.73sq m 93 97 99   GLUCOSE RANDOM mg/dL 144* 139 165*     Results from last 7 days   Lab Units 11/15/24  0837 11/14/24  2226 11/12/24  0443 11/11/24  0439 11/10/24  0453   MAGNESIUM mg/dL 2.4 1.7* 1.7* 2.0 2.0   PHOSPHORUS mg/dL 3.7 3.3 2.5 1.8*  --                   Results from last 7 days   Lab Units 11/14/24 0821   LACTIC ACID mmol/L 1.5     Results from last 7 days   Lab Units 11/15/24  1119 11/15/24  0559 11/15/24  0001 11/14/24  1757 11/14/24  1621 11/12/24  0743 11/11/24 2123 11/11/24  1620 11/11/24  1104 11/11/24  0704 11/10/24  2107 11/10/24  1628   POC GLUCOSE mg/dl 143* 146* 139 127 123 167* 124 142* 168* 174* 148* 182*               Recent Cultures (last 7 days):         Imaging:  Reviewed radiology reports from this admission including:  XR chest portable  Result Date: 11/15/2024  Impression: Distal aspect of NG tube is coursing below the left  hemidiaphragm. Workstation performed: [id number]     CT abdomen pelvis with contrast  Result Date: 11/14/2024  Impression: Small bowel obstruction with transition in the pelvis is again seen. New small volume of ascites Other stable findings as above. Workstation performed: [id number]       Last 24 Hours Medication List:     Current Facility-Administered Medications:     heparin (porcine) subcutaneous injection 5,000 Units, Q8H SAM **AND** [CANCELED] Platelet count, Once    HYDROmorphone (DILAUDID) injection 0.5 mg, Q3H PRN    HYDROmorphone HCl (DILAUDID) injection 0.2 mg, Q3H PRN    insulin lispro (HumALOG/ADMELOG) 100 units/mL subcutaneous injection 1-6 Units, Q6H SAM **AND** Fingerstick Glucose (POCT), Q6H    metoprolol (LOPRESSOR) injection 2.5 mg, Q6H    multi-electrolyte (PLASMALYTE-A/ISOLYTE-S PH 7.4) IV solution, Continuous, Last Rate: 125 mL/hr (11/15/24 0937)    ondansetron (ZOFRAN) injection 4 mg, Q6H PRN    phenol (CHLORASEPTIC) 1.4 % mucosal liquid 1 spray, Q2H PRN    trimethobenzamide (TIGAN) IM injection 200 mg, Q6H PRN    Administrative Statements   Today, Patient Was Seen By: Obinna Wilson, DO  I have spent a total time of 35 minutes in caring for this patient on the day of the visit/encounter including Counseling / Coordination of care, Documenting in the medical record, Reviewing / ordering tests, medicine, procedures  , and Obtaining or reviewing history  .    **Please Note: This note may have been constructed using a voice recognition system.**

## 2024-11-15 NOTE — NURSING NOTE
NG Tube pulled back 4cm from 60cm at the nare, as requested by provider. Now marking 56 cm at the right nare. Patient offered no complaints of N/V or pain.

## 2024-11-16 ENCOUNTER — APPOINTMENT (INPATIENT)
Dept: RADIOLOGY | Facility: HOSPITAL | Age: 64
DRG: 230 | End: 2024-11-16
Payer: COMMERCIAL

## 2024-11-16 PROBLEM — G47.00 INSOMNIA: Status: ACTIVE | Noted: 2024-11-16

## 2024-11-16 LAB
ALBUMIN SERPL BCG-MCNC: 2.7 G/DL (ref 3.5–5)
ALP SERPL-CCNC: 75 U/L (ref 34–104)
ALT SERPL W P-5'-P-CCNC: 9 U/L (ref 7–52)
ANION GAP SERPL CALCULATED.3IONS-SCNC: 5 MMOL/L (ref 4–13)
AST SERPL W P-5'-P-CCNC: 11 U/L (ref 13–39)
BILIRUB SERPL-MCNC: 0.42 MG/DL (ref 0.2–1)
BUN SERPL-MCNC: 17 MG/DL (ref 5–25)
CALCIUM ALBUM COR SERPL-MCNC: 9 MG/DL (ref 8.3–10.1)
CALCIUM SERPL-MCNC: 8 MG/DL (ref 8.4–10.2)
CHLORIDE SERPL-SCNC: 100 MMOL/L (ref 96–108)
CO2 SERPL-SCNC: 30 MMOL/L (ref 21–32)
CREAT SERPL-MCNC: 0.85 MG/DL (ref 0.6–1.3)
ERYTHROCYTE [DISTWIDTH] IN BLOOD BY AUTOMATED COUNT: 16.3 % (ref 11.6–15.1)
GFR SERPL CREATININE-BSD FRML MDRD: 74 ML/MIN/1.73SQ M
GLUCOSE SERPL-MCNC: 107 MG/DL (ref 65–140)
GLUCOSE SERPL-MCNC: 115 MG/DL (ref 65–140)
GLUCOSE SERPL-MCNC: 115 MG/DL (ref 65–140)
GLUCOSE SERPL-MCNC: 126 MG/DL (ref 65–140)
GLUCOSE SERPL-MCNC: 126 MG/DL (ref 65–140)
GLUCOSE SERPL-MCNC: 99 MG/DL (ref 65–140)
HCT VFR BLD AUTO: 29 % (ref 34.8–46.1)
HGB BLD-MCNC: 9.3 G/DL (ref 11.5–15.4)
MCH RBC QN AUTO: 30.9 PG (ref 26.8–34.3)
MCHC RBC AUTO-ENTMCNC: 32.1 G/DL (ref 31.4–37.4)
MCV RBC AUTO: 96 FL (ref 82–98)
PLATELET # BLD AUTO: 84 THOUSANDS/UL (ref 149–390)
PMV BLD AUTO: 9.9 FL (ref 8.9–12.7)
POTASSIUM SERPL-SCNC: 4 MMOL/L (ref 3.5–5.3)
PROT SERPL-MCNC: 4.7 G/DL (ref 6.4–8.4)
RBC # BLD AUTO: 3.01 MILLION/UL (ref 3.81–5.12)
SODIUM SERPL-SCNC: 135 MMOL/L (ref 135–147)
WBC # BLD AUTO: 6.38 THOUSAND/UL (ref 4.31–10.16)

## 2024-11-16 PROCEDURE — 74018 RADEX ABDOMEN 1 VIEW: CPT

## 2024-11-16 PROCEDURE — 80053 COMPREHEN METABOLIC PANEL: CPT | Performed by: INTERNAL MEDICINE

## 2024-11-16 PROCEDURE — 97166 OT EVAL MOD COMPLEX 45 MIN: CPT

## 2024-11-16 PROCEDURE — 82948 REAGENT STRIP/BLOOD GLUCOSE: CPT

## 2024-11-16 PROCEDURE — 97163 PT EVAL HIGH COMPLEX 45 MIN: CPT

## 2024-11-16 PROCEDURE — 85027 COMPLETE CBC AUTOMATED: CPT | Performed by: INTERNAL MEDICINE

## 2024-11-16 PROCEDURE — 99232 SBSQ HOSP IP/OBS MODERATE 35: CPT | Performed by: SURGERY

## 2024-11-16 PROCEDURE — 99232 SBSQ HOSP IP/OBS MODERATE 35: CPT | Performed by: INTERNAL MEDICINE

## 2024-11-16 RX ORDER — LORAZEPAM 2 MG/ML
1 INJECTION INTRAMUSCULAR
Status: DISPENSED | OUTPATIENT
Start: 2024-11-16 | End: 2024-11-19

## 2024-11-16 RX ADMIN — HYDROMORPHONE HYDROCHLORIDE 0.5 MG: 1 INJECTION, SOLUTION INTRAMUSCULAR; INTRAVENOUS; SUBCUTANEOUS at 12:22

## 2024-11-16 RX ADMIN — Medication 1 SPRAY: at 16:23

## 2024-11-16 RX ADMIN — SODIUM CHLORIDE, SODIUM GLUCONATE, SODIUM ACETATE, POTASSIUM CHLORIDE, MAGNESIUM CHLORIDE, SODIUM PHOSPHATE, DIBASIC, AND POTASSIUM PHOSPHATE 125 ML/HR: .53; .5; .37; .037; .03; .012; .00082 INJECTION, SOLUTION INTRAVENOUS at 01:08

## 2024-11-16 RX ADMIN — ONDANSETRON 4 MG: 2 INJECTION INTRAMUSCULAR; INTRAVENOUS at 03:28

## 2024-11-16 RX ADMIN — HYDROMORPHONE HYDROCHLORIDE 0.2 MG: 0.2 INJECTION, SOLUTION INTRAMUSCULAR; INTRAVENOUS; SUBCUTANEOUS at 19:51

## 2024-11-16 RX ADMIN — METOROPROLOL TARTRATE 2.5 MG: 5 INJECTION, SOLUTION INTRAVENOUS at 16:23

## 2024-11-16 RX ADMIN — HEPARIN SODIUM 5000 UNITS: 5000 INJECTION INTRAVENOUS; SUBCUTANEOUS at 05:05

## 2024-11-16 RX ADMIN — METOROPROLOL TARTRATE 2.5 MG: 5 INJECTION, SOLUTION INTRAVENOUS at 10:27

## 2024-11-16 RX ADMIN — LORAZEPAM 1 MG: 2 INJECTION INTRAMUSCULAR; INTRAVENOUS at 21:14

## 2024-11-16 RX ADMIN — METOROPROLOL TARTRATE 2.5 MG: 5 INJECTION, SOLUTION INTRAVENOUS at 21:14

## 2024-11-16 RX ADMIN — HYDROMORPHONE HYDROCHLORIDE 0.2 MG: 0.2 INJECTION, SOLUTION INTRAMUSCULAR; INTRAVENOUS; SUBCUTANEOUS at 01:11

## 2024-11-16 RX ADMIN — METOROPROLOL TARTRATE 2.5 MG: 5 INJECTION, SOLUTION INTRAVENOUS at 05:04

## 2024-11-16 RX ADMIN — HYDROMORPHONE HYDROCHLORIDE 0.2 MG: 0.2 INJECTION, SOLUTION INTRAMUSCULAR; INTRAVENOUS; SUBCUTANEOUS at 16:23

## 2024-11-16 RX ADMIN — Medication 1 SPRAY: at 03:21

## 2024-11-16 RX ADMIN — SODIUM CHLORIDE, SODIUM GLUCONATE, SODIUM ACETATE, POTASSIUM CHLORIDE, MAGNESIUM CHLORIDE, SODIUM PHOSPHATE, DIBASIC, AND POTASSIUM PHOSPHATE 125 ML/HR: .53; .5; .37; .037; .03; .012; .00082 INJECTION, SOLUTION INTRAVENOUS at 09:29

## 2024-11-16 RX ADMIN — HEPARIN SODIUM 5000 UNITS: 5000 INJECTION INTRAVENOUS; SUBCUTANEOUS at 14:21

## 2024-11-16 RX ADMIN — HEPARIN SODIUM 5000 UNITS: 5000 INJECTION INTRAVENOUS; SUBCUTANEOUS at 21:14

## 2024-11-16 RX ADMIN — HYDROMORPHONE HYDROCHLORIDE 0.5 MG: 1 INJECTION, SOLUTION INTRAMUSCULAR; INTRAVENOUS; SUBCUTANEOUS at 03:20

## 2024-11-16 RX ADMIN — HYDROMORPHONE HYDROCHLORIDE 0.2 MG: 0.2 INJECTION, SOLUTION INTRAMUSCULAR; INTRAVENOUS; SUBCUTANEOUS at 06:50

## 2024-11-16 RX ADMIN — Medication 1 SPRAY: at 12:11

## 2024-11-16 RX ADMIN — HYDROMORPHONE HYDROCHLORIDE 0.5 MG: 1 INJECTION, SOLUTION INTRAMUSCULAR; INTRAVENOUS; SUBCUTANEOUS at 09:26

## 2024-11-16 NOTE — ASSESSMENT & PLAN NOTE
Prior to admission on carvedilol  Stable: While n.p.o. continue scheduled IV metoprolol 2.5 mg every 6 hours

## 2024-11-16 NOTE — PLAN OF CARE
Problem: PHYSICAL THERAPY ADULT  Goal: Performs mobility at highest level of function for planned discharge setting.  See evaluation for individualized goals.  Description: Treatment/Interventions: Functional transfer training, LE strengthening/ROM, Elevations, Therapeutic exercise, Endurance training, Patient/family training, Equipment eval/education, Bed mobility, Gait training, Continued evaluation, Spoke to nursing, OT  Equipment Recommended: Walker       See flowsheet documentation for full assessment, interventions and recommendations.  Note: Prognosis: Fair  Problem List: Decreased strength, Decreased endurance, Impaired balance, Decreased mobility, Decreased safety awareness, Obesity, Decreased skin integrity, Pain  Assessment: Pt is a 62 yo female [facility] 2* bowel obstruction, vomitting, ABD pain. Pt lives with x2 roommates in multilevel style home, (+)VIRA,owns RW,reports being (I) PTA and reports no recent falls. pt currently is not at functional mobility baseline, needs A for mobility with use of RW, ataxic and unsteady gait and movement patterns, pain, multiple lines and masimo monitoring. Pt demonstrates limited mobility and gait including dec endurance, dec balance, dec BLE strength, ataxic and unsteady gait and movement patterns, pain and needs S for GT with use of RW and TT. Pt would cont to benefit from skilled inpt PT services to maximize functional independence and to dec careigver burden upon being DC from the hospital.  Barriers to Discharge: Inaccessible home environment (multilevel style home and (+)VIRA)     Rehab Resource Intensity Level, PT: III (Minimum Resource Intensity)    See flowsheet documentation for full assessment.

## 2024-11-16 NOTE — ASSESSMENT & PLAN NOTE
Related to bowel obstruction/n.p.o. status.  Resolved IV fluids    Results from last 7 days   Lab Units 11/16/24  0547 11/15/24  0837 11/14/24  2226 11/14/24  0821   SODIUM mmol/L 135 130* 133* 132*

## 2024-11-16 NOTE — ASSESSMENT & PLAN NOTE
Lab Results   Component Value Date    HGBA1C 6.9 (H) 11/05/2024     Recent Labs     11/15/24  1815 11/16/24  0004 11/16/24  0546 11/16/24  1217   POCGLU 108 115 126 115     Previously on toujeo 60 units with lispro 8 units 3 times daily  Patient reports now only on sliding scale insulin and no longer on toujeo.    Monitor every 6 hours.

## 2024-11-16 NOTE — PHYSICAL THERAPY NOTE
Physical Therapy Evaluation:    2 forms of pt ID verified:name,birthdate and pt ID tripp    Patient's Name: Lety Botello    Admitting Diagnosis  Bowel obstruction (HCC) [K56.609]  Vomiting [R11.10]  Abdominal pain [R10.9]  Type 2 diabetes mellitus with other specified complication, with long-term current use of insulin (HCC) [E11.69, Z79.4]    Problem List  Patient Active Problem List   Diagnosis    SBO (small bowel obstruction) (HCC)    Hypertension    Type 2 diabetes mellitus (HCC)    Endometrial cancer (HCC)    Obesity    History of pulmonary embolism    Hyponatremia    Hypothyroidism    Mild intermittent asthma    Gastroparesis    Gastric dilation    Morbid obesity (HCC)    Constipation    Abdominal pain    Nausea & vomiting       Past Medical History  Past Medical History:   Diagnosis Date    Diverticulitis     Endometrial cancer (HCC)     History of shingles     Right hemiabdomen (inactive)    Hypertension     IBS (irritable bowel syndrome)     Obesity     Small bowel obstruction (HCC)     Type 2 diabetes mellitus (HCC)        Past Surgical History  Past Surgical History:   Procedure Laterality Date    APPENDECTOMY      CHOLECYSTECTOMY      SIGMOIDECTOMY N/A         11/16/24 1122   PT Last Visit   PT Visit Date 11/16/24   Note Type   Note type Evaluation   Pain Assessment   Pain Assessment Tool FLACC   Pain Rating: FLACC (Rest) - Face 0   Pain Rating: FLACC (Rest) - Legs 0   Pain Rating: FLACC (Rest) - Activity 0   Pain Rating: FLACC (Rest) - Cry 0   Pain Rating: FLACC (Rest) - Consolability 0   Score: FLACC (Rest) 0   Pain Rating: FLACC (Activity) - Face 1   Pain Rating: FLACC (Activity) - Legs 0   Pain Rating: FLACC (Activity) - Activity 1   Pain Rating: FLACC (Activity) - Cry 0   Pain Rating: FLACC (Activity) - Consolability 0   Score: FLACC (Activity) 2   Restrictions/Precautions   Other Precautions Multiple lines;Fall Risk;Impulsive;Pain  (NGT)   Home Living   Type of Home House   Home Layout  "Multi-level;Bed/bath upstairs;Stairs to enter with rails  (6 VIRA with B HR)   Home Equipment Walker   Additional Comments Pt lives with roommates in multilevel style home, (+)VIRA, reports no recent falls   Prior Function   Level of [place] Independent with ADLs;Independent with functional mobility;Independent with IADLS   Lives With Other (Comment)  (x2 roommates)   Receives Help From Friend(s)   IADLs Independent with driving;Independent with meal prep;Independent with medication management   Falls in the last 6 months 0   Vocational On disability   General   Additional Pertinent History bowel obstruction, vomitting, ABD pain, DM2, long term use of insulin   Family/Caregiver Present No   Cognition   Overall Cognitive Status WFL   Arousal/Participation Cooperative   Attention Within functional limits   Orientation Level Oriented X4   Following Commands Follows one step commands without difficulty   Subjective   Subjective Pt sitting in recliner chair resting comfortably; pt willing and agreeable to work with PT and to participate in therapy intervention; \"I'm just sick of all of this\". Tearful at times   RLE Assessment   RLE Assessment   (at least 4/5 grossly throughout)   LLE Assessment   LLE Assessment   (at least 4/5 grossly throughout)   Coordination   Sensation WFL   Light Touch   RLE Light Touch Grossly intact   LLE Light Touch Grossly intact   Bed Mobility   Supine to Sit Unable to assess  (pt in static sit pre and post mobility)   Transfers   Sit to Stand 5  Supervision   Additional items Assist x 1;Armrests;Verbal cues;Impulsive   Stand to Sit 5  Supervision   Additional items Assist x 1;Armrests;Impulsive;Verbal cues   Ambulation/Elevation   Gait pattern Poor UE support;Narrow DA;Forward Flexion;Foward flexed;Short stride   Gait Assistance 5  Supervision   Additional items Assist x 1;Verbal cues   Assistive Device Rolling walker   Distance 60 feet with use of RW on tile and hardwood emigdio; no LOB " noted and/or observed during upright mobility; pt requesting to return to room following first 30 feet of mobility   Balance   Static Sitting Good   Dynamic Sitting Fair   Static Standing Fair   Dynamic Standing Fair   Ambulatory Fair   Endurance Deficit   Endurance Deficit Yes   Endurance Deficit Description pain, dec activity tolerance,pt reports SOB and difficulty breathing following first 40 feet of mobility->SPo2 94% on RA   Activity Tolerance   Activity Tolerance Patient limited by fatigue;Patient limited by pain  (fair)   Medical Staff Made Aware OTR Kena   Nurse Made Aware yes   Assessment   Prognosis Fair   Problem List Decreased strength;Decreased endurance;Impaired balance;Decreased mobility;Decreased safety awareness;Obesity;Decreased skin integrity;Pain   Assessment Pt is a 64 yo female admitted to [facility] 2* bowel obstruction, vomitting, ABD pain. Pt lives with x2 roommates in multilevel style home, (+)VIRA,owns RW,reports being (I) PTA and reports no recent falls. pt currently is not at functional mobility baseline, needs A for mobility with use of RW, ataxic and unsteady gait and movement patterns, pain, multiple lines and masimo monitoring. Pt demonstrates limited mobility and gait including dec endurance, dec balance, dec BLE strength, ataxic and unsteady gait and movement patterns, pain and needs S for GT with use of RW and TT. Pt would cont to benefit from skilled inpt PT services to maximize functional independence and to dec careigver burden upon being DC from the hospital.   Barriers to Discharge Inaccessible home environment  (multilevel style home and (+)VIRA)   Goals   Patient Goals to feel better   [facility] Expiration Date 11/26/24   Short Term Goal #1 in 7-10 days: (1) Pt will be able to ambulate greater than 150 feet  with use of RW on various surfaces needing S to mod (I) level of A in order to A pt to return to OF, (2) activity tolerance:45 mins/45mins, (3) pt will be able to perform sit to  stand transfers needing mod (I) level of A to and from various surfaces consistently in order to return to PLOF, (4) pt will be able to perform BM needing mod (I) level of A to A pt to return to PLOF, (5) (I) with BLE therapeutic ex HEP in various positions to A pt to inc balance,strength,mobility,endurance and to A to dec pain, (6) inc balance 1/2 grade in order to dec fall risk, (7) pt will be able to go up and down 1 FF of steps needing S level of A in order to navigate VIRA multilevel home as able and as needed prior to D/C, (8) cont to provide pt and pt family education for safe D/C planning, (9) inc BLE strength 1/2 to 1 full grade in order to A pt to inc balance,strength,mobility,endurance and to A to dec pain   PT Treatment Day 0   Plan   Treatment/Interventions Functional transfer training;LE strengthening/ROM;Elevations;Therapeutic exercise;Endurance training;Patient/family training;Equipment eval/education;Bed mobility;Gait training;Continued evaluation;Spoke to nursing;OT   PT Frequency 3-5x/wk   Discharge Recommendation   Rehab Resource Intensity Level, PT III (Minimum Resource Intensity)   Equipment Recommended Walker   AM-PAC Basic Mobility Inpatient   Turning in Flat Bed Without Bedrails 3   Lying on Back to Sitting on Edge of Flat Bed Without Bedrails 3   Moving Bed to Chair 3   Standing Up From Chair Using Arms 3   Walk in Room 3   Climb 3-5 Stairs With Railing 2   Basic Mobility Inpatient Raw Score 17   Basic Mobility Standardized Score 39.67   [facility] Highest Level Of Mobility   -HLM Goal 5: Stand one or more mins   -HL Achieved 7: Walk 25 feet or more           @Bryanna Manuel, PT, DPT@

## 2024-11-16 NOTE — ASSESSMENT & PLAN NOTE
History of endometrial cancer pulmonary embolism DVT hypothyroidism insulin-dependent diabetes hypertension and recent admission for pSBO presents to the hospital with abdominal pain nausea vomiting and diarrhea again  Admitted to surgical service after finding of pSBO again  Supportive care with bowel rest and NG tube decompression and IV fluids  Surgery planning for PICC line/TPN.  Will need to monitor glucose closely

## 2024-11-16 NOTE — PROGRESS NOTES
Progress Note - Hospitalist   Name: Lety Botello 63 y.o. female I MRN: [id number]  Unit/Bed#: E5 -01 I Date of Admission: 11/14/2024   Date of Service: 11/16/2024 I Hospital Day: 2    Assessment & Plan  SBO (small bowel obstruction) (Formerly [facility])  History of endometrial cancer pulmonary embolism DVT hypothyroidism insulin-dependent diabetes hypertension and recent admission for pSBO presents to the hospital with abdominal pain nausea vomiting and diarrhea again  Admitted to surgical service after finding of pSBO again  Supportive care with bowel rest and NG tube decompression and IV fluids  Surgery planning for PICC line/TPN.  Will need to monitor glucose closely  Hypertension  Prior to admission on carvedilol  Stable: While n.p.o. continue scheduled IV metoprolol 2.5 mg every 6 hours  Type 2 diabetes mellitus (Formerly [facility])  Lab Results   Component Value Date    HGBA1C 6.9 (H) 11/05/2024     Recent Labs     11/15/24  1815 11/16/24  0004 11/16/24  0546 11/16/24  1217   POCGLU 108 115 126 115     Previously on toujeo 60 units with lispro 8 units 3 times daily  Patient reports now only on sliding scale insulin and no longer on toujeo.    Monitor every 6 hours.  History of pulmonary embolism  History of DVT/PE May 2024.  Prior to admission on Eliquis.  Continue heparin for prophylaxis while NPO  Hypothyroidism  Prior to admission levothyroxine.  Held while NPO  Mild intermittent asthma  Prior to admission on albuterol only as needed.  No exacerbations  Hyponatremia  Related to bowel obstruction/n.p.o. status.  Resolved IV fluids    Results from last 7 days   Lab Units 11/16/24  0547 11/15/24  0837 11/14/24  2226 11/14/24  0821   SODIUM mmol/L 135 130* 133* 132*     Morbid obesity (HCC)  Body mass index is 44.92 kg/m².  Gastroparesis    Abdominal pain    Nausea & vomiting      VTE Pharmacologic Prophylaxis:   Moderate Risk (Score 3-4) - Pharmacological DVT Prophylaxis Ordered: heparin.    Mobility:   Basic Mobility Inpatient Raw  Score: 17  JH-HLM Goal: 5: Stand one or more mins  JH-HLM Achieved: 7: Walk 25 feet or more  JH-HLM Goal achieved. Continue to encourage appropriate mobility.    Patient Centered Rounds: I have performed bedside rounds with nursing staff today.  Discussions with Specialists or Other Care Team Provider: Case management    Education and Discussions with Family / Patient:     Current Length of Stay: 2 day(s)  Current Patient Status: Inpatient   Certification Statement: The patient will continue to require additional inpatient hospital stay due to NG tube decompression  Discharge Plan:     Code Status: Level 1 - Full Code    Subjective   Patient seen and examined.  Walking halls with therapy.    Objective   Vitals:   Temp (24hrs), Av.6 °F (36.4 °C), Min:97.5 °F (36.4 °C), Max:97.7 °F (36.5 °C)    Temp:  [97.5 °F (36.4 °C)-97.7 °F (36.5 °C)] 97.5 °F (36.4 °C)  HR:  [76-78] 78  Resp:  [19] 19  BP: (125-154)/(56-68) 148/62  SpO2:  [92 %-93 %] 92 %  Body mass index is 44.92 kg/m².     Input and Output Summary (last 24 hours):     Intake/Output Summary (Last 24 hours) at 2024 1525  Last data filed at 2024 0929  Gross per 24 hour   Intake 1000 ml   Output 1350 ml   Net -350 ml       Physical Exam  Vitals reviewed.   Constitutional:       General: She is not in acute distress.     Appearance: Normal appearance.   HENT:      Head: Atraumatic.   Cardiovascular:      Rate and Rhythm: Regular rhythm.      Heart sounds: Normal heart sounds.   Pulmonary:      Breath sounds: Decreased breath sounds present. No wheezing.   Abdominal:      General: Bowel sounds are normal.      Palpations: Abdomen is soft.      Tenderness: There is abdominal tenderness. There is no guarding.   Musculoskeletal:         General: No swelling.   Skin:     General: Skin is warm.   Neurological:      General: No focal deficit present.      Mental Status: She is alert.      Motor: No weakness.   Psychiatric:         Mood and Affect: Mood  normal.       Lines/Drains:  Invasive Devices       Peripheral Intravenous Line  Duration             Peripheral IV 11/14/24 Left;Ventral (anterior) Forearm 2 days    Peripheral IV 11/14/24 Right;Ventral (anterior) Forearm 2 days              Drain  Duration             NG/OG/Enteral Tube Nasogastric 16 Fr Right nare 1 day                            Lab Results: I have reviewed the following results:   Results from last 7 days   Lab Units 11/16/24  0547 11/15/24  0837 11/14/24  0854 11/14/24  0821   WBC Thousand/uL 6.38 7.88  --  9.43   HEMOGLOBIN g/dL 9.3* 9.9*  --  11.5   PLATELETS Thousands/uL 84* 85*  --  100*   MCV fL 96 94  --  93   INR   --   --  1.18  --      Results from last 7 days   Lab Units 11/16/24  0547 11/15/24  0837 11/14/24 2226 11/14/24  0821   SODIUM mmol/L 135 130* 133* 132*   POTASSIUM mmol/L 4.0 3.9 3.9 4.1   CHLORIDE mmol/L 100 99 101 101   CO2 mmol/L 30 25 23 24   ANION GAP mmol/L 5 6 9 7   BUN mg/dL 17 15 13 12   CREATININE mg/dL 0.85 0.70 0.62 0.59*   CALCIUM mg/dL 8.0* 8.3* 7.9* 8.4   ALBUMIN g/dL 2.7*  --   --  3.2*   TOTAL BILIRUBIN mg/dL 0.42  --   --  0.51   ALK PHOS U/L 75  --   --  86   ALT U/L 9  --   --  8   AST U/L 11*  --   --  8*   EGFR ml/min/1.73sq m 74 93 97 99   GLUCOSE RANDOM mg/dL 126 144* 139 165*     Results from last 7 days   Lab Units 11/15/24  0837 11/14/24  2226 11/12/24  0443 11/11/24  0439 11/10/24  0453   MAGNESIUM mg/dL 2.4 1.7* 1.7* 2.0 2.0   PHOSPHORUS mg/dL 3.7 3.3 2.5 1.8*  --                   Results from last 7 days   Lab Units 11/14/24  0821   LACTIC ACID mmol/L 1.5     Results from last 7 days   Lab Units 11/16/24  1217 11/16/24  0546 11/16/24  0004 11/15/24  1815 11/15/24  1119 11/15/24  0559 11/15/24  0001 11/14/24  1757 11/14/24  1621 11/12/24  0743 11/11/24  2123 11/11/24  1620   POC GLUCOSE mg/dl 115 126 115 108 143* 146* 139 127 123 167* 124 142*               Recent Cultures (last 7 days):         Imaging:  Reviewed radiology reports from this  admission including:  XR abdomen 1 view kub  Result Date: 11/15/2024  Impression: Technically limited study, as discussed above. No dilated loops of bowel seen, however note fluid-filled loops of bowel as were seen on the 11/14/2024 CT may not be apparent with supine radiographs. Small amount of hyperdense material seen in the loop of presumably ascending colon. This may represent some administered oral contrast, although no oral contrast is seen elsewhere in the gastrointestinal tract, or possibly vicarious excretion of the IV contrast administered on 11/14/2024. Excreted IV contrast from the 11/14/2024 CT seen within the right collecting system, right ureter and the urinary bladder, indicating delayed renal clearance. Mild right-sided hydronephrosis. Workstation performed: [id number]     XR chest portable  Result Date: 11/15/2024  Impression: Distal aspect of NG tube is coursing below the left hemidiaphragm. Workstation performed: [id number]     CT abdomen pelvis with contrast  Result Date: 11/14/2024  Impression: Small bowel obstruction with transition in the pelvis is again seen. New small volume of ascites Other stable findings as above. Workstation performed: [id number]       Last 24 Hours Medication List:     Current Facility-Administered Medications:     heparin (porcine) subcutaneous injection 5,000 Units, Q8H SAM **AND** [CANCELED] Platelet count, Once    HYDROmorphone (DILAUDID) injection 0.5 mg, Q3H PRN    HYDROmorphone HCl (DILAUDID) injection 0.2 mg, Q3H PRN    insulin lispro (HumALOG/ADMELOG) 100 units/mL subcutaneous injection 1-6 Units, Q6H SAM **AND** Fingerstick Glucose (POCT), Q6H    metoprolol (LOPRESSOR) injection 2.5 mg, Q6H    multi-electrolyte (PLASMALYTE-A/ISOLYTE-S PH 7.4) IV solution, Continuous, Last Rate: 125 mL/hr (11/16/24 6278)    ondansetron (ZOFRAN) injection 4 mg, Q6H PRN    phenol (CHLORASEPTIC) 1.4 % mucosal liquid 1 spray, Q2H PRN    trimethobenzamide (TIGAN) IM injection 200  mg, Q6H PRN    Administrative Statements   Today, Patient Was Seen By: Obinna Wilson, DO  I have spent a total time of 35 minutes in caring for this patient on the day of the visit/encounter including Diagnostic results, Instructions for management, Documenting in the medical record, and Communicating with other healthcare professionals .    **Please Note: This note may have been constructed using a voice recognition system.**

## 2024-11-16 NOTE — OCCUPATIONAL THERAPY NOTE
Occupational Therapy Evaluation     Patient Name: Lety Botello  Today's Date: 11/16/2024  Problem List  Principal Problem:    SBO (small bowel obstruction) (HCC)  Active Problems:    Hypertension    Type 2 diabetes mellitus (HCC)    History of pulmonary embolism    Hyponatremia    Hypothyroidism    Mild intermittent asthma    Gastroparesis    Morbid obesity (HCC)    Abdominal pain    Nausea & vomiting    Past Medical History  Past Medical History:   Diagnosis Date    Diverticulitis     Endometrial cancer (HCC)     History of shingles     Right hemiabdomen (inactive)    Hypertension     IBS (irritable bowel syndrome)     Obesity     Small bowel obstruction (HCC)     Type 2 diabetes mellitus (HCC)      Past Surgical History  Past Surgical History:   Procedure Laterality Date    APPENDECTOMY      CHOLECYSTECTOMY      SIGMOIDECTOMY N/A         11/16/24 1105   OT Last Visit   OT Visit Date 11/16/24   Note Type   Note type Evaluation   Pain Assessment   Pain Assessment Tool FLACC   Pain Rating: FLACC (Rest) - Face 0   Pain Rating: FLACC (Rest) - Legs 0   Pain Rating: FLACC (Rest) - Activity 0   Pain Rating: FLACC (Rest) - Cry 0   Pain Rating: FLACC (Rest) - Consolability 0   Score: FLACC (Rest) 0   Pain Rating: FLACC (Activity) - Face 1   Pain Rating: FLACC (Activity) - Legs 0   Pain Rating: FLACC (Activity) - Activity 1   Pain Rating: FLACC (Activity) - Cry 0   Pain Rating: FLACC (Activity) - Consolability 0   Score: FLACC (Activity) 2   Restrictions/Precautions   Weight Bearing Precautions Per Order No   Other Precautions Multiple lines;Fall Risk;Pain  (NGT)   Home Living   Type of Home House   Home Layout Multi-level;Bed/bath upstairs;Stairs to enter with rails  (6 VIRA w/ b/l HR)   Bathroom Shower/Tub Tub/shower unit   Bathroom Toilet Standard   Home Equipment Walker   Prior Function   Level of [place] Independent with ADLs;Independent with functional mobility;Independent with IADLS   Lives With Other  "(Comment)  (2 roommates)   Receives Help From Friend(s)   IADLs Independent with driving;Independent with medication management;Independent with meal prep  ([employer] meals)   Falls in the last 6 months 0   Vocational On disability   Lifestyle   Autonomy PTA, was (I) with ADLs and was (I) with IADLs. Patient lives in a multi-story home w/ 6 VIRA and b/l HR. Bed/bath on second floor w tub/shower and standard toilet. Lives w/ 2 roommates. Denies falls. RW use in community, no AD use in home.   Reciprocal Relationships Roommates   General   Additional Pertinent History Comorbidities affecting pt’s functional performance include a significant PMH of: cardiomyopathy, CHF, CAD, DM, HTN, HLD, DVT; h/o appendectomy, cholecystectomy, ventral hernia repair, diverticulitis s/p sigmoidectomy, endometrial CA s/p FLORECITA/BSO.  Patient with active OT orders.   Family/Caregiver Present No   Subjective   Subjective Tearful. \"I wish this wasn't happening to me\" Agreeable to OT/PT co-eval w encouragement.   ADL   Where Assessed Edge of bed   Eating Assistance 6  Modified independent   Grooming Assistance 6  Modified Independent   UB Bathing Assistance 5  Supervision/Setup   LB Bathing Assistance 4  Minimal Assistance   UB Dressing Assistance 5  Supervision/Setup   LB Dressing Assistance 4  Minimal Assistance   Toileting Assistance  5  Supervision/Setup   Transfers   Sit to Stand 5  Supervision   Additional items Increased time required;Verbal cues;Other  (RW)   Stand to Sit 5  Supervision   Additional items Increased time required;Verbal cues;Other  (RW)   Functional Mobility   Functional Mobility 5  Supervision   Additional Comments No gross LOB noted. Household distances. Reports being \"winded\" after. O2 >94% on RA.   Additional items Rolling walker   Balance   Static Sitting Good   Dynamic Sitting Fair +   Static Standing Fair   Dynamic Standing Fair -   Ambulatory Fair -   Activity Tolerance   Activity Tolerance Patient tolerated " treatment well;Patient limited by fatigue   Medical Staff Made Aware PT Susan   Nurse Made Aware Yes   RUE Assessment   RUE Assessment WFL   LUE Assessment   LUE Assessment WFL   Hand Function   Gross Motor Coordination Functional   Fine Motor Coordination Functional   Sensation   Light Touch No apparent deficits   Proprioception   Proprioception No apparent deficits   Vision - Complex Assessment   Ocular Range of Motion Intact   Perception   Inattention/Neglect Appears intact   Cognition   Overall Cognitive Status WFL   Arousal/Participation Alert;Responsive;Cooperative   Attention Within functional limits   Orientation Level Oriented X4   Memory Within functional limits   Following Commands Follows all commands and directions without difficulty   Assessment   Limitation Decreased ADL status;Decreased UE strength;Decreased Safe judgement during ADL;Decreased endurance;Decreased self-care trans;Decreased high-level ADLs  (dec balance, functional reach, coordination)   Prognosis Good   Assessment Patient is a 63 y.o. year old female seen for OT eval s/p admit to [facility] on 11/14/2024 with SBO.  Patient with active OT orders. OT consulted to assess ADLs/IADLs/functional mobility and assist w/ D/C planning. Patient demonstrates the following deficits impacting occupational performance: decreased strength , decreased balance, decreased activity tolerance, limited functional reach, decreased safety awareness, increased pain, nausea, mood/behavioral limitations , increased body habitus , impaired coordination, decreased cardiovascular endurance, and decreased skin integrity . These impairments, as well at pt’s VIRA home environment, steps within home environment, difficulty performing ADLs, difficulty performing IADLs, difficulty performing transfers/mobility, limited insight into deficits, compliance, fall risk , functional decline , increased reliance on DME , and multiple admissions , limit pt’s ability to safely engage in  all baseline areas of occupation. Pt's CLOF as follows: eating/grooming: Lucina, UB ADLs: supervision , LB ADLs: Darrell, toileting: supervision , bed mobility: DNT, functional transfers: supervision , functional mobility: supervision , sitting/standing tolerance: ~4 min. Pt would benefit from continued skilled OT while in acute setting to address deficits as defined above and to maximize (I) w/ ADLs/functional mobility. Occupational performance areas to address include: grooming, bathing/shower, toilet hygiene, dressing, medication management, health maintenance, functional mobility, community mobility, clothing management, money management, and household maintenance. Based on the aforementioned evaluation, functional performance deficits, and assessments, pt has been identified as a moderate complexity evaluation. At this time, recommendation for pt to receive post-acute rehabilitation services at a Level III (minimum resource intensity) due to above deficits and CLOF. OT will continue to follow pt 2-3x/wk to address the goals listed below to  w/in 10-14 days.   Goals   Patient Goals to feel better   LTG Time Frame 10-14   Plan   Treatment Interventions ADL retraining;Functional transfer training;UE strengthening/ROM;Endurance training;Patient/family training;Cognitive reorientation;Equipment evaluation/education;Neuromuscular reeducation;Compensatory technique education;Continued evaluation;Activityengagement;Energy conservation   Goal Expiration Date 24   OT Treatment Day 0   OT Frequency 2-3x/wk   Discharge Recommendation   Rehab Resource Intensity Level, OT III (Minimum Resource Intensity)   Additional Comments  Co treatment with PT secondary to complex medical condition of pt, possible A of 2 required to achieve and maintain transitional movements, requiring the need of skilled therapeutic intervention of 2 therapists to achieve delivery of services.   AM-PAC Daily Activity Inpatient   Lower Body  Dressing 3   Bathing 3   Toileting 3   Upper Body Dressing 3   Grooming 3   Eating 4   Daily Activity Raw Score 19   Daily Activity Standardized Score (Calc for Raw Score >=11) 40.22   AM-PAC Applied Cognition Inpatient   Following a Speech/Presentation 4   Understanding Ordinary Conversation 4   Taking Medications 4   Remembering Where Things Are Placed or Put Away 4   Remembering List of 4-5 Errands 4   Taking Care of Complicated Tasks 4   Applied Cognition Raw Score 24   Applied Cognition Standardized Score 62.21     Occupational Therapy goals: In 7-14 days:     1- Patient will verbalize and demonstrate use of energy conservation/deep breathing technique and work simplification skills during functional activity with no verbal cues.   2- Patient will verbalize and demonstrate good body mechanics and joint protection techniques during ADLs/IADLs with no verbal cues   3- Pt will complete bed mobility at a Mod I level w/ G balance/safety demonstrated to decrease caregiver assistance required   4- Patient will increase OOB/ sitting tolerance to 2-4 hours per day for increased participation in self care and leisure tasks with no s/s of exertion.   5-Patient will increase standing tolerance time to 5 minutes with unilateral UE support to complete sink level ADLs@ mod I level    6- Pt will improve functional transfers to Mod I on/off all surfaces using DME as needed w/ G balance/safety   7- Patient will complete UB ADLs with Silvia utilizing appropriate DME/AE PRN   8- Patient will complete LB ADLs with Silvia utilizing appropriate DME/AE PRN   9- Patient will complete toileting tasks with Silvia with G hygiene/thoroughness utilizing appropriate DME/AE PRN   10- Pt will improve functional mobility during ADL/IADL/leisure tasks to Mod I using DME as needed w/ G balance/safety    11- Pt will be attentive 100% of the time during ongoing cognitive assessment w/ G participation to assist w/ safe d/c planning/recommendations   12-  Pt will participate in simulated IADL management task to increase independence to Mod I w/ G safety and endurance   13- Pt will increase BUE strength by 1MM grade via AROM/AAROM/PROM exercises to increase independence in ADLs and transfers       Kena Roche, OTR/L

## 2024-11-16 NOTE — PLAN OF CARE
Problem: OCCUPATIONAL THERAPY ADULT  Goal: Performs self-care activities at highest level of function for planned discharge setting.  See evaluation for individualized goals.  Description: Treatment Interventions: ADL retraining, Functional transfer training, UE strengthening/ROM, Endurance training, Patient/family training, Cognitive reorientation, Equipment evaluation/education, Neuromuscular reeducation, Compensatory technique education, Continued evaluation, Activityengagement, Energy conservation          See flowsheet documentation for full assessment, interventions and recommendations.   Note: Limitation: Decreased ADL status, Decreased UE strength, Decreased Safe judgement during ADL, Decreased endurance, Decreased self-care trans, Decreased high-level ADLs (dec balance, functional reach, coordination)  Prognosis: Good  Assessment: Patient is a 63 y.o. year old female seen for OT eval s/p admit to [facility] on 11/14/2024 with SBO.  Patient with active OT orders. OT consulted to assess ADLs/IADLs/functional mobility and assist w/ D/C planning. Patient demonstrates the following deficits impacting occupational performance: decreased strength , decreased balance, decreased activity tolerance, limited functional reach, decreased safety awareness, increased pain, nausea, mood/behavioral limitations , increased body habitus , impaired coordination, decreased cardiovascular endurance, and decreased skin integrity . These impairments, as well at pt’s VIRA home environment, steps within home environment, difficulty performing ADLs, difficulty performing IADLs, difficulty performing transfers/mobility, limited insight into deficits, compliance, fall risk , functional decline , increased reliance on DME , and multiple admissions , limit pt’s ability to safely engage in all baseline areas of occupation. Pt's CLOF as follows: eating/grooming: Lucina, UB ADLs: supervision , LB ADLs: Darrell, toileting: supervision , bed mobility:  DNT, functional transfers: supervision , functional mobility: supervision , sitting/standing tolerance: ~4 min. Pt would benefit from continued skilled OT while in acute setting to address deficits as defined above and to maximize (I) w/ ADLs/functional mobility. Occupational performance areas to address include: grooming, bathing/shower, toilet hygiene, dressing, medication management, health maintenance, functional mobility, community mobility, clothing management, money management, and household maintenance. Based on the aforementioned evaluation, functional performance deficits, and assessments, pt has been identified as a moderate complexity evaluation. At this time, recommendation for pt to receive post-acute rehabilitation services at a Level III (minimum resource intensity) due to above deficits and CLOF. OT will continue to follow pt 2-3x/wk to address the goals listed below to  w/in 10-14 days.     Rehab Resource Intensity Level, OT: III (Minimum Resource Intensity)

## 2024-11-16 NOTE — PLAN OF CARE
Problem: Potential for Falls  Goal: Patient will remain free of falls  Description: INTERVENTIONS:  - Educate patient/family on patient safety including physical limitations  - Instruct patient to call for assistance with activity   - Consult OT/PT to assist with strengthening/mobility   - Keep Call bell within reach  - Keep bed low and locked with side rails adjusted as appropriate  - Keep care items and personal belongings within reach  - Initiate and maintain comfort rounds  - Make Fall Risk Sign visible to staff  - Offer Toileting every 3 Hours, in advance of need  - Initiate/Maintain bed alarm  - Obtain necessary fall risk management equipment  - Apply yellow socks and bracelet for high fall risk patients  - Consider moving patient to room near nurses station  Outcome: Progressing     Problem: PAIN - ADULT  Goal: Verbalizes/displays adequate comfort level or baseline comfort level  Description: Interventions:  - Encourage patient to monitor pain and request assistance  - Assess pain using appropriate pain scale  - Administer analgesics based on type and severity of pain and evaluate response  - Implement non-pharmacological measures as appropriate and evaluate response  - Consider cultural and social influences on pain and pain management  - Notify physician/advanced practitioner if interventions unsuccessful or patient reports new pain  Outcome: Progressing     Problem: SAFETY ADULT  Goal: Patient will remain free of falls  Description: INTERVENTIONS:  - Educate patient/family on patient safety including physical limitations  - Instruct patient to call for assistance with activity   - Consult OT/PT to assist with strengthening/mobility   - Keep Call bell within reach  - Keep bed low and locked with side rails adjusted as appropriate  - Keep care items and personal belongings within reach  - Initiate and maintain comfort rounds  - Make Fall Risk Sign visible to staff  - Offer Toileting every 3 Hours, in  advance of need  - Initiate/Maintain bed alarm  - Obtain necessary fall risk management equipment  - Apply yellow socks and bracelet for high fall risk patients  - Consider moving patient to room near nurses station  Outcome: Progressing  Goal: Maintain or return to baseline ADL function  Description: INTERVENTIONS:  -  Assess patient's ability to carry out ADLs; assess patient's baseline for ADL function and identify physical deficits which impact ability to perform ADLs (bathing, care of mouth/teeth, toileting, grooming, dressing, etc.)  - Assess/evaluate cause of self-care deficits   - Assess range of motion  - Assess patient's mobility; develop plan if impaired  - Assess patient's need for assistive devices and provide as appropriate  - Encourage maximum independence but intervene and supervise when necessary  - Involve family in performance of ADLs  - Assess for home care needs following discharge   - Consider OT consult to assist with ADL evaluation and planning for discharge  - Provide patient education as appropriate  Outcome: Progressing  Goal: Maintains/Returns to pre admission functional level  Description: INTERVENTIONS:  - Perform AM-PAC 6 Click Basic Mobility/ Daily Activity assessment daily.  - Set and communicate daily mobility goal to care team and patient/family/caregiver.   - Collaborate with rehabilitation services on mobility goals if consulted  - Perform Range of Motion 3 times a day.  - Reposition patient every 3 hours.  - Dangle patient 3 times a day  - Stand patient 3 times a day  - Ambulate patient 3 times a day  - Out of bed to chair 3 times a day   - Out of bed for meals 3 times a day  - Out of bed for toileting  - Record patient progress and toleration of activity level   Outcome: Progressing     Problem: DISCHARGE PLANNING  Goal: Discharge to home or other facility with appropriate resources  Description: INTERVENTIONS:  - Identify barriers to discharge w/patient and caregiver  -  Arrange for needed discharge resources and transportation as appropriate  - Identify discharge learning needs (meds, wound care, etc.)  - Arrange for interpretive services to assist at discharge as needed  - Refer to Case Management Department for coordinating discharge planning if the patient needs post-hospital services based on physician/advanced practitioner order or complex needs related to functional status, cognitive ability, or social support system  Outcome: Progressing     Problem: GASTROINTESTINAL - ADULT  Goal: Minimal or absence of nausea and/or vomiting  Description: INTERVENTIONS:  - Administer IV fluids if ordered to ensure adequate hydration  - Maintain NPO status until nausea and vomiting are resolved  - Nasogastric tube if ordered  - Administer ordered antiemetic medications as needed  - Provide nonpharmacologic comfort measures as appropriate  - Advance diet as tolerated, if ordered  - Consider nutrition services referral to assist patient with adequate nutrition and appropriate food choices  Outcome: Progressing  Goal: Maintains or returns to baseline bowel function  Description: INTERVENTIONS:  - Assess bowel function  - Encourage oral fluids to ensure adequate hydration  - Administer IV fluids if ordered to ensure adequate hydration  - Administer ordered medications as needed  - Encourage mobilization and activity  - Consider nutritional services referral to assist patient with adequate nutrition and appropriate food choices  Outcome: Progressing  Goal: Maintains adequate nutritional intake  Description: INTERVENTIONS:  - Monitor percentage of each meal consumed  - Identify factors contributing to decreased intake, treat as appropriate  - Assist with meals as needed  - Monitor I&O, weight, and lab values if indicated  - Obtain nutrition services referral as needed  Outcome: Progressing  Goal: Establish and maintain optimal ostomy function  Description: INTERVENTIONS:  - Assess bowel  function  - Encourage oral fluids to ensure adequate hydration  - Administer IV fluids if ordered to ensure adequate hydration   - Administer ordered medications as needed  - Encourage mobilization and activity  - Nutrition services referral to assist patient with appropriate food choices  - Assess stoma site  - Consider wound care consult   Outcome: Progressing  Goal: Oral mucous membranes remain intact  Description: INTERVENTIONS  - Assess oral mucosa and hygiene practices  - Implement preventative oral hygiene regimen  - Implement oral medicated treatments as ordered  - Initiate Nutrition services referral as needed  Outcome: Progressing     Problem: Prexisting or High Potential for Compromised Skin Integrity  Goal: Skin integrity is maintained or improved  Description: INTERVENTIONS:  - Identify patients at risk for skin breakdown  - Assess and monitor skin integrity  - Assess and monitor nutrition and hydration status  - Monitor labs   - Assess for incontinence   - Turn and reposition patient  - Assist with mobility/ambulation  - Relieve pressure over bony prominences  - Avoid friction and shearing  - Provide appropriate hygiene as needed including keeping skin clean and dry  - Evaluate need for skin moisturizer/barrier cream  - Collaborate with interdisciplinary team   - Patient/family teaching  - Consider wound care consult   Outcome: Progressing     Problem: Nutrition/Hydration-ADULT  Goal: Nutrient/Hydration intake appropriate for improving, restoring or maintaining nutritional needs  Description: Monitor and assess patient's nutrition/hydration status for malnutrition. Collaborate with interdisciplinary team and initiate plan and interventions as ordered.  Monitor patient's weight and dietary intake as ordered or per policy. Utilize nutrition screening tool and intervene as necessary. Determine patient's food preferences and provide high-protein, high-caloric foods as appropriate.     INTERVENTIONS:  -  Monitor oral intake, urinary output, labs, and treatment plans  - Assess nutrition and hydration status and recommend course of action  - Evaluate amount of meals eaten  - Assist patient with eating if necessary   - Allow adequate time for meals  - Recommend/ encourage appropriate diets, oral nutritional supplements, and vitamin/mineral supplements  - Order, calculate, and assess calorie counts as needed  - Recommend, monitor, and adjust tube feedings and TPN/PPN based on assessed needs  - Assess need for intravenous fluids  - Provide specific nutrition/hydration education as appropriate  - Include patient/family/caregiver in decisions related to nutrition  Outcome: Progressing

## 2024-11-17 LAB
ANION GAP SERPL CALCULATED.3IONS-SCNC: 7 MMOL/L (ref 4–13)
BASOPHILS # BLD AUTO: 0.03 THOUSANDS/ÂΜL (ref 0–0.1)
BASOPHILS NFR BLD AUTO: 1 % (ref 0–1)
BUN SERPL-MCNC: 16 MG/DL (ref 5–25)
CALCIUM SERPL-MCNC: 8.2 MG/DL (ref 8.4–10.2)
CHLORIDE SERPL-SCNC: 98 MMOL/L (ref 96–108)
CO2 SERPL-SCNC: 32 MMOL/L (ref 21–32)
CREAT SERPL-MCNC: 0.69 MG/DL (ref 0.6–1.3)
CRP SERPL QL: 16.3 MG/L
EOSINOPHIL # BLD AUTO: 0.1 THOUSAND/ÂΜL (ref 0–0.61)
EOSINOPHIL NFR BLD AUTO: 2 % (ref 0–6)
ERYTHROCYTE [DISTWIDTH] IN BLOOD BY AUTOMATED COUNT: 16.1 % (ref 11.6–15.1)
GFR SERPL CREATININE-BSD FRML MDRD: 94 ML/MIN/1.73SQ M
GLUCOSE SERPL-MCNC: 109 MG/DL (ref 65–140)
GLUCOSE SERPL-MCNC: 117 MG/DL (ref 65–140)
GLUCOSE SERPL-MCNC: 121 MG/DL (ref 65–140)
GLUCOSE SERPL-MCNC: 132 MG/DL (ref 65–140)
GLUCOSE SERPL-MCNC: 158 MG/DL (ref 65–140)
HCT VFR BLD AUTO: 30.8 % (ref 34.8–46.1)
HGB BLD-MCNC: 10.1 G/DL (ref 11.5–15.4)
IMM GRANULOCYTES # BLD AUTO: 0.05 THOUSAND/UL (ref 0–0.2)
IMM GRANULOCYTES NFR BLD AUTO: 1 % (ref 0–2)
LYMPHOCYTES # BLD AUTO: 0.44 THOUSANDS/ÂΜL (ref 0.6–4.47)
LYMPHOCYTES NFR BLD AUTO: 7 % (ref 14–44)
MAGNESIUM SERPL-MCNC: 2.5 MG/DL (ref 1.9–2.7)
MCH RBC QN AUTO: 31.8 PG (ref 26.8–34.3)
MCHC RBC AUTO-ENTMCNC: 32.8 G/DL (ref 31.4–37.4)
MCV RBC AUTO: 97 FL (ref 82–98)
MONOCYTES # BLD AUTO: 0.33 THOUSAND/ÂΜL (ref 0.17–1.22)
MONOCYTES NFR BLD AUTO: 5 % (ref 4–12)
NEUTROPHILS # BLD AUTO: 5.32 THOUSANDS/ÂΜL (ref 1.85–7.62)
NEUTS SEG NFR BLD AUTO: 84 % (ref 43–75)
NRBC BLD AUTO-RTO: 0 /100 WBCS
PHOSPHATE SERPL-MCNC: 3.9 MG/DL (ref 2.3–4.1)
PLATELET # BLD AUTO: 97 THOUSANDS/UL (ref 149–390)
PMV BLD AUTO: 10.7 FL (ref 8.9–12.7)
POTASSIUM SERPL-SCNC: 3.7 MMOL/L (ref 3.5–5.3)
PREALB SERPL-MCNC: 8.8 MG/DL (ref 17–34)
RBC # BLD AUTO: 3.18 MILLION/UL (ref 3.81–5.12)
SODIUM SERPL-SCNC: 137 MMOL/L (ref 135–147)
WBC # BLD AUTO: 6.27 THOUSAND/UL (ref 4.31–10.16)

## 2024-11-17 PROCEDURE — 99233 SBSQ HOSP IP/OBS HIGH 50: CPT | Performed by: SURGERY

## 2024-11-17 PROCEDURE — 82948 REAGENT STRIP/BLOOD GLUCOSE: CPT

## 2024-11-17 PROCEDURE — 99232 SBSQ HOSP IP/OBS MODERATE 35: CPT | Performed by: INTERNAL MEDICINE

## 2024-11-17 PROCEDURE — 84100 ASSAY OF PHOSPHORUS: CPT

## 2024-11-17 PROCEDURE — 86140 C-REACTIVE PROTEIN: CPT

## 2024-11-17 PROCEDURE — 84134 ASSAY OF PREALBUMIN: CPT

## 2024-11-17 PROCEDURE — 83735 ASSAY OF MAGNESIUM: CPT

## 2024-11-17 PROCEDURE — 85025 COMPLETE CBC W/AUTO DIFF WBC: CPT

## 2024-11-17 PROCEDURE — 80048 BASIC METABOLIC PNL TOTAL CA: CPT

## 2024-11-17 RX ORDER — PANTOPRAZOLE SODIUM 40 MG/10ML
40 INJECTION, POWDER, LYOPHILIZED, FOR SOLUTION INTRAVENOUS
Status: DISCONTINUED | OUTPATIENT
Start: 2024-11-17 | End: 2024-11-28

## 2024-11-17 RX ORDER — DEXTROSE MONOHYDRATE, SODIUM CHLORIDE, AND POTASSIUM CHLORIDE 50; 1.49; 4.5 G/1000ML; G/1000ML; G/1000ML
150 INJECTION, SOLUTION INTRAVENOUS CONTINUOUS
Status: DISCONTINUED | OUTPATIENT
Start: 2024-11-17 | End: 2024-11-20

## 2024-11-17 RX ADMIN — Medication 1 SPRAY: at 15:01

## 2024-11-17 RX ADMIN — HEPARIN SODIUM 5000 UNITS: 5000 INJECTION INTRAVENOUS; SUBCUTANEOUS at 05:22

## 2024-11-17 RX ADMIN — METOROPROLOL TARTRATE 2.5 MG: 5 INJECTION, SOLUTION INTRAVENOUS at 21:27

## 2024-11-17 RX ADMIN — HYDROMORPHONE HYDROCHLORIDE 0.5 MG: 1 INJECTION, SOLUTION INTRAMUSCULAR; INTRAVENOUS; SUBCUTANEOUS at 05:22

## 2024-11-17 RX ADMIN — HYDROMORPHONE HYDROCHLORIDE 0.2 MG: 0.2 INJECTION, SOLUTION INTRAMUSCULAR; INTRAVENOUS; SUBCUTANEOUS at 21:27

## 2024-11-17 RX ADMIN — HYDROMORPHONE HYDROCHLORIDE 0.2 MG: 0.2 INJECTION, SOLUTION INTRAMUSCULAR; INTRAVENOUS; SUBCUTANEOUS at 00:06

## 2024-11-17 RX ADMIN — METOROPROLOL TARTRATE 2.5 MG: 5 INJECTION, SOLUTION INTRAVENOUS at 09:53

## 2024-11-17 RX ADMIN — ONDANSETRON 4 MG: 2 INJECTION INTRAMUSCULAR; INTRAVENOUS at 15:01

## 2024-11-17 RX ADMIN — DEXTROSE, SODIUM CHLORIDE, AND POTASSIUM CHLORIDE 125 ML/HR: 5; .45; .15 INJECTION INTRAVENOUS at 18:26

## 2024-11-17 RX ADMIN — LORAZEPAM 1 MG: 2 INJECTION INTRAMUSCULAR; INTRAVENOUS at 21:27

## 2024-11-17 RX ADMIN — METOROPROLOL TARTRATE 2.5 MG: 5 INJECTION, SOLUTION INTRAVENOUS at 15:04

## 2024-11-17 RX ADMIN — METOROPROLOL TARTRATE 2.5 MG: 5 INJECTION, SOLUTION INTRAVENOUS at 03:35

## 2024-11-17 RX ADMIN — HYDROMORPHONE HYDROCHLORIDE 0.2 MG: 0.2 INJECTION, SOLUTION INTRAMUSCULAR; INTRAVENOUS; SUBCUTANEOUS at 03:35

## 2024-11-17 RX ADMIN — HYDROMORPHONE HYDROCHLORIDE 0.2 MG: 0.2 INJECTION, SOLUTION INTRAMUSCULAR; INTRAVENOUS; SUBCUTANEOUS at 18:22

## 2024-11-17 RX ADMIN — PANTOPRAZOLE SODIUM 40 MG: 40 INJECTION, POWDER, LYOPHILIZED, FOR SOLUTION INTRAVENOUS at 10:11

## 2024-11-17 RX ADMIN — HYDROMORPHONE HYDROCHLORIDE 0.2 MG: 0.2 INJECTION, SOLUTION INTRAMUSCULAR; INTRAVENOUS; SUBCUTANEOUS at 08:20

## 2024-11-17 RX ADMIN — HEPARIN SODIUM 5000 UNITS: 5000 INJECTION INTRAVENOUS; SUBCUTANEOUS at 13:25

## 2024-11-17 RX ADMIN — DEXTROSE, SODIUM CHLORIDE, AND POTASSIUM CHLORIDE 125 ML/HR: 5; .45; .15 INJECTION INTRAVENOUS at 09:49

## 2024-11-17 RX ADMIN — HEPARIN SODIUM 5000 UNITS: 5000 INJECTION INTRAVENOUS; SUBCUTANEOUS at 21:27

## 2024-11-17 RX ADMIN — HYDROMORPHONE HYDROCHLORIDE 0.2 MG: 0.2 INJECTION, SOLUTION INTRAMUSCULAR; INTRAVENOUS; SUBCUTANEOUS at 13:25

## 2024-11-17 RX ADMIN — HYDROMORPHONE HYDROCHLORIDE 0.5 MG: 1 INJECTION, SOLUTION INTRAMUSCULAR; INTRAVENOUS; SUBCUTANEOUS at 10:11

## 2024-11-17 NOTE — PROGRESS NOTES
Progress Note - Hospitalist   Name: Lety Botello 63 y.o. female I MRN: [id number]  Unit/Bed#: E5 -01 I Date of Admission: 11/14/2024   Date of Service: 11/17/2024 I Hospital Day: 3    Assessment & Plan  SBO (small bowel obstruction) (HCC)  History of endometrial cancer pulmonary embolism DVT hypothyroidism insulin-dependent diabetes hypertension and recent admission for pSBO presents to the hospital with abdominal pain nausea vomiting and diarrhea again  Admitted to surgical service after finding of pSBO again  Supportive care with bowel rest and NG tube decompression and IV fluids  Surgery planning for PICC line/TPN.  Will need to monitor glucose closely when started  Hypertension  Prior to admission on carvedilol  Stable: While n.p.o. continue scheduled IV metoprolol 2.5 mg every 6 hours  Type 2 diabetes mellitus (HCC)  Lab Results   Component Value Date    HGBA1C 6.9 (H) 11/05/2024     Recent Labs     11/16/24  1217 11/16/24  1753 11/16/24  2352 11/17/24  0607   POCGLU 115 99 107 109     Previously on toujeo 60 units with lispro 8 units 3 times daily  Patient reports now only on sliding scale insulin and no longer on toujeo.    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  Prior to admission on Eliquis.  Continue heparin for prophylaxis while NPO  Hypothyroidism  Prior to admission levothyroxine.  Held while NPO  Mild intermittent asthma  Prior to admission on albuterol only as needed.  No exacerbations  Hyponatremia  Related to bowel obstruction/n.p.o. status.  Resolved IV fluids    Results from last 7 days   Lab Units 11/17/24  0510 11/16/24  0547 11/15/24  0837 11/14/24  2226   SODIUM mmol/L 137 135 130* 133*     Morbid obesity (HCC)  Body mass index is 44.92 kg/m².    VTE Pharmacologic Prophylaxis:   Moderate Risk (Score 3-4) - Pharmacological DVT Prophylaxis Ordered: heparin.    Mobility:    Basic Mobility Inpatient Raw Score: 17  JH-HLM Goal: 5: Stand one or more mins  JH-HLM Achieved: 5: Stand (1 or more minutes)  JH-HLM Goal achieved. Continue to encourage appropriate mobility.    Patient Centered Rounds: I have performed bedside rounds with nursing staff today.  Discussions with Specialists or Other Care Team Provider:     Education and Discussions with Family / Patient:     Current Length of Stay: 3 day(s)  Current Patient Status: Inpatient   Certification Statement:   Discharge Plan: [facility] is following this patient on consult. They are not yet medically stable for discharge secondary to bowel obstruction.    Code Status: Level 1 - Full Code    Subjective   Patient seen and examined.  Slept a little bit better overnight.  Still a lot of abdominal pain.    Objective   Vitals:   Temp (24hrs), Av.9 °F (36.6 °C), Min:97.3 °F (36.3 °C), Max:98.4 °F (36.9 °C)    Temp:  [97.3 °F (36.3 °C)-98.4 °F (36.9 °C)] 98.4 °F (36.9 °C)  HR:  [78-83] 83  Resp:  [14-16] 14  BP: (133-151)/(55-62) 151/60  SpO2:  [90 %-94 %] 91 %  Body mass index is 44.92 kg/m².     Input and Output Summary (last 24 hours):     Intake/Output Summary (Last 24 hours) at 2024 1021  Last data filed at 2024 1001  Gross per 24 hour   Intake 430 ml   Output 2500 ml   Net -2070 ml       Physical Exam  Vitals reviewed.   Constitutional:       General: She is not in acute distress.     Appearance: Normal appearance. She is obese.   HENT:      Head: Atraumatic.   Eyes:      General: No scleral icterus.  Cardiovascular:      Rate and Rhythm: Regular rhythm.      Heart sounds: Normal heart sounds.   Pulmonary:      Breath sounds: Decreased breath sounds present. No wheezing.   Abdominal:      General: Bowel sounds are normal.      Palpations: Abdomen is soft.      Tenderness: There is abdominal tenderness. There is no guarding.   Musculoskeletal:         General: No swelling or tenderness.   Skin:     General: Skin is warm.    Neurological:      General: No focal deficit present.      Mental Status: She is alert.      Motor: No weakness.   Psychiatric:         Mood and Affect: Mood normal.       Lines/Drains:  Invasive Devices       Peripheral Intravenous Line  Duration             Peripheral IV 11/14/24 Left;Ventral (anterior) Forearm 3 days    Peripheral IV 11/14/24 Right;Ventral (anterior) Forearm 3 days              Drain  Duration             NG/OG/Enteral Tube Nasogastric 16 Fr Right nare 2 days                            Lab Results: I have reviewed the following results:   Results from last 7 days   Lab Units 11/17/24  0510 11/16/24  0547 11/15/24  0837 11/14/24  0854   WBC Thousand/uL 6.27 6.38 7.88  --    HEMOGLOBIN g/dL 10.1* 9.3* 9.9*  --    PLATELETS Thousands/uL 97* 84* 85*  --    MCV fL 97 96 94  --    INR   --   --   --  1.18     Results from last 7 days   Lab Units 11/17/24  0510 11/16/24  0547 11/15/24  0837 11/14/24 2226 11/14/24  0821   SODIUM mmol/L 137 135 130*   < > 132*   POTASSIUM mmol/L 3.7 4.0 3.9   < > 4.1   CHLORIDE mmol/L 98 100 99   < > 101   CO2 mmol/L 32 30 25   < > 24   ANION GAP mmol/L 7 5 6   < > 7   BUN mg/dL 16 17 15   < > 12   CREATININE mg/dL 0.69 0.85 0.70   < > 0.59*   CALCIUM mg/dL 8.2* 8.0* 8.3*   < > 8.4   ALBUMIN g/dL  --  2.7*  --   --  3.2*   TOTAL BILIRUBIN mg/dL  --  0.42  --   --  0.51   ALK PHOS U/L  --  75  --   --  86   ALT U/L  --  9  --   --  8   AST U/L  --  11*  --   --  8*   EGFR ml/min/1.73sq m 94 74 93   < > 99   GLUCOSE RANDOM mg/dL 117 126 144*   < > 165*    < > = values in this interval not displayed.     Results from last 7 days   Lab Units 11/17/24  0510 11/15/24  0837 11/14/24 2226 11/12/24  0443 11/11/24  0439   MAGNESIUM mg/dL 2.5 2.4 1.7* 1.7* 2.0   PHOSPHORUS mg/dL 3.9 3.7 3.3 2.5 1.8*                  Results from last 7 days   Lab Units 11/14/24  0821   LACTIC ACID mmol/L 1.5     Results from last 7 days   Lab Units 11/17/24  0607 11/16/24  2352 11/16/24  1755  11/16/24  1217 11/16/24  0546 11/16/24  0004 11/15/24  1815 11/15/24  1119 11/15/24  0559 11/15/24  0001 11/14/24  1757 11/14/24  1621   POC GLUCOSE mg/dl 109 107 99 115 126 115 108 143* 146* 139 127 123               Recent Cultures (last 7 days):         Imaging:  Reviewed radiology reports from this admission including:  XR abdomen 1 view kub  Result Date: 11/17/2024  Impression: NGT partially retracted with tip in proximal stomach. Small bowel obstruction again suggested. Workstation performed: [id number]         Last 24 Hours Medication List:     Current Facility-Administered Medications:     dextrose 5 % and sodium chloride 0.45 % with KCl 20 mEq/L infusion, Continuous, Last Rate: 125 mL/hr (11/17/24 0949)    heparin (porcine) subcutaneous injection 5,000 Units, Q8H SAM **AND** [CANCELED] Platelet count, Once    HYDROmorphone (DILAUDID) injection 0.5 mg, Q3H PRN    HYDROmorphone HCl (DILAUDID) injection 0.2 mg, Q3H PRN    insulin lispro (HumALOG/ADMELOG) 100 units/mL subcutaneous injection 1-6 Units, Q6H SAM **AND** Fingerstick Glucose (POCT), Q6H    LORazepam (ATIVAN) injection 1 mg, HS    metoprolol (LOPRESSOR) injection 2.5 mg, Q6H    ondansetron (ZOFRAN) injection 4 mg, Q6H PRN    pantoprazole (PROTONIX) injection 40 mg, Q24H SAM    phenol (CHLORASEPTIC) 1.4 % mucosal liquid 1 spray, Q2H PRN    trimethobenzamide (TIGAN) IM injection 200 mg, Q6H PRN    Administrative Statements   Today, Patient Was Seen By: Obinna Wilson, DO  I have spent a total time of 35 minutes in caring for this patient on the day of the visit/encounter including Diagnostic results, Patient and family education, Impressions, Documenting in the medical record, and Reviewing / ordering tests, medicine, procedures  .    **Please Note: This note may have been constructed using a voice recognition system.**

## 2024-11-17 NOTE — ASSESSMENT & PLAN NOTE
History of endometrial cancer pulmonary embolism DVT hypothyroidism insulin-dependent diabetes hypertension and recent admission for pSBO presents to the hospital with abdominal pain nausea vomiting and diarrhea again  Admitted to surgical service after finding of pSBO again  Supportive care with bowel rest and NG tube decompression and IV fluids  Surgery planning for PICC line/TPN.  Will need to monitor glucose closely when started

## 2024-11-17 NOTE — ASSESSMENT & PLAN NOTE
Lab Results   Component Value Date    HGBA1C 6.9 (H) 11/05/2024     Recent Labs     11/16/24  1217 11/16/24  1753 11/16/24  2352 11/17/24  0607   POCGLU 115 99 107 109     Previously on toujeo 60 units with lispro 8 units 3 times daily  Patient reports now only on sliding scale insulin and no longer on toujeo.    Currently stable with every 6 hours checks

## 2024-11-17 NOTE — ASSESSMENT & PLAN NOTE
Lab Results   Component Value Date    HGBA1C 6.9 (H) 11/05/2024       Recent Labs     11/16/24  1217 11/16/24  1753 11/16/24  2352 11/17/24  0607   POCGLU 115 99 107 109       Blood Sugar Average: Last 72 hrs:  (P) 121.6933582562215576    Plan  -SSI  -Blood glucose goal 140-180

## 2024-11-17 NOTE — PROGRESS NOTES
Progress Note - Surgery-General   Name: Lety Botello 63 y.o. female I MRN: [id number]  Unit/Bed#: E5 -01 I Date of Admission: 11/14/2024   Date of Service: 11/17/2024 I Hospital Day: 3    Assessment & Plan  SBO (small bowel obstruction) (HCC)  Presenting with recurrent SBO  11/14 CT A/P: Small bowel obstruction with transition in the pelvis is again seen    Plan  - NPO  - NG tube to moderate continuous suction, NGT flushes  - mIVF @ 125 cc/hr  - Ok for ice chips  - PICC/TPN when able  - Nutrition consult for TPN recs  - Prehabilitation,encourage OOB and ambulation, will likely require surgical intervention  - DVT ppx  Gastroparesis  With associated abdominal pain nausea and vomiting  Abdominal pain  See above  Nausea & vomiting  See above  Type 2 diabetes mellitus (HCC)  Lab Results   Component Value Date    HGBA1C 6.9 (H) 11/05/2024       Recent Labs     11/16/24  1217 11/16/24  1753 11/16/24  2352 11/17/24  0607   POCGLU 115 99 107 109       Blood Sugar Average: Last 72 hrs:  (P) 121.9951245624967105    Plan  -SSI  -Blood glucose goal 140-180    Please contact the SecureChat role for the Surgery-General service with any questions/concerns.    Subjective   No acute events overnight. Afebrile, hemodynamically stable. No emesis. Passed small amount of flatus, no bowel movement. Endorsing abdominal pain.      Objective :  Temp:  [97.3 °F (36.3 °C)-98.4 °F (36.9 °C)] 98.4 °F (36.9 °C)  HR:  [78-83] 83  BP: (133-151)/(55-62) 151/60  Resp:  [14-16] 14  SpO2:  [90 %-94 %] 91 %  O2 Device: None (Room air)    I/O         11/15 0701  11/16 0700 11/16 0701 11/17 0700 11/17 0701 11/18 0700    I.V. (mL/kg)  1000 (9.6)     NG/GT 0      IV Piggyback       Total Intake(mL/kg) 0 (0) 1000 (9.6)     Emesis/NG output 1500 2000     Total Output 1500 2000     Net -1500 -1000                  Lines/Drains/Airways       Active Status       Name Placement date Placement time Site Days    NG/OG/Enteral Tube Nasogastric 16 Fr Right  nare 11/15/24  0357  Right nare  2                  Physical Exam:  General: No acute distress, alert and oriented  CV: Well perfused, regular rate and rhythm  Lungs: Normal work of breathing, no increased respiratory effort  Abdomen: Soft, tender, distended, no rebound or guarding  Extremities: No edema, clubbing or cyanosis  Skin: Warm, dry      Lab Results: I have reviewed the following results:  Recent Labs     11/16/24  0547 11/17/24  0510   WBC 6.38 6.27   HGB 9.3* 10.1*   HCT 29.0* 30.8*   PLT 84* 97*   SODIUM 135 137   K 4.0 3.7    98   CO2 30 32   BUN 17 16   CREATININE 0.85 0.69   GLUC 126 117   MG  --  2.5   PHOS  --  3.9   AST 11*  --    ALT 9  --    ALB 2.7*  --    TBILI 0.42  --    ALKPHOS 75  --        Imaging Results Review: No pertinent imaging studies reviewed.  Other Study Results Review: No additional pertinent studies reviewed.    VTE Pharmacologic Prophylaxis: VTE covered by:  heparin (porcine), Subcutaneous, 5,000 Units at 11/17/24 0522     VTE Mechanical Prophylaxis: sequential compression device    Orlando Oshea MD  General Surgery   11/17/24

## 2024-11-17 NOTE — ASSESSMENT & PLAN NOTE
Related to bowel obstruction/n.p.o. status.  Resolved IV fluids    Results from last 7 days   Lab Units 11/17/24  0510 11/16/24  0547 11/15/24  0837 11/14/24  2226   SODIUM mmol/L 137 135 130* 133*

## 2024-11-17 NOTE — ASSESSMENT & PLAN NOTE
Lab Results   Component Value Date    HGBA1C 6.9 (H) 11/05/2024       Recent Labs     11/16/24  2352 11/17/24  0607 11/17/24  1115 11/17/24  1729   POCGLU 107 109 121 132       Blood Sugar Average: Last 72 hrs:  (P) 122.6296125317767513    Plan  -SSI  -Blood glucose goal 140-180

## 2024-11-17 NOTE — PROGRESS NOTES
Progress Note - Surgery-General   Name: Lety Botello 63 y.o. female I MRN: [id number]  Unit/Bed#: E5 -01 I Date of Admission: 11/14/2024   Date of Service: 11/17/2024 I Hospital Day: 3    Assessment & Plan  SBO (small bowel obstruction) (HCC)  Presenting with recurrent SBO  11/14 CT A/P: Small bowel obstruction with transition in the pelvis is again seen  Pain corresponds with suprapubic pain  Had urinary retention overnight. Straight cath'ed with dark urine output. Bolus + increase in IVF added    Plan  - NPO  - NG tube to moderate continuous suction, NGT flushes  - mIVF @ 150 cc/hr  - Ok for ice chips  - PICC/TPN when able  - Nutrition consult for TPN recs  - Prehabilitation,encourage OOB and ambulation, will likely require surgical intervention  - DVT ppx  Gastroparesis  With associated abdominal pain nausea and vomiting  Abdominal pain  See above  Nausea & vomiting  See above  Type 2 diabetes mellitus (HCC)  Lab Results   Component Value Date    HGBA1C 6.9 (H) 11/05/2024       Recent Labs     11/16/24  2352 11/17/24  0607 11/17/24  1115 11/17/24  1729   POCGLU 107 109 121 132       Blood Sugar Average: Last 72 hrs:  (P) [id number]    Plan  -SSI  -Blood glucose goal 140-180  Hypertension    History of pulmonary embolism    Hyponatremia    Hypothyroidism    Mild intermittent asthma    Morbid obesity (HCC)    Insomnia      Subjective   Continues to feel nausea, weak, unwell. No further vomiting around the NGT. No bowel functoin    Objective :  Temp:  [97.3 °F (36.3 °C)-98.4 °F (36.9 °C)] 97.7 °F (36.5 °C)  HR:  [77-83] 77  BP: (117-155)/(46-62) 117/46  Resp:  [14-18] 18  SpO2:  [89 %-94 %] 89 %  O2 Device: None (Room air)    I/O         11/15 0701  11/16 0700 11/16 0701 11/17 0700 11/17 0701 11/18 0700    P.O.   0    I.V. (mL/kg)  1000 (9.6) 940 (9)    NG/GT 0  0    IV Piggyback       Total Intake(mL/kg) 0 (0) 1000 (9.6) 940 (9)    Urine (mL/kg/hr)   250 (0.2)    Emesis/NG output 1500 2000 700     Total Output 1500 2000 950    Net -1500 -1000 -10                 Lines/Drains/Airways       Active Status       Name Placement date Placement time Site Days    NG/OG/Enteral Tube Nasogastric 16 Fr Right nare 11/15/24  0357  Right nare  2                  Physical Exam  Constitutional:       Appearance: Normal appearance. She is ill-appearing.      Comments: Generalized pallor   HENT:      Head: Normocephalic and atraumatic.      Mouth/Throat:      Mouth: Mucous membranes are moist.   Eyes:      Extraocular Movements: Extraocular movements intact.   Cardiovascular:      Rate and Rhythm: Normal rate and regular rhythm.   Pulmonary:      Effort: Pulmonary effort is normal.   Abdominal:      General: Abdomen is flat. There is no distension.      Palpations: Abdomen is soft.      Tenderness: There is abdominal tenderness. There is no guarding or rebound.      Comments: Suprapubic tenderness   Musculoskeletal:         General: Normal range of motion.      Cervical back: Normal range of motion and neck supple.   Skin:     General: Skin is warm and dry.      Coloration: Skin is pale.   Neurological:      General: No focal deficit present.      Mental Status: She is alert and oriented to person, place, and time.

## 2024-11-17 NOTE — ASSESSMENT & PLAN NOTE
Presenting with recurrent SBO  11/14 CT A/P: Small bowel obstruction with transition in the pelvis is again seen  Pain corresponds with suprapubic pain  Had urinary retention overnight. Straight cath'ed with dark urine output. Bolus + increase in IVF added    Plan  - NPO  - NG tube to moderate continuous suction, NGT flushes  - mIVF @ 150 cc/hr  - Ok for ice chips  - PICC/TPN when able  - Nutrition consult for TPN recs  - Prehabilitation,encourage OOB and ambulation, will likely require surgical intervention  - DVT ppx

## 2024-11-17 NOTE — ASSESSMENT & PLAN NOTE
Presenting with recurrent SBO  11/14 CT A/P: Small bowel obstruction with transition in the pelvis is again seen    Plan  - NPO  - NG tube to moderate continuous suction, NGT flushes  - mIVF @ 125 cc/hr  - Ok for ice chips  - PICC/TPN when able  - Nutrition consult for TPN recs  - Prehabilitation,encourage OOB and ambulation, will likely require surgical intervention  - DVT ppx

## 2024-11-18 ENCOUNTER — APPOINTMENT (INPATIENT)
Dept: RADIOLOGY | Facility: HOSPITAL | Age: 64
DRG: 230 | End: 2024-11-18
Payer: COMMERCIAL

## 2024-11-18 LAB
ALBUMIN SERPL BCG-MCNC: 2.7 G/DL (ref 3.5–5)
ALP SERPL-CCNC: 95 U/L (ref 34–104)
ALT SERPL W P-5'-P-CCNC: 26 U/L (ref 7–52)
ANION GAP SERPL CALCULATED.3IONS-SCNC: 4 MMOL/L (ref 4–13)
AST SERPL W P-5'-P-CCNC: 32 U/L (ref 13–39)
BASOPHILS # BLD AUTO: 0.01 THOUSANDS/ÂΜL (ref 0–0.1)
BASOPHILS NFR BLD AUTO: 0 % (ref 0–1)
BILIRUB DIRECT SERPL-MCNC: 0.31 MG/DL (ref 0–0.2)
BILIRUB SERPL-MCNC: 0.87 MG/DL (ref 0.2–1)
BUN SERPL-MCNC: 11 MG/DL (ref 5–25)
CALCIUM SERPL-MCNC: 7.9 MG/DL (ref 8.4–10.2)
CHLORIDE SERPL-SCNC: 99 MMOL/L (ref 96–108)
CO2 SERPL-SCNC: 32 MMOL/L (ref 21–32)
CREAT SERPL-MCNC: 0.62 MG/DL (ref 0.6–1.3)
EOSINOPHIL # BLD AUTO: 0.1 THOUSAND/ÂΜL (ref 0–0.61)
EOSINOPHIL NFR BLD AUTO: 2 % (ref 0–6)
ERYTHROCYTE [DISTWIDTH] IN BLOOD BY AUTOMATED COUNT: 16.3 % (ref 11.6–15.1)
GFR SERPL CREATININE-BSD FRML MDRD: 96 ML/MIN/1.73SQ M
GLUCOSE SERPL-MCNC: 156 MG/DL (ref 65–140)
GLUCOSE SERPL-MCNC: 162 MG/DL (ref 65–140)
GLUCOSE SERPL-MCNC: 177 MG/DL (ref 65–140)
GLUCOSE SERPL-MCNC: 188 MG/DL (ref 65–140)
GLUCOSE SERPL-MCNC: 194 MG/DL (ref 65–140)
HCT VFR BLD AUTO: 31.3 % (ref 34.8–46.1)
HGB BLD-MCNC: 10 G/DL (ref 11.5–15.4)
IMM GRANULOCYTES # BLD AUTO: 0.04 THOUSAND/UL (ref 0–0.2)
IMM GRANULOCYTES NFR BLD AUTO: 1 % (ref 0–2)
LDH SERPL-CCNC: 135 U/L (ref 140–271)
LYMPHOCYTES # BLD AUTO: 0.39 THOUSANDS/ÂΜL (ref 0.6–4.47)
LYMPHOCYTES NFR BLD AUTO: 8 % (ref 14–44)
MAGNESIUM SERPL-MCNC: 2.2 MG/DL (ref 1.9–2.7)
MCH RBC QN AUTO: 30.6 PG (ref 26.8–34.3)
MCHC RBC AUTO-ENTMCNC: 31.9 G/DL (ref 31.4–37.4)
MCV RBC AUTO: 96 FL (ref 82–98)
MONOCYTES # BLD AUTO: 0.26 THOUSAND/ÂΜL (ref 0.17–1.22)
MONOCYTES NFR BLD AUTO: 5 % (ref 4–12)
NEUTROPHILS # BLD AUTO: 4.4 THOUSANDS/ÂΜL (ref 1.85–7.62)
NEUTS SEG NFR BLD AUTO: 84 % (ref 43–75)
NRBC BLD AUTO-RTO: 0 /100 WBCS
PHOSPHATE SERPL-MCNC: 2.7 MG/DL (ref 2.3–4.1)
PLATELET # BLD AUTO: 82 THOUSANDS/UL (ref 149–390)
PMV BLD AUTO: 10.3 FL (ref 8.9–12.7)
POTASSIUM SERPL-SCNC: 4.2 MMOL/L (ref 3.5–5.3)
PROT SERPL-MCNC: 4.9 G/DL (ref 6.4–8.4)
RBC # BLD AUTO: 3.27 MILLION/UL (ref 3.81–5.12)
SODIUM SERPL-SCNC: 135 MMOL/L (ref 135–147)
TRIGL SERPL-MCNC: 178 MG/DL (ref ?–150)
WBC # BLD AUTO: 5.2 THOUSAND/UL (ref 4.31–10.16)

## 2024-11-18 PROCEDURE — 74018 RADEX ABDOMEN 1 VIEW: CPT

## 2024-11-18 PROCEDURE — 80048 BASIC METABOLIC PNL TOTAL CA: CPT

## 2024-11-18 PROCEDURE — 02HV33Z INSERTION OF INFUSION DEVICE INTO SUPERIOR VENA CAVA, PERCUTANEOUS APPROACH: ICD-10-PCS | Performed by: SURGERY

## 2024-11-18 PROCEDURE — 85025 COMPLETE CBC W/AUTO DIFF WBC: CPT

## 2024-11-18 PROCEDURE — 36569 INSJ PICC 5 YR+ W/O IMAGING: CPT

## 2024-11-18 PROCEDURE — 84478 ASSAY OF TRIGLYCERIDES: CPT

## 2024-11-18 PROCEDURE — 83615 LACTATE (LD) (LDH) ENZYME: CPT

## 2024-11-18 PROCEDURE — 80076 HEPATIC FUNCTION PANEL: CPT

## 2024-11-18 PROCEDURE — 84100 ASSAY OF PHOSPHORUS: CPT

## 2024-11-18 PROCEDURE — 3E0436Z INTRODUCTION OF NUTRITIONAL SUBSTANCE INTO CENTRAL VEIN, PERCUTANEOUS APPROACH: ICD-10-PCS | Performed by: SURGERY

## 2024-11-18 PROCEDURE — 99232 SBSQ HOSP IP/OBS MODERATE 35: CPT

## 2024-11-18 PROCEDURE — 99232 SBSQ HOSP IP/OBS MODERATE 35: CPT | Performed by: SURGERY

## 2024-11-18 PROCEDURE — 83735 ASSAY OF MAGNESIUM: CPT

## 2024-11-18 PROCEDURE — C1751 CATH, INF, PER/CENT/MIDLINE: HCPCS

## 2024-11-18 PROCEDURE — 82948 REAGENT STRIP/BLOOD GLUCOSE: CPT

## 2024-11-18 RX ORDER — SODIUM CHLORIDE, SODIUM GLUCONATE, SODIUM ACETATE, POTASSIUM CHLORIDE, MAGNESIUM CHLORIDE, SODIUM PHOSPHATE, DIBASIC, AND POTASSIUM PHOSPHATE .53; .5; .37; .037; .03; .012; .00082 G/100ML; G/100ML; G/100ML; G/100ML; G/100ML; G/100ML; G/100ML
500 INJECTION, SOLUTION INTRAVENOUS ONCE
Status: COMPLETED | OUTPATIENT
Start: 2024-11-18 | End: 2024-11-18

## 2024-11-18 RX ADMIN — INSULIN LISPRO 1 UNITS: 100 INJECTION, SOLUTION INTRAVENOUS; SUBCUTANEOUS at 07:55

## 2024-11-18 RX ADMIN — HYDROMORPHONE HYDROCHLORIDE 0.2 MG: 0.2 INJECTION, SOLUTION INTRAMUSCULAR; INTRAVENOUS; SUBCUTANEOUS at 18:18

## 2024-11-18 RX ADMIN — HEPARIN SODIUM 5000 UNITS: 5000 INJECTION INTRAVENOUS; SUBCUTANEOUS at 06:15

## 2024-11-18 RX ADMIN — METOROPROLOL TARTRATE 2.5 MG: 5 INJECTION, SOLUTION INTRAVENOUS at 15:04

## 2024-11-18 RX ADMIN — METOROPROLOL TARTRATE 2.5 MG: 5 INJECTION, SOLUTION INTRAVENOUS at 03:39

## 2024-11-18 RX ADMIN — INSULIN LISPRO 1 UNITS: 100 INJECTION, SOLUTION INTRAVENOUS; SUBCUTANEOUS at 12:54

## 2024-11-18 RX ADMIN — HYDROMORPHONE HYDROCHLORIDE 0.2 MG: 0.2 INJECTION, SOLUTION INTRAMUSCULAR; INTRAVENOUS; SUBCUTANEOUS at 03:39

## 2024-11-18 RX ADMIN — ONDANSETRON 4 MG: 2 INJECTION INTRAMUSCULAR; INTRAVENOUS at 09:26

## 2024-11-18 RX ADMIN — INSULIN LISPRO 1 UNITS: 100 INJECTION, SOLUTION INTRAVENOUS; SUBCUTANEOUS at 00:18

## 2024-11-18 RX ADMIN — METOROPROLOL TARTRATE 2.5 MG: 5 INJECTION, SOLUTION INTRAVENOUS at 10:07

## 2024-11-18 RX ADMIN — Medication 1 SPRAY: at 12:55

## 2024-11-18 RX ADMIN — HYDROMORPHONE HYDROCHLORIDE 0.2 MG: 0.2 INJECTION, SOLUTION INTRAMUSCULAR; INTRAVENOUS; SUBCUTANEOUS at 11:04

## 2024-11-18 RX ADMIN — HYDROMORPHONE HYDROCHLORIDE 0.5 MG: 1 INJECTION, SOLUTION INTRAMUSCULAR; INTRAVENOUS; SUBCUTANEOUS at 22:12

## 2024-11-18 RX ADMIN — HYDROMORPHONE HYDROCHLORIDE 0.2 MG: 0.2 INJECTION, SOLUTION INTRAMUSCULAR; INTRAVENOUS; SUBCUTANEOUS at 15:04

## 2024-11-18 RX ADMIN — SODIUM CHLORIDE, SODIUM GLUCONATE, SODIUM ACETATE, POTASSIUM CHLORIDE, MAGNESIUM CHLORIDE, SODIUM PHOSPHATE, DIBASIC, AND POTASSIUM PHOSPHATE 500 ML: .53; .5; .37; .037; .03; .012; .00082 INJECTION, SOLUTION INTRAVENOUS at 04:56

## 2024-11-18 RX ADMIN — PANTOPRAZOLE SODIUM 40 MG: 40 INJECTION, POWDER, LYOPHILIZED, FOR SOLUTION INTRAVENOUS at 08:04

## 2024-11-18 RX ADMIN — INSULIN LISPRO 1 UNITS: 100 INJECTION, SOLUTION INTRAVENOUS; SUBCUTANEOUS at 18:18

## 2024-11-18 RX ADMIN — Medication: at 21:25

## 2024-11-18 RX ADMIN — ONDANSETRON 4 MG: 2 INJECTION INTRAMUSCULAR; INTRAVENOUS at 21:38

## 2024-11-18 RX ADMIN — HYDROMORPHONE HYDROCHLORIDE 0.2 MG: 0.2 INJECTION, SOLUTION INTRAMUSCULAR; INTRAVENOUS; SUBCUTANEOUS at 06:11

## 2024-11-18 RX ADMIN — HEPARIN SODIUM 5000 UNITS: 5000 INJECTION INTRAVENOUS; SUBCUTANEOUS at 13:30

## 2024-11-18 RX ADMIN — DEXTROSE, SODIUM CHLORIDE, AND POTASSIUM CHLORIDE 150 ML/HR: 5; .45; .15 INJECTION INTRAVENOUS at 10:13

## 2024-11-18 RX ADMIN — DEXTROSE, SODIUM CHLORIDE, AND POTASSIUM CHLORIDE 125 ML/HR: 5; .45; .15 INJECTION INTRAVENOUS at 01:42

## 2024-11-18 RX ADMIN — HYDROMORPHONE HYDROCHLORIDE 0.2 MG: 0.2 INJECTION, SOLUTION INTRAMUSCULAR; INTRAVENOUS; SUBCUTANEOUS at 00:18

## 2024-11-18 RX ADMIN — DEXTROSE, SODIUM CHLORIDE, AND POTASSIUM CHLORIDE 150 ML/HR: 5; .45; .15 INJECTION INTRAVENOUS at 16:51

## 2024-11-18 NOTE — PLAN OF CARE
Problem: INFECTION - ADULT  Goal: Absence or prevention of progression during hospitalization  Description: INTERVENTIONS:  Picc procedure and maintenance   - Monitor all insertion sites, i.e. indwelling lines, tubes, and drains  - Monitor endotracheal if appropriate and nasal secretions for changes in amount and color  - [place] [facility] appropriate cooling/warming therapies per order  - Administer medications as ordered  - Instruct and encourage patient and family to use good hand hygiene technique  - Identify and instruct in appropriate isolation precautions for identified infection/condition  Outcome: Progressing     Problem: Knowledge Deficit  Goal: Patient/family/caregiver demonstrates understanding of disease process, treatment plan, medications, and discharge instructions  Description: Complete learning assessment and assess knowledge base.  Interventions:  Picc procedure and maintenance  - Provide teaching at level of understanding  - Provide teaching via preferred learning methods  Outcome: Progressing

## 2024-11-18 NOTE — ASSESSMENT & PLAN NOTE
History of endometrial cancer pulmonary embolism DVT hypothyroidism insulin-dependent diabetes hypertension and recent admission for pSBO presents to the hospital with abdominal pain nausea vomiting and diarrhea again  Admitted to surgical service after finding of pSBO again. Undergoing gastrograffin study today  Supportive care with bowel rest and NG tube decompression and IV fluids  Antiemetics & analgesics prn  Surgery planning for PICC line/TPN.  Will need to monitor glucose closely when started  Dietitian consult for management   DISPLAY PLAN FREE TEXT

## 2024-11-18 NOTE — PROGRESS NOTES
Progress Note - Hospitalist   Name: Lety Botello 64 y.o. female I MRN: [id number]  Unit/Bed#: E5 -01 I Date of Admission: 11/14/2024   Date of Service: 11/18/2024 I Hospital Day: 4    Assessment & Plan  SBO (small bowel obstruction) (HCC)  History of endometrial cancer pulmonary embolism DVT hypothyroidism insulin-dependent diabetes hypertension and recent admission for pSBO presents to the hospital with abdominal pain nausea vomiting and diarrhea again  Admitted to surgical service after finding of pSBO again. Undergoing gastrograffin study today  Supportive care with bowel rest and NG tube decompression and IV fluids  Antiemetics & analgesics prn  Surgery planning for PICC line/TPN.  Will need to monitor glucose closely when started  Dietitian consult for management  Hypertension  Prior to admission on carvedilol  Stable: While n.p.o. continue scheduled IV metoprolol 2.5 mg every 6 hours  Type 2 diabetes mellitus (HCC)  Lab Results   Component Value Date    HGBA1C 6.9 (H) 11/05/2024     Recent Labs     11/17/24  2349 11/18/24  0701 11/18/24  0730 11/18/24  1247   POCGLU 158* 156* 162* 188*     Previously on toujeo 60 units with lispro 8 units 3 times daily  Patient reports now only on sliding scale insulin and no longer on toujeo.    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  Prior to admission on Eliquis.  Continue heparin for prophylaxis while NPO  Hypothyroidism  Prior to admission levothyroxine.  Held while NPO  Mild intermittent asthma  Prior to admission on albuterol only as needed.  No exacerbations  Hyponatremia  Related to bowel obstruction/n.p.o. status.  Resolved IV fluids    Results from last 7 days   Lab Units 11/18/24  0409 11/17/24  0510 11/16/24  0547 11/15/24  0837   SODIUM mmol/L 135 137 135 130*     Morbid obesity (HCC)  Body mass index is 44.92 kg/m².    VTE Pharmacologic  Prophylaxis:   heparin    Mobility:   Basic Mobility Inpatient Raw Score: 17  JH-HLM Goal: 5: Stand one or more mins  JH-HLM Achieved: 1: Laying in bed  JH-HLM Goal NOT achieved. Continue with multidisciplinary rounding and encourage appropriate mobility to improve upon JH-HLM goals.    Patient Centered Rounds: I performed bedside rounds with nursing staff today.   Discussions with Specialists or Other Care Team Provider: case management    Education and Discussions with Family / Patient: Patient declined call to .     Current Length of Stay: 4 day(s)  Current Patient Status: Inpatient   Certification Statement: The patient will continue to require additional inpatient hospital stay due to per primary, not medically clear with persistent SBO  Discharge Plan: Anticipate discharge in >72 hrs to discharge location to be determined pending rehab evaluations.    Code Status: Level 1 - Full Code    Subjective   Patient seen and examined. Reports ongoing abdominal pain & nausea. Not yet passing flatus. No bowel movement.    Objective :  Temp:  [97.7 °F (36.5 °C)-98.3 °F (36.8 °C)] 98.3 °F (36.8 °C)  HR:  [77-82] 82  BP: (117-159)/(46-72) 159/72  Resp:  [18] 18  SpO2:  [89 %-95 %] 95 %    Body mass index is 44.92 kg/m².     Input and Output Summary (last 24 hours):     Intake/Output Summary (Last 24 hours) at 11/18/2024 1354  Last data filed at 11/18/2024 1333  Gross per 24 hour   Intake 3287.08 ml   Output 2300 ml   Net 987.08 ml       Physical Exam  Constitutional:       General: She is not in acute distress.  HENT:      Head: Normocephalic and atraumatic.   Cardiovascular:      Rate and Rhythm: Normal rate and regular rhythm.   Pulmonary:      Effort: Pulmonary effort is normal. No respiratory distress.      Breath sounds: Normal breath sounds.   Abdominal:      General: There is distension.      Tenderness: There is abdominal tenderness.      Comments: Decreased bowel sounds   Musculoskeletal:      Right  lower leg: No edema.   Skin:     General: Skin is warm and dry.   Neurological:      General: No focal deficit present.      Mental Status: She is alert and oriented to person, place, and time.           Lines/Drains:  Lines/Drains/Airways       Active Status       Name Placement date Placement time Site Days    PICC Line 11/18/24 Right Basilic 11/18/24  1249  Basilic  less than 1    NG/OG/Enteral Tube Nasogastric 16 Fr Right nare 11/15/24  0357  Right nare  3                    Central Line:  Goal for removal: Continuing for TPN.                Lab Results: I have reviewed the following results:   Results from last 7 days   Lab Units 11/18/24  0435   WBC Thousand/uL 5.20   HEMOGLOBIN g/dL 10.0*   HEMATOCRIT % 31.3*   PLATELETS Thousands/uL 82*   SEGS PCT % 84*   LYMPHO PCT % 8*   MONO PCT % 5   EOS PCT % 2     Results from last 7 days   Lab Units 11/18/24  0409   SODIUM mmol/L 135   POTASSIUM mmol/L 4.2   CHLORIDE mmol/L 99   CO2 mmol/L 32   BUN mg/dL 11   CREATININE mg/dL 0.62   ANION GAP mmol/L 4   CALCIUM mg/dL 7.9*   ALBUMIN g/dL 2.7*   TOTAL BILIRUBIN mg/dL 0.87   ALK PHOS U/L 95   ALT U/L 26   AST U/L 32   GLUCOSE RANDOM mg/dL 194*     Results from last 7 days   Lab Units 11/14/24  0854   INR  1.18     Results from last 7 days   Lab Units 11/18/24  1247 11/18/24  0730 11/18/24  0701 11/17/24  2349 11/17/24  1729 11/17/24  1115 11/17/24  0607 11/16/24  2352 11/16/24  1753 11/16/24  1217 11/16/24  0546 11/16/24  0004   POC GLUCOSE mg/dl 188* 162* 156* 158* 132 121 109 107 99 115 126 115         Results from last 7 days   Lab Units 11/14/24  0821   LACTIC ACID mmol/L 1.5       Recent Cultures (last 7 days):         Imaging Results Review: I reviewed radiology reports from this admission including: CT abdomen/pelvis and xray(s).      Last 24 Hours Medication List:     Current Facility-Administered Medications:     dextrose 5 % and sodium chloride 0.45 % with KCl 20 mEq/L infusion, Continuous, Last Rate: 150 mL/hr  (11/18/24 1013)    heparin (porcine) subcutaneous injection 5,000 Units, Q8H SAM **AND** [CANCELED] Platelet count, Once    HYDROmorphone (DILAUDID) injection 0.5 mg, Q3H PRN    HYDROmorphone HCl (DILAUDID) injection 0.2 mg, Q3H PRN    insulin lispro (HumALOG/ADMELOG) 100 units/mL subcutaneous injection 1-6 Units, Q6H SAM **AND** Fingerstick Glucose (POCT), Q6H    LORazepam (ATIVAN) injection 1 mg, HS    metoprolol (LOPRESSOR) injection 2.5 mg, Q6H    ondansetron (ZOFRAN) injection 4 mg, Q6H PRN    pantoprazole (PROTONIX) injection 40 mg, Q24H SAM    phenol (CHLORASEPTIC) 1.4 % mucosal liquid 1 spray, Q2H PRN    trimethobenzamide (TIGAN) IM injection 200 mg, Q6H PRN    Administrative Statements   Today, Patient Was Seen By: Aminah Hebert PA-C      **Please Note: This note may have been constructed using a voice recognition system.**

## 2024-11-18 NOTE — PROGRESS NOTES
Complex venous access for picc/tpn.Chart reviewed and Plan of care discussed with surgery MARINA James PA-C. Pt scheduled for gastrografin challenge this morning. Picc on hold for results.

## 2024-11-18 NOTE — ASSESSMENT & PLAN NOTE
Related to bowel obstruction/n.p.o. status.  Resolved IV fluids    Results from last 7 days   Lab Units 11/18/24  0409 11/17/24  0510 11/16/24  0547 11/15/24  0837   SODIUM mmol/L 135 137 135 130*

## 2024-11-18 NOTE — UTILIZATION REVIEW
Continued Stay Review    Date: 11/18/24                          Current Patient Class: Inpatient  Current Level of Care: MS    HPI:64 y.o. female initially admitted on 11/14/24 - DX:  SBO (small bowel obstruction) / Gastroparesis / Type 2 diabetes mellitus       Assessment/Plan:   11/18/24: Continues to feel nausea, weak, unwell. No further vomiting around the NGT. No bowel functoin; Exam: abdominal tenderness -  (suprapubic area) ; pain 9-10/10  Plan: cont ivf with KCL; cont lopressor iv Q6; cont iv protonix; recd ivf bolus 500 x1; Accuchecks with ssic; monitor labs; NPO; cont NG tube to moderate continuous suction, NGT flushes       Medications:   Scheduled Medications:  heparin (porcine), 5,000 Units, Subcutaneous, Q8H SAM  insulin lispro, 1-6 Units, Subcutaneous, Q6H SAM  LORazepam, 1 mg, Intravenous, HS  metoprolol, 2.5 mg, Intravenous, Q6H  pantoprazole, 40 mg, Intravenous, Q24H SAM      multi-electrolyte (ISOLYTE-S PH 7.4) bolus 500 mL  Dose: 500 mL  Freq: Once Route: IV  Start: 11/18/24 0445 End: 11/18/24 0556    Continuous IV Infusions:  dextrose 5 % and sodium chloride 0.45 % with KCl 20 mEq/L, 150 mL/hr, Intravenous, Continuous      PRN Meds:  HYDROmorphone, 0.5 mg, Intravenous, Q3H PRN  HYDROmorphone, 0.2 mg, Intravenous, Q3H PRN  (11/18 recd x3 so far today)   ondansetron, 4 mg, Intravenous, Q6H PRN   (11/18 recd x1 so far today)   phenol, 1 spray, Mouth/Throat, Q2H PRN  trimethobenzamide, 200 mg, Intramuscular, Q6H PRN      Discharge Plan: TBD    Vital Signs (last 3 days)       Date/Time Temp Pulse Resp BP MAP (mmHg) SpO2 O2 Device Patient Position - Orthostatic VS Pain    11/18/24 10:04:04 -- 82 -- 159/72 101 95 % -- -- --    11/18/24 07:28:28 98.3 °F (36.8 °C) 80 18 155/70 98 95 % -- Lying --    11/18/24 0611 -- -- -- -- -- -- -- -- 9 11/18/24 0339 -- -- -- -- -- -- -- -- 10 - Worst Possible Pain    11/18/24 0018 -- -- -- -- -- -- -- -- 9 11/17/24 23:48:30 -- 82 -- 135/65 88 94 % -- -- --     11/17/24 23:47:15 -- 77 -- 135/65 88 93 % -- -- --    11/17/24 2127 -- -- -- -- -- -- -- -- 10 - Worst Possible Pain    11/17/24 1822 -- -- -- -- -- -- -- -- 7    11/17/24 14:51:39 97.7 °F (36.5 °C) 77 18 117/46 70 89 % -- Lying --    11/17/24 1325 -- -- -- -- -- -- -- -- 6    11/17/24 11:27:02 98 °F (36.7 °C) 80 -- 155/62 93 93 % -- -- --    11/17/24 1011 -- -- -- -- -- -- -- -- 8    11/17/24 09:50:45 -- 83 -- 151/60 90 91 % -- -- --    11/17/24 0820 -- -- -- -- -- -- -- -- 6    11/17/24 07:01:11 98.4 °F (36.9 °C) 82 14 134/57 83 94 % -- -- --    11/17/24 0522 -- -- -- -- -- -- -- -- 10 - Worst Possible Pain    11/17/24 0335 -- -- -- -- -- -- -- -- 10 - Worst Possible Pain    11/17/24 0006 -- -- -- -- -- -- -- -- 10 - Worst Possible Pain    11/16/24 23:43:50 97.3 °F (36.3 °C) 79 16 133/55 81 90 % None (Room air) Lying --    11/16/24 1951 -- -- -- -- -- -- -- -- 10 - Worst Possible Pain    11/16/24 1623 -- -- -- -- -- -- -- -- 9    11/16/24 15:45:24 -- 80 16 149/55 86 93 % -- -- --    11/16/24 1222 -- -- -- -- -- -- -- -- 9    11/16/24 10:24:50 -- 78 -- 148/62 91 92 % -- -- --    11/16/24 0926 -- 78 -- 154/59 91 93 % -- -- 9    11/16/24 07:11:32 97.5 °F (36.4 °C) 76 -- 125/56 79 92 % -- -- --    11/16/24 0650 -- -- -- -- -- -- -- -- 9    11/16/24 0320 -- -- -- -- -- -- -- -- 9    11/16/24 0111 -- -- -- -- -- -- -- -- 10 - Worst Possible Pain    11/15/24 22:54:56 97.7 °F (36.5 °C) -- 19 137/68 91 -- -- -- --    11/15/24 2032 -- -- -- -- -- -- -- -- 10 - Worst Possible Pain    11/15/24 1647 -- -- -- -- -- -- -- -- 6    11/15/24 15:23:09 -- 80 -- 117/62 80 95 % -- -- --    11/15/24 1328 -- -- -- -- -- -- -- -- 10 - Worst Possible Pain    11/15/24 09:04:43 -- 84 -- 135/70 92 93 % -- -- --    11/15/24 0901 -- -- -- -- -- -- -- -- 10 - Worst Possible Pain    11/15/24 07:45:06 98.8 °F (37.1 °C) 82 20 127/67 87 95 % -- Lying --    11/15/24 03:09:21 -- 89 -- 138/63 88 94 % -- -- --    11/15/24 0308 -- -- -- -- -- -- -- -- 9           Pertinent Labs/Diagnostic Results:   Radiology:  XR abdomen 1 view kub   Final Interpretation by Damian Mack MD (11/17 9507)      NGT partially retracted with tip in proximal stomach.      Small bowel obstruction again suggested.               Workstation performed: [id number]         XR abdomen 1 view kub   Final Interpretation by Jaylin Magaña MD (11/15 3617)      Technically limited study, as discussed above.      No dilated loops of bowel seen, however note fluid-filled loops of bowel as were seen on the 11/14/2024 CT may not be apparent with supine radiographs.      Small amount of hyperdense material seen in the loop of presumably ascending colon. This may represent some administered oral contrast, although no oral contrast is seen elsewhere in the gastrointestinal tract, or possibly vicarious excretion of the IV    contrast administered on 11/14/2024.      Excreted IV contrast from the 11/14/2024 CT seen within the right collecting system, right ureter and the urinary bladder, indicating delayed renal clearance. Mild right-sided hydronephrosis.               Workstation performed: [id number]         XR chest portable   Final Interpretation by Kalie Nova MD (11/15 7014)      Distal aspect of NG tube is coursing below the left hemidiaphragm.            Workstation performed: [id number]         CT abdomen pelvis with contrast   Final Interpretation by E. Alec Schoenberger, MD (11/14 8905)   Small bowel obstruction with transition in the pelvis is again seen.   New small volume of ascites   Other stable findings as above.         Workstation performed: [id number]         XR abdomen complete inc upright and/or decubitus    (Results Pending)     Cardiology:  ECG 12 lead   Final Result by Eleazar Blanco DO (11/15 0702)   Sinus tachycardia   Left axis deviation   Minimal voltage criteria for LVH, may be normal variant   Abnormal ECG   When compared with ECG of 07-Nov-2024 08:00,   Vent.  rate has increased by  42 bpm   Confirmed by Eleazar Blanco ([id number]) on 11/15/2024 7:02:14 AM          Results from last 7 days   Lab Units 11/18/24  0435 11/17/24  0510 11/16/24  0547 11/15/24  0837 11/14/24  0821   WBC Thousand/uL 5.20 6.27 6.38 7.88 9.43   HEMOGLOBIN g/dL 10.0* 10.1* 9.3* 9.9* 11.5   HEMATOCRIT % 31.3* 30.8* 29.0* 30.3* 35.6   PLATELETS Thousands/uL 82* 97* 84* 85* 100*   TOTAL NEUT ABS Thousands/µL 4.40 5.32  --  6.58 7.44        Results from last 7 days   Lab Units 11/18/24 0409 11/17/24  0510 11/16/24  0547 11/15/24  0837 11/14/24  2226 11/14/24  0821 11/12/24  0443   SODIUM mmol/L 135 137 135 130* 133*   < > 132*   POTASSIUM mmol/L 4.2 3.7 4.0 3.9 3.9   < > 4.3   CHLORIDE mmol/L 99 98 100 99 101   < > 103   CO2 mmol/L 32 32 30 25 23   < > 24   ANION GAP mmol/L 4 7 5 6 9   < > 5   BUN mg/dL 11 16 17 15 13   < > 12   CREATININE mg/dL 0.62 0.69 0.85 0.70 0.62   < > 0.55*   EGFR ml/min/1.73sq m 96 94 74 93 97   < > 101   CALCIUM mg/dL 7.9* 8.2* 8.0* 8.3* 7.9*   < > 7.7*   MAGNESIUM mg/dL 2.2 2.5  --  2.4 1.7*  --  1.7*   PHOSPHORUS mg/dL 2.7 3.9  --  3.7 3.3  --  2.5    < > = values in this interval not displayed.     Results from last 7 days   Lab Units 11/18/24 0409 11/16/24 0547 11/14/24  0821   AST U/L 32 11* 8*   ALT U/L 26 9 8   ALK PHOS U/L 95 75 86   TOTAL PROTEIN g/dL 4.9* 4.7* 5.5*   ALBUMIN g/dL 2.7* 2.7* 3.2*   TOTAL BILIRUBIN mg/dL 0.87 0.42 0.51   BILIRUBIN DIRECT mg/dL 0.31*  --   --      Results from last 7 days   Lab Units 11/18/24  0730 11/18/24  0701 11/17/24  2349 11/17/24  1729 11/17/24  1115 11/17/24  0607 11/16/24  2352 11/16/24  1753 11/16/24  1217 11/16/24  0546 11/16/24  0004 11/15/24  1815   POC GLUCOSE mg/dl 162* 156* 158* 132 121 109 107 99 115 126 115 108     Results from last 7 days   Lab Units 11/18/24  0409 11/17/24  0510 11/16/24  0547 11/15/24  0837 11/14/24  2226 11/14/24  0821 11/12/24  0443   GLUCOSE RANDOM mg/dL 194* 117 126 144* 139 165* 153*         Results from last 7 days   Lab Units 11/17/24  0510   CRP mg/L 16.3*          Network Utilization Review Department  ATTENTION: Please call with any questions or concerns to [phone] and carefully listen to the prompts so that you are directed to the right person. All voicemails are confidential.   For Discharge needs, contact Care Management DC Support Team at [phone] opt. [phone]  Send all requests for admission clinical reviews, approved or denied determinations and any other requests to dedicated fax number below belonging to the [facility] where the patient is receiving treatment. List of dedicated fax numbers for the Facilities:  FACILITY NAME UR FAX NUMBER   ADMISSION DENIALS (Administrative/Medical Necessity) [phone]   DISCHARGE SUPPORT TEAM (NETWORK) [phone]   PARENT CHILD HEALTH (Maternity/NICU/Pediatrics) [phone]   [facility] [phone]   [facility] [phone]   [facility] [phone]   [facility] [phone]   [facility] [phone]   [facility] [phone]   [facility] [phone]   [facility] [phone]   [facility] [phone]   [facility] [phone]   [facility] [phone]   [facility] [phone]

## 2024-11-18 NOTE — PROCEDURES
Insert Complex Venous Access Line    Date/Time: 11/18/2024 12:45 PM    Performed by: Misty Robles RN  Authorized by: Orlando Oshea MD    Patient location:  Bedside  Consent:     Consent obtained:  Written    Procedural risks discussed: consent obtained by Harvey James PA-C.  Universal protocol:     Procedure explained and questions answered to patient or proxy's satisfaction: yes      Immediately prior to procedure, a time out was called: yes      Relevant documents present and verified: yes      Test results available and properly labeled: yes      Radiology Images displayed and confirmed.  If images not available, report reviewed: yes      Required blood products, implants, devices, and special equipment available: yes      Site/side marked: yes      Patient identity confirmed:  Verbally with patient, arm band, provided demographic data and hospital-assigned identification number  Pre-procedure details:     Hand hygiene: Hand hygiene performed prior to insertion      Sterile barrier technique: All elements of maximal sterile technique followed      Skin preparation:  ChloraPrep    Skin preparation agent: Skin preparation agent completely dried prior to procedure    Procedure details:     Complex Venous Access Line Type: PICC      Complex Venous Access Line Indications: total parenteral nutrition      Catheter tip vessel location: atriocaval junction      Orientation:  Right    Location:  Basilic    Procedural supplies:  Double lumen (confirmed with Harvey James PA-C)    Catheter size:  5 Fr    Total catheter length (cm):  37    Catheter out on skin (cm):  0    Max flow rate:  999ml/hr    Arm circumference:  42cm    Patient evaluated for contraindications to access (i.e. fistula, thrombosis, etc): Yes      Approach: percutaneous technique used      Patient position:  Flat    Ultrasound image availability:  Not saved    Sterile ultrasound techniques: Sterile gel and sterile probe covers were used       Number of attempts:  1    Successful placement: yes      Landmarks identified: yes      Vessel of catheter tip end:  Sherlock 3CG confirmed (sherlock 3cg procedure record confirmed placement sent to medical records with consent, picc okay to be utilized)  Anesthesia (see MAR for exact dosages):     Anesthesia method:  Local infiltration (3ml)    Local anesthetic:  Lidocaine 1% w/o epi  Post-procedure details:     Post-procedure:  Dressing applied and securement device placed    Assessment:  Blood return through all ports and free fluid flow    Post-procedure complications: none      Patient tolerance of procedure:  Tolerated well, no immediate complications    Lot#[id number]

## 2024-11-18 NOTE — PROGRESS NOTES
Nutrition    Standard TPN for day 1 and day 2.   check TPN lab panel.  when electrolytes stable, increase to TPN goal:  950 mL D30;  600 mL 15%AA;  275 mL 20% lipids.  provides: 1879 Kcal, 90 g protein, 1825 mL fluids.

## 2024-11-18 NOTE — CASE MANAGEMENT
Case Management Progress Note    Patient name Lety Botello  [facility] /E5 -* MRN [id number]  : 1960 Date 2024       LOS (days): 4  Geometric Mean LOS (GMLOS) (days): 3  Days to GMLOS:-0.9        OBJECTIVE:     Current admission status: Inpatient  Preferred Pharmacy:   [id number] - [place] - [address]  [address] [facility] [address]  Phone: [phone] Fax: [phone]    Primary Care Provider: Tai Martinez    Primary Insurance: [employer]  Secondary Insurance:     PROGRESS NOTE:    Pt states her roommates would not be able to help her if she was on TPN. Cm attempted to discuss further but pt did not want to talk at that time.

## 2024-11-18 NOTE — ASSESSMENT & PLAN NOTE
Lab Results   Component Value Date    HGBA1C 6.9 (H) 11/05/2024     Recent Labs     11/17/24  2349 11/18/24  0701 11/18/24  0730 11/18/24  1247   POCGLU 158* 156* 162* 188*     Previously on toujeo 60 units with lispro 8 units 3 times daily  Patient reports now only on sliding scale insulin and no longer on toujeo.    Currently stable with every 6 hours checks

## 2024-11-19 ENCOUNTER — APPOINTMENT (INPATIENT)
Dept: RADIOLOGY | Facility: HOSPITAL | Age: 64
DRG: 230 | End: 2024-11-19
Payer: COMMERCIAL

## 2024-11-19 LAB
ANION GAP SERPL CALCULATED.3IONS-SCNC: 2 MMOL/L (ref 4–13)
BASOPHILS # BLD AUTO: 0.03 THOUSANDS/ÂΜL (ref 0–0.1)
BASOPHILS NFR BLD AUTO: 1 % (ref 0–1)
BUN SERPL-MCNC: 9 MG/DL (ref 5–25)
CALCIUM SERPL-MCNC: 7.9 MG/DL (ref 8.4–10.2)
CHLORIDE SERPL-SCNC: 101 MMOL/L (ref 96–108)
CO2 SERPL-SCNC: 33 MMOL/L (ref 21–32)
CREAT SERPL-MCNC: 0.56 MG/DL (ref 0.6–1.3)
EOSINOPHIL # BLD AUTO: 0.11 THOUSAND/ÂΜL (ref 0–0.61)
EOSINOPHIL NFR BLD AUTO: 3 % (ref 0–6)
ERYTHROCYTE [DISTWIDTH] IN BLOOD BY AUTOMATED COUNT: 16.3 % (ref 11.6–15.1)
GFR SERPL CREATININE-BSD FRML MDRD: 100 ML/MIN/1.73SQ M
GLUCOSE SERPL-MCNC: 176 MG/DL (ref 65–140)
GLUCOSE SERPL-MCNC: 179 MG/DL (ref 65–140)
GLUCOSE SERPL-MCNC: 181 MG/DL (ref 65–140)
GLUCOSE SERPL-MCNC: 192 MG/DL (ref 65–140)
GLUCOSE SERPL-MCNC: 192 MG/DL (ref 65–140)
HCT VFR BLD AUTO: 28.9 % (ref 34.8–46.1)
HGB BLD-MCNC: 9.1 G/DL (ref 11.5–15.4)
IMM GRANULOCYTES # BLD AUTO: 0.02 THOUSAND/UL (ref 0–0.2)
IMM GRANULOCYTES NFR BLD AUTO: 1 % (ref 0–2)
LYMPHOCYTES # BLD AUTO: 0.34 THOUSANDS/ÂΜL (ref 0.6–4.47)
LYMPHOCYTES NFR BLD AUTO: 8 % (ref 14–44)
MAGNESIUM SERPL-MCNC: 2.1 MG/DL (ref 1.9–2.7)
MCH RBC QN AUTO: 30.3 PG (ref 26.8–34.3)
MCHC RBC AUTO-ENTMCNC: 31.5 G/DL (ref 31.4–37.4)
MCV RBC AUTO: 96 FL (ref 82–98)
MONOCYTES # BLD AUTO: 0.28 THOUSAND/ÂΜL (ref 0.17–1.22)
MONOCYTES NFR BLD AUTO: 7 % (ref 4–12)
NEUTROPHILS # BLD AUTO: 3.4 THOUSANDS/ÂΜL (ref 1.85–7.62)
NEUTS SEG NFR BLD AUTO: 80 % (ref 43–75)
NRBC BLD AUTO-RTO: 0 /100 WBCS
PHOSPHATE SERPL-MCNC: 2.9 MG/DL (ref 2.3–4.1)
PLATELET # BLD AUTO: 74 THOUSANDS/UL (ref 149–390)
PMV BLD AUTO: 10.5 FL (ref 8.9–12.7)
POTASSIUM SERPL-SCNC: 4.4 MMOL/L (ref 3.5–5.3)
RBC # BLD AUTO: 3 MILLION/UL (ref 3.81–5.12)
SODIUM SERPL-SCNC: 136 MMOL/L (ref 135–147)
WBC # BLD AUTO: 4.18 THOUSAND/UL (ref 4.31–10.16)

## 2024-11-19 PROCEDURE — 99232 SBSQ HOSP IP/OBS MODERATE 35: CPT | Performed by: SURGERY

## 2024-11-19 PROCEDURE — 80048 BASIC METABOLIC PNL TOTAL CA: CPT

## 2024-11-19 PROCEDURE — 99232 SBSQ HOSP IP/OBS MODERATE 35: CPT | Performed by: PHYSICIAN ASSISTANT

## 2024-11-19 PROCEDURE — 74018 RADEX ABDOMEN 1 VIEW: CPT

## 2024-11-19 PROCEDURE — 85025 COMPLETE CBC W/AUTO DIFF WBC: CPT

## 2024-11-19 PROCEDURE — 82948 REAGENT STRIP/BLOOD GLUCOSE: CPT

## 2024-11-19 PROCEDURE — 83735 ASSAY OF MAGNESIUM: CPT

## 2024-11-19 PROCEDURE — 84100 ASSAY OF PHOSPHORUS: CPT

## 2024-11-19 RX ADMIN — METOROPROLOL TARTRATE 2.5 MG: 5 INJECTION, SOLUTION INTRAVENOUS at 07:31

## 2024-11-19 RX ADMIN — INSULIN LISPRO 1 UNITS: 100 INJECTION, SOLUTION INTRAVENOUS; SUBCUTANEOUS at 18:00

## 2024-11-19 RX ADMIN — HEPARIN SODIUM 5000 UNITS: 5000 INJECTION INTRAVENOUS; SUBCUTANEOUS at 21:41

## 2024-11-19 RX ADMIN — INSULIN LISPRO 1 UNITS: 100 INJECTION, SOLUTION INTRAVENOUS; SUBCUTANEOUS at 00:58

## 2024-11-19 RX ADMIN — INSULIN LISPRO 1 UNITS: 100 INJECTION, SOLUTION INTRAVENOUS; SUBCUTANEOUS at 12:41

## 2024-11-19 RX ADMIN — HYDROMORPHONE HYDROCHLORIDE 0.5 MG: 1 INJECTION, SOLUTION INTRAMUSCULAR; INTRAVENOUS; SUBCUTANEOUS at 17:07

## 2024-11-19 RX ADMIN — HYDROMORPHONE HYDROCHLORIDE 0.5 MG: 1 INJECTION, SOLUTION INTRAMUSCULAR; INTRAVENOUS; SUBCUTANEOUS at 07:32

## 2024-11-19 RX ADMIN — HYDROMORPHONE HYDROCHLORIDE 0.5 MG: 1 INJECTION, SOLUTION INTRAMUSCULAR; INTRAVENOUS; SUBCUTANEOUS at 23:04

## 2024-11-19 RX ADMIN — Medication: at 21:41

## 2024-11-19 RX ADMIN — METOROPROLOL TARTRATE 2.5 MG: 5 INJECTION, SOLUTION INTRAVENOUS at 19:58

## 2024-11-19 RX ADMIN — HEPARIN SODIUM 5000 UNITS: 5000 INJECTION INTRAVENOUS; SUBCUTANEOUS at 07:31

## 2024-11-19 RX ADMIN — PANTOPRAZOLE SODIUM 40 MG: 40 INJECTION, POWDER, LYOPHILIZED, FOR SOLUTION INTRAVENOUS at 09:21

## 2024-11-19 RX ADMIN — METOROPROLOL TARTRATE 2.5 MG: 5 INJECTION, SOLUTION INTRAVENOUS at 00:54

## 2024-11-19 RX ADMIN — METOROPROLOL TARTRATE 2.5 MG: 5 INJECTION, SOLUTION INTRAVENOUS at 12:41

## 2024-11-19 RX ADMIN — HYDROMORPHONE HYDROCHLORIDE 0.5 MG: 1 INJECTION, SOLUTION INTRAMUSCULAR; INTRAVENOUS; SUBCUTANEOUS at 20:06

## 2024-11-19 RX ADMIN — HEPARIN SODIUM 5000 UNITS: 5000 INJECTION INTRAVENOUS; SUBCUTANEOUS at 00:54

## 2024-11-19 RX ADMIN — HYDROMORPHONE HYDROCHLORIDE 0.5 MG: 1 INJECTION, SOLUTION INTRAMUSCULAR; INTRAVENOUS; SUBCUTANEOUS at 10:33

## 2024-11-19 RX ADMIN — HYDROMORPHONE HYDROCHLORIDE 0.5 MG: 1 INJECTION, SOLUTION INTRAMUSCULAR; INTRAVENOUS; SUBCUTANEOUS at 03:24

## 2024-11-19 RX ADMIN — INSULIN LISPRO 2 UNITS: 100 INJECTION, SOLUTION INTRAVENOUS; SUBCUTANEOUS at 07:32

## 2024-11-19 RX ADMIN — HEPARIN SODIUM 5000 UNITS: 5000 INJECTION INTRAVENOUS; SUBCUTANEOUS at 14:06

## 2024-11-19 RX ADMIN — HYDROMORPHONE HYDROCHLORIDE 0.5 MG: 1 INJECTION, SOLUTION INTRAMUSCULAR; INTRAVENOUS; SUBCUTANEOUS at 14:03

## 2024-11-19 NOTE — ASSESSMENT & PLAN NOTE
64 y.o. female presenting with recurrent SBO. 11/14 CT A/P: Small bowel obstruction with transition in the pelvis is again seen  - 11/18 KUB showed SBO and some contrast within bowel in the right side of the abdomen which seems similar to the prior study and might be residual colonic contrast from prior imaging work-up   -NG tube output: 2350 cc green bilious  -Abdomen: Soft, nondistended, lower hemiabdominal tenderness to palpation.    Plan  - F/u AM GGC KUB  - Pending KUB results, may need surgical intervention  - NPO  - NG tube to moderate continuous suction, NGT flushes  - mIVF @ 150 cc/hr  - Ok for ice chips  - Re-order TPN  - Nutrition consult for TPN recs  - Prehabilitation,encourage OOB and ambulation, will likely require surgical intervention  - DVT ppx

## 2024-11-19 NOTE — ASSESSMENT & PLAN NOTE
Lab Results   Component Value Date    HGBA1C 6.9 (H) 11/05/2024     Recent Labs     11/18/24  1247 11/18/24  1808 11/19/24  0004 11/19/24  0554   POCGLU 188* 177* 181* 192*   Previously on toujeo 60 units with lispro 8 units 3 times daily  Patient reports now only on sliding scale insulin and no longer on toujeo.    Currently stable with every 6 hours checks

## 2024-11-19 NOTE — PLAN OF CARE
Problem: Potential for Falls  Goal: Patient will remain free of falls  Description: INTERVENTIONS:  - Educate patient/family on patient safety including physical limitations  - Instruct patient to call for assistance with activity   - Consult OT/PT to assist with strengthening/mobility   - Keep Call bell within reach  - Keep bed low and locked with side rails adjusted as appropriate  - Keep care items and personal belongings within reach  - Initiate and maintain comfort rounds  - Make Fall Risk Sign visible to staff  - Offer Toileting every 3 Hours, in advance of need  - Initiate/Maintain bed alarm  - Obtain necessary fall risk management equipment  - Apply yellow socks and bracelet for high fall risk patients  - Consider moving patient to room near nurses station  Outcome: Progressing     Problem: PAIN - ADULT  Goal: Verbalizes/displays adequate comfort level or baseline comfort level  Description: Interventions:  - Encourage patient to monitor pain and request assistance  - Assess pain using appropriate pain scale  - Administer analgesics based on type and severity of pain and evaluate response  - Implement non-pharmacological measures as appropriate and evaluate response  - Consider cultural and social influences on pain and pain management  - Notify physician/advanced practitioner if interventions unsuccessful or patient reports new pain  Outcome: Progressing     Problem: SAFETY ADULT  Goal: Patient will remain free of falls  Description: INTERVENTIONS:  - Educate patient/family on patient safety including physical limitations  - Instruct patient to call for assistance with activity   - Consult OT/PT to assist with strengthening/mobility   - Keep Call bell within reach  - Keep bed low and locked with side rails adjusted as appropriate  - Keep care items and personal belongings within reach  - Initiate and maintain comfort rounds  - Make Fall Risk Sign visible to staff  - Offer Toileting every 3 Hours, in  advance of need  - Initiate/Maintain bed alarm  - Obtain necessary fall risk management equipment  - Apply yellow socks and bracelet for high fall risk patients  - Consider moving patient to room near nurses station  Outcome: Progressing  Goal: Maintain or return to baseline ADL function  Description: INTERVENTIONS:  -  Assess patient's ability to carry out ADLs; assess patient's baseline for ADL function and identify physical deficits which impact ability to perform ADLs (bathing, care of mouth/teeth, toileting, grooming, dressing, etc.)  - Assess/evaluate cause of self-care deficits   - Assess range of motion  - Assess patient's mobility; develop plan if impaired  - Assess patient's need for assistive devices and provide as appropriate  - Encourage maximum independence but intervene and supervise when necessary  - Involve family in performance of ADLs  - Assess for home care needs following discharge   - Consider OT consult to assist with ADL evaluation and planning for discharge  - Provide patient education as appropriate  Outcome: Progressing  Goal: Maintains/Returns to pre admission functional level  Description: INTERVENTIONS:  - Perform AM-PAC 6 Click Basic Mobility/ Daily Activity assessment daily.  - Set and communicate daily mobility goal to care team and patient/family/caregiver.   - Collaborate with rehabilitation services on mobility goals if consulted  - Perform Range of Motion 3 times a day.  - Reposition patient every 3 hours.  - Dangle patient 3 times a day  - Stand patient 3 times a day  - Ambulate patient 3 times a day  - Out of bed to chair 3 times a day   - Out of bed for meals 3 times a day  - Out of bed for toileting  - Record patient progress and toleration of activity level   Outcome: Progressing     Problem: DISCHARGE PLANNING  Goal: Discharge to home or other facility with appropriate resources  Description: INTERVENTIONS:  - Identify barriers to discharge w/patient and caregiver  -  Arrange for needed discharge resources and transportation as appropriate  - Identify discharge learning needs (meds, wound care, etc.)  - Arrange for interpretive services to assist at discharge as needed  - Refer to Case Management Department for coordinating discharge planning if the patient needs post-hospital services based on physician/advanced practitioner order or complex needs related to functional status, cognitive ability, or social support system  Outcome: Progressing     Problem: GASTROINTESTINAL - ADULT  Goal: Minimal or absence of nausea and/or vomiting  Description: INTERVENTIONS:  - Administer IV fluids if ordered to ensure adequate hydration  - Maintain NPO status until nausea and vomiting are resolved  - Nasogastric tube if ordered  - Administer ordered antiemetic medications as needed  - Provide nonpharmacologic comfort measures as appropriate  - Advance diet as tolerated, if ordered  - Consider nutrition services referral to assist patient with adequate nutrition and appropriate food choices  Outcome: Progressing  Goal: Maintains or returns to baseline bowel function  Description: INTERVENTIONS:  - Assess bowel function  - Encourage oral fluids to ensure adequate hydration  - Administer IV fluids if ordered to ensure adequate hydration  - Administer ordered medications as needed  - Encourage mobilization and activity  - Consider nutritional services referral to assist patient with adequate nutrition and appropriate food choices  Outcome: Progressing  Goal: Maintains adequate nutritional intake  Description: INTERVENTIONS:  - Monitor percentage of each meal consumed  - Identify factors contributing to decreased intake, treat as appropriate  - Assist with meals as needed  - Monitor I&O, weight, and lab values if indicated  - Obtain nutrition services referral as needed  Outcome: Progressing  Goal: Establish and maintain optimal ostomy function  Description: INTERVENTIONS:  - Assess bowel  function  - Encourage oral fluids to ensure adequate hydration  - Administer IV fluids if ordered to ensure adequate hydration   - Administer ordered medications as needed  - Encourage mobilization and activity  - Nutrition services referral to assist patient with appropriate food choices  - Assess stoma site  - Consider wound care consult   Outcome: Progressing  Goal: Oral mucous membranes remain intact  Description: INTERVENTIONS  - Assess oral mucosa and hygiene practices  - Implement preventative oral hygiene regimen  - Implement oral medicated treatments as ordered  - Initiate Nutrition services referral as needed  Outcome: Progressing     Problem: Prexisting or High Potential for Compromised Skin Integrity  Goal: Skin integrity is maintained or improved  Description: INTERVENTIONS:  - Identify patients at risk for skin breakdown  - Assess and monitor skin integrity  - Assess and monitor nutrition and hydration status  - Monitor labs   - Assess for incontinence   - Turn and reposition patient  - Assist with mobility/ambulation  - Relieve pressure over bony prominences  - Avoid friction and shearing  - Provide appropriate hygiene as needed including keeping skin clean and dry  - Evaluate need for skin moisturizer/barrier cream  - Collaborate with interdisciplinary team   - Patient/family teaching  - Consider wound care consult   Outcome: Progressing     Problem: Nutrition/Hydration-ADULT  Goal: Nutrient/Hydration intake appropriate for improving, restoring or maintaining nutritional needs  Description: Monitor and assess patient's nutrition/hydration status for malnutrition. Collaborate with interdisciplinary team and initiate plan and interventions as ordered.  Monitor patient's weight and dietary intake as ordered or per policy. Utilize nutrition screening tool and intervene as necessary. Determine patient's food preferences and provide high-protein, high-caloric foods as appropriate.     INTERVENTIONS:  -  Monitor oral intake, urinary output, labs, and treatment plans  - Assess nutrition and hydration status and recommend course of action  - Evaluate amount of meals eaten  - Assist patient with eating if necessary   - Allow adequate time for meals  - Recommend/ encourage appropriate diets, oral nutritional supplements, and vitamin/mineral supplements  - Order, calculate, and assess calorie counts as needed  - Recommend, monitor, and adjust tube feedings and TPN/PPN based on assessed needs  - Assess need for intravenous fluids  - Provide specific nutrition/hydration education as appropriate  - Include patient/family/caregiver in decisions related to nutrition  Outcome: Progressing     Problem: INFECTION - ADULT  Goal: Absence or prevention of progression during hospitalization  Description: INTERVENTIONS:  - Assess and monitor for signs and symptoms of infection  - Monitor lab/diagnostic results  - Monitor all insertion sites, i.e. indwelling lines, tubes, and drains  - Monitor endotracheal if appropriate and nasal secretions for changes in amount and color  - [place] appropriate cooling/warming therapies per order  - Administer medications as ordered  - Instruct and encourage patient and family to use good hand hygiene technique  - Identify and instruct in appropriate isolation precautions for identified infection/condition  Outcome: Progressing     Problem: Knowledge Deficit  Goal: Patient/family/caregiver demonstrates understanding of disease process, treatment plan, medications, and discharge instructions  Description: Complete learning assessment and assess knowledge base.  Interventions:  - Provide teaching at level of understanding  - Provide teaching via preferred learning methods  Outcome: Progressing

## 2024-11-19 NOTE — PROGRESS NOTES
Progress Note - Surgery-General   Name: Lety Botello 64 y.o. female I MRN: [id number]  Unit/Bed#: E5 -01 I Date of Admission: 11/14/2024   Date of Service: 11/18/2024 I Hospital Day: 4     Assessment & Plan  SBO (small bowel obstruction) (HCC)  64 y.o. female presenting with recurrent SBO. 11/14 CT A/P: Small bowel obstruction with transition in the pelvis is again seen  - 11/18 KUB showed SBO and some contrast within bowel in the right side of the abdomen which seems similar to the prior study and might be residual colonic contrast from prior imaging work-up   -NG tube output: 2350 cc green bilious  -Abdomen: Soft, nondistended, lower hemiabdominal tenderness to palpation.    Plan  - F/u AM GGC KUB  - Pending KUB results, may need surgical intervention  - NPO  - NG tube to moderate continuous suction, NGT flushes  - mIVF @ 150 cc/hr  - Ok for ice chips  - Re-order TPN  - Nutrition consult for TPN recs  - Prehabilitation,encourage OOB and ambulation, will likely require surgical intervention  - DVT ppx  Gastroparesis  With associated abdominal pain nausea and vomiting  Abdominal pain  See above    24 Hour Events : No acute overnight events  Subjective : Patient continues to have lower hemiabdominal pain.  She denies any nausea, vomiting, fevers or chills.  Patient states she has not had any bowel movements or passed gas overnight.    Objective :  Visit Vitals  /68 (BP Location: Right arm)   Pulse 84   Temp 97.5 °F (36.4 °C) (Oral)   Resp 18   Ht 5' (1.524 m)   Wt 104 kg (230 lb)   SpO2 92%   BMI 44.92 kg/m²   Smoking Status Never   BSA 1.98 m²   NG tube output: 2350 cc green bilious    Physical Exam  Constitutional:       Appearance: Normal appearance. She is obese. She is not ill-appearing or diaphoretic.   Cardiovascular:      Rate and Rhythm: Normal rate and regular rhythm.      Pulses: Normal pulses.      Heart sounds: Normal heart sounds. No murmur heard.  Pulmonary:      Effort: Pulmonary effort is  normal. No respiratory distress.      Breath sounds: Normal breath sounds. No wheezing or rales.   Abdominal:      Palpations: Abdomen is soft. There is no mass.      Tenderness: There is abdominal tenderness. There is no right CVA tenderness, left CVA tenderness, guarding or rebound.      Hernia: No hernia is present.   Neurological:      Mental Status: She is alert.         Recent Labs     11/18/24  0409 11/18/24  0435 11/19/24  0503   WBC  --  5.20 4.18*   HGB  --  10.0* 9.1*   PLT  --  82* 74*   SODIUM 135  --  136   K 4.2  --  4.4   CL 99  --  101   CO2 32  --  33*   BUN 11  --  9   CREATININE 0.62  --  0.56*   GLUC 194*  --  192*   CALCIUM 7.9*  --  7.9*   MG 2.2  --  2.1   PHOS 2.7  --  2.9   AST 32  --   --    ALT 26  --   --    ALKPHOS 95  --   --    TBILI 0.87  --   --             VTE Pharmacologic Prophylaxis: VTE covered by:  heparin (porcine), Subcutaneous, 5,000 Units at 11/18/24 1330     VTE Mechanical Prophylaxis: sequential compression device

## 2024-11-19 NOTE — ASSESSMENT & PLAN NOTE
Related to bowel obstruction/n.p.o. status.   Results from last 7 days   Lab Units 11/19/24  0503 11/18/24  0409 11/17/24  0510 11/16/24  0547   SODIUM mmol/L 136 135 137 135    Resolved on IV fluids

## 2024-11-19 NOTE — NURSING NOTE
Pt refusing to get OOB to attempt to void or use bedpan. Per pt she has not voided since yesterday evening. Bladder scanned for 269, though patient did not tolerate bladder scan well c/o pain. Previously bladder scanned for 34mL and straight cathed for 400. On call general surgery made aware. Per general surgery, do not straight cath at this time.

## 2024-11-19 NOTE — PROGRESS NOTES
Progress Note - Hospitalist   Name: Lety Botello 64 y.o. female I MRN: [id number]  Unit/Bed#: E5 -01 I Date of Admission: 11/14/2024   Date of Service: 11/19/2024 I Hospital Day: 5    Assessment & Plan  SBO (small bowel obstruction) (HCC)  History of endometrial cancer, pulmonary embolism, DVT, hypothyroidism, insulin-dependent diabetes, hypertension, and recent admission for SBO (11/5/24-11/12/24)  She represents to the hospital with abdominal pain nausea vomiting and diarrhea again  Admitted to surgical service after finding of SBO again.   Underwent gastrograffin study 11/18/24  Supportive care with bowel rest and NG tube decompression and IV fluids  Antiemetics & analgesics prn  Started on PICC line/TPN - monitoring glucose while on   Dietitian consulted for management  KUB pending to determine further surgical plan  Hypertension  Prior to admission on carvedilol 12.5 mg BID  Stable: While n.p.o. continue scheduled IV metoprolol 2.5 mg every 6 hours  /72, 140/68  Type 2 diabetes mellitus (HCC)  Lab Results   Component Value Date    HGBA1C 6.9 (H) 11/05/2024     Recent Labs     11/18/24  1247 11/18/24  1808 11/19/24  0004 11/19/24  0554   POCGLU 188* 177* 181* 192*   Previously on toujeo 60 units with lispro 8 units 3 times daily  Patient reports now only on sliding scale insulin and no longer on toujeo.    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    Prior to admission on Eliquis.  Continue heparin for prophylaxis while NPO  Hypothyroidism  Prior to admission levothyroxine.  Held while NPO  Mild intermittent asthma  Prior to admission on albuterol only as needed. No exacerbations  Breathing stable here  Hyponatremia  Related to bowel obstruction/n.p.o. status.   Results from last 7 days   Lab Units 11/19/24  0503 11/18/24  0409 11/17/24  0510 11/16/24  0547   SODIUM mmol/L 136 135 137  135    Resolved on IV fluids  Morbid obesity (HCC)  Body mass index is 44.92 kg/m².    VTE Pharmacologic Prophylaxis:   heparin    Mobility:   Basic Mobility Inpatient Raw Score: 17  -HL Goal: 5: Stand one or more mins  -HL Achieved: 1: Laying in bed    Patient Centered Rounds: I performed bedside rounds with nursing staff today.     Education and Discussions with Family / Patient: patient    Current Length of Stay: 5 day(s)  Current Patient Status: Inpatient   Discharge Plan: Per primary team, surgery    Code Status: Level 1 - Full Code    Subjective   Patient resting in bed however does complain of abdominal pain.  Tender to touch on exam.  Additionally with nausea but denies any vomiting.    Lives at home with 2 roommates and uses a walker to ambulate at baseline.    Objective :  Temp:  [97.5 °F (36.4 °C)-97.7 °F (36.5 °C)] 97.7 °F (36.5 °C)  HR:  [73-86] 73  BP: (123-162)/(54-74) 141/72  Resp:  [18] 18  SpO2:  [92 %-95 %] 94 %  O2 Device: None (Room air)    Body mass index is 44.92 kg/m².     Input and Output Summary (last 24 hours):     Intake/Output Summary (Last 24 hours) at 11/19/2024 1053  Last data filed at 11/19/2024 0447  Gross per 24 hour   Intake 1675 ml   Output 2350 ml   Net -675 ml       Physical Exam  Vitals and nursing note reviewed.   Constitutional:       General: She is not in acute distress.     Appearance: She is normal weight. She is not ill-appearing, toxic-appearing or diaphoretic.   HENT:      Head: Normocephalic and atraumatic.      Comments: TPN tube in place  Eyes:      General: No scleral icterus.  Cardiovascular:      Rate and Rhythm: Normal rate and regular rhythm.   Pulmonary:      Effort: Pulmonary effort is normal. No respiratory distress.      Breath sounds: Normal breath sounds. No stridor. No wheezing or rhonchi.   Abdominal:      General: Bowel sounds are normal. There is no distension.      Palpations: Abdomen is soft. There is no mass.      Tenderness: There is no  abdominal tenderness.      Hernia: No hernia is present.   Musculoskeletal:         General: No swelling.      Cervical back: Neck supple.   Skin:     General: Skin is warm and dry.   Neurological:      Mental Status: She is alert and oriented to person, place, and time. Mental status is at baseline.   Psychiatric:         Mood and Affect: Mood normal.         Behavior: Behavior normal.         Lines/Drains:  Lines/Drains/Airways       Active Status       Name Placement date Placement time Site Days    PICC Line 11/18/24 Right Basilic 11/18/24  1249  Basilic  less than 1    NG/OG/Enteral Tube Nasogastric 16 Fr Right nare 11/15/24  0357  Right nare  4                               Lab Results: I have reviewed the following results:   Results from last 7 days   Lab Units 11/19/24  0503   WBC Thousand/uL 4.18*   HEMOGLOBIN g/dL 9.1*   HEMATOCRIT % 28.9*   PLATELETS Thousands/uL 74*   SEGS PCT % 80*   LYMPHO PCT % 8*   MONO PCT % 7   EOS PCT % 3     Results from last 7 days   Lab Units 11/19/24  0503 11/18/24  0409   SODIUM mmol/L 136 135   POTASSIUM mmol/L 4.4 4.2   CHLORIDE mmol/L 101 99   CO2 mmol/L 33* 32   BUN mg/dL 9 11   CREATININE mg/dL 0.56* 0.62   ANION GAP mmol/L 2* 4   CALCIUM mg/dL 7.9* 7.9*   ALBUMIN g/dL  --  2.7*   TOTAL BILIRUBIN mg/dL  --  0.87   ALK PHOS U/L  --  95   ALT U/L  --  26   AST U/L  --  32   GLUCOSE RANDOM mg/dL 192* 194*     Results from last 7 days   Lab Units 11/14/24  0854   INR  1.18     Results from last 7 days   Lab Units 11/19/24  0554 11/19/24  0004 11/18/24  1808 11/18/24  1247 11/18/24  0730 11/18/24  0701 11/17/24  2349 11/17/24  1729 11/17/24  1115 11/17/24  0607 11/16/24  2352 11/16/24  1753   POC GLUCOSE mg/dl 192* 181* 177* 188* 162* 156* 158* 132 121 109 107 99         Results from last 7 days   Lab Units 11/14/24  0821   LACTIC ACID mmol/L 1.5       Recent Cultures (last 7 days):               Last 24 Hours Medication List:     Current Facility-Administered Medications:      Adult 3-in-1 TPN (standard base / standard electrolytes), Continuous TPN, Last Rate: 42 mL/hr at 11/18/24 2125    dextrose 5 % and sodium chloride 0.45 % with KCl 20 mEq/L infusion, Continuous, Last Rate: Stopped (11/18/24 2123)    heparin (porcine) subcutaneous injection 5,000 Units, Q8H SAM **AND** [CANCELED] Platelet count, Once    HYDROmorphone (DILAUDID) injection 0.5 mg, Q3H PRN    HYDROmorphone HCl (DILAUDID) injection 0.2 mg, Q3H PRN    insulin lispro (HumALOG/ADMELOG) 100 units/mL subcutaneous injection 1-6 Units, Q6H SAM **AND** Fingerstick Glucose (POCT), Q6H    LORazepam (ATIVAN) injection 1 mg, HS    metoprolol (LOPRESSOR) injection 2.5 mg, Q6H    ondansetron (ZOFRAN) injection 4 mg, Q6H PRN    pantoprazole (PROTONIX) injection 40 mg, Q24H SAM    phenol (CHLORASEPTIC) 1.4 % mucosal liquid 1 spray, Q2H PRN    trimethobenzamide (TIGAN) IM injection 200 mg, Q6H PRN    Administrative Statements   Today, Patient Was Seen By: Kimberly Del Toro PA-C      **Please Note: This note may have been constructed using a voice recognition system.**

## 2024-11-19 NOTE — ASSESSMENT & PLAN NOTE
Prior to admission on carvedilol 12.5 mg BID  Stable: While n.p.o. continue scheduled IV metoprolol 2.5 mg every 6 hours  /72, 140/68

## 2024-11-19 NOTE — ASSESSMENT & PLAN NOTE
History of endometrial cancer, pulmonary embolism, DVT, hypothyroidism, insulin-dependent diabetes, hypertension, and recent admission for SBO (11/5/24-11/12/24)  She represents to the hospital with abdominal pain nausea vomiting and diarrhea again  Admitted to surgical service after finding of SBO again.   Underwent gastrograffin study 11/18/24  Supportive care with bowel rest and NG tube decompression and IV fluids  Antiemetics & analgesics prn  Started on PICC line/TPN - monitoring glucose while on   Dietitian consulted for management  KUB pending to determine further surgical plan

## 2024-11-19 NOTE — NURSING NOTE
RN suggested pt shift weight while in bed to prevent PI. Pt refused and said she was fine. Approx 0300, RN suggested pt get OOB to go to the bathroom. PT agreed but then requested pain meds. Once meds were given, pt refused to go to the bathroom or use the bedpan. RN counseled pt about being on TPN and fluids and needing to relieve bladder.

## 2024-11-20 ENCOUNTER — APPOINTMENT (INPATIENT)
Dept: RADIOLOGY | Facility: HOSPITAL | Age: 64
DRG: 230 | End: 2024-11-20
Payer: COMMERCIAL

## 2024-11-20 ENCOUNTER — ANESTHESIA EVENT (INPATIENT)
Dept: PERIOP | Facility: HOSPITAL | Age: 64
DRG: 230 | End: 2024-11-20
Payer: COMMERCIAL

## 2024-11-20 LAB
ANION GAP SERPL CALCULATED.3IONS-SCNC: 3 MMOL/L (ref 4–13)
ANION GAP SERPL CALCULATED.3IONS-SCNC: 6 MMOL/L (ref 4–13)
BUN SERPL-MCNC: 13 MG/DL (ref 5–25)
BUN SERPL-MCNC: 14 MG/DL (ref 5–25)
CA-I BLD-SCNC: 1.17 MMOL/L (ref 1.12–1.32)
CALCIUM SERPL-MCNC: 7.9 MG/DL (ref 8.4–10.2)
CALCIUM SERPL-MCNC: 8 MG/DL (ref 8.4–10.2)
CHLORIDE SERPL-SCNC: 103 MMOL/L (ref 96–108)
CHLORIDE SERPL-SCNC: 99 MMOL/L (ref 96–108)
CO2 SERPL-SCNC: 28 MMOL/L (ref 21–32)
CO2 SERPL-SCNC: 31 MMOL/L (ref 21–32)
CREAT SERPL-MCNC: 0.54 MG/DL (ref 0.6–1.3)
CREAT SERPL-MCNC: 0.61 MG/DL (ref 0.6–1.3)
ERYTHROCYTE [DISTWIDTH] IN BLOOD BY AUTOMATED COUNT: 16.4 % (ref 11.6–15.1)
GFR SERPL CREATININE-BSD FRML MDRD: 101 ML/MIN/1.73SQ M
GFR SERPL CREATININE-BSD FRML MDRD: 97 ML/MIN/1.73SQ M
GLUCOSE SERPL-MCNC: 194 MG/DL (ref 65–140)
GLUCOSE SERPL-MCNC: 195 MG/DL (ref 65–140)
GLUCOSE SERPL-MCNC: 196 MG/DL (ref 65–140)
GLUCOSE SERPL-MCNC: 198 MG/DL (ref 65–140)
GLUCOSE SERPL-MCNC: 213 MG/DL (ref 65–140)
GLUCOSE SERPL-MCNC: 593 MG/DL (ref 65–140)
HCT VFR BLD AUTO: 30.5 % (ref 34.8–46.1)
HGB BLD-MCNC: 9.5 G/DL (ref 11.5–15.4)
MAGNESIUM SERPL-MCNC: 2.3 MG/DL (ref 1.9–2.7)
MCH RBC QN AUTO: 30.8 PG (ref 26.8–34.3)
MCHC RBC AUTO-ENTMCNC: 31.1 G/DL (ref 31.4–37.4)
MCV RBC AUTO: 99 FL (ref 82–98)
PHOSPHATE SERPL-MCNC: 5.7 MG/DL (ref 2.3–4.1)
PLATELET # BLD AUTO: 82 THOUSANDS/UL (ref 149–390)
PMV BLD AUTO: 10.7 FL (ref 8.9–12.7)
POTASSIUM SERPL-SCNC: 4.6 MMOL/L (ref 3.5–5.3)
POTASSIUM SERPL-SCNC: 6.1 MMOL/L (ref 3.5–5.3)
RBC # BLD AUTO: 3.08 MILLION/UL (ref 3.81–5.12)
SODIUM SERPL-SCNC: 133 MMOL/L (ref 135–147)
SODIUM SERPL-SCNC: 137 MMOL/L (ref 135–147)
WBC # BLD AUTO: 4.43 THOUSAND/UL (ref 4.31–10.16)

## 2024-11-20 PROCEDURE — 83735 ASSAY OF MAGNESIUM: CPT | Performed by: PHYSICIAN ASSISTANT

## 2024-11-20 PROCEDURE — 85027 COMPLETE CBC AUTOMATED: CPT | Performed by: PHYSICIAN ASSISTANT

## 2024-11-20 PROCEDURE — 82330 ASSAY OF CALCIUM: CPT | Performed by: SURGERY

## 2024-11-20 PROCEDURE — 99232 SBSQ HOSP IP/OBS MODERATE 35: CPT | Performed by: PHYSICIAN ASSISTANT

## 2024-11-20 PROCEDURE — 74018 RADEX ABDOMEN 1 VIEW: CPT

## 2024-11-20 PROCEDURE — 80048 BASIC METABOLIC PNL TOTAL CA: CPT | Performed by: PHYSICIAN ASSISTANT

## 2024-11-20 PROCEDURE — 80048 BASIC METABOLIC PNL TOTAL CA: CPT

## 2024-11-20 PROCEDURE — 99232 SBSQ HOSP IP/OBS MODERATE 35: CPT | Performed by: SURGERY

## 2024-11-20 PROCEDURE — 82948 REAGENT STRIP/BLOOD GLUCOSE: CPT

## 2024-11-20 PROCEDURE — 84100 ASSAY OF PHOSPHORUS: CPT | Performed by: PHYSICIAN ASSISTANT

## 2024-11-20 RX ORDER — INSULIN LISPRO 100 [IU]/ML
4-20 INJECTION, SOLUTION INTRAVENOUS; SUBCUTANEOUS EVERY 6 HOURS SCHEDULED
Status: DISCONTINUED | OUTPATIENT
Start: 2024-11-20 | End: 2024-11-22

## 2024-11-20 RX ORDER — METOCLOPRAMIDE HYDROCHLORIDE 5 MG/ML
10 INJECTION INTRAMUSCULAR; INTRAVENOUS EVERY 6 HOURS SCHEDULED
Status: COMPLETED | OUTPATIENT
Start: 2024-11-20 | End: 2024-11-21

## 2024-11-20 RX ADMIN — INSULIN LISPRO 2 UNITS: 100 INJECTION, SOLUTION INTRAVENOUS; SUBCUTANEOUS at 00:41

## 2024-11-20 RX ADMIN — HEPARIN SODIUM 5000 UNITS: 5000 INJECTION INTRAVENOUS; SUBCUTANEOUS at 21:15

## 2024-11-20 RX ADMIN — HYDROMORPHONE HYDROCHLORIDE 0.5 MG: 1 INJECTION, SOLUTION INTRAMUSCULAR; INTRAVENOUS; SUBCUTANEOUS at 05:04

## 2024-11-20 RX ADMIN — HEPARIN SODIUM 5000 UNITS: 5000 INJECTION INTRAVENOUS; SUBCUTANEOUS at 05:06

## 2024-11-20 RX ADMIN — Medication 1 SPRAY: at 12:25

## 2024-11-20 RX ADMIN — PANTOPRAZOLE SODIUM 40 MG: 40 INJECTION, POWDER, LYOPHILIZED, FOR SOLUTION INTRAVENOUS at 08:20

## 2024-11-20 RX ADMIN — METOROPROLOL TARTRATE 2.5 MG: 5 INJECTION, SOLUTION INTRAVENOUS at 21:05

## 2024-11-20 RX ADMIN — HYDROMORPHONE HYDROCHLORIDE 0.2 MG: 0.2 INJECTION, SOLUTION INTRAMUSCULAR; INTRAVENOUS; SUBCUTANEOUS at 08:21

## 2024-11-20 RX ADMIN — HYDROMORPHONE HYDROCHLORIDE 0.2 MG: 0.2 INJECTION, SOLUTION INTRAMUSCULAR; INTRAVENOUS; SUBCUTANEOUS at 16:34

## 2024-11-20 RX ADMIN — METOCLOPRAMIDE 10 MG: 5 INJECTION, SOLUTION INTRAMUSCULAR; INTRAVENOUS at 12:16

## 2024-11-20 RX ADMIN — HYDROMORPHONE HYDROCHLORIDE 0.5 MG: 1 INJECTION, SOLUTION INTRAMUSCULAR; INTRAVENOUS; SUBCUTANEOUS at 10:58

## 2024-11-20 RX ADMIN — METOROPROLOL TARTRATE 2.5 MG: 5 INJECTION, SOLUTION INTRAVENOUS at 00:41

## 2024-11-20 RX ADMIN — INSULIN LISPRO 2 UNITS: 100 INJECTION, SOLUTION INTRAVENOUS; SUBCUTANEOUS at 12:17

## 2024-11-20 RX ADMIN — HEPARIN SODIUM 5000 UNITS: 5000 INJECTION INTRAVENOUS; SUBCUTANEOUS at 13:11

## 2024-11-20 RX ADMIN — METOCLOPRAMIDE 10 MG: 5 INJECTION, SOLUTION INTRAMUSCULAR; INTRAVENOUS at 18:00

## 2024-11-20 RX ADMIN — HYDROMORPHONE HYDROCHLORIDE 0.2 MG: 0.2 INJECTION, SOLUTION INTRAMUSCULAR; INTRAVENOUS; SUBCUTANEOUS at 13:05

## 2024-11-20 RX ADMIN — ONDANSETRON 4 MG: 2 INJECTION INTRAMUSCULAR; INTRAVENOUS at 16:34

## 2024-11-20 RX ADMIN — METOROPROLOL TARTRATE 2.5 MG: 5 INJECTION, SOLUTION INTRAVENOUS at 08:20

## 2024-11-20 RX ADMIN — INSULIN LISPRO 2 UNITS: 100 INJECTION, SOLUTION INTRAVENOUS; SUBCUTANEOUS at 05:07

## 2024-11-20 RX ADMIN — INSULIN LISPRO 4 UNITS: 100 INJECTION, SOLUTION INTRAVENOUS; SUBCUTANEOUS at 19:43

## 2024-11-20 RX ADMIN — METOROPROLOL TARTRATE 2.5 MG: 5 INJECTION, SOLUTION INTRAVENOUS at 13:04

## 2024-11-20 RX ADMIN — HYDROMORPHONE HYDROCHLORIDE 0.5 MG: 1 INJECTION, SOLUTION INTRAMUSCULAR; INTRAVENOUS; SUBCUTANEOUS at 02:13

## 2024-11-20 RX ADMIN — HYDROMORPHONE HYDROCHLORIDE 0.5 MG: 1 INJECTION, SOLUTION INTRAMUSCULAR; INTRAVENOUS; SUBCUTANEOUS at 21:40

## 2024-11-20 RX ADMIN — Medication: at 21:14

## 2024-11-20 RX ADMIN — HYDROMORPHONE HYDROCHLORIDE 0.5 MG: 1 INJECTION, SOLUTION INTRAMUSCULAR; INTRAVENOUS; SUBCUTANEOUS at 18:01

## 2024-11-20 NOTE — ASSESSMENT & PLAN NOTE
Lab Results   Component Value Date    HGBA1C 6.9 (H) 11/05/2024     Recent Labs     11/19/24  1207 11/19/24  1748 11/20/24  0006 11/20/24  0506   POCGLU 179* 176* 195* 194*   Previously on toujeo 60 units with lispro 8 units 3 times daily  Patient reports now only on sliding scale insulin and no longer on toujeo.    Currently stable with every 6 hours checks

## 2024-11-20 NOTE — ASSESSMENT & PLAN NOTE
History of DVT/PE May 2024.    Prior to admission on Eliquis- currently held  Continue heparin for prophylaxis while NPO

## 2024-11-20 NOTE — PHYSICAL THERAPY NOTE
Physical Therapy Cancellation Note    PT session cancelled as pt. Was sleeping upon entry and later declined to participate in therapy reporting fatigue. Will continue to follow as able.

## 2024-11-20 NOTE — ASSESSMENT & PLAN NOTE
64 y.o. female presenting with recurrent SBO. 11/14 CT A/P: Small bowel obstruction with transition in the pelvis is again seen. 11/18 KUB showed SBO and some contrast within bowel in the right side of the abdomen which seems similar to the prior study and might be residual colonic contrast from prior imaging work-up  - 11/19 AM GGC: partial SBO some contrast in right colon  - Vitals: AVSS on RA  - WBC: 4.43  - Hgb: 9.5  - NG tube output: 650cc green bilious  - Abdomen: Soft, distended, lower hemiabdominal tenderness to palpation.  - Patient continues to ask for bedpan, although she is level III via PT/OT. Strongly encourage nursing staff and patient to continue to walk     Plan  - Re-scan with KUB today   - NPO  - NG tube to moderate continuous suction, NGT flushes  - mIVF @ 150 cc/hr  - Ok for ice chips  - Re-order TPN  - Nutrition consult for TPN recs  - Appreciate PT recs, continue to encourage patient to walk as well as nursing staff  - DVT ppx

## 2024-11-20 NOTE — PLAN OF CARE
Problem: Potential for Falls  Goal: Patient will remain free of falls  Description: INTERVENTIONS:  - Educate patient/family on patient safety including physical limitations  - Instruct patient to call for assistance with activity   - Consult OT/PT to assist with strengthening/mobility   - Keep Call bell within reach  - Keep bed low and locked with side rails adjusted as appropriate  - Keep care items and personal belongings within reach  - Initiate and maintain comfort rounds  - Make Fall Risk Sign visible to staff  - Offer Toileting every 3 Hours, in advance of need  - Initiate/Maintain bed alarm  - Obtain necessary fall risk management equipment  - Apply yellow socks and bracelet for high fall risk patients  - Consider moving patient to room near nurses station  Outcome: Progressing     Problem: PAIN - ADULT  Goal: Verbalizes/displays adequate comfort level or baseline comfort level  Description: Interventions:  - Encourage patient to monitor pain and request assistance  - Assess pain using appropriate pain scale  - Administer analgesics based on type and severity of pain and evaluate response  - Implement non-pharmacological measures as appropriate and evaluate response  - Consider cultural and social influences on pain and pain management  - Notify physician/advanced practitioner if interventions unsuccessful or patient reports new pain  Outcome: Progressing     Problem: SAFETY ADULT  Goal: Patient will remain free of falls  Description: INTERVENTIONS:  - Educate patient/family on patient safety including physical limitations  - Instruct patient to call for assistance with activity   - Consult OT/PT to assist with strengthening/mobility   - Keep Call bell within reach  - Keep bed low and locked with side rails adjusted as appropriate  - Keep care items and personal belongings within reach  - Initiate and maintain comfort rounds  - Make Fall Risk Sign visible to staff  - Offer Toileting every 3 Hours, in  advance of need  - Initiate/Maintain bed alarm  - Obtain necessary fall risk management equipment  - Apply yellow socks and bracelet for high fall risk patients  - Consider moving patient to room near nurses station  Outcome: Progressing  Goal: Maintain or return to baseline ADL function  Description: INTERVENTIONS:  -  Assess patient's ability to carry out ADLs; assess patient's baseline for ADL function and identify physical deficits which impact ability to perform ADLs (bathing, care of mouth/teeth, toileting, grooming, dressing, etc.)  - Assess/evaluate cause of self-care deficits   - Assess range of motion  - Assess patient's mobility; develop plan if impaired  - Assess patient's need for assistive devices and provide as appropriate  - Encourage maximum independence but intervene and supervise when necessary  - Involve family in performance of ADLs  - Assess for home care needs following discharge   - Consider OT consult to assist with ADL evaluation and planning for discharge  - Provide patient education as appropriate  Outcome: Progressing  Goal: Maintains/Returns to pre admission functional level  Description: INTERVENTIONS:  - Perform AM-PAC 6 Click Basic Mobility/ Daily Activity assessment daily.  - Set and communicate daily mobility goal to care team and patient/family/caregiver.   - Collaborate with rehabilitation services on mobility goals if consulted  - Perform Range of Motion 3 times a day.  - Reposition patient every 3 hours.  - Dangle patient 3 times a day  - Stand patient 3 times a day  - Ambulate patient 3 times a day  - Out of bed to chair 3 times a day   - Out of bed for meals 3 times a day  - Out of bed for toileting  - Record patient progress and toleration of activity level   Outcome: Progressing     Problem: DISCHARGE PLANNING  Goal: Discharge to home or other facility with appropriate resources  Description: INTERVENTIONS:  - Identify barriers to discharge w/patient and caregiver  -  Arrange for needed discharge resources and transportation as appropriate  - Identify discharge learning needs (meds, wound care, etc.)  - Arrange for interpretive services to assist at discharge as needed  - Refer to Case Management Department for coordinating discharge planning if the patient needs post-hospital services based on physician/advanced practitioner order or complex needs related to functional status, cognitive ability, or social support system  Outcome: Progressing     Problem: GASTROINTESTINAL - ADULT  Goal: Minimal or absence of nausea and/or vomiting  Description: INTERVENTIONS:  - Administer IV fluids if ordered to ensure adequate hydration  - Maintain NPO status until nausea and vomiting are resolved  - Nasogastric tube if ordered  - Administer ordered antiemetic medications as needed  - Provide nonpharmacologic comfort measures as appropriate  - Advance diet as tolerated, if ordered  - Consider nutrition services referral to assist patient with adequate nutrition and appropriate food choices  Outcome: Progressing  Goal: Maintains or returns to baseline bowel function  Description: INTERVENTIONS:  - Assess bowel function  - Encourage oral fluids to ensure adequate hydration  - Administer IV fluids if ordered to ensure adequate hydration  - Administer ordered medications as needed  - Encourage mobilization and activity  - Consider nutritional services referral to assist patient with adequate nutrition and appropriate food choices  Outcome: Progressing  Goal: Maintains adequate nutritional intake  Description: INTERVENTIONS:  - Monitor percentage of each meal consumed  - Identify factors contributing to decreased intake, treat as appropriate  - Assist with meals as needed  - Monitor I&O, weight, and lab values if indicated  - Obtain nutrition services referral as needed  Outcome: Progressing  Goal: Establish and maintain optimal ostomy function  Description: INTERVENTIONS:  - Assess bowel  function  - Encourage oral fluids to ensure adequate hydration  - Administer IV fluids if ordered to ensure adequate hydration   - Administer ordered medications as needed  - Encourage mobilization and activity  - Nutrition services referral to assist patient with appropriate food choices  - Assess stoma site  - Consider wound care consult   Outcome: Progressing  Goal: Oral mucous membranes remain intact  Description: INTERVENTIONS  - Assess oral mucosa and hygiene practices  - Implement preventative oral hygiene regimen  - Implement oral medicated treatments as ordered  - Initiate Nutrition services referral as needed  Outcome: Progressing     Problem: Prexisting or High Potential for Compromised Skin Integrity  Goal: Skin integrity is maintained or improved  Description: INTERVENTIONS:  - Identify patients at risk for skin breakdown  - Assess and monitor skin integrity  - Assess and monitor nutrition and hydration status  - Monitor labs   - Assess for incontinence   - Turn and reposition patient  - Assist with mobility/ambulation  - Relieve pressure over bony prominences  - Avoid friction and shearing  - Provide appropriate hygiene as needed including keeping skin clean and dry  - Evaluate need for skin moisturizer/barrier cream  - Collaborate with interdisciplinary team   - Patient/family teaching  - Consider wound care consult   Outcome: Progressing     Problem: Nutrition/Hydration-ADULT  Goal: Nutrient/Hydration intake appropriate for improving, restoring or maintaining nutritional needs  Description: Monitor and assess patient's nutrition/hydration status for malnutrition. Collaborate with interdisciplinary team and initiate plan and interventions as ordered.  Monitor patient's weight and dietary intake as ordered or per policy. Utilize nutrition screening tool and intervene as necessary. Determine patient's food preferences and provide high-protein, high-caloric foods as appropriate.     INTERVENTIONS:  -  Monitor oral intake, urinary output, labs, and treatment plans  - Assess nutrition and hydration status and recommend course of action  - Evaluate amount of meals eaten  - Assist patient with eating if necessary   - Allow adequate time for meals  - Recommend/ encourage appropriate diets, oral nutritional supplements, and vitamin/mineral supplements  - Order, calculate, and assess calorie counts as needed  - Recommend, monitor, and adjust tube feedings and TPN/PPN based on assessed needs  - Assess need for intravenous fluids  - Provide specific nutrition/hydration education as appropriate  - Include patient/family/caregiver in decisions related to nutrition  Outcome: Progressing     Problem: INFECTION - ADULT  Goal: Absence or prevention of progression during hospitalization  Description: INTERVENTIONS:  - Assess and monitor for signs and symptoms of infection  - Monitor lab/diagnostic results  - Monitor all insertion sites, i.e. indwelling lines, tubes, and drains  - Monitor endotracheal if appropriate and nasal secretions for changes in amount and color  - Rowan appropriate cooling/warming therapies per order  - Administer medications as ordered  - Instruct and encourage patient and family to use good hand hygiene technique  - Identify and instruct in appropriate isolation precautions for identified infection/condition  Outcome: Progressing     Problem: Knowledge Deficit  Goal: Patient/family/caregiver demonstrates understanding of disease process, treatment plan, medications, and discharge instructions  Description: Complete learning assessment and assess knowledge base.  Interventions:  - Provide teaching at level of understanding  - Provide teaching via preferred learning methods  Outcome: Progressing

## 2024-11-20 NOTE — PROGRESS NOTES
Progress Note - Hospitalist   Name: Lety Botello 64 y.o. female I MRN: [id number]  Unit/Bed#: E5 -01 I Date of Admission: 11/14/2024   Date of Service: 11/20/2024 I Hospital Day: 6    Assessment & Plan  SBO (small bowel obstruction) (HCC)  History of endometrial cancer, pulmonary embolism, DVT, hypothyroidism, insulin-dependent diabetes, hypertension, history of multiple abdominal surgeries, and recent admission for SBO (11/5/24-11/12/24)  She represents to the hospital with abdominal pain nausea vomiting and diarrhea again  Admitted to surgical service after finding of SBO  Underwent gastrograffin study 11/18/24  Supportive care with bowel rest and NG tube decompression and IV fluids  Antiemetics & analgesics prn  Started on PICC line/TPN - monitoring glucose while on   Dietitian consulted for management  Multiple KUBs is obtained  Most recent KUB with continued dilution and/or suction removal of enteric contrast from otherwise previously dilated small bowel centrally and abdomen.  Bowel loops are slightly less distended than prior KUB on the 18th.  Overall findings still concerning for possible small bowel obstruction.  NG tube in place.    Will defer to surgery I if additional repeat abdominopelvic CT scan is necessary at this time  Discussed with general surgery at bedside -continued monitoring versus NG tube clamped versus possible need for additional surgery.  GI has added reglan to regimen    Goal to focus on ambulating more today  Hypertension  Prior to admission on carvedilol 12.5 mg BID  Stable: While n.p.o. continue scheduled IV metoprolol 2.5 mg every 6 hours  /65  Type 2 diabetes mellitus (HCC)  Lab Results   Component Value Date    HGBA1C 6.9 (H) 11/05/2024     Recent Labs     11/19/24  1207 11/19/24  1748 11/20/24  0006 11/20/24  0506   POCGLU 179* 176* 195* 194*   Previously on toujeo 60 units with lispro 8 units 3 times daily  Patient reports now only on sliding scale insulin and no  longer on toujeo.    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    Prior to admission on Eliquis- currently held  Continue heparin for prophylaxis while NPO  Hypothyroidism  Prior to admission levothyroxine.  Held while NPO  Mild intermittent asthma  Prior to admission on albuterol only as needed. No exacerbations  Breathing stable here  Hyponatremia  Related to bowel obstruction/n.p.o. status.   Results from last 7 days   Lab Units 11/20/24  0618 11/20/24  0439 11/19/24  0503 11/18/24  0409   SODIUM mmol/L 137 133* 136 135    Resolved on IV fluids  Morbid obesity (HCC)  Body mass index is 44.92 kg/m².  Would benefit from weight loss    VTE Pharmacologic Prophylaxis:   heparin    Mobility:   Basic Mobility Inpatient Raw Score: 17  -[facility] Goal: 5: Stand one or more mins  -HL Achieved: 1: Laying in bed    Patient Centered Rounds: I performed bedside rounds with nursing staff today.   Discussions with Specialists or Other Care Team Provider: General surgery at bedside, nursing    Education and Discussions with Family / Patient: Patient    Current Length of Stay: 6 day(s)  Current Patient Status: Inpatient   Discharge Plan: Per primary team, surgery    Code Status: Level 1 - Full Code    Subjective   Patient seen in bed.  She has NG tube in place which is currently suctioning along with TPN.  She complains of ongoing lower abdominal pain.  Discussed with surgery at bedside.  Goal is for patient to start ambulating more today.  Personally discussed with nursing.    Objective :  Temp:  [96.2 °F (35.7 °C)-97.5 °F (36.4 °C)] 96.2 °F (35.7 °C)  HR:  [78-83] 78  BP: (125-169)/(65-76) 134/65  Resp:  [12-18] 18  SpO2:  [92 %-95 %] 92 %  O2 Device: None (Room air)    Body mass index is 44.92 kg/m².     Input and Output Summary (last 24 hours):     Intake/Output Summary (Last 24 hours) at 11/20/2024 1022  Last data  filed at 11/20/2024 0514  Gross per 24 hour   Intake 40 ml   Output 2350 ml   Net -2310 ml       Physical Exam  Vitals and nursing note reviewed.   Constitutional:       General: She is not in acute distress.     Appearance: She is obese. She is not ill-appearing, toxic-appearing or diaphoretic.   HENT:      Head: Normocephalic and atraumatic.      Comments: NG tube in place  Eyes:      General: No scleral icterus.  Cardiovascular:      Rate and Rhythm: Normal rate and regular rhythm.   Pulmonary:      Effort: Pulmonary effort is normal. No respiratory distress.      Breath sounds: Normal breath sounds. No stridor. No wheezing or rhonchi.   Abdominal:      General: Bowel sounds are normal. There is no distension.      Palpations: Abdomen is soft. There is no mass.      Tenderness: There is no abdominal tenderness.      Hernia: No hernia is present.   Musculoskeletal:         General: No swelling.      Cervical back: Neck supple.   Skin:     General: Skin is warm and dry.   Neurological:      Mental Status: She is alert and oriented to person, place, and time. Mental status is at baseline.   Psychiatric:         Mood and Affect: Mood normal.         Behavior: Behavior normal.         Lines/Drains:  Lines/Drains/Airways       Active Status       Name Placement date Placement time Site Days    PICC Line 11/18/24 Right Basilic 11/18/24  1249  Basilic  1    NG/OG/Enteral Tube Nasogastric 16 Fr Right nare 11/15/24  0357  Right nare  5                        Lab Results: I have reviewed the following results:   Results from last 7 days   Lab Units 11/20/24  0439 11/19/24  0503   WBC Thousand/uL 4.43 4.18*   HEMOGLOBIN g/dL 9.5* 9.1*   HEMATOCRIT % 30.5* 28.9*   PLATELETS Thousands/uL 82* 74*   SEGS PCT %  --  80*   LYMPHO PCT %  --  8*   MONO PCT %  --  7   EOS PCT %  --  3     Results from last 7 days   Lab Units 11/20/24  0618 11/19/24  0503 11/18/24  0409   SODIUM mmol/L 137   < > 135   POTASSIUM mmol/L 4.6   < > 4.2    CHLORIDE mmol/L 103   < > 99   CO2 mmol/L 31   < > 32   BUN mg/dL 14   < > 11   CREATININE mg/dL 0.54*   < > 0.62   ANION GAP mmol/L 3*   < > 4   CALCIUM mg/dL 7.9*   < > 7.9*   ALBUMIN g/dL  --   --  2.7*   TOTAL BILIRUBIN mg/dL  --   --  0.87   ALK PHOS U/L  --   --  95   ALT U/L  --   --  26   AST U/L  --   --  32   GLUCOSE RANDOM mg/dL 213*   < > 194*    < > = values in this interval not displayed.     Results from last 7 days   Lab Units 11/14/24  0854   INR  1.18     Results from last 7 days   Lab Units 11/20/24  0506 11/20/24  0006 11/19/24  1748 11/19/24  1207 11/19/24  0554 11/19/24  0004 11/18/24  1808 11/18/24  1247 11/18/24  0730 11/18/24  0701 11/17/24  2349 11/17/24  1729   POC GLUCOSE mg/dl 194* 195* 176* 179* 192* 181* 177* 188* 162* 156* 158* 132         Results from last 7 days   Lab Units 11/14/24  0821   LACTIC ACID mmol/L 1.5       Recent Cultures (last 7 days):               Last 24 Hours Medication List:     Current Facility-Administered Medications:     Adult 3-in-1 TPN (standard base / standard electrolytes), Continuous TPN, Last Rate: 42 mL/hr at 11/19/24 2141    Adult TPN (STANDARD BASE/STANDARD ELECTROLYTE), Continuous TPN    heparin (porcine) subcutaneous injection 5,000 Units, Q8H SAM **AND** [CANCELED] Platelet count, Once    HYDROmorphone (DILAUDID) injection 0.5 mg, Q3H PRN    HYDROmorphone HCl (DILAUDID) injection 0.2 mg, Q3H PRN    insulin lispro (HumALOG/ADMELOG) 100 units/mL subcutaneous injection 1-6 Units, Q6H SAM **AND** Fingerstick Glucose (POCT), Q6H    metoclopramide (REGLAN) injection 10 mg, Q6H SAM    metoprolol (LOPRESSOR) injection 2.5 mg, Q6H    ondansetron (ZOFRAN) injection 4 mg, Q6H PRN    pantoprazole (PROTONIX) injection 40 mg, Q24H SAM    phenol (CHLORASEPTIC) 1.4 % mucosal liquid 1 spray, Q2H PRN    trimethobenzamide (TIGAN) IM injection 200 mg, Q6H PRN    Administrative Statements   Today, Patient Was Seen By: Kimberly Del Toro PA-C      **Please Note: This note  may have been constructed using a voice recognition system.**

## 2024-11-20 NOTE — ASSESSMENT & PLAN NOTE
Prior to admission on carvedilol 12.5 mg BID  Stable: While n.p.o. continue scheduled IV metoprolol 2.5 mg every 6 hours  /65

## 2024-11-20 NOTE — ASSESSMENT & PLAN NOTE
History of DVT/PE May 2024-has been anticoagulated on Eliquis since  Eliquis currently on hold given n.p.o. status  Unclear etiology of thromboembolic event, has been 6 months since PE/DVT, most recent CT scan from September 2024 without evidence of PE  Continue heparin for prophylaxis while NPO with possible need for surgery

## 2024-11-20 NOTE — PLAN OF CARE
Problem: Potential for Falls  Goal: Patient will remain free of falls  Description: INTERVENTIONS:  - Educate patient/family on patient safety including physical limitations  - Instruct patient to call for assistance with activity   - Consult OT/PT to assist with strengthening/mobility   - Keep Call bell within reach  - Keep bed low and locked with side rails adjusted as appropriate  - Keep care items and personal belongings within reach  - Initiate and maintain comfort rounds  - Make Fall Risk Sign visible to staff  - Offer Toileting every 3 Hours, in advance of need  - Initiate/Maintain bed alarm  - Obtain necessary fall risk management equipment  - Apply yellow socks and bracelet for high fall risk patients  - Consider moving patient to room near nurses station  Outcome: Progressing     Problem: PAIN - ADULT  Goal: Verbalizes/displays adequate comfort level or baseline comfort level  Description: Interventions:  - Encourage patient to monitor pain and request assistance  - Assess pain using appropriate pain scale  - Administer analgesics based on type and severity of pain and evaluate response  - Implement non-pharmacological measures as appropriate and evaluate response  - Consider cultural and social influences on pain and pain management  - Notify physician/advanced practitioner if interventions unsuccessful or patient reports new pain  Outcome: Progressing     Problem: SAFETY ADULT  Goal: Patient will remain free of falls  Description: INTERVENTIONS:  - Educate patient/family on patient safety including physical limitations  - Instruct patient to call for assistance with activity   - Consult OT/PT to assist with strengthening/mobility   - Keep Call bell within reach  - Keep bed low and locked with side rails adjusted as appropriate  - Keep care items and personal belongings within reach  - Initiate and maintain comfort rounds  - Make Fall Risk Sign visible to staff  - Offer Toileting every 3 Hours, in  advance of need  - Initiate/Maintain bed alarm  - Obtain necessary fall risk management equipment  - Apply yellow socks and bracelet for high fall risk patients  - Consider moving patient to room near nurses station  Outcome: Progressing  Goal: Maintain or return to baseline ADL function  Description: INTERVENTIONS:  -  Assess patient's ability to carry out ADLs; assess patient's baseline for ADL function and identify physical deficits which impact ability to perform ADLs (bathing, care of mouth/teeth, toileting, grooming, dressing, etc.)  - Assess/evaluate cause of self-care deficits   - Assess range of motion  - Assess patient's mobility; develop plan if impaired  - Assess patient's need for assistive devices and provide as appropriate  - Encourage maximum independence but intervene and supervise when necessary  - Involve family in performance of ADLs  - Assess for home care needs following discharge   - Consider OT consult to assist with ADL evaluation and planning for discharge  - Provide patient education as appropriate  Outcome: Progressing  Goal: Maintains/Returns to pre admission functional level  Description: INTERVENTIONS:  - Perform AM-PAC 6 Click Basic Mobility/ Daily Activity assessment daily.  - Set and communicate daily mobility goal to care team and patient/family/caregiver.   - Collaborate with rehabilitation services on mobility goals if consulted  - Perform Range of Motion 3 times a day.  - Reposition patient every 3 hours.  - Dangle patient 3 times a day  - Stand patient 3 times a day  - Ambulate patient 3 times a day  - Out of bed to chair 3 times a day   - Out of bed for meals 3 times a day  - Out of bed for toileting  - Record patient progress and toleration of activity level   Outcome: Progressing     Problem: DISCHARGE PLANNING  Goal: Discharge to home or other facility with appropriate resources  Description: INTERVENTIONS:  - Identify barriers to discharge w/patient and caregiver  -  Arrange for needed discharge resources and transportation as appropriate  - Identify discharge learning needs (meds, wound care, etc.)  - Arrange for interpretive services to assist at discharge as needed  - Refer to Case Management Department for coordinating discharge planning if the patient needs post-hospital services based on physician/advanced practitioner order or complex needs related to functional status, cognitive ability, or social support system  Outcome: Progressing     Problem: INFECTION - ADULT  Goal: Absence or prevention of progression during hospitalization  Description: INTERVENTIONS:  - Assess and monitor for signs and symptoms of infection  - Monitor lab/diagnostic results  - Monitor all insertion sites, i.e. indwelling lines, tubes, and drains  - Monitor endotracheal if appropriate and nasal secretions for changes in amount and color  - [place] appropriate cooling/warming therapies per order  - Administer medications as ordered  - Instruct and encourage patient and family to use good hand hygiene technique  - Identify and instruct in appropriate isolation precautions for identified infection/condition  Outcome: Progressing     Problem: Knowledge Deficit  Goal: Patient/family/caregiver demonstrates understanding of disease process, treatment plan, medications, and discharge instructions  Description: Complete learning assessment and assess knowledge base.  Interventions:  - Provide teaching at level of understanding  - Provide teaching via preferred learning methods  Outcome: Progressing     Problem: GASTROINTESTINAL - ADULT  Goal: Minimal or absence of nausea and/or vomiting  Description: INTERVENTIONS:  - Administer IV fluids if ordered to ensure adequate hydration  - Maintain NPO status until nausea and vomiting are resolved  - Nasogastric tube if ordered  - Administer ordered antiemetic medications as needed  - Provide nonpharmacologic comfort measures as appropriate  - Advance diet as  tolerated, if ordered  - Consider nutrition services referral to assist patient with adequate nutrition and appropriate food choices  Outcome: Progressing  Goal: Maintains or returns to baseline bowel function  Description: INTERVENTIONS:  - Assess bowel function  - Encourage oral fluids to ensure adequate hydration  - Administer IV fluids if ordered to ensure adequate hydration  - Administer ordered medications as needed  - Encourage mobilization and activity  - Consider nutritional services referral to assist patient with adequate nutrition and appropriate food choices  Outcome: Progressing  Goal: Maintains adequate nutritional intake  Description: INTERVENTIONS:  - Monitor percentage of each meal consumed  - Identify factors contributing to decreased intake, treat as appropriate  - Assist with meals as needed  - Monitor I&O, weight, and lab values if indicated  - Obtain nutrition services referral as needed  Outcome: Progressing  Goal: Establish and maintain optimal ostomy function  Description: INTERVENTIONS:  - Assess bowel function  - Encourage oral fluids to ensure adequate hydration  - Administer IV fluids if ordered to ensure adequate hydration   - Administer ordered medications as needed  - Encourage mobilization and activity  - Nutrition services referral to assist patient with appropriate food choices  - Assess stoma site  - Consider wound care consult   Outcome: Progressing  Goal: Oral mucous membranes remain intact  Description: INTERVENTIONS  - Assess oral mucosa and hygiene practices  - Implement preventative oral hygiene regimen  - Implement oral medicated treatments as ordered  - Initiate Nutrition services referral as needed  Outcome: Progressing     Problem: Prexisting or High Potential for Compromised Skin Integrity  Goal: Skin integrity is maintained or improved  Description: INTERVENTIONS:  - Identify patients at risk for skin breakdown  - Assess and monitor skin integrity  - Assess and  monitor nutrition and hydration status  - Monitor labs   - Assess for incontinence   - Turn and reposition patient  - Assist with mobility/ambulation  - Relieve pressure over bony prominences  - Avoid friction and shearing  - Provide appropriate hygiene as needed including keeping skin clean and dry  - Evaluate need for skin moisturizer/barrier cream  - Collaborate with interdisciplinary team   - Patient/family teaching  - Consider wound care consult   Outcome: Progressing     Problem: Nutrition/Hydration-ADULT  Goal: Nutrient/Hydration intake appropriate for improving, restoring or maintaining nutritional needs  Description: Monitor and assess patient's nutrition/hydration status for malnutrition. Collaborate with interdisciplinary team and initiate plan and interventions as ordered.  Monitor patient's weight and dietary intake as ordered or per policy. Utilize nutrition screening tool and intervene as necessary. Determine patient's food preferences and provide high-protein, high-caloric foods as appropriate.     INTERVENTIONS:  - Monitor oral intake, urinary output, labs, and treatment plans  - Assess nutrition and hydration status and recommend course of action  - Evaluate amount of meals eaten  - Assist patient with eating if necessary   - Allow adequate time for meals  - Recommend/ encourage appropriate diets, oral nutritional supplements, and vitamin/mineral supplements  - Order, calculate, and assess calorie counts as needed  - Recommend, monitor, and adjust tube feedings and TPN/PPN based on assessed needs  - Assess need for intravenous fluids  - Provide specific nutrition/hydration education as appropriate  - Include patient/family/caregiver in decisions related to nutrition  Outcome: Progressing

## 2024-11-20 NOTE — ASSESSMENT & PLAN NOTE
History of endometrial cancer, pulmonary embolism, DVT, hypothyroidism, insulin-dependent diabetes, hypertension, history of multiple abdominal surgeries, and recent admission for SBO (11/5/24-11/12/24)  She represents to the hospital with abdominal pain nausea vomiting and diarrhea again  Admitted to surgical service after finding of SBO  Underwent gastrograffin study 11/18/24  Supportive care with bowel rest and NG tube decompression and IV fluids  Antiemetics & analgesics prn  Started on PICC line/TPN - monitoring glucose while on   Dietitian consulted for management  Multiple KUBs is obtained  Most recent KUB with continued dilution and/or suction removal of enteric contrast from otherwise previously dilated small bowel centrally and abdomen.  Bowel loops are slightly less distended than prior KUB on the 18th.  Overall findings still concerning for possible small bowel obstruction.  NG tube in place.    Will defer to surgery I if additional repeat abdominopelvic CT scan is necessary at this time  Discussed with general surgery at bedside -continued monitoring versus NG tube clamped versus possible need for additional surgery.  GI has added reglan to regimen    Goal to focus on ambulating more today

## 2024-11-20 NOTE — PROGRESS NOTES
"Progress Note - Surgery-General   Name: Lety Botello 64 y.o. female I MRN: [id number]  Unit/Bed#: E5 -01 I Date of Admission: 11/14/2024   Date of Service: 11/19/2024 I Hospital Day: 5     Assessment & Plan  SBO (small bowel obstruction) (HCC)  64 y.o. female presenting with recurrent SBO. 11/14 CT A/P: Small bowel obstruction with transition in the pelvis is again seen. 11/18 KUB showed SBO and some contrast within bowel in the right side of the abdomen which seems similar to the prior study and might be residual colonic contrast from prior imaging work-up  - 11/19 AM GGC: partial SBO some contrast in right colon  - Vitals: AVSS on RA  - WBC: 4.43  - Hgb: 9.5  - NG tube output: 650cc green bilious  - Abdomen: Soft, distended, lower hemiabdominal tenderness to palpation.  - Patient continues to ask for bedpan, although she is level III via PT/OT. Strongly encourage nursing staff and patient to continue to walk     Plan  - Re-scan with KUB today   - NPO  - NG tube to moderate continuous suction, NGT flushes  - mIVF @ 150 cc/hr  - Ok for ice chips  - Re-order TPN  - Nutrition consult for TPN recs  - Appreciate PT recs, continue to encourage patient to walk as well as nursing staff  - DVT ppx    24 Hour Events : No acute overnight events   Subjective : This morning patient complains of 8 out of 10 abdominal pain.  She says she feels \"crappy\".  Patient denies any nausea, vomiting, fevers or chills.  Patient states she has not passed gas or had any bowel movements overnight.  Patient continues to have high NG tube output.    Objective :  Visit Vitals  /65   Pulse 79   Temp 97.7 °F (36.5 °C) (Axillary)   Resp 12   Ht 5' (1.524 m)   Wt 104 kg (230 lb)   SpO2 93%   BMI 44.92 kg/m²   Smoking Status Never   BSA 1.98 m²        Physical Exam  Constitutional:       Appearance: Normal appearance. She is obese. She is not ill-appearing or diaphoretic.   Cardiovascular:      Rate and Rhythm: Normal rate and regular " rhythm.      Pulses: Normal pulses.      Heart sounds: Normal heart sounds. No murmur heard.     No gallop.   Pulmonary:      Effort: Pulmonary effort is normal. No respiratory distress.      Breath sounds: Normal breath sounds. No wheezing or rales.   Abdominal:      General: Abdomen is flat. There is distension.      Palpations: Abdomen is soft. There is no mass.      Tenderness: There is abdominal tenderness (Lower hemiabdominal tenderness to palpation). There is no right CVA tenderness, left CVA tenderness, guarding or rebound.      Hernia: No hernia is present.   Skin:     General: Skin is warm and dry.   Neurological:      General: No focal deficit present.      Mental Status: She is alert and oriented to person, place, and time.         Recent Labs     11/18/24  0409 11/18/24  0435 11/19/24  0503 11/20/24  0439   WBC  --    < > 4.18* 4.43   HGB  --    < > 9.1* 9.5*   PLT  --    < > 74* 82*   SODIUM 135  --  136  --    K 4.2  --  4.4  --    CL 99  --  101  --    CO2 32  --  33*  --    BUN 11  --  9  --    CREATININE 0.62  --  0.56*  --    GLUC 194*  --  192*  --    CALCIUM 7.9*  --  7.9*  --    MG 2.2  --  2.1  --    PHOS 2.7  --  2.9  --    AST 32  --   --   --    ALT 26  --   --   --    ALKPHOS 95  --   --   --    TBILI 0.87  --   --   --     < > = values in this interval not displayed.        Imaging Results Review: No pertinent imaging studies reviewed.  Other Study Results Review: No additional pertinent studies reviewed.    VTE Pharmacologic Prophylaxis: VTE covered by:  heparin (porcine), Subcutaneous, 5,000 Units at 11/19/24 1406     VTE Mechanical Prophylaxis: sequential compression device

## 2024-11-20 NOTE — ASSESSMENT & PLAN NOTE
Related to bowel obstruction/n.p.o. status.   Results from last 7 days   Lab Units 11/20/24  0618 11/20/24  0439 11/19/24  0503 11/18/24  0409   SODIUM mmol/L 137 133* 136 135    Resolved on IV fluids

## 2024-11-20 NOTE — PLAN OF CARE
Problem: GASTROINTESTINAL - ADULT  Goal: Minimal or absence of nausea and/or vomiting  Description: INTERVENTIONS:  - Administer IV fluids if ordered to ensure adequate hydration  - Maintain NPO status until nausea and vomiting are resolved  - Nasogastric tube if ordered  - Administer ordered antiemetic medications as needed  - Provide nonpharmacologic comfort measures as appropriate  - Advance diet as tolerated, if ordered  - Consider nutrition services referral to assist patient with adequate nutrition and appropriate food choices  11/20/2024 1134 by Misty Rowe RN  Outcome: Progressing  11/20/2024 1122 by Misty Rowe RN  Outcome: Progressing  Goal: Maintains or returns to baseline bowel function  Description: INTERVENTIONS:  - Assess bowel function  - Encourage oral fluids to ensure adequate hydration  - Administer IV fluids if ordered to ensure adequate hydration  - Administer ordered medications as needed  - Encourage mobilization and activity  - Consider nutritional services referral to assist patient with adequate nutrition and appropriate food choices  11/20/2024 1134 by Misty Rowe RN  Outcome: Progressing  11/20/2024 1122 by Misty Rowe RN  Outcome: Progressing  Goal: Maintains adequate nutritional intake  Description: INTERVENTIONS:  - Monitor percentage of each meal consumed  - Identify factors contributing to decreased intake, treat as appropriate  - Assist with meals as needed  - Monitor I&O, weight, and lab values if indicated  - Obtain nutrition services referral as needed  11/20/2024 1134 by Misty Rowe RN  Outcome: Progressing  11/20/2024 1122 by Misty Rowe RN  Outcome: Progressing  Goal: Establish and maintain optimal ostomy function  Description: INTERVENTIONS:  - Assess bowel function  - Encourage oral fluids to ensure adequate hydration  - Administer IV fluids if ordered to ensure adequate hydration   - Administer ordered medications as needed  -  Encourage mobilization and activity  - Nutrition services referral to assist patient with appropriate food choices  - Assess stoma site  - Consider wound care consult   11/20/2024 1134 by Misty Rowe RN  Outcome: Progressing  11/20/2024 1122 by Misty Rowe RN  Outcome: Progressing  Goal: Oral mucous membranes remain intact  Description: INTERVENTIONS  - Assess oral mucosa and hygiene practices  - Implement preventative oral hygiene regimen  - Implement oral medicated treatments as ordered  - Initiate Nutrition services referral as needed  11/20/2024 1134 by Misty Rowe RN  Outcome: Progressing  11/20/2024 1122 by Misty Rowe RN  Outcome: Progressing     Problem: Prexisting or High Potential for Compromised Skin Integrity  Goal: Skin integrity is maintained or improved  Description: INTERVENTIONS:  - Identify patients at risk for skin breakdown  - Assess and monitor skin integrity  - Assess and monitor nutrition and hydration status  - Monitor labs   - Assess for incontinence   - Turn and reposition patient  - Assist with mobility/ambulation  - Relieve pressure over bony prominences  - Avoid friction and shearing  - Provide appropriate hygiene as needed including keeping skin clean and dry  - Evaluate need for skin moisturizer/barrier cream  - Collaborate with interdisciplinary team   - Patient/family teaching  - Consider wound care consult   11/20/2024 1134 by Misty Rowe RN  Outcome: Progressing  11/20/2024 1122 by Misty Rowe RN  Outcome: Progressing     Problem: Nutrition/Hydration-ADULT  Goal: Nutrient/Hydration intake appropriate for improving, restoring or maintaining nutritional needs  Description: Monitor and assess patient's nutrition/hydration status for malnutrition. Collaborate with interdisciplinary team and initiate plan and interventions as ordered.  Monitor patient's weight and dietary intake as ordered or per policy. Utilize nutrition screening tool and  intervene as necessary. Determine patient's food preferences and provide high-protein, high-caloric foods as appropriate.     INTERVENTIONS:  - Monitor oral intake, urinary output, labs, and treatment plans  - Assess nutrition and hydration status and recommend course of action  - Evaluate amount of meals eaten  - Assist patient with eating if necessary   - Allow adequate time for meals  - Recommend/ encourage appropriate diets, oral nutritional supplements, and vitamin/mineral supplements  - Order, calculate, and assess calorie counts as needed  - Recommend, monitor, and adjust tube feedings and TPN/PPN based on assessed needs  - Assess need for intravenous fluids  - Provide specific nutrition/hydration education as appropriate  - Include patient/family/caregiver in decisions related to nutrition  11/20/2024 1134 by Misty Rowe RN  Outcome: Progressing  11/20/2024 1122 by Misty Rowe RN  Outcome: Progressing

## 2024-11-21 ENCOUNTER — ANESTHESIA (INPATIENT)
Dept: PERIOP | Facility: HOSPITAL | Age: 64
DRG: 230 | End: 2024-11-21
Payer: COMMERCIAL

## 2024-11-21 ENCOUNTER — APPOINTMENT (INPATIENT)
Dept: RADIOLOGY | Facility: HOSPITAL | Age: 64
DRG: 230 | End: 2024-11-21
Payer: COMMERCIAL

## 2024-11-21 LAB
ANION GAP SERPL CALCULATED.3IONS-SCNC: 5 MMOL/L (ref 4–13)
BASOPHILS # BLD AUTO: 0.02 THOUSANDS/ÂΜL (ref 0–0.1)
BASOPHILS NFR BLD AUTO: 0 % (ref 0–1)
BUN SERPL-MCNC: 14 MG/DL (ref 5–25)
CA-I BLD-SCNC: 1.15 MMOL/L (ref 1.12–1.32)
CALCIUM SERPL-MCNC: 8 MG/DL (ref 8.4–10.2)
CHLORIDE SERPL-SCNC: 103 MMOL/L (ref 96–108)
CO2 SERPL-SCNC: 28 MMOL/L (ref 21–32)
CREAT SERPL-MCNC: 0.54 MG/DL (ref 0.6–1.3)
EOSINOPHIL # BLD AUTO: 0.11 THOUSAND/ÂΜL (ref 0–0.61)
EOSINOPHIL NFR BLD AUTO: 2 % (ref 0–6)
ERYTHROCYTE [DISTWIDTH] IN BLOOD BY AUTOMATED COUNT: 16.4 % (ref 11.6–15.1)
GFR SERPL CREATININE-BSD FRML MDRD: 101 ML/MIN/1.73SQ M
GLUCOSE SERPL-MCNC: 165 MG/DL (ref 65–140)
GLUCOSE SERPL-MCNC: 174 MG/DL (ref 65–140)
GLUCOSE SERPL-MCNC: 175 MG/DL (ref 65–140)
GLUCOSE SERPL-MCNC: 181 MG/DL (ref 65–140)
GLUCOSE SERPL-MCNC: 185 MG/DL (ref 65–140)
GLUCOSE SERPL-MCNC: 190 MG/DL (ref 65–140)
GLUCOSE SERPL-MCNC: 190 MG/DL (ref 65–140)
HCT VFR BLD AUTO: 29.5 % (ref 34.8–46.1)
HGB BLD-MCNC: 9.4 G/DL (ref 11.5–15.4)
IMM GRANULOCYTES # BLD AUTO: 0.04 THOUSAND/UL (ref 0–0.2)
IMM GRANULOCYTES NFR BLD AUTO: 1 % (ref 0–2)
LYMPHOCYTES # BLD AUTO: 0.46 THOUSANDS/ÂΜL (ref 0.6–4.47)
LYMPHOCYTES NFR BLD AUTO: 10 % (ref 14–44)
MAGNESIUM SERPL-MCNC: 1.8 MG/DL (ref 1.9–2.7)
MCH RBC QN AUTO: 30.7 PG (ref 26.8–34.3)
MCHC RBC AUTO-ENTMCNC: 31.9 G/DL (ref 31.4–37.4)
MCV RBC AUTO: 96 FL (ref 82–98)
MONOCYTES # BLD AUTO: 0.31 THOUSAND/ÂΜL (ref 0.17–1.22)
MONOCYTES NFR BLD AUTO: 7 % (ref 4–12)
NEUTROPHILS # BLD AUTO: 3.66 THOUSANDS/ÂΜL (ref 1.85–7.62)
NEUTS SEG NFR BLD AUTO: 80 % (ref 43–75)
NRBC BLD AUTO-RTO: 0 /100 WBCS
PHOSPHATE SERPL-MCNC: 3.9 MG/DL (ref 2.3–4.1)
PLATELET # BLD AUTO: 80 THOUSANDS/UL (ref 149–390)
PMV BLD AUTO: 10.3 FL (ref 8.9–12.7)
POTASSIUM SERPL-SCNC: 4.9 MMOL/L (ref 3.5–5.3)
RBC # BLD AUTO: 3.06 MILLION/UL (ref 3.81–5.12)
SODIUM SERPL-SCNC: 136 MMOL/L (ref 135–147)
WBC # BLD AUTO: 4.6 THOUSAND/UL (ref 4.31–10.16)

## 2024-11-21 PROCEDURE — 84100 ASSAY OF PHOSPHORUS: CPT | Performed by: PHYSICIAN ASSISTANT

## 2024-11-21 PROCEDURE — 99232 SBSQ HOSP IP/OBS MODERATE 35: CPT | Performed by: SURGERY

## 2024-11-21 PROCEDURE — 82948 REAGENT STRIP/BLOOD GLUCOSE: CPT

## 2024-11-21 PROCEDURE — 80048 BASIC METABOLIC PNL TOTAL CA: CPT

## 2024-11-21 PROCEDURE — 99232 SBSQ HOSP IP/OBS MODERATE 35: CPT | Performed by: PHYSICIAN ASSISTANT

## 2024-11-21 PROCEDURE — 74018 RADEX ABDOMEN 1 VIEW: CPT

## 2024-11-21 PROCEDURE — 85025 COMPLETE CBC W/AUTO DIFF WBC: CPT

## 2024-11-21 PROCEDURE — 82330 ASSAY OF CALCIUM: CPT | Performed by: SURGERY

## 2024-11-21 PROCEDURE — 83735 ASSAY OF MAGNESIUM: CPT

## 2024-11-21 RX ORDER — SODIUM CHLORIDE, SODIUM GLUCONATE, SODIUM ACETATE, POTASSIUM CHLORIDE, MAGNESIUM CHLORIDE, SODIUM PHOSPHATE, DIBASIC, AND POTASSIUM PHOSPHATE .53; .5; .37; .037; .03; .012; .00082 G/100ML; G/100ML; G/100ML; G/100ML; G/100ML; G/100ML; G/100ML
100 INJECTION, SOLUTION INTRAVENOUS CONTINUOUS
Status: DISCONTINUED | OUTPATIENT
Start: 2024-11-21 | End: 2024-11-27

## 2024-11-21 RX ORDER — SODIUM CHLORIDE, SODIUM GLUCONATE, SODIUM ACETATE, POTASSIUM CHLORIDE, MAGNESIUM CHLORIDE, SODIUM PHOSPHATE, DIBASIC, AND POTASSIUM PHOSPHATE .53; .5; .37; .037; .03; .012; .00082 G/100ML; G/100ML; G/100ML; G/100ML; G/100ML; G/100ML; G/100ML
75 INJECTION, SOLUTION INTRAVENOUS CONTINUOUS
Status: DISCONTINUED | OUTPATIENT
Start: 2024-11-21 | End: 2024-11-21

## 2024-11-21 RX ORDER — MAGNESIUM SULFATE HEPTAHYDRATE 40 MG/ML
2 INJECTION, SOLUTION INTRAVENOUS ONCE
Status: COMPLETED | OUTPATIENT
Start: 2024-11-21 | End: 2024-11-21

## 2024-11-21 RX ORDER — NYSTATIN 100000 [USP'U]/G
POWDER TOPICAL 3 TIMES DAILY
Status: DISCONTINUED | OUTPATIENT
Start: 2024-11-21 | End: 2024-12-05 | Stop reason: HOSPADM

## 2024-11-21 RX ADMIN — HYDROMORPHONE HYDROCHLORIDE 0.5 MG: 1 INJECTION, SOLUTION INTRAMUSCULAR; INTRAVENOUS; SUBCUTANEOUS at 10:42

## 2024-11-21 RX ADMIN — INSULIN LISPRO 4 UNITS: 100 INJECTION, SOLUTION INTRAVENOUS; SUBCUTANEOUS at 19:02

## 2024-11-21 RX ADMIN — INSULIN LISPRO 4 UNITS: 100 INJECTION, SOLUTION INTRAVENOUS; SUBCUTANEOUS at 12:06

## 2024-11-21 RX ADMIN — METOROPROLOL TARTRATE 2.5 MG: 5 INJECTION, SOLUTION INTRAVENOUS at 19:32

## 2024-11-21 RX ADMIN — HYDROMORPHONE HYDROCHLORIDE 0.5 MG: 1 INJECTION, SOLUTION INTRAMUSCULAR; INTRAVENOUS; SUBCUTANEOUS at 03:23

## 2024-11-21 RX ADMIN — METOROPROLOL TARTRATE 2.5 MG: 5 INJECTION, SOLUTION INTRAVENOUS at 13:57

## 2024-11-21 RX ADMIN — METOROPROLOL TARTRATE 2.5 MG: 5 INJECTION, SOLUTION INTRAVENOUS at 01:05

## 2024-11-21 RX ADMIN — METOROPROLOL TARTRATE 2.5 MG: 5 INJECTION, SOLUTION INTRAVENOUS at 07:30

## 2024-11-21 RX ADMIN — HYDROMORPHONE HYDROCHLORIDE 0.5 MG: 1 INJECTION, SOLUTION INTRAMUSCULAR; INTRAVENOUS; SUBCUTANEOUS at 17:11

## 2024-11-21 RX ADMIN — HYDROMORPHONE HYDROCHLORIDE 0.5 MG: 1 INJECTION, SOLUTION INTRAMUSCULAR; INTRAVENOUS; SUBCUTANEOUS at 00:21

## 2024-11-21 RX ADMIN — SODIUM CHLORIDE, SODIUM GLUCONATE, SODIUM ACETATE, POTASSIUM CHLORIDE, MAGNESIUM CHLORIDE, SODIUM PHOSPHATE, DIBASIC, AND POTASSIUM PHOSPHATE 75 ML/HR: .53; .5; .37; .037; .03; .012; .00082 INJECTION, SOLUTION INTRAVENOUS at 05:32

## 2024-11-21 RX ADMIN — PANTOPRAZOLE SODIUM 40 MG: 40 INJECTION, POWDER, LYOPHILIZED, FOR SOLUTION INTRAVENOUS at 08:26

## 2024-11-21 RX ADMIN — HYDROMORPHONE HYDROCHLORIDE 0.5 MG: 1 INJECTION, SOLUTION INTRAMUSCULAR; INTRAVENOUS; SUBCUTANEOUS at 13:57

## 2024-11-21 RX ADMIN — HEPARIN SODIUM 5000 UNITS: 5000 INJECTION INTRAVENOUS; SUBCUTANEOUS at 13:57

## 2024-11-21 RX ADMIN — INSULIN LISPRO 4 UNITS: 100 INJECTION, SOLUTION INTRAVENOUS; SUBCUTANEOUS at 00:13

## 2024-11-21 RX ADMIN — METOCLOPRAMIDE 10 MG: 5 INJECTION, SOLUTION INTRAMUSCULAR; INTRAVENOUS at 00:15

## 2024-11-21 RX ADMIN — HEPARIN SODIUM 5000 UNITS: 5000 INJECTION INTRAVENOUS; SUBCUTANEOUS at 22:13

## 2024-11-21 RX ADMIN — Medication: at 22:23

## 2024-11-21 RX ADMIN — SODIUM CHLORIDE, SODIUM GLUCONATE, SODIUM ACETATE, POTASSIUM CHLORIDE, MAGNESIUM CHLORIDE, SODIUM PHOSPHATE, DIBASIC, AND POTASSIUM PHOSPHATE 125 ML/HR: .53; .5; .37; .037; .03; .012; .00082 INJECTION, SOLUTION INTRAVENOUS at 17:17

## 2024-11-21 RX ADMIN — NYSTATIN: 100000 POWDER TOPICAL at 17:11

## 2024-11-21 RX ADMIN — HYDROMORPHONE HYDROCHLORIDE 0.5 MG: 1 INJECTION, SOLUTION INTRAMUSCULAR; INTRAVENOUS; SUBCUTANEOUS at 07:30

## 2024-11-21 RX ADMIN — NYSTATIN: 100000 POWDER TOPICAL at 22:13

## 2024-11-21 RX ADMIN — MAGNESIUM SULFATE HEPTAHYDRATE 2 G: 40 INJECTION, SOLUTION INTRAVENOUS at 09:15

## 2024-11-21 RX ADMIN — HYDROMORPHONE HYDROCHLORIDE 0.5 MG: 1 INJECTION, SOLUTION INTRAMUSCULAR; INTRAVENOUS; SUBCUTANEOUS at 22:13

## 2024-11-21 RX ADMIN — METOCLOPRAMIDE 10 MG: 5 INJECTION, SOLUTION INTRAMUSCULAR; INTRAVENOUS at 05:27

## 2024-11-21 RX ADMIN — INSULIN LISPRO 4 UNITS: 100 INJECTION, SOLUTION INTRAVENOUS; SUBCUTANEOUS at 05:33

## 2024-11-21 NOTE — PLAN OF CARE
Problem: Potential for Falls  Goal: Patient will remain free of falls  Description: INTERVENTIONS:  - Educate patient/family on patient safety including physical limitations  - Instruct patient to call for assistance with activity   - Consult OT/PT to assist with strengthening/mobility   - Keep Call bell within reach  - Keep bed low and locked with side rails adjusted as appropriate  - Keep care items and personal belongings within reach  - Initiate and maintain comfort rounds  - Make Fall Risk Sign visible to staff  - Offer Toileting every 3 Hours, in advance of need  - Initiate/Maintain bed alarm  - Obtain necessary fall risk management equipment  - Apply yellow socks and bracelet for high fall risk patients  - Consider moving patient to room near nurses station  Outcome: Progressing     Problem: PAIN - ADULT  Goal: Verbalizes/displays adequate comfort level or baseline comfort level  Description: Interventions:  - Encourage patient to monitor pain and request assistance  - Assess pain using appropriate pain scale  - Administer analgesics based on type and severity of pain and evaluate response  - Implement non-pharmacological measures as appropriate and evaluate response  - Consider cultural and social influences on pain and pain management  - Notify physician/advanced practitioner if interventions unsuccessful or patient reports new pain  Outcome: Progressing     Problem: SAFETY ADULT  Goal: Patient will remain free of falls  Description: INTERVENTIONS:  - Educate patient/family on patient safety including physical limitations  - Instruct patient to call for assistance with activity   - Consult OT/PT to assist with strengthening/mobility   - Keep Call bell within reach  - Keep bed low and locked with side rails adjusted as appropriate  - Keep care items and personal belongings within reach  - Initiate and maintain comfort rounds  - Make Fall Risk Sign visible to staff  - Offer Toileting every 3 Hours, in  advance of need  - Initiate/Maintain bed alarm  - Obtain necessary fall risk management equipment  - Apply yellow socks and bracelet for high fall risk patients  - Consider moving patient to room near nurses station  Outcome: Progressing  Goal: Maintain or return to baseline ADL function  Description: INTERVENTIONS:  -  Assess patient's ability to carry out ADLs; assess patient's baseline for ADL function and identify physical deficits which impact ability to perform ADLs (bathing, care of mouth/teeth, toileting, grooming, dressing, etc.)  - Assess/evaluate cause of self-care deficits   - Assess range of motion  - Assess patient's mobility; develop plan if impaired  - Assess patient's need for assistive devices and provide as appropriate  - Encourage maximum independence but intervene and supervise when necessary  - Involve family in performance of ADLs  - Assess for home care needs following discharge   - Consider OT consult to assist with ADL evaluation and planning for discharge  - Provide patient education as appropriate  Outcome: Progressing  Goal: Maintains/Returns to pre admission functional level  Description: INTERVENTIONS:  - Perform AM-PAC 6 Click Basic Mobility/ Daily Activity assessment daily.  - Set and communicate daily mobility goal to care team and patient/family/caregiver.   - Collaborate with rehabilitation services on mobility goals if consulted  - Perform Range of Motion 3 times a day.  - Reposition patient every 3 hours.  - Dangle patient 3 times a day  - Stand patient 3 times a day  - Ambulate patient 3 times a day  - Out of bed to chair 3 times a day   - Out of bed for meals 3 times a day  - Out of bed for toileting  - Record patient progress and toleration of activity level   Outcome: Progressing     Problem: DISCHARGE PLANNING  Goal: Discharge to home or other facility with appropriate resources  Description: INTERVENTIONS:  - Identify barriers to discharge w/patient and caregiver  -  Arrange for needed discharge resources and transportation as appropriate  - Identify discharge learning needs (meds, wound care, etc.)  - Arrange for interpretive services to assist at discharge as needed  - Refer to Case Management Department for coordinating discharge planning if the patient needs post-hospital services based on physician/advanced practitioner order or complex needs related to functional status, cognitive ability, or social support system  Outcome: Progressing     Problem: INFECTION - ADULT  Goal: Absence or prevention of progression during hospitalization  Description: INTERVENTIONS:  - Assess and monitor for signs and symptoms of infection  - Monitor lab/diagnostic results  - Monitor all insertion sites, i.e. indwelling lines, tubes, and drains  - Monitor endotracheal if appropriate and nasal secretions for changes in amount and color  - [place] appropriate cooling/warming therapies per order  - Administer medications as ordered  - Instruct and encourage patient and family to use good hand hygiene technique  - Identify and instruct in appropriate isolation precautions for identified infection/condition  Outcome: Progressing     Problem: Knowledge Deficit  Goal: Patient/family/caregiver demonstrates understanding of disease process, treatment plan, medications, and discharge instructions  Description: Complete learning assessment and assess knowledge base.  Interventions:  - Provide teaching at level of understanding  - Provide teaching via preferred learning methods  Outcome: Progressing     Problem: GASTROINTESTINAL - ADULT  Goal: Minimal or absence of nausea and/or vomiting  Description: INTERVENTIONS:  - Administer IV fluids if ordered to ensure adequate hydration  - Maintain NPO status until nausea and vomiting are resolved  - Nasogastric tube if ordered  - Administer ordered antiemetic medications as needed  - Provide nonpharmacologic comfort measures as appropriate  - Advance diet as  tolerated, if ordered  - Consider nutrition services referral to assist patient with adequate nutrition and appropriate food choices  Outcome: Progressing  Goal: Maintains or returns to baseline bowel function  Description: INTERVENTIONS:  - Assess bowel function  - Encourage oral fluids to ensure adequate hydration  - Administer IV fluids if ordered to ensure adequate hydration  - Administer ordered medications as needed  - Encourage mobilization and activity  - Consider nutritional services referral to assist patient with adequate nutrition and appropriate food choices  Outcome: Progressing  Goal: Maintains adequate nutritional intake  Description: INTERVENTIONS:  - Monitor percentage of each meal consumed  - Identify factors contributing to decreased intake, treat as appropriate  - Assist with meals as needed  - Monitor I&O, weight, and lab values if indicated  - Obtain nutrition services referral as needed  Outcome: Progressing  Goal: Establish and maintain optimal ostomy function  Description: INTERVENTIONS:  - Assess bowel function  - Encourage oral fluids to ensure adequate hydration  - Administer IV fluids if ordered to ensure adequate hydration   - Administer ordered medications as needed  - Encourage mobilization and activity  - Nutrition services referral to assist patient with appropriate food choices  - Assess stoma site  - Consider wound care consult   Outcome: Progressing  Goal: Oral mucous membranes remain intact  Description: INTERVENTIONS  - Assess oral mucosa and hygiene practices  - Implement preventative oral hygiene regimen  - Implement oral medicated treatments as ordered  - Initiate Nutrition services referral as needed  Outcome: Progressing     Problem: Prexisting or High Potential for Compromised Skin Integrity  Goal: Skin integrity is maintained or improved  Description: INTERVENTIONS:  - Identify patients at risk for skin breakdown  - Assess and monitor skin integrity  - Assess and  monitor nutrition and hydration status  - Monitor labs   - Assess for incontinence   - Turn and reposition patient  - Assist with mobility/ambulation  - Relieve pressure over bony prominences  - Avoid friction and shearing  - Provide appropriate hygiene as needed including keeping skin clean and dry  - Evaluate need for skin moisturizer/barrier cream  - Collaborate with interdisciplinary team   - Patient/family teaching  - Consider wound care consult   Outcome: Progressing     Problem: Nutrition/Hydration-ADULT  Goal: Nutrient/Hydration intake appropriate for improving, restoring or maintaining nutritional needs  Description: Monitor and assess patient's nutrition/hydration status for malnutrition. Collaborate with interdisciplinary team and initiate plan and interventions as ordered.  Monitor patient's weight and dietary intake as ordered or per policy. Utilize nutrition screening tool and intervene as necessary. Determine patient's food preferences and provide high-protein, high-caloric foods as appropriate.     INTERVENTIONS:  - Monitor oral intake, urinary output, labs, and treatment plans  - Assess nutrition and hydration status and recommend course of action  - Evaluate amount of meals eaten  - Assist patient with eating if necessary   - Allow adequate time for meals  - Recommend/ encourage appropriate diets, oral nutritional supplements, and vitamin/mineral supplements  - Order, calculate, and assess calorie counts as needed  - Recommend, monitor, and adjust tube feedings and TPN/PPN based on assessed needs  - Assess need for intravenous fluids  - Provide specific nutrition/hydration education as appropriate  - Include patient/family/caregiver in decisions related to nutrition  Outcome: Progressing

## 2024-11-21 NOTE — ASSESSMENT & PLAN NOTE
Prior to admission on carvedilol 12.5 mg BID  While n.p.o. continue scheduled IV metoprolol 2.5 mg every 6 hours  Given elevated BPs in the 150s-160s may need to increase metoprolol dosage

## 2024-11-21 NOTE — ASSESSMENT & PLAN NOTE
Lab Results   Component Value Date    HGBA1C 6.9 (H) 11/05/2024     Recent Labs     11/21/24  0001 11/21/24  0529 11/21/24  0700 11/21/24  1107   POCGLU 181* 175* 185* 190*   Previously on toujeo 60 units with lispro 8 units 3 times daily  Patient reports now only on sliding scale insulin and no longer on toujeo.    Currently stable with every 6 hours checks

## 2024-11-21 NOTE — ASSESSMENT & PLAN NOTE
64 y.o. female presenting with recurrent SBO. 11/14 CT A/P: Small bowel obstruction with transition in the pelvis is again seen. 11/18 KUB showed SBO and some contrast within bowel in the right side of the abdomen which seems similar to the prior study and might be residual colonic contrast from prior imaging work-up    11/20 NGT removed after contrast seen in R colon    Plan  - NPO  - f/u AM KUB today   - tentative plan for OR today for ex lap pending discussion with Surgical Attending  - IVF  - cont PICC/TPN  - Nutrition consult for TPN recs  - Appreciate PT recs, continue to encourage patient to walk as well as nursing staff  - DVT ppx

## 2024-11-21 NOTE — PROGRESS NOTES
Progress Note - Hospitalist   Name: Lety Botello 64 y.o. female I MRN: [id number]  Unit/Bed#: E5 -01 I Date of Admission: 11/14/2024   Date of Service: 11/21/2024 I Hospital Day: 7    Assessment & Plan  SBO (small bowel obstruction) (HCC)  History of endometrial cancer, pulmonary embolism, DVT, hypothyroidism, insulin-dependent diabetes, hypertension, history of multiple abdominal surgeries, and recent admission for SBO (11/5/24-11/12/24)  She represents to the hospital with abdominal pain nausea vomiting and diarrhea again  Admitted to surgical service after finding of SBO  Supportive care with bowel rest and NG tube decompression and IV fluids attempted  Antiemetics & analgesics prn  Started on PICC line/TPN - monitoring glucose while on -Dietitian consulted for management  Multiple KUBs obtained  Most recent KUB with continued dilution and/or suction removal of enteric contrast from otherwise previously dilated small bowel centrally and abdomen.  Bowel loops are slightly less distended than prior KUB on the 18th.  Overall findings still concerning for possible small bowel obstruction.   NG tube was removed by surgery on 11/20/24  Patient will be going to the OR today with surgery for exploratory laparotomy  Hypertension  Prior to admission on carvedilol 12.5 mg BID  While n.p.o. continue scheduled IV metoprolol 2.5 mg every 6 hours  Given elevated BPs in the 150s-160s may need to increase metoprolol dosage  Type 2 diabetes mellitus (HCC)  Lab Results   Component Value Date    HGBA1C 6.9 (H) 11/05/2024     Recent Labs     11/21/24  0001 11/21/24  0529 11/21/24  0700 11/21/24  1107   POCGLU 181* 175* 185* 190*   Previously on toujeo 60 units with lispro 8 units 3 times daily  Patient reports now only on sliding scale insulin and no longer on toujeo.    Currently stable with every 6 hours checks  Insomnia  Lorazepam added with improvement overnight  History of pulmonary embolism  History of DVT/PE May 2024-has  been anticoagulated on Eliquis since  Eliquis currently on hold given n.p.o. status  Unclear etiology of thromboembolic event, has been 6 months since PE/DVT, most recent CT scan from September 2024 without evidence of PE  Continue heparin for prophylaxis while NPO with possible need for surgery  Hypothyroidism  Prior to admission levothyroxine.  Held while NPO  Mild intermittent asthma  Prior to admission on albuterol only as needed. No exacerbations  Breathing stable here  Hyponatremia  Related to bowel obstruction/n.p.o. status.   Results from last 7 days   Lab Units 11/21/24  0525 11/20/24  0618 11/20/24  0439 11/19/24  0503   SODIUM mmol/L 136 137 133* 136    Resolved on IV fluids  Morbid obesity (HCC)  Body mass index is 44.92 kg/m².  Would benefit from weight loss    VTE Pharmacologic Prophylaxis:   Heparin    Mobility:   Basic Mobility Inpatient Raw Score: 18  -HLM Goal: 6: Walk 10 steps or more  -HLM Achieved: 4: Move to chair/commode    Patient Centered Rounds: I performed bedside rounds with nursing staff today.   Discussions with Specialists or Other Care Team Provider: Nursing    Education and Discussions with Family / Patient: Patient    Current Length of Stay: 7 day(s)  Current Patient Status: Inpatient   Discharge Plan: Per primary, surgery    Code Status: Level 1 - Full Code    Subjective   With ongoing abdominal pain.  NG tube was pulled yesterday.  Frustrated as it got along going abdominal pain and now is eager to have surgery.  Reports multiple lesions on her abdomen were from a prior history of shingles months ago.    Objective :  Temp:  [96.8 °F (36 °C)-97.5 °F (36.4 °C)] 97.2 °F (36.2 °C)  HR:  [77-92] 77  BP: (149-164)/(66-84) 152/73  Resp:  [16-18] 16  SpO2:  [93 %-95 %] 95 %  O2 Device: None (Room air)    Body mass index is 44.92 kg/m².     Input and Output Summary (last 24 hours):     Intake/Output Summary (Last 24 hours) at 11/21/2024 1527  Last data filed at 11/21/2024 1224  Gross  per 24 hour   Intake --   Output 1070 ml   Net -1070 ml       Physical Exam  Vitals and nursing note reviewed.   Constitutional:       General: She is not in acute distress.     Appearance: She is obese. She is not ill-appearing, toxic-appearing or diaphoretic.   HENT:      Head: Normocephalic and atraumatic.   Eyes:      General: No scleral icterus.  Cardiovascular:      Rate and Rhythm: Normal rate and regular rhythm.   Pulmonary:      Effort: No respiratory distress.      Breath sounds: No wheezing.   Abdominal:      General: Bowel sounds are normal. There is no distension.      Palpations: Abdomen is soft. There is no mass.      Tenderness: There is no abdominal tenderness.      Hernia: No hernia is present.   Musculoskeletal:         General: No swelling.      Cervical back: Neck supple.   Skin:     General: Skin is warm and dry.      Findings: Lesion present.      Comments: To abdomen   Neurological:      Mental Status: She is alert and oriented to person, place, and time. Mental status is at baseline.   Psychiatric:         Mood and Affect: Mood normal.         Behavior: Behavior normal.         Lines/Drains:  Lines/Drains/Airways       Active Status       Name Placement date Placement time Site Days    PICC Line 11/18/24 Right Basilic 11/18/24  1249  Basilic  3                             Lab Results: I have reviewed the following results:   Results from last 7 days   Lab Units 11/21/24  0525   WBC Thousand/uL 4.60   HEMOGLOBIN g/dL 9.4*   HEMATOCRIT % 29.5*   PLATELETS Thousands/uL 80*   SEGS PCT % 80*   LYMPHO PCT % 10*   MONO PCT % 7   EOS PCT % 2     Results from last 7 days   Lab Units 11/21/24  0525 11/19/24  0503 11/18/24  0409   SODIUM mmol/L 136   < > 135   POTASSIUM mmol/L 4.9   < > 4.2   CHLORIDE mmol/L 103   < > 99   CO2 mmol/L 28   < > 32   BUN mg/dL 14   < > 11   CREATININE mg/dL 0.54*   < > 0.62   ANION GAP mmol/L 5   < > 4   CALCIUM mg/dL 8.0*   < > 7.9*   ALBUMIN g/dL  --   --  2.7*   TOTAL  BILIRUBIN mg/dL  --   --  0.87   ALK PHOS U/L  --   --  95   ALT U/L  --   --  26   AST U/L  --   --  32   GLUCOSE RANDOM mg/dL 190*   < > 194*    < > = values in this interval not displayed.         Results from last 7 days   Lab Units 11/21/24  1107 11/21/24  0700 11/21/24  0529 11/21/24  0001 11/20/24  1902 11/20/24  1141 11/20/24  0506 11/20/24  0006 11/19/24  1748 11/19/24  1207 11/19/24  0554 11/19/24  0004   POC GLUCOSE mg/dl 190* 185* 175* 181* 198* 196* 194* 195* 176* 179* 192* 181*               Recent Cultures (last 7 days):               Last 24 Hours Medication List:     Current Facility-Administered Medications:     Adult 3-in-1 TPN (custom base / custom electrolytes), Continuous TPN    Adult TPN (STANDARD BASE/STANDARD ELECTROLYTE), Continuous TPN, Last Rate: 41.8 mL/hr at 11/20/24 2114    heparin (porcine) subcutaneous injection 5,000 Units, Q8H SAM **AND** [CANCELED] Platelet count, Once    HYDROmorphone (DILAUDID) injection 0.5 mg, Q3H PRN    HYDROmorphone HCl (DILAUDID) injection 0.2 mg, Q3H PRN    insulin lispro (HumALOG/ADMELOG) 100 units/mL subcutaneous injection 4-20 Units, Q6H SAM **AND** Fingerstick Glucose (POCT), Q6H    metoprolol (LOPRESSOR) injection 2.5 mg, Q6H    multi-electrolyte (PLASMALYTE-A/ISOLYTE-S PH 7.4) IV solution, Continuous    ondansetron (ZOFRAN) injection 4 mg, Q6H PRN    pantoprazole (PROTONIX) injection 40 mg, Q24H SAM    phenol (CHLORASEPTIC) 1.4 % mucosal liquid 1 spray, Q2H PRN    trimethobenzamide (TIGAN) IM injection 200 mg, Q6H PRN    Administrative Statements   Today, Patient Was Seen By: Kimberly Del Toro PA-C      **Please Note: This note may have been constructed using a voice recognition system.**

## 2024-11-21 NOTE — ASSESSMENT & PLAN NOTE
History of endometrial cancer, pulmonary embolism, DVT, hypothyroidism, insulin-dependent diabetes, hypertension, history of multiple abdominal surgeries, and recent admission for SBO (11/5/24-11/12/24)  She represents to the hospital with abdominal pain nausea vomiting and diarrhea again  Admitted to surgical service after finding of SBO  Supportive care with bowel rest and NG tube decompression and IV fluids attempted  Antiemetics & analgesics prn  Started on PICC line/TPN - monitoring glucose while on -Dietitian consulted for management  Multiple KUBs obtained  Most recent KUB with continued dilution and/or suction removal of enteric contrast from otherwise previously dilated small bowel centrally and abdomen.  Bowel loops are slightly less distended than prior KUB on the 18th.  Overall findings still concerning for possible small bowel obstruction.   NG tube was removed by surgery on 11/20/24  Patient will be going to the OR today with surgery for exploratory laparotomy

## 2024-11-21 NOTE — ASSESSMENT & PLAN NOTE
Related to bowel obstruction/n.p.o. status.   Results from last 7 days   Lab Units 11/21/24  0525 11/20/24  0618 11/20/24  0439 11/19/24  0503   SODIUM mmol/L 136 137 133* 136    Resolved on IV fluids

## 2024-11-21 NOTE — RESTORATIVE TECHNICIAN NOTE
Restorative Technician Note      Patient Name: Lety Botello     Restorative Tech Visit Date: 11/21/24  Note Type: Mobility  Patient Position Upon Consult: Supine  Activity Performed: Ambulated  Assistive Device: Roller walker  Patient Position at End of Consult: All needs within reach; Supine            ns

## 2024-11-21 NOTE — PLAN OF CARE
Problem: Potential for Falls  Goal: Patient will remain free of falls  Description: INTERVENTIONS:  - Educate patient/family on patient safety including physical limitations  - Instruct patient to call for assistance with activity   - Consult OT/PT to assist with strengthening/mobility   - Keep Call bell within reach  - Keep bed low and locked with side rails adjusted as appropriate  - Keep care items and personal belongings within reach  - Initiate and maintain comfort rounds  - Make Fall Risk Sign visible to staff  - Offer Toileting every 3 Hours, in advance of need  - Initiate/Maintain bed alarm  - Obtain necessary fall risk management equipment  - Apply yellow socks and bracelet for high fall risk patients  - Consider moving patient to room near nurses station  Outcome: Progressing     Problem: PAIN - ADULT  Goal: Verbalizes/displays adequate comfort level or baseline comfort level  Description: Interventions:  - Encourage patient to monitor pain and request assistance  - Assess pain using appropriate pain scale  - Administer analgesics based on type and severity of pain and evaluate response  - Implement non-pharmacological measures as appropriate and evaluate response  - Consider cultural and social influences on pain and pain management  - Notify physician/advanced practitioner if interventions unsuccessful or patient reports new pain  Outcome: Progressing     Problem: SAFETY ADULT  Goal: Patient will remain free of falls  Description: INTERVENTIONS:  - Educate patient/family on patient safety including physical limitations  - Instruct patient to call for assistance with activity   - Consult OT/PT to assist with strengthening/mobility   - Keep Call bell within reach  - Keep bed low and locked with side rails adjusted as appropriate  - Keep care items and personal belongings within reach  - Initiate and maintain comfort rounds  - Make Fall Risk Sign visible to staff  - Offer Toileting every 3 Hours, in  advance of need  - Initiate/Maintain bed alarm  - Obtain necessary fall risk management equipment  - Apply yellow socks and bracelet for high fall risk patients  - Consider moving patient to room near nurses station  Outcome: Progressing  Goal: Maintain or return to baseline ADL function  Description: INTERVENTIONS:  -  Assess patient's ability to carry out ADLs; assess patient's baseline for ADL function and identify physical deficits which impact ability to perform ADLs (bathing, care of mouth/teeth, toileting, grooming, dressing, etc.)  - Assess/evaluate cause of self-care deficits   - Assess range of motion  - Assess patient's mobility; develop plan if impaired  - Assess patient's need for assistive devices and provide as appropriate  - Encourage maximum independence but intervene and supervise when necessary  - Involve family in performance of ADLs  - Assess for home care needs following discharge   - Consider OT consult to assist with ADL evaluation and planning for discharge  - Provide patient education as appropriate  Outcome: Progressing  Goal: Maintains/Returns to pre admission functional level  Description: INTERVENTIONS:  - Perform AM-PAC 6 Click Basic Mobility/ Daily Activity assessment daily.  - Set and communicate daily mobility goal to care team and patient/family/caregiver.   - Collaborate with rehabilitation services on mobility goals if consulted  - Perform Range of Motion 3 times a day.  - Reposition patient every 3 hours.  - Dangle patient 3 times a day  - Stand patient 3 times a day  - Ambulate patient 3 times a day  - Out of bed to chair 3 times a day   - Out of bed for meals 3 times a day  - Out of bed for toileting  - Record patient progress and toleration of activity level   Outcome: Progressing     Problem: DISCHARGE PLANNING  Goal: Discharge to home or other facility with appropriate resources  Description: INTERVENTIONS:  - Identify barriers to discharge w/patient and caregiver  -  Arrange for needed discharge resources and transportation as appropriate  - Identify discharge learning needs (meds, wound care, etc.)  - Arrange for interpretive services to assist at discharge as needed  - Refer to Case Management Department for coordinating discharge planning if the patient needs post-hospital services based on physician/advanced practitioner order or complex needs related to functional status, cognitive ability, or social support system  Outcome: Progressing     Problem: GASTROINTESTINAL - ADULT  Goal: Minimal or absence of nausea and/or vomiting  Description: INTERVENTIONS:  - Administer IV fluids if ordered to ensure adequate hydration  - Maintain NPO status until nausea and vomiting are resolved  - Nasogastric tube if ordered  - Administer ordered antiemetic medications as needed  - Provide nonpharmacologic comfort measures as appropriate  - Advance diet as tolerated, if ordered  - Consider nutrition services referral to assist patient with adequate nutrition and appropriate food choices  Outcome: Progressing  Goal: Maintains or returns to baseline bowel function  Description: INTERVENTIONS:  - Assess bowel function  - Encourage oral fluids to ensure adequate hydration  - Administer IV fluids if ordered to ensure adequate hydration  - Administer ordered medications as needed  - Encourage mobilization and activity  - Consider nutritional services referral to assist patient with adequate nutrition and appropriate food choices  Outcome: Progressing  Goal: Maintains adequate nutritional intake  Description: INTERVENTIONS:  - Monitor percentage of each meal consumed  - Identify factors contributing to decreased intake, treat as appropriate  - Assist with meals as needed  - Monitor I&O, weight, and lab values if indicated  - Obtain nutrition services referral as needed  Outcome: Progressing  Goal: Establish and maintain optimal ostomy function  Description: INTERVENTIONS:  - Assess bowel  function  - Encourage oral fluids to ensure adequate hydration  - Administer IV fluids if ordered to ensure adequate hydration   - Administer ordered medications as needed  - Encourage mobilization and activity  - Nutrition services referral to assist patient with appropriate food choices  - Assess stoma site  - Consider wound care consult   Outcome: Progressing  Goal: Oral mucous membranes remain intact  Description: INTERVENTIONS  - Assess oral mucosa and hygiene practices  - Implement preventative oral hygiene regimen  - Implement oral medicated treatments as ordered  - Initiate Nutrition services referral as needed  Outcome: Progressing     Problem: Prexisting or High Potential for Compromised Skin Integrity  Goal: Skin integrity is maintained or improved  Description: INTERVENTIONS:  - Identify patients at risk for skin breakdown  - Assess and monitor skin integrity  - Assess and monitor nutrition and hydration status  - Monitor labs   - Assess for incontinence   - Turn and reposition patient  - Assist with mobility/ambulation  - Relieve pressure over bony prominences  - Avoid friction and shearing  - Provide appropriate hygiene as needed including keeping skin clean and dry  - Evaluate need for skin moisturizer/barrier cream  - Collaborate with interdisciplinary team   - Patient/family teaching  - Consider wound care consult   Outcome: Progressing     Problem: Nutrition/Hydration-ADULT  Goal: Nutrient/Hydration intake appropriate for improving, restoring or maintaining nutritional needs  Description: Monitor and assess patient's nutrition/hydration status for malnutrition. Collaborate with interdisciplinary team and initiate plan and interventions as ordered.  Monitor patient's weight and dietary intake as ordered or per policy. Utilize nutrition screening tool and intervene as necessary. Determine patient's food preferences and provide high-protein, high-caloric foods as appropriate.     INTERVENTIONS:  -  Monitor oral intake, urinary output, labs, and treatment plans  - Assess nutrition and hydration status and recommend course of action  - Evaluate amount of meals eaten  - Assist patient with eating if necessary   - Allow adequate time for meals  - Recommend/ encourage appropriate diets, oral nutritional supplements, and vitamin/mineral supplements  - Order, calculate, and assess calorie counts as needed  - Recommend, monitor, and adjust tube feedings and TPN/PPN based on assessed needs  - Assess need for intravenous fluids  - Provide specific nutrition/hydration education as appropriate  - Include patient/family/caregiver in decisions related to nutrition  Outcome: Progressing     Problem: INFECTION - ADULT  Goal: Absence or prevention of progression during hospitalization  Description: INTERVENTIONS:  - Assess and monitor for signs and symptoms of infection  - Monitor lab/diagnostic results  - Monitor all insertion sites, i.e. indwelling lines, tubes, and drains  - Monitor endotracheal if appropriate and nasal secretions for changes in amount and color  - [place] appropriate cooling/warming therapies per order  - Administer medications as ordered  - Instruct and encourage patient and family to use good hand hygiene technique  - Identify and instruct in appropriate isolation precautions for identified infection/condition  Outcome: Progressing     Problem: Knowledge Deficit  Goal: Patient/family/caregiver demonstrates understanding of disease process, treatment plan, medications, and discharge instructions  Description: Complete learning assessment and assess knowledge base.  Interventions:  - Provide teaching at level of understanding  - Provide teaching via preferred learning methods  Outcome: Progressing

## 2024-11-21 NOTE — PROGRESS NOTES
Progress Note - Surgery-General   Name: Lety Botello 64 y.o. female I MRN: [id number]  Unit/Bed#: E5 -01 I Date of Admission: 11/14/2024   Date of Service: 11/20/2024 I Hospital Day: 6    Assessment & Plan  SBO (small bowel obstruction) (HCC)  64 y.o. female presenting with recurrent SBO. 11/14 CT A/P: Small bowel obstruction with transition in the pelvis is again seen. 11/18 KUB showed SBO and some contrast within bowel in the right side of the abdomen which seems similar to the prior study and might be residual colonic contrast from prior imaging work-up    11/20 NGT removed after contrast seen in R colon    Plan  - NPO  - f/u AM KUB today   - tentative plan for OR today for ex lap pending discussion with Surgical Attending  - IVF  - cont PICC/TPN  - Nutrition consult for TPN recs  - Appreciate PT recs, continue to encourage patient to walk as well as nursing staff  - DVT ppx        Subjective   NAEON. Patient reports feeling slightly better this AM. Continues to have lower abdominal pain, slight improvement from yesterday. Endorses nausea, no vomiting. Has not passed flatus or had bowel movement. Has been OOB ambulating a few times out to hallway. When asked, would like surgery as oppose to continuing conservative management.    Objective :  Temp:  [96.2 °F (35.7 °C)-97.5 °F (36.4 °C)] 96.8 °F (36 °C)  HR:  [78-86] 86  BP: (134-169)/(65-84) 155/73  Resp:  [18] 18  SpO2:  [92 %-95 %] 95 %  O2 Device: None (Room air)    I/O         11/19 0701  11/20 0700 11/20 0701  11/21 0700    I.V. (mL/kg)      NG/GT 40 20    Total Intake(mL/kg) 40 (0.4) 20 (0.2)    Urine (mL/kg/hr) 1700 (0.7) 350 (0.2)    Emesis/NG output 650 200    Total Output 2350 550    Net -2310 -530                Lines/Drains/Airways       Active Status       Name Placement date Placement time Site Days    PICC Line 11/18/24 Right Basilic 11/18/24  1249  Basilic  2                  Physical Exam  General: NAD  HENT: NCAT MMM  Neck: supple, no  JVD  CV: nl rate  Lungs: nl wob. No resp distress  ABD: Soft, tender to palpation over bilateral lower quadrants, protuberant/nondistended  Extrem: No CCE  Neuro: AAOx3      Lab Results: I have reviewed the following results:  Recent Labs     11/18/24 0409 11/18/24 0435 11/20/24 0439 11/20/24  0618   WBC  --    < > 4.43  --    HGB  --    < > 9.5*  --    HCT  --    < > 30.5*  --    PLT  --    < > 82*  --    SODIUM 135   < > 133* 137   K 4.2   < > 6.1* 4.6   CL 99   < > 99 103   CO2 32   < > 28 31   BUN 11   < > 13 14   CREATININE 0.62   < > 0.61 0.54*   GLUC 194*   < > 593* 213*   CAIONIZED  --   --  1.17  --    MG 2.2   < > 2.3  --    PHOS 2.7   < > 5.7*  --    AST 32  --   --   --    ALT 26  --   --   --    ALB 2.7*  --   --   --    TBILI 0.87  --   --   --    ALKPHOS 95  --   --   --     < > = values in this interval not displayed.             VTE Pharmacologic Prophylaxis: VTE covered by:  heparin (porcine), Subcutaneous, 5,000 Units at 11/20/24 2115     VTE Mechanical Prophylaxis: sequential compression device

## 2024-11-22 PROBLEM — B02.9 SHINGLES: Status: ACTIVE | Noted: 2024-11-22

## 2024-11-22 PROBLEM — Z78.9 ON TOTAL PARENTERAL NUTRITION (TPN): Status: ACTIVE | Noted: 2024-11-22

## 2024-11-22 LAB
ABO GROUP BLD: NORMAL
ABO GROUP BLD: NORMAL
ALBUMIN SERPL BCG-MCNC: 2.1 G/DL (ref 3.5–5)
ALBUMIN SERPL BCG-MCNC: 2.4 G/DL (ref 3.5–5)
ALP SERPL-CCNC: 109 U/L (ref 34–104)
ALP SERPL-CCNC: 135 U/L (ref 34–104)
ALT SERPL W P-5'-P-CCNC: 35 U/L (ref 7–52)
ALT SERPL W P-5'-P-CCNC: 41 U/L (ref 7–52)
ANION GAP SERPL CALCULATED.3IONS-SCNC: 3 MMOL/L (ref 4–13)
ANION GAP SERPL CALCULATED.3IONS-SCNC: 4 MMOL/L (ref 4–13)
AST SERPL W P-5'-P-CCNC: 27 U/L (ref 13–39)
AST SERPL W P-5'-P-CCNC: 40 U/L (ref 13–39)
BASE EXCESS BLDA CALC-SCNC: 0 MMOL/L (ref -2–3)
BASOPHILS # BLD AUTO: 0.02 THOUSANDS/ÂΜL (ref 0–0.1)
BASOPHILS NFR BLD AUTO: 0 % (ref 0–1)
BILIRUB SERPL-MCNC: 0.68 MG/DL (ref 0.2–1)
BILIRUB SERPL-MCNC: 0.75 MG/DL (ref 0.2–1)
BLD GP AB SCN SERPL QL: NEGATIVE
BUN SERPL-MCNC: 15 MG/DL (ref 5–25)
BUN SERPL-MCNC: 15 MG/DL (ref 5–25)
CA-I BLD-SCNC: 1.01 MMOL/L (ref 1.12–1.32)
CA-I BLD-SCNC: 1.13 MMOL/L (ref 1.12–1.32)
CALCIUM ALBUM COR SERPL-MCNC: 8.4 MG/DL (ref 8.3–10.1)
CALCIUM ALBUM COR SERPL-MCNC: 9.3 MG/DL (ref 8.3–10.1)
CALCIUM SERPL-MCNC: 6.9 MG/DL (ref 8.4–10.2)
CALCIUM SERPL-MCNC: 8 MG/DL (ref 8.4–10.2)
CHLORIDE SERPL-SCNC: 105 MMOL/L (ref 96–108)
CHLORIDE SERPL-SCNC: 99 MMOL/L (ref 96–108)
CO2 SERPL-SCNC: 24 MMOL/L (ref 21–32)
CO2 SERPL-SCNC: 28 MMOL/L (ref 21–32)
CREAT SERPL-MCNC: 0.54 MG/DL (ref 0.6–1.3)
CREAT SERPL-MCNC: 0.62 MG/DL (ref 0.6–1.3)
EOSINOPHIL # BLD AUTO: 0.04 THOUSAND/ÂΜL (ref 0–0.61)
EOSINOPHIL NFR BLD AUTO: 1 % (ref 0–6)
ERYTHROCYTE [DISTWIDTH] IN BLOOD BY AUTOMATED COUNT: 16.6 % (ref 11.6–15.1)
ERYTHROCYTE [DISTWIDTH] IN BLOOD BY AUTOMATED COUNT: 16.8 % (ref 11.6–15.1)
GFR SERPL CREATININE-BSD FRML MDRD: 101 ML/MIN/1.73SQ M
GFR SERPL CREATININE-BSD FRML MDRD: 96 ML/MIN/1.73SQ M
GLUCOSE SERPL-MCNC: 183 MG/DL (ref 65–140)
GLUCOSE SERPL-MCNC: 207 MG/DL (ref 65–140)
GLUCOSE SERPL-MCNC: 213 MG/DL (ref 65–140)
GLUCOSE SERPL-MCNC: 218 MG/DL (ref 65–140)
GLUCOSE SERPL-MCNC: 221 MG/DL (ref 65–140)
GLUCOSE SERPL-MCNC: 227 MG/DL (ref 65–140)
GLUCOSE SERPL-MCNC: 253 MG/DL (ref 65–140)
GLUCOSE SERPL-MCNC: 303 MG/DL (ref 65–140)
GLUCOSE SERPL-MCNC: 312 MG/DL (ref 65–140)
HCO3 BLDA-SCNC: 23.8 MMOL/L (ref 22–28)
HCT VFR BLD AUTO: 28.9 % (ref 34.8–46.1)
HCT VFR BLD AUTO: 30.4 % (ref 34.8–46.1)
HCT VFR BLD CALC: 25 % (ref 34.8–46.1)
HGB BLD-MCNC: 9.2 G/DL (ref 11.5–15.4)
HGB BLD-MCNC: 9.8 G/DL (ref 11.5–15.4)
HGB BLDA-MCNC: 8.5 G/DL (ref 11.5–15.4)
IMM GRANULOCYTES # BLD AUTO: 0.03 THOUSAND/UL (ref 0–0.2)
IMM GRANULOCYTES NFR BLD AUTO: 1 % (ref 0–2)
LACTATE SERPL-SCNC: 1.6 MMOL/L (ref 0.5–2)
LYMPHOCYTES # BLD AUTO: 0.59 THOUSANDS/ÂΜL (ref 0.6–4.47)
LYMPHOCYTES NFR BLD AUTO: 11 % (ref 14–44)
MAGNESIUM SERPL-MCNC: 1.5 MG/DL (ref 1.9–2.7)
MAGNESIUM SERPL-MCNC: 1.9 MG/DL (ref 1.9–2.7)
MCH RBC QN AUTO: 30.5 PG (ref 26.8–34.3)
MCH RBC QN AUTO: 30.8 PG (ref 26.8–34.3)
MCHC RBC AUTO-ENTMCNC: 31.8 G/DL (ref 31.4–37.4)
MCHC RBC AUTO-ENTMCNC: 32.2 G/DL (ref 31.4–37.4)
MCV RBC AUTO: 96 FL (ref 82–98)
MCV RBC AUTO: 96 FL (ref 82–98)
MONOCYTES # BLD AUTO: 0.45 THOUSAND/ÂΜL (ref 0.17–1.22)
MONOCYTES NFR BLD AUTO: 8 % (ref 4–12)
NEUTROPHILS # BLD AUTO: 4.43 THOUSANDS/ÂΜL (ref 1.85–7.62)
NEUTS SEG NFR BLD AUTO: 79 % (ref 43–75)
NRBC BLD AUTO-RTO: 0 /100 WBCS
PCO2 BLD: 25 MMOL/L (ref 21–32)
PCO2 BLD: 36 MM HG (ref 36–44)
PH BLD: 7.43 [PH] (ref 7.35–7.45)
PHOSPHATE SERPL-MCNC: 3.3 MG/DL (ref 2.3–4.1)
PLATELET # BLD AUTO: 83 THOUSANDS/UL (ref 149–390)
PLATELET # BLD AUTO: 88 THOUSANDS/UL (ref 149–390)
PMV BLD AUTO: 10.1 FL (ref 8.9–12.7)
PMV BLD AUTO: 9.7 FL (ref 8.9–12.7)
PO2 BLD: >400 MM HG (ref 75–129)
POTASSIUM BLD-SCNC: 4.6 MMOL/L (ref 3.5–5.3)
POTASSIUM SERPL-SCNC: 4.3 MMOL/L (ref 3.5–5.3)
POTASSIUM SERPL-SCNC: 4.9 MMOL/L (ref 3.5–5.3)
PROT SERPL-MCNC: 4 G/DL (ref 6.4–8.4)
PROT SERPL-MCNC: 4.8 G/DL (ref 6.4–8.4)
RBC # BLD AUTO: 3.02 MILLION/UL (ref 3.81–5.12)
RBC # BLD AUTO: 3.18 MILLION/UL (ref 3.81–5.12)
RH BLD: POSITIVE
RH BLD: POSITIVE
SODIUM BLD-SCNC: 131 MMOL/L (ref 136–145)
SODIUM SERPL-SCNC: 130 MMOL/L (ref 135–147)
SODIUM SERPL-SCNC: 133 MMOL/L (ref 135–147)
SPECIMEN EXPIRATION DATE: NORMAL
SPECIMEN SOURCE: ABNORMAL
WBC # BLD AUTO: 5.41 THOUSAND/UL (ref 4.31–10.16)
WBC # BLD AUTO: 5.56 THOUSAND/UL (ref 4.31–10.16)

## 2024-11-22 PROCEDURE — C9290 INJ, BUPIVACAINE LIPOSOME: HCPCS | Performed by: ANESTHESIOLOGY

## 2024-11-22 PROCEDURE — 82947 ASSAY GLUCOSE BLOOD QUANT: CPT

## 2024-11-22 PROCEDURE — 83735 ASSAY OF MAGNESIUM: CPT | Performed by: PHYSICIAN ASSISTANT

## 2024-11-22 PROCEDURE — 44120 REMOVAL OF SMALL INTESTINE: CPT | Performed by: SURGERY

## 2024-11-22 PROCEDURE — 94760 N-INVAS EAR/PLS OXIMETRY 1: CPT

## 2024-11-22 PROCEDURE — 88344 IMHCHEM/IMCYTCHM EA MLT ANTB: CPT | Performed by: PATHOLOGY

## 2024-11-22 PROCEDURE — 86850 RBC ANTIBODY SCREEN: CPT | Performed by: ANESTHESIOLOGY

## 2024-11-22 PROCEDURE — 84100 ASSAY OF PHOSPHORUS: CPT | Performed by: SURGERY

## 2024-11-22 PROCEDURE — 88307 TISSUE EXAM BY PATHOLOGIST: CPT | Performed by: PATHOLOGY

## 2024-11-22 PROCEDURE — 80053 COMPREHEN METABOLIC PANEL: CPT | Performed by: PHYSICIAN ASSISTANT

## 2024-11-22 PROCEDURE — 0DQ80ZZ REPAIR SMALL INTESTINE, OPEN APPROACH: ICD-10-PCS | Performed by: SURGERY

## 2024-11-22 PROCEDURE — 88341 IMHCHEM/IMCYTCHM EA ADD ANTB: CPT | Performed by: PATHOLOGY

## 2024-11-22 PROCEDURE — 86923 COMPATIBILITY TEST ELECTRIC: CPT

## 2024-11-22 PROCEDURE — 83605 ASSAY OF LACTIC ACID: CPT | Performed by: SURGERY

## 2024-11-22 PROCEDURE — 0DN80ZZ RELEASE SMALL INTESTINE, OPEN APPROACH: ICD-10-PCS | Performed by: SURGERY

## 2024-11-22 PROCEDURE — 84295 ASSAY OF SERUM SODIUM: CPT

## 2024-11-22 PROCEDURE — 85025 COMPLETE CBC W/AUTO DIFF WBC: CPT | Performed by: SURGERY

## 2024-11-22 PROCEDURE — NC001 PR NO CHARGE: Performed by: SURGERY

## 2024-11-22 PROCEDURE — 82330 ASSAY OF CALCIUM: CPT | Performed by: SURGERY

## 2024-11-22 PROCEDURE — 0DBU0ZZ EXCISION OF OMENTUM, OPEN APPROACH: ICD-10-PCS | Performed by: SURGERY

## 2024-11-22 PROCEDURE — 85027 COMPLETE CBC AUTOMATED: CPT | Performed by: PHYSICIAN ASSISTANT

## 2024-11-22 PROCEDURE — 83735 ASSAY OF MAGNESIUM: CPT | Performed by: SURGERY

## 2024-11-22 PROCEDURE — 99232 SBSQ HOSP IP/OBS MODERATE 35: CPT | Performed by: SURGERY

## 2024-11-22 PROCEDURE — 82803 BLOOD GASES ANY COMBINATION: CPT

## 2024-11-22 PROCEDURE — 80053 COMPREHEN METABOLIC PANEL: CPT | Performed by: SURGERY

## 2024-11-22 PROCEDURE — 82948 REAGENT STRIP/BLOOD GLUCOSE: CPT

## 2024-11-22 PROCEDURE — 85014 HEMATOCRIT: CPT

## 2024-11-22 PROCEDURE — 0DNA0ZZ RELEASE JEJUNUM, OPEN APPROACH: ICD-10-PCS | Performed by: SURGERY

## 2024-11-22 PROCEDURE — 99232 SBSQ HOSP IP/OBS MODERATE 35: CPT | Performed by: PHYSICIAN ASSISTANT

## 2024-11-22 PROCEDURE — 84132 ASSAY OF SERUM POTASSIUM: CPT

## 2024-11-22 PROCEDURE — 82330 ASSAY OF CALCIUM: CPT

## 2024-11-22 PROCEDURE — 88342 IMHCHEM/IMCYTCHM 1ST ANTB: CPT | Performed by: PATHOLOGY

## 2024-11-22 PROCEDURE — 99223 1ST HOSP IP/OBS HIGH 75: CPT | Performed by: INTERNAL MEDICINE

## 2024-11-22 PROCEDURE — 86901 BLOOD TYPING SEROLOGIC RH(D): CPT | Performed by: ANESTHESIOLOGY

## 2024-11-22 PROCEDURE — 49204 PR EXC/DESTRUCTION OPEN ABDMNL TUMORS 5.1-10.0 CM: CPT | Performed by: SURGERY

## 2024-11-22 PROCEDURE — 86900 BLOOD TYPING SEROLOGIC ABO: CPT | Performed by: ANESTHESIOLOGY

## 2024-11-22 PROCEDURE — 0DB80ZZ EXCISION OF SMALL INTESTINE, OPEN APPROACH: ICD-10-PCS | Performed by: SURGERY

## 2024-11-22 RX ORDER — MIDAZOLAM HYDROCHLORIDE 2 MG/2ML
INJECTION, SOLUTION INTRAMUSCULAR; INTRAVENOUS AS NEEDED
Status: DISCONTINUED | OUTPATIENT
Start: 2024-11-22 | End: 2024-11-22

## 2024-11-22 RX ORDER — FENTANYL CITRATE 50 UG/ML
50 INJECTION, SOLUTION INTRAMUSCULAR; INTRAVENOUS
Status: DISCONTINUED | OUTPATIENT
Start: 2024-11-22 | End: 2024-11-22 | Stop reason: HOSPADM

## 2024-11-22 RX ORDER — MAGNESIUM SULFATE HEPTAHYDRATE 40 MG/ML
2 INJECTION, SOLUTION INTRAVENOUS ONCE
Status: COMPLETED | OUTPATIENT
Start: 2024-11-22 | End: 2024-11-22

## 2024-11-22 RX ORDER — CHLORHEXIDINE GLUCONATE ORAL RINSE 1.2 MG/ML
15 SOLUTION DENTAL EVERY 12 HOURS SCHEDULED
Status: DISCONTINUED | OUTPATIENT
Start: 2024-11-22 | End: 2024-12-05 | Stop reason: HOSPADM

## 2024-11-22 RX ORDER — METRONIDAZOLE 500 MG/100ML
500 INJECTION, SOLUTION INTRAVENOUS EVERY 8 HOURS
Status: DISCONTINUED | OUTPATIENT
Start: 2024-11-22 | End: 2024-11-22

## 2024-11-22 RX ORDER — MAGNESIUM SULFATE HEPTAHYDRATE 40 MG/ML
0.5 INJECTION, SOLUTION INTRAVENOUS CONTINUOUS
Status: CANCELLED | OUTPATIENT
Start: 2024-11-22

## 2024-11-22 RX ORDER — ALBUMIN HUMAN 50 G/1000ML
SOLUTION INTRAVENOUS CONTINUOUS PRN
Status: DISCONTINUED | OUTPATIENT
Start: 2024-11-22 | End: 2024-11-22

## 2024-11-22 RX ORDER — ONDANSETRON 2 MG/ML
4 INJECTION INTRAMUSCULAR; INTRAVENOUS EVERY 6 HOURS PRN
Status: DISCONTINUED | OUTPATIENT
Start: 2024-11-22 | End: 2024-11-22 | Stop reason: HOSPADM

## 2024-11-22 RX ORDER — ROCURONIUM BROMIDE 10 MG/ML
INJECTION, SOLUTION INTRAVENOUS AS NEEDED
Status: DISCONTINUED | OUTPATIENT
Start: 2024-11-22 | End: 2024-11-22

## 2024-11-22 RX ORDER — PHENYLEPHRINE HCL IN 0.9% NACL 1 MG/10 ML
SYRINGE (ML) INTRAVENOUS AS NEEDED
Status: DISCONTINUED | OUTPATIENT
Start: 2024-11-22 | End: 2024-11-22

## 2024-11-22 RX ORDER — CEFAZOLIN SODIUM 2 G/50ML
2000 SOLUTION INTRAVENOUS ONCE
Status: COMPLETED | OUTPATIENT
Start: 2024-11-22 | End: 2024-11-22

## 2024-11-22 RX ORDER — SUCCINYLCHOLINE/SOD CL,ISO/PF 100 MG/5ML
SYRINGE (ML) INTRAVENOUS AS NEEDED
Status: DISCONTINUED | OUTPATIENT
Start: 2024-11-22 | End: 2024-11-22

## 2024-11-22 RX ORDER — SODIUM CHLORIDE, SODIUM GLUCONATE, SODIUM ACETATE, POTASSIUM CHLORIDE, MAGNESIUM CHLORIDE, SODIUM PHOSPHATE, DIBASIC, AND POTASSIUM PHOSPHATE .53; .5; .37; .037; .03; .012; .00082 G/100ML; G/100ML; G/100ML; G/100ML; G/100ML; G/100ML; G/100ML
1000 INJECTION, SOLUTION INTRAVENOUS ONCE
Status: COMPLETED | OUTPATIENT
Start: 2024-11-22 | End: 2024-11-22

## 2024-11-22 RX ORDER — BUPIVACAINE HYDROCHLORIDE 5 MG/ML
INJECTION, SOLUTION EPIDURAL; INTRACAUDAL AS NEEDED
Status: DISCONTINUED | OUTPATIENT
Start: 2024-11-22 | End: 2024-11-22

## 2024-11-22 RX ORDER — ACETAMINOPHEN 10 MG/ML
1000 INJECTION, SOLUTION INTRAVENOUS EVERY 6 HOURS SCHEDULED
Status: DISCONTINUED | OUTPATIENT
Start: 2024-11-22 | End: 2024-11-27

## 2024-11-22 RX ORDER — METRONIDAZOLE 500 MG/100ML
500 INJECTION, SOLUTION INTRAVENOUS EVERY 8 HOURS
Status: COMPLETED | OUTPATIENT
Start: 2024-11-22 | End: 2024-11-23

## 2024-11-22 RX ORDER — FENTANYL CITRATE 50 UG/ML
INJECTION, SOLUTION INTRAMUSCULAR; INTRAVENOUS AS NEEDED
Status: DISCONTINUED | OUTPATIENT
Start: 2024-11-22 | End: 2024-11-22

## 2024-11-22 RX ORDER — PROMETHAZINE HYDROCHLORIDE 25 MG/ML
12.5 INJECTION, SOLUTION INTRAMUSCULAR; INTRAVENOUS EVERY 4 HOURS PRN
Status: DISCONTINUED | OUTPATIENT
Start: 2024-11-22 | End: 2024-11-22 | Stop reason: HOSPADM

## 2024-11-22 RX ORDER — MAGNESIUM HYDROXIDE 1200 MG/15ML
LIQUID ORAL AS NEEDED
Status: DISCONTINUED | OUTPATIENT
Start: 2024-11-22 | End: 2024-11-22 | Stop reason: HOSPADM

## 2024-11-22 RX ORDER — CEFAZOLIN SODIUM 2 G/50ML
2000 SOLUTION INTRAVENOUS EVERY 8 HOURS
Status: COMPLETED | OUTPATIENT
Start: 2024-11-22 | End: 2024-11-23

## 2024-11-22 RX ORDER — ONDANSETRON 2 MG/ML
4 INJECTION INTRAMUSCULAR; INTRAVENOUS EVERY 4 HOURS PRN
Status: DISCONTINUED | OUTPATIENT
Start: 2024-11-22 | End: 2024-12-05 | Stop reason: HOSPADM

## 2024-11-22 RX ORDER — LIDOCAINE HYDROCHLORIDE 10 MG/ML
INJECTION, SOLUTION EPIDURAL; INFILTRATION; INTRACAUDAL; PERINEURAL AS NEEDED
Status: DISCONTINUED | OUTPATIENT
Start: 2024-11-22 | End: 2024-11-22

## 2024-11-22 RX ORDER — ONDANSETRON 2 MG/ML
INJECTION INTRAMUSCULAR; INTRAVENOUS AS NEEDED
Status: DISCONTINUED | OUTPATIENT
Start: 2024-11-22 | End: 2024-11-22

## 2024-11-22 RX ORDER — SODIUM CHLORIDE 9 MG/ML
125 INJECTION, SOLUTION INTRAVENOUS CONTINUOUS
Status: DISCONTINUED | OUTPATIENT
Start: 2024-11-22 | End: 2024-11-22

## 2024-11-22 RX ORDER — INSULIN LISPRO 100 [IU]/ML
5-25 INJECTION, SOLUTION INTRAVENOUS; SUBCUTANEOUS EVERY 6 HOURS SCHEDULED
Status: DISCONTINUED | OUTPATIENT
Start: 2024-11-22 | End: 2024-11-22

## 2024-11-22 RX ORDER — SODIUM CHLORIDE 9 MG/ML
INJECTION, SOLUTION INTRAVENOUS CONTINUOUS PRN
Status: DISCONTINUED | OUTPATIENT
Start: 2024-11-22 | End: 2024-11-22

## 2024-11-22 RX ORDER — HYDROMORPHONE HCL/PF 1 MG/ML
0.5 SYRINGE (ML) INJECTION
Status: DISCONTINUED | OUTPATIENT
Start: 2024-11-22 | End: 2024-11-22 | Stop reason: HOSPADM

## 2024-11-22 RX ORDER — PROPOFOL 10 MG/ML
INJECTION, EMULSION INTRAVENOUS AS NEEDED
Status: DISCONTINUED | OUTPATIENT
Start: 2024-11-22 | End: 2024-11-22

## 2024-11-22 RX ORDER — CALCIUM CHLORIDE 100 MG/ML
0.5 INJECTION INTRAVENOUS; INTRAVENTRICULAR ONCE
Status: CANCELLED | OUTPATIENT
Start: 2024-11-22 | End: 2024-11-22

## 2024-11-22 RX ORDER — ACETAMINOPHEN 10 MG/ML
1000 INJECTION, SOLUTION INTRAVENOUS ONCE
Status: COMPLETED | OUTPATIENT
Start: 2024-11-22 | End: 2024-11-22

## 2024-11-22 RX ORDER — CALCIUM GLUCONATE 20 MG/ML
2 INJECTION, SOLUTION INTRAVENOUS ONCE
Status: COMPLETED | OUTPATIENT
Start: 2024-11-22 | End: 2024-11-22

## 2024-11-22 RX ADMIN — MAGNESIUM SULFATE HEPTAHYDRATE 2 G: 40 INJECTION, SOLUTION INTRAVENOUS at 17:29

## 2024-11-22 RX ADMIN — Medication: at 15:54

## 2024-11-22 RX ADMIN — ROCURONIUM 10 MG: 50 INJECTION, SOLUTION INTRAVENOUS at 11:33

## 2024-11-22 RX ADMIN — Medication 200 MCG: at 09:59

## 2024-11-22 RX ADMIN — MIDAZOLAM 1 MG: 1 INJECTION INTRAMUSCULAR; INTRAVENOUS at 09:38

## 2024-11-22 RX ADMIN — FENTANYL CITRATE 50 MCG: 50 INJECTION INTRAMUSCULAR; INTRAVENOUS at 10:16

## 2024-11-22 RX ADMIN — CHLORHEXIDINE GLUCONATE 15 ML: 1.2 RINSE ORAL at 22:35

## 2024-11-22 RX ADMIN — ALBUMIN (HUMAN): 12.5 INJECTION, SOLUTION INTRAVENOUS at 09:37

## 2024-11-22 RX ADMIN — SODIUM CHLORIDE, SODIUM GLUCONATE, SODIUM ACETATE, POTASSIUM CHLORIDE, MAGNESIUM CHLORIDE, SODIUM PHOSPHATE, DIBASIC, AND POTASSIUM PHOSPHATE 75 ML/HR: .53; .5; .37; .037; .03; .012; .00082 INJECTION, SOLUTION INTRAVENOUS at 16:28

## 2024-11-22 RX ADMIN — SODIUM CHLORIDE: 0.9 INJECTION, SOLUTION INTRAVENOUS at 13:13

## 2024-11-22 RX ADMIN — FENTANYL CITRATE 25 MCG: 50 INJECTION INTRAMUSCULAR; INTRAVENOUS at 11:28

## 2024-11-22 RX ADMIN — HYDROMORPHONE HYDROCHLORIDE 0.5 MG: 1 INJECTION, SOLUTION INTRAMUSCULAR; INTRAVENOUS; SUBCUTANEOUS at 01:17

## 2024-11-22 RX ADMIN — ROCURONIUM 10 MG: 50 INJECTION, SOLUTION INTRAVENOUS at 12:37

## 2024-11-22 RX ADMIN — METOROPROLOL TARTRATE 2.5 MG: 5 INJECTION, SOLUTION INTRAVENOUS at 07:24

## 2024-11-22 RX ADMIN — Medication 100 MCG: at 13:36

## 2024-11-22 RX ADMIN — PHENYLEPHRINE HYDROCHLORIDE 20 MCG/MIN: 50 INJECTION INTRAVENOUS at 09:39

## 2024-11-22 RX ADMIN — NYSTATIN: 100000 POWDER TOPICAL at 18:42

## 2024-11-22 RX ADMIN — SODIUM CHLORIDE 6 UNITS/HR: 9 INJECTION, SOLUTION INTRAVENOUS at 23:42

## 2024-11-22 RX ADMIN — HEPARIN SODIUM 5000 UNITS: 5000 INJECTION INTRAVENOUS; SUBCUTANEOUS at 21:05

## 2024-11-22 RX ADMIN — BUPIVACAINE HYDROCHLORIDE 30 ML: 5 INJECTION, SOLUTION EPIDURAL; INTRACAUDAL; PERINEURAL at 14:12

## 2024-11-22 RX ADMIN — Medication: at 21:34

## 2024-11-22 RX ADMIN — Medication 100 MG: at 09:33

## 2024-11-22 RX ADMIN — NYSTATIN: 100000 POWDER TOPICAL at 21:05

## 2024-11-22 RX ADMIN — Medication 200 MCG: at 09:52

## 2024-11-22 RX ADMIN — INSULIN LISPRO 10 UNITS: 100 INJECTION, SOLUTION INTRAVENOUS; SUBCUTANEOUS at 18:43

## 2024-11-22 RX ADMIN — HYDROMORPHONE HYDROCHLORIDE 0.5 MG: 1 INJECTION, SOLUTION INTRAMUSCULAR; INTRAVENOUS; SUBCUTANEOUS at 04:28

## 2024-11-22 RX ADMIN — ACETAMINOPHEN 1000 MG: 10 INJECTION INTRAVENOUS at 23:43

## 2024-11-22 RX ADMIN — CALCIUM GLUCONATE 2 G: 20 INJECTION, SOLUTION INTRAVENOUS at 17:28

## 2024-11-22 RX ADMIN — SUGAMMADEX 400 MG: 100 INJECTION, SOLUTION INTRAVENOUS at 14:13

## 2024-11-22 RX ADMIN — Medication 100 MCG: at 10:46

## 2024-11-22 RX ADMIN — ACETAMINOPHEN 1000 MG: 10 INJECTION INTRAVENOUS at 12:50

## 2024-11-22 RX ADMIN — FENTANYL CITRATE 25 MCG: 50 INJECTION INTRAMUSCULAR; INTRAVENOUS at 11:33

## 2024-11-22 RX ADMIN — FENTANYL CITRATE 25 MCG: 50 INJECTION INTRAMUSCULAR; INTRAVENOUS at 11:14

## 2024-11-22 RX ADMIN — SODIUM CHLORIDE, SODIUM GLUCONATE, SODIUM ACETATE, POTASSIUM CHLORIDE, MAGNESIUM CHLORIDE, SODIUM PHOSPHATE, DIBASIC, AND POTASSIUM PHOSPHATE 75 ML/HR: .53; .5; .37; .037; .03; .012; .00082 INJECTION, SOLUTION INTRAVENOUS at 06:36

## 2024-11-22 RX ADMIN — INSULIN HUMAN 4 UNITS: 100 INJECTION, SOLUTION PARENTERAL at 18:46

## 2024-11-22 RX ADMIN — ONDANSETRON 4 MG: 2 INJECTION INTRAMUSCULAR; INTRAVENOUS at 04:28

## 2024-11-22 RX ADMIN — CEFAZOLIN SODIUM 2000 MG: 2 SOLUTION INTRAVENOUS at 09:20

## 2024-11-22 RX ADMIN — Medication 200 MCG: at 10:01

## 2024-11-22 RX ADMIN — LIDOCAINE HYDROCHLORIDE 100 MG: 10 INJECTION, SOLUTION EPIDURAL; INFILTRATION; INTRACAUDAL; PERINEURAL at 09:32

## 2024-11-22 RX ADMIN — HEPARIN SODIUM 5000 UNITS: 5000 INJECTION INTRAVENOUS; SUBCUTANEOUS at 05:27

## 2024-11-22 RX ADMIN — METOROPROLOL TARTRATE 2.5 MG: 5 INJECTION, SOLUTION INTRAVENOUS at 01:00

## 2024-11-22 RX ADMIN — SODIUM CHLORIDE, SODIUM GLUCONATE, SODIUM ACETATE, POTASSIUM CHLORIDE, MAGNESIUM CHLORIDE, SODIUM PHOSPHATE, DIBASIC, AND POTASSIUM PHOSPHATE 1000 ML: .53; .5; .37; .037; .03; .012; .00082 INJECTION, SOLUTION INTRAVENOUS at 15:21

## 2024-11-22 RX ADMIN — SODIUM CHLORIDE: 0.9 INJECTION, SOLUTION INTRAVENOUS at 09:20

## 2024-11-22 RX ADMIN — PROPOFOL 150 MG: 10 INJECTION, EMULSION INTRAVENOUS at 09:33

## 2024-11-22 RX ADMIN — BUPIVACAINE 20 ML: 13.3 INJECTION, SUSPENSION, LIPOSOMAL INFILTRATION at 14:12

## 2024-11-22 RX ADMIN — Medication 200 MCG: at 09:40

## 2024-11-22 RX ADMIN — FENTANYL CITRATE 25 MCG: 50 INJECTION INTRAMUSCULAR; INTRAVENOUS at 12:13

## 2024-11-22 RX ADMIN — Medication 200 MCG: at 09:45

## 2024-11-22 RX ADMIN — METOROPROLOL TARTRATE 2.5 MG: 5 INJECTION, SOLUTION INTRAVENOUS at 18:46

## 2024-11-22 RX ADMIN — Medication 100 MCG: at 12:48

## 2024-11-22 RX ADMIN — ROCURONIUM 20 MG: 50 INJECTION, SOLUTION INTRAVENOUS at 09:51

## 2024-11-22 RX ADMIN — METRONIDAZOLE: 500 INJECTION, SOLUTION INTRAVENOUS at 09:35

## 2024-11-22 RX ADMIN — FENTANYL CITRATE 50 MCG: 50 INJECTION INTRAMUSCULAR; INTRAVENOUS at 15:26

## 2024-11-22 RX ADMIN — CEFAZOLIN SODIUM 2000 MG: 2 SOLUTION INTRAVENOUS at 21:05

## 2024-11-22 RX ADMIN — ONDANSETRON 4 MG: 2 INJECTION INTRAMUSCULAR; INTRAVENOUS at 12:39

## 2024-11-22 RX ADMIN — INSULIN LISPRO 8 UNITS: 100 INJECTION, SOLUTION INTRAVENOUS; SUBCUTANEOUS at 06:36

## 2024-11-22 RX ADMIN — ACETAMINOPHEN 1000 MG: 10 INJECTION INTRAVENOUS at 20:33

## 2024-11-22 RX ADMIN — Medication 50 MCG: at 12:43

## 2024-11-22 RX ADMIN — MIDAZOLAM 1 MG: 1 INJECTION INTRAMUSCULAR; INTRAVENOUS at 10:46

## 2024-11-22 RX ADMIN — SODIUM CHLORIDE: 0.9 INJECTION, SOLUTION INTRAVENOUS at 09:37

## 2024-11-22 RX ADMIN — INSULIN LISPRO 8 UNITS: 100 INJECTION, SOLUTION INTRAVENOUS; SUBCUTANEOUS at 01:00

## 2024-11-22 RX ADMIN — FENTANYL CITRATE 50 MCG: 50 INJECTION INTRAMUSCULAR; INTRAVENOUS at 10:22

## 2024-11-22 RX ADMIN — CEFAZOLIN SODIUM 2000 MG: 2 SOLUTION INTRAVENOUS at 13:23

## 2024-11-22 RX ADMIN — METRONIDAZOLE 500 MG: 500 INJECTION, SOLUTION INTRAVENOUS at 17:28

## 2024-11-22 NOTE — ASSESSMENT & PLAN NOTE
Related to bowel obstruction/n.p.o. status.   Results from last 7 days   Lab Units 11/22/24  0437 11/21/24  0525 11/20/24  0618 11/20/24  0439   SODIUM mmol/L 130* 136 137 133*    Resolved on IV fluids

## 2024-11-22 NOTE — ASSESSMENT & PLAN NOTE
History of recurrent small bowel obstruction presented with abdominal pain, nausea, vomiting, and diarrhea  Last hospitalized on 11/5/2024-11/12/2024 for SBO.   S/P Ex Lap on 11/22/2024. During operation, found to have a stricture suspected to be related to prior history radiation for uterine carcinoma. No gross spillage.Strictures were resected and anastomosis on 11/22/2024  Had multiple NG decompression over the last few months due waxing/waning with obstructive pictur  11/14/2024 CT abd/pelvis: Small bowel obstruction with transition in the pelvis is again seen.New small volume of ascites  11/15/2024 KUB: fluid-filled loops of bowel   11/16/2024 KUB:Small bowel obstruction again suggested.   Extubated after procedure and got TAP blocks   NG tube, haley and A line in place    Plan:   Will keep NGT, HALEY and A-LINE overnight  24 Hour postop abx with Ancef   Frequent abdominal examination  TPN ordered   DVT ppx

## 2024-11-22 NOTE — PERIOPERATIVE NURSING NOTE
Pt found having vomited and still vomiting very large amounts yellow/green/brown foul smelling liquid. Pt mouth suctioned as she was vomiting and anesthesia notified immediately. CRNA brought NGT and placed same after pt ceased vomiting. Total of 500 ml yellow/green/brown fluid obtained via suction from NGT and oral suctioning.

## 2024-11-22 NOTE — CONSULTS
Nutrition consult received and appreciated      Recomemnd changing TPN RX to: 30% dextrose: 800ml, 15% amino acid: 760ml, 20% lipid: 250ml- provides 1772kcal, 50g fat, 240g dextrose, 114g protein;   Consider increasing Mg sulfate in TPN     Nutrition will continue to follow up as per policy    Jyoti Christian MS RD LDN CNSC

## 2024-11-22 NOTE — ASSESSMENT & PLAN NOTE
64 y.o. female presenting with recurrent SBO. 11/14 CT A/P: Small bowel obstruction with transition in the pelvis is again seen. 11/18 KUB showed SBO and some contrast within bowel in the right side of the abdomen which seems similar to the prior study and might be residual colonic contrast from prior imaging work-up    11/20 NGT removed after contrast seen in R colon    Plan  - plan for OR today for ex lap 9 am start time  - IVF  - cont PICC/TPN  - reorder TPN, plan for repletion of electrolytes  - Appreciate PT recs, continue to encourage patient to walk as well as nursing staff  - DVT ppx

## 2024-11-22 NOTE — ASSESSMENT & PLAN NOTE
History of DVT/PE May 2024-has been anticoagulated on Eliquis since  Eliquis currently on hold given n.p.o. status  Unclear etiology of thromboembolic event, has been 6 months since PE/DVT, most recent CT scan from September 2024 without evidence of PE  Continue heparin for prophylaxis while NPO with scheduled surgery

## 2024-11-22 NOTE — ANESTHESIA PROCEDURE NOTES
Arterial Line Insertion    Performed by: Sharon Mercado MD  Authorized by: Sharon Mercado MD  Consent: The procedure was performed in an emergent situation. Verbal consent not obtained. Written consent not obtained.  Required items: required blood products, implants, devices, and special equipment available  Patient identity confirmed: arm band  Preparation: Patient was prepped and draped in the usual sterile fashion.  Indications: hemodynamic monitoring  Orientation:  Right  Location: radial artery  Sedation:  Patient sedated: under GA.    Procedure Details:  Needle gauge: 20  Number of attempts: 1    Post-procedure:  Post-procedure: dressing applied  Waveform: good waveform  Patient tolerance: Patient tolerated the procedure well with no immediate complications  Comments: Ultrasound utilized

## 2024-11-22 NOTE — QUICK NOTE
Post Op Check:    64 y.o. female * Day of Surgery * s/p ex lap, SBR  Patients pain is well controlled. They denied any nausea, chest pain, or shortness of breath.    Temp:  [97.4 °F (36.3 °C)-98.1 °F (36.7 °C)] 97.5 °F (36.4 °C)  HR:  [64-93] 78  BP: ()/(34-78) 133/47  Resp:  [12-26] 19  SpO2:  [94 %-100 %] 99 %  O2 Device: Nasal cannula  Nasal Cannula O2 Flow Rate (L/min):  [2 L/min-3 L/min] 2 L/min      Physical Exam:  General: No acute distress, alert and oriented  CV: Well perfused, regular rate  Lungs: Normal work of breathing, no increased respiratory effort  Abdomen: Soft, appropriately tender, non-distended. Incision(s) clean, dry and intact.  Extremities: No edema, clubbing or cyanosis  Skin: Warm, dry      Plan:  Diet NPO  Adult 3-in-1 TPN (custom base / custom electrolytes)  Continue to monitor  Pain and nausea control PRN    Don Boo MD  General Surgery   11/22/24

## 2024-11-22 NOTE — ANESTHESIA POSTPROCEDURE EVALUATION
Post-Op Assessment Note    CV Status:  Stable    Pain management: adequate       Mental Status:  Alert and awake   Hydration Status:  Euvolemic   PONV Controlled:  Controlled   Airway Patency:  Patent     Post Op Vitals Reviewed: Yes    No anethesia notable event occurred.    Staff: Anesthesiologist           Last Filed PACU Vitals:  Vitals Value Taken Time   Temp 97.9 °F (36.6 °C) 11/22/24 1430   Pulse 66 11/22/24 1532   /60 11/22/24 1528   Resp 23 11/22/24 1532   SpO2 95 % 11/22/24 1532   Vitals shown include unfiled device data.    Modified [place]:  No data recorded

## 2024-11-22 NOTE — PROGRESS NOTES
Progress Note - Surgery-General   Name: Lety Botello 64 y.o. female I MRN: [id number]  Unit/Bed#: E5 -01 I Date of Admission: 11/14/2024   Date of Service: 11/22/2024 I Hospital Day: 8    Assessment & Plan  SBO (small bowel obstruction) (HCC)  64 y.o. female presenting with recurrent SBO. 11/14 CT A/P: Small bowel obstruction with transition in the pelvis is again seen. 11/18 KUB showed SBO and some contrast within bowel in the right side of the abdomen which seems similar to the prior study and might be residual colonic contrast from prior imaging work-up    11/20 NGT removed after contrast seen in R colon    Plan  - plan for OR today for ex lap 9 am start time  - IVF  - cont PICC/TPN  - reorder TPN, plan for repletion of electrolytes  - Appreciate PT recs, continue to encourage patient to walk as well as nursing staff  - DVT ppx        Subjective   Overnight pt w/ ongoing diffuse abd pain requiring prn analgesia and anti-emetics. Episode of small volume emesis this am. Feels worse than day prior. Not passing flatus or bm.     Objective :  Temp:  [97.2 °F (36.2 °C)-98.1 °F (36.7 °C)] 98.1 °F (36.7 °C)  HR:  [77-93] 93  BP: (147-165)/(72-78) 147/72  Resp:  [16] 16  SpO2:  [93 %-95 %] 94 %  O2 Device: None (Room air)    I/O         11/20 0701  11/21 0700 11/21 0701  11/22 0700 11/22 0701  11/23 0700    NG/GT 20      Total Intake(mL/kg) 20 (0.2)      Urine (mL/kg/hr) 650 (0.3) 1895 (0.8)     Emesis/NG output 200      Total Output 850 1895     Net -830 -1895                  Lines/Drains/Airways       Active Status       Name Placement date Placement time Site Days    PICC Line 11/18/24 Right Basilic 11/18/24  1249  Basilic  3                  Physical Exam  Physical Exam:  General: No acute distress, alert and oriented  CV: RRR  Lungs: Normal work of breathing   Abdomen: soft, distended, diffusely ttp, protuberant abd  Skin: Warm, dry      Lab Results: I have reviewed the following results:  Recent Labs      11/21/24  0525 11/22/24  0437   WBC 4.60 5.41   HGB 9.4* 9.8*   HCT 29.5* 30.4*   PLT 80* 88*   SODIUM 136 130*   K 4.9 4.9    99   CO2 28 28   BUN 14 15   CREATININE 0.54* 0.54*   GLUC 190* 218*   CAIONIZED 1.15  --    MG 1.8* 1.9   PHOS 3.9  --    AST  --  40*   ALT  --  41   ALB  --  2.4*   TBILI  --  0.68   ALKPHOS  --  135*             VTE Pharmacologic Prophylaxis: VTE covered by:  heparin (porcine), Subcutaneous, 5,000 Units at 11/22/24 0527      VTE Mechanical Prophylaxis: sequential compression device

## 2024-11-22 NOTE — CONSULTS
ICU Admission - Critical Care/ICU   Name: Lety Botello 64 y.o. female I MRN: [id number]  Unit/Bed#: OR POOL I Date of Admission: 11/14/2024   Date of Service: 11/22/2024 I Hospital Day: 8     Physician Requesting Evaluation: Riki Langston MD   Reason for Evaluation / Principal Problem:         Assessment & Plan  SBO (small bowel obstruction) (HCC)  History of recurrent small bowel obstruction presented with abdominal pain, nausea, vomiting, and diarrhea  Last hospitalized on 11/5/2024-11/12/2024 for SBO.   S/P Ex Lap on 11/22/2024. During operation, found to have a stricture suspected to be related to prior history radiation for uterine carcinoma. No gross spillage.Strictures were resected and anastomosis on 11/22/2024  Had multiple NG decompression over the last few months due waxing/waning with obstructive pictur  11/14/2024 CT abd/pelvis: Small bowel obstruction with transition in the pelvis is again seen.New small volume of ascites  11/15/2024 KUB: fluid-filled loops of bowel   11/16/2024 KUB:Small bowel obstruction again suggested.   Extubated after procedure and got TAP blocks   NG tube, haley and A line in place    Plan:   Will keep NGT, HALEY and A-LINE overnight  24 Hour postop abx with Ancef   Frequent abdominal examination  TPN ordered   DVT ppx      Hypertension  History of hypertension.  Was on carvedilol prior to admission  Scheduled IV metoprolol 2.5 mg every 6 hours   Type 2 diabetes mellitus (HCC)  Lab Results   Component Value Date    HGBA1C 6.9 (H) 11/05/2024       Recent Labs     11/21/24  1849 11/22/24  0004 11/22/24  0611 11/22/24  0909   POCGLU 174* 227* 253* 213*       Blood Sugar Average: Last 72 hrs:  (P) [id number]  TPN  Glucose checks and Insulin correction ACHS  Goal -180 while admitted, adjusting insulin regimen as appropriate  Monitor for hypoglycemia and treat per protocol   History of pulmonary embolism  History of DVT/ PE on Eliquis   Eliquis held for  procedure     Hypothyroidism  Plan to resume levothyroxine after starting diet   Morbid obesity (HCC)  Body mass index 44.92    Shingles  Lesion appear to be crusting over. Located on right lower abdomen. Recommend Gabapentin   Insomnia  Melatonin, avoid   Hyponatremia    Mild intermittent asthma    Gastroparesis    On total parenteral nutrition (TPN)  Started after surgery   Disposition: Critical care    History of Present Illness   Lety Botello is a 64 y.o. female with past medical history of endometrial cancer, pulmonary embolism, DVT, hypothyroidism , insuline dependent diabetes, and hypertension who presented with abdominal pain, nausea, vomiting and diarrhea. Admitted to surgical service for proximal SBO. Multiple admission for SBO at [facility] and [facility].  NG decompression and IV hydration were unsuccessful . She underwent Ex lab on 11/22/2024 where strictures were resected and anastomosis.Stricture were suspected to be related to prior radiation for uterine carcinoma. Transferred to ICU after procedure. Received TAP blocks after extubation.     History obtained from chart review.  Review of Systems: Review of Systems   Constitutional:  Negative for chills and fever.   HENT:  Negative for ear pain and sore throat.    Eyes:  Negative for pain and visual disturbance.   Respiratory:  Negative for cough and shortness of breath.    Cardiovascular:  Negative for chest pain and palpitations.   Gastrointestinal:  Positive for abdominal pain. Negative for nausea and vomiting.   Genitourinary:  Negative for dysuria and hematuria.   Musculoskeletal:  Negative for arthralgias and back pain.   Skin:  Positive for rash (Painful rash on lower right abdomen). Negative for color change.   Neurological:  Negative for seizures and syncope.   All other systems reviewed and are negative.      Historical Information   Past Medical History:  No date: Diverticulitis  No date: Endometrial cancer (HCC)  No date: History of shingles       Comment:  Right hemiabdomen (inactive)  No date: Hypertension  No date: IBS (irritable bowel syndrome)  No date: Obesity  No date: Small bowel obstruction (HCC)  No date: Type 2 diabetes mellitus (HCC) Past Surgical History:  No date: APPENDECTOMY  No date: CHOLECYSTECTOMY  No date: SIGMOIDECTOMY; N/A   Current Outpatient Medications   Medication Instructions    Acetaminophen 325 mg, Oral, Every 6 hours PRN    albuterol (PROVENTIL HFA,VENTOLIN HFA) 90 mcg/act inhaler 2 puffs, Inhalation, Every 6 hours PRN    apixaban (ELIQUIS) 5 mg, Oral, 2 times daily    aspirin (ECOTRIN LOW STRENGTH) 81 mg, Oral    bisacodyl (DULCOLAX) 10 mg, Rectal, Daily    carvedilol (COREG) 12.5 mg, Oral, 2 times daily with meals    dapagliflozin (FARXIGA) 10 mg, Oral    dicyclomine (BENTYL) 10 mg, Oral, 2 times daily PRN    diphenhydrAMINE (BENADRYL) 25 mg, Oral, Every 6 hours PRN    furosemide (LASIX) 20 mg tablet 1 tablet, Oral, Daily    gabapentin (NEURONTIN) 100 mg, Oral    Insulin Lispro (HUMALOG PEN SC) Subcutaneous, 3 times daily before meals, Sliding scale.    Klor-Con M20 20 MEQ tablet 1 tablet, Oral, Daily    levothyroxine 125 mcg, Oral, Daily    metoclopramide (REGLAN) 10 mg, Oral, 4 times daily    nitroglycerin (NITROSTAT) 0.4 mg, Sublingual    ondansetron (ZOFRAN) 4 mg, Oral, Every 8 hours PRN    pantoprazole (PROTONIX) 40 mg, Oral, Daily (early morning)    vitamin B-12 (VITAMIN B-12) 1,000 mcg, Oral, Daily    Allergies   Allergen Reactions    Bee Pollen Anaphylaxis    Azithromycin Hives    Metformin Diarrhea    Other Other (See Comments)     hayfever with inhaler   hayfever with inhaler    Pollen Extract Other (See Comments)     hayfever with inhaler    Sulfamethizole Hives    Sulfamethoxazole-Trimethoprim Hives      Social History     Tobacco Use    Smoking status: Never    Smokeless tobacco: Never   Substance Use Topics    Alcohol use: Not Currently    Drug use: Never    History reviewed. No pertinent family history.        Objective :                   Vitals I/O      Most Recent Min/Max in 24hrs   Temp 97.5 °F (36.4 °C) Temp  Min: 97.4 °F (36.3 °C)  Max: 98.1 °F (36.7 °C)   Pulse 74 Pulse  Min: 64  Max: 93   Resp (!) 24 Resp  Min: 12  Max: 26   /55 BP  Min: 78/46  Max: 165/78   O2 Sat 100 % SpO2  Min: 94 %  Max: 100 %      Intake/Output Summary (Last 24 hours) at 11/22/2024 1633  Last data filed at 11/22/2024 1600  Gross per 24 hour   Intake 3150 ml   Output 3300 ml   Net -150 ml       Diet NPO  Adult 3-in-1 TPN (custom base / custom electrolytes)  Adult 3-in-1 TPN (custom base / custom electrolytes)    Invasive Monitoring   Arterial Line  [place] BP    No data recorded   MAP (!) 8 mmHg  Arterial Line MAP (mmHg)  Min: 8 mmHg  Max: 8 mmHg           Physical Exam   Physical Exam  Skin:     General: Skin is warm and dry.   HENT:      Head: Normocephalic and atraumatic.   Cardiovascular:      Rate and Rhythm: Normal rate and regular rhythm.      Pulses: Normal pulses.      Heart sounds: Normal heart sounds, S1 normal and S2 normal.      Arteriovenous access: Right arteriovenous access is present.  Musculoskeletal:         General: Swelling present.      Right lower leg: Edema present.      Left lower leg: Edema present.   Abdominal:      Palpations: Abdomen is soft.      Tenderness: There is abdominal tenderness in the right lower quadrant.   Constitutional:       Appearance: She is morbidly obese. She is ill-appearing.   Pulmonary:      Effort: Pulmonary effort is normal.      Breath sounds: Normal breath sounds and air entry.   Neurological:      Mental Status: She is easily aroused. She is somnolent.          Diagnostic Studies        Lab Results: I have reviewed the following results:     Medications:  Scheduled PRN   acetaminophen, 1,000 mg, Q6H SAM  cefazolin, 2,000 mg, Q8H  heparin (porcine), 5,000 Units, Q8H SAM  insulin lispro, 5-25 Units, Q6H SAM  lactated ringers, 1,000 mL, Once  metoprolol, 2.5 mg,  Q6H  metroNIDAZOLE, 500 mg, Q8H  nystatin, , TID  pantoprazole, 40 mg, Q24H SAM      fentaNYL, 50 mcg, Q10 Min PRN  HYDROmorphone, 0.5 mg, Q3H PRN  HYDROmorphone, 0.5 mg, Q10 Min PRN  ondansetron, 4 mg, Q6H PRN  ondansetron, 4 mg, Q4H PRN  phenol, 1 spray, Q2H PRN  promethazine, 12.5 mg, Q4H PRN  trimethobenzamide, 200 mg, Q6H PRN       Continuous    Adult 3-in-1 TPN (custom base / custom electrolytes), , Last Rate: 78.8 mL/hr at 11/21/24 2223  Adult 3-in-1 TPN (custom base / custom electrolytes),   HYDROmorphone,   multi-electrolyte, 75 mL/hr, Last Rate: 75 mL/hr (11/22/24 1628)         Labs:   CBC    Recent Labs     11/22/24 0437 11/22/24  1015 11/22/24  1513   WBC 5.41  --  5.56   HGB 9.8* 8.5* 9.2*   HCT 30.4* 25* 28.9*   PLT 88*  --  83*     BMP    Recent Labs     11/22/24 0437 11/22/24  1015 11/22/24  1513   SODIUM 130*  --  133*   K 4.9  --  4.3   CL 99  --  105   CO2 28 25 24   AGAP 3*  --  4   BUN 15  --  15   CREATININE 0.54*  --  0.62   CALCIUM 8.0*  --  6.9*       Coags    No recent results     Additional Electrolytes  Recent Labs     11/21/24  0525 11/22/24  0437 11/22/24  1015 11/22/24  1513   MG 1.8* 1.9  --  1.5*   PHOS 3.9  --   --  3.3   CAIONIZED 1.15  --  1.13 1.01*          Blood Gas    No recent results  No recent results LFTs  Recent Labs     11/22/24  0437 11/22/24  1513   ALT 41 35   AST 40* 27   ALKPHOS 135* 109*   ALB 2.4* 2.1*   TBILI 0.68 0.75       Infectious  No recent results  Glucose  Recent Labs     11/21/24  0525 11/22/24  0437 11/22/24  1513   GLUC 190* 218* 207*

## 2024-11-22 NOTE — OP NOTE
OPERATIVE REPORT  PATIENT NAME: Lety Botello    :  1960  MRN: [id number]  Pt Location: [facility] OR ROOM 02    SURGERY DATE: 2024    Surgeons and Role:     * Alejo Ward MD - Primary     * Jennifer Bearden PA-C - Assisting     * Alfredo Wick MD - Assisting    Preop Diagnosis:  SBO (small bowel obstruction) (HCC) [K56.609]    Post-Op Diagnosis Codes:     * SBO (small bowel obstruction) (HCC) [K56.609]    Procedure(s):  LAPAROTOMY EXPLORATORY  RESECTION SMALL BOWEL WITH PRIMARY ANASTOMOSIS.  PARTIAL OMENTECTOMY  EXTENSIVE LYSIS OF ADHESIONS >90 MINUTES    Specimen(s):  ID Type Source Tests Collected by Time Destination   1 : Small bowel resection, suspected radiation stricture Tissue Small Bowel, NOS TISSUE EXAM Alejo Ward MD 2024 1215    2 : OMENTUM MASS Tissue Omentum TISSUE EXAM Alejo Ward MD 2024 1304        Estimated Blood Loss:   100 mL    Drains:  NG/OG/Enteral Tube Nasogastric 18 Fr Left nare (Active)   External Tube Length (cm) 58.5 cm 24 1430   Marking at Nare (cm) - For ongoing assessment of tube placement 59 cm 24 1430   Status Suction-low continuous 24 1500   Drainage Appearance Bile;Mucous shreds 24 1430   Number of days: 0       Urethral Catheter Non-latex 16 Fr. (Active)   Collection Container Standard drainage bag 24 1430   Securement Method Securing device (Describe) 24 1430   Output (mL) 50 mL 24 1600   Number of days: 0       [REMOVED] NG/OG/Enteral Tube Nasogastric Left nare (Removed)   Number of days: 0       [REMOVED] NG/OG/Enteral Tube Nasogastric 18 Fr Left nare (Removed)   Number of days: 1       [REMOVED] NG/OG/Enteral Tube Nasogastric 18 Fr Left nare (Removed)   Placement Reverification Aspiration 24 1516   Site Assessment Clean;Dry;Intact 11/15/24 0149   Marking at Nare (cm) - For ongoing assessment of tube placement 55 cm 24 1516   Status Suction-low intermittent 11/15/24 0149   Drainage  "Appearance Other (Comment) 11/15/24 0149   Output (mL) 350 mL 11/14/24 1516   Number of days: 1       [REMOVED] NG/OG/Enteral Tube Nasogastric 16 Fr Right nare (Removed)   Placement Reverification Auscultation 11/20/24 1001   Site Assessment Clean;Dry;Intact 11/20/24 1001   External Tube Length (cm) 57 cm 11/20/24 1001   Marking at Nare (cm) - For ongoing assessment of tube placement 56 cm 11/17/24 1401   Status Suction-low continuous 11/19/24 0901   Drainage Appearance Brown 11/20/24 1001   Intake (mL) 20 mL 11/20/24 1001   Output (mL) 200 mL 11/20/24 1115   Number of days: 5     Anesthesia Type:   Choice    Operative Indications:  SBO (small bowel obstruction) (HCC) [K56.609]  Prolonged failure of non-operative therapy    Operative Findings:  Dense, presumably radiation-induced mid-jejunal small bowel stricture  Clear transition point around the mid-jejunal stricture location  Suspicious mass within the left-sided, inferior omentum    Complications:   None    Procedure and Technique:  The patient was brought to the operating room after pre-operative evaluation and placement of NG tube prior to operative room transport. She was positively identified, and the purpose of the procedure was confirmed. She was then placed on the OR table in supine position and placed under general anesthesia with endotracheal intubation. Initially hypotensive, the patient required rapid volume restoration, along with appropriate vasopressor dosing of Neosynephrine to restore stability. Arterial line was placed for accurate blood pressure monitoring. Once this was completed, the patient was prepped and draped in the usual sterile fashion, followed by the safety \"Time Out\" procedures.  A generous upper midline incision was then created with Bovie electrocautery, with special attention paid to hemostasis (the patient's platelets were <100K). Once we entered peritoneum, we noted several fairly loose adhesive bands involving more superficial " small bowel. These were lyses easily, in a sharp fashion, within about 10-15 minutes. We then progressed to run the bowel from ligament-of-Treitz to the cecum. Here, we encountered very dense adhesive changes in the left mid-abdomen, involving mid-jejunum. In order to free up two distinct, closely co-located loops of bowel in that particular location, we spent approximately 90 minutes utilizing a combination of sharp dissection involving scissors and blunt dissection involving hydro-dissection technique. After this effort, we were able to freely mobilize the said loops, with clear bowel diameter change associated with what appeared to be a tight stricture involving the deepest / posterior aspect of the second mobilized loop.  At this point, we were able to freely run the entire bowel from the ligament-of-treitz to the cecum, without any additional adhesions noted.   Upon closer inspection of the strictured segment (approximately 20 cm) we identified two small unavoidable serosal injuries, which were repaired with interrupted vicryl sutures. We then checked for the patency of the stricture segment, suspecting that patency may not be present. Indeed, we were able to unable to milk proximal bowel contents into the post-stricture bowel, indicating complete or near-complete stricturing of the bowel. Because of this we proceeded to resect the above-identified segment of about 20 cm of small bowel, followed by the creation of a side-to-side functional end-to-end anastomosis. This was done with three LIVIA staple loads, followed by a TA staple load to close off the open end of the new common channel. This was then reinforced with 3-0 Silk sutures in Lembert fashion.   After that, we once again checked the entire length of the bowel, including the colon and no further areas of concern were identified. We irrigated the abdomen copiously and we proceeded to start our closure.   At this time, we did identified a very dense, round  mass-like lesion in the distal omentum. This was resected and sent to pathology for analysis. The fascia was then closed with interrupted, figure-of-eight Prolene sutures. Skin was then closed with skin staples. Surgical counts were correct / verified. I was present for the entire procedure.    Patient Disposition:  Critical Care Unit    This procedure was not performed to treat colon cancer through resection    SIGNATURE: Alejo Ward MD  DATE: November 22, 2024  TIME: 4:16 PM

## 2024-11-22 NOTE — ANESTHESIA PROCEDURE NOTES
Peripheral Block    Patient location during procedure: holding area  Start time: 11/22/2024 2:12 PM  Reason for block: at surgeon's request and post-op pain management  Staffing  Performed by: Sharon Mercado MD  Authorized by: Sharon Mercado MD    Preanesthetic Checklist  Completed: patient identified, IV checked, risks and benefits discussed, surgical consent, monitors and equipment checked, pre-op evaluation and timeout performed  Peripheral Block  Patient position: supine  Prep: ChloraPrep  Patient monitoring: frequent blood pressure checks, continuous pulse oximetry and heart rate  Block type: TAP  Laterality: bilateral  Injection technique: single-shot  Procedures: ultrasound guided, Ultrasound guidance required for the procedure to increase accuracy and safety of medication placement and decrease risk of complications.  Ultrasound permanent image saved    Needle  Needle type: Stimuplex   Needle gauge: 20 G  Needle length: 6 in  Needle localization: anatomical landmarks and ultrasound guidance  Needle insertion depth: 5 cm  Assessment  Injection assessment: incremental injection, frequent aspiration, injected with ease, negative aspiration, negative for heart rate change, no paresthesia on injection, no symptoms of intraneural/intravenous injection and needle tip visualized at all times  Paresthesia pain: none  Post-procedure:  site cleaned  patient tolerated the procedure well with no immediate complications

## 2024-11-22 NOTE — ASSESSMENT & PLAN NOTE
History of hypertension.  Was on carvedilol prior to admission  Scheduled IV metoprolol 2.5 mg every 6 hours

## 2024-11-22 NOTE — PROGRESS NOTES
Progress Note - Hospitalist   Name: Lety Botello 64 y.o. female I MRN: [id number]  Unit/Bed#: OR POOL I Date of Admission: 11/14/2024   Date of Service: 11/22/2024 I Hospital Day: 8    Assessment & Plan  SBO (small bowel obstruction) (HCC)  History of endometrial cancer, pulmonary embolism, DVT, hypothyroidism, insulin-dependent diabetes, hypertension, history of multiple abdominal surgeries, and recent admission for SBO (11/5/24-11/12/24)  She represents to the hospital with abdominal pain nausea vomiting and diarrhea again  Admitted to surgical service after finding of SBO  Supportive care with bowel rest and NG tube decompression and IV fluids attempted  Antiemetics & analgesics prn  Started on PICC line/TPN - monitoring glucose while on -Dietitian consulted for management  Multiple KUBs obtained  Most recent KUB with continued dilution and/or suction removal of enteric contrast from otherwise previously dilated small bowel centrally and abdomen.  Bowel loops are slightly less distended than prior KUB on the 18th.  Overall findings still concerning for possible small bowel obstruction.   NG tube was removed by surgery on 11/20/24 after contrast was seen in colon   Patient will be going to the OR today with surgery for exploratory laparotomy  Hypertension  Prior to admission on carvedilol 12.5 mg BID  While n.p.o. continue scheduled IV metoprolol 2.5 mg every 6 hours  Given elevated BPs in the 150s-160s may need to increase metoprolol dosage  Will continue to follow and make adjustments after surgery   Type 2 diabetes mellitus (HCC)  Lab Results   Component Value Date    HGBA1C 6.9 (H) 11/05/2024     Recent Labs     11/21/24  1849 11/22/24  0004 11/22/24  0611 11/22/24  0909   POCGLU 174* 227* 253* 213*   Previously on toujeo 60 units with lispro 8 units 3 times daily  Patient reports now only on sliding scale insulin and no longer on toujeo.    Currently stable with every 6 hours checks  Insomnia  Lorazepam added  with improvement overnight  History of pulmonary embolism  History of DVT/PE May 2024-has been anticoagulated on Eliquis since  Eliquis currently on hold given n.p.o. status  Unclear etiology of thromboembolic event, has been 6 months since PE/DVT, most recent CT scan from September 2024 without evidence of PE  Continue heparin for prophylaxis while NPO with scheduled surgery  Hypothyroidism  Prior to admission levothyroxine.  Held while NPO  Mild intermittent asthma  Prior to admission on albuterol only as needed. No exacerbations  Breathing stable here  Hyponatremia  Related to bowel obstruction/n.p.o. status.   Results from last 7 days   Lab Units 11/22/24  0437 11/21/24  0525 11/20/24  0618 11/20/24  0439   SODIUM mmol/L 130* 136 137 133*    Resolved on IV fluids  Morbid obesity (HCC)  Body mass index is 44.92 kg/m².  Would benefit from weight loss    VTE Pharmacologic Prophylaxis:   heparin    Mobility:   Basic Mobility Inpatient Raw Score: 18  -[facility] Goal: 6: Walk 10 steps or more  -HL Achieved: 6: Walk 10 steps or more    Patient Centered Rounds: I performed bedside rounds with nursing staff today.     Education and Discussions with Family / Patient: patient    Current Length of Stay: 8 day(s)  Current Patient Status: Inpatient   Discharge Plan: per primary team    Code Status: Level 1 - Full Code    Subjective   With ongoing pain. To go to OR today with surgery. Had episode of vomiting this am.     Objective :  Temp:  [97.2 °F (36.2 °C)-98.1 °F (36.7 °C)] 97.9 °F (36.6 °C)  HR:  [77-93] 78  BP: (116-165)/(57-78) 116/57  Resp:  [12-19] 12  SpO2:  [94 %-100 %] 100 %  O2 Device: None (Room air)    Body mass index is 44.92 kg/m².     Input and Output Summary (last 24 hours):     Intake/Output Summary (Last 24 hours) at 11/22/2024 1509  Last data filed at 11/22/2024 1413  Gross per 24 hour   Intake 3150 ml   Output 3250 ml   Net -100 ml       Physical Exam  Vitals and nursing note reviewed.   Constitutional:        General: She is not in acute distress.     Appearance: She is obese. She is not toxic-appearing.   HENT:      Head: Normocephalic and atraumatic.   Eyes:      General: No scleral icterus.  Cardiovascular:      Rate and Rhythm: Normal rate and regular rhythm.   Pulmonary:      Effort: Pulmonary effort is normal. No respiratory distress.      Breath sounds: Normal breath sounds. No stridor.   Abdominal:      Palpations: There is no mass.      Tenderness: There is abdominal tenderness.      Hernia: No hernia is present.   Musculoskeletal:         General: No swelling.   Skin:     General: Skin is warm and dry.   Neurological:      Mental Status: She is alert and oriented to person, place, and time. Mental status is at baseline.   Psychiatric:         Mood and Affect: Mood normal.         Lines/Drains:  Lines/Drains/Airways       Active Status       Name Placement date Placement time Site Days    PICC Line 11/18/24 Right Basilic 11/18/24  1249  Basilic  4    Urethral Catheter Non-latex 16 Fr. 11/22/24  1000  Non-latex  less than 1                                      Lab Results: I have reviewed the following results:   Results from last 7 days   Lab Units 11/22/24  1015 11/22/24  0437 11/21/24  0525   WBC Thousand/uL  --  5.41 4.60   HEMOGLOBIN g/dL  --  9.8* 9.4*   I STAT HEMOGLOBIN g/dl 8.5*  --   --    HEMATOCRIT %  --  30.4* 29.5*   HEMATOCRIT, ISTAT % 25*  --   --    PLATELETS Thousands/uL  --  88* 80*   SEGS PCT %  --   --  80*   LYMPHO PCT %  --   --  10*   MONO PCT %  --   --  7   EOS PCT %  --   --  2     Results from last 7 days   Lab Units 11/22/24  1015 11/22/24  0437   SODIUM mmol/L  --  130*   POTASSIUM mmol/L  --  4.9   CHLORIDE mmol/L  --  99   CO2 mmol/L  --  28   CO2, I-STAT mmol/L 25  --    BUN mg/dL  --  15   CREATININE mg/dL  --  0.54*   ANION GAP mmol/L  --  3*   CALCIUM mg/dL  --  8.0*   ALBUMIN g/dL  --  2.4*   TOTAL BILIRUBIN mg/dL  --  0.68   ALK PHOS U/L  --  135*   ALT U/L  --  41   AST  U/L  --  40*   GLUCOSE RANDOM mg/dL  --  218*         Results from last 7 days   Lab Units 11/22/24  0909 11/22/24  0611 11/22/24  0004 11/21/24  1849 11/21/24  1632 11/21/24  1107 11/21/24  0700 11/21/24  0529 11/21/24  0001 11/20/24  1902 11/20/24  1141 11/20/24  0506   POC GLUCOSE mg/dl 213* 253* 227* 174* 165* 190* 185* 175* 181* 198* 196* 194*               Recent Cultures (last 7 days):               Last 24 Hours Medication List:     Current Facility-Administered Medications:     acetaminophen (Ofirmev) injection 1,000 mg, Q6H SAM    Adult 3-in-1 TPN (custom base / custom electrolytes), Continuous TPN, Last Rate: 78.8 mL/hr at 11/21/24 2223    Adult 3-in-1 TPN (custom base / custom electrolytes), Continuous TPN    ceFAZolin (ANCEF) IVPB (premix in dextrose) 2,000 mg 50 mL, Q8H    fentaNYL injection 50 mcg, Q10 Min PRN    heparin (porcine) subcutaneous injection 5,000 Units, Q8H SAM **AND** [CANCELED] Platelet count, Once    HYDROmorphone (DILAUDID) 1 mg/mL 50 mL PCA, Continuous    HYDROmorphone (DILAUDID) injection 0.5 mg, Q3H PRN    HYDROmorphone (DILAUDID) injection 0.5 mg, Q10 Min PRN    insulin lispro (HumALOG/ADMELOG) 100 units/mL subcutaneous injection 5-25 Units, Q6H SAM **AND** Fingerstick Glucose (POCT), Q6H    metoprolol (LOPRESSOR) injection 2.5 mg, Q6H    metroNIDAZOLE (FLAGYL) IVPB (premix) 500 mg 100 mL, Q8H    multi-electrolyte (ISOLYTE-S PH 7.4) bolus 1,000 mL, Once    multi-electrolyte (PLASMALYTE-A/ISOLYTE-S PH 7.4) IV solution, Continuous, Last Rate: Stopped (11/22/24 0841)    nystatin (MYCOSTATIN) powder, TID    ondansetron (ZOFRAN) injection 4 mg, Q6H PRN    ondansetron (ZOFRAN) injection 4 mg, Q4H PRN    pantoprazole (PROTONIX) injection 40 mg, Q24H SAM    phenol (CHLORASEPTIC) 1.4 % mucosal liquid 1 spray, Q2H PRN    promethazine (PHENERGAN) injection 12.5 mg, Q4H PRN    trimethobenzamide (TIGAN) IM injection 200 mg, Q6H PRN    Administrative Statements   Today, Patient Was Seen By:  Kimberly Del Toro PA-C      **Please Note: This note may have been constructed using a voice recognition system.**

## 2024-11-22 NOTE — PLAN OF CARE
Problem: Potential for Falls  Goal: Patient will remain free of falls  Description: INTERVENTIONS:  - Educate patient/family on patient safety including physical limitations  - Instruct patient to call for assistance with activity   - Consult OT/PT to assist with strengthening/mobility   - Keep Call bell within reach  - Keep bed low and locked with side rails adjusted as appropriate  - Keep care items and personal belongings within reach  - Initiate and maintain comfort rounds  - Make Fall Risk Sign visible to staff  - Offer Toileting every 3 Hours, in advance of need  - Initiate/Maintain bed alarm  - Obtain necessary fall risk management equipment  - Apply yellow socks and bracelet for high fall risk patients  - Consider moving patient to room near nurses station  Outcome: Progressing     Problem: PAIN - ADULT  Goal: Verbalizes/displays adequate comfort level or baseline comfort level  Description: Interventions:  - Encourage patient to monitor pain and request assistance  - Assess pain using appropriate pain scale  - Administer analgesics based on type and severity of pain and evaluate response  - Implement non-pharmacological measures as appropriate and evaluate response  - Consider cultural and social influences on pain and pain management  - Notify physician/advanced practitioner if interventions unsuccessful or patient reports new pain  Outcome: Progressing     Problem: SAFETY ADULT  Goal: Patient will remain free of falls  Description: INTERVENTIONS:  - Educate patient/family on patient safety including physical limitations  - Instruct patient to call for assistance with activity   - Consult OT/PT to assist with strengthening/mobility   - Keep Call bell within reach  - Keep bed low and locked with side rails adjusted as appropriate  - Keep care items and personal belongings within reach  - Initiate and maintain comfort rounds  - Make Fall Risk Sign visible to staff  - Offer Toileting every 3 Hours, in  advance of need  - Initiate/Maintain bed alarm  - Obtain necessary fall risk management equipment  - Apply yellow socks and bracelet for high fall risk patients  - Consider moving patient to room near nurses station  Outcome: Progressing  Goal: Maintain or return to baseline ADL function  Description: INTERVENTIONS:  -  Assess patient's ability to carry out ADLs; assess patient's baseline for ADL function and identify physical deficits which impact ability to perform ADLs (bathing, care of mouth/teeth, toileting, grooming, dressing, etc.)  - Assess/evaluate cause of self-care deficits   - Assess range of motion  - Assess patient's mobility; develop plan if impaired  - Assess patient's need for assistive devices and provide as appropriate  - Encourage maximum independence but intervene and supervise when necessary  - Involve family in performance of ADLs  - Assess for home care needs following discharge   - Consider OT consult to assist with ADL evaluation and planning for discharge  - Provide patient education as appropriate  Outcome: Progressing  Goal: Maintains/Returns to pre admission functional level  Description: INTERVENTIONS:  - Perform AM-PAC 6 Click Basic Mobility/ Daily Activity assessment daily.  - Set and communicate daily mobility goal to care team and patient/family/caregiver.   - Collaborate with rehabilitation services on mobility goals if consulted  - Perform Range of Motion 3 times a day.  - Reposition patient every 3 hours.  - Dangle patient 3 times a day  - Stand patient 3 times a day  - Ambulate patient 3 times a day  - Out of bed to chair 3 times a day   - Out of bed for meals 3 times a day  - Out of bed for toileting  - Record patient progress and toleration of activity level   Outcome: Progressing     Problem: DISCHARGE PLANNING  Goal: Discharge to home or other facility with appropriate resources  Description: INTERVENTIONS:  - Identify barriers to discharge w/patient and caregiver  -  Arrange for needed discharge resources and transportation as appropriate  - Identify discharge learning needs (meds, wound care, etc.)  - Arrange for interpretive services to assist at discharge as needed  - Refer to Case Management Department for coordinating discharge planning if the patient needs post-hospital services based on physician/advanced practitioner order or complex needs related to functional status, cognitive ability, or social support system  Outcome: Progressing     Problem: GASTROINTESTINAL - ADULT  Goal: Minimal or absence of nausea and/or vomiting  Description: INTERVENTIONS:  - Administer IV fluids if ordered to ensure adequate hydration  - Maintain NPO status until nausea and vomiting are resolved  - Nasogastric tube if ordered  - Administer ordered antiemetic medications as needed  - Provide nonpharmacologic comfort measures as appropriate  - Advance diet as tolerated, if ordered  - Consider nutrition services referral to assist patient with adequate nutrition and appropriate food choices  Outcome: Progressing  Goal: Maintains or returns to baseline bowel function  Description: INTERVENTIONS:  - Assess bowel function  - Encourage oral fluids to ensure adequate hydration  - Administer IV fluids if ordered to ensure adequate hydration  - Administer ordered medications as needed  - Encourage mobilization and activity  - Consider nutritional services referral to assist patient with adequate nutrition and appropriate food choices  Outcome: Progressing  Goal: Maintains adequate nutritional intake  Description: INTERVENTIONS:  - Monitor percentage of each meal consumed  - Identify factors contributing to decreased intake, treat as appropriate  - Assist with meals as needed  - Monitor I&O, weight, and lab values if indicated  - Obtain nutrition services referral as needed  Outcome: Progressing  Goal: Establish and maintain optimal ostomy function  Description: INTERVENTIONS:  - Assess bowel  function  - Encourage oral fluids to ensure adequate hydration  - Administer IV fluids if ordered to ensure adequate hydration   - Administer ordered medications as needed  - Encourage mobilization and activity  - Nutrition services referral to assist patient with appropriate food choices  - Assess stoma site  - Consider wound care consult   Outcome: Progressing  Goal: Oral mucous membranes remain intact  Description: INTERVENTIONS  - Assess oral mucosa and hygiene practices  - Implement preventative oral hygiene regimen  - Implement oral medicated treatments as ordered  - Initiate Nutrition services referral as needed  Outcome: Progressing     Problem: Prexisting or High Potential for Compromised Skin Integrity  Goal: Skin integrity is maintained or improved  Description: INTERVENTIONS:  - Identify patients at risk for skin breakdown  - Assess and monitor skin integrity  - Assess and monitor nutrition and hydration status  - Monitor labs   - Assess for incontinence   - Turn and reposition patient  - Assist with mobility/ambulation  - Relieve pressure over bony prominences  - Avoid friction and shearing  - Provide appropriate hygiene as needed including keeping skin clean and dry  - Evaluate need for skin moisturizer/barrier cream  - Collaborate with interdisciplinary team   - Patient/family teaching  - Consider wound care consult   Outcome: Progressing     Problem: Nutrition/Hydration-ADULT  Goal: Nutrient/Hydration intake appropriate for improving, restoring or maintaining nutritional needs  Description: Monitor and assess patient's nutrition/hydration status for malnutrition. Collaborate with interdisciplinary team and initiate plan and interventions as ordered.  Monitor patient's weight and dietary intake as ordered or per policy. Utilize nutrition screening tool and intervene as necessary. Determine patient's food preferences and provide high-protein, high-caloric foods as appropriate.     INTERVENTIONS:  -  Monitor oral intake, urinary output, labs, and treatment plans  - Assess nutrition and hydration status and recommend course of action  - Evaluate amount of meals eaten  - Assist patient with eating if necessary   - Allow adequate time for meals  - Recommend/ encourage appropriate diets, oral nutritional supplements, and vitamin/mineral supplements  - Order, calculate, and assess calorie counts as needed  - Recommend, monitor, and adjust tube feedings and TPN/PPN based on assessed needs  - Assess need for intravenous fluids  - Provide specific nutrition/hydration education as appropriate  - Include patient/family/caregiver in decisions related to nutrition  Outcome: Progressing     Problem: INFECTION - ADULT  Goal: Absence or prevention of progression during hospitalization  Description: INTERVENTIONS:  - Assess and monitor for signs and symptoms of infection  - Monitor lab/diagnostic results  - Monitor all insertion sites, i.e. indwelling lines, tubes, and drains  - Monitor endotracheal if appropriate and nasal secretions for changes in amount and color  - [place] appropriate cooling/warming therapies per order  - Administer medications as ordered  - Instruct and encourage patient and family to use good hand hygiene technique  - Identify and instruct in appropriate isolation precautions for identified infection/condition  Outcome: Progressing     Problem: Knowledge Deficit  Goal: Patient/family/caregiver demonstrates understanding of disease process, treatment plan, medications, and discharge instructions  Description: Complete learning assessment and assess knowledge base.  Interventions:  - Provide teaching at level of understanding  - Provide teaching via preferred learning methods  Outcome: Progressing

## 2024-11-22 NOTE — WOUND OSTOMY CARE
Consult Note - Wound   Lety Botello 64 y.o. female MRN: [id number]  Unit/Bed#: OR [place] Encounter: [id number]      Virtual wound care consultation--attempted to see patient x 2 today.  However, she was off the unit in the OR.  Consultation based on review of EPIC documentation and wound images taken 11/20/24 at 2130.    History and Present Illness:  64 year old female presented to the hospital with abdominal pain, nausea, and vomiting.  Patient's history significant for endometrial cancer, shingles, gastroparesis, DM, HTN, obesity.  Patient is currently NPO, receiving TPN.    Assessment Findings:   Resolving shingles to right abdomen--appear dry with some brown crusting.  Katya-wound intact.  Recommend covering with ABD.    MASD to right abdominal fold--pink, intact.  Appears moist in photo.  Cannot rule out fungal component.  Nystatin powder is ordered for patient.    Sacrum--presents as maroon, intact area.  Nursing documenting area is non-blanchable.  Likely hospital acquired deep tissue pressure injury.  Katya-wound appears pink and intact.   Recommend application of silicone bordered foam dressing.      Right pretibial--unclear etiology.  Difficult to assess in photo.      See flowsheet for wound details.    Wound Care Plan:   1-Silicone Cream/Hydraguard lotion to bilateral heels twice daily and as needed.  2-Elevate heels off of bed/chair surface to offload pressure.  3-Offloading air cushion in chair when out of bed.  4-Apply moisturizing skin cream to body daily and as needed.  5-Turn/reposition every 2 hours while in bed and weight shift frequently while in chair for pressure re-distribution on skin.   6-Sacrum/buttocks--cleanse with soap and water, pat dry.  Apply silicone bordered foam dressing for treatment.  Change dressing every other day and as needed with soilage.  7-Right abdomen (shingles)--cover with ABD.  Change dressing daily and as needed.    Wound care team to follow.  Plan of care reviewed with  primary RN.    Joy Cameron RN, BSN, CWON

## 2024-11-22 NOTE — ASSESSMENT & PLAN NOTE
Lab Results   Component Value Date    HGBA1C 6.9 (H) 11/05/2024     Recent Labs     11/21/24  1849 11/22/24  0004 11/22/24  0611 11/22/24  0909   POCGLU 174* 227* 253* 213*   Previously on toujeo 60 units with lispro 8 units 3 times daily  Patient reports now only on sliding scale insulin and no longer on toujeo.    Currently stable with every 6 hours checks

## 2024-11-22 NOTE — OCCUPATIONAL THERAPY NOTE
Occupational Therapy Cancellation          Patient Name: Lety Botello  Today's Date: 11/22/2024 11/22/24 1045   OT Last Visit   OT Visit Date 11/22/24   Note Type   Note type Cancelled Session   Cancel Reasons Patient to operating room     Estela Conner, OT

## 2024-11-22 NOTE — ASSESSMENT & PLAN NOTE
History of endometrial cancer, pulmonary embolism, DVT, hypothyroidism, insulin-dependent diabetes, hypertension, history of multiple abdominal surgeries, and recent admission for SBO (11/5/24-11/12/24)  She represents to the hospital with abdominal pain nausea vomiting and diarrhea again  Admitted to surgical service after finding of SBO  Supportive care with bowel rest and NG tube decompression and IV fluids attempted  Antiemetics & analgesics prn  Started on PICC line/TPN - monitoring glucose while on -Dietitian consulted for management  Multiple KUBs obtained  Most recent KUB with continued dilution and/or suction removal of enteric contrast from otherwise previously dilated small bowel centrally and abdomen.  Bowel loops are slightly less distended than prior KUB on the 18th.  Overall findings still concerning for possible small bowel obstruction.   NG tube was removed by surgery on 11/20/24 after contrast was seen in colon   Patient will be going to the OR today with surgery for exploratory laparotomy

## 2024-11-22 NOTE — ASSESSMENT & PLAN NOTE
Prior to admission on carvedilol 12.5 mg BID  While n.p.o. continue scheduled IV metoprolol 2.5 mg every 6 hours  Given elevated BPs in the 150s-160s may need to increase metoprolol dosage  Will continue to follow and make adjustments after surgery

## 2024-11-22 NOTE — PHYSICAL THERAPY NOTE
Physical Therapy Cancellation Note       11/22/24 1017   PT Last Visit   PT Visit Date 11/22/24   Note Type   Note Type Cancelled Session   Cancel Reasons Patient to operating room     Maryjane Ibrahim, PT

## 2024-11-22 NOTE — ASSESSMENT & PLAN NOTE
Lab Results   Component Value Date    HGBA1C 6.9 (H) 11/05/2024       Recent Labs     11/21/24  1849 11/22/24  0004 11/22/24  0611 11/22/24  0909   POCGLU 174* 227* 253* 213*       Blood Sugar Average: Last 72 hrs:  (P) [id number]  TPN  Glucose checks and Insulin correction ACHS  Goal -180 while admitted, adjusting insulin regimen as appropriate  Monitor for hypoglycemia and treat per protocol

## 2024-11-23 PROBLEM — I95.81 HYPOTENSION AFTER PROCEDURE: Status: ACTIVE | Noted: 2024-11-23

## 2024-11-23 LAB
ANION GAP SERPL CALCULATED.3IONS-SCNC: 1 MMOL/L (ref 4–13)
ANION GAP SERPL CALCULATED.3IONS-SCNC: 1 MMOL/L (ref 4–13)
APTT PPP: 168 SECONDS (ref 23–34)
APTT PPP: 48 SECONDS (ref 23–34)
BASOPHILS # BLD AUTO: 0.02 THOUSANDS/ÂΜL (ref 0–0.1)
BASOPHILS # BLD AUTO: 0.02 THOUSANDS/ÂΜL (ref 0–0.1)
BASOPHILS NFR BLD AUTO: 1 % (ref 0–1)
BASOPHILS NFR BLD AUTO: 1 % (ref 0–1)
BUN SERPL-MCNC: 20 MG/DL (ref 5–25)
BUN SERPL-MCNC: 22 MG/DL (ref 5–25)
CA-I BLD-SCNC: 1.09 MMOL/L (ref 1.12–1.32)
CALCIUM SERPL-MCNC: 7 MG/DL (ref 8.4–10.2)
CALCIUM SERPL-MCNC: 7.2 MG/DL (ref 8.4–10.2)
CHLORIDE SERPL-SCNC: 104 MMOL/L (ref 96–108)
CHLORIDE SERPL-SCNC: 105 MMOL/L (ref 96–108)
CO2 SERPL-SCNC: 25 MMOL/L (ref 21–32)
CO2 SERPL-SCNC: 26 MMOL/L (ref 21–32)
CREAT SERPL-MCNC: 0.66 MG/DL (ref 0.6–1.3)
CREAT SERPL-MCNC: 0.68 MG/DL (ref 0.6–1.3)
EOSINOPHIL # BLD AUTO: 0.04 THOUSAND/ÂΜL (ref 0–0.61)
EOSINOPHIL # BLD AUTO: 0.1 THOUSAND/ÂΜL (ref 0–0.61)
EOSINOPHIL NFR BLD AUTO: 1 % (ref 0–6)
EOSINOPHIL NFR BLD AUTO: 3 % (ref 0–6)
ERYTHROCYTE [DISTWIDTH] IN BLOOD BY AUTOMATED COUNT: 17 % (ref 11.6–15.1)
ERYTHROCYTE [DISTWIDTH] IN BLOOD BY AUTOMATED COUNT: 17 % (ref 11.6–15.1)
ERYTHROCYTE [DISTWIDTH] IN BLOOD BY AUTOMATED COUNT: 17.1 % (ref 11.6–15.1)
GFR SERPL CREATININE-BSD FRML MDRD: 94 ML/MIN/1.73SQ M
GFR SERPL CREATININE-BSD FRML MDRD: 94 ML/MIN/1.73SQ M
GLUCOSE SERPL-MCNC: 131 MG/DL (ref 65–140)
GLUCOSE SERPL-MCNC: 147 MG/DL (ref 65–140)
GLUCOSE SERPL-MCNC: 153 MG/DL (ref 65–140)
GLUCOSE SERPL-MCNC: 162 MG/DL (ref 65–140)
GLUCOSE SERPL-MCNC: 170 MG/DL (ref 65–140)
GLUCOSE SERPL-MCNC: 170 MG/DL (ref 65–140)
GLUCOSE SERPL-MCNC: 171 MG/DL (ref 65–140)
GLUCOSE SERPL-MCNC: 172 MG/DL (ref 65–140)
GLUCOSE SERPL-MCNC: 197 MG/DL (ref 65–140)
GLUCOSE SERPL-MCNC: 215 MG/DL (ref 65–140)
GLUCOSE SERPL-MCNC: 239 MG/DL (ref 65–140)
GLUCOSE SERPL-MCNC: 239 MG/DL (ref 65–140)
GLUCOSE SERPL-MCNC: 264 MG/DL (ref 65–140)
HCT VFR BLD AUTO: 22.4 % (ref 34.8–46.1)
HCT VFR BLD AUTO: 25.2 % (ref 34.8–46.1)
HCT VFR BLD AUTO: 26 % (ref 34.8–46.1)
HGB BLD-MCNC: 7.1 G/DL (ref 11.5–15.4)
HGB BLD-MCNC: 7.9 G/DL (ref 11.5–15.4)
HGB BLD-MCNC: 8.2 G/DL (ref 11.5–15.4)
IMM GRANULOCYTES # BLD AUTO: 0.02 THOUSAND/UL (ref 0–0.2)
IMM GRANULOCYTES # BLD AUTO: 0.02 THOUSAND/UL (ref 0–0.2)
IMM GRANULOCYTES NFR BLD AUTO: 1 % (ref 0–2)
IMM GRANULOCYTES NFR BLD AUTO: 1 % (ref 0–2)
INR PPP: 1.49 (ref 0.85–1.19)
LACTATE SERPL-SCNC: 1 MMOL/L (ref 0.5–2)
LYMPHOCYTES # BLD AUTO: 0.33 THOUSANDS/ÂΜL (ref 0.6–4.47)
LYMPHOCYTES # BLD AUTO: 0.43 THOUSANDS/ÂΜL (ref 0.6–4.47)
LYMPHOCYTES NFR BLD AUTO: 11 % (ref 14–44)
LYMPHOCYTES NFR BLD AUTO: 8 % (ref 14–44)
MAGNESIUM SERPL-MCNC: 2 MG/DL (ref 1.9–2.7)
MAGNESIUM SERPL-MCNC: 2 MG/DL (ref 1.9–2.7)
MCH RBC QN AUTO: 30.4 PG (ref 26.8–34.3)
MCH RBC QN AUTO: 30.5 PG (ref 26.8–34.3)
MCH RBC QN AUTO: 31 PG (ref 26.8–34.3)
MCHC RBC AUTO-ENTMCNC: 31.3 G/DL (ref 31.4–37.4)
MCHC RBC AUTO-ENTMCNC: 31.5 G/DL (ref 31.4–37.4)
MCHC RBC AUTO-ENTMCNC: 31.7 G/DL (ref 31.4–37.4)
MCV RBC AUTO: 97 FL (ref 82–98)
MCV RBC AUTO: 97 FL (ref 82–98)
MCV RBC AUTO: 98 FL (ref 82–98)
MONOCYTES # BLD AUTO: 0.25 THOUSAND/ÂΜL (ref 0.17–1.22)
MONOCYTES # BLD AUTO: 0.36 THOUSAND/ÂΜL (ref 0.17–1.22)
MONOCYTES NFR BLD AUTO: 7 % (ref 4–12)
MONOCYTES NFR BLD AUTO: 9 % (ref 4–12)
NEUTROPHILS # BLD AUTO: 3.05 THOUSANDS/ÂΜL (ref 1.85–7.62)
NEUTROPHILS # BLD AUTO: 3.31 THOUSANDS/ÂΜL (ref 1.85–7.62)
NEUTS SEG NFR BLD AUTO: 77 % (ref 43–75)
NEUTS SEG NFR BLD AUTO: 80 % (ref 43–75)
NRBC BLD AUTO-RTO: 0 /100 WBCS
NRBC BLD AUTO-RTO: 0 /100 WBCS
PHOSPHATE SERPL-MCNC: 2.3 MG/DL (ref 2.3–4.1)
PLATELET # BLD AUTO: 70 THOUSANDS/UL (ref 149–390)
PLATELET # BLD AUTO: 72 THOUSANDS/UL (ref 149–390)
PLATELET # BLD AUTO: 76 THOUSANDS/UL (ref 149–390)
PMV BLD AUTO: 10.4 FL (ref 8.9–12.7)
PMV BLD AUTO: 10.4 FL (ref 8.9–12.7)
PMV BLD AUTO: 10.8 FL (ref 8.9–12.7)
POTASSIUM SERPL-SCNC: 4.1 MMOL/L (ref 3.5–5.3)
POTASSIUM SERPL-SCNC: 4.4 MMOL/L (ref 3.5–5.3)
PROTHROMBIN TIME: 18.1 SECONDS (ref 12.3–15)
RBC # BLD AUTO: 2.29 MILLION/UL (ref 3.81–5.12)
RBC # BLD AUTO: 2.6 MILLION/UL (ref 3.81–5.12)
RBC # BLD AUTO: 2.69 MILLION/UL (ref 3.81–5.12)
SODIUM SERPL-SCNC: 130 MMOL/L (ref 135–147)
SODIUM SERPL-SCNC: 132 MMOL/L (ref 135–147)
WBC # BLD AUTO: 3.87 THOUSAND/UL (ref 4.31–10.16)
WBC # BLD AUTO: 4.08 THOUSAND/UL (ref 4.31–10.16)
WBC # BLD AUTO: 4.1 THOUSAND/UL (ref 4.31–10.16)

## 2024-11-23 PROCEDURE — 83735 ASSAY OF MAGNESIUM: CPT

## 2024-11-23 PROCEDURE — 85025 COMPLETE CBC W/AUTO DIFF WBC: CPT | Performed by: SURGERY

## 2024-11-23 PROCEDURE — 82330 ASSAY OF CALCIUM: CPT | Performed by: SURGERY

## 2024-11-23 PROCEDURE — 80048 BASIC METABOLIC PNL TOTAL CA: CPT | Performed by: SURGERY

## 2024-11-23 PROCEDURE — 85025 COMPLETE CBC W/AUTO DIFF WBC: CPT

## 2024-11-23 PROCEDURE — 85610 PROTHROMBIN TIME: CPT | Performed by: NURSE PRACTITIONER

## 2024-11-23 PROCEDURE — 85730 THROMBOPLASTIN TIME PARTIAL: CPT | Performed by: NURSE PRACTITIONER

## 2024-11-23 PROCEDURE — 85730 THROMBOPLASTIN TIME PARTIAL: CPT | Performed by: INTERNAL MEDICINE

## 2024-11-23 PROCEDURE — 80048 BASIC METABOLIC PNL TOTAL CA: CPT

## 2024-11-23 PROCEDURE — 83735 ASSAY OF MAGNESIUM: CPT | Performed by: SURGERY

## 2024-11-23 PROCEDURE — NC001 PR NO CHARGE: Performed by: SPECIALIST

## 2024-11-23 PROCEDURE — 84100 ASSAY OF PHOSPHORUS: CPT

## 2024-11-23 PROCEDURE — 99232 SBSQ HOSP IP/OBS MODERATE 35: CPT | Performed by: INTERNAL MEDICINE

## 2024-11-23 PROCEDURE — 85027 COMPLETE CBC AUTOMATED: CPT | Performed by: NURSE PRACTITIONER

## 2024-11-23 PROCEDURE — 82948 REAGENT STRIP/BLOOD GLUCOSE: CPT

## 2024-11-23 PROCEDURE — 83605 ASSAY OF LACTIC ACID: CPT

## 2024-11-23 RX ORDER — ALBUMIN HUMAN 50 G/1000ML
25 SOLUTION INTRAVENOUS ONCE
Status: COMPLETED | OUTPATIENT
Start: 2024-11-23 | End: 2024-11-23

## 2024-11-23 RX ORDER — SODIUM CHLORIDE, SODIUM GLUCONATE, SODIUM ACETATE, POTASSIUM CHLORIDE, MAGNESIUM CHLORIDE, SODIUM PHOSPHATE, DIBASIC, AND POTASSIUM PHOSPHATE .53; .5; .37; .037; .03; .012; .00082 G/100ML; G/100ML; G/100ML; G/100ML; G/100ML; G/100ML; G/100ML
1000 INJECTION, SOLUTION INTRAVENOUS ONCE
Status: COMPLETED | OUTPATIENT
Start: 2024-11-23 | End: 2024-11-23

## 2024-11-23 RX ORDER — CALCIUM GLUCONATE 20 MG/ML
2 INJECTION, SOLUTION INTRAVENOUS ONCE
Status: COMPLETED | OUTPATIENT
Start: 2024-11-23 | End: 2024-11-23

## 2024-11-23 RX ORDER — HEPARIN SODIUM 10000 [USP'U]/100ML
3-20 INJECTION, SOLUTION INTRAVENOUS
Status: DISCONTINUED | OUTPATIENT
Start: 2024-11-23 | End: 2024-11-27

## 2024-11-23 RX ORDER — LEVALBUTEROL INHALATION SOLUTION 1.25 MG/3ML
1.25 SOLUTION RESPIRATORY (INHALATION) EVERY 6 HOURS PRN
Status: DISCONTINUED | OUTPATIENT
Start: 2024-11-23 | End: 2024-12-05 | Stop reason: HOSPADM

## 2024-11-23 RX ADMIN — METOROPROLOL TARTRATE 2.5 MG: 5 INJECTION, SOLUTION INTRAVENOUS at 19:56

## 2024-11-23 RX ADMIN — NYSTATIN: 100000 POWDER TOPICAL at 15:35

## 2024-11-23 RX ADMIN — ACETAMINOPHEN 1000 MG: 10 INJECTION INTRAVENOUS at 17:45

## 2024-11-23 RX ADMIN — CHLORHEXIDINE GLUCONATE 15 ML: 1.2 RINSE ORAL at 20:08

## 2024-11-23 RX ADMIN — CALCIUM GLUCONATE 2 G: 20 INJECTION, SOLUTION INTRAVENOUS at 05:46

## 2024-11-23 RX ADMIN — CEFAZOLIN SODIUM 2000 MG: 2 SOLUTION INTRAVENOUS at 05:10

## 2024-11-23 RX ADMIN — NYSTATIN: 100000 POWDER TOPICAL at 20:08

## 2024-11-23 RX ADMIN — ACETAMINOPHEN 1000 MG: 10 INJECTION INTRAVENOUS at 05:02

## 2024-11-23 RX ADMIN — ACETAMINOPHEN 1000 MG: 10 INJECTION INTRAVENOUS at 12:35

## 2024-11-23 RX ADMIN — HEPARIN SODIUM 5000 UNITS: 5000 INJECTION INTRAVENOUS; SUBCUTANEOUS at 05:03

## 2024-11-23 RX ADMIN — PANTOPRAZOLE SODIUM 40 MG: 40 INJECTION, POWDER, LYOPHILIZED, FOR SOLUTION INTRAVENOUS at 08:37

## 2024-11-23 RX ADMIN — ACETAMINOPHEN 1000 MG: 10 INJECTION INTRAVENOUS at 23:57

## 2024-11-23 RX ADMIN — SODIUM CHLORIDE, SODIUM LACTATE, POTASSIUM CHLORIDE, AND CALCIUM CHLORIDE 1000 ML: .6; .31; .03; .02 INJECTION, SOLUTION INTRAVENOUS at 01:15

## 2024-11-23 RX ADMIN — SODIUM CHLORIDE, SODIUM GLUCONATE, SODIUM ACETATE, POTASSIUM CHLORIDE, MAGNESIUM CHLORIDE, SODIUM PHOSPHATE, DIBASIC, AND POTASSIUM PHOSPHATE 1000 ML: .53; .5; .37; .037; .03; .012; .00082 INJECTION, SOLUTION INTRAVENOUS at 10:05

## 2024-11-23 RX ADMIN — SODIUM CHLORIDE, SODIUM GLUCONATE, SODIUM ACETATE, POTASSIUM CHLORIDE, MAGNESIUM CHLORIDE, SODIUM PHOSPHATE, DIBASIC, AND POTASSIUM PHOSPHATE 75 ML/HR: .53; .5; .37; .037; .03; .012; .00082 INJECTION, SOLUTION INTRAVENOUS at 05:03

## 2024-11-23 RX ADMIN — SODIUM CHLORIDE 3 UNITS/HR: 9 INJECTION, SOLUTION INTRAVENOUS at 16:15

## 2024-11-23 RX ADMIN — ALBUMIN (HUMAN) 25 G: 12.5 INJECTION, SOLUTION INTRAVENOUS at 17:29

## 2024-11-23 RX ADMIN — Medication: at 20:15

## 2024-11-23 RX ADMIN — METRONIDAZOLE 500 MG: 500 INJECTION, SOLUTION INTRAVENOUS at 10:43

## 2024-11-23 RX ADMIN — SODIUM CHLORIDE, SODIUM LACTATE, POTASSIUM CHLORIDE, AND CALCIUM CHLORIDE 500 ML: .6; .31; .03; .02 INJECTION, SOLUTION INTRAVENOUS at 15:35

## 2024-11-23 RX ADMIN — METRONIDAZOLE 500 MG: 500 INJECTION, SOLUTION INTRAVENOUS at 01:18

## 2024-11-23 RX ADMIN — HEPARIN SODIUM 11.1 UNITS/KG/HR: 10000 INJECTION, SOLUTION INTRAVENOUS at 10:34

## 2024-11-23 RX ADMIN — CHLORHEXIDINE GLUCONATE 15 ML: 1.2 RINSE ORAL at 08:37

## 2024-11-23 RX ADMIN — CEFAZOLIN SODIUM 2000 MG: 2 SOLUTION INTRAVENOUS at 14:25

## 2024-11-23 RX ADMIN — NYSTATIN: 100000 POWDER TOPICAL at 08:38

## 2024-11-23 NOTE — ASSESSMENT & PLAN NOTE
History of recurrent small bowel obstruction presented with abdominal pain, nausea, vomiting, and diarrhea  Last hospitalized on 11/5/2024-11/12/2024 for SBO.   S/P Ex Lap on 11/22/2024. During operation, found to have a stricture suspected to be related to prior history radiation for uterine carcinoma. No gross spillage.Strictures were resected and anastomosis on 11/22/2024  Had multiple NG decompression over the last few months due waxing/waning with obstructive pictur  11/14/2024 CT abd/pelvis: Small bowel obstruction with transition in the pelvis is again seen.New small volume of ascites  11/15/2024 KUB: fluid-filled loops of bowel   11/16/2024 KUB:Small bowel obstruction again suggested.   Extubated after procedure and got TAP blocks   NG tube, haley and A line in place    Plan:   Continue NG tube per surgery. Maintain Npo including meds per surgery. Discuss starting meds today. Await return of bowel function  Monitor abdominal exam  Avoid hypotension/pressors/bipap on anastomosis  24 Hour postop abx with Ancef and flagyl  Continue TPN  and NG tube per surgery.W ill re-order TPN today and adjust dextrose content given hyperglycemia   DVT ppx with sqh - discuss with surgery regarding transition to lovenox

## 2024-11-23 NOTE — ASSESSMENT & PLAN NOTE
64 y.o. female presenting with recurrent SBO. 11/14-11/21 failed conservative management with persistent SBO.  S/p ex lap, SBR, DAVID on 11/22.  Hypotensive overnight and started of insulin gtt.    Plan  - cont NPO/NGT  - IVF  - cont A-line  - Recommend 1L crtysalloid and full volume assessment  - Consider heparin gtt for recent PEs  - wean o2  - cont PICC/TPN  - replete lytes prn  - PT/OT  - Encourage OOB, ambulate, IS  - DVT ppx  -Appreciate ICU care

## 2024-11-23 NOTE — ASSESSMENT & PLAN NOTE
History of DVT/ PE on Eliquis - May 2024, in the setting of endometrial cancer, appears to be non-provoked  Eliquis held for procedure     Plan:  Discuss resumption of eliquis vs hep gtt with surgical team   Monitor hgb, o2 requirements

## 2024-11-23 NOTE — PLAN OF CARE
Problem: Potential for Falls  Goal: Patient will remain free of falls  Description: INTERVENTIONS:  - Educate patient/family on patient safety including physical limitations  - Instruct patient to call for assistance with activity   - Consult OT/PT to assist with strengthening/mobility   - Keep Call bell within reach  - Keep bed low and locked with side rails adjusted as appropriate  - Keep care items and personal belongings within reach  - Initiate and maintain comfort rounds  - Make Fall Risk Sign visible to staff  - Offer Toileting every 3 Hours, in advance of need  - Initiate/Maintain bed alarm  - Obtain necessary fall risk management equipment  - Apply yellow socks and bracelet for high fall risk patients  - Consider moving patient to room near nurses station  Outcome: Progressing     Problem: PAIN - ADULT  Goal: Verbalizes/displays adequate comfort level or baseline comfort level  Description: Interventions:  - Encourage patient to monitor pain and request assistance  - Assess pain using appropriate pain scale  - Administer analgesics based on type and severity of pain and evaluate response  - Implement non-pharmacological measures as appropriate and evaluate response  - Consider cultural and social influences on pain and pain management  - Notify physician/advanced practitioner if interventions unsuccessful or patient reports new pain  Outcome: Progressing     Problem: SAFETY ADULT  Goal: Patient will remain free of falls  Description: INTERVENTIONS:  - Educate patient/family on patient safety including physical limitations  - Instruct patient to call for assistance with activity   - Consult OT/PT to assist with strengthening/mobility   - Keep Call bell within reach  - Keep bed low and locked with side rails adjusted as appropriate  - Keep care items and personal belongings within reach  - Initiate and maintain comfort rounds  - Make Fall Risk Sign visible to staff  - Offer Toileting every 3 Hours, in  advance of need  - Initiate/Maintain bed alarm  - Obtain necessary fall risk management equipment  - Apply yellow socks and bracelet for high fall risk patients  - Consider moving patient to room near nurses station  Outcome: Progressing  Goal: Maintain or return to baseline ADL function  Description: INTERVENTIONS:  -  Assess patient's ability to carry out ADLs; assess patient's baseline for ADL function and identify physical deficits which impact ability to perform ADLs (bathing, care of mouth/teeth, toileting, grooming, dressing, etc.)  - Assess/evaluate cause of self-care deficits   - Assess range of motion  - Assess patient's mobility; develop plan if impaired  - Assess patient's need for assistive devices and provide as appropriate  - Encourage maximum independence but intervene and supervise when necessary  - Involve family in performance of ADLs  - Assess for home care needs following discharge   - Consider OT consult to assist with ADL evaluation and planning for discharge  - Provide patient education as appropriate  Outcome: Progressing  Goal: Maintains/Returns to pre admission functional level  Description: INTERVENTIONS:  - Perform AM-PAC 6 Click Basic Mobility/ Daily Activity assessment daily.  - Set and communicate daily mobility goal to care team and patient/family/caregiver.   - Collaborate with rehabilitation services on mobility goals if consulted  - Perform Range of Motion 3 times a day.  - Reposition patient every 3 hours.  - Dangle patient 3 times a day  - Stand patient 3 times a day  - Ambulate patient 3 times a day  - Out of bed to chair 3 times a day   - Out of bed for meals 3 times a day  - Out of bed for toileting  - Record patient progress and toleration of activity level   Outcome: Progressing     Problem: DISCHARGE PLANNING  Goal: Discharge to home or other facility with appropriate resources  Description: INTERVENTIONS:  - Identify barriers to discharge w/patient and caregiver  -  Arrange for needed discharge resources and transportation as appropriate  - Identify discharge learning needs (meds, wound care, etc.)  - Arrange for interpretive services to assist at discharge as needed  - Refer to Case Management Department for coordinating discharge planning if the patient needs post-hospital services based on physician/advanced practitioner order or complex needs related to functional status, cognitive ability, or social support system  Outcome: Progressing     Problem: GASTROINTESTINAL - ADULT  Goal: Minimal or absence of nausea and/or vomiting  Description: INTERVENTIONS:  - Administer IV fluids if ordered to ensure adequate hydration  - Maintain NPO status until nausea and vomiting are resolved  - Nasogastric tube if ordered  - Administer ordered antiemetic medications as needed  - Provide nonpharmacologic comfort measures as appropriate  - Advance diet as tolerated, if ordered  - Consider nutrition services referral to assist patient with adequate nutrition and appropriate food choices  Outcome: Progressing  Goal: Maintains or returns to baseline bowel function  Description: INTERVENTIONS:  - Assess bowel function  - Encourage oral fluids to ensure adequate hydration  - Administer IV fluids if ordered to ensure adequate hydration  - Administer ordered medications as needed  - Encourage mobilization and activity  - Consider nutritional services referral to assist patient with adequate nutrition and appropriate food choices  Outcome: Progressing  Goal: Maintains adequate nutritional intake  Description: INTERVENTIONS:  - Monitor percentage of each meal consumed  - Identify factors contributing to decreased intake, treat as appropriate  - Assist with meals as needed  - Monitor I&O, weight, and lab values if indicated  - Obtain nutrition services referral as needed  Outcome: Progressing  Goal: Establish and maintain optimal ostomy function  Description: INTERVENTIONS:  - Assess bowel  function  - Encourage oral fluids to ensure adequate hydration  - Administer IV fluids if ordered to ensure adequate hydration   - Administer ordered medications as needed  - Encourage mobilization and activity  - Nutrition services referral to assist patient with appropriate food choices  - Assess stoma site  - Consider wound care consult   Outcome: Progressing  Goal: Oral mucous membranes remain intact  Description: INTERVENTIONS  - Assess oral mucosa and hygiene practices  - Implement preventative oral hygiene regimen  - Implement oral medicated treatments as ordered  - Initiate Nutrition services referral as needed  Outcome: Progressing     Problem: Prexisting or High Potential for Compromised Skin Integrity  Goal: Skin integrity is maintained or improved  Description: INTERVENTIONS:  - Identify patients at risk for skin breakdown  - Assess and monitor skin integrity  - Assess and monitor nutrition and hydration status  - Monitor labs   - Assess for incontinence   - Turn and reposition patient  - Assist with mobility/ambulation  - Relieve pressure over bony prominences  - Avoid friction and shearing  - Provide appropriate hygiene as needed including keeping skin clean and dry  - Evaluate need for skin moisturizer/barrier cream  - Collaborate with interdisciplinary team   - Patient/family teaching  - Consider wound care consult   Outcome: Progressing     Problem: Nutrition/Hydration-ADULT  Goal: Nutrient/Hydration intake appropriate for improving, restoring or maintaining nutritional needs  Description: Monitor and assess patient's nutrition/hydration status for malnutrition. Collaborate with interdisciplinary team and initiate plan and interventions as ordered.  Monitor patient's weight and dietary intake as ordered or per policy. Utilize nutrition screening tool and intervene as necessary. Determine patient's food preferences and provide high-protein, high-caloric foods as appropriate.     INTERVENTIONS:  -  Monitor oral intake, urinary output, labs, and treatment plans  - Assess nutrition and hydration status and recommend course of action  - Evaluate amount of meals eaten  - Assist patient with eating if necessary   - Allow adequate time for meals  - Recommend/ encourage appropriate diets, oral nutritional supplements, and vitamin/mineral supplements  - Order, calculate, and assess calorie counts as needed  - Recommend, monitor, and adjust tube feedings and TPN/PPN based on assessed needs  - Assess need for intravenous fluids  - Provide specific nutrition/hydration education as appropriate  - Include patient/family/caregiver in decisions related to nutrition  Outcome: Progressing     Problem: INFECTION - ADULT  Goal: Absence or prevention of progression during hospitalization  Description: INTERVENTIONS:  - Assess and monitor for signs and symptoms of infection  - Monitor lab/diagnostic results  - Monitor all insertion sites, i.e. indwelling lines, tubes, and drains  - Monitor endotracheal if appropriate and nasal secretions for changes in amount and color  - [place] appropriate cooling/warming therapies per order  - Administer medications as ordered  - Instruct and encourage patient and family to use good hand hygiene technique  - Identify and instruct in appropriate isolation precautions for identified infection/condition  Outcome: Progressing     Problem: Knowledge Deficit  Goal: Patient/family/caregiver demonstrates understanding of disease process, treatment plan, medications, and discharge instructions  Description: Complete learning assessment and assess knowledge base.  Interventions:  - Provide teaching at level of understanding  - Provide teaching via preferred learning methods  Outcome: Progressing

## 2024-11-23 NOTE — ASSESSMENT & PLAN NOTE
Lesion appear to be crusting over. Located on right lower abdomen. Recommend Gabapentin  when tolerating PO or meds down NG

## 2024-11-23 NOTE — ASSESSMENT & PLAN NOTE
History of hypertension.  Was on carvedilol prior to admission  Scheduled IV metoprolol 2.5 mg every 6 hours - last dose held in the setting of post op hypotension

## 2024-11-23 NOTE — CASE MANAGEMENT
Case Management Discharge Planning Note    Patient name Lety Botello  Location ICU 10/ICU 10 MRN [id number]  : 1960 Date 2024       Current Admission Date: 2024  Current Admission Diagnosis:SBO (small bowel obstruction) (HCC)   Patient Active Problem List    Diagnosis Date Noted Date Diagnosed    Hypotension after procedure 2024     Shingles 2024     On total parenteral nutrition (TPN) 2024     Abdominal pain 2024     Nausea & vomiting 2024     Constipation 11/10/2024     Morbid obesity (HCC) 2024     Gastroparesis 2024     Gastric dilation 2024     History of pulmonary embolism 2024     Hypothyroidism 2024     Mild intermittent asthma 2024     Hypertension      Type 2 diabetes mellitus (HCC)      Endometrial cancer (HCC)      Obesity      SBO (small bowel obstruction) (HCC) 2024       LOS (days): 9  Geometric Mean LOS (GMLOS) (days): 3  Days to GMLOS:-6     OBJECTIVE:  Risk of Unplanned Readmission Score: 28.82         Current admission status: Inpatient   Preferred Pharmacy:   [id number] - [place] - [address]  [address] [facility] [address] [facility] [address]  Phone: [phone] Fax: [phone]    Primary Care Provider: Tai Martinez    Primary Insurance: POPPY TSANG  Secondary Insurance:     DISCHARGE DETAILS:        Treatment Team Recommendation: Home with Home Health Care  Discharge Destination Plan:: Home with Home Health Care       Additional Comments: Patient remains under critical care services and is not yet medically stable for downgrade.  CM department will continue to follow for discharge planning.

## 2024-11-23 NOTE — PROGRESS NOTES
Progress Note - Surgery-General   Name: Lety Botello 64 y.o. female I MRN: [id number]  Unit/Bed#: ICU 10 I Date of Admission: 11/14/2024   Date of Service: 11/23/2024 I Hospital Day: 9    Assessment & Plan  SBO (small bowel obstruction) (HCC)  64 y.o. female presenting with recurrent SBO. 11/14-11/21 failed conservative management with persistent SBO.  S/p ex lap, SBR, DAVID on 11/22.  Hypotensive overnight and started of insulin gtt.    Plan  - cont NPO/NGT  - IVF  - cont A-line  - Recommend 1L crtysalloid and full volume assessment  - Consider heparin gtt for recent PEs  - wean o2  - cont PICC/TPN  - replete lytes prn  - PT/OT  - Encourage OOB, ambulate, IS  - DVT ppx  -Appreciate ICU care  Shingles    On total parenteral nutrition (TPN)    Hypotension after procedure          Subjective   No acute events overnight.  Was started on insulin drip.  Received total of 2 L crystalloid postop, 1 L in PACU and 1 L overnight.  Pain controlled with PCA.  Denies nausea or vomiting.  Has not passed flatus or had bowel movement.  Has not been out of bed.  Breathing comfortably on 2 L nasal cannula.    Objective :  Temp:  [97.4 °F (36.3 °C)-99.5 °F (37.5 °C)] 98.7 °F (37.1 °C)  HR:  [64-90] 88  BP: ()/(34-65) 99/45  Resp:  [10-26] 18  SpO2:  [95 %-100 %] 99 %  O2 Device: EtCO2 nasal cannula  Nasal Cannula O2 Flow Rate (L/min):  [2 L/min-3 L/min] 2 L/min    I/O         11/21 0701  11/22 0700 11/22 0701  11/23 0700    I.V. (mL/kg)  3533.2 (33.3)    IV Piggyback  600    TPN  710.9    Total Intake(mL/kg)  4844.1 (45.7)    Urine (mL/kg/hr) 1895 (0.8) 1240 (0.5)    Emesis/NG output  1200    Blood  100    Total Output 1895 2540    Net -1895 +2304.1          Unmeasured Emesis Occurrence  3 x          Lines/Drains/Airways       Active Status       Name Placement date Placement time Site Days    PICC Line 11/18/24 Right Basilic 11/18/24  1249  Basilic  4    NG/OG/Enteral Tube Nasogastric 18 Fr Left nare 11/22/24  0915  Left nare   less than 1    Urethral Catheter Non-latex 16 Fr. 11/22/24  1000  Non-latex  less than 1                  Physical Exam  General: NAD  HENT: NCAT MMM  Neck: supple, no JVD  CV: nl rate  Lungs: nl wob. No resp distress  ABD: Soft, post surgically tender, nondistended.  Incision Mepilex CDI with no strikethrough  Extrem: No CCE  Neuro: AAOx3      Lab Results: I have reviewed the following results:  Recent Labs     11/22/24  1513 11/23/24  0426   WBC 5.56 4.08*   HGB 9.2* 8.2*   HCT 28.9* 26.0*   PLT 83* 76*   SODIUM 133* 130*   K 4.3 4.4    104   CO2 24 25   BUN 15 20   CREATININE 0.62 0.68   GLUC 207* 239*   CAIONIZED 1.01* 1.09*   MG 1.5* 2.0   PHOS 3.3  --    AST 27  --    ALT 35  --    ALB 2.1*  --    TBILI 0.75  --    ALKPHOS 109*  --    LACTICACID 1.6  --              VTE Pharmacologic Prophylaxis: VTE covered by:  heparin (porcine), Subcutaneous, 5,000 Units at 11/23/24 0503      VTE Mechanical Prophylaxis: sequential compression device

## 2024-11-23 NOTE — PLAN OF CARE
Problem: Potential for Falls  Goal: Patient will remain free of falls  Description: INTERVENTIONS:  - Educate patient/family on patient safety including physical limitations  - Instruct patient to call for assistance with activity   - Consult OT/PT to assist with strengthening/mobility   - Keep Call bell within reach  - Keep bed low and locked with side rails adjusted as appropriate  - Keep care items and personal belongings within reach  - Initiate and maintain comfort rounds  - Make Fall Risk Sign visible to staff  - Offer Toileting every 3 Hours, in advance of need  - Initiate/Maintain bed alarm  - Obtain necessary fall risk management equipment  - Apply yellow socks and bracelet for high fall risk patients  - Consider moving patient to room near nurses station  Outcome: Progressing     Problem: PAIN - ADULT  Goal: Verbalizes/displays adequate comfort level or baseline comfort level  Description: Interventions:  - Encourage patient to monitor pain and request assistance  - Assess pain using appropriate pain scale  - Administer analgesics based on type and severity of pain and evaluate response  - Implement non-pharmacological measures as appropriate and evaluate response  - Consider cultural and social influences on pain and pain management  - Notify physician/advanced practitioner if interventions unsuccessful or patient reports new pain  Outcome: Progressing     Problem: SAFETY ADULT  Goal: Patient will remain free of falls  Description: INTERVENTIONS:  - Educate patient/family on patient safety including physical limitations  - Instruct patient to call for assistance with activity   - Consult OT/PT to assist with strengthening/mobility   - Keep Call bell within reach  - Keep bed low and locked with side rails adjusted as appropriate  - Keep care items and personal belongings within reach  - Initiate and maintain comfort rounds  - Make Fall Risk Sign visible to staff  - Offer Toileting every 3 Hours, in  advance of need  - Initiate/Maintain bed alarm  - Obtain necessary fall risk management equipment  - Apply yellow socks and bracelet for high fall risk patients  - Consider moving patient to room near nurses station  Outcome: Progressing  Goal: Maintain or return to baseline ADL function  Description: INTERVENTIONS:  -  Assess patient's ability to carry out ADLs; assess patient's baseline for ADL function and identify physical deficits which impact ability to perform ADLs (bathing, care of mouth/teeth, toileting, grooming, dressing, etc.)  - Assess/evaluate cause of self-care deficits   - Assess range of motion  - Assess patient's mobility; develop plan if impaired  - Assess patient's need for assistive devices and provide as appropriate  - Encourage maximum independence but intervene and supervise when necessary  - Involve family in performance of ADLs  - Assess for home care needs following discharge   - Consider OT consult to assist with ADL evaluation and planning for discharge  - Provide patient education as appropriate  Outcome: Progressing  Goal: Maintains/Returns to pre admission functional level  Description: INTERVENTIONS:  - Perform AM-PAC 6 Click Basic Mobility/ Daily Activity assessment daily.  - Set and communicate daily mobility goal to care team and patient/family/caregiver.   - Collaborate with rehabilitation services on mobility goals if consulted  - Perform Range of Motion 3 times a day.  - Reposition patient every 3 hours.  - Dangle patient 3 times a day  - Stand patient 3 times a day  - Ambulate patient 3 times a day  - Out of bed to chair 3 times a day   - Out of bed for meals 3 times a day  - Out of bed for toileting  - Record patient progress and toleration of activity level   Outcome: Progressing     Problem: DISCHARGE PLANNING  Goal: Discharge to home or other facility with appropriate resources  Description: INTERVENTIONS:  - Identify barriers to discharge w/patient and caregiver  -  Arrange for needed discharge resources and transportation as appropriate  - Identify discharge learning needs (meds, wound care, etc.)  - Arrange for interpretive services to assist at discharge as needed  - Refer to Case Management Department for coordinating discharge planning if the patient needs post-hospital services based on physician/advanced practitioner order or complex needs related to functional status, cognitive ability, or social support system  Outcome: Progressing     Problem: GASTROINTESTINAL - ADULT  Goal: Minimal or absence of nausea and/or vomiting  Description: INTERVENTIONS:  - Administer IV fluids if ordered to ensure adequate hydration  - Maintain NPO status until nausea and vomiting are resolved  - Nasogastric tube if ordered  - Administer ordered antiemetic medications as needed  - Provide nonpharmacologic comfort measures as appropriate  - Advance diet as tolerated, if ordered  - Consider nutrition services referral to assist patient with adequate nutrition and appropriate food choices  Outcome: Progressing  Goal: Maintains or returns to baseline bowel function  Description: INTERVENTIONS:  - Assess bowel function  - Encourage oral fluids to ensure adequate hydration  - Administer IV fluids if ordered to ensure adequate hydration  - Administer ordered medications as needed  - Encourage mobilization and activity  - Consider nutritional services referral to assist patient with adequate nutrition and appropriate food choices  Outcome: Progressing  Goal: Maintains adequate nutritional intake  Description: INTERVENTIONS:  - Monitor percentage of each meal consumed  - Identify factors contributing to decreased intake, treat as appropriate  - Assist with meals as needed  - Monitor I&O, weight, and lab values if indicated  - Obtain nutrition services referral as needed  Outcome: Progressing  Goal: Establish and maintain optimal ostomy function  Description: INTERVENTIONS:  - Assess bowel  function  - Encourage oral fluids to ensure adequate hydration  - Administer IV fluids if ordered to ensure adequate hydration   - Administer ordered medications as needed  - Encourage mobilization and activity  - Nutrition services referral to assist patient with appropriate food choices  - Assess stoma site  - Consider wound care consult   Outcome: Progressing  Goal: Oral mucous membranes remain intact  Description: INTERVENTIONS  - Assess oral mucosa and hygiene practices  - Implement preventative oral hygiene regimen  - Implement oral medicated treatments as ordered  - Initiate Nutrition services referral as needed  Outcome: Progressing     Problem: Prexisting or High Potential for Compromised Skin Integrity  Goal: Skin integrity is maintained or improved  Description: INTERVENTIONS:  - Identify patients at risk for skin breakdown  - Assess and monitor skin integrity  - Assess and monitor nutrition and hydration status  - Monitor labs   - Assess for incontinence   - Turn and reposition patient  - Assist with mobility/ambulation  - Relieve pressure over bony prominences  - Avoid friction and shearing  - Provide appropriate hygiene as needed including keeping skin clean and dry  - Evaluate need for skin moisturizer/barrier cream  - Collaborate with interdisciplinary team   - Patient/family teaching  - Consider wound care consult   Outcome: Progressing     Problem: Nutrition/Hydration-ADULT  Goal: Nutrient/Hydration intake appropriate for improving, restoring or maintaining nutritional needs  Description: Monitor and assess patient's nutrition/hydration status for malnutrition. Collaborate with interdisciplinary team and initiate plan and interventions as ordered.  Monitor patient's weight and dietary intake as ordered or per policy. Utilize nutrition screening tool and intervene as necessary. Determine patient's food preferences and provide high-protein, high-caloric foods as appropriate.     INTERVENTIONS:  -  Monitor oral intake, urinary output, labs, and treatment plans  - Assess nutrition and hydration status and recommend course of action  - Evaluate amount of meals eaten  - Assist patient with eating if necessary   - Allow adequate time for meals  - Recommend/ encourage appropriate diets, oral nutritional supplements, and vitamin/mineral supplements  - Order, calculate, and assess calorie counts as needed  - Recommend, monitor, and adjust tube feedings and TPN/PPN based on assessed needs  - Assess need for intravenous fluids  - Provide specific nutrition/hydration education as appropriate  - Include patient/family/caregiver in decisions related to nutrition  Outcome: Progressing     Problem: INFECTION - ADULT  Goal: Absence or prevention of progression during hospitalization  Description: INTERVENTIONS:  - Assess and monitor for signs and symptoms of infection  - Monitor lab/diagnostic results  - Monitor all insertion sites, i.e. indwelling lines, tubes, and drains  - Monitor endotracheal if appropriate and nasal secretions for changes in amount and color  - [place] appropriate cooling/warming therapies per order  - Administer medications as ordered  - Instruct and encourage patient and family to use good hand hygiene technique  - Identify and instruct in appropriate isolation precautions for identified infection/condition  Outcome: Progressing     Problem: Knowledge Deficit  Goal: Patient/family/caregiver demonstrates understanding of disease process, treatment plan, medications, and discharge instructions  Description: Complete learning assessment and assess knowledge base.  Interventions:  - Provide teaching at level of understanding  - Provide teaching via preferred learning methods  Outcome: Progressing

## 2024-11-23 NOTE — ASSESSMENT & PLAN NOTE
Lab Results   Component Value Date    HGBA1C 6.9 (H) 11/05/2024       Recent Labs     11/22/24  0909 11/22/24  1719 11/22/24  2234 11/22/24  2334   POCGLU 213* 221* 312* 303*       Blood Sugar Average: Last 72 hrs:  (P) 211.375  Started on insulin gtt overnight due to hyperglycemia on SSI algo 6. Fingersticks q2  Goal -180. Adjust BS per protocol   Monitor for hypoglycemia and treat per protocol   Adjust Tpn to contain less dextrose- will discuss with pharmacy

## 2024-11-24 LAB
ABO GROUP BLD BPU: NORMAL
ANION GAP SERPL CALCULATED.3IONS-SCNC: 0 MMOL/L (ref 4–13)
APTT PPP: 73 SECONDS (ref 23–34)
APTT PPP: 74 SECONDS (ref 23–34)
APTT PPP: 93 SECONDS (ref 23–34)
BASOPHILS # BLD AUTO: 0.02 THOUSANDS/ΜL (ref 0–0.1)
BASOPHILS NFR BLD AUTO: 1 % (ref 0–1)
BPU ID: NORMAL
BUN SERPL-MCNC: 21 MG/DL (ref 5–25)
CALCIUM SERPL-MCNC: 7 MG/DL (ref 8.4–10.2)
CHLORIDE SERPL-SCNC: 105 MMOL/L (ref 96–108)
CO2 SERPL-SCNC: 24 MMOL/L (ref 21–32)
CREAT SERPL-MCNC: 0.58 MG/DL (ref 0.6–1.3)
CROSSMATCH: NORMAL
CROSSMATCH: NORMAL
EOSINOPHIL # BLD AUTO: 0.16 THOUSAND/ΜL (ref 0–0.61)
EOSINOPHIL NFR BLD AUTO: 4 % (ref 0–6)
ERYTHROCYTE [DISTWIDTH] IN BLOOD BY AUTOMATED COUNT: 17.1 % (ref 11.6–15.1)
GFR SERPL CREATININE-BSD FRML MDRD: 99 ML/MIN/1.73SQ M
GLUCOSE SERPL-MCNC: 112 MG/DL (ref 65–140)
GLUCOSE SERPL-MCNC: 120 MG/DL (ref 65–140)
GLUCOSE SERPL-MCNC: 123 MG/DL (ref 65–140)
GLUCOSE SERPL-MCNC: 128 MG/DL (ref 65–140)
GLUCOSE SERPL-MCNC: 130 MG/DL (ref 65–140)
GLUCOSE SERPL-MCNC: 131 MG/DL (ref 65–140)
GLUCOSE SERPL-MCNC: 132 MG/DL (ref 65–140)
GLUCOSE SERPL-MCNC: 140 MG/DL (ref 65–140)
GLUCOSE SERPL-MCNC: 140 MG/DL (ref 65–140)
GLUCOSE SERPL-MCNC: 141 MG/DL (ref 65–140)
GLUCOSE SERPL-MCNC: 144 MG/DL (ref 65–140)
GLUCOSE SERPL-MCNC: 166 MG/DL (ref 65–140)
HCT VFR BLD AUTO: 22 % (ref 34.8–46.1)
HCT VFR BLD AUTO: 23.7 % (ref 34.8–46.1)
HGB BLD-MCNC: 6.9 G/DL (ref 11.5–15.4)
HGB BLD-MCNC: 7.5 G/DL (ref 11.5–15.4)
IMM GRANULOCYTES # BLD AUTO: 0.04 THOUSAND/UL (ref 0–0.2)
IMM GRANULOCYTES NFR BLD AUTO: 1 % (ref 0–2)
LYMPHOCYTES # BLD AUTO: 0.34 THOUSANDS/ΜL (ref 0.6–4.47)
LYMPHOCYTES NFR BLD AUTO: 8 % (ref 14–44)
MAGNESIUM SERPL-MCNC: 2.1 MG/DL (ref 1.9–2.7)
MCH RBC QN AUTO: 31.5 PG (ref 26.8–34.3)
MCHC RBC AUTO-ENTMCNC: 31.4 G/DL (ref 31.4–37.4)
MCV RBC AUTO: 101 FL (ref 82–98)
MONOCYTES # BLD AUTO: 0.35 THOUSAND/ΜL (ref 0.17–1.22)
MONOCYTES NFR BLD AUTO: 9 % (ref 4–12)
NEUTROPHILS # BLD AUTO: 3.18 THOUSANDS/ΜL (ref 1.85–7.62)
NEUTS SEG NFR BLD AUTO: 77 % (ref 43–75)
NRBC BLD AUTO-RTO: 0 /100 WBCS
OSMOLALITY UR/SERPL-RTO: 280 MMOL/KG (ref 282–298)
PHOSPHATE SERPL-MCNC: 2.1 MG/DL (ref 2.3–4.1)
PLATELET # BLD AUTO: 75 THOUSANDS/UL (ref 149–390)
PMV BLD AUTO: 10.8 FL (ref 8.9–12.7)
POTASSIUM SERPL-SCNC: 4.3 MMOL/L (ref 3.5–5.3)
RBC # BLD AUTO: 2.19 MILLION/UL (ref 3.81–5.12)
SODIUM 24H UR-SCNC: 83 MOL/L
SODIUM SERPL-SCNC: 129 MMOL/L (ref 135–147)
UNIT DISPENSE STATUS: NORMAL
UNIT PRODUCT CODE: NORMAL
UNIT PRODUCT VOLUME: 240 ML
UNIT PRODUCT VOLUME: 321 ML
UNIT PRODUCT VOLUME: 350 ML
UNIT PRODUCT VOLUME: 350 ML
UNIT RH: NORMAL
WBC # BLD AUTO: 4.09 THOUSAND/UL (ref 4.31–10.16)

## 2024-11-24 PROCEDURE — 85018 HEMOGLOBIN: CPT

## 2024-11-24 PROCEDURE — 82948 REAGENT STRIP/BLOOD GLUCOSE: CPT

## 2024-11-24 PROCEDURE — 99024 POSTOP FOLLOW-UP VISIT: CPT | Performed by: SPECIALIST

## 2024-11-24 PROCEDURE — 85730 THROMBOPLASTIN TIME PARTIAL: CPT | Performed by: SPECIALIST

## 2024-11-24 PROCEDURE — 30233N1 TRANSFUSION OF NONAUTOLOGOUS RED BLOOD CELLS INTO PERIPHERAL VEIN, PERCUTANEOUS APPROACH: ICD-10-PCS | Performed by: SURGERY

## 2024-11-24 PROCEDURE — 85014 HEMATOCRIT: CPT

## 2024-11-24 PROCEDURE — 84300 ASSAY OF URINE SODIUM: CPT

## 2024-11-24 PROCEDURE — 83930 ASSAY OF BLOOD OSMOLALITY: CPT | Performed by: SURGERY

## 2024-11-24 PROCEDURE — 85025 COMPLETE CBC W/AUTO DIFF WBC: CPT

## 2024-11-24 PROCEDURE — 80048 BASIC METABOLIC PNL TOTAL CA: CPT

## 2024-11-24 PROCEDURE — 83735 ASSAY OF MAGNESIUM: CPT

## 2024-11-24 PROCEDURE — P9016 RBC LEUKOCYTES REDUCED: HCPCS

## 2024-11-24 PROCEDURE — 0DHA4UZ INSERTION OF FEEDING DEVICE INTO JEJUNUM, PERCUTANEOUS ENDOSCOPIC APPROACH: ICD-10-PCS | Performed by: INTERNAL MEDICINE

## 2024-11-24 PROCEDURE — 84100 ASSAY OF PHOSPHORUS: CPT

## 2024-11-24 RX ORDER — METHOCARBAMOL 100 MG/ML
750 INJECTION, SOLUTION INTRAMUSCULAR; INTRAVENOUS EVERY 6 HOURS
Status: COMPLETED | OUTPATIENT
Start: 2024-11-24 | End: 2024-11-24

## 2024-11-24 RX ADMIN — NYSTATIN: 100000 POWDER TOPICAL at 08:12

## 2024-11-24 RX ADMIN — METOROPROLOL TARTRATE 2.5 MG: 5 INJECTION, SOLUTION INTRAVENOUS at 21:06

## 2024-11-24 RX ADMIN — METOROPROLOL TARTRATE 2.5 MG: 5 INJECTION, SOLUTION INTRAVENOUS at 01:27

## 2024-11-24 RX ADMIN — PANTOPRAZOLE SODIUM 40 MG: 40 INJECTION, POWDER, LYOPHILIZED, FOR SOLUTION INTRAVENOUS at 08:11

## 2024-11-24 RX ADMIN — Medication: at 21:07

## 2024-11-24 RX ADMIN — CHLORHEXIDINE GLUCONATE 15 ML: 1.2 RINSE ORAL at 21:06

## 2024-11-24 RX ADMIN — METHOCARBAMOL 750 MG: 100 INJECTION INTRAMUSCULAR; INTRAVENOUS at 14:42

## 2024-11-24 RX ADMIN — METHOCARBAMOL 750 MG: 100 INJECTION INTRAMUSCULAR; INTRAVENOUS at 08:11

## 2024-11-24 RX ADMIN — ACETAMINOPHEN 1000 MG: 10 INJECTION INTRAVENOUS at 12:24

## 2024-11-24 RX ADMIN — NYSTATIN 1 APPLICATION: 100000 POWDER TOPICAL at 16:33

## 2024-11-24 RX ADMIN — METOROPROLOL TARTRATE 2.5 MG: 5 INJECTION, SOLUTION INTRAVENOUS at 14:43

## 2024-11-24 RX ADMIN — SODIUM PHOSPHATE, MONOBASIC, MONOHYDRATE AND SODIUM PHOSPHATE, DIBASIC, ANHYDROUS 15 MMOL: 142; 276 INJECTION, SOLUTION INTRAVENOUS at 15:11

## 2024-11-24 RX ADMIN — HEPARIN SODIUM 6.1 UNITS/KG/HR: 10000 INJECTION, SOLUTION INTRAVENOUS at 14:42

## 2024-11-24 RX ADMIN — CHLORHEXIDINE GLUCONATE 15 ML: 1.2 RINSE ORAL at 08:11

## 2024-11-24 RX ADMIN — ACETAMINOPHEN 1000 MG: 10 INJECTION INTRAVENOUS at 18:05

## 2024-11-24 RX ADMIN — SODIUM CHLORIDE, SODIUM GLUCONATE, SODIUM ACETATE, POTASSIUM CHLORIDE, MAGNESIUM CHLORIDE, SODIUM PHOSPHATE, DIBASIC, AND POTASSIUM PHOSPHATE 100 ML/HR: .53; .5; .37; .037; .03; .012; .00082 INJECTION, SOLUTION INTRAVENOUS at 20:16

## 2024-11-24 RX ADMIN — SODIUM CHLORIDE, SODIUM GLUCONATE, SODIUM ACETATE, POTASSIUM CHLORIDE, MAGNESIUM CHLORIDE, SODIUM PHOSPHATE, DIBASIC, AND POTASSIUM PHOSPHATE 100 ML/HR: .53; .5; .37; .037; .03; .012; .00082 INJECTION, SOLUTION INTRAVENOUS at 14:42

## 2024-11-24 RX ADMIN — NYSTATIN: 100000 POWDER TOPICAL at 21:07

## 2024-11-24 RX ADMIN — METHOCARBAMOL 750 MG: 100 INJECTION INTRAMUSCULAR; INTRAVENOUS at 01:27

## 2024-11-24 RX ADMIN — ACETAMINOPHEN 1000 MG: 10 INJECTION INTRAVENOUS at 05:28

## 2024-11-24 RX ADMIN — SODIUM CHLORIDE, SODIUM GLUCONATE, SODIUM ACETATE, POTASSIUM CHLORIDE, MAGNESIUM CHLORIDE, SODIUM PHOSPHATE, DIBASIC, AND POTASSIUM PHOSPHATE 100 ML/HR: .53; .5; .37; .037; .03; .012; .00082 INJECTION, SOLUTION INTRAVENOUS at 04:00

## 2024-11-24 NOTE — PLAN OF CARE
Problem: Potential for Falls  Goal: Patient will remain free of falls  Description: INTERVENTIONS:  - Educate patient/family on patient safety including physical limitations  - Instruct patient to call for assistance with activity   - Consult OT/PT to assist with strengthening/mobility   - Keep Call bell within reach  - Keep bed low and locked with side rails adjusted as appropriate  - Keep care items and personal belongings within reach  - Initiate and maintain comfort rounds  - Make Fall Risk Sign visible to staff  - Offer Toileting every 3 Hours, in advance of need  - Initiate/Maintain bed alarm  - Obtain necessary fall risk management equipment  - Apply yellow socks and bracelet for high fall risk patients  - Consider moving patient to room near nurses station  Outcome: Progressing     Problem: PAIN - ADULT  Goal: Verbalizes/displays adequate comfort level or baseline comfort level  Description: Interventions:  - Encourage patient to monitor pain and request assistance  - Assess pain using appropriate pain scale  - Administer analgesics based on type and severity of pain and evaluate response  - Implement non-pharmacological measures as appropriate and evaluate response  - Consider cultural and social influences on pain and pain management  - Notify physician/advanced practitioner if interventions unsuccessful or patient reports new pain  Outcome: Progressing     Problem: SAFETY ADULT  Goal: Patient will remain free of falls  Description: INTERVENTIONS:  - Educate patient/family on patient safety including physical limitations  - Instruct patient to call for assistance with activity   - Consult OT/PT to assist with strengthening/mobility   - Keep Call bell within reach  - Keep bed low and locked with side rails adjusted as appropriate  - Keep care items and personal belongings within reach  - Initiate and maintain comfort rounds  - Make Fall Risk Sign visible to staff  - Offer Toileting every 3 Hours, in  advance of need  - Initiate/Maintain bed alarm  - Obtain necessary fall risk management equipment  - Apply yellow socks and bracelet for high fall risk patients  - Consider moving patient to room near nurses station  Outcome: Progressing  Goal: Maintain or return to baseline ADL function  Description: INTERVENTIONS:  -  Assess patient's ability to carry out ADLs; assess patient's baseline for ADL function and identify physical deficits which impact ability to perform ADLs (bathing, care of mouth/teeth, toileting, grooming, dressing, etc.)  - Assess/evaluate cause of self-care deficits   - Assess range of motion  - Assess patient's mobility; develop plan if impaired  - Assess patient's need for assistive devices and provide as appropriate  - Encourage maximum independence but intervene and supervise when necessary  - Involve family in performance of ADLs  - Assess for home care needs following discharge   - Consider OT consult to assist with ADL evaluation and planning for discharge  - Provide patient education as appropriate  Outcome: Progressing  Goal: Maintains/Returns to pre admission functional level  Description: INTERVENTIONS:  - Perform AM-PAC 6 Click Basic Mobility/ Daily Activity assessment daily.  - Set and communicate daily mobility goal to care team and patient/family/caregiver.   - Collaborate with rehabilitation services on mobility goals if consulted  - Perform Range of Motion 3 times a day.  - Reposition patient every 3 hours.  - Dangle patient 3 times a day  - Stand patient 3 times a day  - Ambulate patient 3 times a day  - Out of bed to chair 3 times a day   - Out of bed for meals 3 times a day  - Out of bed for toileting  - Record patient progress and toleration of activity level   Outcome: Progressing     Problem: DISCHARGE PLANNING  Goal: Discharge to home or other facility with appropriate resources  Description: INTERVENTIONS:  - Identify barriers to discharge w/patient and caregiver  -  Arrange for needed discharge resources and transportation as appropriate  - Identify discharge learning needs (meds, wound care, etc.)  - Arrange for interpretive services to assist at discharge as needed  - Refer to Case Management Department for coordinating discharge planning if the patient needs post-hospital services based on physician/advanced practitioner order or complex needs related to functional status, cognitive ability, or social support system  Outcome: Progressing     Problem: GASTROINTESTINAL - ADULT  Goal: Minimal or absence of nausea and/or vomiting  Description: INTERVENTIONS:  - Administer IV fluids if ordered to ensure adequate hydration  - Maintain NPO status until nausea and vomiting are resolved  - Nasogastric tube if ordered  - Administer ordered antiemetic medications as needed  - Provide nonpharmacologic comfort measures as appropriate  - Advance diet as tolerated, if ordered  - Consider nutrition services referral to assist patient with adequate nutrition and appropriate food choices  Outcome: Progressing  Goal: Maintains or returns to baseline bowel function  Description: INTERVENTIONS:  - Assess bowel function  - Encourage oral fluids to ensure adequate hydration  - Administer IV fluids if ordered to ensure adequate hydration  - Administer ordered medications as needed  - Encourage mobilization and activity  - Consider nutritional services referral to assist patient with adequate nutrition and appropriate food choices  Outcome: Progressing  Goal: Maintains adequate nutritional intake  Description: INTERVENTIONS:  - Monitor percentage of each meal consumed  - Identify factors contributing to decreased intake, treat as appropriate  - Assist with meals as needed  - Monitor I&O, weight, and lab values if indicated  - Obtain nutrition services referral as needed  Outcome: Progressing  Goal: Establish and maintain optimal ostomy function  Description: INTERVENTIONS:  - Assess bowel  function  - Encourage oral fluids to ensure adequate hydration  - Administer IV fluids if ordered to ensure adequate hydration   - Administer ordered medications as needed  - Encourage mobilization and activity  - Nutrition services referral to assist patient with appropriate food choices  - Assess stoma site  - Consider wound care consult   Outcome: Progressing  Goal: Oral mucous membranes remain intact  Description: INTERVENTIONS  - Assess oral mucosa and hygiene practices  - Implement preventative oral hygiene regimen  - Implement oral medicated treatments as ordered  - Initiate Nutrition services referral as needed  Outcome: Progressing     Problem: Prexisting or High Potential for Compromised Skin Integrity  Goal: Skin integrity is maintained or improved  Description: INTERVENTIONS:  - Identify patients at risk for skin breakdown  - Assess and monitor skin integrity  - Assess and monitor nutrition and hydration status  - Monitor labs   - Assess for incontinence   - Turn and reposition patient  - Assist with mobility/ambulation  - Relieve pressure over bony prominences  - Avoid friction and shearing  - Provide appropriate hygiene as needed including keeping skin clean and dry  - Evaluate need for skin moisturizer/barrier cream  - Collaborate with interdisciplinary team   - Patient/family teaching  - Consider wound care consult   Outcome: Progressing     Problem: Nutrition/Hydration-ADULT  Goal: Nutrient/Hydration intake appropriate for improving, restoring or maintaining nutritional needs  Description: Monitor and assess patient's nutrition/hydration status for malnutrition. Collaborate with interdisciplinary team and initiate plan and interventions as ordered.  Monitor patient's weight and dietary intake as ordered or per policy. Utilize nutrition screening tool and intervene as necessary. Determine patient's food preferences and provide high-protein, high-caloric foods as appropriate.     INTERVENTIONS:  -  Monitor oral intake, urinary output, labs, and treatment plans  - Assess nutrition and hydration status and recommend course of action  - Evaluate amount of meals eaten  - Assist patient with eating if necessary   - Allow adequate time for meals  - Recommend/ encourage appropriate diets, oral nutritional supplements, and vitamin/mineral supplements  - Order, calculate, and assess calorie counts as needed  - Recommend, monitor, and adjust tube feedings and TPN/PPN based on assessed needs  - Assess need for intravenous fluids  - Provide specific nutrition/hydration education as appropriate  - Include patient/family/caregiver in decisions related to nutrition  Outcome: Progressing     Problem: INFECTION - ADULT  Goal: Absence or prevention of progression during hospitalization  Description: INTERVENTIONS:  - Assess and monitor for signs and symptoms of infection  - Monitor lab/diagnostic results  - Monitor all insertion sites, i.e. indwelling lines, tubes, and drains  - Monitor endotracheal if appropriate and nasal secretions for changes in amount and color  - [place] appropriate cooling/warming therapies per order  - Administer medications as ordered  - Instruct and encourage patient and family to use good hand hygiene technique  - Identify and instruct in appropriate isolation precautions for identified infection/condition  Outcome: Progressing     Problem: Knowledge Deficit  Goal: Patient/family/caregiver demonstrates understanding of disease process, treatment plan, medications, and discharge instructions  Description: Complete learning assessment and assess knowledge base.  Interventions:  - Provide teaching at level of understanding  - Provide teaching via preferred learning methods  Outcome: Progressing

## 2024-11-24 NOTE — ASSESSMENT & PLAN NOTE
64 y.o. female presenting with recurrent SBO. 11/14-11/21 failed conservative management with persistent SBO.  S/p ex lap, SBR, DAVID on 11/22.  Hypotensive overnight and started of insulin gtt.    Plan  - cont NPO/NGT  - IVF  - PICC/TPN  - d/c haley  - cont PCA-D  - Cont heparin gtt for recent Pes  - 1u PRBCs  - post transfusion h/h  - wean o2  - PT/OT  - Encourage OOB, ambulate, IS  - DVT ppx  - check urine lytes, osm, consider nephro c/s if hyponatremia persists  - SD2

## 2024-11-24 NOTE — PROGRESS NOTES
Progress Note - Surgery-General   Name: Lety Botello 64 y.o. female I MRN: [id number]  Unit/Bed#: ICU 10 I Date of Admission: 11/14/2024   Date of Service: 11/24/2024 I Hospital Day: 10    Assessment & Plan  SBO (small bowel obstruction) (HCC)  64 y.o. female presenting with recurrent SBO. 11/14-11/21 failed conservative management with persistent SBO.  S/p ex lap, SBR, DAVID on 11/22.  Hypotensive overnight and started of insulin gtt.    Plan  - cont NPO/NGT  - IVF  - PICC/TPN  - d/c haley  - cont PCA-D  - Cont heparin gtt for recent Pes  - 1u PRBCs  - post transfusion h/h  - wean o2  - PT/OT  - Encourage OOB, ambulate, IS  - DVT ppx  - check urine lytes, osm, consider nephro c/s if hyponatremia persists  - SD2          Subjective   Improved BP. No n/v, prefers NGT. Passing flatus and bm.     Objective :  Temp:  [97.6 °F (36.4 °C)-99.2 °F (37.3 °C)] 97.8 °F (36.6 °C)  HR:  [72-90] 76  BP: ()/(42-70) 125/54  Resp:  [14-27] 14  SpO2:  [98 %-100 %] 99 %  O2 Device: Nasal cannula  Nasal Cannula O2 Flow Rate (L/min):  [2 L/min] 2 L/min    I/O         11/22 0701  11/23 0700 11/23 0701  11/24 0700 11/24 0701  11/25 0700    I.V. (mL/kg) 3848.2 (36.3) 3228.5 (28.6) 218 (1.9)    IV Piggyback 700 1350     TPN 1024.1 1880.2 156.8    Total Intake(mL/kg) 5572.3 (52.6) 6458.8 (57.2) 374.8 (3.3)    Urine (mL/kg/hr) 1360 (0.5) 1655 (0.6) 150 (0.7)    Emesis/NG output 1250 150     Blood 100      Total Output 2710 1805 150    Net +2862.3 +4653.8 +224.8           Unmeasured Emesis Occurrence 3 x            Lines/Drains/Airways       Active Status       Name Placement date Placement time Site Days    PICC Line 11/18/24 Right Basilic 11/18/24  1249  Basilic  5    NG/OG/Enteral Tube Nasogastric 18 Fr Left nare 11/22/24  0915  Left nare  1    Urethral Catheter Non-latex 16 Fr. 11/22/24  1000  Non-latex  1                  Physical Exam  Physical Exam:  General: No acute distress, alert and oriented  HEENT: NGT in place, on 2L NC  CV:  RRR  Lungs: Normal work of breathing  Abdomen: soft, post surgically ttp, nondistended, protuberant abdomen,. Incision/s mepilex without strikethrough  Skin: Warm, dry      Lab Results: I have reviewed the following results:  Recent Labs     11/22/24  1513 11/23/24  0426 11/23/24  1001 11/23/24  1624 11/23/24  1739 11/24/24  0005 11/24/24  0542 11/24/24  0610   WBC 5.56 4.08* 4.10*  --  3.87*  --   --  4.09*   HGB 9.2* 8.2* 7.9*  --  7.1*  --   --  6.9*   HCT 28.9* 26.0* 25.2*  --  22.4*  --   --  22.0*   PLT 83* 76* 70*  --  72*  --   --  75*   SODIUM 133* 130*  --   --  132*  --  129*  --    K 4.3 4.4  --   --  4.1  --  4.3  --     104  --   --  105  --  105  --    CO2 24 25  --   --  26  --  24  --    BUN 15 20  --   --  22  --  21  --    CREATININE 0.62 0.68  --   --  0.66  --  0.58*  --    GLUC 207* 239*  --   --  162*  --  141*  --    CAIONIZED 1.01* 1.09*  --   --   --   --   --   --    MG 1.5* 2.0  --   --  2.0  --  2.1  --    PHOS 3.3  --   --   --  2.3  --  2.1*  --    AST 27  --   --   --   --   --   --   --    ALT 35  --   --   --   --   --   --   --    ALB 2.1*  --   --   --   --   --   --   --    TBILI 0.75  --   --   --   --   --   --   --    ALKPHOS 109*  --   --   --   --   --   --   --    PTT  --   --  48*   < >  --    < > 73*  --    INR  --   --  1.49*  --   --   --   --   --    LACTICACID 1.6  --   --   --  1.0  --   --   --     < > = values in this interval not displayed.             VTE Pharmacologic Prophylaxis: VTE covered by:  heparin (porcine), Intravenous, 6.1 Units/kg/hr at 11/24/24 0047     VTE Mechanical Prophylaxis: sequential compression device

## 2024-11-24 NOTE — PLAN OF CARE
Problem: Potential for Falls  Goal: Patient will remain free of falls  Description: INTERVENTIONS:  - Educate patient/family on patient safety including physical limitations  - Instruct patient to call for assistance with activity   - Consult OT/PT to assist with strengthening/mobility   - Keep Call bell within reach  - Keep bed low and locked with side rails adjusted as appropriate  - Keep care items and personal belongings within reach  - Initiate and maintain comfort rounds  - Make Fall Risk Sign visible to staff  - Offer Toileting every 3 Hours, in advance of need  - Initiate/Maintain bed alarm  - Obtain necessary fall risk management equipment  - Apply yellow socks and bracelet for high fall risk patients  - Consider moving patient to room near nurses station  Outcome: Progressing     Problem: PAIN - ADULT  Goal: Verbalizes/displays adequate comfort level or baseline comfort level  Description: Interventions:  - Encourage patient to monitor pain and request assistance  - Assess pain using appropriate pain scale  - Administer analgesics based on type and severity of pain and evaluate response  - Implement non-pharmacological measures as appropriate and evaluate response  - Consider cultural and social influences on pain and pain management  - Notify physician/advanced practitioner if interventions unsuccessful or patient reports new pain  Outcome: Progressing     Problem: SAFETY ADULT  Goal: Patient will remain free of falls  Description: INTERVENTIONS:  - Educate patient/family on patient safety including physical limitations  - Instruct patient to call for assistance with activity   - Consult OT/PT to assist with strengthening/mobility   - Keep Call bell within reach  - Keep bed low and locked with side rails adjusted as appropriate  - Keep care items and personal belongings within reach  - Initiate and maintain comfort rounds  - Make Fall Risk Sign visible to staff  - Offer Toileting every 3 Hours, in advance  of need  - Initiate/Maintain bed alarm  - Obtain necessary fall risk management equipment  - Apply yellow socks and bracelet for high fall risk patients  - Consider moving patient to room near nurses station  Outcome: Progressing  Goal: Maintain or return to baseline ADL function  Description: INTERVENTIONS:  -  Assess patient's ability to carry out ADLs; assess patient's baseline for ADL function and identify physical deficits which impact ability to perform ADLs (bathing, care of mouth/teeth, toileting, grooming, dressing, etc.)  - Assess/evaluate cause of self-care deficits   - Assess range of motion  - Assess patient's mobility; develop plan if impaired  - Assess patient's need for assistive devices and provide as appropriate  - Encourage maximum independence but intervene and supervise when necessary  - Involve family in performance of ADLs  - Assess for home care needs following discharge   - Consider OT consult to assist with ADL evaluation and planning for discharge  - Provide patient education as appropriate  Outcome: Progressing  Goal: Maintains/Returns to pre admission functional level  Description: INTERVENTIONS:  - Perform AM-PAC 6 Click Basic Mobility/ Daily Activity assessment daily.  - Set and communicate daily mobility goal to care team and patient/family/caregiver.   - Collaborate with rehabilitation services on mobility goals if consulted  - Perform Range of Motion 3 times a day.  - Reposition patient every 3 hours.  - Dangle patient 3 times a day  - Stand patient 3 times a day  - Ambulate patient 3 times a day  - Out of bed to chair 3 times a day   - Out of bed for meals 3 times a day  - Out of bed for toileting  - Record patient progress and toleration of activity level   Outcome: Progressing     Problem: DISCHARGE PLANNING  Goal: Discharge to home or other facility with appropriate resources  Description: INTERVENTIONS:  - Identify barriers to discharge w/patient and caregiver  - Arrange for  needed discharge resources and transportation as appropriate  - Identify discharge learning needs (meds, wound care, etc.)  - Arrange for interpretive services to assist at discharge as needed  - Refer to Case Management Department for coordinating discharge planning if the patient needs post-hospital services based on physician/advanced practitioner order or complex needs related to functional status, cognitive ability, or social support system  Outcome: Progressing     Problem: GASTROINTESTINAL - ADULT  Goal: Minimal or absence of nausea and/or vomiting  Description: INTERVENTIONS:  - Administer IV fluids if ordered to ensure adequate hydration  - Maintain NPO status until nausea and vomiting are resolved  - Nasogastric tube if ordered  - Administer ordered antiemetic medications as needed  - Provide nonpharmacologic comfort measures as appropriate  - Advance diet as tolerated, if ordered  - Consider nutrition services referral to assist patient with adequate nutrition and appropriate food choices  Outcome: Progressing  Goal: Maintains or returns to baseline bowel function  Description: INTERVENTIONS:  - Assess bowel function  - Encourage oral fluids to ensure adequate hydration  - Administer IV fluids if ordered to ensure adequate hydration  - Administer ordered medications as needed  - Encourage mobilization and activity  - Consider nutritional services referral to assist patient with adequate nutrition and appropriate food choices  Outcome: Progressing  Goal: Maintains adequate nutritional intake  Description: INTERVENTIONS:  - Monitor percentage of each meal consumed  - Identify factors contributing to decreased intake, treat as appropriate  - Assist with meals as needed  - Monitor I&O, weight, and lab values if indicated  - Obtain nutrition services referral as needed  Outcome: Progressing  Goal: Establish and maintain optimal ostomy function  Description: INTERVENTIONS:  - Assess bowel function  - Encourage  oral fluids to ensure adequate hydration  - Administer IV fluids if ordered to ensure adequate hydration   - Administer ordered medications as needed  - Encourage mobilization and activity  - Nutrition services referral to assist patient with appropriate food choices  - Assess stoma site  - Consider wound care consult   Outcome: Progressing  Goal: Oral mucous membranes remain intact  Description: INTERVENTIONS  - Assess oral mucosa and hygiene practices  - Implement preventative oral hygiene regimen  - Implement oral medicated treatments as ordered  - Initiate Nutrition services referral as needed  Outcome: Progressing     Problem: Prexisting or High Potential for Compromised Skin Integrity  Goal: Skin integrity is maintained or improved  Description: INTERVENTIONS:  - Identify patients at risk for skin breakdown  - Assess and monitor skin integrity  - Assess and monitor nutrition and hydration status  - Monitor labs   - Assess for incontinence   - Turn and reposition patient  - Assist with mobility/ambulation  - Relieve pressure over bony prominences  - Avoid friction and shearing  - Provide appropriate hygiene as needed including keeping skin clean and dry  - Evaluate need for skin moisturizer/barrier cream  - Collaborate with interdisciplinary team   - Patient/family teaching  - Consider wound care consult   Outcome: Progressing     Problem: Nutrition/Hydration-ADULT  Goal: Nutrient/Hydration intake appropriate for improving, restoring or maintaining nutritional needs  Description: Monitor and assess patient's nutrition/hydration status for malnutrition. Collaborate with interdisciplinary team and initiate plan and interventions as ordered.  Monitor patient's weight and dietary intake as ordered or per policy. Utilize nutrition screening tool and intervene as necessary. Determine patient's food preferences and provide high-protein, high-caloric foods as appropriate.     INTERVENTIONS:  - Monitor oral intake,  urinary output, labs, and treatment plans  - Assess nutrition and hydration status and recommend course of action  - Evaluate amount of meals eaten  - Assist patient with eating if necessary   - Allow adequate time for meals  - Recommend/ encourage appropriate diets, oral nutritional supplements, and vitamin/mineral supplements  - Order, calculate, and assess calorie counts as needed  - Recommend, monitor, and adjust tube feedings and TPN/PPN based on assessed needs  - Assess need for intravenous fluids  - Provide specific nutrition/hydration education as appropriate  - Include patient/family/caregiver in decisions related to nutrition  Outcome: Progressing     Problem: INFECTION - ADULT  Goal: Absence or prevention of progression during hospitalization  Description: INTERVENTIONS:  - Assess and monitor for signs and symptoms of infection  - Monitor lab/diagnostic results  - Monitor all insertion sites, i.e. indwelling lines, tubes, and drains  - Monitor endotracheal if appropriate and nasal secretions for changes in amount and color  - [place] appropriate cooling/warming therapies per order  - Administer medications as ordered  - Instruct and encourage patient and family to use good hand hygiene technique  - Identify and instruct in appropriate isolation precautions for identified infection/condition  Outcome: Progressing     Problem: Knowledge Deficit  Goal: Patient/family/caregiver demonstrates understanding of disease process, treatment plan, medications, and discharge instructions  Description: Complete learning assessment and assess knowledge base.  Interventions:  - Provide teaching at level of understanding  - Provide teaching via preferred learning methods  Outcome: Progressing

## 2024-11-25 LAB
ABO GROUP BLD BPU: NORMAL
ANION GAP SERPL CALCULATED.3IONS-SCNC: 2 MMOL/L (ref 4–13)
ANION GAP SERPL CALCULATED.3IONS-SCNC: 4 MMOL/L (ref 4–13)
APTT PPP: 66 SECONDS (ref 23–34)
BASOPHILS # BLD AUTO: 0.02 THOUSANDS/ΜL (ref 0–0.1)
BASOPHILS # BLD AUTO: 0.02 THOUSANDS/ΜL (ref 0–0.1)
BASOPHILS NFR BLD AUTO: 0 % (ref 0–1)
BASOPHILS NFR BLD AUTO: 1 % (ref 0–1)
BPU ID: NORMAL
BUN SERPL-MCNC: 24 MG/DL (ref 5–25)
BUN SERPL-MCNC: 25 MG/DL (ref 5–25)
CALCIUM SERPL-MCNC: 7.4 MG/DL (ref 8.4–10.2)
CALCIUM SERPL-MCNC: 7.8 MG/DL (ref 8.4–10.2)
CHLORIDE SERPL-SCNC: 104 MMOL/L (ref 96–108)
CHLORIDE SERPL-SCNC: 104 MMOL/L (ref 96–108)
CO2 SERPL-SCNC: 24 MMOL/L (ref 21–32)
CO2 SERPL-SCNC: 25 MMOL/L (ref 21–32)
CREAT SERPL-MCNC: 0.58 MG/DL (ref 0.6–1.3)
CREAT SERPL-MCNC: 0.63 MG/DL (ref 0.6–1.3)
CROSSMATCH: NORMAL
EOSINOPHIL # BLD AUTO: 0.18 THOUSAND/ΜL (ref 0–0.61)
EOSINOPHIL # BLD AUTO: 0.19 THOUSAND/ΜL (ref 0–0.61)
EOSINOPHIL NFR BLD AUTO: 4 % (ref 0–6)
EOSINOPHIL NFR BLD AUTO: 4 % (ref 0–6)
ERYTHROCYTE [DISTWIDTH] IN BLOOD BY AUTOMATED COUNT: 17.7 % (ref 11.6–15.1)
ERYTHROCYTE [DISTWIDTH] IN BLOOD BY AUTOMATED COUNT: 17.8 % (ref 11.6–15.1)
GFR SERPL CREATININE-BSD FRML MDRD: 96 ML/MIN/1.73SQ M
GFR SERPL CREATININE-BSD FRML MDRD: 99 ML/MIN/1.73SQ M
GLUCOSE SERPL-MCNC: 104 MG/DL (ref 65–140)
GLUCOSE SERPL-MCNC: 112 MG/DL (ref 65–140)
GLUCOSE SERPL-MCNC: 114 MG/DL (ref 65–140)
GLUCOSE SERPL-MCNC: 117 MG/DL (ref 65–140)
GLUCOSE SERPL-MCNC: 123 MG/DL (ref 65–140)
GLUCOSE SERPL-MCNC: 133 MG/DL (ref 65–140)
GLUCOSE SERPL-MCNC: 135 MG/DL (ref 65–140)
GLUCOSE SERPL-MCNC: 140 MG/DL (ref 65–140)
GLUCOSE SERPL-MCNC: 145 MG/DL (ref 65–140)
GLUCOSE SERPL-MCNC: 146 MG/DL (ref 65–140)
GLUCOSE SERPL-MCNC: 147 MG/DL (ref 65–140)
GLUCOSE SERPL-MCNC: 151 MG/DL (ref 65–140)
GLUCOSE SERPL-MCNC: 152 MG/DL (ref 65–140)
GLUCOSE SERPL-MCNC: 86 MG/DL (ref 65–140)
HCT VFR BLD AUTO: 25.6 % (ref 34.8–46.1)
HCT VFR BLD AUTO: 27.6 % (ref 34.8–46.1)
HGB BLD-MCNC: 8.2 G/DL (ref 11.5–15.4)
HGB BLD-MCNC: 8.9 G/DL (ref 11.5–15.4)
IMM GRANULOCYTES # BLD AUTO: 0.05 THOUSAND/UL (ref 0–0.2)
IMM GRANULOCYTES # BLD AUTO: 0.06 THOUSAND/UL (ref 0–0.2)
IMM GRANULOCYTES NFR BLD AUTO: 1 % (ref 0–2)
IMM GRANULOCYTES NFR BLD AUTO: 1 % (ref 0–2)
LYMPHOCYTES # BLD AUTO: 0.36 THOUSANDS/ΜL (ref 0.6–4.47)
LYMPHOCYTES # BLD AUTO: 0.38 THOUSANDS/ΜL (ref 0.6–4.47)
LYMPHOCYTES NFR BLD AUTO: 7 % (ref 14–44)
LYMPHOCYTES NFR BLD AUTO: 9 % (ref 14–44)
MAGNESIUM SERPL-MCNC: 2.1 MG/DL (ref 1.9–2.7)
MAGNESIUM SERPL-MCNC: 2.7 MG/DL (ref 1.9–2.7)
MCH RBC QN AUTO: 31.1 PG (ref 26.8–34.3)
MCH RBC QN AUTO: 31.2 PG (ref 26.8–34.3)
MCHC RBC AUTO-ENTMCNC: 32 G/DL (ref 31.4–37.4)
MCHC RBC AUTO-ENTMCNC: 32.2 G/DL (ref 31.4–37.4)
MCV RBC AUTO: 97 FL (ref 82–98)
MCV RBC AUTO: 97 FL (ref 82–98)
MONOCYTES # BLD AUTO: 0.27 THOUSAND/ΜL (ref 0.17–1.22)
MONOCYTES # BLD AUTO: 0.32 THOUSAND/ΜL (ref 0.17–1.22)
MONOCYTES NFR BLD AUTO: 6 % (ref 4–12)
MONOCYTES NFR BLD AUTO: 6 % (ref 4–12)
NEUTROPHILS # BLD AUTO: 3.4 THOUSANDS/ΜL (ref 1.85–7.62)
NEUTROPHILS # BLD AUTO: 4.1 THOUSANDS/ΜL (ref 1.85–7.62)
NEUTS SEG NFR BLD AUTO: 79 % (ref 43–75)
NEUTS SEG NFR BLD AUTO: 82 % (ref 43–75)
NRBC BLD AUTO-RTO: 0 /100 WBCS
NRBC BLD AUTO-RTO: 0 /100 WBCS
PHOSPHATE SERPL-MCNC: 3 MG/DL (ref 2.3–4.1)
PHOSPHATE SERPL-MCNC: 3.1 MG/DL (ref 2.3–4.1)
PLATELET # BLD AUTO: 108 THOUSANDS/UL (ref 149–390)
PLATELET # BLD AUTO: 97 THOUSANDS/UL (ref 149–390)
PMV BLD AUTO: 10.3 FL (ref 8.9–12.7)
PMV BLD AUTO: 10.6 FL (ref 8.9–12.7)
POTASSIUM SERPL-SCNC: 3.7 MMOL/L (ref 3.5–5.3)
POTASSIUM SERPL-SCNC: 3.9 MMOL/L (ref 3.5–5.3)
RBC # BLD AUTO: 2.64 MILLION/UL (ref 3.81–5.12)
RBC # BLD AUTO: 2.85 MILLION/UL (ref 3.81–5.12)
SODIUM SERPL-SCNC: 131 MMOL/L (ref 135–147)
SODIUM SERPL-SCNC: 132 MMOL/L (ref 135–147)
UNIT DISPENSE STATUS: NORMAL
UNIT PRODUCT CODE: NORMAL
UNIT PRODUCT VOLUME: 350 ML
UNIT RH: NORMAL
WBC # BLD AUTO: 4.32 THOUSAND/UL (ref 4.31–10.16)
WBC # BLD AUTO: 5.03 THOUSAND/UL (ref 4.31–10.16)

## 2024-11-25 PROCEDURE — 83735 ASSAY OF MAGNESIUM: CPT

## 2024-11-25 PROCEDURE — 82948 REAGENT STRIP/BLOOD GLUCOSE: CPT

## 2024-11-25 PROCEDURE — 84100 ASSAY OF PHOSPHORUS: CPT

## 2024-11-25 PROCEDURE — 80048 BASIC METABOLIC PNL TOTAL CA: CPT | Performed by: PHYSICIAN ASSISTANT

## 2024-11-25 PROCEDURE — 85025 COMPLETE CBC W/AUTO DIFF WBC: CPT

## 2024-11-25 PROCEDURE — 84100 ASSAY OF PHOSPHORUS: CPT | Performed by: PHYSICIAN ASSISTANT

## 2024-11-25 PROCEDURE — NC001 PR NO CHARGE: Performed by: SURGERY

## 2024-11-25 PROCEDURE — 94760 N-INVAS EAR/PLS OXIMETRY 1: CPT

## 2024-11-25 PROCEDURE — 83735 ASSAY OF MAGNESIUM: CPT | Performed by: PHYSICIAN ASSISTANT

## 2024-11-25 PROCEDURE — 85730 THROMBOPLASTIN TIME PARTIAL: CPT | Performed by: SPECIALIST

## 2024-11-25 PROCEDURE — 97168 OT RE-EVAL EST PLAN CARE: CPT

## 2024-11-25 PROCEDURE — 85025 COMPLETE CBC W/AUTO DIFF WBC: CPT | Performed by: PHYSICIAN ASSISTANT

## 2024-11-25 PROCEDURE — 80048 BASIC METABOLIC PNL TOTAL CA: CPT

## 2024-11-25 PROCEDURE — 97164 PT RE-EVAL EST PLAN CARE: CPT

## 2024-11-25 RX ORDER — CALCIUM GLUCONATE 20 MG/ML
2 INJECTION, SOLUTION INTRAVENOUS ONCE
Status: COMPLETED | OUTPATIENT
Start: 2024-11-25 | End: 2024-11-25

## 2024-11-25 RX ORDER — MAGNESIUM SULFATE HEPTAHYDRATE 40 MG/ML
2 INJECTION, SOLUTION INTRAVENOUS ONCE
Status: COMPLETED | OUTPATIENT
Start: 2024-11-25 | End: 2024-11-25

## 2024-11-25 RX ADMIN — CHLORHEXIDINE GLUCONATE 15 ML: 1.2 RINSE ORAL at 08:06

## 2024-11-25 RX ADMIN — METOROPROLOL TARTRATE 2.5 MG: 5 INJECTION, SOLUTION INTRAVENOUS at 20:10

## 2024-11-25 RX ADMIN — SODIUM CHLORIDE 3 UNITS/HR: 9 INJECTION, SOLUTION INTRAVENOUS at 15:33

## 2024-11-25 RX ADMIN — NYSTATIN 1 APPLICATION: 100000 POWDER TOPICAL at 16:13

## 2024-11-25 RX ADMIN — ACETAMINOPHEN 1000 MG: 10 INJECTION INTRAVENOUS at 00:31

## 2024-11-25 RX ADMIN — MAGNESIUM SULFATE HEPTAHYDRATE 2 G: 40 INJECTION, SOLUTION INTRAVENOUS at 11:28

## 2024-11-25 RX ADMIN — ACETAMINOPHEN 1000 MG: 10 INJECTION INTRAVENOUS at 17:35

## 2024-11-25 RX ADMIN — SODIUM CHLORIDE, SODIUM GLUCONATE, SODIUM ACETATE, POTASSIUM CHLORIDE, MAGNESIUM CHLORIDE, SODIUM PHOSPHATE, DIBASIC, AND POTASSIUM PHOSPHATE 100 ML/HR: .53; .5; .37; .037; .03; .012; .00082 INJECTION, SOLUTION INTRAVENOUS at 05:37

## 2024-11-25 RX ADMIN — NYSTATIN: 100000 POWDER TOPICAL at 08:06

## 2024-11-25 RX ADMIN — NYSTATIN: 100000 POWDER TOPICAL at 21:13

## 2024-11-25 RX ADMIN — METOROPROLOL TARTRATE 2.5 MG: 5 INJECTION, SOLUTION INTRAVENOUS at 14:04

## 2024-11-25 RX ADMIN — METOROPROLOL TARTRATE 2.5 MG: 5 INJECTION, SOLUTION INTRAVENOUS at 08:06

## 2024-11-25 RX ADMIN — CHLORHEXIDINE GLUCONATE 15 ML: 1.2 RINSE ORAL at 20:10

## 2024-11-25 RX ADMIN — PANTOPRAZOLE SODIUM 40 MG: 40 INJECTION, POWDER, LYOPHILIZED, FOR SOLUTION INTRAVENOUS at 08:06

## 2024-11-25 RX ADMIN — SODIUM CHLORIDE, SODIUM GLUCONATE, SODIUM ACETATE, POTASSIUM CHLORIDE, MAGNESIUM CHLORIDE, SODIUM PHOSPHATE, DIBASIC, AND POTASSIUM PHOSPHATE 100 ML/HR: .53; .5; .37; .037; .03; .012; .00082 INJECTION, SOLUTION INTRAVENOUS at 15:33

## 2024-11-25 RX ADMIN — METOROPROLOL TARTRATE 2.5 MG: 5 INJECTION, SOLUTION INTRAVENOUS at 00:30

## 2024-11-25 RX ADMIN — CALCIUM GLUCONATE 2 G: 20 INJECTION, SOLUTION INTRAVENOUS at 11:28

## 2024-11-25 RX ADMIN — ACETAMINOPHEN 1000 MG: 10 INJECTION INTRAVENOUS at 05:16

## 2024-11-25 RX ADMIN — Medication: at 21:12

## 2024-11-25 NOTE — WOUND OSTOMY CARE
"Progress Note - Wound   Lety Botello 64 y.o. female MRN: [id number]  Unit/Bed#: ICU 02 Encounter: [id number]        Assessment:   Patient seen for wound care follow-up.  She is sitting up in the chair--assisted back to bed for assessment.  Requires assist x 3 and walker to transfer.  Patient on low air-loss mattress with ATR positioning system in use.  Patient incontinent of urine with purewick in place.  Nutrition team following, currently NPO with TPN infusing.    Midline incision approximated with staples--dressing dry and intact per surgery team.  Bilateral heels intact and blanchable--offloading heel boots in use. Skin folds intact.  Resolving shingles to right abdomen--pink, dry with some scabbing.  Katya-wound intact.  MASD to right abdominal fold--resolved.  Receiving nystatin powder to folds per primary team.  Sacrum--on exam today there is a tan area to the left mid sacrum with surrounding intact and blanchable skin.  Unclear of etiology.  Chronic hyperpigmentation versus re-absorbed deep tissue pressure injury.  No induration, fluctuance, or pain on palpation.  Right pretibial--patient reports this area is a scar \"from the early 1990s.\" Intact, dry.  Right abdominal blisters--two re-absorbing serous fluid filled intact blisters.  Katya-wound area intact without redness.      See flowsheet for wound details.     Wound Care Plan:   1-Silicone Cream/Hydraguard lotion to bilateral heels twice daily and as needed.  2-Elevate heels off of bed/chair surface--offloading heel boots.  3-Offloading air cushion in chair when out of bed.  4-Apply moisturizing skin cream to body daily and as needed.  5-Turn/reposition every 2 hours while in bed and weight shift frequently while in chair for pressure re-distribution on skin.   6-Sacrum/buttocks--cleanse with soap and water, pat dry.  Apply silicone bordered foam dressing.  Change dressing every other day and as needed with soilage.  7-Right distal abdomen " (shingles)--apply silicone cream/Hydraguard lotion twice daily.  May leave open to air if dry.  Cover with ABD if open or draining.  8-Right lateral abdomen (blisters)--apply 3m no sting skin prep.  Cover with non-adherent strips (adaptic) and silicone bordered foam dressing.  Change dressing every other day and as needed.     Wound care team to follow.  Plan of care reviewed with primary RN.    Wound 11/20/24 Buttocks (Active)   Wound Image   11/25/24 1648   Wound Description Dry;Intact;Tan 11/25/24 1648   Katya-wound Assessment Intact 11/25/24 1648   Drainage Amount None 11/25/24 1648   Treatments Cleansed 11/25/24 1648   Dressing Foam, Silicon (eg. Allevyn, etc) 11/25/24 1648   Dressing Changed Changed 11/25/24 1648   Patient Tolerance Tolerated well 11/25/24 1648   Dressing Status Clean;Dry;Intact 11/25/24 1648       Wound 11/20/24 Abdomen Right;Distal (Active)   Wound Image   11/25/24 1645   Wound Description Dry;Pink 11/25/24 1645   Katya-wound Assessment Intact 11/25/24 1645   Wound Length (cm) 9 cm 11/25/24 1645   Wound Width (cm) 24 cm 11/25/24 1645   Wound Depth (cm) 0 cm 11/25/24 1645   Wound Surface Area (cm^2) 216 cm^2 11/25/24 1645   Wound Volume (cm^3) 0 cm^3 11/25/24 1645   Calculated Wound Volume (cm^3) 0 cm^3 11/25/24 1645   Drainage Amount None 11/25/24 1645   Dressing ABD 11/25/24 1645   Patient Tolerance Tolerated well 11/25/24 1645   Dressing Status Clean;Dry;Intact 11/25/24 1645       Wound 11/20/24 Pretibial Right (Active)   Wound Image   11/25/24 1647   Wound Description Clean;Dry;Intact 11/25/24 1647   Katya-wound Assessment Intact 11/25/24 1647   Dressing Open to air 11/25/24 1647       Wound 11/22/24 Incision Abdomen Mid (Active)   Wound Description Clean 11/25/24 1647   Katya-wound Assessment Intact 11/25/24 1647   Wound Site Closure Approximated 11/25/24 1647   Drainage Amount Scant 11/25/24 1647   Drainage Description Serosanguineous 11/25/24 1647   Dressing ABD;Gauze 11/25/24 164    Dressing Status Clean;Dry;Intact 11/25/24 1647       Wound 11/25/24 Abdomen Right;Lateral (Active)   Wound Image   11/25/24 1647   Wound Description Intact;Dry 11/25/24 1647   Katya-wound Assessment Intact 11/25/24 1647   Wound Length (cm) 3 cm 11/25/24 1647   Wound Width (cm) 7 cm 11/25/24 1647   Wound Depth (cm) 0.1 cm 11/25/24 1647   Wound Surface Area (cm^2) 21 cm^2 11/25/24 1647   Wound Volume (cm^3) 2.1 cm^3 11/25/24 1647   Calculated Wound Volume (cm^3) 2.1 cm^3 11/25/24 1647   Drainage Amount None 11/25/24 1647   Dressing Open to air 11/25/24 1647     Joy STONEN, RN, CWON

## 2024-11-25 NOTE — PLAN OF CARE
Problem: Potential for Falls  Goal: Patient will remain free of falls  Description: INTERVENTIONS:  - Educate patient/family on patient safety including physical limitations  - Instruct patient to call for assistance with activity   - Consult OT/PT to assist with strengthening/mobility   - Keep Call bell within reach  - Keep bed low and locked with side rails adjusted as appropriate  - Keep care items and personal belongings within reach  - Initiate and maintain comfort rounds  - Make Fall Risk Sign visible to staff  - Offer Toileting every 3 Hours, in advance of need  - Initiate/Maintain bed alarm  - Obtain necessary fall risk management equipment  - Apply yellow socks and bracelet for high fall risk patients  - Consider moving patient to room near nurses station  Outcome: Progressing     Problem: PAIN - ADULT  Goal: Verbalizes/displays adequate comfort level or baseline comfort level  Description: Interventions:  - Encourage patient to monitor pain and request assistance  - Assess pain using appropriate pain scale  - Administer analgesics based on type and severity of pain and evaluate response  - Implement non-pharmacological measures as appropriate and evaluate response  - Consider cultural and social influences on pain and pain management  - Notify physician/advanced practitioner if interventions unsuccessful or patient reports new pain  Outcome: Progressing

## 2024-11-25 NOTE — ASSESSMENT & PLAN NOTE
64 y.o. female presenting with recurrent SBO. 11/14-11/21 failed conservative management with persistent SBO. S/p ex lap, SBR, DAVID on 11/22. Patient slowly improving. She continues to have diffuse abdominal pain. Patient downgraded from ICU, not requiring pressors.    AVSS on 2L O2  Abdomen: soft, ND, protuberant. Tender near incision sites. No guarding or rigidity. Midline incision CDI.  Bowel function: None  UOP: 900cc  WBC: 4.21  Hgb: 8.2  Glucose: 146    Plan  - Continue CLD for now, will advance when appropriate, Ensure clears with sips  - Continue maintenance IV fluids  - PICC/TPN d/c when appropriately tolerating diet and gaining full nutrition. Continue to re-order TPN  - cont dPCA  - Cont heparin gtt for recent PEs  - Wean O2  - Appreciate PT/OT recs  - Encourage OOB, ambulate, IS  - DVT ppx  - F/u CM for rehab placement and dispo

## 2024-11-25 NOTE — UTILIZATION REVIEW
Continued Stay Review    Date:     for  11/22                          Current Patient Class: Inpatient  Current Level of Care:   ICU    HPI:64 y.o. female initially admitted on   11/14     Assessment/Plan:   SURGERY DATE: 11/22/2024   Procedure(s):  LAPAROTOMY EXPLORATORY  RESECTION SMALL BOWEL WITH PRIMARY ANASTOMOSIS.  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  Remains on  TPN.       Medications:   Scheduled Medications:  acetaminophen, 1,000 mg, Intravenous, Q6H SAM  chlorhexidine, 15 mL, Mouth/Throat, Q12H SAM  metoprolol, 2.5 mg, Intravenous, Q6H  nystatin, , Topical, TID  pantoprazole, 40 mg, Intravenous, Q24H SAM      Continuous IV Infusions:  Adult 3-in-1 TPN (custom base / custom electrolytes), , Intravenous, Continuous TPN  Adult 3-in-1 TPN (custom base / custom electrolytes), , Intravenous, Continuous TPN  heparin (porcine), 3-20 Units/kg/hr (Order-Specific), Intravenous, Titrated  HYDROmorphone, , Intravenous, Continuous  insulin regular (HumuLIN R,NovoLIN R) 1 Units/mL in sodium chloride 0.9 % 100 mL infusion, 0.3-21 Units/hr, Intravenous, Titrated  multi-electrolyte, 100 mL/hr, Intravenous, Continuous      PRN Meds:  HYDROmorphone, 0.5 mg, Intravenous, Q3H PRN  levalbuterol, 1.25 mg, Nebulization, Q6H PRN  ondansetron, 4 mg, Intravenous, Q4H PRN  phenol, 1 spray, Mouth/Throat, Q2H PRN  trimethobenzamide, 200 mg, Intramuscular, Q6H PRN      Discharge Plan:   TBD    Vital Signs (last 3 days)       Date/Time Temp Pulse Resp BP MAP (mmHg) MAP SpO2 Calculated FIO2 (%) - Nasal Cannula O2 Flow Rate (L/min) Nasal Cannula O2 Flow Rate (L/min) O2 Device Cardiac (WDL) Patient Position - Orthostatic VS [place] Coma Scale Score Pain    11/25/24 0700  98.6 °F (37 °C) 75 15 184/74 106 -- 100 % 28 -- 2 L/min EtCO2 nasal cannula -- -- -- --    11/25/24 0600 -- 70 13 145/64 92 -- 99 % 28 -- 2 L/min EtCO2 nasal cannula -- -- -- --    11/25/24 0515 97.6 °F (36.4 °C) -- -- -- -- -- -- -- -- -- -- -- -- -- --    11/25/24 0400 -- 73 17 170/74 106 -- 99 % -- -- -- EtCO2 nasal cannula -- -- 14 8 11/25/24 0300 -- 77 17 172/75 108 -- 99 % 28 -- 2 L/min EtCO2 nasal cannula -- -- -- --    11/25/24 0200 -- 69 31 113/57 82 -- 99 % 28 -- 2 L/min EtCO2 nasal cannula -- -- -- --    11/25/24 0100 -- 68 29 123/58 84 -- 99 % 28 -- 2 L/min EtCO2 nasal cannula -- -- -- --    11/25/24 0000 -- 75 17 132/63 90 -- 98 % -- -- -- -- -- -- 14 8 11/24/24 2300 -- 74 17 121/60 86 -- 99 % -- -- -- -- -- -- -- --    11/24/24 2200 -- 68 16 107/53 77 -- 99 % -- -- -- -- -- -- -- --    11/24/24 2100 97.9 °F (36.6 °C) 72 17 129/60 86 -- 100 % -- -- -- -- -- -- -- --    11/24/24 2000 -- 72 17 135/63 91 -- 99 % -- -- -- -- -- -- 14 8 11/24/24 1900 -- 75 17 136/63 91 -- 99 % -- -- -- -- -- -- -- --    11/24/24 1800 -- 74 16 115/57 82 -- 98 % -- -- -- -- -- -- -- --    11/24/24 1700 -- 74 16 122/58 84 -- 99 % -- -- -- -- -- -- -- --    11/24/24 1645 -- -- -- -- -- -- -- -- -- -- -- -- -- 14 --    11/24/24 1600 -- 78 16 137/58 88 -- 99 % 28 -- 2 L/min EtCO2 nasal cannula -- Lying -- 9    11/24/24 1500 -- 80 16 139/53 87 -- 100 % 28 -- 2 L/min EtCO2 nasal cannula -- -- -- --    11/24/24 1400 -- 76 17 120/55 86 -- 100 % -- -- -- -- -- -- -- --    11/24/24 1300 -- 76 17 129/63 98 -- 99 % -- -- -- -- -- -- -- --    11/24/24 1230 -- -- -- -- -- -- -- -- -- -- -- -- -- 14 --    11/24/24 1200 -- 82 19 149/73 116 -- 99 % 28 -- 2 L/min EtCO2 nasal cannula -- -- -- 10 - Worst Possible Pain    11/24/24 1121 -- 76 17 151/70 107 -- 100 % 28 -- 2 L/min EtCO2 nasal cannula -- -- -- --    11/24/24 1111 98.4 °F (36.9 °C) 74 16 151/70 -- -- 100 % 28 -- 2 L/min Nasal cannula -- -- -- --    11/24/24 1100 -- 76 17  129/59 94 -- 100 % -- -- -- -- -- -- -- --    11/24/24 1012 98.4 °F (36.9 °C) 82 17 146/64 -- -- 100 % 28 -- 2 L/min Nasal cannula -- -- -- --    11/24/24 1001 -- 78 18 137/62 -- -- 100 % -- -- -- -- -- -- -- --    11/24/24 1000 98.2 °F (36.8 °C) 76 15 137/62 101 -- 100 % -- -- -- -- -- -- -- --    11/24/24 0900 -- 76 16 124/59 92 -- 100 % -- -- -- -- -- -- -- --    11/24/24 0800 97.8 °F (36.6 °C) 76 14 125/54 88 -- 99 % 28 -- 2 L/min Nasal cannula -- Lying 14 8    11/24/24 0730 -- 74 15 107/58 85 -- 100 % -- -- -- -- -- -- -- --    11/24/24 0726 -- -- -- -- -- -- 100 % 28 -- 2 L/min EtCO2 nasal cannula -- -- -- --    11/24/24 0700 -- 74 14 119/51 75 -- 100 % -- -- -- -- -- -- -- --    11/24/24 0600 -- 76 16 116/60 91 -- 100 % 28 -- 2 L/min EtCO2 nasal cannula -- Lying -- --    11/24/24 0500 -- 76 14 118/52 76 -- 100 % -- -- -- -- -- -- -- --    11/24/24 0400 97.7 °F (36.5 °C) 76 15 124/50 82 -- 99 % 28 -- 2 L/min EtCO2 nasal cannula -- Lying -- --    11/24/24 0330 -- -- -- -- -- -- -- -- -- -- -- -- -- 14 --    11/24/24 0300 -- 72 19 113/48 66 -- 100 % -- -- -- -- -- -- -- --    11/24/24 0200 -- 84 19 136/56 78 -- 99 % -- -- -- -- -- -- -- --    11/24/24 0100 -- 78 18 114/53 79 -- 100 % -- -- -- -- -- -- -- --    11/24/24 0000 97.8 °F (36.6 °C) 82 20 134/66 102 -- 99 % 28 -- 2 L/min EtCO2 nasal cannula -- Lying -- --    11/23/24 2340 -- -- -- -- -- -- 100 % -- -- -- -- -- -- -- --    11/23/24 2330 -- -- -- -- -- -- -- -- -- -- -- -- -- 14 --    11/23/24 2300 97.6 °F (36.4 °C) 78 18 121/62 96 -- 99 % -- -- -- -- -- -- -- --    11/23/24 2130 -- 78 27 -- -- -- 100 % -- -- -- -- -- -- -- --    11/23/24 2129 -- -- -- 105/48 76 -- -- -- -- -- -- -- -- -- --    11/23/24 2100 -- 74 18 86/48 72 -- 100 % -- -- -- -- -- -- -- --    11/23/24 2000 97.7 °F (36.5 °C) 90 22 121/70 93 -- 100 % 28 -- 2 L/min EtCO2 nasal cannula -- Lying -- --    11/23/24 1930 -- -- -- -- -- -- -- -- -- -- -- -- -- 14 --    11/23/24 1900 97.7 °F  (36.5 °C) 82 17 115/56 86 -- 100 % -- -- -- -- -- -- -- --    11/23/24 1830 -- 82 16 119/49 74 -- 99 % -- -- -- -- -- -- -- --    11/23/24 1800 -- 86 19 119/49 66 -- 99 % -- -- -- -- -- -- -- --    11/23/24 1747 -- 90 21 119/53 76 -- 100 % -- -- -- -- -- -- -- --    11/23/24 1700 -- 82 17 90/43 55 -- 99 % -- -- -- -- -- -- -- --    11/23/24 1609 -- -- -- -- -- -- -- -- -- -- -- -- -- 14 --    11/23/24 1600 -- 86 18 106/45 60 -- 99 % 28 -- 2 L/min EtCO2 nasal cannula -- -- -- --    11/23/24 1539 99.2 °F (37.3 °C) -- -- -- -- -- -- -- -- -- -- -- -- -- --    11/23/24 1500 -- 86 18 110/45 67 -- 100 % -- -- -- -- -- -- -- --    11/23/24 1400 -- 82 18 102/42 61 -- 98 % -- -- -- -- -- -- -- --    11/23/24 1300 -- 82 18 112/48 68 -- 98 % -- -- -- -- -- -- -- --    11/23/24 1238 -- -- -- -- -- -- -- -- -- -- -- -- -- 14 --    11/23/24 1200 -- 88 19 117/51 76 -- 99 % -- -- -- -- -- -- -- --    11/23/24 1136 99.1 °F (37.3 °C) -- -- -- -- -- 99 % 28 -- 2 L/min EtCO2 nasal cannula -- -- -- --    11/23/24 1100 -- 82 17 127/46 76 -- 99 % -- -- -- -- -- -- -- --    11/23/24 1000 -- 88 18 113/52 65 -- 99 % -- -- -- -- -- -- -- --    11/23/24 0900 -- 86 17 97/49 61 -- 100 % -- -- -- -- -- -- -- --    11/23/24 0855 -- -- -- -- -- -- -- -- -- -- -- -- -- 14 No Pain    11/23/24 0800 -- 88 18 99/45 61 -- 99 % -- -- -- -- -- -- -- --    11/23/24 0721 98.7 °F (37.1 °C) -- -- -- -- -- 100 % 28 -- 2 L/min EtCO2 nasal cannula -- -- -- --    11/23/24 0700 -- 86 18 100/43 66 -- 99 % -- -- -- -- -- -- -- --    11/23/24 0600 -- 84 19 96/45 55 -- 99 % 28 -- 2 L/min EtCO2 nasal cannula -- Lying -- --    11/23/24 0500 -- 84 18 109/52 79 -- 99 % -- -- -- -- -- -- -- --    11/23/24 0400 97.4 °F (36.3 °C) 90 19 110/54 80 -- 99 % 28 -- 2 L/min EtCO2 nasal cannula -- Lying 14 --    11/23/24 0300 -- 86 10 112/57 85 -- 100 % -- -- -- -- -- -- -- --    11/23/24 0200 -- 90 18 97/48 63 -- 100 % -- -- -- -- -- -- -- --    11/23/24 0100 -- 80 15 82/37 50 -- 99  % -- -- -- -- -- -- -- --    11/23/24 0000 98.6 °F (37 °C) 88 22 102/51 68 -- 100 % 28 -- 2 L/min EtCO2 nasal cannula -- Lying 14 --    11/22/24 2350 -- -- -- -- -- -- 100 % -- -- -- -- -- -- -- --    11/22/24 2300 98.6 °F (37 °C) 78 16 117/51 74 -- 100 % -- -- -- -- -- -- -- --    11/22/24 2200 -- 78 15 97/49 70 -- 100 % -- -- -- -- -- -- -- --    11/22/24 2100 -- 78 15 102/46 67 -- 100 % -- -- -- -- -- -- -- --    11/22/24 2040 -- -- -- -- -- -- 100 % 28 -- 2 L/min EtCO2 nasal cannula -- -- -- --    11/22/24 2000 99.5 °F (37.5 °C) 82 18 125/55 73 -- 100 % 28 -- 2 L/min EtCO2 nasal cannula -- Lying 14 --    11/22/24 1900 98.5 °F (36.9 °C) 82 18 123/56 77 -- 100 % -- -- -- -- -- -- -- --    11/22/24 1806 -- -- -- -- -- -- 99 % 28 -- 2 L/min Nasal cannula -- -- -- --    11/22/24 1800 -- 90 13 158/65 98 -- 99 % -- -- -- -- -- -- -- --    11/22/24 1700 -- 78 19 133/47 72 -- 99 % 28 -- 2 L/min Nasal cannula -- -- -- No Pain    11/22/24 1647 -- 68 21 89/34 44 -- 100 % -- -- -- -- -- -- 14 --    11/22/24 1645 -- 66 22 -- -- -- 100 % -- -- -- -- -- -- -- 10 - Worst Possible Pain    11/22/24 1630 97.5 °F (36.4 °C) 74 24 135/55 79 -- 100 % -- -- -- -- -- -- -- --    11/22/24 1600 97.8 °F (36.6 °C) -- -- -- -- -- -- -- -- -- -- -- -- -- --    11/22/24 1554 97.5 °F (36.4 °C) 76 23 147/65 94 -- 100 % 32 -- 3 L/min Nasal cannula -- Lying -- 10 - Worst Possible Pain    11/22/24 1530 97.7 °F (36.5 °C) 68 24 130/60 87 -- 95 % -- -- -- -- -- -- -- --    11/22/24 1526 -- -- -- -- -- -- -- -- -- -- -- -- -- -- 10 - Worst Possible Pain    11/22/24 1515 -- 64 26 111/55 79 -- 96 % -- -- -- -- -- -- -- 10 - Worst Possible Pain    11/22/24 1500 98 °F (36.7 °C) 64 19 99/49 70 8 mmHg 96 % -- -- -- -- -- -- -- --    11/22/24 1445 -- 68 24 78/46 58 -- 96 % 32 -- 3 L/min Nasal cannula -- -- -- 10 - Worst Possible Pain    11/22/24 1430 97.9 °F (36.6 °C) 78 12 116/57 82 -- 100 % -- 6 L/min -- Simple mask X -- -- --    11/22/24 0846 -- -- -- -- --  -- -- -- -- -- -- -- -- -- 10 - Worst Possible Pain    11/22/24 07:49:24 97.4 °F (36.3 °C) 84 19 159/71 100 -- 95 % -- -- -- None (Room air) -- Lying -- --    11/22/24 07:20:25 98.1 °F (36.7 °C) 93 -- 147/72 97 -- 94 % -- -- -- -- -- -- -- --    11/22/24 0428 -- -- -- -- -- -- -- -- -- -- -- -- -- -- 10 - Worst Possible Pain    11/22/24 0117 -- -- -- -- -- -- -- -- -- -- -- -- -- -- 10 - Worst Possible Pain    11/22/24 00:59:44 98.1 °F (36.7 °C) 87 16 165/78 107 -- 94 % -- -- -- -- -- -- -- --          Weight (last 2 days)       Date/Time Weight    11/24/24 0600 113 (248.24)    11/23/24 0548 106 (234.57)            Pertinent Labs/Diagnostic Results:   Radiology:  XR abdomen 1 view kub   Final Interpretation by Edis Medina MD (11/21 0910)      Persistent small bowel dilatation, slightly increased from the most recent prior study, with contrast in the colon. Continued partial small bowel obstruction not excluded.               Workstation performed: [id number]         XR abdomen 1 view kub   Final Interpretation by Frederick Zapata MD (11/20 0824)      Limited study. Continued dilution and/or suction removal of enteric contrast from otherwise persistently dilated small bowel centrally in the abdomen. The bowel loops may be slightly less distended than on the 18th. Overall, findings are still concerning    for possible partial small bowel obstruction.      The contrast in the right side of the colon has been unchanged for many days.      The endogastric tube is stable in position      Numerous suspected skinfolds projecting over the abdomen.      Please consider repeat abdominopelvic CT scan for more thorough assessment.      The study was marked in EPIC for immediate notification.               Workstation performed: [id number]         XR abdomen 1 vw portable   Final Interpretation by Tariq Gallagher DO (11/19 3834)      Air-filled, dilated probable small bowel persists in the central abdomen with  dilution of oral contrast within bowel compared to the previous study. Although there is contrast reaching the right colon, findings may reflect at least moderate grade partial    small bowel obstruction.               Resident: Romy Blunt I, the attending radiologist, have reviewed the images and agree with the final report above.      Workstation performed: [id number]         XR abdomen 1 view kub   Final Interpretation by Frederick Zapata MD (11/18 9509)      Markedly dilated small bowel loops centrally in the abdomen containing enteric contrast. The appearance is most compatible with a high-grade small bowel obstruction.      Enteric tube tip in the left upper abdomen presumably in the gastric body.      There is some contrast within bowel in the right side of the abdomen which seems similar to the prior study and might be residual colonic contrast from prior imaging work-up.      The study was marked in EPIC for immediate notification.               Workstation performed: [id number]         XR abdomen 1 view kub   Final Interpretation by Damian Mack MD (11/17 2879)      NGT partially retracted with tip in proximal stomach.      Small bowel obstruction again suggested.               Workstation performed: [id number]         XR abdomen 1 view kub   Final Interpretation by Jaylin Magaña MD (11/15 0247)      Technically limited study, as discussed above.      No dilated loops of bowel seen, however note fluid-filled loops of bowel as were seen on the 11/14/2024 CT may not be apparent with supine radiographs.      Small amount of hyperdense material seen in the loop of presumably ascending colon. This may represent some administered oral contrast, although no oral contrast is seen elsewhere in the gastrointestinal tract, or possibly vicarious excretion of the IV    contrast administered on 11/14/2024.      Excreted IV contrast from the 11/14/2024 CT seen within the right collecting system,  right ureter and the urinary bladder, indicating delayed renal clearance. Mild right-sided hydronephrosis.               Workstation performed: [id number]         XR chest portable   Final Interpretation by Kalie Nova MD (11/15 0845)      Distal aspect of NG tube is coursing below the left hemidiaphragm.            Workstation performed: [id number]         CT abdomen pelvis with contrast   Final Interpretation by E. Alec Schoenberger, MD (11/14 3103)   Small bowel obstruction with transition in the pelvis is again seen.   New small volume of ascites   Other stable findings as above.         Workstation performed: [id number]           Cardiology:  ECG 12 lead   Final Result by Eleazar Blanco DO (11/15 0702)   Sinus tachycardia   Left axis deviation   Minimal voltage criteria for LVH, may be normal variant   Abnormal ECG   When compared with ECG of 07-Nov-2024 08:00,   Vent. rate has increased by  42 bpm   Confirmed by Eleazar Blanco ([id number]) on 11/15/2024 7:02:14 AM            Results from last 7 days   Lab Units 11/25/24  0802 11/24/24  1156 11/24/24  0610 11/23/24  1739 11/23/24  1001   WBC Thousand/uL 4.32  --  4.09* 3.87* 4.10*   HEMOGLOBIN g/dL 8.2* 7.5* 6.9* 7.1* 7.9*   HEMATOCRIT % 25.6* 23.7* 22.0* 22.4* 25.2*   PLATELETS Thousands/uL 97*  --  75* 72* 70*   TOTAL NEUT ABS Thousands/µL 3.40  --  3.18 3.05  --          Results from last 7 days   Lab Units 11/25/24  0802 11/24/24  0542 11/23/24  1739 11/23/24  0426 11/22/24  1513 11/22/24  1015 11/22/24  0437 11/21/24  0525 11/20/24  0618 11/20/24  0439   SODIUM mmol/L 131* 129* 132* 130* 133*  --    < > 136   < > 133*   POTASSIUM mmol/L 3.7 4.3 4.1 4.4 4.3  --    < > 4.9   < > 6.1*   CHLORIDE mmol/L 104 105 105 104 105  --    < > 103   < > 99   CO2 mmol/L 25 24 26 25 24  --    < > 28   < > 28   CO2, I-STAT mmol/L  --   --   --   --   --  25  --   --   --   --    ANION GAP mmol/L 2* 0* 1* 1* 4  --    < > 5   < > 6   BUN mg/dL 24 21 22 20 15  --    <  > 14   < > 13   CREATININE mg/dL 0.58* 0.58* 0.66 0.68 0.62  --    < > 0.54*   < > 0.61   EGFR ml/min/1.73sq m 99 99 94 94 96  --    < > 101   < > 97   CALCIUM mg/dL 7.4* 7.0* 7.0* 7.2* 6.9*  --    < > 8.0*   < > 8.0*   CALCIUM, IONIZED mmol/L  --   --   --  1.09* 1.01*  --   --  1.15  --  1.17   CALCIUM, IONIZED, ISTAT mmol/L  --   --   --   --   --  1.13  --   --   --   --    MAGNESIUM mg/dL 2.1 2.1 2.0 2.0 1.5*  --    < > 1.8*  --  2.3   PHOSPHORUS mg/dL 3.0 2.1* 2.3  --  3.3  --   --  3.9  --  5.7*    < > = values in this interval not displayed.     Results from last 7 days   Lab Units 11/22/24  1513 11/22/24  0437   AST U/L 27 40*   ALT U/L 35 41   ALK PHOS U/L 109* 135*   TOTAL PROTEIN g/dL 4.0* 4.8*   ALBUMIN g/dL 2.1* 2.4*   TOTAL BILIRUBIN mg/dL 0.75 0.68     Results from last 7 days   Lab Units 11/25/24  0801 11/25/24  0612 11/25/24  0431 11/25/24  0209 11/25/24  0003 11/24/24  2224 11/24/24 2011 11/24/24  1804 11/24/24  1621 11/24/24  1440 11/24/24  1153 11/24/24  1008   POC GLUCOSE mg/dl 146* 145* 123 117 140 112 128 144* 123 120 166* 130     Results from last 7 days   Lab Units 11/25/24  0802 11/24/24  0542 11/23/24  1739 11/23/24  0426 11/22/24  1513 11/22/24  0437 11/21/24  0525 11/20/24  0618 11/20/24  0439 11/19/24  0503   GLUCOSE RANDOM mg/dL 152* 141* 162* 239* 207* 218* 190* 213* 593* 192*     Results from last 7 days   Lab Units 11/24/24  1156   OSMOLALITY, SERUM mmol/*           Results from last 7 days   Lab Units 11/22/24  1015   I STAT BASE EXC mmol/L 0   ISTAT PH ART  7.428   I STAT ART PCO2 mm HG 36.0   I STAT ART PO2 mm HG >400.0*   I STAT ART HCO3 mmol/L 23.8                 Results from last 7 days   Lab Units 11/25/24  0802 11/24/24  1156 11/24/24  0542 11/23/24  1624 11/23/24  1001   PROTIME seconds  --   --   --   --  18.1*   INR   --   --   --   --  1.49*   PTT seconds 66* 74* 73*   < > 48*    < > = values in this interval not displayed.             Results from last 7 days    Lab Units 11/23/24  1739 11/22/24  1513   LACTIC ACID mmol/L 1.0 1.6               Results from last 7 days   Lab Units 11/25/24  0530 11/24/24  0958 11/24/24  0859   UNIT PRODUCT CODE  [id number]  [id number]  [id number]   UNIT NUMBER  [id number]  [id number]  [id number]   UNITABO  O A  B O  O   UNITRH  NEG NEG  POS POS  POS   CROSSMATCH  Compatible  --  Compatible  Compatible   UNIT DISPENSE STATUS  Presumed Trans Return to Inv  Return to Inv Return to Inv  Return to Inv   UNIT PRODUCT VOL ml 350 321  240 350  350                     Results from last 7 days   Lab Units 11/24/24  1156   OSMOLALITY, SERUM mmol/*     Results from last 7 days   Lab Units 11/24/24  0918   SODIUM UR  83             Network Utilization Review Department  ATTENTION: Please call with any questions or concerns to [phone] and carefully listen to the prompts so that you are directed to the right person. All voicemails are confidential.   For Discharge needs, contact Care Management DC Support Team at [phone] opt. 2  Send all requests for admission clinical reviews, approved or denied determinations and any other requests to dedicated fax number below belonging to the campus where the patient is receiving treatment. List of dedicated fax numbers for the Facilities:  FACILITY NAME UR FAX NUMBER   ADMISSION DENIALS (Administrative/Medical Necessity) [phone]   DISCHARGE SUPPORT TEAM (NETWORK) [phone]   PARENT CHILD HEALTH (Maternity/NICU/Pediatrics) [phone]   [facility] [phone]   [facility] [phone]   [facility] [phone]   [facility] [phone]   Critical access hospital [phone]   [facility] [phone]   [facility] [phone]   [facility]  [facility] [phone]   [facility] [phone]   [facility] [phone]   [facility] [phone]   [facility] [phone]

## 2024-11-25 NOTE — PHYSICAL THERAPY NOTE
PHYSICAL THERAPY NOTE          Patient Name: Lety Botello  Today's Date: 11/25/2024 11/25/24 1355   PT Last Visit   PT Visit Date 11/25/24   Note Type   Note type Re-Evaluation   Pain Assessment   Pain Assessment Tool 0-10   Pain Score 10 - Worst Possible Pain   Pain Location/Orientation Location: Abdomen   Restrictions/Precautions   Other Precautions Multiple lines;Telemetry;O2;Pain;Fall Risk  (1L>RA)   Home Living   Additional Comments see IE 11/16. lives in multi level home   Prior Function   Comments see IE 11/16. ambulates no AD household distances   General   Additional Pertinent History S/p ex lap, SBR, DAVID on 11/22.   Cognition   Arousal/Participation Responsive   Attention Within functional limits   Orientation Level Oriented to person;Oriented to time;Oriented to place   Following Commands Follows one step commands with increased time or repetition   Comments requires encouragement to participate   Bed Mobility   Supine to Sit 2  Maximal assistance   Additional items Assist x 2;HOB elevated;Bedrails;Increased time required;Verbal cues;Other;LE management  (trunk support)   Transfers   Sit to Stand 3  Moderate assistance   Additional items Assist x 2;Increased time required;Verbal cues;Other  (RW)   Stand to Sit 4  Minimal assistance   Additional items Assist x 2;Increased time required;Other;Verbal cues  (RW)   Additional Comments cues for direction, safety   Ambulation/Elevation   Gait pattern Improper Weight shift;Decreased foot clearance;Short stride;Antalgic   Gait Assistance 4  Minimal assist   Additional items Assist x 2;Verbal cues  (cues for direction, safety)   Assistive Device Rolling walker   Distance 2' bed>chair   Balance   Static Standing Poor +   Dynamic Standing Poor   Ambulatory Poor   Endurance Deficit   Endurance Deficit No   Endurance Deficit Description vitals 161/72, 78, 100% on 2L at rest. 75, 98% on RA post activity   Activity Tolerance   Activity Tolerance  Patient limited by pain;Other (Comment)  (fall risk)   Medical Staff Made Aware Laney OT   Nurse Made Aware yes   Assessment   Prognosis Fair   Problem List Decreased strength;Impaired balance;Decreased mobility;Impaired judgement;Decreased safety awareness;Obesity;Pain   Assessment Lety was seen for a re-evaluation now s/p ex lap, SBR, DAVID on 11/22. Unfortunately pt w functional decline since time of IE. Now requiring Ax2 for all functional mobility w deficits related to pain, balance. Also requires increased assistance dt decreased safety awareness and effort. Unable to ambulate household distances or negotiate steps to simulate home environment. At increased risk for falls and concerns for pt ability to care for herself upon dc at current LOF. Would benefit from continued therapy services to help facilitate recovery and optimize mobility for improved quality of life.   Barriers to Discharge Inaccessible home environment;Other (Comment)   Goals   Patient Goals no pain   STG Expiration Date 12/09/24   Short Term Goal #1 1) Pt will perform bed mobility with mod Ax2 demonstrating appropriate technique 100% of the time in order to improve function.2) Perform all transfers with min Ax1 demonstrating safe and appropriate technique 100% of the time in order to improve ability to negotiate safely in home environment.3) Amb with least restrictive AD > 10'x1 with mod A in order to demonstrate ability to negotiate in home environment.4)  Improve overall strength and balance 1/2 grade in order to optimize ability to perform functional tasks and reduce fall risk.5) Increase activity tolerance to 40 minutes in order to improve endurance to functional tasks.6) Negotiate stairs using most appropriate technique and min Ax2 in order to be able to negotiate safely in home environment.   Plan   Treatment/Interventions Functional transfer training;LE strengthening/ROM;Elevations;Therapeutic exercise;Patient/family training;Equipment  eval/education;Gait training;Bed mobility;Continued evaluation;Compensatory technique education;Spoke to nursing;OT   PT Frequency 3-5x/wk   Discharge Recommendation   Rehab Resource Intensity Level, PT II (Moderate Resource Intensity)   AM-PAC Basic Mobility Inpatient   Turning in Flat Bed Without Bedrails 1   Lying on Back to Sitting on Edge of Flat Bed Without Bedrails 1   Moving Bed to Chair 2   Standing Up From Chair Using Arms 1   Walk in Room 2   Climb 3-5 Stairs With Railing 1   Basic Mobility Inpatient Raw Score 8   Turning Head Towards Sound 4   Follow Simple Instructions 3   Low Function Basic Mobility Raw Score  15   Low Function Basic Mobility Standardized Score  23.9   [facility] Highest Level Of Mobility   -HLM Goal 3: Sit at edge of bed   -HLM Achieved 4: Move to chair/commode     Maryjane Ibrahim, PT

## 2024-11-25 NOTE — OCCUPATIONAL THERAPY NOTE
Occupational Therapy Evaluation     Patient Name: Lety Botello  Today's Date: 11/25/2024  Problem List  Principal Problem:    SBO (small bowel obstruction) (HCC)  Active Problems:    Hypertension    Type 2 diabetes mellitus (HCC)    History of pulmonary embolism    Hypothyroidism    Mild intermittent asthma    Gastroparesis    Morbid obesity (HCC)    Abdominal pain    Nausea & vomiting    Shingles    On total parenteral nutrition (TPN)    Hypotension after procedure    Past Medical History  Past Medical History:   Diagnosis Date    Diverticulitis     Endometrial cancer (HCC)     History of shingles     Right hemiabdomen (inactive)    Hypertension     IBS (irritable bowel syndrome)     Obesity     Small bowel obstruction (HCC)     Type 2 diabetes mellitus (HCC)      Past Surgical History  Past Surgical History:   Procedure Laterality Date    ABDOMINAL ADHESION SURGERY N/A 11/22/2024    Procedure: EXTENSIVE LYSIS OF ADHESIONS >90 MINUTES;  Surgeon: Alejo Ward MD;  [facility];  Service: General    APPENDECTOMY      CHOLECYSTECTOMY      LAPAROTOMY N/A 11/22/2024    Procedure: LAPAROTOMY EXPLORATORY;  Surgeon: Alejo Ward MD;  [facility];  Service: General    SIGMOIDECTOMY N/A     SMALL INTESTINE SURGERY N/A 11/22/2024    Procedure: RESECTION SMALL BOWEL WITH PRIMARY ANASTAMOSIS,PARTIAL OMENTECTOMY;  Surgeon: Alejo Ward MD;  [facility];  Service: General         11/25/24 1356   OT Last Visit   OT Visit Date 11/25/24   Note Type   Note type Re-Evaluation   Pain Assessment   Pain Assessment Tool 0-10   Pain Score 10 - Worst Possible Pain   Pain Location/Orientation Orientation: Bilateral;Location: Abdomen   Restrictions/Precautions   Other Precautions Bed Alarm;Multiple lines;Telemetry;O2;Fall Risk;Pain  (1.5L O2 then room air)   Home Living   Type of Home House   Home Layout Multi-level;Bed/bath upstairs;Stairs to enter with rails  (6STE)   Bathroom Shower/Tub  "Tub/shower unit   Bathroom Toilet Standard   Home Equipment Walker   Prior Function   Level of [place] Independent with ADLs;Independent with functional mobility;Independent with IADLS   Lives With Other (Comment)  (2 roommates)   Receives Help From Friend(s)   IADLs Independent with driving;Independent with meal prep;Independent with medication management  ([employer] meals)   Falls in the last 6 months 0   Vocational On disability   Comments pt w/ no AD at baseline   Lifestyle   Autonomy per pt independent w/ ADLs, independent w/ functional transfers and mobility w/ no AD, independent w/ IADLs   Reciprocal Relationships roommates   Service to Others on disability   Intrinsic Gratification watch tv   Subjective   Subjective \"Mom!!\"   ADL   Where Assessed Chair   Eating Assistance 5  Supervision/Setup   Grooming Assistance 4  Minimal Assistance   UB Bathing Assistance 4  Minimal Assistance   LB Bathing Assistance 3  Moderate Assistance   UB Dressing Assistance 3  Moderate Assistance   LB Dressing Assistance 2  Maximal Assistance   LB Dressing Deficit Don/doff R sock;Don/doff L sock   Toileting Assistance  2  Maximal Assistance   Bed Mobility   Supine to Sit 2  Maximal assistance   Additional items Assist x 2;Increased time required;Verbal cues;LE management;Bedrails;HOB elevated   Additional Comments increased time to complete and positioning   Transfers   Sit to Stand 3  Moderate assistance   Additional items Assist x 2;Increased time required;Verbal cues;Armrests   Stand to Sit 4  Minimal assistance   Additional items Assist x 2;Increased time required;Verbal cues;Armrests   Additional Comments cues for positioning and safety   Functional Mobility   Functional Mobility 4  Minimal assistance   Additional Comments assist x2 w/ increased time and cues for positioning   Additional items Rolling walker   Balance   Static Sitting Fair   Dynamic Sitting Fair -   Static Standing Poor +   Dynamic Standing Poor "   Ambulatory Poor   Activity Tolerance   Activity Tolerance Patient limited by fatigue;Patient limited by pain;Treatment limited secondary to medical complications (Comment)   Medical Staff Made Aware PT Maryjane: Pt seen for co-session with skilled Physical therapist 2* clinically unstable presentation, medical complexity, new precautions, performance deficits/functional limitations, impaired cognition/safety awareness, limited activity tolerance and present impairments which are a regression from patient patient's baseline and impacting overall occupational performance   Nurse Made Aware appropriate to see per KARLA Herrera   RUE Assessment   RUE Assessment WFL  (4-/5)   LUE Assessment   LUE Assessment WFL  (4-/5)   Hand Function   Gross Motor Coordination Functional   Fine Motor Coordination Functional   Sensation   Light Touch No apparent deficits   Proprioception   Proprioception No apparent deficits   Vision - Complex Assessment   Ocular Range of Motion Intact   Perception   Inattention/Neglect Appears intact   Cognition   Overall Cognitive Status Impaired   Arousal/Participation Alert;Responsive   Attention Attends with cues to redirect   Orientation Level Oriented to person;Oriented to place;Oriented to time   Memory Decreased recall of precautions   Following Commands Follows one step commands with increased time or repetition   Comments increased encouragement to participate in session, decreased insight and safety awareness   Assessment   Limitation Decreased ADL status;Decreased Safe judgement during ADL;Decreased UE strength;Decreased cognition;Decreased endurance;Decreased self-care trans;Decreased high-level ADLs   Prognosis Good   Assessment Pt is a 64 y.o. female seen for OT re-evaluation s/p admit to [facility] on 11/14/2024 w/ SBO (small bowel obstruction) (HCC) and now s/p LAPAROTOMY EXPLORATORY, RESECTION SMALL BOWEL WITH PRIMARY ANASTOMOSIS. PARTIAL OMENTECTOMY, EXTENSIVE LYSIS OF ADHESIONS. Pt on TPN.    Comorbidities affecting pt's functional performance at time of assessment include: shingles, h/o PE, hypothyroidism, morbid obesity, mild intermittent asthma,gastroparesis, hypotension after procedure. Personal factors affecting pt at time of IE include:steps to enter environment, limited home support, difficulty performing ADLS, difficulty performing IADLS , limited insight into deficits, flat affect, and decreased initiation and engagement . Prior to admission, pt was living w/ roommates and reports: per pt independent w/ ADLs, independent w/ functional transfers and mobility w/ no AD, independent w/ IADLs.  Upon evaluation: Pt requires MIN assist Grooming, MIN-MOD assist UB ADLs, MOD-MAX assist LB ADLs, MAX assist x2 supine>sit bed mobility w/ increased time and cues for positioning, MOD assist x2 sit>stand w/ VCs, MIN assist x2 stand>sit, MIN assist x2 functional mobility w/ RW 2* the following deficits impacting occupational performance: increased pain in abdomen, max encouragement to participate, impaired balance, impaired sitting balance, impaired functional reach, decreased strength and endurance, decreased insight and safety awareness, fall risk. . Pt to benefit from continued skilled OT tx while in the hospital to address deficits as defined above and maximize level of functional independence w ADL's and functional mobility. Occupational Performance areas to address include: grooming, bathing/shower, toilet hygiene, dressing, health maintenance, functional mobility, clothing management, cleaning, and meal prep. From OT standpoint, recommendation at time of d/c would be level II moderate resources.   The patient's raw score on the AM-PAC Daily Activity Inpatient Short Form is 13. A raw score of less than 19 suggests the patient may benefit from discharge to post-acute rehabilitation services. Please refer to the recommendation of the Occupational Therapist for safe discharge planning.   Goals   Patient  Goals have no pain   LTG Time Frame 10-14   Long Term Goal please see below goals   Plan   Treatment Interventions ADL retraining;UE strengthening/ROM;Functional transfer training;Endurance training;Cognitive reorientation;Equipment evaluation/education;Patient/family training;Compensatory technique education;Activityengagement;Energy conservation   Goal Expiration Date 12/09/24   OT Treatment Day 1   OT Frequency 3-5x/wk   Discharge Recommendation   Rehab Resource Intensity Level, OT II (Moderate Resource Intensity)   AM-PAC Daily Activity Inpatient   Lower Body Dressing 1   Bathing 2   Toileting 2   Upper Body Dressing 2   Grooming 3   Eating 3   Daily Activity Raw Score 13   Daily Activity Standardized Score (Calc for Raw Score >=11) 32.03   AM-PAC Applied Cognition Inpatient   Following a Speech/Presentation 4   Understanding Ordinary Conversation 4   Taking Medications 3   Remembering Where Things Are Placed or Put Away 3   Remembering List of 4-5 Errands 3   Taking Care of Complicated Tasks 3   Applied Cognition Raw Score 20   Applied Cognition Standardized Score 41.76   Modified [place] Scale   Modified [place] Scale 4   End of Consult   Education Provided Yes   Patient Position at End of Consult Bed/Chair alarm activated;All needs within reach;Bedside chair   Nurse Communication Nurse aware of consult     Occupational Therapy Goals to be met in 10-14 days:  1) Pt will improve activity tolerance to G for min 30  txment sessions to enhance ADLS  2) Pt will complete ADLs/self care w/ mod I w/ LHAE prn  3) Pt will complete toileting w/ mod I w/ G hygiene/thoroughness using DME PRN  4) Pt will improve functional transfers on/off all surfaces using DME PRN w/ G balance/safety including toileting w/ mod I  5) Pt will improve fx'l mobility during I/ADl/leisure tasks using DME PRN w/ g balance/safety w/ mod I  6) Pt will engage in ongoing cognitive assessment w/ G participation to A w/ safe d/c  planning/recommendations  7) Pt will demonstrate G carryover of pt/caregiver education and training as appropriate w/ mod I  w/ G tolerance  8) Pt will demonstrate 100% carryover of E.C. techniques w/ mod I t/o fx'l I/ADL/leisure tasks w/o cues s/p skilled education  9) Pt will demonstrate improved bed mobility to mod I to enhance aDLs  10) Pt will engage in activity configuration activity w/ G participation and mod I to increase time management skills and improve participation in a structured routine to improve overall quality of life  11) Pt will demonstrate 100% carryover of LHAE for LB ADLs/self care and leisure s/p skilled education w/ mod I and G participation  12) Pt will demonstrate improved standing tolerance to 3-5 minutes during functional tasks w/ no LOB to enhance ADL performance  13) Pt will demonstrate improved b/l UE strength by 1 MMT grade to enhance ADLS and functional transfers  Documentation completed by: Laney Tate MS, OTR/L

## 2024-11-25 NOTE — PLAN OF CARE
Problem: OCCUPATIONAL THERAPY ADULT  Goal: Performs self-care activities at highest level of function for planned discharge setting.  See evaluation for individualized goals.  Description: Treatment Interventions: ADL retraining, UE strengthening/ROM, Functional transfer training, Endurance training, Cognitive reorientation, Equipment evaluation/education, Patient/family training, Compensatory technique education, Activityengagement, Energy conservation          See flowsheet documentation for full assessment, interventions and recommendations.   Note: Limitation: Decreased ADL status, Decreased Safe judgement during ADL, Decreased UE strength, Decreased cognition, Decreased endurance, Decreased self-care trans, Decreased high-level ADLs  Prognosis: Good  Assessment: Pt is a 64 y.o. female seen for OT re-evaluation s/p admit to [facility] on [facility]/14/2024 w/ SBO (small bowel obstruction) (HCC) and now s/p LAPAROTOMY EXPLORATORY, RESECTION SMALL BOWEL WITH PRIMARY ANASTOMOSIS. PARTIAL OMENTECTOMY, EXTENSIVE LYSIS OF ADHESIONS. Pt on TPN.   Comorbidities affecting pt's functional performance at time of assessment include: shingles, h/o PE, hypothyroidism, morbid obesity, mild intermittent asthma,gastroparesis, hypotension after procedure. Personal factors affecting pt at time of IE include:steps to enter environment, limited home support, difficulty performing ADLS, difficulty performing IADLS , limited insight into deficits, flat affect, and decreased initiation and engagement . Prior to admission, pt was living w/ roommates and reports: per pt independent w/ ADLs, independent w/ functional transfers and mobility w/ no AD, independent w/ IADLs.  Upon evaluation: Pt requires MIN assist Grooming, MIN-MOD assist UB ADLs, MOD-MAX assist LB ADLs, MAX assist x2 supine>sit bed mobility w/ increased time and cues for positioning, MOD assist x2 sit>stand w/ VCs, MIN assist x2 stand>sit, MIN assist x2 functional mobility w/ RW 2* the  following deficits impacting occupational performance: increased pain in abdomen, max encouragement to participate, impaired balance, impaired sitting balance, impaired functional reach, decreased strength and endurance, decreased insight and safety awareness, fall risk. . Pt to benefit from continued skilled OT tx while in the hospital to address deficits as defined above and maximize level of functional independence w ADL's and functional mobility. Occupational Performance areas to address include: grooming, bathing/shower, toilet hygiene, dressing, health maintenance, functional mobility, clothing management, cleaning, and meal prep. From OT standpoint, recommendation at time of d/c would be level II moderate resources.   The patient's raw score on the AM-PAC Daily Activity Inpatient Short Form is 13. A raw score of less than 19 suggests the patient may benefit from discharge to post-acute rehabilitation services. Please refer to the recommendation of the Occupational Therapist for safe discharge planning.     Rehab Resource Intensity Level, OT: II (Moderate Resource Intensity)

## 2024-11-25 NOTE — DISCHARGE INSTR - OTHER ORDERS
Wound Care Plan:   1-Remedy Silicone Cream/Hydraguard lotion to bilateral heels, buttocks, and sacrum three times daily and as needed.  2-Elevate heels off of bed/chair surface--offloading heel boots.  3-Offloading air cushion in chair when out of bed.  4-Apply lotion to body daily and as needed.  5-Turn/reposition every 2 hours while in bed and weight shift frequently while in chair for pressure re-distribution on skin.   6-Right distal abdomen (shingles)--apply silicone cream/Hydraguard lotion twice daily.  May leave open to air if dry.  Cover with ABD if open or draining.  7-Right lateral abdomen (blisters)--apply xeroform and cover with silicone bordered foam dressing.  Change dressing every other day and as needed.

## 2024-11-25 NOTE — ASSESSMENT & PLAN NOTE
64 y.o. female presenting with recurrent SBO. 11/14-11/21 failed conservative management with persistent SBO.  S/p ex lap, SBR, DAVID on 11/22.  Hypotensive overnight and started of insulin gtt.    Plan  - cont NPO/NGT, will discuss/consider removal possibly later today  - cont IVF  - PICC/TPN  - cont dPCA  - Cont heparin gtt for recent Pes  - wean o2  - PT/OT  - Encourage OOB, ambulate, IS  - DVT ppx

## 2024-11-25 NOTE — CASE MANAGEMENT
Case Management Discharge Planning Note    Patient name Lety Botello  Location ICU 02/ICU 02 MRN [id number]  : 1960 Date 2024       Current Admission Date: 2024  Current Admission Diagnosis:SBO (small bowel obstruction) (HCC)   Patient Active Problem List    Diagnosis Date Noted Date Diagnosed    Hypotension after procedure 2024     Shingles 2024     On total parenteral nutrition (TPN) 2024     Abdominal pain 2024     Nausea & vomiting 2024     Constipation 11/10/2024     Morbid obesity (HCC) 2024     Gastroparesis 2024     Gastric dilation 2024     History of pulmonary embolism 2024     Hypothyroidism 2024     Mild intermittent asthma 2024     Hypertension      Type 2 diabetes mellitus (HCC)      Endometrial cancer (HCC)      Obesity      SBO (small bowel obstruction) (HCC) 2024       LOS (days): 11  Geometric Mean LOS (GMLOS) (days): 3  Days to GMLOS:-8.1     OBJECTIVE:  Risk of Unplanned Readmission Score: 27.8         Current admission status: Inpatient   [facility]erred Pharmacy:   [id number] [address] [facility]  [address] [place] [id number]  Phone: [phone] Fax: [phone]    Primary Care Provider: Tai Martinez    Primary Insurance: POPPY TSANG  Secondary Insurance:     DISCHARGE DETAILS:    Discharge planning discussed with:: patient  Freedom of Choice: Yes  Comments - Freedom of Choice: Reviewed HHC vs STR and discussed physical therapist evaluation and recommendation.  Patient verbalized understanding.  CM contacted family/caregiver?: No- see comments  Were Treatment Team discharge recommendations reviewed with patient/caregiver?: Yes  Did patient/caregiver verbalize understanding of patient care needs?: Yes  Were patient/caregiver advised of the risks associated with not following Treatment Team discharge recommendations?: Yes    Contacts  Patient Contacts: Sharda  Wen (Lupe)       Other Referral/Resources/Interventions Provided:  Interventions: Acute Rehab, Short Term Rehab         Treatment Team Recommendation: Acute Rehab, Short Term Rehab  Discharge Destination Plan:: Acute Rehab, Short Term Rehab         Additional Comments: CM met with the patient at the bedside for continuing discharge planning. CM introduced self and reviewed role. CM reviewed the referral process after discussing physical therapy evaluation and recommendation for discharge. Patient verbalized understanding and agreed to rehab if recommended. PT did evaluate the patient and recommended STR. [place] referral placed in /[facility] for STR. Patient will be provided with her choice list when it ready for her rehab choice. CM department will continue to follow the patient through hospital discharge.

## 2024-11-25 NOTE — PROGRESS NOTES
Progress Note - Surgery-General   Name: Lety Botello 64 y.o. female I MRN: [id number]  Unit/Bed#: ICU 02 I Date of Admission: 11/14/2024   Date of Service: 11/25/2024 I Hospital Day: 11    Assessment & Plan  SBO (small bowel obstruction) (HCC)  64 y.o. female presenting with recurrent SBO. 11/14-11/21 failed conservative management with persistent SBO.  S/p ex lap, SBR, DAVID on 11/22.  Hypotensive overnight and started of insulin gtt.    Plan  - cont NPO/NGT, will discuss/consider removal possibly later today  - cont IVF  - PICC/TPN  - cont dPCA  - Cont heparin gtt for recent Pes  - wean o2  - PT/OT  - Encourage OOB, ambulate, IS  - DVT ppx            Subjective   No acute events overnight.  No episodes of hypotension, vital stable, breathing comfortably on 2 L nasal cannula.  Denies nausea or vomiting.  Not passing flatus nor bowel movement.  Pain controlled.  Endorses hallucination this AM, seeing family. Has not been out of bed ambulating.    Objective :  Temp:  [97.8 °F (36.6 °C)-98.4 °F (36.9 °C)] 98.4 °F (36.9 °C)  HR:  [68-82] 68  BP: (107-151)/(51-73) 123/58  Resp:  [14-29] 29  SpO2:  [98 %-100 %] 99 %  O2 Device: EtCO2 nasal cannula  Nasal Cannula O2 Flow Rate (L/min):  [2 L/min] 2 L/min    I/O         11/23 0701  11/24 0700 11/24 0701  11/25 0700    I.V. (mL/kg) 3228.5 (28.6) 2141 (18.9)    Blood  350    NG/GT  0    IV Piggyback 1350 450    TPN 1880.2 2309.6    Total Intake(mL/kg) 6458.8 (57.2) 5250.6 (46.5)    Urine (mL/kg/hr) 1655 (0.6) 550 (0.2)    Emesis/NG output 150 0    Total Output 1805 550    Net +4653.8 +4700.6                Lines/Drains/Airways       Active Status       Name Placement date Placement time Site Days    PICC Line 11/18/24 Right Basilic 11/18/24  1249  Basilic  6    NG/OG/Enteral Tube Nasogastric 18 Fr Left nare 11/22/24  0915  Left nare  2    External Urinary Catheter 11/24/24  1232  -- less than 1                  Physical Exam  General: NAD  HENT: NGT to low cont suction.   Neck:  supple, no JVD  CV: nl rate  Lungs: nl wob. No resp distress  ABD: Soft, appropriately tender, protuberant/nondistended.  Midline incision CDI  Extrem: No CCE  Neuro: AAOx3      Lab Results: I have reviewed the following results:  Recent Labs     11/22/24  1513 11/23/24  0426 11/23/24  1001 11/23/24  1624 11/23/24  1739 11/24/24  0005 11/24/24  0542 11/24/24  0610 11/24/24  1156   WBC 5.56 4.08* 4.10*  --  3.87*  --   --  4.09*  --    HGB 9.2* 8.2* 7.9*  --  7.1*  --   --  6.9* 7.5*   HCT 28.9* 26.0* 25.2*  --  22.4*  --   --  22.0* 23.7*   PLT 83* 76* 70*  --  72*  --   --  75*  --    SODIUM 133* 130*  --   --  132*  --  129*  --   --    K 4.3 4.4  --   --  4.1  --  4.3  --   --     104  --   --  105  --  105  --   --    CO2 24 25  --   --  26  --  24  --   --    BUN 15 20  --   --  22  --  21  --   --    CREATININE 0.62 0.68  --   --  0.66  --  0.58*  --   --    GLUC 207* 239*  --   --  162*  --  141*  --   --    CAIONIZED 1.01* 1.09*  --   --   --   --   --   --   --    MG 1.5* 2.0  --   --  2.0  --  2.1  --   --    PHOS 3.3  --   --   --  2.3  --  2.1*  --   --    AST 27  --   --   --   --   --   --   --   --    ALT 35  --   --   --   --   --   --   --   --    ALB 2.1*  --   --   --   --   --   --   --   --    TBILI 0.75  --   --   --   --   --   --   --   --    ALKPHOS 109*  --   --   --   --   --   --   --   --    PTT  --   --  48*   < >  --    < > 73*  --  74*   INR  --   --  1.49*  --   --   --   --   --   --    LACTICACID 1.6  --   --   --  1.0  --   --   --   --     < > = values in this interval not displayed.             VTE Pharmacologic Prophylaxis: VTE covered by:  heparin (porcine), Intravenous, 6.1 Units/kg/hr at 11/24/24 1442     VTE Mechanical Prophylaxis: sequential compression device

## 2024-11-26 LAB
ANION GAP SERPL CALCULATED.3IONS-SCNC: 5 MMOL/L (ref 4–13)
APTT PPP: 66 SECONDS (ref 23–34)
BASOPHILS # BLD AUTO: 0.02 THOUSANDS/ΜL (ref 0–0.1)
BASOPHILS NFR BLD AUTO: 1 % (ref 0–1)
BUN SERPL-MCNC: 30 MG/DL (ref 5–25)
CALCIUM SERPL-MCNC: 7.6 MG/DL (ref 8.4–10.2)
CHLORIDE SERPL-SCNC: 101 MMOL/L (ref 96–108)
CO2 SERPL-SCNC: 22 MMOL/L (ref 21–32)
CREAT SERPL-MCNC: 0.68 MG/DL (ref 0.6–1.3)
EOSINOPHIL # BLD AUTO: 0.14 THOUSAND/ΜL (ref 0–0.61)
EOSINOPHIL NFR BLD AUTO: 3 % (ref 0–6)
ERYTHROCYTE [DISTWIDTH] IN BLOOD BY AUTOMATED COUNT: 17.6 % (ref 11.6–15.1)
GFR SERPL CREATININE-BSD FRML MDRD: 94 ML/MIN/1.73SQ M
GLUCOSE SERPL-MCNC: 131 MG/DL (ref 65–140)
GLUCOSE SERPL-MCNC: 133 MG/DL (ref 65–140)
GLUCOSE SERPL-MCNC: 134 MG/DL (ref 65–140)
GLUCOSE SERPL-MCNC: 146 MG/DL (ref 65–140)
GLUCOSE SERPL-MCNC: 147 MG/DL (ref 65–140)
GLUCOSE SERPL-MCNC: 150 MG/DL (ref 65–140)
GLUCOSE SERPL-MCNC: 150 MG/DL (ref 65–140)
GLUCOSE SERPL-MCNC: 161 MG/DL (ref 65–140)
GLUCOSE SERPL-MCNC: 181 MG/DL (ref 65–140)
GLUCOSE SERPL-MCNC: 209 MG/DL (ref 65–140)
GLUCOSE SERPL-MCNC: 507 MG/DL (ref 65–140)
HCT VFR BLD AUTO: 25.6 % (ref 34.8–46.1)
HGB BLD-MCNC: 8.2 G/DL (ref 11.5–15.4)
IMM GRANULOCYTES # BLD AUTO: 0.07 THOUSAND/UL (ref 0–0.2)
IMM GRANULOCYTES NFR BLD AUTO: 2 % (ref 0–2)
LYMPHOCYTES # BLD AUTO: 0.38 THOUSANDS/ΜL (ref 0.6–4.47)
LYMPHOCYTES NFR BLD AUTO: 9 % (ref 14–44)
MCH RBC QN AUTO: 31.5 PG (ref 26.8–34.3)
MCHC RBC AUTO-ENTMCNC: 32 G/DL (ref 31.4–37.4)
MCV RBC AUTO: 99 FL (ref 82–98)
MONOCYTES # BLD AUTO: 0.25 THOUSAND/ΜL (ref 0.17–1.22)
MONOCYTES NFR BLD AUTO: 6 % (ref 4–12)
NEUTROPHILS # BLD AUTO: 3.35 THOUSANDS/ΜL (ref 1.85–7.62)
NEUTS SEG NFR BLD AUTO: 79 % (ref 43–75)
NRBC BLD AUTO-RTO: 0 /100 WBCS
PLATELET # BLD AUTO: 107 THOUSANDS/UL (ref 149–390)
PMV BLD AUTO: 10.1 FL (ref 8.9–12.7)
POTASSIUM SERPL-SCNC: 4.1 MMOL/L (ref 3.5–5.3)
RBC # BLD AUTO: 2.6 MILLION/UL (ref 3.81–5.12)
SODIUM SERPL-SCNC: 128 MMOL/L (ref 135–147)
WBC # BLD AUTO: 4.21 THOUSAND/UL (ref 4.31–10.16)

## 2024-11-26 PROCEDURE — 85730 THROMBOPLASTIN TIME PARTIAL: CPT | Performed by: SPECIALIST

## 2024-11-26 PROCEDURE — 82948 REAGENT STRIP/BLOOD GLUCOSE: CPT

## 2024-11-26 PROCEDURE — 82947 ASSAY GLUCOSE BLOOD QUANT: CPT | Performed by: NURSE PRACTITIONER

## 2024-11-26 PROCEDURE — 99024 POSTOP FOLLOW-UP VISIT: CPT

## 2024-11-26 PROCEDURE — 85025 COMPLETE CBC W/AUTO DIFF WBC: CPT

## 2024-11-26 PROCEDURE — 94760 N-INVAS EAR/PLS OXIMETRY 1: CPT

## 2024-11-26 PROCEDURE — 80048 BASIC METABOLIC PNL TOTAL CA: CPT

## 2024-11-26 RX ORDER — LEVOTHYROXINE SODIUM 125 UG/1
125 TABLET ORAL DAILY
Status: DISCONTINUED | OUTPATIENT
Start: 2024-11-26 | End: 2024-12-05 | Stop reason: HOSPADM

## 2024-11-26 RX ORDER — LABETALOL HYDROCHLORIDE 5 MG/ML
10 INJECTION, SOLUTION INTRAVENOUS EVERY 4 HOURS PRN
Status: DISCONTINUED | OUTPATIENT
Start: 2024-11-26 | End: 2024-12-05 | Stop reason: HOSPADM

## 2024-11-26 RX ORDER — CARVEDILOL 12.5 MG/1
12.5 TABLET ORAL EVERY 12 HOURS
Status: DISCONTINUED | OUTPATIENT
Start: 2024-11-26 | End: 2024-12-05 | Stop reason: HOSPADM

## 2024-11-26 RX ORDER — INSULIN LISPRO 100 [IU]/ML
2-12 INJECTION, SOLUTION INTRAVENOUS; SUBCUTANEOUS EVERY 6 HOURS SCHEDULED
Status: DISCONTINUED | OUTPATIENT
Start: 2024-11-26 | End: 2024-11-27

## 2024-11-26 RX ORDER — CALCIUM GLUCONATE 20 MG/ML
2 INJECTION, SOLUTION INTRAVENOUS ONCE
Status: COMPLETED | OUTPATIENT
Start: 2024-11-26 | End: 2024-11-26

## 2024-11-26 RX ORDER — OXYCODONE HYDROCHLORIDE 10 MG/1
10 TABLET ORAL EVERY 4 HOURS PRN
Refills: 0 | Status: DISCONTINUED | OUTPATIENT
Start: 2024-11-26 | End: 2024-11-29

## 2024-11-26 RX ORDER — OXYCODONE HYDROCHLORIDE 5 MG/1
5 TABLET ORAL EVERY 4 HOURS PRN
Refills: 0 | Status: DISCONTINUED | OUTPATIENT
Start: 2024-11-26 | End: 2024-11-29

## 2024-11-26 RX ORDER — HYDRALAZINE HYDROCHLORIDE 20 MG/ML
5 INJECTION INTRAMUSCULAR; INTRAVENOUS EVERY 4 HOURS PRN
Status: DISCONTINUED | OUTPATIENT
Start: 2024-11-26 | End: 2024-12-05 | Stop reason: HOSPADM

## 2024-11-26 RX ORDER — BISACODYL 10 MG
10 SUPPOSITORY, RECTAL RECTAL DAILY
Status: DISCONTINUED | OUTPATIENT
Start: 2024-11-26 | End: 2024-12-05 | Stop reason: HOSPADM

## 2024-11-26 RX ORDER — GABAPENTIN 100 MG/1
100 CAPSULE ORAL
Status: DISCONTINUED | OUTPATIENT
Start: 2024-11-26 | End: 2024-12-05 | Stop reason: HOSPADM

## 2024-11-26 RX ADMIN — METOROPROLOL TARTRATE 2.5 MG: 5 INJECTION, SOLUTION INTRAVENOUS at 14:18

## 2024-11-26 RX ADMIN — METOROPROLOL TARTRATE 2.5 MG: 5 INJECTION, SOLUTION INTRAVENOUS at 07:52

## 2024-11-26 RX ADMIN — Medication: at 21:40

## 2024-11-26 RX ADMIN — NYSTATIN 1 APPLICATION: 100000 POWDER TOPICAL at 08:00

## 2024-11-26 RX ADMIN — CARVEDILOL 12.5 MG: 12.5 TABLET, FILM COATED ORAL at 20:26

## 2024-11-26 RX ADMIN — GABAPENTIN 100 MG: 100 CAPSULE ORAL at 21:42

## 2024-11-26 RX ADMIN — METOROPROLOL TARTRATE 2.5 MG: 5 INJECTION, SOLUTION INTRAVENOUS at 01:41

## 2024-11-26 RX ADMIN — SODIUM CHLORIDE, SODIUM GLUCONATE, SODIUM ACETATE, POTASSIUM CHLORIDE, MAGNESIUM CHLORIDE, SODIUM PHOSPHATE, DIBASIC, AND POTASSIUM PHOSPHATE 100 ML/HR: .53; .5; .37; .037; .03; .012; .00082 INJECTION, SOLUTION INTRAVENOUS at 23:20

## 2024-11-26 RX ADMIN — HEPARIN SODIUM 6.1 UNITS/KG/HR: 10000 INJECTION, SOLUTION INTRAVENOUS at 10:54

## 2024-11-26 RX ADMIN — LEVOTHYROXINE SODIUM 125 MCG: 125 TABLET ORAL at 11:44

## 2024-11-26 RX ADMIN — ACETAMINOPHEN 1000 MG: 10 INJECTION INTRAVENOUS at 05:58

## 2024-11-26 RX ADMIN — NYSTATIN: 100000 POWDER TOPICAL at 21:41

## 2024-11-26 RX ADMIN — PANTOPRAZOLE SODIUM 40 MG: 40 INJECTION, POWDER, LYOPHILIZED, FOR SOLUTION INTRAVENOUS at 08:00

## 2024-11-26 RX ADMIN — ACETAMINOPHEN 1000 MG: 10 INJECTION INTRAVENOUS at 17:57

## 2024-11-26 RX ADMIN — INSULIN LISPRO 4 UNITS: 100 INJECTION, SOLUTION INTRAVENOUS; SUBCUTANEOUS at 23:49

## 2024-11-26 RX ADMIN — CHLORHEXIDINE GLUCONATE 15 ML: 1.2 RINSE ORAL at 20:26

## 2024-11-26 RX ADMIN — INSULIN LISPRO 2 UNITS: 100 INJECTION, SOLUTION INTRAVENOUS; SUBCUTANEOUS at 17:57

## 2024-11-26 RX ADMIN — CHLORHEXIDINE GLUCONATE 15 ML: 1.2 RINSE ORAL at 08:00

## 2024-11-26 RX ADMIN — BISACODYL 10 MG: 10 SUPPOSITORY RECTAL at 17:57

## 2024-11-26 RX ADMIN — SODIUM CHLORIDE, SODIUM GLUCONATE, SODIUM ACETATE, POTASSIUM CHLORIDE, MAGNESIUM CHLORIDE, SODIUM PHOSPHATE, DIBASIC, AND POTASSIUM PHOSPHATE 100 ML/HR: .53; .5; .37; .037; .03; .012; .00082 INJECTION, SOLUTION INTRAVENOUS at 12:49

## 2024-11-26 RX ADMIN — NYSTATIN: 100000 POWDER TOPICAL at 16:25

## 2024-11-26 RX ADMIN — ACETAMINOPHEN 1000 MG: 10 INJECTION INTRAVENOUS at 11:44

## 2024-11-26 RX ADMIN — SODIUM CHLORIDE, SODIUM GLUCONATE, SODIUM ACETATE, POTASSIUM CHLORIDE, MAGNESIUM CHLORIDE, SODIUM PHOSPHATE, DIBASIC, AND POTASSIUM PHOSPHATE 100 ML/HR: .53; .5; .37; .037; .03; .012; .00082 INJECTION, SOLUTION INTRAVENOUS at 01:39

## 2024-11-26 RX ADMIN — METOROPROLOL TARTRATE 2.5 MG: 5 INJECTION, SOLUTION INTRAVENOUS at 20:26

## 2024-11-26 RX ADMIN — CALCIUM GLUCONATE 2 G: 20 INJECTION, SOLUTION INTRAVENOUS at 14:18

## 2024-11-26 RX ADMIN — ACETAMINOPHEN 1000 MG: 10 INJECTION INTRAVENOUS at 01:40

## 2024-11-26 NOTE — CASE MANAGEMENT
Case Management Discharge Planning Note    Patient name Lety Botello  Location ICU 02/ICU 02 MRN [id number]  : 1960 Date 2024       Current Admission Date: 2024  Current Admission Diagnosis:SBO (small bowel obstruction) (HCC)   Patient Active Problem List    Diagnosis Date Noted Date Diagnosed    Hypotension after procedure 2024     Shingles 2024     On total parenteral nutrition (TPN) 2024     Abdominal pain 2024     Nausea & vomiting 2024     Constipation 11/10/2024     Morbid obesity (HCC) 2024     Gastroparesis 2024     Gastric dilation 2024     History of pulmonary embolism 2024     Hypothyroidism 2024     Mild intermittent asthma 2024     Hypertension      Type 2 diabetes mellitus (HCC)      Endometrial cancer (HCC)      Obesity      SBO (small bowel obstruction) (HCC) 2024       LOS (days): 12  Geometric Mean LOS (GMLOS) (days): 3  Days to GMLOS:-9     OBJECTIVE:  Risk of Unplanned Readmission Score: 29.88         Current admission status: Inpatient   Preferred Pharmacy:   [id number] - [place] - [address] [facility]  [address] [place] [id number]  Phone: [phone] Fax: [phone]    Primary Care Provider: Tai Martinez    Primary Insurance: [employer]  Secondary Insurance:     DISCHARGE DETAILS:        CM contacted family/caregiver?: No- see comments  Were Treatment Team discharge recommendations reviewed with patient/caregiver?: Yes  Did patient/caregiver verbalize understanding of patient care needs?: N/A- going to facility  Were patient/caregiver advised of the risks associated with not following Treatment Team discharge recommendations?: Yes    Contacts  Patient Contacts: Sharda Carver (Niece)  Relationship to Patient:: Family       Other Referral/Resources/Interventions Provided:  Interventions: Short Term Rehab, Acute Hospital Transfer  Referral Comments: Choice list for  STR/SNF and ARC to follow       Treatment Team Recommendation: Acute Rehab, Short Term Rehab  Discharge Destination Plan:: Acute Rehab, Short Term Rehab         Additional Comments: CM to provide patient with choice list for both STR/SNF and ARC and will reserve facility and submit for insurance auth.  CM department will continue to follow the patient through hospital discharge.

## 2024-11-26 NOTE — PLAN OF CARE
Problem: Potential for Falls  Goal: Patient will remain free of falls  Description: INTERVENTIONS:  - Educate patient/family on patient safety including physical limitations  - Instruct patient to call for assistance with activity   - Consult OT/PT to assist with strengthening/mobility   - Keep Call bell within reach  - Keep bed low and locked with side rails adjusted as appropriate  - Keep care items and personal belongings within reach  - Initiate and maintain comfort rounds  - Make Fall Risk Sign visible to staff  - Offer Toileting every 3 Hours, in advance of need  - Initiate/Maintain bed alarm  - Obtain necessary fall risk management equipment  - Apply yellow socks and bracelet for high fall risk patients  - Consider moving patient to room near nurses station  Outcome: Progressing     Problem: PAIN - ADULT  Goal: Verbalizes/displays adequate comfort level or baseline comfort level  Description: Interventions:  - Encourage patient to monitor pain and request assistance  - Assess pain using appropriate pain scale  - Administer analgesics based on type and severity of pain and evaluate response  - Implement non-pharmacological measures as appropriate and evaluate response  - Consider cultural and social influences on pain and pain management  - Notify physician/advanced practitioner if interventions unsuccessful or patient reports new pain  Outcome: Progressing     Problem: SAFETY ADULT  Goal: Patient will remain free of falls  Description: INTERVENTIONS:  - Educate patient/family on patient safety including physical limitations  - Instruct patient to call for assistance with activity   - Consult OT/PT to assist with strengthening/mobility   - Keep Call bell within reach  - Keep bed low and locked with side rails adjusted as appropriate  - Keep care items and personal belongings within reach  - Initiate and maintain comfort rounds  - Make Fall Risk Sign visible to staff  - Offer Toileting every 3 Hours, in  advance of need  - Initiate/Maintain bed alarm  - Obtain necessary fall risk management equipment  - Apply yellow socks and bracelet for high fall risk patients  - Consider moving patient to room near nurses station  Outcome: Progressing  Goal: Maintain or return to baseline ADL function  Description: INTERVENTIONS:  -  Assess patient's ability to carry out ADLs; assess patient's baseline for ADL function and identify physical deficits which impact ability to perform ADLs (bathing, care of mouth/teeth, toileting, grooming, dressing, etc.)  - Assess/evaluate cause of self-care deficits   - Assess range of motion  - Assess patient's mobility; develop plan if impaired  - Assess patient's need for assistive devices and provide as appropriate  - Encourage maximum independence but intervene and supervise when necessary  - Involve family in performance of ADLs  - Assess for home care needs following discharge   - Consider OT consult to assist with ADL evaluation and planning for discharge  - Provide patient education as appropriate  Outcome: Progressing  Goal: Maintains/Returns to pre admission functional level  Description: INTERVENTIONS:  - Perform AM-PAC 6 Click Basic Mobility/ Daily Activity assessment daily.  - Set and communicate daily mobility goal to care team and patient/family/caregiver.   - Collaborate with rehabilitation services on mobility goals if consulted  - Perform Range of Motion 3 times a day.  - Reposition patient every 3 hours.  - Dangle patient 3 times a day  - Stand patient 3 times a day  - Ambulate patient 3 times a day  - Out of bed to chair 3 times a day   - Out of bed for meals 3 times a day  - Out of bed for toileting  - Record patient progress and toleration of activity level   Outcome: Progressing     Problem: DISCHARGE PLANNING  Goal: Discharge to home or other facility with appropriate resources  Description: INTERVENTIONS:  - Identify barriers to discharge w/patient and caregiver  -  Arrange for needed discharge resources and transportation as appropriate  - Identify discharge learning needs (meds, wound care, etc.)  - Arrange for interpretive services to assist at discharge as needed  - Refer to Case Management Department for coordinating discharge planning if the patient needs post-hospital services based on physician/advanced practitioner order or complex needs related to functional status, cognitive ability, or social support system  Outcome: Progressing     Problem: GASTROINTESTINAL - ADULT  Goal: Minimal or absence of nausea and/or vomiting  Description: INTERVENTIONS:  - Administer IV fluids if ordered to ensure adequate hydration  - Maintain NPO status until nausea and vomiting are resolved  - Nasogastric tube if ordered  - Administer ordered antiemetic medications as needed  - Provide nonpharmacologic comfort measures as appropriate  - Advance diet as tolerated, if ordered  - Consider nutrition services referral to assist patient with adequate nutrition and appropriate food choices  Outcome: Progressing  Goal: Maintains or returns to baseline bowel function  Description: INTERVENTIONS:  - Assess bowel function  - Encourage oral fluids to ensure adequate hydration  - Administer IV fluids if ordered to ensure adequate hydration  - Administer ordered medications as needed  - Encourage mobilization and activity  - Consider nutritional services referral to assist patient with adequate nutrition and appropriate food choices  Outcome: Progressing  Goal: Maintains adequate nutritional intake  Description: INTERVENTIONS:  - Monitor percentage of each meal consumed  - Identify factors contributing to decreased intake, treat as appropriate  - Assist with meals as needed  - Monitor I&O, weight, and lab values if indicated  - Obtain nutrition services referral as needed  Outcome: Progressing  Goal: Establish and maintain optimal ostomy function  Description: INTERVENTIONS:  - Assess bowel  function  - Encourage oral fluids to ensure adequate hydration  - Administer IV fluids if ordered to ensure adequate hydration   - Administer ordered medications as needed  - Encourage mobilization and activity  - Nutrition services referral to assist patient with appropriate food choices  - Assess stoma site  - Consider wound care consult   Outcome: Progressing  Goal: Oral mucous membranes remain intact  Description: INTERVENTIONS  - Assess oral mucosa and hygiene practices  - Implement preventative oral hygiene regimen  - Implement oral medicated treatments as ordered  - Initiate Nutrition services referral as needed  Outcome: Progressing     Problem: Prexisting or High Potential for Compromised Skin Integrity  Goal: Skin integrity is maintained or improved  Description: INTERVENTIONS:  - Identify patients at risk for skin breakdown  - Assess and monitor skin integrity  - Assess and monitor nutrition and hydration status  - Monitor labs   - Assess for incontinence   - Turn and reposition patient  - Assist with mobility/ambulation  - Relieve pressure over bony prominences  - Avoid friction and shearing  - Provide appropriate hygiene as needed including keeping skin clean and dry  - Evaluate need for skin moisturizer/barrier cream  - Collaborate with interdisciplinary team   - Patient/family teaching  - Consider wound care consult   Outcome: Progressing     Problem: Nutrition/Hydration-ADULT  Goal: Nutrient/Hydration intake appropriate for improving, restoring or maintaining nutritional needs  Description: Monitor and assess patient's nutrition/hydration status for malnutrition. Collaborate with interdisciplinary team and initiate plan and interventions as ordered.  Monitor patient's weight and dietary intake as ordered or per policy. Utilize nutrition screening tool and intervene as necessary. Determine patient's food preferences and provide high-protein, high-caloric foods as appropriate.     INTERVENTIONS:  -  Monitor oral intake, urinary output, labs, and treatment plans  - Assess nutrition and hydration status and recommend course of action  - Evaluate amount of meals eaten  - Assist patient with eating if necessary   - Allow adequate time for meals  - Recommend/ encourage appropriate diets, oral nutritional supplements, and vitamin/mineral supplements  - Order, calculate, and assess calorie counts as needed  - Recommend, monitor, and adjust tube feedings and TPN/PPN based on assessed needs  - Assess need for intravenous fluids  - Provide specific nutrition/hydration education as appropriate  - Include patient/family/caregiver in decisions related to nutrition  Outcome: Progressing     Problem: INFECTION - ADULT  Goal: Absence or prevention of progression during hospitalization  Description: INTERVENTIONS:  - Assess and monitor for signs and symptoms of infection  - Monitor lab/diagnostic results  - Monitor all insertion sites, i.e. indwelling lines, tubes, and drains  - Monitor endotracheal if appropriate and nasal secretions for changes in amount and color  - [place] appropriate cooling/warming therapies per order  - Administer medications as ordered  - Instruct and encourage patient and family to use good hand hygiene technique  - Identify and instruct in appropriate isolation precautions for identified infection/condition  Outcome: Progressing     Problem: Knowledge Deficit  Goal: Patient/family/caregiver demonstrates understanding of disease process, treatment plan, medications, and discharge instructions  Description: Complete learning assessment and assess knowledge base.  Interventions:  - Provide teaching at level of understanding  - Provide teaching via preferred learning methods  Outcome: Progressing

## 2024-11-26 NOTE — PLAN OF CARE
Problem: Potential for Falls  Goal: Patient will remain free of falls  Description: INTERVENTIONS:  - Educate patient/family on patient safety including physical limitations  - Instruct patient to call for assistance with activity   - Consult OT/PT to assist with strengthening/mobility   - Keep Call bell within reach  - Keep bed low and locked with side rails adjusted as appropriate  - Keep care items and personal belongings within reach  - Initiate and maintain comfort rounds  - Make Fall Risk Sign visible to staff  - Offer Toileting every 3 Hours, in advance of need  - Initiate/Maintain bed alarm  - Obtain necessary fall risk management equipment  - Apply yellow socks and bracelet for high fall risk patients  - Consider moving patient to room near nurses station  11/26/2024 1141 by Deanne Spear RN  Outcome: Progressing  11/26/2024 1141 by Deanne Spear RN  Outcome: Progressing     Problem: PAIN - ADULT  Goal: Verbalizes/displays adequate comfort level or baseline comfort level  Description: Interventions:  - Encourage patient to monitor pain and request assistance  - Assess pain using appropriate pain scale  - Administer analgesics based on type and severity of pain and evaluate response  - Implement non-pharmacological measures as appropriate and evaluate response  - Consider cultural and social influences on pain and pain management  - Notify physician/advanced practitioner if interventions unsuccessful or patient reports new pain  11/26/2024 1141 by Deanne Spear RN  Outcome: Progressing  11/26/2024 1141 by Deanne Spear RN  Outcome: Progressing     Problem: SAFETY ADULT  Goal: Patient will remain free of falls  Description: INTERVENTIONS:  - Educate patient/family on patient safety including physical limitations  - Instruct patient to call for assistance with activity   - Consult OT/PT to assist with strengthening/mobility   - Keep Call bell within reach  - Keep bed low and locked with side rails  adjusted as appropriate  - Keep care items and personal belongings within reach  - Initiate and maintain comfort rounds  - Make Fall Risk Sign visible to staff  - Offer Toileting every 3 Hours, in advance of need  - Initiate/Maintain bed alarm  - Obtain necessary fall risk management equipment  - Apply yellow socks and bracelet for high fall risk patients  - Consider moving patient to room near nurses station  11/26/2024 1141 by Deanne Spear RN  Outcome: Progressing  11/26/2024 1141 by Deanne Spear RN  Outcome: Progressing  Goal: Maintain or return to baseline ADL function  Description: INTERVENTIONS:  -  Assess patient's ability to carry out ADLs; assess patient's baseline for ADL function and identify physical deficits which impact ability to perform ADLs (bathing, care of mouth/teeth, toileting, grooming, dressing, etc.)  - Assess/evaluate cause of self-care deficits   - Assess range of motion  - Assess patient's mobility; develop plan if impaired  - Assess patient's need for assistive devices and provide as appropriate  - Encourage maximum independence but intervene and supervise when necessary  - Involve family in performance of ADLs  - Assess for home care needs following discharge   - Consider OT consult to assist with ADL evaluation and planning for discharge  - Provide patient education as appropriate  11/26/2024 1141 by Deanne Spear RN  Outcome: Progressing  11/26/2024 1141 by Deanne Spear RN  Outcome: Progressing  Goal: Maintains/Returns to pre admission functional level  Description: INTERVENTIONS:  - Perform AM-PAC 6 Click Basic Mobility/ Daily Activity assessment daily.  - Set and communicate daily mobility goal to care team and patient/family/caregiver.   - Collaborate with rehabilitation services on mobility goals if consulted  - Perform Range of Motion 3 times a day.  - Reposition patient every 3 hours.  - Dangle patient 3 times a day  - Stand patient 3 times a day  - Ambulate patient 3  times a day  - Out of bed to chair 3 times a day   - Out of bed for meals 3 times a day  - Out of bed for toileting  - Record patient progress and toleration of activity level   11/26/2024 1141 by Deanne Spear RN  Outcome: Progressing  11/26/2024 1141 by Deanne Spear RN  Outcome: Progressing     Problem: DISCHARGE PLANNING  Goal: Discharge to home or other facility with appropriate resources  Description: INTERVENTIONS:  - Identify barriers to discharge w/patient and caregiver  - Arrange for needed discharge resources and transportation as appropriate  - Identify discharge learning needs (meds, wound care, etc.)  - Arrange for interpretive services to assist at discharge as needed  - Refer to Case Management Department for coordinating discharge planning if the patient needs post-hospital services based on physician/advanced practitioner order or complex needs related to functional status, cognitive ability, or social support system  11/26/2024 1141 by Deanne Spear RN  Outcome: Progressing  11/26/2024 1141 by Deanne Spear RN  Outcome: Progressing     Problem: GASTROINTESTINAL - ADULT  Goal: Minimal or absence of nausea and/or vomiting  Description: INTERVENTIONS:  - Administer IV fluids if ordered to ensure adequate hydration  - Maintain NPO status until nausea and vomiting are resolved  - Nasogastric tube if ordered  - Administer ordered antiemetic medications as needed  - Provide nonpharmacologic comfort measures as appropriate  - Advance diet as tolerated, if ordered  - Consider nutrition services referral to assist patient with adequate nutrition and appropriate food choices  11/26/2024 1141 by Deanne Spear RN  Outcome: Progressing  11/26/2024 1141 by Deanne Spear RN  Outcome: Progressing  Goal: Maintains or returns to baseline bowel function  Description: INTERVENTIONS:  - Assess bowel function  - Encourage oral fluids to ensure adequate hydration  - Administer IV fluids if ordered to ensure  adequate hydration  - Administer ordered medications as needed  - Encourage mobilization and activity  - Consider nutritional services referral to assist patient with adequate nutrition and appropriate food choices  11/26/2024 1141 by Deanne Spear RN  Outcome: Progressing  11/26/2024 1141 by Deanne Spear RN  Outcome: Progressing  Goal: Maintains adequate nutritional intake  Description: INTERVENTIONS:  - Monitor percentage of each meal consumed  - Identify factors contributing to decreased intake, treat as appropriate  - Assist with meals as needed  - Monitor I&O, weight, and lab values if indicated  - Obtain nutrition services referral as needed  11/26/2024 1141 by Deanne Spear RN  Outcome: Progressing  11/26/2024 1141 by Deanne Spear RN  Outcome: Progressing  Goal: Establish and maintain optimal ostomy function  Description: INTERVENTIONS:  - Assess bowel function  - Encourage oral fluids to ensure adequate hydration  - Administer IV fluids if ordered to ensure adequate hydration   - Administer ordered medications as needed  - Encourage mobilization and activity  - Nutrition services referral to assist patient with appropriate food choices  - Assess stoma site  - Consider wound care consult   11/26/2024 1141 by Deanne Spear RN  Outcome: Progressing  11/26/2024 1141 by Deanne Spear RN  Outcome: Progressing  Goal: Oral mucous membranes remain intact  Description: INTERVENTIONS  - Assess oral mucosa and hygiene practices  - Implement preventative oral hygiene regimen  - Implement oral medicated treatments as ordered  - Initiate Nutrition services referral as needed  11/26/2024 1141 by Deanne Spear RN  Outcome: Progressing  11/26/2024 1141 by Deanne Spear RN  Outcome: Progressing     Problem: Prexisting or High Potential for Compromised Skin Integrity  Goal: Skin integrity is maintained or improved  Description: INTERVENTIONS:  - Identify patients at risk for skin breakdown  - Assess and  monitor skin integrity  - Assess and monitor nutrition and hydration status  - Monitor labs   - Assess for incontinence   - Turn and reposition patient  - Assist with mobility/ambulation  - Relieve pressure over bony prominences  - Avoid friction and shearing  - Provide appropriate hygiene as needed including keeping skin clean and dry  - Evaluate need for skin moisturizer/barrier cream  - Collaborate with interdisciplinary team   - Patient/family teaching  - Consider wound care consult   11/26/2024 1141 by Deanne Spear RN  Outcome: Progressing  11/26/2024 1141 by Deanne Spear RN  Outcome: Progressing     Problem: Nutrition/Hydration-ADULT  Goal: Nutrient/Hydration intake appropriate for improving, restoring or maintaining nutritional needs  Description: Monitor and assess patient's nutrition/hydration status for malnutrition. Collaborate with interdisciplinary team and initiate plan and interventions as ordered.  Monitor patient's weight and dietary intake as ordered or per policy. Utilize nutrition screening tool and intervene as necessary. Determine patient's food preferences and provide high-protein, high-caloric foods as appropriate.     INTERVENTIONS:  - Monitor oral intake, urinary output, labs, and treatment plans  - Assess nutrition and hydration status and recommend course of action  - Evaluate amount of meals eaten  - Assist patient with eating if necessary   - Allow adequate time for meals  - Recommend/ encourage appropriate diets, oral nutritional supplements, and vitamin/mineral supplements  - Order, calculate, and assess calorie counts as needed  - Recommend, monitor, and adjust tube feedings and TPN/PPN based on assessed needs  - Assess need for intravenous fluids  - Provide specific nutrition/hydration education as appropriate  - Include patient/family/caregiver in decisions related to nutrition  11/26/2024 1141 by Deanne Spear RN  Outcome: Progressing  11/26/2024 1141 by Deanne Spear  RN  Outcome: Progressing     Problem: INFECTION - ADULT  Goal: Absence or prevention of progression during hospitalization  Description: INTERVENTIONS:  - Assess and monitor for signs and symptoms of infection  - Monitor lab/diagnostic results  - Monitor all insertion sites, i.e. indwelling lines, tubes, and drains  - Monitor endotracheal if appropriate and nasal secretions for changes in amount and color  - [place] appropriate cooling/warming therapies per order  - Administer medications as ordered  - Instruct and encourage patient and family to use good hand hygiene technique  - Identify and instruct in appropriate isolation precautions for identified infection/condition  11/26/2024 1141 by Deanne Spear RN  Outcome: Progressing  11/26/2024 1141 by Deanne Spear RN  Outcome: Progressing     Problem: Knowledge Deficit  Goal: Patient/family/caregiver demonstrates understanding of disease process, treatment plan, medications, and discharge instructions  Description: Complete learning assessment and assess knowledge base.  Interventions:  - Provide teaching at level of understanding  - Provide teaching via preferred learning methods  11/26/2024 1141 by Deanne Spear RN  Outcome: Progressing  11/26/2024 1141 by Deanne Spear RN  Outcome: Progressing

## 2024-11-26 NOTE — ARC ADMISSION
[facility] community Liaison called pt's niece/ family- introduced self, explained role, reviewed ARC program, services offered, acute rehab criteria, review of referral by [facility] Medical Director, insurance authorization process, the three ARC locations and anticipated rehab length of stay.; all questions answered. family first choice is  [facility] and second is [facility]. family made aware ARC reviewer will communicate with the  who will keep patient updated on referral status.    Of note, niadriane believes pt's first choice would be  [facility] to stay closest to home in [place],  attempted to reach out to pt directly however phone number listed for her is not in service. Would ask CM to follow up with pt regarding location/choice preference if pt is accepted to [facility].

## 2024-11-26 NOTE — PROGRESS NOTES
Patient:  ASAF BULLOCK    MRN:  [id number]    Aidin Request ID:  [id number]    Level of care reserved:  Skilled Nursing Facility    Partner Reserved:  [facility], [place] [address] [phone]    Clinical needs requested:    Geography searched:  10 miles around [id number]    Start of Service:    Request sent:  4:04pm EST on 11/25/2024 by Karmen Aguilar    Partner reserved:  4:49pm EST on 11/26/2024 by Karmen Aguilar    Choice list shared:

## 2024-11-26 NOTE — CASE MANAGEMENT
Case Management Discharge Planning Note    Patient name Lety Botello  Location ICU 02/ICU 02 MRN [id number]  : 1960 Date 2024       Current Admission Date: 2024  Current Admission Diagnosis:SBO (small bowel obstruction) (HCC)   Patient Active Problem List    Diagnosis Date Noted Date Diagnosed    Hypotension after procedure 2024     Shingles 2024     On total parenteral nutrition (TPN) 2024     Abdominal pain 2024     Nausea & vomiting 2024     Constipation 11/10/2024     Morbid obesity (HCC) 2024     Gastroparesis 2024     Gastric dilation 2024     History of pulmonary embolism 2024     Hypothyroidism 2024     Mild intermittent asthma 2024     Hypertension      Type 2 diabetes mellitus (HCC)      Endometrial cancer (HCC)      Obesity      SBO (small bowel obstruction) (HCC) 2024       LOS (days): 12  Geometric Mean LOS (GMLOS) (days): 3  Days to GMLOS:-9.1     OBJECTIVE:  Risk of Unplanned Readmission Score: 29.94         Current admission status: Inpatient   [facility]erred Pharmacy:   [id number] [place] [facility] [address]  [address] [address]  Phone: [phone] Fax: [phone]    Primary Care Provider: Tai Martinez    Primary Insurance: POPPY TSANG  Secondary Insurance:     DISCHARGE DETAILS:    Discharge planning discussed with:: patient        Additional Comments: CM provided the patient with her choice list for AR[facility] and STR/SNF - Patient has opted to go with [facility] [address] [place] [address] Phone: [phone] Fax: [phone] and [facility] [address] [place] [address] Phone: [phone] Fax: [phone]. CM will follow up with providers for medical stability before submitting for insurance auth.                       Patient requesting copy of lab results mailed

## 2024-11-27 LAB
ALBUMIN SERPL BCG-MCNC: 1.9 G/DL (ref 3.5–5)
ALP SERPL-CCNC: 118 U/L (ref 34–104)
ALT SERPL W P-5'-P-CCNC: 7 U/L (ref 7–52)
ANION GAP SERPL CALCULATED.3IONS-SCNC: 3 MMOL/L (ref 4–13)
APTT PPP: 49 SECONDS (ref 23–34)
AST SERPL W P-5'-P-CCNC: 7 U/L (ref 13–39)
BASOPHILS # BLD AUTO: 0.01 THOUSANDS/ΜL (ref 0–0.1)
BASOPHILS NFR BLD AUTO: 0 % (ref 0–1)
BILIRUB DIRECT SERPL-MCNC: 0.03 MG/DL (ref 0–0.2)
BILIRUB SERPL-MCNC: 0.23 MG/DL (ref 0.2–1)
BUN SERPL-MCNC: 35 MG/DL (ref 5–25)
CA-I BLD-SCNC: 1.16 MMOL/L (ref 1.12–1.32)
CALCIUM SERPL-MCNC: 7.6 MG/DL (ref 8.4–10.2)
CHLORIDE SERPL-SCNC: 105 MMOL/L (ref 96–108)
CHOLEST SERPL-MCNC: 65 MG/DL (ref ?–200)
CO2 SERPL-SCNC: 23 MMOL/L (ref 21–32)
CREAT SERPL-MCNC: 0.63 MG/DL (ref 0.6–1.3)
EOSINOPHIL # BLD AUTO: 0.14 THOUSAND/ΜL (ref 0–0.61)
EOSINOPHIL NFR BLD AUTO: 4 % (ref 0–6)
ERYTHROCYTE [DISTWIDTH] IN BLOOD BY AUTOMATED COUNT: 17.2 % (ref 11.6–15.1)
GFR SERPL CREATININE-BSD FRML MDRD: 96 ML/MIN/1.73SQ M
GLUCOSE SERPL-MCNC: 171 MG/DL (ref 65–140)
GLUCOSE SERPL-MCNC: 192 MG/DL (ref 65–140)
GLUCOSE SERPL-MCNC: 206 MG/DL (ref 65–140)
GLUCOSE SERPL-MCNC: 251 MG/DL (ref 65–140)
GLUCOSE SERPL-MCNC: 256 MG/DL (ref 65–140)
HCT VFR BLD AUTO: 26.8 % (ref 34.8–46.1)
HDLC SERPL-MCNC: 12 MG/DL
HGB BLD-MCNC: 8.6 G/DL (ref 11.5–15.4)
IMM GRANULOCYTES # BLD AUTO: 0.05 THOUSAND/UL (ref 0–0.2)
IMM GRANULOCYTES NFR BLD AUTO: 1 % (ref 0–2)
LDLC SERPL CALC-MCNC: 18 MG/DL (ref 0–100)
LYMPHOCYTES # BLD AUTO: 0.27 THOUSANDS/ΜL (ref 0.6–4.47)
LYMPHOCYTES NFR BLD AUTO: 7 % (ref 14–44)
MAGNESIUM SERPL-MCNC: 2.5 MG/DL (ref 1.9–2.7)
MCH RBC QN AUTO: 30.7 PG (ref 26.8–34.3)
MCHC RBC AUTO-ENTMCNC: 32.1 G/DL (ref 31.4–37.4)
MCV RBC AUTO: 96 FL (ref 82–98)
MONOCYTES # BLD AUTO: 0.21 THOUSAND/ΜL (ref 0.17–1.22)
MONOCYTES NFR BLD AUTO: 5 % (ref 4–12)
NEUTROPHILS # BLD AUTO: 3.33 THOUSANDS/ΜL (ref 1.85–7.62)
NEUTS SEG NFR BLD AUTO: 83 % (ref 43–75)
NRBC BLD AUTO-RTO: 0 /100 WBCS
PHOSPHATE SERPL-MCNC: 4.2 MG/DL (ref 2.3–4.1)
PLATELET # BLD AUTO: 110 THOUSANDS/UL (ref 149–390)
PMV BLD AUTO: 10 FL (ref 8.9–12.7)
POTASSIUM SERPL-SCNC: 3.7 MMOL/L (ref 3.5–5.3)
PREALB SERPL-MCNC: 9.1 MG/DL (ref 17–34)
PROT SERPL-MCNC: 4 G/DL (ref 6.4–8.4)
RBC # BLD AUTO: 2.8 MILLION/UL (ref 3.81–5.12)
SODIUM SERPL-SCNC: 131 MMOL/L (ref 135–147)
TRIGL SERPL-MCNC: 177 MG/DL (ref ?–150)
WBC # BLD AUTO: 4.01 THOUSAND/UL (ref 4.31–10.16)

## 2024-11-27 PROCEDURE — 99024 POSTOP FOLLOW-UP VISIT: CPT

## 2024-11-27 PROCEDURE — 80048 BASIC METABOLIC PNL TOTAL CA: CPT

## 2024-11-27 PROCEDURE — 80061 LIPID PANEL: CPT

## 2024-11-27 PROCEDURE — 82330 ASSAY OF CALCIUM: CPT

## 2024-11-27 PROCEDURE — 97116 GAIT TRAINING THERAPY: CPT

## 2024-11-27 PROCEDURE — 85730 THROMBOPLASTIN TIME PARTIAL: CPT | Performed by: SPECIALIST

## 2024-11-27 PROCEDURE — 97530 THERAPEUTIC ACTIVITIES: CPT

## 2024-11-27 PROCEDURE — 84100 ASSAY OF PHOSPHORUS: CPT

## 2024-11-27 PROCEDURE — 97535 SELF CARE MNGMENT TRAINING: CPT

## 2024-11-27 PROCEDURE — 97110 THERAPEUTIC EXERCISES: CPT

## 2024-11-27 PROCEDURE — 84134 ASSAY OF PREALBUMIN: CPT

## 2024-11-27 PROCEDURE — 80076 HEPATIC FUNCTION PANEL: CPT

## 2024-11-27 PROCEDURE — 82948 REAGENT STRIP/BLOOD GLUCOSE: CPT

## 2024-11-27 PROCEDURE — 85025 COMPLETE CBC W/AUTO DIFF WBC: CPT

## 2024-11-27 PROCEDURE — 83735 ASSAY OF MAGNESIUM: CPT

## 2024-11-27 RX ORDER — POTASSIUM CHLORIDE 1500 MG/1
40 TABLET, EXTENDED RELEASE ORAL ONCE
Status: COMPLETED | OUTPATIENT
Start: 2024-11-27 | End: 2024-11-27

## 2024-11-27 RX ORDER — PANTOPRAZOLE SODIUM 40 MG/10ML
40 INJECTION, POWDER, LYOPHILIZED, FOR SOLUTION INTRAVENOUS
Status: CANCELLED | OUTPATIENT
Start: 2024-11-27

## 2024-11-27 RX ORDER — ACETAMINOPHEN 10 MG/ML
1000 INJECTION, SOLUTION INTRAVENOUS EVERY 6 HOURS SCHEDULED
Status: DISCONTINUED | OUTPATIENT
Start: 2024-11-27 | End: 2024-11-28

## 2024-11-27 RX ORDER — LABETALOL HYDROCHLORIDE 5 MG/ML
10 INJECTION, SOLUTION INTRAVENOUS EVERY 4 HOURS PRN
Status: CANCELLED | OUTPATIENT
Start: 2024-11-27

## 2024-11-27 RX ORDER — INSULIN LISPRO 100 [IU]/ML
2-12 INJECTION, SOLUTION INTRAVENOUS; SUBCUTANEOUS EVERY 6 HOURS SCHEDULED
Status: CANCELLED | OUTPATIENT
Start: 2024-11-27

## 2024-11-27 RX ORDER — GABAPENTIN 100 MG/1
100 CAPSULE ORAL
Status: CANCELLED | OUTPATIENT
Start: 2024-11-27

## 2024-11-27 RX ORDER — ENOXAPARIN SODIUM 100 MG/ML
60 INJECTION SUBCUTANEOUS EVERY 12 HOURS SCHEDULED
Status: DISCONTINUED | OUTPATIENT
Start: 2024-11-27 | End: 2024-12-05 | Stop reason: HOSPADM

## 2024-11-27 RX ORDER — BISACODYL 10 MG
10 SUPPOSITORY, RECTAL RECTAL DAILY
Status: CANCELLED | OUTPATIENT
Start: 2024-11-27

## 2024-11-27 RX ORDER — CARVEDILOL 12.5 MG/1
12.5 TABLET ORAL EVERY 12 HOURS
Status: CANCELLED | OUTPATIENT
Start: 2024-11-27

## 2024-11-27 RX ORDER — NYSTATIN 100000 [USP'U]/G
POWDER TOPICAL 3 TIMES DAILY
Status: CANCELLED | OUTPATIENT
Start: 2024-11-27

## 2024-11-27 RX ORDER — INSULIN LISPRO 100 [IU]/ML
4-20 INJECTION, SOLUTION INTRAVENOUS; SUBCUTANEOUS
Status: DISCONTINUED | OUTPATIENT
Start: 2024-11-27 | End: 2024-12-03

## 2024-11-27 RX ORDER — ACETAMINOPHEN 10 MG/ML
1000 INJECTION, SOLUTION INTRAVENOUS EVERY 6 HOURS SCHEDULED
Status: CANCELLED | OUTPATIENT
Start: 2024-11-27

## 2024-11-27 RX ORDER — HYDROMORPHONE HCL/PF 1 MG/ML
0.5 SYRINGE (ML) INJECTION
Status: CANCELLED | OUTPATIENT
Start: 2024-11-27

## 2024-11-27 RX ORDER — CHLORHEXIDINE GLUCONATE ORAL RINSE 1.2 MG/ML
15 SOLUTION DENTAL EVERY 12 HOURS SCHEDULED
Status: CANCELLED | OUTPATIENT
Start: 2024-11-27

## 2024-11-27 RX ORDER — HYDRALAZINE HYDROCHLORIDE 20 MG/ML
5 INJECTION INTRAMUSCULAR; INTRAVENOUS EVERY 4 HOURS PRN
Status: CANCELLED | OUTPATIENT
Start: 2024-11-27

## 2024-11-27 RX ORDER — LEVOTHYROXINE SODIUM 125 UG/1
125 TABLET ORAL DAILY
Status: CANCELLED | OUTPATIENT
Start: 2024-11-27

## 2024-11-27 RX ADMIN — NYSTATIN: 100000 POWDER TOPICAL at 16:42

## 2024-11-27 RX ADMIN — NYSTATIN: 100000 POWDER TOPICAL at 08:07

## 2024-11-27 RX ADMIN — INSULIN LISPRO 4 UNITS: 100 INJECTION, SOLUTION INTRAVENOUS; SUBCUTANEOUS at 12:01

## 2024-11-27 RX ADMIN — INSULIN LISPRO 4 UNITS: 100 INJECTION, SOLUTION INTRAVENOUS; SUBCUTANEOUS at 21:25

## 2024-11-27 RX ADMIN — ACETAMINOPHEN 1000 MG: 10 INJECTION INTRAVENOUS at 19:32

## 2024-11-27 RX ADMIN — CHLORHEXIDINE GLUCONATE 15 ML: 1.2 RINSE ORAL at 08:03

## 2024-11-27 RX ADMIN — ACETAMINOPHEN 1000 MG: 10 INJECTION INTRAVENOUS at 01:45

## 2024-11-27 RX ADMIN — ENOXAPARIN SODIUM 60 MG: 100 INJECTION SUBCUTANEOUS at 21:25

## 2024-11-27 RX ADMIN — ACETAMINOPHEN 1000 MG: 10 INJECTION INTRAVENOUS at 13:21

## 2024-11-27 RX ADMIN — ACETAMINOPHEN 1000 MG: 10 INJECTION INTRAVENOUS at 07:40

## 2024-11-27 RX ADMIN — INSULIN LISPRO 6 UNITS: 100 INJECTION, SOLUTION INTRAVENOUS; SUBCUTANEOUS at 05:41

## 2024-11-27 RX ADMIN — OXYCODONE HYDROCHLORIDE 10 MG: 10 TABLET ORAL at 08:03

## 2024-11-27 RX ADMIN — PANTOPRAZOLE SODIUM 40 MG: 40 INJECTION, POWDER, LYOPHILIZED, FOR SOLUTION INTRAVENOUS at 08:04

## 2024-11-27 RX ADMIN — INSULIN LISPRO 4 UNITS: 100 INJECTION, SOLUTION INTRAVENOUS; SUBCUTANEOUS at 17:44

## 2024-11-27 RX ADMIN — POTASSIUM CHLORIDE 40 MEQ: 1500 TABLET, EXTENDED RELEASE ORAL at 17:37

## 2024-11-27 RX ADMIN — ENOXAPARIN SODIUM 60 MG: 100 INJECTION SUBCUTANEOUS at 08:03

## 2024-11-27 RX ADMIN — LEVOTHYROXINE SODIUM 125 MCG: 125 TABLET ORAL at 08:04

## 2024-11-27 RX ADMIN — OXYCODONE 5 MG: 5 TABLET ORAL at 12:00

## 2024-11-27 RX ADMIN — OXYCODONE HYDROCHLORIDE 10 MG: 10 TABLET ORAL at 17:37

## 2024-11-27 RX ADMIN — CARVEDILOL 12.5 MG: 12.5 TABLET, FILM COATED ORAL at 21:25

## 2024-11-27 RX ADMIN — CHLORHEXIDINE GLUCONATE 15 ML: 1.2 RINSE ORAL at 21:25

## 2024-11-27 RX ADMIN — CARVEDILOL 12.5 MG: 12.5 TABLET, FILM COATED ORAL at 08:03

## 2024-11-27 RX ADMIN — GABAPENTIN 100 MG: 100 CAPSULE ORAL at 21:25

## 2024-11-27 RX ADMIN — HYDROMORPHONE HYDROCHLORIDE 0.5 MG: 1 INJECTION, SOLUTION INTRAMUSCULAR; INTRAVENOUS; SUBCUTANEOUS at 13:21

## 2024-11-27 NOTE — PHYSICAL THERAPY NOTE
Physical Therapy Treatment Note     11/27/24 1003   PT Last Visit   PT Visit Date 11/27/24   Note Type   Note Type Treatment   Pain Assessment   Pain Assessment Tool 0-10   Pain Score 10 - Worst Possible Pain   Pain Location/Orientation Location: Abdomen   Restrictions/Precautions   Weight Bearing Precautions Per Order No   Other Precautions Bed Alarm;Fall Risk;Pain;Multiple lines;Telemetry;Cognitive  (anxiety, fear)   General   Chart Reviewed Yes   Family/Caregiver Present No   Subjective   Subjective Pt. in bed upon entry. Encouragemen provided for participating in therapy and pt. agreeable.   Bed Mobility   Rolling L 2  Maximal assistance   Additional items Assist x 2;Increased time required;Bedrails;LE management;Verbal cues   Supine to Sit 2  Maximal assistance   Additional items Assist x 1;Increased time required;LE management;Bedrails;HOB elevated;Verbal cues   Sit to Supine 3  Moderate assistance   Additional items Assist x 2;Increased time required;LE management;Verbal cues   Additional Comments Max A x 2 for scooting up in bed in supine   Transfers   Sit to Stand 4  Minimal assistance   Additional items Assist x 2;Increased time required;Verbal cues;Armrests   Stand to Sit 4  Minimal assistance   Additional items Assist x 2;Increased time required;Verbal cues   Stand pivot 4  Minimal assistance   Additional items Assist x 2;Increased time required;Verbal cues   Ambulation/Elevation   Gait pattern Improper Weight shift;Decreased foot clearance;Wide DA;Forward Flexion;Excessively slow;Short stride;Decreased heel strike;Decreased toe off;Step to   Gait Assistance 4  Minimal assist   Additional items Assist x 2;Verbal cues   Assistive Device Rolling walker   Distance 6 stepf forward, 8 side steps, 6 steps backwards   Balance   Static Sitting Good   Dynamic Sitting Fair   Static Standing Fair -   Dynamic Standing Poor +   Ambulatory Poor +   Endurance Deficit   Endurance Deficit No   Activity Tolerance    Activity Tolerance Patient tolerated treatment well;Other (Comment)  (fear, anxiety)   Nurse Made Aware Yes   Exercises   TKR Supine;Sitting;Bilateral;AROM;20 reps;10 reps  (AP, quad sets, minimal HS, LAQ)   Assessment   Prognosis Fair   Problem List Decreased range of motion;Decreased strength;Impaired balance;Decreased mobility;Impaired judgement;Pain;Obesity;Decreased cognition   Assessment Pt. able to participate well in therapy session today. pt. noted to be very fearful and anxious during and regarding mobility. Pt. provided extended education, encouragement and emotional support t/o session. Pt. required less A for supine to sit trasnfers cues for hand placement and techniques for all transfers given. pt. able to maintain static sitting at the EOB unsupported with cues for postural correction as pt. was retropulsing initiially when seated at EOB. Pt. able to maintain static standing with RW for urinating, and to change of pads on the buttocks. Pt. was given cues for keeping the RW closer durin ambulation and to take steadier steps with increased heel strike. Pt. got anxious and needed to be seated on the chair during ambulation as she was reporting she was falling which was not true. Pt. declined to ambulate furher after that and wanted to get back in bed. Pt. able to perform back stepping to the EOB and sidestepping at the EOB towards HOB. P. back in bed post session with all needs within reach and OTR present in the room. Will continue to follow per PT POC. pt. has regressed in her mobility and is not at her baseline for functioning hence recommending rehab at this time prior to returning home. Pt. asked about if she can be helped with her current mental status and therapist passed on the msg. to RN.   Barriers to Discharge None   Goals   Patient Goals Walk again like I used to   STG Expiration Date 12/09/24   PT Treatment Day 1   Plan   Treatment/Interventions LE strengthening/ROM;Functional transfer  training;Therapeutic exercise;Spoke to nursing;OT;Bed mobility;Gait training;Equipment eval/education;Patient/family training   Progress Slow progress, decreased activity tolerance   PT Frequency 3-5x/wk   Discharge Recommendation   Rehab Resource Intensity Level, PT II (Moderate Resource Intensity)   Equipment Recommended Walker   AM-PAC Basic Mobility Inpatient   Turning in Flat Bed Without Bedrails 1   Lying on Back to Sitting on Edge of Flat Bed Without Bedrails 1   Moving Bed to Chair 3   Standing Up From Chair Using Arms 3   Walk in Room 3   Climb 3-5 Stairs With Railing 2   Basic Mobility Inpatient Raw Score 13   Basic Mobility Standardized Score 33.99   Turning Head Towards Sound 4   Follow Simple Instructions 4   Low Function Basic Mobility Raw Score  21   Low Function Basic Mobility Standardized Score  34.39   [facility] Highest Level Of Mobility   -HL Goal 4: Move to chair/commode   -HLM Achieved 6: Walk 10 steps or more   End of Consult   Patient Position at End of Consult All needs within reach;Bed/Chair alarm activated;Supine         Chinyere Driscoll PTA    An AM-PAC basic mobility standardized score less than 42.9 suggest the patient may benefit from discharge to post-acute rehab services.

## 2024-11-27 NOTE — PLAN OF CARE
Problem: Potential for Falls  Goal: Patient will remain free of falls  Description: INTERVENTIONS:  - Educate patient/family on patient safety including physical limitations  - Instruct patient to call for assistance with activity   - Consult OT/PT to assist with strengthening/mobility   - Keep Call bell within reach  - Keep bed low and locked with side rails adjusted as appropriate  - Keep care items and personal belongings within reach  - Initiate and maintain comfort rounds  - Make Fall Risk Sign visible to staff  - Offer Toileting every 3 Hours, in advance of need  - Initiate/Maintain bed alarm  - Obtain necessary fall risk management equipment  - Apply yellow socks and bracelet for high fall risk patients  - Consider moving patient to room near nurses station  11/27/2024 1254 by Madison Mckeon RN  Outcome: Progressing  11/27/2024 0912 by Madison Mckeon RN  Outcome: Progressing     Problem: PAIN - ADULT  Goal: Verbalizes/displays adequate comfort level or baseline comfort level  Description: Interventions:  - Encourage patient to monitor pain and request assistance  - Assess pain using appropriate pain scale  - Administer analgesics based on type and severity of pain and evaluate response  - Implement non-pharmacological measures as appropriate and evaluate response  - Consider cultural and social influences on pain and pain management  - Notify physician/advanced practitioner if interventions unsuccessful or patient reports new pain  11/27/2024 1254 by Madison Mckeon RN  Outcome: Progressing  11/27/2024 0912 by Madison Mckeon RN  Outcome: Progressing     Problem: SAFETY ADULT  Goal: Patient will remain free of falls  Description: INTERVENTIONS:  - Educate patient/family on patient safety including physical limitations  - Instruct patient to call for assistance with activity   - Consult OT/PT to assist with strengthening/mobility   - Keep Call bell within reach  - Keep bed low and locked with side rails adjusted  as appropriate  - Keep care items and personal belongings within reach  - Initiate and maintain comfort rounds  - Make Fall Risk Sign visible to staff  - Offer Toileting every 3 Hours, in advance of need  - Initiate/Maintain bed alarm  - Obtain necessary fall risk management equipment  - Apply yellow socks and bracelet for high fall risk patients  - Consider moving patient to room near nurses station  11/27/2024 1254 by Madison Mckeon RN  Outcome: Progressing  11/27/2024 0912 by Madison Mckeon RN  Outcome: Progressing  Goal: Maintain or return to baseline ADL function  Description: INTERVENTIONS:  -  Assess patient's ability to carry out ADLs; assess patient's baseline for ADL function and identify physical deficits which impact ability to perform ADLs (bathing, care of mouth/teeth, toileting, grooming, dressing, etc.)  - Assess/evaluate cause of self-care deficits   - Assess range of motion  - Assess patient's mobility; develop plan if impaired  - Assess patient's need for assistive devices and provide as appropriate  - Encourage maximum independence but intervene and supervise when necessary  - Involve family in performance of ADLs  - Assess for home care needs following discharge   - Consider OT consult to assist with ADL evaluation and planning for discharge  - Provide patient education as appropriate  11/27/2024 1254 by Madison Mckeon RN  Outcome: Progressing  11/27/2024 0912 by Madison Mckeon RN  Outcome: Progressing  Goal: Maintains/Returns to pre admission functional level  Description: INTERVENTIONS:  - Perform AM-PAC 6 Click Basic Mobility/ Daily Activity assessment daily.  - Set and communicate daily mobility goal to care team and patient/family/caregiver.   - Collaborate with rehabilitation services on mobility goals if consulted  - Perform Range of Motion 3 times a day.  - Reposition patient every 3 hours.  - Dangle patient 3 times a day  - Stand patient 3 times a day  - Ambulate patient 3 times a day  -  Out of bed to chair 3 times a day   - Out of bed for meals 3 times a day  - Out of bed for toileting  - Record patient progress and toleration of activity level   11/27/2024 1254 by Madiosn Mckeon RN  Outcome: Progressing  11/27/2024 0912 by Madison Mckeon RN  Outcome: Progressing     Problem: DISCHARGE PLANNING  Goal: Discharge to home or other facility with appropriate resources  Description: INTERVENTIONS:  - Identify barriers to discharge w/patient and caregiver  - Arrange for needed discharge resources and transportation as appropriate  - Identify discharge learning needs (meds, wound care, etc.)  - Arrange for interpretive services to assist at discharge as needed  - Refer to Case Management Department for coordinating discharge planning if the patient needs post-hospital services based on physician/advanced practitioner order or complex needs related to functional status, cognitive ability, or social support system  11/27/2024 1254 by Madison Mckeon RN  Outcome: Progressing  11/27/2024 0912 by Madison Mckeon RN  Outcome: Progressing     Problem: GASTROINTESTINAL - ADULT  Goal: Minimal or absence of nausea and/or vomiting  Description: INTERVENTIONS:  - Administer IV fluids if ordered to ensure adequate hydration  - Maintain NPO status until nausea and vomiting are resolved  - Nasogastric tube if ordered  - Administer ordered antiemetic medications as needed  - Provide nonpharmacologic comfort measures as appropriate  - Advance diet as tolerated, if ordered  - Consider nutrition services referral to assist patient with adequate nutrition and appropriate food choices  11/27/2024 1254 by Madison Mckeon RN  Outcome: Progressing  11/27/2024 0912 by Madison Mckeon RN  Outcome: Progressing  Goal: Maintains or returns to baseline bowel function  Description: INTERVENTIONS:  - Assess bowel function  - Encourage oral fluids to ensure adequate hydration  - Administer IV fluids if ordered to ensure adequate hydration  -  Administer ordered medications as needed  - Encourage mobilization and activity  - Consider nutritional services referral to assist patient with adequate nutrition and appropriate food choices  11/27/2024 1254 by Madison Mckeon RN  Outcome: Progressing  11/27/2024 0912 by Madison Mckeon RN  Outcome: Progressing  Goal: Maintains adequate nutritional intake  Description: INTERVENTIONS:  - Monitor percentage of each meal consumed  - Identify factors contributing to decreased intake, treat as appropriate  - Assist with meals as needed  - Monitor I&O, weight, and lab values if indicated  - Obtain nutrition services referral as needed  11/27/2024 1254 by Madison Mckeon RN  Outcome: Progressing  11/27/2024 0912 by Madison Mckeon RN  Outcome: Progressing  Goal: Establish and maintain optimal ostomy function  Description: INTERVENTIONS:  - Assess bowel function  - Encourage oral fluids to ensure adequate hydration  - Administer IV fluids if ordered to ensure adequate hydration   - Administer ordered medications as needed  - Encourage mobilization and activity  - Nutrition services referral to assist patient with appropriate food choices  - Assess stoma site  - Consider wound care consult   11/27/2024 1254 by Madison Mckeon RN  Outcome: Progressing  11/27/2024 0912 by Madison Mckeon RN  Outcome: Progressing  Goal: Oral mucous membranes remain intact  Description: INTERVENTIONS  - Assess oral mucosa and hygiene practices  - Implement preventative oral hygiene regimen  - Implement oral medicated treatments as ordered  - Initiate Nutrition services referral as needed  11/27/2024 1254 by Madison Mckeon RN  Outcome: Progressing  11/27/2024 0912 by Madison Mckeon RN  Outcome: Progressing     Problem: Prexisting or High Potential for Compromised Skin Integrity  Goal: Skin integrity is maintained or improved  Description: INTERVENTIONS:  - Identify patients at risk for skin breakdown  - Assess and monitor skin integrity  - Assess and monitor  nutrition and hydration status  - Monitor labs   - Assess for incontinence   - Turn and reposition patient  - Assist with mobility/ambulation  - Relieve pressure over bony prominences  - Avoid friction and shearing  - Provide appropriate hygiene as needed including keeping skin clean and dry  - Evaluate need for skin moisturizer/barrier cream  - Collaborate with interdisciplinary team   - Patient/family teaching  - Consider wound care consult   11/27/2024 1254 by Madison Mckeon RN  Outcome: Progressing  11/27/2024 0912 by Madison Mckeon RN  Outcome: Progressing     Problem: Nutrition/Hydration-ADULT  Goal: Nutrient/Hydration intake appropriate for improving, restoring or maintaining nutritional needs  Description: Monitor and assess patient's nutrition/hydration status for malnutrition. Collaborate with interdisciplinary team and initiate plan and interventions as ordered.  Monitor patient's weight and dietary intake as ordered or per policy. Utilize nutrition screening tool and intervene as necessary. Determine patient's food preferences and provide high-protein, high-caloric foods as appropriate.     INTERVENTIONS:  - Monitor oral intake, urinary output, labs, and treatment plans  - Assess nutrition and hydration status and recommend course of action  - Evaluate amount of meals eaten  - Assist patient with eating if necessary   - Allow adequate time for meals  - Recommend/ encourage appropriate diets, oral nutritional supplements, and vitamin/mineral supplements  - Order, calculate, and assess calorie counts as needed  - Recommend, monitor, and adjust tube feedings and TPN/PPN based on assessed needs  - Assess need for intravenous fluids  - Provide specific nutrition/hydration education as appropriate  - Include patient/family/caregiver in decisions related to nutrition  11/27/2024 1254 by Madison Mckeon RN  Outcome: Progressing  11/27/2024 0912 by Madison Mckeon RN  Outcome: Progressing     Problem: INFECTION -  ADULT  Goal: Absence or prevention of progression during hospitalization  Description: INTERVENTIONS:  - Assess and monitor for signs and symptoms of infection  - Monitor lab/diagnostic results  - Monitor all insertion sites, i.e. indwelling lines, tubes, and drains  - Monitor endotracheal if appropriate and nasal secretions for changes in amount and color  - [place] appropriate cooling/warming therapies per order  - Administer medications as ordered  - Instruct and encourage patient and family to use good hand hygiene technique  - Identify and instruct in appropriate isolation precautions for identified infection/condition  11/27/2024 1254 by Madison Mckeon RN  Outcome: Progressing  11/27/2024 0912 by Madison Mckeon RN  Outcome: Progressing     Problem: Knowledge Deficit  Goal: Patient/family/caregiver demonstrates understanding of disease process, treatment plan, medications, and discharge instructions  Description: Complete learning assessment and assess knowledge base.  Interventions:  - Provide teaching at level of understanding  - Provide teaching via preferred learning methods  11/27/2024 1254 by Madison Mckeon RN  Outcome: Progressing  11/27/2024 0912 by Madison Mckeon RN  Outcome: Progressing     Problem: METABOLIC, FLUID AND ELECTROLYTES - ADULT  Goal: Electrolytes maintained within normal limits  Description: INTERVENTIONS:  - Monitor labs and assess patient for signs and symptoms of electrolyte imbalances  - Administer electrolyte replacement as ordered  - Monitor response to electrolyte replacements, including repeat lab results as appropriate  - Instruct patient on fluid and nutrition as appropriate  11/27/2024 1254 by Madison Mckeon RN  Outcome: Progressing  11/27/2024 0912 by Madison Mckeon RN  Outcome: Progressing     Problem: SKIN/TISSUE INTEGRITY - ADULT  Goal: Skin Integrity remains intact(Skin Breakdown Prevention)  Description: Assess:  -Perform Esteban assessment every   -Clean and moisturize skin  every   -Inspect skin when repositioning, toileting, and assisting with ADLS  -Assess under medical devices such as  every   -Assess extremities for adequate circulation and sensation     Bed Management:  -Have minimal linens on bed & keep smooth, unwrinkled  -Change linens as needed when moist or perspiring  -Avoid sitting or lying in one position for more than  hours while in bed  -Keep HOB at degrees     Toileting:  -Offer bedside commode  -Assess for incontinence every   -Use incontinent care products after each incontinent episode such as     Activity:  -Mobilize patient  times a day  -Encourage activity and walks on unit  -Encourage or provide ROM exercises   -Turn and reposition patient every  Hours  -Use appropriate equipment to lift or move patient in bed  -Instruct/ Assist with weight shifting every  when out of bed in chair  -Consider limitation of chair time  hour intervals    Skin Care:  -Avoid use of baby powder, tape, friction and shearing, hot water or constrictive clothing  -Relieve pressure over bony prominences usin  -Do not massage red bony areas    Next Steps:  -Teach patient strategies to minimize risks such as    -Consider consults to  interdisciplinary teams such as   11/27/2024 1254 by Madison Mckeon RN  Outcome: Progressing  11/27/2024 0912 by Madison Mckeon RN  Outcome: Progressing  Goal: Incision(s), wounds(s) or drain site(s) healing without S/S of infection  Description: INTERVENTIONS  - Assess and document dressing, incision, wound bed, drain sites and surrounding tissue  - Provide patient and family education  - Perform skin care/dressing changes every   11/27/2024 1254 by Madison Mckeon RN  Outcome: Progressing  11/27/2024 0912 by Madison Mckeon RN  Outcome: Progressing     Problem: HEMATOLOGIC - ADULT  Goal: Maintains hematologic stability  Description: INTERVENTIONS  - Assess for signs and symptoms of bleeding or hemorrhage  - Monitor labs  - Administer supportive blood  products/factors as ordered and appropriate  11/27/2024 1254 by Madison Mckeon RN  Outcome: Progressing  11/27/2024 0912 by Madison Mckeon RN  Outcome: Progressing     Problem: CARDIOVASCULAR - ADULT  Goal: Maintains optimal cardiac output and hemodynamic stability  Description: INTERVENTIONS:  - Monitor I/O, vital signs and rhythm  - Monitor for S/S and trends of decreased cardiac output  - Administer and titrate ordered vasoactive medications to optimize hemodynamic stability  - Assess quality of pulses, skin color and temperature  - Assess for signs of decreased coronary artery perfusion  - Instruct patient to report change in severity of symptoms  Outcome: Progressing  Goal: Absence of cardiac dysrhythmias or at baseline rhythm  Description: INTERVENTIONS:  - Continuous cardiac monitoring, vital signs, obtain 12 lead EKG if ordered  - Administer antiarrhythmic and heart rate control medications as ordered  - Monitor electrolytes and administer replacement therapy as ordered  Outcome: Progressing     Problem: RESPIRATORY - ADULT  Goal: Achieves optimal ventilation and oxygenation  Description: INTERVENTIONS:  - Assess for changes in respiratory status  - Assess for changes in mentation and behavior  - Position to facilitate oxygenation and minimize respiratory effort  - Oxygen administered by appropriate delivery if ordered  - Initiate smoking cessation education as indicated  - Encourage broncho-pulmonary hygiene including cough, deep breathe, Incentive Spirometry  - Assess the need for suctioning and aspirate as needed  - Assess and instruct to report SOB or any respiratory difficulty  - Respiratory Therapy support as indicated  Outcome: Progressing

## 2024-11-27 NOTE — PLAN OF CARE
Problem: Potential for Falls  Goal: Patient will remain free of falls  Description: INTERVENTIONS:  - Educate patient/family on patient safety including physical limitations  - Instruct patient to call for assistance with activity   - Consult OT/PT to assist with strengthening/mobility   - Keep Call bell within reach  - Keep bed low and locked with side rails adjusted as appropriate  - Keep care items and personal belongings within reach  - Initiate and maintain comfort rounds  - Make Fall Risk Sign visible to staff  - Offer Toileting every 3 Hours, in advance of need  - Initiate/Maintain bed alarm  - Obtain necessary fall risk management equipment  - Apply yellow socks and bracelet for high fall risk patients  - Consider moving patient to room near nurses station  Outcome: Progressing     Problem: PAIN - ADULT  Goal: Verbalizes/displays adequate comfort level or baseline comfort level  Description: Interventions:  - Encourage patient to monitor pain and request assistance  - Assess pain using appropriate pain scale  - Administer analgesics based on type and severity of pain and evaluate response  - Implement non-pharmacological measures as appropriate and evaluate response  - Consider cultural and social influences on pain and pain management  - Notify physician/advanced practitioner if interventions unsuccessful or patient reports new pain  Outcome: Progressing     Problem: SAFETY ADULT  Goal: Patient will remain free of falls  Description: INTERVENTIONS:  - Educate patient/family on patient safety including physical limitations  - Instruct patient to call for assistance with activity   - Consult OT/PT to assist with strengthening/mobility   - Keep Call bell within reach  - Keep bed low and locked with side rails adjusted as appropriate  - Keep care items and personal belongings within reach  - Initiate and maintain comfort rounds  - Make Fall Risk Sign visible to staff  - Offer Toileting every 3 Hours, in  advance of need  - Initiate/Maintain bed alarm  - Obtain necessary fall risk management equipment  - Apply yellow socks and bracelet for high fall risk patients  - Consider moving patient to room near nurses station  Outcome: Progressing  Goal: Maintain or return to baseline ADL function  Description: INTERVENTIONS:  -  Assess patient's ability to carry out ADLs; assess patient's baseline for ADL function and identify physical deficits which impact ability to perform ADLs (bathing, care of mouth/teeth, toileting, grooming, dressing, etc.)  - Assess/evaluate cause of self-care deficits   - Assess range of motion  - Assess patient's mobility; develop plan if impaired  - Assess patient's need for assistive devices and provide as appropriate  - Encourage maximum independence but intervene and supervise when necessary  - Involve family in performance of ADLs  - Assess for home care needs following discharge   - Consider OT consult to assist with ADL evaluation and planning for discharge  - Provide patient education as appropriate  Outcome: Progressing  Goal: Maintains/Returns to pre admission functional level  Description: INTERVENTIONS:  - Perform AM-PAC 6 Click Basic Mobility/ Daily Activity assessment daily.  - Set and communicate daily mobility goal to care team and patient/family/caregiver.   - Collaborate with rehabilitation services on mobility goals if consulted  - Perform Range of Motion 3 times a day.  - Reposition patient every 3 hours.  - Dangle patient 3 times a day  - Stand patient 3 times a day  - Ambulate patient 3 times a day  - Out of bed to chair 3 times a day   - Out of bed for meals 3 times a day  - Out of bed for toileting  - Record patient progress and toleration of activity level   Outcome: Progressing     Problem: DISCHARGE PLANNING  Goal: Discharge to home or other facility with appropriate resources  Description: INTERVENTIONS:  - Identify barriers to discharge w/patient and caregiver  -  Arrange for needed discharge resources and transportation as appropriate  - Identify discharge learning needs (meds, wound care, etc.)  - Arrange for interpretive services to assist at discharge as needed  - Refer to Case Management Department for coordinating discharge planning if the patient needs post-hospital services based on physician/advanced practitioner order or complex needs related to functional status, cognitive ability, or social support system  Outcome: Progressing     Problem: GASTROINTESTINAL - ADULT  Goal: Minimal or absence of nausea and/or vomiting  Description: INTERVENTIONS:  - Administer IV fluids if ordered to ensure adequate hydration  - Maintain NPO status until nausea and vomiting are resolved  - Nasogastric tube if ordered  - Administer ordered antiemetic medications as needed  - Provide nonpharmacologic comfort measures as appropriate  - Advance diet as tolerated, if ordered  - Consider nutrition services referral to assist patient with adequate nutrition and appropriate food choices  Outcome: Progressing  Goal: Maintains or returns to baseline bowel function  Description: INTERVENTIONS:  - Assess bowel function  - Encourage oral fluids to ensure adequate hydration  - Administer IV fluids if ordered to ensure adequate hydration  - Administer ordered medications as needed  - Encourage mobilization and activity  - Consider nutritional services referral to assist patient with adequate nutrition and appropriate food choices  Outcome: Progressing  Goal: Maintains adequate nutritional intake  Description: INTERVENTIONS:  - Monitor percentage of each meal consumed  - Identify factors contributing to decreased intake, treat as appropriate  - Assist with meals as needed  - Monitor I&O, weight, and lab values if indicated  - Obtain nutrition services referral as needed  Outcome: Progressing  Goal: Establish and maintain optimal ostomy function  Description: INTERVENTIONS:  - Assess bowel  function  - Encourage oral fluids to ensure adequate hydration  - Administer IV fluids if ordered to ensure adequate hydration   - Administer ordered medications as needed  - Encourage mobilization and activity  - Nutrition services referral to assist patient with appropriate food choices  - Assess stoma site  - Consider wound care consult   Outcome: Progressing  Goal: Oral mucous membranes remain intact  Description: INTERVENTIONS  - Assess oral mucosa and hygiene practices  - Implement preventative oral hygiene regimen  - Implement oral medicated treatments as ordered  - Initiate Nutrition services referral as needed  Outcome: Progressing     Problem: Prexisting or High Potential for Compromised Skin Integrity  Goal: Skin integrity is maintained or improved  Description: INTERVENTIONS:  - Identify patients at risk for skin breakdown  - Assess and monitor skin integrity  - Assess and monitor nutrition and hydration status  - Monitor labs   - Assess for incontinence   - Turn and reposition patient  - Assist with mobility/ambulation  - Relieve pressure over bony prominences  - Avoid friction and shearing  - Provide appropriate hygiene as needed including keeping skin clean and dry  - Evaluate need for skin moisturizer/barrier cream  - Collaborate with interdisciplinary team   - Patient/family teaching  - Consider wound care consult   Outcome: Progressing     Problem: Nutrition/Hydration-ADULT  Goal: Nutrient/Hydration intake appropriate for improving, restoring or maintaining nutritional needs  Description: Monitor and assess patient's nutrition/hydration status for malnutrition. Collaborate with interdisciplinary team and initiate plan and interventions as ordered.  Monitor patient's weight and dietary intake as ordered or per policy. Utilize nutrition screening tool and intervene as necessary. Determine patient's food preferences and provide high-protein, high-caloric foods as appropriate.     INTERVENTIONS:  -  Monitor oral intake, urinary output, labs, and treatment plans  - Assess nutrition and hydration status and recommend course of action  - Evaluate amount of meals eaten  - Assist patient with eating if necessary   - Allow adequate time for meals  - Recommend/ encourage appropriate diets, oral nutritional supplements, and vitamin/mineral supplements  - Order, calculate, and assess calorie counts as needed  - Recommend, monitor, and adjust tube feedings and TPN/PPN based on assessed needs  - Assess need for intravenous fluids  - Provide specific nutrition/hydration education as appropriate  - Include patient/family/caregiver in decisions related to nutrition  Outcome: Progressing     Problem: INFECTION - ADULT  Goal: Absence or prevention of progression during hospitalization  Description: INTERVENTIONS:  - Assess and monitor for signs and symptoms of infection  - Monitor lab/diagnostic results  - Monitor all insertion sites, i.e. indwelling lines, tubes, and drains  - Monitor endotracheal if appropriate and nasal secretions for changes in amount and color  - [place] appropriate cooling/warming therapies per order  - Administer medications as ordered  - Instruct and encourage patient and family to use good hand hygiene technique  - Identify and instruct in appropriate isolation precautions for identified infection/condition  Outcome: Progressing     Problem: Knowledge Deficit  Goal: Patient/family/caregiver demonstrates understanding of disease process, treatment plan, medications, and discharge instructions  Description: Complete learning assessment and assess knowledge base.  Interventions:  - Provide teaching at level of understanding  - Provide teaching via preferred learning methods  Outcome: Progressing

## 2024-11-27 NOTE — PLAN OF CARE
Problem: Potential for Falls  Goal: Patient will remain free of falls  Description: INTERVENTIONS:  - Educate patient/family on patient safety including physical limitations  - Instruct patient to call for assistance with activity   - Consult OT/PT to assist with strengthening/mobility   - Keep Call bell within reach  - Keep bed low and locked with side rails adjusted as appropriate  - Keep care items and personal belongings within reach  - Initiate and maintain comfort rounds  - Make Fall Risk Sign visible to staff  - Offer Toileting every 3 Hours, in advance of need  - Initiate/Maintain bed alarm  - Obtain necessary fall risk management equipment  - Apply yellow socks and bracelet for high fall risk patients  - Consider moving patient to room near nurses station  Outcome: Progressing     Problem: PAIN - ADULT  Goal: Verbalizes/displays adequate comfort level or baseline comfort level  Description: Interventions:  - Encourage patient to monitor pain and request assistance  - Assess pain using appropriate pain scale  - Administer analgesics based on type and severity of pain and evaluate response  - Implement non-pharmacological measures as appropriate and evaluate response  - Consider cultural and social influences on pain and pain management  - Notify physician/advanced practitioner if interventions unsuccessful or patient reports new pain  Outcome: Progressing     Problem: SAFETY ADULT  Goal: Patient will remain free of falls  Description: INTERVENTIONS:  - Educate patient/family on patient safety including physical limitations  - Instruct patient to call for assistance with activity   - Consult OT/PT to assist with strengthening/mobility   - Keep Call bell within reach  - Keep bed low and locked with side rails adjusted as appropriate  - Keep care items and personal belongings within reach  - Initiate and maintain comfort rounds  - Make Fall Risk Sign visible to staff  - Offer Toileting every 3 Hours, in  advance of need  - Initiate/Maintain bed alarm  - Obtain necessary fall risk management equipment  - Apply yellow socks and bracelet for high fall risk patients  - Consider moving patient to room near nurses station  Outcome: Progressing  Goal: Maintain or return to baseline ADL function  Description: INTERVENTIONS:  -  Assess patient's ability to carry out ADLs; assess patient's baseline for ADL function and identify physical deficits which impact ability to perform ADLs (bathing, care of mouth/teeth, toileting, grooming, dressing, etc.)  - Assess/evaluate cause of self-care deficits   - Assess range of motion  - Assess patient's mobility; develop plan if impaired  - Assess patient's need for assistive devices and provide as appropriate  - Encourage maximum independence but intervene and supervise when necessary  - Involve family in performance of ADLs  - Assess for home care needs following discharge   - Consider OT consult to assist with ADL evaluation and planning for discharge  - Provide patient education as appropriate  Outcome: Progressing  Goal: Maintains/Returns to pre admission functional level  Description: INTERVENTIONS:  - Perform AM-PAC 6 Click Basic Mobility/ Daily Activity assessment daily.  - Set and communicate daily mobility goal to care team and patient/family/caregiver.   - Collaborate with rehabilitation services on mobility goals if consulted  - Perform Range of Motion 3 times a day.  - Reposition patient every 3 hours.  - Dangle patient 3 times a day  - Stand patient 3 times a day  - Ambulate patient 3 times a day  - Out of bed to chair 3 times a day   - Out of bed for meals 3 times a day  - Out of bed for toileting  - Record patient progress and toleration of activity level   Outcome: Progressing     Problem: DISCHARGE PLANNING  Goal: Discharge to home or other facility with appropriate resources  Description: INTERVENTIONS:  - Identify barriers to discharge w/patient and caregiver  -  Arrange for needed discharge resources and transportation as appropriate  - Identify discharge learning needs (meds, wound care, etc.)  - Arrange for interpretive services to assist at discharge as needed  - Refer to Case Management Department for coordinating discharge planning if the patient needs post-hospital services based on physician/advanced practitioner order or complex needs related to functional status, cognitive ability, or social support system  Outcome: Progressing     Problem: GASTROINTESTINAL - ADULT  Goal: Minimal or absence of nausea and/or vomiting  Description: INTERVENTIONS:  - Administer IV fluids if ordered to ensure adequate hydration  - Maintain NPO status until nausea and vomiting are resolved  - Nasogastric tube if ordered  - Administer ordered antiemetic medications as needed  - Provide nonpharmacologic comfort measures as appropriate  - Advance diet as tolerated, if ordered  - Consider nutrition services referral to assist patient with adequate nutrition and appropriate food choices  Outcome: Progressing  Goal: Maintains or returns to baseline bowel function  Description: INTERVENTIONS:  - Assess bowel function  - Encourage oral fluids to ensure adequate hydration  - Administer IV fluids if ordered to ensure adequate hydration  - Administer ordered medications as needed  - Encourage mobilization and activity  - Consider nutritional services referral to assist patient with adequate nutrition and appropriate food choices  Outcome: Progressing  Goal: Maintains adequate nutritional intake  Description: INTERVENTIONS:  - Monitor percentage of each meal consumed  - Identify factors contributing to decreased intake, treat as appropriate  - Assist with meals as needed  - Monitor I&O, weight, and lab values if indicated  - Obtain nutrition services referral as needed  Outcome: Progressing  Goal: Establish and maintain optimal ostomy function  Description: INTERVENTIONS:  - Assess bowel  function  - Encourage oral fluids to ensure adequate hydration  - Administer IV fluids if ordered to ensure adequate hydration   - Administer ordered medications as needed  - Encourage mobilization and activity  - Nutrition services referral to assist patient with appropriate food choices  - Assess stoma site  - Consider wound care consult   Outcome: Progressing  Goal: Oral mucous membranes remain intact  Description: INTERVENTIONS  - Assess oral mucosa and hygiene practices  - Implement preventative oral hygiene regimen  - Implement oral medicated treatments as ordered  - Initiate Nutrition services referral as needed  Outcome: Progressing     Problem: Prexisting or High Potential for Compromised Skin Integrity  Goal: Skin integrity is maintained or improved  Description: INTERVENTIONS:  - Identify patients at risk for skin breakdown  - Assess and monitor skin integrity  - Assess and monitor nutrition and hydration status  - Monitor labs   - Assess for incontinence   - Turn and reposition patient  - Assist with mobility/ambulation  - Relieve pressure over bony prominences  - Avoid friction and shearing  - Provide appropriate hygiene as needed including keeping skin clean and dry  - Evaluate need for skin moisturizer/barrier cream  - Collaborate with interdisciplinary team   - Patient/family teaching  - Consider wound care consult   Outcome: Progressing     Problem: Nutrition/Hydration-ADULT  Goal: Nutrient/Hydration intake appropriate for improving, restoring or maintaining nutritional needs  Description: Monitor and assess patient's nutrition/hydration status for malnutrition. Collaborate with interdisciplinary team and initiate plan and interventions as ordered.  Monitor patient's weight and dietary intake as ordered or per policy. Utilize nutrition screening tool and intervene as necessary. Determine patient's food preferences and provide high-protein, high-caloric foods as appropriate.     INTERVENTIONS:  -  Monitor oral intake, urinary output, labs, and treatment plans  - Assess nutrition and hydration status and recommend course of action  - Evaluate amount of meals eaten  - Assist patient with eating if necessary   - Allow adequate time for meals  - Recommend/ encourage appropriate diets, oral nutritional supplements, and vitamin/mineral supplements  - Order, calculate, and assess calorie counts as needed  - Recommend, monitor, and adjust tube feedings and TPN/PPN based on assessed needs  - Assess need for intravenous fluids  - Provide specific nutrition/hydration education as appropriate  - Include patient/family/caregiver in decisions related to nutrition  Outcome: Progressing     Problem: INFECTION - ADULT  Goal: Absence or prevention of progression during hospitalization  Description: INTERVENTIONS:  - Assess and monitor for signs and symptoms of infection  - Monitor lab/diagnostic results  - Monitor all insertion sites, i.e. indwelling lines, tubes, and drains  - Monitor endotracheal if appropriate and nasal secretions for changes in amount and color  - [place] appropriate cooling/warming therapies per order  - Administer medications as ordered  - Instruct and encourage patient and family to use good hand hygiene technique  - Identify and instruct in appropriate isolation precautions for identified infection/condition  Outcome: Progressing     Problem: Knowledge Deficit  Goal: Patient/family/caregiver demonstrates understanding of disease process, treatment plan, medications, and discharge instructions  Description: Complete learning assessment and assess knowledge base.  Interventions:  - Provide teaching at level of understanding  - Provide teaching via preferred learning methods  Outcome: Progressing     Problem: METABOLIC, FLUID AND ELECTROLYTES - ADULT  Goal: Electrolytes maintained within normal limits  Description: INTERVENTIONS:  - Monitor labs and assess patient for signs and symptoms of electrolyte  imbalances  - Administer electrolyte replacement as ordered  - Monitor response to electrolyte replacements, including repeat lab results as appropriate  - Instruct patient on fluid and nutrition as appropriate  Outcome: Progressing     Problem: SKIN/TISSUE INTEGRITY - ADULT  Goal: Skin Integrity remains intact(Skin Breakdown Prevention)  Description: Assess:  -Perform Esteban assessment every   -Clean and moisturize skin every   -Inspect skin when repositioning, toileting, and assisting with ADLS  -Assess under medical devices such as  every   -Assess extremities for adequate circulation and sensation     Bed Management:  -Have minimal linens on bed & keep smooth, unwrinkled  -Change linens as needed when moist or perspiring  -Avoid sitting or lying in one position for more than  hours while in bed  -Keep HOB at degrees     Toileting:  -Offer bedside commode  -Assess for incontinence every   -Use incontinent care products after each incontinent episode such as     Activity:  -Mobilize patient  times a day  -Encourage activity and walks on unit  -Encourage or provide ROM exercises   -Turn and reposition patient every  Hours  -Use appropriate equipment to lift or move patient in bed  -Instruct/ Assist with weight shifting every  when out of bed in chair  -Consider limitation of chair time  hour intervals    Skin Care:  -Avoid use of baby powder, tape, friction and shearing, hot water or constrictive clothing  -Relieve pressure over bony prominences using   -Do not massage red bony areas    Next Steps:  -Teach patient strategies to minimize risks such as    -Consider consults to  interdisciplinary teams such as   Outcome: Progressing  Goal: Incision(s), wounds(s) or drain site(s) healing without S/S of infection  Description: INTERVENTIONS  - Assess and document dressing, incision, wound bed, drain sites and surrounding tissue  - Provide patient and family education  - Perform skin care/dressing changes every    Outcome: Progressing     Problem: HEMATOLOGIC - ADULT  Goal: Maintains hematologic stability  Description: INTERVENTIONS  - Assess for signs and symptoms of bleeding or hemorrhage  - Monitor labs  - Administer supportive blood products/factors as ordered and appropriate  Outcome: Progressing

## 2024-11-27 NOTE — ASSESSMENT & PLAN NOTE
64 y.o. female presenting with recurrent SBO. 11/14-11/21 failed conservative management with persistent SBO. S/p ex lap, SBR, DAVID on 11/22.     AVSS on 2L O2  Abdomen: soft, ND, protuberant. Tender near incision sites. No guarding or rigidity. Midline incision CDI.  Bowel function: large BM and passing flatus  UOP: 1700  WBC: 4.01  Hgb: 8.6  Glucose: 256    Plan  - Advance to clears, toast and crackers for diet  - D/C maintenance IV fluids  - D/C TPN pending nutrition   - D/C dPCA, continue with PO pain management  - Wean O2  - Appreciate PT/OT recs  - Encourage OOB, ambulate, IS  - DVT ppx with therapeutic Lovenox, stop heparin drip  - F/u CM for rehab placement and dispo  - Strongly encourage OOB and ambulation

## 2024-11-27 NOTE — PROGRESS NOTES
Progress Note - Surgery-General   Name: Lety Botello 64 y.o. female I MRN: [id number]  Unit/Bed#: ICU 02 I Date of Admission: 11/14/2024   Date of Service: 11/26/2024 I Hospital Day: 12     Assessment & Plan  SBO (small bowel obstruction) (HCC)  64 y.o. female presenting with recurrent SBO. 11/14-11/21 failed conservative management with persistent SBO. S/p ex lap, SBR, DAVID on 11/22.     AVSS on 2L O2  Abdomen: soft, ND, protuberant. Tender near incision sites. No guarding or rigidity. Midline incision CDI.  Bowel function: large BM and passing flatus  UOP: 1700  WBC: 4.01  Hgb: 8.6  Glucose: 256    Plan  - Advance to clears, toast and crackers for diet  - D/C maintenance IV fluids  - D/C TPN pending nutrition   - D/C dPCA, continue with PO pain management  - Wean O2  - Appreciate PT/OT recs  - Encourage OOB, ambulate, IS  - DVT ppx with therapeutic Lovenox, stop heparin drip  - F/u CM for rehab placement and dispo  - Strongly encourage OOB and ambulation     24 Hour Events : Hypertensive overnight. Patient continue to be asymptomatic     Subjective : Patient still complaining of 10/10 abdominal pain near incision sites. She has not ambulateed at all overnight and has no desire to. She denies N/V, fevers or chills    Objective :  Visit Vitals  /64 (BP Location: Left arm)   Pulse 63   Temp 97.6 °F (36.4 °C) (Oral)   Resp 15   Ht 5' (1.524 m)   Wt 118 kg (259 lb 4.2 oz)   SpO2 99%   BMI 50.63 kg/m²   Smoking Status Never   BSA 2.09 m²        Physical Exam  Constitutional:       General: She is not in acute distress.     Appearance: Normal appearance. She is obese. She is not ill-appearing, toxic-appearing or diaphoretic.   Cardiovascular:      Rate and Rhythm: Normal rate and regular rhythm.      Pulses: Normal pulses.      Heart sounds: Normal heart sounds. No murmur heard.     No gallop.   Pulmonary:      Effort: Pulmonary effort is normal. No respiratory distress.      Breath sounds: Normal breath sounds. No  wheezing or rales.   Abdominal:      General: There is no distension.      Palpations: Abdomen is soft. There is no mass.      Tenderness: There is abdominal tenderness. There is no right CVA tenderness, left CVA tenderness, guarding or rebound.      Hernia: No hernia is present.   Skin:     General: Skin is warm and dry.   Neurological:      General: No focal deficit present.      Mental Status: She is alert and oriented to person, place, and time.         Recent Labs     11/25/24  0802 11/25/24  1404 11/26/24  0435 11/26/24  0534 11/27/24  0619   WBC 4.32 5.03 4.21*  --  4.01*   HGB 8.2* 8.9* 8.2*  --  8.6*   PLT 97* 108* 107*  --  110*   SODIUM 131* 132* 128*  --   --    K 3.7 3.9 4.1  --   --     104 101  --   --    CO2 25 24 22  --   --    BUN 24 25 30*  --   --    CREATININE 0.58* 0.63 0.68  --   --    GLUC 152* 135 507* 146*  --    CALCIUM 7.4* 7.8* 7.6*  --   --    MG 2.1 2.7  --   --   --    PHOS 3.0 3.1  --   --   --             VTE Pharmacologic Prophylaxis: Sequential compression device (Venodyne)   VTE Mechanical Prophylaxis: sequential compression device

## 2024-11-27 NOTE — UTILIZATION REVIEW
Continued Stay Review    Date: 11/26                          Current Patient Class: Inpatient  Current Level of Care: Level 2 stepdown?HOT    HPI:64 y.o. female initially admitted on 11/14     Assessment/Plan: Pt reports pain of 7/10 near incision site. Denies any BM/flatus. On exam, abd soft, protuberant. Tender near incision sites. Midline incision CDI.Cont CLD for now, will advance when appropriate. Cont maintenance IVFs. PICC/TPN d/c when appropriately tolerating diet and gaining full nutrition. Continue to re-order TPN. dPCA. Cont heparin gtt for recent PEs. Wean O2.  PT/OT.  Encourage OOB, ambulate, IS. DVT ppx.  PT/OT recommending Level 2, moderate resource intensity rehab.    Medications:   Scheduled Medications:  acetaminophen, 1,000 mg, Intravenous, Q6H SAM  bisacodyl, 10 mg, Rectal, Daily  carvedilol, 12.5 mg, Oral, Q12H  chlorhexidine, 15 mL, Mouth/Throat, Q12H SAM  enoxaparin, 60 mg, Subcutaneous, Q12H SAM  gabapentin, 100 mg, Oral, HS  insulin lispro, 2-12 Units, Subcutaneous, Q6H SAM  levothyroxine, 125 mcg, Oral, Daily  nystatin, , Topical, TID  pantoprazole, 40 mg, Intravenous, Q24H SAM      Continuous IV Infusions:  Adult 3-in-1 TPN (custom base / custom electrolytes), , Intravenous, Continuous TPN  multi-electrolyte (PLASMALYTE-A/ISOLYTE-S PH 7.4) IV solution  Rate: 100 mL/hr Dose: 100 mL/hr  Freq: Continuous Route: IV  Indications of Use: IV Hydration  Last Dose: Stopped (11/27/24 0752)  Start: 11/21/24 1515 End: 11/27/24 0749    HYDROmorphone (DILAUDID) 1 mg/mL 50 mL PCA  Continuous Rate: 0 mg/hr  PCA Dose: 0.1 mg  PCA Lock-out: 10 Minutes  One Hour Limit: 2 mg  Freq: Continuous Route: IV  Last Dose: Stopped (11/27/24 0716)  Start: 11/26/24 0645 End: 11/27/24 0658  PRN Meds:  hydrALAZINE, 5 mg, Intravenous, Q4H PRN  HYDROmorphone, 0.5 mg, Intravenous, Q3H PRN   labetalol, 10 mg, Intravenous, Q4H PRN  levalbuterol, 1.25 mg, Nebulization, Q6H PRN  ondansetron, 4 mg, Intravenous, Q4H  PRN  oxyCODONE, 10 mg, Oral, Q4H PRN   oxyCODONE, 5 mg, Oral, Q4H PRN   phenol, 1 spray, Mouth/Throat, Q2H PRN  trimethobenzamide, 200 mg, Intramuscular, Q6H PRN      Discharge Plan: TBD    Vital Signs (last 3 days)       Date/Time Temp Pulse Resp BP MAP (mmHg) SpO2 Calculated FIO2 (%) - Nasal Cannula O2 Flow Rate (L/min) Nasal Cannula O2 Flow Rate (L/min) O2 Device Patient Position - Orthostatic VS [place] Coma Scale Score Pain    11/27/24 1321 -- -- -- -- -- -- -- -- -- -- -- -- 9    11/27/24 1200 -- -- -- -- -- -- -- -- -- -- -- 15 7    11/27/24 1100 97.9 °F (36.6 °C) 64 20 147/72 97 97 % -- -- -- None (Room air) -- -- --    11/27/24 10:45:38 -- 67 17 147/72 97 98 % -- -- -- -- -- -- --    11/27/24 1003 -- -- -- -- -- -- -- -- -- -- -- -- 10 - Worst Possible Pain    11/27/24 09:52:13 97.3 °F (36.3 °C) 73 -- -- -- 96 % -- -- -- -- -- -- --    11/27/24 09:40:36 -- 68 -- 153/73 100 95 % -- -- -- -- -- -- --    11/27/24 0932 -- -- -- -- -- -- -- -- -- -- -- -- 10 - Worst Possible Pain    11/27/24 0803 -- -- -- -- -- -- -- -- -- -- -- -- 9    11/27/24 0800 -- -- -- -- -- -- -- -- -- -- -- 15 --    11/27/24 07:17:46 98.1 °F (36.7 °C) 67 19 176/72 107 99 % -- -- -- -- -- -- --    11/27/24 0716 -- -- -- -- -- -- -- -- -- -- -- -- 9    11/27/24 0315 -- -- -- -- -- -- -- 2 L/min -- Nasal cannula -- -- 10 - Worst Possible Pain    11/27/24 0230 -- 63 15 -- -- 99 % -- -- -- -- -- -- --    11/27/24 0001 -- -- -- -- -- 97 % -- -- -- -- -- -- --    11/27/24 0000 97.6 °F (36.4 °C) 61 16 137/64 92 98 % 28 -- 2 L/min EtCO2 nasal cannula Lying -- --    11/26/24 2300 -- 63 15 157/67 96 100 % -- -- -- -- -- -- --    11/26/24 2200 -- 64 15 144/65 94 99 % -- -- -- -- -- -- --    11/26/24 2026 -- 70 -- 185/74 -- -- -- -- -- -- -- -- --    11/26/24 2001 -- -- -- -- -- 100 % -- -- -- -- -- -- --    11/26/24 2000 97.7 °F (36.5 °C) 66 18 178/74 107 100 % 28 -- 2 L/min EtCO2 nasal cannula Lying -- --    11/26/24 1930 -- -- -- -- -- -- -- --  -- -- -- 15 --    11/26/24 1859 -- 66 26 -- -- 100 % -- -- -- -- -- -- --    11/26/24 1801 -- 67 18 159/71 102 99 % 28 -- 2 L/min EtCO2 nasal cannula -- -- 10 - Worst Possible Pain    11/26/24 1800 -- 66 14 159/71 102 99 % -- -- -- -- -- -- --    11/26/24 1700 -- 71 16 172/75 108 100 % -- -- -- -- -- -- --    11/26/24 1630 -- -- -- -- -- -- -- -- -- -- -- 15 --    11/26/24 1600 97.8 °F (36.6 °C) 69 15 173/75 108 100 % 28 -- 2 L/min EtCO2 nasal cannula Lying -- 10 - Worst Possible Pain    11/26/24 1500 -- 64 14 156/70 100 100 % -- -- -- -- -- -- --    11/26/24 1400 -- 69 18 166/72 104 100 % 28 -- 2 L/min EtCO2 nasal cannula -- -- 10 - Worst Possible Pain    11/26/24 1300 -- 67 15 160/70 100 99 % -- -- -- -- -- -- --    11/26/24 1230 -- -- -- -- -- -- -- -- -- -- -- 15 --    11/26/24 1200 -- 64 17 170/73 105 99 % -- -- -- -- -- -- --    11/26/24 1140 -- 65 30 151/69 99 99 % 28 -- 2 L/min EtCO2 nasal cannula -- -- --    11/26/24 1100 -- 69 19 162/73 105 99 % -- -- -- -- -- -- --    11/26/24 1000 -- 63 30 135/62 89 99 % -- -- -- -- -- -- --    11/26/24 0900 -- 64 23 126/58 84 100 % -- -- -- -- -- -- --    11/26/24 0800 97.8 °F (36.6 °C) 62 16 153/70 101 100 % 28 -- 2 L/min EtCO2 nasal cannula Lying 15 10 - Worst Possible Pain    11/26/24 0730 -- 69 21 182/72 103 100 % -- -- -- -- -- -- --    11/26/24 0700 -- 66 15 161/67 97 100 % 28 -- 2 L/min EtCO2 nasal cannula Lying -- --    11/26/24 0627 -- 66 14 -- -- 100 % -- -- -- -- -- -- --    11/26/24 0601 -- -- -- 148/68 98 -- -- -- -- -- -- -- --    11/26/24 0600 -- 63 23 196/79 113 99 % -- -- -- -- -- -- --    11/26/24 0430 -- -- -- -- -- -- -- -- -- -- -- 15 --    11/26/24 0408 -- -- -- -- -- 99 % -- -- -- -- -- -- --    11/26/24 0400 97.7 °F (36.5 °C) 70 17 -- -- 100 % -- 6 L/min -- EtCO2 nasal cannula -- -- --    11/26/24 0213 -- 64 19 -- -- 99 % -- -- -- -- -- -- --    11/26/24 0200 -- 62 18 -- -- 99 % -- -- -- -- -- -- --    11/26/24 0130 -- -- -- 176/75 108 -- -- --  -- -- -- -- --    11/26/24 0100 -- 67 22 153/68 98 99 % -- -- -- -- -- -- --    11/26/24 0051 -- -- -- -- -- 99 % 28 -- 2 L/min EtCO2 nasal cannula -- -- --    11/26/24 0030 -- -- -- -- -- -- -- -- -- -- -- 15 --    11/26/24 0000 97.7 °F (36.5 °C) 67 16 150/66 95 99 % 28 -- 2 L/min EtCO2 nasal cannula Lying -- 9    11/25/24 2300 -- 65 15 155/64 92 99 % -- -- -- -- -- -- --    11/25/24 2200 -- 66 15 141/62 89 100 % -- -- -- -- -- -- --    11/25/24 2100 -- 63 21 122/58 84 -- -- -- -- -- -- -- --    11/25/24 2038 -- -- -- -- -- 99 % 28 -- 2 L/min EtCO2 nasal cannula -- -- --    11/25/24 2030 -- -- -- -- -- -- -- -- -- -- -- 15 --    11/25/24 2000 98.3 °F (36.8 °C) 70 14 135/64 92 100 % 28 -- 2 L/min EtCO2 nasal cannula Lying -- 10 - Worst Possible Pain    11/25/24 1906 98.4 °F (36.9 °C) 70 20 126/59 85 100 % -- -- -- -- -- -- --    11/25/24 1900 -- 72 14 126/59 85 100 % -- -- -- -- -- -- --    11/25/24 1830 -- 72 21 149/69 99 99 % -- -- -- -- -- -- --    11/25/24 1600 -- 67 17 140/65 93 100 % -- -- -- -- -- 14 --    11/25/24 1538 97.6 °F (36.4 °C) -- -- -- -- -- -- -- -- -- -- -- --    11/25/24 1500 -- 67 15 122/60 86 100 % -- -- -- -- -- -- --    11/25/24 1400 -- 72 16 140/67 96 100 % -- -- -- -- -- -- --    11/25/24 1356 -- -- -- -- -- -- -- -- -- -- -- -- 10 - Worst Possible Pain    11/25/24 1355 -- -- -- -- -- -- -- -- -- -- -- -- 10 - Worst Possible Pain    11/25/24 1200 -- 66 27 166/71 102 99 % -- -- -- -- -- 14 --    11/25/24 1100 -- 66 17 170/73 105 100 % -- -- -- -- -- -- --    11/25/24 1030 -- 65 16 163/68 98 98 % -- -- -- -- -- -- --    11/25/24 0900 -- 63 19 169/75 108 100 % -- -- -- -- -- -- --    11/25/24 0800 -- -- -- -- -- -- -- -- -- -- -- 14 10 - Worst Possible Pain    11/25/24 0700 98.6 °F (37 °C) 75 15 184/74 106 100 % 28 -- 2 L/min EtCO2 nasal cannula -- -- --    11/25/24 0600 -- 70 13 145/64 92 99 % 28 -- 2 L/min EtCO2 nasal cannula -- -- --    11/25/24 0515 97.6 °F (36.4 °C) -- -- -- -- -- -- --  -- -- -- -- --    11/25/24 0400 -- 73 17 170/74 106 99 % -- -- -- EtCO2 nasal cannula -- 14 8    11/25/24 0300 -- 77 17 172/75 108 99 % 28 -- 2 L/min EtCO2 nasal cannula -- -- --    11/25/24 0200 -- 69 31 113/57 82 99 % 28 -- 2 L/min EtCO2 nasal cannula -- -- --    11/25/24 0100 -- 68 29 123/58 84 99 % 28 -- 2 L/min EtCO2 nasal cannula -- -- --    11/25/24 0000 -- 75 17 132/63 90 98 % -- -- -- -- -- 14 8 11/24/24 2300 -- 74 17 121/60 86 99 % -- -- -- -- -- -- --    11/24/24 2200 -- 68 16 107/53 77 99 % -- -- -- -- -- -- --    11/24/24 2100 97.9 °F (36.6 °C) 72 17 129/60 86 100 % -- -- -- -- -- -- --    11/24/24 2000 -- 72 17 135/63 91 99 % -- -- -- -- -- 14 8 11/24/24 1900 -- 75 17 136/63 91 99 % -- -- -- -- -- -- --    11/24/24 1800 -- 74 16 115/57 82 98 % -- -- -- -- -- -- --    11/24/24 1700 -- 74 16 122/58 84 99 % -- -- -- -- -- -- --    11/24/24 1645 -- -- -- -- -- -- -- -- -- -- -- 14 --    11/24/24 1600 -- 78 16 137/58 88 99 % 28 -- 2 L/min EtCO2 nasal cannula Lying -- 9    11/24/24 1500 -- 80 16 139/53 87 100 % 28 -- 2 L/min EtCO2 nasal cannula -- -- --    11/24/24 1400 -- 76 17 120/55 86 100 % -- -- -- -- -- -- --    11/24/24 1300 -- 76 17 129/63 98 99 % -- -- -- -- -- -- --    11/24/24 1230 -- -- -- -- -- -- -- -- -- -- -- 14 --    11/24/24 1200 -- 82 19 149/73 116 99 % 28 -- 2 L/min EtCO2 nasal cannula -- -- 10 - Worst Possible Pain    11/24/24 1121 -- 76 17 151/70 107 100 % 28 -- 2 L/min EtCO2 nasal cannula -- -- --    11/24/24 1111 98.4 °F (36.9 °C) 74 16 151/70 -- 100 % 28 -- 2 L/min Nasal cannula -- -- --    11/24/24 1100 -- 76 17 129/59 94 100 % -- -- -- -- -- -- --    11/24/24 1012 98.4 °F (36.9 °C) 82 17 146/64 -- 100 % 28 -- 2 L/min Nasal cannula -- -- --    11/24/24 1001 -- 78 18 137/62 -- 100 % -- -- -- -- -- -- --    11/24/24 1000 98.2 °F (36.8 °C) 76 15 137/62 101 100 % -- -- -- -- -- -- --    11/24/24 0900 -- 76 16 124/59 92 100 % -- -- -- -- -- -- --    11/24/24 0800 97.8 °F (36.6 °C)  76 14 125/54 88 99 % 28 -- 2 L/min Nasal cannula Lying 14 8    11/24/24 0730 -- 74 15 107/58 85 100 % -- -- -- -- -- -- --    11/24/24 0726 -- -- -- -- -- 100 % 28 -- 2 L/min EtCO2 nasal cannula -- -- --    11/24/24 0700 -- 74 14 119/51 75 100 % -- -- -- -- -- -- --    11/24/24 0600 -- 76 16 116/60 91 100 % 28 -- 2 L/min EtCO2 nasal cannula Lying -- --    11/24/24 0500 -- 76 14 118/52 76 100 % -- -- -- -- -- -- --    11/24/24 0400 97.7 °F (36.5 °C) 76 15 124/50 82 99 % 28 -- 2 L/min EtCO2 nasal cannula Lying -- --    11/24/24 0330 -- -- -- -- -- -- -- -- -- -- -- 14 --    11/24/24 0300 -- 72 19 113/48 66 100 % -- -- -- -- -- -- --    11/24/24 0200 -- 84 19 136/56 78 99 % -- -- -- -- -- -- --    11/24/24 0100 -- 78 18 114/53 79 100 % -- -- -- -- -- -- --    11/24/24 0000 97.8 °F (36.6 °C) 82 20 134/66 102 99 % 28 -- 2 L/min EtCO2 nasal cannula Lying -- --          Weight (last 2 days)       Date/Time Weight    11/27/24 0554 120 (264.55)    11/26/24 0600 118 (259.26)            Pertinent Labs/Diagnostic Results:   Radiology:  XR abdomen 1 view kub   Final Interpretation by Edis Medina MD (11/21 0910)      Persistent small bowel dilatation, slightly increased from the most recent prior study, with contrast in the colon. Continued partial small bowel obstruction not excluded.               Workstation performed: [id number]         XR abdomen 1 view kub   Final Interpretation by Frederick Zapata MD (11/20 0824)      Limited study. Continued dilution and/or suction removal of enteric contrast from otherwise persistently dilated small bowel centrally in the abdomen. The bowel loops may be slightly less distended than on the 18th. Overall, findings are still concerning    for possible partial small bowel obstruction.      The contrast in the right side of the colon has been unchanged for many days.      The endogastric tube is stable in position      Numerous suspected skinfolds projecting over the abdomen.       Please consider repeat abdominopelvic CT scan for more thorough assessment.      The study was marked in EPIC for immediate notification.               Workstation performed: [id number]         XR abdomen 1 vw portable   Final Interpretation by Tariq Gallagher DO (11/19 1154)      Air-filled, dilated probable small bowel persists in the central abdomen with dilution of oral contrast within bowel compared to the previous study. Although there is contrast reaching the right colon, findings may reflect at least moderate grade partial    small bowel obstruction.               Resident: Romy Blunt I, the attending radiologist, have reviewed the images and agree with the final report above.      Workstation performed: [id number]         XR abdomen 1 view kub   Final Interpretation by Frederick Zapata MD (11/18 1487)      Markedly dilated small bowel loops centrally in the abdomen containing enteric contrast. The appearance is most compatible with a high-grade small bowel obstruction.      Enteric tube tip in the left upper abdomen presumably in the gastric body.      There is some contrast within bowel in the right side of the abdomen which seems similar to the prior study and might be residual colonic contrast from prior imaging work-up.      The study was marked in EPIC for immediate notification.               Workstation performed: [id number]         XR abdomen 1 view kub   Final Interpretation by Damian Mack MD (11/17 3818)      NGT partially retracted with tip in proximal stomach.      Small bowel obstruction again suggested.               Workstation performed: [id number]         XR abdomen 1 view kub   Final Interpretation by Jaylin Magaña MD (11/15 1148)      Technically limited study, as discussed above.      No dilated loops of bowel seen, however note fluid-filled loops of bowel as were seen on the 11/14/2024 CT may not be apparent with supine radiographs.      Small amount of  hyperdense material seen in the loop of presumably ascending colon. This may represent some administered oral contrast, although no oral contrast is seen elsewhere in the gastrointestinal tract, or possibly vicarious excretion of the IV    contrast administered on 11/14/2024.      Excreted IV contrast from the 11/14/2024 CT seen within the right collecting system, right ureter and the urinary bladder, indicating delayed renal clearance. Mild right-sided hydronephrosis.               Workstation performed: [id number]         XR chest portable   Final Interpretation by Kalie Nova MD (11/15 5745)      Distal aspect of NG tube is coursing below the left hemidiaphragm.            Workstation performed: [id number]         CT abdomen pelvis with contrast   Final Interpretation by E. Alec Schoenberger, MD (11/14 9895)   Small bowel obstruction with transition in the pelvis is again seen.   New small volume of ascites   Other stable findings as above.         Workstation performed: [id number]           Cardiology:  ECG 12 lead   Final Result by Eleazar Blanco DO (11/15 0702)   Sinus tachycardia   Left axis deviation   Minimal voltage criteria for LVH, may be normal variant   Abnormal ECG   When compared with ECG of 07-Nov-2024 08:00,   Vent. rate has increased by  42 bpm   Confirmed by Eleazar Blanco ([id number]) on 11/15/2024 7:02:14 AM        GI:  No orders to display           Results from last 7 days   Lab Units 11/27/24  0619 11/26/24  0435 11/25/24  1404 11/25/24  0802 11/24/24  1156   WBC Thousand/uL 4.01* 4.21* 5.03 4.32  --    HEMOGLOBIN g/dL 8.6* 8.2* 8.9* 8.2* 7.5*   HEMATOCRIT % 26.8* 25.6* 27.6* 25.6* 23.7*   PLATELETS Thousands/uL 110* 107* 108* 97*  --    TOTAL NEUT ABS Thousands/µL 3.33 3.35 4.10 3.40  --          Results from last 7 days   Lab Units 11/27/24  0619 11/26/24  0435 11/25/24  1404 11/25/24  0802 11/24/24  0542 11/23/24  1739 11/23/24  0426 11/22/24  1513 11/22/24  1015 11/22/24  0437  11/21/24  0525   SODIUM mmol/L 131* 128* 132* 131* 129* 132* 130* 133*  --    < > 136   POTASSIUM mmol/L 3.7 4.1 3.9 3.7 4.3 4.1 4.4 4.3  --    < > 4.9   CHLORIDE mmol/L 105 101 104 104 105 105 104 105  --    < > 103   CO2 mmol/L 23 22 24 25 24 26 25 24  --    < > 28   CO2, I-STAT mmol/L  --   --   --   --   --   --   --   --  25  --   --    ANION GAP mmol/L 3* 5 4 2* 0* 1* 1* 4  --    < > 5   BUN mg/dL 35* 30* 25 24 21 22 20 15  --    < > 14   CREATININE mg/dL 0.63 0.68 0.63 0.58* 0.58* 0.66 0.68 0.62  --    < > 0.54*   EGFR ml/min/1.73sq m 96 94 96 99 99 94 94 96  --    < > 101   CALCIUM mg/dL 7.6* 7.6* 7.8* 7.4* 7.0* 7.0* 7.2* 6.9*  --    < > 8.0*   CALCIUM, IONIZED mmol/L 1.16  --   --   --   --   --  1.09* 1.01*  --   --  1.15   CALCIUM, IONIZED, ISTAT mmol/L  --   --   --   --   --   --   --   --  1.13  --   --    MAGNESIUM mg/dL 2.5  --  2.7 2.1 2.1 2.0 2.0 1.5*  --    < > 1.8*   PHOSPHORUS mg/dL 4.2*  --  3.1 3.0 2.1* 2.3  --  3.3  --   --  3.9    < > = values in this interval not displayed.     Results from last 7 days   Lab Units 11/27/24  0619 11/22/24  1513 11/22/24  0437   AST U/L 7* 27 40*   ALT U/L 7 35 41   ALK PHOS U/L 118* 109* 135*   TOTAL PROTEIN g/dL 4.0* 4.0* 4.8*   ALBUMIN g/dL 1.9* 2.1* 2.4*   TOTAL BILIRUBIN mg/dL 0.23 0.75 0.68   BILIRUBIN DIRECT mg/dL 0.03  --   --      Results from last 7 days   Lab Units 11/27/24  1144 11/27/24  0538 11/26/24  2348 11/26/24  1754 11/26/24  1147 11/26/24  1001 11/26/24  0757 11/26/24  0603 11/26/24  0434 11/26/24  0149 11/26/24  0004 11/25/24  2204   POC GLUCOSE mg/dl 206* 256* 209* 181* 134 147* 131 161* 150* 150* 133 114     Results from last 7 days   Lab Units 11/27/24  0619 11/26/24  0534 11/26/24  0435 11/25/24  1404 11/25/24  0802 11/24/24  0542 11/23/24  1739 11/23/24  0426 11/22/24  1513 11/22/24  0437 11/21/24  0525   GLUCOSE RANDOM mg/dL 251* 146* 507* 135 152* 141* 162* 239* 207* 218* 190*     Results from last 7 days   Lab Units 11/24/24  1156  "  OSMOLALITY, SERUM mmol/*         No results found for: \"BETA-HYDROXYBUTYRATE\"           Results from last 7 days   Lab Units 11/22/24  1015   I STAT BASE EXC mmol/L 0   ISTAT PH ART  7.428   I STAT ART PCO2 mm HG 36.0   I STAT ART PO2 mm HG >400.0*   I STAT ART HCO3 mmol/L 23.8                 Results from last 7 days   Lab Units 11/27/24  0619 11/26/24  0435 11/25/24  0802 11/23/24  1624 11/23/24  1001   PROTIME seconds  --   --   --   --  18.1*   INR   --   --   --   --  1.49*   PTT seconds 49* 66* 66*   < > 48*    < > = values in this interval not displayed.             Results from last 7 days   Lab Units 11/23/24  1739 11/22/24  1513   LACTIC ACID mmol/L 1.0 1.6                         Results from last 7 days   Lab Units 11/25/24  0530 11/24/24  0958 11/24/24  0859   UNIT PRODUCT CODE  C8577H65 [id number]  [id number]  [id number]   UNIT NUMBER  [id number]  [id number]  [id number]   UNITABO  O A  B O  O   UNITRH  NEG NEG  POS POS  POS   CROSSMATCH  Compatible  --  Compatible  Compatible   UNIT DISPENSE STATUS  Presumed Trans Return to Inv  Return to Inv Return to Inv  Return to Inv   UNIT PRODUCT VOL ml 350 321  240 350  350                     Results from last 7 days   Lab Units 11/24/24  1156   OSMOLALITY, SERUM mmol/*     Results from last 7 days   Lab Units 11/24/24  0918   SODIUM UR  83                                                   Network Utilization Review Department  ATTENTION: Please call with any questions or concerns to [phone] and carefully listen to the prompts so that you are directed to the right person. All voicemails are confidential.   For Discharge needs, contact Care Management DC Support Team at [phone] opt. 2  Send all requests for admission clinical reviews, approved or denied determinations and any other requests to dedicated fax number below belonging to the campus where the patient is receiving " treatment. List of dedicated fax numbers for the Facilities:  FACILITY NAME UR FAX NUMBER   ADMISSION DENIALS (Administrative/Medical Necessity) [phone]   DISCHARGE SUPPORT TEAM (NETWORK) [phone]   PARENT CHILD HEALTH (Maternity/NICU/Pediatrics) [phone]   [facility] [phone]   [facility] [phone]   [facility] [phone]   [facility] [phone]   [facility] [phone]   [facility] [phone]   [facility] [phone]   [facility] [phone]   [facility] [phone]   [facility] [phone]   [facility] [phone]   [facility] [phone]

## 2024-11-27 NOTE — PLAN OF CARE
Problem: OCCUPATIONAL THERAPY ADULT  Goal: Performs self-care activities at highest level of function for planned discharge setting.  See evaluation for individualized goals.  Description: Treatment Interventions: ADL retraining, UE strengthening/ROM, Functional transfer training, Endurance training, Cognitive reorientation, Equipment evaluation/education, Patient/family training, Compensatory technique education, Activityengagement, Energy conservation          See flowsheet documentation for full assessment, interventions and recommendations.   Note: Limitation: Decreased ADL status, Decreased Safe judgement during ADL, Decreased UE strength, Decreased cognition, Decreased endurance, Decreased self-care trans, Decreased high-level ADLs  Prognosis: Good  Assessment: Pt seen for 30 min tx session with focus on functional balance, functional mobility, ADL status, transfer safety, b/l UE ROM, and cognition. Pt able to tolerate OOB mobility; sitting balance=f+/f, standing balance=f/f-. Pt required physical/verbal cues to maintain transfer safety. Pt demonstrating need for assistance with her UE and LE ADLs. Pt able to demosntrate good b/l UE AROM and cognition(i.e.orientation, memory)--limited judgement/safety; anxiety noted with tx session. Pt continues to demonstrate appropriateness for inpt rehab rehab to improve her overall level of independence. Will continue.   The patient's raw score on the AM-PAC Daily Activity Inpatient Short Form is 15. A raw score of less than 19 suggests the patient may benefit from discharge to post-acute rehabilitation services. Please refer to the recommendation of the Occupational Therapist for safe discharge planning.     Rehab Resource Intensity Level, OT: II (Moderate Resource Intensity)

## 2024-11-27 NOTE — RESTORATIVE TECHNICIAN NOTE
Restorative Technician Note      Patient Name: Lety Botello     Restorative Tech Visit Date: 11/27/24  Note Type: Mobility  Patient Position Upon Consult: Supine  Mobility / Activity Provided: Assisted PTA with standing pt  Activity Performed: Range of motion; Stood; Repositioned  Assistive Device: Roller walker  Patient Position at End of Consult: All needs within reach; Supine

## 2024-11-27 NOTE — OCCUPATIONAL THERAPY NOTE
Occupational Therapy Progress Note     Patient Name: Lety Botello  Today's Date: 11/27/2024  Problem List  Principal Problem:    SBO (small bowel obstruction) (HCC)  Active Problems:    Hypertension    Type 2 diabetes mellitus (HCC)    History of pulmonary embolism    Hypothyroidism    Mild intermittent asthma    Gastroparesis    Morbid obesity (HCC)    Abdominal pain    Nausea & vomiting    Shingles    On total parenteral nutrition (TPN)    Hypotension after procedure            11/27/24 0932   Note Type   Note Type Treatment   Pain Assessment   Pain Assessment Tool 0-10   Pain Score 10 - Worst Possible Pain   Pain Rating: FLACC (Rest) - Face 0   Pain Rating: FLACC (Rest) - Legs 0   Pain Rating: FLACC (Rest) - Activity 0   Pain Rating: FLACC (Rest) - Cry 1   Pain Rating: FLACC (Rest) - Consolability 0   Score: FLACC (Rest) 1   Restrictions/Precautions   Weight Bearing Precautions Per Order No   Other Precautions Cognitive;Chair Alarm;Bed Alarm;Multiple lines;Fall Risk;Pain  (SPO2=96% on RA--nsg made aware)   ADL   Where Assessed Edge of bed   Eating Assistance 5  Supervision/Setup   Grooming Assistance 5  Supervision/Setup   UB Bathing Assistance 4  Minimal Assistance   LB Bathing Assistance 2  Maximal Assistance   UB Dressing Assistance 4  Minimal Assistance   LB Dressing Assistance 2  Maximal Assistance   Toileting Assistance  1  Total Assistance   Functional Standing Tolerance   Time 2-3mins   Bed Mobility   Rolling R 2  Maximal assistance   Additional items Assist x 2;Increased time required;LE management;Verbal cues   Rolling L 2  Maximal assistance   Additional items Assist x 2;Increased time required;LE management;Verbal cues   Sit to Supine 3  Moderate assistance   Additional items Assist x 2;Increased time required;Verbal cues;LE management   Transfers   Sit to Stand 4  Minimal assistance   Additional items Assist x 2;Increased time required;Verbal cues   Stand to Sit 4  Minimal assistance   Additional  "items Assist x 2;Increased time required;Verbal cues   Additional Comments bp's=153/73(EOB, after standing)   Functional Mobility   Functional Mobility 4  Minimal assistance   Additional Comments x2   Additional items Rolling walker   Therapeutic Exercise - ROM   UE-ROM Yes   Subjective   Subjective \"I want to get back in being an independent woman.\"   Cognition   Overall Cognitive Status Impaired   Arousal/Participation Alert   Attention Attends with cues to redirect   Orientation Level Oriented to person;Oriented to place;Oriented to time   Memory Decreased recall of precautions  (4/4 recall after 5mins)   Following Commands Follows one step commands with increased time or repetition   Comments anxiety noted   Additional Activities   Additional Activities Comments Partial co-tx with P.T. to improve safety with pt handling and provide safe challenging environment with tx intervention   Activity Tolerance   Activity Tolerance Patient limited by fatigue;Patient limited by pain   Medical Staff Made Aware nsg, P.T.   Assessment   Assessment Pt seen for 30 min tx session with focus on functional balance, functional mobility, ADL status, transfer safety, b/l UE ROM, and cognition. Pt able to tolerate OOB mobility; sitting balance=f+/f, standing balance=f/f-. Pt required physical/verbal cues to maintain transfer safety. Pt demonstrating need for assistance with her UE and LE ADLs. Pt able to demosntrate good b/l UE AROM and cognition(i.e.orientation, memory)--limited judgement/safety; anxiety noted with tx session. Pt continues to demonstrate appropriateness for inpt rehab rehab to improve her overall level of independence. Will continue.   The patient's raw score on the AM-PAC Daily Activity Inpatient Short Form is 15. A raw score of less than 19 suggests the patient may benefit from discharge to post-acute rehabilitation services. Please refer to the recommendation of the Occupational Therapist for safe discharge " planning.   Plan   Treatment Interventions ADL retraining;Functional transfer training;UE strengthening/ROM;Endurance training;Cognitive reorientation;Patient/family training;Equipment evaluation/education;Compensatory technique education;Continued evaluation   Goal Expiration Date 12/09/24   OT Treatment Day 2   OT Frequency 3-5x/wk   Discharge Recommendation   Rehab Resource Intensity Level, OT II (Moderate Resource Intensity)   AM-PAC Daily Activity Inpatient   Lower Body Dressing 2   Bathing 2   Toileting 1   Upper Body Dressing 3   Grooming 3   Eating 4   Daily Activity Raw Score 15   Daily Activity Standardized Score (Calc for Raw Score >=11) 34.69   AM-PAC Applied Cognition Inpatient   Following a Speech/Presentation 4   Understanding Ordinary Conversation 3   Taking Medications 3   Remembering Where Things Are Placed or Put Away 3   Remembering List of 4-5 Errands 3   Taking Care of Complicated Tasks 3   Applied Cognition Raw Score 19   Applied Cognition Standardized Score 39.77   Osvaldo Robles

## 2024-11-27 NOTE — PLAN OF CARE
Problem: PHYSICAL THERAPY ADULT  Goal: Performs mobility at highest level of function for planned discharge setting.  See evaluation for individualized goals.  Description: Treatment/Interventions: Functional transfer training, LE strengthening/ROM, Elevations, Therapeutic exercise, Endurance training, Patient/family training, Equipment eval/education, Bed mobility, Gait training, Continued evaluation, Spoke to nursing, OT  Equipment Recommended: Walker       See flowsheet documentation for full assessment, interventions and recommendations.  Outcome: Progressing  Note: Prognosis: Fair  Problem List: Decreased range of motion, Decreased strength, Impaired balance, Decreased mobility, Impaired judgement, Pain, Obesity, Decreased cognition  Assessment: Pt. able to participate well in therapy session today. pt. noted to be very fearful and anxious during and regarding mobility. Pt. provided extended education, encouragement and emotional support t/o session. Pt. required less A for supine to sit trasnfers cues for hand placement and techniques for all transfers given. pt. able to maintain static sitting at the EOB unsupported with cues for postural correction as pt. was retropulsing initiially when seated at EOB. Pt. able to maintain static standing with RW for urinating, and to change of pads on the buttocks. Pt. was given cues for keeping the RW closer durin ambulation and to take steadier steps with increased heel strike. Pt. got anxious and needed to be seated on the chair during ambulation as she was reporting she was falling which was not true. Pt. declined to ambulate furher after that and wanted to get back in bed. Pt. able to perform back stepping to the EOB and sidestepping at the EOB towards HOB. P. back in bed post session with all needs within reach and OTR present in the room. Will continue to follow per PT POC. pt. has regressed in her mobility and is not at her baseline for functioning hence  recommending rehab at this time prior to returning home. Pt. asked about if she can be helped with her current mental status and therapist passed on the msg. to RN.  Barriers to Discharge: None     Rehab Resource Intensity Level, PT: II (Moderate Resource Intensity)    See flowsheet documentation for full assessment.

## 2024-11-28 LAB
ANION GAP SERPL CALCULATED.3IONS-SCNC: 5 MMOL/L (ref 4–13)
BASOPHILS # BLD AUTO: 0.01 THOUSANDS/ΜL (ref 0–0.1)
BASOPHILS NFR BLD AUTO: 0 % (ref 0–1)
BUN SERPL-MCNC: 41 MG/DL (ref 5–25)
CALCIUM SERPL-MCNC: 7.5 MG/DL (ref 8.4–10.2)
CHLORIDE SERPL-SCNC: 105 MMOL/L (ref 96–108)
CO2 SERPL-SCNC: 21 MMOL/L (ref 21–32)
CREAT SERPL-MCNC: 0.77 MG/DL (ref 0.6–1.3)
EOSINOPHIL # BLD AUTO: 0.11 THOUSAND/ΜL (ref 0–0.61)
EOSINOPHIL NFR BLD AUTO: 3 % (ref 0–6)
ERYTHROCYTE [DISTWIDTH] IN BLOOD BY AUTOMATED COUNT: 17.2 % (ref 11.6–15.1)
GFR SERPL CREATININE-BSD FRML MDRD: 83 ML/MIN/1.73SQ M
GLUCOSE SERPL-MCNC: 119 MG/DL (ref 65–140)
GLUCOSE SERPL-MCNC: 125 MG/DL (ref 65–140)
GLUCOSE SERPL-MCNC: 129 MG/DL (ref 65–140)
GLUCOSE SERPL-MCNC: 135 MG/DL (ref 65–140)
GLUCOSE SERPL-MCNC: 156 MG/DL (ref 65–140)
GLUCOSE SERPL-MCNC: 171 MG/DL (ref 65–140)
HCT VFR BLD AUTO: 24.4 % (ref 34.8–46.1)
HGB BLD-MCNC: 7.7 G/DL (ref 11.5–15.4)
IMM GRANULOCYTES # BLD AUTO: 0.05 THOUSAND/UL (ref 0–0.2)
IMM GRANULOCYTES NFR BLD AUTO: 1 % (ref 0–2)
LYMPHOCYTES # BLD AUTO: 0.39 THOUSANDS/ΜL (ref 0.6–4.47)
LYMPHOCYTES NFR BLD AUTO: 9 % (ref 14–44)
MAGNESIUM SERPL-MCNC: 2.5 MG/DL (ref 1.9–2.7)
MCH RBC QN AUTO: 30.4 PG (ref 26.8–34.3)
MCHC RBC AUTO-ENTMCNC: 31.6 G/DL (ref 31.4–37.4)
MCV RBC AUTO: 96 FL (ref 82–98)
MONOCYTES # BLD AUTO: 0.35 THOUSAND/ΜL (ref 0.17–1.22)
MONOCYTES NFR BLD AUTO: 9 % (ref 4–12)
NEUTROPHILS # BLD AUTO: 3.23 THOUSANDS/ΜL (ref 1.85–7.62)
NEUTS SEG NFR BLD AUTO: 78 % (ref 43–75)
NRBC BLD AUTO-RTO: 0 /100 WBCS
PHOSPHATE SERPL-MCNC: 4.4 MG/DL (ref 2.3–4.1)
PLATELET # BLD AUTO: 114 THOUSANDS/UL (ref 149–390)
PMV BLD AUTO: 10.1 FL (ref 8.9–12.7)
POTASSIUM SERPL-SCNC: 3.9 MMOL/L (ref 3.5–5.3)
RBC # BLD AUTO: 2.53 MILLION/UL (ref 3.81–5.12)
SODIUM SERPL-SCNC: 131 MMOL/L (ref 135–147)
WBC # BLD AUTO: 4.14 THOUSAND/UL (ref 4.31–10.16)

## 2024-11-28 PROCEDURE — 84100 ASSAY OF PHOSPHORUS: CPT

## 2024-11-28 PROCEDURE — 82948 REAGENT STRIP/BLOOD GLUCOSE: CPT

## 2024-11-28 PROCEDURE — 80048 BASIC METABOLIC PNL TOTAL CA: CPT

## 2024-11-28 PROCEDURE — 83735 ASSAY OF MAGNESIUM: CPT

## 2024-11-28 PROCEDURE — 99024 POSTOP FOLLOW-UP VISIT: CPT | Performed by: SURGERY

## 2024-11-28 PROCEDURE — 85025 COMPLETE CBC W/AUTO DIFF WBC: CPT

## 2024-11-28 RX ORDER — PANTOPRAZOLE SODIUM 40 MG/1
40 TABLET, DELAYED RELEASE ORAL
Status: DISCONTINUED | OUTPATIENT
Start: 2024-11-29 | End: 2024-12-05 | Stop reason: HOSPADM

## 2024-11-28 RX ORDER — ACETAMINOPHEN 325 MG/1
975 TABLET ORAL EVERY 8 HOURS SCHEDULED
Status: DISCONTINUED | OUTPATIENT
Start: 2024-11-28 | End: 2024-12-05 | Stop reason: HOSPADM

## 2024-11-28 RX ORDER — HYDROMORPHONE HCL/PF 1 MG/ML
0.5 SYRINGE (ML) INJECTION
Status: DISCONTINUED | OUTPATIENT
Start: 2024-11-28 | End: 2024-11-29

## 2024-11-28 RX ORDER — POTASSIUM CHLORIDE 1500 MG/1
20 TABLET, EXTENDED RELEASE ORAL ONCE
Status: COMPLETED | OUTPATIENT
Start: 2024-11-28 | End: 2024-11-28

## 2024-11-28 RX ADMIN — HYDROMORPHONE HYDROCHLORIDE 0.5 MG: 1 INJECTION, SOLUTION INTRAMUSCULAR; INTRAVENOUS; SUBCUTANEOUS at 22:36

## 2024-11-28 RX ADMIN — CHLORHEXIDINE GLUCONATE 15 ML: 1.2 RINSE ORAL at 08:13

## 2024-11-28 RX ADMIN — ACETAMINOPHEN 1000 MG: 10 INJECTION INTRAVENOUS at 13:38

## 2024-11-28 RX ADMIN — OXYCODONE HYDROCHLORIDE 10 MG: 10 TABLET ORAL at 21:21

## 2024-11-28 RX ADMIN — NYSTATIN: 100000 POWDER TOPICAL at 16:05

## 2024-11-28 RX ADMIN — ACETAMINOPHEN 1000 MG: 10 INJECTION INTRAVENOUS at 01:52

## 2024-11-28 RX ADMIN — POTASSIUM CHLORIDE 20 MEQ: 1500 TABLET, EXTENDED RELEASE ORAL at 08:13

## 2024-11-28 RX ADMIN — GABAPENTIN 100 MG: 100 CAPSULE ORAL at 21:20

## 2024-11-28 RX ADMIN — CARVEDILOL 12.5 MG: 12.5 TABLET, FILM COATED ORAL at 08:13

## 2024-11-28 RX ADMIN — HYDROMORPHONE HYDROCHLORIDE 0.5 MG: 1 INJECTION, SOLUTION INTRAMUSCULAR; INTRAVENOUS; SUBCUTANEOUS at 18:02

## 2024-11-28 RX ADMIN — OXYCODONE HYDROCHLORIDE 10 MG: 10 TABLET ORAL at 05:24

## 2024-11-28 RX ADMIN — INSULIN LISPRO 4 UNITS: 100 INJECTION, SOLUTION INTRAVENOUS; SUBCUTANEOUS at 11:44

## 2024-11-28 RX ADMIN — LEVOTHYROXINE SODIUM 125 MCG: 125 TABLET ORAL at 08:13

## 2024-11-28 RX ADMIN — ACETAMINOPHEN 1000 MG: 10 INJECTION INTRAVENOUS at 06:28

## 2024-11-28 RX ADMIN — ENOXAPARIN SODIUM 60 MG: 100 INJECTION SUBCUTANEOUS at 08:13

## 2024-11-28 RX ADMIN — ENOXAPARIN SODIUM 60 MG: 100 INJECTION SUBCUTANEOUS at 21:20

## 2024-11-28 RX ADMIN — CHLORHEXIDINE GLUCONATE 15 ML: 1.2 RINSE ORAL at 21:20

## 2024-11-28 RX ADMIN — ONDANSETRON 4 MG: 2 INJECTION INTRAMUSCULAR; INTRAVENOUS at 20:53

## 2024-11-28 RX ADMIN — INSULIN LISPRO 4 UNITS: 100 INJECTION, SOLUTION INTRAVENOUS; SUBCUTANEOUS at 21:20

## 2024-11-28 RX ADMIN — CARVEDILOL 12.5 MG: 12.5 TABLET, FILM COATED ORAL at 21:20

## 2024-11-28 RX ADMIN — ACETAMINOPHEN 975 MG: 325 TABLET, FILM COATED ORAL at 21:20

## 2024-11-28 RX ADMIN — OXYCODONE HYDROCHLORIDE 10 MG: 10 TABLET ORAL at 17:24

## 2024-11-28 RX ADMIN — PANTOPRAZOLE SODIUM 40 MG: 40 INJECTION, POWDER, LYOPHILIZED, FOR SOLUTION INTRAVENOUS at 08:13

## 2024-11-28 RX ADMIN — OXYCODONE HYDROCHLORIDE 10 MG: 10 TABLET ORAL at 11:46

## 2024-11-28 RX ADMIN — HYDROMORPHONE HYDROCHLORIDE 0.5 MG: 1 INJECTION, SOLUTION INTRAMUSCULAR; INTRAVENOUS; SUBCUTANEOUS at 08:17

## 2024-11-28 RX ADMIN — NYSTATIN 1 APPLICATION: 100000 POWDER TOPICAL at 08:24

## 2024-11-28 RX ADMIN — HYDROMORPHONE HYDROCHLORIDE 0.5 MG: 1 INJECTION, SOLUTION INTRAMUSCULAR; INTRAVENOUS; SUBCUTANEOUS at 12:39

## 2024-11-28 NOTE — PROGRESS NOTES
Progress Note - Surgery-General   Name: Lety Botello 64 y.o. female I MRN: [id number]  Unit/Bed#: [place] [address] -01 I Date of Admission: 11/14/2024   Date of Service: 11/28/2024 I Hospital Day: 14    Assessment & Plan  SBO (small bowel obstruction) (HCC)  64 y.o. female presenting with recurrent SBO. 11/14-11/21 failed conservative management with persistent SBO. S/p ex lap, SBR, DAVID on 11/22.       Plan  - Advance to diabetic diet  - repeat suppository  - Appreciate PT/OT recs  - Encourage OOB, ambulate, IS  - DVT ppx with therapeutic Lovenox  - F/u CM for rehab placement and dispo  - Strongly encourage OOB and ambulation         Subjective   No acute events overnight. Pt reporting ongoing discomfort but is able to michael small PO. No n/v, but says she is fearful of food bc of previous episodes. Educated that we removed the section of bowel that was causing issues and now proper nutrition is paramount.     Objective :  Temp:  [97.2 °F (36.2 °C)-97.9 °F (36.6 °C)] 97.5 °F (36.4 °C)  HR:  [64-76] 71  BP: (117-153)/(51-73) 145/58  Resp:  [17-20] 18  SpO2:  [95 %-99 %] 97 %  O2 Device: None (Room air)    I/O         11/26 0701  11/27 0700 11/27 0701  11/28 0700 11/28 0701  11/29 0700    P.O. 50 340     I.V. (mL/kg) 1918.3 (16) 901.4 (7.5)     IV Piggyback       TPN 1427.6      Total Intake(mL/kg) 3395.9 (28.3) 1241.4 (10.3)     Urine (mL/kg/hr) 1700 (0.6) 900 (0.3)     Stool 0      Total Output 1700 900     Net +1695.9 +341.4            Unmeasured Stool Occurrence 1 x            Lines/Drains/Airways       Active Status       Name Placement date Placement time Site Days    PICC Line 11/18/24 Right Basilic 11/18/24  1249  Basilic  9    External Urinary Catheter 11/24/24  1232  -- 3                  Physical Exam  Physical Exam:  General: No acute distress, alert and oriented  CV: RRR  Lungs: Normal work of breathing   Abdomen: soft, protuberant abdomen, midline incision is well healing lower pannus with excoriations.    Skin: Warm, dry      Lab Results: I have reviewed the following results:  Recent Labs     11/27/24  0619 11/28/24  0451   WBC 4.01* 4.14*   HGB 8.6* 7.7*   HCT 26.8* 24.4*   * 114*   SODIUM 131* 131*   K 3.7 3.9    105   CO2 23 21   BUN 35* 41*   CREATININE 0.63 0.77   GLUC 251* 129   CAIONIZED 1.16  --    MG 2.5 2.5   PHOS 4.2* 4.4*   AST 7*  --    ALT 7  --    ALB 1.9*  --    TBILI 0.23  --    ALKPHOS 118*  --    PTT 49*  --        Imaging Results Review: No pertinent imaging studies reviewed.  Other Study Results Review: No additional pertinent studies reviewed.    VTE Pharmacologic Prophylaxis: VTE covered by:  enoxaparin, Subcutaneous, 60 mg at 11/27/24 2120      VTE Mechanical Prophylaxis: sequential compression device

## 2024-11-28 NOTE — PLAN OF CARE
Problem: Potential for Falls  Goal: Patient will remain free of falls  Description: INTERVENTIONS:  - Educate patient/family on patient safety including physical limitations  - Instruct patient to call for assistance with activity   - Consult OT/PT to assist with strengthening/mobility   - Keep Call bell within reach  - Keep bed low and locked with side rails adjusted as appropriate  - Keep care items and personal belongings within reach  - Initiate and maintain comfort rounds  - Make Fall Risk Sign visible to staff  - Offer Toileting every 3 Hours, in advance of need  - Initiate/Maintain bed alarm  - Obtain necessary fall risk management equipment  - Apply yellow socks and bracelet for high fall risk patients  - Consider moving patient to room near nurses station  Outcome: Progressing     Problem: PAIN - ADULT  Goal: Verbalizes/displays adequate comfort level or baseline comfort level  Description: Interventions:  - Encourage patient to monitor pain and request assistance  - Assess pain using appropriate pain scale  - Administer analgesics based on type and severity of pain and evaluate response  - Implement non-pharmacological measures as appropriate and evaluate response  - Consider cultural and social influences on pain and pain management  - Notify physician/advanced practitioner if interventions unsuccessful or patient reports new pain  Outcome: Progressing     Problem: SAFETY ADULT  Goal: Patient will remain free of falls  Description: INTERVENTIONS:  - Educate patient/family on patient safety including physical limitations  - Instruct patient to call for assistance with activity   - Consult OT/PT to assist with strengthening/mobility   - Keep Call bell within reach  - Keep bed low and locked with side rails adjusted as appropriate  - Keep care items and personal belongings within reach  - Initiate and maintain comfort rounds  - Make Fall Risk Sign visible to staff  - Offer Toileting every 3 Hours, in  advance of need  - Initiate/Maintain bed alarm  - Obtain necessary fall risk management equipment  - Apply yellow socks and bracelet for high fall risk patients  - Consider moving patient to room near nurses station  Outcome: Progressing  Goal: Maintain or return to baseline ADL function  Description: INTERVENTIONS:  -  Assess patient's ability to carry out ADLs; assess patient's baseline for ADL function and identify physical deficits which impact ability to perform ADLs (bathing, care of mouth/teeth, toileting, grooming, dressing, etc.)  - Assess/evaluate cause of self-care deficits   - Assess range of motion  - Assess patient's mobility; develop plan if impaired  - Assess patient's need for assistive devices and provide as appropriate  - Encourage maximum independence but intervene and supervise when necessary  - Involve family in performance of ADLs  - Assess for home care needs following discharge   - Consider OT consult to assist with ADL evaluation and planning for discharge  - Provide patient education as appropriate  Outcome: Progressing  Goal: Maintains/Returns to pre admission functional level  Description: INTERVENTIONS:  - Perform AM-PAC 6 Click Basic Mobility/ Daily Activity assessment daily.  - Set and communicate daily mobility goal to care team and patient/family/caregiver.   - Collaborate with rehabilitation services on mobility goals if consulted  - Perform Range of Motion 3 times a day.  - Reposition patient every 3 hours.  - Dangle patient 3 times a day  - Stand patient 3 times a day  - Ambulate patient 3 times a day  - Out of bed to chair 3 times a day   - Out of bed for meals 3 times a day  - Out of bed for toileting  - Record patient progress and toleration of activity level   Outcome: Progressing     Problem: DISCHARGE PLANNING  Goal: Discharge to home or other facility with appropriate resources  Description: INTERVENTIONS:  - Identify barriers to discharge w/patient and caregiver  -  Arrange for needed discharge resources and transportation as appropriate  - Identify discharge learning needs (meds, wound care, etc.)  - Arrange for interpretive services to assist at discharge as needed  - Refer to Case Management Department for coordinating discharge planning if the patient needs post-hospital services based on physician/advanced practitioner order or complex needs related to functional status, cognitive ability, or social support system  Outcome: Progressing     Problem: GASTROINTESTINAL - ADULT  Goal: Minimal or absence of nausea and/or vomiting  Description: INTERVENTIONS:  - Administer IV fluids if ordered to ensure adequate hydration  - Maintain NPO status until nausea and vomiting are resolved  - Nasogastric tube if ordered  - Administer ordered antiemetic medications as needed  - Provide nonpharmacologic comfort measures as appropriate  - Advance diet as tolerated, if ordered  - Consider nutrition services referral to assist patient with adequate nutrition and appropriate food choices  Outcome: Progressing  Goal: Maintains or returns to baseline bowel function  Description: INTERVENTIONS:  - Assess bowel function  - Encourage oral fluids to ensure adequate hydration  - Administer IV fluids if ordered to ensure adequate hydration  - Administer ordered medications as needed  - Encourage mobilization and activity  - Consider nutritional services referral to assist patient with adequate nutrition and appropriate food choices  Outcome: Progressing  Goal: Maintains adequate nutritional intake  Description: INTERVENTIONS:  - Monitor percentage of each meal consumed  - Identify factors contributing to decreased intake, treat as appropriate  - Assist with meals as needed  - Monitor I&O, weight, and lab values if indicated  - Obtain nutrition services referral as needed  Outcome: Progressing  Goal: Establish and maintain optimal ostomy function  Description: INTERVENTIONS:  - Assess bowel  function  - Encourage oral fluids to ensure adequate hydration  - Administer IV fluids if ordered to ensure adequate hydration   - Administer ordered medications as needed  - Encourage mobilization and activity  - Nutrition services referral to assist patient with appropriate food choices  - Assess stoma site  - Consider wound care consult   Outcome: Progressing  Goal: Oral mucous membranes remain intact  Description: INTERVENTIONS  - Assess oral mucosa and hygiene practices  - Implement preventative oral hygiene regimen  - Implement oral medicated treatments as ordered  - Initiate Nutrition services referral as needed  Outcome: Progressing     Problem: Prexisting or High Potential for Compromised Skin Integrity  Goal: Skin integrity is maintained or improved  Description: INTERVENTIONS:  - Identify patients at risk for skin breakdown  - Assess and monitor skin integrity  - Assess and monitor nutrition and hydration status  - Monitor labs   - Assess for incontinence   - Turn and reposition patient  - Assist with mobility/ambulation  - Relieve pressure over bony prominences  - Avoid friction and shearing  - Provide appropriate hygiene as needed including keeping skin clean and dry  - Evaluate need for skin moisturizer/barrier cream  - Collaborate with interdisciplinary team   - Patient/family teaching  - Consider wound care consult   Outcome: Progressing     Problem: Nutrition/Hydration-ADULT  Goal: Nutrient/Hydration intake appropriate for improving, restoring or maintaining nutritional needs  Description: Monitor and assess patient's nutrition/hydration status for malnutrition. Collaborate with interdisciplinary team and initiate plan and interventions as ordered.  Monitor patient's weight and dietary intake as ordered or per policy. Utilize nutrition screening tool and intervene as necessary. Determine patient's food preferences and provide high-protein, high-caloric foods as appropriate.     INTERVENTIONS:  -  Monitor oral intake, urinary output, labs, and treatment plans  - Assess nutrition and hydration status and recommend course of action  - Evaluate amount of meals eaten  - Assist patient with eating if necessary   - Allow adequate time for meals  - Recommend/ encourage appropriate diets, oral nutritional supplements, and vitamin/mineral supplements  - Order, calculate, and assess calorie counts as needed  - Recommend, monitor, and adjust tube feedings and TPN/PPN based on assessed needs  - Assess need for intravenous fluids  - Provide specific nutrition/hydration education as appropriate  - Include patient/family/caregiver in decisions related to nutrition  Outcome: Progressing     Problem: INFECTION - ADULT  Goal: Absence or prevention of progression during hospitalization  Description: INTERVENTIONS:  - Assess and monitor for signs and symptoms of infection  - Monitor lab/diagnostic results  - Monitor all insertion sites, i.e. indwelling lines, tubes, and drains  - Monitor endotracheal if appropriate and nasal secretions for changes in amount and color  - [place] appropriate cooling/warming therapies per order  - Administer medications as ordered  - Instruct and encourage patient and family to use good hand hygiene technique  - Identify and instruct in appropriate isolation precautions for identified infection/condition  Outcome: Progressing     Problem: Knowledge Deficit  Goal: Patient/family/caregiver demonstrates understanding of disease process, treatment plan, medications, and discharge instructions  Description: Complete learning assessment and assess knowledge base.  Interventions:  - Provide teaching at level of understanding  - Provide teaching via preferred learning methods  Outcome: Progressing     Problem: METABOLIC, FLUID AND ELECTROLYTES - ADULT  Goal: Electrolytes maintained within normal limits  Description: INTERVENTIONS:  - Monitor labs and assess patient for signs and symptoms of electrolyte  imbalances  - Administer electrolyte replacement as ordered  - Monitor response to electrolyte replacements, including repeat lab results as appropriate  - Instruct patient on fluid and nutrition as appropriate  Outcome: Progressing     Problem: SKIN/TISSUE INTEGRITY - ADULT  Goal: Skin Integrity remains intact(Skin Breakdown Prevention)  Description: Assess:  -Perform Esteban assessment every shift  -Clean and moisturize skin as needed  -Inspect skin when repositioning, toileting, and assisting with ADLS  -Assess under medical devices such as masimo every shift  -Assess extremities for adequate circulation and sensation     Bed Management:  -Have minimal linens on bed & keep smooth, unwrinkled  -Change linens as needed when moist or perspiring  -Avoid sitting or lying in one position for more than 2 hours while in bed  -Keep HOB at 30 degrees     Toileting:  -Offer bedside commode  -Assess for incontinence every 2 hrs  -Use incontinent care products after each incontinent episode such as lotion    Activity:  -Mobilize patient 3 times a day  -Encourage activity and walks on unit  -Encourage or provide ROM exercises   -Turn and reposition patient every 2 Hours  -Use appropriate equipment to lift or move patient in bed  -Instruct/ Assist with weight shifting every 1 hour when out of bed in chair  -Consider limitation of chair time 1 hour intervals    Skin Care:  -Avoid use of baby powder, tape, friction and shearing, hot water or constrictive clothing  -Relieve pressure over bony prominences using allevyn  -Do not massage red bony areas    Next Steps:  -Teach patient strategies to minimize risks such as repositioning   -Consider consults to  interdisciplinary teams such as PTOT  Outcome: Progressing  Goal: Incision(s), wounds(s) or drain site(s) healing without S/S of infection  Description: INTERVENTIONS  - Assess and document dressing, incision, wound bed, drain sites and surrounding tissue  - Provide patient and  family education  - Perform skin care/dressing changes as needed  Outcome: Progressing     Problem: HEMATOLOGIC - ADULT  Goal: Maintains hematologic stability  Description: INTERVENTIONS  - Assess for signs and symptoms of bleeding or hemorrhage  - Monitor labs  - Administer supportive blood products/factors as ordered and appropriate  Outcome: Progressing     Problem: CARDIOVASCULAR - ADULT  Goal: Maintains optimal cardiac output and hemodynamic stability  Description: INTERVENTIONS:  - Monitor I/O, vital signs and rhythm  - Monitor for S/S and trends of decreased cardiac output  - Administer and titrate ordered vasoactive medications to optimize hemodynamic stability  - Assess quality of pulses, skin color and temperature  - Assess for signs of decreased coronary artery perfusion  - Instruct patient to report change in severity of symptoms  Outcome: Progressing  Goal: Absence of cardiac dysrhythmias or at baseline rhythm  Description: INTERVENTIONS:  - Continuous cardiac monitoring, vital signs, obtain 12 lead EKG if ordered  - Administer antiarrhythmic and heart rate control medications as ordered  - Monitor electrolytes and administer replacement therapy as ordered  Outcome: Progressing     Problem: RESPIRATORY - ADULT  Goal: Achieves optimal ventilation and oxygenation  Description: INTERVENTIONS:  - Assess for changes in respiratory status  - Assess for changes in mentation and behavior  - Position to facilitate oxygenation and minimize respiratory effort  - Oxygen administered by appropriate delivery if ordered  - Initiate smoking cessation education as indicated  - Encourage broncho-pulmonary hygiene including cough, deep breathe, Incentive Spirometry  - Assess the need for suctioning and aspirate as needed  - Assess and instruct to report SOB or any respiratory difficulty  - Respiratory Therapy support as indicated  Outcome: Progressing

## 2024-11-28 NOTE — PROGRESS NOTES
The pantoprazole has been converted to Oral per [facility] IV-to-PO Auto-Conversion Protocol for Adults as approved by the Pharmacy and Therapeutics Committee. The patient met all eligible criteria:  1) Age >/= 18 years old   2) Received at least one dose of the IV form   3) Receiving at least one other scheduled oral/enteral medication   4) Tolerating an oral/enteral diet   and did not have any exclusions:   1) Critical care patient   2) Active GI bleed (IF assessing H2RAs or PPIs)   3) Continuous tube feeding (IF assessing cipro, doxycycline, levofloxacin, minocycline, rifampin, or voriconazole)   4) Receiving PO vancomycin (IF assessing metronidazole)   5) Persistent nausea and/or vomiting   6) Ileus or gastrointestinal obstructionp- resolved  7) Alexus/nasogastric tube set for continuous suction   8) Specific order not to automatically convert to PO (in the order's comments or if discussed in the most recent Infectious Disease or primary team's progress notes).

## 2024-11-28 NOTE — ASSESSMENT & PLAN NOTE
64 y.o. female presenting with recurrent SBO. 11/14-11/21 failed conservative management with persistent SBO. S/p ex lap, SBR, DAVID on 11/22.       Plan  - Advance to diabetic diet  - repeat suppository  - Appreciate PT/OT recs  - Encourage OOB, ambulate, IS  - DVT ppx with therapeutic Lovenox  - F/u CM for rehab placement and dispo  - Strongly encourage OOB and ambulation

## 2024-11-28 NOTE — PLAN OF CARE
Problem: Potential for Falls  Goal: Patient will remain free of falls  Description: INTERVENTIONS:  - Educate patient/family on patient safety including physical limitations  - Instruct patient to call for assistance with activity   - Consult OT/PT to assist with strengthening/mobility   - Keep Call bell within reach  - Keep bed low and locked with side rails adjusted as appropriate  - Keep care items and personal belongings within reach  - Initiate and maintain comfort rounds  - Make Fall Risk Sign visible to staff  - Offer Toileting every 3 Hours, in advance of need  - Initiate/Maintain bed alarm  - Obtain necessary fall risk management equipment  - Apply yellow socks and bracelet for high fall risk patients  - Consider moving patient to room near nurses station  Outcome: Progressing     Problem: PAIN - ADULT  Goal: Verbalizes/displays adequate comfort level or baseline comfort level  Description: Interventions:  - Encourage patient to monitor pain and request assistance  - Assess pain using appropriate pain scale  - Administer analgesics based on type and severity of pain and evaluate response  - Implement non-pharmacological measures as appropriate and evaluate response  - Consider cultural and social influences on pain and pain management  - Notify physician/advanced practitioner if interventions unsuccessful or patient reports new pain  Outcome: Progressing     Problem: SAFETY ADULT  Goal: Patient will remain free of falls  Description: INTERVENTIONS:  - Educate patient/family on patient safety including physical limitations  - Instruct patient to call for assistance with activity   - Consult OT/PT to assist with strengthening/mobility   - Keep Call bell within reach  - Keep bed low and locked with side rails adjusted as appropriate  - Keep care items and personal belongings within reach  - Initiate and maintain comfort rounds  - Make Fall Risk Sign visible to staff  - Offer Toileting every 3 Hours, in  advance of need  - Initiate/Maintain bed alarm  - Obtain necessary fall risk management equipment  - Apply yellow socks and bracelet for high fall risk patients  - Consider moving patient to room near nurses station  Outcome: Progressing  Goal: Maintain or return to baseline ADL function  Description: INTERVENTIONS:  -  Assess patient's ability to carry out ADLs; assess patient's baseline for ADL function and identify physical deficits which impact ability to perform ADLs (bathing, care of mouth/teeth, toileting, grooming, dressing, etc.)  - Assess/evaluate cause of self-care deficits   - Assess range of motion  - Assess patient's mobility; develop plan if impaired  - Assess patient's need for assistive devices and provide as appropriate  - Encourage maximum independence but intervene and supervise when necessary  - Involve family in performance of ADLs  - Assess for home care needs following discharge   - Consider OT consult to assist with ADL evaluation and planning for discharge  - Provide patient education as appropriate  Outcome: Progressing  Goal: Maintains/Returns to pre admission functional level  Description: INTERVENTIONS:  - Perform AM-PAC 6 Click Basic Mobility/ Daily Activity assessment daily.  - Set and communicate daily mobility goal to care team and patient/family/caregiver.   - Collaborate with rehabilitation services on mobility goals if consulted  - Perform Range of Motion 3 times a day.  - Reposition patient every 3 hours.  - Dangle patient 3 times a day  - Stand patient 3 times a day  - Ambulate patient 3 times a day  - Out of bed to chair 3 times a day   - Out of bed for meals 3 times a day  - Out of bed for toileting  - Record patient progress and toleration of activity level   Outcome: Progressing     Problem: DISCHARGE PLANNING  Goal: Discharge to home or other facility with appropriate resources  Description: INTERVENTIONS:  - Identify barriers to discharge w/patient and caregiver  -  Arrange for needed discharge resources and transportation as appropriate  - Identify discharge learning needs (meds, wound care, etc.)  - Arrange for interpretive services to assist at discharge as needed  - Refer to Case Management Department for coordinating discharge planning if the patient needs post-hospital services based on physician/advanced practitioner order or complex needs related to functional status, cognitive ability, or social support system  Outcome: Progressing     Problem: GASTROINTESTINAL - ADULT  Goal: Minimal or absence of nausea and/or vomiting  Description: INTERVENTIONS:  - Administer IV fluids if ordered to ensure adequate hydration  - Maintain NPO status until nausea and vomiting are resolved  - Nasogastric tube if ordered  - Administer ordered antiemetic medications as needed  - Provide nonpharmacologic comfort measures as appropriate  - Advance diet as tolerated, if ordered  - Consider nutrition services referral to assist patient with adequate nutrition and appropriate food choices  Outcome: Progressing  Goal: Maintains or returns to baseline bowel function  Description: INTERVENTIONS:  - Assess bowel function  - Encourage oral fluids to ensure adequate hydration  - Administer IV fluids if ordered to ensure adequate hydration  - Administer ordered medications as needed  - Encourage mobilization and activity  - Consider nutritional services referral to assist patient with adequate nutrition and appropriate food choices  Outcome: Progressing  Goal: Maintains adequate nutritional intake  Description: INTERVENTIONS:  - Monitor percentage of each meal consumed  - Identify factors contributing to decreased intake, treat as appropriate  - Assist with meals as needed  - Monitor I&O, weight, and lab values if indicated  - Obtain nutrition services referral as needed  Outcome: Progressing  Goal: Establish and maintain optimal ostomy function  Description: INTERVENTIONS:  - Assess bowel  function  - Encourage oral fluids to ensure adequate hydration  - Administer IV fluids if ordered to ensure adequate hydration   - Administer ordered medications as needed  - Encourage mobilization and activity  - Nutrition services referral to assist patient with appropriate food choices  - Assess stoma site  - Consider wound care consult   Outcome: Progressing  Goal: Oral mucous membranes remain intact  Description: INTERVENTIONS  - Assess oral mucosa and hygiene practices  - Implement preventative oral hygiene regimen  - Implement oral medicated treatments as ordered  - Initiate Nutrition services referral as needed  Outcome: Progressing     Problem: Prexisting or High Potential for Compromised Skin Integrity  Goal: Skin integrity is maintained or improved  Description: INTERVENTIONS:  - Identify patients at risk for skin breakdown  - Assess and monitor skin integrity  - Assess and monitor nutrition and hydration status  - Monitor labs   - Assess for incontinence   - Turn and reposition patient  - Assist with mobility/ambulation  - Relieve pressure over bony prominences  - Avoid friction and shearing  - Provide appropriate hygiene as needed including keeping skin clean and dry  - Evaluate need for skin moisturizer/barrier cream  - Collaborate with interdisciplinary team   - Patient/family teaching  - Consider wound care consult   Outcome: Progressing     Problem: Nutrition/Hydration-ADULT  Goal: Nutrient/Hydration intake appropriate for improving, restoring or maintaining nutritional needs  Description: Monitor and assess patient's nutrition/hydration status for malnutrition. Collaborate with interdisciplinary team and initiate plan and interventions as ordered.  Monitor patient's weight and dietary intake as ordered or per policy. Utilize nutrition screening tool and intervene as necessary. Determine patient's food preferences and provide high-protein, high-caloric foods as appropriate.     INTERVENTIONS:  -  Monitor oral intake, urinary output, labs, and treatment plans  - Assess nutrition and hydration status and recommend course of action  - Evaluate amount of meals eaten  - Assist patient with eating if necessary   - Allow adequate time for meals  - Recommend/ encourage appropriate diets, oral nutritional supplements, and vitamin/mineral supplements  - Order, calculate, and assess calorie counts as needed  - Recommend, monitor, and adjust tube feedings and TPN/PPN based on assessed needs  - Assess need for intravenous fluids  - Provide specific nutrition/hydration education as appropriate  - Include patient/family/caregiver in decisions related to nutrition  Outcome: Progressing     Problem: INFECTION - ADULT  Goal: Absence or prevention of progression during hospitalization  Description: INTERVENTIONS:  - Assess and monitor for signs and symptoms of infection  - Monitor lab/diagnostic results  - Monitor all insertion sites, i.e. indwelling lines, tubes, and drains  - Monitor endotracheal if appropriate and nasal secretions for changes in amount and color  - [place] appropriate cooling/warming therapies per order  - Administer medications as ordered  - Instruct and encourage patient and family to use good hand hygiene technique  - Identify and instruct in appropriate isolation precautions for identified infection/condition  Outcome: Progressing     Problem: Knowledge Deficit  Goal: Patient/family/caregiver demonstrates understanding of disease process, treatment plan, medications, and discharge instructions  Description: Complete learning assessment and assess knowledge base.  Interventions:  - Provide teaching at level of understanding  - Provide teaching via preferred learning methods  Outcome: Progressing     Problem: METABOLIC, FLUID AND ELECTROLYTES - ADULT  Goal: Electrolytes maintained within normal limits  Description: INTERVENTIONS:  - Monitor labs and assess patient for signs and symptoms of electrolyte  imbalances  - Administer electrolyte replacement as ordered  - Monitor response to electrolyte replacements, including repeat lab results as appropriate  - Instruct patient on fluid and nutrition as appropriate  Outcome: Progressing     Problem: SKIN/TISSUE INTEGRITY - ADULT  Goal: Skin Integrity remains intact(Skin Breakdown Prevention)  Description: Assess:  -Perform Esteban assessment every 12 hours.  -Clean and moisturize skin every as needed  -Inspect skin when repositioning, toileting, and assisting with ADLS  -Assess extremities for adequate circulation and sensation     Bed Management:  -Have minimal linens on bed & keep smooth, unwrinkled  -Change linens as needed when moist or perspiring  -Avoid sitting or lying in one position for more than 2 hours while in bed  -Keep HOB at 40 degrees     Toileting:  -Offer bedside commode  -Assess for incontinence every 2 hours.  -Use incontinent care products after each incontinent episode such as wipes and barrier cream.    Activity:  -Mobilize patient 3 times a day  -Encourage activity and walks on unit  -Encourage or provide ROM exercises   -Turn and reposition patient every 2 Hours  -Use appropriate equipment to lift or move patient in bed  -Instruct/ Assist with weight shifting every 30 when out of bed in chair  -Consider limitation of chair time 2 hour intervals    Skin Care:  -Avoid use of baby powder, tape, friction and shearing, hot water or constrictive clothing  -Relieve pressure over bony prominences using pillows and wedges.  -Do not massage red bony areas    Next Steps:  -Teach patient strategies to minimize risks such as turning.   -Consider consults to  interdisciplinary teams such as pt/ot, nutrition and case management.  Outcome: Progressing  Goal: Incision(s), wounds(s) or drain site(s) healing without S/S of infection  Description: INTERVENTIONS  - Assess and document dressing, incision, wound bed, drain sites and surrounding tissue  - Provide  patient and family education  - Perform skin care/dressing changes every day.  Outcome: Progressing     Problem: HEMATOLOGIC - ADULT  Goal: Maintains hematologic stability  Description: INTERVENTIONS  - Assess for signs and symptoms of bleeding or hemorrhage  - Monitor labs  - Administer supportive blood products/factors as ordered and appropriate  Outcome: Progressing     Problem: CARDIOVASCULAR - ADULT  Goal: Maintains optimal cardiac output and hemodynamic stability  Description: INTERVENTIONS:  - Monitor I/O, vital signs and rhythm  - Monitor for S/S and trends of decreased cardiac output  - Administer and titrate ordered vasoactive medications to optimize hemodynamic stability  - Assess quality of pulses, skin color and temperature  - Assess for signs of decreased coronary artery perfusion  - Instruct patient to report change in severity of symptoms  Outcome: Progressing  Goal: Absence of cardiac dysrhythmias or at baseline rhythm  Description: INTERVENTIONS:  - Continuous cardiac monitoring, vital signs, obtain 12 lead EKG if ordered  - Administer antiarrhythmic and heart rate control medications as ordered  - Monitor electrolytes and administer replacement therapy as ordered  Outcome: Progressing     Problem: RESPIRATORY - ADULT  Goal: Achieves optimal ventilation and oxygenation  Description: INTERVENTIONS:  - Assess for changes in respiratory status  - Assess for changes in mentation and behavior  - Position to facilitate oxygenation and minimize respiratory effort  - Oxygen administered by appropriate delivery if ordered  - Initiate smoking cessation education as indicated  - Encourage broncho-pulmonary hygiene including cough, deep breathe, Incentive Spirometry  - Assess the need for suctioning and aspirate as needed  - Assess and instruct to report SOB or any respiratory difficulty  - Respiratory Therapy support as indicated  Outcome: Progressing      Her/She

## 2024-11-29 ENCOUNTER — APPOINTMENT (INPATIENT)
Dept: RADIOLOGY | Facility: HOSPITAL | Age: 64
DRG: 230 | End: 2024-11-29
Payer: COMMERCIAL

## 2024-11-29 LAB
ANION GAP SERPL CALCULATED.3IONS-SCNC: 5 MMOL/L (ref 4–13)
BASOPHILS # BLD AUTO: 0.02 THOUSANDS/ΜL (ref 0–0.1)
BASOPHILS NFR BLD AUTO: 0 % (ref 0–1)
BUN SERPL-MCNC: 41 MG/DL (ref 5–25)
CALCIUM SERPL-MCNC: 7.5 MG/DL (ref 8.4–10.2)
CHLORIDE SERPL-SCNC: 106 MMOL/L (ref 96–108)
CO2 SERPL-SCNC: 22 MMOL/L (ref 21–32)
CREAT SERPL-MCNC: 0.9 MG/DL (ref 0.6–1.3)
EOSINOPHIL # BLD AUTO: 0.11 THOUSAND/ΜL (ref 0–0.61)
EOSINOPHIL NFR BLD AUTO: 2 % (ref 0–6)
ERYTHROCYTE [DISTWIDTH] IN BLOOD BY AUTOMATED COUNT: 17.3 % (ref 11.6–15.1)
GFR SERPL CREATININE-BSD FRML MDRD: 68 ML/MIN/1.73SQ M
GLUCOSE SERPL-MCNC: 121 MG/DL (ref 65–140)
GLUCOSE SERPL-MCNC: 140 MG/DL (ref 65–140)
GLUCOSE SERPL-MCNC: 153 MG/DL (ref 65–140)
GLUCOSE SERPL-MCNC: 161 MG/DL (ref 65–140)
GLUCOSE SERPL-MCNC: 190 MG/DL (ref 65–140)
HCT VFR BLD AUTO: 26.1 % (ref 34.8–46.1)
HGB BLD-MCNC: 8.2 G/DL (ref 11.5–15.4)
IMM GRANULOCYTES # BLD AUTO: 0.09 THOUSAND/UL (ref 0–0.2)
IMM GRANULOCYTES NFR BLD AUTO: 2 % (ref 0–2)
LYMPHOCYTES # BLD AUTO: 0.55 THOUSANDS/ΜL (ref 0.6–4.47)
LYMPHOCYTES NFR BLD AUTO: 11 % (ref 14–44)
MCH RBC QN AUTO: 31.2 PG (ref 26.8–34.3)
MCHC RBC AUTO-ENTMCNC: 31.4 G/DL (ref 31.4–37.4)
MCV RBC AUTO: 99 FL (ref 82–98)
MONOCYTES # BLD AUTO: 0.37 THOUSAND/ΜL (ref 0.17–1.22)
MONOCYTES NFR BLD AUTO: 7 % (ref 4–12)
NEUTROPHILS # BLD AUTO: 4.06 THOUSANDS/ΜL (ref 1.85–7.62)
NEUTS SEG NFR BLD AUTO: 78 % (ref 43–75)
NRBC BLD AUTO-RTO: 0 /100 WBCS
PHOSPHATE SERPL-MCNC: 5.1 MG/DL (ref 2.3–4.1)
PLATELET # BLD AUTO: 113 THOUSANDS/UL (ref 149–390)
PMV BLD AUTO: 10.1 FL (ref 8.9–12.7)
POTASSIUM SERPL-SCNC: 4.4 MMOL/L (ref 3.5–5.3)
RBC # BLD AUTO: 2.63 MILLION/UL (ref 3.81–5.12)
SODIUM SERPL-SCNC: 133 MMOL/L (ref 135–147)
WBC # BLD AUTO: 5.2 THOUSAND/UL (ref 4.31–10.16)

## 2024-11-29 PROCEDURE — 97530 THERAPEUTIC ACTIVITIES: CPT

## 2024-11-29 PROCEDURE — 85025 COMPLETE CBC W/AUTO DIFF WBC: CPT

## 2024-11-29 PROCEDURE — 88341 IMHCHEM/IMCYTCHM EA ADD ANTB: CPT | Performed by: PATHOLOGY

## 2024-11-29 PROCEDURE — 97535 SELF CARE MNGMENT TRAINING: CPT

## 2024-11-29 PROCEDURE — 88307 TISSUE EXAM BY PATHOLOGIST: CPT | Performed by: PATHOLOGY

## 2024-11-29 PROCEDURE — 99024 POSTOP FOLLOW-UP VISIT: CPT | Performed by: SURGERY

## 2024-11-29 PROCEDURE — 82948 REAGENT STRIP/BLOOD GLUCOSE: CPT

## 2024-11-29 PROCEDURE — 88342 IMHCHEM/IMCYTCHM 1ST ANTB: CPT | Performed by: PATHOLOGY

## 2024-11-29 PROCEDURE — 88344 IMHCHEM/IMCYTCHM EA MLT ANTB: CPT | Performed by: PATHOLOGY

## 2024-11-29 PROCEDURE — 74018 RADEX ABDOMEN 1 VIEW: CPT

## 2024-11-29 PROCEDURE — 84100 ASSAY OF PHOSPHORUS: CPT

## 2024-11-29 PROCEDURE — 97116 GAIT TRAINING THERAPY: CPT

## 2024-11-29 PROCEDURE — 80048 BASIC METABOLIC PNL TOTAL CA: CPT

## 2024-11-29 RX ORDER — HYDROMORPHONE HCL IN WATER/PF 6 MG/30 ML
0.2 PATIENT CONTROLLED ANALGESIA SYRINGE INTRAVENOUS
Status: DISCONTINUED | OUTPATIENT
Start: 2024-11-29 | End: 2024-12-03

## 2024-11-29 RX ORDER — METOCLOPRAMIDE HYDROCHLORIDE 5 MG/ML
10 INJECTION INTRAMUSCULAR; INTRAVENOUS EVERY 6 HOURS SCHEDULED
Status: COMPLETED | OUTPATIENT
Start: 2024-11-29 | End: 2024-11-30

## 2024-11-29 RX ORDER — HYDROMORPHONE HCL/PF 1 MG/ML
0.5 SYRINGE (ML) INJECTION EVERY 4 HOURS PRN
Status: DISCONTINUED | OUTPATIENT
Start: 2024-11-29 | End: 2024-11-29

## 2024-11-29 RX ORDER — OXYCODONE HYDROCHLORIDE 5 MG/1
5 TABLET ORAL EVERY 4 HOURS PRN
Refills: 0 | Status: DISCONTINUED | OUTPATIENT
Start: 2024-11-29 | End: 2024-12-05 | Stop reason: HOSPADM

## 2024-11-29 RX ADMIN — INSULIN LISPRO 4 UNITS: 100 INJECTION, SOLUTION INTRAVENOUS; SUBCUTANEOUS at 12:23

## 2024-11-29 RX ADMIN — HYDROMORPHONE HYDROCHLORIDE 0.5 MG: 1 INJECTION, SOLUTION INTRAMUSCULAR; INTRAVENOUS; SUBCUTANEOUS at 05:48

## 2024-11-29 RX ADMIN — ONDANSETRON 4 MG: 2 INJECTION INTRAMUSCULAR; INTRAVENOUS at 13:39

## 2024-11-29 RX ADMIN — OXYCODONE 5 MG: 5 TABLET ORAL at 17:06

## 2024-11-29 RX ADMIN — METOCLOPRAMIDE 10 MG: 5 INJECTION, SOLUTION INTRAMUSCULAR; INTRAVENOUS at 17:06

## 2024-11-29 RX ADMIN — CARVEDILOL 12.5 MG: 12.5 TABLET, FILM COATED ORAL at 08:31

## 2024-11-29 RX ADMIN — ACETAMINOPHEN 975 MG: 325 TABLET, FILM COATED ORAL at 21:47

## 2024-11-29 RX ADMIN — CHLORHEXIDINE GLUCONATE 15 ML: 1.2 RINSE ORAL at 21:54

## 2024-11-29 RX ADMIN — HYDROMORPHONE HYDROCHLORIDE 0.2 MG: 0.2 INJECTION, SOLUTION INTRAMUSCULAR; INTRAVENOUS; SUBCUTANEOUS at 21:54

## 2024-11-29 RX ADMIN — ONDANSETRON 4 MG: 2 INJECTION INTRAMUSCULAR; INTRAVENOUS at 19:58

## 2024-11-29 RX ADMIN — LEVOTHYROXINE SODIUM 125 MCG: 125 TABLET ORAL at 08:31

## 2024-11-29 RX ADMIN — ENOXAPARIN SODIUM 60 MG: 100 INJECTION SUBCUTANEOUS at 21:47

## 2024-11-29 RX ADMIN — METOCLOPRAMIDE 10 MG: 5 INJECTION, SOLUTION INTRAMUSCULAR; INTRAVENOUS at 12:24

## 2024-11-29 RX ADMIN — CHLORHEXIDINE GLUCONATE 15 ML: 1.2 RINSE ORAL at 08:31

## 2024-11-29 RX ADMIN — ACETAMINOPHEN 975 MG: 325 TABLET, FILM COATED ORAL at 05:40

## 2024-11-29 RX ADMIN — ACETAMINOPHEN 975 MG: 325 TABLET, FILM COATED ORAL at 13:34

## 2024-11-29 RX ADMIN — NYSTATIN: 100000 POWDER TOPICAL at 16:07

## 2024-11-29 RX ADMIN — Medication 2.5 MG: at 19:55

## 2024-11-29 RX ADMIN — GABAPENTIN 100 MG: 100 CAPSULE ORAL at 21:47

## 2024-11-29 RX ADMIN — METOCLOPRAMIDE 10 MG: 5 INJECTION, SOLUTION INTRAMUSCULAR; INTRAVENOUS at 08:15

## 2024-11-29 RX ADMIN — OXYCODONE 5 MG: 5 TABLET ORAL at 12:37

## 2024-11-29 RX ADMIN — OXYCODONE 5 MG: 5 TABLET ORAL at 08:37

## 2024-11-29 RX ADMIN — HYDROMORPHONE HYDROCHLORIDE 0.2 MG: 0.2 INJECTION, SOLUTION INTRAMUSCULAR; INTRAVENOUS; SUBCUTANEOUS at 16:05

## 2024-11-29 RX ADMIN — OXYCODONE HYDROCHLORIDE 10 MG: 10 TABLET ORAL at 02:30

## 2024-11-29 RX ADMIN — CARVEDILOL 12.5 MG: 12.5 TABLET, FILM COATED ORAL at 21:47

## 2024-11-29 RX ADMIN — PANTOPRAZOLE SODIUM 40 MG: 40 TABLET, DELAYED RELEASE ORAL at 05:41

## 2024-11-29 RX ADMIN — NYSTATIN: 100000 POWDER TOPICAL at 21:47

## 2024-11-29 RX ADMIN — NYSTATIN: 100000 POWDER TOPICAL at 08:32

## 2024-11-29 RX ADMIN — INSULIN LISPRO 4 UNITS: 100 INJECTION, SOLUTION INTRAVENOUS; SUBCUTANEOUS at 21:47

## 2024-11-29 RX ADMIN — BISACODYL 10 MG: 10 SUPPOSITORY RECTAL at 08:34

## 2024-11-29 RX ADMIN — ENOXAPARIN SODIUM 60 MG: 100 INJECTION SUBCUTANEOUS at 08:33

## 2024-11-29 NOTE — PLAN OF CARE
Problem: Potential for Falls  Goal: Patient will remain free of falls  Description: INTERVENTIONS:  - Educate patient/family on patient safety including physical limitations  - Instruct patient to call for assistance with activity   - Consult OT/PT to assist with strengthening/mobility   - Keep Call bell within reach  - Keep bed low and locked with side rails adjusted as appropriate  - Keep care items and personal belongings within reach  - Initiate and maintain comfort rounds  - Make Fall Risk Sign visible to staff  - Offer Toileting every 3 Hours, in advance of need  - Initiate/Maintain bed alarm  - Obtain necessary fall risk management equipment  - Apply yellow socks and bracelet for high fall risk patients  - Consider moving patient to room near nurses station  Outcome: Progressing     Problem: PAIN - ADULT  Goal: Verbalizes/displays adequate comfort level or baseline comfort level  Description: Interventions:  - Encourage patient to monitor pain and request assistance  - Assess pain using appropriate pain scale  - Administer analgesics based on type and severity of pain and evaluate response  - Implement non-pharmacological measures as appropriate and evaluate response  - Consider cultural and social influences on pain and pain management  - Notify physician/advanced practitioner if interventions unsuccessful or patient reports new pain  Outcome: Progressing     Problem: SAFETY ADULT  Goal: Patient will remain free of falls  Description: INTERVENTIONS:  - Educate patient/family on patient safety including physical limitations  - Instruct patient to call for assistance with activity   - Consult OT/PT to assist with strengthening/mobility   - Keep Call bell within reach  - Keep bed low and locked with side rails adjusted as appropriate  - Keep care items and personal belongings within reach  - Initiate and maintain comfort rounds  - Make Fall Risk Sign visible to staff  - Offer Toileting every 3 Hours, in  advance of need  - Initiate/Maintain bed alarm  - Obtain necessary fall risk management equipment  - Apply yellow socks and bracelet for high fall risk patients  - Consider moving patient to room near nurses station  Outcome: Progressing  Goal: Maintain or return to baseline ADL function  Description: INTERVENTIONS:  -  Assess patient's ability to carry out ADLs; assess patient's baseline for ADL function and identify physical deficits which impact ability to perform ADLs (bathing, care of mouth/teeth, toileting, grooming, dressing, etc.)  - Assess/evaluate cause of self-care deficits   - Assess range of motion  - Assess patient's mobility; develop plan if impaired  - Assess patient's need for assistive devices and provide as appropriate  - Encourage maximum independence but intervene and supervise when necessary  - Involve family in performance of ADLs  - Assess for home care needs following discharge   - Consider OT consult to assist with ADL evaluation and planning for discharge  - Provide patient education as appropriate  Outcome: Progressing  Goal: Maintains/Returns to pre admission functional level  Description: INTERVENTIONS:  - Perform AM-PAC 6 Click Basic Mobility/ Daily Activity assessment daily.  - Set and communicate daily mobility goal to care team and patient/family/caregiver.   - Collaborate with rehabilitation services on mobility goals if consulted  - Perform Range of Motion 3 times a day.  - Reposition patient every 3 hours.  - Dangle patient 3 times a day  - Stand patient 3 times a day  - Ambulate patient 3 times a day  - Out of bed to chair 3 times a day   - Out of bed for meals 3 times a day  - Out of bed for toileting  - Record patient progress and toleration of activity level   Outcome: Progressing     Problem: DISCHARGE PLANNING  Goal: Discharge to home or other facility with appropriate resources  Description: INTERVENTIONS:  - Identify barriers to discharge w/patient and caregiver  -  Arrange for needed discharge resources and transportation as appropriate  - Identify discharge learning needs (meds, wound care, etc.)  - Arrange for interpretive services to assist at discharge as needed  - Refer to Case Management Department for coordinating discharge planning if the patient needs post-hospital services based on physician/advanced practitioner order or complex needs related to functional status, cognitive ability, or social support system  Outcome: Progressing     Problem: GASTROINTESTINAL - ADULT  Goal: Minimal or absence of nausea and/or vomiting  Description: INTERVENTIONS:  - Administer IV fluids if ordered to ensure adequate hydration  - Maintain NPO status until nausea and vomiting are resolved  - Nasogastric tube if ordered  - Administer ordered antiemetic medications as needed  - Provide nonpharmacologic comfort measures as appropriate  - Advance diet as tolerated, if ordered  - Consider nutrition services referral to assist patient with adequate nutrition and appropriate food choices  Outcome: Progressing  Goal: Maintains or returns to baseline bowel function  Description: INTERVENTIONS:  - Assess bowel function  - Encourage oral fluids to ensure adequate hydration  - Administer IV fluids if ordered to ensure adequate hydration  - Administer ordered medications as needed  - Encourage mobilization and activity  - Consider nutritional services referral to assist patient with adequate nutrition and appropriate food choices  Outcome: Progressing  Goal: Maintains adequate nutritional intake  Description: INTERVENTIONS:  - Monitor percentage of each meal consumed  - Identify factors contributing to decreased intake, treat as appropriate  - Assist with meals as needed  - Monitor I&O, weight, and lab values if indicated  - Obtain nutrition services referral as needed  Outcome: Progressing  Goal: Establish and maintain optimal ostomy function  Description: INTERVENTIONS:  - Assess bowel  function  - Encourage oral fluids to ensure adequate hydration  - Administer IV fluids if ordered to ensure adequate hydration   - Administer ordered medications as needed  - Encourage mobilization and activity  - Nutrition services referral to assist patient with appropriate food choices  - Assess stoma site  - Consider wound care consult   Outcome: Progressing  Goal: Oral mucous membranes remain intact  Description: INTERVENTIONS  - Assess oral mucosa and hygiene practices  - Implement preventative oral hygiene regimen  - Implement oral medicated treatments as ordered  - Initiate Nutrition services referral as needed  Outcome: Progressing     Problem: Prexisting or High Potential for Compromised Skin Integrity  Goal: Skin integrity is maintained or improved  Description: INTERVENTIONS:  - Identify patients at risk for skin breakdown  - Assess and monitor skin integrity  - Assess and monitor nutrition and hydration status  - Monitor labs   - Assess for incontinence   - Turn and reposition patient  - Assist with mobility/ambulation  - Relieve pressure over bony prominences  - Avoid friction and shearing  - Provide appropriate hygiene as needed including keeping skin clean and dry  - Evaluate need for skin moisturizer/barrier cream  - Collaborate with interdisciplinary team   - Patient/family teaching  - Consider wound care consult   Outcome: Progressing     Problem: Nutrition/Hydration-ADULT  Goal: Nutrient/Hydration intake appropriate for improving, restoring or maintaining nutritional needs  Description: Monitor and assess patient's nutrition/hydration status for malnutrition. Collaborate with interdisciplinary team and initiate plan and interventions as ordered.  Monitor patient's weight and dietary intake as ordered or per policy. Utilize nutrition screening tool and intervene as necessary. Determine patient's food preferences and provide high-protein, high-caloric foods as appropriate.     INTERVENTIONS:  -  Monitor oral intake, urinary output, labs, and treatment plans  - Assess nutrition and hydration status and recommend course of action  - Evaluate amount of meals eaten  - Assist patient with eating if necessary   - Allow adequate time for meals  - Recommend/ encourage appropriate diets, oral nutritional supplements, and vitamin/mineral supplements  - Order, calculate, and assess calorie counts as needed  - Recommend, monitor, and adjust tube feedings and TPN/PPN based on assessed needs  - Assess need for intravenous fluids  - Provide specific nutrition/hydration education as appropriate  - Include patient/family/caregiver in decisions related to nutrition  Outcome: Progressing     Problem: INFECTION - ADULT  Goal: Absence or prevention of progression during hospitalization  Description: INTERVENTIONS:  - Assess and monitor for signs and symptoms of infection  - Monitor lab/diagnostic results  - Monitor all insertion sites, i.e. indwelling lines, tubes, and drains  - Monitor endotracheal if appropriate and nasal secretions for changes in amount and color  - Etlan appropriate cooling/warming therapies per order  - Administer medications as ordered  - Instruct and encourage patient and family to use good hand hygiene technique  - Identify and instruct in appropriate isolation precautions for identified infection/condition  Outcome: Progressing     Problem: Knowledge Deficit  Goal: Patient/family/caregiver demonstrates understanding of disease process, treatment plan, medications, and discharge instructions  Description: Complete learning assessment and assess knowledge base.  Interventions:  - Provide teaching at level of understanding  - Provide teaching via preferred learning methods  Outcome: Progressing     Problem: METABOLIC, FLUID AND ELECTROLYTES - ADULT  Goal: Electrolytes maintained within normal limits  Description: INTERVENTIONS:  - Monitor labs and assess patient for signs and symptoms of electrolyte  imbalances  - Administer electrolyte replacement as ordered  - Monitor response to electrolyte replacements, including repeat lab results as appropriate  - Instruct patient on fluid and nutrition as appropriate  Outcome: Progressing     Problem: SKIN/TISSUE INTEGRITY - ADULT  Goal: Skin Integrity remains intact(Skin Breakdown Prevention)  Description: Assess:  -Perform Esteban assessment every shift  -Clean and moisturize skin as needed  -Inspect skin when repositioning, toileting, and assisting with ADLS  -Assess under medical devices such as masimo every shift  -Assess extremities for adequate circulation and sensation     Bed Management:  -Have minimal linens on bed & keep smooth, unwrinkled  -Change linens as needed when moist or perspiring  -Avoid sitting or lying in one position for more than 2 hours while in bed  -Keep HOB at 30 degrees     Toileting:  -Offer bedside commode  -Assess for incontinence as needed  -Use incontinent care products after each incontinent episode such as lotion    Activity:  -Mobilize patient 3 times a day  -Encourage activity and walks on unit  -Encourage or provide ROM exercises   -Turn and reposition patient every 2 Hours  -Use appropriate equipment to lift or move patient in bed  -Instruct/ Assist with weight shifting every 1 hour when out of bed in chair  -Consider limitation of chair time 1 hour intervals    Skin Care:  -Avoid use of baby powder, tape, friction and shearing, hot water or constrictive clothing  -Relieve pressure over bony prominences using allevyn  -Do not massage red bony areas    Next Steps:  -Teach patient strategies to minimize risks such as repositioning   -Consider consults to  interdisciplinary teams such as PTOT  Outcome: Progressing  Goal: Incision(s), wounds(s) or drain site(s) healing without S/S of infection  Description: INTERVENTIONS  - Assess and document dressing, incision, wound bed, drain sites and surrounding tissue  - Provide patient and  family education  - Perform skin care/dressing changes every shift/as ordered  Outcome: Progressing     Problem: HEMATOLOGIC - ADULT  Goal: Maintains hematologic stability  Description: INTERVENTIONS  - Assess for signs and symptoms of bleeding or hemorrhage  - Monitor labs  - Administer supportive blood products/factors as ordered and appropriate  Outcome: Progressing     Problem: CARDIOVASCULAR - ADULT  Goal: Maintains optimal cardiac output and hemodynamic stability  Description: INTERVENTIONS:  - Monitor I/O, vital signs and rhythm  - Monitor for S/S and trends of decreased cardiac output  - Administer and titrate ordered vasoactive medications to optimize hemodynamic stability  - Assess quality of pulses, skin color and temperature  - Assess for signs of decreased coronary artery perfusion  - Instruct patient to report change in severity of symptoms  Outcome: Progressing  Goal: Absence of cardiac dysrhythmias or at baseline rhythm  Description: INTERVENTIONS:  - Continuous cardiac monitoring, vital signs, obtain 12 lead EKG if ordered  - Administer antiarrhythmic and heart rate control medications as ordered  - Monitor electrolytes and administer replacement therapy as ordered  Outcome: Progressing     Problem: RESPIRATORY - ADULT  Goal: Achieves optimal ventilation and oxygenation  Description: INTERVENTIONS:  - Assess for changes in respiratory status  - Assess for changes in mentation and behavior  - Position to facilitate oxygenation and minimize respiratory effort  - Oxygen administered by appropriate delivery if ordered  - Initiate smoking cessation education as indicated  - Encourage broncho-pulmonary hygiene including cough, deep breathe, Incentive Spirometry  - Assess the need for suctioning and aspirate as needed  - Assess and instruct to report SOB or any respiratory difficulty  - Respiratory Therapy support as indicated  Outcome: Progressing

## 2024-11-29 NOTE — PLAN OF CARE
Problem: OCCUPATIONAL THERAPY ADULT  Goal: Performs self-care activities at highest level of function for planned discharge setting.  See evaluation for individualized goals.  Description: Treatment Interventions: ADL retraining, UE strengthening/ROM, Functional transfer training, Endurance training, Cognitive reorientation, Equipment evaluation/education, Patient/family training, Compensatory technique education, Activityengagement, Energy conservation          See flowsheet documentation for full assessment, interventions and recommendations.   Note: Limitation: Decreased ADL status, Decreased Safe judgement during ADL, Decreased UE strength, Decreased cognition, Decreased endurance, Decreased self-care trans, Decreased high-level ADLs  Prognosis: Good  Assessment: Pt seen for 32min tx session with focus on functional balance, functional mobility, ADL status, transfer safety, b/l UE ROM/strengthening, cognition, and education. Pt able to tolerate OOB mobility; sitting balance=f/f-, standing balance=f-/p+. Pt required verbal/physical cues to maintain transfer/walker safety. Pt continues to demonstrate need for heavy assistance with her UE and LE ADLs. Pt able to demonstrate good b/l UE AROM, but limited strength/endurance. Pt able to demonstrate good orientation, simple direction-following. Continue to encourage increasing OOB activities [place] [facility] [place]. Will continue. The patient's raw score on the AM-PAC Daily Activity Inpatient Short Form is 14. A raw score of less than 19 suggests the patient may benefit from discharge to post-acute rehabilitation services. Please refer to the recommendation of the Occupational Therapist for safe discharge planning.     Rehab Resource Intensity Level, OT: II (Moderate Resource Intensity)

## 2024-11-29 NOTE — PLAN OF CARE
Problem: Potential for Falls  Goal: Patient will remain free of falls  Description: INTERVENTIONS:  - Educate patient/family on patient safety including physical limitations  - Instruct patient to call for assistance with activity   - Consult OT/PT to assist with strengthening/mobility   - Keep Call bell within reach  - Keep bed low and locked with side rails adjusted as appropriate  - Keep care items and personal belongings within reach  - Initiate and maintain comfort rounds  - Make Fall Risk Sign visible to staff  - Offer Toileting every 3 Hours, in advance of need  - Initiate/Maintain bed alarm  - Obtain necessary fall risk management equipment  - Apply yellow socks and bracelet for high fall risk patients  - Consider moving patient to room near nurses station  Outcome: Progressing     Problem: PAIN - ADULT  Goal: Verbalizes/displays adequate comfort level or baseline comfort level  Description: Interventions:  - Encourage patient to monitor pain and request assistance  - Assess pain using appropriate pain scale  - Administer analgesics based on type and severity of pain and evaluate response  - Implement non-pharmacological measures as appropriate and evaluate response  - Consider cultural and social influences on pain and pain management  - Notify physician/advanced practitioner if interventions unsuccessful or patient reports new pain  Outcome: Progressing     Problem: SAFETY ADULT  Goal: Patient will remain free of falls  Description: INTERVENTIONS:  - Educate patient/family on patient safety including physical limitations  - Instruct patient to call for assistance with activity   - Consult OT/PT to assist with strengthening/mobility   - Keep Call bell within reach  - Keep bed low and locked with side rails adjusted as appropriate  - Keep care items and personal belongings within reach  - Initiate and maintain comfort rounds  - Make Fall Risk Sign visible to staff  - Offer Toileting every 3 Hours, in  advance of need  - Initiate/Maintain bed alarm  - Obtain necessary fall risk management equipment  - Apply yellow socks and bracelet for high fall risk patients  - Consider moving patient to room near nurses station  Outcome: Progressing  Goal: Maintain or return to baseline ADL function  Description: INTERVENTIONS:  -  Assess patient's ability to carry out ADLs; assess patient's baseline for ADL function and identify physical deficits which impact ability to perform ADLs (bathing, care of mouth/teeth, toileting, grooming, dressing, etc.)  - Assess/evaluate cause of self-care deficits   - Assess range of motion  - Assess patient's mobility; develop plan if impaired  - Assess patient's need for assistive devices and provide as appropriate  - Encourage maximum independence but intervene and supervise when necessary  - Involve family in performance of ADLs  - Assess for home care needs following discharge   - Consider OT consult to assist with ADL evaluation and planning for discharge  - Provide patient education as appropriate  Outcome: Progressing  Goal: Maintains/Returns to pre admission functional level  Description: INTERVENTIONS:  - Perform AM-PAC 6 Click Basic Mobility/ Daily Activity assessment daily.  - Set and communicate daily mobility goal to care team and patient/family/caregiver.   - Collaborate with rehabilitation services on mobility goals if consulted  - Perform Range of Motion 3 times a day.  - Reposition patient every 3 hours.  - Dangle patient 3 times a day  - Stand patient 3 times a day  - Ambulate patient 3 times a day  - Out of bed to chair 3 times a day   - Out of bed for meals 3 times a day  - Out of bed for toileting  - Record patient progress and toleration of activity level   Outcome: Progressing     Problem: DISCHARGE PLANNING  Goal: Discharge to home or other facility with appropriate resources  Description: INTERVENTIONS:  - Identify barriers to discharge w/patient and caregiver  -  Arrange for needed discharge resources and transportation as appropriate  - Identify discharge learning needs (meds, wound care, etc.)  - Arrange for interpretive services to assist at discharge as needed  - Refer to Case Management Department for coordinating discharge planning if the patient needs post-hospital services based on physician/advanced practitioner order or complex needs related to functional status, cognitive ability, or social support system  Outcome: Progressing     Problem: INFECTION - ADULT  Goal: Absence or prevention of progression during hospitalization  Description: INTERVENTIONS:  - Assess and monitor for signs and symptoms of infection  - Monitor lab/diagnostic results  - Monitor all insertion sites, i.e. indwelling lines, tubes, and drains  - Monitor endotracheal if appropriate and nasal secretions for changes in amount and color  - [place] appropriate cooling/warming therapies per order  - Administer medications as ordered  - Instruct and encourage patient and family to use good hand hygiene technique  - Identify and instruct in appropriate isolation precautions for identified infection/condition  Outcome: Progressing     Problem: Knowledge Deficit  Goal: Patient/family/caregiver demonstrates understanding of disease process, treatment plan, medications, and discharge instructions  Description: Complete learning assessment and assess knowledge base.  Interventions:  - Provide teaching at level of understanding  - Provide teaching via preferred learning methods  Outcome: Progressing     Problem: GASTROINTESTINAL - ADULT  Goal: Minimal or absence of nausea and/or vomiting  Description: INTERVENTIONS:  - Administer IV fluids if ordered to ensure adequate hydration  - Maintain NPO status until nausea and vomiting are resolved  - Nasogastric tube if ordered  - Administer ordered antiemetic medications as needed  - Provide nonpharmacologic comfort measures as appropriate  - Advance diet as  tolerated, if ordered  - Consider nutrition services referral to assist patient with adequate nutrition and appropriate food choices  Outcome: Progressing  Goal: Maintains or returns to baseline bowel function  Description: INTERVENTIONS:  - Assess bowel function  - Encourage oral fluids to ensure adequate hydration  - Administer IV fluids if ordered to ensure adequate hydration  - Administer ordered medications as needed  - Encourage mobilization and activity  - Consider nutritional services referral to assist patient with adequate nutrition and appropriate food choices  Outcome: Progressing  Goal: Maintains adequate nutritional intake  Description: INTERVENTIONS:  - Monitor percentage of each meal consumed  - Identify factors contributing to decreased intake, treat as appropriate  - Assist with meals as needed  - Monitor I&O, weight, and lab values if indicated  - Obtain nutrition services referral as needed  Outcome: Progressing  Goal: Establish and maintain optimal ostomy function  Description: INTERVENTIONS:  - Assess bowel function  - Encourage oral fluids to ensure adequate hydration  - Administer IV fluids if ordered to ensure adequate hydration   - Administer ordered medications as needed  - Encourage mobilization and activity  - Nutrition services referral to assist patient with appropriate food choices  - Assess stoma site  - Consider wound care consult   Outcome: Progressing  Goal: Oral mucous membranes remain intact  Description: INTERVENTIONS  - Assess oral mucosa and hygiene practices  - Implement preventative oral hygiene regimen  - Implement oral medicated treatments as ordered  - Initiate Nutrition services referral as needed  Outcome: Progressing     Problem: Prexisting or High Potential for Compromised Skin Integrity  Goal: Skin integrity is maintained or improved  Description: INTERVENTIONS:  - Identify patients at risk for skin breakdown  - Assess and monitor skin integrity  - Assess and  monitor nutrition and hydration status  - Monitor labs   - Assess for incontinence   - Turn and reposition patient  - Assist with mobility/ambulation  - Relieve pressure over bony prominences  - Avoid friction and shearing  - Provide appropriate hygiene as needed including keeping skin clean and dry  - Evaluate need for skin moisturizer/barrier cream  - Collaborate with interdisciplinary team   - Patient/family teaching  - Consider wound care consult   Outcome: Progressing     Problem: Nutrition/Hydration-ADULT  Goal: Nutrient/Hydration intake appropriate for improving, restoring or maintaining nutritional needs  Description: Monitor and assess patient's nutrition/hydration status for malnutrition. Collaborate with interdisciplinary team and initiate plan and interventions as ordered.  Monitor patient's weight and dietary intake as ordered or per policy. Utilize nutrition screening tool and intervene as necessary. Determine patient's food preferences and provide high-protein, high-caloric foods as appropriate.     INTERVENTIONS:  - Monitor oral intake, urinary output, labs, and treatment plans  - Assess nutrition and hydration status and recommend course of action  - Evaluate amount of meals eaten  - Assist patient with eating if necessary   - Allow adequate time for meals  - Recommend/ encourage appropriate diets, oral nutritional supplements, and vitamin/mineral supplements  - Order, calculate, and assess calorie counts as needed  - Recommend, monitor, and adjust tube feedings and TPN/PPN based on assessed needs  - Assess need for intravenous fluids  - Provide specific nutrition/hydration education as appropriate  - Include patient/family/caregiver in decisions related to nutrition  Outcome: Progressing     Problem: METABOLIC, FLUID AND ELECTROLYTES - ADULT  Goal: Electrolytes maintained within normal limits  Description: INTERVENTIONS:  - Monitor labs and assess patient for signs and symptoms of electrolyte  imbalances  - Administer electrolyte replacement as ordered  - Monitor response to electrolyte replacements, including repeat lab results as appropriate  - Instruct patient on fluid and nutrition as appropriate  Outcome: Progressing     Problem: SKIN/TISSUE INTEGRITY - ADULT  Goal: Skin Integrity remains intact(Skin Breakdown Prevention)  Description: Assess:  -Perform Esteban assessment every shift  -Clean and moisturize skin every shift  -Inspect skin when repositioning, toileting, and assisting with ADLS  -Assess extremities for adequate circulation and sensation     Bed Management:  -Have minimal linens on bed & keep smooth, unwrinkled  -Change linens as needed when moist or perspiring  -Avoid sitting or lying in one position for more than 2 hours while in bed  -Keep HOB at 30 degrees     Toileting:  -Offer bedside commode  -Assess for incontinence every 2 hours      Activity:  -Mobilize patient 3 times a day  -Encourage activity and walks on unit  -Encourage or provide ROM exercises   -Turn and reposition patient every 2 Hours  -Use appropriate equipment to lift or move patient in bed  -Instruct/ Assist with weight shifting every 2 when out of bed in chair  -Consider limitation of chair time 2 hour intervals    Skin Care:  -Avoid use of baby powder, tape, friction and shearing, hot water or constrictive clothing  -Relieve pressure over bony prominences using Allevyns  -Do not massage red bony areas      Outcome: Progressing  Goal: Incision(s), wounds(s) or drain site(s) healing without S/S of infection  Description: INTERVENTIONS  - Assess and document dressing, incision, wound bed, drain sites and surrounding tissue  - Provide patient and family education  - Perform skin care/dressing changes every shift  Outcome: Progressing     Problem: HEMATOLOGIC - ADULT  Goal: Maintains hematologic stability  Description: INTERVENTIONS  - Assess for signs and symptoms of bleeding or hemorrhage  - Monitor labs  - Administer  supportive blood products/factors as ordered and appropriate  Outcome: Progressing     Problem: CARDIOVASCULAR - ADULT  Goal: Maintains optimal cardiac output and hemodynamic stability  Description: INTERVENTIONS:  - Monitor I/O, vital signs and rhythm  - Monitor for S/S and trends of decreased cardiac output  - Administer and titrate ordered vasoactive medications to optimize hemodynamic stability  - Assess quality of pulses, skin color and temperature  - Assess for signs of decreased coronary artery perfusion  - Instruct patient to report change in severity of symptoms  Outcome: Progressing  Goal: Absence of cardiac dysrhythmias or at baseline rhythm  Description: INTERVENTIONS:  - Continuous cardiac monitoring, vital signs, obtain 12 lead EKG if ordered  - Administer antiarrhythmic and heart rate control medications as ordered  - Monitor electrolytes and administer replacement therapy as ordered  Outcome: Progressing     Problem: RESPIRATORY - ADULT  Goal: Achieves optimal ventilation and oxygenation  Description: INTERVENTIONS:  - Assess for changes in respiratory status  - Assess for changes in mentation and behavior  - Position to facilitate oxygenation and minimize respiratory effort  - Oxygen administered by appropriate delivery if ordered  - Initiate smoking cessation education as indicated  - Encourage broncho-pulmonary hygiene including cough, deep breathe, Incentive Spirometry  - Assess the need for suctioning and aspirate as needed  - Assess and instruct to report SOB or any respiratory difficulty  - Respiratory Therapy support as indicated  Outcome: Progressing

## 2024-11-29 NOTE — PHYSICAL THERAPY NOTE
PHYSICAL THERAPY NOTE          Patient Name: Lety Botello  Today's Date: 11/29/2024  10:41-11:06       11/29/24 1041   PT Last Visit   PT Visit Date 11/29/24   Note Type   Note Type Treatment   End of Consult   Patient Position at End of Consult All needs within reach;Bed/Chair alarm activated;Bedside chair;Other (comment)  (recliner chair, soft care cushion)   Pain Assessment   Pain Score 8   Pain Location/Orientation Location: Abdomen   Restrictions/Precautions   Weight Bearing Precautions Per Order No   Other Precautions Chair Alarm;Bed Alarm;Fall Risk;Multiple lines;Pain  (PureWick)   General   Chart Reviewed Yes   Response to Previous Treatment Patient reporting fatigue but able to participate.   Family/Caregiver Present No   Cognition   Overall Cognitive Status Impaired   Arousal/Participation Arousable   Attention Attends with cues to redirect   Following Commands Follows one step commands without difficulty   Comments Flat affect.   Subjective   Subjective Agreeable to therapy. Refuses to remove pure wick.  Declines trial at further ambulation.   Bed Mobility   Rolling R 2  Maximal assistance   Additional items Assist x 2;Increased time required;Verbal cues;LE management;Bedrails;HOB elevated   Supine to Sit 2  Maximal assistance   Additional items Assist x 2;Increased time required;Verbal cues;LE management;Bedrails;HOB elevated   Additional Comments Significant increase in time to acheive upright.  Poor upright sitting given abdominal girth with posterior positioning. UE use needed to support unassisted.  EOB sitting tolerance 5-7 minutes.  Last 2 minutes with trunk support neeed 2* fatigue.   Transfers   Sit to Stand 4  Minimal assistance   Additional items Assist x 2;Increased time required;Verbal cues  (cues for hand placement. Wide stance.)   Stand to Sit 4  Minimal assistance   Additional items Assist x 2;Increased time  required;Verbal cues  (Cues for safety.  Decreased carryover of hand placement for descent. A needed to sit, poor positioning in chair.)   Additional Comments Increased time to complete transition.  Wide stance.  /60   Ambulation/Elevation   Gait pattern Improper Weight shift;Decreased foot clearance;Wide DA;Shuffling;Inconsistent mervin;Short stride;Excessively slow   Gait Assistance 4  Minimal assist   Additional items Assist x 2;Verbal cues;Tactile cues  (A for RW management needed upon approach to chair.)   Assistive Device Rolling walker   Distance Amb with RW 3'x1.  Declines further distances at this time.  Appears fatigued once seated OOB in chair.   Ambulation/Elevation Additional Comments Wide stance, shuffling pattern with poor foot clearance, decreased heel strike.   Balance   Static Sitting Fair -   Dynamic Sitting Poor   Static Standing Poor +   Dynamic Standing Poor   Ambulatory Poor   Endurance Deficit   Endurance Deficit Yes   Endurance Deficit Description fatigue, weakness, pain.   Activity Tolerance   Activity Tolerance Patient limited by pain;Patient limited by fatigue   Medical Staff Made Aware NurseElsie.  OT-Osvaldo: Pt seen for co-evaluation/treatment with skilled Occupational Therapist 2* clinically unstable/unpredictable presentation, medical complexity, fall risk, level of assistance, functional/physical limitations, impaired functional balance, decreased safety awareness, limited activity tolerance which is decline from PLOF and may impact overall functional mobility/mobility safety.   Nurse Made Aware yes   Exercises   Ankle Pumps Sitting;10 reps;AROM;Bilateral   Heel Cord Stretch Sitting;10 reps;AROM;Bilateral  (heel raises, poor heel clearance observed.)   Neuro re-ed Sitting balance, upright posture, benefits of upright mobility.  Unable to tolerate further LE ex EOB 2* positioning and abd girth.   Balance training  A for repositioning in chair with max A of 2. Decreased effort  to assist with repositioning.   Assessment   Prognosis Fair   Problem List Decreased strength;Decreased range of motion;Decreased endurance;Impaired balance;Decreased mobility;Obesity;Decreased skin integrity;Pain  (increased edema.)   Assessment Lety is seen to progress with PT POC.  Continues to require max A of 2 for bed mobility.Tolerated EOB sitting 5-7 minutes.  Last 2 minutes trunk support needed.  Fatigue and pain limiting progression of ambulation at this time.  Amb few feet OOB to chair with RW support and Darrell.  Max A of 2 to reposition for improved comfort in chair.  Decreaesd participation and ability to assist with weight shift.  Limited progression to tolerance of functional activity.  The patient's [facility] Basic Mobility Inpatient Short Form Raw Score is 8. A Raw score of less than or equal to 16 suggests the patient may benefit from discharge to post-acute rehabilitation services. Please also refer to the recommendation of the Physical Therapist for safe discharge planning. Continue to recommend level II moderate resource intensity level of care at d/c.  PT will follow per POC.   Barriers to Discharge None   Goals   Patient Goals to rest   STG Expiration Date 12/09/24   PT Treatment Day 2   Plan   Treatment/Interventions Functional transfer training;LE strengthening/ROM;Elevations;Therapeutic exercise;Endurance training;Patient/family training;Equipment eval/education;Bed mobility;Gait training;Compensatory technique education;Continued evaluation;Spoke to nursing;OT   Progress Slow progress, decreased activity tolerance   PT Frequency 3-5x/wk   Discharge Recommendation   Rehab Resource Intensity Level, PT II (Moderate Resource Intensity)   Equipment Recommended Walker   [facility] Basic Mobility Inpatient   Turning in Flat Bed Without Bedrails 1   Lying on Back to Sitting on Edge of Flat Bed Without Bedrails 1   Moving Bed to Chair 1   Standing Up From Chair Using Arms 2   Walk in Room 2   Climb 3-5  Stairs With Railing 1   Basic Mobility Inpatient Raw Score 8   Turning Head Towards Sound 4   Follow Simple Instructions 4   Low Function Basic Mobility Raw Score  16   Low Function Basic Mobility Standardized Score  25.72   [facility] Highest Level Of Mobility   -[facility] Goal 3: Sit at edge of bed   -[facility] Achieved 4: Move to chair/commode   Education   Education Provided Mobility training;Home exercise program;Assistive device   Patient Reinforcement needed   End of Consult   Patient Position at End of Consult Bedside chair;Bed/Chair alarm activated;All needs within reach  (LE elevation.)   Nevin Segura, PT

## 2024-11-29 NOTE — PLAN OF CARE
Problem: PHYSICAL THERAPY ADULT  Goal: Performs mobility at highest level of function for planned discharge setting.  See evaluation for individualized goals.  Description: Treatment/Interventions: Functional transfer training, LE strengthening/ROM, Elevations, Therapeutic exercise, Endurance training, Patient/family training, Equipment eval/education, Bed mobility, Gait training, Continued evaluation, Spoke to nursing, OT  Equipment Recommended: Walker       See flowsheet documentation for full assessment, interventions and recommendations.  11/29/2024 1312 by Nevin Segura, PT  Outcome: Progressing  Note: Prognosis: Fair  Problem List: Decreased strength, Decreased range of motion, Decreased endurance, Impaired balance, Decreased mobility, Obesity, Decreased skin integrity, Pain (increased edema.)  Assessment: Lety is seen to progress with PT POC.  Continues to require max A of 2 for bed mobility.Tolerated EOB sitting 5-7 minutes.  Last 2 minutes trunk support needed.  Fatigue and pain limiting progression of ambulation at this time.  Amb few feet OOB to chair with RW support and Darrell.  Max A of 2 to reposition for improved comfort in chair.  Decreaesd participation and ability to assist with weight shift.  Limited progression to tolerance of functional activity.  The patient's AM-PAC Basic Mobility Inpatient Short Form Raw Score is 8. A Raw score of less than or equal to 16 suggests the patient may benefit from discharge to post-acute rehabilitation services. Please also refer to the recommendation of the Physical Therapist for safe discharge planning. Continue to recommend level II moderate resource intensity level of care at d/c.  PT will follow per POC.  Barriers to Discharge: None     Rehab Resource Intensity Level, PT: II (Moderate Resource Intensity)    See flowsheet documentation for full assessment.     11/29/2024 1312 by Nevin Segura, GIAN  Outcome: Progressing  Note: Prognosis: Fair  Problem  List: Decreased strength, Decreased range of motion, Decreased endurance, Impaired balance, Decreased mobility, Obesity, Decreased skin integrity, Pain (increased edema.)  Assessment: Lety is seen to progress with PT POC.  Continues to require max A of 2 for bed mobility.Tolerated EOB sitting 5-7 minutes.  Last 2 minutes trunk support needed.  Fatigue and pain limiting progression of ambulation at this time.  Amb few feet OOB to chair with RW support and Darrell.  Max A of 2 to reposition for improved comfort in chair.  Decreaesd participation and ability to assist with weight shift.  Limited progression to tolerance of functional activity.  The patient's AM-PAC Basic Mobility Inpatient Short Form Raw Score is 8. A Raw score of less than or equal to 16 suggests the patient may benefit from discharge to post-acute rehabilitation services. Please also refer to the recommendation of the Physical Therapist for safe discharge planning. Continue to recommend level II moderate resource intensity level of care at d/c.  PT will follow per POC.  Barriers to Discharge: None     Rehab Resource Intensity Level, PT: II (Moderate Resource Intensity)    See flowsheet documentation for full assessment.

## 2024-11-29 NOTE — OCCUPATIONAL THERAPY NOTE
Occupational Therapy Progress Note     Patient Name: Lety Botello  Today's Date: 11/29/2024  Problem List  Principal Problem:    SBO (small bowel obstruction) (HCC)  Active Problems:    Hypertension    Type 2 diabetes mellitus (HCC)    History of pulmonary embolism    Hypothyroidism    Mild intermittent asthma    Gastroparesis    Morbid obesity (HCC)    Abdominal pain    Nausea & vomiting    Shingles    On total parenteral nutrition (TPN)    Hypotension after procedure            11/29/24 1036   Note Type   Note Type Treatment   Pain Assessment   Pain Assessment Tool 0-10   Pain Score 8  (pt found sleeping in bed; once awake, stating 8/10 pain)   Pain Location/Orientation Location: Abdomen   Pain Rating: FLACC (Rest) - Face 0   Pain Rating: FLACC (Rest) - Legs 0   Pain Rating: FLACC (Rest) - Activity 0   Pain Rating: FLACC (Rest) - Cry 1   Pain Rating: FLACC (Rest) - Consolability 0   Score: FLACC (Rest) 1   Restrictions/Precautions   Weight Bearing Precautions Per Order No   Other Precautions Chair Alarm;Bed Alarm;Fall Risk;Pain;Multiple lines   ADL   Where Assessed Edge of bed   Eating Assistance 5  Supervision/Setup   Grooming Assistance 5  Supervision/Setup   UB Bathing Assistance 4  Minimal Assistance   LB Bathing Assistance 2  Maximal Assistance   UB Dressing Assistance 4  Minimal Assistance   LB Dressing Assistance 2  Maximal Assistance   Toileting Assistance  1  Total Assistance   Functional Standing Tolerance   Time 1 min   Bed Mobility   Rolling R 2  Maximal assistance   Additional items Assist x 2;Increased time required;Verbal cues;LE management   Rolling L 2  Maximal assistance   Additional items Assist x 2;Increased time required;Verbal cues;LE management   Supine to Sit 2  Maximal assistance   Additional items Assist x 2;Increased time required;Verbal cues;LE management   Transfers   Sit to Stand 4  Minimal assistance   Additional items Assist x 2;Increased time required;Verbal cues   Stand to Sit 4  " Minimal assistance   Additional items Assist x 2;Increased time required;Verbal cues   Additional Comments bp's=146/60(EOB)   Functional Mobility   Functional Mobility 4  Minimal assistance   Additional Comments x2   Additional items Rolling walker   Therapeutic Exercise - ROM   UE-ROM Yes   Therapeutic Excerise-Strength   UE Strength Yes  (b/l UE AROM exercises(i.e.shr flex/ext)1set, 5reps--fatigued quickly)   Subjective   Subjective \"I'm tired today.\"   Cognition   Overall Cognitive Status Impaired   Arousal/Participation Alert   Attention Attends with cues to redirect   Orientation Level Oriented X4   Memory Decreased short term memory;Decreased recall of recent events;Decreased recall of precautions   Following Commands Follows one step commands without difficulty   Additional Activities   Additional Activities Comments Co-tx with P.T. to improve safety with pt handling and provide safety challenging functional/physical activity for tx intervention   Activity Tolerance   Activity Tolerance Patient limited by fatigue;Patient limited by pain   Medical Staff Made Aware nsg, P.T.   Assessment   Assessment Pt seen for 32min tx session with focus on functional balance, functional mobility, ADL status, transfer safety, b/l UE ROM/strengthening, cognition, and education. Pt able to tolerate OOB mobility; sitting balance=f/f-, standing balance=f-/p+. Pt required verbal/physical cues to maintain transfer/walker safety. Pt continues to demonstrate need for heavy assistance with her UE and LE ADLs. Pt able to demonstrate good b/l UE AROM, but limited strength/endurance. Pt able to demonstrate good orientation, simple direction-following. Continue to encourage increasing OOB activities [place] [facility] [place] Will continue. The patient's raw score on the -PAC Daily Activity Inpatient Short Form is 14. A raw score of less than 19 suggests the patient may benefit from discharge to post-acute rehabilitation services. Please refer to " the recommendation of the Occupational Therapist for safe discharge planning.   Plan   Treatment Interventions ADL retraining;Functional transfer training;UE strengthening/ROM;Endurance training;Cognitive reorientation;Equipment evaluation/education;Patient/family training;Compensatory technique education;Continued evaluation   Goal Expiration Date 12/09/24   OT Treatment Day 3   OT Frequency 3-5x/wk   Discharge Recommendation   Rehab Resource Intensity Level, OT II (Moderate Resource Intensity)   AM-PAC Daily Activity Inpatient   Lower Body Dressing 2   Bathing 2   Toileting 1   Upper Body Dressing 3   Grooming 3   Eating 3   Daily Activity Raw Score 14   Daily Activity Standardized Score (Calc for Raw Score >=11) 33.39   AM-PAC Applied Cognition Inpatient   Following a Speech/Presentation 3   Understanding Ordinary Conversation 3   Taking Medications 3   Remembering Where Things Are Placed or Put Away 3   Remembering List of 4-5 Errands 3   Taking Care of Complicated Tasks 3   Applied Cognition Raw Score 18   Applied Cognition Standardized Score 38.07   Osvaldo Robles

## 2024-11-29 NOTE — PROGRESS NOTES
Progress Note - Surgery-General   Name: Lety Botello 64 y.o. female I MRN: [id number]  Unit/Bed#: [place] [address] -01 I Date of Admission: 11/14/2024   Date of Service: 11/29/2024 I Hospital Day: 15    Assessment & Plan  SBO (small bowel obstruction) (HCC)  64 y.o. female presenting with recurrent SBO. 11/14-11/21 failed conservative management with persistent SBO. S/p ex lap, SBR, DAVID on 11/22.       Plan  - Continue diabetic diet  - f/u AM KUB  - Appreciate PT/OT recs  - Encourage OOB, ambulate, IS  - DVT ppx with therapeutic Lovenox  - F/u CM for rehab placement and dispo  - Strongly encourage OOB, ambulation, IS         Subjective   One reported episode of nonbloody emesis around 20:00 last night, unclear whether related to pain medication or food. Continues to report generalized lower abdominal pain. Tolerating small amount of food without nausea. Has not been OOB, ambulating    Objective :  Temp:  [97.5 °F (36.4 °C)-98.3 °F (36.8 °C)] 98.1 °F (36.7 °C)  HR:  [67-77] 77  BP: (141-169)/(57-66) 169/66  Resp:  [18] 18  SpO2:  [96 %-98 %] 96 %  O2 Device: None (Room air)    I/O         11/27 0701  11/28 0700 11/28 0701  11/29 0700    P.O. 340 720    I.V. (mL/kg) 901.4 (7.5)     Total Intake(mL/kg) 1241.4 (10.3) 720 (6)    Urine (mL/kg/hr) 900 (0.3) 400 (0.1)    Emesis/NG output  800    Total Output 900 1200    Net +341.4 -480          Unmeasured Urine Occurrence  1 x    Unmeasured Emesis Occurrence  1 x          Lines/Drains/Airways       Active Status       Name Placement date Placement time Site Days    PICC Line 11/18/24 Right Basilic 11/18/24  1249  Basilic  10    External Urinary Catheter 11/24/24  1232  -- 4                  Physical Exam  General: NAD  HENT: NCAT MMM  Neck: supple, no JVD  CV: nl rate  Lungs: nl wob. No resp distress  ABD: Soft, nontender, protuberant/nondistended. Midline incision CDI with excoriations and erythema of pannus.  Extrem: No CCE  Neuro: AAOx3      Lab Results: I have reviewed the  following results:  Recent Labs     11/27/24  0619 11/28/24  0451   WBC 4.01* 4.14*   HGB 8.6* 7.7*   HCT 26.8* 24.4*   * 114*   SODIUM 131* 131*   K 3.7 3.9    105   CO2 23 21   BUN 35* 41*   CREATININE 0.63 0.77   GLUC 251* 129   CAIONIZED 1.16  --    MG 2.5 2.5   PHOS 4.2* 4.4*   AST 7*  --    ALT 7  --    ALB 1.9*  --    TBILI 0.23  --    ALKPHOS 118*  --    PTT 49*  --              VTE Pharmacologic Prophylaxis: VTE covered by:  enoxaparin, Subcutaneous, 60 mg at 11/28/24 2120     VTE Mechanical Prophylaxis: sequential compression device

## 2024-11-29 NOTE — QUICK NOTE
Pathology report from 11/22 OR case was reviewed with patient and by Sharda heath via phone. All parties were in agreement to discuss findings. New findings of metastatic adenocarcinoma compatible with pts previous gynecological hx with tumor extending onto  mesenteric margin, 0/1 Lymph nodes negative for carcinoma, omental mass compatible with metastatic adenocarcinoma again compatible with known gynecologic history. We reviewed this pathology and discussed engaging the GynOnc team to further discuss option. Pt did mentioned she does not have a lot of fight left and just wants to be comfortable. Pt was amenable to have discussion with GynOnc team. All questions and concerns were addressed at this time.     Ricki Rodriguez MD  PGY-2

## 2024-11-29 NOTE — ASSESSMENT & PLAN NOTE
64 y.o. female presenting with recurrent SBO. 11/14-11/21 failed conservative management with persistent SBO. S/p ex lap, SBR, DAVID on 11/22.       Plan  - Continue diabetic diet  - f/u AM KUB  - Appreciate PT/OT recs  - Encourage OOB, ambulate, IS  - DVT ppx with therapeutic Lovenox  - F/u CM for rehab placement and dispo  - Strongly encourage OOB, ambulation, IS

## 2024-11-30 ENCOUNTER — APPOINTMENT (INPATIENT)
Dept: CT IMAGING | Facility: HOSPITAL | Age: 64
DRG: 230 | End: 2024-11-30
Payer: COMMERCIAL

## 2024-11-30 LAB
ANION GAP SERPL CALCULATED.3IONS-SCNC: 3 MMOL/L (ref 4–13)
BASOPHILS # BLD AUTO: 0.03 THOUSANDS/ΜL (ref 0–0.1)
BASOPHILS NFR BLD AUTO: 1 % (ref 0–1)
BUN SERPL-MCNC: 39 MG/DL (ref 5–25)
CALCIUM SERPL-MCNC: 7.5 MG/DL (ref 8.4–10.2)
CHLORIDE SERPL-SCNC: 105 MMOL/L (ref 96–108)
CO2 SERPL-SCNC: 23 MMOL/L (ref 21–32)
CREAT SERPL-MCNC: 0.81 MG/DL (ref 0.6–1.3)
EOSINOPHIL # BLD AUTO: 0.13 THOUSAND/ΜL (ref 0–0.61)
EOSINOPHIL NFR BLD AUTO: 3 % (ref 0–6)
ERYTHROCYTE [DISTWIDTH] IN BLOOD BY AUTOMATED COUNT: 17.4 % (ref 11.6–15.1)
GFR SERPL CREATININE-BSD FRML MDRD: 78 ML/MIN/1.73SQ M
GLUCOSE SERPL-MCNC: 105 MG/DL (ref 65–140)
GLUCOSE SERPL-MCNC: 129 MG/DL (ref 65–140)
GLUCOSE SERPL-MCNC: 135 MG/DL (ref 65–140)
GLUCOSE SERPL-MCNC: 149 MG/DL (ref 65–140)
GLUCOSE SERPL-MCNC: 188 MG/DL (ref 65–140)
HCT VFR BLD AUTO: 25.8 % (ref 34.8–46.1)
HGB BLD-MCNC: 8.3 G/DL (ref 11.5–15.4)
IMM GRANULOCYTES # BLD AUTO: 0.08 THOUSAND/UL (ref 0–0.2)
IMM GRANULOCYTES NFR BLD AUTO: 2 % (ref 0–2)
LYMPHOCYTES # BLD AUTO: 0.38 THOUSANDS/ΜL (ref 0.6–4.47)
LYMPHOCYTES NFR BLD AUTO: 8 % (ref 14–44)
MAGNESIUM SERPL-MCNC: 2.4 MG/DL (ref 1.9–2.7)
MCH RBC QN AUTO: 31.9 PG (ref 26.8–34.3)
MCHC RBC AUTO-ENTMCNC: 32.2 G/DL (ref 31.4–37.4)
MCV RBC AUTO: 99 FL (ref 82–98)
MONOCYTES # BLD AUTO: 0.33 THOUSAND/ΜL (ref 0.17–1.22)
MONOCYTES NFR BLD AUTO: 7 % (ref 4–12)
NEUTROPHILS # BLD AUTO: 4.15 THOUSANDS/ΜL (ref 1.85–7.62)
NEUTS SEG NFR BLD AUTO: 79 % (ref 43–75)
NRBC BLD AUTO-RTO: 0 /100 WBCS
PHOSPHATE SERPL-MCNC: 4.4 MG/DL (ref 2.3–4.1)
PLATELET # BLD AUTO: 123 THOUSANDS/UL (ref 149–390)
PMV BLD AUTO: 10.4 FL (ref 8.9–12.7)
POTASSIUM SERPL-SCNC: 4.2 MMOL/L (ref 3.5–5.3)
RBC # BLD AUTO: 2.6 MILLION/UL (ref 3.81–5.12)
SODIUM SERPL-SCNC: 131 MMOL/L (ref 135–147)
WBC # BLD AUTO: 5.1 THOUSAND/UL (ref 4.31–10.16)

## 2024-11-30 PROCEDURE — 99024 POSTOP FOLLOW-UP VISIT: CPT | Performed by: SURGERY

## 2024-11-30 PROCEDURE — 84100 ASSAY OF PHOSPHORUS: CPT

## 2024-11-30 PROCEDURE — 80048 BASIC METABOLIC PNL TOTAL CA: CPT

## 2024-11-30 PROCEDURE — 85025 COMPLETE CBC W/AUTO DIFF WBC: CPT

## 2024-11-30 PROCEDURE — 82948 REAGENT STRIP/BLOOD GLUCOSE: CPT

## 2024-11-30 PROCEDURE — 83735 ASSAY OF MAGNESIUM: CPT

## 2024-11-30 PROCEDURE — 74177 CT ABD & PELVIS W/CONTRAST: CPT

## 2024-11-30 PROCEDURE — 71260 CT THORAX DX C+: CPT

## 2024-11-30 PROCEDURE — 99223 1ST HOSP IP/OBS HIGH 75: CPT | Performed by: OBSTETRICS & GYNECOLOGY

## 2024-11-30 RX ORDER — FUROSEMIDE 20 MG/1
20 TABLET ORAL DAILY
Status: DISCONTINUED | OUTPATIENT
Start: 2024-11-30 | End: 2024-12-05 | Stop reason: HOSPADM

## 2024-11-30 RX ADMIN — METOCLOPRAMIDE 10 MG: 5 INJECTION, SOLUTION INTRAMUSCULAR; INTRAVENOUS at 00:22

## 2024-11-30 RX ADMIN — IOHEXOL 100 ML: 350 INJECTION, SOLUTION INTRAVENOUS at 17:04

## 2024-11-30 RX ADMIN — HYDROMORPHONE HYDROCHLORIDE 0.2 MG: 0.2 INJECTION, SOLUTION INTRAMUSCULAR; INTRAVENOUS; SUBCUTANEOUS at 16:41

## 2024-11-30 RX ADMIN — GABAPENTIN 100 MG: 100 CAPSULE ORAL at 22:17

## 2024-11-30 RX ADMIN — ACETAMINOPHEN 975 MG: 325 TABLET, FILM COATED ORAL at 22:17

## 2024-11-30 RX ADMIN — CHLORHEXIDINE GLUCONATE 15 ML: 1.2 RINSE ORAL at 22:18

## 2024-11-30 RX ADMIN — ACETAMINOPHEN 975 MG: 325 TABLET, FILM COATED ORAL at 13:25

## 2024-11-30 RX ADMIN — CARVEDILOL 12.5 MG: 12.5 TABLET, FILM COATED ORAL at 22:18

## 2024-11-30 RX ADMIN — OXYCODONE 5 MG: 5 TABLET ORAL at 09:06

## 2024-11-30 RX ADMIN — HYDROMORPHONE HYDROCHLORIDE 0.2 MG: 0.2 INJECTION, SOLUTION INTRAMUSCULAR; INTRAVENOUS; SUBCUTANEOUS at 22:17

## 2024-11-30 RX ADMIN — INSULIN LISPRO 4 UNITS: 100 INJECTION, SOLUTION INTRAVENOUS; SUBCUTANEOUS at 12:40

## 2024-11-30 RX ADMIN — ONDANSETRON 4 MG: 2 INJECTION INTRAMUSCULAR; INTRAVENOUS at 13:25

## 2024-11-30 RX ADMIN — HYDROMORPHONE HYDROCHLORIDE 0.2 MG: 0.2 INJECTION, SOLUTION INTRAMUSCULAR; INTRAVENOUS; SUBCUTANEOUS at 05:06

## 2024-11-30 RX ADMIN — ENOXAPARIN SODIUM 60 MG: 100 INJECTION SUBCUTANEOUS at 09:11

## 2024-11-30 RX ADMIN — HYDROMORPHONE HYDROCHLORIDE 0.2 MG: 0.2 INJECTION, SOLUTION INTRAMUSCULAR; INTRAVENOUS; SUBCUTANEOUS at 10:54

## 2024-11-30 RX ADMIN — ENOXAPARIN SODIUM 60 MG: 100 INJECTION SUBCUTANEOUS at 22:57

## 2024-11-30 RX ADMIN — LEVOTHYROXINE SODIUM 125 MCG: 125 TABLET ORAL at 09:06

## 2024-11-30 RX ADMIN — NYSTATIN: 100000 POWDER TOPICAL at 22:00

## 2024-11-30 RX ADMIN — ACETAMINOPHEN 975 MG: 325 TABLET, FILM COATED ORAL at 05:06

## 2024-11-30 RX ADMIN — OXYCODONE 5 MG: 5 TABLET ORAL at 02:25

## 2024-11-30 RX ADMIN — OXYCODONE 5 MG: 5 TABLET ORAL at 14:18

## 2024-11-30 RX ADMIN — PANTOPRAZOLE SODIUM 40 MG: 40 TABLET, DELAYED RELEASE ORAL at 09:06

## 2024-11-30 RX ADMIN — FUROSEMIDE 20 MG: 20 TABLET ORAL at 22:57

## 2024-11-30 RX ADMIN — CHLORHEXIDINE GLUCONATE 15 ML: 1.2 RINSE ORAL at 09:11

## 2024-11-30 RX ADMIN — CARVEDILOL 12.5 MG: 12.5 TABLET, FILM COATED ORAL at 09:06

## 2024-11-30 RX ADMIN — OXYCODONE 5 MG: 5 TABLET ORAL at 20:26

## 2024-11-30 RX ADMIN — NYSTATIN: 100000 POWDER TOPICAL at 09:08

## 2024-11-30 RX ADMIN — BISACODYL 10 MG: 10 SUPPOSITORY RECTAL at 09:07

## 2024-11-30 RX ADMIN — NYSTATIN: 100000 POWDER TOPICAL at 17:39

## 2024-11-30 NOTE — PLAN OF CARE
Problem: Potential for Falls  Goal: Patient will remain free of falls  Description: INTERVENTIONS:  - Educate patient/family on patient safety including physical limitations  - Instruct patient to call for assistance with activity   - Consult OT/PT to assist with strengthening/mobility   - Keep Call bell within reach  - Keep bed low and locked with side rails adjusted as appropriate  - Keep care items and personal belongings within reach  - Initiate and maintain comfort rounds  - Make Fall Risk Sign visible to staff  - Offer Toileting every 3 Hours, in advance of need  - Initiate/Maintain bed alarm  - Obtain necessary fall risk management equipment  - Apply yellow socks and bracelet for high fall risk patients  - Consider moving patient to room near nurses station  Outcome: Progressing     Problem: PAIN - ADULT  Goal: Verbalizes/displays adequate comfort level or baseline comfort level  Description: Interventions:  - Encourage patient to monitor pain and request assistance  - Assess pain using appropriate pain scale  - Administer analgesics based on type and severity of pain and evaluate response  - Implement non-pharmacological measures as appropriate and evaluate response  - Consider cultural and social influences on pain and pain management  - Notify physician/advanced practitioner if interventions unsuccessful or patient reports new pain  Outcome: Progressing     Problem: SAFETY ADULT  Goal: Patient will remain free of falls  Description: INTERVENTIONS:  - Educate patient/family on patient safety including physical limitations  - Instruct patient to call for assistance with activity   - Consult OT/PT to assist with strengthening/mobility   - Keep Call bell within reach  - Keep bed low and locked with side rails adjusted as appropriate  - Keep care items and personal belongings within reach  - Initiate and maintain comfort rounds  - Make Fall Risk Sign visible to staff  - Offer Toileting every 3 Hours, in  advance of need  - Initiate/Maintain bed alarm  - Obtain necessary fall risk management equipment  - Apply yellow socks and bracelet for high fall risk patients  - Consider moving patient to room near nurses station  Outcome: Progressing  Goal: Maintain or return to baseline ADL function  Description: INTERVENTIONS:  -  Assess patient's ability to carry out ADLs; assess patient's baseline for ADL function and identify physical deficits which impact ability to perform ADLs (bathing, care of mouth/teeth, toileting, grooming, dressing, etc.)  - Assess/evaluate cause of self-care deficits   - Assess range of motion  - Assess patient's mobility; develop plan if impaired  - Assess patient's need for assistive devices and provide as appropriate  - Encourage maximum independence but intervene and supervise when necessary  - Involve family in performance of ADLs  - Assess for home care needs following discharge   - Consider OT consult to assist with ADL evaluation and planning for discharge  - Provide patient education as appropriate  Outcome: Progressing  Goal: Maintains/Returns to pre admission functional level  Description: INTERVENTIONS:  - Perform AM-PAC 6 Click Basic Mobility/ Daily Activity assessment daily.  - Set and communicate daily mobility goal to care team and patient/family/caregiver.   - Collaborate with rehabilitation services on mobility goals if consulted  - Perform Range of Motion 3 times a day.  - Reposition patient every 3 hours.  - Dangle patient 3 times a day  - Stand patient 3 times a day  - Ambulate patient 3 times a day  - Out of bed to chair 3 times a day   - Out of bed for meals 3 times a day  - Out of bed for toileting  - Record patient progress and toleration of activity level   Outcome: Progressing     Problem: DISCHARGE PLANNING  Goal: Discharge to home or other facility with appropriate resources  Description: INTERVENTIONS:  - Identify barriers to discharge w/patient and caregiver  -  Arrange for needed discharge resources and transportation as appropriate  - Identify discharge learning needs (meds, wound care, etc.)  - Arrange for interpretive services to assist at discharge as needed  - Refer to Case Management Department for coordinating discharge planning if the patient needs post-hospital services based on physician/advanced practitioner order or complex needs related to functional status, cognitive ability, or social support system  Outcome: Progressing     Problem: GASTROINTESTINAL - ADULT  Goal: Minimal or absence of nausea and/or vomiting  Description: INTERVENTIONS:  - Administer IV fluids if ordered to ensure adequate hydration  - Maintain NPO status until nausea and vomiting are resolved  - Nasogastric tube if ordered  - Administer ordered antiemetic medications as needed  - Provide nonpharmacologic comfort measures as appropriate  - Advance diet as tolerated, if ordered  - Consider nutrition services referral to assist patient with adequate nutrition and appropriate food choices  Outcome: Progressing  Goal: Maintains or returns to baseline bowel function  Description: INTERVENTIONS:  - Assess bowel function  - Encourage oral fluids to ensure adequate hydration  - Administer IV fluids if ordered to ensure adequate hydration  - Administer ordered medications as needed  - Encourage mobilization and activity  - Consider nutritional services referral to assist patient with adequate nutrition and appropriate food choices  Outcome: Progressing  Goal: Maintains adequate nutritional intake  Description: INTERVENTIONS:  - Monitor percentage of each meal consumed  - Identify factors contributing to decreased intake, treat as appropriate  - Assist with meals as needed  - Monitor I&O, weight, and lab values if indicated  - Obtain nutrition services referral as needed  Outcome: Progressing  Goal: Establish and maintain optimal ostomy function  Description: INTERVENTIONS:  - Assess bowel  function  - Encourage oral fluids to ensure adequate hydration  - Administer IV fluids if ordered to ensure adequate hydration   - Administer ordered medications as needed  - Encourage mobilization and activity  - Nutrition services referral to assist patient with appropriate food choices  - Assess stoma site  - Consider wound care consult   Outcome: Progressing  Goal: Oral mucous membranes remain intact  Description: INTERVENTIONS  - Assess oral mucosa and hygiene practices  - Implement preventative oral hygiene regimen  - Implement oral medicated treatments as ordered  - Initiate Nutrition services referral as needed  Outcome: Progressing     Problem: Prexisting or High Potential for Compromised Skin Integrity  Goal: Skin integrity is maintained or improved  Description: INTERVENTIONS:  - Identify patients at risk for skin breakdown  - Assess and monitor skin integrity  - Assess and monitor nutrition and hydration status  - Monitor labs   - Assess for incontinence   - Turn and reposition patient  - Assist with mobility/ambulation  - Relieve pressure over bony prominences  - Avoid friction and shearing  - Provide appropriate hygiene as needed including keeping skin clean and dry  - Evaluate need for skin moisturizer/barrier cream  - Collaborate with interdisciplinary team   - Patient/family teaching  - Consider wound care consult   Outcome: Progressing     Problem: Nutrition/Hydration-ADULT  Goal: Nutrient/Hydration intake appropriate for improving, restoring or maintaining nutritional needs  Description: Monitor and assess patient's nutrition/hydration status for malnutrition. Collaborate with interdisciplinary team and initiate plan and interventions as ordered.  Monitor patient's weight and dietary intake as ordered or per policy. Utilize nutrition screening tool and intervene as necessary. Determine patient's food preferences and provide high-protein, high-caloric foods as appropriate.     INTERVENTIONS:  -  Monitor oral intake, urinary output, labs, and treatment plans  - Assess nutrition and hydration status and recommend course of action  - Evaluate amount of meals eaten  - Assist patient with eating if necessary   - Allow adequate time for meals  - Recommend/ encourage appropriate diets, oral nutritional supplements, and vitamin/mineral supplements  - Order, calculate, and assess calorie counts as needed  - Recommend, monitor, and adjust tube feedings and TPN/PPN based on assessed needs  - Assess need for intravenous fluids  - Provide specific nutrition/hydration education as appropriate  - Include patient/family/caregiver in decisions related to nutrition  Outcome: Progressing     Problem: INFECTION - ADULT  Goal: Absence or prevention of progression during hospitalization  Description: INTERVENTIONS:  - Assess and monitor for signs and symptoms of infection  - Monitor lab/diagnostic results  - Monitor all insertion sites, i.e. indwelling lines, tubes, and drains  - Monitor endotracheal if appropriate and nasal secretions for changes in amount and color  - [place] appropriate cooling/warming therapies per order  - Administer medications as ordered  - Instruct and encourage patient and family to use good hand hygiene technique  - Identify and instruct in appropriate isolation precautions for identified infection/condition  Outcome: Progressing     Problem: Knowledge Deficit  Goal: Patient/family/caregiver demonstrates understanding of disease process, treatment plan, medications, and discharge instructions  Description: Complete learning assessment and assess knowledge base.  Interventions:  - Provide teaching at level of understanding  - Provide teaching via preferred learning methods  Outcome: Progressing     Problem: METABOLIC, FLUID AND ELECTROLYTES - ADULT  Goal: Electrolytes maintained within normal limits  Description: INTERVENTIONS:  - Monitor labs and assess patient for signs and symptoms of electrolyte  imbalances  - Administer electrolyte replacement as ordered  - Monitor response to electrolyte replacements, including repeat lab results as appropriate  - Instruct patient on fluid and nutrition as appropriate  Outcome: Progressing     Problem: SKIN/TISSUE INTEGRITY - ADULT  Goal: Skin Integrity remains intact(Skin Breakdown Prevention)  Description: Assess:  -Perform Esteban assessment every shift  -Clean and moisturize skin every change  -Inspect skin when repositioning, toileting, and assisting with ADLS  -Assess under medical devices such as oxygen every shift  -Assess extremities for adequate circulation and sensation     Bed Management:  -Have minimal linens on bed & keep smooth, unwrinkled  -Change linens as needed when moist or perspiring  -Avoid sitting or lying in one position for more than 2 hours while in bed  -Keep HOB at 30 degrees     Toileting:  -Offer bedside commode  -Assess for incontinence every change  -Use incontinent care products after each incontinent episode such as cream    Activity:  -Mobilize patient 3 times a day  -Encourage activity and walks on unit  -Encourage or provide ROM exercises   -Turn and reposition patient every 2 Hours  -Use appropriate equipment to lift or move patient in bed  -Instruct/ Assist with weight shifting every 2 when out of bed in chair  -Consider limitation of chair time 2 hour intervals    Skin Care:  -Avoid use of baby powder, tape, friction and shearing, hot water or constrictive clothing  -Relieve pressure over bony prominences using foam wedges  -Do not massage red bony areas    Next Steps:  -Teach patient strategies to minimize risks such as call    -Consider consults to  interdisciplinary teams such as pt  Outcome: Progressing  Goal: Incision(s), wounds(s) or drain site(s) healing without S/S of infection  Description: INTERVENTIONS  - Assess and document dressing, incision, wound bed, drain sites and surrounding tissue  - Provide patient and family  education  - Perform skin care/dressing changes every shift  Outcome: Progressing     Problem: HEMATOLOGIC - ADULT  Goal: Maintains hematologic stability  Description: INTERVENTIONS  - Assess for signs and symptoms of bleeding or hemorrhage  - Monitor labs  - Administer supportive blood products/factors as ordered and appropriate  Outcome: Progressing     Problem: CARDIOVASCULAR - ADULT  Goal: Maintains optimal cardiac output and hemodynamic stability  Description: INTERVENTIONS:  - Monitor I/O, vital signs and rhythm  - Monitor for S/S and trends of decreased cardiac output  - Administer and titrate ordered vasoactive medications to optimize hemodynamic stability  - Assess quality of pulses, skin color and temperature  - Assess for signs of decreased coronary artery perfusion  - Instruct patient to report change in severity of symptoms  Outcome: Progressing  Goal: Absence of cardiac dysrhythmias or at baseline rhythm  Description: INTERVENTIONS:  - Continuous cardiac monitoring, vital signs, obtain 12 lead EKG if ordered  - Administer antiarrhythmic and heart rate control medications as ordered  - Monitor electrolytes and administer replacement therapy as ordered  Outcome: Progressing     Problem: RESPIRATORY - ADULT  Goal: Achieves optimal ventilation and oxygenation  Description: INTERVENTIONS:  - Assess for changes in respiratory status  - Assess for changes in mentation and behavior  - Position to facilitate oxygenation and minimize respiratory effort  - Oxygen administered by appropriate delivery if ordered  - Initiate smoking cessation education as indicated  - Encourage broncho-pulmonary hygiene including cough, deep breathe, Incentive Spirometry  - Assess the need for suctioning and aspirate as needed  - Assess and instruct to report SOB or any respiratory difficulty  - Respiratory Therapy support as indicated  Outcome: Progressing

## 2024-11-30 NOTE — PLAN OF CARE
Problem: Potential for Falls  Goal: Patient will remain free of falls  Description: INTERVENTIONS:  - Educate patient/family on patient safety including physical limitations  - Instruct patient to call for assistance with activity   - Consult OT/PT to assist with strengthening/mobility   - Keep Call bell within reach  - Keep bed low and locked with side rails adjusted as appropriate  - Keep care items and personal belongings within reach  - Initiate and maintain comfort rounds  - Make Fall Risk Sign visible to staff  - Offer Toileting every 3 Hours, in advance of need  - Initiate/Maintain bed alarm  - Obtain necessary fall risk management equipment  - Apply yellow socks and bracelet for high fall risk patients  - Consider moving patient to room near nurses station  Outcome: Progressing     Problem: PAIN - ADULT  Goal: Verbalizes/displays adequate comfort level or baseline comfort level  Description: Interventions:  - Encourage patient to monitor pain and request assistance  - Assess pain using appropriate pain scale  - Administer analgesics based on type and severity of pain and evaluate response  - Implement non-pharmacological measures as appropriate and evaluate response  - Consider cultural and social influences on pain and pain management  - Notify physician/advanced practitioner if interventions unsuccessful or patient reports new pain  Outcome: Progressing     Problem: SAFETY ADULT  Goal: Patient will remain free of falls  Description: INTERVENTIONS:  - Educate patient/family on patient safety including physical limitations  - Instruct patient to call for assistance with activity   - Consult OT/PT to assist with strengthening/mobility   - Keep Call bell within reach  - Keep bed low and locked with side rails adjusted as appropriate  - Keep care items and personal belongings within reach  - Initiate and maintain comfort rounds  - Make Fall Risk Sign visible to staff  - Offer Toileting every 3 Hours, in  advance of need  - Initiate/Maintain bed alarm  - Obtain necessary fall risk management equipment  - Apply yellow socks and bracelet for high fall risk patients  - Consider moving patient to room near nurses station  Outcome: Progressing  Goal: Maintain or return to baseline ADL function  Description: INTERVENTIONS:  -  Assess patient's ability to carry out ADLs; assess patient's baseline for ADL function and identify physical deficits which impact ability to perform ADLs (bathing, care of mouth/teeth, toileting, grooming, dressing, etc.)  - Assess/evaluate cause of self-care deficits   - Assess range of motion  - Assess patient's mobility; develop plan if impaired  - Assess patient's need for assistive devices and provide as appropriate  - Encourage maximum independence but intervene and supervise when necessary  - Involve family in performance of ADLs  - Assess for home care needs following discharge   - Consider OT consult to assist with ADL evaluation and planning for discharge  - Provide patient education as appropriate  Outcome: Progressing  Goal: Maintains/Returns to pre admission functional level  Description: INTERVENTIONS:  - Perform AM-PAC 6 Click Basic Mobility/ Daily Activity assessment daily.  - Set and communicate daily mobility goal to care team and patient/family/caregiver.   - Collaborate with rehabilitation services on mobility goals if consulted  - Perform Range of Motion 3 times a day.  - Reposition patient every 3 hours.  - Dangle patient 3 times a day  - Stand patient 3 times a day  - Ambulate patient 3 times a day  - Out of bed to chair 3 times a day   - Out of bed for meals 3 times a day  - Out of bed for toileting  - Record patient progress and toleration of activity level   Outcome: Progressing     Problem: DISCHARGE PLANNING  Goal: Discharge to home or other facility with appropriate resources  Description: INTERVENTIONS:  - Identify barriers to discharge w/patient and caregiver  -  Arrange for needed discharge resources and transportation as appropriate  - Identify discharge learning needs (meds, wound care, etc.)  - Arrange for interpretive services to assist at discharge as needed  - Refer to Case Management Department for coordinating discharge planning if the patient needs post-hospital services based on physician/advanced practitioner order or complex needs related to functional status, cognitive ability, or social support system  Outcome: Progressing     Problem: Knowledge Deficit  Goal: Patient/family/caregiver demonstrates understanding of disease process, treatment plan, medications, and discharge instructions  Description: Complete learning assessment and assess knowledge base.  Interventions:  - Provide teaching at level of understanding  - Provide teaching via preferred learning methods  Outcome: Progressing     Problem: INFECTION - ADULT  Goal: Absence or prevention of progression during hospitalization  Description: INTERVENTIONS:  - Assess and monitor for signs and symptoms of infection  - Monitor lab/diagnostic results  - Monitor all insertion sites, i.e. indwelling lines, tubes, and drains  - Monitor endotracheal if appropriate and nasal secretions for changes in amount and color  - Elnora appropriate cooling/warming therapies per order  - Administer medications as ordered  - Instruct and encourage patient and family to use good hand hygiene technique  - Identify and instruct in appropriate isolation precautions for identified infection/condition  Outcome: Progressing     Problem: GASTROINTESTINAL - ADULT  Goal: Minimal or absence of nausea and/or vomiting  Description: INTERVENTIONS:  - Administer IV fluids if ordered to ensure adequate hydration  - Maintain NPO status until nausea and vomiting are resolved  - Nasogastric tube if ordered  - Administer ordered antiemetic medications as needed  - Provide nonpharmacologic comfort measures as appropriate  - Advance diet as  tolerated, if ordered  - Consider nutrition services referral to assist patient with adequate nutrition and appropriate food choices  Outcome: Progressing  Goal: Maintains or returns to baseline bowel function  Description: INTERVENTIONS:  - Assess bowel function  - Encourage oral fluids to ensure adequate hydration  - Administer IV fluids if ordered to ensure adequate hydration  - Administer ordered medications as needed  - Encourage mobilization and activity  - Consider nutritional services referral to assist patient with adequate nutrition and appropriate food choices  Outcome: Progressing  Goal: Maintains adequate nutritional intake  Description: INTERVENTIONS:  - Monitor percentage of each meal consumed  - Identify factors contributing to decreased intake, treat as appropriate  - Assist with meals as needed  - Monitor I&O, weight, and lab values if indicated  - Obtain nutrition services referral as needed  Outcome: Progressing  Goal: Establish and maintain optimal ostomy function  Description: INTERVENTIONS:  - Assess bowel function  - Encourage oral fluids to ensure adequate hydration  - Administer IV fluids if ordered to ensure adequate hydration   - Administer ordered medications as needed  - Encourage mobilization and activity  - Nutrition services referral to assist patient with appropriate food choices  - Assess stoma site  - Consider wound care consult   Outcome: Progressing  Goal: Oral mucous membranes remain intact  Description: INTERVENTIONS  - Assess oral mucosa and hygiene practices  - Implement preventative oral hygiene regimen  - Implement oral medicated treatments as ordered  - Initiate Nutrition services referral as needed  Outcome: Progressing

## 2024-11-30 NOTE — CONSULTS
"Consultation - Gyn Oncology  Lety Botello 64 y.o. female MRN: [id number]  Unit/Bed#: [place] [address] -01 Encounter: [id number]    Inpatient consult to Gynecologic Oncology  Consult performed by: Debi Mauricio DO  Consult ordered by: Ricki Rodriguez MD          Assessment & Plan     Endometrial cancer (HCC)  Assessment & Plan  Patient is a 65 yo with hx of stage 1A endometrial cancer treated with [id number] in 2020  10/23 enlarged lymph nodes seen on CT, biopsy consistent with GYN primary  1/24 received radiation to involved nodes   On this admission, omental biopsy and SBR both consistent with metastatic adenocarcinoma with GYN primary origin   Patient reports she is not sure she is interested in Adjuvant or hormonal therapy at this time and feels like \"radiation was the worst thing she has done, I'm okay with dying, and I just want to be comfortable and not in pain\"   Patient had appointment to follow up with [facility] GYN ONC on 11/15 however missed her appointment due to hospital admission  Would like to follow up with their team as they know her course of treatment best  Consider Palliative consult for support       Abdominal pain  Assessment & Plan  Patient with recurrent SBO, failed conservative management s/p ex-lap, SBR, DAVID  Patient reports her pain is improved today and she is trial ing a light meal, had bowel movement  Management per primary team    Morbid obesity (HCC)  Assessment & Plan  Patient with BMI of 53 and recurrence of endometrial cancer that is ER positive   Recommend nutrition consult and followup    History of pulmonary embolism  Assessment & Plan  Continue lovenox    Type 2 diabetes mellitus (HCC)  Assessment & Plan  Continue humalog and FS checks    Hypertension  Assessment & Plan  Continue home medications, management per primary team    * SBO (small bowel obstruction) (HCC)  Assessment & Plan  Management per primary team   Follow up repeat CT scan ordered today           History of " "Present Illness   HPI:  Lety Botello is a 64 y.o. with a history of stage one grade 1 endometrial cancer treated with FLORECITA/BSO/SLND in 2020. [facility]. In October of 2023 she was noted to have enlarged lymph nodes on CT and the biopsy was consistent with metastatic adenocarcinoma GYN origin, consistent with her primary tumor. She underwent radiation of the involved nodes in January of 2024.    This admission she presented on 11/14/24 with a history of recurrent small bowel obstructions in the setting of morbid obesity, gastroparesis, and diabetes (suspected element of diabetic autonomic neuropathy of SBO). Conservative treatment was attempted for 1 week and failed, so patient underwent a Ex lap, SBR, DAVID on 11.22.24. Omental biopsy and small bowel were consistent with metastatic adenocarcinoma with positive margins.     Gyn Onc was consulted to discuss results and patient treatment goals of care. Discussed adjuvant vs hormonal therapy options with patient. Lety reports that \"she is okay with dying and just wants to be comfortable and pain free\".       Review of Systems   Constitutional: Negative.    HENT: Negative.     Respiratory: Negative.     Cardiovascular: Negative.    Gastrointestinal:  Positive for abdominal pain.   Genitourinary: Negative.    Musculoskeletal: Negative.    Skin: Negative.    Neurological: Negative.    Psychiatric/Behavioral: Negative.         Historical Information   Past Medical History:   Diagnosis Date    Diverticulitis     Endometrial cancer (HCC)     History of shingles     Right hemiabdomen (inactive)    Hypertension     IBS (irritable bowel syndrome)     Obesity     Small bowel obstruction (HCC)     Type 2 diabetes mellitus (HCC)      Past Surgical History:   Procedure Laterality Date    ABDOMINAL ADHESION SURGERY N/A 11/22/2024    Procedure: EXTENSIVE LYSIS OF ADHESIONS >90 MINUTES;  Surgeon: Alejo Ward MD;  Location: [facility];  Service: General    " APPENDECTOMY      CHOLECYSTECTOMY      LAPAROTOMY N/A 2024    Procedure: LAPAROTOMY EXPLORATORY;  Surgeon: Alejo Ward MD;  Location[facility];  Service: General    SIGMOIDECTOMY N/A     SMALL INTESTINE SURGERY N/A 2024    Procedure: RESECTION SMALL BOWEL WITH PRIMARY ANASTAMOSIS,PARTIAL OMENTECTOMY;  Surgeon: Alejo Ward MD;  Location: [facility];  Service: General     OB History   No obstetric history on file.     History reviewed. No pertinent family history.  Social History   Social History     Substance and Sexual Activity   Alcohol Use Not Currently     Social History     Substance and Sexual Activity   Drug Use Never     Social History     Tobacco Use   Smoking Status Never   Smokeless Tobacco Never       Meds/Allergies     Medications Prior to Admission:     Insulin Lispro (HUMALOG PEN SC)    Acetaminophen 325 MG CAPS    albuterol (PROVENTIL HFA,VENTOLIN HFA) 90 mcg/act inhaler    apixaban (ELIQUIS) 5 mg    aspirin (ECOTRIN LOW STRENGTH) 81 mg EC tablet    bisacodyl (DULCOLAX) 10 mg suppository    carvedilol (COREG) 12.5 mg tablet    dapagliflozin (Farxiga) 10 MG tablet    dicyclomine (BENTYL) 10 mg capsule    diphenhydrAMINE (BENADRYL) 25 mg capsule    furosemide (LASIX) 20 mg tablet    gabapentin (NEURONTIN) 100 mg capsule    Klor-Con M20 20 MEQ tablet    levothyroxine 125 mcg tablet    metoclopramide (Reglan) 10 mg tablet    nitroglycerin (NITROSTAT) 0.4 mg SL tablet    ondansetron (ZOFRAN) 4 mg tablet    [] oxyCODONE (ROXICODONE) 5 immediate release tablet    pantoprazole (PROTONIX) 40 mg tablet    vitamin B-12 (VITAMIN B-12) 1,000 mcg tablet  Allergies   Allergen Reactions    Bee Pollen Anaphylaxis    Azithromycin Hives    Metformin Diarrhea    Other Other (See Comments)     hayfever with inhaler   hayfever with inhaler    Pollen Extract Other (See Comments)     hayfever with inhaler    Sulfamethizole Hives    Sulfamethoxazole-Trimethoprim Hives       Objective   BP  150/59 (BP Location: Left arm)   Pulse 72   Temp 98.7 °F (37.1 °C) (Axillary)   Resp 18   Ht 5' (1.524 m)   Wt 124 kg (273 lb)   SpO2 98%   BMI 53.32 kg/m²     I/O last 3 completed shifts:  In: 1980 [P.O.:1980]  Out: 2400 [Urine:1600; Emesis/NG output:800]  I/O this shift:  In: 620 [P.O.:600; I.V.:20]  Out: -     Lab Results   Component Value Date    WBC 5.10 11/30/2024    HGB 8.3 (L) 11/30/2024    HCT 25.8 (L) 11/30/2024    MCV 99 (H) 11/30/2024     (L) 11/30/2024       Lab Results   Component Value Date    GLUCOSE 183 (H) 11/22/2024    CALCIUM 7.5 (L) 11/30/2024    K 4.2 11/30/2024    CO2 23 11/30/2024     11/30/2024    BUN 39 (H) 11/30/2024    CREATININE 0.81 11/30/2024       Physical Exam  Vitals and nursing note reviewed.   Constitutional:       General: She is not in acute distress.     Appearance: She is obese.   HENT:      Head: Normocephalic and atraumatic.   Eyes:      Extraocular Movements: Extraocular movements intact.   Cardiovascular:      Rate and Rhythm: Normal rate.   Pulmonary:      Effort: Pulmonary effort is normal.   Abdominal:      Tenderness: There is abdominal tenderness.      Comments: Shingles rash present on her abdomen  Tenderness mild, appropriate for postoperative status  Mild distention   Genitourinary:     Comments: Deferred  Skin:     General: Skin is warm and dry.   Neurological:      General: No focal deficit present.      Mental Status: She is alert.   Psychiatric:         Mood and Affect: Mood normal.         Behavior: Behavior normal.         Imaging: Results Review Statement: I personally reviewed the following image studies in PACS and associated radiology reports: CT abdomen/pelvis. My interpretation of the radiology images/reports is: agree with radiology, no imaging evidence of metastasis, some omental nodularity.  [facility]      Code Status: Level 1 - Full Code    Debi Mauricio, DO   Obstetrics  & Gynecology PGY-II  11/30/24  9:49 AM

## 2024-11-30 NOTE — PROGRESS NOTES
Progress Note - Surgery-General   Name: Lety Botello 64 y.o. female I MRN: [id number]  Unit/Bed#: [place] [address] -01 I Date of Admission: 11/14/2024   Date of Service: 11/30/2024 I Hospital Day: 16    Assessment & Plan  SBO (small bowel obstruction) (HCC)  64 y.o. female presenting with recurrent SBO. 11/14-11/21 failed conservative management with persistent SBO. S/p ex lap, SBR, DAVID on 11/22.       Plan  - Continue diabetic diet w/ CTC  - f/u CT PO and IV of CAP  - appreciate gynonc recs  - Appreciate PT/OT recs  - Encourage OOB, ambulate, IS  - DVT ppx with therapeutic Lovenox  - F/u CM for rehab placement and dispo  - Strongly encourage OOB, ambulation, IS         Subjective   Improving nausea now only occasionally, no emesis, passed flatus and had BM, wants to try some toast and crackers today. Reports her pain is mildly improved.    Objective :  Temp:  [97.4 °F (36.3 °C)-98.7 °F (37.1 °C)] 98.7 °F (37.1 °C)  HR:  [66-73] 72  BP: (134-158)/(59-76) 150/59  Resp:  [17-18] 18  SpO2:  [96 %-98 %] 98 %  O2 Device: None (Room air)    I/O         11/28 0701  11/29 0700 11/29 0701  11/30 0700 11/30 0701  12/01 0700    P.O. 720 1260     I.V. (mL/kg)       Total Intake(mL/kg) 720 (5.8) 1260 (10.2)     Urine (mL/kg/hr) 450 (0.2) 1500 (0.5)     Emesis/NG output 800      Stool  0     Total Output 1250 1500     Net -530 -240            Unmeasured Urine Occurrence 1 x      Unmeasured Stool Occurrence  1 x     Unmeasured Emesis Occurrence 1 x            Lines/Drains/Airways       Active Status       Name Placement date Placement time Site Days    PICC Line 11/18/24 Right Basilic 11/18/24  1249  Basilic  11    External Urinary Catheter 11/24/24  1232  -- 5                  Physical Exam  Physical Exam:  General: No acute distress, alert and oriented  CV: RRR  Lungs: Normal work of breathing   Abdomen: soft, mildly less distended,, improving tenderness to palpation, incision c/d/I anterior abd excoriation  Skin: Warm,  dry      Lab Results: I have reviewed the following results:  Recent Labs     11/30/24  0459   WBC 5.10   HGB 8.3*   HCT 25.8*   *   SODIUM 131*   K 4.2      CO2 23   BUN 39*   CREATININE 0.81   GLUC 149*   MG 2.4   PHOS 4.4*             VTE Pharmacologic Prophylaxis: VTE covered by:  enoxaparin, Subcutaneous, 60 mg at 11/29/24 9410     VTE Mechanical Prophylaxis: sequential compression device

## 2024-11-30 NOTE — ASSESSMENT & PLAN NOTE
64 y.o. female presenting with recurrent SBO. 11/14-11/21 failed conservative management with persistent SBO. S/p ex lap, SBR, DAVID on 11/22.       Plan  - Continue diabetic diet w/ CTC  - f/u CT PO and IV of CAP  - appreciate gynonc recs  - Appreciate PT/OT recs  - Encourage OOB, ambulate, IS  - DVT ppx with therapeutic Lovenox  - F/u CM for rehab placement and dispo  - Strongly encourage OOB, ambulation, IS

## 2024-11-30 NOTE — ASSESSMENT & PLAN NOTE
Patient with BMI of 53 and recurrence of endometrial cancer that is ER positive   Recommend nutrition consult and followup

## 2024-11-30 NOTE — ASSESSMENT & PLAN NOTE
"Patient is a 63 yo with hx of stage 1A endometrial cancer treated with [id number] in 2020  10/23 enlarged lymph nodes seen on CT, biopsy consistent with GYN primary  1/24 received radiation to involved nodes   On this admission, omental biopsy and SBR both consistent with metastatic adenocarcinoma with GYN primary origin   Patient reports she is not sure she is interested in Adjuvant or hormonal therapy at this time and feels like \"radiation was the worst thing she has done, I'm okay with dying, and I just want to be comfortable and not in pain\"   Patient had appointment to follow up with [facility] GYN ONC on 11/15 however missed her appointment due to hospital admission  Would like to follow up with their team as they know her course of treatment best  Consider Palliative consult for support     "

## 2024-11-30 NOTE — ASSESSMENT & PLAN NOTE
Patient with recurrent SBO, failed conservative management s/p ex-lap, SBR, DAVID  Patient reports her pain is improved today and she is trial ing a light meal, had bowel movement  Management per primary team

## 2024-12-01 LAB
ANION GAP SERPL CALCULATED.3IONS-SCNC: 3 MMOL/L (ref 4–13)
BUN SERPL-MCNC: 33 MG/DL (ref 5–25)
CALCIUM SERPL-MCNC: 7.4 MG/DL (ref 8.4–10.2)
CHLORIDE SERPL-SCNC: 104 MMOL/L (ref 96–108)
CO2 SERPL-SCNC: 22 MMOL/L (ref 21–32)
CREAT SERPL-MCNC: 0.75 MG/DL (ref 0.6–1.3)
GFR SERPL CREATININE-BSD FRML MDRD: 85 ML/MIN/1.73SQ M
GLUCOSE SERPL-MCNC: 116 MG/DL (ref 65–140)
GLUCOSE SERPL-MCNC: 126 MG/DL (ref 65–140)
GLUCOSE SERPL-MCNC: 144 MG/DL (ref 65–140)
GLUCOSE SERPL-MCNC: 149 MG/DL (ref 65–140)
GLUCOSE SERPL-MCNC: 160 MG/DL (ref 65–140)
MAGNESIUM SERPL-MCNC: 2.2 MG/DL (ref 1.9–2.7)
POTASSIUM SERPL-SCNC: 3.9 MMOL/L (ref 3.5–5.3)
SODIUM SERPL-SCNC: 129 MMOL/L (ref 135–147)

## 2024-12-01 PROCEDURE — 83735 ASSAY OF MAGNESIUM: CPT

## 2024-12-01 PROCEDURE — 99024 POSTOP FOLLOW-UP VISIT: CPT | Performed by: SURGERY

## 2024-12-01 PROCEDURE — 80048 BASIC METABOLIC PNL TOTAL CA: CPT

## 2024-12-01 PROCEDURE — 82948 REAGENT STRIP/BLOOD GLUCOSE: CPT

## 2024-12-01 RX ADMIN — PANTOPRAZOLE SODIUM 40 MG: 40 TABLET, DELAYED RELEASE ORAL at 07:09

## 2024-12-01 RX ADMIN — ONDANSETRON 4 MG: 2 INJECTION INTRAMUSCULAR; INTRAVENOUS at 18:38

## 2024-12-01 RX ADMIN — ACETAMINOPHEN 975 MG: 325 TABLET, FILM COATED ORAL at 06:59

## 2024-12-01 RX ADMIN — ENOXAPARIN SODIUM 60 MG: 100 INJECTION SUBCUTANEOUS at 21:45

## 2024-12-01 RX ADMIN — NYSTATIN: 100000 POWDER TOPICAL at 09:17

## 2024-12-01 RX ADMIN — ALTEPLASE 2 MG: 2.2 INJECTION, POWDER, LYOPHILIZED, FOR SOLUTION INTRAVENOUS at 12:58

## 2024-12-01 RX ADMIN — OXYCODONE 5 MG: 5 TABLET ORAL at 07:10

## 2024-12-01 RX ADMIN — ALTEPLASE 2 MG: 2.2 INJECTION, POWDER, LYOPHILIZED, FOR SOLUTION INTRAVENOUS at 13:01

## 2024-12-01 RX ADMIN — GABAPENTIN 100 MG: 100 CAPSULE ORAL at 21:43

## 2024-12-01 RX ADMIN — HYDROMORPHONE HYDROCHLORIDE 0.2 MG: 0.2 INJECTION, SOLUTION INTRAMUSCULAR; INTRAVENOUS; SUBCUTANEOUS at 09:41

## 2024-12-01 RX ADMIN — LEVOTHYROXINE SODIUM 125 MCG: 125 TABLET ORAL at 09:11

## 2024-12-01 RX ADMIN — HYDROMORPHONE HYDROCHLORIDE 0.2 MG: 0.2 INJECTION, SOLUTION INTRAMUSCULAR; INTRAVENOUS; SUBCUTANEOUS at 04:17

## 2024-12-01 RX ADMIN — CARVEDILOL 12.5 MG: 12.5 TABLET, FILM COATED ORAL at 09:11

## 2024-12-01 RX ADMIN — HYDROMORPHONE HYDROCHLORIDE 0.2 MG: 0.2 INJECTION, SOLUTION INTRAMUSCULAR; INTRAVENOUS; SUBCUTANEOUS at 16:40

## 2024-12-01 RX ADMIN — ENOXAPARIN SODIUM 60 MG: 100 INJECTION SUBCUTANEOUS at 09:24

## 2024-12-01 RX ADMIN — ACETAMINOPHEN 975 MG: 325 TABLET, FILM COATED ORAL at 21:43

## 2024-12-01 RX ADMIN — FUROSEMIDE 20 MG: 20 TABLET ORAL at 09:11

## 2024-12-01 RX ADMIN — ACETAMINOPHEN 975 MG: 325 TABLET, FILM COATED ORAL at 13:01

## 2024-12-01 RX ADMIN — CARVEDILOL 12.5 MG: 12.5 TABLET, FILM COATED ORAL at 21:43

## 2024-12-01 RX ADMIN — OXYCODONE 5 MG: 5 TABLET ORAL at 22:18

## 2024-12-01 RX ADMIN — HYDROMORPHONE HYDROCHLORIDE 0.2 MG: 0.2 INJECTION, SOLUTION INTRAMUSCULAR; INTRAVENOUS; SUBCUTANEOUS at 23:06

## 2024-12-01 RX ADMIN — OXYCODONE 5 MG: 5 TABLET ORAL at 12:48

## 2024-12-01 RX ADMIN — NYSTATIN: 100000 POWDER TOPICAL at 16:40

## 2024-12-01 RX ADMIN — HYDROMORPHONE HYDROCHLORIDE 0.2 MG: 0.2 INJECTION, SOLUTION INTRAMUSCULAR; INTRAVENOUS; SUBCUTANEOUS at 19:58

## 2024-12-01 RX ADMIN — CHLORHEXIDINE GLUCONATE 15 ML: 1.2 RINSE ORAL at 09:11

## 2024-12-01 RX ADMIN — NYSTATIN: 100000 POWDER TOPICAL at 21:45

## 2024-12-01 RX ADMIN — INSULIN LISPRO 4 UNITS: 100 INJECTION, SOLUTION INTRAVENOUS; SUBCUTANEOUS at 21:45

## 2024-12-01 RX ADMIN — CHLORHEXIDINE GLUCONATE 15 ML: 1.2 RINSE ORAL at 21:45

## 2024-12-01 RX ADMIN — OXYCODONE 5 MG: 5 TABLET ORAL at 18:38

## 2024-12-01 NOTE — CASE MANAGEMENT
Case Management Progress Note    Patient name Lety Botello  Location [facility] 2 /[facility] 2 M* MRN [id number]  : 1960 Date 2024       LOS (days): 17  Geometric Mean LOS (GMLOS) (days): 3  Days to GMLOS:-13.8        OBJECTIVE:        Current admission status: Inpatient  Preferred Pharmacy:   [id number] - [place], [place] - [address]  [address] [address]  Phone: [phone] Fax: [phone]    Primary Care Provider: Tai Martinez    Primary Insurance: POPPY TSANG  Secondary Insurance:     PROGRESS NOTE: EPIC chat message sent to PT/OT by CM, requesting early AM visit with pt as notes will be needed for auth.

## 2024-12-01 NOTE — NURSING NOTE
Overnight pt had 3 lg loose BM's possibly caused by contrast. Pt was offered bed pan but declined due to pain. Pt was ordered lasix & I/O. Very difficult to separate urine from bowel until pt has more control over her BM's. Physician requested purewick for accurate I/O's. This practice is discouraged on unit when pt has multiple loose BM's which could lead to infection. Feces contaminates purewick which is operated with suction. Consulted with unit manager and forwarded to dayshift RN. Will weigh pads and use purewick as soon as contamination risk is reduced.

## 2024-12-01 NOTE — PROGRESS NOTES
"Progress Note - Surgery-General   Name: Lety Botello 64 y.o. female I MRN: [id number]  Unit/Bed#: [place] [address] -01 I Date of Admission: 11/14/2024   Date of Service: 12/1/2024 I Hospital Day: 17    Assessment & Plan  SBO (small bowel obstruction) (HCC)  64 y.o. female presenting with recurrent SBO. 11/14-11/21 failed conservative management with persistent SBO. S/p ex lap, SBR, DAVID on 11/22.     Plan  - adv to reg diet  - gyn/onc recs appreciate  - Appreciate PT/OT   - accurate I/O given current resumption of PTA diuretics  - Encourage OOB, ambulate, IS  - DVT ppx with therapeutic Lovenox  - F/u CM for rehab placement and dispo  - Strongly encourage OOB, ambulation, IS   - order RUE U/S as poor draw from PICC        Subjective   No n/v, pt having multiple looser BM likely related to contrast administration. Pt still has some \"fear of eating\" bc she think it well disrupt her insides. We reviewed results of ct scan and encourage her to begin with small meals.    Objective :  Temp:  [97.6 °F (36.4 °C)-97.7 °F (36.5 °C)] 97.7 °F (36.5 °C)  HR:  [71-74] 74  BP: (137-155)/(58-69) 155/69  Resp:  [18-19] 18  SpO2:  [97 %] 97 %  O2 Device: None (Room air)    I/O         11/29 0701  11/30 0700 11/30 0701  12/01 0700 12/01 0701  12/02 0700    P.O. 1260 1314 0    I.V. (mL/kg)  30 (0.2)     Total Intake(mL/kg) 1260 (10.2) 1344 (10.9) 0 (0)    Urine (mL/kg/hr) 1500 (0.5)      Emesis/NG output       Stool 0      Total Output 1500      Net -240 +1344 0           Unmeasured Stool Occurrence 1 x 2 x           Lines/Drains/Airways       Active Status       Name Placement date Placement time Site Days    PICC Line 11/18/24 Right Basilic 11/18/24  1249  Basilic  12                  Physical Exam  Physical Exam:  General: No acute distress, alert and oriented  CV: RRR  Lungs: Normal work of breathing   Abdomen: soft, protuberant abdomen, post surgically tender, improving distension, midline incision is c/d/i  Skin: Warm, dry      Lab " Results: I have reviewed the following results:  Recent Labs     11/30/24  0459 12/01/24  0426   WBC 5.10  --    HGB 8.3*  --    HCT 25.8*  --    *  --    SODIUM 131* 129*   K 4.2 3.9    104   CO2 23 22   BUN 39* 33*   CREATININE 0.81 0.75   GLUC 149* 126   MG 2.4 2.2   PHOS 4.4*  --              VTE Pharmacologic Prophylaxis: VTE covered by:  enoxaparin, Subcutaneous, 60 mg at 12/01/24 0924     VTE Mechanical Prophylaxis: sequential compression device

## 2024-12-01 NOTE — ASSESSMENT & PLAN NOTE
64 y.o. female presenting with recurrent SBO. 11/14-11/21 failed conservative management with persistent SBO. S/p ex lap, SBR, DAVID on 11/22.     Plan  - adv to reg diet  - gyn/onc recs appreciate  - Appreciate PT/OT   - accurate I/O given current resumption of PTA diuretics  - Encourage OOB, ambulate, IS  - DVT ppx with therapeutic Lovenox  - F/u CM for rehab placement and dispo  - Strongly encourage OOB, ambulation, IS   - order RUE U/S as poor draw from PICC

## 2024-12-01 NOTE — PLAN OF CARE
Problem: Potential for Falls  Goal: Patient will remain free of falls  Description: INTERVENTIONS:  - Educate patient/family on patient safety including physical limitations  - Instruct patient to call for assistance with activity   - Consult OT/PT to assist with strengthening/mobility   - Keep Call bell within reach  - Keep bed low and locked with side rails adjusted as appropriate  - Keep care items and personal belongings within reach  - Initiate and maintain comfort rounds  - Make Fall Risk Sign visible to staff  - Offer Toileting every 3 Hours, in advance of need  - Initiate/Maintain bed alarm  - Obtain necessary fall risk management equipment  - Apply yellow socks and bracelet for high fall risk patients  - Consider moving patient to room near nurses station  Outcome: Progressing     Problem: PAIN - ADULT  Goal: Verbalizes/displays adequate comfort level or baseline comfort level  Description: Interventions:  - Encourage patient to monitor pain and request assistance  - Assess pain using appropriate pain scale  - Administer analgesics based on type and severity of pain and evaluate response  - Implement non-pharmacological measures as appropriate and evaluate response  - Consider cultural and social influences on pain and pain management  - Notify physician/advanced practitioner if interventions unsuccessful or patient reports new pain  Outcome: Progressing     Problem: SAFETY ADULT  Goal: Patient will remain free of falls  Description: INTERVENTIONS:  - Educate patient/family on patient safety including physical limitations  - Instruct patient to call for assistance with activity   - Consult OT/PT to assist with strengthening/mobility   - Keep Call bell within reach  - Keep bed low and locked with side rails adjusted as appropriate  - Keep care items and personal belongings within reach  - Initiate and maintain comfort rounds  - Make Fall Risk Sign visible to staff  - Offer Toileting every 3 Hours, in  advance of need  - Initiate/Maintain bed alarm  - Obtain necessary fall risk management equipment  - Apply yellow socks and bracelet for high fall risk patients  - Consider moving patient to room near nurses station  Outcome: Progressing  Goal: Maintain or return to baseline ADL function  Description: INTERVENTIONS:  -  Assess patient's ability to carry out ADLs; assess patient's baseline for ADL function and identify physical deficits which impact ability to perform ADLs (bathing, care of mouth/teeth, toileting, grooming, dressing, etc.)  - Assess/evaluate cause of self-care deficits   - Assess range of motion  - Assess patient's mobility; develop plan if impaired  - Assess patient's need for assistive devices and provide as appropriate  - Encourage maximum independence but intervene and supervise when necessary  - Involve family in performance of ADLs  - Assess for home care needs following discharge   - Consider OT consult to assist with ADL evaluation and planning for discharge  - Provide patient education as appropriate  Outcome: Progressing  Goal: Maintains/Returns to pre admission functional level  Description: INTERVENTIONS:  - Perform AM-PAC 6 Click Basic Mobility/ Daily Activity assessment daily.  - Set and communicate daily mobility goal to care team and patient/family/caregiver.   - Collaborate with rehabilitation services on mobility goals if consulted  - Perform Range of Motion 3 times a day.  - Reposition patient every 3 hours.  - Dangle patient 3 times a day  - Stand patient 3 times a day  - Ambulate patient 3 times a day  - Out of bed to chair 3 times a day   - Out of bed for meals 3 times a day  - Out of bed for toileting  - Record patient progress and toleration of activity level   Outcome: Progressing     Problem: DISCHARGE PLANNING  Goal: Discharge to home or other facility with appropriate resources  Description: INTERVENTIONS:  - Identify barriers to discharge w/patient and caregiver  -  Arrange for needed discharge resources and transportation as appropriate  - Identify discharge learning needs (meds, wound care, etc.)  - Arrange for interpretive services to assist at discharge as needed  - Refer to Case Management Department for coordinating discharge planning if the patient needs post-hospital services based on physician/advanced practitioner order or complex needs related to functional status, cognitive ability, or social support system  Outcome: Progressing     Problem: INFECTION - ADULT  Goal: Absence or prevention of progression during hospitalization  Description: INTERVENTIONS:  - Assess and monitor for signs and symptoms of infection  - Monitor lab/diagnostic results  - Monitor all insertion sites, i.e. indwelling lines, tubes, and drains  - Monitor endotracheal if appropriate and nasal secretions for changes in amount and color  - [place] appropriate cooling/warming therapies per order  - Administer medications as ordered  - Instruct and encourage patient and family to use good hand hygiene technique  - Identify and instruct in appropriate isolation precautions for identified infection/condition  Outcome: Progressing     Problem: Knowledge Deficit  Goal: Patient/family/caregiver demonstrates understanding of disease process, treatment plan, medications, and discharge instructions  Description: Complete learning assessment and assess knowledge base.  Interventions:  - Provide teaching at level of understanding  - Provide teaching via preferred learning methods  Outcome: Progressing     Problem: GASTROINTESTINAL - ADULT  Goal: Minimal or absence of nausea and/or vomiting  Description: INTERVENTIONS:  - Administer IV fluids if ordered to ensure adequate hydration  - Maintain NPO status until nausea and vomiting are resolved  - Nasogastric tube if ordered  - Administer ordered antiemetic medications as needed  - Provide nonpharmacologic comfort measures as appropriate  - Advance diet as  tolerated, if ordered  - Consider nutrition services referral to assist patient with adequate nutrition and appropriate food choices  Outcome: Progressing  Goal: Maintains or returns to baseline bowel function  Description: INTERVENTIONS:  - Assess bowel function  - Encourage oral fluids to ensure adequate hydration  - Administer IV fluids if ordered to ensure adequate hydration  - Administer ordered medications as needed  - Encourage mobilization and activity  - Consider nutritional services referral to assist patient with adequate nutrition and appropriate food choices  Outcome: Progressing  Goal: Maintains adequate nutritional intake  Description: INTERVENTIONS:  - Monitor percentage of each meal consumed  - Identify factors contributing to decreased intake, treat as appropriate  - Assist with meals as needed  - Monitor I&O, weight, and lab values if indicated  - Obtain nutrition services referral as needed  Outcome: Progressing  Goal: Establish and maintain optimal ostomy function  Description: INTERVENTIONS:  - Assess bowel function  - Encourage oral fluids to ensure adequate hydration  - Administer IV fluids if ordered to ensure adequate hydration   - Administer ordered medications as needed  - Encourage mobilization and activity  - Nutrition services referral to assist patient with appropriate food choices  - Assess stoma site  - Consider wound care consult   Outcome: Progressing  Goal: Oral mucous membranes remain intact  Description: INTERVENTIONS  - Assess oral mucosa and hygiene practices  - Implement preventative oral hygiene regimen  - Implement oral medicated treatments as ordered  - Initiate Nutrition services referral as needed  Outcome: Progressing     Problem: Prexisting or High Potential for Compromised Skin Integrity  Goal: Skin integrity is maintained or improved  Description: INTERVENTIONS:  - Identify patients at risk for skin breakdown  - Assess and monitor skin integrity  - Assess and  monitor nutrition and hydration status  - Monitor labs   - Assess for incontinence   - Turn and reposition patient  - Assist with mobility/ambulation  - Relieve pressure over bony prominences  - Avoid friction and shearing  - Provide appropriate hygiene as needed including keeping skin clean and dry  - Evaluate need for skin moisturizer/barrier cream  - Collaborate with interdisciplinary team   - Patient/family teaching  - Consider wound care consult   Outcome: Progressing     Problem: Nutrition/Hydration-ADULT  Goal: Nutrient/Hydration intake appropriate for improving, restoring or maintaining nutritional needs  Description: Monitor and assess patient's nutrition/hydration status for malnutrition. Collaborate with interdisciplinary team and initiate plan and interventions as ordered.  Monitor patient's weight and dietary intake as ordered or per policy. Utilize nutrition screening tool and intervene as necessary. Determine patient's food preferences and provide high-protein, high-caloric foods as appropriate.     INTERVENTIONS:  - Monitor oral intake, urinary output, labs, and treatment plans  - Assess nutrition and hydration status and recommend course of action  - Evaluate amount of meals eaten  - Assist patient with eating if necessary   - Allow adequate time for meals  - Recommend/ encourage appropriate diets, oral nutritional supplements, and vitamin/mineral supplements  - Order, calculate, and assess calorie counts as needed  - Recommend, monitor, and adjust tube feedings and TPN/PPN based on assessed needs  - Assess need for intravenous fluids  - Provide specific nutrition/hydration education as appropriate  - Include patient/family/caregiver in decisions related to nutrition  Outcome: Progressing     Problem: METABOLIC, FLUID AND ELECTROLYTES - ADULT  Goal: Electrolytes maintained within normal limits  Description: INTERVENTIONS:  - Monitor labs and assess patient for signs and symptoms of electrolyte  imbalances  - Administer electrolyte replacement as ordered  - Monitor response to electrolyte replacements, including repeat lab results as appropriate  - Instruct patient on fluid and nutrition as appropriate  Outcome: Progressing     Problem: SKIN/TISSUE INTEGRITY - ADULT  Goal: Skin Integrity remains intact(Skin Breakdown Prevention)  Description: Assess:  -Perform Esteban assessment every shift  -Clean and moisturize skin every shift  -Inspect skin when repositioning, toileting, and assisting with ADLS  -Assess extremities for adequate circulation and sensation     Bed Management:  -Have minimal linens on bed & keep smooth, unwrinkled  -Change linens as needed when moist or perspiring  -Avoid sitting or lying in one position for more than 2 hours while in bed  -Keep HOB at 30 degrees     Toileting:  -Offer bedside commode  -Assess for incontinence every 2 hours      Activity:  -Mobilize patient 3 times a day  -Encourage activity and walks on unit  -Encourage or provide ROM exercises   -Turn and reposition patient every 2 Hours  -Use appropriate equipment to lift or move patient in bed  -Instruct/ Assist with weight shifting every 2 when out of bed in chair  -Consider limitation of chair time 2 hour intervals    Skin Care:  -Avoid use of baby powder, tape, friction and shearing, hot water or constrictive clothing  -Relieve pressure over bony prominences using Allevyns  -Do not massage red bony areas    Outcome: Progressing  Goal: Incision(s), wounds(s) or drain site(s) healing without S/S of infection  Description: INTERVENTIONS  - Assess and document dressing, incision, wound bed, drain sites and surrounding tissue  - Provide patient and family education  - Perform skin care/dressing changes every shift  Outcome: Progressing     Problem: HEMATOLOGIC - ADULT  Goal: Maintains hematologic stability  Description: INTERVENTIONS  - Assess for signs and symptoms of bleeding or hemorrhage  - Monitor labs  - Administer  supportive blood products/factors as ordered and appropriate  Outcome: Progressing     Problem: CARDIOVASCULAR - ADULT  Goal: Maintains optimal cardiac output and hemodynamic stability  Description: INTERVENTIONS:  - Monitor I/O, vital signs and rhythm  - Monitor for S/S and trends of decreased cardiac output  - Administer and titrate ordered vasoactive medications to optimize hemodynamic stability  - Assess quality of pulses, skin color and temperature  - Assess for signs of decreased coronary artery perfusion  - Instruct patient to report change in severity of symptoms  Outcome: Progressing  Goal: Absence of cardiac dysrhythmias or at baseline rhythm  Description: INTERVENTIONS:  - Continuous cardiac monitoring, vital signs, obtain 12 lead EKG if ordered  - Administer antiarrhythmic and heart rate control medications as ordered  - Monitor electrolytes and administer replacement therapy as ordered  Outcome: Progressing     Problem: RESPIRATORY - ADULT  Goal: Achieves optimal ventilation and oxygenation  Description: INTERVENTIONS:  - Assess for changes in respiratory status  - Assess for changes in mentation and behavior  - Position to facilitate oxygenation and minimize respiratory effort  - Oxygen administered by appropriate delivery if ordered  - Initiate smoking cessation education as indicated  - Encourage broncho-pulmonary hygiene including cough, deep breathe, Incentive Spirometry  - Assess the need for suctioning and aspirate as needed  - Assess and instruct to report SOB or any respiratory difficulty  - Respiratory Therapy support as indicated  Outcome: Progressing

## 2024-12-01 NOTE — PLAN OF CARE
Problem: Potential for Falls  Goal: Patient will remain free of falls  Description: INTERVENTIONS:  - Educate patient/family on patient safety including physical limitations  - Instruct patient to call for assistance with activity   - Consult OT/PT to assist with strengthening/mobility   - Keep Call bell within reach  - Keep bed low and locked with side rails adjusted as appropriate  - Keep care items and personal belongings within reach  - Initiate and maintain comfort rounds  - Make Fall Risk Sign visible to staff  - Offer Toileting every 3 Hours, in advance of need  - Initiate/Maintain bed alarm  - Obtain necessary fall risk management equipment  - Apply yellow socks and bracelet for high fall risk patients  - Consider moving patient to room near nurses station  Outcome: Progressing     Problem: PAIN - ADULT  Goal: Verbalizes/displays adequate comfort level or baseline comfort level  Description: Interventions:  - Encourage patient to monitor pain and request assistance  - Assess pain using appropriate pain scale  - Administer analgesics based on type and severity of pain and evaluate response  - Implement non-pharmacological measures as appropriate and evaluate response  - Consider cultural and social influences on pain and pain management  - Notify physician/advanced practitioner if interventions unsuccessful or patient reports new pain  Outcome: Progressing     Problem: SAFETY ADULT  Goal: Patient will remain free of falls  Description: INTERVENTIONS:  - Educate patient/family on patient safety including physical limitations  - Instruct patient to call for assistance with activity   - Consult OT/PT to assist with strengthening/mobility   - Keep Call bell within reach  - Keep bed low and locked with side rails adjusted as appropriate  - Keep care items and personal belongings within reach  - Initiate and maintain comfort rounds  - Make Fall Risk Sign visible to staff  - Offer Toileting every 3 Hours, in  advance of need  - Initiate/Maintain bed alarm  - Obtain necessary fall risk management equipment  - Apply yellow socks and bracelet for high fall risk patients  - Consider moving patient to room near nurses station  Outcome: Progressing  Goal: Maintain or return to baseline ADL function  Description: INTERVENTIONS:  -  Assess patient's ability to carry out ADLs; assess patient's baseline for ADL function and identify physical deficits which impact ability to perform ADLs (bathing, care of mouth/teeth, toileting, grooming, dressing, etc.)  - Assess/evaluate cause of self-care deficits   - Assess range of motion  - Assess patient's mobility; develop plan if impaired  - Assess patient's need for assistive devices and provide as appropriate  - Encourage maximum independence but intervene and supervise when necessary  - Involve family in performance of ADLs  - Assess for home care needs following discharge   - Consider OT consult to assist with ADL evaluation and planning for discharge  - Provide patient education as appropriate  Outcome: Progressing  Goal: Maintains/Returns to pre admission functional level  Description: INTERVENTIONS:  - Perform AM-PAC 6 Click Basic Mobility/ Daily Activity assessment daily.  - Set and communicate daily mobility goal to care team and patient/family/caregiver.   - Collaborate with rehabilitation services on mobility goals if consulted  - Perform Range of Motion 3 times a day.  - Reposition patient every 3 hours.  - Dangle patient 3 times a day  - Stand patient 3 times a day  - Ambulate patient 3 times a day  - Out of bed to chair 3 times a day   - Out of bed for meals 3 times a day  - Out of bed for toileting  - Record patient progress and toleration of activity level   Outcome: Progressing     Problem: DISCHARGE PLANNING  Goal: Discharge to home or other facility with appropriate resources  Description: INTERVENTIONS:  - Identify barriers to discharge w/patient and caregiver  -  Arrange for needed discharge resources and transportation as appropriate  - Identify discharge learning needs (meds, wound care, etc.)  - Arrange for interpretive services to assist at discharge as needed  - Refer to Case Management Department for coordinating discharge planning if the patient needs post-hospital services based on physician/advanced practitioner order or complex needs related to functional status, cognitive ability, or social support system  Outcome: Progressing     Problem: GASTROINTESTINAL - ADULT  Goal: Minimal or absence of nausea and/or vomiting  Description: INTERVENTIONS:  - Administer IV fluids if ordered to ensure adequate hydration  - Maintain NPO status until nausea and vomiting are resolved  - Nasogastric tube if ordered  - Administer ordered antiemetic medications as needed  - Provide nonpharmacologic comfort measures as appropriate  - Advance diet as tolerated, if ordered  - Consider nutrition services referral to assist patient with adequate nutrition and appropriate food choices  Outcome: Progressing  Goal: Maintains or returns to baseline bowel function  Description: INTERVENTIONS:  - Assess bowel function  - Encourage oral fluids to ensure adequate hydration  - Administer IV fluids if ordered to ensure adequate hydration  - Administer ordered medications as needed  - Encourage mobilization and activity  - Consider nutritional services referral to assist patient with adequate nutrition and appropriate food choices  Outcome: Progressing  Goal: Maintains adequate nutritional intake  Description: INTERVENTIONS:  - Monitor percentage of each meal consumed  - Identify factors contributing to decreased intake, treat as appropriate  - Assist with meals as needed  - Monitor I&O, weight, and lab values if indicated  - Obtain nutrition services referral as needed  Outcome: Progressing  Goal: Establish and maintain optimal ostomy function  Description: INTERVENTIONS:  - Assess bowel  function  - Encourage oral fluids to ensure adequate hydration  - Administer IV fluids if ordered to ensure adequate hydration   - Administer ordered medications as needed  - Encourage mobilization and activity  - Nutrition services referral to assist patient with appropriate food choices  - Assess stoma site  - Consider wound care consult   Outcome: Progressing  Goal: Oral mucous membranes remain intact  Description: INTERVENTIONS  - Assess oral mucosa and hygiene practices  - Implement preventative oral hygiene regimen  - Implement oral medicated treatments as ordered  - Initiate Nutrition services referral as needed  Outcome: Progressing     Problem: Prexisting or High Potential for Compromised Skin Integrity  Goal: Skin integrity is maintained or improved  Description: INTERVENTIONS:  - Identify patients at risk for skin breakdown  - Assess and monitor skin integrity  - Assess and monitor nutrition and hydration status  - Monitor labs   - Assess for incontinence   - Turn and reposition patient  - Assist with mobility/ambulation  - Relieve pressure over bony prominences  - Avoid friction and shearing  - Provide appropriate hygiene as needed including keeping skin clean and dry  - Evaluate need for skin moisturizer/barrier cream  - Collaborate with interdisciplinary team   - Patient/family teaching  - Consider wound care consult   Outcome: Progressing     Problem: Nutrition/Hydration-ADULT  Goal: Nutrient/Hydration intake appropriate for improving, restoring or maintaining nutritional needs  Description: Monitor and assess patient's nutrition/hydration status for malnutrition. Collaborate with interdisciplinary team and initiate plan and interventions as ordered.  Monitor patient's weight and dietary intake as ordered or per policy. Utilize nutrition screening tool and intervene as necessary. Determine patient's food preferences and provide high-protein, high-caloric foods as appropriate.     INTERVENTIONS:  -  Monitor oral intake, urinary output, labs, and treatment plans  - Assess nutrition and hydration status and recommend course of action  - Evaluate amount of meals eaten  - Assist patient with eating if necessary   - Allow adequate time for meals  - Recommend/ encourage appropriate diets, oral nutritional supplements, and vitamin/mineral supplements  - Order, calculate, and assess calorie counts as needed  - Recommend, monitor, and adjust tube feedings and TPN/PPN based on assessed needs  - Assess need for intravenous fluids  - Provide specific nutrition/hydration education as appropriate  - Include patient/family/caregiver in decisions related to nutrition  Outcome: Progressing     Problem: INFECTION - ADULT  Goal: Absence or prevention of progression during hospitalization  Description: INTERVENTIONS:  - Assess and monitor for signs and symptoms of infection  - Monitor lab/diagnostic results  - Monitor all insertion sites, i.e. indwelling lines, tubes, and drains  - Monitor endotracheal if appropriate and nasal secretions for changes in amount and color  - [place] appropriate cooling/warming therapies per order  - Administer medications as ordered  - Instruct and encourage patient and family to use good hand hygiene technique  - Identify and instruct in appropriate isolation precautions for identified infection/condition  Outcome: Progressing     Problem: Knowledge Deficit  Goal: Patient/family/caregiver demonstrates understanding of disease process, treatment plan, medications, and discharge instructions  Description: Complete learning assessment and assess knowledge base.  Interventions:  - Provide teaching at level of understanding  - Provide teaching via preferred learning methods  Outcome: Progressing     Problem: METABOLIC, FLUID AND ELECTROLYTES - ADULT  Goal: Electrolytes maintained within normal limits  Description: INTERVENTIONS:  - Monitor labs and assess patient for signs and symptoms of electrolyte  imbalances  - Administer electrolyte replacement as ordered  - Monitor response to electrolyte replacements, including repeat lab results as appropriate  - Instruct patient on fluid and nutrition as appropriate  Outcome: Progressing     Problem: SKIN/TISSUE INTEGRITY - ADULT  Goal: Skin Integrity remains intact(Skin Breakdown Prevention)  Description: Assess:  -Perform Esteban assessment every shift  -Clean and moisturize skin as needed  -Inspect skin when repositioning, toileting, and assisting with ADLS  -Assess under medical devices such as masimo every 2H  -Assess extremities for adequate circulation and sensation     Bed Management:  -Have minimal linens on bed & keep smooth, unwrinkled  -Change linens as needed when moist or perspiring  -Avoid sitting or lying in one position for more than 2 hours while in bed  -Keep HOB at >30 degrees     Toileting:  -Offer bedside commode  -Assess for incontinence every 2H  -Use incontinent care products after each incontinent episode such as lotion wipes    Activity:  -Mobilize patient 2 times a day  -Encourage activity and walks on unit  -Encourage or provide ROM exercises   -Turn and reposition patient every 2 Hours  -Use appropriate equipment to lift or move patient in bed  -Instruct/ Assist with weight shifting every 15 min when out of bed in chair  -Consider limitation of chair time to 2 hour intervals    Skin Care:  -Avoid use of baby powder, tape, friction and shearing, hot water or constrictive clothing  -Relieve pressure over bony prominences using mepilex dressings  -Do not massage red bony areas    Next Steps:  -Teach patient strategies to minimize risks such as call, don't fall   -Consider consults to  interdisciplinary teams such as Palliative Care  Outcome: Progressing  Goal: Incision(s), wounds(s) or drain site(s) healing without S/S of infection  Description: INTERVENTIONS  - Assess and document dressing, incision, wound bed, drain sites and surrounding  tissue  - Provide patient and family education  - Perform skin care/dressing changes as needed  Outcome: Progressing     Problem: HEMATOLOGIC - ADULT  Goal: Maintains hematologic stability  Description: INTERVENTIONS  - Assess for signs and symptoms of bleeding or hemorrhage  - Monitor labs  - Administer supportive blood products/factors as ordered and appropriate  Outcome: Progressing     Problem: CARDIOVASCULAR - ADULT  Goal: Maintains optimal cardiac output and hemodynamic stability  Description: INTERVENTIONS:  - Monitor I/O, vital signs and rhythm  - Monitor for S/S and trends of decreased cardiac output  - Administer and titrate ordered vasoactive medications to optimize hemodynamic stability  - Assess quality of pulses, skin color and temperature  - Assess for signs of decreased coronary artery perfusion  - Instruct patient to report change in severity of symptoms  Outcome: Progressing  Goal: Absence of cardiac dysrhythmias or at baseline rhythm  Description: INTERVENTIONS:  - Continuous cardiac monitoring, vital signs, obtain 12 lead EKG if ordered  - Administer antiarrhythmic and heart rate control medications as ordered  - Monitor electrolytes and administer replacement therapy as ordered  Outcome: Progressing     Problem: RESPIRATORY - ADULT  Goal: Achieves optimal ventilation and oxygenation  Description: INTERVENTIONS:  - Assess for changes in respiratory status  - Assess for changes in mentation and behavior  - Position to facilitate oxygenation and minimize respiratory effort  - Oxygen administered by appropriate delivery if ordered  - Initiate smoking cessation education as indicated  - Encourage broncho-pulmonary hygiene including cough, deep breathe, Incentive Spirometry  - Assess the need for suctioning and aspirate as needed  - Assess and instruct to report SOB or any respiratory difficulty  - Respiratory Therapy support as indicated  Outcome: Progressing

## 2024-12-02 ENCOUNTER — APPOINTMENT (INPATIENT)
Dept: NON INVASIVE DIAGNOSTICS | Facility: HOSPITAL | Age: 64
DRG: 230 | End: 2024-12-02
Payer: COMMERCIAL

## 2024-12-02 PROBLEM — Z51.5 PALLIATIVE CARE ENCOUNTER: Status: ACTIVE | Noted: 2024-12-02

## 2024-12-02 PROBLEM — Z71.89 GOALS OF CARE, COUNSELING/DISCUSSION: Status: ACTIVE | Noted: 2024-12-02

## 2024-12-02 LAB
ANION GAP SERPL CALCULATED.3IONS-SCNC: 4 MMOL/L (ref 4–13)
ANION GAP SERPL CALCULATED.3IONS-SCNC: 4 MMOL/L (ref 4–13)
BASOPHILS # BLD AUTO: 0.02 THOUSANDS/ΜL (ref 0–0.1)
BASOPHILS NFR BLD AUTO: 0 % (ref 0–1)
BUN SERPL-MCNC: 30 MG/DL (ref 5–25)
BUN SERPL-MCNC: 31 MG/DL (ref 5–25)
CALCIUM SERPL-MCNC: 7.4 MG/DL (ref 8.4–10.2)
CALCIUM SERPL-MCNC: 7.4 MG/DL (ref 8.4–10.2)
CHLORIDE SERPL-SCNC: 105 MMOL/L (ref 96–108)
CHLORIDE SERPL-SCNC: 107 MMOL/L (ref 96–108)
CO2 SERPL-SCNC: 21 MMOL/L (ref 21–32)
CO2 SERPL-SCNC: 23 MMOL/L (ref 21–32)
CREAT SERPL-MCNC: 0.76 MG/DL (ref 0.6–1.3)
CREAT SERPL-MCNC: 0.79 MG/DL (ref 0.6–1.3)
EOSINOPHIL # BLD AUTO: 0.07 THOUSAND/ΜL (ref 0–0.61)
EOSINOPHIL NFR BLD AUTO: 1 % (ref 0–6)
ERYTHROCYTE [DISTWIDTH] IN BLOOD BY AUTOMATED COUNT: 17.3 % (ref 11.6–15.1)
GFR SERPL CREATININE-BSD FRML MDRD: 80 ML/MIN/1.73SQ M
GFR SERPL CREATININE-BSD FRML MDRD: 84 ML/MIN/1.73SQ M
GLUCOSE SERPL-MCNC: 131 MG/DL (ref 65–140)
GLUCOSE SERPL-MCNC: 134 MG/DL (ref 65–140)
GLUCOSE SERPL-MCNC: 135 MG/DL (ref 65–140)
GLUCOSE SERPL-MCNC: 148 MG/DL (ref 65–140)
GLUCOSE SERPL-MCNC: 159 MG/DL (ref 65–140)
GLUCOSE SERPL-MCNC: 161 MG/DL (ref 65–140)
HCT VFR BLD AUTO: 26.1 % (ref 34.8–46.1)
HGB BLD-MCNC: 8.5 G/DL (ref 11.5–15.4)
IMM GRANULOCYTES # BLD AUTO: 0.06 THOUSAND/UL (ref 0–0.2)
IMM GRANULOCYTES NFR BLD AUTO: 1 % (ref 0–2)
LYMPHOCYTES # BLD AUTO: 0.35 THOUSANDS/ΜL (ref 0.6–4.47)
LYMPHOCYTES NFR BLD AUTO: 7 % (ref 14–44)
MCH RBC QN AUTO: 32.1 PG (ref 26.8–34.3)
MCHC RBC AUTO-ENTMCNC: 32.6 G/DL (ref 31.4–37.4)
MCV RBC AUTO: 99 FL (ref 82–98)
MONOCYTES # BLD AUTO: 0.3 THOUSAND/ΜL (ref 0.17–1.22)
MONOCYTES NFR BLD AUTO: 6 % (ref 4–12)
NEUTROPHILS # BLD AUTO: 4.2 THOUSANDS/ΜL (ref 1.85–7.62)
NEUTS SEG NFR BLD AUTO: 85 % (ref 43–75)
NRBC BLD AUTO-RTO: 0 /100 WBCS
PLATELET # BLD AUTO: 115 THOUSANDS/UL (ref 149–390)
PMV BLD AUTO: 9.9 FL (ref 8.9–12.7)
POTASSIUM SERPL-SCNC: 3.7 MMOL/L (ref 3.5–5.3)
POTASSIUM SERPL-SCNC: 3.9 MMOL/L (ref 3.5–5.3)
RBC # BLD AUTO: 2.65 MILLION/UL (ref 3.81–5.12)
SODIUM SERPL-SCNC: 132 MMOL/L (ref 135–147)
SODIUM SERPL-SCNC: 132 MMOL/L (ref 135–147)
WBC # BLD AUTO: 5 THOUSAND/UL (ref 4.31–10.16)

## 2024-12-02 PROCEDURE — 93971 EXTREMITY STUDY: CPT

## 2024-12-02 PROCEDURE — 97530 THERAPEUTIC ACTIVITIES: CPT

## 2024-12-02 PROCEDURE — 85025 COMPLETE CBC W/AUTO DIFF WBC: CPT

## 2024-12-02 PROCEDURE — 80048 BASIC METABOLIC PNL TOTAL CA: CPT | Performed by: SURGERY

## 2024-12-02 PROCEDURE — 80048 BASIC METABOLIC PNL TOTAL CA: CPT

## 2024-12-02 PROCEDURE — 82948 REAGENT STRIP/BLOOD GLUCOSE: CPT

## 2024-12-02 PROCEDURE — 93971 EXTREMITY STUDY: CPT | Performed by: SURGERY

## 2024-12-02 PROCEDURE — 99223 1ST HOSP IP/OBS HIGH 75: CPT | Performed by: INTERNAL MEDICINE

## 2024-12-02 PROCEDURE — 97110 THERAPEUTIC EXERCISES: CPT

## 2024-12-02 PROCEDURE — 99222 1ST HOSP IP/OBS MODERATE 55: CPT | Performed by: INTERNAL MEDICINE

## 2024-12-02 PROCEDURE — 97535 SELF CARE MNGMENT TRAINING: CPT

## 2024-12-02 PROCEDURE — 99024 POSTOP FOLLOW-UP VISIT: CPT | Performed by: SPECIALIST

## 2024-12-02 RX ORDER — CEFAZOLIN SODIUM 2 G/50ML
2000 SOLUTION INTRAVENOUS
Status: DISPENSED | OUTPATIENT
Start: 2024-12-03 | End: 2024-12-04

## 2024-12-02 RX ADMIN — OXYCODONE 5 MG: 5 TABLET ORAL at 21:54

## 2024-12-02 RX ADMIN — ACETAMINOPHEN 975 MG: 325 TABLET, FILM COATED ORAL at 13:00

## 2024-12-02 RX ADMIN — ACETAMINOPHEN 975 MG: 325 TABLET, FILM COATED ORAL at 21:49

## 2024-12-02 RX ADMIN — ONDANSETRON 4 MG: 2 INJECTION INTRAMUSCULAR; INTRAVENOUS at 21:49

## 2024-12-02 RX ADMIN — ONDANSETRON 4 MG: 2 INJECTION INTRAMUSCULAR; INTRAVENOUS at 17:44

## 2024-12-02 RX ADMIN — Medication: at 21:49

## 2024-12-02 RX ADMIN — CHLORHEXIDINE GLUCONATE 15 ML: 1.2 RINSE ORAL at 21:49

## 2024-12-02 RX ADMIN — ONDANSETRON 4 MG: 2 INJECTION INTRAMUSCULAR; INTRAVENOUS at 11:58

## 2024-12-02 RX ADMIN — HYDROMORPHONE HYDROCHLORIDE 0.2 MG: 0.2 INJECTION, SOLUTION INTRAMUSCULAR; INTRAVENOUS; SUBCUTANEOUS at 14:08

## 2024-12-02 RX ADMIN — ONDANSETRON 4 MG: 2 INJECTION INTRAMUSCULAR; INTRAVENOUS at 01:46

## 2024-12-02 RX ADMIN — CHLORHEXIDINE GLUCONATE 15 ML: 1.2 RINSE ORAL at 09:51

## 2024-12-02 RX ADMIN — ENOXAPARIN SODIUM 60 MG: 100 INJECTION SUBCUTANEOUS at 09:51

## 2024-12-02 RX ADMIN — PANTOPRAZOLE SODIUM 40 MG: 40 TABLET, DELAYED RELEASE ORAL at 06:04

## 2024-12-02 RX ADMIN — GABAPENTIN 100 MG: 100 CAPSULE ORAL at 21:49

## 2024-12-02 RX ADMIN — CARVEDILOL 12.5 MG: 12.5 TABLET, FILM COATED ORAL at 21:49

## 2024-12-02 RX ADMIN — ONDANSETRON 4 MG: 2 INJECTION INTRAMUSCULAR; INTRAVENOUS at 06:04

## 2024-12-02 RX ADMIN — LEVOTHYROXINE SODIUM 125 MCG: 125 TABLET ORAL at 11:58

## 2024-12-02 RX ADMIN — CARVEDILOL 12.5 MG: 12.5 TABLET, FILM COATED ORAL at 11:58

## 2024-12-02 RX ADMIN — NYSTATIN: 100000 POWDER TOPICAL at 09:51

## 2024-12-02 RX ADMIN — HYDROMORPHONE HYDROCHLORIDE 0.2 MG: 0.2 INJECTION, SOLUTION INTRAMUSCULAR; INTRAVENOUS; SUBCUTANEOUS at 08:58

## 2024-12-02 RX ADMIN — OXYCODONE 5 MG: 5 TABLET ORAL at 12:38

## 2024-12-02 RX ADMIN — FUROSEMIDE 20 MG: 20 TABLET ORAL at 11:58

## 2024-12-02 RX ADMIN — Medication 2.5 MG: at 00:36

## 2024-12-02 RX ADMIN — TRIMETHOBENZAMIDE HYDROCHLORIDE 200 MG: 100 INJECTION INTRAMUSCULAR at 08:57

## 2024-12-02 RX ADMIN — OXYCODONE 5 MG: 5 TABLET ORAL at 17:44

## 2024-12-02 RX ADMIN — HYDROMORPHONE HYDROCHLORIDE 0.2 MG: 0.2 INJECTION, SOLUTION INTRAMUSCULAR; INTRAVENOUS; SUBCUTANEOUS at 02:21

## 2024-12-02 RX ADMIN — OXYCODONE 5 MG: 5 TABLET ORAL at 06:04

## 2024-12-02 RX ADMIN — NYSTATIN: 100000 POWDER TOPICAL at 16:55

## 2024-12-02 RX ADMIN — ACETAMINOPHEN 975 MG: 325 TABLET, FILM COATED ORAL at 06:04

## 2024-12-02 NOTE — PLAN OF CARE
Problem: OCCUPATIONAL THERAPY ADULT  Goal: Performs self-care activities at highest level of function for planned discharge setting.  See evaluation for individualized goals.  Description: Treatment Interventions: ADL retraining, Functional transfer training, UE strengthening/ROM, Endurance training, Cognitive reorientation, Patient/family training, Equipment evaluation/education, Compensatory technique education, Continued evaluation          See flowsheet documentation for full assessment, interventions and recommendations.   Note: Limitation: Decreased ADL status, Decreased Safe judgement during ADL, Decreased UE strength, Decreased cognition, Decreased endurance, Decreased self-care trans, Decreased high-level ADLs  Prognosis: Good  Assessment: Pt seen for 38min tx session with focus on functional balance, functional mobility, ADL status, transfer safety, b/l UE ROM, and cognition. Pt able to tolerate OOB mobility; sitting balance=f/f-, standing balance=f-/p+. Pt required heavy assistance with all functional mobility tasks. Pt incontinent of urine; demonstrating need for heavy assistance with her UE and LE ADLs. Pt able to demonstrate good b/l UE AROM and cognition(i.e.orientation). Continue to review benefits for increased OOB activities. Will continue. The patient's raw score on the AM-PAC Daily Activity Inpatient Short Form is 13. A raw score of less than 19 suggests the patient may benefit from discharge to post-acute rehabilitation services. Please refer to the recommendation of the Occupational Therapist for safe discharge planning.     Rehab Resource Intensity Level, OT: II (Moderate Resource Intensity)

## 2024-12-02 NOTE — PLAN OF CARE
Problem: PHYSICAL THERAPY ADULT  Goal: Performs mobility at highest level of function for planned discharge setting.  See evaluation for individualized goals.  Description: Treatment/Interventions: Functional transfer training, LE strengthening/ROM, Elevations, Therapeutic exercise, Endurance training, Patient/family training, Equipment eval/education, Bed mobility, Gait training, Continued evaluation, Spoke to nursing, OT  Equipment Recommended: Walker       See flowsheet documentation for full assessment, interventions and recommendations.  Outcome: Progressing  Note: Prognosis: Fair  Problem List: Decreased strength, Decreased endurance, Impaired balance, Decreased mobility, Decreased range of motion, Obesity, Decreased skin integrity, Pain (increased edema)  Assessment: Pt seen for PT treatment session this date with interventions consisting of bed mobility, transfer training, and HEP, sitting and standing balance and tolerance activities and education provided as needed for safety and direction to improve functional mobility, safety awareness, and activity tolerance. Pt agreeable to PT treatment session upon arrival, pt found seated EOB. At end of session, pt left supine in bed. with all needs in reach. In comparison to previous session, pt with no improvement activity tolerance, endurance, standing balance, ambulation distances, ambulatory balance, AM- pac score, and functional mobility. Pt  continues to require max assist x 2 for rolling L and r with use of bed rails.  Sit to supine with max assist x 2. Pt  performs static sitting EOB with mod- min assist x1 - cg x1 pt  demonstrated heavy posterior lean requiring mod-min assist x1 for trunk support,  L knee blocked due to sliding toward EOB.  Pt  performs sit to stand with mod assist x2 with bed height elevated.  Pt performs static standing trials with support fo Rw and mod assist x2 verbal cues for improved posture  and ue weightbearing techniques.   Encouragement and reassurance required t/o PT session. PT declines seated lateral side scoots or lateral side steps due to pain and nausea.  PT  incontinent of urine x2 t/o PT session requiring total assist for change of gown, slipper socks and hygiene.  Pt  performs supine b/l le aarom- arom exercises x 10 reps. PT  vomited x1 during pt session. PT's Rn, Yessy leavitt.    Continue to recommend  level II moderate rehab resource intensity  at time of d/c in order to maximize pt's functional independence and safety w/ mobility. Pt continues to be functioning below baseline level. PT will continue to see pt while here in order to address the deficits listed above and provide interventions consistent w/ POC in effort to achieve STGs.    The patient's AM-PAC Basic Mobility Inpatient Short Form Raw Score is 7. A raw score less than 16 suggests the patient may benefit from discharge to post-acute rehabilitation services. Please also refer to the recommendation of the Physical Therapist for safe discharge planning.  Barriers to Discharge: None     Rehab Resource Intensity Level, PT: II (Moderate Resource Intensity)    See flowsheet documentation for full assessment.

## 2024-12-02 NOTE — ASSESSMENT & PLAN NOTE
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 Gyn-onc will see her in the office to discuss any potential additional treatment.

## 2024-12-02 NOTE — PROGRESS NOTES
Progress Note - Surgery-General   Name: Lety Botello 64 y.o. female I MRN: [id number]  Unit/Bed#: [place] [address] -01 I Date of Admission: 11/14/2024   Date of Service: 12/2/2024 I Hospital Day: 18    Assessment & Plan  SBO (small bowel obstruction) (HCC)  64 y.o. female presenting with recurrent SBO. 11/14-11/21 failed conservative management with persistent SBO. S/p ex lap, SBR, DAVID on 11/22.     Plan  - reg diet  - f/u RUE venous duplex, appears PICC improved with cathflo  - gyn/onc recs appreciate  - Appreciate PT/OT   - accurate I/O given current resumption of PTA diuretics  - Encourage OOB, ambulate, IS  - DVT ppx with therapeutic Lovenox  - F/u CM for rehab placement and dispo  - Strongly encourage OOB, ambulation, IS         Subjective   Reported episode of nausea w/ emesis overnight. This was several hours after her last meal, reports going on with flatus and looser BM.    Objective :  Temp:  [97.1 °F (36.2 °C)-97.7 °F (36.5 °C)] 97.1 °F (36.2 °C)  HR:  [69-74] 69  BP: (149-155)/(64-69) 149/64  Resp:  [18] 18  SpO2:  [96 %-98 %] 96 %  O2 Device: None (Room air)  Nasal Cannula O2 Flow Rate (L/min):  [0 L/min] 0 L/min    I/O         11/30 0701  12/01 0700 12/01 0701  12/02 0700 12/02 0701  12/03 0700    P.O. 1314 530     I.V. (mL/kg) 30 (0.2)      Total Intake(mL/kg) 1344 (10.9) 530 (4.3)     Urine (mL/kg/hr)  643 (0.2)     Emesis/NG output  0     Stool  0     Total Output  643     Net +1344 -113            Unmeasured Urine Occurrence  2 x     Unmeasured Stool Occurrence 2 x 2 x     Unmeasured Emesis Occurrence  1 x           Lines/Drains/Airways       Active Status       Name Placement date Placement time Site Days    PICC Line 11/18/24 Right Basilic 11/18/24  1249  Basilic  13                  Physical Exam  Physical Exam:  General: No acute distress, alert and oriented  CV: RRR  Lungs: Normal work of breathing   Abdomen: soft, protuberant, psttp, incisions c/d/i  Skin: Warm, dry      Lab Results: I have  reviewed the following results:  Recent Labs     11/30/24  0459 12/01/24  0426 12/02/24  0503   WBC 5.10  --  5.00   HGB 8.3*  --  8.5*   HCT 25.8*  --  26.1*   *  --  115*   SODIUM 131* 129* 132*   K 4.2 3.9 3.9    104 105   CO2 23 22 23   BUN 39* 33* 31*   CREATININE 0.81 0.75 0.76   GLUC 149* 126 159*   MG 2.4 2.2  --    PHOS 4.4*  --   --              VTE Pharmacologic Prophylaxis: VTE covered by:  enoxaparin, Subcutaneous, 60 mg at 12/01/24 1589     VTE Mechanical Prophylaxis: sequential compression device

## 2024-12-02 NOTE — ASSESSMENT & PLAN NOTE
Recurrent presentations for SBO (11/18/24, 11/5/24, 11/14/24)  Initially attempted conservative management with NG tube to suction and TPN for nutrition, though attempts to discontinue NG tube were unsuccessful  S/p ex lap, small bowel resection, partial omentectomy, DAVID 11/22/24  Follow up CT 11/30/24 demonstrates resolution of dilated small bowel loops seen previously with contrast seen predominantly in the distal nondistended small bowel loops and in the colon

## 2024-12-02 NOTE — WOUND OSTOMY CARE
Progress Note - Wound   Lety Botello 64 y.o. female MRN: [id number]  Unit/Bed#: [place] [address] -01 Encounter: [id number]        Assessment:   Patient seen for wound care follow-up.  Assessed during PT/OT session.  Requires assist x 2 to turn in bed.  Incontinent of bowel and bladder, unable to use purewick per primary RN due to liquid stools.  Nutrition team following, dietary supplements ordered.    Bilateral heels intact with preventative foam dressings in place.  Patient intermittently refusing offloading heel boots, but otherwise agreeable to heel elevation with pillows.   Skin folds pink and intact.    Midline incision approximated with staples and open to air, no drainage.  Katya-wound pink.  Resolving shingles to right lower abdomen--pink, dry with some brown crusting/scabbing. Katya-wound intact.   Sacrum--clean, intact, blanchable.  No evidence of prior hyperpigmented area on left sacrum.  Right abdominal blisters--both blisters now open, pink, with partial thickness tissue loss and scant serous drainage.  Katya-wound intact.      See flowsheet for wound details.     Wound Care Plan:   1-Apply silicone bordered foam dressings to bilateral heels for prevention.  Jason with P.  Peel back for skin assessments at least daily and re-apply.  Change dressings every 3 days and as needed.  2-Elevate heels off of bed/chair surface--offloading heel boots.  3-Offloading air cushion in chair when out of bed.  4-Apply moisturizing skin cream to body daily and as needed.  5-Turn/reposition every 2 hours while in bed and weight shift frequently while in chair for pressure re-distribution on skin.   6-Silicone Cream/Hydraguard lotion to bilateral buttocks and sacrum three times daily and as needed.  7-Right distal abdomen (shingles)--apply silicone cream/Hydraguard lotion twice daily.  May leave open to air if dry.  Cover with ABD if open or draining.  8-Right lateral abdomen (blisters)--apply xeroform and cover with silicone  bordered foam dressing.  Change dressing every other day and as needed.    Wound care team to follow. Plan of care reviewed with primary RN.     Wound 11/20/24 Buttocks (Active)   Wound Image   12/02/24 0913   Wound Description Clean;Dry;Intact 12/02/24 0913   Katya-wound Assessment Intact 12/02/24 0913   Wound Length (cm) 0 cm 12/02/24 0913   Wound Width (cm) 0 cm 12/02/24 0913   Wound Depth (cm) 0 cm 12/02/24 0913   Wound Surface Area (cm^2) 0 cm^2 12/02/24 0913   Wound Volume (cm^3) 0 cm^3 12/02/24 0913   Calculated Wound Volume (cm^3) 0 cm^3 12/02/24 0913   Drainage Amount None 12/02/24 0913   Dressing Moisture barrier 12/02/24 0913   Patient Tolerance Tolerated well 12/02/24 0913       Wound 11/20/24 Abdomen Right;Distal (Active)   Wound Image   12/02/24 0914   Wound Description Dry;Brown;Pink 12/02/24 0914   Katya-wound Assessment Intact 12/02/24 0914   Wound Length (cm) 9 cm 12/02/24 0914   Wound Width (cm) 24 cm 12/02/24 0914   Wound Depth (cm) 0 cm 12/02/24 0914   Wound Surface Area (cm^2) 216 cm^2 12/02/24 0914   Wound Volume (cm^3) 0 cm^3 12/02/24 0914   Calculated Wound Volume (cm^3) 0 cm^3 12/02/24 0914   Drainage Amount None 12/02/24 0914   Dressing Open to air 12/02/24 0914       Wound 11/22/24 Incision Abdomen Mid (Active)   Wound Description Clean;Dry;Intact 12/02/24 0914   Katya-wound Assessment Pink;Intact 12/02/24 0914   Wound Site Closure Staples 12/02/24 0914   Drainage Amount None 12/02/24 0914   Dressing Open to air 12/02/24 0914       Wound 11/25/24 Abdomen Right;Lateral (Active)   Wound Image   12/02/24 0913   Wound Description Pink 12/02/24 0913   Katya-wound Assessment Intact 12/02/24 0913   Wound Length (cm) 2.5 cm 12/02/24 0913   Wound Width (cm) 6.5 cm 12/02/24 0913   Wound Depth (cm) 0.1 cm 12/02/24 0913   Wound Surface Area (cm^2) 16.25 cm^2 12/02/24 0913   Wound Volume (cm^3) 1.625 cm^3 12/02/24 0913   Calculated Wound Volume (cm^3) 1.63 cm^3 12/02/24 0913   Change in Wound Size % 22.38  12/02/24 0913   Drainage Amount Scant 12/02/24 0913   Drainage Description Serous 12/02/24 0913   Non-staged Wound Description Partial thickness 12/02/24 0913   Treatments Cleansed 12/02/24 0913   Dressing Xeroform;Foam, Silicon (eg. Allevyn, etc) 12/02/24 0913   Dressing Changed Changed 12/02/24 0913   Patient Tolerance Tolerated well 12/02/24 0913   Dressing Status Clean;Dry;Intact 12/02/24 0913     Joy STONEN, RN, CWON

## 2024-12-02 NOTE — CASE MANAGEMENT
Case Management Discharge Planning Note    Patient name Lety Botello  Location [place] [address] /South 2 M* MRN [id number]  : 1960 Date 2024       Current Admission Date: 2024  Current Admission Diagnosis:SBO (small bowel obstruction) (HCC)   Patient Active Problem List    Diagnosis Date Noted Date Diagnosed    Goals of care, counseling/discussion 2024     Palliative care encounter 2024     Hypotension after procedure 2024     Shingles 2024     On total parenteral nutrition (TPN) 2024     Abdominal pain 2024     Nausea & vomiting 2024     Constipation 11/10/2024     Morbid obesity (HCC) 2024     Gastroparesis 2024     Gastric dilation 2024     History of pulmonary embolism 2024     Hypothyroidism 2024     Mild intermittent asthma 2024     Hypertension      Type 2 diabetes mellitus (HCC)      Endometrial cancer (HCC)      Obesity      SBO (small bowel obstruction) (HCC) 2024       LOS (days): 18  Geometric Mean LOS (GMLOS) (days): 3  Days to GMLOS:-15     OBJECTIVE:  Risk of Unplanned Readmission Score: 31.04         Current admission status: Inpatient   Preferred Pharmacy:   [id number] [place] - [address]  [address] [address]  Phone: [phone] Fax: [phone]    Primary Care Provider: Tai Martinez    Primary Insurance: POPPY TSANG  Secondary Insurance:     DISCHARGE DETAILS:                                          Other Referral/Resources/Interventions Provided:  Interventions: Hospice (consulted re: hospic no referral made w/ all info and questions answered by this CM.  Pt opted at time of conversation to try and regain strength in STR thus making a decision on her ability to withstand palliative chemo vs choose hospice care)  Referral Comments: Pt unable to return home w/ hospice as has no support system who would be able to assist with care.  She  states that if and when hospice would be needed she would need to look at an SNF for care.  CM reached out to SNF pt currently looking into for STR inquiring on if insurance pays for both hospice and room & Board- awaiting answer re: same (for informational purposes only).  Voiced the will to attempt [facility] if still accepting and insurance approves acknowledging should this not be an option of [facility] accepting pt at that LOC.  Informed this afternoon, pt now not medically stable- will continue to follow to assist with dc poc.

## 2024-12-02 NOTE — ASSESSMENT & PLAN NOTE
64 y.o. female presenting with recurrent SBO. 11/14-11/21 failed conservative management with persistent SBO. S/p ex lap, SBR, DAVID on 11/22.     Plan  - reg diet  - f/u RUE venous duplex, appears PICC improved with cathflo  - gyn/onc recs appreciate  - Appreciate PT/OT   - accurate I/O given current resumption of PTA diuretics  - Encourage OOB, ambulate, IS  - DVT ppx with therapeutic Lovenox  - F/u CM for rehab placement and dispo  - Strongly encourage OOB, ambulation, IS

## 2024-12-02 NOTE — CONSULTS
"Consultation - Palliative and Supportive Care   Lety Botello 64 y.o. female [id number]    Goals of care, counseling/discussion  Assessment & Plan  Goals:  Level 1  She does not think she will be strong enough to undergo further cancer cares at this point. Wants to be comfortable at home. If she gets better, she may consider chemo, but at this time, she said she does not see how she'll get better. For this reason, teams have discussed hospice with her which she is hoping to speak to as well.   I will place a hospice consult.   She lives with 2 roommates.   She has a son but son is not involved in her cares. She said he does not know about her condition and his phone is \"turned off\".  When asked who we should speak to in the event that she loses capacity, she said a Sharda Carver who is also listed as her ER contact. She lives with her and she knows what is happening to her and what she wants.   D/w Primary team and CM    Decisional apparatus:  Patient does have capacity on exam today.  If capacity is lost, patient's substitute decision maker would default to Sharda Carver per her choice by [place].  ER contacts:    Sharda Carver (Niece)  [phone] (Work Phone)     Advance Directive/Living Will/POLST: none on file      Palliative care encounter  Assessment & Plan  Palliative diagnosis: recurrent, metastatic endometrial cancer  Last outpatient visit: not established    Social support:  Supportive listening provided  Normalized experience of patient/family  Provided anxiety containment  Provided anticipatory guidance  Encouraged self care  Discussed spiritual needs  Advocated for patient/family with interdisciplinary care team    Coordination of care:  Reviewed case with     Follow up  Palliative Care will continue to follow and goals of care discussions will be ongoing.   Please reach out via [employer] chat if questions or concerns arise.    We appreciate the invitation to be involved in this patient's care. " Please do not hesitate to reach our on call provider through our clinic answering service at [phone] should you have acute symptom control concerns.      Endometrial cancer (HCC)  Assessment & Plan  Recurrent endometrial cancer, initial diagnosis 2020  Follows with JASON Dukes of [facility] gyn onc  S/p FLORECTIA SBO SLND 6/2020 c/b splenic laceration  NAYE 1/2023, though presented for a fall in 10/2023 was incidentally found to have RP lymphadenopathy  Bx 12/2023 confirm metastatic adenocarcinoma  S/p pelvic RT and treatment with femara  NAYE 5/2024  Presented 10/18, 11/5, and again this admission 11/14 with SBO - s/p ex lap, small bowel resection, partial omentectomy, DAVID 11/22/24 with biopsy confirming metastatic adenocarcinoma  Was seen by [facility] on consult, recommended follow up as an OP for trial of palliative chemo either with [facility] or with [facility].     * SBO (small bowel obstruction) (HCC)  Assessment & Plan  Recurrent presentations for SBO (11/18/24, 11/5/24, 11/14/24)  Initially attempted conservative management with NG tube to suction and TPN for nutrition, though attempts to discontinue NG tube were unsuccessful  S/p ex lap, small bowel resection, partial omentectomy, DAVID 11/22/24  Follow up CT 11/30/24 demonstrates resolution of dilated small bowel loops seen previously with contrast seen predominantly in the distal nondistended small bowel loops and in the colon        IDENTIFICATION:  Inpatient consult to Palliative Care  Consult performed by: JASON Carias  Consult ordered by: Ricki Rodriguez MD          History of Present Illness:  Lety Botello is a 64 y.o. female who presents with a palliative diagnosis of recurrent endometrial cancer s/p FLORECITA/BSO and radiation. Patient presented 11/14/24 with abdominal pain and nausea found to have SBO with transition in the pelvis and new small volume ascites. General surgery consulted, initial plan for conservative management with NG tube and TPN for nutrition.  "Attempts made to wean from NG tube were ultimately unsuccessful. Patient is s/p exploratory laparotomy, small bowel resection with primary anastomosis, partial omentectomy, extensive lysis of adhesions 11/22/24. Pathology consistent with metastatic adenocarcinoma and GYN oncology consulted. Patient planning to follow up outpatient with her primary oncologist through [facility]. Palliative care consulted for \"metastatic recurrence of endometrial cancer.\"    Surgery primary team reached out to me prior to my visit with her. Per team, she has decided to be on hospice and that they are consulting GI for a venting G tube. Saw CM in the [place] as well who verified hospice decision as well.    Spoke to patient who was groggy in the room, but was able to hold a conversation. She said her goal is to be comfortable. When asked about further cancer cares, she said she can't see how she is going to be able to do that given the degree of her weakness. Since hospital stay about 18 days ago she has not seen any improvement. When asked about PT, she also can't answer that at the moment. She may consider cancer cares/chemo if she gets stronger, but she can't see how that is going to happen. I asked about hospice and if she wants to speak with them. She said she wants to. I will place a consult in. Pain is controlled (pain in the abdomen).     She lives with 2 roommates. When asked about HCR, she chose a Sharda Wen whom she lives with. She does have a son but son is not involved. When asked if son knows that she is in the hospital, she said no and that his phone had been turned off.     Past Medical History:   Diagnosis Date    Diverticulitis     Endometrial cancer (HCC)     History of shingles     Right hemiabdomen (inactive)    Hypertension     IBS (irritable bowel syndrome)     Obesity     Small bowel obstruction (HCC)     Type 2 diabetes mellitus (HCC)      Past Surgical History:   Procedure Laterality Date    ABDOMINAL ADHESION " SURGERY N/A 11/22/2024    Procedure: EXTENSIVE LYSIS OF ADHESIONS >90 MINUTES;  Surgeon: Alejo Ward MD;  [facility];  Service: General    APPENDECTOMY      CHOLECYSTECTOMY      LAPAROTOMY N/A 11/22/2024    Procedure: LAPAROTOMY EXPLORATORY;  Surgeon: Alejo Ward MD;  Location: [facility];  Service: General    SIGMOIDECTOMY N/A     SMALL INTESTINE SURGERY N/A 11/22/2024    Procedure: RESECTION SMALL BOWEL WITH PRIMARY ANASTAMOSIS,PARTIAL OMENTECTOMY;  Surgeon: Alejo Ward MD;  Location: [facility];  Service: General     Social History     Socioeconomic History    Marital status: Unknown     Spouse name: Not on file    Number of children: Not on file    Years of education: Not on file    Highest education level: Not on file   Occupational History    Not on file   Tobacco Use    Smoking status: Never    Smokeless tobacco: Never   Substance and Sexual Activity    Alcohol use: Not Currently    Drug use: Never    Sexual activity: Not on file   Other Topics Concern    Not on file   Social History Narrative    Not on file     Social Drivers of Health     Financial Resource Strain: Medium Risk (10/18/2024)    Received from [facility]    Overall Financial Resource Strain (CARDIA)     Difficulty of Paying Living Expenses: Somewhat hard   Food Insecurity: Food Insecurity Present (11/14/2024)    Nursing - Inadequate Food Risk Classification     Worried About Running Out of Food in the Last Year: Not on file     Ran Out of Food in the Last Year: Not on file     Ran Out of Food in the Last Year: 2   Transportation Needs: No Transportation Needs (11/14/2024)    Nursing - Transportation Risk Classification     Lack of Transportation: Not on file     Lack of Transportation: 2   Recent Concern: Transportation Needs - Unmet Transportation Needs (10/18/2024)    Received from [facility]    PRAPARE - Transportation     Lack of Transportation (Medical): Yes     Lack of  Transportation (Non-Medical): Yes   Physical Activity: Not on file   Stress: Not on file   Social Connections: Unknown (2024)    Received from [facility]     How often do you feel lonely or isolated from those around you? (Adult - for ages 18 years and over): Not on file   Intimate Partner Violence: Unknown (2024)    Nursing IPS     Feels Physically and Emotionally Safe: Not on file     Physically Hurt by Someone: Not on file     Humiliated or Emotionally Abused by Someone: Not on file     Physically Hurt by Someone: 2     Hurt or Threatened by Someone: 2   Housing Stability: Unknown (2024)    Nursing: Inadequate Housing Risk Classification     Has Housing: Not on file     Worried About Losing Housing: Not on file     Unable to Get Utilities: Not on file     Unable to Pay for Housing in the Last Year: 2     Has Housin   Recent Concern: Housing Stability - High Risk (10/4/2024)    Received from [facility]    Housing Stability Vital Sign     Unable to Pay for Housing in the Last Year: Yes     Number of Times Moved in the Last Year: 0     Homeless in the Last Year: No     History reviewed. No pertinent family history.    Medications:  all current active meds have been reviewed    Allergies   Allergen Reactions    Bee Pollen Anaphylaxis    Azithromycin Hives    Metformin Diarrhea    Other Other (See Comments)     hayfever with inhaler   hayfever with inhaler    Pollen Extract Other (See Comments)     hayfever with inhaler    Sulfamethizole Hives    Sulfamethoxazole-Trimethoprim Hives       Objective:  /66   Pulse 72   Temp (!) 96.5 °F (35.8 °C)   Resp 16   Ht 5' (1.524 m)   Wt 122 kg (269 lb 6.4 oz)   SpO2 97%   BMI 52.61 kg/m²     Physical Exam  Constitutional:       General: She is not in acute distress.     Appearance: She is ill-appearing. She is not toxic-appearing or diaphoretic.      Comments: Groggy, comfortable, stable, not toxic  appearing, appears weak and tired   HENT:      Head: Normocephalic and atraumatic.   Eyes:      General: No scleral icterus.        Right eye: No discharge.         Left eye: No discharge.   Pulmonary:      Effort: Pulmonary effort is normal. No respiratory distress.   Abdominal:      General: Abdomen is flat. There is no distension.   Musculoskeletal:         General: No swelling.   Skin:     General: Skin is warm and dry.      Coloration: Skin is not cyanotic or jaundiced.   Neurological:      General: No focal deficit present.   Psychiatric:         Mood and Affect: Mood normal.         Behavior: Behavior normal.       Lab Results: I have personally reviewed pertinent labs.  Imaging Studies: I have personally reviewed pertinent reports.  EKG, Pathology, and Other Studies: I have personally reviewed pertinent reports.    Counseling / Coordination of Care  Total floor / unit time spent today 45 minutes. Greater than 50% of total time was spent with the patient and / or family counseling and / or coordination of care. A description of the counseling / coordination of care: Reviewed chart, provided medical updates, determined goals of care, discussed palliative care and symptom management, discussed comfort care and hospice care, discussed code status, provided anticipatory guidance, determined competency and POA/HCA, determined social/family support, provided psychosocial support. Reviewed with Primary team and CM.    Pilar Reese MD  Palliative Medicine & Supportive Care  Internal Medicine  Available via [employer]  Office: [phone]  Fax: [phone]

## 2024-12-02 NOTE — ASSESSMENT & PLAN NOTE
Recurrent endometrial cancer, initial diagnosis 2020  Follows with JASON D[facility] of [facility] gyn onc  S/p FLORECITA SBO SLND 6/2020 c/b splenic laceration  NAYE 1/2023, though presented for a fall in 10/2023 was incidentally found to have RP lymphadenopathy  Bx 12/2023 confirm metastatic adenocarcinoma  S/p pelvic RT and treatment with femara  NAYE 5/2024  Presented 10/18, 11/5, and again this admission 11/14 with SBO - s/p ex lap, small bowel resection, partial omentectomy, DAVID 11/22/24 with biopsy confirming metastatic adenocarcinoma  Was seen by [facility] on consult, recommended follow up as an OP for trial of palliative chemo either with [facility] or with [facility].

## 2024-12-02 NOTE — PLAN OF CARE
Problem: Potential for Falls  Goal: Patient will remain free of falls  Description: INTERVENTIONS:  - Educate patient/family on patient safety including physical limitations  - Instruct patient to call for assistance with activity   - Consult OT/PT to assist with strengthening/mobility   - Keep Call bell within reach  - Keep bed low and locked with side rails adjusted as appropriate  - Keep care items and personal belongings within reach  - Initiate and maintain comfort rounds  - Make Fall Risk Sign visible to staff  - Offer Toileting every 3 Hours, in advance of need  - Initiate/Maintain bed alarm  - Obtain necessary fall risk management equipment  - Apply yellow socks and bracelet for high fall risk patients  - Consider moving patient to room near nurses station  Outcome: Progressing     Problem: PAIN - ADULT  Goal: Verbalizes/displays adequate comfort level or baseline comfort level  Description: Interventions:  - Encourage patient to monitor pain and request assistance  - Assess pain using appropriate pain scale  - Administer analgesics based on type and severity of pain and evaluate response  - Implement non-pharmacological measures as appropriate and evaluate response  - Consider cultural and social influences on pain and pain management  - Notify physician/advanced practitioner if interventions unsuccessful or patient reports new pain  Outcome: Progressing     Problem: SAFETY ADULT  Goal: Patient will remain free of falls  Description: INTERVENTIONS:  - Educate patient/family on patient safety including physical limitations  - Instruct patient to call for assistance with activity   - Consult OT/PT to assist with strengthening/mobility   - Keep Call bell within reach  - Keep bed low and locked with side rails adjusted as appropriate  - Keep care items and personal belongings within reach  - Initiate and maintain comfort rounds  - Make Fall Risk Sign visible to staff  - Offer Toileting every 3 Hours, in  advance of need  - Initiate/Maintain bed alarm  - Obtain necessary fall risk management equipment  - Apply yellow socks and bracelet for high fall risk patients  - Consider moving patient to room near nurses station  Outcome: Progressing  Goal: Maintain or return to baseline ADL function  Description: INTERVENTIONS:  -  Assess patient's ability to carry out ADLs; assess patient's baseline for ADL function and identify physical deficits which impact ability to perform ADLs (bathing, care of mouth/teeth, toileting, grooming, dressing, etc.)  - Assess/evaluate cause of self-care deficits   - Assess range of motion  - Assess patient's mobility; develop plan if impaired  - Assess patient's need for assistive devices and provide as appropriate  - Encourage maximum independence but intervene and supervise when necessary  - Involve family in performance of ADLs  - Assess for home care needs following discharge   - Consider OT consult to assist with ADL evaluation and planning for discharge  - Provide patient education as appropriate  Outcome: Progressing  Goal: Maintains/Returns to pre admission functional level  Description: INTERVENTIONS:  - Perform AM-PAC 6 Click Basic Mobility/ Daily Activity assessment daily.  - Set and communicate daily mobility goal to care team and patient/family/caregiver.   - Collaborate with rehabilitation services on mobility goals if consulted  - Perform Range of Motion 3 times a day.  - Reposition patient every 3 hours.  - Dangle patient 3 times a day  - Stand patient 3 times a day  - Ambulate patient 3 times a day  - Out of bed to chair 3 times a day   - Out of bed for meals 3 times a day  - Out of bed for toileting  - Record patient progress and toleration of activity level   Outcome: Progressing     Problem: DISCHARGE PLANNING  Goal: Discharge to home or other facility with appropriate resources  Description: INTERVENTIONS:  - Identify barriers to discharge w/patient and caregiver  -  Arrange for needed discharge resources and transportation as appropriate  - Identify discharge learning needs (meds, wound care, etc.)  - Arrange for interpretive services to assist at discharge as needed  - Refer to Case Management Department for coordinating discharge planning if the patient needs post-hospital services based on physician/advanced practitioner order or complex needs related to functional status, cognitive ability, or social support system  Outcome: Progressing     Problem: GASTROINTESTINAL - ADULT  Goal: Minimal or absence of nausea and/or vomiting  Description: INTERVENTIONS:  - Administer IV fluids if ordered to ensure adequate hydration  - Maintain NPO status until nausea and vomiting are resolved  - Nasogastric tube if ordered  - Administer ordered antiemetic medications as needed  - Provide nonpharmacologic comfort measures as appropriate  - Advance diet as tolerated, if ordered  - Consider nutrition services referral to assist patient with adequate nutrition and appropriate food choices  Outcome: Progressing  Goal: Maintains or returns to baseline bowel function  Description: INTERVENTIONS:  - Assess bowel function  - Encourage oral fluids to ensure adequate hydration  - Administer IV fluids if ordered to ensure adequate hydration  - Administer ordered medications as needed  - Encourage mobilization and activity  - Consider nutritional services referral to assist patient with adequate nutrition and appropriate food choices  Outcome: Progressing  Goal: Maintains adequate nutritional intake  Description: INTERVENTIONS:  - Monitor percentage of each meal consumed  - Identify factors contributing to decreased intake, treat as appropriate  - Assist with meals as needed  - Monitor I&O, weight, and lab values if indicated  - Obtain nutrition services referral as needed  Outcome: Progressing  Goal: Establish and maintain optimal ostomy function  Description: INTERVENTIONS:  - Assess bowel  function  - Encourage oral fluids to ensure adequate hydration  - Administer IV fluids if ordered to ensure adequate hydration   - Administer ordered medications as needed  - Encourage mobilization and activity  - Nutrition services referral to assist patient with appropriate food choices  - Assess stoma site  - Consider wound care consult   Outcome: Progressing  Goal: Oral mucous membranes remain intact  Description: INTERVENTIONS  - Assess oral mucosa and hygiene practices  - Implement preventative oral hygiene regimen  - Implement oral medicated treatments as ordered  - Initiate Nutrition services referral as needed  Outcome: Progressing     Problem: Prexisting or High Potential for Compromised Skin Integrity  Goal: Skin integrity is maintained or improved  Description: INTERVENTIONS:  - Identify patients at risk for skin breakdown  - Assess and monitor skin integrity  - Assess and monitor nutrition and hydration status  - Monitor labs   - Assess for incontinence   - Turn and reposition patient  - Assist with mobility/ambulation  - Relieve pressure over bony prominences  - Avoid friction and shearing  - Provide appropriate hygiene as needed including keeping skin clean and dry  - Evaluate need for skin moisturizer/barrier cream  - Collaborate with interdisciplinary team   - Patient/family teaching  - Consider wound care consult   Outcome: Progressing     Problem: Nutrition/Hydration-ADULT  Goal: Nutrient/Hydration intake appropriate for improving, restoring or maintaining nutritional needs  Description: Monitor and assess patient's nutrition/hydration status for malnutrition. Collaborate with interdisciplinary team and initiate plan and interventions as ordered.  Monitor patient's weight and dietary intake as ordered or per policy. Utilize nutrition screening tool and intervene as necessary. Determine patient's food preferences and provide high-protein, high-caloric foods as appropriate.     INTERVENTIONS:  -  Monitor oral intake, urinary output, labs, and treatment plans  - Assess nutrition and hydration status and recommend course of action  - Evaluate amount of meals eaten  - Assist patient with eating if necessary   - Allow adequate time for meals  - Recommend/ encourage appropriate diets, oral nutritional supplements, and vitamin/mineral supplements  - Order, calculate, and assess calorie counts as needed  - Recommend, monitor, and adjust tube feedings and TPN/PPN based on assessed needs  - Assess need for intravenous fluids  - Provide specific nutrition/hydration education as appropriate  - Include patient/family/caregiver in decisions related to nutrition  Outcome: Progressing     Problem: INFECTION - ADULT  Goal: Absence or prevention of progression during hospitalization  Description: INTERVENTIONS:  - Assess and monitor for signs and symptoms of infection  - Monitor lab/diagnostic results  - Monitor all insertion sites, i.e. indwelling lines, tubes, and drains  - Monitor endotracheal if appropriate and nasal secretions for changes in amount and color  - [place] appropriate cooling/warming therapies per order  - Administer medications as ordered  - Instruct and encourage patient and family to use good hand hygiene technique  - Identify and instruct in appropriate isolation precautions for identified infection/condition  Outcome: Progressing     Problem: Knowledge Deficit  Goal: Patient/family/caregiver demonstrates understanding of disease process, treatment plan, medications, and discharge instructions  Description: Complete learning assessment and assess knowledge base.  Interventions:  - Provide teaching at level of understanding  - Provide teaching via preferred learning methods  Outcome: Progressing     Problem: METABOLIC, FLUID AND ELECTROLYTES - ADULT  Goal: Electrolytes maintained within normal limits  Description: INTERVENTIONS:  - Monitor labs and assess patient for signs and symptoms of electrolyte  imbalances  - Administer electrolyte replacement as ordered  - Monitor response to electrolyte replacements, including repeat lab results as appropriate  - Instruct patient on fluid and nutrition as appropriate  Outcome: Progressing     Problem: SKIN/TISSUE INTEGRITY - ADULT  Goal: Skin Integrity remains intact(Skin Breakdown Prevention)  Description: Assess:  -Perform Esteban assessment every shift  -Clean and moisturize skin every shift  -Inspect skin when repositioning, toileting, and assisting with ADLS  -Assess under medical devices such as oxygen every shift  -Assess extremities for adequate circulation and sensation     Bed Management:  -Have minimal linens on bed & keep smooth, unwrinkled  -Change linens as needed when moist or perspiring  -Avoid sitting or lying in one position for more than 2 hours while in bed  -Keep HOB at 30 degrees     Toileting:  -Offer bedside commode  -Assess for incontinence every 2 hr  -Use incontinent care products after each incontinent episode such as cream    Activity:  -Mobilize patient 3 times a day  -Encourage activity and walks on unit  -Encourage or provide ROM exercises   -Turn and reposition patient every 2 Hours  -Use appropriate equipment to lift or move patient in bed  -Instruct/ Assist with weight shifting every 2 when out of bed in chair  -Consider limitation of chair time 3 hour intervals    Skin Care:  -Avoid use of baby powder, tape, friction and shearing, hot water or constrictive clothing  -Relieve pressure over bony prominences using wedges  -Do not massage red bony areas    Next Steps:  -Teach patient strategies to minimize risks such as yellow socks   -Consider consults to  interdisciplinary teams such as pt  Outcome: Progressing  Goal: Incision(s), wounds(s) or drain site(s) healing without S/S of infection  Description: INTERVENTIONS  - Assess and document dressing, incision, wound bed, drain sites and surrounding tissue  - Provide patient and family  education  - Perform skin care/dressing changes every change  Outcome: Progressing     Problem: HEMATOLOGIC - ADULT  Goal: Maintains hematologic stability  Description: INTERVENTIONS  - Assess for signs and symptoms of bleeding or hemorrhage  - Monitor labs  - Administer supportive blood products/factors as ordered and appropriate  Outcome: Progressing     Problem: CARDIOVASCULAR - ADULT  Goal: Maintains optimal cardiac output and hemodynamic stability  Description: INTERVENTIONS:  - Monitor I/O, vital signs and rhythm  - Monitor for S/S and trends of decreased cardiac output  - Administer and titrate ordered vasoactive medications to optimize hemodynamic stability  - Assess quality of pulses, skin color and temperature  - Assess for signs of decreased coronary artery perfusion  - Instruct patient to report change in severity of symptoms  Outcome: Progressing  Goal: Absence of cardiac dysrhythmias or at baseline rhythm  Description: INTERVENTIONS:  - Continuous cardiac monitoring, vital signs, obtain 12 lead EKG if ordered  - Administer antiarrhythmic and heart rate control medications as ordered  - Monitor electrolytes and administer replacement therapy as ordered  Outcome: Progressing     Problem: RESPIRATORY - ADULT  Goal: Achieves optimal ventilation and oxygenation  Description: INTERVENTIONS:  - Assess for changes in respiratory status  - Assess for changes in mentation and behavior  - Position to facilitate oxygenation and minimize respiratory effort  - Oxygen administered by appropriate delivery if ordered  - Initiate smoking cessation education as indicated  - Encourage broncho-pulmonary hygiene including cough, deep breathe, Incentive Spirometry  - Assess the need for suctioning and aspirate as needed  - Assess and instruct to report SOB or any respiratory difficulty  - Respiratory Therapy support as indicated  Outcome: Progressing

## 2024-12-02 NOTE — Clinical Note
Pt seen for PT treatment session this date with interventions consisting of bed mobility, transfer training, and HEP, sitting and standing balance and tolerance activities and education provided as needed for safety and direction to improve functional mobility, safety awareness, and activity tolerance. Pt agreeable to PT treatment session upon arrival, pt found seated EOB. At end of session, pt left supine in bed. with all needs in reach. In comparison to previous session, pt with no improvement activity tolerance, endurance, standing balance, ambulation distances, ambulatory balance, AM- pac score, and functional mobility. Pt  continues to require max assist x 2 for rolling L and r with use of bed rails.  Sit to supine with max assist x 2. Pt  performs static sitting EOB with mod- min assist x1 - cg x1 pt  demonstrated heavy posterior lean requiring mod-min assist x1 for trunk support,  L knee blocked due to sliding toward EOB.  Pt  performs sit to stand with mod assist x2 with bed height elevated.  Pt performs static standing trials with support fo Rw and mod assist x2 verbal cues for improved posture  and ue weightbearing techniques.  Encouragement and reassurance required t/o PT session. PT declines seated lateral side scoots or lateral side steps due to pain and nausea.  PT  incontinent of urine x2 t/o PT session requiring total assist for change of gown, slipper socks and hygiene.  Pt  performs supine b/l le aarom- arom exercises x 10 reps. PT  vomited x1 during pt session. PT's Rn, Yessy leavitt.    Continue to recommend  level II moderate rehab resource intensity  at time of d/c in order to maximize pt's functional independence and safety w/ mobility. Pt continues to be functioning below baseline level. PT will continue to see pt while here in order to address the deficits listed above and provide interventions consistent w/ POC in effort to achieve STGs.    The patient's AM-PAC Basic Mobility Inpatient Short  Form Raw Score is 7. A raw score less than 16 suggests the patient may benefit from discharge to post-acute rehabilitation services. Please also refer to the recommendation of the Physical Therapist for safe discharge planning.    Lisa Ba, PTA

## 2024-12-02 NOTE — ASSESSMENT & PLAN NOTE
"Goals:  Level 1  She does not think she will be strong enough to undergo further cancer cares at this point. Wants to be comfortable at home. If she gets better, she may consider chemo, but at this time, she said she does not see how she'll get better. For this reason, teams have discussed hospice with her which she is hoping to speak to as well.   I will place a hospice consult.   She lives with 2 roommates.   She has a son but son is not involved in her cares. She said he does not know about her condition and his phone is \"turned off\".  When asked who we should speak to in the event that she loses capacity, she said a Sharda Carver who is also listed as her ER contact. She lives with her and she knows what is happening to her and what she wants.   D/w Primary team and CM    Decisional apparatus:  Patient does have capacity on exam today.  If capacity is lost, patient's substitute decision maker would default to Sharda Carver per her choice by [place].  ER contacts:    Sharda Carver (Niece)  [phone] (Work Phone)     Advance Directive/Living Will/POLST: none on file    "

## 2024-12-02 NOTE — PLAN OF CARE
Problem: Potential for Falls  Goal: Patient will remain free of falls  Description: INTERVENTIONS:  - Educate patient/family on patient safety including physical limitations  - Instruct patient to call for assistance with activity   - Consult OT/PT to assist with strengthening/mobility   - Keep Call bell within reach  - Keep bed low and locked with side rails adjusted as appropriate  - Keep care items and personal belongings within reach  - Initiate and maintain comfort rounds  - Make Fall Risk Sign visible to staff  - Offer Toileting every 3 Hours, in advance of need  - Initiate/Maintain bed alarm  - Obtain necessary fall risk management equipment  - Apply yellow socks and bracelet for high fall risk patients  - Consider moving patient to room near nurses station  Outcome: Progressing     Problem: PAIN - ADULT  Goal: Verbalizes/displays adequate comfort level or baseline comfort level  Description: Interventions:  - Encourage patient to monitor pain and request assistance  - Assess pain using appropriate pain scale  - Administer analgesics based on type and severity of pain and evaluate response  - Implement non-pharmacological measures as appropriate and evaluate response  - Consider cultural and social influences on pain and pain management  - Notify physician/advanced practitioner if interventions unsuccessful or patient reports new pain  Outcome: Progressing     Problem: SAFETY ADULT  Goal: Patient will remain free of falls  Description: INTERVENTIONS:  - Educate patient/family on patient safety including physical limitations  - Instruct patient to call for assistance with activity   - Consult OT/PT to assist with strengthening/mobility   - Keep Call bell within reach  - Keep bed low and locked with side rails adjusted as appropriate  - Keep care items and personal belongings within reach  - Initiate and maintain comfort rounds  - Make Fall Risk Sign visible to staff  - Offer Toileting every 3 Hours, in  advance of need  - Initiate/Maintain bed alarm  - Obtain necessary fall risk management equipment  - Apply yellow socks and bracelet for high fall risk patients  - Consider moving patient to room near nurses station  Outcome: Progressing  Goal: Maintain or return to baseline ADL function  Description: INTERVENTIONS:  -  Assess patient's ability to carry out ADLs; assess patient's baseline for ADL function and identify physical deficits which impact ability to perform ADLs (bathing, care of mouth/teeth, toileting, grooming, dressing, etc.)  - Assess/evaluate cause of self-care deficits   - Assess range of motion  - Assess patient's mobility; develop plan if impaired  - Assess patient's need for assistive devices and provide as appropriate  - Encourage maximum independence but intervene and supervise when necessary  - Involve family in performance of ADLs  - Assess for home care needs following discharge   - Consider OT consult to assist with ADL evaluation and planning for discharge  - Provide patient education as appropriate  Outcome: Progressing  Goal: Maintains/Returns to pre admission functional level  Description: INTERVENTIONS:  - Perform AM-PAC 6 Click Basic Mobility/ Daily Activity assessment daily.  - Set and communicate daily mobility goal to care team and patient/family/caregiver.   - Collaborate with rehabilitation services on mobility goals if consulted  - Perform Range of Motion 3 times a day.  - Reposition patient every 3 hours.  - Dangle patient 3 times a day  - Stand patient 3 times a day  - Ambulate patient 3 times a day  - Out of bed to chair 3 times a day   - Out of bed for meals 3 times a day  - Out of bed for toileting  - Record patient progress and toleration of activity level   Outcome: Progressing     Problem: DISCHARGE PLANNING  Goal: Discharge to home or other facility with appropriate resources  Description: INTERVENTIONS:  - Identify barriers to discharge w/patient and caregiver  -  Arrange for needed discharge resources and transportation as appropriate  - Identify discharge learning needs (meds, wound care, etc.)  - Arrange for interpretive services to assist at discharge as needed  - Refer to Case Management Department for coordinating discharge planning if the patient needs post-hospital services based on physician/advanced practitioner order or complex needs related to functional status, cognitive ability, or social support system  Outcome: Progressing     Problem: INFECTION - ADULT  Goal: Absence or prevention of progression during hospitalization  Description: INTERVENTIONS:  - Assess and monitor for signs and symptoms of infection  - Monitor lab/diagnostic results  - Monitor all insertion sites, i.e. indwelling lines, tubes, and drains  - Monitor endotracheal if appropriate and nasal secretions for changes in amount and color  - [employer] appropriate cooling/warming therapies per order  - Administer medications as ordered  - Instruct and encourage patient and family to use good hand hygiene technique  - Identify and instruct in appropriate isolation precautions for identified infection/condition  Outcome: Progressing     Problem: Knowledge Deficit  Goal: Patient/family/caregiver demonstrates understanding of disease process, treatment plan, medications, and discharge instructions  Description: Complete learning assessment and assess knowledge base.  Interventions:  - Provide teaching at level of understanding  - Provide teaching via preferred learning methods  Outcome: Progressing     Problem: GASTROINTESTINAL - ADULT  Goal: Minimal or absence of nausea and/or vomiting  Description: INTERVENTIONS:  - Administer IV fluids if ordered to ensure adequate hydration  - Maintain NPO status until nausea and vomiting are resolved  - Nasogastric tube if ordered  - Administer ordered antiemetic medications as needed  - Provide nonpharmacologic comfort measures as appropriate  - Advance diet as  tolerated, if ordered  - Consider nutrition services referral to assist patient with adequate nutrition and appropriate food choices  Outcome: Progressing  Goal: Maintains or returns to baseline bowel function  Description: INTERVENTIONS:  - Assess bowel function  - Encourage oral fluids to ensure adequate hydration  - Administer IV fluids if ordered to ensure adequate hydration  - Administer ordered medications as needed  - Encourage mobilization and activity  - Consider nutritional services referral to assist patient with adequate nutrition and appropriate food choices  Outcome: Progressing  Goal: Maintains adequate nutritional intake  Description: INTERVENTIONS:  - Monitor percentage of each meal consumed  - Identify factors contributing to decreased intake, treat as appropriate  - Assist with meals as needed  - Monitor I&O, weight, and lab values if indicated  - Obtain nutrition services referral as needed  Outcome: Progressing  Goal: Establish and maintain optimal ostomy function  Description: INTERVENTIONS:  - Assess bowel function  - Encourage oral fluids to ensure adequate hydration  - Administer IV fluids if ordered to ensure adequate hydration   - Administer ordered medications as needed  - Encourage mobilization and activity  - Nutrition services referral to assist patient with appropriate food choices  - Assess stoma site  - Consider wound care consult   Outcome: Progressing  Goal: Oral mucous membranes remain intact  Description: INTERVENTIONS  - Assess oral mucosa and hygiene practices  - Implement preventative oral hygiene regimen  - Implement oral medicated treatments as ordered  - Initiate Nutrition services referral as needed  Outcome: Progressing     Problem: Prexisting or High Potential for Compromised Skin Integrity  Goal: Skin integrity is maintained or improved  Description: INTERVENTIONS:  - Identify patients at risk for skin breakdown  - Assess and monitor skin integrity  - Assess and  monitor nutrition and hydration status  - Monitor labs   - Assess for incontinence   - Turn and reposition patient  - Assist with mobility/ambulation  - Relieve pressure over bony prominences  - Avoid friction and shearing  - Provide appropriate hygiene as needed including keeping skin clean and dry  - Evaluate need for skin moisturizer/barrier cream  - Collaborate with interdisciplinary team   - Patient/family teaching  - Consider wound care consult   Outcome: Progressing     Problem: Nutrition/Hydration-ADULT  Goal: Nutrient/Hydration intake appropriate for improving, restoring or maintaining nutritional needs  Description: Monitor and assess patient's nutrition/hydration status for malnutrition. Collaborate with interdisciplinary team and initiate plan and interventions as ordered.  Monitor patient's weight and dietary intake as ordered or per policy. Utilize nutrition screening tool and intervene as necessary. Determine patient's food preferences and provide high-protein, high-caloric foods as appropriate.     INTERVENTIONS:  - Monitor oral intake, urinary output, labs, and treatment plans  - Assess nutrition and hydration status and recommend course of action  - Evaluate amount of meals eaten  - Assist patient with eating if necessary   - Allow adequate time for meals  - Recommend/ encourage appropriate diets, oral nutritional supplements, and vitamin/mineral supplements  - Order, calculate, and assess calorie counts as needed  - Recommend, monitor, and adjust tube feedings and TPN/PPN based on assessed needs  - Assess need for intravenous fluids  - Provide specific nutrition/hydration education as appropriate  - Include patient/family/caregiver in decisions related to nutrition  Outcome: Progressing     Problem: METABOLIC, FLUID AND ELECTROLYTES - ADULT  Goal: Electrolytes maintained within normal limits  Description: INTERVENTIONS:  - Monitor labs and assess patient for signs and symptoms of electrolyte  imbalances  - Administer electrolyte replacement as ordered  - Monitor response to electrolyte replacements, including repeat lab results as appropriate  - Instruct patient on fluid and nutrition as appropriate  Outcome: Progressing     Problem: SKIN/TISSUE INTEGRITY - ADULT  Goal: Skin Integrity remains intact(Skin Breakdown Prevention)  Description: Assess:  -Perform Esteban assessment every shift  -Clean and moisturize skin every shift  -Inspect skin when repositioning, toileting, and assisting with ADLS  -Assess extremities for adequate circulation and sensation     Bed Management:  -Have minimal linens on bed & keep smooth, unwrinkled  -Change linens as needed when moist or perspiring  -Avoid sitting or lying in one position for more than 2 hours while in bed  -Keep HOB at 30 degrees     Toileting:  -Offer bedside commode    Activity:  -Mobilize patient 3 times a day  -Encourage activity and walks on unit  -Encourage or provide ROM exercises   -Turn and reposition patient every 2 Hours  -Use appropriate equipment to lift or move patient in bed  -Instruct/ Assist with weight shifting every 2 when out of bed in chair  -Consider limitation of chair time 2 hour intervals    Skin Care:  -Avoid use of baby powder, tape, friction and shearing, hot water or constrictive clothing  -Relieve pressure over bony prominences using Allevyns  -Do not massage red bony areas    Outcome: Progressing     Problem: SKIN/TISSUE INTEGRITY - ADULT  Goal: Skin Integrity remains intact(Skin Breakdown Prevention)  Description: Assess:  -Perform Esteban assessment every shift  -Clean and moisturize skin every shift  -Inspect skin when repositioning, toileting, and assisting with ADLS  -Assess extremities for adequate circulation and sensation     Bed Management:  -Have minimal linens on bed & keep smooth, unwrinkled  -Change linens as needed when moist or perspiring  -Avoid sitting or lying in one position for more than 2 hours while in  bed  -Keep HOB at 30 degrees     Toileting:  -Offer bedside commode  -Assess for incontinence every 2 hours      Activity:  -Mobilize patient 3 times a day  -Encourage activity and walks on unit  -Encourage or provide ROM exercises   -Turn and reposition patient every 2 Hours  -Use appropriate equipment to lift or move patient in bed  -Instruct/ Assist with weight shifting every 2 when out of bed in chair  -Consider limitation of chair time 2 hour intervals    Skin Care:  -Avoid use of baby powder, tape, friction and shearing, hot water or constrictive clothing  -Relieve pressure over bony prominences using Allevyns  -Do not massage red bony areas    Outcome: Progressing  Goal: Incision(s), wounds(s) or drain site(s) healing without S/S of infection  Description: INTERVENTIONS  - Assess and document dressing, incision, wound bed, drain sites and surrounding tissue  - Provide patient and family education  - Perform skin care/dressing changes every shift  Outcome: Progressing     Problem: HEMATOLOGIC - ADULT  Goal: Maintains hematologic stability  Description: INTERVENTIONS  - Assess for signs and symptoms of bleeding or hemorrhage  - Monitor labs  - Administer supportive blood products/factors as ordered and appropriate  Outcome: Progressing     Problem: CARDIOVASCULAR - ADULT  Goal: Maintains optimal cardiac output and hemodynamic stability  Description: INTERVENTIONS:  - Monitor I/O, vital signs and rhythm  - Monitor for S/S and trends of decreased cardiac output  - Administer and titrate ordered vasoactive medications to optimize hemodynamic stability  - Assess quality of pulses, skin color and temperature  - Assess for signs of decreased coronary artery perfusion  - Instruct patient to report change in severity of symptoms  Outcome: Progressing  Goal: Absence of cardiac dysrhythmias or at baseline rhythm  Description: INTERVENTIONS:  - Continuous cardiac monitoring, vital signs, obtain 12 lead EKG if ordered  -  Administer antiarrhythmic and heart rate control medications as ordered  - Monitor electrolytes and administer replacement therapy as ordered  Outcome: Progressing     Problem: RESPIRATORY - ADULT  Goal: Achieves optimal ventilation and oxygenation  Description: INTERVENTIONS:  - Assess for changes in respiratory status  - Assess for changes in mentation and behavior  - Position to facilitate oxygenation and minimize respiratory effort  - Oxygen administered by appropriate delivery if ordered  - Initiate smoking cessation education as indicated  - Encourage broncho-pulmonary hygiene including cough, deep breathe, Incentive Spirometry  - Assess the need for suctioning and aspirate as needed  - Assess and instruct to report SOB or any respiratory difficulty  - Respiratory Therapy support as indicated  Outcome: Progressing

## 2024-12-02 NOTE — ASSESSMENT & PLAN NOTE
- previously suspected 2/2 SBO however she has had persistent symptoms.   - suspect she likely has diabetic gastroparesis however has not had enough time in the outpatient study to obtain a GES.   - she is agreeable for enteral access for nutritional support. She is hoping to get stronger to tolerate additional chemo if it is offered. Ideally we would place a postpyloric feeding tube to decrease the risk of recurrence of her postprandial epigastric pain, N/V. She defers surgical J tube for now.   - we can plan for PEG-J placement tomorrow. Hold evening lovenox dose. Npo amn

## 2024-12-02 NOTE — ASSESSMENT & PLAN NOTE
Palliative diagnosis: recurrent, metastatic endometrial cancer  Last outpatient visit: not established    Social support:  Supportive listening provided  Normalized experience of patient/family  Provided anxiety containment  Provided anticipatory guidance  Encouraged self care  Discussed spiritual needs  Advocated for patient/family with interdisciplinary care team    Coordination of care:  Reviewed case with     Follow up  Palliative Care will continue to follow and goals of care discussions will be ongoing.   Please reach out via [employer] secure chat if questions or concerns arise.    We appreciate the invitation to be involved in this patient's care. Please do not hesitate to reach our on call provider through our clinic answering service at [phone] should you have acute symptom control concerns.

## 2024-12-02 NOTE — OCCUPATIONAL THERAPY NOTE
Occupational Therapy Progress Note     Patient Name: Lety Botello  Today's Date: 12/2/2024  Problem List  Principal Problem:    SBO (small bowel obstruction) (HCC)  Active Problems:    Hypertension    Type 2 diabetes mellitus (HCC)    Endometrial cancer (HCC)    History of pulmonary embolism    Hypothyroidism    Mild intermittent asthma    Gastroparesis    Morbid obesity (HCC)    Abdominal pain    Nausea & vomiting    Shingles    On total parenteral nutrition (TPN)    Hypotension after procedure    Goals of care, counseling/discussion    Palliative care encounter            12/02/24 0682   Note Type   Note Type Treatment   Pain Assessment   Pain Assessment Tool 0-10   Pain Score 10 - Worst Possible Pain  (pt initially found sleeping; stated 10/10 when woken)   Pain Location/Orientation Location: Abdomen   Pain Rating: FLACC (Rest) - Face 0   Pain Rating: FLACC (Rest) - Legs 0   Pain Rating: FLACC (Rest) - Activity 0   Pain Rating: FLACC (Rest) - Cry 1   Pain Rating: FLACC (Rest) - Consolability 1   Score: FLACC (Rest) 2   Restrictions/Precautions   Weight Bearing Precautions Per Order No   Other Precautions Chair Alarm;Bed Alarm;Fall Risk;Pain   ADL   Where Assessed Edge of bed   Eating Assistance 5  Supervision/Setup   Grooming Assistance 5  Supervision/Setup   UB Bathing Assistance 3  Moderate Assistance   LB Bathing Assistance 2  Maximal Assistance   UB Dressing Assistance 3  Moderate Assistance   LB Dressing Assistance 2  Maximal Assistance   Toileting Assistance  1  Total Assistance   Functional Standing Tolerance   Time 30-60secs x 2   Bed Mobility   Rolling R 2  Maximal assistance   Additional items Assist x 2;Increased time required;Verbal cues;LE management   Rolling L 2  Maximal assistance   Additional items Assist x 2;Increased time required;Verbal cues;LE management   Supine to Sit 2  Maximal assistance   Additional items Assist x 1;Increased time required;Verbal cues   Sit to Supine 2  Maximal  "assistance   Additional items Assist x 2;Increased time required;Verbal cues;LE management   Transfers   Sit to Stand 3  Moderate assistance   Additional items Assist x 2;Increased time required;Verbal cues   Stand to Sit 3  Moderate assistance   Additional items Assist x 2;Increased time required;Verbal cues   Additional Comments bp's=149/66(EOB)   Functional Mobility   Functional Mobility 3  Moderate assistance   Additional Comments x2   Additional items Rolling walker   Therapeutic Exercise - ROM   UE-ROM Yes   Subjective   Subjective \"Everything hurts.\"   Cognition   Overall Cognitive Status WFL   Arousal/Participation Alert   Attention Attends with cues to redirect   Orientation Level Oriented to person;Oriented to place;Oriented to time   Memory Decreased recall of precautions;Decreased short term memory   Following Commands Follows one step commands with increased time or repetition   Additional Activities   Additional Activities Comments Partial co-tx with P.T. to improve safety with pt handling and provide safety challenging functional/physical activity for tx intervention   Activity Tolerance   Activity Tolerance Patient limited by fatigue;Patient limited by pain   Medical Staff Made Aware nsg, P.T., CM   Assessment   Assessment Pt seen for 38min tx session with focus on functional balance, functional mobility, ADL status, transfer safety, b/l UE ROM, and cognition. Pt able to tolerate OOB mobility; sitting balance=f/f-, standing balance=f-/p+. Pt required heavy assistance with all functional mobility tasks. Pt incontinent of urine; demonstrating need for heavy assistance with her UE and LE ADLs. Pt able to demonstrate good b/l UE AROM and cognition(i.e.orientation). Continue to review benefits for increased OOB activities. Will continue. The patient's raw score on the AM-PAC Daily Activity Inpatient Short Form is 13. A raw score of less than 19 suggests the patient may benefit from discharge to post-acute " rehabilitation services. Please refer to the recommendation of the Occupational Therapist for safe discharge planning.   Plan   Treatment Interventions ADL retraining;Functional transfer training;UE strengthening/ROM;Endurance training;Cognitive reorientation;Patient/family training;Equipment evaluation/education;Compensatory technique education;Continued evaluation   Goal Expiration Date 12/09/24   OT Treatment Day 4   OT Frequency 3-5x/wk   Discharge Recommendation   Rehab Resource Intensity Level, OT II (Moderate Resource Intensity)   AM-PAC Daily Activity Inpatient   Lower Body Dressing 2   Bathing 2   Toileting 1   Upper Body Dressing 2   Grooming 3   Eating 3   Daily Activity Raw Score 13   Daily Activity Standardized Score (Calc for Raw Score >=11) 32.03   AM-PAC Applied Cognition Inpatient   Following a Speech/Presentation 3   Understanding Ordinary Conversation 3   Taking Medications 3   Remembering Where Things Are Placed or Put Away 3   Remembering List of 4-5 Errands 3   Taking Care of Complicated Tasks 3   Applied Cognition Raw Score 18   Applied Cognition Standardized Score 38.07   Osvaldo Robles

## 2024-12-02 NOTE — PHYSICAL THERAPY NOTE
PHYSICAL THERAPY NOTE          Patient Name: Lety Botello  Today's Date: 12/2/2024 12/02/24 0934   Note Type   Note Type Treatment for insurance authorization   Pain Assessment   Pain Assessment Tool 0-10   Pain Score 10 - Worst Possible Pain   Pain Location/Orientation Location: Abdomen;Orientation: Bilateral   Hospital Pain Intervention(s) Repositioned;Ambulation/increased activity;Emotional support   Restrictions/Precautions   Other Precautions Chair Alarm;Bed Alarm;Pain;Fall Risk   General   Chart Reviewed Yes   Family/Caregiver Present No   Cognition   Arousal/Participation Alert   Attention Attends with cues to redirect   Orientation Level Oriented to person;Oriented to place;Oriented to time   Memory Decreased recall of precautions;Decreased short term memory   Following Commands Follows one step commands with increased time or repetition   Subjective   Subjective pt  agreeable to PT.  pt  reports abdominal pain nd nausea.   Bed Mobility   Rolling R 2  Maximal assistance   Additional items Assist x 2;Bedrails;Increased time required;Verbal cues;LE management   Rolling L 2  Maximal assistance   Additional items Assist x 2;Bedrails;Increased time required;Verbal cues;LE management   Sit to Supine 2  Maximal assistance   Additional items Assist x 2;Increased time required;Verbal cues;LE management   Additional Comments pt  seated EOB upon arrival.   Transfers   Sit to Stand 3  Moderate assistance   Additional items Assist x 2;Increased time required;Verbal cues  (bed heighty elevated)   Stand to Sit 3  Moderate assistance   Additional items Assist x 2;Increased time required;Verbal cues   Additional Comments standing trials with use of RW and mod assist x2,  15 seconds x1 and 40 seconds x1,  verbal cues for improved posture and ue weightbearing technqiues.  wide stance and forward flexed posture.   Balance   Static Sitting Fair  -  (poor- fair, noted posterior lean at times requiring mod- min assist for trunk support, verbal cues and min- mod assist x1 to correct)   Dynamic Sitting Poor   Static Standing Poor   Dynamic Standing Poor -   Ambulatory Zero   Endurance Deficit   Endurance Deficit Description fatigue, weakness, pain, nausea   Activity Tolerance   Activity Tolerance Patient limited by pain;Patient limited by fatigue   Medical Staff Made Aware Yessy ACOSTA Adam OT  Pt seen for co-treatment with skilled Occupational Therapist 2* clinically unstable/unpredictable presentation, medical complexity, fall risk, level of assistance, functional/physical limitations, impaired functional balance, decreased safety awareness, limited activity tolerance which is decline from PLOF and may impact overall functional mobility/mobility safety.   Exercises   Quad Sets Supine;10 reps;AROM;Bilateral   Heelslides Supine;10 reps;AAROM;AROM;Bilateral   Knee AROM Short Arc Quad Supine;10 reps;AROM;Bilateral   Ankle Pumps Supine;10 reps;AROM;Bilateral   Balance training  sittng balance tolerance activites at eob x 15 minutes with mod-min-cg assist x1,  static standing blance/ tolerance activites  with support of rw and mod assist x2 15 seconds x1 and 40 seconds x1, working on sitting and standing tolerance, upright posure, righting techniques and balance.  pt declined seated lateral side scoots and/ or lateral side steps   Assessment   Prognosis Fair   Problem List Decreased strength;Decreased endurance;Impaired balance;Decreased mobility;Decreased range of motion;Obesity;Decreased skin integrity;Pain  (increased edema)   Assessment Pt seen for PT treatment session this date with interventions consisting of bed mobility, transfer training, and HEP, sitting and standing balance and tolerance activities and education provided as needed for safety and direction to improve functional mobility, safety awareness, and activity tolerance. Pt agreeable to PT  treatment session upon arrival, pt found seated EOB. At end of session, pt left supine in bed. with all needs in reach. In comparison to previous session, pt with no improvement activity tolerance, endurance, standing balance, ambulation distances, ambulatory balance, AM- pac score, and functional mobility. Pt  continues to require max assist x 2 for rolling L and r with use of bed rails.  Sit to supine with max assist x 2. Pt  performs static sitting EOB with mod- min assist x1 - cg x1 pt  demonstrated heavy posterior lean requiring mod-min assist x1 for trunk support,  L knee blocked due to sliding toward EOB.  Pt  performs sit to stand with mod assist x2 with bed height elevated.  Pt performs static standing trials with support fo Rw and mod assist x2 verbal cues for improved posture  and ue weightbearing techniques.  Encouragement and reassurance required t/o PT session. PT declines seated lateral side scoots or lateral side steps due to pain and nausea.  PT  incontinent of urine x2 t/o PT session requiring total assist for change of gown, slipper socks and hygiene.  Pt  performs supine b/l le aarom- arom exercises x 10 reps. PT  vomited x1 during pt session. PT's Rn, Yessy leavitt.    Continue to recommend  level II moderate rehab resource intensity  at time of d/c in order to maximize pt's functional independence and safety w/ mobility. Pt continues to be functioning below baseline level. PT will continue to see pt while here in order to address the deficits listed above and provide interventions consistent w/ POC in effort to achieve STGs.    The patient's AM-PAC Basic Mobility Inpatient Short Form Raw Score is 7. A raw score less than 16 suggests the patient may benefit from discharge to post-acute rehabilitation services. Please also refer to the recommendation of the Physical Therapist for safe discharge planning.   Goals   STG Expiration Date 12/09/24   PT Treatment Day 2   Plan   Treatment/Interventions  Functional transfer training;LE strengthening/ROM;Therapeutic exercise;Endurance training;Patient/family training;Equipment eval/education;Bed mobility;Spoke to nursing;OT   Progress Slow progress, decreased activity tolerance   PT Frequency 3-5x/wk   Discharge Recommendation   Rehab Resource Intensity Level, PT II (Moderate Resource Intensity)   AM-PAC Basic Mobility Inpatient   Turning in Flat Bed Without Bedrails 1   Lying on Back to Sitting on Edge of Flat Bed Without Bedrails 1   Moving Bed to Chair 1   Standing Up From Chair Using Arms 2   Walk in Room 1   Climb 3-5 Stairs With Railing 1   Basic Mobility Inpatient Raw Score 7   Turning Head Towards Sound 4   Follow Simple Instructions 4   Low Function Basic Mobility Raw Score  15   Low Function Basic Mobility Standardized Score  23.9   [facility] Highest Level Of Mobility   -HLM Goal 2: Bed activities/Dependent transfer   -HL Achieved 3: Sit at edge of bed   Education   Education Provided Mobility training;Home exercise program;Assistive device   Patient Reinforcement needed   End of Consult   Patient Position at End of Consult Supine;Bed/Chair alarm activated;All needs within reach   Lisa Ba, PTA

## 2024-12-02 NOTE — CONSULTS
Consultation - Gastroenterology   Name: Lety Botello 64 y.o. female I MRN: [id number]  Unit/Bed#: [place] [address] -01 I Date of Admission: 11/14/2024   Date of Service: 12/2/2024 I Hospital Day: 18       Inpatient consult to gastroenterology     Date/Time  12/2/2024 2:16 PM     Performed by  Sanjay Perez MD   Authorized by  Harvey James PA-C           Physician Requesting Evaluation: Andrea Senior MD   Reason for Evaluation / Principal Problem: N/V, poor PO intake    Assessment & Plan  Nausea & vomiting   - previously suspected 2/2 SBO however she has had persistent symptoms.   - suspect she likely has diabetic gastroparesis however has not had enough time in the outpatient study to obtain a GES.   - she is agreeable for enteral access for nutritional support. She is hoping to get stronger to tolerate additional chemo if it is offered. Ideally we would place a postpyloric feeding tube to decrease the risk of recurrence of her postprandial epigastric pain, N/V. She defers surgical J tube for now.   - we can plan for PEG-J placement tomorrow. Hold evening lovenox dose. Npo amn  SBO (small bowel obstruction) (HCC)   - malignant stricture that has been resolved since surgery on 11/22/24. 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 Gyn-onc will see her in the office to discuss any potential additional treatment.      History of Present Illness   HPI:  Lety Botello is a 64 y.o. 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    She has had SBOs for the past 2 months. Our team has seen her last month. She underwent a push enteroscopy 11/08/24, which only showed some gastritis, Hpy Bx neg. Imaging showed SBO. Gastrograffin challenge c/f gastroparesis. She failed conservative management and went  to the OR on 11/22/24: radiation mid-jejunal stricture w/ transition point, 20cm jejunum resected w/ STS anastomosis. Extensive DAVID. Omental mass resected   - path: jejunal malignant stricture (recurrent EDIS). Omental mass also recurrent EDIS of gyn origin.    Since post op she has had persistence of poor PO intake, especially w/ solids. Liquids okay, but she is not a fan of the ensures.    She apparently has had gastroparesis-type symptoms for many years. Her A1c has been poorly ctrled since at least 2018. Previously on Mounjaro in the late summer. Unclear if she actually used any, as it was not covered by insurance. No prior GES. But she does have sequelae of advanced unctrled DM2.    Review of Systems   Constitutional:  Negative for appetite change, chills, fatigue and fever.   Eyes:  Negative for photophobia.   Respiratory:  Negative for cough and shortness of breath.    Cardiovascular:  Negative for chest pain.   Gastrointestinal:  Positive for abdominal pain, nausea and vomiting. Negative for constipation and diarrhea.   Endocrine: Negative.    Genitourinary:  Negative for dysuria and frequency.   Musculoskeletal:  Negative for myalgias.   Skin:  Negative for pallor.   Neurological:  Negative for weakness.   Hematological: Negative.    Psychiatric/Behavioral: Negative.       I have reviewed the patient's PMH, PSH, Social History, Family History, Meds, and Allergies    Objective :  Temp:  [96.5 °F (35.8 °C)-97.6 °F (36.4 °C)] 96.5 °F (35.8 °C)  HR:  [69-72] 72  BP: (146-152)/(63-66) 152/66  Resp:  [16-18] 16  SpO2:  [96 %-98 %] 97 %  O2 Device: None (Room air)    Physical Exam  Constitutional:       General: She is not in acute distress.     Appearance: Normal appearance.   HENT:      Head: Normocephalic and atraumatic.      Nose: Nose normal.      Mouth/Throat:      Mouth: Mucous membranes are moist.   Eyes:      General: No scleral icterus.     Extraocular Movements: Extraocular movements intact.       Conjunctiva/sclera: Conjunctivae normal.      Pupils: Pupils are equal, round, and reactive to light.   Cardiovascular:      Rate and Rhythm: Normal rate and regular rhythm.   Pulmonary:      Effort: Pulmonary effort is normal.   Abdominal:      General: Abdomen is flat. There is no distension.      Palpations: There is no mass.      Tenderness: There is no abdominal tenderness.      Comments: Upper midline staple line c/d/i   Musculoskeletal:         General: No swelling. Normal range of motion.   Skin:     General: Skin is warm and dry.      Capillary Refill: Capillary refill takes less than 2 seconds.   Neurological:      General: No focal deficit present.      Mental Status: She is alert.   Psychiatric:         Mood and Affect: Mood normal.         Lab Results: I have reviewed all relevant labs and imaging.

## 2024-12-02 NOTE — UTILIZATION REVIEW
"Continued Stay Review    Date: 11/30/24                          Current Patient Class: Inpatient  Current Level of Care: MS    HPI:64 y.o. female initially admitted on 11/14/24 - DX:  SBO (small bowel obstruction)       Assessment/Plan:   11/30/24: Improving nausea now only occasionally, no emesis, passed flatus and had BM, wants to try some toast and crackers today. Reports her pain is mildly improved. Exam: Abdomen: soft, mildly less distended,, improving tenderness to palpation, incision c/d/I anterior abd excoriation; Heme 8.3  Plan: f/u repeat CT; cont reglan iv Q6; pain / nausea control (see below); monitor labs;  Accuchecks with ssic; advancing diet    OB / GYN CONSULT   This admission she presented on 11/14/24 with a history of recurrent small bowel obstructions in the setting of morbid obesity, gastroparesis, and diabetes (suspected element of diabetic autonomic neuropathy of SBO). Conservative treatment was attempted for 1 week and failed, so patient underwent a Ex lap, SBR, DAVID on 11.22.24. Omental biopsy and small bowel were consistent with metastatic adenocarcinoma with positive margins.      Gyn Onc was consulted to discuss results and patient treatment goals of care. Discussed adjuvant vs hormonal therapy options with patient. Lety reports that \"she is okay with dying and just wants to be comfortable and pain free\".       Medications:   Scheduled Medications:  acetaminophen, 975 mg, Oral, Q8H SAM  bisacodyl, 10 mg, Rectal, Daily  carvedilol, 12.5 mg, Oral, Q12H  chlorhexidine, 15 mL, Mouth/Throat, Q12H SAM  enoxaparin, 60 mg, Subcutaneous, Q12H SAM  furosemide, 20 mg, Oral, Daily  gabapentin, 100 mg, Oral, HS  insulin lispro, 4-20 Units, Subcutaneous, 4x Daily (AC & HS)  levothyroxine, 125 mcg, Oral, Daily  nystatin, , Topical, TID  pantoprazole, 40 mg, Oral, Daily Before Breakfast  metoclopramide (REGLAN) injection 10 mg, Intravenous Q6H       Continuous IV Infusions: None       PRN " Meds:  hydrALAZINE, 5 mg, Intravenous, Q4H PRN  HYDROmorphone, 0.2 mg, Intravenous, Q3H PRN  (11/30 recd x4)   labetalol, 10 mg, Intravenous, Q4H PRN  levalbuterol, 1.25 mg, Nebulization, Q6H PRN  ondansetron, 4 mg, Intravenous, Q4H PRN   (11/30 recd x1)   oxyCODONE, 5 mg, Oral, Q4H PRN    (11/30 recd x4)   oxyCODONE, 2.5 mg, Oral, Q4H PRN  phenol, 1 spray, Mouth/Throat, Q2H PRN  trimethobenzamide, 200 mg, Intramuscular, Q6H PRN      Discharge Plan: TBD    Vital Signs (last 3 days)       Date/Time Temp Pulse Resp BP MAP (mmHg) SpO2 Calculated FIO2 (%) - Nasal Cannula O2 Flow Rate (L/min) Nasal Cannula O2 Flow Rate (L/min) O2 Device Patient Position - Orthostatic VS Lowes Coma Scale Score Pain    11/30/24 2217 -- -- -- -- -- -- -- -- -- -- -- -- 10 - Worst Possible Pain    11/30/24 21:24:38 97.6 °F (36.4 °C) 73 18 137/58 84 97 % -- -- -- -- -- -- --    11/30/24 2026 -- -- -- -- -- -- -- -- -- -- -- -- 10 - Worst Possible Pain    11/30/24 2000 -- -- -- -- -- -- -- -- -- None (Room air) -- 15 --    11/30/24 1641 -- -- -- -- -- -- -- -- -- -- -- -- 10 - Worst Possible Pain    11/30/24 1607 -- 71 19 144/59 87 97 % -- -- -- None (Room air) Lying -- --    11/30/24 1418 -- -- -- -- -- -- -- -- -- -- -- -- 8    11/30/24 1325 -- -- -- -- -- -- -- -- -- -- -- -- 8    11/30/24 1054 -- -- -- -- -- -- -- -- -- -- -- -- 10 - Worst Possible Pain    11/30/24 0906 -- -- -- -- -- -- -- -- -- -- -- -- 10 - Worst Possible Pain    11/30/24 0900 -- -- -- -- -- 98 % -- -- -- None (Room air) -- 15 10 - Worst Possible Pain    11/30/24 07:12:56 98.7 °F (37.1 °C) 72 18 150/59 89 98 % -- -- -- None (Room air) Lying -- --    11/30/24 0506 -- -- -- -- -- -- -- -- -- -- -- -- Med Not Given for Pain - for MAR use only    11/30/24 0225 -- -- -- -- -- -- -- -- -- -- -- -- 10 - Worst Possible Pain    11/29/24 2154 -- -- -- -- -- -- -- -- -- -- -- -- 8    11/29/24 2147 -- -- -- -- -- -- -- -- -- -- -- -- Med Not Given for Pain - for MAR use only     11/29/24 20:47:50 97.7 °F (36.5 °C) 70 18 158/76 103 97 % -- -- -- None (Room air) Lying -- --    11/29/24 1955 -- -- -- -- -- -- -- -- -- -- -- 15 9    11/29/24 1706 -- -- -- -- -- -- -- -- -- -- -- -- 7    11/29/24 16:26:20 97.4 °F (36.3 °C) 66 17 134/64 87 97 % -- -- -- -- -- -- --    11/29/24 1605 -- -- -- -- -- -- -- -- -- -- -- -- 10 - Worst Possible Pain    11/29/24 15:33:18 97.7 °F (36.5 °C) 69 -- 146/61 89 98 % -- -- -- -- -- -- --    11/29/24 1334 -- -- -- -- -- -- -- -- -- -- -- -- 9    11/29/24 1228 -- -- -- -- -- -- -- -- -- -- -- -- 9    11/29/24 10:48:51 -- 73 -- 146/60 89 96 % -- -- -- -- -- -- --    11/29/24 1041 -- -- -- -- -- -- -- -- -- -- -- -- 8    11/29/24 1036 -- -- -- -- -- -- -- -- -- -- -- -- 8    11/29/24 0837 -- -- -- -- -- -- -- -- -- -- -- -- 8 11/29/24 08:20:43 97.7 °F (36.5 °C) 70 16 141/57 85 97 % 20 0 L/min 0 L/min None (Room air) Lying 15 --    11/29/24 0540 -- -- -- -- -- -- -- -- -- -- -- -- 7    11/29/24 0230 -- -- -- -- -- -- -- -- -- -- -- -- 10 - Worst Possible Pain          Weight (last 2 days)       Date/Time Weight    12/02/24 0600 122 (269.4)    12/02/24 0440 122 (269.4)    12/01/24 0531 123 (272.05)    11/30/24 0600 124 (273)            Pertinent Labs/Diagnostic Results:   Radiology:  CT chest abdomen pelvis w contrast   Final Interpretation by Lauri Adam MD (11/30 2542)      1.Resolution of obstructive pattern seen in the small bowel loops with contrast in the distal nondistended small bowel loops and colon. Stomach is distended with air.      2.Moderate ascites in the perihepatic space tracking in the right paracolic gutter into the pelvis.   Worsening subcutaneous edema or third spacing.      3.Severely distended bladder with tiny intraluminal air causing fullness of the collecting systems bilaterally.      4. Trace right and small to moderate right effusion with adjacent atelectasis. Resolution of previous possible infiltrative changes in the right  lung base.         Workstation performed: [id number]         XR abdomen 1 view kub   Final Interpretation by Lorne Underwood MD (11/29 0814)   Mild gaseous prominence of large and small bowel loops appears improved from prior study consistent with resolving postoperative ileus.         Workstation performed: [id number]           Results from last 7 days   Lab Units 11/30/24  0459 11/29/24  0544 11/28/24  0451 11/27/24  0619   WBC Thousand/uL 5.10 5.20 4.14* 4.01*   HEMOGLOBIN g/dL 8.3* 8.2* 7.7* 8.6*   HEMATOCRIT % 25.8* 26.1* 24.4* 26.8*   PLATELETS Thousands/uL 123* 113* 114* 110*   TOTAL NEUT ABS Thousands/µL 4.15 4.06 3.23 3.33        Results from last 7 days   Lab Units 11/30/24  0459 11/29/24  0544 11/28/24  0451 11/27/24  0619 11/26/24  0435 11/25/24  1404   SODIUM mmol/L 131* 133* 131* 131*   < > 132*   POTASSIUM mmol/L 4.2 4.4 3.9 3.7   < > 3.9   CHLORIDE mmol/L 105 106 105 105   < > 104   CO2 mmol/L 23 22 21 23   < > 24   ANION GAP mmol/L 3* 5 5 3*   < > 4   BUN mg/dL 39* 41* 41* 35*   < > 25   CREATININE mg/dL 0.81 0.90 0.77 0.63   < > 0.63   EGFR ml/min/1.73sq m 78 68 83 96   < > 96   CALCIUM mg/dL 7.5* 7.5* 7.5* 7.6*   < > 7.8*   CALCIUM, IONIZED mmol/L  --   --   --  1.16  --   --    MAGNESIUM mg/dL 2.4  --  2.5 2.5  --  2.7   PHOSPHORUS mg/dL 4.4* 5.1* 4.4* 4.2*  --  3.1    < > = values in this interval not displayed.     Results from last 7 days   Lab Units 11/27/24  0619   AST U/L 7*   ALT U/L 7   ALK PHOS U/L 118*   TOTAL PROTEIN g/dL 4.0*   ALBUMIN g/dL 1.9*   TOTAL BILIRUBIN mg/dL 0.23   BILIRUBIN DIRECT mg/dL 0.03     Results from last 7 days   Lab Units 11/30/24  2120 11/30/24  1737 11/30/24  1112 11/30/24  0713 11/29/24  2051 11/29/24  1627 11/29/24  1115   POC GLUCOSE mg/dl 129 105 188* 135 153* 190* 161*     Results from last 7 days   Lab Units 11/30/24  0459 11/29/24  0544 11/28/24  0451 11/27/24  0619 11/26/24  0534 11/26/24  0435 11/25/24  1404   GLUCOSE RANDOM mg/dL 149* 140 129 251* 146*  507* 135        Results from last 7 days   Lab Units 11/27/24  0619 11/26/24  0435   PTT seconds 49* 66*             Network Utilization Review Department  ATTENTION: Please call with any questions or concerns to [phone] and carefully listen to the prompts so that you are directed to the right person. All voicemails are confidential.   For Discharge needs, contact Care Management DC Support Team at [phone] opt. 2  Send all requests for admission clinical reviews, approved or denied determinations and any other requests to dedicated fax number below belonging to the campus where the patient is receiving treatment. List of dedicated fax numbers for the Facilities:  FACILITY NAME UR FAX NUMBER   ADMISSION DENIALS (Administrative/Medical Necessity) [phone]   DISCHARGE SUPPORT TEAM (NETWORK) [phone]   PARENT CHILD HEALTH (Maternity/NICU/Pediatrics) [phone]   [facility] [phone]   [facility] [phone]   [facility] [phone]   [facility] [phone]   [facility] [phone]   [facility] [phone]   [facility] [phone]   [facility] [phone]   [facility] [phone]   [facility] [phone]   [facility] [phone]   [facility] [phone]

## 2024-12-03 ENCOUNTER — ANESTHESIA EVENT (INPATIENT)
Dept: PERIOP | Facility: HOSPITAL | Age: 64
DRG: 230 | End: 2024-12-03
Payer: COMMERCIAL

## 2024-12-03 ENCOUNTER — ANESTHESIA (INPATIENT)
Dept: PERIOP | Facility: HOSPITAL | Age: 64
DRG: 230 | End: 2024-12-03
Payer: COMMERCIAL

## 2024-12-03 ENCOUNTER — APPOINTMENT (INPATIENT)
Dept: PERIOP | Facility: HOSPITAL | Age: 64
DRG: 230 | End: 2024-12-03
Payer: COMMERCIAL

## 2024-12-03 LAB
BASOPHILS # BLD AUTO: 0.01 THOUSANDS/ΜL (ref 0–0.1)
BASOPHILS NFR BLD AUTO: 0 % (ref 0–1)
CA-I BLD-SCNC: 1.13 MMOL/L (ref 1.12–1.32)
EOSINOPHIL # BLD AUTO: 0.05 THOUSAND/ΜL (ref 0–0.61)
EOSINOPHIL NFR BLD AUTO: 1 % (ref 0–6)
ERYTHROCYTE [DISTWIDTH] IN BLOOD BY AUTOMATED COUNT: 17.2 % (ref 11.6–15.1)
GLUCOSE SERPL-MCNC: 170 MG/DL (ref 65–140)
GLUCOSE SERPL-MCNC: 178 MG/DL (ref 65–140)
GLUCOSE SERPL-MCNC: 184 MG/DL (ref 65–140)
GLUCOSE SERPL-MCNC: 205 MG/DL (ref 65–140)
GLUCOSE SERPL-MCNC: 223 MG/DL (ref 65–140)
GLUCOSE SERPL-MCNC: 51 MG/DL (ref 65–140)
HCT VFR BLD AUTO: 23.3 % (ref 34.8–46.1)
HGB BLD-MCNC: 7.3 G/DL (ref 11.5–15.4)
IMM GRANULOCYTES # BLD AUTO: 0.04 THOUSAND/UL (ref 0–0.2)
IMM GRANULOCYTES NFR BLD AUTO: 1 % (ref 0–2)
LYMPHOCYTES # BLD AUTO: 0.29 THOUSANDS/ΜL (ref 0.6–4.47)
LYMPHOCYTES NFR BLD AUTO: 6 % (ref 14–44)
MAGNESIUM SERPL-MCNC: 2.1 MG/DL (ref 1.9–2.7)
MCH RBC QN AUTO: 30.3 PG (ref 26.8–34.3)
MCHC RBC AUTO-ENTMCNC: 31.3 G/DL (ref 31.4–37.4)
MCV RBC AUTO: 97 FL (ref 82–98)
MONOCYTES # BLD AUTO: 0.23 THOUSAND/ΜL (ref 0.17–1.22)
MONOCYTES NFR BLD AUTO: 4 % (ref 4–12)
NEUTROPHILS # BLD AUTO: 4.65 THOUSANDS/ΜL (ref 1.85–7.62)
NEUTS SEG NFR BLD AUTO: 88 % (ref 43–75)
NRBC BLD AUTO-RTO: 0 /100 WBCS
PHOSPHATE SERPL-MCNC: 3.4 MG/DL (ref 2.3–4.1)
PLATELET # BLD AUTO: 86 THOUSANDS/UL (ref 149–390)
PMV BLD AUTO: 10.1 FL (ref 8.9–12.7)
RBC # BLD AUTO: 2.41 MILLION/UL (ref 3.81–5.12)
TRIGL SERPL-MCNC: 201 MG/DL (ref ?–150)
WBC # BLD AUTO: 5.27 THOUSAND/UL (ref 4.31–10.16)

## 2024-12-03 PROCEDURE — 83735 ASSAY OF MAGNESIUM: CPT | Performed by: PHYSICIAN ASSISTANT

## 2024-12-03 PROCEDURE — 85025 COMPLETE CBC W/AUTO DIFF WBC: CPT | Performed by: SURGERY

## 2024-12-03 PROCEDURE — 82330 ASSAY OF CALCIUM: CPT | Performed by: SURGERY

## 2024-12-03 PROCEDURE — 99024 POSTOP FOLLOW-UP VISIT: CPT

## 2024-12-03 PROCEDURE — 84100 ASSAY OF PHOSPHORUS: CPT | Performed by: PHYSICIAN ASSISTANT

## 2024-12-03 PROCEDURE — 82948 REAGENT STRIP/BLOOD GLUCOSE: CPT

## 2024-12-03 PROCEDURE — 84478 ASSAY OF TRIGLYCERIDES: CPT | Performed by: PHYSICIAN ASSISTANT

## 2024-12-03 PROCEDURE — 43246 EGD PLACE GASTROSTOMY TUBE: CPT | Performed by: INTERNAL MEDICINE

## 2024-12-03 RX ORDER — ONDANSETRON 2 MG/ML
4 INJECTION INTRAMUSCULAR; INTRAVENOUS EVERY 6 HOURS PRN
Status: DISCONTINUED | OUTPATIENT
Start: 2024-12-03 | End: 2024-12-03 | Stop reason: HOSPADM

## 2024-12-03 RX ORDER — SODIUM CHLORIDE 9 MG/ML
INJECTION, SOLUTION INTRAVENOUS CONTINUOUS PRN
Status: DISCONTINUED | OUTPATIENT
Start: 2024-12-03 | End: 2024-12-03

## 2024-12-03 RX ORDER — HYDROMORPHONE HCL/PF 1 MG/ML
0.5 SYRINGE (ML) INJECTION EVERY 4 HOURS PRN
Refills: 0 | Status: DISCONTINUED | OUTPATIENT
Start: 2024-12-03 | End: 2024-12-05 | Stop reason: HOSPADM

## 2024-12-03 RX ORDER — CEFAZOLIN SODIUM 2 G/50ML
SOLUTION INTRAVENOUS AS NEEDED
Status: DISCONTINUED | OUTPATIENT
Start: 2024-12-03 | End: 2024-12-03

## 2024-12-03 RX ORDER — EPHEDRINE SULFATE 50 MG/ML
INJECTION INTRAVENOUS AS NEEDED
Status: DISCONTINUED | OUTPATIENT
Start: 2024-12-03 | End: 2024-12-03

## 2024-12-03 RX ORDER — FUROSEMIDE 10 MG/ML
20 INJECTION INTRAMUSCULAR; INTRAVENOUS DAILY PRN
Status: DISCONTINUED | OUTPATIENT
Start: 2024-12-03 | End: 2024-12-05 | Stop reason: HOSPADM

## 2024-12-03 RX ORDER — DEXTROSE MONOHYDRATE 25 G/50ML
INJECTION, SOLUTION INTRAVENOUS
Status: COMPLETED
Start: 2024-12-03 | End: 2024-12-03

## 2024-12-03 RX ORDER — LIDOCAINE HYDROCHLORIDE 20 MG/ML
INJECTION, SOLUTION EPIDURAL; INFILTRATION; INTRACAUDAL; PERINEURAL AS NEEDED
Status: DISCONTINUED | OUTPATIENT
Start: 2024-12-03 | End: 2024-12-03

## 2024-12-03 RX ORDER — HYDROMORPHONE HCL IN WATER/PF 6 MG/30 ML
0.2 PATIENT CONTROLLED ANALGESIA SYRINGE INTRAVENOUS EVERY 2 HOUR PRN
Status: DISCONTINUED | OUTPATIENT
Start: 2024-12-03 | End: 2024-12-05 | Stop reason: HOSPADM

## 2024-12-03 RX ORDER — INSULIN LISPRO 100 [IU]/ML
4-20 INJECTION, SOLUTION INTRAVENOUS; SUBCUTANEOUS EVERY 6 HOURS
Status: DISCONTINUED | OUTPATIENT
Start: 2024-12-03 | End: 2024-12-05 | Stop reason: HOSPADM

## 2024-12-03 RX ORDER — ALBUMIN HUMAN 50 G/1000ML
SOLUTION INTRAVENOUS CONTINUOUS PRN
Status: DISCONTINUED | OUTPATIENT
Start: 2024-12-03 | End: 2024-12-03

## 2024-12-03 RX ORDER — ACETAMINOPHEN 10 MG/ML
1000 INJECTION, SOLUTION INTRAVENOUS EVERY 6 HOURS PRN
Status: DISCONTINUED | OUTPATIENT
Start: 2024-12-03 | End: 2024-12-05 | Stop reason: HOSPADM

## 2024-12-03 RX ORDER — PROPOFOL 10 MG/ML
INJECTION, EMULSION INTRAVENOUS AS NEEDED
Status: DISCONTINUED | OUTPATIENT
Start: 2024-12-03 | End: 2024-12-03

## 2024-12-03 RX ORDER — ALBUTEROL SULFATE 0.83 MG/ML
2.5 SOLUTION RESPIRATORY (INHALATION) EVERY 6 HOURS PRN
Status: COMPLETED | OUTPATIENT
Start: 2024-12-03 | End: 2024-12-03

## 2024-12-03 RX ORDER — FENTANYL CITRATE 50 UG/ML
25 INJECTION, SOLUTION INTRAMUSCULAR; INTRAVENOUS
Refills: 0 | Status: DISCONTINUED | OUTPATIENT
Start: 2024-12-03 | End: 2024-12-03 | Stop reason: HOSPADM

## 2024-12-03 RX ORDER — ROCURONIUM BROMIDE 10 MG/ML
INJECTION, SOLUTION INTRAVENOUS AS NEEDED
Status: DISCONTINUED | OUTPATIENT
Start: 2024-12-03 | End: 2024-12-03

## 2024-12-03 RX ADMIN — NYSTATIN: 100000 POWDER TOPICAL at 08:35

## 2024-12-03 RX ADMIN — ALBUTEROL SULFATE 2.5 MG: 2.5 SOLUTION RESPIRATORY (INHALATION) at 16:15

## 2024-12-03 RX ADMIN — SUGAMMADEX 300 MG: 100 INJECTION, SOLUTION INTRAVENOUS at 15:55

## 2024-12-03 RX ADMIN — HYDROMORPHONE HYDROCHLORIDE 0.5 MG: 1 INJECTION, SOLUTION INTRAMUSCULAR; INTRAVENOUS; SUBCUTANEOUS at 18:44

## 2024-12-03 RX ADMIN — PROPOFOL 200 MG: 10 INJECTION, EMULSION INTRAVENOUS at 15:05

## 2024-12-03 RX ADMIN — CHLORHEXIDINE GLUCONATE 15 ML: 1.2 RINSE ORAL at 21:40

## 2024-12-03 RX ADMIN — HYDROMORPHONE HYDROCHLORIDE 0.2 MG: 0.2 INJECTION, SOLUTION INTRAMUSCULAR; INTRAVENOUS; SUBCUTANEOUS at 11:54

## 2024-12-03 RX ADMIN — FENTANYL CITRATE 25 MCG: 50 INJECTION INTRAMUSCULAR; INTRAVENOUS at 16:37

## 2024-12-03 RX ADMIN — HYDROMORPHONE HYDROCHLORIDE 0.2 MG: 0.2 INJECTION, SOLUTION INTRAMUSCULAR; INTRAVENOUS; SUBCUTANEOUS at 05:15

## 2024-12-03 RX ADMIN — FENTANYL CITRATE 25 MCG: 50 INJECTION INTRAMUSCULAR; INTRAVENOUS at 16:50

## 2024-12-03 RX ADMIN — ROCURONIUM 30 MG: 50 INJECTION, SOLUTION INTRAVENOUS at 15:05

## 2024-12-03 RX ADMIN — ROCURONIUM 20 MG: 50 INJECTION, SOLUTION INTRAVENOUS at 15:07

## 2024-12-03 RX ADMIN — SUGAMMADEX 100 MG: 100 INJECTION, SOLUTION INTRAVENOUS at 15:57

## 2024-12-03 RX ADMIN — ONDANSETRON 4 MG: 2 INJECTION INTRAMUSCULAR; INTRAVENOUS at 06:35

## 2024-12-03 RX ADMIN — EPHEDRINE SULFATE 10 MG: 50 INJECTION INTRAVENOUS at 15:11

## 2024-12-03 RX ADMIN — DEXTROSE MONOHYDRATE 50 ML: 25 INJECTION, SOLUTION INTRAVENOUS at 05:59

## 2024-12-03 RX ADMIN — LIDOCAINE HYDROCHLORIDE 100 MG: 20 INJECTION, SOLUTION EPIDURAL; INFILTRATION; INTRACAUDAL at 15:05

## 2024-12-03 RX ADMIN — ALBUMIN (HUMAN): 12.5 INJECTION, SOLUTION INTRAVENOUS at 15:16

## 2024-12-03 RX ADMIN — INSULIN LISPRO 4 UNITS: 100 INJECTION, SOLUTION INTRAVENOUS; SUBCUTANEOUS at 09:48

## 2024-12-03 RX ADMIN — FUROSEMIDE 20 MG: 10 INJECTION, SOLUTION INTRAMUSCULAR; INTRAVENOUS at 11:54

## 2024-12-03 RX ADMIN — ENOXAPARIN SODIUM 60 MG: 100 INJECTION SUBCUTANEOUS at 21:40

## 2024-12-03 RX ADMIN — HYDROMORPHONE HYDROCHLORIDE 0.2 MG: 0.2 INJECTION, SOLUTION INTRAMUSCULAR; INTRAVENOUS; SUBCUTANEOUS at 21:46

## 2024-12-03 RX ADMIN — TRIMETHOBENZAMIDE HYDROCHLORIDE 200 MG: 100 INJECTION INTRAMUSCULAR at 08:15

## 2024-12-03 RX ADMIN — CHLORHEXIDINE GLUCONATE 15 ML: 1.2 RINSE ORAL at 08:33

## 2024-12-03 RX ADMIN — CEFAZOLIN SODIUM 2000 MG: 2 SOLUTION INTRAVENOUS at 15:09

## 2024-12-03 RX ADMIN — SODIUM CHLORIDE: 0.9 INJECTION, SOLUTION INTRAVENOUS at 14:56

## 2024-12-03 RX ADMIN — HYDROMORPHONE HYDROCHLORIDE 0.2 MG: 0.2 INJECTION, SOLUTION INTRAMUSCULAR; INTRAVENOUS; SUBCUTANEOUS at 08:16

## 2024-12-03 RX ADMIN — FENTANYL CITRATE 25 MCG: 50 INJECTION INTRAMUSCULAR; INTRAVENOUS at 16:27

## 2024-12-03 RX ADMIN — ONDANSETRON 4 MG: 2 INJECTION INTRAMUSCULAR; INTRAVENOUS at 14:21

## 2024-12-03 NOTE — PLAN OF CARE
Problem: Potential for Falls  Goal: Patient will remain free of falls  Description: INTERVENTIONS:  - Educate patient/family on patient safety including physical limitations  - Instruct patient to call for assistance with activity   - Consult OT/PT to assist with strengthening/mobility   - Keep Call bell within reach  - Keep bed low and locked with side rails adjusted as appropriate  - Keep care items and personal belongings within reach  - Initiate and maintain comfort rounds  - Make Fall Risk Sign visible to staff  - Offer Toileting every 3 Hours, in advance of need  - Initiate/Maintain bed alarm  - Obtain necessary fall risk management equipment  - Apply yellow socks and bracelet for high fall risk patients  - Consider moving patient to room near nurses station  Outcome: Progressing     Problem: PAIN - ADULT  Goal: Verbalizes/displays adequate comfort level or baseline comfort level  Description: Interventions:  - Encourage patient to monitor pain and request assistance  - Assess pain using appropriate pain scale  - Administer analgesics based on type and severity of pain and evaluate response  - Implement non-pharmacological measures as appropriate and evaluate response  - Consider cultural and social influences on pain and pain management  - Notify physician/advanced practitioner if interventions unsuccessful or patient reports new pain  Outcome: Progressing     Problem: SAFETY ADULT  Goal: Patient will remain free of falls  Description: INTERVENTIONS:  - Educate patient/family on patient safety including physical limitations  - Instruct patient to call for assistance with activity   - Consult OT/PT to assist with strengthening/mobility   - Keep Call bell within reach  - Keep bed low and locked with side rails adjusted as appropriate  - Keep care items and personal belongings within reach  - Initiate and maintain comfort rounds  - Make Fall Risk Sign visible to staff  - Offer Toileting every 3 Hours, in  advance of need  - Initiate/Maintain bed alarm  - Obtain necessary fall risk management equipment  - Apply yellow socks and bracelet for high fall risk patients  - Consider moving patient to room near nurses station  Outcome: Progressing  Goal: Maintain or return to baseline ADL function  Description: INTERVENTIONS:  -  Assess patient's ability to carry out ADLs; assess patient's baseline for ADL function and identify physical deficits which impact ability to perform ADLs (bathing, care of mouth/teeth, toileting, grooming, dressing, etc.)  - Assess/evaluate cause of self-care deficits   - Assess range of motion  - Assess patient's mobility; develop plan if impaired  - Assess patient's need for assistive devices and provide as appropriate  - Encourage maximum independence but intervene and supervise when necessary  - Involve family in performance of ADLs  - Assess for home care needs following discharge   - Consider OT consult to assist with ADL evaluation and planning for discharge  - Provide patient education as appropriate  Outcome: Progressing  Goal: Maintains/Returns to pre admission functional level  Description: INTERVENTIONS:  - Perform AM-PAC 6 Click Basic Mobility/ Daily Activity assessment daily.  - Set and communicate daily mobility goal to care team and patient/family/caregiver.   - Collaborate with rehabilitation services on mobility goals if consulted  - Perform Range of Motion 3 times a day.  - Reposition patient every 3 hours.  - Dangle patient 3 times a day  - Stand patient 3 times a day  - Ambulate patient 3 times a day  - Out of bed to chair 3 times a day   - Out of bed for meals 3 times a day  - Out of bed for toileting  - Record patient progress and toleration of activity level   Outcome: Progressing     Problem: DISCHARGE PLANNING  Goal: Discharge to home or other facility with appropriate resources  Description: INTERVENTIONS:  - Identify barriers to discharge w/patient and caregiver  -  Arrange for needed discharge resources and transportation as appropriate  - Identify discharge learning needs (meds, wound care, etc.)  - Arrange for interpretive services to assist at discharge as needed  - Refer to Case Management Department for coordinating discharge planning if the patient needs post-hospital services based on physician/advanced practitioner order or complex needs related to functional status, cognitive ability, or social support system  Outcome: Progressing     Problem: GASTROINTESTINAL - ADULT  Goal: Minimal or absence of nausea and/or vomiting  Description: INTERVENTIONS:  - Administer IV fluids if ordered to ensure adequate hydration  - Maintain NPO status until nausea and vomiting are resolved  - Nasogastric tube if ordered  - Administer ordered antiemetic medications as needed  - Provide nonpharmacologic comfort measures as appropriate  - Advance diet as tolerated, if ordered  - Consider nutrition services referral to assist patient with adequate nutrition and appropriate food choices  Outcome: Progressing  Goal: Maintains or returns to baseline bowel function  Description: INTERVENTIONS:  - Assess bowel function  - Encourage oral fluids to ensure adequate hydration  - Administer IV fluids if ordered to ensure adequate hydration  - Administer ordered medications as needed  - Encourage mobilization and activity  - Consider nutritional services referral to assist patient with adequate nutrition and appropriate food choices  Outcome: Progressing  Goal: Maintains adequate nutritional intake  Description: INTERVENTIONS:  - Monitor percentage of each meal consumed  - Identify factors contributing to decreased intake, treat as appropriate  - Assist with meals as needed  - Monitor I&O, weight, and lab values if indicated  - Obtain nutrition services referral as needed  Outcome: Progressing  Goal: Establish and maintain optimal ostomy function  Description: INTERVENTIONS:  - Assess bowel  function  - Encourage oral fluids to ensure adequate hydration  - Administer IV fluids if ordered to ensure adequate hydration   - Administer ordered medications as needed  - Encourage mobilization and activity  - Nutrition services referral to assist patient with appropriate food choices  - Assess stoma site  - Consider wound care consult   Outcome: Progressing  Goal: Oral mucous membranes remain intact  Description: INTERVENTIONS  - Assess oral mucosa and hygiene practices  - Implement preventative oral hygiene regimen  - Implement oral medicated treatments as ordered  - Initiate Nutrition services referral as needed  Outcome: Progressing     Problem: Prexisting or High Potential for Compromised Skin Integrity  Goal: Skin integrity is maintained or improved  Description: INTERVENTIONS:  - Identify patients at risk for skin breakdown  - Assess and monitor skin integrity  - Assess and monitor nutrition and hydration status  - Monitor labs   - Assess for incontinence   - Turn and reposition patient  - Assist with mobility/ambulation  - Relieve pressure over bony prominences  - Avoid friction and shearing  - Provide appropriate hygiene as needed including keeping skin clean and dry  - Evaluate need for skin moisturizer/barrier cream  - Collaborate with interdisciplinary team   - Patient/family teaching  - Consider wound care consult   Outcome: Progressing     Problem: Nutrition/Hydration-ADULT  Goal: Nutrient/Hydration intake appropriate for improving, restoring or maintaining nutritional needs  Description: Monitor and assess patient's nutrition/hydration status for malnutrition. Collaborate with interdisciplinary team and initiate plan and interventions as ordered.  Monitor patient's weight and dietary intake as ordered or per policy. Utilize nutrition screening tool and intervene as necessary. Determine patient's food preferences and provide high-protein, high-caloric foods as appropriate.     INTERVENTIONS:  -  Monitor oral intake, urinary output, labs, and treatment plans  - Assess nutrition and hydration status and recommend course of action  - Evaluate amount of meals eaten  - Assist patient with eating if necessary   - Allow adequate time for meals  - Recommend/ encourage appropriate diets, oral nutritional supplements, and vitamin/mineral supplements  - Order, calculate, and assess calorie counts as needed  - Recommend, monitor, and adjust tube feedings and TPN/PPN based on assessed needs  - Assess need for intravenous fluids  - Provide specific nutrition/hydration education as appropriate  - Include patient/family/caregiver in decisions related to nutrition  Outcome: Progressing     Problem: INFECTION - ADULT  Goal: Absence or prevention of progression during hospitalization  Description: INTERVENTIONS:  - Assess and monitor for signs and symptoms of infection  - Monitor lab/diagnostic results  - Monitor all insertion sites, i.e. indwelling lines, tubes, and drains  - Monitor endotracheal if appropriate and nasal secretions for changes in amount and color  - [place] appropriate cooling/warming therapies per order  - Administer medications as ordered  - Instruct and encourage patient and family to use good hand hygiene technique  - Identify and instruct in appropriate isolation precautions for identified infection/condition  Outcome: Progressing     Problem: Knowledge Deficit  Goal: Patient/family/caregiver demonstrates understanding of disease process, treatment plan, medications, and discharge instructions  Description: Complete learning assessment and assess knowledge base.  Interventions:  - Provide teaching at level of understanding  - Provide teaching via preferred learning methods  Outcome: Progressing     Problem: METABOLIC, FLUID AND ELECTROLYTES - ADULT  Goal: Electrolytes maintained within normal limits  Description: INTERVENTIONS:  - Monitor labs and assess patient for signs and symptoms of electrolyte  imbalances  - Administer electrolyte replacement as ordered  - Monitor response to electrolyte replacements, including repeat lab results as appropriate  - Instruct patient on fluid and nutrition as appropriate  Outcome: Progressing     Problem: SKIN/TISSUE INTEGRITY - ADULT  Goal: Skin Integrity remains intact(Skin Breakdown Prevention)  Description: Assess:  -Perform Esteban assessment every shift   -Clean and moisturize skin every shift   -Inspect skin when repositioning, toileting, and assisting with ADLS       Bed Management:  -Have minimal linens on bed & keep smooth, unwrinkled  -Change linens as needed when moist or perspiring  -Avoid sitting or lying in one position for more than 2 hours while in bed  -Keep HOB at 30degrees     Toileting:  -Offer bedside commode  -Assess for incontinence every   -Use incontinent care products after each incontinent episode such as     Activity:  -Mobilize patient  times a day  -Encourage activity and walks on unit  -Encourage or provide ROM exercises   -Turn and reposition patient every  Hours  -Use appropriate equipment to lift or move patient in bed  -Instruct/ Assist with weight shifting every  when out of bed in chair  -Consider limitation of chair time  hour intervals    Skin Care:  -Avoid use of baby powder, tape, friction and shearing, hot water or constrictive clothing  -Relieve pressure over bony prominences using   -Do not massage red bony areas    Next Steps:  -Teach patient strategies to minimize risks such as    -Consider consults to  interdisciplinary teams such as   Outcome: Progressing  Goal: Incision(s), wounds(s) or drain site(s) healing without S/S of infection  Description: INTERVENTIONS  - Assess and document dressing, incision, wound bed, drain sites and surrounding tissue  - Provide patient and family education  - Perform skin care/dressing changes every   Outcome: Progressing     Problem: HEMATOLOGIC - ADULT  Goal: Maintains hematologic  stability  Description: INTERVENTIONS  - Assess for signs and symptoms of bleeding or hemorrhage  - Monitor labs  - Administer supportive blood products/factors as ordered and appropriate  Outcome: Progressing     Problem: CARDIOVASCULAR - ADULT  Goal: Maintains optimal cardiac output and hemodynamic stability  Description: INTERVENTIONS:  - Monitor I/O, vital signs and rhythm  - Monitor for S/S and trends of decreased cardiac output  - Administer and titrate ordered vasoactive medications to optimize hemodynamic stability  - Assess quality of pulses, skin color and temperature  - Assess for signs of decreased coronary artery perfusion  - Instruct patient to report change in severity of symptoms  Outcome: Progressing  Goal: Absence of cardiac dysrhythmias or at baseline rhythm  Description: INTERVENTIONS:  - Continuous cardiac monitoring, vital signs, obtain 12 lead EKG if ordered  - Administer antiarrhythmic and heart rate control medications as ordered  - Monitor electrolytes and administer replacement therapy as ordered  Outcome: Progressing     Problem: RESPIRATORY - ADULT  Goal: Achieves optimal ventilation and oxygenation  Description: INTERVENTIONS:  - Assess for changes in respiratory status  - Assess for changes in mentation and behavior  - Position to facilitate oxygenation and minimize respiratory effort  - Oxygen administered by appropriate delivery if ordered  - Initiate smoking cessation education as indicated  - Encourage broncho-pulmonary hygiene including cough, deep breathe, Incentive Spirometry  - Assess the need for suctioning and aspirate as needed  - Assess and instruct to report SOB or any respiratory difficulty  - Respiratory Therapy support as indicated  Outcome: Progressing     Problem: Potential for Falls  Goal: Patient will remain free of falls  Description: INTERVENTIONS:  - Educate patient/family on patient safety including physical limitations  - Instruct patient to call for assistance  with activity   - Consult OT/PT to assist with strengthening/mobility   - Keep Call bell within reach  - Keep bed low and locked with side rails adjusted as appropriate  - Keep care items and personal belongings within reach  - Initiate and maintain comfort rounds  - Make Fall Risk Sign visible to staff  - Offer Toileting every 3 Hours, in advance of need  - Initiate/Maintain bed alarm  - Obtain necessary fall risk management equipment  - Apply yellow socks and bracelet for high fall risk patients  - Consider moving patient to room near nurses station  Outcome: Progressing     Problem: PAIN - ADULT  Goal: Verbalizes/displays adequate comfort level or baseline comfort level  Description: Interventions:  - Encourage patient to monitor pain and request assistance  - Assess pain using appropriate pain scale  - Administer analgesics based on type and severity of pain and evaluate response  - Implement non-pharmacological measures as appropriate and evaluate response  - Consider cultural and social influences on pain and pain management  - Notify physician/advanced practitioner if interventions unsuccessful or patient reports new pain  Outcome: Progressing     Problem: SAFETY ADULT  Goal: Patient will remain free of falls  Description: INTERVENTIONS:  - Educate patient/family on patient safety including physical limitations  - Instruct patient to call for assistance with activity   - Consult OT/PT to assist with strengthening/mobility   - Keep Call bell within reach  - Keep bed low and locked with side rails adjusted as appropriate  - Keep care items and personal belongings within reach  - Initiate and maintain comfort rounds  - Make Fall Risk Sign visible to staff  - Offer Toileting every 3 Hours, in advance of need  - Initiate/Maintain bed alarm  - Obtain necessary fall risk management equipment  - Apply yellow socks and bracelet for high fall risk patients  - Consider moving patient to room near nurses  station  Outcome: Progressing  Goal: Maintain or return to baseline ADL function  Description: INTERVENTIONS:  -  Assess patient's ability to carry out ADLs; assess patient's baseline for ADL function and identify physical deficits which impact ability to perform ADLs (bathing, care of mouth/teeth, toileting, grooming, dressing, etc.)  - Assess/evaluate cause of self-care deficits   - Assess range of motion  - Assess patient's mobility; develop plan if impaired  - Assess patient's need for assistive devices and provide as appropriate  - Encourage maximum independence but intervene and supervise when necessary  - Involve family in performance of ADLs  - Assess for home care needs following discharge   - Consider OT consult to assist with ADL evaluation and planning for discharge  - Provide patient education as appropriate  Outcome: Progressing  Goal: Maintains/Returns to pre admission functional level  Description: INTERVENTIONS:  - Perform AM-PAC 6 Click Basic Mobility/ Daily Activity assessment daily.  - Set and communicate daily mobility goal to care team and patient/family/caregiver.   - Collaborate with rehabilitation services on mobility goals if consulted  - Perform Range of Motion 3 times a day.  - Reposition patient every 3 hours.  - Dangle patient 3 times a day  - Stand patient 3 times a day  - Ambulate patient 3 times a day  - Out of bed to chair 3 times a day   - Out of bed for meals 3 times a day  - Out of bed for toileting  - Record patient progress and toleration of activity level   Outcome: Progressing     Problem: DISCHARGE PLANNING  Goal: Discharge to home or other facility with appropriate resources  Description: INTERVENTIONS:  - Identify barriers to discharge w/patient and caregiver  - Arrange for needed discharge resources and transportation as appropriate  - Identify discharge learning needs (meds, wound care, etc.)  - Arrange for interpretive services to assist at discharge as needed  - Refer  to Case Management Department for coordinating discharge planning if the patient needs post-hospital services based on physician/advanced practitioner order or complex needs related to functional status, cognitive ability, or social support system  Outcome: Progressing     Problem: GASTROINTESTINAL - ADULT  Goal: Minimal or absence of nausea and/or vomiting  Description: INTERVENTIONS:  - Administer IV fluids if ordered to ensure adequate hydration  - Maintain NPO status until nausea and vomiting are resolved  - Nasogastric tube if ordered  - Administer ordered antiemetic medications as needed  - Provide nonpharmacologic comfort measures as appropriate  - Advance diet as tolerated, if ordered  - Consider nutrition services referral to assist patient with adequate nutrition and appropriate food choices  Outcome: Progressing  Goal: Maintains or returns to baseline bowel function  Description: INTERVENTIONS:  - Assess bowel function  - Encourage oral fluids to ensure adequate hydration  - Administer IV fluids if ordered to ensure adequate hydration  - Administer ordered medications as needed  - Encourage mobilization and activity  - Consider nutritional services referral to assist patient with adequate nutrition and appropriate food choices  Outcome: Progressing  Goal: Maintains adequate nutritional intake  Description: INTERVENTIONS:  - Monitor percentage of each meal consumed  - Identify factors contributing to decreased intake, treat as appropriate  - Assist with meals as needed  - Monitor I&O, weight, and lab values if indicated  - Obtain nutrition services referral as needed  Outcome: Progressing  Goal: Establish and maintain optimal ostomy function  Description: INTERVENTIONS:  - Assess bowel function  - Encourage oral fluids to ensure adequate hydration  - Administer IV fluids if ordered to ensure adequate hydration   - Administer ordered medications as needed  - Encourage mobilization and activity  - Nutrition  services referral to assist patient with appropriate food choices  - Assess stoma site  - Consider wound care consult   Outcome: Progressing  Goal: Oral mucous membranes remain intact  Description: INTERVENTIONS  - Assess oral mucosa and hygiene practices  - Implement preventative oral hygiene regimen  - Implement oral medicated treatments as ordered  - Initiate Nutrition services referral as needed  Outcome: Progressing     Problem: Prexisting or High Potential for Compromised Skin Integrity  Goal: Skin integrity is maintained or improved  Description: INTERVENTIONS:  - Identify patients at risk for skin breakdown  - Assess and monitor skin integrity  - Assess and monitor nutrition and hydration status  - Monitor labs   - Assess for incontinence   - Turn and reposition patient  - Assist with mobility/ambulation  - Relieve pressure over bony prominences  - Avoid friction and shearing  - Provide appropriate hygiene as needed including keeping skin clean and dry  - Evaluate need for skin moisturizer/barrier cream  - Collaborate with interdisciplinary team   - Patient/family teaching  - Consider wound care consult   Outcome: Progressing     Problem: Nutrition/Hydration-ADULT  Goal: Nutrient/Hydration intake appropriate for improving, restoring or maintaining nutritional needs  Description: Monitor and assess patient's nutrition/hydration status for malnutrition. Collaborate with interdisciplinary team and initiate plan and interventions as ordered.  Monitor patient's weight and dietary intake as ordered or per policy. Utilize nutrition screening tool and intervene as necessary. Determine patient's food preferences and provide high-protein, high-caloric foods as appropriate.     INTERVENTIONS:  - Monitor oral intake, urinary output, labs, and treatment plans  - Assess nutrition and hydration status and recommend course of action  - Evaluate amount of meals eaten  - Assist patient with eating if necessary   - Allow  adequate time for meals  - Recommend/ encourage appropriate diets, oral nutritional supplements, and vitamin/mineral supplements  - Order, calculate, and assess calorie counts as needed  - Recommend, monitor, and adjust tube feedings and TPN/PPN based on assessed needs  - Assess need for intravenous fluids  - Provide specific nutrition/hydration education as appropriate  - Include patient/family/caregiver in decisions related to nutrition  Outcome: Progressing     Problem: INFECTION - ADULT  Goal: Absence or prevention of progression during hospitalization  Description: INTERVENTIONS:  - Assess and monitor for signs and symptoms of infection  - Monitor lab/diagnostic results  - Monitor all insertion sites, i.e. indwelling lines, tubes, and drains  - Monitor endotracheal if appropriate and nasal secretions for changes in amount and color  - [place] appropriate cooling/warming therapies per order  - Administer medications as ordered  - Instruct and encourage patient and family to use good hand hygiene technique  - Identify and instruct in appropriate isolation precautions for identified infection/condition  Outcome: Progressing     Problem: Knowledge Deficit  Goal: Patient/family/caregiver demonstrates understanding of disease process, treatment plan, medications, and discharge instructions  Description: Complete learning assessment and assess knowledge base.  Interventions:  - Provide teaching at level of understanding  - Provide teaching via preferred learning methods  Outcome: Progressing     Problem: METABOLIC, FLUID AND ELECTROLYTES - ADULT  Goal: Electrolytes maintained within normal limits  Description: INTERVENTIONS:  - Monitor labs and assess patient for signs and symptoms of electrolyte imbalances  - Administer electrolyte replacement as ordered  - Monitor response to electrolyte replacements, including repeat lab results as appropriate  - Instruct patient on fluid and nutrition as appropriate  Outcome:  Progressing     Problem: SKIN/TISSUE INTEGRITY - ADULT  Goal: Skin Integrity remains intact(Skin Breakdown Prevention)  Description: Assess:  -Perform Esteban assessment every   -Clean and moisturize skin every   -Inspect skin when repositioning, toileting, and assisting with ADLS  -Assess under medical devices such as  every   -Assess extremities for adequate circulation and sensation     Bed Management:  -Have minimal linens on bed & keep smooth, unwrinkled  -Change linens as needed when moist or perspiring  -Avoid sitting or lying in one position for more than  hours while in bed  -Keep HOB at degrees     Toileting:  -Offer bedside commode  -Assess for incontinence every   -Use incontinent care products after each incontinent episode such as    Activity:  -Mobilize patient  times a day  -Encourage activity and walks on unit  -Encourage or provide ROM exercises   -Turn and reposition patient every  Hours  -Use appropriate equipment to lift or move patient in bed  -Instruct/ Assist with weight shifting every  when out of bed in chair  -Consider limitation of chair time  hour intervals    Skin Care:  -Avoid use of baby powder, tape, friction and shearing, hot water or constrictive clothing  -Relieve pressure over bony prominences using  -Do not massage red bony areas    Next Steps:  -Teach patient strategies to minimize risks such a   -Consider consults to  interdisciplinary teams such as  Outcome: Progressing  Goal: Incision(s), wounds(s) or drain site(s) healing without S/S of infection  Description: INTERVENTIONS  - Assess and document dressing, incision, wound bed, drain sites and surrounding tissue  - Provide patient and family education  - Perform skin care/dressing changes every   Outcome: Progressing     Problem: HEMATOLOGIC - ADULT  Goal: Maintains hematologic stability  Description: INTERVENTIONS  - Assess for signs and symptoms of bleeding or hemorrhage  - Monitor labs  - Administer supportive blood  products/factors as ordered and appropriate  Outcome: Progressing     Problem: CARDIOVASCULAR - ADULT  Goal: Maintains optimal cardiac output and hemodynamic stability  Description: INTERVENTIONS:  - Monitor I/O, vital signs and rhythm  - Monitor for S/S and trends of decreased cardiac output  - Administer and titrate ordered vasoactive medications to optimize hemodynamic stability  - Assess quality of pulses, skin color and temperature  - Assess for signs of decreased coronary artery perfusion  - Instruct patient to report change in severity of symptoms  Outcome: Progressing  Goal: Absence of cardiac dysrhythmias or at baseline rhythm  Description: INTERVENTIONS:  - Continuous cardiac monitoring, vital signs, obtain 12 lead EKG if ordered  - Administer antiarrhythmic and heart rate control medications as ordered  - Monitor electrolytes and administer replacement therapy as ordered  Outcome: Progressing     Problem: RESPIRATORY - ADULT  Goal: Achieves optimal ventilation and oxygenation  Description: INTERVENTIONS:  - Assess for changes in respiratory status  - Assess for changes in mentation and behavior  - Position to facilitate oxygenation and minimize respiratory effort  - Oxygen administered by appropriate delivery if ordered  - Initiate smoking cessation education as indicated  - Encourage broncho-pulmonary hygiene including cough, deep breathe, Incentive Spirometry  - Assess the need for suctioning and aspirate as needed  - Assess and instruct to report SOB or any respiratory difficulty  - Respiratory Therapy support as indicated  Outcome: Progressing

## 2024-12-03 NOTE — ANESTHESIA POSTPROCEDURE EVALUATION
Post-Op Assessment Note    CV Status:  Stable    Pain management: adequate       Mental Status:  Awake   Hydration Status:  Euvolemic   PONV Controlled:  Controlled   Airway Patency:  Patent  Two or more mitigation strategies used for obstructive sleep apnea   Post Op Vitals Reviewed: Yes    No anethesia notable event occurred.    Staff: CRNA, Anesthesiologist           Last Filed PACU Vitals:  Vitals Value Taken Time   Temp     Pulse 96 12/03/24 1610   /56 12/03/24 1607   Resp 37 12/03/24 1610   SpO2 97 % 12/03/24 1610   Vitals shown include unfiled device data.    Modified [place]:  No data recorded

## 2024-12-03 NOTE — ASSESSMENT & PLAN NOTE
64 y.o. female presenting with recurrent SBO. 11/14-11/21 failed conservative management with persistent SBO. S/p ex lap, SBR, DAVID on 11/22.   UOP: 678 cc    Plan  - NPO with planned PEG tube placement per GI  - D/C TPN d/t PEG   - Give dextrose, continue tight glucose parameters  - F/u hospice consult  - Gyn/onc recs appreciate  - Appreciate PT/OT   - accurate I/O given current resumption of PTA diuretics  - Encourage OOB, ambulate, IS  - DVT ppx with therapeutic Lovenox  - Strongly encourage OOB, ambulation, IS    [Normal] : soft, non-tender, non-distended, no masses palpated, no HSM and normal bowel sounds

## 2024-12-03 NOTE — CONSULTS
Nutrition Consult received and appreciated.     Once PEG placed and available to use, reccomend formula of Osmolite 1.2 and slow advancement to prevent refeeding.     Suggest to initiate Osmolite 1.2 @ 20ml/hr advancing 10ml per hour q 8 hour or as tolerated. Monitor electrolytes closely.     Goal: Osmolite 1.2 60ml/hr x 22 hours. Flush 120ml q 4 hours. Add Prosource No Carb 30ml BID.     TF at goal will provide 1704kcal, 103 gm protein, Free water 1082ml - total water with flushes 1802ml.     RD to follow and provide additional recommendations. May resume Low Fiber diet CCD 2 ad Ensure Clear TID as tolerated.

## 2024-12-03 NOTE — UTILIZATION REVIEW
Continued Stay Review    Date: 12/3                          Current Patient Class: Inpatient  Current Level of Care: MS    HPI:64 y.o. female initially admitted on 11/14    Assessment/Plan:   SBO w/ ex lap, SBR and DAVID on 11/22 - for planned PEG tube placement.  Will d/c TPN in prep for tube feeds.  Continue tight glucose control.  Encourage OOB and ambulation.  Pt had GI Consult for PEG tube placement on 12/2.  Pt and family selected PEG tube w/ jejunal extension.  Holding Lovenox and NPO after MN for PEG insertion today.  Imaging - Venous duplex BUE shows superficial thrombophlebitis noted throughout the cephalic vein and in the forearm segments of the basilic vein.  Nothing on LUE.  Today  pt rates abd pain 5/10. No N/V, fevers, chills.  Passing gas, no BM overnight.  On exam abd tenderness.  Hgb is 7.3 today, platelets 86, low NA.  Also seen by Palliative care yesterday - pt is willing to talk to hospice as she does not know how she will go through further cancer care at this point.  Wants to be comfortable at home. If she improves may consider chemo at that time. Pt has capacity today on exam and is groggy, comfortable, stable, not toxic appearing, appears weak and tired.     Medications:   Scheduled Medications:  acetaminophen, 975 mg, Oral, Q8H SAM  bisacodyl, 10 mg, Rectal, Daily  carvedilol, 12.5 mg, Oral, Q12H  cefazolin, 2,000 mg, Intravenous, On Call To OR  chlorhexidine, 15 mL, Mouth/Throat, Q12H SAM  [Held by provider] enoxaparin, 60 mg, Subcutaneous, Q12H SAM  [Held by provider] furosemide, 20 mg, Oral, Daily  gabapentin, 100 mg, Oral, HS  insulin lispro, 4-20 Units, Subcutaneous, Q6H  levothyroxine, 125 mcg, Oral, Daily  nystatin, , Topical, TID  pantoprazole, 40 mg, Oral, Daily Before Breakfast      Continuous IV Infusions:  Adult 3-in-1 TPN (custom base / custom electrolytes), , Intravenous, Continuous TPN      PRN Meds:  furosemide, 20 mg, Intravenous, Daily PRN - x 1 12/3  hydrALAZINE, 5 mg,  Intravenous, Q4H PRN  HYDROmorphone, 0.2 mg, Intravenous, Q3H PRN - x 3 12/2, x 3 12/3  labetalol, 10 mg, Intravenous, Q4H PRN  levalbuterol, 1.25 mg, Nebulization, Q6H PRN  ondansetron, 4 mg, Intravenous, Q4H PRN - x 5 12/2, x 1 12/3  oxyCODONE, 5 mg, Oral, Q4H PRN - x 4 12/2  oxyCODONE, 2.5 mg, Oral, Q4H PRN -x 1 12/2  phenol, 1 spray, Mouth/Throat, Q2H PRN  trimethobenzamide, 200 mg, Intramuscular, Q6H PRN - x 1 12/2 & 12/3      Discharge Plan: TBD    Vital Signs (last 3 days)       Date/Time Temp Pulse Resp BP MAP (mmHg) SpO2 Calculated FIO2 (%) - Nasal Cannula O2 Flow Rate (L/min) Nasal Cannula O2 Flow Rate (L/min) O2 Device Patient Position - Orthostatic VS Claudia Coma Scale Score Pain    12/03/24 12:10:48 -- 85 -- 129/56 80 97 % -- -- -- -- -- -- --    12/03/24 1154 -- -- -- -- -- -- -- -- -- -- -- -- 9    12/03/24 0816 -- -- -- -- -- -- -- -- -- -- -- -- 8    12/03/24 07:31:51 98 °F (36.7 °C) 85 20 125/57 80 95 % -- -- -- -- -- -- --    12/03/24 0515 -- -- -- -- -- -- -- -- -- -- -- -- 10 - Worst Possible Pain    12/02/24 2149 -- -- -- -- -- -- -- -- -- -- -- -- 10 - Worst Possible Pain    12/02/24 2130 -- -- -- -- -- -- -- -- -- None (Room air) -- -- 10 - Worst Possible Pain    12/02/24 20:35:59 98.1 °F (36.7 °C) 79 18 125/59 81 97 % -- -- -- -- -- -- --    12/02/24 1744 -- -- -- -- -- -- -- -- -- -- -- -- 10 - Worst Possible Pain    12/02/24 15:08:20 97.5 °F (36.4 °C) 70 16 125/61 82 97 % -- -- -- -- -- -- --    12/02/24 1408 -- -- -- -- -- -- -- -- -- -- -- -- 10 - Worst Possible Pain    12/02/24 1300 -- -- -- -- -- -- -- -- -- -- -- -- 10 - Worst Possible Pain    12/02/24 1238 -- -- -- -- -- -- -- -- -- -- -- -- 10 - Worst Possible Pain    12/02/24 1205 -- -- -- 152/66 -- -- -- -- -- -- -- -- --    12/02/24 0934 -- -- -- -- -- -- -- -- -- -- -- -- 10 - Worst Possible Pain    12/02/24 0858 -- -- -- -- -- -- -- -- -- -- -- -- 10 - Worst Possible Pain    12/02/24 08:53:36 98 °F (36.7 °C) 72 16 149/66 94 97  % 20 0 L/min 0 L/min None (Room air) Lying -- --    12/02/24 0837 -- -- -- -- -- -- -- -- -- -- -- -- 10 - Worst Possible Pain    12/02/24 0730 -- -- -- -- -- -- -- -- -- -- -- 15 --    12/02/24 07:14:04 96.5 °F (35.8 °C) 71 16 146/63 91 96 % -- -- -- -- -- -- --    12/02/24 0604 -- -- -- -- -- -- -- -- -- -- -- -- Med Not Given for Pain - for MAR use only    12/02/24 0221 -- -- -- -- -- -- -- -- -- -- -- -- 10 - Worst Possible Pain    12/02/24 0036 -- -- -- -- -- -- -- -- -- -- -- -- 10 - Worst Possible Pain    12/01/24 2306 -- -- -- -- -- -- -- -- -- -- -- -- 10 - Worst Possible Pain    12/01/24 2218 -- -- -- -- -- -- -- -- -- -- -- -- 9    12/01/24 2143 -- -- -- -- -- -- -- -- -- -- -- -- Med Not Given for Pain - for MAR use only    12/01/24 21:21:39 97.1 °F (36.2 °C) 69 18 149/64 92 96 % -- -- -- None (Room air) Lying -- --    12/01/24 1958 -- -- -- -- -- -- -- -- -- -- -- 15 10 - Worst Possible Pain    12/01/24 1838 -- -- -- -- -- -- -- -- -- -- -- -- 10 - Worst Possible Pain    12/01/24 1640 -- -- -- -- -- -- -- -- -- -- -- -- 10 - Worst Possible Pain    12/01/24 15:28:46 97.6 °F (36.4 °C) 72 18 150/66 94 98 % -- -- -- -- -- -- --    12/01/24 1301 -- -- -- -- -- -- -- -- -- -- -- -- 10 - Worst Possible Pain    12/01/24 1248 -- -- -- -- -- -- -- -- -- -- -- -- 10 - Worst Possible Pain    12/01/24 0941 -- -- -- -- -- -- -- -- -- -- -- -- 10 - Worst Possible Pain    12/01/24 0911 -- 74 -- 155/69 -- -- -- -- -- -- -- -- --    12/01/24 07:20:33 97.7 °F (36.5 °C) 74 18 155/69 98 97 % 20 0 L/min 0 L/min None (Room air) Lying 15 --    12/01/24 0710 -- -- -- -- -- -- -- -- -- -- -- -- 10 - Worst Possible Pain    12/01/24 0659 -- -- -- -- -- -- -- -- -- -- -- -- 10 - Worst Possible Pain    12/01/24 0417 -- -- -- -- -- -- -- -- -- -- -- -- 10 - Worst Possible Pain    11/30/24 2217 -- -- -- -- -- -- -- -- -- -- -- -- 10 - Worst Possible Pain    11/30/24 21:24:38 97.6 °F (36.4 °C) 73 18 137/58 84 97 % -- -- -- -- -- -- --     11/30/24 2026 -- -- -- -- -- -- -- -- -- -- -- -- 10 - Worst Possible Pain    11/30/24 2000 -- -- -- -- -- -- -- -- -- None (Room air) -- 15 --    11/30/24 1641 -- -- -- -- -- -- -- -- -- -- -- -- 10 - Worst Possible Pain    11/30/24 1607 -- 71 19 144/59 87 97 % -- -- -- None (Room air) Lying -- --    11/30/24 1418 -- -- -- -- -- -- -- -- -- -- -- -- 8    11/30/24 1325 -- -- -- -- -- -- -- -- -- -- -- -- 8    11/30/24 1054 -- -- -- -- -- -- -- -- -- -- -- -- 10 - Worst Possible Pain    11/30/24 0906 -- -- -- -- -- -- -- -- -- -- -- -- 10 - Worst Possible Pain    11/30/24 0900 -- -- -- -- -- 98 % -- -- -- None (Room air) -- 15 10 - Worst Possible Pain    11/30/24 07:12:56 98.7 °F (37.1 °C) 72 18 150/59 89 98 % -- -- -- None (Room air) Lying -- --    11/30/24 0506 -- -- -- -- -- -- -- -- -- -- -- -- Med Not Given for Pain - for MAR use only    11/30/24 0225 -- -- -- -- -- -- -- -- -- -- -- -- 10 - Worst Possible Pain          Weight (last 2 days)       Date/Time Weight    12/03/24 0531 122 (269.62)    12/02/24 0600 122 (269.4)    12/02/24 0440 122 (269.4)    12/01/24 0531 123 (272.05)            Pertinent Labs/Diagnostic Results:   Radiology:  VAS upper limb venous duplex scan, unilateral/limited   Final Interpretation by Pao Bradshaw MD (12/02 1734)   RIGHT UPPER LIMB:  No evidence of acute or chronic deep vein thrombosis.  Evidence of superficial thrombophlebitis noted throughout the cephalic vein and  in the forearm segments of the basilic vein.  Unable to visualize vessels in the antecubital fossa area secondary to bandages  from PICC line.     LEFT UPPER LIMB LIMITED:  Evaluation shows no gross evidence of thrombus in the internal jugular vein,  subclavian vein, and the innominate vein.      CT chest abdomen pelvis w contrast   Final Interpretation by Lauri Adam MD (11/30 3793)      1.Resolution of obstructive pattern seen in the small bowel loops with contrast in the distal nondistended small  bowel loops and colon. Stomach is distended with air.      2.Moderate ascites in the perihepatic space tracking in the right paracolic gutter into the pelvis.   Worsening subcutaneous edema or third spacing.      3.Severely distended bladder with tiny intraluminal air causing fullness of the collecting systems bilaterally.      4. Trace right and small to moderate right effusion with adjacent atelectasis. Resolution of previous possible infiltrative changes in the right lung base.         Workstation performed: [id number]         XR abdomen 1 view kub   Final Interpretation by Lorne Underwood MD (11/29 0814)   Mild gaseous prominence of large and small bowel loops appears improved from prior study consistent with resolving postoperative ileus.         Workstation performed: [id number]         XR abdomen 1 view kub   Final Interpretation by Edis Medina MD (11/21 0910)      Persistent small bowel dilatation, slightly increased from the most recent prior study, with contrast in the colon. Continued partial small bowel obstruction not excluded.               Workstation performed: [id number]         XR abdomen 1 view kub   Final Interpretation by Frederick Zapata MD (11/20 0824)      Limited study. Continued dilution and/or suction removal of enteric contrast from otherwise persistently dilated small bowel centrally in the abdomen. The bowel loops may be slightly less distended than on the 18th. Overall, findings are still concerning    for possible partial small bowel obstruction.      The contrast in the right side of the colon has been unchanged for many days.      The endogastric tube is stable in position      Numerous suspected skinfolds projecting over the abdomen.      Please consider repeat abdominopelvic CT scan for more thorough assessment.      The study was marked in EPIC for immediate notification.               Workstation performed: [id number]         XR abdomen 1 vw portable   Final  Interpretation by Tariq Gallagher DO (11/19 1953)      Air-filled, dilated probable small bowel persists in the central abdomen with dilution of oral contrast within bowel compared to the previous study. Although there is contrast reaching the right colon, findings may reflect at least moderate grade partial    small bowel obstruction.               Resident: Romy Blunt I, the attending radiologist, have reviewed the images and agree with the final report above.      Workstation performed: [id number]         XR abdomen 1 view kub   Final Interpretation by Frederick Zapata MD (11/18 5516)      Markedly dilated small bowel loops centrally in the abdomen containing enteric contrast. The appearance is most compatible with a high-grade small bowel obstruction.      Enteric tube tip in the left upper abdomen presumably in the gastric body.      There is some contrast within bowel in the right side of the abdomen which seems similar to the prior study and might be residual colonic contrast from prior imaging work-up.      The study was marked in EPIC for immediate notification.               Workstation performed: [id number]         XR abdomen 1 view kub   Final Interpretation by Damian Mack MD (11/17 5058)      NGT partially retracted with tip in proximal stomach.      Small bowel obstruction again suggested.               Workstation performed: [id number]         XR abdomen 1 view kub   Final Interpretation by Jaylin Magaña MD (11/15 6111)      Technically limited study, as discussed above.      No dilated loops of bowel seen, however note fluid-filled loops of bowel as were seen on the 11/14/2024 CT may not be apparent with supine radiographs.      Small amount of hyperdense material seen in the loop of presumably ascending colon. This may represent some administered oral contrast, although no oral contrast is seen elsewhere in the gastrointestinal tract, or possibly vicarious excretion of  the IV    contrast administered on 11/14/2024.      Excreted IV contrast from the 11/14/2024 CT seen within the right collecting system, right ureter and the urinary bladder, indicating delayed renal clearance. Mild right-sided hydronephrosis.               Workstation performed: [id number]         XR chest portable   Final Interpretation by Kalie Nova MD (11/15 3498)      Distal aspect of NG tube is coursing below the left hemidiaphragm.            Workstation performed: [id number]         CT abdomen pelvis with contrast   Final Interpretation by E. Alec Schoenberger, MD (11/14 4979)   Small bowel obstruction with transition in the pelvis is again seen.   New small volume of ascites   Other stable findings as above.         Workstation performed: [id number]           Cardiology:  ECG 12 lead   Final Result by Eleazar Blanco DO (11/15 0702)   Sinus tachycardia   Left axis deviation   Minimal voltage criteria for LVH, may be normal variant   Abnormal ECG   When compared with ECG of 07-Nov-2024 08:00,   Vent. rate has increased by  42 bpm   Confirmed by Eleazar Blanco ([id number]) on 11/15/2024 7:02:14 AM        GI:  No orders to display           Results from last 7 days   Lab Units 12/03/24 0457 12/02/24 0503 11/30/24 0459 11/29/24 0544 11/28/24  0451   WBC Thousand/uL 5.27 5.00 5.10 5.20 4.14*   HEMOGLOBIN g/dL 7.3* 8.5* 8.3* 8.2* 7.7*   HEMATOCRIT % 23.3* 26.1* 25.8* 26.1* 24.4*   PLATELETS Thousands/uL 86* 115* 123* 113* 114*   TOTAL NEUT ABS Thousands/µL 4.65 4.20 4.15 4.06 3.23         Results from last 7 days   Lab Units 12/03/24 0457 12/02/24 2009 12/02/24 0503 12/01/24 0426 11/30/24 0459 11/29/24  0544 11/28/24 0451 11/27/24  0619   SODIUM mmol/L  --  132* 132* 129* 131* 133* 131* 131*   POTASSIUM mmol/L  --  3.7 3.9 3.9 4.2 4.4 3.9 3.7   CHLORIDE mmol/L  --  107 105 104 105 106 105 105   CO2 mmol/L  --  21 23 22 23 22 21 23   ANION GAP mmol/L  --  4 4 3* 3* 5 5 3*   BUN mg/dL  --  30* 31*  33* 39* 41* 41* 35*   CREATININE mg/dL  --  0.79 0.76 0.75 0.81 0.90 0.77 0.63   EGFR ml/min/1.73sq m  --  80 84 85 78 68 83 96   CALCIUM mg/dL  --  7.4* 7.4* 7.4* 7.5* 7.5* 7.5* 7.6*   CALCIUM, IONIZED mmol/L 1.13  --   --   --   --   --   --  1.16   MAGNESIUM mg/dL 2.1  --   --  2.2 2.4  --  2.5 2.5   PHOSPHORUS mg/dL 3.4  --   --   --  4.4* 5.1* 4.4* 4.2*     Results from last 7 days   Lab Units 11/27/24  0619   AST U/L 7*   ALT U/L 7   ALK PHOS U/L 118*   TOTAL PROTEIN g/dL 4.0*   ALBUMIN g/dL 1.9*   TOTAL BILIRUBIN mg/dL 0.23   BILIRUBIN DIRECT mg/dL 0.03     Results from last 7 days   Lab Units 12/03/24  0944 12/03/24  0621 12/03/24  0552 12/02/24  2034 12/02/24  1623 12/02/24  1132 12/02/24  0712 12/01/24  2121 12/01/24  1614 12/01/24  1109 12/01/24  0708 11/30/24  2120   POC GLUCOSE mg/dl 205* 223* 51* 131 134 148* 161* 160* 144* 149* 116 129     Results from last 7 days   Lab Units 12/02/24  2009 12/02/24  0503 12/01/24  0426 11/30/24  0459 11/29/24  0544 11/28/24  0451 11/27/24  0619   GLUCOSE RANDOM mg/dL 135 159* 126 149* 140 129 251*     Results from last 7 days   Lab Units 11/27/24  0619   PTT seconds 49*     Network Utilization Review Department  ATTENTION: Please call with any questions or concerns to [phone] and carefully listen to the prompts so that you are directed to the right person. All voicemails are confidential.   For Discharge needs, contact Care Management DC Support Team at [phone] opt. 2  Send all requests for admission clinical reviews, approved or denied determinations and any other requests to dedicated fax number below belonging to the campus where the patient is receiving treatment. List of dedicated fax numbers for the Facilities:  FACILITY NAME UR FAX NUMBER   ADMISSION DENIALS (Administrative/Medical Necessity) [phone]   DISCHARGE SUPPORT TEAM (NETWORK) [phone]   PARENT CHILD HEALTH (Maternity/NICU/Pediatrics) [phone]   [facility]  [place] [phone]   [facility] [phone]   [facility] [phone]   [facility] [phone]   [facility] [phone]   [facility] [phone]   [facility] [phone]   [facility] [phone]   [facility] [phone]   [facility] [phone]   [facility] [phone]   [facility] [phone]

## 2024-12-03 NOTE — PLAN OF CARE
Problem: Potential for Falls  Goal: Patient will remain free of falls  Description: INTERVENTIONS:  - Educate patient/family on patient safety including physical limitations  - Instruct patient to call for assistance with activity   - Consult OT/PT to assist with strengthening/mobility   - Keep Call bell within reach  - Keep bed low and locked with side rails adjusted as appropriate  - Keep care items and personal belongings within reach  - Initiate and maintain comfort rounds  - Make Fall Risk Sign visible to staff  - Offer Toileting every 3 Hours, in advance of need  - Initiate/Maintain bed alarm  - Obtain necessary fall risk management equipment  - Apply yellow socks and bracelet for high fall risk patients  - Consider moving patient to room near nurses station  Outcome: Progressing     Problem: PAIN - ADULT  Goal: Verbalizes/displays adequate comfort level or baseline comfort level  Description: Interventions:  - Encourage patient to monitor pain and request assistance  - Assess pain using appropriate pain scale  - Administer analgesics based on type and severity of pain and evaluate response  - Implement non-pharmacological measures as appropriate and evaluate response  - Consider cultural and social influences on pain and pain management  - Notify physician/advanced practitioner if interventions unsuccessful or patient reports new pain  Outcome: Progressing     Problem: SAFETY ADULT  Goal: Patient will remain free of falls  Description: INTERVENTIONS:  - Educate patient/family on patient safety including physical limitations  - Instruct patient to call for assistance with activity   - Consult OT/PT to assist with strengthening/mobility   - Keep Call bell within reach  - Keep bed low and locked with side rails adjusted as appropriate  - Keep care items and personal belongings within reach  - Initiate and maintain comfort rounds  - Make Fall Risk Sign visible to staff  - Offer Toileting every 3 Hours, in  advance of need  - Initiate/Maintain bed alarm  - Obtain necessary fall risk management equipment  - Apply yellow socks and bracelet for high fall risk patients  - Consider moving patient to room near nurses station  Outcome: Progressing  Goal: Maintain or return to baseline ADL function  Description: INTERVENTIONS:  -  Assess patient's ability to carry out ADLs; assess patient's baseline for ADL function and identify physical deficits which impact ability to perform ADLs (bathing, care of mouth/teeth, toileting, grooming, dressing, etc.)  - Assess/evaluate cause of self-care deficits   - Assess range of motion  - Assess patient's mobility; develop plan if impaired  - Assess patient's need for assistive devices and provide as appropriate  - Encourage maximum independence but intervene and supervise when necessary  - Involve family in performance of ADLs  - Assess for home care needs following discharge   - Consider OT consult to assist with ADL evaluation and planning for discharge  - Provide patient education as appropriate  Outcome: Progressing  Goal: Maintains/Returns to pre admission functional level  Description: INTERVENTIONS:  - Perform AM-PAC 6 Click Basic Mobility/ Daily Activity assessment daily.  - Set and communicate daily mobility goal to care team and patient/family/caregiver.   - Collaborate with rehabilitation services on mobility goals if consulted  - Perform Range of Motion 3 times a day.  - Reposition patient every 3 hours.  - Dangle patient 3 times a day  - Stand patient 3 times a day  - Ambulate patient 3 times a day  - Out of bed to chair 3 times a day   - Out of bed for meals 3 times a day  - Out of bed for toileting  - Record patient progress and toleration of activity level   Outcome: Progressing     Problem: DISCHARGE PLANNING  Goal: Discharge to home or other facility with appropriate resources  Description: INTERVENTIONS:  - Identify barriers to discharge w/patient and caregiver  -  Arrange for needed discharge resources and transportation as appropriate  - Identify discharge learning needs (meds, wound care, etc.)  - Arrange for interpretive services to assist at discharge as needed  - Refer to Case Management Department for coordinating discharge planning if the patient needs post-hospital services based on physician/advanced practitioner order or complex needs related to functional status, cognitive ability, or social support system  Outcome: Progressing     Problem: GASTROINTESTINAL - ADULT  Goal: Minimal or absence of nausea and/or vomiting  Description: INTERVENTIONS:  - Administer IV fluids if ordered to ensure adequate hydration  - Maintain NPO status until nausea and vomiting are resolved  - Nasogastric tube if ordered  - Administer ordered antiemetic medications as needed  - Provide nonpharmacologic comfort measures as appropriate  - Advance diet as tolerated, if ordered  - Consider nutrition services referral to assist patient with adequate nutrition and appropriate food choices  Outcome: Progressing  Goal: Maintains or returns to baseline bowel function  Description: INTERVENTIONS:  - Assess bowel function  - Encourage oral fluids to ensure adequate hydration  - Administer IV fluids if ordered to ensure adequate hydration  - Administer ordered medications as needed  - Encourage mobilization and activity  - Consider nutritional services referral to assist patient with adequate nutrition and appropriate food choices  Outcome: Progressing  Goal: Maintains adequate nutritional intake  Description: INTERVENTIONS:  - Monitor percentage of each meal consumed  - Identify factors contributing to decreased intake, treat as appropriate  - Assist with meals as needed  - Monitor I&O, weight, and lab values if indicated  - Obtain nutrition services referral as needed  Outcome: Progressing  Goal: Establish and maintain optimal ostomy function  Description: INTERVENTIONS:  - Assess bowel  function  - Encourage oral fluids to ensure adequate hydration  - Administer IV fluids if ordered to ensure adequate hydration   - Administer ordered medications as needed  - Encourage mobilization and activity  - Nutrition services referral to assist patient with appropriate food choices  - Assess stoma site  - Consider wound care consult   Outcome: Progressing  Goal: Oral mucous membranes remain intact  Description: INTERVENTIONS  - Assess oral mucosa and hygiene practices  - Implement preventative oral hygiene regimen  - Implement oral medicated treatments as ordered  - Initiate Nutrition services referral as needed  Outcome: Progressing     Problem: Prexisting or High Potential for Compromised Skin Integrity  Goal: Skin integrity is maintained or improved  Description: INTERVENTIONS:  - Identify patients at risk for skin breakdown  - Assess and monitor skin integrity  - Assess and monitor nutrition and hydration status  - Monitor labs   - Assess for incontinence   - Turn and reposition patient  - Assist with mobility/ambulation  - Relieve pressure over bony prominences  - Avoid friction and shearing  - Provide appropriate hygiene as needed including keeping skin clean and dry  - Evaluate need for skin moisturizer/barrier cream  - Collaborate with interdisciplinary team   - Patient/family teaching  - Consider wound care consult   Outcome: Progressing     Problem: Nutrition/Hydration-ADULT  Goal: Nutrient/Hydration intake appropriate for improving, restoring or maintaining nutritional needs  Description: Monitor and assess patient's nutrition/hydration status for malnutrition. Collaborate with interdisciplinary team and initiate plan and interventions as ordered.  Monitor patient's weight and dietary intake as ordered or per policy. Utilize nutrition screening tool and intervene as necessary. Determine patient's food preferences and provide high-protein, high-caloric foods as appropriate.     INTERVENTIONS:  -  Monitor oral intake, urinary output, labs, and treatment plans  - Assess nutrition and hydration status and recommend course of action  - Evaluate amount of meals eaten  - Assist patient with eating if necessary   - Allow adequate time for meals  - Recommend/ encourage appropriate diets, oral nutritional supplements, and vitamin/mineral supplements  - Order, calculate, and assess calorie counts as needed  - Recommend, monitor, and adjust tube feedings and TPN/PPN based on assessed needs  - Assess need for intravenous fluids  - Provide specific nutrition/hydration education as appropriate  - Include patient/family/caregiver in decisions related to nutrition  Outcome: Progressing     Problem: INFECTION - ADULT  Goal: Absence or prevention of progression during hospitalization  Description: INTERVENTIONS:  - Assess and monitor for signs and symptoms of infection  - Monitor lab/diagnostic results  - Monitor all insertion sites, i.e. indwelling lines, tubes, and drains  - Monitor endotracheal if appropriate and nasal secretions for changes in amount and color  - [place] appropriate cooling/warming therapies per order  - Administer medications as ordered  - Instruct and encourage patient and family to use good hand hygiene technique  - Identify and instruct in appropriate isolation precautions for identified infection/condition  Outcome: Progressing     Problem: Knowledge Deficit  Goal: Patient/family/caregiver demonstrates understanding of disease process, treatment plan, medications, and discharge instructions  Description: Complete learning assessment and assess knowledge base.  Interventions:  - Provide teaching at level of understanding  - Provide teaching via preferred learning methods  Outcome: Progressing     Problem: METABOLIC, FLUID AND ELECTROLYTES - ADULT  Goal: Electrolytes maintained within normal limits  Description: INTERVENTIONS:  - Monitor labs and assess patient for signs and symptoms of electrolyte  imbalances  - Administer electrolyte replacement as ordered  - Monitor response to electrolyte replacements, including repeat lab results as appropriate  - Instruct patient on fluid and nutrition as appropriate  Outcome: Progressing     Problem: SKIN/TISSUE INTEGRITY - ADULT  Goal: Skin Integrity remains intact(Skin Breakdown Prevention)  Description: Assess:  -Perform Esteban assessment every shift  -Clean and moisturize skin every shift  -Inspect skin when repositioning, toileting, and assisting with ADLS  -Assess under medical devices such as masimo every shift  -Assess extremities for adequate circulation and sensation     Bed Management:  -Have minimal linens on bed & keep smooth, unwrinkled  -Change linens as needed when moist or perspiring  -Avoid sitting or lying in one position for more than 2 hours while in bed  -Keep HOB at 30 degrees     Toileting:  -Offer bedside commode  -Assess for incontinence every 2 hours  -Use incontinent care products after each incontinent episode such as lotion    Activity:  -Mobilize patient 3 times a day  -Encourage activity and walks on unit  -Encourage or provide ROM exercises   -Turn and reposition patient every 2 Hours  -Use appropriate equipment to lift or move patient in bed  -Instruct/ Assist with weight shifting every 1 hour when out of bed in chair  -Consider limitation of chair time 2 hour intervals    Skin Care:  -Avoid use of baby powder, tape, friction and shearing, hot water or constrictive clothing  -Relieve pressure over bony prominences using allevyn  -Do not massage red bony areas    Next Steps:  -Teach patient strategies to minimize risks such as repositioning   -Consider consults to  interdisciplinary teams such as PTOT  Outcome: Progressing  Goal: Incision(s), wounds(s) or drain site(s) healing without S/S of infection  Description: INTERVENTIONS  - Assess and document dressing, incision, wound bed, drain sites and surrounding tissue  - Provide patient and  family education  - Perform skin care/dressing changes every shift/as needed  Outcome: Progressing     Problem: HEMATOLOGIC - ADULT  Goal: Maintains hematologic stability  Description: INTERVENTIONS  - Assess for signs and symptoms of bleeding or hemorrhage  - Monitor labs  - Administer supportive blood products/factors as ordered and appropriate  Outcome: Progressing     Problem: CARDIOVASCULAR - ADULT  Goal: Maintains optimal cardiac output and hemodynamic stability  Description: INTERVENTIONS:  - Monitor I/O, vital signs and rhythm  - Monitor for S/S and trends of decreased cardiac output  - Administer and titrate ordered vasoactive medications to optimize hemodynamic stability  - Assess quality of pulses, skin color and temperature  - Assess for signs of decreased coronary artery perfusion  - Instruct patient to report change in severity of symptoms  Outcome: Progressing  Goal: Absence of cardiac dysrhythmias or at baseline rhythm  Description: INTERVENTIONS:  - Continuous cardiac monitoring, vital signs, obtain 12 lead EKG if ordered  - Administer antiarrhythmic and heart rate control medications as ordered  - Monitor electrolytes and administer replacement therapy as ordered  Outcome: Progressing     Problem: RESPIRATORY - ADULT  Goal: Achieves optimal ventilation and oxygenation  Description: INTERVENTIONS:  - Assess for changes in respiratory status  - Assess for changes in mentation and behavior  - Position to facilitate oxygenation and minimize respiratory effort  - Oxygen administered by appropriate delivery if ordered  - Initiate smoking cessation education as indicated  - Encourage broncho-pulmonary hygiene including cough, deep breathe, Incentive Spirometry  - Assess the need for suctioning and aspirate as needed  - Assess and instruct to report SOB or any respiratory difficulty  - Respiratory Therapy support as indicated  Outcome: Progressing

## 2024-12-03 NOTE — CASE MANAGEMENT
Case Management Assessment    Patient name Lety Botello  Location [place] [address] /[place] 2 M* MRN [id number]  : 1960 Date 12/3/2024         OBJECTIVE:  PATIENT READMITTED TO HOSPITAL  Risk of Unplanned Readmission Score: 30.87         Current admission status: Inpatient         ASSESSMENT:  Active Health Care Proxies       Sharda Carver Health Care Representative - Niece   Primary Phone: [phone] (Work)                 Advance Directives  Does patient have a Health Care POA?: Yes  Was patient offered paperwork?: Yes (completed ACP paperwork this a.m with pt being of sound mind and verbally understands process and that which is being discussed.  Form to be scanned in EHR)  Does patient currently have a Health Care decision maker?: Yes, please see Health Care Proxy section  Does patient have Advance Directives?: Yes  Advance Directives: Living will, Power of  for health care (completed ACP paperwork this a.m with pt being of sound mind and verbally understands process and that which is being discussed. Form to be scanned in EHR)  Was patient offered paperwork?: Yes  Primary Contact: Sharda Carver-friend

## 2024-12-03 NOTE — ANESTHESIA POSTPROCEDURE EVALUATION
Post-Op Assessment Note    CV Status:  Stable  Pain Score: 1    Pain management: adequate       Mental Status:  Alert and awake   Hydration Status:  Euvolemic   PONV Controlled:  Controlled   Airway Patency:  Patent     Post Op Vitals Reviewed: Yes    No anethesia notable event occurred.    Staff: Anesthesiologist           Last Filed PACU Vitals:  Vitals Value Taken Time   Temp 97.7 °F (36.5 °C) 12/03/24 1613   Pulse 94 12/03/24 1628   /59 12/03/24 1623   Resp 30 12/03/24 1628   SpO2 98 % 12/03/24 1628   Vitals shown include unfiled device data.    Modified [place]:  Activity: 2 (12/3/2024  4:23 PM)  Respiration: 2 (12/3/2024  4:23 PM)  Circulation: 2 (12/3/2024  4:23 PM)  Consciousness: 1 (12/3/2024  4:23 PM)  Oxygen Saturation: 1 (12/3/2024  4:23 PM)  Modified [place] Score: 8 (12/3/2024  4:23 PM)

## 2024-12-03 NOTE — ANESTHESIA PREPROCEDURE EVALUATION
Procedure:  EGD    Relevant Problems   CARDIO   (+) Hypertension      ENDO   (+) Hypothyroidism   (+) Type 2 diabetes mellitus (HCC)      GI/HEPATIC   (+) SBO (small bowel obstruction) (HCC)      GYN   (+) Endometrial cancer (HCC)      PULMONARY   (+) Mild intermittent asthma      Other   (+) Morbid obesity (HCC)        Physical Exam    Airway    Mallampati score: II         Dental       Cardiovascular  Rhythm: regular, Rate: normal    Pulmonary   Breath sounds clear to auscultation, Decreased breath sounds    Other Findings  post-pubertal.      Anesthesia Plan  ASA Score- 3     Anesthesia Type- general with ASA Monitors.         Additional Monitors:     Airway Plan:            Plan Factors-Exercise tolerance (METS): >4 METS.    Chart reviewed.   Existing labs reviewed. Patient summary reviewed.    Patient is not a current smoker.      Obstructive sleep apnea risk education given perioperatively.        Induction- intravenous.    Postoperative Plan-     Perioperative Resuscitation Plan - Level 1 - Full Code.       Informed Consent- Anesthetic plan and risks discussed with patient.

## 2024-12-03 NOTE — PROGRESS NOTES
Progress Note - Surgery-General   Name: Lety Botello 64 y.o. female I MRN: [id number]  Unit/Bed#: [place] [address] -01 I Date of Admission: 11/14/2024   Date of Service: 12/2/2024 I Hospital Day: 18     Assessment & Plan  SBO (small bowel obstruction) (HCC)  64 y.o. female presenting with recurrent SBO. 11/14-11/21 failed conservative management with persistent SBO. S/p ex lap, SBR, DAVID on 11/22.   UOP: 678 cc    Plan  - NPO with planned PEG tube placement per GI  - D/C TPN d/t PEG   - Give dextrose, continue tight glucose parameters  - F/u hospice consult  - Gyn/onc recs appreciate  - Appreciate PT/OT   - accurate I/O given current resumption of PTA diuretics  - Encourage OOB, ambulate, IS  - DVT ppx with therapeutic Lovenox  - Strongly encourage OOB, ambulation, IS     24 Hour Events : No acute overnight events.  Subjective : Patient states she has 5 out of 10 abdominal pain near the incision site.  Incision site is clean dry and intact.  She denies any nausea, vomiting, fevers or chills.  Patient is passing gas but has not had a bowel movement overnight.    Objective :  Visit Vitals  /61   Pulse 70   Temp 97.5 °F (36.4 °C)   Resp 16   Ht 5' (1.524 m)   Wt 122 kg (269 lb 6.4 oz)   SpO2 97%   BMI 52.61 kg/m²   Smoking Status Never   BSA 2.12 m²        Physical Exam  Constitutional:       General: She is not in acute distress.     Appearance: Normal appearance. She is obese. She is not ill-appearing, toxic-appearing or diaphoretic.   Cardiovascular:      Rate and Rhythm: Normal rate and regular rhythm.      Pulses: Normal pulses.      Heart sounds: Normal heart sounds. No murmur heard.     No gallop.   Pulmonary:      Effort: Pulmonary effort is normal. No respiratory distress.      Breath sounds: Normal breath sounds. No wheezing.   Abdominal:      General: There is no distension.      Palpations: Abdomen is soft. There is no mass.      Tenderness: There is abdominal tenderness. There is no guarding or rebound.       Hernia: No hernia is present.   Neurological:      General: No focal deficit present.      Mental Status: She is alert and oriented to person, place, and time.         Recent Labs     12/01/24  0426 12/02/24  0503 12/02/24 2009 12/03/24  0457   WBC  --  5.00  --  5.27   HGB  --  8.5*  --  7.3*   PLT  --  115*  --  86*   SODIUM 129* 132* 132*  --    K 3.9 3.9 3.7  --     105 107  --    CO2 22 23 21  --    BUN 33* 31* 30*  --    CREATININE 0.75 0.76 0.79  --    GLUC 126 159* 135  --    CALCIUM 7.4* 7.4* 7.4*  --    MG 2.2  --   --   --             VTE Pharmacologic Prophylaxis: VTE covered by:  [Held by provider] enoxaparin, Subcutaneous, 60 mg at 12/02/24 0951     VTE Mechanical Prophylaxis: sequential compression device

## 2024-12-04 LAB
ANION GAP SERPL CALCULATED.3IONS-SCNC: 4 MMOL/L (ref 4–13)
BUN SERPL-MCNC: 29 MG/DL (ref 5–25)
CALCIUM SERPL-MCNC: 7.5 MG/DL (ref 8.4–10.2)
CHLORIDE SERPL-SCNC: 110 MMOL/L (ref 96–108)
CO2 SERPL-SCNC: 22 MMOL/L (ref 21–32)
CREAT SERPL-MCNC: 0.82 MG/DL (ref 0.6–1.3)
GFR SERPL CREATININE-BSD FRML MDRD: 76 ML/MIN/1.73SQ M
GLUCOSE SERPL-MCNC: 108 MG/DL (ref 65–140)
GLUCOSE SERPL-MCNC: 128 MG/DL (ref 65–140)
GLUCOSE SERPL-MCNC: 128 MG/DL (ref 65–140)
GLUCOSE SERPL-MCNC: 130 MG/DL (ref 65–140)
GLUCOSE SERPL-MCNC: 150 MG/DL (ref 65–140)
GLUCOSE SERPL-MCNC: 153 MG/DL (ref 65–140)
MAGNESIUM SERPL-MCNC: 2 MG/DL (ref 1.9–2.7)
PHOSPHATE SERPL-MCNC: 2.5 MG/DL (ref 2.3–4.1)
POTASSIUM SERPL-SCNC: 3.9 MMOL/L (ref 3.5–5.3)
SODIUM SERPL-SCNC: 136 MMOL/L (ref 135–147)

## 2024-12-04 PROCEDURE — 84100 ASSAY OF PHOSPHORUS: CPT | Performed by: INTERNAL MEDICINE

## 2024-12-04 PROCEDURE — 99024 POSTOP FOLLOW-UP VISIT: CPT | Performed by: SPECIALIST

## 2024-12-04 PROCEDURE — 82948 REAGENT STRIP/BLOOD GLUCOSE: CPT

## 2024-12-04 PROCEDURE — 80048 BASIC METABOLIC PNL TOTAL CA: CPT | Performed by: INTERNAL MEDICINE

## 2024-12-04 PROCEDURE — 83735 ASSAY OF MAGNESIUM: CPT | Performed by: INTERNAL MEDICINE

## 2024-12-04 RX ADMIN — CARVEDILOL 12.5 MG: 12.5 TABLET, FILM COATED ORAL at 09:02

## 2024-12-04 RX ADMIN — NYSTATIN: 100000 POWDER TOPICAL at 09:02

## 2024-12-04 RX ADMIN — OXYCODONE 5 MG: 5 TABLET ORAL at 05:37

## 2024-12-04 RX ADMIN — GABAPENTIN 100 MG: 100 CAPSULE ORAL at 21:27

## 2024-12-04 RX ADMIN — HYDROMORPHONE HYDROCHLORIDE 0.2 MG: 0.2 INJECTION, SOLUTION INTRAMUSCULAR; INTRAVENOUS; SUBCUTANEOUS at 20:18

## 2024-12-04 RX ADMIN — HYDROMORPHONE HYDROCHLORIDE 0.5 MG: 1 INJECTION, SOLUTION INTRAMUSCULAR; INTRAVENOUS; SUBCUTANEOUS at 12:28

## 2024-12-04 RX ADMIN — ACETAMINOPHEN 975 MG: 325 TABLET, FILM COATED ORAL at 14:26

## 2024-12-04 RX ADMIN — HYDROMORPHONE HYDROCHLORIDE 0.2 MG: 0.2 INJECTION, SOLUTION INTRAMUSCULAR; INTRAVENOUS; SUBCUTANEOUS at 10:16

## 2024-12-04 RX ADMIN — CHLORHEXIDINE GLUCONATE 15 ML: 1.2 RINSE ORAL at 09:01

## 2024-12-04 RX ADMIN — INSULIN LISPRO 4 UNITS: 100 INJECTION, SOLUTION INTRAVENOUS; SUBCUTANEOUS at 00:33

## 2024-12-04 RX ADMIN — NYSTATIN 1 APPLICATION: 100000 POWDER TOPICAL at 16:26

## 2024-12-04 RX ADMIN — LEVOTHYROXINE SODIUM 125 MCG: 125 TABLET ORAL at 09:01

## 2024-12-04 RX ADMIN — ACETAMINOPHEN 975 MG: 325 TABLET, FILM COATED ORAL at 05:37

## 2024-12-04 RX ADMIN — HYDROMORPHONE HYDROCHLORIDE 0.5 MG: 1 INJECTION, SOLUTION INTRAMUSCULAR; INTRAVENOUS; SUBCUTANEOUS at 00:33

## 2024-12-04 RX ADMIN — ENOXAPARIN SODIUM 60 MG: 100 INJECTION SUBCUTANEOUS at 21:27

## 2024-12-04 RX ADMIN — HYDROMORPHONE HYDROCHLORIDE 0.5 MG: 1 INJECTION, SOLUTION INTRAMUSCULAR; INTRAVENOUS; SUBCUTANEOUS at 08:48

## 2024-12-04 RX ADMIN — CARVEDILOL 12.5 MG: 12.5 TABLET, FILM COATED ORAL at 21:27

## 2024-12-04 RX ADMIN — CHLORHEXIDINE GLUCONATE 15 ML: 1.2 RINSE ORAL at 21:27

## 2024-12-04 RX ADMIN — INSULIN LISPRO 4 UNITS: 100 INJECTION, SOLUTION INTRAVENOUS; SUBCUTANEOUS at 05:50

## 2024-12-04 RX ADMIN — OXYCODONE 5 MG: 5 TABLET ORAL at 21:28

## 2024-12-04 RX ADMIN — OXYCODONE 5 MG: 5 TABLET ORAL at 16:24

## 2024-12-04 RX ADMIN — HYDROMORPHONE HYDROCHLORIDE 0.5 MG: 1 INJECTION, SOLUTION INTRAMUSCULAR; INTRAVENOUS; SUBCUTANEOUS at 04:37

## 2024-12-04 RX ADMIN — ENOXAPARIN SODIUM 60 MG: 100 INJECTION SUBCUTANEOUS at 08:48

## 2024-12-04 RX ADMIN — ACETAMINOPHEN 975 MG: 325 TABLET, FILM COATED ORAL at 21:27

## 2024-12-04 RX ADMIN — HYDROMORPHONE HYDROCHLORIDE 0.2 MG: 0.2 INJECTION, SOLUTION INTRAMUSCULAR; INTRAVENOUS; SUBCUTANEOUS at 14:21

## 2024-12-04 NOTE — PROGRESS NOTES
Progress Note - Surgery-General   Name: Lety Botello 64 y.o. female I MRN: [id number]  Unit/Bed#: [place] [address] -01 I Date of Admission: 11/14/2024   Date of Service: 12/3/2024 I Hospital Day: 19     Assessment & Plan  SBO (small bowel obstruction) (HCC)  64 y.o. female presenting with recurrent SBO. 11/14-11/21 failed conservative management with persistent SBO. S/p ex lap, SBR, DAVID on 11/22.   - s/p PEG GJ placement on 12/3    UOP: 678 cc    Plan  - NPO  - Monitor PEG insertion site  -Appreciate tube feed recommendations, will start tube feeds this morning  - Monitor glucose  - F/u hospice consult  - Gyn/onc recs appreciate  - Appreciate PT/OT   - accurate I/O given current resumption of PTA diuretics  - Encourage OOB, ambulate, IS  - DVT ppx with therapeutic Lovenox  - Strongly encourage OOB, ambulation, IS     24 Hour Events : No acute overnight events  Subjective : Patient states she still has 10 out of 10 abdominal pain diffusely throughout.  She denies any nausea, vomiting, fevers or chills.    Objective :  Visit Vitals  /59   Pulse 94   Temp 97.5 °F (36.4 °C)   Resp 18   Ht 5' (1.524 m)   Wt 122 kg (269 lb 10 oz)   SpO2 97%   BMI 52.66 kg/m²   Smoking Status Never   BSA 2.12 m²        Physical Exam  Constitutional:       General: She is not in acute distress.     Appearance: Normal appearance. She is obese. She is not ill-appearing, toxic-appearing or diaphoretic.   Cardiovascular:      Rate and Rhythm: Normal rate and regular rhythm.      Pulses: Normal pulses.      Heart sounds: Normal heart sounds. No murmur heard.  Pulmonary:      Effort: Pulmonary effort is normal. No respiratory distress.      Breath sounds: Normal breath sounds. No wheezing.   Abdominal:      General: There is no distension.      Palpations: Abdomen is soft. There is no mass.      Tenderness: There is abdominal tenderness. There is no guarding or rebound.      Hernia: No hernia is present.      Comments: protuberant    Neurological:      Mental Status: She is alert.     Incision site:      Recent Labs     12/02/24  0503 12/02/24 2009 12/03/24  0457 12/04/24  0440   WBC 5.00  --  5.27  --    HGB 8.5*  --  7.3*  --    *  --  86*  --    SODIUM 132* 132*  --  136   K 3.9 3.7  --  3.9    107  --  110*   CO2 23 21  --  22   BUN 31* 30*  --  29*   CREATININE 0.76 0.79  --  0.82   GLUC 159* 135  --  150*   CALCIUM 7.4* 7.4*  --  7.5*   MG  --   --  2.1 2.0   PHOS  --   --  3.4 2.5          VTE Pharmacologic Prophylaxis: VTE covered by:  enoxaparin, Subcutaneous, 60 mg at 12/02/24 0951     VTE Mechanical Prophylaxis: sequential compression device

## 2024-12-04 NOTE — CASE MANAGEMENT
Case Management Progress Note    Patient name Lety Botello  Location [place] [facility] /[place] 2 M* MRN [id number]  : 1960 Date 2024       LOS (days): 20  Geometric Mean LOS (GMLOS) (days): 3  Days to GMLOS:-17        OBJECTIVE:        Current admission status: Inpatient  Preferred Pharmacy:   [id number] - [place] - [address]  [address] [address]  Phone: [phone] Fax: [phone]    Primary Care Provider: Tai Martinez    Primary Insurance: POPPY TSANG  Secondary Insurance:     PROGRESS NOTE: CM awaiting determination if current TF is able to be obtained from [facility] on d/c.  Per attending pt would be cleared for d/c Thursday with r PT/OT therapy updated notes requested.  Will follow up to assist with dc poc

## 2024-12-04 NOTE — PLAN OF CARE
Problem: Potential for Falls  Goal: Patient will remain free of falls  Description: INTERVENTIONS:  - Educate patient/family on patient safety including physical limitations  - Instruct patient to call for assistance with activity   - Consult OT/PT to assist with strengthening/mobility   - Keep Call bell within reach  - Keep bed low and locked with side rails adjusted as appropriate  - Keep care items and personal belongings within reach  - Initiate and maintain comfort rounds  - Make Fall Risk Sign visible to staff  - Offer Toileting every 3 Hours, in advance of need  - Initiate/Maintain bed alarm  - Obtain necessary fall risk management equipment  - Apply yellow socks and bracelet for high fall risk patients  - Consider moving patient to room near nurses station  Outcome: Progressing     Problem: PAIN - ADULT  Goal: Verbalizes/displays adequate comfort level or baseline comfort level  Description: Interventions:  - Encourage patient to monitor pain and request assistance  - Assess pain using appropriate pain scale  - Administer analgesics based on type and severity of pain and evaluate response  - Implement non-pharmacological measures as appropriate and evaluate response  - Consider cultural and social influences on pain and pain management  - Notify physician/advanced practitioner if interventions unsuccessful or patient reports new pain  Outcome: Progressing     Problem: SAFETY ADULT  Goal: Patient will remain free of falls  Description: INTERVENTIONS:  - Educate patient/family on patient safety including physical limitations  - Instruct patient to call for assistance with activity   - Consult OT/PT to assist with strengthening/mobility   - Keep Call bell within reach  - Keep bed low and locked with side rails adjusted as appropriate  - Keep care items and personal belongings within reach  - Initiate and maintain comfort rounds  - Make Fall Risk Sign visible to staff  - Offer Toileting every 3 Hours, in  advance of need  - Initiate/Maintain bed alarm  - Obtain necessary fall risk management equipment  - Apply yellow socks and bracelet for high fall risk patients  - Consider moving patient to room near nurses station  Outcome: Progressing  Goal: Maintain or return to baseline ADL function  Description: INTERVENTIONS:  -  Assess patient's ability to carry out ADLs; assess patient's baseline for ADL function and identify physical deficits which impact ability to perform ADLs (bathing, care of mouth/teeth, toileting, grooming, dressing, etc.)  - Assess/evaluate cause of self-care deficits   - Assess range of motion  - Assess patient's mobility; develop plan if impaired  - Assess patient's need for assistive devices and provide as appropriate  - Encourage maximum independence but intervene and supervise when necessary  - Involve family in performance of ADLs  - Assess for home care needs following discharge   - Consider OT consult to assist with ADL evaluation and planning for discharge  - Provide patient education as appropriate  Outcome: Progressing  Goal: Maintains/Returns to pre admission functional level  Description: INTERVENTIONS:  - Perform AM-PAC 6 Click Basic Mobility/ Daily Activity assessment daily.  - Set and communicate daily mobility goal to care team and patient/family/caregiver.   - Collaborate with rehabilitation services on mobility goals if consulted  - Perform Range of Motion 3 times a day.  - Reposition patient every 3 hours.  - Dangle patient 3 times a day  - Stand patient 3 times a day  - Ambulate patient 3 times a day  - Out of bed to chair 3 times a day   - Out of bed for meals 3 times a day  - Out of bed for toileting  - Record patient progress and toleration of activity level   Outcome: Progressing     Problem: DISCHARGE PLANNING  Goal: Discharge to home or other facility with appropriate resources  Description: INTERVENTIONS:  - Identify barriers to discharge w/patient and caregiver  -  Arrange for needed discharge resources and transportation as appropriate  - Identify discharge learning needs (meds, wound care, etc.)  - Arrange for interpretive services to assist at discharge as needed  - Refer to Case Management Department for coordinating discharge planning if the patient needs post-hospital services based on physician/advanced practitioner order or complex needs related to functional status, cognitive ability, or social support system  Outcome: Progressing     Problem: GASTROINTESTINAL - ADULT  Goal: Minimal or absence of nausea and/or vomiting  Description: INTERVENTIONS:  - Administer IV fluids if ordered to ensure adequate hydration  - Maintain NPO status until nausea and vomiting are resolved  - Nasogastric tube if ordered  - Administer ordered antiemetic medications as needed  - Provide nonpharmacologic comfort measures as appropriate  - Advance diet as tolerated, if ordered  - Consider nutrition services referral to assist patient with adequate nutrition and appropriate food choices  Outcome: Progressing  Goal: Maintains or returns to baseline bowel function  Description: INTERVENTIONS:  - Assess bowel function  - Encourage oral fluids to ensure adequate hydration  - Administer IV fluids if ordered to ensure adequate hydration  - Administer ordered medications as needed  - Encourage mobilization and activity  - Consider nutritional services referral to assist patient with adequate nutrition and appropriate food choices  Outcome: Progressing  Goal: Maintains adequate nutritional intake  Description: INTERVENTIONS:  - Monitor percentage of each meal consumed  - Identify factors contributing to decreased intake, treat as appropriate  - Assist with meals as needed  - Monitor I&O, weight, and lab values if indicated  - Obtain nutrition services referral as needed  Outcome: Progressing  Goal: Establish and maintain optimal ostomy function  Description: INTERVENTIONS:  - Assess bowel  function  - Encourage oral fluids to ensure adequate hydration  - Administer IV fluids if ordered to ensure adequate hydration   - Administer ordered medications as needed  - Encourage mobilization and activity  - Nutrition services referral to assist patient with appropriate food choices  - Assess stoma site  - Consider wound care consult   Outcome: Progressing  Goal: Oral mucous membranes remain intact  Description: INTERVENTIONS  - Assess oral mucosa and hygiene practices  - Implement preventative oral hygiene regimen  - Implement oral medicated treatments as ordered  - Initiate Nutrition services referral as needed  Outcome: Progressing     Problem: Prexisting or High Potential for Compromised Skin Integrity  Goal: Skin integrity is maintained or improved  Description: INTERVENTIONS:  - Identify patients at risk for skin breakdown  - Assess and monitor skin integrity  - Assess and monitor nutrition and hydration status  - Monitor labs   - Assess for incontinence   - Turn and reposition patient  - Assist with mobility/ambulation  - Relieve pressure over bony prominences  - Avoid friction and shearing  - Provide appropriate hygiene as needed including keeping skin clean and dry  - Evaluate need for skin moisturizer/barrier cream  - Collaborate with interdisciplinary team   - Patient/family teaching  - Consider wound care consult   Outcome: Progressing     Problem: Nutrition/Hydration-ADULT  Goal: Nutrient/Hydration intake appropriate for improving, restoring or maintaining nutritional needs  Description: Monitor and assess patient's nutrition/hydration status for malnutrition. Collaborate with interdisciplinary team and initiate plan and interventions as ordered.  Monitor patient's weight and dietary intake as ordered or per policy. Utilize nutrition screening tool and intervene as necessary. Determine patient's food preferences and provide high-protein, high-caloric foods as appropriate.     INTERVENTIONS:  -  Monitor oral intake, urinary output, labs, and treatment plans  - Assess nutrition and hydration status and recommend course of action  - Evaluate amount of meals eaten  - Assist patient with eating if necessary   - Allow adequate time for meals  - Recommend/ encourage appropriate diets, oral nutritional supplements, and vitamin/mineral supplements  - Order, calculate, and assess calorie counts as needed  - Recommend, monitor, and adjust tube feedings and TPN/PPN based on assessed needs  - Assess need for intravenous fluids  - Provide specific nutrition/hydration education as appropriate  - Include patient/family/caregiver in decisions related to nutrition  Outcome: Progressing     Problem: INFECTION - ADULT  Goal: Absence or prevention of progression during hospitalization  Description: INTERVENTIONS:  - Assess and monitor for signs and symptoms of infection  - Monitor lab/diagnostic results  - Monitor all insertion sites, i.e. indwelling lines, tubes, and drains  - Monitor endotracheal if appropriate and nasal secretions for changes in amount and color  - [place] appropriate cooling/warming therapies per order  - Administer medications as ordered  - Instruct and encourage patient and family to use good hand hygiene technique  - Identify and instruct in appropriate isolation precautions for identified infection/condition  Outcome: Progressing     Problem: Knowledge Deficit  Goal: Patient/family/caregiver demonstrates understanding of disease process, treatment plan, medications, and discharge instructions  Description: Complete learning assessment and assess knowledge base.  Interventions:  - Provide teaching at level of understanding  - Provide teaching via preferred learning methods  Outcome: Progressing     Problem: METABOLIC, FLUID AND ELECTROLYTES - ADULT  Goal: Electrolytes maintained within normal limits  Description: INTERVENTIONS:  - Monitor labs and assess patient for signs and symptoms of electrolyte  imbalances  - Administer electrolyte replacement as ordered  - Monitor response to electrolyte replacements, including repeat lab results as appropriate  - Instruct patient on fluid and nutrition as appropriate  Outcome: Progressing     Problem: SKIN/TISSUE INTEGRITY - ADULT  Goal: Skin Integrity remains intact(Skin Breakdown Prevention)  Description: Assess:  -Perform Esteban assessment every shift  -Clean and moisturize skin every shift  -Inspect skin when repositioning, toileting, and assisting with ADLS  -Assess under medical devices such as masimo every shift  -Assess extremities for adequate circulation and sensation     Bed Management:  -Have minimal linens on bed & keep smooth, unwrinkled  -Change linens as needed when moist or perspiring  -Avoid sitting or lying in one position for more than 2 hours while in bed  -Keep HOB at 30 degrees     Toileting:  -Offer bedside commode  -Assess for incontinence every 2 hours  -Use incontinent care products after each incontinent episode such as lotion    Activity:  -Mobilize patient 3 times a day  -Encourage activity and walks on unit  -Encourage or provide ROM exercises   -Turn and reposition patient every 2 Hours  -Use appropriate equipment to lift or move patient in bed  -Instruct/ Assist with weight shifting every hour when out of bed in chair  -Consider limitation of chair time 2 hour intervals    Skin Care:  -Avoid use of baby powder, tape, friction and shearing, hot water or constrictive clothing  -Relieve pressure over bony prominences using allevyn  -Do not massage red bony areas    Next Steps:  -Teach patient strategies to minimize risks such as repositioning   -Consider consults to  interdisciplinary teams such as PTOT  Outcome: Progressing  Goal: Incision(s), wounds(s) or drain site(s) healing without S/S of infection  Description: INTERVENTIONS  - Assess and document dressing, incision, wound bed, drain sites and surrounding tissue  - Provide patient and  family education  - Perform skin care/dressing changes every shift  Outcome: Progressing     Problem: HEMATOLOGIC - ADULT  Goal: Maintains hematologic stability  Description: INTERVENTIONS  - Assess for signs and symptoms of bleeding or hemorrhage  - Monitor labs  - Administer supportive blood products/factors as ordered and appropriate  Outcome: Progressing     Problem: CARDIOVASCULAR - ADULT  Goal: Maintains optimal cardiac output and hemodynamic stability  Description: INTERVENTIONS:  - Monitor I/O, vital signs and rhythm  - Monitor for S/S and trends of decreased cardiac output  - Administer and titrate ordered vasoactive medications to optimize hemodynamic stability  - Assess quality of pulses, skin color and temperature  - Assess for signs of decreased coronary artery perfusion  - Instruct patient to report change in severity of symptoms  Outcome: Progressing  Goal: Absence of cardiac dysrhythmias or at baseline rhythm  Description: INTERVENTIONS:  - Continuous cardiac monitoring, vital signs, obtain 12 lead EKG if ordered  - Administer antiarrhythmic and heart rate control medications as ordered  - Monitor electrolytes and administer replacement therapy as ordered  Outcome: Progressing     Problem: RESPIRATORY - ADULT  Goal: Achieves optimal ventilation and oxygenation  Description: INTERVENTIONS:  - Assess for changes in respiratory status  - Assess for changes in mentation and behavior  - Position to facilitate oxygenation and minimize respiratory effort  - Oxygen administered by appropriate delivery if ordered  - Initiate smoking cessation education as indicated  - Encourage broncho-pulmonary hygiene including cough, deep breathe, Incentive Spirometry  - Assess the need for suctioning and aspirate as needed  - Assess and instruct to report SOB or any respiratory difficulty  - Respiratory Therapy support as indicated  Outcome: Progressing

## 2024-12-04 NOTE — NURSING NOTE
Pt tolerating TF at 20ml/hr w/o any difficulty. Advanced TF to 30ml/hr @1700. Will continue to monitor

## 2024-12-04 NOTE — QUICK NOTE
Gastroenterology PEG Check    External bumper seen today at 3cm, which rotates freely. Insertion site w/ old blood, otherwise c/d/i. Ports flush easily sans resistance. Tube feeds just started running @ 20. GI will sign off for now. Please call back with any additional questions.    Sanjay Perez, PGY-5

## 2024-12-04 NOTE — ASSESSMENT & PLAN NOTE
64 y.o. female presenting with recurrent SBO. 11/14-11/21 failed conservative management with persistent SBO. S/p ex lap, SBR, DAVID on 11/22.   - s/p PEG GJ placement on 12/3    UOP: 678 cc    Plan  - NPO  - Monitor PEG insertion site  -Appreciate tube feed recommendations, will start tube feeds this morning  - Monitor glucose  - F/u hospice consult  - Gyn/onc recs appreciate  - Appreciate PT/OT   - accurate I/O given current resumption of PTA diuretics  - Encourage OOB, ambulate, IS  - DVT ppx with therapeutic Lovenox  - Strongly encourage OOB, ambulation, IS

## 2024-12-04 NOTE — PLAN OF CARE
Problem: Potential for Falls  Goal: Patient will remain free of falls  Description: INTERVENTIONS:  - Educate patient/family on patient safety including physical limitations  - Instruct patient to call for assistance with activity   - Consult OT/PT to assist with strengthening/mobility   - Keep Call bell within reach  - Keep bed low and locked with side rails adjusted as appropriate  - Keep care items and personal belongings within reach  - Initiate and maintain comfort rounds  - Make Fall Risk Sign visible to staff  - Offer Toileting every 3 Hours, in advance of need  - Initiate/Maintain bed alarm  - Obtain necessary fall risk management equipment  - Apply yellow socks and bracelet for high fall risk patients  - Consider moving patient to room near nurses station  Outcome: Progressing     Problem: PAIN - ADULT  Goal: Verbalizes/displays adequate comfort level or baseline comfort level  Description: Interventions:  - Encourage patient to monitor pain and request assistance  - Assess pain using appropriate pain scale  - Administer analgesics based on type and severity of pain and evaluate response  - Implement non-pharmacological measures as appropriate and evaluate response  - Consider cultural and social influences on pain and pain management  - Notify physician/advanced practitioner if interventions unsuccessful or patient reports new pain  Outcome: Progressing     Problem: SAFETY ADULT  Goal: Patient will remain free of falls  Description: INTERVENTIONS:  - Educate patient/family on patient safety including physical limitations  - Instruct patient to call for assistance with activity   - Consult OT/PT to assist with strengthening/mobility   - Keep Call bell within reach  - Keep bed low and locked with side rails adjusted as appropriate  - Keep care items and personal belongings within reach  - Initiate and maintain comfort rounds  - Make Fall Risk Sign visible to staff  - Offer Toileting every 3 Hours, in advance  of need  - Initiate/Maintain bed alarm  - Obtain necessary fall risk management equipment  - Apply yellow socks and bracelet for high fall risk patients  - Consider moving patient to room near nurses station  Outcome: Progressing  Goal: Maintain or return to baseline ADL function  Description: INTERVENTIONS:  -  Assess patient's ability to carry out ADLs; assess patient's baseline for ADL function and identify physical deficits which impact ability to perform ADLs (bathing, care of mouth/teeth, toileting, grooming, dressing, etc.)  - Assess/evaluate cause of self-care deficits   - Assess range of motion  - Assess patient's mobility; develop plan if impaired  - Assess patient's need for assistive devices and provide as appropriate  - Encourage maximum independence but intervene and supervise when necessary  - Involve family in performance of ADLs  - Assess for home care needs following discharge   - Consider OT consult to assist with ADL evaluation and planning for discharge  - Provide patient education as appropriate  Outcome: Progressing  Goal: Maintains/Returns to pre admission functional level  Description: INTERVENTIONS:  - Perform AM-PAC 6 Click Basic Mobility/ Daily Activity assessment daily.  - Set and communicate daily mobility goal to care team and patient/family/caregiver.   - Collaborate with rehabilitation services on mobility goals if consulted  - Perform Range of Motion 3 times a day.  - Reposition patient every 3 hours.  - Dangle patient 3 times a day  - Stand patient 3 times a day  - Ambulate patient 3 times a day  - Out of bed to chair 3 times a day   - Out of bed for meals 3 times a day  - Out of bed for toileting  - Record patient progress and toleration of activity level   Outcome: Progressing     Problem: DISCHARGE PLANNING  Goal: Discharge to home or other facility with appropriate resources  Description: INTERVENTIONS:  - Identify barriers to discharge w/patient and caregiver  - Arrange for  needed discharge resources and transportation as appropriate  - Identify discharge learning needs (meds, wound care, etc.)  - Arrange for interpretive services to assist at discharge as needed  - Refer to Case Management Department for coordinating discharge planning if the patient needs post-hospital services based on physician/advanced practitioner order or complex needs related to functional status, cognitive ability, or social support system  Outcome: Progressing     Problem: GASTROINTESTINAL - ADULT  Goal: Minimal or absence of nausea and/or vomiting  Description: INTERVENTIONS:  - Administer IV fluids if ordered to ensure adequate hydration  - Maintain NPO status until nausea and vomiting are resolved  - Nasogastric tube if ordered  - Administer ordered antiemetic medications as needed  - Provide nonpharmacologic comfort measures as appropriate  - Advance diet as tolerated, if ordered  - Consider nutrition services referral to assist patient with adequate nutrition and appropriate food choices  Outcome: Progressing  Goal: Maintains or returns to baseline bowel function  Description: INTERVENTIONS:  - Assess bowel function  - Encourage oral fluids to ensure adequate hydration  - Administer IV fluids if ordered to ensure adequate hydration  - Administer ordered medications as needed  - Encourage mobilization and activity  - Consider nutritional services referral to assist patient with adequate nutrition and appropriate food choices  Outcome: Progressing  Goal: Maintains adequate nutritional intake  Description: INTERVENTIONS:  - Monitor percentage of each meal consumed  - Identify factors contributing to decreased intake, treat as appropriate  - Assist with meals as needed  - Monitor I&O, weight, and lab values if indicated  - Obtain nutrition services referral as needed  Outcome: Progressing  Goal: Establish and maintain optimal ostomy function  Description: INTERVENTIONS:  - Assess bowel function  - Encourage  oral fluids to ensure adequate hydration  - Administer IV fluids if ordered to ensure adequate hydration   - Administer ordered medications as needed  - Encourage mobilization and activity  - Nutrition services referral to assist patient with appropriate food choices  - Assess stoma site  - Consider wound care consult   Outcome: Progressing  Goal: Oral mucous membranes remain intact  Description: INTERVENTIONS  - Assess oral mucosa and hygiene practices  - Implement preventative oral hygiene regimen  - Implement oral medicated treatments as ordered  - Initiate Nutrition services referral as needed  Outcome: Progressing     Problem: Prexisting or High Potential for Compromised Skin Integrity  Goal: Skin integrity is maintained or improved  Description: INTERVENTIONS:  - Identify patients at risk for skin breakdown  - Assess and monitor skin integrity  - Assess and monitor nutrition and hydration status  - Monitor labs   - Assess for incontinence   - Turn and reposition patient  - Assist with mobility/ambulation  - Relieve pressure over bony prominences  - Avoid friction and shearing  - Provide appropriate hygiene as needed including keeping skin clean and dry  - Evaluate need for skin moisturizer/barrier cream  - Collaborate with interdisciplinary team   - Patient/family teaching  - Consider wound care consult   Outcome: Progressing     Problem: Nutrition/Hydration-ADULT  Goal: Nutrient/Hydration intake appropriate for improving, restoring or maintaining nutritional needs  Description: Monitor and assess patient's nutrition/hydration status for malnutrition. Collaborate with interdisciplinary team and initiate plan and interventions as ordered.  Monitor patient's weight and dietary intake as ordered or per policy. Utilize nutrition screening tool and intervene as necessary. Determine patient's food preferences and provide high-protein, high-caloric foods as appropriate.     INTERVENTIONS:  - Monitor oral intake,  urinary output, labs, and treatment plans  - Assess nutrition and hydration status and recommend course of action  - Evaluate amount of meals eaten  - Assist patient with eating if necessary   - Allow adequate time for meals  - Recommend/ encourage appropriate diets, oral nutritional supplements, and vitamin/mineral supplements  - Order, calculate, and assess calorie counts as needed  - Recommend, monitor, and adjust tube feedings and TPN/PPN based on assessed needs  - Assess need for intravenous fluids  - Provide specific nutrition/hydration education as appropriate  - Include patient/family/caregiver in decisions related to nutrition  Outcome: Progressing     Problem: INFECTION - ADULT  Goal: Absence or prevention of progression during hospitalization  Description: INTERVENTIONS:  - Assess and monitor for signs and symptoms of infection  - Monitor lab/diagnostic results  - Monitor all insertion sites, i.e. indwelling lines, tubes, and drains  - Monitor endotracheal if appropriate and nasal secretions for changes in amount and color  - [place] appropriate cooling/warming therapies per order  - Administer medications as ordered  - Instruct and encourage patient and family to use good hand hygiene technique  - Identify and instruct in appropriate isolation precautions for identified infection/condition  Outcome: Progressing     Problem: Knowledge Deficit  Goal: Patient/family/caregiver demonstrates understanding of disease process, treatment plan, medications, and discharge instructions  Description: Complete learning assessment and assess knowledge base.  Interventions:  - Provide teaching at level of understanding  - Provide teaching via preferred learning methods  Outcome: Progressing     Problem: METABOLIC, FLUID AND ELECTROLYTES - ADULT  Goal: Electrolytes maintained within normal limits  Description: INTERVENTIONS:  - Monitor labs and assess patient for signs and symptoms of electrolyte imbalances  -  Administer electrolyte replacement as ordered  - Monitor response to electrolyte replacements, including repeat lab results as appropriate  - Instruct patient on fluid and nutrition as appropriate  Outcome: Progressing     Problem: SKIN/TISSUE INTEGRITY - ADULT  Goal: Skin Integrity remains intact(Skin Breakdown Prevention)  Description: Assess:  -Perform Esteban assessment every   -Clean and moisturize skin every   -Inspect skin when repositioning, toileting, and assisting with ADLS  -Assess under medical devices such as  every   -Assess extremities for adequate circulation and sensation     Bed Management:  -Have minimal linens on bed & keep smooth, unwrinkled  -Change linens as needed when moist or perspiring  -Avoid sitting or lying in one position for more than  hours while in bed  -Keep HOB at degrees     Toileting:  -Offer bedside commode  -Assess for incontinence every   -Use incontinent care products after each incontinent episode such as     Activity:  -Mobilize patient  times a day  -Encourage activity and walks on unit  -Encourage or provide ROM exercises   -Turn and reposition patient every  Hours  -Use appropriate equipment to lift or move patient in bed  -Instruct/ Assist with weight shifting every  when out of bed in chair  -Consider limitation of chair time  hour intervals    Skin Care:  -Avoid use of baby powder, tape, friction and shearing, hot water or constrictive clothing  -Relieve pressure over bony prominences using   -Do not massage red bony areas    Next Steps:  -Teach patient strategies to minimize risks such as    -Consider consults to  interdisciplinary teams such as   Outcome: Progressing  Goal: Incision(s), wounds(s) or drain site(s) healing without S/S of infection  Description: INTERVENTIONS  - Assess and document dressing, incision, wound bed, drain sites and surrounding tissue  - Provide patient and family education  - Perform skin care/dressing changes every  Outcome: Progressing      Problem: HEMATOLOGIC - ADULT  Goal: Maintains hematologic stability  Description: INTERVENTIONS  - Assess for signs and symptoms of bleeding or hemorrhage  - Monitor labs  - Administer supportive blood products/factors as ordered and appropriate  Outcome: Progressing     Problem: CARDIOVASCULAR - ADULT  Goal: Maintains optimal cardiac output and hemodynamic stability  Description: INTERVENTIONS:  - Monitor I/O, vital signs and rhythm  - Monitor for S/S and trends of decreased cardiac output  - Administer and titrate ordered vasoactive medications to optimize hemodynamic stability  - Assess quality of pulses, skin color and temperature  - Assess for signs of decreased coronary artery perfusion  - Instruct patient to report change in severity of symptoms  Outcome: Progressing  Goal: Absence of cardiac dysrhythmias or at baseline rhythm  Description: INTERVENTIONS:  - Continuous cardiac monitoring, vital signs, obtain 12 lead EKG if ordered  - Administer antiarrhythmic and heart rate control medications as ordered  - Monitor electrolytes and administer replacement therapy as ordered  Outcome: Progressing     Problem: RESPIRATORY - ADULT  Goal: Achieves optimal ventilation and oxygenation  Description: INTERVENTIONS:  - Assess for changes in respiratory status  - Assess for changes in mentation and behavior  - Position to facilitate oxygenation and minimize respiratory effort  - Oxygen administered by appropriate delivery if ordered  - Initiate smoking cessation education as indicated  - Encourage broncho-pulmonary hygiene including cough, deep breathe, Incentive Spirometry  - Assess the need for suctioning and aspirate as needed  - Assess and instruct to report SOB or any respiratory difficulty  - Respiratory Therapy support as indicated  Outcome: Progressing

## 2024-12-05 VITALS
OXYGEN SATURATION: 100 % | DIASTOLIC BLOOD PRESSURE: 68 MMHG | SYSTOLIC BLOOD PRESSURE: 169 MMHG | BODY MASS INDEX: 53.11 KG/M2 | TEMPERATURE: 99 F | WEIGHT: 270.5 LBS | HEART RATE: 82 BPM | RESPIRATION RATE: 18 BRPM | HEIGHT: 60 IN

## 2024-12-05 LAB
ANION GAP SERPL CALCULATED.3IONS-SCNC: 3 MMOL/L (ref 4–13)
BUN SERPL-MCNC: 24 MG/DL (ref 5–25)
CALCIUM SERPL-MCNC: 7.3 MG/DL (ref 8.4–10.2)
CHLORIDE SERPL-SCNC: 107 MMOL/L (ref 96–108)
CO2 SERPL-SCNC: 22 MMOL/L (ref 21–32)
CREAT SERPL-MCNC: 0.65 MG/DL (ref 0.6–1.3)
GFR SERPL CREATININE-BSD FRML MDRD: 95 ML/MIN/1.73SQ M
GLUCOSE SERPL-MCNC: 147 MG/DL (ref 65–140)
GLUCOSE SERPL-MCNC: 155 MG/DL (ref 65–140)
GLUCOSE SERPL-MCNC: 165 MG/DL (ref 65–140)
MAGNESIUM SERPL-MCNC: 1.8 MG/DL (ref 1.9–2.7)
PHOSPHATE SERPL-MCNC: 2.2 MG/DL (ref 2.3–4.1)
POTASSIUM SERPL-SCNC: 4.1 MMOL/L (ref 3.5–5.3)
SODIUM SERPL-SCNC: 132 MMOL/L (ref 135–147)

## 2024-12-05 PROCEDURE — 99024 POSTOP FOLLOW-UP VISIT: CPT | Performed by: SPECIALIST

## 2024-12-05 PROCEDURE — NC001 PR NO CHARGE: Performed by: PHYSICIAN ASSISTANT

## 2024-12-05 PROCEDURE — 97110 THERAPEUTIC EXERCISES: CPT

## 2024-12-05 PROCEDURE — 83735 ASSAY OF MAGNESIUM: CPT

## 2024-12-05 PROCEDURE — 97530 THERAPEUTIC ACTIVITIES: CPT

## 2024-12-05 PROCEDURE — 84100 ASSAY OF PHOSPHORUS: CPT

## 2024-12-05 PROCEDURE — 97535 SELF CARE MNGMENT TRAINING: CPT

## 2024-12-05 PROCEDURE — 82948 REAGENT STRIP/BLOOD GLUCOSE: CPT

## 2024-12-05 PROCEDURE — 80048 BASIC METABOLIC PNL TOTAL CA: CPT

## 2024-12-05 RX ORDER — ENOXAPARIN SODIUM 100 MG/ML
60 INJECTION SUBCUTANEOUS EVERY 12 HOURS SCHEDULED
Qty: 36 ML | Refills: 0 | Status: CANCELLED | OUTPATIENT
Start: 2024-12-05

## 2024-12-05 RX ORDER — OXYCODONE HYDROCHLORIDE 5 MG/1
5 TABLET ORAL EVERY 4 HOURS PRN
Qty: 8 TABLET | Refills: 0 | Status: ON HOLD | OUTPATIENT
Start: 2024-12-05 | End: 2024-12-15

## 2024-12-05 RX ORDER — INSULIN LISPRO 100 [IU]/ML
4-20 INJECTION, SOLUTION INTRAVENOUS; SUBCUTANEOUS EVERY 6 HOURS
Qty: 24 ML | Refills: 0 | Status: CANCELLED | OUTPATIENT
Start: 2024-12-05

## 2024-12-05 RX ADMIN — ACETAMINOPHEN 975 MG: 325 TABLET, FILM COATED ORAL at 05:24

## 2024-12-05 RX ADMIN — NYSTATIN: 100000 POWDER TOPICAL at 09:48

## 2024-12-05 RX ADMIN — ENOXAPARIN SODIUM 60 MG: 100 INJECTION SUBCUTANEOUS at 09:38

## 2024-12-05 RX ADMIN — BISACODYL 10 MG: 10 SUPPOSITORY RECTAL at 09:38

## 2024-12-05 RX ADMIN — PANTOPRAZOLE SODIUM 40 MG: 40 TABLET, DELAYED RELEASE ORAL at 05:24

## 2024-12-05 RX ADMIN — ACETAMINOPHEN 975 MG: 325 TABLET, FILM COATED ORAL at 13:37

## 2024-12-05 RX ADMIN — HYDROMORPHONE HYDROCHLORIDE 0.5 MG: 1 INJECTION, SOLUTION INTRAMUSCULAR; INTRAVENOUS; SUBCUTANEOUS at 05:21

## 2024-12-05 RX ADMIN — LEVOTHYROXINE SODIUM 125 MCG: 125 TABLET ORAL at 09:35

## 2024-12-05 RX ADMIN — OXYCODONE 5 MG: 5 TABLET ORAL at 01:52

## 2024-12-05 RX ADMIN — HYDROMORPHONE HYDROCHLORIDE 0.2 MG: 0.2 INJECTION, SOLUTION INTRAMUSCULAR; INTRAVENOUS; SUBCUTANEOUS at 06:12

## 2024-12-05 RX ADMIN — OXYCODONE 5 MG: 5 TABLET ORAL at 09:39

## 2024-12-05 RX ADMIN — CHLORHEXIDINE GLUCONATE 15 ML: 1.2 RINSE ORAL at 09:38

## 2024-12-05 RX ADMIN — CARVEDILOL 12.5 MG: 12.5 TABLET, FILM COATED ORAL at 09:37

## 2024-12-05 RX ADMIN — HYDROMORPHONE HYDROCHLORIDE 0.2 MG: 0.2 INJECTION, SOLUTION INTRAMUSCULAR; INTRAVENOUS; SUBCUTANEOUS at 13:36

## 2024-12-05 NOTE — PLAN OF CARE
Problem: Potential for Falls  Goal: Patient will remain free of falls  Description: INTERVENTIONS:  - Educate patient/family on patient safety including physical limitations  - Instruct patient to call for assistance with activity   - Consult OT/PT to assist with strengthening/mobility   - Keep Call bell within reach  - Keep bed low and locked with side rails adjusted as appropriate  - Keep care items and personal belongings within reach  - Initiate and maintain comfort rounds  - Make Fall Risk Sign visible to staff  - Offer Toileting every 3 Hours, in advance of need  - Initiate/Maintain bed alarm  - Obtain necessary fall risk management equipment  - Apply yellow socks and bracelet for high fall risk patients  - Consider moving patient to room near nurses station  Outcome: Progressing     Problem: PAIN - ADULT  Goal: Verbalizes/displays adequate comfort level or baseline comfort level  Description: Interventions:  - Encourage patient to monitor pain and request assistance  - Assess pain using appropriate pain scale  - Administer analgesics based on type and severity of pain and evaluate response  - Implement non-pharmacological measures as appropriate and evaluate response  - Consider cultural and social influences on pain and pain management  - Notify physician/advanced practitioner if interventions unsuccessful or patient reports new pain  Outcome: Progressing     Problem: SAFETY ADULT  Goal: Patient will remain free of falls  Description: INTERVENTIONS:  - Educate patient/family on patient safety including physical limitations  - Instruct patient to call for assistance with activity   - Consult OT/PT to assist with strengthening/mobility   - Keep Call bell within reach  - Keep bed low and locked with side rails adjusted as appropriate  - Keep care items and personal belongings within reach  - Initiate and maintain comfort rounds  - Make Fall Risk Sign visible to staff  - Offer Toileting every 3 Hours, in  advance of need  - Initiate/Maintain bed alarm  - Obtain necessary fall risk management equipment  - Apply yellow socks and bracelet for high fall risk patients  - Consider moving patient to room near nurses station  Outcome: Progressing  Goal: Maintain or return to baseline ADL function  Description: INTERVENTIONS:  -  Assess patient's ability to carry out ADLs; assess patient's baseline for ADL function and identify physical deficits which impact ability to perform ADLs (bathing, care of mouth/teeth, toileting, grooming, dressing, etc.)  - Assess/evaluate cause of self-care deficits   - Assess range of motion  - Assess patient's mobility; develop plan if impaired  - Assess patient's need for assistive devices and provide as appropriate  - Encourage maximum independence but intervene and supervise when necessary  - Involve family in performance of ADLs  - Assess for home care needs following discharge   - Consider OT consult to assist with ADL evaluation and planning for discharge  - Provide patient education as appropriate  Outcome: Progressing  Goal: Maintains/Returns to pre admission functional level  Description: INTERVENTIONS:  - Perform AM-PAC 6 Click Basic Mobility/ Daily Activity assessment daily.  - Set and communicate daily mobility goal to care team and patient/family/caregiver.   - Collaborate with rehabilitation services on mobility goals if consulted  - Perform Range of Motion 3 times a day.  - Reposition patient every 3 hours.  - Dangle patient 3 times a day  - Stand patient 3 times a day  - Ambulate patient 3 times a day  - Out of bed to chair 3 times a day   - Out of bed for meals 3 times a day  - Out of bed for toileting  - Record patient progress and toleration of activity level   Outcome: Progressing     Problem: DISCHARGE PLANNING  Goal: Discharge to home or other facility with appropriate resources  Description: INTERVENTIONS:  - Identify barriers to discharge w/patient and caregiver  -  Arrange for needed discharge resources and transportation as appropriate  - Identify discharge learning needs (meds, wound care, etc.)  - Arrange for interpretive services to assist at discharge as needed  - Refer to Case Management Department for coordinating discharge planning if the patient needs post-hospital services based on physician/advanced practitioner order or complex needs related to functional status, cognitive ability, or social support system  Outcome: Progressing     Problem: GASTROINTESTINAL - ADULT  Goal: Minimal or absence of nausea and/or vomiting  Description: INTERVENTIONS:  - Administer IV fluids if ordered to ensure adequate hydration  - Maintain NPO status until nausea and vomiting are resolved  - Nasogastric tube if ordered  - Administer ordered antiemetic medications as needed  - Provide nonpharmacologic comfort measures as appropriate  - Advance diet as tolerated, if ordered  - Consider nutrition services referral to assist patient with adequate nutrition and appropriate food choices  Outcome: Progressing  Goal: Maintains or returns to baseline bowel function  Description: INTERVENTIONS:  - Assess bowel function  - Encourage oral fluids to ensure adequate hydration  - Administer IV fluids if ordered to ensure adequate hydration  - Administer ordered medications as needed  - Encourage mobilization and activity  - Consider nutritional services referral to assist patient with adequate nutrition and appropriate food choices  Outcome: Progressing  Goal: Maintains adequate nutritional intake  Description: INTERVENTIONS:  - Monitor percentage of each meal consumed  - Identify factors contributing to decreased intake, treat as appropriate  - Assist with meals as needed  - Monitor I&O, weight, and lab values if indicated  - Obtain nutrition services referral as needed  Outcome: Progressing  Goal: Establish and maintain optimal ostomy function  Description: INTERVENTIONS:  - Assess bowel  function  - Encourage oral fluids to ensure adequate hydration  - Administer IV fluids if ordered to ensure adequate hydration   - Administer ordered medications as needed  - Encourage mobilization and activity  - Nutrition services referral to assist patient with appropriate food choices  - Assess stoma site  - Consider wound care consult   Outcome: Progressing  Goal: Oral mucous membranes remain intact  Description: INTERVENTIONS  - Assess oral mucosa and hygiene practices  - Implement preventative oral hygiene regimen  - Implement oral medicated treatments as ordered  - Initiate Nutrition services referral as needed  Outcome: Progressing     Problem: Prexisting or High Potential for Compromised Skin Integrity  Goal: Skin integrity is maintained or improved  Description: INTERVENTIONS:  - Identify patients at risk for skin breakdown  - Assess and monitor skin integrity  - Assess and monitor nutrition and hydration status  - Monitor labs   - Assess for incontinence   - Turn and reposition patient  - Assist with mobility/ambulation  - Relieve pressure over bony prominences  - Avoid friction and shearing  - Provide appropriate hygiene as needed including keeping skin clean and dry  - Evaluate need for skin moisturizer/barrier cream  - Collaborate with interdisciplinary team   - Patient/family teaching  - Consider wound care consult   Outcome: Progressing     Problem: Nutrition/Hydration-ADULT  Goal: Nutrient/Hydration intake appropriate for improving, restoring or maintaining nutritional needs  Description: Monitor and assess patient's nutrition/hydration status for malnutrition. Collaborate with interdisciplinary team and initiate plan and interventions as ordered.  Monitor patient's weight and dietary intake as ordered or per policy. Utilize nutrition screening tool and intervene as necessary. Determine patient's food preferences and provide high-protein, high-caloric foods as appropriate.     INTERVENTIONS:  -  Monitor oral intake, urinary output, labs, and treatment plans  - Assess nutrition and hydration status and recommend course of action  - Evaluate amount of meals eaten  - Assist patient with eating if necessary   - Allow adequate time for meals  - Recommend/ encourage appropriate diets, oral nutritional supplements, and vitamin/mineral supplements  - Order, calculate, and assess calorie counts as needed  - Recommend, monitor, and adjust tube feedings and TPN/PPN based on assessed needs  - Assess need for intravenous fluids  - Provide specific nutrition/hydration education as appropriate  - Include patient/family/caregiver in decisions related to nutrition  Outcome: Progressing     Problem: INFECTION - ADULT  Goal: Absence or prevention of progression during hospitalization  Description: INTERVENTIONS:  - Assess and monitor for signs and symptoms of infection  - Monitor lab/diagnostic results  - Monitor all insertion sites, i.e. indwelling lines, tubes, and drains  - Monitor endotracheal if appropriate and nasal secretions for changes in amount and color  - [place] appropriate cooling/warming therapies per order  - Administer medications as ordered  - Instruct and encourage patient and family to use good hand hygiene technique  - Identify and instruct in appropriate isolation precautions for identified infection/condition  Outcome: Progressing     Problem: Knowledge Deficit  Goal: Patient/family/caregiver demonstrates understanding of disease process, treatment plan, medications, and discharge instructions  Description: Complete learning assessment and assess knowledge base.  Interventions:  - Provide teaching at level of understanding  - Provide teaching via preferred learning methods  Outcome: Progressing     Problem: METABOLIC, FLUID AND ELECTROLYTES - ADULT  Goal: Electrolytes maintained within normal limits  Description: INTERVENTIONS:  - Monitor labs and assess patient for signs and symptoms of electrolyte  imbalances  - Administer electrolyte replacement as ordered  - Monitor response to electrolyte replacements, including repeat lab results as appropriate  - Instruct patient on fluid and nutrition as appropriate  Outcome: Progressing     Problem: SKIN/TISSUE INTEGRITY - ADULT  Goal: Skin Integrity remains intact(Skin Breakdown Prevention)  Description: Assess:  -Perform Esteban assessment every shift  -Clean and moisturize skin every shift  -Inspect skin when repositioning, toileting, and assisting with ADLS  -Assess under medical devices such as masimo every shift  -Assess extremities for adequate circulation and sensation     Bed Management:  -Have minimal linens on bed & keep smooth, unwrinkled  -Change linens as needed when moist or perspiring  -Avoid sitting or lying in one position for more than 2 hours while in bed  -Keep HOB at 30 degrees     Toileting:  -Offer bedside commode  -Assess for incontinence every 2 hours  -Use incontinent care products after each incontinent episode such as lotion    Activity:  -Mobilize patient 3 times a day  -Encourage activity and walks on unit  -Encourage or provide ROM exercises   -Turn and reposition patient every 2 Hours  -Use appropriate equipment to lift or move patient in bed  -Instruct/ Assist with weight shifting every 1 hour when out of bed in chair  -Consider limitation of chair time 2 hour intervals    Skin Care:  -Avoid use of baby powder, tape, friction and shearing, hot water or constrictive clothing  -Relieve pressure over bony prominences using allevyn  -Do not massage red bony areas    Next Steps:  -Teach patient strategies to minimize risks such as repositioning   -Consider consults to  interdisciplinary teams such as PTOT  Outcome: Progressing  Goal: Incision(s), wounds(s) or drain site(s) healing without S/S of infection  Description: INTERVENTIONS  - Assess and document dressing, incision, wound bed, drain sites and surrounding tissue  - Provide patient and  family education  - Perform skin care/dressing changes every shift  Outcome: Progressing     Problem: HEMATOLOGIC - ADULT  Goal: Maintains hematologic stability  Description: INTERVENTIONS  - Assess for signs and symptoms of bleeding or hemorrhage  - Monitor labs  - Administer supportive blood products/factors as ordered and appropriate  Outcome: Progressing     Problem: CARDIOVASCULAR - ADULT  Goal: Maintains optimal cardiac output and hemodynamic stability  Description: INTERVENTIONS:  - Monitor I/O, vital signs and rhythm  - Monitor for S/S and trends of decreased cardiac output  - Administer and titrate ordered vasoactive medications to optimize hemodynamic stability  - Assess quality of pulses, skin color and temperature  - Assess for signs of decreased coronary artery perfusion  - Instruct patient to report change in severity of symptoms  Outcome: Progressing  Goal: Absence of cardiac dysrhythmias or at baseline rhythm  Description: INTERVENTIONS:  - Continuous cardiac monitoring, vital signs, obtain 12 lead EKG if ordered  - Administer antiarrhythmic and heart rate control medications as ordered  - Monitor electrolytes and administer replacement therapy as ordered  Outcome: Progressing     Problem: RESPIRATORY - ADULT  Goal: Achieves optimal ventilation and oxygenation  Description: INTERVENTIONS:  - Assess for changes in respiratory status  - Assess for changes in mentation and behavior  - Position to facilitate oxygenation and minimize respiratory effort  - Oxygen administered by appropriate delivery if ordered  - Initiate smoking cessation education as indicated  - Encourage broncho-pulmonary hygiene including cough, deep breathe, Incentive Spirometry  - Assess the need for suctioning and aspirate as needed  - Assess and instruct to report SOB or any respiratory difficulty  - Respiratory Therapy support as indicated  Outcome: Progressing

## 2024-12-05 NOTE — CASE MANAGEMENT
Case Management Discharge Planning Note    Patient name Lety Botello  Location [place] [address] /[place] 2 M* MRN [id number]  : 1960 Date 2024       Current Admission Date: 2024  Current Admission Diagnosis:SBO (small bowel obstruction) (HCC)   Patient Active Problem List    Diagnosis Date Noted Date Diagnosed    Goals of care, counseling/discussion 2024     Palliative care encounter 2024     Hypotension after procedure 2024     Shingles 2024     On total parenteral nutrition (TPN) 2024     Abdominal pain 2024     Nausea & vomiting 2024     Constipation 11/10/2024     Morbid obesity (HCC) 2024     Gastroparesis 2024     Gastric dilation 2024     History of pulmonary embolism 2024     Hypothyroidism 2024     Mild intermittent asthma 2024     Hypertension      Type 2 diabetes mellitus (HCC)      Endometrial cancer (HCC)      Obesity      SBO (small bowel obstruction) (HCC) 2024       LOS (days): 21  Geometric Mean LOS (GMLOS) (days): 4.9  Days to GMLOS:-16     OBJECTIVE:  Risk of Unplanned Readmission Score: 32.39         Current admission status: Inpatient   Preferred Pharmacy:   [id number] [place] - [address] [facility]  [address] [place] [address]  Phone: [phone] Fax: [phone]    Primary Care Provider: Tai Martinez    Primary Insurance: POPPY TSANG  Secondary Insurance:     DISCHARGE DETAILS:                                                                                                               Facility Insurance Auth Number: [id number]

## 2024-12-05 NOTE — CASE MANAGEMENT
[facility] has received APPROVED authorization.  Insurance:   Philomena Mcclellan obtained via Insurance Rep:  Radha  Authorization received for: SNF  Facility: [facility]   Authorization #: [id number]   Start of Care: 12/5  Next Review Date: 2 business days  Continued Stay Care Coordinator: none given  Submit next review to: [employer] Portal or F: [phone]     Care Manager notified: Liliane Saenz    Please reach out to CM for updates on any clinical information.

## 2024-12-05 NOTE — PROGRESS NOTES
Progress Note - Vascular Surgery   Name: Lety Botello 64 y.o. female I MRN: [id number]  Unit/Bed#: [place] [address] -01 I Date of Admission: 11/14/2024   Date of Service: 12/5/2024 I Hospital Day: 21    Assessment & Plan  SBO (small bowel obstruction) (HCC)  64 y.o. female presenting with recurrent SBO. 11/14-11/21 failed conservative management with persistent SBO. S/p ex lap, SBR, DAVID on 11/22.   - s/p PEG GJ placement on 12/3      Plan  - diet as tolerated, can vent G limb as needed  - advance tube feeds to goal  - Monitor glucose  - Gyn/onc recs appreciate  - Appreciate PT/OT   - accurate I/O  - Encourage OOB, ambulate, IS  - DVT ppx with therapeutic Lovenox  - Strongly encourage OOB, ambulation, IS     24 Hour Events : none  Subjective : no nausea or vomiting. No flatus or bm.     Objective :  Temp:  [97.7 °F (36.5 °C)-98.9 °F (37.2 °C)] 98.1 °F (36.7 °C)  HR:  [78-83] 78  BP: (135-145)/(59-66) 136/65  Resp:  [17-20] 20  SpO2:  [95 %-100 %] 100 %  O2 Device: None (Room air)     I/O         12/03 0701  12/04 0700 12/04 0701  12/05 0700 12/05 0701  12/06 0700    P.O. 0 960     I.V. (mL/kg) 500 (4.1)      NG/GT  520     IV Piggyback 250      Total Intake(mL/kg) 750 (6.1) 1480 (12)     Urine (mL/kg/hr)  797 (0.3)     Emesis/NG output  0     Total Output  797     Net +750 +683                  Lines/Drains/Airways       Active Status       Name Placement date Placement time Site Days    PICC Line 11/18/24 Right Basilic 11/18/24  1249  Basilic  16    Gastrostomy/Enterostomy PEG- Jejunostomy 20 Fr. LUQ 12/03/24  1553  LUQ  1                  Physical Exam  Vitals and nursing note reviewed.   Constitutional:       General: She is not in acute distress.     Appearance: She is well-developed.   HENT:      Head: Normocephalic and atraumatic.   Eyes:      Conjunctiva/sclera: Conjunctivae normal.   Cardiovascular:      Rate and Rhythm: Normal rate and regular rhythm.      Heart sounds: No murmur heard.  Pulmonary:      Effort:  "Pulmonary effort is normal. No respiratory distress.      Breath sounds: Normal breath sounds.   Abdominal:      Palpations: Abdomen is soft.      Tenderness: There is abdominal tenderness.      Comments: Tender lower abdomen   Musculoskeletal:         General: No swelling.      Cervical back: Neck supple.   Skin:     General: Skin is warm and dry.      Capillary Refill: Capillary refill takes less than 2 seconds.   Neurological:      Mental Status: She is alert.   Psychiatric:         Mood and Affect: Mood normal.             Lab Results: I have reviewed the following results:  CBC with diff:   Lab Results   Component Value Date    WBC 5.27 12/03/2024    HGB 7.3 (L) 12/03/2024    HCT 23.3 (L) 12/03/2024    MCV 97 12/03/2024    PLT 86 (L) 12/03/2024    RBC 2.41 (L) 12/03/2024    MCH 30.3 12/03/2024    MCHC 31.3 (L) 12/03/2024    RDW 17.2 (H) 12/03/2024    MPV 10.1 12/03/2024    NRBC 0 12/03/2024   ,   BMP/CMP:  Lab Results   Component Value Date    SODIUM 136 12/04/2024    SODIUM 132 (L) 11/04/2024    K 3.9 12/04/2024    K 4.7 11/04/2024     (H) 12/04/2024     11/04/2024    CO2 22 12/04/2024    CO2 25 11/22/2024    CO2 24 11/04/2024    BUN 29 (H) 12/04/2024    BUN 19 11/04/2024    CREATININE 0.82 12/04/2024    CREATININE 0.76 11/04/2024    GLUCOSE 183 (H) 11/22/2024    CALCIUM 7.5 (L) 12/04/2024    CALCIUM 8.6 11/04/2024    AST 7 (L) 11/27/2024    AST 9 11/04/2024    ALT 7 11/27/2024    ALT 6 11/04/2024    ALKPHOS 118 (H) 11/27/2024    ALKPHOS 71 11/04/2024    EGFR 76 12/04/2024    EGFR 89 11/04/2024    EGFR 97 12/11/2020   ,   Lipid Panel: No results found for: \"CHOL\",   Coags:   Lab Results   Component Value Date    PT 13.6 12/01/2023    PTT 49 (H) 11/27/2024    INR 1.49 (H) 11/23/2024    INR 1.1 12/01/2023   ,   Blood Culture: No results found for: \"BLOODCX\",   Urinalysis: No results found for: \"COLORU\", \"CLARITYU\", \"SPECGRAV\", \"PHUR\", \"LEUKOCYTESUR\", \"NITRITE\", \"PROTEINUA\", \"GLUCOSEU\", \"KETONESU\", " "\"BILIRUBINUR\", \"BLOODU\",   Urine Culture: No results found for: \"URINECX\",   Wound Culure: No results found for: \"WOUNDCULT\"      "

## 2024-12-05 NOTE — CASE MANAGEMENT
Case Management Discharge Planning Note    Patient name Lety Botello  Location [facility] /[facility] 2 M* MRN [id number]  : 1960 Date 2024       Current Admission Date: 2024  Current Admission Diagnosis:SBO (small bowel obstruction) (HCC)   Patient Active Problem List    Diagnosis Date Noted Date Diagnosed    Goals of care, counseling/discussion 2024     Palliative care encounter 2024     Hypotension after procedure 2024     Shingles 2024     On total parenteral nutrition (TPN) 2024     Abdominal pain 2024     Nausea & vomiting 2024     Constipation 11/10/2024     Morbid obesity (HCC) 2024     Gastroparesis 2024     Gastric dilation 2024     History of pulmonary embolism 2024     Hypothyroidism 2024     Mild intermittent asthma 2024     Hypertension      Type 2 diabetes mellitus (HCC)      Endometrial cancer (HCC)      Obesity      SBO (small bowel obstruction) (HCC) 2024       LOS (days): 21  Geometric Mean LOS (GMLOS) (days): 4.9  Days to GMLOS:-16     OBJECTIVE:  Risk of Unplanned Readmission Score: 29.39         Current admission status: Inpatient   Preferred Pharmacy:   [id number] [place] - [address] [place] [address]  Phone: [phone] Fax: [phone]    Primary Care Provider: Tai Martinez    Primary Insurance: POPPY TSANG  Secondary Insurance:     DISCHARGE DETAILS:                                                          Transport at Discharge : Otilio fernandez     Number/Name of Dispatcher: Roundtrip  Transported by (Company and Unit #): [employer]  ETA of Transport (Date): 24  ETA of Transport (Time): 1530                            Accepting Facility Name, City & State : [facility]  Receiving Facility/Agency Phone Number: [phone]  Facility/Agency Fax Number: [phone]

## 2024-12-05 NOTE — DISCHARGE SUMMARY
Discharge Summary - Lety Botello 64 y.o. female MRN: [id number]    Unit/Bed#: [place] [address] -01 Encounter: [id number]    Admission Date: 11/14/2024   Discharge Date: 12/05/24    Admitting Diagnosis:   Bowel obstruction (HCC) [K56.609]  Vomiting [R11.10]  Abdominal pain [R10.9]  Type 2 diabetes mellitus with other specified complication, with long-term current use of insulin (HCC) [E11.69, Z79.4]    Discharge Diagnoses: Principal Problem:    SBO (small bowel obstruction) (HCC)  Active Problems:    Hypertension    Type 2 diabetes mellitus (HCC)    Endometrial cancer (HCC)    History of pulmonary embolism    Hypothyroidism    Mild intermittent asthma    Gastroparesis    Morbid obesity (HCC)    Abdominal pain    Nausea & vomiting    Shingles    On total parenteral nutrition (TPN)    Hypotension after procedure    Goals of care, counseling/discussion    Palliative care encounter      Consultations: GI, Nutrition, Palliative care, Gyn Onc, SLIM    Procedures Performed:     Hospital Course: Lety Botello is a 64 y.o. female admitted for ***.    Condition at Discharge: good     Discharge instructions/Information to patient and family:   See after visit summary for information provided to patient and family.      Provisions for Follow-Up Care:  See after visit summary for information related to follow-up care and any pertinent home health orders.      Disposition: Skilled nursing facility at [facility]    Planned Readmission: No    Discharge Statement   I spent 30 minutes discharging the patient. This time was spent on the day of discharge. I had direct contact with the patient on the day of discharge. Additional documentation is required if more than 30 minutes were spent on discharge.     Discharge Medications:  See after visit summary for reconciled discharge medications provided to patient and family.           was consulted.   A venting G tube was placed and tube feeds were started which she was tolerating at the time of discharge.  Case management made arrangements for her to go to [facility].  Home meds restarted at discharge.    Condition at Discharge: good     Discharge instructions/Information to patient and family:   See after visit summary for information provided to patient and family.      Provisions for Follow-Up Care:  See after visit summary for information related to follow-up care and any pertinent home health orders.      Disposition: Skilled nursing facility at [facility]    Planned Readmission: No    Discharge Statement   I spent 30 minutes discharging the patient. This time was spent on the day of discharge. I had direct contact with the patient on the day of discharge. Additional documentation is required if more than 30 minutes were spent on discharge.     Discharge Medications:  See after visit summary for reconciled discharge medications provided to patient and family.

## 2024-12-05 NOTE — PLAN OF CARE
Problem: OCCUPATIONAL THERAPY ADULT  Goal: Performs self-care activities at highest level of function for planned discharge setting.  See evaluation for individualized goals.  Description: Treatment Interventions: ADL retraining, UE strengthening/ROM, Functional transfer training, Endurance training, Cognitive reorientation, Equipment evaluation/education, Patient/family training, Compensatory technique education, Activityengagement, Energy conservation          See flowsheet documentation for full assessment, interventions and recommendations.   Outcome: Progressing  Note: Limitation: Decreased ADL status, Decreased Safe judgement during ADL, Decreased UE strength, Decreased cognition, Decreased endurance, Decreased self-care trans, Decreased high-level ADLs  Prognosis: Good  Assessment: Pt seen for skilled OT session focused on ADLs, functional transfers, EOB sitting, and standing, bed mobility. Pt w/ improvement in standing and ADLs this session. Pt w/ MAX assist x2 supine>sit bed mobility w/ cues for techniques. Pt w/ MAX assist initially to maintain EOB sitting tolerance w/ posterio lean noted, w/ increased time pt able to maintain EOB sitting w/ close supervision. Pt w/ 3 trials of standing w/ MAX assist x2 sit<>Stand w/ clearance of 75% on 1st trial and 50% 2nd trial and full upright positioning 3rd trial w/ 20-30 sec standing tolerance and mod support. Pt required rest breaks between tasks. Pt w/ MAX assist x2 sit>supine bed mobility w/ increased time to complete. Pt w/ MAX assist x2 rolling in bed w/ rails. Pt w/ setup to wash face. Pt w/ MOD assist UB ADLs and max-total assist LB ADLs 2* impaired balance and functional reach. Pt completed b/l UE exercises seen above to increase strength and endurance for ADLs, functional transfers and mobility. Pt upright w/ b/l UEs elevated on pillows and all needs met in semichair position. Pt continues to be limited due to decreased strength and endurance, impaired  balance, impaired activity tolerance, decreased activity tolerance, increased abdominal pain, fall risk, impaired trunk control all causing a decline in aDLs, functional transfers and mobility. REcommend level II moderate resources when medically stable.   The patient's raw score on the AM-PAC Daily Activity Inpatient Short Form is 13. A raw score of less than 19 suggests the patient may benefit from discharge to post-acute rehabilitation services. Please refer to the recommendation of the Occupational Therapist for safe discharge planning.     Rehab Resource Intensity Level, OT: II (Moderate Resource Intensity)

## 2024-12-05 NOTE — CASE MANAGEMENT
[facility] received request for authorization from Care Manager.  Authorization request submitted for: SNF  [facility]ility Name: [facility]  NPI: [id number]  Facility MD: Adebayo Smith  NPI: [id number]  Authorization initiated by contacting insurance:  [employer]  Via: "[employer]   Clinicals submitted via Portal attachment   Pending Reference #: [id number]     Care Manager notified: Liliane Saenz    Updates to authorization status will be noted in chart. Please reach out to CM for updates on any clinical information.

## 2024-12-05 NOTE — PLAN OF CARE
Problem: Potential for Falls  Goal: Patient will remain free of falls  Description: INTERVENTIONS:  - Educate patient/family on patient safety including physical limitations  - Instruct patient to call for assistance with activity   - Consult OT/PT to assist with strengthening/mobility   - Keep Call bell within reach  - Keep bed low and locked with side rails adjusted as appropriate  - Keep care items and personal belongings within reach  - Initiate and maintain comfort rounds  - Make Fall Risk Sign visible to staff  - Offer Toileting every 3 Hours, in advance of need  - Initiate/Maintain bed alarm  - Obtain necessary fall risk management equipment  - Apply yellow socks and bracelet for high fall risk patients  - Consider moving patient to room near nurses station  Outcome: Progressing     Problem: PAIN - ADULT  Goal: Verbalizes/displays adequate comfort level or baseline comfort level  Description: Interventions:  - Encourage patient to monitor pain and request assistance  - Assess pain using appropriate pain scale  - Administer analgesics based on type and severity of pain and evaluate response  - Implement non-pharmacological measures as appropriate and evaluate response  - Consider cultural and social influences on pain and pain management  - Notify physician/advanced practitioner if interventions unsuccessful or patient reports new pain  Outcome: Progressing     Problem: SAFETY ADULT  Goal: Patient will remain free of falls  Description: INTERVENTIONS:  - Educate patient/family on patient safety including physical limitations  - Instruct patient to call for assistance with activity   - Consult OT/PT to assist with strengthening/mobility   - Keep Call bell within reach  - Keep bed low and locked with side rails adjusted as appropriate  - Keep care items and personal belongings within reach  - Initiate and maintain comfort rounds  - Make Fall Risk Sign visible to staff  - Offer Toileting every 3 Hours, in  advance of need  - Initiate/Maintain bed alarm  - Obtain necessary fall risk management equipment  - Apply yellow socks and bracelet for high fall risk patients  - Consider moving patient to room near nurses station  Outcome: Progressing

## 2024-12-05 NOTE — RESTORATIVE TECHNICIAN NOTE
Restorative Technician Note      Patient Name: Lety Botello     Restorative Tech Visit Date: 12/05/24  Note Type: Mobility  Patient Position Upon Consult: Bedside chair  Activity Performed: Ambulated; Range of motion  Assistive Device: Roller walker  Patient Position at End of Consult: All needs within reach; Bedside chair

## 2024-12-05 NOTE — PLAN OF CARE
Problem: PHYSICAL THERAPY ADULT  Goal: Performs mobility at highest level of function for planned discharge setting.  See evaluation for individualized goals.  Description: Treatment/Interventions: Functional transfer training, LE strengthening/ROM, Elevations, Therapeutic exercise, Endurance training, Patient/family training, Equipment eval/education, Bed mobility, Gait training, Continued evaluation, Spoke to nursing, OT  Equipment Recommended: Walker       See flowsheet documentation for full assessment, interventions and recommendations.  Outcome: Progressing  Note: Prognosis: Fair  Problem List: Decreased strength, Decreased range of motion, Decreased endurance, Impaired balance, Decreased mobility, Obesity, Decreased skin integrity, Pain (increased edema le's, abdomen and ue's.)  Assessment: Pt seen for PT treatment session this date with interventions consisting of bed mobility, transfer training, and HEP, sitting and standing balance and tolerance activities and education provided as needed for safety and direction to improve functional mobility, safety awareness, and activity tolerance. Pt agreeable to PT treatment session upon arrival, pt found supine in bed . At end of session, pt left  seated in semi chair position in bed with all needs in reach. In comparison to previous session, pt with improvement in activity tolerance, endurance, static sitting balance, and standing balance.  Pt  continues to require max assist x 2 for rolling L and r with use of bed rails.  Sit to supine with max assist x 2. Pt  performs static sitting EOB with max assist x1 with b/l ue support due to heavy retropulsion / posterior lean with L knee blocked due to pt sliding forward toward EOB.  Pt progressed to supervision with improved  sitting posture and balance.  pt able to maintain midline without ue support. Sitting tolerance eob 20 minutes.   Pt  performs sit to stand transfers with max assist x2 with bed height elevated.   Pt performs static standing trials with support of Rw and max assist x2 verbal cues to push through le's and lock knees during static standing.  Pt  able to achieve full upright standing posture on 3rd attempt. Standing tolerance 20 seconds with max assist x2 with support of rw.  Pt  performs aa-arom to b/l le's x10 reps.    Encouragement and reassurance required t/o PT session. Please refer to endurance deficit section of flowsheet for vitals. Continue to recommend  level II moderate rehab resource intensity  at time of d/c in order to maximize pt's functional independence and safety w/ mobility. Pt continues to be functioning below baseline level. PT will continue to see pt while here in order to address the deficits listed above and provide interventions consistent w/ POC in effort to achieve STGs.    The patient's AM-PAC Basic Mobility Inpatient Short Form Raw Score is 6. A raw score less than 16 suggests the patient may benefit from discharge to post-acute rehabilitation services. Please also refer to the recommendation of the Physical Therapist for safe discharge planning.  Barriers to Discharge: None     Rehab Resource Intensity Level, PT: II (Moderate Resource Intensity)    See flowsheet documentation for full assessment.

## 2024-12-05 NOTE — ASSESSMENT & PLAN NOTE
64 y.o. female presenting with recurrent SBO. 11/14-11/21 failed conservative management with persistent SBO. S/p ex lap, SBR, DAVID on 11/22.   - s/p PEG GJ placement on 12/3      Plan  - diet as tolerated, can vent G limb as needed  - advance tube feeds to goal  - Monitor glucose  - Gyn/onc recs appreciate  - Appreciate PT/OT   - accurate I/O  - Encourage OOB, ambulate, IS  - DVT ppx with therapeutic Lovenox  - Strongly encourage OOB, ambulation, IS

## 2024-12-05 NOTE — DISCHARGE INSTR - AVS FIRST PAGE
Suggest to initiate Osmolite 1.2 @ 20ml/hr advancing 10ml per hour q 8 hour or as tolerated. Monitor electrolytes closely. She is currently @ 50 ml/hour as of 12/5/24.     Goal: Osmolite 1.2 60ml/hr x 22 hours. Flush 120ml q 4 hours. Add Prosource No Carb 30ml BID.      TF at goal will provide 1704kcal, 103 gm protein, Free water 1082ml - total water with flushes 1802ml.      RD to follow and provide additional recommendations. May resume Low Fiber diet CCD 2 ad Ensure Clear TID as tolerated.    Vent G tube is patient develops nausea, emesis  or abdominal distention.

## 2024-12-05 NOTE — OCCUPATIONAL THERAPY NOTE
Occupational Therapy Progress Note     Patient Name: Lety Botello  Today's Date: 12/5/2024  Problem List  Principal Problem:    SBO (small bowel obstruction) (HCC)  Active Problems:    Hypertension    Type 2 diabetes mellitus (HCC)    Endometrial cancer (HCC)    History of pulmonary embolism    Hypothyroidism    Mild intermittent asthma    Gastroparesis    Morbid obesity (HCC)    Abdominal pain    Nausea & vomiting    Shingles    On total parenteral nutrition (TPN)    Hypotension after procedure    Goals of care, counseling/discussion    Palliative care encounter          12/05/24 1021   OT Last Visit   OT Visit Date 12/05/24   Note Type   Note Type Treatment for insurance authorization   Pain Assessment   Pain Assessment Tool 0-10   Pain Score 10 - Worst Possible Pain   Pain Location/Orientation Orientation: Bilateral;Location: Abdomen   Hospital Pain Intervention(s) Ambulation/increased activity;Repositioned;Emotional support  (RN provided medication)   Restrictions/Precautions   Weight Bearing Precautions Per Order No   Other Precautions Chair Alarm;Bed Alarm;Multiple lines;Fall Risk;Pain  (pegtube)   ADL   Where Assessed   (upright in bed)   Grooming Assistance 5  Supervision/Setup   Grooming Deficit Setup;Supervision/safety;Increased time to complete;Wash/dry face   UB Bathing Assistance 3  Moderate Assistance   LB Bathing Assistance 1  Total Assistance   UB Dressing Assistance 3  Moderate Assistance   UB Dressing Deficit Setup;Verbal cueing;Supervision/safety;Increased time to complete;Thread RUE;Thread LUE   UB Dressing Comments increased time and assist   LB Dressing Assistance 2  Maximal Assistance   LB Dressing Deficit Don/doff L sock;Don/doff R sock   Toileting Assistance  1  Total Assistance   Functional Standing Tolerance   Time 20-30sec on 3rd trial of stance   Activity w/ MOD assist support steadying w/ RW   Comments other 2 stance 5-10sec w/ MOD assist x2 steadying support   Bed Mobility   Rolling  "R 2  Maximal assistance   Additional items Assist x 2;Increased time required;Verbal cues;LE management;Bedrails;HOB elevated   Rolling L 2  Maximal assistance   Additional items Assist x 2;HOB elevated;Bedrails;Increased time required;Verbal cues;LE management   Supine to Sit 2  Maximal assistance   Additional items Assist x 2;Increased time required;Verbal cues;LE management;Bedrails;HOB elevated   Sit to Supine 2  Maximal assistance   Additional items Assist x 2;Increased time required;LE management;Verbal cues;Bedrails   Additional Comments increased time to achieve upright EOB; MAX assist steadying support w/ posterior lean noted and then improved to supervision w/ bed rail support; pt EOB sitting tolerance around 20 min w/ encouragement   Transfers   Sit to Stand 2  Maximal assistance   Additional items Assist x 2;Increased time required;Verbal cues;Bedrails   Stand to Sit 2  Maximal assistance   Additional items Assist x 2;Increased time required;Verbal cues;Bedrails   Additional Comments cues for positioning and safety; elevated bed height   Therapeutic Exercise - ROM   UE-ROM Yes   ROM- Right Upper Extremities   R Shoulder AROM;Flexion;Extension  (triceps)   R Elbow AROM;Elbow flexion;Elbow extension  (forearm pronation/supination)   R Weight/Reps/Sets 2 sets x 10reps   RUE ROM Comment cues for appropriate techniques and rest breaks needed   ROM - Left Upper Extremities    L Shoulder AROM;Flexion;Extension;Horizontal ABduction  (triceps)   L Elbow AROM;Elbow flexion;Elbow extension  (forearm pronation/supination)   LUE ROM Comment cues for appropriate techniques and rest breaks needed   Subjective   Subjective \"I will try\"   Cognition   Overall Cognitive Status WFL   Arousal/Participation Alert;Responsive;Cooperative   Attention Attends with cues to redirect   Orientation Level Oriented to person;Oriented to place;Oriented to time   Memory Decreased recall of precautions   Following Commands Follows one " step commands with increased time or repetition   Comments increased encouragement and pt then more motivated to participate   Additional Activities   Additional Activities   (education on positioning and safety)   Additional Activities Comments upright in bed in semichair position end of session   Activity Tolerance   Activity Tolerance Patient limited by fatigue;Patient limited by pain   Medical Staff Made Aware appropriate to see per RN, and PTA :Pt seen for co-session with skilled Physical therapist 2* clinically unstable presentation, medical complexity, new precautions, performance deficits/functional limitations, impaired cognition/safety awareness, limited activity tolerance and present impairments which are a regression from patient patient's baseline and impacting overall occupational performance   Assessment   Assessment Pt seen for skilled OT session focused on ADLs, functional transfers, EOB sitting, and standing, bed mobility. Pt w/ improvement in standing and ADLs this session. Pt w/ MAX assist x2 supine>sit bed mobility w/ cues for techniques. Pt w/ MAX assist initially to maintain EOB sitting tolerance w/ posterio lean noted, w/ increased time pt able to maintain EOB sitting w/ close supervision. Pt w/ 3 trials of standing w/ MAX assist x2 sit<>Stand w/ clearance of 75% on 1st trial and 50% 2nd trial and full upright positioning 3rd trial w/ 20-30 sec standing tolerance and mod support. Pt required rest breaks between tasks. Pt w/ MAX assist x2 sit>supine bed mobility w/ increased time to complete. Pt w/ MAX assist x2 rolling in bed w/ rails. Pt w/ setup to wash face. Pt w/ MOD assist UB ADLs and max-total assist LB ADLs 2* impaired balance and functional reach. Pt completed b/l UE exercises seen above to increase strength and endurance for ADLs, functional transfers and mobility. Pt upright w/ b/l UEs elevated on pillows and all needs met in semichair position. Pt continues to be limited due to  decreased strength and endurance, impaired balance, impaired activity tolerance, decreased activity tolerance, increased abdominal pain, fall risk, impaired trunk control all causing a decline in aDLs, functional transfers and mobility. REcommend level II moderate resources when medically stable.   The patient's raw score on the AM-PAC Daily Activity Inpatient Short Form is 13. A raw score of less than 19 suggests the patient may benefit from discharge to post-acute rehabilitation services. Please refer to the recommendation of the Occupational Therapist for safe discharge planning.   Plan   Treatment Interventions ADL retraining;Functional transfer training;UE strengthening/ROM;Endurance training;Cognitive reorientation;Patient/family training;Equipment evaluation/education;Compensatory technique education;Energy conservation;Activityengagement   Goal Expiration Date 12/09/24   OT Treatment Day 5   OT Frequency 3-5x/wk   Discharge Recommendation   Rehab Resource Intensity Level, OT II (Moderate Resource Intensity)   AM-PAC Daily Activity Inpatient   Lower Body Dressing 1   Bathing 2   Toileting 1   Upper Body Dressing 2   Grooming 3   Eating 3   Daily Activity Raw Score 12   Daily Activity Standardized Score (Calc for Raw Score >=11) 30.6   -[facility] Applied Cognition Inpatient   Following a Speech/Presentation 3   Understanding Ordinary Conversation 4   Taking Medications 3   Remembering Where Things Are Placed or Put Away 3   Remembering List of 4-5 Errands 3   Taking Care of Complicated Tasks 3   Applied Cognition Raw Score 19   Applied Cognition Standardized Score 39.77   Modified Lucinda Scale   Modified Lucinda Scale 4   End of Consult   Education Provided Yes   Patient Position at End of Consult Bed/Chair alarm activated;All needs within reach  (upright bed)   Nurse Communication Nurse aware of consult     Documentation completed by: Laney Tate MS, OTR/L

## 2024-12-05 NOTE — NURSING NOTE
Pt discharged to [facility] via stretcher with transport team. No s/s of any distress noted. Report called over to facility. Pt took all personal belongings.

## 2024-12-06 NOTE — UTILIZATION REVIEW
NOTIFICATION OF ADMISSION DISCHARGE   This is a Notification of Discharge from [facility]. Please be advised that this patient has been discharge from our facility. Below you will find the admission and discharge date and time including the patient’s disposition.   UTILIZATION REVIEW CONTACT:  Tova Whittaker  Utilization   Network Utilization Review Department  Phone: [phone] x carefully listen to the prompts. All voicemails are confidential.  Email: [email or web address]     ADMISSION INFORMATION  PRESENTATION DATE: 11/14/2024  7:16 AM  OBERVATION ADMISSION DATE: N/A  INPATIENT ADMISSION DATE: 11/14/24  3:02 PM   DISCHARGE DATE: 12/5/2024  3:30 PM   DISPOSITION:Non [facility] SNF/TCU/SNU    Network Utilization Review Department  ATTENTION: Please call with any questions or concerns to [phone] and carefully listen to the prompts so that you are directed to the right person. All voicemails are confidential.   For Discharge needs, contact Care Management DC Support Team at [phone] opt. 2  Send all requests for admission clinical reviews, approved or denied determinations and any other requests to dedicated fax number below belonging to the campus where the patient is receiving treatment. List of dedicated fax numbers for the Facilities:  FACILITY NAME UR FAX NUMBER   ADMISSION DENIALS (Administrative/Medical Necessity) [phone]   DISCHARGE SUPPORT TEAM ([facility]) [phone]   PARENT CHILD HEALTH (Maternity/NICU/Pediatrics) [phone]   [facility] [phone]   [facility] [phone]   [facility] [phone]   [facility] [phone]   [facility] [phone]   [facility] [phone]   [facility] [phone]   [facility]  [phone]   [facility] [phone]   [facility] [phone]   [facility] [phone]   [facility] [phone]

## 2024-12-09 PROBLEM — Z78.9 ENCOUNTER FOR GASTROJEJUNAL (GJ) TUBE PLACEMENT: Status: ACTIVE | Noted: 2024-12-09

## 2024-12-09 PROBLEM — D64.9 ANEMIA: Status: ACTIVE | Noted: 2024-12-09

## 2024-12-09 PROBLEM — E87.1 HYPONATREMIA: Status: ACTIVE | Noted: 2024-12-09

## 2024-12-09 NOTE — ED PROCEDURE NOTE
Procedure  Complex Venous Access Line    Date/Time: 12/9/2024 4:43 PM    Performed by: Ravinder Colon DO  Authorized by: Rvainder Colon DO    Patient location:  ED  Consent:     Consent obtained:  Verbal    Consent given by:  Patient  Universal protocol:     Procedure explained and questions answered to patient or proxy's satisfaction: yes    Pre-procedure details:     Skin preparation:  Alcohol    Skin preparation agent: Skin preparation agent completely dried prior to procedure    Procedure details:     Complex Venous Access Line Type: US Guided Peripheral IV      Peripheral IV Indications: other specified disorders of the vein      Orientation:  Right    Location:  Antecubital    Catheter size:  18 gauge    Patient evaluated for contraindications to access (i.e. fistula, thrombosis, etc): Yes      Patient position:  Flat    Ultrasound image availability:  Images available in PACS    Sterile ultrasound techniques: Sterile gel and sterile probe covers were used      Number of attempts:  2    Successful placement: yes      Landmarks identified: yes    Anesthesia (see MAR for exact dosages):     Anesthesia method:  None  Post-procedure details:     Post-procedure:  Dressing applied    Assessment:  Blood return through all ports and free fluid flow    Post-procedure complications: none      Patient tolerance of procedure:  Tolerated well, no immediate complications                   Ravinder Colon DO  12/09/24 1643

## 2024-12-09 NOTE — ED PROVIDER NOTES
Time reflects when diagnosis was documented in both MDM as applicable and the Disposition within this note       Time User Action Codes Description Comment    12/9/2024  6:47 PM Tor Chavez Add [K56.609] SBO (small bowel obstruction) (HCC)     12/9/2024  6:47 PM Tor Chavez Add [Z78.9] Encounter for gastrojejunal (GJ) tube placement           ED Disposition       ED Disposition   Admit    Condition   Stable    Date/Time   Mon Dec 9, 2024  5:58 PM    Comment   --             Assessment & Plan       Medical Decision Making  CT + abd labs to r/o GJ complication  Attempted to unclog GJ w/ hot water, unsuccessful  Will reach out to on call GI for replacement once CT resulted unless surgical emergency found  Medicine admit    Amount and/or Complexity of Data Reviewed  Labs: ordered.  Radiology: ordered.    Risk  Prescription drug management.  Decision regarding hospitalization.             Medications   HYDROmorphone (DILAUDID) injection 1 mg (1 mg Intravenous Given 12/9/24 1706)   iohexol (OMNIPAQUE) 350 MG/ML injection (MULTI-DOSE) 100 mL (100 mL Intravenous Given 12/9/24 1723)       ED Risk Strat Scores                                               History of Present Illness       Chief Complaint   Patient presents with    Feeding Tube Problem     Pt arrives from [facility] for clogged feeding tube.        Past Medical History:   Diagnosis Date    Diverticulitis     Endometrial cancer (HCC)     History of shingles     Right hemiabdomen (inactive)    Hypertension     IBS (irritable bowel syndrome)     Obesity     Small bowel obstruction (HCC)     Type 2 diabetes mellitus (HCC)       Past Surgical History:   Procedure Laterality Date    ABDOMINAL ADHESION SURGERY N/A 11/22/2024    Procedure: EXTENSIVE LYSIS OF ADHESIONS >90 MINUTES;  Surgeon: Alejo Ward MD;  Location: [facility];  Service: General    APPENDECTOMY      CHOLECYSTECTOMY      LAPAROTOMY N/A 11/22/2024    Procedure: LAPAROTOMY EXPLORATORY;   Surgeon: Alejo Ward MD;  Location: [facility];  Service: General    SIGMOIDECTOMY N/A     SMALL INTESTINE SURGERY N/A 11/22/2024    Procedure: RESECTION SMALL BOWEL WITH PRIMARY ANASTAMOSIS,PARTIAL OMENTECTOMY;  Surgeon: Alejo Ward MD;  Location: [facility];  Service: General      History reviewed. No pertinent family history.   Social History     Tobacco Use    Smoking status: Never    Smokeless tobacco: Never   Substance Use Topics    Alcohol use: Not Currently    Drug use: Never      E-Cigarette/Vaping      E-Cigarette/Vaping Substances      I have reviewed and agree with the history as documented.     Patient is a 64-year-old female with extensive past medical history including recent small bowel resection with anastomosis secondary to recurrent SBO, hysterectomy with salpingo-oophorectomy, currently with PEG and J-tube in place presenting for evaluation of GJ tube obstruction.  Tube is been obstructed over the last day, noted this morning, sent in from care home.  Patient endorses some worsening of her chronic abdominal pain as well.        Review of Systems   All other systems reviewed and are negative.          Objective       ED Triage Vitals   Temperature Pulse Blood Pressure Respirations SpO2 Patient Position - Orthostatic VS   12/09/24 1350 12/09/24 1350 12/09/24 1351 12/09/24 1350 12/09/24 1350 12/09/24 1350   97.5 °F (36.4 °C) 74 152/67 18 99 % Lying      Temp Source Heart Rate Source BP Location FiO2 (%) Pain Score    12/09/24 1350 12/09/24 1350 12/09/24 1350 -- 12/09/24 1706    Tympanic Monitor Left arm  10 - Worst Possible Pain      Vitals      Date and Time Temp Pulse SpO2 Resp BP Pain Score FACES Pain Rating User   12/09/24 1815 -- 70 100 % -- -- 8 -- [place]   12/09/24 1706 -- -- -- -- -- 10 - Worst Possible Pain -- [place]   12/09/24 1653 -- 70 100 % 18 158/71 -- -- [place]   12/09/24 1351 -- -- -- -- 152/67 -- --    12/09/24 1350 97.5 °F (36.4 °C) 74 99 % 18 -- -- --             Physical  Exam  Vitals and nursing note reviewed.   Constitutional:       General: She is not in acute distress.     Appearance: She is not ill-appearing.   HENT:      Head: Normocephalic and atraumatic.      Nose: Nose normal.      Mouth/Throat:      Mouth: Mucous membranes are moist.      Pharynx: Oropharynx is clear.   Cardiovascular:      Rate and Rhythm: Normal rate and regular rhythm.   Pulmonary:      Effort: Pulmonary effort is normal.      Breath sounds: Normal breath sounds.   Abdominal:      General: Abdomen is flat. Bowel sounds are normal.      Palpations: Abdomen is soft.      Tenderness: There is abdominal tenderness.   Musculoskeletal:         General: Normal range of motion.      Cervical back: Normal range of motion.   Skin:     General: Skin is warm and dry.   Neurological:      General: No focal deficit present.      Mental Status: She is alert and oriented to person, place, and time.   Psychiatric:         Mood and Affect: Mood normal.         Results Reviewed       Procedure Component Value Units Date/Time    Comprehensive metabolic panel [[id number]]  (Abnormal) Collected: 12/09/24 1447    Lab Status: Final result Specimen: Blood from Arm, Right Updated: 12/09/24 1522     Sodium 129 mmol/L      Potassium 4.8 mmol/L      Chloride 100 mmol/L      CO2 26 mmol/L      ANION GAP 3 mmol/L      BUN 14 mg/dL      Creatinine 0.39 mg/dL      Glucose 187 mg/dL      Calcium 7.5 mg/dL      Corrected Calcium 8.9 mg/dL      AST 11 U/L      ALT 13 U/L      Alkaline Phosphatase 66 U/L      Total Protein 4.5 g/dL      Albumin 2.2 g/dL      Total Bilirubin 0.36 mg/dL      eGFR 112 ml/min/1.73sq m     Narrative:      National Kidney Disease Foundation guidelines for Chronic Kidney Disease (CKD):     Stage 1 with normal or high GFR (GFR > 90 mL/min/1.73 square meters)    Stage 2 Mild CKD (GFR = 60-89 mL/min/1.73 square meters)    Stage 3A Moderate CKD (GFR = 45-59 mL/min/1.73 square meters)    Stage 3B Moderate CKD (GFR =  30-44 mL/min/1.73 square meters)    Stage 4 Severe CKD (GFR = 15-29 mL/min/1.73 square meters)    Stage 5 End Stage CKD (GFR <15 mL/min/1.73 square meters)  Note: GFR calculation is accurate only with a steady state creatinine    Lipase [[id number]]  (Abnormal) Collected: 12/09/24 1447    Lab Status: Final result Specimen: Blood from Arm, Right Updated: 12/09/24 1522     Lipase <6 u/L     CBC and differential [[id number]]  (Abnormal) Collected: 12/09/24 1447    Lab Status: Final result Specimen: Blood from Arm, Right Updated: 12/09/24 1457     WBC 6.34 Thousand/uL      RBC 2.55 Million/uL      Hemoglobin 8.0 g/dL      Hematocrit 24.7 %      MCV 97 fL      MCH 31.4 pg      MCHC 32.4 g/dL      RDW 16.9 %      MPV 9.9 fL      Platelets 136 Thousands/uL      nRBC 0 /100 WBCs      Segmented % 80 %      Immature Grans % 2 %      Lymphocytes % 7 %      Monocytes % 9 %      Eosinophils Relative 2 %      Basophils Relative 0 %      Absolute Neutrophils 5.11 Thousands/µL      Absolute Immature Grans 0.10 Thousand/uL      Absolute Lymphocytes 0.45 Thousands/µL      Absolute Monocytes 0.54 Thousand/µL      Eosinophils Absolute 0.13 Thousand/µL      Basophils Absolute 0.01 Thousands/µL             CT abdomen pelvis w contrast   Final Interpretation by Andrea Anderson MD (12/09 1829)      Since November 30, 2024:   1.  Persistent third spacing with slight increase in moderate volume ascites, and no significant change in moderate size right and small left pleural effusions with bibasilar atelectasis.   2.  New percutaneous enteric tube with tip in the distal duodenum.         Workstation performed: [id number]             Procedures    ED Medication and Procedure Management   Prior to Admission Medications   Prescriptions Last Dose Informant Patient Reported? Taking?   Acetaminophen 325 MG CAPS   Yes No   Sig: Take 325 mg by mouth every 6 (six) hours as needed   Insulin Lispro (HUMALOG PEN SC)   Yes No   Sig: Inject under the skin 3  (three) times a day before meals Sliding scale.   Klor-Con M20 20 MEQ tablet   Yes No   Sig: Take 1 tablet by mouth in the morning   albuterol (PROVENTIL HFA,VENTOLIN HFA) 90 mcg/act inhaler   Yes No   Sig: Inhale 2 puffs every 6 (six) hours as needed   apixaban (ELIQUIS) 5 mg   Yes No   Sig: Take 5 mg by mouth 2 (two) times a day   aspirin (ECOTRIN LOW STRENGTH) 81 mg EC tablet   Yes No   Sig: Take 81 mg by mouth   bisacodyl (DULCOLAX) 10 mg suppository   No No   Sig: Insert 1 suppository (10 mg total) into the rectum daily   carvedilol (COREG) 12.5 mg tablet   No No   Sig: Take 1 tablet (12.5 mg total) by mouth 2 (two) times a day with meals   dapagliflozin (Farxiga) 10 MG tablet   Yes No   Sig: Take 10 mg by mouth   dicyclomine (BENTYL) 10 mg capsule   Yes No   Sig: Take 10 mg by mouth 2 (two) times a day as needed   furosemide (LASIX) 20 mg tablet   Yes No   Sig: Take 1 tablet by mouth in the morning   gabapentin (NEURONTIN) 100 mg capsule   Yes No   Sig: Take 100 mg by mouth   levothyroxine 125 mcg tablet   Yes No   Sig: Take 125 mcg by mouth daily   metoclopramide (Reglan) 10 mg tablet   No No   Sig: Take 1 tablet (10 mg total) by mouth 4 (four) times a day   nitroglycerin (NITROSTAT) 0.4 mg SL tablet   Yes No   Sig: Place 0.4 mg under the tongue   ondansetron (ZOFRAN) 4 mg tablet   No No   Sig: Take 1 tablet (4 mg total) by mouth every 8 (eight) hours as needed for nausea or vomiting   oxyCODONE (ROXICODONE) 5 immediate release tablet   No No   Si tablet (5 mg total) by Per G Tube route every 4 (four) hours as needed for severe pain for up to 10 days Max Daily Amount: 30 mg   pantoprazole (PROTONIX) 40 mg tablet   No No   Sig: Take 1 tablet (40 mg total) by mouth daily in the early morning   vitamin B-12 (VITAMIN B-12) 1,000 mcg tablet   Yes No   Sig: Take 1,000 mcg by mouth daily      Facility-Administered Medications: None     Patient's Medications   Discharge Prescriptions    No medications on file      No discharge procedures on file.  ED SEPSIS DOCUMENTATION   Time reflects when diagnosis was documented in both MDM as applicable and the Disposition within this note       Time User Action Codes Description Comment    12/9/2024  6:47 PM Tor Chavez Add [K56.609] SBO (small bowel obstruction) (HCC)     12/9/2024  6:47 PM Tor Chavez Add [Z78.9] Encounter for gastrojejunal (GJ) tube placement                  Tor Chavez MD  12/09/24 9654

## 2024-12-09 NOTE — ED ATTENDING ATTESTATION
12/9/2024  I, Harini Weldon MD, saw and evaluated the patient. I have discussed the patient with the resident/non-physician practitioner and agree with the resident's/non-physician practitioner's findings, Plan of Care, and MDM as documented in the resident's/non-physician practitioner's note, except where noted. All available labs and Radiology studies were reviewed.  I was present for key portions of any procedure(s) performed by the resident/non-physician practitioner and I was immediately available to provide assistance.       At this point I agree with the current assessment done in the Emergency Department.  I have conducted an independent evaluation of this patient a history and physical is as follows:  Patient here because the J port on her GJ tube will not flush.  Patient is a 64-year-old woman with history of gastroparesis, hysterectomy with subsequent small bowel obstruction, had a feeding tube placed on December 2 with a vent support in her stomach.  Today, the patient states that the J-tube does not flush, and she has been having increased abdominal pain all over since this morning.  She is not vomiting.  She has not had fever.  On exam the patient is chronically ill-appearing.  She is a little pale.  Her HEENT exam is otherwise nonfocal.  Her neck is supple and nontender.  Her heart is regular without murmurs.  The patient's lungs are clear.  Her abdomen is diffusely tender.  She has voluntary guarding throughout.  Her GJ tube is in place, but cannot be flushed through the J port.  The patient is extremities are intact.  She has a rash across her right abdomen which appears to be consistent with shingles.  This has been present for months. MEDICAL DECISION MAKING    Number and Complexity of Problems  Differential diagnosis: Intra-abdominal catastrophe, GJ tube malfunction, small bowel obstruction, shingles, abdominal pain, failure to thrive    Medical Decision Making Data  External documents  reviewed: Patient's hospitalization from the second reviewed, labs reviewed  My EKG interpretation:   My CT interpretation:   My X-ray interpretation:   My ultrasound interpretation:     XR chest portable   Final Result      Bilateral lung disease.            Workstation performed: [id number]         CT abdomen pelvis w contrast   Final Result      Since November 30, 2024:   1.  Persistent third spacing with slight increase in moderate volume ascites, and no significant change in moderate size right and small left pleural effusions with bibasilar atelectasis.   2.  New percutaneous enteric tube with tip in the distal duodenum.         Workstation performed: [id number]             Labs Reviewed   CBC AND DIFFERENTIAL - Abnormal       Result Value Ref Range Status    WBC 6.34  4.31 - 10.16 Thousand/uL Final    RBC 2.55 (*) 3.81 - 5.12 Million/uL Final    Hemoglobin 8.0 (*) 11.5 - 15.4 g/dL Final    Hematocrit 24.7 (*) 34.8 - 46.1 % Final    MCV 97  82 - 98 fL Final    MCH 31.4  26.8 - 34.3 pg Final    MCHC 32.4  31.4 - 37.4 g/dL Final    RDW 16.9 (*) 11.6 - 15.1 % Final    MPV 9.9  8.9 - 12.7 fL Final    Platelets 136 (*) 149 - 390 Thousands/uL Final    nRBC 0  /100 WBCs Final    Segmented % 80 (*) 43 - 75 % Final    Immature Grans % 2  0 - 2 % Final    Lymphocytes % 7 (*) 14 - 44 % Final    Monocytes % 9  4 - 12 % Final    Eosinophils Relative 2  0 - 6 % Final    Basophils Relative 0  0 - 1 % Final    Absolute Neutrophils 5.11  1.85 - 7.62 Thousands/µL Final    Absolute Immature Grans 0.10  0.00 - 0.20 Thousand/uL Final    Absolute Lymphocytes 0.45 (*) 0.60 - 4.47 Thousands/µL Final    Absolute Monocytes 0.54  0.17 - 1.22 Thousand/µL Final    Eosinophils Absolute 0.13  0.00 - 0.61 Thousand/µL Final    Basophils Absolute 0.01  0.00 - 0.10 Thousands/µL Final   COMPREHENSIVE METABOLIC PANEL - Abnormal    Sodium 129 (*) 135 - 147 mmol/L Final    Potassium 4.8  3.5 - 5.3 mmol/L Final    Chloride 100  96 - 108 mmol/L Final     CO2 26  21 - 32 mmol/L Final    ANION GAP 3 (*) 4 - 13 mmol/L Final    BUN 14  5 - 25 mg/dL Final    Creatinine 0.39 (*) 0.60 - 1.30 mg/dL Final    Comment: Standardized to IDMS reference method    Glucose 187 (*) 65 - 140 mg/dL Final    Comment: If the patient is fasting, the ADA then defines impaired fasting glucose as > 100 mg/dL and diabetes as > or equal to 123 mg/dL.    Calcium 7.5 (*) 8.4 - 10.2 mg/dL Final    Corrected Calcium 8.9  8.3 - 10.1 mg/dL Final    AST 11 (*) 13 - 39 U/L Final    ALT 13  7 - 52 U/L Final    Comment: Specimen collection should occur prior to Sulfasalazine administration due to the potential for falsely depressed results.     Alkaline Phosphatase 66  34 - 104 U/L Final    Total Protein 4.5 (*) 6.4 - 8.4 g/dL Final    Albumin 2.2 (*) 3.5 - 5.0 g/dL Final    Total Bilirubin 0.36  0.20 - 1.00 mg/dL Final    Comment: Use of this assay is not recommended for patients undergoing treatment with eltrombopag due to the potential for falsely elevated results.  N-acetyl-p-benzoquinone imine (metabolite of Acetaminophen) will generate erroneously low results in samples for patients that have taken an overdose of Acetaminophen.    eGFR 112  ml/min/1.73sq m Final    Narrative:     National Kidney Disease Foundation guidelines for Chronic Kidney Disease (CKD):     Stage 1 with normal or high GFR (GFR > 90 mL/min/1.73 square meters)    Stage 2 Mild CKD (GFR = 60-89 mL/min/1.73 square meters)    Stage 3A Moderate CKD (GFR = 45-59 mL/min/1.73 square meters)    Stage 3B Moderate CKD (GFR = 30-44 mL/min/1.73 square meters)    Stage 4 Severe CKD (GFR = 15-29 mL/min/1.73 square meters)    Stage 5 End Stage CKD (GFR <15 mL/min/1.73 square meters)  Note: GFR calculation is accurate only with a steady state creatinine   LIPASE - Abnormal    Lipase <6 (*) 11 - 82 u/L Final       Labs reviewed by me are significant for:     Clinical decision rules/scores are significant for:     Discussed case with:    Considered admission for:     Treatment and Disposition  ED course: Patient seen and examined.  Discussed with surgery.  Will plan to CT  Shared decision making:   Code status:     ED Course         Critical Care Time  Procedures

## 2024-12-10 ENCOUNTER — ANESTHESIA EVENT (INPATIENT)
Dept: GASTROENTEROLOGY | Facility: HOSPITAL | Age: 64
End: 2024-12-10
Payer: COMMERCIAL

## 2024-12-10 ENCOUNTER — ANESTHESIA (INPATIENT)
Dept: GASTROENTEROLOGY | Facility: HOSPITAL | Age: 64
End: 2024-12-10
Payer: COMMERCIAL

## 2024-12-10 PROBLEM — J96.01 ACUTE RESPIRATORY FAILURE WITH HYPOXIA (HCC): Status: ACTIVE | Noted: 2024-12-10

## 2024-12-10 PROBLEM — E83.42 HYPOMAGNESEMIA: Status: ACTIVE | Noted: 2024-12-10

## 2024-12-10 RX ORDER — PROPOFOL 10 MG/ML
INJECTION, EMULSION INTRAVENOUS AS NEEDED
Status: DISCONTINUED | OUTPATIENT
Start: 2024-12-10 | End: 2024-12-10

## 2024-12-10 RX ORDER — SODIUM CHLORIDE 9 MG/ML
INJECTION, SOLUTION INTRAVENOUS CONTINUOUS PRN
Status: DISCONTINUED | OUTPATIENT
Start: 2024-12-10 | End: 2024-12-10

## 2024-12-10 RX ORDER — METOCLOPRAMIDE HYDROCHLORIDE 5 MG/ML
INJECTION INTRAMUSCULAR; INTRAVENOUS AS NEEDED
Status: DISCONTINUED | OUTPATIENT
Start: 2024-12-10 | End: 2024-12-10

## 2024-12-10 RX ORDER — FUROSEMIDE 10 MG/ML
INJECTION INTRAMUSCULAR; INTRAVENOUS AS NEEDED
Status: DISCONTINUED | OUTPATIENT
Start: 2024-12-10 | End: 2024-12-10

## 2024-12-10 RX ORDER — KETOROLAC TROMETHAMINE 30 MG/ML
INJECTION, SOLUTION INTRAMUSCULAR; INTRAVENOUS AS NEEDED
Status: DISCONTINUED | OUTPATIENT
Start: 2024-12-10 | End: 2024-12-10

## 2024-12-10 RX ORDER — CEFAZOLIN SODIUM 1 G/3ML
INJECTION, POWDER, FOR SOLUTION INTRAMUSCULAR; INTRAVENOUS AS NEEDED
Status: DISCONTINUED | OUTPATIENT
Start: 2024-12-10 | End: 2024-12-10

## 2024-12-10 RX ADMIN — SODIUM CHLORIDE: 0.9 INJECTION, SOLUTION INTRAVENOUS at 10:22

## 2024-12-10 RX ADMIN — PROPOFOL 30 MG: 10 INJECTION, EMULSION INTRAVENOUS at 10:41

## 2024-12-10 RX ADMIN — PROPOFOL 20 MG: 10 INJECTION, EMULSION INTRAVENOUS at 10:58

## 2024-12-10 RX ADMIN — METOCLOPRAMIDE 10 MG: 5 INJECTION, SOLUTION INTRAMUSCULAR; INTRAVENOUS at 14:17

## 2024-12-10 RX ADMIN — PROPOFOL 20 MG: 10 INJECTION, EMULSION INTRAVENOUS at 10:45

## 2024-12-10 RX ADMIN — FUROSEMIDE 20 MG: 10 INJECTION, SOLUTION INTRAMUSCULAR; INTRAVENOUS at 13:30

## 2024-12-10 RX ADMIN — PROPOFOL 20 MG: 10 INJECTION, EMULSION INTRAVENOUS at 10:47

## 2024-12-10 RX ADMIN — PROPOFOL 20 MG: 10 INJECTION, EMULSION INTRAVENOUS at 11:04

## 2024-12-10 RX ADMIN — PROPOFOL 70 MG: 10 INJECTION, EMULSION INTRAVENOUS at 10:40

## 2024-12-10 RX ADMIN — CEFAZOLIN 2000 MG: 1 INJECTION, POWDER, FOR SOLUTION INTRAMUSCULAR; INTRAVENOUS at 10:49

## 2024-12-10 RX ADMIN — KETOROLAC TROMETHAMINE 30 MG: 30 INJECTION, SOLUTION INTRAMUSCULAR; INTRAVENOUS at 14:39

## 2024-12-10 RX ADMIN — PROPOFOL 20 MG: 10 INJECTION, EMULSION INTRAVENOUS at 10:54

## 2024-12-10 RX ADMIN — FUROSEMIDE 20 MG: 10 INJECTION, SOLUTION INTRAMUSCULAR; INTRAVENOUS at 12:04

## 2024-12-10 RX ADMIN — PROPOFOL 30 MG: 10 INJECTION, EMULSION INTRAVENOUS at 10:42

## 2024-12-10 NOTE — PROGRESS NOTES
"Progress Note - Hospitalist   Name: Lety Botello 64 y.o. 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  Persistent third spacing with slight increase in moderate volume ascites, and no significant change in moderate size right and small left pleural effusions with bibasilar atelectasis. 2.  New percutaneous enteric tube with tip in the distal duodenum.\"  GI following. S/p EGD today, 12/10. GI recommended to start 7 day course of Augmentin, and OK to use PEG-J for meds/water flushes/tube feeds  Patient being transferred to ICU post procedure as below   Hypertension  Monitor blood pressures  Continue carvedilol 12.5 mg twice daily  Avoid hypotension  Type 2 diabetes mellitus (HCC)  Lab Results   Component Value Date    HGBA1C 6.9 (H) 11/05/2024       Recent Labs     12/09/24  2116 12/10/24  0513   POCGLU 150* 164*       Blood Sugar Average: Last 72 hrs:  (P) 157    Farxiga on hold while inpatient  Continue SSI  Endometrial cancer (HCC)  History of endometrial cancer noted  History of pulmonary embolism  Continue Eliquis 5 mg twice daily  Hypothyroidism  Continue levothyroxine 125 mcg p.o. daily  Gastroparesis  History of   With PEG-J as above  GI following  Diet per Nutrition and GI recs post-procedurally   Morbid obesity (HCC)  Body mass index is 51.45 kg/m².  Encourage lifestyle modification and weight loss once acute issues improved/resolved  Hyponatremia  Hyponatremia likely multifactorial  S/p Lasix post-procedure as above  Anemia  On iron " supplementation  Acute respiratory failure with hypoxia (HCC)  Was contacted by anesthesiologist while patient in GI lab that patient had worsening hypoxia. Was satting mid-80s on 10L  CXR formal read pending, concern for some volume overload  Patient was given Duo-neb, hypertonic neb, a total of 40mg IV Lasix  STAT ABG ordered  Patient placed on BiPAP  Discussed with Critical Care - patient being upgraded to Critical Care. Giving IV steroids and mag additionally   Hypomagnesemia  Replete and monitor    VTE Pharmacologic Prophylaxis: VTE Score: 7 High Risk (Score >/= 5) - Pharmacological DVT Prophylaxis Ordered: apixaban (Eliquis). Sequential Compression Devices Ordered.    Mobility:   Basic Mobility Inpatient Raw Score: 11  JH-HLM Goal: 4: Move to chair/commode  JH-HLM Achieved: 2: Bed activities/Dependent transfer  JH-HLM Goal NOT achieved. Continue with multidisciplinary rounding and encourage appropriate mobility to improve upon JH-HLM goals.    Patient Centered Rounds: I performed bedside rounds with nursing staff today.  Debi  Discussions with Specialists or Other Care Team Provider: Anesthesiologist, GI fellow, Critical Care attending and fellow    Education and Discussions with Family / Patient: Updated  (niece) via phone. Sharda    Current Length of Stay: 1 day(s)  Current Patient Status: Inpatient   Certification Statement: The patient will continue to require additional inpatient hospital stay due to not medically stable for d/c as above  Discharge Plan:  not medically stablefor d/c at this time, being upgraded to Critical Care    Code Status: Level 1 - Full Code    Subjective   Ms. Botello reports SOB following procedure     Objective :  Temp:  [97.4 °F (36.3 °C)-98.5 °F (36.9 °C)] 98.5 °F (36.9 °C)  HR:  [] 126  BP: (124-208)/(53-91) 167/75  Resp:  [16-28] 28  SpO2:  [85 %-100 %] 94 %  O2 Device: BiPAP  Nasal Cannula O2 Flow Rate (L/min):  [4 L/min] 4 L/min    Body mass index is 51.45  kg/m².     Input and Output Summary (last 24 hours):     Intake/Output Summary (Last 24 hours) at 12/10/2024 1420  Last data filed at 12/10/2024 1108  Gross per 24 hour   Intake 200 ml   Output 300 ml   Net -100 ml       Physical Exam  Vitals reviewed.   Constitutional:       Appearance: She is obese. She is ill-appearing.      Comments: Patient seen in GI Lab with anesthesiologist present  Fatigued appearing   Cardiovascular:      Rate and Rhythm: Tachycardia present.   Pulmonary:      Effort: Respiratory distress present.      Comments: On BiPAP  Abdominal:      Palpations: Abdomen is soft.   Musculoskeletal:      Right lower leg: Edema present.      Left lower leg: Edema present.   Skin:     General: Skin is warm.   Neurological:      Mental Status: She is alert.   Psychiatric:         Mood and Affect: Mood normal.           Lines/Drains:  Lines/Drains/Airways       Active Status       Name Placement date Placement time Site Days    External Urinary Catheter 12/09/24  0000  -- 1                            Lab Results: I have reviewed the following results:   Results from last 7 days   Lab Units 12/10/24  1410 12/10/24  0526 12/09/24  1447   WBC Thousand/uL  --  6.26 6.34   HEMOGLOBIN g/dL  --  8.3* 8.0*   I STAT HEMOGLOBIN g/dl 11.2*  --   --    HEMATOCRIT %  --  27.0* 24.7*   HEMATOCRIT, ISTAT % 33*  --   --    PLATELETS Thousands/uL  --  148* 136*   SEGS PCT %  --   --  80*   LYMPHO PCT %  --   --  7*   MONO PCT %  --   --  9   EOS PCT %  --   --  2     Results from last 7 days   Lab Units 12/10/24  1410 12/10/24  0526 12/09/24  1447   SODIUM mmol/L  --  130* 129*   POTASSIUM mmol/L  --  5.0 4.8   CHLORIDE mmol/L  --  100 100   CO2 mmol/L  --  26 26   CO2, I-STAT mmol/L 26  --   --    BUN mg/dL  --  11 14   CREATININE mg/dL  --  0.40* 0.39*   ANION GAP mmol/L  --  4 3*   CALCIUM mg/dL  --  7.6* 7.5*   ALBUMIN g/dL  --   --  2.2*   TOTAL BILIRUBIN mg/dL  --   --  0.36   ALK PHOS U/L  --   --  66   ALT U/L  --    --  13   AST U/L  --   --  11*   GLUCOSE RANDOM mg/dL  --  152* 187*         Results from last 7 days   Lab Units 12/10/24  0513 12/09/24  2116 12/05/24  1217 12/05/24  0524 12/04/24  2349 12/04/24  2044 12/04/24  1756 12/04/24  1158 12/04/24  0539 12/03/24  2341 12/03/24  2058 12/03/24  1621   POC GLUCOSE mg/dl 164* 150* 165* 147* 130 128 128 108 153* 170* 184* 178*               Recent Cultures (last 7 days):         Imaging Results Review: I reviewed radiology reports from this admission including: chest xray.    Last 24 Hours Medication List:     Current Facility-Administered Medications:     acetaminophen (TYLENOL) tablet 650 mg, Q6H PRN    albuterol (PROVENTIL HFA,VENTOLIN HFA) inhaler 2 puff, Q6H PRN    aluminum-magnesium hydroxide-simethicone (MAALOX) oral suspension 30 mL, Q6H PRN    amoxicillin-clavulanate (AUGMENTIN) 875-125 mg per tablet 1 tablet, Q12H SAM    [Held by provider] apixaban (ELIQUIS) tablet 5 mg, BID    aspirin (ECOTRIN LOW STRENGTH) EC tablet 81 mg, Daily    bisacodyl (DULCOLAX) rectal suppository 10 mg, Daily    carvedilol (COREG) tablet 12.5 mg, BID With Meals    cyanocobalamin (VITAMIN B-12) tablet 1,000 mcg, Daily    dicyclomine (BENTYL) capsule 10 mg, BID PRN    furosemide (LASIX) tablet 20 mg, Daily    gabapentin (NEURONTIN) capsule 100 mg, Daily    HYDROmorphone (DILAUDID) injection 0.5 mg, Q4H PRN    insulin lispro (HumALOG/ADMELOG) 100 units/mL subcutaneous injection 1-5 Units, TID AC **AND** Fingerstick Glucose (POCT), TID AC    iron sucrose (VENOFER) 200 mg in sodium chloride 0.9 % 50 mL IVPB, Daily, Last Rate: 200 mg (12/09/24 2152)    levothyroxine tablet 125 mcg, Daily    magnesium sulfate 4 g/100 mL IVPB (premix) 4 g, Once    metoclopramide (REGLAN) tablet 10 mg, 4x Daily    nitroglycerin (NITROSTAT) SL tablet 0.4 mg, Q5 Min PRN    ondansetron (ZOFRAN) injection 4 mg, Q6H PRN    oxyCODONE (ROXICODONE) IR tablet 5 mg, Q4H PRN    oxyCODONE (ROXICODONE) split tablet 2.5 mg, Q6H  PRN    pantoprazole (PROTONIX) EC tablet 40 mg, Early Morning    polyethylene glycol (MIRALAX) packet 17 g, Daily    potassium chloride (Klor-Con M20) CR tablet 20 mEq, Daily    Administrative Statements   Today, Patient Was Seen By: Danilo Norman PA-C      **Please Note: This note may have been constructed using a voice recognition system.**

## 2024-12-10 NOTE — ANESTHESIA PROCEDURE NOTES
Anesthesia Notable Event    Date/Time: 12/10/2024 1:00 PM    Patient location during procedure: PACU  Performed by: George Watson MD  Authorized by: George Watson MD    Anesthesia notable event Other: other  Patient noted to be hypoxic to 92% after discontinuation of supplemental Oxygen in recovery bay following EGD performed under sedation. Duo neb (albuterol/ipratropium) administered, followed by hypertonic saline neb without much improvement. 20 mg IV lasix administered. CXR performed (RLL consolidations) and EKG (sinus tachy) performed. Respiratory status continued to decline, with patient now requiring 10 Liters per minute on face mask for Oxygen saturations in the high 80s, and notably increased work of breathing and lip pursing, c/f impending respiratory failure. Called primary team to discuss need to escalate to BIPAP and a higher level of care (stepdown vs ICU), initiated BIPAP, additional 20 mg IV lasix given (40 mg IV total). Medicine team and ICU team came over to GI suite to evaluate patient, agreed to proceed to higher level of care (Stepdown 1), ABG performed (7.23 / 47 / 59 / 24), IV Mg and solumedrol administered per ICU team's orders

## 2024-12-10 NOTE — OCCUPATIONAL THERAPY NOTE
Occupational Therapy Cancel Note        Patient Name: Lety Botello  Today's Date: 12/10/2024       12/10/24 1141   OT Last Visit   OT Visit Date 12/10/24   Note Type   Note type Cancelled Session   Cancel Reasons Patient to operating room   Additional Comments OT consult recived, chart reviewed. Pt off the floor having EGD with J-tube exchange. Will hold and continue to follow as pt is medically appropriate.         Joy Linda, ANGELLA, OTR/L

## 2024-12-10 NOTE — ASSESSMENT & PLAN NOTE
Anemia multifactorial  Iron studies suggest iron deficiency state  Iron supplementation  Monitor hemoglobin

## 2024-12-10 NOTE — WOUND OSTOMY CARE
Consult Note - Wound   Lety Botello 64 y.o. female MRN: [id number]  Unit/Bed#: -01 Encounter: [id number]      Assessment Findings:   Wound care consulted for 63 yo female admitted to [facility] with GJ tube placement issues. Patient is dependent for all care, assist X2 with turning from side to side and incontinent of bowel and bladder. Unable to see patient today due to nursing giving care and acuity of care being elevated at this time. Wound care team has seen this patient in recent past as she was at [facility] recently, recommendations placed as photos from this admission similar to prior admission. Will see in person later in the week.       Wound Care Plan:   1-Apply silicone bordered foam dressings to bilateral heels for prevention.  Jason with P.  Peel back for skin assessments at least daily and re-apply.  Change dressings every 3 days and as needed.  2-Elevate heels off of bed/chair surface--offloading heel boots.  3-Offloading air cushion in chair when out of bed.  4-Apply moisturizing skin cream to body daily and as needed.  5-Turn/reposition every 2 hours while in bed and weight shift frequently while in chair for pressure re-distribution on skin.   6-Silicone Cream/Hydraguard lotion to bilateral buttocks and sacrum three times daily and as needed.  7-Right lateral abdomen (blisters)--apply xeroform and cover with silicone bordered foam dressing.  Change dressing every other day and as needed.    Wounds:    Resolving shingles appear to be intact and open to air.        Partial thickness blisters with pink tissue.                      Tricia Handley BSN, RN, CWOCN

## 2024-12-10 NOTE — ASSESSMENT & PLAN NOTE
"Lab Results   Component Value Date    HGBA1C 6.9 (H) 11/05/2024       No results for input(s): \"POCGLU\" in the last 72 hours.    Blood Sugar Average: Last 72 hrs:    Diabetes mellitus type 2 controlled  Farxiga on hold while inpatient  Monitor Accu-Cheks  Avoid hypoglycemia  "

## 2024-12-10 NOTE — CONSULTS
Consultation - Gastroenterology   Name: Lety Botello 64 y.o. female I MRN: [id number]  Unit/Bed#: -01 I Date of Admission: 12/9/2024   Date of Service: 12/10/2024 I Hospital Day: 1       Inpatient consult to gastroenterology     Date/Time  12/10/2024 10:24 AM     Performed by  Crissy Bowling DO   Authorized by  Harini Weldon MD           Physician Requesting Evaluation: Jackelyn Felder MD   Reason for Evaluation / Principal Problem: PEG-J Dysfunction    Assessment & Plan  Encounter for gastrojejunal (GJ) tube placement issues  Patient's status post PEG-J placement on 12/3/2024.  This occurred after she had an admission for recurrent SBO.  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  Also component of underlying gastroparesis.  Following discharge, patient with ongoing pain in the abdomen and unable to flush J portion.  CT imaging showed appropriate position of PEG tube.  Mild surrounding erythema and drainage.  Bumper able to be freely rotated but J portion unable to be flushed.  Suspect that J portion is likely clogged and requires replacement. Will plan for EGD this morning.     Plan:  Patient to remain NPO for EGD with J-tube exchange  Additional pain and symptom management per primary team     Please contact the SecureChat role for the Gastroenterology service with any questions/concerns.    History of Present Illness   HPI:  Lety Botello is a 64 y.o. female who presents due to PEG-J malfunction. Patient with a PMHx of diabetes mellitus type 2, hypertension, hyperlipidemia, CAD status post PCI, history of DVT/PE, history of omental infarct, endometrial carcinoma status post FLORECITA/SBO with recurrence and status post RT to LAD, heart failure, IBS, recurrent diverticulitis status post sigmoid resection, and history of recurrent SBO.     Was recently hospitalized at [facility] from 11/14 to 12/5 for recurrent SBO.  Underwent endoscopic evaluation which  revealed no intraluminal strictures or evidence of obstruction but rather just gastritis -push enteroscopy performed 11/8/2024.  She unfortunately failed conservative management for her SBO and went to the OR on 11/22/2024.  Found to have a mid jejunal stricture with transition point that was resected.  Underwent extensive lysis of adhesions and had an omental mass that was resected.  Path revealed jejunal malignant stricture concerning for recurrent adenocarcinoma.  Omental mass also revealed adenocarcinoma of GYN origin.  Following the OR, patient with ongoing issues with oral intake.  There was also some concern for underlying gastroparesis given her chronic opioid use.  She therefore underwent PEG-J placement on 12/3/2024.    After discharge, patient with intense abdominal pain that was diffusely located.  More significant at site of prior PEG site.  This prompted her to report to the ED for evaluation.  Hemodynamically stable on arrival and afebrile.  CT abdomen/pelvis imaging was completed and showed PEG-J to be in appropriate position but J portion of PEG-J was unable to be flushed.      I have reviewed the patient's PMH, PSH, Social History, Family History, Meds, and Allergies    Objective :  Temp:  [97.4 °F (36.3 °C)-98.3 °F (36.8 °C)] 97.4 °F (36.3 °C)  HR:  [63-78] 76  BP: (124-159)/(53-71) 154/67  Resp:  [16-20] 20  SpO2:  [94 %-100 %] 94 %  O2 Device: None (Room air)  Nasal Cannula O2 Flow Rate (L/min):  [4 L/min] 4 L/min    Physical Exam  Constitutional:       General: She is not in acute distress.     Appearance: She is obese. She is not ill-appearing or toxic-appearing.   HENT:      Head: Normocephalic and atraumatic.      Nose: Nose normal.   Eyes:      General: No scleral icterus.  Cardiovascular:      Rate and Rhythm: Normal rate.      Pulses: Normal pulses.   Pulmonary:      Effort: Pulmonary effort is normal.   Abdominal:      General: Abdomen is flat. Bowel sounds are normal. There is no  distension.      Tenderness: There is no abdominal tenderness.      Comments: PEG-J in placed with mild surrounding erythema. Mild drainage. Bumper freely rotated.    Musculoskeletal:      Cervical back: Normal range of motion.   Skin:     General: Skin is warm.      Capillary Refill: Capillary refill takes less than 2 seconds.      Coloration: Skin is not jaundiced.   Neurological:      Mental Status: She is alert and oriented to person, place, and time.   Psychiatric:         Mood and Affect: Mood normal.         Behavior: Behavior normal.           Lab Results: I have reviewed the following results:CBC/BMP:   .     12/10/24  0526   WBC 6.26   HGB 8.3*   HCT 27.0*   *   SODIUM 130*   K 5.0      CO2 26   BUN 11   CREATININE 0.40*   GLUC 152*   MG 1.5*    , Creatinine Clearance: Estimated Creatinine Clearance: 173.3 mL/min (A) (by C-G formula based on SCr of 0.4 mg/dL (L))., LFTs:   .     12/09/24  1447   AST 11*   ALT 13   ALB 2.2*   TBILI 0.36   ALKPHOS 66    , PTT/INR:No new results in last 24 hours.     Imaging Results Review: I reviewed radiology reports from this admission including: CT abdomen/pelvis.  Other Study Results Review: Pathology reports reviewed.

## 2024-12-10 NOTE — PROGRESS NOTES
Patient:  ASAF BULLOCK    MRN:  [id number]    Aidin Request ID:  [id number]    Level of care reserved:  Skilled Nursing Facility    Partner Reserved:  [facility], [place] [address] [phone]    Clinical needs requested:    Geography searched:  10 miles around [id number]    Start of Service:    Request sent:  9:53am EST on 12/10/2024 by Jyotsna Mack    Partner reserved:  10:21am EST on 12/10/2024 by Jyotsna Mack    Choice list shared:  10:20am EST on 12/10/2024 by Jyotsna Mack

## 2024-12-10 NOTE — ASSESSMENT & PLAN NOTE
Patient with history of gastroparesis GJ tube in place  Presents with GJ tube clogging and is being admitted for GI evaluation for possible replacement of GJ tube  Clear liquid diet for now  Analgesics  Antiemetics  N.p.o. after midnight  GI following

## 2024-12-10 NOTE — ANESTHESIA POSTPROCEDURE EVALUATION
Post-Op Assessment Note    CV Status:  Stable  Pain Score: 0    Pain management: adequate       Mental Status:  Awake and sleepy   Hydration Status:  Euvolemic   PONV Controlled:  Controlled   Airway Patency:  Patent     Post Op Vitals Reviewed: Yes    No anethesia notable event occurred.    Staff: CRNA, Anesthesiologist   Comments: on 8L facmask          Last Filed PACU Vitals:  Vitals Value Taken Time   Temp     Pulse 88    BP 14/88    Resp 20    SpO2 96        Modified Fozia:  Activity: 2 (12/10/2024 10:26 AM)  Respiration: 2 (12/10/2024 10:26 AM)  Circulation: 2 (12/10/2024 10:26 AM)  Consciousness: 2 (12/10/2024 10:26 AM)  Oxygen Saturation: 2 (12/10/2024 10:26 AM)  Modified Fozia Score: 10 (12/10/2024 10:26 AM)

## 2024-12-10 NOTE — ASSESSMENT & PLAN NOTE
Body mass index is 51.45 kg/m².  Encourage lifestyle modification and weight loss once acute issues improved/resolved

## 2024-12-10 NOTE — RESPIRATORY THERAPY NOTE
Resp care   12/10/24 1349   Respiratory Protocol   Protocol Initiated? Yes   Protocol Selection Respiratory   Language Barrier? No   Medical & Social History Reviewed? Yes   Diagnostic Studies Reviewed? Yes   Physical Assessment Performed? Yes   Respiratory Plan Vent/NIV/HFNC   Respiratory Assessment   Resp Comments called for bipap cont. after GI procedure.Pt has no known pulm hx. uses albuterol mdi prn.   Additional Assessments   SpO2 96 %

## 2024-12-10 NOTE — ASSESSMENT & PLAN NOTE
Hyponatremia likely multifactorial  Continue Lasix  Consider fluid restriction when placed on tube feeds  Monitor sodium levels

## 2024-12-10 NOTE — ASSESSMENT & PLAN NOTE
Was contacted by anesthesiologist while patient in GI lab that patient had worsening hypoxia. Was satting mid-80s on 10L  CXR formal read pending, concern for some volume overload  Patient was given Duo-neb, hypertonic neb, a total of 40mg IV Lasix  STAT ABG ordered  Patient placed on BiPAP  Discussed with Critical Care - patient being upgraded to Critical Care. Giving IV steroids and mag additionally

## 2024-12-10 NOTE — H&P
H&P - Critical Care/ICU   Name: Lety Botello 64 y.o. female I MRN: [id number]  Unit/Bed#: -01 I Date of Admission: 12/9/2024   Date of Service: 12/10/2024 I Hospital Day: 1       Assessment & Plan   Neuro:   Diagnosis: Diabetic neuropathic pain  Plan:   Continue home gabapentin 100mg daily    CV:   Diagnosis: CAD   Plan:  Continue ASA 81mg daily  Diagnosis: HTN  Plan:   Continue home Coreg 12.5mg BID, Lasix 20mg daily    Pulm:  Diagnosis: Acute Hypoxic respiratory failure, Pleural effusions, Asthma  12/10 CT a/p moderate size right and small left pleural effusions with bibasilar atelectasis.   12/10 CXR Patchy airspace opacities in the bilateral mid and lower lung fields. Hazy bibasilar opacity which may represent a combination of layered effusion and parenchymal opacity   Plan:  Continue BiPAP  Wean BiPAP to maintain SpO2>90  Continue nebs    GI:   Diagnosis: Hx SBO s/p ex lap w/ small bowel resection (11/22/2024) s/p PEG tube placement (12/3/24), Gastroparesis    Admitted due to GJ tube clogging   S/p EGD w/ J tube exchange 12/10  Plan:   Per GI, 7 day course of Augmentin,   PEG-J for meds/water flushes/tube feeds   Protonix 40mg daily    :   No active issues    F/E/N:   F: None  E: Replete as needed  N: Vital 1.5 at goal rate of 60 mL/hr     Heme/Onc:   Diagnosis: Endometrial carcinoma   Follows with Gyn Onc and Heme onc outpt  6/2020 FLORECITA/BSO  11/2023 PET scan-FDG avid nodes in the right pelvis/retroperitoneum and right inguinal canal.  12/2023 R Inguina LN Bx - metastatic adenocarcinoma consistent with müllerian origin.   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 female with a PMH of T2DM, HTN, CAD, history of DVT/PE on Eliquis, endometrial carcinoma status post FLORECITA/SBO with recurrence and status post RT to lymph nodes, recent admission for recurrent SBO s/p ex lap w/ small bowel resection and PEG tube placement who presented due to PEG-J malfunction on 12/9. Pt underwent EGD w/ J tube exchange today. Post op pt had worsening hypoxia satting mid 80s on 10L NC. Was given nebs, lasix and subsequently placed on BiPAP and upgraded to critical care for acute hypoxic respiratory failure     History obtained from chart review.  Review of Systems: Review of Systems   Constitutional:  Negative for chills and fever.   HENT:  Negative for ear pain and sore throat.    Eyes:  Negative for pain and visual disturbance.   Respiratory:  Positive for shortness of breath. Negative for cough.    Cardiovascular:  Negative for chest pain and palpitations.   Gastrointestinal:  Positive for abdominal pain and nausea.   Genitourinary:  Negative for dysuria and hematuria.   Musculoskeletal:  Negative for arthralgias and back pain.   Skin:  Negative for color change and rash.   Neurological:  Negative for seizures and syncope.   All other systems reviewed and are negative.      Historical Information   Past Medical History:  No date: Diverticulitis  No date: Endometrial cancer (HCC)  No date: History of shingles      Comment:  Right hemiabdomen (inactive)  No date: Hypertension  No date: IBS (irritable bowel syndrome)  No date: Obesity  No date: Small bowel obstruction (HCC)  No date: Type 2 diabetes mellitus (HCC) Past Surgical History:  11/22/2024: ABDOMINAL ADHESION SURGERY; N/A      Comment:  Procedure: EXTENSIVE LYSIS OF ADHESIONS >90 MINUTES;                 Surgeon: Alejo Ward MD;   Location: [facility];                 Service: General  No date: APPENDECTOMY  No date: CHOLECYSTECTOMY  11/22/2024: LAPAROTOMY; N/A      Comment:  Procedure: LAPAROTOMY EXPLORATORY;  Surgeon: Alejo Ward MD;  Location: [facility];  Service: General  No date: SIGMOIDECTOMY; N/A  11/22/2024: SMALL INTESTINE SURGERY; N/A      Comment:  Procedure: RESECTION SMALL BOWEL WITH PRIMARY                ANASTAMOSIS,PARTIAL OMENTECTOMY;  Surgeon: Alejo Ward MD;  Location: [facility];  Service: General   Current Outpatient Medications   Medication Instructions    Acetaminophen 325 mg, Oral, Every 6 hours PRN    albuterol (PROVENTIL HFA,VENTOLIN HFA) 90 mcg/act inhaler 2 puffs, Inhalation, Every 6 hours PRN    apixaban (ELIQUIS) 5 mg, Oral, 2 times daily    aspirin (ECOTRIN LOW STRENGTH) 81 mg, Oral    bisacodyl (DULCOLAX) 10 mg, Rectal, Daily    carvedilol (COREG) 12.5 mg, Oral, 2 times daily with meals    dapagliflozin (FARXIGA) 10 mg, Oral    dicyclomine (BENTYL) 10 mg, Oral, 2 times daily PRN    furosemide (LASIX) 20 mg tablet 1 tablet, Oral, Daily    gabapentin (NEURONTIN) 100 mg, Oral    Insulin Lispro (HUMALOG PEN SC) Subcutaneous, 3 times daily before meals, Sliding scale.    Klor-Con M20 20 MEQ tablet 1 tablet, Oral, Daily    levothyroxine 125 mcg, Oral, Daily    metoclopramide (REGLAN) 10 mg, Oral, 4 times daily    nitroglycerin (NITROSTAT) 0.4 mg, Sublingual    ondansetron (ZOFRAN) 4 mg, Oral, Every 8 hours PRN    oxyCODONE (ROXICODONE) 5 mg, Per G Tube, Every 4 hours PRN    pantoprazole (PROTONIX) 40 mg, Oral, Daily (early morning)    vitamin B-12 (VITAMIN B-12) 1,000 mcg, Oral, Daily    Allergies   Allergen Reactions    Bee Pollen Anaphylaxis    Azithromycin Hives    Metformin Diarrhea    Other Other (See Comments)     hayfever with inhaler   hayfever with inhaler    Pollen Extract Other (See Comments)     hayfever with inhaler    Sulfamethizole Hives     Sulfamethoxazole-Trimethoprim Hives      Social History     Tobacco Use    Smoking status: Never    Smokeless tobacco: Never   Substance Use Topics    Alcohol use: Not Currently    Drug use: Never    History reviewed. No pertinent family history.       Objective :                   Vitals I/O      Most Recent Min/Max in 24hrs   Temp 98.5 °F (36.9 °C) Temp  Min: 97.4 °F (36.3 °C)  Max: 98.5 °F (36.9 °C)   Pulse (!) 120 Pulse  Min: 63  Max: 123   Resp (!) 28 Resp  Min: 16  Max: 28   BP (!) 181/76 BP  Min: 124/53  Max: 208/91   O2 Sat 95 % SpO2  Min: 85 %  Max: 100 %      Intake/Output Summary (Last 24 hours) at 12/10/2024 1412  Last data filed at 12/10/2024 1108  Gross per 24 hour   Intake 200 ml   Output 300 ml   Net -100 ml       Diet NPO    Invasive Monitoring           Physical Exam   Physical Exam  Eyes:      Conjunctiva/sclera: Conjunctivae normal.   Skin:     General: Skin is warm and dry.   Cardiovascular:      Rate and Rhythm: Regular rhythm. Tachycardia present.      Heart sounds: Normal heart sounds.   Musculoskeletal:      Right lower leg: Edema present.      Left lower leg: Edema present.   Abdominal: General: There is no distension.      Palpations: Abdomen is soft.      Tenderness: There is abdominal tenderness.      Comments: PEG tube   Constitutional:       General: She is not in acute distress.     Appearance: She is not ill-appearing.   Pulmonary:      Effort: Tachypnea and respiratory distress present.      Comments: BiPAP  Neurological:      General: No focal deficit present.      Mental Status: She is alert and oriented to person, place and time.          Diagnostic Studies        Lab Results: I have reviewed the following results:     Medications:  Scheduled PRN   amoxicillin-clavulanate, 1 tablet, Q12H [facility]  [Held by provider] apixaban, 5 mg, BID  aspirin, 81 mg, Daily  bisacodyl, 10 mg, Daily  carvedilol, 12.5 mg, BID With Meals  vitamin B-12, 1,000 mcg, Daily  furosemide, 20 mg,  Daily  gabapentin, 100 mg, Daily  insulin lispro, 1-5 Units, TID AC  iron sucrose, 200 mg, Daily  levothyroxine, 125 mcg, Daily  magnesium sulfate, 4 g, Once  methylPREDNISolone sodium succinate, 125 mg, Once  metoclopramide, 10 mg, 4x Daily  pantoprazole, 40 mg, Early Morning  polyethylene glycol, 17 g, Daily  potassium chloride, 20 mEq, Daily      acetaminophen, 650 mg, Q6H PRN  albuterol, 2 puff, Q6H PRN  aluminum-magnesium hydroxide-simethicone, 30 mL, Q6H PRN  dicyclomine, 10 mg, BID PRN  HYDROmorphone, 0.5 mg, Q4H PRN  nitroglycerin, 0.4 mg, Q5 Min PRN  ondansetron, 4 mg, Q6H PRN  oxyCODONE, 5 mg, Q4H PRN  oxyCODONE, 2.5 mg, Q6H PRN       Continuous          Labs:   CBC    Recent Labs     12/09/24  1447 12/10/24  0526   WBC 6.34 6.26   HGB 8.0* 8.3*   HCT 24.7* 27.0*   * 148*     BMP    Recent Labs     12/09/24  1447 12/10/24  0526   SODIUM 129* 130*   K 4.8 5.0    100   CO2 26 26   AGAP 3* 4   BUN 14 11   CREATININE 0.39* 0.40*   CALCIUM 7.5* 7.6*       Coags    No recent results     Additional Electrolytes  Recent Labs     12/10/24  0526   MG 1.5*          Blood Gas    No recent results  No recent results LFTs  Recent Labs     12/09/24  1447   ALT 13   AST 11*   ALKPHOS 66   ALB 2.2*   TBILI 0.36       Infectious  No recent results  Glucose  Recent Labs     12/09/24  1447 12/10/24  0526   GLUC 187* 152*

## 2024-12-10 NOTE — CASE MANAGEMENT
Case Management Assessment & Discharge Planning Note    Patient name Lety Botello  Location /-01 MRN [id number]  : 1960 Date 12/10/2024       Current Admission Date: 2024  Current Admission Diagnosis:Encounter for gastrojejunal (GJ) tube placement issues   Patient Active Problem List    Diagnosis Date Noted Date Diagnosed    Encounter for gastrojejunal (GJ) tube placement issues 2024     Hyponatremia 2024     Anemia 2024     Goals of care, counseling/discussion 2024     Palliative care encounter 2024     Hypotension after procedure 2024     Shingles 2024     On total parenteral nutrition (TPN) 2024     Abdominal pain 2024     Nausea & vomiting 2024     Constipation 11/10/2024     Morbid obesity (HCC) 2024     Gastroparesis 2024     Gastric dilation 2024     History of pulmonary embolism 2024     Hypothyroidism 2024     Mild intermittent asthma 2024     Hypertension      Type 2 diabetes mellitus (HCC)      Endometrial cancer (HCC)      Obesity      SBO (small bowel obstruction) (HCC) 2024       LOS (days): 1  Geometric Mean LOS (GMLOS) (days):   Days to GMLOS:     OBJECTIVE:  PATIENT READMITTED TO HOSPITAL  Risk of Unplanned Readmission Score: 31.68         Current admission status: Inpatient       Preferred Pharmacy:   [id number] - [place] - [address]  [address] [address]  Phone: [phone] Fax: [phone]    Primary Care Provider: Tai Martinez    Primary Insurance: POPPY TSANG  Secondary Insurance:     ASSESSMENT:  Active Health Care Proxies       Sharda Carver Health Care Representative - Friend   Primary Phone: [phone] (Work)                 Advance Directives  Does patient have a Health Care POA?: Yes  Does patient have Advance Directives?: Yes  Advance Directives: Power of  for health care  Primary Contact:  Sharda Carver Lupe         Readmission Root Cause  30 Day Readmission: Yes  During your hospital stay, did someone (provider, nurse, ) explain your care to you in a way you could understand?: Yes  Did you feel medically stable to leave the hospital?: Yes  Were you able to pay for your medication at the pharmacy?: Yes (Went to a facility)  Did you have reliable transportation to take you to your appointments?: Yes  During previous admission, was a post-acute recommendation made?: Yes  What post-acute resources were offered?: STR  Patient was readmitted due to: Gtube blocked  Action Plan: GI consult    Patient Information  Admitted from:: Facility ([facility]  Mental Status: Alert  During Assessment patient was accompanied by: Not accompanied during assessment  Assessment information provided by:: Patient  Support Systems: Friends/neighbors, Self  Type of Current Residence: Facility  Upon entering residence, is there a bedroom on the main floor (no further steps)?: Yes  Upon entering residence, is there a bathroom on the main floor (no further steps)?: Yes    Activities of Daily Living Prior to Admission  Functional Status: Assistance  Completes ADLs independently?: No  Level of ADL dependence: Assistance  Ambulates independently?: No  Level of ambulatory dependence: Assistance  Does patient use assisted devices?: Yes  Assisted Devices (DME) used: Walker  Does patient currently own DME?: Yes  What DME does the patient currently own?: Walker  Does patient have a history of Outpatient Therapy (PT/OT)?: No  Does the patient have a history of Short-Term Rehab?: Yes  Does patient have a history of HHC?: Yes ([facility])  Does patient currently have HHC?: No         Patient Information Continued  Does patient have prescription coverage?: Yes  Does patient receive dialysis treatments?: No  Does patient have a history of substance abuse?: No  Does patient have a history of Mental Health Diagnosis?: No  Has patient  received inpatient treatment related to mental health in the last 2 years?: No         Means of Transportation  Means of Transport to Appts:: Public Transportation - [employer]          DISCHARGE DETAILS:    Discharge planning discussed with:: Patient  Freedom of Choice: Yes  Comments - Freedom of Choice: Discussed FOC  CM contacted family/caregiver?: No- see comments (Declined)  Were Treatment Team discharge recommendations reviewed with patient/caregiver?: Yes  Did patient/caregiver verbalize understanding of patient care needs?: N/A- going to facility  Were patient/caregiver advised of the risks associated with not following Treatment Team discharge recommendations?: Yes         Other Referral/Resources/Interventions Provided:  Referral Comments: Pt BIBA from [facility] for clogged J-tube. Referral sent in Aidin for pt to return to [facility] when medically stable.  Will need a new auth to return to facility.    Pt 30 day readmit - see open completed 11/14/2024 @ 1537 by JESSICA Escoto.  Pt was discharged from [facility] 12/5 to [facility] for STR.      This CM met w/pt at bedside to introduce self & CM role. Pt stated Sharda Carver is her MPOA. Pt was IADL's PTH in November at [facility]. Used RW for ambulation.  [employer] for medical appointments.  Confirmed pt would like to return to [facility] when medically stable. CM to follow for dcp needs pending medical course.

## 2024-12-10 NOTE — ASSESSMENT & PLAN NOTE
Lab Results   Component Value Date    HGBA1C 6.9 (H) 11/05/2024       Recent Labs     12/09/24  2116 12/10/24  0513   POCGLU 150* 164*       Blood Sugar Average: Last 72 hrs:  (P) 157    Farxiga on hold while inpatient  Continue SSI

## 2024-12-10 NOTE — PROGRESS NOTES
When able, recommend Vital 1.5 at goal rate of 60 mL/hr for 22 hrs (hold for levothyroxine) for a total volume of 1320 mL. This will provide 1980 kcals, 89 gms protein and 1009 mL free water.     Recommend flushes of 120 mL q 4 hrs for a total fluid intake of 1729 mL.

## 2024-12-10 NOTE — ASSESSMENT & PLAN NOTE
Lab Results   Component Value Date    HGBA1C 6.9 (H) 11/05/2024       Recent Labs     12/09/24  2116 12/10/24  0513   POCGLU 150* 164*       Blood Sugar Average: Last 72 hrs:  (P) 157

## 2024-12-10 NOTE — ASSESSMENT & PLAN NOTE
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  Persistent third spacing with slight increase in moderate volume ascites, and no significant change in moderate size right and small left pleural effusions with bibasilar atelectasis. 2.  New percutaneous enteric tube with tip in the distal duodenum.\"  GI following. S/p EGD today, 12/10. GI recommended to start 7 day course of Augmentin, and OK to use PEG-J for meds/water flushes/tube feeds  Patient being transferred to ICU post procedure as below   "

## 2024-12-10 NOTE — UTILIZATION REVIEW
Initial Clinical Review    Admission: Date/Time/Statement:   Admission Orders (From admission, onward)       Ordered        12/09/24 1847  INPATIENT ADMISSION  Once                          Orders Placed This Encounter   Procedures    INPATIENT ADMISSION     Standing Status:   Standing     Number of Occurrences:   1     Level of Care:   Med Surg [16]     Estimated length of stay:   More than 2 Midnights     Certification:   I certify that inpatient services are medically necessary for this patient for a duration of greater than two midnights. See H&P and MD Progress Notes for additional information about the patient's course of treatment.     ED Arrival Information       Expected   -    Arrival   12/9/2024 13:48    Acuity   Urgent              Means of arrival   Ambulance    Escorted by   [facility]    Service   Hospitalist    Admission type   Emergency              Arrival complaint   -             Chief Complaint   Patient presents with    Feeding Tube Problem     Pt arrives from [facility] for clogged feeding tube.      PMH:  FLORECITA.  BSO s/p radiation treatment for uterine cancer -   hysterectomy w/salpingo-oophorectomy.  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 Also component of underlying gastroparesis )   Required  exp lap , resection small bowel w/primary anastomosis,  partial omentectomy,  extensive DAVID on  11/22.     PEG-J placement 12/3/2024  Morbid obesity  (BMI  52)  HTN,  DMT2,  amb dysfunction     Initial Presentation: 64 y.o. female  to ER from SNF via EMS for  Peg tube obstruction (over the last day, noted this morning, sent in from facility)   Patient endorses some worsening of her chronic abdominal pain as well.    Attempted to unclog GJ w/ hot water in ER, unsuccessful    CT imaging showed appropriate position of PEG tube.  Mild surrounding erythema and drainage.   Berniemper able to be freely rotated but J portion unable to be flushed.  Suspect that J portion is likely clogged and requires replacement.     12/09   Admit  INPT  Status to INTERNAL Med service,  MS  Level of care  for  GI evaluation and for possible GJ tube replacement;  Hyponatremia;  anemia.  Accucks qid w/SSI.  Antiemetics prn.  IVF hydration.  Cont lasix.  Trend daily bmp/cbc.  Iron studies. Daily IV Venofer.  remain NPO for EGD with J-tube exchange.    Scd's b/l LE.  PT/OT eval and treat.  Consult Wound Care nurse.  I/O q shift.  OOB as tolerated.     Anticipated Length of Stay/Certification Statement: Patient will be admitted on an inpatient basis with an anticipated length of stay of greater than 2 midnights secondary to GJ tube clogging GI evaluation.     Date: 12/10     Day 2:   GI CONSULT:   egd   w/J Tube exchange today    POSTOP/PACU   NOTE:    Patient noted to be hypoxic to 92% after discontinuation of supplemental Oxygen in recovery bay following EGD performed under sedation. Duo neb (albuterol/ipratropium) administered, followed by hypertonic saline neb without much improvement. 20 mg IV lasix administered. CXR performed (RLL consolidations) and EKG (sinus tachy) performed. Respiratory status continued to decline, with patient now requiring 10 Liters per minute on face mask for Oxygen saturations in the high 80s, and notably increased work of breathing and lip pursing, c/f impending respiratory failure. Called primary team to discuss need to escalate to BIPAP and a higher level of care (stepdown vs ICU), initiated BIPAP, additional 20 mg IV lasix given (40 mg IV total). Medicine team and ICU team came over to GI suite to evaluate patient, agreed to proceed to higher level of care (Stepdown 1), ABG performed (7.23 / 47 / 59 / 24), IV Mg and solumedrol administered per ICU team's orders             ED Treatment-Medication Administration from 12/09/2024 1348 to 12/09/2024 1932         Date/Time Order  Dose Route Action     12/09/2024 1706 HYDROmorphone (DILAUDID) injection 1 mg 1 mg Intravenous Given          Scheduled Medications:  amoxicillin-clavulanate, 1 tablet, Oral, Q12H SAM  [Held by provider] apixaban, 5 mg, Oral, BID  aspirin, 81 mg, Oral, Daily  bisacodyl, 10 mg, Rectal, Daily  carvedilol, 12.5 mg, Oral, BID With Meals  vitamin B-12, 1,000 mcg, Oral, Daily  furosemide, 20 mg, Oral, Daily  gabapentin, 100 mg, Oral, Daily  insulin lispro, 1-5 Units, Subcutaneous, TID AC  iron sucrose, 200 mg, Intravenous, Daily  levalbuterol, 0.31 mg, Nebulization, Q6H  levothyroxine, 125 mcg, Oral, Daily  magnesium sulfate, 4 g, Intravenous, Once  metoclopramide, 10 mg, Oral, 4x Daily  pantoprazole, 40 mg, Oral, Early Morning  polyethylene glycol, 17 g, Oral, Daily  potassium chloride, 20 mEq, Oral, Daily  sodium chloride, 4 mL, Nebulization, Q6H      Continuous IV Infusions:     12/09  @   2201   IV reglan 10 mg   x1     PRN Meds:  acetaminophen, 650 mg, Oral, Q6H PRN  albuterol, 2 puff, Inhalation, Q6H PRN  aluminum-magnesium hydroxide-simethicone, 30 mL, Oral, Q6H PRN  dicyclomine, 10 mg, Oral, BID PRN  HYDROmorphone, 0.5 mg, Intravenous, Q4H PRN  ...  12/10  @  0810  nitroglycerin, 0.4 mg, Sublingual, Q5 Min PRN  ondansetron, 4 mg, Intravenous, Q6H PRN  oxyCODONE, 5 mg, Oral, Q4H PRN   ...  12/9  @  2020 and  12/10  @  0500  oxyCODONE, 2.5 mg, Oral, Q6H PRN      ED Triage Vitals   Temperature Pulse Respirations Blood Pressure SpO2 Pain Score   12/09/24 1350 12/09/24 1350 12/09/24 1350 12/09/24 1351 12/09/24 1350 12/09/24 1706   97.5 °F (36.4 °C) 74 18 152/67 99 % 10 - Worst Possible Pain     Weight (last 2 days)       Date/Time Weight    12/10/24 0526 120 (263.45)            Vital Signs (last 3 days)                   Pertinent Labs/Diagnostic Test Results:   Radiology:  XR chest portable   Final Interpretation by Kalie Nova MD (12/10 1450)      Bilateral lung disease.            Workstation performed:  [id number]         CT abdomen pelvis w contrast   Final Interpretation by Andrea Anderson MD (12/09 0089)      Since November 30, 2024:   1.  Persistent third spacing with slight increase in moderate volume ascites, and no significant change in moderate size right and small left pleural effusions with bibasilar atelectasis.   2.  New percutaneous enteric tube with tip in the distal duodenum.         Workstation performed: [id number]           Cardiology:  No orders to display     GI:  EGD   Preliminary Result by Iliana Neely DO (12/10 1133)   The esophagus appeared normal.   Upon entry into the stomach, erythema was noted around the internal    bolster of the PEG tube.  The internal bolster was pushed away from the    mucosa revealing an area of ulceration with some surrounding purulence and    erythema.  2 g of cefazolin was administered intraprocedural.    Removed jejunal extension externally from the stomach and a new jejunal    extension was replaced using a rat-tooth forceps to extend the tubing as    far distal into the duodenum as we could.  The J port was successfully    flushed. External tubing 4 cm at the skin surface.    The duodenal bulb, 1st part of the duodenum, 2nd part of the duodenum and    3rd part of the duodenum appeared normal.      RECOMMENDATION:   Start 7-day course of Augmentin   Okay to use PEG-J tube for medications, water flushes, and tube feeds now   Continue tube care.   Make sure to use the correct ports. DO NOT USE ports for any other purpose    than as labeled:   \"Feed\" port is for tube feeds ONLY.   \"Rx\" port is for medications ONLY.   \"Suction\" port is for gastric venting/emptying.    Please make sure to flush ports with water before and after use to prevent    clogging.                      Dennise Garcia MD               Results from last 7 days   Lab Units 12/10/24  1410 12/10/24  0526 12/09/24  1447   WBC Thousand/uL  --  6.26 6.34   HEMOGLOBIN g/dL  --  8.3* 8.0*   I STAT " HEMOGLOBIN g/dl 11.2*  --   --    HEMATOCRIT %  --  27.0* 24.7*   HEMATOCRIT, ISTAT % 33*  --   --    PLATELETS Thousands/uL  --  148* 136*   TOTAL NEUT ABS Thousands/µL  --   --  5.11         Results from last 7 days   Lab Units 12/10/24  1410 12/10/24  0526 12/09/24  1447 12/05/24  0805 12/04/24  0440   SODIUM mmol/L  --  130* 129* 132* 136   POTASSIUM mmol/L  --  5.0 4.8 4.1 3.9   CHLORIDE mmol/L  --  100 100 107 110*   CO2 mmol/L  --  26 26 22 22   CO2, I-STAT mmol/L 26  --   --   --   --    ANION GAP mmol/L  --  4 3* 3* 4   BUN mg/dL  --  11 14 24 29*   CREATININE mg/dL  --  0.40* 0.39* 0.65 0.82   EGFR ml/min/1.73sq m  --  111 112 95 76   CALCIUM mg/dL  --  7.6* 7.5* 7.3* 7.5*   CALCIUM, IONIZED, ISTAT mmol/L 1.27  --   --   --   --    MAGNESIUM mg/dL  --  1.5*  --  1.8* 2.0   PHOSPHORUS mg/dL  --   --   --  2.2* 2.5     Results from last 7 days   Lab Units 12/09/24  1447   AST U/L 11*   ALT U/L 13   ALK PHOS U/L 66   TOTAL PROTEIN g/dL 4.5*   ALBUMIN g/dL 2.2*   TOTAL BILIRUBIN mg/dL 0.36     Results from last 7 days   Lab Units 12/10/24  0513 12/09/24  2116 12/05/24  1217 12/05/24  0524 12/04/24  2349 12/04/24  2044 12/04/24  1756 12/04/24  1158 12/04/24  0539 12/03/24  2341 12/03/24 2058 12/03/24  1621   POC GLUCOSE mg/dl 164* 150* 165* 147* 130 128 128 108 153* 170* 184* 178*     Results from last 7 days   Lab Units 12/10/24  0526 12/09/24  1447 12/05/24  0805 12/04/24  0440   GLUCOSE RANDOM mg/dL 152* 187* 155* 150*       Results from last 7 days   Lab Units 12/09/24  1447   LIPASE u/L <6*       Past Medical History:   Diagnosis Date    Diverticulitis     Endometrial cancer (HCC)     History of shingles     Right hemiabdomen (inactive)    Hypertension     IBS (irritable bowel syndrome)     Obesity     Small bowel obstruction (HCC)     Type 2 diabetes mellitus (HCC)      Present on Admission:   Endometrial cancer (HCC)   Gastroparesis   History of pulmonary embolism   Hypertension   Hypothyroidism    Morbid obesity (HCC)   Type 2 diabetes mellitus (HCC)      Admitting Diagnosis: SBO (small bowel obstruction) (HCC) [K56.609]  Encounter for gastrojejunal (GJ) tube placement [Z78.9]  Age/Sex: 64 y.o. female    Network Utilization Review Department  ATTENTION: Please call with any questions or concerns to [phone] and carefully listen to the prompts so that you are directed to the right person. All voicemails are confidential.   For Discharge needs, contact Care Management DC Support Team at [phone] opt. 2  Send all requests for admission clinical reviews, approved or denied determinations and any other requests to dedicated fax number below belonging to the campus where the patient is receiving treatment. List of dedicated fax numbers for the Facilities:  FACILITY NAME UR FAX NUMBER   ADMISSION DENIALS (Administrative/Medical Necessity) [phone]   DISCHARGE SUPPORT TEAM (NETWORK) [phone]   PARENT CHILD HEALTH (Maternity/NICU/Pediatrics) [phone]   [facility] [phone]   [facility] [phone]   [facility] [phone]   [facility] [phone]   [facility] [phone]   [facility] [phone]   [facility] [phone]   [facility] [phone]   [facility] [phone]   [facility] [phone]   [facility] [phone]   [facility] [phone]

## 2024-12-10 NOTE — UTILIZATION REVIEW
NOTIFICATION OF INPATIENT ADMISSION   AUTHORIZATION REQUEST   SERVICING FACILITY:   [facility]  Address: [address] [place] [address]  Tax ID: [id number]  NPI: [id number] ATTENDING PROVIDER:  Attending Name and NPI#: Jackelyn Felder Md [[id number]]  Address: [address] [place] [address]  Phone: [phone]   ADMISSION INFORMATION:  Place of Service: Inpatient [facility]  Place of Service Code: 21  Inpatient Admission Date/Time: 12/9/24  6:47 PM  Discharge Date/Time: No discharge date for patient encounter.  Admitting Diagnosis Code/Description:  SBO (small bowel obstruction) (HCC) [K56.609]  Encounter for gastrojejunal (GJ) tube placement [Z78.9]     UTILIZATION REVIEW CONTACT:  Sebastian Arzate Utilization   Network Utilization Review Department  Phone: [phone]  Fax: [phone]  Email: [email or web address]  Contact for approvals/pending authorizations, clinical reviews, and discharge.     PHYSICIAN ADVISORY SERVICES:  Medical Necessity Denial & Fsap-pj-Meun Review  Phone: [phone]  Fax: [phone]  Email: [email or web address]     DISCHARGE SUPPORT TEAM:  For Patients Discharge Needs & Updates  Phone: [phone] opt. 2 Fax: [phone]  Email: [email or web address]

## 2024-12-10 NOTE — ASSESSMENT & PLAN NOTE
Patient's status post PEG-J placement on 12/3/2024.  This occurred after she had an admission for recurrent SBO.  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  Also component of underlying gastroparesis.  Following discharge, patient with ongoing pain in the abdomen and unable to flush J portion.  CT imaging showed appropriate position of PEG tube.  Mild surrounding erythema and drainage.  Bumper able to be freely rotated but J portion unable to be flushed.  Suspect that J portion is likely clogged and requires replacement. Will plan for EGD this morning.     Plan:  Patient to remain NPO for EGD with J-tube exchange  Additional pain and symptom management per primary team

## 2024-12-10 NOTE — PLAN OF CARE
Problem: Prexisting or High Potential for Compromised Skin Integrity  Goal: Skin integrity is maintained or improved  Description: INTERVENTIONS:  - Identify patients at risk for skin breakdown  - Assess and monitor skin integrity  - Assess and monitor nutrition and hydration status  - Monitor labs   - Assess for incontinence   - Turn and reposition patient  - Assist with mobility/ambulation  - Relieve pressure over bony prominences  - Avoid friction and shearing  - Provide appropriate hygiene as needed including keeping skin clean and dry  - Evaluate need for skin moisturizer/barrier cream  - Collaborate with interdisciplinary team   - Patient/family teaching  - Consider wound care consult   Outcome: Progressing     Problem: PAIN - ADULT  Goal: Verbalizes/displays adequate comfort level or baseline comfort level  Description: Interventions:  - Encourage patient to monitor pain and request assistance  - Assess pain using appropriate pain scale  - Administer analgesics based on type and severity of pain and evaluate response  - Implement non-pharmacological measures as appropriate and evaluate response  - Consider cultural and social influences on pain and pain management  - Notify physician/advanced practitioner if interventions unsuccessful or patient reports new pain  Outcome: Progressing     Problem: INFECTION - ADULT  Goal: Absence or prevention of progression during hospitalization  Description: INTERVENTIONS:  - Assess and monitor for signs and symptoms of infection  - Monitor lab/diagnostic results  - Monitor all insertion sites, i.e. indwelling lines, tubes, and drains  - Monitor endotracheal if appropriate and nasal secretions for changes in amount and color  - [employer] appropriate cooling/warming therapies per order  - Administer medications as ordered  - Instruct and encourage patient and family to use good hand hygiene technique  - Identify and instruct in appropriate isolation precautions for  identified infection/condition  Outcome: Progressing     Problem: SAFETY ADULT  Goal: Patient will remain free of falls  Description: INTERVENTIONS:  - Educate patient/family on patient safety including physical limitations  - Instruct patient to call for assistance with activity   - Consult OT/PT to assist with strengthening/mobility   - Keep Call bell within reach  - Keep bed low and locked with side rails adjusted as appropriate  - Keep care items and personal belongings within reach  - Initiate and maintain comfort rounds  - Make Fall Risk Sign visible to staff  - Offer Toileting every 2 Hours, in advance of need  - Initiate/Maintain bed/chair alarm  - Obtain necessary fall risk management equipment: nonskid footwear  - Apply yellow socks and bracelet for high fall risk patients  - Consider moving patient to room near nurses station  Outcome: Progressing  Goal: Maintain or return to baseline ADL function  Description: INTERVENTIONS:  -  Assess patient's ability to carry out ADLs; assess patient's baseline for ADL function and identify physical deficits which impact ability to perform ADLs (bathing, care of mouth/teeth, toileting, grooming, dressing, etc.)  - Assess/evaluate cause of self-care deficits   - Assess range of motion  - Assess patient's mobility; develop plan if impaired  - Assess patient's need for assistive devices and provide as appropriate  - Encourage maximum independence but intervene and supervise when necessary  - Involve family in performance of ADLs  - Assess for home care needs following discharge   - Consider OT consult to assist with ADL evaluation and planning for discharge  - Provide patient education as appropriate  Outcome: Progressing  Goal: Maintains/Returns to pre admission functional level  Description: INTERVENTIONS:  - Perform AM-PAC 6 Click Basic Mobility/ Daily Activity assessment daily.  - Set and communicate daily mobility goal to care team and patient/family/caregiver.   -  Collaborate with rehabilitation services on mobility goals if consulted  - Perform Range of Motion 3 times a day.  - Reposition patient every 2 hours.  - Dangle patient 3 times a day  - Stand patient 3 times a day  - Ambulate patient 3 times a day  - Out of bed to chair 3 times a day   - Out of bed for meals 3 times a day  - Out of bed for toileting  - Record patient progress and toleration of activity level   Outcome: Progressing     Problem: DISCHARGE PLANNING  Goal: Discharge to home or other facility with appropriate resources  Description: INTERVENTIONS:  - Identify barriers to discharge w/patient and caregiver  - Arrange for needed discharge resources and transportation as appropriate  - Identify discharge learning needs (meds, wound care, etc.)  - Arrange for interpretive services to assist at discharge as needed  - Refer to Case Management Department for coordinating discharge planning if the patient needs post-hospital services based on physician/advanced practitioner order or complex needs related to functional status, cognitive ability, or social support system  Outcome: Progressing     Problem: Knowledge Deficit  Goal: Patient/family/caregiver demonstrates understanding of disease process, treatment plan, medications, and discharge instructions  Description: Complete learning assessment and assess knowledge base.  Interventions:  - Provide teaching at level of understanding  - Provide teaching via preferred learning methods  Outcome: Progressing

## 2024-12-10 NOTE — H&P
"H&P - Hospitalist   Name: Lety Botello 64 y.o. female I MRN: [id number]  Unit/Bed#: -01 I Date of Admission: 12/9/2024   Date of Service: 12/9/2024 I Hospital Day: 0     Assessment & Plan  Encounter for gastrojejunal (GJ) tube placement issues  Patient with history of gastroparesis GJ tube in place  Presents with GJ tube clogging and is being admitted for GI evaluation for possible replacement of GJ tube  Clear liquid diet for now  Analgesics  Antiemetics  N.p.o. after midnight  GI following  Hypertension  Monitor blood pressures  Continue carvedilol 12.5 mg twice daily  Avoid hypotension  Type 2 diabetes mellitus (HCC)  Lab Results   Component Value Date    HGBA1C 6.9 (H) 11/05/2024       No results for input(s): \"POCGLU\" in the last 72 hours.    Blood Sugar Average: Last 72 hrs:    Diabetes mellitus type 2 controlled  Farxiga on hold while inpatient  Monitor Accu-Cheks  Avoid hypoglycemia  Endometrial cancer (HCC)  History of endometrial cancer noted  History of pulmonary embolism  History of pulm embolism  Continue Eliquis 5 mg twice daily  Hypothyroidism  Continue levothyroxine 125 mcg p.o. daily  Gastroparesis  History of gastroparesis  GJ tube in place  Antiemetics  Supportive cares  Morbid obesity (HCC)  BMI 52  Therapeutic lifestyle modification encouraged  Hyponatremia  Hyponatremia likely multifactorial  Continue Lasix  Consider fluid restriction when placed on tube feeds  Monitor sodium levels  Anemia  Anemia multifactorial  Iron studies suggest iron deficiency state  Iron supplementation  Monitor hemoglobin      VTE Pharmacologic Prophylaxis: VTE Score: 7 High Risk (Score >/= 5) - Pharmacological DVT Prophylaxis Ordered: apixaban (Eliquis). Sequential Compression Devices Ordered.  Code Status: Level 1 - Full Code       Discussion with family:  Discussed with the patient, jer Robin in detail questions answered.     Anticipated Length of Stay: Patient will be admitted on an inpatient basis with " an anticipated length of stay of greater than 2 midnights secondary to GJ tube clogging GI evaluation.    History of Present Illness   Chief Complaint:     GJ tube clogging    Lety Botello is a 64 y.o. female with a PMH of morbid obesity, history of gastroparesis GJ tube in place, hypertension, diabetes mellitus type 2, obesity, ambulatory dysfunction who presents with GJ tube clogging.    Patient with history of recurrent proximal SBO gastroparesis, on her recent hospitalization a GJ tube was placed for venting and tube feeding.  She presents from [facility] today with clogged GJ tube and unable to obtain feeds.  She reports abdominal pain.  Denies nausea or vomiting.  Patient is being admitted for GI evaluation and for possible GJ tube replacement    No history of fever chills sweats constitutional symptoms.  Denies cough chest tightness wheezing.  Denies urinary symptoms    Recent hospital stay reviewed in epic  History chart labs medications reviewed  Medical records from [facility] reviewed    Review of Systems   All other systems reviewed and are negative.      Historical Information   Past Medical History:   Diagnosis Date    Diverticulitis     Endometrial cancer (HCC)     History of shingles     Right hemiabdomen (inactive)    Hypertension     IBS (irritable bowel syndrome)     Obesity     Small bowel obstruction (HCC)     Type 2 diabetes mellitus (HCC)      Past Surgical History:   Procedure Laterality Date    ABDOMINAL ADHESION SURGERY N/A 11/22/2024    Procedure: EXTENSIVE LYSIS OF ADHESIONS >90 MINUTES;  Surgeon: Alejo Ward MD;  [facility];  Service: General    APPENDECTOMY      CHOLECYSTECTOMY      LAPAROTOMY N/A 11/22/2024    Procedure: LAPAROTOMY EXPLORATORY;  Surgeon: Alejo Ward MD;  [facility];  Service: General    SIGMOIDECTOMY N/A     SMALL INTESTINE SURGERY N/A 11/22/2024    Procedure: RESECTION SMALL BOWEL WITH PRIMARY ANASTAMOSIS,PARTIAL OMENTECTOMY;  Surgeon: Alejo  MD Ed;  Location: [facility] OR;  Service: General     Social History     Tobacco Use    Smoking status: Never    Smokeless tobacco: Never   Substance and Sexual Activity    Alcohol use: Not Currently    Drug use: Never    Sexual activity: Not on file     E-Cigarette/Vaping     E-Cigarette/Vaping Substances     History reviewed. No pertinent family history.  Social History:  Marital Status: Unknown   Occupation:   Patient Pre-hospital Living Situation: SNF  Patient Pre-hospital Level of Mobility: Ambulates with a walker  Patient Pre-hospital Diet Restrictions: no    Meds/Allergies   I have reviewed home medications with patient personally.  Prior to Admission medications    Medication Sig Start Date End Date Taking? Authorizing Provider   Acetaminophen 325 MG CAPS Take 325 mg by mouth every 6 (six) hours as needed    Historical Provider, MD   albuterol (PROVENTIL HFA,VENTOLIN HFA) 90 mcg/act inhaler Inhale 2 puffs every 6 (six) hours as needed    Historical Provider, MD   apixaban (ELIQUIS) 5 mg Take 5 mg by mouth 2 (two) times a day 8/14/24   Historical Provider, MD   aspirin (ECOTRIN LOW STRENGTH) 81 mg EC tablet Take 81 mg by mouth    Historical Provider, MD   bisacodyl (DULCOLAX) 10 mg suppository Insert 1 suppository (10 mg total) into the rectum daily 11/11/24   Orlando Oshea MD   carvedilol (COREG) 12.5 mg tablet Take 1 tablet (12.5 mg total) by mouth 2 (two) times a day with meals 11/10/24 1/9/25  Orlando Oshea MD   dapagliflozin (Farxiga) 10 MG tablet Take 10 mg by mouth    Historical Provider, MD   dicyclomine (BENTYL) 10 mg capsule Take 10 mg by mouth 2 (two) times a day as needed 4/10/21 3/12/25  Historical Provider, MD   furosemide (LASIX) 20 mg tablet Take 1 tablet by mouth in the morning 9/15/24   Historical Provider, MD   gabapentin (NEURONTIN) 100 mg capsule Take 100 mg by mouth 9/25/24   Historical Provider, MD   Insulin Lispro (HUMALOG PEN SC) Inject under the skin  3 (three) times a day before meals Sliding scale.    Historical Provider, MD   Klor-Con M20 20 MEQ tablet Take 1 tablet by mouth in the morning 9/15/24   Historical Provider, MD   levothyroxine 125 mcg tablet Take 125 mcg by mouth daily 9/25/24   Historical Provider, MD   metoclopramide (Reglan) 10 mg tablet Take 1 tablet (10 mg total) by mouth 4 (four) times a day 11/10/24 12/10/24  Orlando Oshea MD   nitroglycerin (NITROSTAT) 0.4 mg SL tablet Place 0.4 mg under the tongue    Historical Provider, MD   ondansetron (ZOFRAN) 4 mg tablet Take 1 tablet (4 mg total) by mouth every 8 (eight) hours as needed for nausea or vomiting 11/10/24   Orlando Oshea MD   oxyCODONE (ROXICODONE) 5 immediate release tablet 1 tablet (5 mg total) by Per G Tube route every 4 (four) hours as needed for severe pain for up to 10 days Max Daily Amount: 30 mg 12/5/24 12/15/24  Paty Owens PA-C   pantoprazole (PROTONIX) 40 mg tablet Take 1 tablet (40 mg total) by mouth daily in the early morning 11/11/24 2/9/25  Orlando Oshea MD   vitamin B-12 (VITAMIN B-12) 1,000 mcg tablet Take 1,000 mcg by mouth daily 10/10/24   Historical Provider, MD     Allergies   Allergen Reactions    Bee Pollen Anaphylaxis    Azithromycin Hives    Metformin Diarrhea    Other Other (See Comments)     hayfever with inhaler   hayfever with inhaler    Pollen Extract Other (See Comments)     hayfever with inhaler    Sulfamethizole Hives    Sulfamethoxazole-Trimethoprim Hives       Objective :  Temp:  [97.5 °F (36.4 °C)-98.1 °F (36.7 °C)] 98.1 °F (36.7 °C)  HR:  [70-74] 74  BP: (125-158)/(54-71) 125/54  Resp:  [16-18] 16  SpO2:  [98 %-100 %] 98 %  O2 Device: Nasal cannula  Nasal Cannula O2 Flow Rate (L/min):  [4 L/min] 4 L/min    Physical Exam       Comfortably in bed  Obese  Anasarca noted  Features of protein calorie malnutrition noted  Short thick neck  Lungs diminished breath sounds bilateral  Heart sounds S1-S2 noted  Heart  sounds are distant  Abdomen soft  Abdominal obesity noted  Mild diffuse tenderness reported  Awake follows simple commands  Pedal edema noted  No rash      Lines/Drains:            Lab Results: I have reviewed the following results:  Results from last 7 days   Lab Units 12/09/24  1447   WBC Thousand/uL 6.34   HEMOGLOBIN g/dL 8.0*   HEMATOCRIT % 24.7*   PLATELETS Thousands/uL 136*   SEGS PCT % 80*   LYMPHO PCT % 7*   MONO PCT % 9   EOS PCT % 2     Results from last 7 days   Lab Units 12/09/24  1447   SODIUM mmol/L 129*   POTASSIUM mmol/L 4.8   CHLORIDE mmol/L 100   CO2 mmol/L 26   BUN mg/dL 14   CREATININE mg/dL 0.39*   ANION GAP mmol/L 3*   CALCIUM mg/dL 7.5*   ALBUMIN g/dL 2.2*   TOTAL BILIRUBIN mg/dL 0.36   ALK PHOS U/L 66   ALT U/L 13   AST U/L 11*   GLUCOSE RANDOM mg/dL 187*         Results from last 7 days   Lab Units 12/05/24  1217 12/05/24  0524 12/04/24  2349 12/04/24  2044 12/04/24  1756 12/04/24  1158 12/04/24  0539 12/03/24  2341 12/03/24  2058 12/03/24  1621 12/03/24  0944 12/03/24  0621   POC GLUCOSE mg/dl 165* 147* 130 128 128 108 153* 170* 184* 178* 205* 223*     Lab Results   Component Value Date    HGBA1C 6.9 (H) 11/05/2024    HGBA1C 9.7 (H) 09/21/2024    HGBA1C 10.6 (H) 05/07/2024           Imaging Results Review: I personally reviewed the following image studies/reports in PACS and discussed pertinent findings with Radiology: CT abdomen/pelvis. My interpretation of the radiology images/reports is:  .        ** Please Note: This note has been constructed using a voice recognition system. **

## 2024-12-10 NOTE — DISCHARGE INSTR - OTHER ORDERS
Wound Care Plan:   1-Apply silicone bordered foam dressings to bilateral heels for prevention.  Mary Carmen with P.  Peel back for skin assessments at least daily and re-apply.  Change dressings every 3 days and as needed.  2-Elevate heels off of bed/chair surface--offloading heel boots.  3-Offloading air cushion in chair when out of bed.  4-Apply moisturizing skin cream to body daily and as needed.  5-Turn/reposition every 2 hours while in bed and weight shift frequently while in chair for pressure re-distribution on skin.   6-Silicone Cream/Hydraguard lotion to bilateral buttocks and sacrum three times daily and as needed.  7-Right distal abdomen (shingles)--Cleanse with NSS and pat dry. Cover wounds with silicone bordered foam dressings to areas. Mary Carmen with T for Treatment and change every other day or PRN  8. Peg site- cleanse with Normal saline then apply melgisorb ag as split gauze then a mepilex up as a split gauze change daily   9. [facility] low air loss mattress re-ordered for patient   10. ATR turning system with wedges   11. Cleanse left posterior thigh with soap and water. Apply Silicone bordered foam, mary carmen T for treatment, and change every other day and PRN soilage/displacement

## 2024-12-10 NOTE — ANESTHESIA PREPROCEDURE EVALUATION
Procedure:  EGD    Relevant Problems   CARDIO   (+) Hypertension      ENDO   (+) Hypothyroidism   (+) Type 2 diabetes mellitus (HCC)      GI/HEPATIC   (+) SBO (small bowel obstruction) (HCC)      GYN   (+) Endometrial cancer (HCC)      HEMATOLOGY   (+) Anemia      PULMONARY   (+) Mild intermittent asthma      Neurology/Sleep   (+) Shingles      Cardiovascular/Peripheral Vascular   (+) Hypotension after procedure      FEN/Gastrointestinal   (+) Gastric dilation   (+) Gastroparesis   (+) Nausea & vomiting      Other   (+) Constipation   (+) Encounter for gastrojejunal (GJ) tube placement issues   (+) History of pulmonary embolism   (+) Hyponatremia   (+) Morbid obesity (HCC)   (+) Obesity   (+) On total parenteral nutrition (TPN)   (+) Palliative care encounter      Lab Results   Component Value Date    SODIUM 130 (L) 12/10/2024    K 5.0 12/10/2024     12/10/2024    CO2 26 12/10/2024    AGAP 4 12/10/2024    BUN 11 12/10/2024    CREATININE 0.40 (L) 12/10/2024    GLUC 152 (H) 12/10/2024    CALCIUM 7.6 (L) 12/10/2024    AST 11 (L) 12/09/2024    ALT 13 12/09/2024    ALKPHOS 66 12/09/2024    TP 4.5 (L) 12/09/2024    TBILI 0.36 12/09/2024    EGFR 111 12/10/2024     Lab Results   Component Value Date    WBC 6.26 12/10/2024    HGB 8.3 (L) 12/10/2024    HCT 27.0 (L) 12/10/2024     (H) 12/10/2024     (L) 12/10/2024         Physical Exam    Airway    Mallampati score: III  TM Distance: <3 FB  Neck ROM: full     Dental       Cardiovascular      Pulmonary      Other Findings  post-pubertal.      Anesthesia Plan  ASA Score- 4     Anesthesia Type- IV sedation with anesthesia with ASA Monitors.         Additional Monitors:     Airway Plan:            Plan Factors-Exercise tolerance (METS): <4 METS.    Chart reviewed. EKG reviewed. Imaging results reviewed. Existing labs reviewed. Patient summary reviewed.                  Induction- intravenous.    Postoperative Plan-     Perioperative Resuscitation Plan - Level 1  - Full Code.       Informed Consent- Anesthetic plan and risks discussed with patient.  I personally reviewed this patient with the CRNA. Discussed and agreed on the Anesthesia Plan with the CRNA..

## 2024-12-11 NOTE — PHYSICAL THERAPY NOTE
PHYSICAL THERAPY EVALUATION  NAME:  Lety Botello  DATE: 12/11/24    AGE:   64 y.o.  Mrn:   [id number]  ADMIT DX:  SBO (small bowel obstruction) (HCC) [K56.609]  Encounter for gastrojejunal (GJ) tube placement [Z78.9]    Past Medical History:   Diagnosis Date    Diverticulitis     Endometrial cancer (HCC)     History of shingles     Right hemiabdomen (inactive)    Hypertension     IBS (irritable bowel syndrome)     Obesity     Small bowel obstruction (HCC)     Type 2 diabetes mellitus (HCC)      Past Surgical History:   Procedure Laterality Date    ABDOMINAL ADHESION SURGERY N/A 11/22/2024    Procedure: EXTENSIVE LYSIS OF ADHESIONS >90 MINUTES;  Surgeon: Alejo Ward MD;  [facility];  Service: General    APPENDECTOMY      CHOLECYSTECTOMY      LAPAROTOMY N/A 11/22/2024    Procedure: LAPAROTOMY EXPLORATORY;  Surgeon: Alejo Ward MD;  [facility];  Service: General    SIGMOIDECTOMY N/A     SMALL INTESTINE SURGERY N/A 11/22/2024    Procedure: RESECTION SMALL BOWEL WITH PRIMARY ANASTAMOSIS,PARTIAL OMENTECTOMY;  Surgeon: Alejo Ward MD;  [facility];  Service: General       Length Of Stay: 2  PHYSICAL THERAPY EVALUATION :    12/11/24 1059   PT Last Visit   PT Visit Date 12/11/24   Note Type   Note type Evaluation   Pain Assessment   Pain Assessment Tool 0-10   Pain Score 9   Pain Location/Orientation Location: Abdomen   Pain Radiating Towards non   Pain Onset/Description Onset: Ongoing   Effect of Pain on Daily Activities limited mobility/ guarding/ limits comfort   Patient's Stated Pain Goal No pain   Restrictions/Precautions   Weight Bearing Precautions Per Order No   Braces or Orthoses   (none)   Other Precautions Bed Alarm;Multiple lines;Telemetry;Fall Risk;Pain   Home Living   Type of Home House   Home Layout Two level;Stairs to enter with rails;Bed/bath upstairs   Bathroom Shower/Tub Tub/shower unit   Bathroom Toilet Standard   Home Equipment Walker   Additional  "Comments Pt reports being in and out of hospital / + rehab since mid Sept. of this year and recently being at  [facility] rehab- pt reporting limited mobility since Sept and only standing to transfer a few times w/ assist. Prior to Sept hospitalization pt was walking w/ occasional use of RW; was I w/ ADLs and IADLs and was living w/ roommates in a 2SH w/ her bed and full bath located on 2nd floor. 0 drive (uses [employer]); and denies falls. Reports little to no family / friend support.  Pt like to play on her computer and watch You Tube.   Prior Function   Level of [place] Needs assistance with ADLs;Needs assistance with functional mobility;Needs assistance with IADLS   Lives With Other (Comment)  (was living w/ roomates prior to Sept of this chiqui- was admitted from [facility] rehab this hiospitalization)   Receives Help From Personal care attendant   IADLs Family/Friend/Other provides transportation;Family/Friend/Other provides meals;Family/Friend/Other provides medication management   Falls in the last 6 months 0   Vocational Unemployed   Comments see annotation above   General   Family/Caregiver Present No   Cognition   Arousal/Participation Cooperative   Orientation Level Oriented X4   Memory Within functional limits   Following Commands Follows one step commands with increased time or repetition   Comments pt anxious and fearful of pain + falling/ being dropped. required inc time + cues for participation but was overall cooperative w/ therapy.   Subjective   Subjective \"I dont know if I can do this. Its been so long\"   RLE Assessment   RLE Assessment X  (generalized weakness throughout- 3-/5 throughout- hip strength also limited by pannus/ soft tissue approximation/ obesity)   LLE Assessment   LLE Assessment X  (generalized weakness throughout- 3-/5 throughout- hip strength also limited by pannus/ soft tissue approximation/ obesity)   Coordination   Movements are Fluid and Coordinated 0   Sensation "   (pt repors chronic numbness to B/L feet R foot moreso than L)   Light Touch   RLE Light Touch Grossly intact   LLE Light Touch Grossly intact   Sharp/Dull   RLE Sharp/Dull Grossly intact   LLE Sharp/Dull Grossly intact   Proprioception   RLE Proprioception Grossly intact   LLE Proprioception Grossly Intact   Bed Mobility   Rolling R 2  Maximal assistance   Additional items Assist x 2   Rolling L 2  Maximal assistance   Additional items Assist x 2   Supine to Sit 2  Maximal assistance   Additional items Assist x 2   Sit to Supine 2  Maximal assistance   Additional items Assist x 2;Assist x 3   Transfers   Sit to Stand 2  Maximal assistance  (unable to achieve full buttock clearance from bed- ~50% for lateral scoots to HOB- see below)   Additional items Assist x 2   Additional Comments pt performed lateral scooting 3x to HOB w/ B/L knee block and maxA x2. pt unable to come to full stand- fearful of falling and somewhat self limits 2* same. retured to supine and repositioned for comfort w/ all needs in reach and  alarm intact   Ambulation/Elevation   Gait pattern Not appropriate   Balance   Static Sitting Poor   Dynamic Sitting Poor -   Ambulatory Zero   Endurance Deficit   Endurance Deficit Yes   Activity Tolerance   Activity Tolerance Patient limited by fatigue;Patient limited by pain   Medical Staff Made Aware OT Yolanda/ RN   Nurse Made Aware yes   AssessmentPt is 64 y.o. female  w/ hx endometrial carcinoma status post total abdominal hysterectomy and bilateral salpingo-oophorectomy with recurrence and status post radiation therapy recently admitted for small bowel obstruction status post exploratory laparotomy with a small bowel resection and PEG tube insertion presents to [facility]  for PEG tube dysfunction and underwent EGD with G-tube exchange. Pt w/ post op hypoxia and respiratory failure requiring Bipap- now  seen for PT evaluation.  Current dx/ problem list includes: SBO/ encounter for G tube placement .     Pt    has a past medical history of Diverticulitis, Endometrial cancer (HCC), History of shingles, Hypertension, IBS (irritable bowel syndrome), Obesity, Small bowel obstruction (HCC), and Type 2 diabetes mellitus (HCC).   Due to acute medical issues, ongoing medical management  for primary dx; pain, fall risk, decreased activity tolerance compared to baseline, increased assistance needed from caregiver at current time, continuous monitoring, trending labs;  multiple lines, decline in overall functional mobility status; health management issues; note unstable clinical picture (high complexity).      Pt reports being in and out of hospital / + rehab since mid Sept. of this year and recently being at  [facility] rehab- pt reporting limited mobility since Sept and only standing to transfer a few times w/ assist. Prior to Sept hospitalization pt was walking w/ occasional use of RW; was I w/ ADLs and IADLs and was living w/ roommates in a 2SH w/ her bed and full bath located on 2nd floor. 0 drive (uses [employer]); and denies falls. Reports little to no family / friend support.  Pt likes to play on her computer and watch You Tube.      Currently,  pt  is requiring max A x2 (via flat spin for supine> sit and max A x2 for taking 3 scoots to HOB- pt unable to achieve full stand- is limited by fear of falling w/ limited effort on stand 2* same despite encouragement and reassurances.  Pt presents functioning below baseline and currently w/ overall mobility deficits 2* to: gross LE strength deficits; edema; obesity; pain;  limited flexibility; decreased endurance; decreased activity tolerance; decreased coordination; impaired  sitting balance and tolerance;  decreased safety awareness; SOB/SEYMOUR; fatigue; impaired safety and judgement; limited insight into current deficits; bed/ chair alarms; multiple lines; fear/ anxiety.  Pt currently at risk for falls.  (Please find additional objective findings from PT assessment regarding body  systems outlined above.)     Pt will continue to benefit from skilled PT interventions to address stated impairments; to maximize functional potential; for ongoing pt/ family training; and DME needs.  PT is recommending PT Resource Intensity Level  2 on d/c.      PT will follow for STG as outlined on eval. Pt  agreeable to PT POC and goals as stated.    Prognosis Fair   Problem List Decreased strength;Decreased range of motion;Decreased endurance;Impaired balance;Decreased mobility;Decreased coordination;Decreased cognition;Impaired judgement;Decreased safety awareness;Impaired sensation;Obesity;Decreased skin integrity;Pain   Goals   Patient Goals to not fall; have less pain   STG Expiration Date 12/25/24   Short Term Goal #1 In 14 days pt will:  Complete bed mobility skills with minAx1 to facilitate safe return to previous living environment and decrease burden on caregivers.  Perform all functional transfers with minAx1  to facilitate safe return to previous living environment.   Perform sit<>stand w/ Darrell x1 1 min each. PT to see for gait assessment and to establish STG at that time. '  Improve  sitting balance by 1 grade in order to decrease fall risk.    Improve LE strength grades by 1 to increase independence w/ all functional mobility, transfers and gait.  Actively participate w/ PT for ongoing pt and family education; DME needs and D/C planning to promote highest level of function in least restrictive environment.   PT Treatment Day 0   Plan   Treatment/Interventions ADL retraining;Functional transfer training;LE strengthening/ROM;Elevations;Therapeutic exercise;Endurance training;Cognitive reorientation;Patient/family training;Equipment eval/education;Bed mobility;Compensatory technique education;Continued evaluation;Spoke to nursing;Spoke to case management;Spoke to advanced practitioner;OT   PT Frequency 2-3x/wk   Discharge Recommendation   Rehab Resource Intensity Level, PT II (Moderate Resource  Intensity)   Equipment Recommended Wheelchair   AM-PAC Basic Mobility Inpatient   Turning in Flat Bed Without Bedrails 2   Lying on Back to Sitting on Edge of Flat Bed Without Bedrails 2   Moving Bed to Chair 1   Standing Up From Chair Using Arms 1   Walk in Room 1   Climb 3-5 Stairs With Railing 1   Basic Mobility Inpatient Raw Score 8   Turning Head Towards Sound 4   Follow Simple Instructions 3   Low Function Basic Mobility Raw Score  15   Low Function Basic Mobility Standardized Score  23.9   [facility] Level Of Mobility   [facility] Goal 3: Sit at edge of bed   [facility] Achieved 3: Sit at edge of bed     The patient's AM-PAC Basic Mobility Inpatient Short Form Raw Score is 8. A Raw score of less than or equal to 16 suggests the patient may benefit from discharge to post-acute rehabilitation services. Please also refer to the recommendation of the Physical Therapist for safe discharge planning.

## 2024-12-11 NOTE — PROGRESS NOTES
Vancomycin Pharmacy Consult    Lety Botello is an 64 y.o. female who is currently receiving IV vancomycin for PEG tube site infection.    Vancomycin Assessment:  1. ID Consult: No  2. Cultures:    MRSA: in process  3. Procalcitonin:   n/a  4. Renal Function:   SCr: 0.75  CrCl: >90 mL/min  UOP: 0.4 mL/kg/hr  5. Days of Therapy: 1  6. Current Dose: 2000 mg IV x1  7. Last Level: n/a  8. Goal AUC(24h): 400-600  9. Goal Random/Trough: 10-15    Vancomycin Plan:  1. Evaluation: will initiate scheduled regimen based on population kinetics  2. New Dosin mg IV q24h  Predicted Trough / AUC(24h): 11.9 / 500  3. Next Level: random  at 0600      Pharmacy will continue to follow closely for s/sx of nephrotoxicity, infusion reactions, and appropriateness of therapy. BMP and CBC will be ordered per protocol. We will continue to follow the patient’s culture results and clinical progress daily.      iLve Marte, PharmD  Internal Medicine Clinical Pharmacist Specialist  [phone]  E[employer] Secure Chat/Teams

## 2024-12-11 NOTE — CASE MANAGEMENT
Case Management Discharge Planning Note    Patient name Lety Botello  Location MICU 03/MICU 03 MRN [id number]  : 1960 Date 2024       Current Admission Date: 2024  Current Admission Diagnosis:Encounter for gastrojejunal (GJ) tube placement issues   Patient Active Problem List    Diagnosis Date Noted Date Diagnosed    Acute respiratory failure with hypoxia (HCC) 12/10/2024     Hypomagnesemia 12/10/2024     Encounter for gastrojejunal (GJ) tube placement issues 2024     Hyponatremia 2024     Anemia 2024     Goals of care, counseling/discussion 2024     Palliative care encounter 2024     Hypotension after procedure 2024     Shingles 2024     On total parenteral nutrition (TPN) 2024     Abdominal pain 2024     Nausea & vomiting 2024     Constipation 11/10/2024     Morbid obesity (HCC) 2024     Gastroparesis 2024     Gastric dilation 2024     History of pulmonary embolism 2024     Hypothyroidism 2024     Mild intermittent asthma 2024     Hypertension      Type 2 diabetes mellitus (HCC)      Endometrial cancer (HCC)      Obesity      SBO (small bowel obstruction) (HCC) 2024       LOS (days): 2  Geometric Mean LOS (GMLOS) (days):   Days to GMLOS:     OBJECTIVE:  Risk of Unplanned Readmission Score: 38.1         Current admission status: Inpatient   Preferred Pharmacy:   [id number] - [place] - [address] [place] [id number]  Phone: [phone] Fax: [phone]    Primary Care Provider: Tai Martinez    Primary Insurance: POPPY TSANG  Secondary Insurance:     DISCHARGE DETAILS:            Other Referral/Resources/Interventions Provided:  Referral Comments: Per MICU rounds/chart review:  Pt transferred from MS4 to MICU  for increased WOB, worsening hypoxia. Placed on BiPap. Pt s/p G-tube replacement 12/10.  Pt from [facility] for STR. Plan  to return when medically stable.

## 2024-12-11 NOTE — PROGRESS NOTES
Progress Note - Gastroenterology   Name: Lety Botello 64 y.o. female I MRN: [id number]  Unit/Bed#: MICU 03 I Date of Admission: 12/9/2024   Date of Service: 12/11/2024 I Hospital Day: 2     Assessment & Plan  Encounter for gastrojejunal (GJ) tube placement issues  Patient's status post PEG-J placement on 12/3/2024.  This occurred after she had an admission for recurrent SBO.  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  Patient also with a suspected component of underlying gastroparesis given chronic opioid use.  Following discharge, patient with ongoing pain in the abdomen and unable to flush J portion.  CT imaging showed appropriate position of PEG tube.  Mild surrounding erythema and drainage.  Bumper able to be freely rotated but J portion unable to be flushed.  Patient now status post EGD on 12/10/2024 which revealed ulcerated mucosa with pus below the bumper concerning for infection.  J extension also looped in stomach.  Extension was removed and then replaced in distal duodenum.  At time of procedure, G and J ports were able to be successfully flushed.  J has not been used postprocedure but unfortunately now unable to flush again.  Suspect that this is likely due to looping.  Reach out to IR to see if they would be able to assist in placement.  Plan for evaluation with KUB.  If unable to be repositioned by IR team, will need to determine timing of EGD for GI extension replacement.    Plan:  Begin on ceftriaxone/vancomycin for suspected PEG infection, monitor WBC and fever curve  Continue to hold tube feedings while awaiting J extension replacement  IR consulted and appreciate any recommendations and assistance, KUB ordered  Additional pain and symptom management per primary team   Acute respiratory failure with hypoxia (HCC)  Postprocedure, patient with worsening shortness of breath.  Required initiation of BiPAP and MICU admission.  Currently off  BiPAP and on room air oxygen.  Denies any shortness of breath.    Subjective  Patient seen and examined at bedside.  Sitting in bed comfortably no acute distress or visible discomfort.  However, does admit to generalized abdominal pain.  Denies shortness of breath.  No fever/chills.    Objective :  Temp:  [98.5 °F (36.9 °C)-98.6 °F (37 °C)] 98.6 °F (37 °C)  HR:  [] 81  BP: ()/(45-91) 150/63  Resp:  [12-33] 27  SpO2:  [85 %-100 %] 90 %  O2 Device: BiPAP  Nasal Cannula O2 Flow Rate (L/min):  [4 L/min] 4 L/min  FiO2 (%):  [60] 60    Physical Exam  Constitutional:       General: She is not in acute distress.     Appearance: She is obese. She is not ill-appearing or toxic-appearing.   HENT:      Head: Normocephalic and atraumatic.      Mouth/Throat:      Mouth: Mucous membranes are moist.      Pharynx: Oropharynx is clear. No oropharyngeal exudate.   Eyes:      General: No scleral icterus.  Cardiovascular:      Rate and Rhythm: Normal rate.      Pulses: Normal pulses.   Pulmonary:      Effort: Pulmonary effort is normal.   Abdominal:      General: There is no distension.      Tenderness: There is no abdominal tenderness.      Hernia: No hernia is present.      Comments: PEG-J currently in place with surrounding erythema and drainage.  J portion unable to be flushed.  Painful with palpation around site.   Musculoskeletal:         General: Normal range of motion.      Cervical back: Normal range of motion.   Skin:     General: Skin is warm.      Coloration: Skin is not jaundiced or pale.   Neurological:      Mental Status: She is alert and oriented to person, place, and time.   Psychiatric:         Mood and Affect: Mood normal.         Behavior: Behavior normal.         Lab Results: I have reviewed the following results:CBC/BMP:   .     12/10/24  1410 12/11/24  0044 12/11/24  0554   WBC  --   --  9.28   HGB 11.2*  --  8.8*   HCT 33*  --  27.8*   PLT  --   --  127*   SODIUM  --    < > 131*   K  --    < > 5.4*    CL  --    < > 100   CO2 26   < > 23   BUN  --    < > 21   CREATININE  --    < > 0.75   GLUC  --    < > 268*   CAIONIZED 1.27  --   --    MG  --    < > 2.2   PHOS  --    < > 4.2*    < > = values in this interval not displayed.    , Creatinine Clearance: Estimated Creatinine Clearance: 92.5 mL/min (by C-G formula based on SCr of 0.75 mg/dL)., LFTs: No new results in last 24 hours. , PTT/INR:No new results in last 24 hours.     Imaging Results Review: I reviewed radiology reports from this admission including: CT abdomen/pelvis.  Other Study Results Review: No additional pertinent studies reviewed.

## 2024-12-11 NOTE — PLAN OF CARE
Problem: OCCUPATIONAL THERAPY ADULT  Goal: Performs self-care activities at highest level of function for planned discharge setting.  See evaluation for individualized goals.  Description: Treatment Interventions: ADL retraining, Functional transfer training, UE strengthening/ROM, Endurance training, Cognitive reorientation, Patient/family training, Equipment evaluation/education, Compensatory technique education, Continued evaluation, Activityengagement, Energy conservation          See flowsheet documentation for full assessment, interventions and recommendations.   Note: Limitation: Decreased ADL status, Decreased UE strength, Decreased Safe judgement during ADL, Decreased cognition, Decreased endurance, Decreased self-care trans, Decreased high-level ADLs, Decreased sensation  Prognosis: Fair  Assessment: Pt is a 63 y/o female seen for OT eval s/p adm to [facility] w/ PEG-J malfunction, s/p J tube exchange 12/10 w/ worsening hypoxia post-op. Pt  has a past medical history of Diverticulitis, Endometrial cancer (HCC), History of shingles, Hypertension, IBS (irritable bowel syndrome), Obesity, Small bowel obstruction (HCC), and Type 2 diabetes mellitus ([facility]).  . Pt with active OT orders and activity as tolerated orders. Pt lives with roommates in multi level home w/ 6 VIRA; 2nd floor setup. Pt was I w/  ADLS and IADLS, drove, & required use of DME PTA including RW. Pt is currently demonstrating the following occupational deficits: Mod A UB ADLS, Max A LB ADLS, Max A x2 bed mobility and scooting along EOB. These deficits that are impacting pt's baseline areas of occupation are a result of the following impairments: pain, endurance, activity tolerance, functional mobility, forward functional reach, balance, trunk control, functional standing tolerance, unsupportive home environment, decreased I w/ ADLS/IADLS, strength, ROM, cognitive impairments, decreased safety awareness, and decreased insight into deficits.The following  Occupational Performance Areas to address include: grooming, bathing/shower, toilet hygiene, dressing, medication management, socialization, health maintenance, functional mobility, community mobility, clothing management, household maintenance, and social participation. Recommend inpatient rehab  upon D/C. Pt to continue to benefit from acute immediate OT services to address the following goals 2-3x/week to  w/in 10-14 days:     Rehab Resource Intensity Level, OT: II (Moderate Resource Intensity)     Yanira Self MS, OTR/L

## 2024-12-11 NOTE — PROGRESS NOTES
Progress Note - Critical Care/ICU   Name: Lety Botello 64 y.o. female I MRN: [id number]  Unit/Bed#: MICU 03 I Date of Admission: 12/9/2024   Date of Service: 12/11/2024 I Hospital Day: 2       64 y.o. female with a PMH of T2DM, HTN, CAD, history of DVT/PE on Eliquis, endometrial carcinoma status post FLORECITA/SBO with recurrence and status post RT to lymph nodes, recent admission for recurrent SBO s/p ex lap w/ small bowel resection and PEG tube placement who presented due to PEG-J malfunction on 12/9. Pt underwent EGD w/ J tube exchange 12/10. Post op pt had worsening hypoxia satting mid 80s on 10L NC. Was given nebs, lasix and subsequently placed on BiPAP and upgraded to critical care for acute hypoxic respiratory failure. Treating with IV diuresis, continued NIPPV.    Assessment & Plan   Neuro:   Diagnosis: Diabetic neuropathic pain  Plan:   Continue home gabapentin 100mg daily     CV:   Diagnosis: CAD   Plan:  Continue ASA 81mg daily  Diagnosis: HTN  Plan:   Continue home Coreg 12.5mg BID, Lasix 20mg daily     Pulm:  Diagnosis: Acute Hypoxic respiratory failure, Pleural effusions, Asthma  12/10 CT a/p moderate size right and small left pleural effusions with bibasilar atelectasis.   12/10 CXR Patchy airspace opacities in the bilateral mid and lower lung fields. Hazy bibasilar opacity which may represent a combination of layered effusion and parenchymal opacity   Plan:  Continue BiPAP  Wean BiPAP to maintain SpO2>90  Continue nebs     GI:   Diagnosis: Hx SBO s/p ex lap w/ small bowel resection (11/22/2024) s/p PEG tube placement (12/3/24), Gastroparesis    Admitted due to GJ tube clogging   S/p EGD w/ J tube exchange 12/10  Plan:   Per GI, 7 day course of Augmentin,   PEG-J for meds/water flushes/tube feeds   Protonix 40mg daily    :   No active issues     F/E/N:   F: None  E: Replete as needed  N: Vital 1.5 at goal rate of 60 mL/hr      Heme/Onc:   Diagnosis: Endometrial carcinoma   Follows with Gyn Onc and Heme  onc outpt  6/2020 FLORECITA/BSO  11/2023 PET scan-FDG avid nodes in the right pelvis/retroperitoneum and right inguinal canal.  12/2023 R Inguina LN Bx - metastatic adenocarcinoma consistent with müllerian origin.   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 Yes  Need for changes to pain regimen? No   B: Both SAT/SAT  N/A   C: Choice of Sedation RASS Goal: 0 Alert and Calm  Need for changes to sedation or analgesia regimen? No   D: Delirium CAM-ICU: Negative   E: Early Mobility  Plan for early mobility? NA   F: Family Engagement Plan for family engagement today? Yes       Antibiotic Review: Patient on appropriate coverage based on culture data.       Prophylaxis:  VTE VTE covered by:  apixaban, Oral, 5 mg at 12/09/24 2018       Stress Ulcer  covered bypantoprazole (PROTONIX) 40 mg tablet [[id number]] (Long-Term Med), pantoprazole (PROTONIX) EC tablet 40 mg [[id number]]         24 Hour Events : VALERIO overnight. Trying off BiPAP, will diurese again.    Subjective   Review of Systems: See HPI for Review of Systems    Objective :                   Vitals I/O      Most Recent Min/Max in 24hrs   Temp 98.6 °F (37 °C) Temp  Min: 97.4 °F (36.3 °C)  Max: 98.6 °F (37 °C)   Pulse 96 Pulse  Min: 76  Max: 126   Resp (!) 23 Resp  Min: 12  Max: 33   /58 BP  Min: 111/58  Max: 208/91   O2 Sat 96 % SpO2  Min: 85 %  Max: 98 %      Intake/Output Summary (Last 24 hours)  at 12/11/2024 0505  Last data filed at 12/10/2024 1801  Gross per 24 hour   Intake 288.33 ml   Output 500 ml   Net -211.67 ml       Diet Enteral/Parenteral; Tube Feeding No Oral Diet; Vital 1.5; Continuous; 60; 120; Every 4 hours    Invasive Monitoring           Physical Exam   Physical Exam  Vitals reviewed.   Eyes:      Conjunctiva/sclera: Conjunctivae normal.   Skin:     General: Skin is warm and dry.   Cardiovascular:      Rate and Rhythm: Normal rate and regular rhythm.      Heart sounds: Normal heart sounds.   Musculoskeletal:      Right lower leg: Edema present.      Left lower leg: Edema present.   Abdominal: General: There is no distension.      Palpations: Abdomen is soft.      Tenderness: There is abdominal tenderness.      Comments: PEG tube   Constitutional:       General: She is not in acute distress.     Appearance: She is not ill-appearing.   Pulmonary:      Effort: Tachypnea and respiratory distress present.      Comments: BiPAP  Neurological:      General: No focal deficit present.      Mental Status: She is alert and oriented to person, place and time.          Diagnostic Studies        Lab Results: I have reviewed the following results:     Medications:  Scheduled PRN   amoxicillin-clavulanate, 1 tablet, Q12H SAM  apixaban, 5 mg, BID  aspirin, 81 mg, Daily  bisacodyl, 10 mg, Daily  carvedilol, 12.5 mg, BID With Meals  vitamin B-12, 1,000 mcg, Daily  [Held by provider] furosemide, 20 mg, Daily  gabapentin, 100 mg, Daily  insulin lispro, 1-5 Units, TID AC  iron sucrose, 200 mg, Daily  levalbuterol, 1.25 mg, Q6H  levothyroxine, 125 mcg, Daily  methylPREDNISolone sodium succinate, 40 mg, Q8H SAM  pantoprazole, 40 mg, Early Morning  polyethylene glycol, 17 g, Daily  potassium chloride, 20 mEq, Daily  sodium chloride, 4 mL, Q6H      acetaminophen, 650 mg, Q6H PRN  albuterol, 2 puff, Q6H PRN  aluminum-magnesium hydroxide-simethicone, 30 mL, Q6H PRN  dicyclomine, 10 mg, BID PRN  HYDROmorphone, 0.5 mg,  Q4H PRN  nitroglycerin, 0.4 mg, Q5 Min PRN  ondansetron, 4 mg, Q6H PRN  oxyCODONE, 5 mg, Q4H PRN  oxyCODONE, 2.5 mg, Q6H PRN       Continuous          Labs:   CBC    Recent Labs     12/09/24  1447 12/10/24  0526 12/10/24  1410   WBC 6.34 6.26  --    HGB 8.0* 8.3* 11.2*   HCT 24.7* 27.0* 33*   * 148*  --      BMP    Recent Labs     12/10/24  0526 12/10/24  1410 12/11/24  0044   SODIUM 130*  --  129*   K 5.0  --  5.7*     --  100   CO2 26 26 22   AGAP 4  --  7   BUN 11  --  19   CREATININE 0.40*  --  0.76   CALCIUM 7.6*  --  7.5*       Coags    No recent results     Additional Electrolytes  Recent Labs     12/10/24  0526 12/10/24  1410 12/11/24  0044   MG 1.5*  --  3.7*   PHOS  --   --  3.7   CAIONIZED  --  1.27  --           Blood Gas    No recent results  No recent results LFTs  Recent Labs     12/09/24  1447   ALT 13   AST 11*   ALKPHOS 66   ALB 2.2*   TBILI 0.36       Infectious  No recent results  Glucose  Recent Labs     12/09/24  1447 12/10/24  0526 12/11/24  0044   GLUC 187* 152* 273*

## 2024-12-11 NOTE — WOUND OSTOMY CARE
Progress Note - Wound   Lety Botello 64 y.o. female MRN: [id number]  [id number]        Assessment:   Patient seen for wound care follow-up.  She is agreeable to assessment.  On low air-loss mattress with ATR positioning system.  Assist x 2 to turn in bed.  Incontinent of urine with purewick in place.  Nutrition team following, receiving tube feeding.    Bilateral heels intact and blanchable.  Skin folds intact.  Resolving shingles to right lower abdomen--pink, dry with some brown crusting/scabbing. Katya-wound intact. Resolving.  Sacrum--clean, intact, blanchable with small tan hyperpigmented area which has been chronically present.  Right lateral abdominal blisters--both blisters now open, pink, with partial thickness tissue loss and moderate serous drainage.  Katya-wound intact.      See flowsheet for wound details.     Wound Care Plan:   1-Apply silicone bordered foam dressings to bilateral heels for prevention.  Jason with PEGGY Phan back for skin assessments at least daily and re-apply.  Change dressings every 3 days and as needed.  2-Elevate heels off of bed/chair surface--offloading heel boots.  3-Offloading air cushion in chair when out of bed.  4-Apply moisturizing skin cream to body daily and as needed.  5-Turn/reposition every 2 hours while in bed and weight shift frequently while in chair for pressure re-distribution on skin.   6-Silicone Cream/Hydraguard lotion to bilateral buttocks and sacrum three times daily and as needed.  7-Right distal abdomen (shingles)--apply silicone cream/Hydraguard lotion twice daily.  May leave open to air if dry.  Cover with ABD if open or draining.  8-Right lateral abdomen (open blisters)--apply calcium alginate (melgisorb) and cover with silicone bordered foam dressing.  Change dressing daily and as needed.     Wound care team to follow. Plan of care reviewed with primary RN.      Wound 11/20/24 Abdomen Right;Distal (Active)   Wound Image    12/11/24 0954   Wound Description Dry;Tan;Pink 12/11/24 0954   Katya-wound Assessment Intact 12/11/24 0954   Drainage Amount None 12/11/24 0954   Dressing Open to air 12/11/24 0954       Wound 12/09/24  Abdomen Right;Lateral (Active)   Wound Image   12/11/24 0955   Wound Description [place] 12/11/24 1200   Katya-wound Assessment Intact 12/11/24 0955   Wound Length (cm) 1.5 cm 12/11/24 0955   Wound Width (cm) 4 cm 12/11/24 0955   Wound Depth (cm) 0.1 cm 12/11/24 0955   Wound Surface Area (cm^2) 6 cm^2 12/11/24 0955   Wound Volume (cm^3) 0.6 cm^3 12/11/24 0955   Calculated Wound Volume (cm^3) 0.6 cm^3 12/11/24 0955   Change in Wound Size % 71.43 12/11/24 0955   Drainage Amount Moderate 12/11/24 0955   Drainage Description Serous 12/11/24 0955   Non-staged Wound Description Partial thickness 12/11/24 0955   Treatments Cleansed 12/11/24 0955   Dressing Changed New 12/11/24 0955   Patient Tolerance Tolerated well 12/11/24 0955   Dressing Status Clean;Dry;Intact 12/11/24 0955     Joy STONEN, RN, CWON

## 2024-12-11 NOTE — PLAN OF CARE
Problem: PHYSICAL THERAPY ADULT  Goal: Performs mobility at highest level of function for planned discharge setting.  See evaluation for individualized goals.  Description: Treatment/Interventions: ADL retraining, Functional transfer training, LE strengthening/ROM, Elevations, Therapeutic exercise, Endurance training, Cognitive reorientation, Patient/family training, Equipment eval/education, Bed mobility, Compensatory technique education, Continued evaluation, Spoke to nursing, Spoke to case management, Spoke to advanced practitioner, OT  Equipment Recommended: Wheelchair       See flowsheet documentation for full assessment, interventions and recommendations.  Note: Prognosis: Fair  Problem List: Decreased strength, Decreased range of motion, Decreased endurance, Impaired balance, Decreased mobility, Decreased coordination, Decreased cognition, Impaired judgement, Decreased safety awareness, Impaired sensation, Obesity, Decreased skin integrity, Pain     Pt is 64 y.o. female  w/ hx endometrial carcinoma status post total abdominal hysterectomy and bilateral salpingo-oophorectomy with recurrence and status post radiation therapy recently admitted for small bowel obstruction status post exploratory laparotomy with a small bowel resection and PEG tube insertion presents to [facility]  for PEG tube dysfunction and underwent EGD with G-tube exchange. Pt w/ post op hypoxia and respiratory failure requiring Bipap- now  seen for PT evaluation.  Current dx/ problem list includes: SBO/ encounter for G tube placement . Pt   has a past medical history of Diverticulitis, Endometrial cancer (HCC), History of shingles, Hypertension, IBS (irritable bowel syndrome), Obesity, Small bowel obstruction (HCC), and Type 2 diabetes mellitus (HCC).   Due to acute medical issues, ongoing medical management  for primary dx; pain, fall risk, decreased activity tolerance compared to baseline, increased assistance needed from caregiver at current  time, continuous monitoring, trending labs;  multiple lines, decline in overall functional mobility status; health management issues; note unstable clinical picture (high complexity).  Pt reports being in and out of hospital / + rehab since mid Sept. of this year and recently being at  [facility] rehab- pt reporting limited mobility since Sept and only standing to transfer a few times w/ assist. Prior to Sept hospitalization pt was walking w/ occasional use of RW; was I w/ ADLs and IADLs and was living w/ roommates in a 2SH w/ her bed and full bath located on 2nd floor. 0 drive (uses [employer]); and denies falls. Reports little to no family / friend support.  Pt like to play on her computer and watch You Tube.  Currently,  pt  is requiring max A x2 (via flat spin for supine> sit and max A x2 for taking 3 scoots to HOB- pt unable to achieve full stand- is limited by fear of falling w/ limited effort on stand 2* same despite encouragement and reassurances.  Pt presents functioning below baseline and currently w/ overall mobility deficits 2* to: gross LE strength deficits; edema; obesity; pain;  limited flexibility; decreased endurance; decreased activity tolerance; decreased coordination; impaired  sitting balance and tolerance;  decreased safety awareness; SOB/SEYMOUR; fatigue; impaired safety and judgement; limited insight into current deficits; bed/ chair alarms; multiple lines; fear/ anxiety.  Pt currently at risk for falls.  (Please find additional objective findings from PT assessment regarding body systems outlined above.) Pt will continue to benefit from skilled PT interventions to address stated impairments; to maximize functional potential; for ongoing pt/ family training; and DME needs.  PT is recommending PT Resource Intensity Level  2 on d/c.      PT will follow for STG as outlined on eval. Pt  agreeable to PT POC and goals as stated.         Rehab Resource Intensity Level, PT: II (Moderate Resource  Intensity)    See flowsheet documentation for full assessment.

## 2024-12-11 NOTE — RESPIRATORY THERAPY NOTE
12/11/24 0849   Respiratory Assessment   Assessment Type Assess only   General Appearance Alert;Awake   Respiratory Pattern Normal   Chest Assessment Chest expansion symmetrical   Bilateral Breath Sounds Clear;Diminished   Cough Strong;Dry   Resp Comments Pt in NAD. BS clear/diminished. States she has no trouble clearing her secretions. Has hx of asthma and takes PRN albuterol mdi at home. SpO2 currently 95% on RA. Offers no respiratory complaints at this time. Will continue with home bronchodilator regimen.

## 2024-12-11 NOTE — OCCUPATIONAL THERAPY NOTE
Occupational Therapy Evaluation     Patient Name: Lety Botello  Today's Date: 12/11/2024  Problem List  Principal Problem:    Encounter for gastrojejunal (GJ) tube placement issues  Active Problems:    Hypertension    Type 2 diabetes mellitus (HCC)    Endometrial cancer (HCC)    History of pulmonary embolism    Hypothyroidism    Gastroparesis    Morbid obesity (HCC)    Hyponatremia    Anemia    Acute respiratory failure with hypoxia (HCC)    Hypomagnesemia    Past Medical History  Past Medical History:   Diagnosis Date    Diverticulitis     Endometrial cancer (HCC)     History of shingles     Right hemiabdomen (inactive)    Hypertension     IBS (irritable bowel syndrome)     Obesity     Small bowel obstruction (HCC)     Type 2 diabetes mellitus (HCC)      Past Surgical History  Past Surgical History:   Procedure Laterality Date    ABDOMINAL ADHESION SURGERY N/A 11/22/2024    Procedure: EXTENSIVE LYSIS OF ADHESIONS >90 MINUTES;  Surgeon: Alejo Ward MD;  Location: [facility];  Service: General    APPENDECTOMY      CHOLECYSTECTOMY      LAPAROTOMY N/A 11/22/2024    Procedure: LAPAROTOMY EXPLORATORY;  Surgeon: Alejo Ward MD;  Location: [facility];  Service: General    SIGMOIDECTOMY N/A     SMALL INTESTINE SURGERY N/A 11/22/2024    Procedure: RESECTION SMALL BOWEL WITH PRIMARY ANASTAMOSIS,PARTIAL OMENTECTOMY;  Surgeon: Alejo Ward MD;  Location: [facility];  Service: General         12/11/24 1057   OT Last Visit   OT Visit Date 12/11/24   Note Type   Note type Evaluation   Pain Assessment   Pain Assessment Tool 0-10   Pain Score 8   Pain Location/Orientation Location: Abdomen   Pain Onset/Description Onset: Ongoing;Descriptor: Aching;Descriptor: Discomfort   Patient's Stated Pain Goal No pain   Hospital Pain Intervention(s) Repositioned;Ambulation/increased activity;Emotional support   Restrictions/Precautions   Weight Bearing Precautions Per Order No   Other Precautions Cognitive;Bed  Alarm;Chair Alarm;Multiple lines;Telemetry;Fall Risk;Pain;O2   Home Living   Type of Home House   Home Layout Multi-level;Bed/bath upstairs   Bathroom Shower/Tub Tub/shower unit   Bathroom Toilet Standard   Home Equipment Walker   Additional Comments pt admitted from [facility]; prior to that resides in multi level home w/ roommates; 2nd floor setup. Pt has been in/out of hospital since September of this year.   Prior Function   Level of [place] Needs assistance with ADLs;Needs assistance with functional mobility;Needs assistance with IADLS   Lives With Other (Comment)  (roommates)   Receives Help From Other (Comment)  (roommates)   IADLs Family/Friend/Other provides transportation;Independent with medication management  (door dash meals)   Falls in the last 6 months 0   Vocational On disability   Comments Prior to september, pt was I w/ ADLS/IADLS and engaged in functional mobility w/ RW; Since being in/out of hospital and rehab, pt has not been walking; minimally standing @ side of bed and requiring assist for all ADLS.   Lifestyle   Autonomy Assist ADLS/IADLS, transfers and functional mobility   Reciprocal Relationships Pt lives w/ roommates; admitted from rehab PTA   Service to Others SSD   Intrinsic Gratification Ipad   ADL   Eating Assistance 4  Minimal Assistance   Grooming Assistance 4  Minimal Assistance   UB Bathing Assistance 3  Moderate Assistance   LB Bathing Assistance 2  Maximal Assistance   UB Dressing Assistance 3  Moderate Assistance   LB Dressing Assistance 1  Total Assistance   Toileting Assistance  2  Maximal Assistance   Functional Assistance 2  Maximal Assistance   Functional Deficit Steadying;Verbal cueing;Supervision/safety;Increased time to complete  (Ax2)   Bed Mobility   Supine to Sit 2  Maximal assistance   Additional items Assist x 2;HOB elevated;Increased time required;Verbal cues;LE management   Sit to Supine 2  Maximal assistance   Additional items Assist x 2;Increased time  required;Verbal cues;LE management   Additional Comments Pt sat EOB w/ Mod A x1 for sitting balance/trunk control   Transfers   Other 2  Maximal assistance   Additional items Assist x 2;Increased time required;Verbal cues   Additional Comments Pt performed lateral side scoots along EOB w/ Max A x2; B/L HHA used   Functional Mobility   Additional Comments Unable to assess @ this time   Balance   Static Sitting Poor   Dynamic Sitting Poor   Static Standing Zero   Ambulatory Zero   Activity Tolerance   Activity Tolerance Patient limited by pain;Patient limited by fatigue   Medical Staff Made Aware PT   Nurse Made Aware yes   RUE Assessment   RUE Assessment X  (grossly 3+/5)   LUE Assessment   LUE Assessment X  (grossly 3+/5)   Hand Function   Gross Motor Coordination Functional   Fine Motor Coordination Functional   Sensation   Additional Comments R foot numbness   Vision-Basic Assessment   Current Vision No visual deficits   Cognition   Overall Cognitive Status WFL   Arousal/Participation Responsive;Cooperative   Attention Attends with cues to redirect   Orientation Level Oriented X4   Memory Decreased recall of precautions   Following Commands Follows one step commands without difficulty   Comments pt is pleasant and cooperative; needs occasional cues for safety/ limited by fear/anxiety; VC for encouragement to participate   Assessment   Limitation Decreased ADL status;Decreased UE strength;Decreased Safe judgement during ADL;Decreased cognition;Decreased endurance;Decreased self-care trans;Decreased high-level ADLs;Decreased sensation   Prognosis Fair   Assessment Pt is a 65 y/o female seen for OT eval s/p adm to [facility] w/ PEG-J malfunction, s/p J tube exchange 12/10 w/ worsening hypoxia post-op. Pt  has a past medical history of Diverticulitis, Endometrial cancer (HCC), History of shingles, Hypertension, IBS (irritable bowel syndrome), Obesity, Small bowel obstruction (HCC), and Type 2 diabetes mellitus (HCC).  . Pt  with active OT orders and activity as tolerated orders. Pt lives with roommates in multi level home w/ 6 VIRA; 2nd floor setup. Pt was I w/  ADLS and IADLS, drove, & required use of DME PTA including RW. Pt is currently demonstrating the following occupational deficits: Mod A UB ADLS, Max A LB ADLS, Max A x2 bed mobility and scooting along EOB. These deficits that are impacting pt's baseline areas of occupation are a result of the following impairments: pain, endurance, activity tolerance, functional mobility, forward functional reach, balance, trunk control, functional standing tolerance, unsupportive home environment, decreased I w/ ADLS/IADLS, strength, ROM, cognitive impairments, decreased safety awareness, and decreased insight into deficits.The following Occupational Performance Areas to address include: grooming, bathing/shower, toilet hygiene, dressing, medication management, socialization, health maintenance, functional mobility, community mobility, clothing management, household maintenance, and social participation. Recommend inpatient rehab  upon D/C. Pt to continue to benefit from acute immediate OT services to address the following goals 2-3x/week to  w/in 10-14 days:   Goals   Patient Goals to be more mobile and independent   LTG Time Frame 10-   Long Term Goal #1 see below listed goals   Plan   Treatment Interventions ADL retraining;Functional transfer training;UE strengthening/ROM;Endurance training;Cognitive reorientation;Patient/family training;Equipment evaluation/education;Compensatory technique education;Continued evaluation;Activityengagement;Energy conservation   Goal Expiration Date 24   OT Frequency 2-3x/wk   Discharge Recommendation   Rehab Resource Intensity Level, OT II (Moderate Resource Intensity)   Additional Comments  The patient's raw score on the AM-PAC Daily Activity Inpatient Short Form is 14. A raw score of less than 19 suggests the patient may benefit from  discharge to post-acute rehabilitation services. Please refer to the recommendation of the Occupational Therapist for safe discharge planning.   Additional Comments 2 Pt seen as a co-session due to the patient's co-morbidities, clinically unstable presentation, and present impairments which are a regression from the patient's baseline.   AM-PAC Daily Activity Inpatient   Lower Body Dressing 2   Bathing 2   Toileting 2   Upper Body Dressing 2   Grooming 3   Eating 3   Daily Activity Raw Score 14   Daily Activity Standardized Score (Calc for Raw Score >=11) 33.39   AM-PAC Applied Cognition Inpatient   Following a Speech/Presentation 3   Understanding Ordinary Conversation 4   Taking Medications 4   Remembering Where Things Are Placed or Put Away 4   Remembering List of 4-5 Errands 3   Taking Care of Complicated Tasks 3   Applied Cognition Raw Score 21   Applied Cognition Standardized Score 44.3   End of Consult   Education Provided Yes   Patient Position at End of Consult Supine;All needs within reach;Bed/Chair alarm activated   Nurse Communication Nurse aware of consult      GOALS    1) Pt will complete rolling left/right in bed with Mod assist, as prerequisite for further engagement in ADLS   2) Pt will complete supine to sit transfer with Mod A using B/L UEs to initiate bed mobility   3) Pt will tolerate sitting at EOB 20 minutes with S assist and stable vital signs, as prerequisite for further engagement in ADLS   4) Pt will complete grooming task with S assist and increased time to increase independence in functional tasks  5) Pt will increase B/L UE ROM 1/2 MMT to increase functional UB use during ADLS   6) Pt will complete UB ADLS with S and use of AD/DME as needed to increase independence in functional tasks  7) Pt will complete LB ADLS with Min A and use of AD/DME as needed to increase independence in functional tasks  8) Pt will complete sit to stand transfer / sit pivot transfer / stand pivot transfer with  Mod assist on/off all ADL surfaces   9) Pt will follow 100% simple one step verbal commands and be A/Ox4 consistently t/o use of external environmental cues to increase awareness for functional tasks  10) Pt will demonstrate 100% carryover of E.C. techniques t/o fx'l I/ADL/ leisure tasks w/o cues s/p skilled education      Yanira Self MS, OTR/L

## 2024-12-11 NOTE — PROGRESS NOTES
Pastoral Care Progress Note             12/11/24 1500   Clinical Encounter Type   Visited With Patient   Routine Visit Introduction      met with pt while rounding and explored family life and regrets. Pt shared of regrets related to treatment of children who are no longer alive. Visit was interrupted by med staff and when  went back to finish visit pt was being moved to P 511.  will attempt to see again tomorrow.

## 2024-12-11 NOTE — PROGRESS NOTES
Progress Note - Critical Care/ICU   Name: Lety Botello 64 y.o. female I MRN: [id number]  Unit/Bed#: MICU 03 I Date of Admission: 12/9/2024   Date of Service: 12/11/2024 I Hospital Day: 2       Critical Care Interval Transfer Note:    Brief Hospital Summary:   64 y.o. female with a PMH of T2DM, HTN, CAD, history of DVT/PE on Eliquis, endometrial carcinoma status post FLORECITA/SBO with recurrence and status post RT to lymph nodes, recent admission for recurrent SBO s/p ex lap w/ small bowel resection and PEG tube placement who presented due to PEG-J malfunction on 12/9. Pt underwent EGD w/ J tube exchange 12/10. Post op pt had worsening hypoxia satting mid 80s on 10L NC. Was given nebs, lasix and subsequently placed on BiPAP and upgraded to critical care for acute hypoxic respiratory failure. Treating with IV diuresis, continued NIPPV.     This morning off BiPAP, saturating well on room air. Concern for possible infection surrounding J tube site, treating with Vancomycin and Ceftriaxone. J-tube unfortunately not flushing, may need repeat procedure - may potentially coordinate with pulmonology for bronchoscopy when planning for another endoscopic revision.    Barriers to discharge:   Diuresis  J-tube malfunction  PT/OT evaluation for potential rehab needs     Consults: IP CONSULT TO GASTROENTEROLOGY  IP CONSULT TO NUTRITION SERVICES  IP CONSULT TO PHARMACY  INPATIENT CONSULT TO IR    Recommended to review admission imaging for incidental findings and document in discharge navigator: Chart reviewed, no known incidental findings noted at this time.      Discharge Plan: Anticipate discharge in >72 hrs to discharge location to be determined pending rehab evaluations.    Patient seen and evaluated by Critical Care today and deemed to be appropriate for transfer to Med Surg with Telemetry. Spoke to Dr. Sheppard from [facility] to accept transfer. Critical care can be contacted via SecureChat with any questions or concerns. Please use the  Critical Care AP Role in Secure Chat for any provider inquires until the patient is transferred out of the ICU or until tomorrow at 0600.

## 2024-12-11 NOTE — ASSESSMENT & PLAN NOTE
Patient's status post PEG-J placement on 12/3/2024.  This occurred after she had an admission for recurrent SBO.  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  Patient also with a suspected component of underlying gastroparesis given chronic opioid use.  Following discharge, patient with ongoing pain in the abdomen and unable to flush J portion.  CT imaging showed appropriate position of PEG tube.  Mild surrounding erythema and drainage.  Bumper able to be freely rotated but J portion unable to be flushed.  Patient now status post EGD on 12/10/2024 which revealed ulcerated mucosa with pus below the bumper concerning for infection.  J extension also looped in stomach.  Extension was removed and then replaced in distal duodenum.  At time of procedure, G and J ports were able to be successfully flushed.  J has not been used postprocedure but unfortunately now unable to flush again.  Suspect that this is likely due to looping.  Reach out to IR to see if they would be able to assist in placement.  Plan for evaluation with KUB.  If unable to be repositioned by IR team, will need to determine timing of EGD for GI extension replacement.    Plan:  Begin on ceftriaxone/vancomycin for suspected PEG infection, monitor WBC and fever curve  Continue to hold tube feedings while awaiting J extension replacement  IR consulted and appreciate any recommendations and assistance, KUB ordered  Additional pain and symptom management per primary team

## 2024-12-11 NOTE — ASSESSMENT & PLAN NOTE
Postprocedure, patient with worsening shortness of breath.  Required initiation of BiPAP and MICU admission.  Currently off BiPAP and on room air oxygen.  Denies any shortness of breath.

## 2024-12-12 PROBLEM — I25.10 CORONARY ARTERY DISEASE INVOLVING NATIVE CORONARY ARTERY: Status: ACTIVE | Noted: 2024-12-12

## 2024-12-12 PROBLEM — B02.9 HERPES ZOSTER WITHOUT COMPLICATION: Status: ACTIVE | Noted: 2024-12-12

## 2024-12-12 PROBLEM — I47.29 NSVT (NONSUSTAINED VENTRICULAR TACHYCARDIA) (HCC): Status: ACTIVE | Noted: 2024-12-12

## 2024-12-12 NOTE — ASSESSMENT & PLAN NOTE
25 run of nonsustained V. tach noted on telemetry, patient was asymptomatic  Cardiology consulted  Follow on  cardiac echo

## 2024-12-12 NOTE — QUICK NOTE
GASTROENTEROLOGY QUICK NOTE:    Patient's status post PEG-J exchange on 12/10/2024.  Unfortunately, J extension appears to be nonfunctioning at this time.  Unable to flush.  Seen and evaluated at bedside. G portion flushing appropriately but J port meeting significant resistance with inability to flush.  Despite tube brushing, unable to flush J portion.  Will ultimately require endoscopic exchange and repositioning tomorrow.  Patient understanding and agreeable.  Patient should be n.p.o. after midnight for planned procedure tomorrow.  Please reach out to GI team with any questions or concerns.  Thank you.    Crissy Bowling DO, PGY-5  [facility] Gastroenterology Fellow

## 2024-12-12 NOTE — PROGRESS NOTES
Pastoral Care Progress Note     12/12/24 1500   Clinical Encounter Type   Visited With Patient   Routine Visit Follow-up     Check-in found pt wanting a return visit in the morning since she was tired.  remains available.

## 2024-12-12 NOTE — ASSESSMENT & PLAN NOTE
Body mass index is 51.36 kg/m².  Encourage lifestyle modification and weight loss once acute issues improved/resolved

## 2024-12-12 NOTE — PROGRESS NOTES
"Progress Note - Hospitalist   Name: Lety Botello 64 y.o. 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  Persistent third spacing with slight increase in moderate volume ascites, and no significant change in moderate size right and small left pleural effusions with bibasilar atelectasis. 2.  New percutaneous enteric tube with tip in the distal duodenum.\"  GI following. S/p EGD 12/10 with J-tube exchange, postop patient had worsening hypoxia required transfer to ICU and using BiPAP  Concern for possible infection surrounding J-tube site patient was started on vancomycin and ceftriaxone  With clogged J-tube, IR consulted  GI is following  Acute respiratory failure with hypoxia (HCC)  Patient had worsening hypoxia post EGD on 12/10. Was satting mid-80s on 10L  Was given nebs, Lasix and subsequently placed on BiPAP and upgraded to critical care for further treatment  Currently in stable condition and transferred out of ICU  Continue to titrate oxygen  Discontinue IV steroids  NSVT (nonsustained ventricular tachycardia) (HCC)  25 run of nonsustained V. tach noted on telemetry, patient was asymptomatic  Cardiology consulted  Follow on  cardiac echo  Hypertension  Monitor blood pressures  Continue carvedilol  Avoid hypotension  Coronary artery disease involving native coronary artery  History of MI in 2016 status post KARY  Stable  Type 2 diabetes mellitus (HCC)  Lab Results   Component Value Date    HGBA1C 6.9 (H) 11/05/2024       Recent Labs     " 12/11/24  1711 12/11/24  2138 12/12/24  0615 12/12/24  1034   POCGLU 234* 248* 220* 222*       Blood Sugar Average: Last 72 hrs:  (P) 221.4    Farxiga on hold while inpatient  Hyperglycemia secondary to steroids  Add Lantus at bedtime  Continue SSI  Endometrial cancer (HCC)  History of endometrial cancer noted  History of pulmonary embolism  Continue Eliquis 5 mg twice daily  Hypothyroidism  Continue levothyroxine 125 mcg p.o. daily  Gastroparesis  History of   With PEG-J as above  GI following  Diet per Nutrition and GI recs post-procedurally   Morbid obesity (HCC)  Body mass index is 51.36 kg/m².  Encourage lifestyle modification and weight loss once acute issues improved/resolved  Hyponatremia  Hyponatremia likely multifactorial  S/p Lasix post-procedure as above  Anemia  Received IV iron x 2  Herpes zoster without complication    Located on the right lower abdomen, well crusted, no active lesion  Continue with Neurontin    VTE Pharmacologic Prophylaxis: VTE Score: 7 High Risk (Score >/= 5) - Pharmacological DVT Prophylaxis Ordered: apixaban (Eliquis). Sequential Compression Devices Ordered.    Mobility:   Basic Mobility Inpatient Raw Score: 8  -HLM Goal: 3: Sit at edge of bed  JH-HLM Achieved: 1: Laying in bed  JH-HLM Goal NOT achieved. Continue with multidisciplinary rounding and encourage appropriate mobility to improve upon JH-HLM goals.    Patient Centered Rounds: I performed bedside rounds with nursing staff today.   Discussions with Specialists or Other Care Team Provider: CM    Education and Discussions with Family / Patient: Patient declined call to .     Current Length of Stay: 3 day(s)  Current Patient Status: Inpatient   Certification Statement: The patient will continue to require additional inpatient hospital stay due to management of PEG tube malfunction  Discharge Plan: Anticipate discharge in 48-72 hrs to rehab facility.    Code Status: Level 1 - Full Code    Subjective   Patient  seen and examined  Complaining of generalized pain  N.p.o. for PEG tube malfunctioning  ICU chart reviewed  25 beat run of V. tach on telemetry early this morning patient was asymptomatic    Objective :  Temp:  [97.2 °F (36.2 °C)-98 °F (36.7 °C)] 97.9 °F (36.6 °C)  HR:  [65-78] 74  BP: (113-182)/(56-93) 155/61  Resp:  [18-21] 18  SpO2:  [88 %-97 %] 94 %  O2 Device: Nasal cannula    Body mass index is 51.36 kg/m².     Input and Output Summary (last 24 hours):     Intake/Output Summary (Last 24 hours) at 12/12/2024 1157  Last data filed at 12/11/2024 2200  Gross per 24 hour   Intake 740 ml   Output 550 ml   Net 190 ml       Physical Exam  Patient is awake alert oriented in no acute distress  Obese comfortable in bed  Lung clear to auscultation bilateral anteriorly  Heart positive S1-S2 no murmur  Abdomen soft obese tender,  crusted herpes lesions on the right lower abdomen  PEG-J tube in place with surrounding erythema and drainage  Lateral lower extremity trace edema    Lines/Drains:  Lines/Drains/Airways       Active Status       Name Placement date Placement time Site Days    External Urinary Catheter 12/09/24  0000  -- 3                      Telemetry:  Telemetry Orders (From admission, onward)               24 Hour Telemetry Monitoring  Continuous x 24 Hours (Telem)        Question:  Reason for 24 Hour Telemetry  Answer:  Arrhythmias requiring acute medical intervention / PPM or ICD malfunction                     Telemetry Reviewed:  yes  Indication for Continued Telemetry Use: Arrthymias requiring medical therapy               Lab Results: I have reviewed the following results:   Results from last 7 days   Lab Units 12/11/24  0554 12/10/24  0526 12/09/24  1447   WBC Thousand/uL 9.28   < > 6.34   HEMOGLOBIN g/dL 8.8*   < > 8.0*   I STAT HEMOGLOBIN   --    < >  --    HEMATOCRIT % 27.8*   < > 24.7*   HEMATOCRIT, ISTAT   --    < >  --    PLATELETS Thousands/uL 127*   < > 136*   BANDS PCT % 9*  --   --    SEGS PCT %   --   --  80*   LYMPHO PCT % 1*  --  7*   MONO PCT % 3*  --  9   EOS PCT % 1  --  2    < > = values in this interval not displayed.     Results from last 7 days   Lab Units 12/12/24  0634 12/10/24  0526 12/09/24  1447   SODIUM mmol/L 130*   < > 129*   POTASSIUM mmol/L 5.3   < > 4.8   CHLORIDE mmol/L 100   < > 100   CO2 mmol/L 24   < > 26   CO2, I-STAT   --    < >  --    BUN mg/dL 27*   < > 14   CREATININE mg/dL 0.59*   < > 0.39*   ANION GAP mmol/L 6   < > 3*   CALCIUM mg/dL 8.1*   < > 7.5*   ALBUMIN g/dL  --   --  2.2*   TOTAL BILIRUBIN mg/dL  --   --  0.36   ALK PHOS U/L  --   --  66   ALT U/L  --   --  13   AST U/L  --   --  11*   GLUCOSE RANDOM mg/dL 227*   < > 187*    < > = values in this interval not displayed.         Results from last 7 days   Lab Units 12/12/24  1034 12/12/24  0615 12/11/24  2138 12/11/24  1711 12/11/24  1312 12/11/24  0619 12/10/24  2105 12/10/24  1749 12/10/24  0513 12/09/24  2116 12/05/24  1217   POC GLUCOSE mg/dl 222* 220* 248* 234* 228* 252* 255* 241* 164* 150* 165*               Recent Cultures (last 7 days):         Imaging Results Review: No pertinent imaging studies reviewed.  Other Study Results Review: No additional pertinent studies reviewed.    Last 24 Hours Medication List:     Current Facility-Administered Medications:     acetaminophen (TYLENOL) tablet 650 mg, Q6H PRN    albuterol (PROVENTIL HFA,VENTOLIN HFA) inhaler 2 puff, Q6H PRN    aluminum-magnesium hydroxide-simethicone (MAALOX) oral suspension 30 mL, Q6H PRN    amLODIPine (NORVASC) tablet 5 mg, Daily    apixaban (ELIQUIS) tablet 5 mg, BID    aspirin (ECOTRIN LOW STRENGTH) EC tablet 81 mg, Daily    bisacodyl (DULCOLAX) rectal suppository 10 mg, Daily    carvedilol (COREG) tablet 25 mg, BID With Meals    cefTRIAXone (ROCEPHIN) 2,000 mg in dextrose 5 % 50 mL IVPB, Q24H    cyanocobalamin (VITAMIN B-12) tablet 1,000 mcg, Daily    dicyclomine (BENTYL) capsule 10 mg, BID PRN    [Held by provider] furosemide (LASIX) tablet 20  mg, Daily    gabapentin (NEURONTIN) capsule 100 mg, Daily    HYDROmorphone (DILAUDID) injection 1 mg, Q3H PRN    insulin glargine (LANTUS) subcutaneous injection 10 Units 0.1 mL, HS    insulin lispro (HumALOG/ADMELOG) 100 units/mL subcutaneous injection 1-5 Units, TID AC **AND** Fingerstick Glucose (POCT), TID AC    levothyroxine tablet 125 mcg, Daily    nitroglycerin (NITROSTAT) SL tablet 0.4 mg, Q5 Min PRN    ondansetron (ZOFRAN) injection 4 mg, Q6H PRN    oxyCODONE (ROXICODONE) IR tablet 5 mg, Q4H PRN    oxyCODONE (ROXICODONE) split tablet 2.5 mg, Q6H PRN    pantoprazole (PROTONIX) EC tablet 40 mg, Early Morning    polyethylene glycol (MIRALAX) packet 17 g, Daily    vancomycin (VANCOCIN) 2,000 mg in sodium chloride 0.9 % 500 mL IVPB, Q24H    Administrative Statements   Today, Patient Was Seen By: Kelvin Sheppard DO      **Please Note: This note may have been constructed using a voice recognition system.**

## 2024-12-12 NOTE — PROGRESS NOTES
Vancomycin Pharmacy Consult    Lety Botello is an 64 y.o. female who is currently receiving IV vancomycin for PEG tube site infection.    Vancomycin Assessment:  1. ID Consult: No  2. Cultures:   12/11 MRSA: no component results  3. Procalcitonin:   n/a  4. Renal Function:   SCr: 0.59  mg/dL (was 0.75 mg/dL)  CrCl: 117.5 mL/min (was >90 mL/min)  UOP: 0.4 mL/kg/hr  5. Days of Therapy: 2  6. Current Dose: vancomycin 1750 mg IV q 24 hr  7. Last Level: 9.2 on Thur 12/12 at 0431  8. Goal AUC(24h): 400-600  9. Goal Random/Trough: 10-15    Vancomycin Plan:  1. Evaluation: vancomyin random trough drawn today (Thur 12/12) at 0431 was predicted low at 9.2. Patient's renal slightly improving so dosing will be slightly increased for better AUC/Trough levels to 2 gm q 24 hr.   2. New Dosing: vancomycin 2000 mg IV q 24 hr starting today 12/12 at 1000  Predicted Trough / AUC(24h): 10.2 / 487  3. Next Level: random  Thur 12/19 at 0600      Pharmacy will continue to follow closely for s/sx of nephrotoxicity, infusion reactions, and appropriateness of therapy. BMP and CBC will be ordered per protocol. We will continue to follow the patient’s culture results and clinical progress daily.    Wesley Jones, R.Ph., Pharmacist

## 2024-12-12 NOTE — ASSESSMENT & PLAN NOTE
Patient had worsening hypoxia post EGD on 12/10. Was satting mid-80s on 10L  Was given nebs, Lasix and subsequently placed on BiPAP and upgraded to critical care for further treatment  Currently in stable condition and transferred out of ICU  Continue to titrate oxygen  Discontinue IV steroids

## 2024-12-12 NOTE — SPEECH THERAPY NOTE
Dysphagia consult received. Patient currently NPO for possible IR/EGD procedure. SLP s/w patient. She has PEG for SBO and component of gastroparesis. She was eating by mouth at [facility], but has not been since admission. Suspect no oropharyngeal dysphagia, but will f/u and complete evaluation when able and as patient desires to have PO.

## 2024-12-12 NOTE — ASSESSMENT & PLAN NOTE
Located on the right lower abdomen, where there are crusted lesions  Patient had since September  Continue with pain control with Neurontin

## 2024-12-12 NOTE — CONSULTS
Consultation - Cardiology Team One  Lety Botello 64 y.o. female MRN: [id number]  Unit/Bed#: [facility] 511-01 Encounter: [id number]    Inpatient consult to Cardiology  Consult performed by: JASON Syed  Consult ordered by: JASON Quiros        Physician Requesting Consult: Kelvin Sheppard DO  Reason for Consult / Principal Problem: Nonsustained VT on 12/12/2024    Assessment & Plan  Encounter for gastrojejunal (GJ) tube placement issues  -Status post PEG-J tube placement 12/3/2024  -Admission for recurrent SBO.  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)  -25 beat run of nonsustained VT noted on telemetry, no further ectopy seen on telemetry  -Patient denies chest pain, palpitations, lightheadedness, dizziness or syncope  -No other episodes of VT noted on monitor  Hypertension  -Uncontrolled  -Average /70  -Home Rx: Carvedilol 12.5 mg twice daily    Type 2 diabetes mellitus (HCC)  Lab Results   Component Value Date    HGBA1C 6.9 (H) 11/05/2024       Recent Labs     12/11/24  1312 12/11/24  1711 12/11/24  2138 12/12/24  0615   POCGLU 228* 234* 248* 220*   -Controlled  -Managed by primary team    Blood Sugar Average: Last 72 hrs:  (P) [id number]    Endometrial cancer (HCC)  -Follows at [facility]   -Recurrent endometrial cancer     History of pulmonary embolism  -Anticoagulated with Eliquis 5 mg twice daily  Hypothyroidism  -Levothyroxine 25 mcg daily  Gastroparesis  -PEG-J-tube placement  Morbid obesity (HCC)  -BMI 51.45 kg/m2  Acute respiratory failure with hypoxia (HCC)  -Acute respiratory failure following EGD with J-tube exchange on 12/10/2024 with sats noted to be in the 80s on 10 L nasal cannula.  -Treated with nebulizers, Lasix and BiPAP  -Currently on nasal cannula, 2 L NC    Hypomagnesemia  -Magnesium today 2.2  Coronary artery disease involving native coronary artery  -History of anterior MI in 2016 with KARY to LAD which was 99% blocked             Plan/Recommendations:  -Obtain echocardiogram to assess structure and function   -Intake/output  -Aleja;y weights  -Lasix 40 mg Iv twice daily  -Amlodipine 5 mg daily for uncontrolled hypertension   -Increase Coreg to 25 mg twice daily           ______________________________________________________________________________________    CC: Nonsustained VT      History of Present Illness   HPI: Lety Botello is a 64 y.o. year old female , who has a past medical history of type 2 diabetes, hypertension, coronary artery disease, MI (2016, s/p KARY to LAD which was 99% stenosed),  DVT/PE (anticoagulated with Eliquis),  endometrial carcinoma (status post FLORECITA, recurrent SBO and status post RT to lymph nodes was admitted for recurrent small bowel obstruction with PEG-J tube malfunction on 12/9.  She underwent EGD with J-tube exchange on 12/10/2024.  During postoperative period she had worsening hypoxia with O2 sats in the mid 80s on 10 L nasal cannula.  She was treated with nebulizers, IV Lasix and placed on BiPAP for acute hypoxic respiratory failure.  She follows on the outpatient basis with [facility] cardiology however has not had a follow-up appointment since 2016 at the time of the anterior MI.    Consulted for evaluation of nonsustained VT noted on 12/12/2024, 25 beat run while patient was sleeping.Currently, she denies any complaints of chest pain, shortness of breath, palpitations, syncope, lightheadedness or dizziness.  She does note increased lower extremity edema.  Denies lower extremity edema on baseline        Home cardiac medications:   ASA 81 mg daily, Eliquis 5 mg daily, Coreg 12.5 mg twice daily , Lasix 20 mg daily and Dapagl;iflozin 10 mg daily.       Previous cardiac studies:  Lexiscan stress (3/19/2021): No ST changes or  arrhythmias.       Echocardiogram (3/23/2018):   LVEF 55%.   Mild mitral regurgitation.   Mild diastolic dysfunction.       Cardiac catheterization (2016) status post PCI/KARY to LAD lesion.  50% RCA.  EF at that time 45% with apical septal akinesis.        EKG reviewed personally:  NSR rate 78 BPM  and sinus tachycardia with non-specific ST changes, rate 112 BPM, -        Telemetry reviewed personally:   NSVT, 25 beats no further episodes of VT noted on telemetry.          Review of Systems   Constitutional: Negative. Negative for chills and fever.   HENT: Negative.  Negative for congestion.    Eyes: Negative.    Cardiovascular:  Positive for leg swelling. Negative for chest pain, orthopnea, palpitations, paroxysmal nocturnal dyspnea and syncope.   Respiratory:  Positive for shortness of breath.    Endocrine: Negative.    Skin: Negative.    Musculoskeletal: Negative.    Gastrointestinal:  Positive for abdominal pain.   Genitourinary: Negative.    Neurological: Negative.  Negative for dizziness and light-headedness.   Psychiatric/Behavioral: Negative.     Allergic/Immunologic: Negative.      Historical Information   Past Medical History:   Diagnosis Date    Diverticulitis     Endometrial cancer (HCC)     History of shingles     Right hemiabdomen (inactive)    Hypertension     IBS (irritable bowel syndrome)     Obesity     Small bowel obstruction (HCC)     Type 2 diabetes mellitus (HCC)      Past Surgical History:   Procedure Laterality Date    ABDOMINAL ADHESION SURGERY N/A 11/22/2024    Procedure: EXTENSIVE LYSIS OF ADHESIONS >90 MINUTES;  Surgeon: Alejo Ward MD;  Location: [facility];  Service: General    APPENDECTOMY      CHOLECYSTECTOMY      LAPAROTOMY N/A 11/22/2024    Procedure: LAPAROTOMY EXPLORATORY;  Surgeon: Alejo Ward MD;  Location: [facility];  Service: General    SIGMOIDECTOMY N/A     SMALL INTESTINE SURGERY N/A 11/22/2024    Procedure: RESECTION SMALL BOWEL WITH PRIMARY  ANASTAMOSIS,PARTIAL OMENTECTOMY;  Surgeon: Alejo Ward MD;  Location: [facility];  Service: General     Social History     Substance and Sexual Activity   Alcohol Use Not Currently     Social History     Substance and Sexual Activity   Drug Use Never     Social History     Tobacco Use   Smoking Status Never   Smokeless Tobacco Never     Family History: Family history non-contributory    Meds/Allergies   all current active meds have been reviewed       Allergies   Allergen Reactions    Bee Pollen Anaphylaxis    Azithromycin Hives    Metformin Diarrhea    Other Other (See Comments)     hayfever with inhaler   hayfever with inhaler    Pollen Extract Other (See Comments)     hayfever with inhaler    Sulfamethizole Hives    Sulfamethoxazole-Trimethoprim Hives       Objective   Vitals: Blood pressure 153/93, pulse 74, temperature 97.7 °F (36.5 °C), temperature source Oral, resp. rate 18, height 5' (1.524 m), weight 120 kg (263 lb 7.2 oz), SpO2 95%.,     Body mass index is 51.45 kg/m².,     Systolic (24hrs), Av , Min:113 , Max:182     Diastolic (24hrs), Av, Min:56, Max:93    Wt Readings from Last 3 Encounters:   12/10/24 120 kg (263 lb 7.2 oz)   24 123 kg (270 lb 8.1 oz)   24 107 kg (236 lb 12.4 oz)      Lab Results   Component Value Date    CREATININE 0.59 (L) 2024    CREATININE 0.60 2024    CREATININE 0.67 2024         Intake/Output Summary (Last 24 hours) at 2024 0827  Last data filed at 2024 2200  Gross per 24 hour   Intake 740 ml   Output 550 ml   Net 190 ml     Weight (last 2 days)       Date/Time Weight    12/10/24 0526 120 (263.45)          Invasive Devices       Peripheral Intravenous Line  Duration             Peripheral IV 24 Right Antecubital 2 days    Peripheral IV 24 Left;Ventral (anterior) Forearm 1 day              Drain  Duration             Gastrostomy/Enterostomy PEG- Jejunostomy 20 Fr. LUQ 8 days    External Urinary Catheter 3 days                       Physical Exam  Constitutional:       General: She is not in acute distress.     Appearance: Normal appearance. She is obese. She is not ill-appearing.   HENT:      Right Ear: Tympanic membrane normal.      Nose: Nose normal.      Mouth/Throat:      Mouth: Mucous membranes are moist.   Eyes:      Conjunctiva/sclera: Conjunctivae normal.   Cardiovascular:      Rate and Rhythm: Normal rate and regular rhythm.      Pulses: Normal pulses.      Heart sounds: Normal heart sounds. No murmur heard.     No friction rub. No gallop.   Pulmonary:      Breath sounds: Rales present.   Abdominal:      General: Bowel sounds are normal.      Palpations: Abdomen is soft.      Tenderness: There is abdominal tenderness.   Musculoskeletal:      Cervical back: Neck supple.      Right lower leg: Edema present.      Left lower leg: Edema present.   Skin:     Capillary Refill: Capillary refill takes less than 2 seconds.   Neurological:      Mental Status: She is alert and oriented to person, place, and time.   Psychiatric:         Mood and Affect: Mood normal.         Behavior: Behavior normal.         Thought Content: Thought content normal.           LABORATORY RESULTS:      CBC with diff:   Results from last 7 days   Lab Units 12/11/24  0554 12/10/24  1410 12/10/24  0526 12/09/24  1447   WBC Thousand/uL 9.28  --  6.26 6.34   HEMOGLOBIN g/dL 8.8*  --  8.3* 8.0*   I STAT HEMOGLOBIN g/dl  --  11.2*  --   --    HEMATOCRIT % 27.8*  --  27.0* 24.7*   HEMATOCRIT, ISTAT %  --  33*  --   --    MCV fL 99*  --  101* 97   PLATELETS Thousands/uL 127*  --  148* 136*   RBC Million/uL 2.82*  --  2.68* 2.55*   MCH pg 31.2  --  31.0 31.4   MCHC g/dL 31.7  --  30.7* 32.4   RDW % 16.8*  --  17.2* 16.9*   MPV fL 11.0  --  10.6 9.9   NRBC AUTO /100 WBCs  --   --   --  0       CMP:  Results from last 7 days   Lab Units 12/12/24  0634 12/12/24  0406 12/11/24  1431 12/11/24  0554 12/11/24  0044 12/10/24  1410 12/10/24  0526 12/09/24  1447  "  POTASSIUM mmol/L 5.3 5.5* 4.9 5.4* 5.7*  --  5.0 4.8   CHLORIDE mmol/L 100 98 100 100 100  --  100 100   CO2 mmol/L 24 24 24 23 22  --  26 26   CO2, I-STAT mmol/L  --   --   --   --   --  26  --   --    BUN mg/dL 27* 27* 25 21 19  --  11 14   CREATININE mg/dL 0.59* 0.60 0.67 0.75 0.76  --  0.40* 0.39*   GLUCOSE, ISTAT mg/dl  --   --   --   --   --  228*  --   --    CALCIUM mg/dL 8.1* 8.2* 8.2* 7.9* 7.5*  --  7.6* 7.5*   AST U/L  --   --   --   --   --   --   --  11*   ALT U/L  --   --   --   --   --   --   --  13   ALK PHOS U/L  --   --   --   --   --   --   --  66   EGFR ml/min/1.73sq m 98 98 94 85 84  --  111 112       BMP:  Results from last 7 days   Lab Units 12/12/24  0634 12/12/24  0406 12/11/24  1431 12/11/24  0554 12/11/24  0044 12/10/24  1410 12/10/24  0526 12/09/24  1447   POTASSIUM mmol/L 5.3 5.5* 4.9 5.4* 5.7*  --  5.0 4.8   CHLORIDE mmol/L 100 98 100 100 100  --  100 100   CO2 mmol/L 24 24 24 23 22  --  26 26   CO2, I-STAT mmol/L  --   --   --   --   --  26  --   --    BUN mg/dL 27* 27* 25 21 19  --  11 14   CREATININE mg/dL 0.59* 0.60 0.67 0.75 0.76  --  0.40* 0.39*   GLUCOSE, ISTAT mg/dl  --   --   --   --   --  228*  --   --    CALCIUM mg/dL 8.1* 8.2* 8.2* 7.9* 7.5*  --  7.6* 7.5*          No results found for: \"NTBNP\"         Results from last 7 days   Lab Units 12/12/24  0406 12/11/24  1431 12/11/24  0554 12/11/24  0044 12/10/24  0526   MAGNESIUM mg/dL 2.2 2.2 2.2 3.7* 1.5*                         Lipid Profile:   No results found for: \"CHOL\"  Lab Results   Component Value Date    HDL 12 (L) 11/27/2024     Lab Results   Component Value Date    LDLCALC 18 11/27/2024     Lab Results   Component Value Date    TRIG 201 (H) 12/03/2024    TRIG 177 (H) 11/27/2024    TRIG 178 (H) 11/18/2024       Imaging: Results Review Statement: No pertinent imaging studies reviewed.  XR chest portable ICU  Result Date: 12/11/2024  Narrative: XR CHEST PORTABLE ICU INDICATION: Pulmonary edema requiring diuresis. " COMPARISON: Chest radiograph December 10, 2024 FINDINGS: Minimal increase in bilateral perihilar and peripheral opacities, right greater than left. Unchanged blunting of the costophrenic angles, right greater than left. No pneumothorax. Normal cardiomediastinal silhouette. Bones are unremarkable for age. Normal upper abdomen.     Impression: Minimal increase in pulmonary edema and no significant change in the bilateral pleural effusions (right larger than left). Workstation performed: [id number]     XR chest portable  Result Date: 12/10/2024  Narrative: XR CHEST PORTABLE INDICATION: hypoxia after anesthesia. COMPARISON: 11/15/2024 FINDINGS: Patchy airspace opacities in the bilateral mid and lower lung fields. There is also hazy bibasilar opacity which may represent a combination of layered effusion and parenchymal opacity. No pneumothorax. Unchanged cardiomediastinal silhouette. No acute osseous abnormality within the limitations of portable radiography. Normal upper abdomen.     Impression: Bilateral lung disease. Workstation performed: [id number]     EGD  Result Date: 12/10/2024  Narrative: Table formatting from the original result was not included. [facility] [address] [facility] [address] [phone] DATE OF SERVICE: 12/10/24 PHYSICIAN(S): Attending: Dennise Garcia MD Fellow: DO Iliana Baker DO INDICATION: Encounter for gastrojejunal (GJ) tube placement POST-OP DIAGNOSIS: See the impression below. PREPROCEDURE: Informed consent was obtained for the procedure, including sedation.  Risks of perforation, hemorrhage, adverse drug reaction and aspiration were discussed. The patient was placed in the left lateral decubitus position. Patient was explained about the risks and benefits of the procedure. Risks including but not limited to bleeding, infection, and perforation were explained in detail. Also explained about less than 100% sensitivity with the exam and  other alternatives. PROCEDURE: EGD DETAILS OF PROCEDURE: Patient was taken to the procedure room where a time out was performed to confirm correct patient and correct procedure. The patient underwent monitored anesthesia care, which was administered by an anesthesia professional. The patient's blood pressure, heart rate, level of consciousness, respirations, oxygen, ECG and ETCO2 were monitored throughout the procedure. The scope was introduced through the mouth and advanced to the third part of the duodenum. Retroflexion was performed in the fundus. The patient experienced no blood loss. The procedure was not difficult. The patient tolerated the procedure well. There were no apparent adverse events. ANESTHESIA INFORMATION: ASA: IV Anesthesia Type: IV Sedation with Anesthesia MEDICATIONS: No administrations occurring from 1037 to 1110 on 12/10/24 FINDINGS: The esophagus appeared normal. Upon entry into the stomach, erythema was noted around the internal bolster of the PEG tube.  The internal bolster was pushed away from the mucosa revealing an area of ulceration with some surrounding purulence and erythema. Removed jejunal extension externally from the stomach and a new jejunal extension was replaced using a rat-tooth forceps to extend the tubing as far distal into the duodenum as we could.  The J port was successfully flushed. External tubing 4cm at the skin surface. The duodenal bulb, 1st part of the duodenum, 2nd part of the duodenum and 3rd part of the duodenum appeared normal. SPECIMENS: * No specimens in log *     Impression: The esophagus appeared normal. Upon entry into the stomach, erythema was noted around the internal bolster of the PEG tube.  The internal bolster was pushed away from the mucosa revealing an area of ulceration with some surrounding purulence and erythema.  2 g of cefazolin was administered intraprocedural. Removed jejunal extension externally from the stomach and a new jejunal extension  "was replaced using a rat-tooth forceps to extend the tubing as far distal into the duodenum as we could.  The J port was successfully flushed. External tubing 4 cm at the skin surface. The duodenal bulb, 1st part of the duodenum, 2nd part of the duodenum and 3rd part of the duodenum appeared normal. RECOMMENDATION: Start 7-day course of Augmentin Okay to use PEG-J tube for medications, water flushes, and tube feeds now Continue tube care. Make sure to use the correct ports. DO NOT USE ports for any other purpose than as labeled: \"Feed\" port is for tube feeds ONLY. \"Rx\" port is for medications ONLY. \"Suction\" port is for gastric venting/emptying. Please make sure to flush ports with water before and after use to prevent clogging.    Dennise Garcia MD      bedside procedure  Result Date: 12/10/2024  Narrative: [id number]    CT abdomen pelvis w contrast  Result Date: 12/9/2024  Narrative: CT ABDOMEN AND PELVIS WITH IV CONTRAST INDICATION: abd pain. . COMPARISON: CT chest abdomen pelvis November 30, 2024 TECHNIQUE: CT examination of the abdomen and pelvis was performed. Multiplanar 2D reformatted images were created from the source data. This examination, like all CT scans performed in the [facility], was performed utilizing techniques to minimize radiation dose exposure, including the use of iterative reconstruction and automated exposure control. Radiation dose length product (DLP) for this visit: 1326.44 mGy-cm IV Contrast: 100 mL of iohexol (OMNIPAQUE) Enteric Contrast: Not administered. FINDINGS: ABDOMEN LOWER CHEST: Moderate-sized right and small left pleural effusions with bibasilar passive atelectasis are similar to prior study. LIVER/BILIARY TREE: Unremarkable. GALLBLADDER: Post cholecystectomy. SPLEEN: Unchanged splenomegaly measuring 16.3 cm in craniocaudal dimension. Unchanged 2.3 cm wedge-shaped infarct in the periphery of the spleen Unchanged 1.2 cm " hypoattenuating splenic lesion, which likely a cyst. PANCREAS: Unremarkable. ADRENAL GLANDS: Unchanged 1 cm left adrenal nodule. KIDNEYS/URETERS: Simple renal cyst(s). Otherwise unremarkable kidneys. No hydronephrosis. STOMACH AND BOWEL: Post sigmoidectomy. Colonic diverticulosis without acute diverticulitis. No bowel obstruction. Percutaneous enteric tube with proximal portion partially coiled in the stomach, and tip in the distal duodenum. Post partial small bowel resection with anastomosis in the anterior right abdomen. APPENDIX: Surgically absent. ABDOMINOPELVIC CAVITY: Slight increase in moderate volume ascites VESSELS: Unremarkable for patient's age. PELVIS REPRODUCTIVE ORGANS: Post hysterectomy. URINARY BLADDER: Unremarkable. ABDOMINAL WALL/INGUINAL REGIONS: No significant change in anasarca. BONES: No acute fracture or suspicious osseous lesion. Spinal degenerative changes.     Impression: Since November 30, 2024: 1.  Persistent third spacing with slight increase in moderate volume ascites, and no significant change in moderate size right and small left pleural effusions with bibasilar atelectasis. 2.  New percutaneous enteric tube with tip in the distal duodenum. Workstation performed: [id number]     EGD PEG-J  Result Date: 12/3/2024  Narrative: Table formatting from the original result was not included. [facility] Operating Room [address] [facility] [address] [phone] DATE OF SERVICE: 12/03/24 PHYSICIAN(S): Attending: Phi Hoang MD Fellow: No Staff Documented INDICATION: Nausea and vomiting, unspecified vomiting type POST-OP DIAGNOSIS: See the impression below. PREPROCEDURE: Informed consent was obtained for the procedure, including sedation.  Risks of perforation, hemorrhage, adverse drug reaction and aspiration were discussed. The patient was placed in the left lateral decubitus position. Patient was explained about the risks and benefits of the procedure. Risks including  but not limited to bleeding, infection, and perforation were explained in detail. Also explained about less than 100% sensitivity with the exam and other alternatives. PROCEDURE: EGD DETAILS OF PROCEDURE: Patient was taken to the procedure room where a time out was performed to confirm correct patient and correct procedure. The patient underwent monitored anesthesia care, which was administered by an anesthesia professional. The patient's blood pressure, heart rate, level of consciousness, respirations, oxygen, ECG and ETCO2 were monitored throughout the procedure. The scope was introduced through the mouth and advanced to the second part of the duodenum. Retroflexion was performed in the fundus. The patient experienced no blood loss. The procedure was not difficult. The patient tolerated the procedure well. There were no apparent adverse events. ANESTHESIA INFORMATION: ASA: III Anesthesia Type: General MEDICATIONS: Adult 3-in-1 TPN (custom base / custom electrolytes) Cannot be calculated*  *Total user-documented volume 9017.11 mL may contain volume from other administrations nystatin (MYCOSTATIN) powder Cannot be calculated*  *Administration dose not documented enoxaparin (LOVENOX) subcutaneous injection 60 mg Cannot be calculated*  *Administration dose not documented (Totals for administrations occurring from 1459 to 1600 on 12/03/24) FINDINGS: The esophagus, stomach and duodenum appeared normal. Z-line is 38 cm from the incisors. Retroflexed view in the stomach was also unremarkable. A PEG-J tube measuring 20 Fr was placed in the stomach using a deformable internal bolster via the pull technique after the site was identified via transillumination, visualized indentation and needle passed through abdominal wall; distance from external bolster to external end of tube: 3 cm; scope reinserted and tube rotated freely to confirm placement. Jejunal extension passed through the PEG and tip was clipped to the mucosa in  "the third part of the duodenum. SPECIMENS: * No specimens in log *     Impression: The esophagus, stomach and duodenum appeared normal. PEG-J tube placed in the stomach Jejunal extension passed through the PEG and tip was clipped to the mucosa in the third part of the duodenum. RECOMMENDATION: May use tube for medications after 6 hours and feedings in morning if no evidence of bleeding, fever, or elevated WBC.    Phi Hoang MD     VAS upper limb venous duplex scan, unilateral/limited  Result Date: 12/2/2024  Narrative:  [facility] REPORT CLINICAL: Indications: Patient presents with right upper extremity edema S/P PICC Line insertion. Operative History: No Cardiovascular Surgeries Risk Factors The patient has history of Recurrent Metastatic Adenocarcinoma and  Diabetes (IDDM). Height: 5'0\"  Weight: 269 lbs  FINDINGS:  Right                             Thrombus             Impression      Antecubital                                            Not Visualized  Cephalic Upper Arm Distal         Sub-Acute                            Bas AC                                                 Not visualized  Basilic Vein - Forearm Proximal   Chronic - Occlusive                  Ceph AC                                                Not visualized  Cephalic Vein - Forearm Proximal  Sub-Acute                            Bas Mid Forearm                   Chronic - Occlusive                  Basilic Vein - Forearm Distal     Chronic - Occlusive                  Cephalic Vein - Forearm Distal    Sub-Acute                               CONCLUSION: Impression RIGHT UPPER LIMB: No evidence of acute or chronic deep vein thrombosis. Evidence of superficial thrombophlebitis noted throughout the cephalic vein and in the forearm segments of the basilic vein. Unable to visualize vessels in the antecubital fossa area secondary to bandages from PICC line.  LEFT UPPER LIMB LIMITED: Evaluation shows no gross evidence of thrombus in the " internal jugular vein, subclavian vein, and the innominate vein.  Technically difficult/limited study. Some segments may be poorly visualized on today's exam.  Technical findings shared with Dr. Jackson and posted in Epic.  SIGNATURE: Electronically Signed by: PEEWEE LESLIE MD on 2024-12-02 05:34:02 PM    CT chest abdomen pelvis w contrast  Result Date: 11/30/2024  Narrative: CT CHEST, ABDOMEN AND PELVIS WITH IV CONTRAST INDICATION: ileus, 6 hr post drink delay. COMPARISON: None. TECHNIQUE: CT examination of the chest, abdomen and pelvis was performed. Multiplanar 2D reformatted images were created from the source data. This examination, like all CT scans performed in the [facility], was performed utilizing techniques to minimize radiation dose exposure, including the use of iterative reconstruction and automated exposure control. Radiation dose length product (DLP) for this visit: 3063 mGy-cm IV Contrast: 100 mL of iohexol (OMNIPAQUE) 50 mL of iohexol (OMNIPAQUE) Enteric Contrast: Not administered. FINDINGS: CHEST LUNGS: Compressive posterior right lower lobe subsegmental atelectasis. Lungs are otherwise clear. No tracheal or endobronchial lesion. PLEURA: Trace left and small right effusion. HEART/GREAT VESSELS: Heart is unremarkable for patient's age. No thoracic aortic aneurysm. Right-sided PICC line in the SVC. MEDIASTINUM AND TRINITY: Unremarkable. CHEST WALL AND LOWER NECK: Unremarkable. ABDOMEN LIVER/BILIARY TREE: Unremarkable. GALLBLADDER: Post cholecystectomy. SPLEEN: Hypodensity in the spleen measuring 11 mm anteriorly suggesting cyst is unchanged. Wedge shaped hypodensity laterally suggesting infarct is smaller. Splenomegaly measuring 16.2 cm PANCREAS: Unremarkable. ADRENAL GLANDS: Unremarkable. KIDNEYS/URETERS: Bilateral renal cysts.. Mild right hydronephrosis. Mild fullness of the left kidney collecting system. Prominent extrarenal pelvis bilaterally. These are probably secondary to the  distended bladder. STOMACH AND BOWEL: Prior sigmoid resection. Sutures in the small bowel loop in the anterior abdomen. Resolution of dilated small bowel loops seen previously with contrast seen predominantly in the distal nondistended small bowel loops and in the colon. APPENDIX: Surgically absent. ABDOMINOPELVIC CAVITY: Moderate perihepatic ascites tracking in the right paracolic gutter. Ascites. No pneumoperitoneum. No lymphadenopathy. VESSELS: Unremarkable for patient's age. PELVIS REPRODUCTIVE ORGANS: Unremarkable for patient's age. URINARY BLADDER: Distended bladder with tiny intraluminal air.. ABDOMINAL WALL/INGUINAL REGIONS: Extensive subcutaneous edema in the flank and abdominal pannus. BONES: Degenerative disc disease at L3-L4 and L4-L5. No acute fracture or suspicious osseous lesion.     Impression: 1.Resolution of obstructive pattern seen in the small bowel loops with contrast in the distal nondistended small bowel loops and colon. Stomach is distended with air. 2.Moderate ascites in the perihepatic space tracking in the right paracolic gutter into the pelvis. Worsening subcutaneous edema or third spacing. 3.Severely distended bladder with tiny intraluminal air causing fullness of the collecting systems bilaterally. 4. Trace right and small to moderate right effusion with adjacent atelectasis. Resolution of previous possible infiltrative changes in the right lung base. Workstation performed: [id number]     XR abdomen 1 view kub  Result Date: 11/29/2024  Narrative: XR ABDOMEN 1 VIEW KUB INDICATION: vomiting. COMPARISON: 11/21/2024 FINDINGS: Persistent mild gaseous prominence of small and large bowel loops suspected to reflect postoperative ileus without zainab obstruction or free air. Interval decrease in size of small bowel loop dilatation since prior exam. Gaseous distention of the stomach  also noted.. No evidence of pneumoperitoneum on this supine study. Upright or left lateral decubitus imaging is more  sensitive to detect subtle free air in the appropriate setting. No pathologic calcification or soft tissue mass. Clear lung bases. Bones are unremarkable for the patient's age.     Impression: Mild gaseous prominence of large and small bowel loops appears improved from prior study consistent with resolving postoperative ileus. Workstation performed: [id number]     XR abdomen 1 view kub  Result Date: 11/21/2024  Narrative: XR ABDOMEN 1 VIEW KUB INDICATION: f/u SBO, s/p GGC 11/18. COMPARISON: 11/20/2024, 11/19/2024 FINDINGS: There is increased distention of small bowel loops in the right lower quadrant, though similar to the x-ray from 11/19/2024. There is stable contrast in the colon. No evidence of pneumoperitoneum on this supine study. Upright or left lateral decubitus imaging is more sensitive to detect subtle free air in the appropriate setting. No pathologic calcification or soft tissue mass. Clear lung bases. Bones are unremarkable for the patient's age.     Impression: Persistent small bowel dilatation, slightly increased from the most recent prior study, with contrast in the colon. Continued partial small bowel obstruction not excluded. Workstation performed: [id number]     XR abdomen 1 view kub  Result Date: 11/20/2024  Narrative: XR ABDOMEN 1 VIEW KUB INDICATION: F/u gastrograffin. COMPARISON: 11/19/2024 FINDINGS: Overall, image quality is limited. An endogastric tube is present with the tip near the midline of the abdomen. Right upper quadrant surgical clips are present. There is moderate density enteric contrast visible within a small portion of the right side of the colon. This has been present on prior studies without significant change. The contrast which was visible within the dilated small bowel in the central portion of the abdomen on 11/18/2024 has become virtually inapparent due to dilution and/or suction. There is one area where there is still some faintly visible contrast within persistently  dilated bowel just to the left of midline. Overall, I am still concerned that there is at least a partial small bowel obstruction. Consider repeat CT scan for more thorough assessment. While there is no gross evidence of free air on this exam, the study is significantly limited in terms of image quality and also with the patient imaged in the supine position which lowers sensitivity for detecting free air. Furthermore, there are numerous linear and curvilinear lucent areas projecting over the abdominal region which follow nonanatomic planes and are most likely large skin folds. These seem most numerous over the right side of the abdomen. These are oriented both horizontally and vertically. No abnormal calcifications are appreciated other than some pelvic phleboliths. Clear lung bases. Bones are unremarkable for the patient's age.     Impression: Limited study. Continued dilution and/or suction removal of enteric contrast from otherwise persistently dilated small bowel centrally in the abdomen. The bowel loops may be slightly less distended than on the 18th. Overall, findings are still concerning  for possible partial small bowel obstruction. The contrast in the right side of the colon has been unchanged for many days. The endogastric tube is stable in position Numerous suspected skinfolds projecting over the abdomen. Please consider repeat abdominopelvic CT scan for more thorough assessment. The study was marked in EPIC for immediate notification. Workstation performed: [id number]     XR abdomen 1 vw portable  Result Date: 11/19/2024  Narrative: XR ABDOMEN 1 VW PORTABLE INDICATION: Follow-up for Gastrografin challenge. COMPARISON: Abdominal radiograph 11/18/2024, 11/16/2024, 11/15/2024, CT abdomen and pelvis 11/14/2024 FINDINGS: Tip of the enteric tube projects over the gastric body. Some residual contrast from Gastrografin challenge is present in the right lateral abdomen/upper quadrant probably within the colon.  Air-filled, dilated probable small bowel persists in the central abdomen with dilution of oral contrast within bowel compared to the previous study. There is a paucity of gas in the distal large bowel/rectum. No evidence of pneumoperitoneum on this supine study. Upright or left lateral decubitus imaging is more sensitive to detect subtle free air in the appropriate setting. No soft tissue mass. Cholecystectomy clips. Clear lung bases. Bones are unremarkable for the patient's age.     Impression: Air-filled, dilated probable small bowel persists in the central abdomen with dilution of oral contrast within bowel compared to the previous study. Although there is contrast reaching the right colon, findings may reflect at least moderate grade partial  small bowel obstruction. Resident: Romy Blunt I, the attending radiologist, have reviewed the images and agree with the final report above. Workstation performed: [id number]     XR abdomen 1 view kub  Result Date: 11/18/2024  Narrative: XR ABDOMEN 1 VIEW KUB INDICATION: gastrografin challenge. 5 hours after contrast intake. Patient was reportedly given Gastrografin by mouth at about 8:10 a.m. and images were acquired about 2:30 p.m. Gastric tube suction was initiated by surgery department at around 11:45 a.m. COMPARISON: 11/16/2024 and 11/15/2024 FINDINGS: The tip of the endogastric tube is in the left upper abdomen. Dilated small bowel is seen centrally in the abdomen containing enteric contrast. The caliber of the bowel is estimated to be almost 6 cm which is well above normal limits. Findings are most suggestive of a high-grade small bowel obstruction. There is some enteric contrast visible within bowel in the right side of the abdomen, however, this seems to have been present on the prior exam and may be residual contrast in the colon from prior imaging. No gross evidence of free air within the limits of this exam. Right upper quadrant surgical clips are noted.  There appears to be some lower lumbar disc degeneration. Lung bases clear.     Impression: Markedly dilated small bowel loops centrally in the abdomen containing enteric contrast. The appearance is most compatible with a high-grade small bowel obstruction. Enteric tube tip in the left upper abdomen presumably in the gastric body. There is some contrast within bowel in the right side of the abdomen which seems similar to the prior study and might be residual colonic contrast from prior imaging work-up. The study was marked in EPIC for immediate notification. Workstation performed: [id number]     XR abdomen 1 view kub  Result Date: 11/17/2024  Narrative: XR ABDOMEN 1 VIEW KUB INDICATION: Confirm NG location. COMPARISON: 11/15/2024. FINDINGS: NGT partially retracted with tip in proximal stomach. Dilated lower abdomen occasional small bowel loops. Nondistended colonic loops with previous noted right colon suggested vicariously excreted contrast. Persistent decreased slight right renal opacification and persistent decreased right bladder opacification secondary to an head CT 11/14/2024.. No evidence of pneumoperitoneum on this supine study. Upright or left lateral decubitus imaging is more sensitive to detect subtle free air in the appropriate setting. No pathologic calcification or soft tissue mass. Clear lung bases. Bones are unremarkable for the patient's age.     Impression: NGT partially retracted with tip in proximal stomach. Small bowel obstruction again suggested. Workstation performed: [id number]     XR abdomen 1 view kub  Result Date: 11/15/2024  Narrative: ABDOMEN INDICATION: Distention. NGT in place. COMPARISON: Abdominal radiograph from 11/6/2024 and CT of the abdomen and pelvis from 11/14/2024. TECHNIQUE: AP supine portable views of the abdomen - 5 images. FINDINGS: Evaluation is limited due to patient's body habitus, patient rotation and inability to image the whole abdomen on a single view. Excreted IV  contrast from the 11/14/2024 CT is seen within the right collecting system, ureter, and urinary bladder. There is mild right-sided hydronephrosis. An enteric tube is seen in the region of the distal stomach. No dilated loops of bowel are seen, however please note fluid-filled loops of bowel as were seen on the 11/14/2024 CT may not be apparent with supine radiographs. A loop of large bowel projects over the right hemiabdomen, it appears to contain some hyperdense material, possibly vicariously excreted IV contrast from 11/14/2024 or may be some administered oral contrast although no oral contrast is seen elsewhere in the gastrointestinal tract. No definite evidence of pneumoperitoneum. Surgical clips in the right upper quadrant related to prior cholecystectomy. Visualized osseous structures demonstrate degenerative changes.     Impression: Technically limited study, as discussed above. No dilated loops of bowel seen, however note fluid-filled loops of bowel as were seen on the 11/14/2024 CT may not be apparent with supine radiographs. Small amount of hyperdense material seen in the loop of presumably ascending colon. This may represent some administered oral contrast, although no oral contrast is seen elsewhere in the gastrointestinal tract, or possibly vicarious excretion of the IV contrast administered on 11/14/2024. Excreted IV contrast from the 11/14/2024 CT seen within the right collecting system, right ureter and the urinary bladder, indicating delayed renal clearance. Mild right-sided hydronephrosis. Workstation performed: [id number]     XR chest portable  Result Date: 11/15/2024  Narrative: XR CHEST PORTABLE INDICATION: Confirm ng placement. COMPARISON: 11/6/2024, 11/14/2024 FINDINGS: Distal aspect of NG tube is coursing below the left hemidiaphragm. Included lung fields are clear. No pneumothorax or pleural effusion. Normal cardiomediastinal silhouette. Bones are unremarkable for age. Normal upper abdomen.      Impression: Distal aspect of NG tube is coursing below the left hemidiaphragm. Workstation performed: [id number]     CT abdomen pelvis with contrast  Result Date: 11/14/2024  Narrative: CT ABDOMEN AND PELVIS WITH IV CONTRAST INDICATION: r/o small bowel obstruction. . COMPARISON: CT dated 11/5/2024 TECHNIQUE: CT examination of the abdomen and pelvis was performed. Multiplanar 2D reformatted images were created from the source data. This examination, like all CT scans performed in the [facility], was performed utilizing techniques to minimize radiation dose exposure, including the use of iterative reconstruction and automated exposure control. Radiation dose length product (DLP) for this visit: 1354 mGy-cm IV Contrast: 50 mL of iohexol (OMNIPAQUE) 100 mL of iohexol (OMNIPAQUE) Enteric Contrast: Not administered. FINDINGS: ABDOMEN LOWER CHEST: Partially imaged groundglass opacity in the right middle and right lower lobes, probably due to infection. LIVER/BILIARY TREE: Hepatomegaly. No focal liver lesion or biliary dilatation. GALLBLADDER: Post cholecystectomy. SPLEEN: Stable splenomegaly. Stable peripheral wedge-shaped hypodensities in the lateral and inferior aspect of the spleen that may represent splenic infarcts. Stable subcentimeter hypodense lesion more anteriorly in the spleen. PANCREAS: Unremarkable. ADRENAL GLANDS: Unremarkable. KIDNEYS/URETERS: Stable mild fullness of the renal collecting system. Stable small cyst at the left lower pole. There are stable bilateral subcentimeter hypodense renal lesions that are too small to characterize but also likely benign. STOMACH AND BOWEL: Stable postoperative changes status post sigmoidectomy with colocolonic anastomosis in the rectosigmoid junction. There is diverticulosis without diverticulitis. Small bowel obstruction again seen with transition again seen in the pelvis near midline, probably due to adhesions. New small amount of ascites. No  pneumatosis or portal venous gas. Gastric distention is improved.. APPENDIX: Surgically absent ABDOMINOPELVIC CAVITY: Small volume of ascites as noted above. No abscess. No pneumoperitoneum. No adenopathy. VESSELS: Atherosclerosis without abdominal aortic aneurysm. PELVIS REPRODUCTIVE ORGANS: Uterus is surgically absent. URINARY BLADDER: Unremarkable. ABDOMINAL WALL/INGUINAL REGIONS: Stable nonspecific subcutaneous edema in the ventral abdomen asymmetric to the right and in the left gluteal region. BONES: No acute fracture or suspicious osseous lesion.     Impression: Small bowel obstruction with transition in the pelvis is again seen. New small volume of ascites Other stable findings as above. Workstation performed: [id number]         Counseling / Coordination of Care  Total floor / unit time spent today 45 minutes.  Greater than 50% of total time was spent with the patient and / or family counseling and / or coordination of care.  A description of the counseling / coordination of care: Review of history, current assessment, development of a plan.      Code Status: Level 1 - Full Code    ** Please Note: Dragon 360 Dictation voice to text software may have been used in the creation of this document. **

## 2024-12-12 NOTE — ASSESSMENT & PLAN NOTE
Lab Results   Component Value Date    HGBA1C 6.9 (H) 11/05/2024       Recent Labs     12/11/24  1711 12/11/24  2138 12/12/24  0615 12/12/24  1034   POCGLU 234* 248* 220* 222*       Blood Sugar Average: Last 72 hrs:  (P) 221.4    Farxiga on hold while inpatient  Hyperglycemia secondary to steroids  Add Lantus at bedtime  Continue SSI

## 2024-12-12 NOTE — ASSESSMENT & PLAN NOTE
Lab Results   Component Value Date    HGBA1C 6.9 (H) 11/05/2024       Recent Labs     12/11/24  1312 12/11/24  1711 12/11/24  2138 12/12/24  0615   POCGLU 228* 234* 248* 220*   -Controlled  -Managed by primary team    Blood Sugar Average: Last 72 hrs:  (P) 221.4279547263428082

## 2024-12-12 NOTE — ASSESSMENT & PLAN NOTE
-25 beat run of nonsustained VT noted on telemetry, no further ectopy seen on telemetry  -Patient denies chest pain, palpitations, lightheadedness, dizziness or syncope  -No other episodes of VT noted on monitor

## 2024-12-12 NOTE — ASSESSMENT & PLAN NOTE
-Status post PEG-J tube placement 12/3/2024  -Admission for recurrent SBO.  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

## 2024-12-12 NOTE — ASSESSMENT & PLAN NOTE
-Acute respiratory failure following EGD with J-tube exchange on 12/10/2024 with sats noted to be in the 80s on 10 L nasal cannula.  -Treated with nebulizers, Lasix and BiPAP  -Currently on nasal cannula, 2 L NC

## 2024-12-12 NOTE — QUICK NOTE
Noted to have 25 beat run of VT on telemetry at 0322. Patient asymptomatic vital signs stable. EKG showing NSR. Will check bmp and magnesium. Will consult cardiology. Continue telemetry for now

## 2024-12-12 NOTE — QUICK NOTE
Patient has been seen by the gastroenterology team out of concerns for G-tube site infection.  Her G-tube seems to be malfunctioning presently and tube feeds are currently on hold, she was started on lactated Ringer's however still seems considerably hypervolemic at bedside with lower extremity edema.  Holding tube feeds, but for now would hold off on supplemental IVF and allow patient to take sips to thirst.  Reassess volume status in the morning

## 2024-12-13 ENCOUNTER — ANESTHESIA EVENT (INPATIENT)
Dept: GASTROENTEROLOGY | Facility: HOSPITAL | Age: 64
End: 2024-12-13
Payer: COMMERCIAL

## 2024-12-13 ENCOUNTER — ANESTHESIA (INPATIENT)
Dept: GASTROENTEROLOGY | Facility: HOSPITAL | Age: 64
End: 2024-12-13
Payer: COMMERCIAL

## 2024-12-13 RX ORDER — PROPOFOL 10 MG/ML
INJECTION, EMULSION INTRAVENOUS AS NEEDED
Status: DISCONTINUED | OUTPATIENT
Start: 2024-12-13 | End: 2024-12-13

## 2024-12-13 RX ORDER — SODIUM CHLORIDE 9 MG/ML
INJECTION, SOLUTION INTRAVENOUS CONTINUOUS PRN
Status: DISCONTINUED | OUTPATIENT
Start: 2024-12-13 | End: 2024-12-13

## 2024-12-13 RX ADMIN — PROPOFOL 20 MG: 10 INJECTION, EMULSION INTRAVENOUS at 15:16

## 2024-12-13 RX ADMIN — SODIUM CHLORIDE: 0.9 INJECTION, SOLUTION INTRAVENOUS at 14:48

## 2024-12-13 RX ADMIN — PROPOFOL 20 MG: 10 INJECTION, EMULSION INTRAVENOUS at 15:29

## 2024-12-13 RX ADMIN — PROPOFOL 20 MG: 10 INJECTION, EMULSION INTRAVENOUS at 15:21

## 2024-12-13 RX ADMIN — PROPOFOL 70 MG: 10 INJECTION, EMULSION INTRAVENOUS at 15:06

## 2024-12-13 RX ADMIN — PROPOFOL 20 MG: 10 INJECTION, EMULSION INTRAVENOUS at 15:33

## 2024-12-13 RX ADMIN — PROPOFOL 20 MG: 10 INJECTION, EMULSION INTRAVENOUS at 15:56

## 2024-12-13 RX ADMIN — PROPOFOL 20 MG: 10 INJECTION, EMULSION INTRAVENOUS at 15:40

## 2024-12-13 RX ADMIN — PROPOFOL 20 MG: 10 INJECTION, EMULSION INTRAVENOUS at 15:47

## 2024-12-13 RX ADMIN — PROPOFOL 20 MG: 10 INJECTION, EMULSION INTRAVENOUS at 15:51

## 2024-12-13 RX ADMIN — PROPOFOL 20 MG: 10 INJECTION, EMULSION INTRAVENOUS at 15:08

## 2024-12-13 NOTE — PROGRESS NOTES
Lety Botello is a 64 y.o. female who is currently ordered Vancomycin IV with management by the Pharmacy Consult service.  Relevant clinical data and objective / subjective history reviewed.  Vancomycin Assessment:  Indication and Goal AUC/Trough: Soft tissue (goal -600, trough >10), -600, trough >10  Clinical Status: stable  Micro:   No new  Renal Function:  SCr: 0.55 mg/dL  CrCl: 125 mL/min  Renal replacement: Not on dialysis  Days of Therapy: 3  Current Dose: 2gm q24  Vancomycin Plan:  New Dosingm q12  Estimated AUC: 462 mcg*hr/mL  Estimated Trough: 12.7 mcg/mL  Next Level: 12-19  Renal Function Monitoring: Daily BMP and UOP  Pharmacy will continue to follow closely for s/sx of nephrotoxicity, infusion reactions and appropriateness of therapy.  BMP and CBC will be ordered per protocol. We will continue to follow the patient’s culture results and clinical progress daily.    Hosea Harris, Pharmacist

## 2024-12-13 NOTE — ANESTHESIA POSTPROCEDURE EVALUATION
Post-Op Assessment Note    CV Status:  Stable  Pain Score: 0    Pain management: adequate       Mental Status:  Alert and awake   Hydration Status:  Euvolemic   PONV Controlled:  Controlled   Airway Patency:  Patent     Post Op Vitals Reviewed: Yes    No anethesia notable event occurred.    Staff: Anesthesiologist, CRNA           Last Filed PACU Vitals:  Vitals Value Taken Time   Temp     Pulse     BP     Resp     SpO2         Modified Fozia:  Activity: 2 (12/13/2024  3:59 PM)  Respiration: 2 (12/13/2024  3:59 PM)  Circulation: 2 (12/13/2024  3:59 PM)  Consciousness: 1 (12/13/2024  3:59 PM)  Oxygen Saturation: 1 (12/13/2024  3:59 PM)  Modified Fozia Score: 8 (12/13/2024  3:59 PM)

## 2024-12-13 NOTE — UTILIZATION REVIEW
Continued Stay Review    Date: 12/13/24                          Current Patient Class: Inpatient Current Level of Care: MS    HPI:64 y.o. female initially admitted on 12/9/24 12/13/24 Assessment/Plan: Pt comfortable in bed. On exam,  peg tube w/ surround erythema and drainage. Peg tube clogged. Plan: EGD for peg tube exchange w/ GI. NPO. Continue IV Ceftriaxone and IV Vanco. IV fluids, Pain control.     Certification Statement: The patient will continue to require additional inpatient hospital stay due to PEG tube malfunctioning  Discharge Plan: Anticipate discharge in 48-72 hrs to rehab facility.    Medications:   Scheduled Medications:  amLODIPine, 5 mg, Oral, Daily  apixaban, 5 mg, Oral, BID  aspirin, 81 mg, Oral, Daily  bisacodyl, 10 mg, Rectal, Daily  carvedilol, 25 mg, Oral, BID With Meals  cefTRIAXone, 2,000 mg, Intravenous, Q24H  vitamin B-12, 1,000 mcg, Oral, Daily  [Held by provider] furosemide, 20 mg, Oral, Daily  gabapentin, 100 mg, Oral, Daily  insulin glargine, 10 Units, Subcutaneous, HS  insulin lispro, 1-5 Units, Subcutaneous, TID AC  levothyroxine, 125 mcg, Oral, Daily  pantoprazole, 40 mg, Oral, Early Morning  polyethylene glycol, 17 g, Oral, Daily  [START ON 12/14/2024] vancomycin, 1,000 mg, Intravenous, Q12H  vancomycin (VANCOCIN) 2,000 mg in sodium chloride 0.9 % 500 mL IVPB  Dose: 2,000 mg  Freq: Every 24 hours Route: IV  Last Dose: Stopped (12/13/24 1200)  Start: 12/12/24 1000 End: 12/13/24 1217       Continuous IV Infusions:  multi-electrolyte (PLASMALYTE-A/ISOLYTE-S PH 7.4) IV solution  Rate: 75 mL/hr Dose: 75 mL/hr  Freq: Continuous Route: IV  Indications of Use: IV Hydration  Last Dose: 75 mL/hr (12/13/24 1029)  Start: 12/13/24 0915 End: 12/13/24 1628         PRN Meds:  acetaminophen, 650 mg, Oral, Q6H PRN  albuterol, 2 puff, Inhalation, Q6H PRN  aluminum-magnesium hydroxide-simethicone, 30 mL, Oral, Q6H PRN  dicyclomine, 10 mg, Oral, BID PRN  fentaNYL, 25 mcg, Intravenous, Q5 Min PRN  - given x1 12/13  fentaNYL, 50 mcg, Intravenous, Q5 Min PRN  HYDROmorphone, 1 mg, Intravenous, Q3H PRN - given x4 12/13.  nitroglycerin, 0.4 mg, Sublingual, Q5 Min PRN  ondansetron, 4 mg, Intravenous, Q6H PRN  oxyCODONE, 5 mg, Oral, Q4H PRN  oxyCODONE, 2.5 mg, Oral, Q6H PRN      Discharge Plan: TBD    Vital Signs (last 3 days)       Date/Time Temp Pulse Resp BP MAP (mmHg) SpO2 FiO2 (%) Calculated FIO2 (%) - Nasal Cannula O2 Flow Rate (L/min) Nasal Cannula O2 Flow Rate (L/min) O2 Device O2 Interface Device Patient Position - Orthostatic VS [place] Coma Scale Score Pain    12/13/24 1615 -- -- -- -- -- -- -- -- -- -- -- -- -- -- 10 - Worst Possible Pain    12/13/24 1559 96.8 °F (36 °C) 64 18 133/60 -- 100 % -- 416 -- 99 L/min Simple mask -- -- -- No Pain    12/13/24 1259 96.9 °F (36.1 °C) 64 20 185/81 -- 95 % -- -- -- -- None (Room air) -- -- -- 10 - Worst Possible Pain    12/13/24 11:44:56 97.3 °F (36.3 °C) 61 -- 167/71 103 91 % -- -- -- -- -- -- -- -- --    12/13/24 1039 -- -- -- -- -- -- -- -- -- -- -- -- -- -- 10 - Worst Possible Pain    12/13/24 0930 -- -- -- -- -- 94 % -- -- -- -- None (Room air) -- -- 15 --    12/13/24 08:04:45 97.3 °F (36.3 °C) 64 -- 152/63 93 91 % -- -- -- -- -- -- -- -- --    12/13/24 08:04:36 97.3 °F (36.3 °C) 61 -- 152/63 93 88 % -- -- -- -- -- -- -- -- --    12/13/24 0751 -- -- -- -- -- -- -- -- -- -- -- -- -- -- 10 - Worst Possible Pain    12/13/24 0358 -- -- -- -- -- -- -- -- -- -- -- -- -- -- 10 - Worst Possible Pain    12/13/24 02:48:05 97.7 °F (36.5 °C) 64 17 152/79 107 93 % -- -- -- -- -- -- Lying -- --    12/13/24 0040 -- -- -- -- -- -- -- -- -- -- -- -- -- -- 10 - Worst Possible Pain    12/12/24 23:29:06 97.5 °F (36.4 °C) 64 17 176/93 121 92 % -- -- -- -- None (Room air) -- Lying -- --    12/12/24 2134 -- -- -- -- -- -- -- -- -- -- -- -- -- -- 10 - Worst Possible Pain    12/12/24 2000 -- -- -- -- -- 97 % -- -- -- -- None (Room air) -- -- 15 --    12/12/24 19:31:01 97.4 °F (36.3 °C)  66 -- 166/74 105 96 % -- -- -- -- -- -- -- -- --    12/12/24 15:47:11 97.9 °F (36.6 °C) 65 17 158/67 97 96 % -- -- -- -- -- -- Lying -- --    12/12/24 1120 -- 74 -- 155/61 -- -- -- -- -- -- -- -- -- -- --    12/12/24 10:41:13 97.9 °F (36.6 °C) 74 18 155/61 92 94 % -- -- -- -- -- -- Lying -- --    12/12/24 0800 -- -- -- -- -- 97 % -- 28 -- 2 L/min Nasal cannula -- -- -- 8    12/12/24 07:09:07 97.7 °F (36.5 °C) 74 18 153/93 113 95 % -- -- -- -- Nasal cannula -- Lying -- --    12/12/24 0612 -- -- -- -- -- -- -- -- -- -- -- -- -- -- 10 - Worst Possible Pain    12/12/24 03:27:05 97.5 °F (36.4 °C) 73 18 171/68 102 97 % -- -- -- -- Nasal cannula -- Lying -- --    12/12/24 02:41:21 97.2 °F (36.2 °C) 73 18 159/71 100 96 % -- -- -- -- -- -- Lying -- --    12/11/24 2303 -- -- -- -- -- -- -- -- -- -- -- -- -- -- 10 - Worst Possible Pain    12/11/24 21:40:05 97.4 °F (36.3 °C) 69 19 160/70 100 96 % -- -- -- -- -- -- Lying -- --    12/11/24 15:25:54 98 °F (36.7 °C) 74 18 167/64 98 88 % -- -- -- -- None (Room air) -- Lying -- --    12/11/24 1400 -- 73 21 146/67 97 92 % -- -- -- -- -- -- -- -- --    12/11/24 1300 -- 78 21 182/76 109 92 % -- -- -- -- -- -- -- -- --    12/11/24 1200 -- 65 18 113/56 81 92 % -- -- -- -- -- -- -- -- --    12/11/24 1100 -- 71 20 141/63 91 92 % -- -- -- -- -- -- -- -- --    12/11/24 1059 -- -- -- -- -- -- -- -- -- -- -- -- -- -- 9    12/11/24 1057 -- -- -- -- -- -- -- -- -- -- -- -- -- -- 8    12/11/24 1006 -- -- -- -- -- -- -- -- -- -- -- -- -- -- 10 - Worst Possible Pain    12/11/24 1000 -- 81 27 150/63 90 90 % -- -- -- -- -- -- -- -- --    12/11/24 0900 -- 74 20 143/64 92 -- -- -- -- -- -- -- -- -- --    12/11/24 0800 -- 69 18 148/66 95 100 % -- -- -- -- -- -- -- -- --    12/11/24 0700 -- 64 16 129/61 88 100 % -- -- -- -- -- -- -- -- --    12/11/24 0615 -- -- -- -- -- -- -- -- -- -- -- -- -- -- 10 - Worst Possible Pain    12/11/24 0600 -- 74 19 153/64 92 100 % -- -- -- -- -- -- -- -- --    12/11/24 0500  -- 80 15 181/73 105 100 % -- -- -- -- -- -- -- -- --    12/11/24 0400 -- 73 18 146/67 96 100 % -- -- -- -- -- -- -- -- --    12/11/24 0335 -- -- -- -- -- -- -- -- -- -- -- Face mask -- -- --    12/11/24 0300 -- 66 20 129/62 89 100 % -- -- -- -- -- -- -- -- --    12/11/24 0200 -- 65 19 107/54 78 100 % -- -- -- -- -- -- -- -- --    12/11/24 0100 -- 72 22 119/58 83 100 % -- -- -- -- -- -- -- -- --    12/11/24 0000 -- 71 17 124/57 82 100 % -- -- -- -- -- -- -- -- --    12/10/24 2300 -- 72 17 89/45 64 100 % -- -- -- -- -- -- -- -- --    12/10/24 2200 -- 82 21 119/56 80 99 % -- -- -- -- -- -- -- -- --    12/10/24 2157 -- -- -- -- -- -- -- -- -- -- -- -- -- -- 10 - Worst Possible Pain    12/10/24 2100 -- 80 20 87/52 65 98 % -- -- -- -- -- -- -- -- --    12/10/24 2020 -- -- -- -- -- -- -- -- -- -- -- Face mask -- -- --    12/10/24 2000 -- -- -- -- -- -- -- -- -- -- -- -- -- -- 10 - Worst Possible Pain    12/10/24 1800 -- 96 23 111/58 80 96 % -- -- -- -- -- -- -- -- --    12/10/24 1750 -- -- -- -- -- -- -- -- -- -- -- -- -- -- 10 - Worst Possible Pain    12/10/24 1730 -- 107 29 115/66 85 97 % -- -- -- -- -- -- -- -- --    12/10/24 1705 -- 116 33 116/69 87 97 % -- -- -- -- -- -- -- -- --    12/10/24 1700 -- -- -- -- -- -- -- -- -- -- -- -- -- -- 10 - Worst Possible Pain    12/10/24 1659 -- -- -- -- -- 97 % -- -- -- -- -- Face mask -- -- --    12/10/24 1630 98.6 °F (37 °C) 112 32 128/61 -- 98 % 60 -- -- -- BiPAP -- Sitting -- --    12/10/24 1559 -- 118 12 121/58 -- 97 % 60 -- -- -- BiPAP -- -- -- --    12/10/24 1545 -- 120 12 124/58 -- 96 % 60 -- -- -- BiPAP -- -- -- --    12/10/24 1530 -- 122 32 122/63 -- 96 % -- -- -- -- BiPAP -- -- -- --    12/10/24 1513 -- 126 32 111/58 -- 96 % -- -- -- -- BiPAP -- -- -- --    12/10/24 1454 -- 126 32 113/61 -- 94 % -- -- -- -- BiPAP -- -- -- --    12/10/24 1448 -- 124 32 137/63 -- 96 % -- -- -- -- BiPAP -- -- -- --    12/10/24 1439 -- 124 26 142/62 -- 93 % -- -- -- -- BiPAP -- -- -- 10 -  Worst Possible Pain    12/10/24 1420 -- 126 28 167/75 -- 94 % -- -- -- -- BiPAP -- -- -- --    12/10/24 1353 -- 120 28 181/76 -- 95 % -- -- -- -- BiPAP -- -- -- --    12/10/24 1349 -- -- -- -- -- 96 % -- -- -- -- -- Face mask -- -- --    12/10/24 1346 -- 123 -- -- -- 97 % -- -- -- -- BiPAP -- -- -- --    12/10/24 1330 -- 115 24 208/91 -- 90 % -- -- 10 L/min -- Simple mask -- -- -- --    12/10/24 1327 -- 112 24 -- -- 90 % -- -- 10 L/min -- Simple mask -- -- -- --    12/10/24 1303 98.5 °F (36.9 °C) 97 18 166/72 -- 93 % -- 36 -- 4 L/min Nasal cannula -- -- -- --    12/10/24 1249 -- 87 16 129/60 -- 90 % -- 36 -- 4 L/min Nasal cannula -- -- -- --    12/10/24 1245 -- -- -- -- -- 85 % -- -- -- -- None (Room air) -- -- -- --    12/10/24 1215 -- -- -- -- -- -- -- -- -- -- -- -- -- -- 10 - Worst Possible Pain    12/10/24 1200 -- 84 22 -- -- 96 % -- -- 4 L/min -- Simple mask -- -- -- --    12/10/24 1144 -- 85 -- 140/65 -- 96 % -- -- -- -- Aerosol mask -- -- -- 10 - Worst Possible Pain    12/10/24 1115 98 °F (36.7 °C) 96 22 154/66 -- 97 % -- -- 10 L/min -- Simple mask -- -- -- --    12/10/24 1023 -- 78 18 196/77 -- 94 % -- -- -- -- None (Room air) -- -- -- 10 - Worst Possible Pain    12/10/24 0811 -- -- -- -- -- -- -- -- -- -- -- -- -- -- 10 - Worst Possible Pain    12/10/24 07:01:39 97.4 °F (36.3 °C) 76 20 154/67 96 94 % -- -- -- -- -- -- -- -- --    12/10/24 0504 -- -- -- -- -- -- -- -- -- -- -- -- -- -- 8    12/10/24 02:25:14 98.3 °F (36.8 °C) 78 16 159/67 98 95 % -- -- -- -- -- -- -- -- --    12/10/24 0100 -- -- -- -- -- -- -- -- -- -- -- -- -- -- No Pain          Weight (last 2 days)       Date/Time Weight    12/12/24 1120 119 (263)            Pertinent Labs/Diagnostic Results:   Radiology:  XR abdomen 1 view kub   Final Interpretation by Arslan Parish MD (12/12 1002)      No acute abdominal findings.      The gastrojejunostomy tube projects over the mid abdomen.               Resident: Romy Blunt I, the  attending radiologist, have reviewed the images and agree with the final report above.      Workstation performed: [id number]         XR chest portable ICU   Final Interpretation by Andrea Anderson MD (12/11 0830)      Minimal increase in pulmonary edema and no significant change in the bilateral pleural effusions (right larger than left).            Workstation performed: [id number]         XR chest portable   Final Interpretation by Kalie Nova MD (12/10 1450)      Bilateral lung disease.            Workstation performed: [id number]         CT abdomen pelvis w contrast   Final Interpretation by Andrea Anderson MD (12/09 7729)      Since November 30, 2024:   1.  Persistent third spacing with slight increase in moderate volume ascites, and no significant change in moderate size right and small left pleural effusions with bibasilar atelectasis.   2.  New percutaneous enteric tube with tip in the distal duodenum.         Workstation performed: [id number]           Cardiology:  Echo complete w/ contrast if indicated   Final Result by Prakash Spring MD (12/12 1420)        Left Ventricle: Left ventricular cavity size is normal. Wall thickness    is mildly increased. There is mild concentric hypertrophy. The left    ventricular ejection fraction is 65% by visual estimation. Systolic    function is normal. Wall motion is normal. Diastolic function is mildly    abnormal, consistent with grade I (abnormal) relaxation.     Aortic Valve: The aortic valve is trileaflet. The leaflets are    moderately thickened. The leaflets are mildly calcified. There is mildly    reduced mobility. There is trace regurgitation. There is mild stenosis.    The aortic valve peak velocity is 2.18 m/s. The aortic valve mean gradient    is 11 mmHg. The dimensionless velocity index is 0.41. The aortic valve    area is 1.54 cm2.         ECG 12 lead   Final Result by Justin Trinidad MD (12/12 8435)   Normal sinus rhythm   Normal ECG   When  compared with ECG of 10-Dec-2024 13:09, (unconfirmed)   T wave inversion no longer evident in Lateral leads   Confirmed by Justin Trinidad ([id number]) on 12/12/2024 11:26:04 PM        GI:  EGD   Preliminary Result by Crissy Bowling DO (12/13 5541)   The esophagus appeared normal.   Mild erythematous mucosa in the antrum, prepyloric region and pylorus,    consistent with gastritis   The internal bolster of PEG was pushed away from the mucosa revealing an    area of ulceration with no surrounding purulence   Removed tube from the stomach. The tube was replaced.   The duodenal bulb, 1st part of the duodenum, 2nd part of the duodenum and    3rd part of the duodenum appeared normal.         RECOMMENDATION:   Return to the medical surgical floors   Okay to begin tube feeds via J port and medication via the G port   Okay to resume diet per primary team    GI will sign off, please reach out with question or concerns                     Dennise Garcia MD       EGD   Final Result by Dennise Garcia MD (12/13 3919)   The esophagus appeared normal.   Upon entry into the stomach, erythema was noted around the internal    bolster of the PEG tube.  The internal bolster was pushed away from the    mucosa revealing an area of ulceration with some surrounding purulence and    erythema.  2 g of cefazolin was administered intraprocedural.    Removed jejunal extension externally from the stomach and a new jejunal    extension was replaced using a rat-tooth forceps to extend the tubing as    far distal into the duodenum as we could.  The J port was successfully    flushed. External tubing 4 cm at the skin surface.    The duodenal bulb, 1st part of the duodenum, 2nd part of the duodenum and    3rd part of the duodenum appeared normal.      RECOMMENDATION:   Start 7-day course of Augmentin   Okay to use PEG-J tube for medications, water flushes, and tube feeds now   Continue tube care.   Make sure to use the correct ports. DO NOT USE ports for  "any other purpose    than as labeled:   \"Feed\" port is for tube feeds ONLY.   \"Rx\" port is for medications ONLY.   \"Suction\" port is for gastric venting/emptying.    Please make sure to flush ports with water before and after use to prevent    clogging.                      Dennise Garcia MD               Results from last 7 days   Lab Units 12/13/24  0612 12/11/24  0554 12/10/24  1410 12/10/24  0526 12/09/24  1447   WBC Thousand/uL 9.87 9.28  --  6.26 6.34   HEMOGLOBIN g/dL 8.3* 8.8*  --  8.3* 8.0*   I STAT HEMOGLOBIN g/dl  --   --  11.2*  --   --    HEMATOCRIT % 26.0* 27.8*  --  27.0* 24.7*   HEMATOCRIT, ISTAT %  --   --  33*  --   --    PLATELETS Thousands/uL 147* 127*  --  148* 136*   TOTAL NEUT ABS Thousands/µL 8.61*  --   --   --  5.11   BANDS PCT %  --  9*  --   --   --          Results from last 7 days   Lab Units 12/13/24  0612 12/12/24  0634 12/12/24  0406 12/11/24  1431 12/11/24  0554 12/11/24  0044 12/10/24  1410   SODIUM mmol/L 132* 130* 129* 130* 131* 129*  --    POTASSIUM mmol/L 4.8 5.3 5.5* 4.9 5.4* 5.7*  --    CHLORIDE mmol/L 102 100 98 100 100 100  --    CO2 mmol/L 27 24 24 24 23 22  --    CO2, I-STAT mmol/L  --   --   --   --   --   --  26   ANION GAP mmol/L 3* 6 7 6 8 7  --    BUN mg/dL 31* 27* 27* 25 21 19  --    CREATININE mg/dL 0.55* 0.59* 0.60 0.67 0.75 0.76  --    EGFR ml/min/1.73sq m 100 98 98 94 85 84  --    CALCIUM mg/dL 8.6 8.1* 8.2* 8.2* 7.9* 7.5*  --    CALCIUM, IONIZED, ISTAT mmol/L  --   --   --   --   --   --  1.27   MAGNESIUM mg/dL 2.1  --  2.2 2.2 2.2 3.7*  --    PHOSPHORUS mg/dL  --   --   --   --  4.2* 3.7  --      Results from last 7 days   Lab Units 12/09/24  1447   AST U/L 11*   ALT U/L 13   ALK PHOS U/L 66   TOTAL PROTEIN g/dL 4.5*   ALBUMIN g/dL 2.2*   TOTAL BILIRUBIN mg/dL 0.36     Results from last 7 days   Lab Units 12/13/24  1142 12/13/24  0604 12/12/24  2119 12/12/24  1800 12/12/24  1034 12/12/24  0615 12/11/24  2138 12/11/24  1711 12/11/24  1312 12/11/24  0619 " 12/10/24  2105 12/10/24  1749   POC GLUCOSE mg/dl 133 173* 203* 217* 222* 220* 248* 234* 228* 252* 255* 241*     Results from last 7 days   Lab Units 12/13/24  0612 12/12/24  0634 12/12/24  0406 12/11/24  1431 12/11/24  0554 12/11/24  0044 12/10/24  0526 12/09/24  1447   GLUCOSE RANDOM mg/dL 179* 227* 236* 251* 268* 273* 152* 187*     Results from last 7 days   Lab Units 12/10/24  1410   I STAT BASE EXC mmol/L -2   I STAT O2 SAT % 88*   ISTAT PH ART  7.323*   I STAT ART PCO2 mm HG 46.8*   I STAT ART PO2 mm HG 59.0*   I STAT ART HCO3 mmol/L 24.3     Results from last 7 days   Lab Units 12/13/24  0612   TSH 3RD GENERATON uIU/mL 11.159*     Results from last 7 days   Lab Units 12/09/24  1447   LIPASE u/L <6*       Results from last 7 days   Lab Units 12/12/24  0431   VANCOMYCIN RM ug/mL 9.2*       Network Utilization Review Department  ATTENTION: Please call with any questions or concerns to [phone] and carefully listen to the prompts so that you are directed to the right person. All voicemails are confidential.   For Discharge needs, contact Care Management DC Support Team at [phone] opt. [phone]  Send all requests for admission clinical reviews, approved or denied determinations and any other requests to dedicated fax number below belonging to the campus where the patient is receiving treatment. List of dedicated fax numbers for the Facilities:  FACILITY NAME UR FAX NUMBER   ADMISSION DENIALS (Administrative/Medical Necessity) [phone]   DISCHARGE SUPPORT TEAM (NETWORK) [phone]   PARENT CHILD HEALTH (Maternity/NICU/Pediatrics) [phone]   [facility] [phone]   [facility] [phone]   [facility] [phone]   [facility] [phone]   [facility] [phone]   [facility] [phone]   [facility] [phone]    [facility] [phone]   [facility] [phone]   [facility] [phone]   General acute hospital [phone]   [facility] [phone]

## 2024-12-13 NOTE — ANESTHESIA POSTPROCEDURE EVALUATION
Post-Op Assessment Note    CV Status:  Stable  Pain Score: 0    Pain management: adequate       Mental Status:  Alert and awake   Hydration Status:  Euvolemic   PONV Controlled:  Controlled   Airway Patency:  Patent     Post Op Vitals Reviewed: Yes    No anethesia notable event occurred.    Staff: Anesthesiologist, CRNA           Last Filed PACU Vitals:  Vitals Value Taken Time   Temp     Pulse     BP     Resp     SpO2         Modified [place]:  No data recorded

## 2024-12-13 NOTE — ANESTHESIA PREPROCEDURE EVALUATION
Procedure:  EGD  J-tube exchange  Relevant Problems   ANESTHESIA (within normal limits)      CARDIO   (+) Coronary artery disease involving native coronary artery   (+) Hypertension      ENDO   (+) Hypothyroidism   (+) Type 2 diabetes mellitus (HCC)      GI/HEPATIC  NPO   BMI 51.4   (+) SBO (small bowel obstruction) (HCC) (S/p exlap with resection)      GYN   (+) Endometrial cancer (HCC)      HEMATOLOGY   (+) Anemia      NEURO/PSYCH (within normal limits)      PULMONARY   (+) Acute respiratory failure with hypoxia (HCC) (No O2 currently, sats 94-95% on RA in preop)   (+) Mild intermittent asthma   (-) Sleep apnea (denies)   (-) URI (upper respiratory infection)      Allergies   Allergen Reactions   • Bee Pollen Anaphylaxis   • Azithromycin Hives   • Metformin Diarrhea   • Other Other (See Comments)     hayfever with inhaler   hayfever with inhaler   • Pollen Extract Other (See Comments)     hayfever with inhaler   • Sulfamethizole Hives   • Sulfamethoxazole-Trimethoprim Hives     Social History     Tobacco Use   • Smoking status: Never   • Smokeless tobacco: Never   Substance Use Topics   • Alcohol use: Not Currently   • Drug use: Never     Current Outpatient Medications   Medication Instructions   • Acetaminophen 325 mg, Oral, Every 6 hours PRN   • albuterol (PROVENTIL HFA,VENTOLIN HFA) 90 mcg/act inhaler 2 puffs, Inhalation, Every 6 hours PRN   • apixaban (ELIQUIS) 5 mg, Oral, 2 times daily   • aspirin (ECOTRIN LOW STRENGTH) 81 mg, Oral   • bisacodyl (DULCOLAX) 10 mg, Rectal, Daily   • carvedilol (COREG) 12.5 mg, Oral, 2 times daily with meals   • dapagliflozin (FARXIGA) 10 mg, Oral   • dicyclomine (BENTYL) 10 mg, Oral, 2 times daily PRN   • furosemide (LASIX) 20 mg tablet 1 tablet, Oral, Daily   • gabapentin (NEURONTIN) 100 mg, Oral   • Insulin Lispro (HUMALOG PEN SC) Subcutaneous, 3 times daily before meals, Sliding scale.   • Klor-Con M20 20 MEQ tablet 1 tablet, Oral, Daily   • levothyroxine 125 mcg, Oral,  Daily   • nitroglycerin (NITROSTAT) 0.4 mg, Sublingual   • ondansetron (ZOFRAN) 4 mg, Oral, Every 8 hours PRN   • oxyCODONE (ROXICODONE) 5 mg, Per G Tube, Every 4 hours PRN   • pantoprazole (PROTONIX) 40 mg, Oral, Daily (early morning)   • vitamin B-12 (VITAMIN B-12) 1,000 mcg, Oral, Daily     Lab Results   Component Value Date    WBC 9.87 12/13/2024    HGB 8.3 (L) 12/13/2024    HCT 26.0 (L) 12/13/2024     (L) 12/13/2024    SODIUM 132 (L) 12/13/2024    K 4.8 12/13/2024     12/13/2024    CO2 27 12/13/2024    BUN 31 (H) 12/13/2024    CREATININE 0.55 (L) 12/13/2024    GLUC 179 (H) 12/13/2024    HGBA1C 6.9 (H) 11/05/2024    AST 11 (L) 12/09/2024    ALT 13 12/09/2024    ALKPHOS 66 12/09/2024    TBILI 0.36 12/09/2024    ALB 2.2 (L) 12/09/2024    PROTIME 18.1 (H) 11/23/2024    PTT 49 (H) 11/27/2024    INR 1.49 (H) 11/23/2024     Vitals:    12/13/24 1259   BP: (!) 185/81   Pulse: 64   Resp: 20   Temp: (!) 96.9 °F (36.1 °C)   SpO2: 95%     Echo 12/12/24  •  Left Ventricle: Left ventricular cavity size is normal. Wall thickness is mildly increased. There is mild concentric hypertrophy. The left ventricular ejection fraction is 65% by visual estimation. Systolic function is normal. Wall motion is normal. Diastolic function is mildly abnormal, consistent with grade I (abnormal) relaxation.  •  Aortic Valve: The aortic valve is trileaflet. The leaflets are moderately thickened. The leaflets are mildly calcified. There is mildly reduced mobility. There is trace regurgitation. There is mild stenosis. The aortic valve peak velocity is 2.18 m/s. The aortic valve mean gradient is 11 mmHg. The dimensionless velocity index is 0.41. The aortic valve area is 1.54 cm2.    EKG 12/12/24  Normal sinus rhythm  Normal ECG  When compared with ECG of 10-Dec-2024 13:09, (unconfirmed)  T wave inversion no longer evident in Lateral leads  Confirmed by Justin Trinidad ([id number]) on 12/12/2024 11:26:04 PM    Cardiology 12/13/24  Encounter for  gastrojejunal (GJ) tube placement issues  -Status post PEG-J tube placement 12/3/2024  -Admission for recurrent SBO.  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  Nonsustained VT likely due to electrolyte derangements with hyponatremia,  Hypertension  Uncontrolled  Mild LVH on echo  Carvedilol increased to 25 mg daily yesterday  Amlodipine introduced, will have a delayed effect over 48 hours     Type 2 diabetes mellitus ([facility])        Lab Results   Component Value Date     HGBA1C 6.9 (H) 11/05/2024      -Controlled  -Managed by primary team  Endometrial cancer ([facility])  -Follows at [facility]   -Recurrent endometrial cancer      History of pulmonary embolism  -Anticoagulated with Eliquis 5 mg twice daily  Hypothyroidism  -Levothyroxine 25 mcg daily  Gastroparesis  -PEG-J-tube placement  Morbid obesity (HCC)  -BMI 51.45 kg/m2  Acute respiratory failure with hypoxia ([facility])  -Acute respiratory failure following EGD with J-tube exchange on 12/10/2024 with sats noted to be in the 80s on 10 L nasal cannula.  -Treated with nebulizers, Lasix and BiPAP  Hypomagnesemia  Magnesium stable  Coronary artery disease involving native coronary artery  History of anterior MI 2016 with drug-eluting stent to the LAD  Follow-up echo 2018 was normal, follow-up stress Myoview 2021 was normal, both at [facility]  Obtained echo yesterday 12/12/2024-EF normal, no regional wall motion abnormality  Shingles  On contact precautions  Hyponatremia  Improving  Anemia  Hemoglobin 8.3 and relatively stable  Plan:  Avoiding ACE inhibitor/ARB in the  context of current high normal to mildly hyperkalemic labs  Continue higher dose carvedilol  Amlodipine affect likely to take 48 hours  Hyponatremia complicates use of a thiazide diuretic as well.  May need to use hydralazine as the next option    Physical Exam    Airway    Mallampati score: III  TM Distance: >3 FB  Neck ROM: full     Dental   Comment: Poor dentition, many missing/cracked, No notable dental hx     Cardiovascular  Rhythm: regular, Rate: normal    Pulmonary   Breath sounds clear to auscultation, Decreased breath sounds    Other Findings  post-pubertal.      Anesthesia Plan  ASA Score- 4     Anesthesia Type- IV sedation with anesthesia with ASA Monitors.         Additional Monitors:     Airway Plan:     Comment: O2 mask, natural airway, EtCO2 monitor. Risks discussed including awareness, aspiration, drug reactions and conversion to GA..       Plan Factors-Exercise tolerance (METS): <4 METS.    Chart reviewed. EKG reviewed.  Existing labs reviewed. Patient summary reviewed.                  Induction- intravenous.    Postoperative Plan-     Perioperative Resuscitation Plan - Level 1 - Full Code.       Informed Consent- Anesthetic plan and risks discussed with patient.  I personally reviewed this patient with the CRNA. Discussed and agreed on the Anesthesia Plan with the CRNA..

## 2024-12-13 NOTE — PROGRESS NOTES
General Cardiology Progress Note   Lety Botello 64 y.o. female MRN: [id number]  Unit/Bed#: [facility] 511-01 Encounter: [id number]      Assessment & Plan  Encounter for gastrojejunal (GJ) tube placement issues  -Status post PEG-J tube placement 12/3/2024  -Admission for recurrent SBO.  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  Nonsustained VT likely due to electrolyte derangements with hyponatremia,  Hypertension  Uncontrolled  Mild LVH on echo  Carvedilol increased to 25 mg daily yesterday  Amlodipine introduced, will have a delayed effect over 48 hours    Type 2 diabetes mellitus ([facility])  Lab Results   Component Value Date    HGBA1C 6.9 (H) 11/05/2024     -Controlled  -Managed by primary team  Endometrial cancer ([facility])  -Follows at [facility]   -Recurrent endometrial cancer     History of pulmonary embolism  -Anticoagulated with Eliquis 5 mg twice daily  Hypothyroidism  -Levothyroxine 25 mcg daily  Gastroparesis  -PEG-J-tube placement  Morbid obesity (HCC)  -BMI 51.45 kg/m2  Acute respiratory failure with hypoxia ([facility])  -Acute respiratory failure following EGD with J-tube exchange on 12/10/2024 with sats noted to be in the 80s on 10 L nasal cannula.  -Treated with nebulizers, Lasix and BiPAP  Hypomagnesemia  Magnesium stable  Coronary artery disease involving native coronary artery  History of anterior MI 2016 with drug-eluting stent to the LAD  Follow-up echo 2018 was normal, follow-up stress Myoview 2021 was normal, both at [facility]  Obtained echo yesterday 12/12/2024-EF normal, no regional wall motion  abnormality  Shingles  On contact precautions  Hyponatremia  Improving  Anemia  Hemoglobin 8.3 and relatively stable       Plan:    Avoiding ACE inhibitor/ARB in the context of current high normal to mildly hyperkalemic labs  Continue higher dose carvedilol  Amlodipine affect likely to take 48 hours  Hyponatremia complicates use of a thiazide diuretic as well.  May need to use hydralazine as the next option    Subjective:   Patient seen and examined.      She has no cardiovascular complaints for me today    Review of Systems   Constitutional: Positive for malaise/fatigue. Negative for diaphoresis, fever and night sweats.   Cardiovascular:  Negative for chest pain, dyspnea on exertion, leg swelling, orthopnea, palpitations and syncope.   Respiratory:  Negative for cough, shortness of breath, sputum production and wheezing.    Skin:  Negative for itching and rash.   Gastrointestinal:  Positive for abdominal pain. Negative for change in bowel habit and diarrhea.   Neurological:  Positive for weakness. Negative for dizziness, focal weakness and light-headedness.       Objective   Vitals: Blood pressure 152/63, pulse 64, temperature (!) 97.3 °F (36.3 °C), resp. rate 17, height 5' (1.524 m), weight 119 kg (263 lb), SpO2 91%., Body mass index is 51.36 kg/m²., I/O last 3 completed shifts:  In: -   Out: 700 [Urine:700]  No intake/output data recorded.  Wt Readings from Last 3 Encounters:   12/12/24 119 kg (263 lb)   12/05/24 123 kg (270 lb 8.1 oz)   11/05/24 107 kg (236 lb 12.4 oz)       Intake/Output Summary (Last 24 hours) at 12/13/2024 0915  Last data filed at 12/13/2024 0401  Gross per 24 hour   Intake --   Output 300 ml   Net -300 ml     I/O last 3 completed shifts:  In: -   Out: 700 [Urine:700]    Telemetry fully interrogated-no further ventricular arrhythmias    Physical Exam  Constitutional:       General: She is not in acute distress.     Appearance: She is obese. She is not diaphoretic.   HENT:      Head:  Normocephalic.   Eyes:      Conjunctiva/sclera: Conjunctivae normal.   Neck:      Vascular: No JVD.   Cardiovascular:      Rate and Rhythm: Normal rate and regular rhythm.      Heart sounds: Normal heart sounds. No murmur heard.     No gallop.   Pulmonary:      Effort: Pulmonary effort is normal. No respiratory distress.      Breath sounds: Normal breath sounds. No wheezing or rales.   Musculoskeletal:         General: Normal range of motion.      Cervical back: Normal range of motion and neck supple.      Right lower leg: No edema.      Left lower leg: No edema.   Skin:     General: Skin is warm and dry.   Neurological:      Mental Status: She is alert and oriented to person, place, and time.         Meds/Allergies   Allergies   Allergen Reactions    Bee Pollen Anaphylaxis    Azithromycin Hives    Metformin Diarrhea    Other Other (See Comments)     hayfever with inhaler   hayfever with inhaler    Pollen Extract Other (See Comments)     hayfever with inhaler    Sulfamethizole Hives    Sulfamethoxazole-Trimethoprim Hives       Current Facility-Administered Medications:     acetaminophen (TYLENOL) tablet 650 mg, 650 mg, Oral, Q6H PRN, Kelvin Sheppard DO    albuterol (PROVENTIL HFA,VENTOLIN HFA) inhaler 2 puff, 2 puff, Inhalation, Q6H PRN, Kelvin Sheppard DO    aluminum-magnesium hydroxide-simethicone (MAALOX) oral suspension 30 mL, 30 mL, Oral, Q6H PRN, Kelvin Sheppard DO    amLODIPine (NORVASC) tablet 5 mg, 5 mg, Oral, Daily, JASON Syed    apixaban (ELIQUIS) tablet 5 mg, 5 mg, Oral, BID, Kelvin Sheppard DO, 5 mg at 12/09/24 2018    aspirin (ECOTRIN LOW STRENGTH) EC tablet 81 mg, 81 mg, Oral, Daily, Kelvin Sheppard DO    bisacodyl (DULCOLAX) rectal suppository 10 mg, 10 mg, Rectal, Daily, Kelvin Sheppard DO    carvedilol (COREG) tablet 25 mg, 25 mg, Oral, BID With Meals, JASON Syed    cefTRIAXone (ROCEPHIN) 2,000 mg in dextrose 5 % 50 mL IVPB, 2,000 mg, Intravenous, Q24H, Kelvin Sheppard DO, Last Rate: 100  mL/hr at 12/13/24 0613, 2,000 mg at 12/13/24 0613    cyanocobalamin (VITAMIN B-12) tablet 1,000 mcg, 1,000 mcg, Oral, Daily, Kelvin Sheppard DO    dicyclomine (BENTYL) capsule 10 mg, 10 mg, Oral, BID PRN, Kelvin Sheppard DO    [Held by provider] furosemide (LASIX) tablet 20 mg, 20 mg, Oral, Daily, Lee Soriano MD, 20 mg at 12/09/24 2018    gabapentin (NEURONTIN) capsule 100 mg, 100 mg, Oral, Daily, Kelvin Sheppard DO    HYDROmorphone (DILAUDID) injection 1 mg, 1 mg, Intravenous, Q3H PRN, JASON Hogue, 1 mg at 12/13/24 0751    insulin glargine (LANTUS) subcutaneous injection 10 Units 0.1 mL, 10 Units, Subcutaneous, HS, Kelvin Sheppard, , 10 Units at 12/12/24 2139    insulin lispro (HumALOG/ADMELOG) 100 units/mL subcutaneous injection 1-5 Units, 1-5 Units, Subcutaneous, TID AC, 1 Units at 12/13/24 0645 **AND** Fingerstick Glucose (POCT), , , TID AC, Kelvin Sheppard DO    levothyroxine tablet 125 mcg, 125 mcg, Oral, Daily, Kelvin Sheppard DO    multi-electrolyte (PLASMALYTE-A/ISOLYTE-S PH 7.4) IV solution, 75 mL/hr, Intravenous, Continuous, Kelvin Sheppard DO    nitroglycerin (NITROSTAT) SL tablet 0.4 mg, 0.4 mg, Sublingual, Q5 Min PRN, Kelvin Sheppard DO    ondansetron (ZOFRAN) injection 4 mg, 4 mg, Intravenous, Q6H PRN, Kelvin Sheppard DO    oxyCODONE (ROXICODONE) IR tablet 5 mg, 5 mg, Oral, Q4H PRN, Kelvin Sheppard, , 5 mg at 12/10/24 2157    oxyCODONE (ROXICODONE) split tablet 2.5 mg, 2.5 mg, Oral, Q6H PRN, Kelvin Sheppard DO    pantoprazole (PROTONIX) EC tablet 40 mg, 40 mg, Oral, Early Morning, Kelvin Sheppard DO, 40 mg at 12/10/24 0504    polyethylene glycol (MIRALAX) packet 17 g, 17 g, Oral, Daily, Kelvin Sheppard DO    vancomycin (VANCOCIN) 2,000 mg in sodium chloride 0.9 % 500 mL IVPB, 2,000 mg, Intravenous, Q24H, Kelvin Sheppard DO, Last Rate: 250 mL/hr at 12/12/24 1415, 2,000 mg at 12/12/24 1415    Laboratory Results:        CBC with diff:   Results from last 7 days   Lab Units 12/13/24  0612 12/11/24  0552  12/10/24  1410 12/10/24  0526 12/09/24  1447   WBC Thousand/uL 9.87 9.28  --  6.26 6.34   HEMOGLOBIN g/dL 8.3* 8.8*  --  8.3* 8.0*   I STAT HEMOGLOBIN g/dl  --   --  11.2*  --   --    HEMATOCRIT % 26.0* 27.8*  --  27.0* 24.7*   HEMATOCRIT, ISTAT %  --   --  33*  --   --    MCV fL 97 99*  --  101* 97   PLATELETS Thousands/uL 147* 127*  --  148* 136*   RBC Million/uL 2.68* 2.82*  --  2.68* 2.55*   MCH pg 31.0 31.2  --  31.0 31.4   MCHC g/dL 31.9 31.7  --  30.7* 32.4   RDW % 16.7* 16.8*  --  17.2* 16.9*   MPV fL 10.1 11.0  --  10.6 9.9   NRBC AUTO /100 WBCs 0  --   --   --  0       CMP:  Results from last 7 days   Lab Units 12/13/24  0612 12/12/24  0634 12/12/24  0406 12/11/24  1431 12/11/24  0554 12/11/24  0044 12/10/24  1410 12/10/24  0526 12/09/24  1447   SODIUM mmol/L 132* 130* 129* 130* 131* 129*  --  130* 129*   POTASSIUM mmol/L 4.8 5.3 5.5* 4.9 5.4* 5.7*  --  5.0 4.8   CHLORIDE mmol/L 102 100 98 100 100 100  --  100 100   CO2 mmol/L 27 24 24 24 23 22  --  26 26   CO2, I-STAT mmol/L  --   --   --   --   --   --  26  --   --    BUN mg/dL 31* 27* 27* 25 21 19  --  11 14   CREATININE mg/dL 0.55* 0.59* 0.60 0.67 0.75 0.76  --  0.40* 0.39*   GLUCOSE, ISTAT mg/dl  --   --   --   --   --   --  228*  --   --    CALCIUM mg/dL 8.6 8.1* 8.2* 8.2* 7.9* 7.5*  --  7.6* 7.5*   AST U/L  --   --   --   --   --   --   --   --  11*   ALT U/L  --   --   --   --   --   --   --   --  13   ALK PHOS U/L  --   --   --   --   --   --   --   --  66   EGFR ml/min/1.73sq m 100 98 98 94 85 84  --  111 112       BMP:  Results from last 7 days   Lab Units 12/13/24  0612 12/12/24  0634 12/12/24  0406 12/11/24  1431 12/11/24  0554 12/11/24  0044 12/10/24  1410 12/10/24  0526   SODIUM mmol/L 132* 130* 129* 130* 131* 129*  --  130*   POTASSIUM mmol/L 4.8 5.3 5.5* 4.9 5.4* 5.7*  --  5.0   CHLORIDE mmol/L 102 100 98 100 100 100  --  100   CO2 mmol/L 27 24 24 24 23 22  --  26   CO2, I-STAT mmol/L  --   --   --   --   --   --  26  --    BUN mg/dL 31*  "27* 27* 25 21 19  --  11   CREATININE mg/dL 0.55* 0.59* 0.60 0.67 0.75 0.76  --  0.40*   GLUCOSE, ISTAT mg/dl  --   --   --   --   --   --  228*  --    CALCIUM mg/dL 8.6 8.1* 8.2* 8.2* 7.9* 7.5*  --  7.6*       NT-proBNP: No results for input(s): \"NTBNP\" in the last 72 hours.     Magnesium:   Results from last 7 days   Lab Units 12/13/24  0612 12/12/24  0406 12/11/24  1431 12/11/24  0554 12/11/24  0044 12/10/24  0526   MAGNESIUM mg/dL 2.1 2.2 2.2 2.2 3.7* 1.5*       Coags:       TSH:    Results from last 7 days   Lab Units 12/13/24  0612   TSH 3RD GENERATON uIU/mL 11.159*       Hemoglobin A1C )      Lipid Profile:   No results found for: \"CHOL\"  Lab Results   Component Value Date    HDL 12 (L) 11/27/2024     Lab Results   Component Value Date    LDLCALC 18 11/27/2024     No results found for: \"LDLDIRECT\"  Lab Results   Component Value Date    TRIG 201 (H) 12/03/2024    TRIG 177 (H) 11/27/2024    TRIG 178 (H) 11/18/2024       Cardiac testing:   EKG personally reviewed by Justin Trinidad MD.     Cath:  ECHO:  Stress TEST:  Other:        Justin Trinidad MD    Portions of the record may have been created with voice recognition software.  Occasional wrong word or \"sound a like\" substitutions may have occurred due to the inherent limitations of voice recognition software.  Read the chart carefully and recognize, using context, where substitutions have occurred.        "

## 2024-12-13 NOTE — SPEECH THERAPY NOTE
Patient NPO for EGD. Will f/u as able. Unsure if patient will immediately have PO since she has not had PO since admission.

## 2024-12-13 NOTE — ASSESSMENT & PLAN NOTE
Uncontrolled  Mild LVH on echo  Carvedilol increased to 25 mg daily yesterday  Amlodipine introduced, will have a delayed effect over 48 hours

## 2024-12-13 NOTE — ASSESSMENT & PLAN NOTE
-25 beat run of nonsustained VT noted on telemetry, no further ectopy seen on telemetry  Asymptomatic  Echo personally reviewed, EF normal 65% with mild LVH and without regional wall motion abnormalities.  Nonsustained VT likely due to electrolyte derangements with hyponatremia,

## 2024-12-13 NOTE — QUICK NOTE
Received call from nursing to clarify medication administration while pt npo.    Upon record review, pt noted to be prescribed Elliquis 5mg bid for hx DVT/PE, however given NPO status, has not received any doses since 12/9.    Will administer therapeutic lovenox x1 this evening until decision can be made in the AM whether to resume eliquis orally vs heparin infusion while npo.

## 2024-12-13 NOTE — PROGRESS NOTES
"Progress Note - Hospitalist   Name: Lety Botello 64 y.o. 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  Persistent third spacing with slight increase in moderate volume ascites, and no significant change in moderate size right and small left pleural effusions with bibasilar atelectasis. 2.  New percutaneous enteric tube with tip in the distal duodenum.\"  GI following. S/p EGD 12/10 with J-tube exchange, postop patient had worsening hypoxia required transfer to ICU and using BiPAP  Concern for possible infection surrounding J-tube site patient was started on vancomycin and ceftriaxone day #3  Now with clogged J-tube, GI is planning for endoscopy exchange and repositioning today  NPO  Acute respiratory failure with hypoxia (HCC)  Patient had worsening hypoxia post EGD on 12/10. Was satting mid-80s on 10L  Was given nebs, Lasix and subsequently placed on BiPAP and upgraded to critical care for further treatment  Currently in stable condition and transferred out of ICU  Continue to titrate oxygen  NSVT (nonsustained ventricular tachycardia) (HCC)  25 run of nonsustained V. tach noted on telemetry, patient was asymptomatic  Cardiology consulted  Cardiac echo with normal EF and mild aortic stenosis  Coreg dose was increased to 25 mg twice daily  Monitor    Hypertension  Monitor blood pressures  Continue carvedilol  Avoid hypotension  Coronary artery disease involving native coronary artery  History of MI in 2016 status post KARY  Stable  Type 2 diabetes " mellitus (HCC)  Lab Results   Component Value Date    HGBA1C 6.9 (H) 11/05/2024       Recent Labs     12/12/24  1034 12/12/24  1800 12/12/24  2119 12/13/24  0604   POCGLU 222* 217* 203* 173*       Blood Sugar Average: Last 72 hrs:  (P) [id number]    Farxiga on hold while inpatient  Hyperglycemia secondary to steroids  Continue Lantus at bedtime  Continue SSI  Endometrial cancer (HCC)  History of endometrial cancer noted  History of pulmonary embolism  Continue Eliquis 5 mg twice daily  Hypothyroidism  Elevated TSH  Free T4 is pending  Patient was not receiving levothyroxine due to  PEG tube malfunctioning   Continue levothyroxine 125 mcg p.o. daily  Gastroparesis  History of   With PEG-J as above  GI following  Diet per Nutrition and GI recs post-procedurally   Morbid obesity (HCC)  Body mass index is 51.36 kg/m².  Encourage lifestyle modification and weight loss once acute issues improved/resolved  Hyponatremia  Hyponatremia likely multifactorial  S/p Lasix post-procedure as above  Anemia  Received IV iron x 2  Herpes zoster without complication  Located on the right lower abdomen, well crusted, no active lesion  Continue with Neurontin    VTE Pharmacologic Prophylaxis: VTE Score: 7 High Risk (Score >/= 5) - Pharmacological DVT Prophylaxis Ordered: apixaban (Eliquis). Sequential Compression Devices Ordered.    Mobility:   Basic Mobility Inpatient Raw Score: 8  -HLM Goal: 3: Sit at edge of bed  JH-HLM Achieved: 2: Bed activities/Dependent transfer  JH-HLM Goal NOT achieved. Continue with multidisciplinary rounding and encourage appropriate mobility to improve upon JH-HLM goals.    Patient Centered Rounds: I performed bedside rounds with nursing staff today.   Discussions with Specialists or Other Care Team Provider: CM    Education and Discussions with Family / Patient:  Patient.     Current Length of Stay: 4 day(s)  Current Patient Status: Inpatient   Certification Statement: The patient will continue  to require additional inpatient hospital stay due to PEG tube malfunctioning  Discharge Plan: Anticipate discharge in 48-72 hrs to rehab facility.    Code Status: Level 1 - Full Code    Subjective   Patient seen and examined  Comfortable in bed  No event overnight  N.p.o. for EGD today    Objective :  Temp:  [97.3 °F (36.3 °C)-97.9 °F (36.6 °C)] 97.3 °F (36.3 °C)  HR:  [61-66] 64  BP: (152-176)/(63-93) 152/63  Resp:  [17] 17  SpO2:  [88 %-97 %] 91 %  O2 Device: None (Room air)    Body mass index is 51.36 kg/m².     Input and Output Summary (last 24 hours):     Intake/Output Summary (Last 24 hours) at 12/13/2024 1131  Last data filed at 12/13/2024 1039  Gross per 24 hour   Intake --   Output 665 ml   Net -665 ml       Physical Exam  Patient is awake alert oriented in no acute distress  Obese, comfortable in bed  Lung clear to auscultation bilateral anteriorly  Heart positive S1-S2 no murmur  Abdomen soft obese tender, crusted herpes lesions on the right lower abdomen  PEG-J tube in place with surrounding erythema and drainage  Lateral lower extremity trace edema    Lines/Drains:  Lines/Drains/Airways       Active Status       Name Placement date Placement time Site Days    External Urinary Catheter 12/09/24  0000  -- 4                      Telemetry:  Telemetry Orders (From admission, onward)               24 Hour Telemetry Monitoring  Continuous x 24 Hours (Telem)        Question:  Reason for 24 Hour Telemetry  Answer:  Arrhythmias requiring acute medical intervention / PPM or ICD malfunction                     Telemetry Reviewed:  yes  Indication for Continued Telemetry Use: Arrthymias requiring medical therapy               Lab Results: I have reviewed the following results:   Results from last 7 days   Lab Units 12/13/24  0612 12/11/24  0554   WBC Thousand/uL 9.87 9.28   HEMOGLOBIN g/dL 8.3* 8.8*   HEMATOCRIT % 26.0* 27.8*   PLATELETS Thousands/uL 147* 127*   BANDS PCT %  --  9*   SEGS PCT % 88*  --    LYMPHO  PCT % 4* 1*   MONO PCT % 6 3*   EOS PCT % 0 1     Results from last 7 days   Lab Units 12/13/24  0612 12/10/24  0526 12/09/24  1447   SODIUM mmol/L 132*   < > 129*   POTASSIUM mmol/L 4.8   < > 4.8   CHLORIDE mmol/L 102   < > 100   CO2 mmol/L 27   < > 26   CO2, I-STAT   --    < >  --    BUN mg/dL 31*   < > 14   CREATININE mg/dL 0.55*   < > 0.39*   ANION GAP mmol/L 3*   < > 3*   CALCIUM mg/dL 8.6   < > 7.5*   ALBUMIN g/dL  --   --  2.2*   TOTAL BILIRUBIN mg/dL  --   --  0.36   ALK PHOS U/L  --   --  66   ALT U/L  --   --  13   AST U/L  --   --  11*   GLUCOSE RANDOM mg/dL 179*   < > 187*    < > = values in this interval not displayed.         Results from last 7 days   Lab Units 12/13/24  0604 12/12/24  2119 12/12/24  1800 12/12/24  1034 12/12/24  0615 12/11/24  2138 12/11/24  1711 12/11/24  1312 12/11/24  0619 12/10/24  2105 12/10/24  1749 12/10/24  0513   POC GLUCOSE mg/dl 173* 203* 217* 222* 220* 248* 234* 228* 252* 255* 241* 164*               Recent Cultures (last 7 days):         Imaging Results Review: No pertinent imaging studies reviewed.  Other Study Results Review: No additional pertinent studies reviewed.    Last 24 Hours Medication List:     Current Facility-Administered Medications:     acetaminophen (TYLENOL) tablet 650 mg, Q6H PRN    albuterol (PROVENTIL HFA,VENTOLIN HFA) inhaler 2 puff, Q6H PRN    aluminum-magnesium hydroxide-simethicone (MAALOX) oral suspension 30 mL, Q6H PRN    amLODIPine (NORVASC) tablet 5 mg, Daily    apixaban (ELIQUIS) tablet 5 mg, BID    aspirin (ECOTRIN LOW STRENGTH) EC tablet 81 mg, Daily    bisacodyl (DULCOLAX) rectal suppository 10 mg, Daily    carvedilol (COREG) tablet 25 mg, BID With Meals    cefTRIAXone (ROCEPHIN) 2,000 mg in dextrose 5 % 50 mL IVPB, Q24H, Last Rate: 2,000 mg (12/13/24 0613)    cyanocobalamin (VITAMIN B-12) tablet 1,000 mcg, Daily    dicyclomine (BENTYL) capsule 10 mg, BID PRN    [Held by provider] furosemide (LASIX) tablet 20 mg, Daily    gabapentin  (NEURONTIN) capsule 100 mg, Daily    HYDROmorphone (DILAUDID) injection 1 mg, Q3H PRN    insulin glargine (LANTUS) subcutaneous injection 10 Units 0.1 mL, HS    insulin lispro (HumALOG/ADMELOG) 100 units/mL subcutaneous injection 1-5 Units, TID AC **AND** Fingerstick Glucose (POCT), TID AC    levothyroxine tablet 125 mcg, Daily    multi-electrolyte (PLASMALYTE-A/ISOLYTE-S PH 7.4) IV solution, Continuous, Last Rate: 75 mL/hr (12/13/24 1029)    nitroglycerin (NITROSTAT) SL tablet 0.4 mg, Q5 Min PRN    ondansetron (ZOFRAN) injection 4 mg, Q6H PRN    oxyCODONE (ROXICODONE) IR tablet 5 mg, Q4H PRN    oxyCODONE (ROXICODONE) split tablet 2.5 mg, Q6H PRN    pantoprazole (PROTONIX) EC tablet 40 mg, Early Morning    polyethylene glycol (MIRALAX) packet 17 g, Daily    vancomycin (VANCOCIN) 2,000 mg in sodium chloride 0.9 % 500 mL IVPB, Q24H, Last Rate: 2,000 mg (12/13/24 0953)    Administrative Statements   Today, Patient Was Seen By: Kelvin Sheppard DO      **Please Note: This note may have been constructed using a voice recognition system.**

## 2024-12-13 NOTE — ASSESSMENT & PLAN NOTE
Patient had worsening hypoxia post EGD on 12/10. Was satting mid-80s on 10L  Was given nebs, Lasix and subsequently placed on BiPAP and upgraded to critical care for further treatment  Currently in stable condition and transferred out of ICU  Continue to titrate oxygen

## 2024-12-13 NOTE — ASSESSMENT & PLAN NOTE
Lab Results   Component Value Date    HGBA1C 6.9 (H) 11/05/2024     -Controlled  -Managed by primary team

## 2024-12-13 NOTE — ASSESSMENT & PLAN NOTE
25 run of nonsustained V. tach noted on telemetry, patient was asymptomatic  Cardiology consulted  Cardiac echo with normal EF and mild aortic stenosis  Coreg dose was increased to 25 mg twice daily  Monitor

## 2024-12-13 NOTE — ASSESSMENT & PLAN NOTE
History of anterior MI 2016 with drug-eluting stent to the LAD  Follow-up echo 2018 was normal, follow-up stress Myoview 2021 was normal, both at [facility]  Obtained echo yesterday 12/12/2024-EF normal, no regional wall motion abnormality

## 2024-12-13 NOTE — ASSESSMENT & PLAN NOTE
-Acute respiratory failure following EGD with J-tube exchange on 12/10/2024 with sats noted to be in the 80s on 10 L nasal cannula.  -Treated with nebulizers, Lasix and BiPAP
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
## 2024-12-13 NOTE — ASSESSMENT & PLAN NOTE
Lab Results   Component Value Date    HGBA1C 6.9 (H) 11/05/2024       Recent Labs     12/12/24  1034 12/12/24  1800 12/12/24  2119 12/13/24  0604   POCGLU 222* 217* 203* 173*       Blood Sugar Average: Last 72 hrs:  (P) [id number]    Farxiga on hold while inpatient  Hyperglycemia secondary to steroids  Continue Lantus at bedtime  Continue SSI

## 2024-12-13 NOTE — RESTORATIVE TECHNICIAN NOTE
Restorative Technician Note      Patient Name: Lety Botello     Restorative Tech Visit Date: 12/13/24  Note Type: Mobility  Patient Position Upon Consult: Supine  Mobility / Activity Provided: assisted RN with CHG bath  Activity Performed: Repositioned  Patient Position at End of Consult: Supine; All needs within reach

## 2024-12-13 NOTE — ASSESSMENT & PLAN NOTE
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  Persistent third spacing with slight increase in moderate volume ascites, and no significant change in moderate size right and small left pleural effusions with bibasilar atelectasis. 2.  New percutaneous enteric tube with tip in the distal duodenum.\"  GI following. S/p EGD 12/10 with J-tube exchange, postop patient had worsening hypoxia required transfer to ICU and using BiPAP  Concern for possible infection surrounding J-tube site patient was started on vancomycin and ceftriaxone day #3  Now with clogged J-tube, GI is planning for endoscopy exchange and repositioning today  NPO  "

## 2024-12-13 NOTE — RESPIRATORY THERAPY NOTE
RT Protocol Note  Lety Botello 64 y.o. female MRN: [id number]  Unit/Bed#: [facility] 511-01 Encounter: [id number]    Assessment    Principal Problem:    Encounter for gastrojejunal (GJ) tube placement issues  Active Problems:    Hypertension    Type 2 diabetes mellitus (HCC)    Endometrial cancer (HCC)    History of pulmonary embolism    Hypothyroidism    Gastroparesis    Morbid obesity (HCC)    Shingles    Hyponatremia    Anemia    Acute respiratory failure with hypoxia (HCC)    Hypomagnesemia    NSVT (nonsustained ventricular tachycardia) (HCC)    Coronary artery disease involving native coronary artery    Herpes zoster without complication      Home Pulmonary Medications:  Albuterol mdi       Past Medical History:   Diagnosis Date    Diverticulitis     Endometrial cancer (HCC)     History of shingles     Right hemiabdomen (inactive)    Hypertension     IBS (irritable bowel syndrome)     Obesity     Small bowel obstruction (HCC)     Type 2 diabetes mellitus (HCC)      Social History     Socioeconomic History    Marital status: Unknown     Spouse name: None    Number of children: None    Years of education: None    Highest education level: None   Occupational History    None   Tobacco Use    Smoking status: Never    Smokeless tobacco: Never   Substance and Sexual Activity    Alcohol use: Not Currently    Drug use: Never    Sexual activity: None   Other Topics Concern    None   Social History Narrative    None     Social Drivers of Health     Financial Resource Strain: Medium Risk (10/18/2024)    Received from [facility]    Overall Financial Resource Strain (CARDIA)     Difficulty of Paying Living Expenses: Somewhat hard   Food Insecurity: Food Insecurity Present (12/10/2024)    Nursing - Inadequate Food Risk Classification     Worried About Running Out of Food in the Last Year: Not on file     Ran Out of Food in the Last Year: Not on file     Ran Out of Food in the Last Year: 2   Transportation Needs:  No Transportation Needs (12/10/2024)    Nursing - Transportation Risk Classification     Lack of Transportation: Not on file     Lack of Transportation: 2   Recent Concern: Transportation Needs - Unmet Transportation Needs (10/18/2024)    Received from [facility]    PRAPARE - Transportation     Lack of Transportation (Medical): Yes     Lack of Transportation (Non-Medical): Yes   Physical Activity: Not on file   Stress: Not on file   Social Connections: Unknown (2024)    Received from [facility]    Social [place] Communication     How often do you feel lonely or isolated from those around you? (Adult - for ages 18 years and over): Not on file   Intimate Partner Violence: Unknown (12/10/2024)    Nursing IPS     Feels Physically and Emotionally Safe: Not on file     Physically Hurt by Someone: Not on file     Humiliated or Emotionally Abused by Someone: Not on file     Physically Hurt by Someone: 2     Hurt or Threatened by Someone: 2   Housing Stability: Unknown (12/10/2024)    Nursing: Inadequate Housing Risk Classification     Has Housing: Not on file     Worried About Losing Housing: Not on file     Unable to Get Utilities: Not on file     Unable to Pay for Housing in the Last Year: 2     Has Housin   Recent Concern: Housing Stability - High Risk (10/4/2024)    Received from [facility]    Housing Stability Vital Sign     Unable to Pay for Housing in the Last Year: Yes     Number of Times Moved in the Last Year: 0     Homeless in the Last Year: No       Subjective         Objective    Physical Exam:        Vitals:  Blood pressure 152/79, pulse 64, temperature 97.7 °F (36.5 °C), temperature source Oral, resp. rate 17, height 5' (1.524 m), weight 119 kg (263 lb), SpO2 93%.          Imaging and other studies: Results Review Statement: No pertinent imaging studies reviewed.    O2 Device: No distress noted at this time.     Plan    Respiratory Plan: Discontinue Protocol, Home  Bronchodilator Patient pathway        Resp Comments: pt was placed on protocol for bipap managment pt uses albuteorl mdi prn at home that has been ordred no other respriatory intervention is needed atth si time will d/c from protocol there is no BiPAP in the room order dc'd

## 2024-12-13 NOTE — PHYSICAL THERAPY NOTE
Physical Therapy Cancellation Note       12/13/24 1412   PT Last Visit   PT Visit Date 12/13/24   Note Type   Note Type Cancelled Session   Cancel Reasons Patient to operating room  (PT attempted treatment session. Patient off the floor for EGD and J tube exchange. PT will continue to follow as able and appropriate)         Cristina Gabriel PT, DPT

## 2024-12-14 NOTE — ASSESSMENT & PLAN NOTE
After increasing carvedilol to 25 mg twice daily, after adding amlodipine and allowing typical 48-hour period to assess effect, blood pressure now improving

## 2024-12-14 NOTE — PROGRESS NOTES
General Cardiology Progress Note   Lety Botello 64 y.o. female MRN: [id number]  Unit/Bed#: [facility] 511-01 Encounter: [id number]      Assessment & Plan  Encounter for gastrojejunal (GJ) tube placement issues  -Status post PEG-J tube placement 12/3/2024  -Admission for recurrent SBO.  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  Nonsustained VT likely due to electrolyte derangements in the context of hyponatremia, hyperkalemia earlier this week  There has been no recurrence  Hypertension  After increasing carvedilol to 25 mg twice daily, after adding amlodipine and allowing typical 48-hour period to assess effect, blood pressure now improving  Type 2 diabetes mellitus ([facility])  Lab Results   Component Value Date    HGBA1C 6.9 (H) 11/05/2024     Controlled, per the primary team  Endometrial cancer ([facility])  -Follows at [facility]   -Recurrent endometrial cancer     History of pulmonary embolism  -Anticoagulated with Eliquis 5 mg twice daily  Hypothyroidism  -Levothyroxine 25 mcg daily  Gastroparesis  -PEG-J-tube placement  Morbid obesity (HCC)  -BMI 51.45 kg/m2  Acute respiratory failure with hypoxia ([facility])  -Acute respiratory failure following EGD with J-tube exchange on 12/10/2024 with sats noted to be in the 80s on 10 L nasal cannula.  Resolved  Hypomagnesemia  Magnesium has been stable  Coronary artery disease involving native coronary artery  History of anterior MI 2016 with drug-eluting stent to the LAD  Follow-up echo 2018 was normal, follow-up stress Myoview 2021 was normal, both at [facility]  Obtained echo yesterday 12/12/2024-EF normal, no regional wall motion  abnormality  Shingles  On contact precautions  Hyponatremia  Continues to improve slowly  Anemia  Up to 8.9 from 8.3, improving  Herpes zoster without complication         Plan:    Avoid ACE inhibitor/ARB due to ongoing mild/borderline hyperkalemia  Avoid thiazide diuretics at this time considering ongoing hyponatremia although improving  Blood pressure improved, currently on carvedilol 25 mg twice daily and amlodipine 5 mg once daily, no further changes at this time.  We will sign off, please call with questions    Subjective:   Patient seen and examined.      No cardiac complaints for me, blood pressures have improved    Review of Systems   Constitutional: Positive for malaise/fatigue. Negative for diaphoresis, fever and night sweats.   Cardiovascular:  Negative for chest pain, dyspnea on exertion, leg swelling, orthopnea, palpitations and syncope.   Respiratory:  Negative for cough, shortness of breath, sputum production and wheezing.    Skin:  Negative for itching and rash.   Gastrointestinal:  Positive for abdominal pain. Negative for change in bowel habit and diarrhea.   Neurological:  Negative for dizziness, focal weakness, light-headedness and weakness.       Objective   Vitals: Blood pressure 147/63, pulse 74, temperature 98.5 °F (36.9 °C), temperature source Oral, resp. rate 17, height 5' (1.524 m), weight 119 kg (263 lb), SpO2 95%., Body mass index is 51.36 kg/m²., I/O last 3 completed shifts:  In: 1777.3 [I.V.:481.3; NG/GT:240; IV Piggyback:500; Feedings:556]  Out: 2045 [Urine:2045]  I/O this shift:  In: 528 [I.V.:30; NG/GT:120; IV Piggyback:50]  Out: 100 [Urine:100]  Wt Readings from Last 3 Encounters:   12/12/24 119 kg (263 lb)   12/05/24 123 kg (270 lb 8.1 oz)   11/05/24 107 kg (236 lb 12.4 oz)       Intake/Output Summary (Last 24 hours) at 12/14/2024 1018  Last data filed at 12/14/2024 0934  Gross per 24 hour   Intake 2305.25 ml   Output 1845 ml   Net 460.25 ml     I/O last 3 completed shifts:  In:  1777.3 [I.V.:481.3; NG/GT:240; IV Piggyback:500; Feedings:556]  Out: 2045 [Urine:2045]    Telemetry fully interrogated-no further ventricular arrhythmias    Physical Exam  Constitutional:       General: She is not in acute distress.     Appearance: She is not diaphoretic.   HENT:      Head: Normocephalic.   Eyes:      Conjunctiva/sclera: Conjunctivae normal.   Neck:      Vascular: No JVD.   Cardiovascular:      Rate and Rhythm: Normal rate and regular rhythm.      Heart sounds: Normal heart sounds. No murmur heard.     No gallop.   Pulmonary:      Effort: Pulmonary effort is normal. No respiratory distress.      Breath sounds: Normal breath sounds. No wheezing or rales.   Abdominal:      General: Bowel sounds are normal. There is no distension.      Palpations: Abdomen is soft.      Tenderness: There is no abdominal tenderness.   Musculoskeletal:         General: Normal range of motion.      Cervical back: Normal range of motion and neck supple.      Right lower leg: No edema.      Left lower leg: No edema.   Skin:     General: Skin is warm and dry.   Neurological:      Mental Status: She is alert and oriented to person, place, and time.         Meds/Allergies   Allergies   Allergen Reactions    Bee Pollen Anaphylaxis    Azithromycin Hives    Metformin Diarrhea    Other Other (See Comments)     hayfever with inhaler   hayfever with inhaler    Pollen Extract Other (See Comments)     hayfever with inhaler    Sulfamethizole Hives    Sulfamethoxazole-Trimethoprim Hives       Current Facility-Administered Medications:     acetaminophen (TYLENOL) tablet 650 mg, 650 mg, Per G Tube, Q6H PRN, Kelvin Sheppard DO    albuterol (PROVENTIL HFA,VENTOLIN HFA) inhaler 2 puff, 2 puff, Inhalation, Q6H PRN, Kelvin Sheppard DO    aluminum-magnesium hydroxide-simethicone (MAALOX) oral suspension 30 mL, 30 mL, Oral, Q6H PRN, Kelvin Sheppard DO    amLODIPine (NORVASC) tablet 5 mg, 5 mg, Per G Tube, Daily, Kelvin Sheppard DO, 5 mg at 12/14/24  0903    apixaban (ELIQUIS) tablet 5 mg, 5 mg, Per G Tube, BID, Kelvin Sheppard DO, 5 mg at 12/14/24 0903    aspirin (ECOTRIN LOW STRENGTH) EC tablet 81 mg, 81 mg, Oral, Daily, Kelvin Sheppard DO, 81 mg at 12/14/24 0903    bisacodyl (DULCOLAX) rectal suppository 10 mg, 10 mg, Rectal, Daily, Kelvin Sheppard DO    carvedilol (COREG) tablet 25 mg, 25 mg, Per G Tube, BID With Meals, Kelvin Sheppard DO, 25 mg at 12/14/24 0921    cefTRIAXone (ROCEPHIN) 2,000 mg in dextrose 5 % 50 mL IVPB, 2,000 mg, Intravenous, Q24H, Kelvin Sheppard DO, Stopped at 12/14/24 0934    cyanocobalamin (VITAMIN B-12) tablet 1,000 mcg, 1,000 mcg, Per G Tube, Daily, Kelvin Sheppard DO, 1,000 mcg at 12/14/24 0903    dicyclomine (BENTYL) capsule 10 mg, 10 mg, Per G Tube, BID PRN, Kelvin Sheppard DO    [Held by provider] furosemide (LASIX) tablet 20 mg, 20 mg, Oral, Daily, Lee Soriano MD, 20 mg at 12/09/24 2018    gabapentin (NEURONTIN) capsule 100 mg, 100 mg, Per G Tube, Daily, Kelvin Sheppard DO, 100 mg at 12/14/24 0903    HYDROmorphone (DILAUDID) injection 1 mg, 1 mg, Intravenous, Q3H PRN, JASON Hogue, 1 mg at 12/14/24 0622    insulin glargine (LANTUS) subcutaneous injection 10 Units 0.1 mL, 10 Units, Subcutaneous, HS, Kelvin Shpepard DO, 10 Units at 12/13/24 2330    insulin lispro (HumALOG/ADMELOG) 100 units/mL subcutaneous injection 1-5 Units, 1-5 Units, Subcutaneous, TID AC, 2 Units at 12/14/24 0633 **AND** Fingerstick Glucose (POCT), , , TID AC, Kelvin Sheppard DO    levothyroxine tablet 125 mcg, 125 mcg, Per G Tube, Daily, Kelvin Sheppard DO, 125 mcg at 12/14/24 0621    nitroglycerin (NITROSTAT) SL tablet 0.4 mg, 0.4 mg, Sublingual, Q5 Min PRN, Kelvin Sheppard DO    omeprazole (PRILOSEC) suspension 2 mg/mL, 40 mg, Per G Tube, Daily, Kelvin Sheppard DO, 40 mg at 12/14/24 0921    ondansetron (ZOFRAN) injection 4 mg, 4 mg, Intravenous, Q6H PRN, Kelvin Sheppard DO    oxyCODONE (ROXICODONE) oral solution 2.5 mg, 2.5 mg, Per G Tube, Q4H PRN, Sabiha Luna,  CRNP    oxyCODONE (ROXICODONE) oral solution 5 mg, 5 mg, Per G Tube, Q4H PRN, Sabiha Luna, CRNP, 5 mg at 12/14/24 0903    polyethylene glycol (MIRALAX) packet 17 g, 17 g, Per G Tube, Daily, Kelvin Sheppard DO    vancomycin (VANCOCIN) IVPB (premix in dextrose) 1,000 mg 200 mL, 1,000 mg, Intravenous, Q12H, Kelvin Sheppard DO, Last Rate: 200 mL/hr at 12/14/24 0921, 1,000 mg at 12/14/24 0921    Laboratory Results:        CBC with diff:   Results from last 7 days   Lab Units 12/14/24  0539 12/13/24  0612 12/11/24  0554 12/10/24  1410 12/10/24  0526 12/09/24  1447   WBC Thousand/uL 9.34 9.87 9.28  --  6.26 6.34   HEMOGLOBIN g/dL 8.9* 8.3* 8.8*  --  8.3* 8.0*   I STAT HEMOGLOBIN g/dl  --   --   --  11.2*  --   --    HEMATOCRIT % 27.5* 26.0* 27.8*  --  27.0* 24.7*   HEMATOCRIT, ISTAT %  --   --   --  33*  --   --    MCV fL 97 97 99*  --  101* 97   PLATELETS Thousands/uL 143* 147* 127*  --  148* 136*   RBC Million/uL 2.85* 2.68* 2.82*  --  2.68* 2.55*   MCH pg 31.2 31.0 31.2  --  31.0 31.4   MCHC g/dL 32.4 31.9 31.7  --  30.7* 32.4   RDW % 17.1* 16.7* 16.8*  --  17.2* 16.9*   MPV fL 9.9 10.1 11.0  --  10.6 9.9   NRBC AUTO /100 WBCs 0 0  --   --   --  0       CMP:  Results from last 7 days   Lab Units 12/14/24  0539 12/13/24  0612 12/12/24  0634 12/12/24  0406 12/11/24  1431 12/11/24  0554 12/11/24  0044 12/10/24  1410 12/10/24  0526 12/09/24  1447   SODIUM mmol/L 133* 132* 130* 129* 130* 131* 129*  --    < > 129*   POTASSIUM mmol/L 5.1 4.8 5.3 5.5* 4.9 5.4* 5.7*  --    < > 4.8   CHLORIDE mmol/L 102 102 100 98 100 100 100  --    < > 100   CO2 mmol/L 26 27 24 24 24 23 22  --    < > 26   CO2, I-STAT mmol/L  --   --   --   --   --   --   --  26  --   --    BUN mg/dL 29* 31* 27* 27* 25 21 19  --    < > 14   CREATININE mg/dL 0.50* 0.55* 0.59* 0.60 0.67 0.75 0.76  --    < > 0.39*   GLUCOSE, ISTAT mg/dl  --   --   --   --   --   --   --  228*  --   --    CALCIUM mg/dL 7.5* 8.6 8.1* 8.2* 8.2* 7.9* 7.5*  --    < > 7.5*   AST U/L 12*   "--   --   --   --   --   --   --   --  11*   ALT U/L 8  --   --   --   --   --   --   --   --  13   ALK PHOS U/L 66  --   --   --   --   --   --   --   --  66   EGFR ml/min/1.73sq m 104 100 98 98 94 85 84  --    < > 112    < > = values in this interval not displayed.       BMP:  Results from last 7 days   Lab Units 12/14/24  0539 12/13/24  0612 12/12/24  0634 12/12/24  0406 12/11/24  1431 12/11/24  0554 12/11/24  0044 12/10/24  1410   SODIUM mmol/L 133* 132* 130* 129* 130* 131* 129*  --    POTASSIUM mmol/L 5.1 4.8 5.3 5.5* 4.9 5.4* 5.7*  --    CHLORIDE mmol/L 102 102 100 98 100 100 100  --    CO2 mmol/L 26 27 24 24 24 23 22  --    CO2, I-STAT mmol/L  --   --   --   --   --   --   --  26   BUN mg/dL 29* 31* 27* 27* 25 21 19  --    CREATININE mg/dL 0.50* 0.55* 0.59* 0.60 0.67 0.75 0.76  --    GLUCOSE, ISTAT mg/dl  --   --   --   --   --   --   --  228*   CALCIUM mg/dL 7.5* 8.6 8.1* 8.2* 8.2* 7.9* 7.5*  --        NT-proBNP: No results for input(s): \"NTBNP\" in the last 72 hours.     Magnesium:   Results from last 7 days   Lab Units 12/14/24  0539 12/13/24  0612 12/12/24  0406 12/11/24  1431 12/11/24  0554 12/11/24  0044 12/10/24  0526   MAGNESIUM mg/dL 1.9 2.1 2.2 2.2 2.2 3.7* 1.5*       Coags:       TSH:    Results from last 7 days   Lab Units 12/13/24 0612   TSH 3RD GENERATON uIU/mL 11.159*   FREE T4 ng/dL 0.65       Hemoglobin A1C )      Lipid Profile:   No results found for: \"CHOL\"  Lab Results   Component Value Date    HDL 12 (L) 11/27/2024     Lab Results   Component Value Date    LDLCALC 18 11/27/2024     No results found for: \"LDLDIRECT\"  Lab Results   Component Value Date    TRIG 201 (H) 12/03/2024    TRIG 177 (H) 11/27/2024    TRIG 178 (H) 11/18/2024       Cardiac testing:   EKG personally reviewed by Justin Trinidad MD.     Cath:  ECHO:  Stress TEST:  Other:        Justin Trinidad MD    Portions of the record may have been created with voice recognition software.  Occasional wrong word or \"sound a like\" " substitutions may have occurred due to the inherent limitations of voice recognition software.  Read the chart carefully and recognize, using context, where substitutions have occurred.

## 2024-12-14 NOTE — ASSESSMENT & PLAN NOTE
Lab Results   Component Value Date    HGBA1C 6.9 (H) 11/05/2024       Recent Labs     12/13/24  1142 12/13/24  1655 12/13/24 2128 12/14/24  0611   POCGLU 133 122 144* 234*       Blood Sugar Average: Last 72 hrs:  (P) [id number]    Farxiga on hold while inpatient  Hyperglycemia secondary to steroids  Continue Lantus at bedtime  Continue SSI

## 2024-12-14 NOTE — PLAN OF CARE
Problem: Prexisting or High Potential for Compromised Skin Integrity  Goal: Skin integrity is maintained or improved  Description: INTERVENTIONS:  - Identify patients at risk for skin breakdown  - Assess and monitor skin integrity  - Assess and monitor nutrition and hydration status  - Monitor labs   - Assess for incontinence   - Turn and reposition patient  - Assist with mobility/ambulation  - Relieve pressure over bony prominences  - Avoid friction and shearing  - Provide appropriate hygiene as needed including keeping skin clean and dry  - Evaluate need for skin moisturizer/barrier cream  - Collaborate with interdisciplinary team   - Patient/family teaching  - Consider wound care consult   Outcome: Progressing     Problem: PAIN - ADULT  Goal: Verbalizes/displays adequate comfort level or baseline comfort level  Description: Interventions:  - Encourage patient to monitor pain and request assistance  - Assess pain using appropriate pain scale  - Administer analgesics based on type and severity of pain and evaluate response  - Implement non-pharmacological measures as appropriate and evaluate response  - Consider cultural and social influences on pain and pain management  - Notify physician/advanced practitioner if interventions unsuccessful or patient reports new pain  Outcome: Progressing     Problem: INFECTION - ADULT  Goal: Absence or prevention of progression during hospitalization  Description: INTERVENTIONS:  - Assess and monitor for signs and symptoms of infection  - Monitor lab/diagnostic results  - Monitor all insertion sites, i.e. indwelling lines, tubes, and drains  - Monitor endotracheal if appropriate and nasal secretions for changes in amount and color  - [place] appropriate cooling/warming therapies per order  - Administer medications as ordered  - Instruct and encourage patient and family to use good hand hygiene technique  - Identify and instruct in appropriate isolation precautions for  identified infection/condition  Outcome: Progressing     Problem: SAFETY ADULT  Goal: Patient will remain free of falls  Description: INTERVENTIONS:  - Educate patient/family on patient safety including physical limitations  - Instruct patient to call for assistance with activity   - Consult OT/PT to assist with strengthening/mobility   - Keep Call bell within reach  - Keep bed low and locked with side rails adjusted as appropriate  - Keep care items and personal belongings within reach  - Initiate and maintain comfort rounds  - Make Fall Risk Sign visible to staff  - Offer Toileting every 2 Hours, in advance of need  - Initiate/Maintain bed/chair alarm  - Obtain necessary fall risk management equipment: nonskid footwear  - Apply yellow socks and bracelet for high fall risk patients  - Consider moving patient to room near nurses station  Outcome: Progressing  Goal: Maintain or return to baseline ADL function  Description: INTERVENTIONS:  -  Assess patient's ability to carry out ADLs; assess patient's baseline for ADL function and identify physical deficits which impact ability to perform ADLs (bathing, care of mouth/teeth, toileting, grooming, dressing, etc.)  - Assess/evaluate cause of self-care deficits   - Assess range of motion  - Assess patient's mobility; develop plan if impaired  - Assess patient's need for assistive devices and provide as appropriate  - Encourage maximum independence but intervene and supervise when necessary  - Involve family in performance of ADLs  - Assess for home care needs following discharge   - Consider OT consult to assist with ADL evaluation and planning for discharge  - Provide patient education as appropriate  Outcome: Progressing  Goal: Maintains/Returns to pre admission functional level  Description: INTERVENTIONS:  - Perform AM-PAC 6 Click Basic Mobility/ Daily Activity assessment daily.  - Set and communicate daily mobility goal to care team and patient/family/caregiver.   -  Collaborate with rehabilitation services on mobility goals if consulted  - Perform Range of Motion 3 times a day.  - Reposition patient every 2 hours.  - Dangle patient 3 times a day  - Stand patient 3 times a day  - Ambulate patient 3 times a day  - Out of bed to chair 3 times a day   - Out of bed for meals 3 times a day  - Out of bed for toileting  - Record patient progress and toleration of activity level   Outcome: Progressing     Problem: DISCHARGE PLANNING  Goal: Discharge to home or other facility with appropriate resources  Description: INTERVENTIONS:  - Identify barriers to discharge w/patient and caregiver  - Arrange for needed discharge resources and transportation as appropriate  - Identify discharge learning needs (meds, wound care, etc.)  - Arrange for interpretive services to assist at discharge as needed  - Refer to Case Management Department for coordinating discharge planning if the patient needs post-hospital services based on physician/advanced practitioner order or complex needs related to functional status, cognitive ability, or social support system  Outcome: Progressing     Problem: Knowledge Deficit  Goal: Patient/family/caregiver demonstrates understanding of disease process, treatment plan, medications, and discharge instructions  Description: Complete learning assessment and assess knowledge base.  Interventions:  - Provide teaching at level of understanding  - Provide teaching via preferred learning methods  Outcome: Progressing     Problem: Nutrition/Hydration-ADULT  Goal: Nutrient/Hydration intake appropriate for improving, restoring or maintaining nutritional needs  Description: Monitor and assess patient's nutrition/hydration status for malnutrition. Collaborate with interdisciplinary team and initiate plan and interventions as ordered.  Monitor patient's weight and dietary intake as ordered or per policy. Utilize nutrition screening tool and intervene as necessary. Determine  patient's food preferences and provide high-protein, high-caloric foods as appropriate.     INTERVENTIONS:  - Monitor oral intake, urinary output, labs, and treatment plans  - Assess nutrition and hydration status and recommend course of action  - Evaluate amount of meals eaten  - Assist patient with eating if necessary   - Allow adequate time for meals  - Recommend/ encourage appropriate diets, oral nutritional supplements, and vitamin/mineral supplements  - Order, calculate, and assess calorie counts as needed  - Recommend, monitor, and adjust tube feedings and TPN/PPN based on assessed needs  - Assess need for intravenous fluids  - Provide specific nutrition/hydration education as appropriate  - Include patient/family/caregiver in decisions related to nutrition  Outcome: Progressing

## 2024-12-14 NOTE — PROGRESS NOTES
Lety Botello is a 64 y.o. female who is currently ordered Vancomycin IV with management by the Pharmacy Consult service.  Relevant clinical data and objective / subjective history reviewed.  Vancomycin Assessment:  Indication and Goal AUC/Trough: Soft tissue (goal -600, trough >10), -600, trough >10  Clinical Status: stable  Micro:   none  Renal Function:  SCr: 0.5 mg/dL  CrCl: 138 mL/min  Renal replacement: Not on dialysis  Days of Therapy: 4  Current Dose: 1gm q12  Vancomycin Plan:  New Dosingm q12  Estimated AUC: 424 mcg*hr/mL  Estimated Trough: 11.3 mcg/mL  Next Level: 12-19  Renal Function Monitoring: Daily BMP and UOP  Pharmacy will continue to follow closely for s/sx of nephrotoxicity, infusion reactions and appropriateness of therapy.  BMP and CBC will be ordered per protocol. We will continue to follow the patient’s culture results and clinical progress daily.    Hosea Harris, Pharmacist

## 2024-12-14 NOTE — ASSESSMENT & PLAN NOTE
Elevated TSH  Normal free T4 at 0.65  Patient was not receiving levothyroxine due to  PEG tube malfunctioning   Crease levothyroxine dose to 150 mcg daily  Repeat thyroid study in 4 to 6 weeks

## 2024-12-14 NOTE — SPEECH THERAPY NOTE
Speech Language/Pathology  Speech-Language Pathology Bedside Swallow Evaluation        Patient Name: Lety Botello    Today's Date: 12/14/2024     Problem List  Principal Problem:    Encounter for gastrojejunal (GJ) tube placement issues  Active Problems:    Hypertension    Type 2 diabetes mellitus (HCC)    Endometrial cancer (HCC)    History of pulmonary embolism    Hypothyroidism    Gastroparesis    Morbid obesity (HCC)    Shingles    Hyponatremia    Anemia    Acute respiratory failure with hypoxia (HCC)    Hypomagnesemia    NSVT (nonsustained ventricular tachycardia) (HCC)    Coronary artery disease involving native coronary artery    Herpes zoster without complication         Past Medical History  Past Medical History:   Diagnosis Date    Diverticulitis     Endometrial cancer (HCC)     History of shingles     Right hemiabdomen (inactive)    Hypertension     IBS (irritable bowel syndrome)     Obesity     Small bowel obstruction (HCC)     Type 2 diabetes mellitus (HCC)        Past Surgical History  Past Surgical History:   Procedure Laterality Date    ABDOMINAL ADHESION SURGERY N/A 11/22/2024    Procedure: EXTENSIVE LYSIS OF ADHESIONS >90 MINUTES;  Surgeon: Alejo Ward MD;  Location: [facility];  Service: General    APPENDECTOMY      CHOLECYSTECTOMY      LAPAROTOMY N/A 11/22/2024    Procedure: LAPAROTOMY EXPLORATORY;  Surgeon: Alejo Ward MD;  Location: [facility];  Service: General    SIGMOIDECTOMY N/A     SMALL INTESTINE SURGERY N/A 11/22/2024    Procedure: RESECTION SMALL BOWEL WITH PRIMARY ANASTAMOSIS,PARTIAL OMENTECTOMY;  Surgeon: Alejo Ward MD;  Location: [facility];  Service: General       Summary    Assessment was limited to thin liquid and one bite of applesauce. Pt expressed apprehension at eating any food, because she is afraid of getting another SBO. Oropharyngeal swallow appears WNL for thin liquids. There were no overt s/s of aspiration. Pt currently desires to only drink  liquids. At this time recommend pt begin a clear liquid diet. ST to follow up for further assessment if/when pt desires to try any food.        Recommendations:   Diet: thin liquids   Meds:  as tolerated, ?via J-tube    Frequent Oral care  Aspiration precautions and compensatory swallowing strategies: upright posture  Other Recommendations/ considerations: ST to follow up, assess for diet tolerance and upgrade if pt desires to eat food.        Current Medical Status  Copied from admission/physician notes:  64-year-old with history of morbid obesity coronary artery disease hypertension history of PE/DVT on chronic anticoagulation, endometrial carcinoma status post total abdominal hysterectomy and bilateral salpingo-oophorectomy with recurrence and status post radiation therapy recently admitted for small bowel obstruction status post exploratory laparotomy with a small bowel resection and PEG tube insertion presented today for PEG tube dysfunction and underwent EGD with G-tube exchange today postoperatively the patient was noted to have increased work of breathing worsening hypoxia with desaturations despite being on mid flow anesthesia contacted medical ICU team for evaluation at that time the patient was already started on BiPAP received 20 mg of Lasix, chest x-ray ordered noted worsening of right elevated diaphragm and bilateral pulmonary infiltrates in the setting of possible volume overload recommended extra 20 mg of Lasix, DuoNeb nebulizers, 125 mg of Solu-Medrol IV push and 4 g of magnesium       Past medical history:   Please see H&P for details    Special Studies:  CXR-  Minimal increase in pulmonary edema and no significant change in the bilateral pleural effusions (right larger than left).       Social/Education/Vocational Hx:  Pt lives in SNF/ECF    Swallow Information   Current Risks for Dysphagia & Aspiration: recent intubation for procedures/surgery  Current Symptoms/Concerns: change in respiratory  status  Current Diet: NPO with tube feeds   Baseline Diet:  pt states she was only drinking liquids for several weeks prior to admission due to SBO    Baseline Assessment   Behavior/Cognition: alert  Speech/Language Status: able to participate in basic conversation and able to follow commands  Patient Positioning:  semi upright in bed, due to abdominal discomfort from recent surgery     Swallow Mechanism Exam   Facial: symmetrical  Labial: WFL  Lingual: WFL  Velum: symmetrical  Mandible: adequate ROM  Dentition: adequate  Vocal quality:clear/adequate   Volitional Cough: strong/productive   Respiratory: RA    Consistencies Assessed and Performance   Consistencies Administered: thin liquids and one bite of puree    Oral Stage: Adequate bolus retrieval and draw from straw. Prompt bolus manipulation and transfer. No residue or pocketing. Adequate lip seal.     Pharyngeal Stage: Hyolaryngeal elevation observed and palpated. Swallows appeared fairly prompt. There were no overt s/s of aspiration.       Esophageal Concerns: none reported      Results Reviewed with: patient, RN, and MD   Dysphagia Goals:  1. Pt will tolerate clear liquid diet with prompt oropharyngeal swallows and no overt s/s of aspiration. 2. Pt will tolerate LRD without s/s of aspiration.   Discharge recommendation: likely no follow up needed for swallowing    Speech Therapy Prognosis   Prognosis: fair    Prognosis Considerations: age, medical status, prior medical history, and therapeutic potential

## 2024-12-14 NOTE — PROGRESS NOTES
"Progress Note - Hospitalist   Name: Lety Botello 64 y.o. 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  Persistent third spacing with slight increase in moderate volume ascites, and no significant change in moderate size right and small left pleural effusions with bibasilar atelectasis. 2.  New percutaneous enteric tube with tip in the distal duodenum.\"  GI following. S/p EGD 12/10 with J-tube exchange, postop patient had worsening hypoxia required transfer to ICU and using BiPAP  Concern for possible infection surrounding J-tube site patient was started on vancomycin and ceftriaxone day #4  Now with clogged J-tube,   Status post EGD on 12/13 with J-tube replacement   Resumed on tube feeding  Speech eval regarding oral feeds  Acute respiratory failure with hypoxia (HCC)  Patient had worsening hypoxia post EGD on 12/10. Was satting mid-80s on 10L  Was given nebs, Lasix and subsequently placed on BiPAP and upgraded to critical care for further treatment  Currently in stable condition and transferred out of ICU  Continue to titrate oxygen  NSVT (nonsustained ventricular tachycardia) (HCC)  25 run of nonsustained V. tach noted on telemetry, patient was asymptomatic  Cardiology consulted  Cardiac echo with normal EF and mild aortic stenosis  Coreg dose was increased to 25 mg twice daily  Monitor    Hypertension  Monitor blood pressures  Continue carvedilol, Norvasc was added  Avoid hypotension  Coronary artery disease involving native coronary " artery  History of MI in 2016 status post KARY  Stable  Type 2 diabetes mellitus (HCC)  Lab Results   Component Value Date    HGBA1C 6.9 (H) 11/05/2024       Recent Labs     12/13/24  1142 12/13/24  1655 12/13/24  2128 12/14/24  0611   POCGLU 133 122 144* 234*       Blood Sugar Average: Last 72 hrs:  (P) [id number]    Farxiga on hold while inpatient  Hyperglycemia secondary to steroids  Continue Lantus at bedtime  Continue SSI  Endometrial cancer (HCC)  History of endometrial cancer noted  History of pulmonary embolism  Continue Eliquis 5 mg twice daily  Hypothyroidism  Elevated TSH  Normal free T4 at 0.65  Patient was not receiving levothyroxine due to  PEG tube malfunctioning   Crease levothyroxine dose to 150 mcg daily  Repeat thyroid study in 4 to 6 weeks  Gastroparesis  History of   With PEG-J as above  GI following  Morbid obesity (HCC)  Body mass index is 51.36 kg/m².  Encourage lifestyle modification and weight loss once acute issues improved/resolved  Hyponatremia  Hyponatremia likely multifactorial  Improving  S/p Lasix post-procedure as above  Monitor  Anemia  Received IV iron x 2  Herpes zoster without complication  Located on the right lower abdomen, well crusted, no active lesion  Continue with Neurontin    VTE Pharmacologic Prophylaxis: VTE Score: 7 High Risk (Score >/= 5) - Pharmacological DVT Prophylaxis Ordered: apixaban (Eliquis). Sequential Compression Devices Ordered.    Mobility:   Basic Mobility Inpatient Raw Score: 8  JH-HLM Goal: 3: Sit at edge of bed  JH-HLM Achieved: 1: Laying in bed  JH-HLM Goal NOT achieved. Continue with multidisciplinary rounding and encourage appropriate mobility to improve upon JH-HLM goals.    Patient Centered Rounds: I performed bedside rounds with nursing staff today.   Discussions with Specialists or Other Care Team Provider: Nursing    Education and Discussions with Family / Patient:  Patient.     Current Length of Stay: 5 day(s)  Current Patient  Status: Inpatient   Certification Statement: The patient will continue to require additional inpatient hospital stay due to management of malfunctioning PEG tube  Discharge Plan: Anticipate discharge in >72 hrs to rehab facility.    Code Status: Level 1 - Full Code    Subjective   Patient seen and examined  Comfortable in bed  Started yesterday on tube feeding without complication  No bowel movement for the past 4 days    Objective :  Temp:  [96.8 °F (36 °C)-98.5 °F (36.9 °C)] 98.5 °F (36.9 °C)  HR:  [61-75] 74  BP: (133-185)/(60-81) 147/63  Resp:  [16-20] 17  SpO2:  [91 %-100 %] 95 %  O2 Device: None (Room air)  Nasal Cannula O2 Flow Rate (L/min):  [99 L/min] 99 L/min    Body mass index is 51.36 kg/m².     Input and Output Summary (last 24 hours):     Intake/Output Summary (Last 24 hours) at 12/14/2024 1046  Last data filed at 12/14/2024 0934  Gross per 24 hour   Intake 2305.25 ml   Output 1480 ml   Net 825.25 ml       Physical Exam  Patient is awake alert in no acute distress  Obese, comfortable in bed  Lung clear to auscultation bilateral anteriorly  Heart positive S1-S2 no murmur  Abdomen soft obese nontender, crusted herpes lesions on the right lower abdomen  PEG-J tube in place   Bilateral lower extremities with trace edema    Lines/Drains:  Lines/Drains/Airways       Active Status       Name Placement date Placement time Site Days    External Urinary Catheter 12/09/24  0000  -- 5                            Lab Results: I have reviewed the following results:   Results from last 7 days   Lab Units 12/14/24  0539 12/13/24  0612 12/11/24  0554   WBC Thousand/uL 9.34   < > 9.28   HEMOGLOBIN g/dL 8.9*   < > 8.8*   HEMATOCRIT % 27.5*   < > 27.8*   PLATELETS Thousands/uL 143*   < > 127*   BANDS PCT %  --   --  9*   SEGS PCT % 86*   < >  --    LYMPHO PCT % 5*   < > 1*   MONO PCT % 7   < > 3*   EOS PCT % 0   < > 1    < > = values in this interval not displayed.     Results from last 7 days   Lab Units 12/14/24  0539    SODIUM mmol/L 133*   POTASSIUM mmol/L 5.1   CHLORIDE mmol/L 102   CO2 mmol/L 26   BUN mg/dL 29*   CREATININE mg/dL 0.50*   ANION GAP mmol/L 5   CALCIUM mg/dL 7.5*   ALBUMIN g/dL 2.5*   TOTAL BILIRUBIN mg/dL 0.35   ALK PHOS U/L 66   ALT U/L 8   AST U/L 12*   GLUCOSE RANDOM mg/dL 231*         Results from last 7 days   Lab Units 12/14/24  0611 12/13/24  2128 12/13/24  1655 12/13/24  1142 12/13/24  0604 12/12/24  2119 12/12/24  1800 12/12/24  1034 12/12/24  0615 12/11/24  2138 12/11/24  1711 12/11/24  1312   POC GLUCOSE mg/dl 234* 144* 122 133 173* 203* 217* 222* 220* 248* 234* 228*               Recent Cultures (last 7 days):         Imaging Results Review: I reviewed radiology reports from this admission including: EGD.  Other Study Results Review: No additional pertinent studies reviewed.    Last 24 Hours Medication List:     Current Facility-Administered Medications:     acetaminophen (TYLENOL) tablet 650 mg, Q6H PRN    albuterol (PROVENTIL HFA,VENTOLIN HFA) inhaler 2 puff, Q6H PRN    aluminum-magnesium hydroxide-simethicone (MAALOX) oral suspension 30 mL, Q6H PRN    amLODIPine (NORVASC) tablet 5 mg, Daily    apixaban (ELIQUIS) tablet 5 mg, BID    aspirin (ECOTRIN LOW STRENGTH) EC tablet 81 mg, Daily    bisacodyl (DULCOLAX) rectal suppository 10 mg, Daily PRN    carvedilol (COREG) tablet 25 mg, BID With Meals    cefTRIAXone (ROCEPHIN) 2,000 mg in dextrose 5 % 50 mL IVPB, Q24H, Last Rate: Stopped (12/14/24 0934)    cyanocobalamin (VITAMIN B-12) tablet 1,000 mcg, Daily    dicyclomine (BENTYL) capsule 10 mg, BID PRN    [Held by provider] furosemide (LASIX) tablet 20 mg, Daily    gabapentin (NEURONTIN) capsule 100 mg, Daily    HYDROmorphone (DILAUDID) injection 1 mg, Q4H PRN    insulin glargine (LANTUS) subcutaneous injection 10 Units 0.1 mL, HS    insulin lispro (HumALOG/ADMELOG) 100 units/mL subcutaneous injection 1-5 Units, TID AC **AND** Fingerstick Glucose (POCT), TID AC    [START ON 12/15/2024] levothyroxine  tablet 150 mcg, Daily    nitroglycerin (NITROSTAT) SL tablet 0.4 mg, Q5 Min PRN    omeprazole (PRILOSEC) suspension 2 mg/mL, Daily    ondansetron (ZOFRAN) injection 4 mg, Q6H PRN    oxyCODONE (ROXICODONE) oral solution 2.5 mg, Q4H PRN    oxyCODONE (ROXICODONE) oral solution 5 mg, Q4H PRN    polyethylene glycol (MIRALAX) packet 17 g, Daily    potassium-sodium phosphateS (K-PHOS,PHOSPHA 250) -250 mg tablet 1 tablet, BID With Meals    senna-docusate sodium (SENOKOT S) 8.6-50 mg per tablet 2 tablet, BID    vancomycin (VANCOCIN) IVPB (premix in dextrose) 1,000 mg 200 mL, Q12H, Last Rate: 1,000 mg (12/14/24 0921)    Administrative Statements   Today, Patient Was Seen By: Kelvin Sheppard DO      **Please Note: This note may have been constructed using a voice recognition system.**

## 2024-12-14 NOTE — ASSESSMENT & PLAN NOTE
-Status post PEG-J tube placement 12/3/2024  -Admission for recurrent SBO.  Found to have jejunal stricture  -EGD (12/10/2024) ulcerated mucosa with possible of the bumper concerning for infection

## 2024-12-14 NOTE — ASSESSMENT & PLAN NOTE
-25 beat run of nonsustained VT noted on telemetry, no further ectopy seen on telemetry  Asymptomatic  Echo personally reviewed, EF normal 65% with mild LVH and without regional wall motion abnormalities.  Nonsustained VT likely due to electrolyte derangements in the context of hyponatremia, hyperkalemia earlier this week  There has been no recurrence

## 2024-12-14 NOTE — ASSESSMENT & PLAN NOTE
-Acute respiratory failure following EGD with J-tube exchange on 12/10/2024 with sats noted to be in the 80s on 10 L nasal cannula.  Resolved

## 2024-12-14 NOTE — ASSESSMENT & PLAN NOTE
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  Persistent third spacing with slight increase in moderate volume ascites, and no significant change in moderate size right and small left pleural effusions with bibasilar atelectasis. 2.  New percutaneous enteric tube with tip in the distal duodenum.\"  GI following. S/p EGD 12/10 with J-tube exchange, postop patient had worsening hypoxia required transfer to ICU and using BiPAP  Concern for possible infection surrounding J-tube site patient was started on vancomycin and ceftriaxone day #4  Now with clogged J-tube,   Status post EGD on 12/13 with J-tube replacement   Resumed on tube feeding  Speech eval regarding oral feeds  "

## 2024-12-14 NOTE — ASSESSMENT & PLAN NOTE
Lab Results   Component Value Date    HGBA1C 6.9 (H) 11/05/2024     Controlled, per the primary team

## 2024-12-14 NOTE — ASSESSMENT & PLAN NOTE
History of anterior MI 2016 with drug-eluting stent to the LAD  Follow-up echo 2018 was normal, follow-up stress Myoview 2021 was normal, both at [facility]  Obtained echo yesterday 12/12/2024-EF normal, no regional wall motion abnormality

## 2024-12-15 NOTE — ASSESSMENT & PLAN NOTE
Hyponatremia likely multifactorial  Corrected sodium given hypoglycemia is 134  Replace magnesium  Monitor

## 2024-12-15 NOTE — PROGRESS NOTES
Progress Note - Gastroenterology   Name: Lety Botello 64 y.o. female I MRN: [id number]  Unit/Bed#: [facility] 511-01 I Date of Admission: 12/9/2024   Date of Service: 12/15/2024 I Hospital Day: 6     Assessment & Plan  Encounter for gastrojejunal (GJ) tube placement issues  H/o recurrent SBO 2/2 jejunal stricture from recurrent adenoCA gyn primary, s/p PEGJ 12/3/24. Admitted with malfunctioning J tube. Underwent EGD 12/10 with ulcerated mucosa and pus below bumper, J tube looped in stomach which was replaced into distal duodenum. J tube malfunctioned again leading to replacement 12/13. Has been functioning and tolerating feeds since replacement, though does report intermittent generalized abdominal pain. Has been on CTX/Vancomycin for PEG infection since 12/11.     GI reengaged to comment on timing and duration of Abx.     PEG site appears to be erythematous near insertion site with granulation tissue, expression of gastric fluid from site. No leukocytosis, has been afebrile since admission. Bumper snug, was loosened during evaluation and freely rotated afterwards. Dressing replaced. Tube was taped in a fashion to prevent further traction.       Plan:  Continue antibiotics to complete 7 day course (day 5/7)   Continue TF through J tube; meds via PO or liquid formulations through G tube if unable to tolerate PO   CLD as tolerated per primary   Change dressing daily and when soiled, discussed with nursing to ensure tube is taped straight so that it prevents traction   CT scan per primary due to ongoing generalized abd pain in the setting of SBO hx; pending read   Additional pain and symptom management per primary team   Morbid obesity (HCC)  Chronic, body habitus may be contributing to tight PEG bumper which was loosened     24 Hour Events : no acute events   Subjective : Evaluated at bedside.  She reports generalized abdominal pain.  She denies that the pain is localized to the PEG site, however, she does report that she  has discomfort near the PEG site.  She has been tolerating her tube feeds.  She is scared to eat or take anything p.o. because she is nervous that she will dislodge her tube.  She denies any other acute complaints.      Objective :  Temp:  [97.3 °F (36.3 °C)-98.3 °F (36.8 °C)] 98.1 °F (36.7 °C)  HR:  [70-79] 71  BP: (108-165)/(43-72) 112/43  Resp:  [16-17] 17  SpO2:  [93 %-94 %] 93 %  O2 Device: None (Room air)    Physical Exam  Vitals reviewed.   Constitutional:       General: She is in acute distress (uncomfortable appearing).   Cardiovascular:      Rate and Rhythm: Normal rate and regular rhythm.   Pulmonary:      Effort: Pulmonary effort is normal. No respiratory distress.   Abdominal:      General: There is distension.      Palpations: Abdomen is soft.      Tenderness: There is abdominal tenderness.      Comments: PEGJ in place with erythema near insertion site and skin breakdown under bumper. Bumper loosened. PEG taped in fashion to prevent traction on tube.    Skin:     General: Skin is warm and dry.   Neurological:      Mental Status: She is alert.         Lab Results: I have reviewed the following results:CBC/BMP:   .     12/15/24  0943   SODIUM 128*   K 5.9*      CO2 26   BUN 25   CREATININE 0.56*   GLUC 371*   MG 1.8*   PHOS 2.7        Imaging Results Review: I personally reviewed the following image studies in PACS and associated radiology reports: CT abdomen/pelvis. My interpretation of the radiology images/reports is: ascites, small R pleural effusion, GJ in place with J tube extending to distal duodenum.  Other Study Results Review: Other studies reviewed include: EGD

## 2024-12-15 NOTE — CONSULTS
Consultation - Surgery-General   Name: Lety Botello 64 y.o. female I MRN: [id number]  Unit/Bed#: [facility] 511-01 I Date of Admission: 12/9/2024   Date of Service: 12/15/2024 I Hospital Day: 6   Consults  Physician Requesting Evaluation: Kelvin Sheppard DO   Reason for Evaluation / Principal Problem: Abdominal pain    Assessment & Plan  Encounter for gastrojejunal (GJ) tube placement issues  Admitted with clogged GJ-tube from rehab  Status post multiple exchanges of GJ tube by GI team, now tolerating goal feeds  Abdominal pain  General Surgery team consults for abdominal pain  Recent hospitalization for small bowel obstruction, status post ex lap, DAVID, SBR 11/22  Now, admitted following clogged GJ tube at rehab status post exchange by GI team, tolerating tube feeds at goal of 60    Afebrile vital signs stable within normal limits on room air    12/9 CT A/P showing pleural effusions with PEG-J tip in distal duodenum    Plan  -CT A/P with p.o. contrast via GJ tube  -Monitor abdominal exam  -Continue tube feeds as tolerated  -Multimodal pain regimen  -Bowel regimen pending results of CT  -Surgery will follow  -Will consider midline stable removal on this admission  -Rest of care per primary team    Surgery-General service will follow.    History of Present Illness   Lety Botello is a 64 y.o. female who presents with with a clogged GJ tube from her rehab facility.  Patient has a GJ tube for her history of gastroparesis.  Patient was recently admitted to [facility] for a small bowel obstruction and underwent exploratory laparotomy lysis of adhesions and small bowel resection by the general surgery team.    At this time patient is tolerating her tube feeds for the past few days.  Patient denies nausea at this time.  Patient reports her abdominal pain started yesterday and was acute in onset.  Patient reports her pain has been persistent since that time and is more in the left abdomen.  Patient denies fevers chills.   Patient endorses some shortness of breath but is saturating well on room air.  Patient reports she is passing flatus but has not had a bowel movement in the past few days.     Review of Systems   Constitutional:  Negative for activity change, chills and fatigue.   Gastrointestinal:  Positive for abdominal pain. Negative for abdominal distention.     I have reviewed the patient's PMH, PSH, Social History, Family History, Meds, and Allergies  Historical Information   Past Medical History:   Diagnosis Date    Diverticulitis     Endometrial cancer (HCC)     History of shingles     Right hemiabdomen (inactive)    Hypertension     IBS (irritable bowel syndrome)     Obesity     Small bowel obstruction (HCC)     Type 2 diabetes mellitus (HCC)      Past Surgical History:   Procedure Laterality Date    ABDOMINAL ADHESION SURGERY N/A 11/22/2024    Procedure: EXTENSIVE LYSIS OF ADHESIONS >90 MINUTES;  Surgeon: Alejo Ward MD;  Location: [facility];  Service: General    APPENDECTOMY      CHOLECYSTECTOMY      LAPAROTOMY N/A 11/22/2024    Procedure: LAPAROTOMY EXPLORATORY;  Surgeon: Alejo Ward MD;  Location: [facility];  Service: General    SIGMOIDECTOMY N/A     SMALL INTESTINE SURGERY N/A 11/22/2024    Procedure: RESECTION SMALL BOWEL WITH PRIMARY ANASTAMOSIS,PARTIAL OMENTECTOMY;  Surgeon: Alejo Ward MD;  Location: [facility];  Service: General     Social History     Tobacco Use    Smoking status: Never    Smokeless tobacco: Never   Substance and Sexual Activity    Alcohol use: Not Currently    Drug use: Never    Sexual activity: Not on file     E-Cigarette/Vaping     E-Cigarette/Vaping Substances     Family history non-contributory  Social History     Tobacco Use    Smoking status: Never    Smokeless tobacco: Never   Substance and Sexual Activity    Alcohol use: Not Currently    Drug use: Never    Sexual activity: Not on file       Current Facility-Administered Medications:     acetaminophen (TYLENOL)  tablet 650 mg, Q6H PRN    albuterol (PROVENTIL HFA,VENTOLIN HFA) inhaler 2 puff, Q6H PRN    aluminum-magnesium hydroxide-simethicone (MAALOX) oral suspension 30 mL, Q6H PRN    amLODIPine (NORVASC) tablet 5 mg, Daily    apixaban (ELIQUIS) tablet 5 mg, BID    aspirin (ECOTRIN LOW STRENGTH) EC tablet 81 mg, Daily    bisacodyl (DULCOLAX) rectal suppository 10 mg, Daily PRN    carvedilol (COREG) tablet 25 mg, BID With Meals    cefTRIAXone (ROCEPHIN) 2,000 mg in dextrose 5 % 50 mL IVPB, Q24H, Last Rate: 2,000 mg (12/15/24 0532)    cyanocobalamin (VITAMIN B-12) tablet 1,000 mcg, Daily    dicyclomine (BENTYL) capsule 10 mg, BID PRN    [Held by provider] furosemide (LASIX) tablet 20 mg, Daily    gabapentin (NEURONTIN) capsule 100 mg, Daily    HYDROmorphone (DILAUDID) injection 1 mg, Q4H PRN    insulin glargine (LANTUS) subcutaneous injection 10 Units 0.1 mL, HS    insulin lispro (HumALOG/ADMELOG) 100 units/mL subcutaneous injection 1-5 Units, TID AC **AND** Fingerstick Glucose (POCT), TID AC    levothyroxine tablet 150 mcg, Daily    nitroglycerin (NITROSTAT) SL tablet 0.4 mg, Q5 Min PRN    omeprazole (PRILOSEC) suspension 2 mg/mL, Daily    ondansetron (ZOFRAN) injection 4 mg, Q6H PRN    oxyCODONE (ROXICODONE) oral solution 2.5 mg, Q4H PRN    oxyCODONE (ROXICODONE) oral solution 5 mg, Q4H PRN    polyethylene glycol (MIRALAX) packet 17 g, Daily    senna oral syrup 17.6 mg, BID    vancomycin (VANCOCIN) IVPB (premix in dextrose) 1,000 mg 200 mL, Q12H, Last Rate: 1,000 mg (12/15/24 0901)  Prior to Admission Medications   Prescriptions Last Dose Informant Patient Reported? Taking?   Acetaminophen 325 MG CAPS   Yes No   Sig: Take 325 mg by mouth every 6 (six) hours as needed   Insulin Lispro (HUMALOG PEN SC)   Yes No   Sig: Inject under the skin 3 (three) times a day before meals Sliding scale.   Klor-Con M20 20 MEQ tablet   Yes No   Sig: Take 1 tablet by mouth in the morning   albuterol (PROVENTIL HFA,VENTOLIN HFA) 90 mcg/act  inhaler   Yes No   Sig: Inhale 2 puffs every 6 (six) hours as needed   apixaban (ELIQUIS) 5 mg   Yes No   Sig: Take 5 mg by mouth 2 (two) times a day   aspirin (ECOTRIN LOW STRENGTH) 81 mg EC tablet   Yes No   Sig: Take 81 mg by mouth   bisacodyl (DULCOLAX) 10 mg suppository   No No   Sig: Insert 1 suppository (10 mg total) into the rectum daily   carvedilol (COREG) 12.5 mg tablet   No No   Sig: Take 1 tablet (12.5 mg total) by mouth 2 (two) times a day with meals   dapagliflozin (Farxiga) 10 MG tablet   Yes No   Sig: Take 10 mg by mouth   dicyclomine (BENTYL) 10 mg capsule   Yes No   Sig: Take 10 mg by mouth 2 (two) times a day as needed   furosemide (LASIX) 20 mg tablet   Yes No   Sig: Take 1 tablet by mouth in the morning   gabapentin (NEURONTIN) 100 mg capsule   Yes No   Sig: Take 100 mg by mouth   levothyroxine 125 mcg tablet   Yes No   Sig: Take 125 mcg by mouth daily   metoclopramide (Reglan) 10 mg tablet   No No   Sig: Take 1 tablet (10 mg total) by mouth 4 (four) times a day   nitroglycerin (NITROSTAT) 0.4 mg SL tablet   Yes No   Sig: Place 0.4 mg under the tongue   ondansetron (ZOFRAN) 4 mg tablet   No No   Sig: Take 1 tablet (4 mg total) by mouth every 8 (eight) hours as needed for nausea or vomiting   oxyCODONE (ROXICODONE) 5 immediate release tablet   No No   Si tablet (5 mg total) by Per G Tube route every 4 (four) hours as needed for severe pain for up to 10 days Max Daily Amount: 30 mg   pantoprazole (PROTONIX) 40 mg tablet   No No   Sig: Take 1 tablet (40 mg total) by mouth daily in the early morning   vitamin B-12 (VITAMIN B-12) 1,000 mcg tablet   Yes No   Sig: Take 1,000 mcg by mouth daily      Facility-Administered Medications: None     Bee pollen, Azithromycin, Metformin, Other, Pollen extract, Sulfamethizole, and Sulfamethoxazole-trimethoprim    Objective :  Temp:  [97.3 °F (36.3 °C)-98.3 °F (36.8 °C)] 98.1 °F (36.7 °C)  HR:  [70-79] 71  BP: (108-165)/(43-72) 112/43  Resp:  [16-17]  17  SpO2:  [93 %-94 %] 93 %  O2 Device: None (Room air)    Lines/Drains/Airways       Active Status       Name Placement date Placement time Site Days    Gastrostomy/Enterostomy PEG- Jejunostomy 20 Fr. LUQ 12/03/24  1553  LUQ  11    External Urinary Catheter 12/09/24  0000  -- 6                  Physical Exam  Vitals and nursing note reviewed.   Constitutional:       General: She is not in acute distress.     Appearance: Normal appearance. She is ill-appearing. She is not toxic-appearing.   HENT:      Head: Normocephalic and atraumatic.   Eyes:      General: No scleral icterus.     Conjunctiva/sclera: Conjunctivae normal.   Cardiovascular:      Rate and Rhythm: Normal rate.   Pulmonary:      Effort: Pulmonary effort is normal.      Comments: On room air   Abdominal:      General: Abdomen is flat. There is no distension.      Palpations: Abdomen is soft.      Tenderness: There is abdominal tenderness (Mild tenderness throughout). There is no guarding or rebound.      Comments: Protuberant abdomen, staples in place from 11/22 procedure, abdominal midline incision healing well   Skin:     General: Skin is warm and dry.      Capillary Refill: Capillary refill takes less than 2 seconds.   Neurological:      Mental Status: She is alert and oriented to person, place, and time.   Psychiatric:         Mood and Affect: Mood normal.         Lab Results: I have reviewed the following results:  Recent Labs     12/14/24  0539 12/15/24  0943   WBC 9.34  --    HGB 8.9*  --    HCT 27.5*  --    *  --    SODIUM 133* 128*   K 5.1 5.9*    100   CO2 26 26   BUN 29* 25   CREATININE 0.50* 0.56*   GLUC 231* 371*   MG 1.9 1.8*   PHOS 2.2* 2.7   AST 12*  --    ALT 8  --    ALB 2.5*  --    TBILI 0.35  --    ALKPHOS 66  --        Imaging Results Review: No pertinent imaging studies reviewed.  Other Study Results Review: No additional pertinent studies reviewed.    VTE Pharmacologic Prophylaxis: VTE covered by:  apixaban, Per G  Tube, 5 mg at 12/15/24 0808     VTE Mechanical Prophylaxis: sequential compression device

## 2024-12-15 NOTE — ASSESSMENT & PLAN NOTE
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 Admitted with malfunctioning J tube. Underwent EGD 12/10 with ulcerated mucosa and pus below bumper, J tube looped in stomach which was replaced into distal duodenum. J tube malfunctioned again leading to replacement 12/13. Has been functioning and tolerating feeds since replacement, though does report intermittent generalized abdominal pain. Has been on CTX/Vancomycin for PEG infection since 12/11.     GI reengaged to comment on timing and duration of Abx.     PEG site appears to be erythematous near insertion site with granulation tissue, expression of gastric fluid from site. No leukocytosis, has been afebrile since admission. Bumper snug, was loosened during evaluation and freely rotated afterwards. Dressing replaced. Tube was taped in a fashion to prevent further traction.       Plan:  Continue antibiotics to complete 7 day course (day 5/7)   Continue TF through J tube; meds via PO or liquid formulations through G tube if unable to tolerate PO   CLD as tolerated per primary   Change dressing daily and when soiled, discussed with nursing to ensure tube is taped straight so that it prevents traction   CT scan per primary due to ongoing generalized abd pain in the setting of SBO hx; pending read   Additional pain and symptom management per primary team

## 2024-12-15 NOTE — ASSESSMENT & PLAN NOTE
25 run of nonsustained V. tach noted on telemetry, patient was asymptomatic  Cardiology consulted  Cardiac echo with normal EF and mild aortic stenosis  Coreg dose was increased to 25 mg twice daily  Monitor  Cardiology signed off

## 2024-12-15 NOTE — PROGRESS NOTES
Lety Botello is a 64 y.o. female who is currently ordered Vancomycin IV with management by the Pharmacy Consult service.  Relevant clinical data and objective / subjective history reviewed.  Vancomycin Assessment:  Indication and Goal AUC/Trough: Soft tissue (goal -600, trough >10), -600, trough >10  Clinical Status: stable  Micro:   none  Renal Function:  SCr: 0.56 mg/dL  CrCl: 123 mL/min  Renal replacement: Not on dialysis  Days of Therapy: 5  Current Dose: 1gm q12  Vancomycin Plan:  New Dosingm q12  Estimated AUC: 468 mcg*hr/mL  Estimated Trough: 13 mcg/mL  Next Level: 12-19  Renal Function Monitoring: Daily BMP and UOP  Pharmacy will continue to follow closely for s/sx of nephrotoxicity, infusion reactions and appropriateness of therapy.  BMP and CBC will be ordered per protocol. We will continue to follow the patient’s culture results and clinical progress daily.    Hosea Harris, Pharmacist

## 2024-12-15 NOTE — QUICK NOTE
Please see general surgery consult note      Inpatient consult to Acute Care Surgery  Consult performed by: Bj Jones MD  Consult ordered by: Kelvin Sheppard DO

## 2024-12-15 NOTE — PROGRESS NOTES
"Progress Note - Hospitalist   Name: Lety Botello 64 y.o. 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  Persistent third spacing with slight increase in moderate volume ascites, and no significant change in moderate size right and small left pleural effusions with bibasilar atelectasis. 2.  New percutaneous enteric tube with tip in the distal duodenum.\"  GI following. S/p EGD 12/10 with J-tube exchange, postop patient had worsening hypoxia required transfer to ICU and using BiPAP  Concern for possible infection surrounding J-tube site patient was started on vancomycin and ceftriaxone day #5 per GI  Now with clogged J-tube,   Status post EGD on 12/13 with J-tube replacement   Resumed on tube feeding  On clear liquid diet  Abdominal pain  Patient with recent hospitalization due to small bowel obstruction status post exploratory laparotomy with small bowel resection on 11/22/2024  Now with worsening abdominal pain and distention  Evaluated by general surgery with the plan for CT abdomen pelvis  Acute respiratory failure with hypoxia (HCC)  Patient had worsening hypoxia post EGD on 12/10. Was satting mid-80s on 10L  Was given nebs, Lasix and subsequently placed on BiPAP and upgraded to critical care for further treatment  Currently in stable condition and transferred out of ICU  Currently on room air  NSVT (nonsustained ventricular tachycardia) (HCC)  25 run of nonsustained V. tach noted on telemetry, patient was asymptomatic  Cardiology " consulted  Cardiac echo with normal EF and mild aortic stenosis  Coreg dose was increased to 25 mg twice daily  Monitor  Cardiology signed off    Hypothyroidism  Elevated TSH  Normal free T4 at 0.65  Patient was not receiving levothyroxine due to  PEG tube malfunctioning   Crease levothyroxine dose to 150 mcg daily  Repeat thyroid study in 4 to 6 weeks  Anemia  Received IV iron x 2  Vitamin B12 deficiency  Continue vitamin B12 supplement  Hyponatremia  Hyponatremia likely multifactorial  Corrected sodium given hypoglycemia is 134  Replace magnesium  Monitor  Hypertension  Monitor blood pressures  Continue carvedilol, Norvasc was added  Avoid hypotension  Coronary artery disease involving native coronary artery  History of MI in 2016 status post KARY  Stable  Type 2 diabetes mellitus (HCC)  Lab Results   Component Value Date    HGBA1C 6.9 (H) 11/05/2024       Recent Labs     12/14/24  1550 12/14/24  2004 12/15/24  0612 12/15/24  1055   POCGLU 306* 299* 317* 345*       Blood Sugar Average: Last 72 hrs:  (P) 229.2993622432044479    Farxiga on hold while inpatient  Hyperglycemia secondary to steroids and tube feeding  Continue Lantus at bedtime, add Humalog 3 times daily  Continue SSI  Endometrial cancer (HCC)  History of endometrial cancer noted  History of pulmonary embolism  Continue Eliquis 5 mg twice daily  Gastroparesis  History of   With PEG-J as above  GI following  Morbid obesity (HCC)  Body mass index is 51.36 kg/m².  Encourage lifestyle modification and weight loss once acute issues improved/resolved  Herpes zoster without complication  Located on the right lower abdomen, well crusted, no active lesion  Continue with Neurontin    VTE Pharmacologic Prophylaxis: VTE Score: 7 High Risk (Score >/= 5) - Pharmacological DVT Prophylaxis Ordered: apixaban (Eliquis). Sequential Compression Devices Ordered.    Mobility:   Basic Mobility Inpatient Raw Score: 7  [facility] Goal: 2: Bed activities/Dependent transfer  [facility]  Achieved: 2: Bed activities/Dependent transfer  [facility] Goal achieved. Continue to encourage appropriate mobility.    Patient Centered Rounds: I performed bedside rounds with nursing staff today.   Discussions with Specialists or Other Care Team Provider: Nursing, general surgery    Education and Discussions with Family / Patient:  Patient.     Current Length of Stay: 6 day(s)  Current Patient Status: Inpatient   Certification Statement: The patient will continue to require additional inpatient hospital stay due to management of tube feeding and abdominal pain  Discharge Plan: Anticipate discharge in 48-72 hrs to rehab facility.    Code Status: Level 1 - Full Code    Subjective   Patient seen and examined  Comfortable in bed  No bowel movement yet  Now with abdominal pain and distention    Objective :  Temp:  [97.1 °F (36.2 °C)-98.3 °F (36.8 °C)] 97.1 °F (36.2 °C)  HR:  [70-79] 77  BP: (108-165)/(43-72) 133/58  Resp:  [16-18] 18  SpO2:  [93 %-94 %] 94 %  O2 Device: None (Room air)    Body mass index is 51.36 kg/m².     Input and Output Summary (last 24 hours):     Intake/Output Summary (Last 24 hours) at 12/15/2024 1238  Last data filed at 12/15/2024 1204  Gross per 24 hour   Intake 2075 ml   Output 850 ml   Net 1225 ml       Physical Exam    Patient is awake alert in no acute distress  Obese, ill-appearing comfortable in bed  Lung clear to auscultation bilateral anteriorly  Heart positive S1-S2 no murmur  Abdomen soft obese distended with generalized tenderness  Surgical staples in place  PEG-J tube in place   Bilateral lower extremities with trace edema  Lines/Drains:  Lines/Drains/Airways       Active Status       Name Placement date Placement time Site Days    Gastrostomy/Enterostomy PEG- Jejunostomy 20 Fr. LUQ 12/03/24  1553  LUQ  11    External Urinary Catheter 12/09/24  0000  -- 6                            Lab Results: I have reviewed the following results:   Results from last 7 days   Lab Units  12/14/24  0539 12/13/24  0612 12/11/24  0554   WBC Thousand/uL 9.34   < > 9.28   HEMOGLOBIN g/dL 8.9*   < > 8.8*   HEMATOCRIT % 27.5*   < > 27.8*   PLATELETS Thousands/uL 143*   < > 127*   BANDS PCT %  --   --  9*   SEGS PCT % 86*   < >  --    LYMPHO PCT % 5*   < > 1*   MONO PCT % 7   < > 3*   EOS PCT % 0   < > 1    < > = values in this interval not displayed.     Results from last 7 days   Lab Units 12/15/24  0943 12/14/24  0539   SODIUM mmol/L 128* 133*   POTASSIUM mmol/L 5.9* 5.1   CHLORIDE mmol/L 100 102   CO2 mmol/L 26 26   BUN mg/dL 25 29*   CREATININE mg/dL 0.56* 0.50*   ANION GAP mmol/L 2* 5   CALCIUM mg/dL 7.6* 7.5*   ALBUMIN g/dL  --  2.5*   TOTAL BILIRUBIN mg/dL  --  0.35   ALK PHOS U/L  --  66   ALT U/L  --  8   AST U/L  --  12*   GLUCOSE RANDOM mg/dL 371* 231*         Results from last 7 days   Lab Units 12/15/24  1055 12/15/24  0612 12/14/24  2004 12/14/24  1550 12/14/24  1054 12/14/24  0611 12/13/24  2128 12/13/24  1655 12/13/24  1142 12/13/24  0604 12/12/24  2119 12/12/24  1800   POC GLUCOSE mg/dl 345* 317* 299* 306* 276* 234* 144* 122 133 173* 203* 217*               Recent Cultures (last 7 days):         Imaging Results Review: No pertinent imaging studies reviewed.  Other Study Results Review: No additional pertinent studies reviewed.    Last 24 Hours Medication List:     Current Facility-Administered Medications:     acetaminophen (TYLENOL) tablet 650 mg, Q6H PRN    albuterol (PROVENTIL HFA,VENTOLIN HFA) inhaler 2 puff, Q6H PRN    aluminum-magnesium hydroxide-simethicone (MAALOX) oral suspension 30 mL, Q6H PRN    amLODIPine (NORVASC) tablet 5 mg, Daily    apixaban (ELIQUIS) tablet 5 mg, BID    aspirin (ECOTRIN LOW STRENGTH) EC tablet 81 mg, Daily    bisacodyl (DULCOLAX) rectal suppository 10 mg, Daily PRN    carvedilol (COREG) tablet 25 mg, BID With Meals    cefTRIAXone (ROCEPHIN) 2,000 mg in dextrose 5 % 50 mL IVPB, Q24H, Last Rate: 2,000 mg (12/15/24 5032)    cyanocobalamin (VITAMIN B-12)  tablet 1,000 mcg, Daily    cyanocobalamin injection 1,000 mcg, Once    dicyclomine (BENTYL) capsule 10 mg, BID PRN    furosemide (LASIX) tablet 20 mg, Daily    gabapentin (NEURONTIN) capsule 100 mg, Daily    HYDROmorphone (DILAUDID) injection 1 mg, Q4H PRN    insulin glargine (LANTUS) subcutaneous injection 20 Units 0.2 mL, HS    insulin lispro (HumALOG/ADMELOG) 100 units/mL subcutaneous injection 1-5 Units, TID AC **AND** Fingerstick Glucose (POCT), TID AC    insulin lispro (HumALOG/ADMELOG) 100 units/mL subcutaneous injection 5 Units, TID With Meals    iohexol (OMNIPAQUE) 240 MG/ML solution 50 mL, Once in imaging    levothyroxine tablet 150 mcg, Daily    nitroglycerin (NITROSTAT) SL tablet 0.4 mg, Q5 Min PRN    omeprazole (PRILOSEC) suspension 2 mg/mL, Daily    ondansetron (ZOFRAN) injection 4 mg, Q6H PRN    oxyCODONE (ROXICODONE) oral solution 2.5 mg, Q4H PRN    oxyCODONE (ROXICODONE) oral solution 5 mg, Q4H PRN    polyethylene glycol (MIRALAX) packet 17 g, Daily    senna oral syrup 17.6 mg, BID    vancomycin (VANCOCIN) IVPB (premix in dextrose) 1,000 mg 200 mL, Q12H, Last Rate: 1,000 mg (12/15/24 0901)    Administrative Statements   Today, Patient Was Seen By: Kelvin Sheppard DO      **Please Note: This note may have been constructed using a voice recognition system.**

## 2024-12-15 NOTE — ASSESSMENT & PLAN NOTE
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  Persistent third spacing with slight increase in moderate volume ascites, and no significant change in moderate size right and small left pleural effusions with bibasilar atelectasis. 2.  New percutaneous enteric tube with tip in the distal duodenum.\"  GI following. S/p EGD 12/10 with J-tube exchange, postop patient had worsening hypoxia required transfer to ICU and using BiPAP  Concern for possible infection surrounding J-tube site patient was started on vancomycin and ceftriaxone day #5 per GI  Now with clogged J-tube,   Status post EGD on 12/13 with J-tube replacement   Resumed on tube feeding  On clear liquid diet  "

## 2024-12-15 NOTE — ASSESSMENT & PLAN NOTE
Lab Results   Component Value Date    HGBA1C 6.9 (H) 11/05/2024       Recent Labs     12/14/24  1550 12/14/24  2004 12/15/24  0612 12/15/24  1055   POCGLU 306* 299* 317* 345*       Blood Sugar Average: Last 72 hrs:  (P) 229.1467919276836231    Farxiga on hold while inpatient  Hyperglycemia secondary to steroids and tube feeding  Continue Lantus at bedtime, add Humalog 3 times daily  Continue SSI

## 2024-12-15 NOTE — ASSESSMENT & PLAN NOTE
General Surgery team consults for abdominal pain  Recent hospitalization for small bowel obstruction, status post ex lap, DAVID, SBR 11/22  Now, admitted following clogged GJ tube at rehab status post exchange by GI team, tolerating tube feeds at goal of 60    Afebrile vital signs stable within normal limits on room air    12/9 CT A/P showing pleural effusions with PEG-J tip in distal duodenum    Plan  -CT A/P with p.o. contrast via GJ tube  -Monitor abdominal exam  -Continue tube feeds as tolerated  -Multimodal pain regimen  -Bowel regimen pending results of CT  -Surgery will follow  -Will consider midline stable removal on this admission  -Rest of care per primary team

## 2024-12-15 NOTE — ASSESSMENT & PLAN NOTE
Patient had worsening hypoxia post EGD on 12/10. Was satting mid-80s on 10L  Was given nebs, Lasix and subsequently placed on BiPAP and upgraded to critical care for further treatment  Currently in stable condition and transferred out of ICU  Currently on room air

## 2024-12-15 NOTE — ASSESSMENT & PLAN NOTE
Admitted with clogged GJ-tube from rehab  Status post multiple exchanges of GJ tube by GI team, now tolerating goal feeds

## 2024-12-15 NOTE — ASSESSMENT & PLAN NOTE
Patient with recent hospitalization due to small bowel obstruction status post exploratory laparotomy with small bowel resection on 11/22/2024  Now with worsening abdominal pain and distention  Evaluated by general surgery with the plan for CT abdomen pelvis

## 2024-12-16 NOTE — PHYSICAL THERAPY NOTE
"                                                                                  PHYSICAL THERAPY NOTE          Patient Name: Lety Botello  Today's Date: 12/16/2024 12/16/24 1043   PT Last Visit   PT Visit Date 12/16/24   Note Type   Note Type Treatment   End of Consult   Patient Position at End of Consult Supine;All needs within reach;Bed/Chair alarm activated   Pain Assessment   Pain Assessment Tool 0-10   Pain Score 10 - Worst Possible Pain   Pain Location/Orientation Location: Abdomen   Pain Onset/Description Onset: Ongoing   Effect of Pain on Daily Activities limits functional mobility   Patient's Stated Pain Goal No pain   Hospital Pain Intervention(s) Repositioned;Emotional support;Rest   Restrictions/Precautions   Weight Bearing Precautions Per Order No   Other Precautions Chair Alarm;Bed Alarm;Cognitive;Multiple lines;Telemetry;Fall Risk;Pain  (J feed)   General   Chart Reviewed Yes   Response to Previous Treatment Patient reporting fatigue but able to participate.   Family/Caregiver Present No   Cognition   Overall Cognitive Status WFL   Arousal/Participation Responsive;Cooperative   Attention Attends with cues to redirect   Orientation Level Oriented to person;Oriented to place;Disoriented to situation;Oriented to time   Memory Within functional limits   Following Commands Follows one step commands with increased time or repetition   Comments patient pleasant and cooperative, requires encouragement to participate due to fear of falling   Subjective   Subjective \"Just don't let me fall\"   Bed Mobility   Rolling R 2  Maximal assistance   Additional items Assist x 2;HOB elevated;Bedrails;Increased time required;Verbal cues;LE management   Rolling L 2  Maximal assistance   Additional items Assist x 2;HOB elevated;Bedrails;Increased time required;Verbal cues;LE management   Supine to Sit 2  Maximal assistance   Additional items Assist x 2;Bedrails;HOB elevated;Increased time required;Verbal cues;LE " management   Sit to Supine 2  Maximal assistance   Additional items Assist x 2;HOB elevated;Bedrails;Increased time required;Verbal cues;LE management   Additional Comments Patient found and left supine in bed with alarm and all needs met per patient request   Transfers   Sit to Stand 2  Maximal assistance   Additional items Assist x 2;Increased time required;Verbal cues   Stand to Sit 2  Maximal assistance   Additional items Assist x 2;Increased time required;Verbal cues   Additional Comments b/l HHA; Attempted x6 STS, able to perform x3 successfully with b/l HHA and MaxAx2, requiring verbal/tactile cues to improve body mechanics/form during STS and encouragement to participate due to constant fear of falling. Patient declined transfer to bedside chair at this time due to fatigue, pain, weakness, and fear of falling during transfer.   Ambulation/Elevation   Gait pattern Not appropriate;Not tested   Ambulation/Elevation Additional Comments patient declined transfer to bedside chair at this time   Balance   Static Sitting Poor +   Dynamic Sitting Poor   Static Standing Poor   Dynamic Standing Poor -   Ambulatory Zero   Endurance Deficit   Endurance Deficit Yes   Endurance Deficit Description generalized weakness, fatigue, pain   Activity Tolerance   Activity Tolerance Patient limited by fatigue;Patient limited by pain   Medical Staff Made Aware OT Deanne; Co-treatment with occupational therapy was performed today. This patient's participation in therapy services was limited by decreased tolerance for activity and current medical status. For these reasons, co-treatment was performed so that patient could optimally participate in therapy services and maximize their rehabilitation during treatment time. PT and OT disciplines addressed separate goals of patient's individualized care plan.   and restorative Felicia   Nurse Made Aware RN cleared and updated   Exercises   Neuro re-ed Patient able to sit at EOB performing  static/dynamic seated balance activities ~15 mins to perform ADLs and hygiene, utilizing b/l UE to maintain neutral posture and occasional MinAx1 due to forward flexion and fear of falling.   Assessment   Prognosis Fair   Problem List Decreased strength;Decreased range of motion;Decreased endurance;Impaired balance;Decreased mobility;Decreased coordination;Decreased cognition;Impaired judgement;Decreased safety awareness;Impaired sensation;Obesity;Decreased skin integrity;Pain   Assessment PT initiated treatment session in order to assist patient in achieving goals to improve transfers, gait training, and overall activity tolerance. Patient demonstrated progress toward achieving functional mobility goals as evidenced by improving activity tolerance, and overall mobility. Patient pleasant, cooperative, and agreeable to today's treatment session. Patient received supine in bed. Patient is currently MAxAx2 bed mobility, transfers. Throughout treatment session patient required both verbal and tactile cuing to improve safety, efficiency, and mechanics of mobility in addition to hands on assistance for all aspects of functional mobility. Additionally, pt required increased time to execute specific mobility tasks with rest breaks in between secondary to gross fatigue and weakness. Patient able to sit at EOB performing static/dynamic seated balance activities ~15 mins to perform ADLs and hygiene, utilizing b/l UE to maintain neutral posture and occasional MinAx1 due to forward flexion and fear of falling. Attempted x6 STS, able to perform x3 successfully with b/l HHA and MaxAx2, requiring verbal/tactile cues to improve body mechanics/form during STS and encouragement to participate due to constant fear of falling. Patient noted with excessive R LE ER in seated position, required assistance to maintain neutral position during STS. Patient declined transfer to bedside chair at this time due to fatigue, pain, weakness, and  fear of falling during transfer. Repositioned patient back into bed requiring a boost and use of wedges  for repositioning / off loading to reduce risk of pressure ulcers. Patient left supine in bed with alarm and all needs met.       Patient will benefit from continued skilled physical therapy to address gait / balance dysfunction, decreased activity tolerance, and generalized weakness. The patients AM-PAC Basic Mobility Inpatient Short From Raw Score is 9 .  Based on AM-PAC scoring and patient presentation, PT currently recommending Level II (Moderate Resource Intensity). Please also refer to the recommendation of the Physical Therapist for safe discharge planning.   Goals   Patient Goals to improve mobility and strength   STG Expiration Date 12/25/24   PT Treatment Day 1   Plan   Treatment/Interventions ADL retraining;Functional transfer training;LE strengthening/ROM;Therapeutic exercise;Endurance training;Patient/family training;Bed mobility;Gait training;Spoke to nursing;Spoke to case management;OT   Progress Progressing toward goals   PT Frequency 2-3x/wk   Discharge Recommendation   Rehab Resource Intensity Level, PT II (Moderate Resource Intensity)   Equipment Recommended Walker;Wheelchair   AM-PAC Basic Mobility Inpatient   Turning in Flat Bed Without Bedrails 2   Lying on Back to Sitting on Edge of Flat Bed Without Bedrails 2   Moving Bed to Chair 2   Standing Up From Chair Using Arms 1   Walk in Room 1   Climb 3-5 Stairs With Railing 1   Basic Mobility Inpatient Raw Score 9   Turning Head Towards Sound 4   Follow Simple Instructions 3   Low Function Basic Mobility Raw Score  16   Low Function Basic Mobility Standardized Score  25.72   [facility] Highest Level Of Mobility   -HLM Goal 3: Sit at edge of bed   -HLM Achieved 3: Sit at edge of bed   Education   Education Provided Mobility training   Patient Demonstrates acceptance/verbal understanding   End of Consult   Patient Position at End of Consult  Supine;Bed/Chair alarm activated;All needs within reach     Cristina Gabriel PT, DPT

## 2024-12-16 NOTE — QUICK NOTE
Was called to bedside by nursing due to abdominal dehisence of midline wound following staple removal at beside this afternoon. The wound was repaired with interrupted vertical mattress sutures using 0 Prolene suture. The edges of the wound were infused with 1% Xylocaine prior to suturing. Patient tolerated the procedure well. Wound was well approximated at the end of the procedure. Pictures before and after are available below.     Titi Cordon, DO  Surgery PGY-3

## 2024-12-16 NOTE — PROGRESS NOTES
Progress Note - Surgery-General   Name: Lety Botello 64 y.o. female I MRN: [id number]  Unit/Bed#: [facility] 511-01 I Date of Admission: 12/9/2024   Date of Service: 12/16/2024 I Hospital Day: 7    Assessment & Plan  Encounter for gastrojejunal (GJ) tube placement issues  Admitted with clogged GJ-tube from rehab  Status post multiple exchanges of GJ tube by GI team, now tolerating goal feeds  Abdominal pain  General Surgery team consults for abdominal pain  Recent hospitalization for small bowel obstruction, status post ex lap, DAVID, SBR 11/22  Now, admitted following clogged GJ tube at rehab status post exchange by GI team, tolerating tube feeds at goal of 60    AVSS on room air    Urine 500 cc recorded  Bouts of emesis beginning this morning    A.m. labs pending    Plan  -Consider KUB  -Tube feeds currently held for a.m. vomiting  -monitor abd exam  -cards and GI following for recs  -Will consider midline stable removal today 12/16  -Rest of care per primary team          Subjective   Seen and examined.  Patient endorses emesis that began this morning.  She endorses nausea.  She endorses diffuse generalized abdominal pain.  She is having bowel function.    Objective :  Temp:  [97.1 °F (36.2 °C)-98.2 °F (36.8 °C)] 98.2 °F (36.8 °C)  HR:  [70-77] 75  BP: (108-133)/(43-81) 127/81  Resp:  [17-19] 19  SpO2:  [93 %-96 %] 93 %  O2 Device: None (Room air)    I/O         12/14 0701  12/15 0700 12/15 0701  12/16 0700    P.O. 80 720    I.V. (mL/kg) 90 (0.8)     NG/ 1200    IV Piggyback 250     Feedings 1386 187    Total Intake(mL/kg) 2406 (20.2) 2107 (17.7)    Urine (mL/kg/hr) 850 (0.3) 300 (0.2)    Emesis/NG output  0    Total Output 850 300    Net +1556 +1807          Unmeasured Urine Occurrence 1 x           Lines/Drains/Airways       Active Status       Name Placement date Placement time Site Days    Gastrostomy/Enterostomy PEG- Jejunostomy 20 Fr. LUQ 12/03/24  1553  LUQ  12    External Urinary Catheter 12/09/24  0000  --  7                  Physical Exam  General - no acute distress, responsive and cooperative  CV - warm, regular rate  Pulm - normal work of breathing, no respiratory distress  Abd - soft, distended protuberant abdomen, nontender. J Tube in place.  Neuro - m/s grossly intact, cn grossly intact  Ext - moving all extremities       Lab Results: I have reviewed the following results:  Recent Labs     12/14/24  0539 12/15/24  0943   WBC 9.34  --    HGB 8.9*  --    HCT 27.5*  --    *  --    SODIUM 133* 128*   K 5.1 5.9*    100   CO2 26 26   BUN 29* 25   CREATININE 0.50* 0.56*   GLUC 231* 371*   MG 1.9 1.8*   PHOS 2.2* 2.7   AST 12*  --    ALT 8  --    ALB 2.5*  --    TBILI 0.35  --    ALKPHOS 66  --        Titi Cordon, DO  Surgery PGY-3

## 2024-12-16 NOTE — CONSULTS
DERMATOLOGY:  CONSULTATION   Lety Botello 64 y.o. female MRN: [id number]  Unit/Bed#: [facility] 511-01 Encounter: [id number]          Inpatient consult to Dermatology     Date/Time  12/16/2024 4:12 PM     Performed by  Karuna Guillen MD   Authorized by  Kelvin Sheppard, DO                 DERM CONSULT - How was clinical care provided?: Attending present VIRTUALLY with Derm Resident and Patient; Resident present in-person with Patient; Patient in hospital setting (ED or INPATIENT)      Assessment/Recommendations     Ddx: Zoster vs Contact Derm vs Sweet's  Patient presents with lower abdominal erythematous coalescing papules with overlying crush and impetiginization. Has not improved since diagnosed with Zoster in September. Rash does cross midline. No reported itch, burning, or irritation. Differentials also include a contact dermatitis such as from cleaning solutions of recent surgeries or wound care. Odd location for Sweet's. Need to rule out zoster although 3 months would be a long time to have this presentation.     Plan:  -Perform viral swab of abdominal rash for HSV and VZV PCR  -Start Valtrex 1000mg TID until swab results  -4 mm punch biopsy performed today  -Continue adequate wound care and bandage changes to biopsy site  -Will follow up with biopsy results  -Contact on call derm at discharge so that outpatient follow up can be scheduled          At time of discharge, please facilitate urgent dermatology follow up at our [facility] by D[employer] Texting the consult resident or on-call dermatology provider to alert us that the patient is being discharged.   Office number: [phone]     Thank you for involving me in the care of your patient. Please call with questions, change in clinical status or if tests recommended above are abnormal.     Discussed with the primary service.    PROCEDURE NOTE:  PUNCH BIOPSY      Performing Physician:  Dr. Guillen    Anatomic Location; Clinical Description with  size (cm); Pre-Op Diagnosis:    4mm punch biopsy of erythematous crusted papules on lower abdomen  Ddx: zoster vs contact derm vs sweet's       Anesthesia: 1% xylocaine with epi       Topical anesthesia: None       Indications: To indicate diagnosis and management plan.    Procedure Details     Patient informed of the risks (including bleeding,scaring and infection) and benefits of the procedure explained. Verbal and written informed consent obtained. The area was prepped and draped in the usual fashion. Anesthesia was obtained with 1% lidocaine with epinephrine. The skin was then stretched perpendicular to the skin tension lines and a punch biopsy to an appropriate sampling depth was obtained with a 4 mm punch with a forceps and iris scissors.     Hemostasis was obtained with Gelfoam      Complications:  None      Specimen has been sent for review by Dermatopathology.      Plan:  1. Instructed to keep the wound dry and covered for 24-48h and clean thereafter.  2. Warning signs of infection were reviewed.    3. Recommended that the patient use acetaminophen as needed for pain        Standard post-procedure care has been explained and has been included in written form within the patient's copy of Informed Consent.        History:     HISTORY OF PRESENT ILLNESS:    Reason for Consult: zoster  HPI: Lety Botello is a 64 y.o. year old female with PMH of obesity, gastroparesis s/p GJ tube, T2DM, HTN, ambulatory dysfunction who presents with diffuse rash along lower abdomen. Patient currently admitted for ex-lap and potential gyn malignancy. Reported that the abdominal rash has been present since September and was diagnosed with zoster. Rash is asymptomatic, does not burn or itch, but still weeps. Has not significantly improved since September. Cr, LFTs, eos wnl. Currently on vanc and ceftriaxone. Dermatology was consulted for further evaluation of the rash.       Review of Systems:  Review of Systems      PAST MEDICAL  HISTORY:  Past Medical History:   Diagnosis Date    Diverticulitis     Endometrial cancer (HCC)     History of shingles     Right hemiabdomen (inactive)    Hypertension     IBS (irritable bowel syndrome)     Obesity     Small bowel obstruction (HCC)     Type 2 diabetes mellitus (HCC)      Past Surgical History:   Procedure Laterality Date    ABDOMINAL ADHESION SURGERY N/A 2024    Procedure: EXTENSIVE LYSIS OF ADHESIONS >90 MINUTES;  Surgeon: Alejo Ward MD;  Location: [facility];  Service: General    APPENDECTOMY      CHOLECYSTECTOMY      LAPAROTOMY N/A 2024    Procedure: LAPAROTOMY EXPLORATORY;  Surgeon: Alejo Ward MD;  Location: [facility];  Service: General    SIGMOIDECTOMY N/A     SMALL INTESTINE SURGERY N/A 2024    Procedure: RESECTION SMALL BOWEL WITH PRIMARY ANASTAMOSIS,PARTIAL OMENTECTOMY;  Surgeon: Alejo Ward MD;  Location: [facility];  Service: General       FAMILY HISTORY:  Non-contributory    SOCIAL HISTORY:  Social History   Unknown  Social History     Substance and Sexual Activity   Alcohol Use Not Currently     Social History     Substance and Sexual Activity   Drug Use Never     Social History     Tobacco Use   Smoking Status Never   Smokeless Tobacco Never       ALLERGIES:  Allergies   Allergen Reactions    Bee Pollen Anaphylaxis    Azithromycin Hives    Metformin Diarrhea    Other Other (See Comments)     hayfever with inhaler   hayfever with inhaler    Pollen Extract Other (See Comments)     hayfever with inhaler    Sulfamethizole Hives    Sulfamethoxazole-Trimethoprim Hives       MEDICATIONS:  All current active medications have been reviewed.       Physical exam:     Temp:  [98.2 °F (36.8 °C)-98.5 °F (36.9 °C)] 98.5 °F (36.9 °C)  HR:  [] 107  Resp:  [16-19] 16  BP: (108-143)/(43-81) 143/67  SpO2:  [92 %-96 %] 93 %  Temp (24hrs), Av.4 °F (36.9 °C), Min:98.2 °F (36.8 °C), Max:98.5 °F (36.9 °C)  Current: Temperature: 98.5 °F (36.9 °C)    PSYCH:  Normal mood and affect  EYES: Normal conjunctiva  ENT: Normal lips and oral mucosa  CARDIOVASCULAR: No edema  RESPIRATORY: Normal respirations  HEME/LYMPH/IMMUNO:  No regional lymphadenopathy     FULL ORGAN SYSTEM SKIN EXAM (SKIN)                        Labs, Imaging, & Other Studies     Lab Results:  I have personally reviewed pertinent labs.     Results from last 7 days   Lab Units 12/16/24  0614 12/14/24  0539 12/13/24  0612   WBC Thousand/uL 11.45* 9.34 9.87   HEMOGLOBIN g/dL 8.6* 8.9* 8.3*   PLATELETS Thousands/uL 100* 143* 147*     Results from last 7 days   Lab Units 12/16/24  0614 12/15/24  0943 12/14/24  0539 12/11/24  0044 12/10/24  1410 12/10/24  0526 12/09/24  1447   POTASSIUM mmol/L 5.3   < > 5.1   < >  --    < > 4.8   CHLORIDE mmol/L 97   < > 102   < >  --    < > 100   CO2 mmol/L 25   < > 26   < >  --    < > 26   CO2, I-STAT mmol/L  --   --   --   --  26  --   --    BUN mg/dL 25   < > 29*   < >  --    < > 14   CREATININE mg/dL 0.64   < > 0.50*   < >  --    < > 0.39*   EGFR ml/min/1.73sq m 95   < > 104   < >  --    < > 112   GLUCOSE, ISTAT mg/dl  --   --   --   --  228*  --   --    CALCIUM mg/dL 7.4*   < > 7.5*   < >  --    < > 7.5*   AST U/L  --   --  12*  --   --   --  11*   ALT U/L  --   --  8  --   --   --  13   ALK PHOS U/L  --   --  66  --   --   --  66    < > = values in this interval not displayed.     Results from last 7 days   Lab Units 12/12/24  0959   MRSA CULTURE ONLY  No Methicillin Resistant Staphlyococcus aureus (MRSA) isolated           Pathology:   I have personally reviewed pertinent reports.       Karuna Guillen MD  Dermatology, PGY-2

## 2024-12-16 NOTE — CASE MANAGEMENT
Case Management Discharge Planning Note    Patient name Lety Botello  [facility] [id number]  : 1960 Date 2024       Current Admission Date: 2024  Current Admission Diagnosis:Encounter for gastrojejunal (GJ) tube placement issues   Patient Active Problem List    Diagnosis Date Noted Date Diagnosed    NSVT (nonsustained ventricular tachycardia) ([facility]) 2024     Coronary artery disease involving native coronary artery 2024     Herpes zoster without complication 2024     Acute respiratory failure with hypoxia ([facility]) 12/10/2024     Hypomagnesemia 12/10/2024     Encounter for gastrojejunal (GJ) tube placement issues 2024     Hyponatremia 2024     Anemia 2024     Goals of care, counseling/discussion 2024     Palliative care encounter 2024     Hypotension after procedure 2024     Shingles 2024     On total parenteral nutrition (TPN) 2024     Abdominal pain 2024     Nausea & vomiting 2024     Constipation 11/10/2024     Morbid obesity (HCC) 2024     Gastroparesis 2024     Gastric dilation 2024     History of pulmonary embolism 2024     Hypothyroidism 2024     Mild intermittent asthma 2024     Hypertension      Type 2 diabetes mellitus (HCC)      Endometrial cancer (HCC)      Obesity      SBO (small bowel obstruction) (HCC) 2024       LOS (days): 7  Geometric Mean LOS (GMLOS) (days):   Days to GMLOS:     OBJECTIVE:  Risk of Unplanned Readmission Score: 37.93         Current admission status: Inpatient   Preferred Pharmacy:   [id number] - [place] - [address]  [address] [address]  Phone: [phone] Fax: [phone]    Primary Care Provider: Tai Martinez    Primary Insurance: POPPY TSANG  Secondary Insurance:     DISCHARGE DETAILS:            Other Referral/Resources/Interventions Provided:  Referral Comments: Pt  discussed during care coordination rounds. Not medically cleared. DCP is to return to [facility] to continue STR. Will need new auth. Aidin message update sent today to notify pt will need a few more days. CM to follow.

## 2024-12-16 NOTE — ASSESSMENT & PLAN NOTE
Palliative Diagnosis: endometrial cancer    Goals:   Full cares, no limits  Palliative will follow for ongoing goals of care discussions as situation evolves.    Social Support:  Patient's support system:   Supportive listening provided  Normalized experience of patient  Advocated for patient/family with interdisciplinary team    Care Coordination  Case discussed with RN    Follow-up  We appreciate the opportunity to participate in this patient's care.   We will continue to follow while admitted.    Please do not hesitate to contact our on-call provider through [employer] Secure Chat or contact [phone] should there be an acute change or other symptom control concerns.

## 2024-12-16 NOTE — ASSESSMENT & PLAN NOTE
Patient with recent hospitalization due to small bowel obstruction s/p exploratory laparotomy with small bowel resection on 11/22/2024  Now with worsening abdominal pain and distention  Underwent history of gastroparesis  Evaluated by general surgery   Abdominal pelvis CT scan, negative for obstruction, stable bilateral pleural effusion and anasarca  Abdominal pain is improving after having bowel movement  Start oral Lasix twice daily for third spacing fluid  Continue supportive care  Continue with urinary retention protocol and bowel regimen

## 2024-12-16 NOTE — ASSESSMENT & PLAN NOTE
Located on the right lower abdomen, well crusted, no active lesion  With oozing and bleeding  Continue with Neurontin  Consult dermatology for further management

## 2024-12-16 NOTE — ASSESSMENT & PLAN NOTE
Lab Results   Component Value Date    HGBA1C 6.9 (H) 11/05/2024       Recent Labs     12/15/24  1623 12/15/24  2030 12/16/24  0617 12/16/24  1045   POCGLU 298* 314* 335* 259*       Blood Sugar Average: Last 72 hrs:  (P) [id number]

## 2024-12-16 NOTE — CONSULTS
Consultation - Palliative Care   Name: Lety Botello 64 y.o. female I MRN: [id number]  [facility] Date of Admission: 12/9/2024   Date of Service: 12/16/2024 I Hospital Day: 7   Inpatient consult to Palliative Care  Consult performed by: Pauly Grissom DO  Consult ordered by: Pauly Ibrahim MD        Physician Requesting Evaluation: Kelvin Sheppard DO   Reason for Evaluation / Principal Problem: pain    Assessment & Plan  Encounter for gastrojejunal (GJ) tube placement issues    Hypertension    Type 2 diabetes mellitus (HCC)  Lab Results   Component Value Date    HGBA1C 6.9 (H) 11/05/2024       Recent Labs     12/15/24  1623 12/15/24  2030 12/16/24  0617 12/16/24  1045   POCGLU 298* 314* 335* 259*       Blood Sugar Average: Last 72 hrs:  (P) [id number]    Endometrial cancer (HCC)    History of pulmonary embolism    Hypothyroidism    Gastroparesis    Morbid obesity (HCC)    Abdominal pain  Current regimen:  Link oxycodone orders for moderate to severe pain  Decrease IV dilaudid to 0.2 mg and decrease availability to q6H PRN for BT pain  Schedule Tylenol 975 mg TID  Consider addition of flomax or other if ongoing urinary retention  Minimize opioids- given recurrent SBO and now with urinary retention  Failed therapies:  NA  Total OME over last 24 hours: NA  Bowel regimen to prevent OIC  Opioid agreement reviewed and signed. Not on file, will need in outpatient follow up    Shingles    Palliative care encounter  Palliative Diagnosis: endometrial cancer    Goals:   Full cares, no limits  Palliative will follow for ongoing goals of care discussions as situation evolves.    Social Support:  Patient's support system:   Supportive listening provided  Normalized experience of patient  Advocated for patient/family with interdisciplinary team    Care Coordination  Case discussed with RN    Follow-up  We appreciate the opportunity to participate in this patient's care.   We will continue to follow  while admitted.    Please do not hesitate to contact our on-call provider through [employer] Secure Chat or contact [phone] should there be an acute change or other symptom control concerns.    Hyponatremia    Anemia    Acute respiratory failure with hypoxia (HCC)    Hypomagnesemia    NSVT (nonsustained ventricular tachycardia) (HCC)    Coronary artery disease involving native coronary artery    Herpes zoster without complication      PDMP Review         Value Time User    PDMP Reviewed  Yes 12/16/2024  1:09 PM Pauly Grissom,            Palliative Care service will follow.  Please contact the SecureChat role for the Palliative Care service with any questions/concerns.    Decisional apparatus: Patient is competent on my exam today. If competence is lost, patient's substitute decision maker would default to niece by [place] Act [place].   Advance Directive / Living Will / POLST: yes     PDMP Review: I have reviewed the patient's controlled substance dispensing history in the Prescription Drug Monitoring Program in compliance with the [facility] regulations before prescribing any controlled substances.    History of Present Illness   HPI: Lety Botello is a 64 y.o. year old female who presents on December 9 for clogging of her GJ tube.  Patient has a known history of endometrial cancer, gastroparesis, obesity, with recurrent SBO's.  Most recently she had a GJ tube placed for both tube feeds and venting purposes.  She is able to take things by mouth but she prefers to use artificial nutrition.  Patient initially needed critical care services due to acute hypoxic respiratory failure requiring BiPAP support, but was quickly weaned down to nasal cannula and transferred him the medical floor.  Palliative care was consulted to assist with pain control.  Patient reports uncontrolled pain to this provider but per discussion with bedside RN who reports have been quite variable to different providers.  Also, at this time she is starting  to develop urinary retention with earlier straight cath yielding over a liter of retained urine.  Patient states that the IV Dilaudid works better than the oxycodone.  However, is not able to truly qualify her pain or rate it.  There is concerns about continued opioid use given her recurrent obstructions and now with concern of urinary retention which can also be attributed to opioid use.  Will plan to optimize adjunct of therapies while minimizing opioids to optimize her pain control.    Review of Systems  I have reviewed the patient's PMH, PSH, Social History, Family History, Meds, and Allergies    Objective :  Temp:  [98.2 °F (36.8 °C)-98.5 °F (36.9 °C)] 98.4 °F (36.9 °C)  HR:  [] 75  BP: (108-143)/(43-81) 122/61  Resp:  [14-19] 14  SpO2:  [92 %-96 %] 95 %  O2 Device: None (Room air)    Physical Exam  Vitals and nursing note reviewed.   Constitutional:       General: She is not in acute distress.     Appearance: She is obese. She is ill-appearing.   HENT:      Head: Normocephalic and atraumatic.      Right Ear: External ear normal.      Left Ear: External ear normal.   Eyes:      General:         Right eye: No discharge.         Left eye: No discharge.   Cardiovascular:      Rate and Rhythm: Normal rate.   Pulmonary:      Effort: Pulmonary effort is normal. No respiratory distress.   Abdominal:      General: There is no distension.      Palpations: Abdomen is soft.   Skin:     Coloration: Skin is pale.   Neurological:      Mental Status: She is oriented to person, place, and time.   Psychiatric:         Mood and Affect: Mood normal.         Behavior: Behavior normal.            Lab Results: I have reviewed the following results:  Lab Results   Component Value Date/Time    SODIUM 128 (L) 12/16/2024 06:14 AM    SODIUM 132 (L) 11/04/2024 03:33 AM    K 5.3 12/16/2024 06:14 AM    K 4.7 11/04/2024 03:33 AM    BUN 25 12/16/2024 06:14 AM    BUN 19 11/04/2024 03:33 AM    CREATININE 0.64 12/16/2024 06:14 AM     CREATININE 0.76 11/04/2024 03:33 AM    GLUC 341 (H) 12/16/2024 06:14 AM    GLUC 98 11/04/2024 03:33 AM    CALCIUM 7.4 (L) 12/16/2024 06:14 AM    CALCIUM 8.6 11/04/2024 03:33 AM    AST 12 (L) 12/14/2024 05:39 AM    AST 9 11/04/2024 03:33 AM    ALT 8 12/14/2024 05:39 AM    ALT 6 11/04/2024 03:33 AM    ALB 2.5 (L) 12/14/2024 05:39 AM    ALB 3.1 (L) 11/04/2024 03:33 AM    TP 4.4 (L) 12/14/2024 05:39 AM    TP 5.4 (L) 11/04/2024 03:33 AM    EGFR 95 12/16/2024 06:14 AM    EGFR 89 11/04/2024 03:33 AM    EGFR 97 12/11/2020 03:15 AM     Lab Results   Component Value Date/Time    HGB 8.6 (L) 12/16/2024 06:14 AM    WBC 11.45 (H) 12/16/2024 06:14 AM     (L) 12/16/2024 06:14 AM     12/01/2023 08:30 AM    INR 1.49 (H) 11/23/2024 10:01 AM    INR 1.1 12/01/2023 08:30 AM    PTT 49 (H) 11/27/2024 06:19 AM     Lab Results   Component Value Date/Time    VIJ2AEJULQPS 11.159 (H) 12/13/2024 06:12 AM       Imaging Results Review: I reviewed radiology reports from this admission including: CT abdomen/pelvis.  Other Study Results Review: EKG was reviewed.     Code Status: Level 1 - Full Code  Advance Directive and Living Will: Yes    Power of :    POLST:      Administrative Statements   I have spent a total time of 55+ minutes in caring for this patient on the day of the visit/encounter including Risks and benefits of tx options, Instructions for management, Patient and family education, Importance of tx compliance, Risk factor reductions, Impressions, Counseling / Coordination of care, Documenting in the medical record, Reviewing / ordering tests, medicine, procedures  , Obtaining or reviewing history  , and Communicating with other healthcare professionals . Topics discussed with the patient / family include symptom assessment and management, medication review, medication adjustment, psychosocial support, opioid titration, and supportive listening.

## 2024-12-16 NOTE — ASSESSMENT & PLAN NOTE
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 Admitted with malfunctioning J tube. Underwent EGD 12/10 with ulcerated mucosa and pus below bumper, J tube looped in stomach which was replaced into distal duodenum. J tube malfunctioned again leading to replacement 12/13. Has been functioning and tolerating feeds since replacement, though does report intermittent generalized abdominal pain. Has been on CTX/Vancomycin for PEG infection since 12/11.     GI reengaged to comment on timing and duration of Abx.     PEG site appears to be erythematous near insertion site with granulation tissue, expression of gastric fluid from site. No leukocytosis, has been afebrile since admission. Bumper snug, was loosened during evaluation and freely rotated afterwards. Dressing replaced. Tube was taped in a fashion to prevent further traction.  Had 2 episodes of emesis overnight.  CT abdomen and pelvis showed gastrojejunostomy tube feeding appropriate position.  No evidence of bowel obstruction.  Severe body wall anasarca and large volume ascites present and unchanged from prior scans.    Evaluated to begin this morning.  Gastric portion flushing without issues and tube feeds running via the jejunal portion      Plan:  Continue antibiotics to complete 7 day course (day 6/7)   Continue TF through J tube; meds via PO or liquid formulations through G tube if unable to tolerate PO.  Admits gastric portion to [place] bag and drain to gravity for venting.  CLD as tolerated per primary   Recommend judicious use of opioids if this is contributing to generalized slowing of her GI system  Change dressing daily and when soiled, discussed with nursing to ensure tube is taped straight so that it prevents traction   Additional pain and symptom management per primary team

## 2024-12-16 NOTE — ASSESSMENT & PLAN NOTE
Current regimen:  Link oxycodone orders for moderate to severe pain  Decrease IV dilaudid to 0.2 mg and decrease availability to q6H PRN for BT pain  Schedule Tylenol 975 mg TID  Consider addition of flomax or other if ongoing urinary retention  Minimize opioids- given recurrent SBO and now with urinary retention  Failed therapies:  NA  Total OME over last 24 hours: NA  Bowel regimen to prevent OIC  Opioid agreement reviewed and signed. Not on file, will need in outpatient follow up

## 2024-12-16 NOTE — ASSESSMENT & PLAN NOTE
Lab Results   Component Value Date    HGBA1C 6.9 (H) 11/05/2024       Recent Labs     12/15/24  1055 12/15/24  1623 12/15/24  2030 12/16/24  0617   POCGLU 345* 298* 314* 335*       Blood Sugar Average: Last 72 hrs:  (P) [id number]    Farxiga on hold while inpatient  Hyperglycemia secondary to steroids and tube feeding  Continue Lantus nightly, continue Humalog tid, adjust the doses as needed  Continue SSI

## 2024-12-16 NOTE — ASSESSMENT & PLAN NOTE
General Surgery team consults for abdominal pain  Recent hospitalization for small bowel obstruction, status post ex lap, DAVID, SBR 11/22  Now, admitted following clogged GJ tube at rehab status post exchange by GI team, tolerating tube feeds at goal of 60    AVSS on room air    Urine 500 cc recorded  Bouts of emesis beginning this morning    A.m. labs pending    Plan  -Consider KUB  -Tube feeds currently held for a.m. vomiting  -monitor abd exam  -cards and GI following for recs  -Will consider midline stable removal today 12/16  -Rest of care per primary team

## 2024-12-16 NOTE — ASSESSMENT & PLAN NOTE
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  Persistent third spacing with slight increase in moderate volume ascites, and no significant change in moderate size right and small left pleural effusions with bibasilar atelectasis. 2.  New percutaneous enteric tube with tip in the distal duodenum.\"  GI following. S/p EGD 12/10 with J-tube exchange, postop patient had worsening hypoxia required transfer to ICU and using BiPAP  Concern for possible infection surrounding J-tube site patient was started on vancomycin and ceftriaxone day # 6/7 per GI  Now with clogged J-tube,   Status post EGD on 12/13 with J-tube replacement   Resumed on tube feeding  On clear liquid diet per speech  "

## 2024-12-16 NOTE — PROGRESS NOTES
Lety Botello is a 64 y.o. female who is currently ordered Vancomycin IV with management by the Pharmacy Consult service.  Relevant clinical data and objective / subjective history reviewed.  Vancomycin Assessment:  Indication and Goal AUC/Trough: Soft tissue (goal -600, trough >10), -600, trough >10  Clinical Status: stable  Micro:   12/12: MRSA nares-negative  Renal Function:  SCr: 0.64 mg/dL  CrCl: 100 mL/min  Renal replacement: Not on dialysis  Days of Therapy: 5  Current Dose: 1000 mg IV q12h  Vancomycin Plan:  New Dosing: continue current dose  Estimated AUC: 524 mcg*hr/mL  Estimated Trough: 15.2 mcg/mL  Next Level: 12/19 with AM labs  Renal Function Monitoring: Daily BMP and UOP  Pharmacy will continue to follow closely for s/sx of nephrotoxicity, infusion reactions and appropriateness of therapy.  BMP and CBC will be ordered per protocol. We will continue to follow the patient’s culture results and clinical progress daily.    Ayala Lindquist, Pharmacist

## 2024-12-16 NOTE — PROCEDURES
Insert Complex Venous Access Line    Date/Time: 12/16/2024 12:37 PM    Performed by: Quiana Major RN  Authorized by: Kelvin Sheppard DO    Patient location:  Bedside  Other Assisting Provider: No    Universal protocol:     Procedure explained and questions answered to patient or proxy's satisfaction: yes      Immediately prior to procedure, a time out was called: yes      Relevant documents present and verified: yes      Test results available and properly labeled: yes      Radiology Images displayed and confirmed.  If images not available, report reviewed: yes      Required blood products, implants, devices, and special equipment available: yes      Site/side marked: yes      Patient identity confirmed:  Verbally with patient, hospital-assigned identification number and arm band  Pre-procedure details:     Hand hygiene: Hand hygiene performed prior to insertion      Sterile barrier technique: All elements of maximal sterile technique followed      Skin preparation:  ChloraPrep    Skin preparation agent: Skin preparation agent completely dried prior to procedure    Procedure details:     Complex Venous Access Line Type: Midline      Complex Venous Access Line Indications: no peripheral vascular access      Orientation:  Left    Location:  Basilic    Catheter size:  20 gauge (REF: [id number], LOT: [id number], EXP: 2025-08-31)    Total catheter length (cm):  10    Catheter out on skin (cm):  0    Arm circumference:  34    Patient evaluated for contraindications to access (i.e. fistula, thrombosis, etc): Yes      Site selection rationale:  Largest most patent vein    Approach: percutaneous technique used      Patient position:  Flat    Ultrasound image availability:  Not saved    Sterile ultrasound techniques: Sterile gel and sterile probe covers were used      Number of attempts:  1    Successful placement: yes      Landmarks identified: yes    Anesthesia (see MAR for exact dosages):     Anesthesia method:   None  Post-procedure details:     Post-procedure:  Dressing applied and securement device placed    Assessment:  Blood return through all ports and free fluid flow    Post-procedure complications: none      Patient tolerance of procedure:  Tolerated well, no immediate complications

## 2024-12-16 NOTE — PLAN OF CARE
Problem: Prexisting or High Potential for Compromised Skin Integrity  Goal: Skin integrity is maintained or improved  Description: INTERVENTIONS:  - Identify patients at risk for skin breakdown  - Assess and monitor skin integrity  - Assess and monitor nutrition and hydration status  - Monitor labs   - Assess for incontinence   - Turn and reposition patient  - Assist with mobility/ambulation  - Relieve pressure over bony prominences  - Avoid friction and shearing  - Provide appropriate hygiene as needed including keeping skin clean and dry  - Evaluate need for skin moisturizer/barrier cream  - Collaborate with interdisciplinary team   - Patient/family teaching  - Consider wound care consult   Outcome: Progressing     Problem: PAIN - ADULT  Goal: Verbalizes/displays adequate comfort level or baseline comfort level  Description: Interventions:  - Encourage patient to monitor pain and request assistance  - Assess pain using appropriate pain scale  - Administer analgesics based on type and severity of pain and evaluate response  - Implement non-pharmacological measures as appropriate and evaluate response  - Consider cultural and social influences on pain and pain management  - Notify physician/advanced practitioner if interventions unsuccessful or patient reports new pain  Outcome: Progressing     Problem: INFECTION - ADULT  Goal: Absence or prevention of progression during hospitalization  Description: INTERVENTIONS:  - Assess and monitor for signs and symptoms of infection  - Monitor lab/diagnostic results  - Monitor all insertion sites, i.e. indwelling lines, tubes, and drains  - Monitor endotracheal if appropriate and nasal secretions for changes in amount and color  - [place] appropriate cooling/warming therapies per order  - Administer medications as ordered  - Instruct and encourage patient and family to use good hand hygiene technique  - Identify and instruct in appropriate isolation precautions for  identified infection/condition  Outcome: Progressing     Problem: SAFETY ADULT  Goal: Patient will remain free of falls  Description: INTERVENTIONS:  - Educate patient/family on patient safety including physical limitations  - Instruct patient to call for assistance with activity   - Consult OT/PT to assist with strengthening/mobility   - Keep Call bell within reach  - Keep bed low and locked with side rails adjusted as appropriate  - Keep care items and personal belongings within reach  - Initiate and maintain comfort rounds  - Make Fall Risk Sign visible to staff  - Offer Toileting every 2 Hours, in advance of need  - Initiate/Maintain bed/chair alarm  - Obtain necessary fall risk management equipment: nonskid footwear  - Apply yellow socks and bracelet for high fall risk patients  - Consider moving patient to room near nurses station  Outcome: Progressing  Goal: Maintain or return to baseline ADL function  Description: INTERVENTIONS:  -  Assess patient's ability to carry out ADLs; assess patient's baseline for ADL function and identify physical deficits which impact ability to perform ADLs (bathing, care of mouth/teeth, toileting, grooming, dressing, etc.)  - Assess/evaluate cause of self-care deficits   - Assess range of motion  - Assess patient's mobility; develop plan if impaired  - Assess patient's need for assistive devices and provide as appropriate  - Encourage maximum independence but intervene and supervise when necessary  - Involve family in performance of ADLs  - Assess for home care needs following discharge   - Consider OT consult to assist with ADL evaluation and planning for discharge  - Provide patient education as appropriate  Outcome: Progressing  Goal: Maintains/Returns to pre admission functional level  Description: INTERVENTIONS:  - Perform AM-PAC 6 Click Basic Mobility/ Daily Activity assessment daily.  - Set and communicate daily mobility goal to care team and patient/family/caregiver.   -  Collaborate with rehabilitation services on mobility goals if consulted  - Perform Range of Motion 3 times a day.  - Reposition patient every 2 hours.  - Dangle patient 3 times a day  - Stand patient 3 times a day  - Ambulate patient 3 times a day  - Out of bed to chair 3 times a day   - Out of bed for meals 3 times a day  - Out of bed for toileting  - Record patient progress and toleration of activity level   Outcome: Progressing     Problem: DISCHARGE PLANNING  Goal: Discharge to home or other facility with appropriate resources  Description: INTERVENTIONS:  - Identify barriers to discharge w/patient and caregiver  - Arrange for needed discharge resources and transportation as appropriate  - Identify discharge learning needs (meds, wound care, etc.)  - Arrange for interpretive services to assist at discharge as needed  - Refer to Case Management Department for coordinating discharge planning if the patient needs post-hospital services based on physician/advanced practitioner order or complex needs related to functional status, cognitive ability, or social support system  Outcome: Progressing     Problem: Knowledge Deficit  Goal: Patient/family/caregiver demonstrates understanding of disease process, treatment plan, medications, and discharge instructions  Description: Complete learning assessment and assess knowledge base.  Interventions:  - Provide teaching at level of understanding  - Provide teaching via preferred learning methods  Outcome: Progressing     Problem: Nutrition/Hydration-ADULT  Goal: Nutrient/Hydration intake appropriate for improving, restoring or maintaining nutritional needs  Description: Monitor and assess patient's nutrition/hydration status for malnutrition. Collaborate with interdisciplinary team and initiate plan and interventions as ordered.  Monitor patient's weight and dietary intake as ordered or per policy. Utilize nutrition screening tool and intervene as necessary. Determine  patient's food preferences and provide high-protein, high-caloric foods as appropriate.     INTERVENTIONS:  - Monitor oral intake, urinary output, labs, and treatment plans  - Assess nutrition and hydration status and recommend course of action  - Evaluate amount of meals eaten  - Assist patient with eating if necessary   - Allow adequate time for meals  - Recommend/ encourage appropriate diets, oral nutritional supplements, and vitamin/mineral supplements  - Order, calculate, and assess calorie counts as needed  - Recommend, monitor, and adjust tube feedings and TPN/PPN based on assessed needs  - Assess need for intravenous fluids  - Provide specific nutrition/hydration education as appropriate  - Include patient/family/caregiver in decisions related to nutrition  Outcome: Progressing     Problem: GASTROINTESTINAL - ADULT  Goal: Minimal or absence of nausea and/or vomiting  Description: INTERVENTIONS:  - Administer IV fluids if ordered to ensure adequate hydration  - Maintain NPO status until nausea and vomiting are resolved  - Nasogastric tube if ordered  - Administer ordered antiemetic medications as needed  - Provide nonpharmacologic comfort measures as appropriate  - Advance diet as tolerated, if ordered  - Consider nutrition services referral to assist patient with adequate nutrition and appropriate food choices  Outcome: Progressing  Goal: Maintains or returns to baseline bowel function  Description: INTERVENTIONS:  - Assess bowel function  - Encourage oral fluids to ensure adequate hydration  - Administer IV fluids if ordered to ensure adequate hydration  - Administer ordered medications as needed  - Encourage mobilization and activity  - Consider nutritional services referral to assist patient with adequate nutrition and appropriate food choices  Outcome: Progressing  Goal: Maintains adequate nutritional intake  Description: INTERVENTIONS:  - Monitor percentage of each meal consumed  - Identify factors  contributing to decreased intake, treat as appropriate  - Assist with meals as needed  - Monitor I&O, weight, and lab values if indicated  - Obtain nutrition services referral as needed  Outcome: Progressing  Goal: Oral mucous membranes remain intact  Description: INTERVENTIONS  - Assess oral mucosa and hygiene practices  - Implement preventative oral hygiene regimen  - Implement oral medicated treatments as ordered  - Initiate Nutrition services referral as needed  Outcome: Progressing     Problem: GENITOURINARY - ADULT  Goal: Maintains or returns to baseline urinary function  Description: INTERVENTIONS:  - Assess urinary function  - Encourage oral fluids to ensure adequate hydration if ordered  - Administer IV fluids as ordered to ensure adequate hydration  - Administer ordered medications as needed  - Offer frequent toileting  - Follow urinary retention protocol if ordered  Outcome: Progressing  Goal: Absence of urinary retention  Description: INTERVENTIONS:  - Assess patient’s ability to void and empty bladder  - Monitor I/O  - Bladder scan as needed  - Discuss with physician/AP medications to alleviate retention as needed  - Discuss catheterization for long term situations as appropriate  Outcome: Progressing

## 2024-12-16 NOTE — PLAN OF CARE
Problem: PHYSICAL THERAPY ADULT  Goal: Performs mobility at highest level of function for planned discharge setting.  See evaluation for individualized goals.  Description: Treatment/Interventions: ADL retraining, Functional transfer training, LE strengthening/ROM, Elevations, Therapeutic exercise, Endurance training, Cognitive reorientation, Patient/family training, Equipment eval/education, Bed mobility, Compensatory technique education, Continued evaluation, Spoke to nursing, Spoke to case management, Spoke to advanced practitioner, OT  Equipment Recommended: Wheelchair       See flowsheet documentation for full assessment, interventions and recommendations.  Outcome: Progressing  Note: Prognosis: Fair  Problem List: Decreased strength, Decreased range of motion, Decreased endurance, Impaired balance, Decreased mobility, Decreased coordination, Decreased cognition, Impaired judgement, Decreased safety awareness, Impaired sensation, Obesity, Decreased skin integrity, Pain  Assessment: PT initiated treatment session in order to assist patient in achieving goals to improve transfers, gait training, and overall activity tolerance. Patient demonstrated progress toward achieving functional mobility goals as evidenced by improving activity tolerance, and overall mobility. Patient pleasant, cooperative, and agreeable to today's treatment session. Patient received supine in bed. Patient is currently MAxAx2 bed mobility, transfers. Throughout treatment session patient required both verbal and tactile cuing to improve safety, efficiency, and mechanics of mobility in addition to hands on assistance for all aspects of functional mobility. Additionally, pt required increased time to execute specific mobility tasks with rest breaks in between secondary to gross fatigue and weakness. Patient able to sit at EOB performing static/dynamic seated balance activities ~15 mins to perform ADLs and hygiene, utilizing b/l UE to maintain  neutral posture and occasional MinAx1 due to forward flexion and fear of falling. Attempted x6 STS, able to perform x3 successfully with b/l HHA and MaxAx2, requiring verbal/tactile cues to improve body mechanics/form during STS and encouragement to participate due to constant fear of falling. Patient noted with excessive R LE ER in seated position, required assistance to maintain neutral position during STS. Patient declined transfer to bedside chair at this time due to fatigue, pain, weakness, and fear of falling during transfer. Repositioned patient back into bed requiring a boost and use of wedges  for repositioning / off loading to reduce risk of pressure ulcers. Patient left supine in bed with alarm and all needs met. Patient will benefit from continued skilled physical therapy to address gait / balance dysfunction, decreased activity tolerance, and generalized weakness. The patients AM-PAC Basic Mobility Inpatient Short From Raw Score is 9 .  Based on AM-PAC scoring and patient presentation, PT currently recommending Level II (Moderate Resource Intensity). Please also refer to the recommendation of the Physical Therapist for safe discharge planning.        Rehab Resource Intensity Level, PT: II (Moderate Resource Intensity)    See flowsheet documentation for full assessment.

## 2024-12-16 NOTE — PROGRESS NOTES
"Progress Note - Hospitalist   Name: Lety Botello 64 y.o. 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  Persistent third spacing with slight increase in moderate volume ascites, and no significant change in moderate size right and small left pleural effusions with bibasilar atelectasis. 2.  New percutaneous enteric tube with tip in the distal duodenum.\"  GI following. S/p EGD 12/10 with J-tube exchange, postop patient had worsening hypoxia required transfer to ICU and using BiPAP  Concern for possible infection surrounding J-tube site patient was started on vancomycin and ceftriaxone day # 6/7 per GI  Now with clogged J-tube,   Status post EGD on 12/13 with J-tube replacement   Resumed on tube feeding  On clear liquid diet per speech  Abdominal pain  Patient with recent hospitalization due to small bowel obstruction s/p exploratory laparotomy with small bowel resection on 11/22/2024  Now with worsening abdominal pain and distention  Underwent history of gastroparesis  Evaluated by general surgery   Abdominal pelvis CT scan, negative for obstruction, stable bilateral pleural effusion and anasarca  Abdominal pain is improving after having bowel movement  Start oral Lasix twice daily for third spacing fluid  Continue supportive care  Continue with urinary retention protocol and bowel regimen  Acute respiratory failure with hypoxia (HCC)  Patient had worsening hypoxia post EGD on 12/10. Was satting mid-80s on 10L  Was given nebs, Lasix and subsequently " placed on BiPAP and upgraded to critical care for further treatment  Currently in stable condition and transferred out of ICU  Currently on room air  NSVT (nonsustained ventricular tachycardia) (HCC)  25 run of nonsustained V. tach noted on telemetry, patient was asymptomatic  Cardiology consulted  Cardiac echo with normal EF and mild aortic stenosis  Coreg dose was increased to 25 mg twice daily  Monitor  Cardiology signed off    Hypothyroidism  Elevated TSH  Normal free T4 at 0.65  Patient was not receiving levothyroxine due to  PEG tube malfunctioning   Crease levothyroxine dose to 150 mcg daily  Repeat thyroid study in 4 to 6 weeks  Anemia  Received IV iron x 2  Vitamin B12 deficiency  Continue vitamin B12 supplement  Herpes zoster without complication  Located on the right lower abdomen, well crusted, no active lesion  With oozing and bleeding  Continue with Neurontin  Consult dermatology for further management  Hyponatremia  Hyponatremia likely multifactorial  Corrected sodium given hypoglycemia is 134  Replace magnesium  Monitor  Hypertension  Monitor blood pressures  Continue carvedilol, Norvasc was added  Avoid hypotension  Coronary artery disease involving native coronary artery  History of MI in 2016 status post KARY  Stable  Type 2 diabetes mellitus (HCC)  Lab Results   Component Value Date    HGBA1C 6.9 (H) 11/05/2024       Recent Labs     12/15/24  1055 12/15/24  1623 12/15/24  2030 12/16/24  0617   POCGLU 345* 298* 314* 335*       Blood Sugar Average: Last 72 hrs:  (P) [id number]    Farxiga on hold while inpatient  Hyperglycemia secondary to steroids and tube feeding  Continue Lantus nightly, continue Humalog tid, adjust the doses as needed  Continue SSI  Endometrial cancer (HCC)  History of endometrial cancer noted  History of pulmonary embolism  Continue Eliquis 5 mg twice daily  Gastroparesis  History of   With PEG-J as above  GI following  Morbid obesity (HCC)  Body mass index is 51.36  kg/m².  Encourage lifestyle modification and weight loss once acute issues improved/resolved    VTE Pharmacologic Prophylaxis: VTE Score: 7 High Risk (Score >/= 5) - Pharmacological DVT Prophylaxis Ordered: apixaban (Eliquis). Sequential Compression Devices Ordered.    Mobility:   Basic Mobility Inpatient Raw Score: 7  JH-HLM Goal: 2: Bed activities/Dependent transfer  JH-HLM Achieved: 2: Bed activities/Dependent transfer  JH-HLM Goal achieved. Continue to encourage appropriate mobility.    Patient Centered Rounds: I performed bedside rounds with nursing staff today.   Discussions with Specialists or Other Care Team Provider: CM    Education and Discussions with Family / Patient:  Patient.     Current Length of Stay: 7 day(s)  Current Patient Status: Inpatient   Certification Statement: The patient will continue to require additional inpatient hospital stay due to tube feeding malfunctioning and anasarca  Discharge Plan: Anticipate discharge in >72 hrs to rehab facility.    Code Status: Level 1 - Full Code    Subjective   Patient seen and examined  Comfortable in bed  Had a bowel movement  Urinary retention with a straight cath for 1100 ml today    Objective :  Temp:  [97.1 °F (36.2 °C)-98.5 °F (36.9 °C)] 98.5 °F (36.9 °C)  HR:  [] 107  BP: (108-143)/(43-81) 143/67  Resp:  [16-19] 16  SpO2:  [92 %-96 %] 92 %    Body mass index is 51.36 kg/m².     Input and Output Summary (last 24 hours):     Intake/Output Summary (Last 24 hours) at 12/16/2024 1004  Last data filed at 12/16/2024 0936  Gross per 24 hour   Intake 3340 ml   Output 1600 ml   Net 1740 ml       Physical Exam    Patient is awake alert in no acute distress  Obese, ill-appearing comfortable in bed  Lung clear to auscultation bilateral anteriorly  Heart positive S1-S2 no murmur  Abdomen soft obese distended and edematous  Surgical staples in place  PEG-J tube in place   Bilateral upper and lower extremities edema  Lines/Drains:  Lines/Drains/Airways        Active Status       Name Placement date Placement time Site Days    Gastrostomy/Enterostomy PEG- Jejunostomy 20 Fr. LUQ 12/03/24  1553  LUQ  12    External Urinary Catheter 12/09/24  0000  -- 7                            Lab Results: I have reviewed the following results:   Results from last 7 days   Lab Units 12/16/24  0614 12/14/24  0539   WBC Thousand/uL 11.45* 9.34   HEMOGLOBIN g/dL 8.6* 8.9*   HEMATOCRIT % 26.8* 27.5*   PLATELETS Thousands/uL 100* 143*   BANDS PCT % 1  --    SEGS PCT %  --  86*   LYMPHO PCT % 0* 5*   MONO PCT % 3* 7   EOS PCT % 0 0     Results from last 7 days   Lab Units 12/16/24  0614 12/15/24  0943 12/14/24  0539   SODIUM mmol/L 128*   < > 133*   POTASSIUM mmol/L 5.3   < > 5.1   CHLORIDE mmol/L 97   < > 102   CO2 mmol/L 25   < > 26   BUN mg/dL 25   < > 29*   CREATININE mg/dL 0.64   < > 0.50*   ANION GAP mmol/L 6   < > 5   CALCIUM mg/dL 7.4*   < > 7.5*   ALBUMIN g/dL  --   --  2.5*   TOTAL BILIRUBIN mg/dL  --   --  0.35   ALK PHOS U/L  --   --  66   ALT U/L  --   --  8   AST U/L  --   --  12*   GLUCOSE RANDOM mg/dL 341*   < > 231*    < > = values in this interval not displayed.         Results from last 7 days   Lab Units 12/16/24  0617 12/15/24  2030 12/15/24  1623 12/15/24  1055 12/15/24  0612 12/14/24  2004 12/14/24  1550 12/14/24  1054 12/14/24  0611 12/13/24  2128 12/13/24  1655 12/13/24  1142   POC GLUCOSE mg/dl 335* 314* 298* 345* 317* 299* 306* 276* 234* 144* 122 133               Recent Cultures (last 7 days):         Imaging Results Review: I reviewed radiology reports from this admission including: CT abdomen/pelvis.  Other Study Results Review: No additional pertinent studies reviewed.    Last 24 Hours Medication List:     Current Facility-Administered Medications:     acetaminophen (TYLENOL) tablet 650 mg, Q6H PRN    albuterol (PROVENTIL HFA,VENTOLIN HFA) inhaler 2 puff, Q6H PRN    aluminum-magnesium hydroxide-simethicone (MAALOX) oral suspension 30 mL, Q6H PRN    amLODIPine  (NORVASC) tablet 5 mg, Daily    apixaban (ELIQUIS) tablet 5 mg, BID    aspirin (ECOTRIN LOW STRENGTH) EC tablet 81 mg, Daily    bisacodyl (DULCOLAX) rectal suppository 10 mg, Daily PRN    carvedilol (COREG) tablet 25 mg, BID With Meals    cefTRIAXone (ROCEPHIN) 2,000 mg in dextrose 5 % 50 mL IVPB, Q24H, Last Rate: 2,000 mg (12/16/24 8542)    cyanocobalamin (VITAMIN B-12) tablet 1,000 mcg, Daily    dicyclomine (BENTYL) capsule 10 mg, BID PRN    furosemide (LASIX) tablet 40 mg, BID (diuretic)    gabapentin (NEURONTIN) capsule 100 mg, Daily    HYDROmorphone (DILAUDID) injection 1 mg, Q4H PRN    insulin glargine (LANTUS) subcutaneous injection 20 Units 0.2 mL, HS    insulin lispro (HumALOG/ADMELOG) 100 units/mL subcutaneous injection 1-5 Units, TID AC **AND** Fingerstick Glucose (POCT), TID AC    insulin lispro (HumALOG/ADMELOG) 100 units/mL subcutaneous injection 10 Units, TID With Meals    iohexol (OMNIPAQUE) 240 MG/ML solution 50 mL, Once in imaging    levothyroxine tablet 150 mcg, Daily    magnesium sulfate 2 g/50 mL IVPB (premix) 2 g, Once    nitroglycerin (NITROSTAT) SL tablet 0.4 mg, Q5 Min PRN    omeprazole (PRILOSEC) suspension 2 mg/mL, Daily    ondansetron (ZOFRAN) injection 4 mg, Q6H PRN    oxyCODONE (ROXICODONE) oral solution 2.5 mg, Q4H PRN    oxyCODONE (ROXICODONE) oral solution 5 mg, Q4H PRN    polyethylene glycol (MIRALAX) packet 17 g, Daily    senna oral syrup 17.6 mg, BID    vancomycin (VANCOCIN) IVPB (premix in dextrose) 1,000 mg 200 mL, Q12H, Last Rate: 1,000 mg (12/15/24 3664)    Administrative Statements   Today, Patient Was Seen By: Kelvin Sheppard DO      **Please Note: This note may have been constructed using a voice recognition system.**

## 2024-12-16 NOTE — SPEECH THERAPY NOTE
Speech Language/Pathology    Speech/Language Pathology Progress Note    Patient Name: Lety Botello  Today's Date: 12/16/2024     Problem List  Principal Problem:    Encounter for gastrojejunal (GJ) tube placement issues  Active Problems:    Hypertension    Type 2 diabetes mellitus (HCC)    Endometrial cancer (HCC)    History of pulmonary embolism    Hypothyroidism    Gastroparesis    Morbid obesity (HCC)    Abdominal pain    Shingles    Palliative care encounter    Hyponatremia    Anemia    Acute respiratory failure with hypoxia (HCC)    Hypomagnesemia    NSVT (nonsustained ventricular tachycardia) (HCC)    Coronary artery disease involving native coronary artery    Herpes zoster without complication       Past Medical History  Past Medical History:   Diagnosis Date    Diverticulitis     Endometrial cancer (HCC)     History of shingles     Right hemiabdomen (inactive)    Hypertension     IBS (irritable bowel syndrome)     Obesity     Small bowel obstruction (HCC)     Type 2 diabetes mellitus (HCC)         Past Surgical History  Past Surgical History:   Procedure Laterality Date    ABDOMINAL ADHESION SURGERY N/A 11/22/2024    Procedure: EXTENSIVE LYSIS OF ADHESIONS >90 MINUTES;  Surgeon: Alejo Ward MD;  Location: [facility];  Service: General    APPENDECTOMY      CHOLECYSTECTOMY      LAPAROTOMY N/A 11/22/2024    Procedure: LAPAROTOMY EXPLORATORY;  Surgeon: Alejo Ward MD;  Location: [facility];  Service: General    SIGMOIDECTOMY N/A     SMALL INTESTINE SURGERY N/A 11/22/2024    Procedure: RESECTION SMALL BOWEL WITH PRIMARY ANASTAMOSIS,PARTIAL OMENTECTOMY;  Surgeon: Alejo Ward MD;  Location: [facility];  Service: General         Subjective:  Pt seen for dysphagia tx. Pt alert, positioned mostly upright in bed.     Objective:  Messaged with physician confirming that pt could try solid food, not just liquids. He confirmed she can have solid food. Pt remains hesitant to try anything but liquids.  "She points to abdomen and states \"there is something going on, it might be blocked again\", and expressed fear of getting another SBO. She was willing to drink several sips of water via straw. Oropharyngeal swallow appeared prompt. There were no overt s/s of aspiration. Pt said she might \"take one bite of pudding\" later. Returned at 1530, and pt was having a procedure in the room with nurse. It appears that pt is currently getting nutrition via J-tube (there have been some issues with it since it was placed).     Assessment:  Recommend pt continue with clear liquids for now.     Plan/Recommendations:  Continue clear liquids and supplemental nutrition via J-tube. Aspiration precautions. Continue with ST, trial solid foods when pt is agreeable.      "

## 2024-12-16 NOTE — PLAN OF CARE
Problem: OCCUPATIONAL THERAPY ADULT  Goal: Performs self-care activities at highest level of function for planned discharge setting.  See evaluation for individualized goals.  Description: Treatment Interventions: ADL retraining, Functional transfer training, UE strengthening/ROM, Endurance training, Cognitive reorientation, Patient/family training, Equipment evaluation/education, Compensatory technique education, Continued evaluation, Activityengagement, Energy conservation          See flowsheet documentation for full assessment, interventions and recommendations.   Outcome: Progressing  Note: Limitation: Decreased ADL status, Decreased UE strength, Decreased Safe judgement during ADL, Decreased cognition, Decreased endurance, Decreased self-care trans, Decreased high-level ADLs, Decreased sensation  Prognosis: Fair  Assessment: Pt seen on this date for OT session focusing on ADL retraining, cognitive reorientation, body mechanics, transfer retraining, increasing activity tolerance/endurance and EOB sitting to increase ability to participate in ADL/functional tasks. Pt was found in bed and was left in bed w/ all needs within reach, bed alarm on. Pt completed bed mob w max ax2, sat eob w min-mod ax1 while completing adls. UB ADL w mod A, LB ADL w total a. See above for further detail. Pt w/ improvements in transfer ability, endurance, adl task completion, however is still limited 2* decreased ADL/High-level ADL status, decreased activity tolerance/endurance, decreased cognition, decreased self-care trans, decreased safety awareness and insight to condition.   The patient's raw score on the AM-PAC Daily Activity Inpatient Short Form is 10. A raw score of less than 19 suggests the patient may benefit from discharge to post-acute rehabilitation services. Please refer to the recommendation of the Occupational Therapist for safe discharge planning.  Recommending pt D/C to level 2 when medically stable. Pt will  continue to benefit from acute OT services to meet goals.     Rehab Resource Intensity Level, OT: II (Moderate Resource Intensity)

## 2024-12-16 NOTE — PROGRESS NOTES
Progress Note - Gastroenterology   Name: Lety Botello 64 y.o. 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 Admitted with malfunctioning J tube. Underwent EGD 12/10 with ulcerated mucosa and pus below bumper, J tube looped in stomach which was replaced into distal duodenum. J tube malfunctioned again leading to replacement 12/13. Has been functioning and tolerating feeds since replacement, though does report intermittent generalized abdominal pain. Has been on CTX/Vancomycin for PEG infection since 12/11.     GI reengaged to comment on timing and duration of Abx.     PEG site appears to be erythematous near insertion site with granulation tissue, expression of gastric fluid from site. No leukocytosis, has been afebrile since admission. Bumper snug, was loosened during evaluation and freely rotated afterwards. Dressing replaced. Tube was taped in a fashion to prevent further traction.  Had 2 episodes of emesis overnight.  CT abdomen and pelvis showed gastrojejunostomy tube feeding appropriate position.  No evidence of bowel obstruction.  Severe body wall anasarca and large volume ascites present and unchanged from prior scans.    Evaluated to begin this morning.  Gastric portion flushing without issues and tube feeds running via the jejunal portion      Plan:  Continue antibiotics to complete 7 day course (day 6/7)   Continue TF through J tube; meds via PO or liquid formulations through G tube if unable to tolerate PO.  Admits gastric portion to [place] bag and drain to gravity for venting.  CLD as tolerated per primary   Recommend judicious use of opioids if this is contributing to generalized slowing of her GI system  Change dressing daily and when soiled, discussed  with nursing to ensure tube is taped straight so that it prevents traction   Additional pain and symptom management per primary team   Morbid obesity (HCC)  Chronic, body habitus may be contributing to tight PEG bumper which was loosened   Abdominal pain    Palliative care encounter          Subjective   Had 2 episodes of nonbilious, nonbloody emesis overnight.  Had large BM this morning    Objective :  Temp:  [98.2 °F (36.8 °C)-98.5 °F (36.9 °C)] 98.4 °F (36.9 °C)  HR:  [] 75  BP: (122-143)/(61-81) 122/61  Resp:  [14-19] 14  SpO2:  [92 %-95 %] 95 %  O2 Device: None (Room air)    Physical Exam  Vitals and nursing note reviewed.   Constitutional:       General: She is not in acute distress.     Appearance: She is well-developed. She is obese.   HENT:      Head: Normocephalic and atraumatic.   Eyes:      Conjunctiva/sclera: Conjunctivae normal.   Cardiovascular:      Rate and Rhythm: Normal rate and regular rhythm.      Heart sounds: No murmur heard.  Pulmonary:      Effort: Pulmonary effort is normal. No respiratory distress.      Breath sounds: Normal breath sounds.   Abdominal:      General: There is distension.      Palpations: Abdomen is soft.      Tenderness: There is abdominal tenderness. There is no guarding or rebound.      Comments: PEG tube sites with mild surrounding erythema and mild drainage of gastric contents.  No purulence noted.    Midline staple lines present    Multiple areas of erythematous crusted over lesions in lower abdomen   Musculoskeletal:         General: No swelling.      Cervical back: Neck supple.      Right lower leg: Edema present.      Left lower leg: Edema present.   Skin:     General: Skin is warm and dry.      Capillary Refill: Capillary refill takes less than 2 seconds.   Neurological:      Mental Status: She is alert and oriented to person, place, and time.   Psychiatric:         Mood and Affect: Mood normal.           Lab Results: I have reviewed the following  results:CBC/BMP:   .     12/16/24  0614   WBC 11.45*   HGB 8.6*   HCT 26.8*   *   BANDSPCT 1   SODIUM 128*   K 5.3   CL 97   CO2 25   BUN 25   CREATININE 0.64   GLUC 341*   MG 1.8*        Imaging Results Review: I reviewed radiology reports from this admission including: CT abdomen/pelvis.  Other Study Results Review: No additional pertinent studies reviewed.    Kasia Perez MD  Gastroenterology Fellow, PGY- 4  Available on [employer] Secure Chat  12/16/2024 5:08 PM

## 2024-12-16 NOTE — OCCUPATIONAL THERAPY NOTE
Occupational Therapy Progress Note     Patient Name: Lety Botello  Today's Date: 12/16/2024  Problem List  Principal Problem:    Encounter for gastrojejunal (GJ) tube placement issues  Active Problems:    Hypertension    Type 2 diabetes mellitus (HCC)    Endometrial cancer (HCC)    History of pulmonary embolism    Hypothyroidism    Gastroparesis    Morbid obesity (HCC)    Abdominal pain    Shingles    Palliative care encounter    Hyponatremia    Anemia    Acute respiratory failure with hypoxia (HCC)    Hypomagnesemia    NSVT (nonsustained ventricular tachycardia) (HCC)    Coronary artery disease involving native coronary artery    Herpes zoster without complication           12/16/24 1044   OT Last Visit   OT Visit Date 12/16/24   Note Type   Note Type Treatment   Pain Assessment   Pain Assessment Tool 0-10   Pain Score 10 - Worst Possible Pain   Pain Location/Orientation Location: Abdomen   Patient's Stated Pain Goal No pain   Hospital Pain Intervention(s) Repositioned;Ambulation/increased activity;Emotional support   Restrictions/Precautions   Weight Bearing Precautions Per Order No   Other Precautions Bed Alarm;Chair Alarm;Cognitive;Multiple lines;Telemetry;Fall Risk  (GJ tube)   ADL   Where Assessed Edge of bed   Grooming Assistance 5  Supervision/Setup   Grooming Deficit Wash/dry face   UB Bathing Assistance 3  Moderate Assistance   UB Bathing Deficit Setup;Increased time to complete;Chest;Right arm;Abdomen;Left arm   UB Bathing Comments mod a for upper body adls while seated EOB. Pt fearful of falling 2' poor balance while seated EOB. required min-mod A to maintain upright @ EOB while performing dynamic balance tasks.   LB Bathing Assistance 1  Total Assistance   LB Bathing Deficit Buttocks;Perineal area;Right upper leg;Left upper leg;Right lower leg including foot;Left lower leg including foot   UB Dressing Assistance 4  Minimal Assistance   UB Dressing Deficit Increased time to complete;Thread RUE;Thread  LUE   LB Dressing Assistance 1  Total Assistance   LB Dressing Deficit Don/doff R sock;Don/doff L sock   Bed Mobility   Rolling R 2  Maximal assistance   Additional items Assist x 2;Increased time required;LE management;Verbal cues   Rolling L 2  Maximal assistance   Additional items Assist x 2;Verbal cues;LE management;Increased time required   Supine to Sit 2  Maximal assistance   Additional items Assist x 2;Increased time required;Verbal cues;LE management   Sit to Supine 2  Maximal assistance   Additional items Assist x 2;Increased time required;Verbal cues;LE management   Additional Comments pt rolled to R and L for perineal hygiene, required max ax2 to do so. then required max ax2 to perform supine>sit mobility. sat EOB w min-mod A   Transfers   Sit to Stand 2  Maximal assistance   Additional items Assist x 2;Increased time required;Verbal cues   Stand to Sit 2  Maximal assistance   Additional items Assist x 2;Increased time required;Verbal cues   Additional Comments b/l HHA, vc for forward weight shifting and inc overall safety awareness.   Functional Mobility   Additional Comments pt unable to take steps today 2' fatigue and pain.   Cognition   Overall Cognitive Status WFL   Arousal/Participation Responsive;Alert;Cooperative   Attention Within functional limits   Orientation Level Oriented to person;Oriented to place;Disoriented to situation;Oriented to time   Memory Within functional limits   Following Commands Follows all commands and directions without difficulty   Comments pt cooperative w overall fair safety awareness and insight to condition t/o session. fearful of falling t/o session.   Activity Tolerance   Activity Tolerance Patient limited by fatigue   Medical Staff Made Aware DPT, Restorative   Assessment   Assessment Pt seen on this date for OT session focusing on ADL retraining, cognitive reorientation, body mechanics, transfer retraining, increasing activity tolerance/endurance and EOB sitting  to increase ability to participate in ADL/functional tasks. Pt was found in bed and was left in bed w/ all needs within reach, bed alarm on. Pt completed bed mob w max ax2, sat eob w min-mod ax1 while completing adls. UB ADL w mod A, LB ADL w total a. See above for further detail. Pt w/ improvements in transfer ability, endurance, adl task completion, however is still limited 2* decreased ADL/High-level ADL status, decreased activity tolerance/endurance, decreased cognition, decreased self-care trans, decreased safety awareness and insight to condition.   The patient's raw score on the AM-PAC Daily Activity Inpatient Short Form is 10. A raw score of less than 19 suggests the patient may benefit from discharge to post-acute rehabilitation services. Please refer to the recommendation of the Occupational Therapist for safe discharge planning.  Recommending pt D/C to level 2 when medically stable. Pt will continue to benefit from acute OT services to meet goals.   Plan   Treatment Interventions ADL retraining;Functional transfer training;Endurance training;Patient/family training;Cognitive reorientation;Equipment evaluation/education;Compensatory technique education;Energy conservation;Activityengagement   Goal Expiration Date 12/25/24   OT Treatment Day 3   OT Frequency 2-3x/wk   Discharge Recommendation   Rehab Resource Intensity Level, OT II (Moderate Resource Intensity)   AM-PAC Daily Activity Inpatient   Lower Body Dressing 1   Bathing 1   Toileting 1   Upper Body Dressing 2   Grooming 2   Eating 3   Daily Activity Raw Score 10   AM-PAC Applied Cognition Inpatient   Following a Speech/Presentation 3   Understanding Ordinary Conversation 4   Taking Medications 4   Remembering Where Things Are Placed or Put Away 4   Remembering List of 4-5 Errands 3   Taking Care of Complicated Tasks 3   Applied Cognition Raw Score 21   Applied Cognition Standardized Score 44.3   Modified Landers Scale   Modified Landers Scale 4   End  of Consult   Education Provided Yes   Patient Position at End of Consult Bedside chair;Bed/Chair alarm activated;All needs within reach   Nurse Communication Nurse aware of consult       OLIVERIO Rey, OTR/L

## 2024-12-17 NOTE — PROGRESS NOTES
"Progress Note - Hospitalist   Name: Lety Botello 64 y.o. 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  Persistent third spacing with slight increase in moderate volume ascites, and no significant change in moderate size right and small left pleural effusions with bibasilar atelectasis. 2.  New percutaneous enteric tube with tip in the distal duodenum.\"  GI following. S/p EGD 12/10 with J-tube exchange, postop patient had worsening hypoxia required transfer to ICU and using BiPAP  Concern for possible infection surrounding J-tube site patient was started on vancomycin and ceftriaxone day # 6/7 per GI  Now with clogged J-tube,   Status post EGD on 12/13 with J-tube replacement   Resumed on tube feeding  On clear liquid diet per speech  Abdominal pain  Patient with recent hospitalization due to small bowel obstruction s/p exploratory laparotomy with small bowel resection on 11/22/2024  Now with worsening abdominal pain and distention  Underwent history of gastroparesis  Evaluated by general surgery   Abdominal pelvis CT scan, negative for obstruction, stable bilateral pleural effusion and anasarca  Abdominal pain is improving after having bowel movement  Continue supportive care  Continue with urinary retention protocol and bowel regimen  No improvement of ascites with PO lasix, will change to IV with 1 dose of albumin for assistance  Acute respiratory failure with hypoxia (HCC)  Patient had worsening hypoxia post EGD on 12/10. Was satting mid-80s on " 10L  Was given nebs, Lasix and subsequently placed on BiPAP and upgraded to critical care for further treatment  Currently in stable condition and transferred out of ICU  Currently on room air  NSVT (nonsustained ventricular tachycardia) (HCC)  25 run of nonsustained V. tach noted on telemetry, patient was asymptomatic  Cardiology consulted  Cardiac echo with normal EF and mild aortic stenosis  Coreg dose was increased to 25 mg twice daily  Monitor  Cardiology signed off    Hypothyroidism  Elevated TSH  Normal free T4 at 0.65  Patient was not receiving levothyroxine due to  PEG tube malfunctioning   Crease levothyroxine dose to 150 mcg daily  Repeat thyroid study in 4 to 6 weeks  Anemia  Received IV iron x 2  Vitamin B12 deficiency  Continue vitamin B12 supplement  Herpes zoster without complication  Located on the right lower abdomen, well crusted, no active lesion  With oozing and bleeding  Continue with Neurontin  Consult dermatology for further management  Continue valtrex  Hyponatremia  Hyponatremia likely multifactorial  Corrected sodium given hypoglycemia is 134  Monitor  Awaiting labs  Hypertension  Monitor blood pressures  Continue carvedilol, Norvasc was added  Avoid hypotension  Coronary artery disease involving native coronary artery  History of MI in 2016 status post KARY  Stable  Type 2 diabetes mellitus (HCC)  Lab Results   Component Value Date    HGBA1C 6.9 (H) 11/05/2024       Recent Labs     12/16/24  1556 12/16/24  2124 12/17/24  0632 12/17/24  1051   POCGLU 258* 217* 253* 232*       Blood Sugar Average: Last 72 hrs:  (P) [id number]    Farxiga on hold while inpatient  Hyperglycemia secondary to steroids and tube feeding  Continue Lantus nightly, continue Humalog tid, adjust the doses as needed  Continue SSI  Endometrial cancer (HCC)  History of endometrial cancer noted  History of pulmonary embolism  Continue Eliquis 5 mg twice daily  Gastroparesis  History of   With PEG-J as above  GI  following  Morbid obesity (HCC)  Body mass index is 51.36 kg/m².  Encourage lifestyle modification and weight loss once acute issues improved/resolved  Palliative care encounter      VTE Pharmacologic Prophylaxis: VTE Score: 7 High Risk (Score >/= 5) - Pharmacological DVT Prophylaxis Ordered: enoxaparin (Lovenox). Sequential Compression Devices Ordered.    Mobility:   Basic Mobility Inpatient Raw Score: 9  JH-HLM Goal: 3: Sit at edge of bed  JH-HLM Achieved: 2: Bed activities/Dependent transfer  JH-HLM Goal NOT achieved. Continue with multidisciplinary rounding and encourage appropriate mobility to improve upon JH-HLM goals.    Patient Centered Rounds: I performed bedside rounds with nursing staff today.   Discussions with Specialists or Other Care Team Provider: nurse, CM, palliative care    Education and Discussions with Family / Patient: Updated  (niece) via phone.    Current Length of Stay: 8 day(s)  Current Patient Status: Inpatient   Certification Statement: The patient will continue to require additional inpatient hospital stay due to pain control  Discharge Plan: Anticipate discharge in 48-72 hrs to rehab facility.    Code Status: Level 1 - Full Code    Subjective   Reports abdominal pain, poor sleep last night    Objective :  Temp:  [97.3 °F (36.3 °C)-98.4 °F (36.9 °C)] 97.3 °F (36.3 °C)  HR:  [70-75] 70  BP: (110-134)/(49-62) 123/58  Resp:  [14-18] 18  SpO2:  [95 %-96 %] 95 %    Body mass index is 51.36 kg/m².     Input and Output Summary (last 24 hours):     Intake/Output Summary (Last 24 hours) at 12/17/2024 1450  Last data filed at 12/17/2024 1426  Gross per 24 hour   Intake 1264 ml   Output 325 ml   Net 939 ml       Physical Exam  Constitutional:       Appearance: Normal appearance.   HENT:      Head: Normocephalic and atraumatic.      Nose: Nose normal.   Eyes:      Extraocular Movements: Extraocular movements intact.   Cardiovascular:      Rate and Rhythm: Normal rate and regular  rhythm.   Pulmonary:      Effort: Pulmonary effort is normal.      Breath sounds: No wheezing or rales.   Abdominal:      General: There is distension.      Palpations: Abdomen is soft.      Tenderness: There is abdominal tenderness.   Musculoskeletal:      Right lower leg: No edema.      Left lower leg: No edema.   Skin:     General: Skin is warm and dry.   Neurological:      Mental Status: She is alert and oriented to person, place, and time.   Psychiatric:         Mood and Affect: Mood normal.         Behavior: Behavior normal.           Lines/Drains:  Lines/Drains/Airways       Active Status       Name Placement date Placement time Site Days    Gastrostomy/Enterostomy PEG- Jejunostomy 20 Fr. LUQ 12/03/24  1553  LUQ  13    External Urinary Catheter 12/09/24  0000  -- 8                            Lab Results: I have reviewed the following results:   Results from last 7 days   Lab Units 12/16/24  0614 12/14/24  0539   WBC Thousand/uL 11.45* 9.34   HEMOGLOBIN g/dL 8.6* 8.9*   HEMATOCRIT % 26.8* 27.5*   PLATELETS Thousands/uL 100* 143*   BANDS PCT % 1  --    SEGS PCT %  --  86*   LYMPHO PCT % 0* 5*   MONO PCT % 3* 7   EOS PCT % 0 0     Results from last 7 days   Lab Units 12/16/24  0614 12/15/24  0943 12/14/24  0539   SODIUM mmol/L 128*   < > 133*   POTASSIUM mmol/L 5.3   < > 5.1   CHLORIDE mmol/L 97   < > 102   CO2 mmol/L 25   < > 26   BUN mg/dL 25   < > 29*   CREATININE mg/dL 0.64   < > 0.50*   ANION GAP mmol/L 6   < > 5   CALCIUM mg/dL 7.4*   < > 7.5*   ALBUMIN g/dL  --   --  2.5*   TOTAL BILIRUBIN mg/dL  --   --  0.35   ALK PHOS U/L  --   --  66   ALT U/L  --   --  8   AST U/L  --   --  12*   GLUCOSE RANDOM mg/dL 341*   < > 231*    < > = values in this interval not displayed.         Results from last 7 days   Lab Units 12/17/24  1051 12/17/24  0632 12/16/24  2124 12/16/24  1556 12/16/24  1045 12/16/24  0617 12/15/24  2030 12/15/24  1623 12/15/24  1055 12/15/24  0612 12/14/24  2004 12/14/24  1550   POC GLUCOSE  mg/dl 232* 253* 217* 258* 259* 335* 314* 298* 345* 317* 299* 306*               Recent Cultures (last 7 days):         Imaging Results Review: No pertinent imaging studies reviewed.  Other Study Results Review: No additional pertinent studies reviewed.    Last 24 Hours Medication List:     Current Facility-Administered Medications:     acetaminophen (TYLENOL) tablet 975 mg, Q8H SAM    albuterol (PROVENTIL HFA,VENTOLIN HFA) inhaler 2 puff, Q6H PRN    aluminum-magnesium hydroxide-simethicone (MAALOX) oral suspension 30 mL, Q6H PRN    amLODIPine (NORVASC) tablet 5 mg, Daily    aspirin (ECOTRIN LOW STRENGTH) EC tablet 81 mg, Daily    bisacodyl (DULCOLAX) rectal suppository 10 mg, Daily PRN    carvedilol (COREG) tablet 25 mg, BID With Meals    cefTRIAXone (ROCEPHIN) 2,000 mg in dextrose 5 % 50 mL IVPB, Q24H, Last Rate: Stopped (12/17/24 0701)    cyanocobalamin (VITAMIN B-12) tablet 1,000 mcg, Daily    dicyclomine (BENTYL) capsule 10 mg, BID PRN    enoxaparin (LOVENOX) subcutaneous injection 120 mg, Q12H SAM    furosemide (LASIX) injection 40 mg, BID (diuretic)    gabapentin (NEURONTIN) capsule 100 mg, Daily    HYDROmorphone HCl (DILAUDID) injection 0.2 mg, Q4H PRN    insulin glargine (LANTUS) subcutaneous injection 20 Units 0.2 mL, HS    insulin lispro (HumALOG/ADMELOG) 100 units/mL subcutaneous injection 1-5 Units, TID AC **AND** Fingerstick Glucose (POCT), TID AC    insulin lispro (HumALOG/ADMELOG) 100 units/mL subcutaneous injection 10 Units, TID With Meals    iohexol (OMNIPAQUE) 240 MG/ML solution 50 mL, Once in imaging    levothyroxine tablet 150 mcg, Daily    nitroglycerin (NITROSTAT) SL tablet 0.4 mg, Q5 Min PRN    omeprazole (PRILOSEC) suspension 2 mg/mL, Daily    ondansetron (ZOFRAN) injection 4 mg, Q6H PRN    oxyCODONE (ROXICODONE) oral solution 5 mg, Q4H PRN **OR** oxyCODONE (ROXICODONE) oral solution 10 mg, Q4H PRN    polyethylene glycol (MIRALAX) packet 17 g, Daily    senna oral syrup 17.6 mg, BID     valACYclovir (VALTREX) tablet 1,000 mg, Q8H SAM    vancomycin (VANCOCIN) IVPB (premix in dextrose) 1,000 mg 200 mL, Q12H, Last Rate: 1,000 mg (12/17/24 1420)    Administrative Statements   Today, Patient Was Seen By: Paul Ríos MD  I have spent a total time of 40 minutes in caring for this patient on the day of the visit/encounter including Diagnostic results, Instructions for management, Patient and family education, Impressions, Counseling / Coordination of care, Documenting in the medical record, Reviewing / ordering tests, medicine, procedures  , Obtaining or reviewing history  , and Communicating with other healthcare professionals .    **Please Note: This note may have been constructed using a voice recognition system.**

## 2024-12-17 NOTE — ASSESSMENT & PLAN NOTE
Patient with recent hospitalization due to small bowel obstruction s/p exploratory laparotomy with small bowel resection on 11/22/2024  Now with worsening abdominal pain and distention  Underwent history of gastroparesis  Evaluated by general surgery   Abdominal pelvis CT scan, negative for obstruction, stable bilateral pleural effusion and anasarca  Abdominal pain is improving after having bowel movement  Continue supportive care  Continue with urinary retention protocol and bowel regimen  No improvement of ascites with PO lasix, will change to IV with 1 dose of albumin for assistance

## 2024-12-17 NOTE — ASSESSMENT & PLAN NOTE
Lab Results   Component Value Date    HGBA1C 6.9 (H) 11/05/2024       Recent Labs     12/16/24  1556 12/16/24  2124 12/17/24  0632 12/17/24  1051   POCGLU 258* 217* 253* 232*       Blood Sugar Average: Last 72 hrs:  (P) [id number]    Farxiga on hold while inpatient  Hyperglycemia secondary to steroids and tube feeding  Continue Lantus nightly, continue Humalog tid, adjust the doses as needed  Continue SSI

## 2024-12-17 NOTE — QUICK NOTE
Midline surgical wound evaluated at bedside today.  Patient tolerated well with no immediate complications.  Dressing reapplied.  Please see photo below.

## 2024-12-17 NOTE — PROGRESS NOTES
Progress Note - Surgery-General   Name: Lety Botello 64 y.o. female I MRN: [id number]  Unit/Bed#: [facility] 511-01 I Date of Admission: 12/9/2024   Date of Service: 12/17/2024 I Hospital Day: 8     Assessment & Plan  Encounter for gastrojejunal (GJ) tube placement issues  Admitted with clogged GJ-tube from rehab  Status post multiple exchanges of GJ tube by GI team, now tolerating goal feeds  Abdominal pain  General Surgery team consults for abdominal pain  Recent hospitalization for small bowel obstruction, status post ex lap, DAVID, SBR 11/22  Now, admitted following clogged GJ tube at rehab status post exchange by GI team, tolerating tube feeds at goal of 60    Plan  -TF to goal, CLD  -midline len removed  -f/u GI for GJ exchange  -DVT ppx  -Rest of care per primary team  Palliative care encounter      Subjective/Objective   NAOE. Reports abdominal pain. No n/v. Having BMs. Voiding.    Physical Exam:  General: NAD  CV: nl rate  Lungs: nl wob. No resp distress.  ABD: Soft, protuberant, tender, CDI. GJ @5.5cm at skin.  Extrem: No CCE  UOP 1525cc  Stool x2    Patient Vitals for the past 24 hrs:   BP Temp Temp src Pulse Resp SpO2   12/17/24 0904 123/58 -- -- 70 -- 95 %   12/17/24 0717 (!) 110/49 (!) 97.3 °F (36.3 °C) Oral 71 18 96 %   12/16/24 2146 134/62 98.3 °F (36.8 °C) Oral 70 17 95 %   12/16/24 1517 122/61 98.4 °F (36.9 °C) Oral 75 14 95 %       I/O         12/15 0701  12/16 0700 12/16 0701  12/17 0700 12/17 0701  12/18 0700    P.O. 1440 480 0    I.V. (mL/kg)  30 (0.3)     NG/GT 1440 240     IV Piggyback  50 50    Feedings 660 874     Total Intake(mL/kg) 3540 (29.7) 1674 (14.1) 50 (0.4)    Urine (mL/kg/hr) 500 (0.2) 1525 (0.5)     Emesis/NG output 0 0     Stool 0 0     Total Output 500 1525     Net +3040 +149 +50           Unmeasured Urine Occurrence 1 x 1 x     Unmeasured Stool Occurrence 1 x 2 x             Recent Labs     12/15/24  0943 12/16/24  0614   WBC  --  11.45*   HGB  --  8.6*   PLT  --  100*   SODIUM 128*  "128*   K 5.9* 5.3    97   CO2 26 25   BUN 25 25   CREATININE 0.56* 0.64   GLUC 371* 341*   CALCIUM 7.6* 7.4*   MG 1.8* 1.8*   PHOS 2.7  --    EGFR 100 95     No results found for: \"BLOODCX\", \"WOUNDCULT\", \"URINECX\", \"LEUKOCYTESUR\"    "

## 2024-12-17 NOTE — ASSESSMENT & PLAN NOTE
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  S/p PEG 12/3/24  Admitted due to malfunctioning J tube  EGD on 12/10   12/13 - J tube malfunctioned again leading to replacement   GI following

## 2024-12-17 NOTE — ASSESSMENT & PLAN NOTE
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 Admitted with malfunctioning J tube. Underwent EGD 12/10 with ulcerated mucosa and pus below bumper, J tube looped in stomach which was replaced into distal duodenum. J tube malfunctioned again leading to replacement 12/13. Has been functioning and tolerating feeds since replacement, though does report intermittent generalized abdominal pain. Has been on CTX/Vancomycin for PEG infection since 12/11.     GI reengaged to comment on timing and duration of Abx.     PEG site appears to be erythematous near insertion site with granulation tissue, expression of gastric fluid from site. No leukocytosis, has been afebrile since admission. Bumper snug, was loosened during evaluation and freely rotated afterwards. Dressing replaced. Tube was taped in a fashion to prevent further traction.  Had 2 episodes of emesis overnight.  CT abdomen and pelvis showed gastrojejunostomy tube feeding appropriate position.  No evidence of bowel obstruction.  Severe body wall anasarca and large volume ascites present and unchanged from prior scans.    PEG-J tube reexamined again this morning 12/17.  Tube feeds running without issues gastric portion able to be flushed and [place] bag able to be connected albeit with no output suggesting either minimal to no stomach contents or stomach fluid located in dependent portion.      Plan:  Continue antibiotics to complete 7 day course   Continue TF through J tube; meds via PO or liquid formulations through G tube if unable to tolerate PO.   Connect gastric portion to [place] bag and drain intermittently 3 times daily or if patient develops symptoms/nausea.  (Reviewed this with bedside nurse and nursing communication entered).   CLD as tolerated per primary   Recommend judicious use of opioids if this is contributing to generalized slowing of her GI system  Change dressing daily and when soiled, discussed with  nursing to ensure tube is taped straight so that it prevents traction   Additional pain and symptom management per primary team

## 2024-12-17 NOTE — PLAN OF CARE
Problem: Prexisting or High Potential for Compromised Skin Integrity  Goal: Skin integrity is maintained or improved  Description: INTERVENTIONS:  - Identify patients at risk for skin breakdown  - Assess and monitor skin integrity  - Assess and monitor nutrition and hydration status  - Monitor labs   - Assess for incontinence   - Turn and reposition patient  - Assist with mobility/ambulation  - Relieve pressure over bony prominences  - Avoid friction and shearing  - Provide appropriate hygiene as needed including keeping skin clean and dry  - Evaluate need for skin moisturizer/barrier cream  - Collaborate with interdisciplinary team   - Patient/family teaching  - Consider wound care consult   Outcome: Progressing     Problem: PAIN - ADULT  Goal: Verbalizes/displays adequate comfort level or baseline comfort level  Description: Interventions:  - Encourage patient to monitor pain and request assistance  - Assess pain using appropriate pain scale  - Administer analgesics based on type and severity of pain and evaluate response  - Implement non-pharmacological measures as appropriate and evaluate response  - Consider cultural and social influences on pain and pain management  - Notify physician/advanced practitioner if interventions unsuccessful or patient reports new pain  Outcome: Progressing     Problem: INFECTION - ADULT  Goal: Absence or prevention of progression during hospitalization  Description: INTERVENTIONS:  - Assess and monitor for signs and symptoms of infection  - Monitor lab/diagnostic results  - Monitor all insertion sites, i.e. indwelling lines, tubes, and drains  - Monitor endotracheal if appropriate and nasal secretions for changes in amount and color  - [place] appropriate cooling/warming therapies per order  - Administer medications as ordered  - Instruct and encourage patient and family to use good hand hygiene technique  - Identify and instruct in appropriate isolation precautions for  identified infection/condition  Outcome: Progressing     Problem: SAFETY ADULT  Goal: Patient will remain free of falls  Description: INTERVENTIONS:  - Educate patient/family on patient safety including physical limitations  - Instruct patient to call for assistance with activity   - Consult OT/PT to assist with strengthening/mobility   - Keep Call bell within reach  - Keep bed low and locked with side rails adjusted as appropriate  - Keep care items and personal belongings within reach  - Initiate and maintain comfort rounds  - Make Fall Risk Sign visible to staff  - Offer Toileting every 2 Hours, in advance of need  - Initiate/Maintain bed/chair alarm  - Obtain necessary fall risk management equipment: nonskid footwear  - Apply yellow socks and bracelet for high fall risk patients  - Consider moving patient to room near nurses station  Outcome: Progressing  Goal: Maintain or return to baseline ADL function  Description: INTERVENTIONS:  -  Assess patient's ability to carry out ADLs; assess patient's baseline for ADL function and identify physical deficits which impact ability to perform ADLs (bathing, care of mouth/teeth, toileting, grooming, dressing, etc.)  - Assess/evaluate cause of self-care deficits   - Assess range of motion  - Assess patient's mobility; develop plan if impaired  - Assess patient's need for assistive devices and provide as appropriate  - Encourage maximum independence but intervene and supervise when necessary  - Involve family in performance of ADLs  - Assess for home care needs following discharge   - Consider OT consult to assist with ADL evaluation and planning for discharge  - Provide patient education as appropriate  Outcome: Progressing  Goal: Maintains/Returns to pre admission functional level  Description: INTERVENTIONS:  - Perform AM-PAC 6 Click Basic Mobility/ Daily Activity assessment daily.  - Set and communicate daily mobility goal to care team and patient/family/caregiver.   -  Collaborate with rehabilitation services on mobility goals if consulted  - Perform Range of Motion 3 times a day.  - Reposition patient every 2 hours.  - Dangle patient 3 times a day  - Stand patient 3 times a day  - Ambulate patient 3 times a day  - Out of bed to chair 3 times a day   - Out of bed for meals 3 times a day  - Out of bed for toileting  - Record patient progress and toleration of activity level   Outcome: Progressing     Problem: DISCHARGE PLANNING  Goal: Discharge to home or other facility with appropriate resources  Description: INTERVENTIONS:  - Identify barriers to discharge w/patient and caregiver  - Arrange for needed discharge resources and transportation as appropriate  - Identify discharge learning needs (meds, wound care, etc.)  - Arrange for interpretive services to assist at discharge as needed  - Refer to Case Management Department for coordinating discharge planning if the patient needs post-hospital services based on physician/advanced practitioner order or complex needs related to functional status, cognitive ability, or social support system  Outcome: Progressing     Problem: Knowledge Deficit  Goal: Patient/family/caregiver demonstrates understanding of disease process, treatment plan, medications, and discharge instructions  Description: Complete learning assessment and assess knowledge base.  Interventions:  - Provide teaching at level of understanding  - Provide teaching via preferred learning methods  Outcome: Progressing     Problem: Nutrition/Hydration-ADULT  Goal: Nutrient/Hydration intake appropriate for improving, restoring or maintaining nutritional needs  Description: Monitor and assess patient's nutrition/hydration status for malnutrition. Collaborate with interdisciplinary team and initiate plan and interventions as ordered.  Monitor patient's weight and dietary intake as ordered or per policy. Utilize nutrition screening tool and intervene as necessary. Determine  patient's food preferences and provide high-protein, high-caloric foods as appropriate.     INTERVENTIONS:  - Monitor oral intake, urinary output, labs, and treatment plans  - Assess nutrition and hydration status and recommend course of action  - Evaluate amount of meals eaten  - Assist patient with eating if necessary   - Allow adequate time for meals  - Recommend/ encourage appropriate diets, oral nutritional supplements, and vitamin/mineral supplements  - Order, calculate, and assess calorie counts as needed  - Recommend, monitor, and adjust tube feedings and TPN/PPN based on assessed needs  - Assess need for intravenous fluids  - Provide specific nutrition/hydration education as appropriate  - Include patient/family/caregiver in decisions related to nutrition  Outcome: Progressing     Problem: GASTROINTESTINAL - ADULT  Goal: Minimal or absence of nausea and/or vomiting  Description: INTERVENTIONS:  - Administer IV fluids if ordered to ensure adequate hydration  - Maintain NPO status until nausea and vomiting are resolved  - Nasogastric tube if ordered  - Administer ordered antiemetic medications as needed  - Provide nonpharmacologic comfort measures as appropriate  - Advance diet as tolerated, if ordered  - Consider nutrition services referral to assist patient with adequate nutrition and appropriate food choices  Outcome: Progressing  Goal: Maintains or returns to baseline bowel function  Description: INTERVENTIONS:  - Assess bowel function  - Encourage oral fluids to ensure adequate hydration  - Administer IV fluids if ordered to ensure adequate hydration  - Administer ordered medications as needed  - Encourage mobilization and activity  - Consider nutritional services referral to assist patient with adequate nutrition and appropriate food choices  Outcome: Progressing  Goal: Maintains adequate nutritional intake  Description: INTERVENTIONS:  - Monitor percentage of each meal consumed  - Identify factors  contributing to decreased intake, treat as appropriate  - Assist with meals as needed  - Monitor I&O, weight, and lab values if indicated  - Obtain nutrition services referral as needed  Outcome: Progressing  Goal: Oral mucous membranes remain intact  Description: INTERVENTIONS  - Assess oral mucosa and hygiene practices  - Implement preventative oral hygiene regimen  - Implement oral medicated treatments as ordered  - Initiate Nutrition services referral as needed  Outcome: Progressing     Problem: GENITOURINARY - ADULT  Goal: Maintains or returns to baseline urinary function  Description: INTERVENTIONS:  - Assess urinary function  - Encourage oral fluids to ensure adequate hydration if ordered  - Administer IV fluids as ordered to ensure adequate hydration  - Administer ordered medications as needed  - Offer frequent toileting  - Follow urinary retention protocol if ordered  Outcome: Progressing  Goal: Absence of urinary retention  Description: INTERVENTIONS:  - Assess patient’s ability to void and empty bladder  - Monitor I/O  - Bladder scan as needed  - Discuss with physician/AP medications to alleviate retention as needed  - Discuss catheterization for long term situations as appropriate  Outcome: Progressing

## 2024-12-17 NOTE — PROGRESS NOTES
Progress Note - Gastroenterology   Name: Lety Botello 64 y.o. 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 Admitted with malfunctioning J tube. Underwent EGD 12/10 with ulcerated mucosa and pus below bumper, J tube looped in stomach which was replaced into distal duodenum. J tube malfunctioned again leading to replacement 12/13. Has been functioning and tolerating feeds since replacement, though does report intermittent generalized abdominal pain. Has been on CTX/Vancomycin for PEG infection since 12/11.     GI reengaged to comment on timing and duration of Abx.     PEG site appears to be erythematous near insertion site with granulation tissue, expression of gastric fluid from site. No leukocytosis, has been afebrile since admission. Bumper snug, was loosened during evaluation and freely rotated afterwards. Dressing replaced. Tube was taped in a fashion to prevent further traction.  Had 2 episodes of emesis overnight.  CT abdomen and pelvis showed gastrojejunostomy tube feeding appropriate position.  No evidence of bowel obstruction.  Severe body wall anasarca and large volume ascites present and unchanged from prior scans.    PEG-J tube reexamined again this morning 12/17.  Tube feeds running without issues gastric portion able to be flushed and [place] bag able to be connected albeit with no output suggesting either minimal to no stomach contents or stomach fluid located in dependent portion.      Plan:  Continue antibiotics to complete 7 day course   Continue TF through J tube; meds via PO or liquid formulations through G tube if unable to tolerate PO.   Connect gastric portion to [place] bag and drain intermittently 3 times daily or if patient develops  symptoms/nausea.  (Reviewed this with bedside nurse and nursing communication entered).   CLD as tolerated per primary   Recommend judicious use of opioids if this is contributing to generalized slowing of her GI system  Change dressing daily and when soiled, discussed with nursing to ensure tube is taped straight so that it prevents traction   Additional pain and symptom management per primary team   Morbid obesity (HCC)  Chronic, body habitus may be contributing to tight PEG bumper which was loosened   Abdominal pain    Palliative care encounter      GI team will sign off at this time.  Please reengage if further questions or issues arises    Subjective   Patient denies any further vomiting this morning.  No fever or chills    Objective :  Temp:  [97.3 °F (36.3 °C)-98.4 °F (36.9 °C)] 97.3 °F (36.3 °C)  HR:  [70-75] 70  BP: (110-134)/(49-62) 123/58  Resp:  [14-18] 18  SpO2:  [95 %-96 %] 95 %    Physical Exam  Vitals and nursing note reviewed.   Constitutional:       General: She is not in acute distress.     Appearance: She is well-developed. She is obese.   HENT:      Head: Normocephalic and atraumatic.   Eyes:      Conjunctiva/sclera: Conjunctivae normal.   Cardiovascular:      Rate and Rhythm: Normal rate and regular rhythm.      Heart sounds: No murmur heard.  Pulmonary:      Effort: Pulmonary effort is normal. No respiratory distress.      Breath sounds: Normal breath sounds.   Abdominal:      General: There is distension.      Palpations: Abdomen is soft.      Tenderness: There is abdominal tenderness. There is no guarding or rebound.      Comments: Abdominal binder in place    PEG tube sites with mild surrounding erythema and mild drainage of gastric contents.  No purulence noted.    Midline staple lines present    Multiple areas of erythematous crusted over lesions in lower abdomen   Musculoskeletal:         General: No swelling.      Cervical back: Neck supple.      Right lower leg: Edema present.       Left lower leg: Edema present.   Skin:     General: Skin is warm and dry.      Capillary Refill: Capillary refill takes less than 2 seconds.   Neurological:      Mental Status: She is alert and oriented to person, place, and time.   Psychiatric:         Mood and Affect: Mood normal.           Lab Results: I have reviewed the following results:CBC/BMP: No new results in last 24 hours.     Imaging Results Review: No pertinent imaging studies reviewed.  Other Study Results Review: No additional pertinent studies reviewed.    Kasia Perez MD  Gastroenterology Fellow, PGY- 4  Available on EPIC Secure Chat  12/17/2024 1:46 PM

## 2024-12-17 NOTE — ASSESSMENT & PLAN NOTE
Hyponatremia likely multifactorial  Corrected sodium given hypoglycemia is 134  Monitor  Awaiting labs

## 2024-12-17 NOTE — PROGRESS NOTES
Progress Note - Palliative Care   Name: Lety Botello 64 y.o. female I MRN: [id number]  Unit/Bed#: [facility] 511-01 I Date of Admission: 12/9/2024   Date of Service: 12/17/2024 I Hospital Day: 8    Assessment & Plan  Encounter for gastrojejunal (GJ) tube placement issues  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  S/p PEG 12/3/24  Admitted due to malfunctioning J tube  EGD on 12/10   12/13 - J tube malfunctioned again leading to replacement   GI following   Abdominal pain  Reporting uncontrolled pain, 10/10 generalized abdominal pain  RN reports that patient has appeared uncomfortable throughout the day  Current regimen:  Start OxyIR 5/10 mg Q4hrs PRN for moderate/severe pain   IV dilaudid to 0.2 mg q4H PRN for BT pain  Schedule Tylenol 975 mg TID  Consider addition of flomax or other if ongoing urinary retention  Minimize opioids- given recurrent SBO and now with urinary retention  Discussed w/ primary team -> OK to increase pain medication   Failed therapies:  NA  Total OME over last 24 hours: 30.5 mg  Bowel regimen to prevent OIC  Opioid agreement reviewed and signed. Not on file, will need outpatient follow up    Palliative care encounter  Palliative Diagnosis: endometrial cancer    Goals:   Full cares, no limits  Palliative will follow for ongoing goals of care discussions as situation evolves.    Social Support:  Patient's support system:   Supportive listening provided  Normalized experience of patient  Advocated for patient/family with interdisciplinary team    Care Coordination  Case discussed with RN, SLIM    Follow-up  We appreciate the opportunity to participate in this patient's care.   We will continue to follow while admitted.    Please do not hesitate to contact our on-call provider through EPIC Secure Chat or contact [phone] should there be an acute change or other symptom control concerns.    PDMP Review: I have reviewed the patient's controlled substance  "dispensing history in the Prescription Drug Monitoring Program in compliance with the [facility] regulations before prescribing any controlled substances.      Subjective   Met w/ patient at the bedside, NAD, appears comfortable. No family present at bedside. Stated that she had a \"rough night,\" due to being awoken throughout the night. Feels that her pain is not well controlled on her current regimen. States that the IV Dilaudid has been working better than the OxyIR but does have some pain relief when using the OxyIR. Discussed w/ SLIM provider, agreeable to increasing pain medication as patient reporting pain 10/10 and current pain regimen has not be adequate for pain control.     Objective :  Temp:  [97.3 °F (36.3 °C)-98.4 °F (36.9 °C)] 97.3 °F (36.3 °C)  HR:  [70-75] 71  BP: (110-134)/(49-62) 110/49  Resp:  [14-18] 18  SpO2:  [95 %-96 %] 96 %    Physical Exam  Constitutional:       General: She is awake. She is not in acute distress.     Appearance: She is ill-appearing.   HENT:      Head: Normocephalic and atraumatic.      Mouth/Throat:      Mouth: Mucous membranes are moist.   Eyes:      Pupils: Pupils are equal, round, and reactive to light.   Cardiovascular:      Rate and Rhythm: Normal rate.   Pulmonary:      Effort: Pulmonary effort is normal. No respiratory distress.   Skin:     General: Skin is warm and dry.      Coloration: Skin is pale.   Neurological:      General: No focal deficit present.      Mental Status: She is alert and oriented to person, place, and time.          Lab Results: I have reviewed the following results:  Lab Results   Component Value Date/Time    SODIUM 128 (L) 12/16/2024 06:14 AM    SODIUM 132 (L) 11/04/2024 03:33 AM    K 5.3 12/16/2024 06:14 AM    K 4.7 11/04/2024 03:33 AM    BUN 25 12/16/2024 06:14 AM    BUN 19 11/04/2024 03:33 AM    CREATININE 0.64 12/16/2024 06:14 AM    CREATININE 0.76 11/04/2024 03:33 AM    GLUC 341 (H) 12/16/2024 06:14 AM    GLUC 98 11/04/2024 03:33 AM    CALCIUM " 7.4 (L) 12/16/2024 06:14 AM    CALCIUM 8.6 11/04/2024 03:33 AM    AST 12 (L) 12/14/2024 05:39 AM    AST 9 11/04/2024 03:33 AM    ALT 8 12/14/2024 05:39 AM    ALT 6 11/04/2024 03:33 AM    ALB 2.5 (L) 12/14/2024 05:39 AM    ALB 3.1 (L) 11/04/2024 03:33 AM    TP 4.4 (L) 12/14/2024 05:39 AM    TP 5.4 (L) 11/04/2024 03:33 AM    EGFR 95 12/16/2024 06:14 AM    EGFR 89 11/04/2024 03:33 AM    EGFR 97 12/11/2020 03:15 AM     Lab Results   Component Value Date/Time    HGB 8.6 (L) 12/16/2024 06:14 AM    WBC 11.45 (H) 12/16/2024 06:14 AM     (L) 12/16/2024 06:14 AM     12/01/2023 08:30 AM    INR 1.49 (H) 11/23/2024 10:01 AM    INR 1.1 12/01/2023 08:30 AM    PTT 49 (H) 11/27/2024 06:19 AM     Lab Results   Component Value Date/Time    [id number] 11.159 (H) 12/13/2024 06:12 AM     Administrative Statements   I have spent a total time of 20 minutes in caring for this patient on the day of the visit/encounter including Risks and benefits of tx options, Instructions for management, Patient and family education, Counseling / Coordination of care, Documenting in the medical record, Reviewing / ordering tests, medicine, procedures  , Obtaining or reviewing history  , and Communicating with other healthcare professionals . Topics discussed with the patient / family include symptom assessment and management, medication review, medication adjustment, psychosocial support, supportive listening, and anticipatory guidance.    Viola Emanuel

## 2024-12-17 NOTE — UTILIZATION REVIEW
Continued Stay Review    Date: 12/17/2024                          Current Patient Class: Inpatient  Current Level of Care: med/surg    HPI:64 y.o. female initially admitted on 12/9 d/t malfunctioning J tube     Assessment/Plan: Reports abdominal pain, appears uncomfortable on exam. No n/v. Having BMs. Voiding. Abdomen soft,  protuberant, tender, CDI. GJ @ 5.5cm at skin. Stool x2. TF to goal: Cindy 1.5 60 ml cont with 120 ml H20 q4 hrs. Clear liquid diet. Midline len removed today. F/u with GI for GJ exchange. Continue IV ceftriaxone and vanco. Po meds, IV lasix, lantus insulin hs, lispro TID with meals, accu-cheks w/ ssi. SCDs, lovenox sq for DVT ppx. Palliative following for pain control:  Current regimen:  Start OxyIR 5/10 mg Q4hrs PRN for moderate/severe pain   IV dilaudid to 0.2 mg q4H PRN for BT pain  Schedule Tylenol 975 mg TID  Consider addition of flomax or other if ongoing urinary retention  Total OME over last 24 hours: 30.5 mg - increase pain med as needed    Medications:   Scheduled Medications:  acetaminophen, 975 mg, Oral, Q8H SAM  Albumin 25%, 25 g, Intravenous, Once  amLODIPine, 5 mg, Oral, Daily  aspirin, 81 mg, Oral, Daily  carvedilol, 25 mg, Oral, BID With Meals  cefTRIAXone, 2,000 mg, Intravenous, Q24H  vitamin B-12, 1,000 mcg, Oral, Daily  enoxaparin, 1 mg/kg, Subcutaneous, Q12H SAM  furosemide, 40 mg, Intravenous, BID (diuretic)  gabapentin, 100 mg, Per G Tube, Daily  insulin glargine, 20 Units, Subcutaneous, HS  insulin lispro, 1-5 Units, Subcutaneous, TID AC  insulin lispro, 10 Units, Subcutaneous, TID With Meals  levothyroxine, 150 mcg, Oral, Daily  omeprazole (PRILOSEC) suspension 2 mg/mL, 40 mg, Per G Tube, Daily  polyethylene glycol, 17 g, Oral, Daily  senna, 17.6 mg, Oral, BID  valACYclovir, 1,000 mg, Oral, Q8H SAM  vancomycin, 1,000 mg, Intravenous, Q12H    PRN Meds:  albuterol, 2 puff, Inhalation, Q6H PRN  aluminum-magnesium hydroxide-simethicone, 30 mL, Oral, Q6H PRN  bisacodyl, 10  mg, Rectal, Daily PRN  dicyclomine, 10 mg, Oral, BID PRN 12/16 x1  HYDROmorphone, 0.1 mg, Intravenous, Q4H PRN 12/15 x1, 12/16 x1 then d/c'd  HYDROmorphone, 0.2 mg, Intravenous, Q4H PRN 12/16 x1, 12/17 x2  nitroglycerin, 0.4 mg, Sublingual, Q5 Min PRN  ondansetron, 4 mg, Intravenous, Q6H PRN 12/16 x1   oxyCODONE, 5 mg, Oral, Q4H PRN 12/15 x5, 12/16 x4, 12/17 x2   Or  oxyCODONE, 10 mg, Oral, Q4H PRN      Discharge Plan: tentative plan to return to [facility] to continue STR     Vital Signs (last 3 days)       Date/Time Temp Pulse Resp BP MAP (mmHg) SpO2 O2 Device Patient Position - Orthostatic VS Claudia Coma Scale Score Pain    12/17/24 1242 -- -- -- -- -- -- -- -- -- 10 - Worst Possible Pain    12/17/24 09:04:53 -- 70 -- 123/58 80 95 % -- -- -- --    12/17/24 0904 -- -- -- -- -- -- -- -- -- 10 - Worst Possible Pain    12/17/24 07:17:27 97.3 °F (36.3 °C) 71 18 110/49 69 96 % -- -- -- --    12/17/24 0609 -- -- -- -- -- -- -- -- -- 10 - Worst Possible Pain    12/17/24 0240 -- -- -- -- -- -- -- -- -- 10 - Worst Possible Pain    12/16/24 21:46:27 98.3 °F (36.8 °C) 70 17 134/62 86 95 % -- Lying -- --    12/16/24 2045 -- -- -- -- -- -- -- -- 15 --    12/16/24 1730 -- -- -- -- -- -- -- -- -- 10 - Worst Possible Pain    12/16/24 1727 -- -- -- -- -- -- -- -- -- 10 - Worst Possible Pain    12/16/24 15:17:47 98.4 °F (36.9 °C) 75 14 122/61 81 95 % -- Lying -- --    12/16/24 1448 -- -- -- -- -- -- -- -- -- 10 - Worst Possible Pain    12/16/24 1044 -- -- -- -- -- -- -- -- -- 10 - Worst Possible Pain    12/16/24 1043 -- -- -- -- -- -- -- -- -- 10 - Worst Possible Pain    12/16/24 1041 -- -- -- -- -- -- -- -- -- 10 - Worst Possible Pain    12/16/24 0740 -- -- -- -- -- 93 % None (Room air) -- 15 --    12/16/24 07:08:39 98.5 °F (36.9 °C) 107 16 143/67 92 92 % -- Lying -- --    12/16/24 0602 -- -- -- -- -- -- -- -- -- 8    12/16/24 0324 -- -- -- -- -- -- -- -- -- 10 - Worst Possible Pain    12/16/24 0142 -- -- -- -- -- -- -- --  -- 10 - Worst Possible Pain    12/15/24 22:46:11 98.2 °F (36.8 °C) 75 19 127/81 96 93 % -- -- -- --    12/15/24 2121 -- -- -- -- -- -- -- -- -- 8    12/15/24 2110 -- -- -- -- -- -- -- -- 15 8    12/15/24 15:58:03 -- 71 -- 108/43 65 96 % -- -- -- --    12/15/24 1558 -- 71 -- 108/43 -- -- -- -- -- --    12/15/24 1555 -- -- -- -- -- -- -- -- -- 10 - Worst Possible Pain    12/15/24 1206 -- -- -- -- -- -- -- -- -- 10 - Worst Possible Pain    12/15/24 11:44:10 97.1 °F (36.2 °C) 77 18 133/58 83 94 % -- -- -- --    12/15/24 08:23:31 -- 71 -- 112/43 66 93 % -- -- -- --    12/15/24 0818 -- -- -- -- -- -- -- -- -- 8    12/15/24 0800 -- -- -- -- -- -- None (Room air) -- 15 --    12/15/24 07:37:36 98.1 °F (36.7 °C) 70 17 108/55 73 93 % None (Room air) Lying -- --    12/15/24 0727 -- -- -- -- -- -- -- -- -- 8    12/15/24 0414 -- -- -- -- -- -- -- -- -- 9    12/15/24 02:24:48 97.3 °F (36.3 °C) 79 16 162/71 101 93 % -- Lying -- --    12/14/24 23:26:15 97.3 °F (36.3 °C) 76 16 165/72 103 93 % -- Lying -- --    12/14/24 2219 -- -- -- -- -- -- -- -- -- 9    12/14/24 2115 -- -- -- -- -- -- -- -- 15 9    12/14/24 1928 -- -- -- -- -- -- -- -- -- 10 - Worst Possible Pain    12/14/24 19:17:13 97.7 °F (36.5 °C) 77 16 155/63 94 94 % -- Lying -- --    12/14/24 1726 -- -- -- -- -- -- -- -- -- 10 - Worst Possible Pain    12/14/24 1600 -- -- -- -- -- 94 % None (Room air) -- -- No Pain    12/14/24 1515 -- -- -- -- -- -- -- -- -- 10 - Worst Possible Pain    12/14/24 14:34:40 98.3 °F (36.8 °C) 75 17 123/56 78 94 % None (Room air) Lying -- --    12/14/24 1313 -- -- -- -- -- -- -- -- -- 10 - Worst Possible Pain    12/14/24 1116 -- -- -- -- -- -- -- -- -- 10 - Worst Possible Pain    12/14/24 0903 -- -- -- -- -- -- -- -- -- 10 - Worst Possible Pain    12/14/24 0800 -- -- -- -- -- -- None (Room air) -- 15 --    12/14/24 0705 98.5 °F (36.9 °C) 74 17 -- -- -- None (Room air) Lying -- --    12/14/24 0622 -- -- -- -- -- -- -- -- -- 10 - Worst Possible Pain     12/14/24 0339 -- -- -- -- -- -- -- -- -- 10 - Worst Possible Pain    12/14/24 02:13:53 97.7 °F (36.5 °C) 74 16 147/63 91 95 % -- Lying -- --       Pertinent Labs/Diagnostic Results:   Radiology:  CT abdomen pelvis w contrast   Final Interpretation by Riki Reina MD (12/15 0032)      No bowel obstruction. Gastrojejunostomy tube in appropriate position.      Stable bilateral pleural effusions with bibasilar atelectasis right worse than left.      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      Severe body wall anasarca and large volume abdominopelvic ascites unchanged likely due to third spacing.           Cardiology:  ECG 12 lead   Final Result by Rishabh Aburto MD (12/14 7227)   Sinus tachycardia   Low voltage QRS   Cannot rule out Anterior infarct , age undetermined   Abnormal ECG   When compared with ECG of 14-Nov-2024 21:45,   No significant change was found   Confirmed by Rishabh Aburto ([id number]) on 12/14/2024 8:42:48 AM        GI:  EGD   Final Result by Dennise Garcia MD (12/13 8159)   The esophagus appeared normal.   Mild erythematous mucosa in the antrum, prepyloric region and pylorus,    consistent with gastritis   The internal bolster of PEG was pushed away from the mucosa revealing an    area of ulceration with no surrounding purulence   Removed tube from the stomach. The tube was replaced.   The duodenal bulb, 1st part of the duodenum, 2nd part of the duodenum and    3rd part of the duodenum appeared normal.         RECOMMENDATION:   Return to the medical surgical floors   Okay to begin tube feeds via J port and medication via the G port   Okay to resume diet per primary team    GI will sign off, please reach out with question or concerns                     Dennise Garcia MD       EGD   Final Result by Dennise Garcia MD (12/13 7101)   The esophagus appeared normal.   Upon entry into the stomach, erythema was noted around the internal    bolster of the PEG tube.  The internal  "bolster was pushed away from the    mucosa revealing an area of ulceration with some surrounding purulence and    erythema.  2 g of cefazolin was administered intraprocedural.    Removed jejunal extension externally from the stomach and a new jejunal    extension was replaced using a rat-tooth forceps to extend the tubing as    far distal into the duodenum as we could.  The J port was successfully    flushed. External tubing 4 cm at the skin surface.    The duodenal bulb, 1st part of the duodenum, 2nd part of the duodenum and    3rd part of the duodenum appeared normal.      RECOMMENDATION:   Start 7-day course of Augmentin   Okay to use PEG-J tube for medications, water flushes, and tube feeds now   Continue tube care.   Make sure to use the correct ports. DO NOT USE ports for any other purpose    than as labeled:   \"Feed\" port is for tube feeds ONLY.   \"Rx\" port is for medications ONLY.   \"Suction\" port is for gastric venting/emptying.    Please make sure to flush ports with water before and after use to prevent    clogging.                      Dennise Garcia MD             Results from last 7 days   Lab Units 12/16/24  0614 12/14/24  0539 12/13/24  0612 12/11/24  0554 12/11/24  0554 12/10/24  1410   WBC Thousand/uL 11.45* 9.34 9.87   < > 9.28  --    HEMOGLOBIN g/dL 8.6* 8.9* 8.3*  --  8.8*  --    I STAT HEMOGLOBIN g/dl  --   --   --   --   --  11.2*   HEMATOCRIT % 26.8* 27.5* 26.0*  --  27.8*  --    HEMATOCRIT, ISTAT %  --   --   --   --   --  33*   PLATELETS Thousands/uL 100* 143* 147*   < > 127*  --    TOTAL NEUT ABS Thousands/µL  --  8.08* 8.61*  --   --   --    BANDS PCT % 1  --   --   --  9*  --     < > = values in this interval not displayed.     Results from last 7 days   Lab Units 12/16/24  0614 12/15/24  0943 12/14/24  0539 12/13/24  0612 12/12/24  0634 12/12/24  0406 12/11/24  1431 12/11/24  0554 12/11/24  0044 12/11/24  0044 12/10/24  1410   SODIUM mmol/L 128* 128* 133* 132* 130* 129*   < > 131*   < " > 129*  --    POTASSIUM mmol/L 5.3 5.9* 5.1 4.8 5.3 5.5*   < > 5.4*   < > 5.7*  --    CHLORIDE mmol/L 97 100 102 102 100 98   < > 100   < > 100  --    CO2 mmol/L 25 26 26 27 24 24   < > 23   < > 22  --    CO2, I-STAT mmol/L  --   --   --   --   --   --   --   --   --   --  26   ANION GAP mmol/L 6 2* 5 3* 6 7   < > 8   < > 7  --    BUN mg/dL 25 25 29* 31* 27* 27*   < > 21   < > 19  --    CREATININE mg/dL 0.64 0.56* 0.50* 0.55* 0.59* 0.60   < > 0.75   < > 0.76  --    EGFR ml/min/1.73sq m 95 100 104 100 98 98   < > 85   < > 84  --    CALCIUM mg/dL 7.4* 7.6* 7.5* 8.6 8.1* 8.2*   < > 7.9*   < > 7.5*  --    CALCIUM, IONIZED, ISTAT mmol/L  --   --   --   --   --   --   --   --   --   --  1.27   MAGNESIUM mg/dL 1.8* 1.8* 1.9 2.1  --  2.2   < > 2.2   < > 3.7*  --    PHOSPHORUS mg/dL  --  2.7 2.2*  --   --   --   --  4.2*  --  3.7  --     < > = values in this interval not displayed.     Results from last 7 days   Lab Units 12/14/24  0539   AST U/L 12*   ALT U/L 8   ALK PHOS U/L 66   TOTAL PROTEIN g/dL 4.4*   ALBUMIN g/dL 2.5*   TOTAL BILIRUBIN mg/dL 0.35     Results from last 7 days   Lab Units 12/17/24  1051 12/17/24  0632 12/16/24  2124 12/16/24  1556 12/16/24  1045 12/16/24  0617 12/15/24  2030 12/15/24  1623 12/15/24  1055 12/15/24  0612 12/14/24 2004 12/14/24  1550   POC GLUCOSE mg/dl 232* 253* 217* 258* 259* 335* 314* 298* 345* 317* 299* 306*     Results from last 7 days   Lab Units 12/16/24  0614 12/15/24  0943 12/14/24  0539 12/13/24  0612 12/12/24  0634 12/12/24  0406 12/11/24  1431 12/11/24  0554 12/11/24  0044   GLUCOSE RANDOM mg/dL 341* 371* 231* 179* 227* 236* 251* 268* 273*             Network Utilization Review Department  ATTENTION: Please call with any questions or concerns to [phone] and carefully listen to the prompts so that you are directed to the right person. All voicemails are confidential.   For Discharge needs, contact Care Management DC Support Team at [phone] opt. [phone]  Send all requests  for admission clinical reviews, approved or denied determinations and any other requests to dedicated fax number below belonging to the campus where the patient is receiving treatment. List of dedicated fax numbers for the Facilities:  FACILITY NAME UR FAX NUMBER   ADMISSION DENIALS (Administrative/Medical Necessity) [phone]   DISCHARGE SUPPORT TEAM (NETWORK) [phone]   PARENT CHILD HEALTH (Maternity/NICU/Pediatrics) [phone]   [facility] [phone]   [facility] [phone]   [facility] [phone]   [facility] [phone]   [facility] [phone]   [facility] [phone]   [facility] [phone]   [facility] [phone]   [facility] [phone]   [facility] [phone]   [facility] [phone]   [facility] [phone]

## 2024-12-17 NOTE — ASSESSMENT & PLAN NOTE
Reporting uncontrolled pain, 10/10 generalized abdominal pain  RN reports that patient has appeared uncomfortable throughout the day  Current regimen:  Start OxyIR 5/10 mg Q4hrs PRN for moderate/severe pain   IV dilaudid to 0.2 mg q4H PRN for BT pain  Schedule Tylenol 975 mg TID  Consider addition of flomax or other if ongoing urinary retention  Minimize opioids- given recurrent SBO and now with urinary retention  Discussed w/ primary team -> OK to increase pain medication   Failed therapies:  NA  Total OME over last 24 hours: 30.5 mg  Bowel regimen to prevent OIC  Opioid agreement reviewed and signed. Not on file, will need outpatient follow up

## 2024-12-17 NOTE — ASSESSMENT & PLAN NOTE
Palliative Diagnosis: endometrial cancer    Goals:   Full cares, no limits  Palliative will follow for ongoing goals of care discussions as situation evolves.    Social Support:  Patient's support system:   Supportive listening provided  Normalized experience of patient  Advocated for patient/family with interdisciplinary team    Care Coordination  Case discussed with RN, SLIM    Follow-up  We appreciate the opportunity to participate in this patient's care.   We will continue to follow while admitted.    Please do not hesitate to contact our on-call provider through [facility] Secure Chat or contact [phone] should there be an acute change or other symptom control concerns.    PDMP Review: I have reviewed the patient's controlled substance dispensing history in the Prescription Drug Monitoring Program in compliance with the [place] regulations before prescribing any controlled substances.

## 2024-12-17 NOTE — PROGRESS NOTES
Lety Botello is a 64 y.o. female who is currently ordered Vancomycin IV with management by the Pharmacy Consult service.  Relevant clinical data and objective / subjective history reviewed.  Vancomycin Assessment:  Indication and Goal AUC/Trough: Soft tissue (goal -600, trough >10), -600, trough >10  Clinical Status: stable  Micro:     Renal Function:  SCr: 0.64 mg/dL  CrCl: 107.7 mL/min  Renal replacement: Not on dialysis  Days of Therapy: 6  Current Dose: 1000 mg IV q12h  Vancomycin Plan:  New Dosing: continue current dose  Estimated AUC: 524 mcg*hr/mL  Estimated Trough: 15.2 mcg/mL  Next Level: 12/19/24   Renal Function Monitoring: Daily BMP and UOP  Pharmacy will continue to follow closely for s/sx of nephrotoxicity, infusion reactions and appropriateness of therapy.  BMP and CBC will be ordered per protocol. We will continue to follow the patient’s culture results and clinical progress daily.    Iftikhar Desouza, Pharmacist

## 2024-12-17 NOTE — ASSESSMENT & PLAN NOTE
General Surgery team consults for abdominal pain  Recent hospitalization for small bowel obstruction, status post ex lap, DAVID, SBR 11/22  Now, admitted following clogged GJ tube at rehab status post exchange by GI team, tolerating tube feeds at goal of 60    Plan  -TF to goal, CLD  -midline len removed  -f/u GI for GJ exchange  -DVT ppx  -Rest of care per primary team

## 2024-12-17 NOTE — ASSESSMENT & PLAN NOTE
Located on the right lower abdomen, well crusted, no active lesion  With oozing and bleeding  Continue with Neurontin  Consult dermatology for further management  Continue valtrex

## 2024-12-18 NOTE — ASSESSMENT & PLAN NOTE
Patient with recent hospitalization due to small bowel obstruction s/p exploratory laparotomy with small bowel resection on 11/22/2024  Now with worsening abdominal pain and distention  Underwent history of gastroparesis  Evaluated by general surgery   Abdominal pelvis CT scan, negative for obstruction, stable bilateral pleural effusion and anasarca  Abdominal pain is improving after having bowel movement  Continue supportive care  Coronado catheter placed due to retention  Consult IR for paracentesis

## 2024-12-18 NOTE — ASSESSMENT & PLAN NOTE
Hyponatremia likely multifactorial - due to volume overload  Coronado catheter placed now, will allow for self correction  Monitor closely - repeat at 4pm  May need diuresis if she fails to correct on her own  Check urine studies

## 2024-12-18 NOTE — ASSESSMENT & PLAN NOTE
Recurrent endometrial cancer, initial diagnosis 2020  Follows with JASON Dukes of [facility] gyn onc  S/p FLORECITA SBO SLND 6/2020 c/b splenic laceration  NAYE 1/2023, though presented for a fall in 10/2023 was incidentally found to have RP lymphadenopathy  Bx 12/2023 confirm metastatic adenocarcinoma  S/p pelvic RT and treatment with femara  NAYE 5/2024  Presented 10/18, 11/5, 11/14 with SBO - s/p ex lap, small bowel resection, partial omentectomy, DAVID 11/22/24 with biopsy confirming metastatic adenocarcinoma

## 2024-12-18 NOTE — PLAN OF CARE
Problem: Prexisting or High Potential for Compromised Skin Integrity  Goal: Skin integrity is maintained or improved  Description: INTERVENTIONS:  - Identify patients at risk for skin breakdown  - Assess and monitor skin integrity  - Assess and monitor nutrition and hydration status  - Monitor labs   - Assess for incontinence   - Turn and reposition patient  - Assist with mobility/ambulation  - Relieve pressure over bony prominences  - Avoid friction and shearing  - Provide appropriate hygiene as needed including keeping skin clean and dry  - Evaluate need for skin moisturizer/barrier cream  - Collaborate with interdisciplinary team   - Patient/family teaching  - Consider wound care consult   Outcome: Progressing     Problem: PAIN - ADULT  Goal: Verbalizes/displays adequate comfort level or baseline comfort level  Description: Interventions:  - Encourage patient to monitor pain and request assistance  - Assess pain using appropriate pain scale  - Administer analgesics based on type and severity of pain and evaluate response  - Implement non-pharmacological measures as appropriate and evaluate response  - Consider cultural and social influences on pain and pain management  - Notify physician/advanced practitioner if interventions unsuccessful or patient reports new pain  Outcome: Progressing     Problem: INFECTION - ADULT  Goal: Absence or prevention of progression during hospitalization  Description: INTERVENTIONS:  - Assess and monitor for signs and symptoms of infection  - Monitor lab/diagnostic results  - Monitor all insertion sites, i.e. indwelling lines, tubes, and drains  - Monitor endotracheal if appropriate and nasal secretions for changes in amount and color  - Golden appropriate cooling/warming therapies per order  - Administer medications as ordered  - Instruct and encourage patient and family to use good hand hygiene technique  - Identify and instruct in appropriate isolation precautions for  identified infection/condition  Outcome: Progressing     Problem: SAFETY ADULT  Goal: Patient will remain free of falls  Description: INTERVENTIONS:  - Educate patient/family on patient safety including physical limitations  - Instruct patient to call for assistance with activity   - Consult OT/PT to assist with strengthening/mobility   - Keep Call bell within reach  - Keep bed low and locked with side rails adjusted as appropriate  - Keep care items and personal belongings within reach  - Initiate and maintain comfort rounds  - Make Fall Risk Sign visible to staff  - Offer Toileting every 2 Hours, in advance of need  - Initiate/Maintain bed/chair alarm  - Obtain necessary fall risk management equipment: nonskid footwear  - Apply yellow socks and bracelet for high fall risk patients  - Consider moving patient to room near nurses station  Outcome: Progressing  Goal: Maintain or return to baseline ADL function  Description: INTERVENTIONS:  -  Assess patient's ability to carry out ADLs; assess patient's baseline for ADL function and identify physical deficits which impact ability to perform ADLs (bathing, care of mouth/teeth, toileting, grooming, dressing, etc.)  - Assess/evaluate cause of self-care deficits   - Assess range of motion  - Assess patient's mobility; develop plan if impaired  - Assess patient's need for assistive devices and provide as appropriate  - Encourage maximum independence but intervene and supervise when necessary  - Involve family in performance of ADLs  - Assess for home care needs following discharge   - Consider OT consult to assist with ADL evaluation and planning for discharge  - Provide patient education as appropriate  Outcome: Progressing  Goal: Maintains/Returns to pre admission functional level  Description: INTERVENTIONS:  - Perform AM-PAC 6 Click Basic Mobility/ Daily Activity assessment daily.  - Set and communicate daily mobility goal to care team and patient/family/caregiver.   -  Collaborate with rehabilitation services on mobility goals if consulted  - Perform Range of Motion 3 times a day.  - Reposition patient every 2 hours.  - Dangle patient 3 times a day  - Stand patient 3 times a day  - Ambulate patient 3 times a day  - Out of bed to chair 3 times a day   - Out of bed for meals 3 times a day  - Out of bed for toileting  - Record patient progress and toleration of activity level   Outcome: Progressing     Problem: DISCHARGE PLANNING  Goal: Discharge to home or other facility with appropriate resources  Description: INTERVENTIONS:  - Identify barriers to discharge w/patient and caregiver  - Arrange for needed discharge resources and transportation as appropriate  - Identify discharge learning needs (meds, wound care, etc.)  - Arrange for interpretive services to assist at discharge as needed  - Refer to Case Management Department for coordinating discharge planning if the patient needs post-hospital services based on physician/advanced practitioner order or complex needs related to functional status, cognitive ability, or social support system  Outcome: Progressing     Problem: Knowledge Deficit  Goal: Patient/family/caregiver demonstrates understanding of disease process, treatment plan, medications, and discharge instructions  Description: Complete learning assessment and assess knowledge base.  Interventions:  - Provide teaching at level of understanding  - Provide teaching via preferred learning methods  Outcome: Progressing     Problem: Nutrition/Hydration-ADULT  Goal: Nutrient/Hydration intake appropriate for improving, restoring or maintaining nutritional needs  Description: Monitor and assess patient's nutrition/hydration status for malnutrition. Collaborate with interdisciplinary team and initiate plan and interventions as ordered.  Monitor patient's weight and dietary intake as ordered or per policy. Utilize nutrition screening tool and intervene as necessary. Determine  patient's food preferences and provide high-protein, high-caloric foods as appropriate.     INTERVENTIONS:  - Monitor oral intake, urinary output, labs, and treatment plans  - Assess nutrition and hydration status and recommend course of action  - Evaluate amount of meals eaten  - Assist patient with eating if necessary   - Allow adequate time for meals  - Recommend/ encourage appropriate diets, oral nutritional supplements, and vitamin/mineral supplements  - Order, calculate, and assess calorie counts as needed  - Recommend, monitor, and adjust tube feedings and TPN/PPN based on assessed needs  - Assess need for intravenous fluids  - Provide specific nutrition/hydration education as appropriate  - Include patient/family/caregiver in decisions related to nutrition  Outcome: Progressing     Problem: GASTROINTESTINAL - ADULT  Goal: Minimal or absence of nausea and/or vomiting  Description: INTERVENTIONS:  - Administer IV fluids if ordered to ensure adequate hydration  - Maintain NPO status until nausea and vomiting are resolved  - Nasogastric tube if ordered  - Administer ordered antiemetic medications as needed  - Provide nonpharmacologic comfort measures as appropriate  - Advance diet as tolerated, if ordered  - Consider nutrition services referral to assist patient with adequate nutrition and appropriate food choices  Outcome: Progressing  Goal: Maintains or returns to baseline bowel function  Description: INTERVENTIONS:  - Assess bowel function  - Encourage oral fluids to ensure adequate hydration  - Administer IV fluids if ordered to ensure adequate hydration  - Administer ordered medications as needed  - Encourage mobilization and activity  - Consider nutritional services referral to assist patient with adequate nutrition and appropriate food choices  Outcome: Progressing  Goal: Maintains adequate nutritional intake  Description: INTERVENTIONS:  - Monitor percentage of each meal consumed  - Identify factors  contributing to decreased intake, treat as appropriate  - Assist with meals as needed  - Monitor I&O, weight, and lab values if indicated  - Obtain nutrition services referral as needed  Outcome: Progressing  Goal: Oral mucous membranes remain intact  Description: INTERVENTIONS  - Assess oral mucosa and hygiene practices  - Implement preventative oral hygiene regimen  - Implement oral medicated treatments as ordered  - Initiate Nutrition services referral as needed  Outcome: Progressing  Problem: GENITOURINARY - ADULT  Goal: Maintains or returns to baseline urinary function  Description: INTERVENTIONS:  - Assess urinary function  - Encourage oral fluids to ensure adequate hydration if ordered  - Administer IV fluids as ordered to ensure adequate hydration  - Administer ordered medications as needed  - Offer frequent toileting  - Follow urinary retention protocol if ordered  Outcome: Progressing  Goal: Absence of urinary retention  Description: INTERVENTIONS:  - Assess patient’s ability to void and empty bladder  - Monitor I/O  - Bladder scan as needed  - Discuss with physician/AP medications to alleviate retention as needed  - Discuss catheterization for long term situations as appropriate  Outcome: Progressing

## 2024-12-18 NOTE — WOUND OSTOMY CARE
Progress Note - Wound   Lety Botello 64 y.o. female MRN: [id number]  Unit/Bed#: [facility] 511-01 Encounter: [id number]        Assessment: Patient is seen for wound care follow up . She is agreeable to the assessment . Ordered low air loss KREG mattress . Patient will need a ATR turning system . Max A of 2 for rolling in the bed . [place] in place for management of urine .      Assessment Findings  Right lower abdominal area is resolving shingles - noted partial thickness open area on the right lower abdominal area . Small serosanguinous drainage   Right lateral abdominal area blisters are resolved   Sacral dry and intact with a small hyperpigmented area that is chronic and blanchable   Bilateral heels dry and intact   Peg site - upper left abdominal area with min- moderate tan drainage . Noted peg site with erosion with slough in the wound bed .     Wound Care Plan:   1-Apply silicone bordered foam dressings to bilateral heels for prevention.  Jason with P.  Peel back for skin assessments at least daily and re-apply.  Change dressings every 3 days and as needed.  2-Elevate heels off of bed/chair surface--offloading heel boots.  3-Offloading air cushion in chair when out of bed.  4-Apply moisturizing skin cream to body daily and as needed.  5-Turn/reposition every 2 hours while in bed and weight shift frequently while in chair for pressure re-distribution on skin.   6-Silicone Cream/Hydraguard lotion to bilateral buttocks and sacrum three times daily and as needed.  7-Right distal abdomen (shingles)--apply xeroform then silicone foam then change every other day   8. Peg site- cleanse with Normal saline then apply melgisorb ag as split gauze then a mepilex up as a split gauze change daily   9. KREG low air loss mattress   10. ATR turning system with wedges         Wound 11/20/24 Abdomen Right;Distal (Active)   Wound Image    12/16/24 1240   Wound Description Intact;Dry 12/18/24 0800   Katya-wound Assessment Dry;Erythema  12/18/24 1247   Wound Site Closure Staples 12/18/24 0800   Drainage Amount None 12/18/24 0800   Drainage Description Serosanguineous 12/17/24 1601   Treatments Site care;Cleansed 12/17/24 1601   Dressing Open to air 12/18/24 0800   Dressing Changed Changed 12/17/24 1601   Patient Tolerance Tolerated well 12/17/24 1601   Dressing Status Clean;Dry;Intact 12/18/24 1247       Wound 12/09/24  Abdomen Right;Lateral (Active)   Wound Image   12/18/24 1041   Wound Description Pink;Dry;Brown 12/18/24 1247   Katya-wound Assessment Clean;Dry;Intact 12/18/24 1247   Wound Length (cm) 9 cm 12/18/24 1041   Wound Width (cm) 8 cm 12/18/24 1041   Wound Depth (cm) 0.1 cm 12/18/24 1041   Wound Surface Area (cm^2) 72 cm^2 12/18/24 1041   Wound Volume (cm^3) 7.2 cm^3 12/18/24 1041   Calculated Wound Volume (cm^3) 7.2 cm^3 12/18/24 1041   Change in Wound Size % -242.86 12/18/24 1041   Drainage Amount Small 12/18/24 1041   Drainage Description Serosanguineous 12/18/24 1041   Non-staged Wound Description Partial thickness 12/18/24 1041   Treatments Cleansed;Site care 12/18/24 1041   Dressing Foam, Silicon (eg. Allevyn, etc) 12/18/24 1247   Dressing Changed Changed 12/18/24 1041   Patient Tolerance Tolerated well 12/18/24 1041   Dressing Status Clean;Dry;Intact 12/18/24 1247       Wound 12/18/24 Abdomen Left;Upper (Active)   Wound Image   12/18/24 1044   Wound Description Beefy red;Fragile 12/18/24 1247   Katya-wound Assessment Fragile 12/18/24 1247   Wound Length (cm) 1 cm 12/18/24 1044   Wound Width (cm) 2.4 cm 12/18/24 1044   Wound Depth (cm) 0.2 cm 12/18/24 1044   Wound Surface Area (cm^2) 2.4 cm^2 12/18/24 1044   Wound Volume (cm^3) 0.48 cm^3 12/18/24 1044   Calculated Wound Volume (cm^3) 0.48 cm^3 12/18/24 1044   Drainage Amount Small 12/18/24 1247   Drainage Description Tan 12/18/24 1247   Non-staged Wound Description Full thickness 12/18/24 1247   Treatments Cleansed;Site care;Irrigation with NSS 12/18/24 1247   Dressing Foam, Silicon  (eg. Allevyn, etc);Calcium Alginate with Silver 12/18/24 1247   Dressing Changed Changed 12/18/24 1247   Patient Tolerance Tolerated well 12/18/24 1247       Wound care will follow weekly secure chat with questions or concerns     Jacki Malhotra RN BSN CWOCN

## 2024-12-18 NOTE — PHYSICAL THERAPY NOTE
Physical Therapy Cancellation Note             12/18/24 1120   PT Last Visit   PT Visit Date 12/18/24   Note Type   Note Type Cancelled Session   Cancel Reasons Medical status     PT attempted treatment session. Spoke to RN, not medically stable at this time. PT will continue to follow as able and appropriate.     Cristina Gabriel PT, DPT

## 2024-12-18 NOTE — PROGRESS NOTES
Progress Note - Surgery-General   Name: Lety Botello 64 y.o. female I MRN: [id number]  Unit/Bed#: [facility] 511-01 I Date of Admission: 12/9/2024   Date of Service: 12/18/2024 I Hospital Day: 9    Assessment & Plan  Encounter for gastrojejunal (GJ) tube placement issues  Admitted with clogged GJ-tube from rehab  Status post multiple exchanges of GJ tube by GI team, now tolerating goal feeds  Abdominal pain  General Surgery team consults for abdominal pain  Recent hospitalization for small bowel obstruction, status post ex lap, DAVID, SBR 11/22  Now, admitted following clogged GJ tube at rehab status post exchange by GI team, tolerating tube feeds at goal of 60    Plan  -TF to goal (currently at 60), CLD  -midline len removed  -f/u GI for GJ exchange  -DVT ppx  -Rest of care per primary team  Palliative care encounter          Subjective   Feeling okay.  Tolerating clears.  Passing gas.  Not walking at this time    Objective :  Temp:  [97.3 °F (36.3 °C)-98.3 °F (36.8 °C)] 98.3 °F (36.8 °C)  HR:  [70-76] 73  BP: (106-134)/(49-58) 106/50  Resp:  [17-18] 18  SpO2:  [93 %-96 %] 93 %  O2 Device: None (Room air)    I/O         12/16 0701  12/17 0700 12/17 0701  12/18 0700    P.O. 480 610    I.V. (mL/kg) 30 (0.3)     NG/ 360    IV Piggyback 50 150    Feedings 874 584    Total Intake(mL/kg) 1674 (14.1) 1704 (14.3)    Urine (mL/kg/hr) 1525 (0.5) 850 (0.3)    Emesis/NG output 0     Stool 0     Total Output 1525 850    Net +149 +854          Unmeasured Urine Occurrence 1 x     Unmeasured Stool Occurrence 3 x           Lines/Drains/Airways       Active Status       Name Placement date Placement time Site Days    Gastrostomy/Enterostomy PEG- Jejunostomy 20 Fr. LUQ 12/03/24  1553  LUQ  14                  Physical Exam  Constitutional:       Appearance: Normal appearance.   HENT:      Head: Normocephalic and atraumatic.      Mouth/Throat:      Mouth: Mucous membranes are moist.   Eyes:      Extraocular Movements: Extraocular  movements intact.   Cardiovascular:      Rate and Rhythm: Normal rate and regular rhythm.   Pulmonary:      Effort: Pulmonary effort is normal.   Abdominal:      General: Abdomen is flat. There is no distension.      Palpations: Abdomen is soft.      Tenderness: There is abdominal tenderness. There is no guarding or rebound.   Musculoskeletal:         General: Normal range of motion.      Cervical back: Normal range of motion and neck supple.   Skin:     General: Skin is warm and dry.   Neurological:      General: No focal deficit present.      Mental Status: She is alert and oriented to person, place, and time.

## 2024-12-18 NOTE — PROGRESS NOTES
Lety Botello is a 64 y.o. female who is currently ordered Vancomycin IV with management by the Pharmacy Consult service.  Relevant clinical data and objective / subjective history reviewed.  Vancomycin Assessment:  Indication and Goal AUC/Trough: Soft tissue (goal -600, trough >10), -600, trough >10  Clinical Status: stable  Micro:     Renal Function:  SCr: 0.76 mg/dL  CrCl: 93.7 mL/min  Renal replacement: Not on dialysis  Days of Therapy: 8  Current Dose: 1000 mg IV q12h  Vancomycin Plan:  New Dosin mg IV q12h  Estimated AUC: 451 mcg*hr/mL  Estimated Trough: 13.7 mcg/mL  Next Level:  @ 0600  Renal Function Monitoring: Daily BMP and UOP  Pharmacy will continue to follow closely for s/sx of nephrotoxicity, infusion reactions and appropriateness of therapy.  BMP and CBC will be ordered per protocol. We will continue to follow the patient’s culture results and clinical progress daily.    Mckenzie Camarillo, Pharmacist

## 2024-12-18 NOTE — ASSESSMENT & PLAN NOTE
Body mass index is 53.69 kg/m².  Encourage lifestyle modification and weight loss once acute issues improved/resolved

## 2024-12-18 NOTE — QUICK NOTE
Midline surgical incision evaluated at bedside. Patient tolerated well. Dressing applied.   Please see photo below.

## 2024-12-18 NOTE — ASSESSMENT & PLAN NOTE
General Surgery team consults for abdominal pain  Recent hospitalization for small bowel obstruction, status post ex lap, DAVID, SBR 11/22  Now, admitted following clogged GJ tube at rehab status post exchange by GI team, tolerating tube feeds at goal of 60    Plan  -TF to goal (currently at 60), CLD  -midline len removed  -f/u GI for GJ exchange  -DVT ppx  -Rest of care per primary team

## 2024-12-18 NOTE — ASSESSMENT & PLAN NOTE
Current regimen:  OxyIR 5/10 mg Q4hrs PRN for moderate/severe pain   IV dilaudid to 0.2 mg q4H PRN for BT pain  Schedule Tylenol 975 mg TID  Minimize opioids- given recurrent SBO  Failed therapies:  NA  Total OME over last 24 hours: 64.5 mg (required 3 doses of IV dilaudid for breakthrough pain)  Bowel regimen to prevent OIC: senna 17.6 mg bid, prn dulcolax suppository  Opioid agreement. Not on file, will need outpatient follow up    IR consulted for paracentesis

## 2024-12-18 NOTE — ASSESSMENT & PLAN NOTE
Palliative Diagnosis: endometrial cancer    Goals:  Discussed code status. Currently a level 1.  Explained CPR and intubation in depth and discussed likelihood of survival.  Patient would not want CPR and has elected to be level 2 DNR at this time. Further considering if she would want intubation, though is not ready to decide this today.  Spoke with patient's HCR, Sharda who is understanding of patient's decision at this time. She is planning to visit tomorrow versus Friday.  Will continue discussions regarding GOC as patient's clinical presentation evolves.    Decisional apparatus:  Patient does have capacity on exam today.  If capacity is lost, patient's substitute decision maker would default to Sharda Carver per AD on file  ER contacts:  Sharda Carver (Niece)  [phone] (Work Phone)   Advance Directive/Living Will/POLST: on file and reviewed    Social Support:  Patient's support system:   Supportive listening provided  Normalized experience of patient  Advocated for patient/family with interdisciplinary team    Care Coordination  Case discussed with RN, SLIM    Follow-up  We appreciate the opportunity to participate in this patient's care.   We will continue to follow while admitted.    Please do not hesitate to contact our on-call provider through EPIC Secure Chat or contact [phone] should there be an acute change or other symptom control concerns.    PDMP Review: I have reviewed the patient's controlled substance dispensing history in the Prescription Drug Monitoring Program in compliance with the [place] regulations before prescribing any controlled substances.

## 2024-12-18 NOTE — PROGRESS NOTES
Progress Note - Palliative Care   Name: Lety Botello 64 y.o. 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 Not on file, will need outpatient follow up    IR consulted for paracentesis   Palliative care encounter  Palliative Diagnosis: endometrial cancer    Goals:  Discussed code status. Currently a level 1.  Explained CPR and intubation in depth and discussed likelihood of survival.  Patient would not want CPR and has elected to be level 2 DNR at this time. Further considering if she would want intubation, though is not ready to decide this today.  Spoke with patient's HCR, Sharda who is understanding of patient's decision at this time. She is planning to visit tomorrow versus Friday.  Will continue discussions regarding GOC as patient's clinical presentation evolves.    Decisional apparatus:  Patient does have capacity on exam today.  If capacity is lost, patient's substitute decision maker would default to Sharda Carver per AD on file  ER contacts:  Sharda Carver (Niece)  [phone] (Work Phone)   Advance Directive/Living Will/POLST: on file and reviewed    Social Support:  Patient's support system:   Supportive listening  provided  Normalized experience of patient  Advocated for patient/family with interdisciplinary team    Care Coordination  Case discussed with RN, SLIM    Follow-up  We appreciate the opportunity to participate in this patient's care.   We will continue to follow while admitted.    Please do not hesitate to contact our on-call provider through EP[employer] Secure Chat or contact [phone] should there be an acute change or other symptom control concerns.    PDMP Review: I have reviewed the patient's controlled substance dispensing history in the Prescription Drug Monitoring Program in compliance with the [place] regulations before prescribing any controlled substances.    Endometrial cancer (HCC)  Recurrent endometrial cancer, initial diagnosis 2020  Follows with JASON Dukes of [facility] gyn onc  S/p FLORECITA SBO SLND 6/2020 c/b splenic laceration  NAYE 1/2023, though presented for a fall in 10/2023 was incidentally found to have RP lymphadenopathy  Bx 12/2023 confirm metastatic adenocarcinoma  S/p pelvic RT and treatment with femara  NAYE 5/2024  Presented 10/18, 11/5, 11/14 with SBO - s/p ex lap, small bowel resection, partial omentectomy, DAVID 11/22/24 with biopsy confirming metastatic adenocarcinoma    24 Hour History  Chart reviewed before visit. No acute overnight events. Patient seen in bed, in NAD. Coronado catheter placed in the setting of urinary retention. IR consulted for paracentesis. Endorsing abdominal pain on exam. Denies nausea or vomiting. Tolerating clear liquid diet. Reports she is not passing gas, last bowel movement documented 12/17. Discussed goals of care with patient in the setting of metastatic endometrial cancer with recurrent SBO and large volume abd/pelvic ascites. Patient expressed understanding of current clinical status and expressed that she is frustrated by the recurrent SBO and severe abdominal pains. She wants to be comfortable. We discussed ongoing treatment focused cares versus comfort care.  She is not ready for comfort care at this time. Discussed code status. Currently a level 1.  Explained CPR and intubation in depth and discussed likelihood of survival.  She does NOT want CPR and has elected to be level 2 DNR at this time. In discussing intubation, she is not sure that she would want that and states she would not want Sharda to have to decide to withdraw life support, though is not ready today to decide definitively on level 3 DNR/DNI. Agreeable to ongoing goc discussions. With patient's approval, I did speak with Sharda and update her of this change. Sharda is planning to visit tomorrow versus Friday. Offered no additional questions or concerns at this time.    Medications    Current Facility-Administered Medications:     acetaminophen (TYLENOL) tablet 975 mg, 975 mg, Oral, Q8H SAM, Pauly Grissom, DO, 975 mg at 12/18/24 0615    albuterol (PROVENTIL HFA,VENTOLIN HFA) inhaler 2 puff, 2 puff, Inhalation, Q6H PRN, Klevin Sheppard DO    aluminum-magnesium hydroxide-simethicone (MAALOX) oral suspension 30 mL, 30 mL, Oral, Q6H PRN, Kelvin Sheppard DO    amLODIPine (NORVASC) tablet 5 mg, 5 mg, Oral, Daily, Kelvin Sheppard DO, 5 mg at 12/18/24 0940    aspirin (ECOTRIN LOW STRENGTH) EC tablet 81 mg, 81 mg, Oral, Daily, Kelvin Sheppard DO, 81 mg at 12/18/24 0940    bisacodyl (DULCOLAX) rectal suppository 10 mg, 10 mg, Rectal, Daily PRN, Kelvin Sheppard DO    carvedilol (COREG) tablet 25 mg, 25 mg, Oral, BID With Meals, Kelvin Sheppard DO, 25 mg at 12/18/24 0947    cefTRIAXone (ROCEPHIN) 2,000 mg in dextrose 5 % 50 mL IVPB, 2,000 mg, Intravenous, Q24H, Kelvin Sheppard DO, Last Rate: 100 mL/hr at 12/18/24 0609, 2,000 mg at 12/18/24 0609    cyanocobalamin (VITAMIN B-12) tablet 1,000 mcg, 1,000 mcg, Oral, Daily, Kelvin Sheppard DO, 1,000 mcg at 12/18/24 0940    dicyclomine (BENTYL) capsule 10 mg, 10 mg, Oral, BID PRN, Kelvin Sheppard DO, 10 mg at 12/18/24 0947    enoxaparin (LOVENOX) subcutaneous injection 120 mg, 1 mg/kg,  Subcutaneous, Q12H SAM, Arslan Glasgow MD, 120 mg at 12/17/24 2122    furosemide (LASIX) injection 40 mg, 40 mg, Intravenous, BID (diuretic), Paul Ríos MD, 40 mg at 12/18/24 0947    gabapentin (NEURONTIN) capsule 100 mg, 100 mg, Per G Tube, Daily, Kelvin Sheppard DO, 100 mg at 12/18/24 0940    HYDROmorphone HCl (DILAUDID) injection 0.2 mg, 0.2 mg, Intravenous, Q4H PRN, JASON Cooley, 0.2 mg at 12/18/24 0608    insulin glargine (LANTUS) subcutaneous injection 20 Units 0.2 mL, 20 Units, Subcutaneous, HS, Kelvin Sheppard DO, 20 Units at 12/17/24 2121    insulin lispro (HumALOG/ADMELOG) 100 units/mL subcutaneous injection 1-5 Units, 1-5 Units, Subcutaneous, TID AC, 1 Units at 12/18/24 1041 **AND** Fingerstick Glucose (POCT), , , TID AC, Kelvin Sheppard DO    insulin lispro (HumALOG/ADMELOG) 100 units/mL subcutaneous injection 10 Units, 10 Units, Subcutaneous, TID With Meals, Kelvin Sheppard DO, 10 Units at 12/18/24 0828    iohexol (OMNIPAQUE) 240 MG/ML solution 50 mL, 50 mL, Oral, Once in imaging, Bj Jones MD    levothyroxine tablet 150 mcg, 150 mcg, Oral, Daily, Kelvin Sheppard DO, 150 mcg at 12/18/24 0618    naloxone (NARCAN) 0.04 mg/mL syringe 0.04 mg, 0.04 mg, Intravenous, Q1MIN PRN, JASON Cooley    nitroglycerin (NITROSTAT) SL tablet 0.4 mg, 0.4 mg, Sublingual, Q5 Min PRN, Kelvin Sheppard DO    omeprazole (PRILOSEC) suspension 2 mg/mL, 40 mg, Per G Tube, Daily, Kelvin Sheppard DO, 40 mg at 12/18/24 0618    ondansetron (ZOFRAN) injection 4 mg, 4 mg, Intravenous, Q6H PRN, Kelvin Sheppard DO, 4 mg at 12/16/24 0741    oxyCODONE (ROXICODONE) oral solution 5 mg, 5 mg, Oral, Q4H PRN **OR** oxyCODONE (ROXICODONE) oral solution 10 mg, 10 mg, Oral, Q4H PRN, JASON Cooley, 10 mg at 12/18/24 0948    polyethylene glycol (MIRALAX) packet 17 g, 17 g, Oral, Daily, Kelvin Sheppard DO, 17 g at 12/18/24 0940    senna oral syrup 17.6 mg, 17.6 mg, Oral, BID, Kelvin Sheppard DO, 17.6 mg at 12/15/24 4441     valACYclovir (VALTREX) tablet 1,000 mg, 1,000 mg, Oral, Q8H [facility], Karuna Guillen MD, 1,000 mg at 12/18/24 0615    vancomycin (VANCOCIN) IVPB (premix in dextrose) 750 mg 150 mL, 750 mg, Intravenous, Q12H, Kelvin Sheppard,     Objective  /75 (BP Location: Left arm)   Pulse 72   Temp 98.1 °F (36.7 °C) (Oral)   Resp 20   Ht 5' (1.524 m)   Wt 125 kg (274 lb 14.6 oz)   SpO2 96%   BMI 53.69 kg/m²   Physical Exam  Vitals and nursing note reviewed.   Constitutional:       General: She is not in acute distress.     Appearance: She is ill-appearing.   HENT:      Mouth/Throat:      Mouth: Mucous membranes are moist.   Pulmonary:      Effort: No respiratory distress.   Abdominal:      General: There is distension.   Musculoskeletal:      Right lower leg: Edema present.      Left lower leg: Edema present.   Skin:     General: Skin is warm and dry.      Coloration: Skin is pale.   Neurological:      Mental Status: She is alert and oriented to person, place, and time.   Psychiatric:         Behavior: Behavior normal.         Thought Content: Thought content normal.       Lab Results: I have personally reviewed pertinent labs., CBC:   Lab Results   Component Value Date    WBC 5.78 12/18/2024    HGB 6.5 (L) 12/18/2024    HCT 20.4 (L) 12/18/2024     (H) 12/18/2024    PLT 77 (L) 12/18/2024    RBC 2.07 (L) 12/18/2024    MCH 31.4 12/18/2024    MCHC 31.0 (L) 12/18/2024    RDW 17.6 (H) 12/18/2024    MPV 10.9 12/18/2024   , CMP:   Lab Results   Component Value Date    SODIUM 124 (L) 12/18/2024    K 5.2 12/18/2024    CL 95 (L) 12/18/2024    CO2 26 12/18/2024    BUN 32 (H) 12/18/2024    CREATININE 0.82 12/18/2024    CALCIUM 7.4 (L) 12/18/2024    EGFR 76 12/18/2024     Imaging Studies: I have personally reviewed pertinent reports.  EKG, Pathology, and Other Studies: I have personally reviewed pertinent reports.    Counseling / Coordination of Care  Total floor / unit time spent today 40 minutes. Greater than 50% of total time  "was spent with the patient and / or family counseling and / or coordinating of care. A description of the counseling / coordination of care: Chart reviewed,  provided medical updates, determined goals of care, discussed palliative care and symptom management, discussed code status, discussed comfort care briefly, provided anticipatory guidance, determined competency and POA/HCA, determined social/family support, provided psychosocial support.     JASON Carias  Palliative & Supportive Care    Portions of this document may have been created using dictation software and as such some \"sound alike\" terms may have been generated by the system. Do not hesitate to contact me with any questions or clarifications.    "

## 2024-12-18 NOTE — SEDATION DOCUMENTATION
Right side paracentesis performed by Live Mayo PA-C. 5550 mL clear/yellow fluid obtained. Patient tolerated well. Ultrasound images saves. Labs collected.

## 2024-12-18 NOTE — ASSESSMENT & PLAN NOTE
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  Persistent third spacing with slight increase in moderate volume ascites, and no significant change in moderate size right and small left pleural effusions with bibasilar atelectasis. 2.  New percutaneous enteric tube with tip in the distal duodenum.\"  GI following. S/p EGD 12/10 with J-tube exchange, postop patient had worsening hypoxia required transfer to ICU and using BiPAP  Concern for possible infection surrounding J-tube site patient was started on vancomycin and ceftriaxone day # 6/7 per GI  Now with clogged J-tube,   Status post EGD on 12/13 with J-tube replacement   Resumed on tube feeding - on hold at this time  On clear liquid diet per speech  "

## 2024-12-18 NOTE — PROCEDURES
Insert Complex Venous Access Line    Date/Time: 12/18/2024 9:20 AM    Performed by: Daphne Deleon RN  Authorized by: Paul Ríos MD    Patient location:  Bedside  Other Assisting Provider: Yes (comment) (kd)    Consent:     Consent obtained:  Written and verbal    Consent given by:  Patient    Risks discussed:  Arterial puncture, bleeding, infection, incorrect placement, pneumothorax and nerve damage    Alternatives discussed:  No treatment, delayed treatment and alternative treatment  Universal protocol:     Procedure explained and questions answered to patient or proxy's satisfaction: yes      Immediately prior to procedure, a time out was called: yes      Site/side marked: yes      Patient identity confirmed:  Verbally with patient, arm band, provided demographic data and hospital-assigned identification number  Pre-procedure details:     Hand hygiene: Hand hygiene performed prior to insertion      Sterile barrier technique: All elements of maximal sterile technique followed      Skin preparation:  ChloraPrep    Skin preparation agent: Skin preparation agent completely dried prior to procedure    Procedure details:     Complex Venous Access Line Type: PICC      Complex Venous Access Line Indications: long term antibiotics and no peripheral vascular access      Catheter tip vessel location: atriocaval junction      Orientation:  Right    Location:  [place]    Procedural supplies:  Single lumen    Catheter size:  4 Fr ([id number] ex 1-31-26)    Total catheter length (cm):  40    Catheter out on skin (cm):  2    Max flow rate:  999    Arm circumference:  33    Patient evaluated for contraindications to access (i.e. fistula, thrombosis, etc): Yes      Site selection rationale:  Largest most avialable vein    Approach: percutaneous technique used      Patient position:  Flat    Ultrasound image availability:  Not saved    Sterile ultrasound techniques: Sterile gel and sterile probe covers were used       Number of attempts:  1    Successful placement: yes      Landmarks identified: yes      Vessel of catheter tip end:  Sherlock 3CG confirmed  Anesthesia (see MAR for exact dosages):     Anesthesia method:  Local infiltration    Local anesthetic:  Lidocaine 1% w/o epi  Post-procedure details:     Post-procedure:  Dressing applied and securement device placed    Assessment:  Blood return through all ports and free fluid flow    Post-procedure complications: none      Patient tolerance of procedure:  Tolerated well, no immediate complications    Bleeding at insertion site sterile gauze applied    Discussed placement with Paul Ríos MD, per MD patient needs PICC line over midline, as previous midline failed after only 2 days

## 2024-12-18 NOTE — SPEECH THERAPY NOTE
Speech Language/Pathology    Speech/Language Pathology Progress Note    Patient Name: Lety Botello  Today's Date: 12/18/2024     Problem List  Principal Problem:    Encounter for gastrojejunal (GJ) tube placement issues  Active Problems:    Hypertension    Type 2 diabetes mellitus (HCC)    Endometrial cancer (HCC)    History of pulmonary embolism    Hypothyroidism    Gastroparesis    Morbid obesity (HCC)    Abdominal pain    Shingles    Palliative care encounter    Hyponatremia    Anemia    Acute respiratory failure with hypoxia (HCC)    Hypomagnesemia    NSVT (nonsustained ventricular tachycardia) (HCC)    Coronary artery disease involving native coronary artery    Herpes zoster without complication       Past Medical History  Past Medical History:   Diagnosis Date    Diverticulitis     Endometrial cancer (HCC)     History of shingles     Right hemiabdomen (inactive)    Hypertension     IBS (irritable bowel syndrome)     Obesity     Small bowel obstruction (HCC)     Type 2 diabetes mellitus (HCC)         Past Surgical History  Past Surgical History:   Procedure Laterality Date    ABDOMINAL ADHESION SURGERY N/A 11/22/2024    Procedure: EXTENSIVE LYSIS OF ADHESIONS >90 MINUTES;  Surgeon: Alejo Ward MD;  Location: [facility];  Service: General    APPENDECTOMY      CHOLECYSTECTOMY      LAPAROTOMY N/A 11/22/2024    Procedure: LAPAROTOMY EXPLORATORY;  Surgeon: Alejo Ward MD;  Location: [facility];  Service: General    SIGMOIDECTOMY N/A     SMALL INTESTINE SURGERY N/A 11/22/2024    Procedure: RESECTION SMALL BOWEL WITH PRIMARY ANASTAMOSIS,PARTIAL OMENTECTOMY;  Surgeon: Alejo Ward MD;  Location: [facility];  Service: General         Subjective:  Pt seen for dysphagia tx. Pt alert, pleasant. Pt agreeable to sitting partially upright in bed (she is concerned about her sutures).    Objective:  Pt was agreeable to trying pudding today. She ate a 4 oz cup, feeding herself. Bolus manipulation and  transfer appeared prompt. Pharyngeal swallows appeared prompt. There were no overt s/s of aspiration. She drank water via straw, with no s/s of aspiration. She took a pill with water, again with no s/s of aspiration. Reviewed diet options with pt; she is agreeable to advancing diet to full liquids for now. Reviewed aspiration precautions; she verbalized understanding.     Assessment:  Good toleration of pudding and thin liquids. No overt s/s of aspiration. Pt declined any solid foods today.     Plan/Recommendations:  Advance to full liquid diet. Aspiration precautions Continue with dysphagia tx.       Cardiac

## 2024-12-18 NOTE — PROGRESS NOTES
"Progress Note - Hospitalist   Name: Lety Botello 64 y.o. 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  Persistent third spacing with slight increase in moderate volume ascites, and no significant change in moderate size right and small left pleural effusions with bibasilar atelectasis. 2.  New percutaneous enteric tube with tip in the distal duodenum.\"  GI following. S/p EGD 12/10 with J-tube exchange, postop patient had worsening hypoxia required transfer to ICU and using BiPAP  Concern for possible infection surrounding J-tube site patient was started on vancomycin and ceftriaxone day # 6/7 per GI  Now with clogged J-tube,   Status post EGD on 12/13 with J-tube replacement   Resumed on tube feeding - on hold at this time  On clear liquid diet per speech  Abdominal pain  Patient with recent hospitalization due to small bowel obstruction s/p exploratory laparotomy with small bowel resection on 11/22/2024  Now with worsening abdominal pain and distention  Underwent history of gastroparesis  Evaluated by general surgery   Abdominal pelvis CT scan, negative for obstruction, stable bilateral pleural effusion and anasarca  Abdominal pain is improving after having bowel movement  Continue supportive care  [place] catheter placed due to retention  Consult IR for paracentesis  Acute respiratory failure with hypoxia (HCC)  Patient had worsening hypoxia post EGD on 12/10. Was satting mid-80s on 10L  Was given nebs, Lasix and subsequently placed on BiPAP and " upgraded to critical care for further treatment  Currently in stable condition and transferred out of ICU  Currently on room air  NSVT (nonsustained ventricular tachycardia) (HCC)  25 run of nonsustained V. tach noted on telemetry, patient was asymptomatic  Cardiology consulted  Cardiac echo with normal EF and mild aortic stenosis  Coreg dose was increased to 25 mg twice daily  Monitor  Cardiology signed off    Hypothyroidism  Elevated TSH  Normal free T4 at 0.65  Patient was not receiving levothyroxine due to  PEG tube malfunctioning   Crease levothyroxine dose to 150 mcg daily  Repeat thyroid study in 4 to 6 weeks  Anemia  Received IV iron x 2  Vitamin B12 deficiency  Continue vitamin B12 supplement  Worsening anemia today, unclear why at this time  Will check paracentesis for possible bleeding  Check iron studies, b12, folate, LDH, TSH, haptoglobin  Transfuse 1 unit of PRBCs for hb of 6.5  Herpes zoster without complication  Located on the right lower abdomen, well crusted, no active lesion  With oozing and bleeding  Continue with Neurontin  Consult dermatology for further management  Continue valtrex  Hyponatremia  Hyponatremia likely multifactorial - due to volume overload  Coronado catheter placed now, will allow for self correction  Monitor closely - repeat at 4pm  May need diuresis if she fails to correct on her own  Check urine studies  Hypertension  Monitor blood pressures  Continue carvedilol, Norvasc was added  Avoid hypotension  Coronary artery disease involving native coronary artery  History of MI in 2016 status post KARY  Stable  Type 2 diabetes mellitus (HCC)  Lab Results   Component Value Date    HGBA1C 6.9 (H) 11/05/2024       Recent Labs     12/17/24  1622 12/17/24  2033 12/18/24  0558 12/18/24  1027   POCGLU 205* 169* 231* 197*       Blood Sugar Average: Last 72 hrs:  (P) [id number]    Farxiga on hold while inpatient  Hyperglycemia secondary to steroids and tube feeding  Continue Lantus  nightly, continue Humalog tid, adjust the doses as needed  Continue SSI  Endometrial cancer (HCC)  History of endometrial cancer noted  History of pulmonary embolism  Continue Eliquis 5 mg twice daily  Gastroparesis  History of   With PEG-J as above  GI following  Morbid obesity (HCC)  Body mass index is 53.69 kg/m².  Encourage lifestyle modification and weight loss once acute issues improved/resolved  Palliative care encounter      VTE Pharmacologic Prophylaxis: VTE Score: 7 High Risk (Score >/= 5) - Pharmacological DVT Prophylaxis Ordered: enoxaparin (Lovenox). Sequential Compression Devices Ordered.    Mobility:   Basic Mobility Inpatient Raw Score: 9  -HL Goal: 3: Sit at edge of bed  JH-HLM Achieved: 2: Bed activities/Dependent transfer  JH-HLM Goal NOT achieved. Continue with multidisciplinary rounding and encourage appropriate mobility to improve upon -HLM goals.    Patient Centered Rounds: I performed bedside rounds with nursing staff today.   Discussions with Specialists or Other Care Team Provider: nurse, CM, palliative care    Education and Discussions with Family / Patient:  will update family later today.     Current Length of Stay: 9 day(s)  Current Patient Status: Inpatient   Certification Statement: The patient will continue to require additional inpatient hospital stay due to severe anemia, hyponatremia  Discharge Plan: Anticipate discharge in >72 hrs to TBD    Code Status: Level 2 - DNAR: but accepts endotracheal intubation    Subjective   Reports 10/10 abdominal pain    Objective :  Temp:  [97.6 °F (36.4 °C)-98.3 °F (36.8 °C)] 97.6 °F (36.4 °C)  HR:  [67-76] 70  BP: (106-134)/(50-75) 118/56  Resp:  [17-20] 18  SpO2:  [93 %-99 %] 97 %  O2 Device: None (Room air)    Body mass index is 53.69 kg/m².     Input and Output Summary (last 24 hours):     Intake/Output Summary (Last 24 hours) at 12/18/2024 1258  Last data filed at 12/18/2024 1028  Gross per 24 hour   Intake 2230 ml   Output 1525 ml   Net  705 ml       Physical Exam  Constitutional:       Appearance: Normal appearance.   HENT:      Head: Normocephalic and atraumatic.      Nose: Nose normal.   Eyes:      Extraocular Movements: Extraocular movements intact.   Cardiovascular:      Rate and Rhythm: Normal rate and regular rhythm.   Pulmonary:      Effort: Pulmonary effort is normal.      Breath sounds: No wheezing or rales.   Abdominal:      General: There is distension.      Tenderness: There is abdominal tenderness. There is no guarding or rebound.   Musculoskeletal:      Right lower leg: Edema present.      Left lower leg: Edema present.   Skin:     General: Skin is warm and dry.   Neurological:      Mental Status: She is alert and oriented to person, place, and time.   Psychiatric:         Mood and Affect: Mood normal.         Behavior: Behavior normal.           Lines/Drains:  Lines/Drains/Airways       Active Status       Name Placement date Placement time Site Days    PICC Line 12/18/24 12/18/24  0922  --  less than 1    Gastrostomy/Enterostomy PEG- Jejunostomy 20 Fr. LUQ 12/03/24  1553  LUQ  14    Urethral Catheter Straight-tip 18 Fr. 12/18/24  1025  Straight-tip  less than 1                  Urinary Catheter:  Goal for removal: Remove after 48 hrs of I/O monitoring         Central Line:  Goal for removal: Will discontinue when peripheral access obtained.                Lab Results: I have reviewed the following results:   Results from last 7 days   Lab Units 12/18/24  1016 12/18/24  0506 12/16/24  0614 12/14/24  0539   WBC Thousand/uL  --  5.78   < > 9.34   HEMOGLOBIN g/dL 6.5* 6.5*   < > 8.9*   HEMATOCRIT % 20.4* 21.0*   < > 27.5*   PLATELETS Thousands/uL  --  77*   < > 143*   BANDS PCT %  --  5   < >  --    SEGS PCT %  --   --   --  86*   LYMPHO PCT %  --  1   < > 5*   MONO PCT %  --  3*   < > 7   EOS PCT %  --  1   < > 0    < > = values in this interval not displayed.     Results from last 7 days   Lab Units 12/18/24  1016 12/15/24  0943  12/14/24  0539   SODIUM mmol/L 124*   < > 133*   POTASSIUM mmol/L 5.2   < > 5.1   CHLORIDE mmol/L 95*   < > 102   CO2 mmol/L 26   < > 26   BUN mg/dL 32*   < > 29*   CREATININE mg/dL 0.82   < > 0.50*   ANION GAP mmol/L 3*   < > 5   CALCIUM mg/dL 7.4*   < > 7.5*   ALBUMIN g/dL  --   --  2.5*   TOTAL BILIRUBIN mg/dL  --   --  0.35   ALK PHOS U/L  --   --  66   ALT U/L  --   --  8   AST U/L  --   --  12*   GLUCOSE RANDOM mg/dL 214*   < > 231*    < > = values in this interval not displayed.         Results from last 7 days   Lab Units 12/18/24  1027 12/18/24  0558 12/17/24  2033 12/17/24  1622 12/17/24  1051 12/17/24  0632 12/16/24  2124 12/16/24  1556 12/16/24  1045 12/16/24  0617 12/15/24  2030 12/15/24  1623   POC GLUCOSE mg/dl 197* 231* 169* 205* 232* 253* 217* 258* 259* 335* 314* 298*               Recent Cultures (last 7 days):         Imaging Results Review: No pertinent imaging studies reviewed.  Other Study Results Review: No additional pertinent studies reviewed.    Last 24 Hours Medication List:     Current Facility-Administered Medications:     acetaminophen (TYLENOL) tablet 975 mg, Q8H SAM    albuterol (PROVENTIL HFA,VENTOLIN HFA) inhaler 2 puff, Q6H PRN    aluminum-magnesium hydroxide-simethicone (MAALOX) oral suspension 30 mL, Q6H PRN    amLODIPine (NORVASC) tablet 5 mg, Daily    aspirin (ECOTRIN LOW STRENGTH) EC tablet 81 mg, Daily    bisacodyl (DULCOLAX) rectal suppository 10 mg, Daily PRN    carvedilol (COREG) tablet 25 mg, BID With Meals    cefTRIAXone (ROCEPHIN) 2,000 mg in dextrose 5 % 50 mL IVPB, Q24H, Last Rate: 2,000 mg (12/18/24 0609)    cyanocobalamin (VITAMIN B-12) tablet 1,000 mcg, Daily    dicyclomine (BENTYL) capsule 10 mg, BID PRN    enoxaparin (LOVENOX) subcutaneous injection 120 mg, Q12H SAM    furosemide (LASIX) injection 40 mg, BID (diuretic)    gabapentin (NEURONTIN) capsule 100 mg, Daily    HYDROmorphone HCl (DILAUDID) injection 0.2 mg, Q4H PRN    insulin glargine (LANTUS)  subcutaneous injection 20 Units 0.2 mL, HS    insulin lispro (HumALOG/ADMELOG) 100 units/mL subcutaneous injection 1-5 Units, TID AC **AND** Fingerstick Glucose (POCT), TID AC    insulin lispro (HumALOG/ADMELOG) 100 units/mL subcutaneous injection 10 Units, TID With Meals    iohexol (OMNIPAQUE) 240 MG/ML solution 50 mL, Once in imaging    levothyroxine tablet 150 mcg, Daily    naloxone (NARCAN) 0.04 mg/mL syringe 0.04 mg, Q1MIN PRN    nitroglycerin (NITROSTAT) SL tablet 0.4 mg, Q5 Min PRN    omeprazole (PRILOSEC) suspension 2 mg/mL, Daily    ondansetron (ZOFRAN) injection 4 mg, Q6H PRN    oxyCODONE (ROXICODONE) oral solution 5 mg, Q4H PRN **OR** oxyCODONE (ROXICODONE) oral solution 10 mg, Q4H PRN    polyethylene glycol (MIRALAX) packet 17 g, Daily    senna oral syrup 17.6 mg, BID    valACYclovir (VALTREX) tablet 1,000 mg, Q8H SAM    vancomycin (VANCOCIN) IVPB (premix in dextrose) 750 mg 150 mL, Q12H    Administrative Statements   Today, Patient Was Seen By: Paul Ríos MD  I have spent a total time of 55 minutes in caring for this patient on the day of the visit/encounter including Diagnostic results, Instructions for management, Patient and family education, Impressions, Counseling / Coordination of care, Documenting in the medical record, Reviewing / ordering tests, medicine, procedures  , Obtaining or reviewing history  , and Communicating with other healthcare professionals .    **Please Note: This note may have been constructed using a voice recognition system.**

## 2024-12-18 NOTE — ASSESSMENT & PLAN NOTE
Received IV iron x 2  Vitamin B12 deficiency  Continue vitamin B12 supplement  Worsening anemia today, unclear why at this time  Will check paracentesis for possible bleeding  Check iron studies, b12, folate, LDH, TSH, haptoglobin  Transfuse 1 unit of PRBCs for hb of 6.5

## 2024-12-18 NOTE — ASSESSMENT & PLAN NOTE
Lab Results   Component Value Date    HGBA1C 6.9 (H) 11/05/2024       Recent Labs     12/17/24  1622 12/17/24  2033 12/18/24  0558 12/18/24  1027   POCGLU 205* 169* 231* 197*       Blood Sugar Average: Last 72 hrs:  (P) [id number]    Farxiga on hold while inpatient  Hyperglycemia secondary to steroids and tube feeding  Continue Lantus nightly, continue Humalog tid, adjust the doses as needed  Continue SSI

## 2024-12-19 NOTE — PROGRESS NOTES
Pastoral Care Progress Note          Chaplaincy Interventions Utilized:   Empowerment: Clarified, confirmed, or reviewed information from treatment team     Exploration: Explored spiritual needs & resources    Collaboration: Encouraged adherence to treatment plan     Relationship Building: Cultivated a relationship of care and support, Listened empathically, and Hospitality    Ritual:        12/19/24 1300   Clinical Encounter Type   Visited With Patient   Referral From    Referral To              Chaplaincy Outcomes Achieved:  Expressed gratitude     met briefly with the patient as she was tired.  She stated she is ok today.   let her sleep and said would try to check on her later.       Spiritual Coping Strategies Utilized:        remains available.

## 2024-12-19 NOTE — ASSESSMENT & PLAN NOTE
Current regimen:  OxyIR 5/10 mg Q4hrs PRN for moderate/severe pain   IV dilaudid to 0.2 mg q4H PRN for BT pain  Schedule Tylenol 975 mg TID  Minimize opioids- given recurrent SBO  Failed therapies:  NA    12/19 : Total OME over last 24 hours: 64 mg   IV dilaudid 0.2 mg  x 4 doses  Oxycodone oral solution 10 mg x 4 doses    Pt still complaining of / abdominal pain in the setting of h/o endometrial cancer S/P FLORECITA/BSO , recurrent SBO, shingles and ascites.  Increase IV dilaudid  0.5mg Q3 Hr PTN for breakthrough pain  Bowel regimen to prevent OIC: senna 17.6 mg bid, prn dulcolax suppository. Last BM yesterday 12/18  Opioid agreement. Not on file, will need outpatient follow up  IR consult placed for paracentesis

## 2024-12-19 NOTE — PLAN OF CARE
Problem: Prexisting or High Potential for Compromised Skin Integrity  Goal: Skin integrity is maintained or improved  Description: INTERVENTIONS:  - Identify patients at risk for skin breakdown  - Assess and monitor skin integrity  - Assess and monitor nutrition and hydration status  - Monitor labs   - Assess for incontinence   - Turn and reposition patient  - Assist with mobility/ambulation  - Relieve pressure over bony prominences  - Avoid friction and shearing  - Provide appropriate hygiene as needed including keeping skin clean and dry  - Evaluate need for skin moisturizer/barrier cream  - Collaborate with interdisciplinary team   - Patient/family teaching  - Consider wound care consult   Outcome: Progressing     Problem: PAIN - ADULT  Goal: Verbalizes/displays adequate comfort level or baseline comfort level  Description: Interventions:  - Encourage patient to monitor pain and request assistance  - Assess pain using appropriate pain scale  - Administer analgesics based on type and severity of pain and evaluate response  - Implement non-pharmacological measures as appropriate and evaluate response  - Consider cultural and social influences on pain and pain management  - Notify physician/advanced practitioner if interventions unsuccessful or patient reports new pain  Outcome: Progressing     Problem: INFECTION - ADULT  Goal: Absence or prevention of progression during hospitalization  Description: INTERVENTIONS:  - Assess and monitor for signs and symptoms of infection  - Monitor lab/diagnostic results  - Monitor all insertion sites, i.e. indwelling lines, tubes, and drains  - Monitor endotracheal if appropriate and nasal secretions for changes in amount and color  - [place] appropriate cooling/warming therapies per order  - Administer medications as ordered  - Instruct and encourage patient and family to use good hand hygiene technique  - Identify and instruct in appropriate isolation precautions for  identified infection/condition  Outcome: Progressing     Problem: SAFETY ADULT  Goal: Patient will remain free of falls  Description: INTERVENTIONS:  - Educate patient/family on patient safety including physical limitations  - Instruct patient to call for assistance with activity   - Consult OT/PT to assist with strengthening/mobility   - Keep Call bell within reach  - Keep bed low and locked with side rails adjusted as appropriate  - Keep care items and personal belongings within reach  - Initiate and maintain comfort rounds  - Make Fall Risk Sign visible to staff  - Offer Toileting every 2 Hours, in advance of need  - Initiate/Maintain bed/chair alarm  - Obtain necessary fall risk management equipment: nonskid footwear  - Apply yellow socks and bracelet for high fall risk patients  - Consider moving patient to room near nurses station  Outcome: Progressing  Goal: Maintain or return to baseline ADL function  Description: INTERVENTIONS:  -  Assess patient's ability to carry out ADLs; assess patient's baseline for ADL function and identify physical deficits which impact ability to perform ADLs (bathing, care of mouth/teeth, toileting, grooming, dressing, etc.)  - Assess/evaluate cause of self-care deficits   - Assess range of motion  - Assess patient's mobility; develop plan if impaired  - Assess patient's need for assistive devices and provide as appropriate  - Encourage maximum independence but intervene and supervise when necessary  - Involve family in performance of ADLs  - Assess for home care needs following discharge   - Consider OT consult to assist with ADL evaluation and planning for discharge  - Provide patient education as appropriate  Outcome: Progressing  Goal: Maintains/Returns to pre admission functional level  Description: INTERVENTIONS:  - Perform AM-PAC 6 Click Basic Mobility/ Daily Activity assessment daily.  - Set and communicate daily mobility goal to care team and patient/family/caregiver.   -  Collaborate with rehabilitation services on mobility goals if consulted  - Perform Range of Motion 3 times a day.  - Reposition patient every 2 hours.  - Dangle patient 3 times a day  - Stand patient 3 times a day  - Ambulate patient 3 times a day  - Out of bed to chair 3 times a day   - Out of bed for meals 3 times a day  - Out of bed for toileting  - Record patient progress and toleration of activity level   Outcome: Progressing     Problem: DISCHARGE PLANNING  Goal: Discharge to home or other facility with appropriate resources  Description: INTERVENTIONS:  - Identify barriers to discharge w/patient and caregiver  - Arrange for needed discharge resources and transportation as appropriate  - Identify discharge learning needs (meds, wound care, etc.)  - Arrange for interpretive services to assist at discharge as needed  - Refer to Case Management Department for coordinating discharge planning if the patient needs post-hospital services based on physician/advanced practitioner order or complex needs related to functional status, cognitive ability, or social support system  Outcome: Progressing     Problem: Knowledge Deficit  Goal: Patient/family/caregiver demonstrates understanding of disease process, treatment plan, medications, and discharge instructions  Description: Complete learning assessment and assess knowledge base.  Interventions:  - Provide teaching at level of understanding  - Provide teaching via preferred learning methods  Outcome: Progressing     Problem: Nutrition/Hydration-ADULT  Goal: Nutrient/Hydration intake appropriate for improving, restoring or maintaining nutritional needs  Description: Monitor and assess patient's nutrition/hydration status for malnutrition. Collaborate with interdisciplinary team and initiate plan and interventions as ordered.  Monitor patient's weight and dietary intake as ordered or per policy. Utilize nutrition screening tool and intervene as necessary. Determine  patient's food preferences and provide high-protein, high-caloric foods as appropriate.     INTERVENTIONS:  - Monitor oral intake, urinary output, labs, and treatment plans  - Assess nutrition and hydration status and recommend course of action  - Evaluate amount of meals eaten  - Assist patient with eating if necessary   - Allow adequate time for meals  - Recommend/ encourage appropriate diets, oral nutritional supplements, and vitamin/mineral supplements  - Order, calculate, and assess calorie counts as needed  - Recommend, monitor, and adjust tube feedings and TPN/PPN based on assessed needs  - Assess need for intravenous fluids  - Provide specific nutrition/hydration education as appropriate  - Include patient/family/caregiver in decisions related to nutrition  Outcome: Progressing     Problem: GASTROINTESTINAL - ADULT  Goal: Minimal or absence of nausea and/or vomiting  Description: INTERVENTIONS:  - Administer IV fluids if ordered to ensure adequate hydration  - Maintain NPO status until nausea and vomiting are resolved  - Nasogastric tube if ordered  - Administer ordered antiemetic medications as needed  - Provide nonpharmacologic comfort measures as appropriate  - Advance diet as tolerated, if ordered  - Consider nutrition services referral to assist patient with adequate nutrition and appropriate food choices  Outcome: Progressing  Goal: Maintains or returns to baseline bowel function  Description: INTERVENTIONS:  - Assess bowel function  - Encourage oral fluids to ensure adequate hydration  - Administer IV fluids if ordered to ensure adequate hydration  - Administer ordered medications as needed  - Encourage mobilization and activity  - Consider nutritional services referral to assist patient with adequate nutrition and appropriate food choices  Outcome: Progressing  Goal: Maintains adequate nutritional intake  Description: INTERVENTIONS:  - Monitor percentage of each meal consumed  - Identify factors  contributing to decreased intake, treat as appropriate  - Assist with meals as needed  - Monitor I&O, weight, and lab values if indicated  - Obtain nutrition services referral as needed  Outcome: Progressing  Goal: Oral mucous membranes remain intact  Description: INTERVENTIONS  - Assess oral mucosa and hygiene practices  - Implement preventative oral hygiene regimen  - Implement oral medicated treatments as ordered  - Initiate Nutrition services referral as needed  Outcome: Progressing     Problem: GENITOURINARY - ADULT  Goal: Maintains or returns to baseline urinary function  Description: INTERVENTIONS:  - Assess urinary function  - Encourage oral fluids to ensure adequate hydration if ordered  - Administer IV fluids as ordered to ensure adequate hydration  - Administer ordered medications as needed  - Offer frequent toileting  - Follow urinary retention protocol if ordered  Outcome: Progressing  Goal: Absence of urinary retention  Description: INTERVENTIONS:  - Assess patient’s ability to void and empty bladder  - Monitor I/O  - Bladder scan as needed  - Discuss with physician/AP medications to alleviate retention as needed  - Discuss catheterization for long term situations as appropriate  Outcome: Progressing

## 2024-12-19 NOTE — ASSESSMENT & PLAN NOTE
Patient with recent hospitalization due to small bowel obstruction s/p exploratory laparotomy with small bowel resection on 11/22/2024  presented with worsening abdominal pain and distention  Abdominal pelvis CT scan, negative for obstruction, stable bilateral pleural effusion and anasarca  Continue supportive care  [place] catheter placed due to retention  S/p paracentesis for ascites  Monitor BMs

## 2024-12-19 NOTE — PROGRESS NOTES
"Progress Note - Hospitalist   Name: Lety Botello 64 y.o. 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  Persistent third spacing with slight increase in moderate volume ascites, and no significant change in moderate size right and small left pleural effusions with bibasilar atelectasis. 2.  New percutaneous enteric tube with tip in the distal duodenum.\"  GI following. S/p EGD 12/10 with J-tube exchange, postop patient had worsening hypoxia required transfer to ICU and using BiPAP  Concern for possible infection surrounding J-tube site, completed 7 days of ceftriaxone and vancomycin  developed clogged J-tube,   Status post EGD on 12/13 with J-tube replacement   Resumed on tube feeding  On clear liquid diet per speech/swallow therapist  Abdominal pain  Patient with recent hospitalization due to small bowel obstruction s/p exploratory laparotomy with small bowel resection on 11/22/2024  presented with worsening abdominal pain and distention  Abdominal pelvis CT scan, negative for obstruction, stable bilateral pleural effusion and anasarca  Continue supportive care  Coronado catheter placed due to retention  S/p paracentesis for ascites  Monitor BMs  Acute respiratory failure with hypoxia (HCC)  Patient had worsening hypoxia post EGD on 12/10. Was satting mid-80s on 10L  Was given nebs, Lasix and subsequently placed on BiPAP and upgraded to critical care for further treatment  Currently in stable condition and transferred out of ICU  Currently on room " air  NSVT (nonsustained ventricular tachycardia) ([facility])  25 run of nonsustained V. tach noted on telemetry, patient was asymptomatic  Cardiology consulted  Cardiac echo with normal EF and mild aortic stenosis  Coreg dose was increased to 25 mg twice daily  Monitor  Cardiology signed off    Hypothyroidism  Elevated TSH  Normal free T4 at 0.65  Patient was not receiving levothyroxine due to  PEG tube malfunctioning   Crease levothyroxine dose to 150 mcg daily  Repeat thyroid study in 4 to 6 weeks  Anemia  Received IV iron x 2  Vitamin B12 deficiency  Continue vitamin B12 supplement  Transfuse 1 unit of PRBCs for hb of 6.5 on 12/18  Continue to monitor hb closely  abdominal rash  Located on the right lower abdomen with oozing and bleeding  Seen by dermatology who performed a biopsy  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, consistent with gynecologic/Mullerian origin   Discontinue valtrex  Outpatient follow up with medical oncology when stable  Hyponatremia  Hyponatremia likely multifactorial - due to volume overload  Coronado catheter placed due to retention  Monitor closely with diuresis  Urine studies consistent with diuretic effect  Hypertension  Monitor blood pressures  Continue carvedilol, Norvasc was added  Avoid hypotension  Coronary artery disease involving native coronary artery  History of MI in 2016 status post KARY  Stable  Type 2 diabetes mellitus (HCC)  Lab Results   Component Value Date    HGBA1C 6.9 (H) 11/05/2024       Recent Labs     12/18/24  1556 12/18/24  2050 12/19/24  0605 12/19/24  1039   POCGLU 102 145* 164* 178*       Blood Sugar Average: Last 72 hrs:  (P) [id number]    Farxiga on hold while inpatient  Hyperglycemia secondary to steroids and tube feeding  Continue Lantus nightly, continue Humalog tid, adjust the doses as needed  Continue SSI  Endometrial cancer (HCC)  History of endometrial cancer noted  History of pulmonary embolism  Continue Eliquis 5 mg twice  daily  Gastroparesis  History of   With PEG-J as above  GI following  Morbid obesity (HCC)  Body mass index is 53.69 kg/m².  Encourage lifestyle modification and weight loss once acute issues improved/resolved    VTE Pharmacologic Prophylaxis: VTE Score: 7 High Risk (Score >/= 5) - Pharmacological DVT Prophylaxis Ordered: enoxaparin (Lovenox). Sequential Compression Devices Ordered.    Mobility:   Basic Mobility Inpatient Raw Score: 7  JH-HLM Goal: 2: Bed activities/Dependent transfer  JH-HLM Achieved: 2: Bed activities/Dependent transfer  JH-HLM Goal NOT achieved. Continue with multidisciplinary rounding and encourage appropriate mobility to improve upon JH-HLM goals.    Patient Centered Rounds: I performed bedside rounds with nursing staff today.   Discussions with Specialists or Other Care Team Provider: nurse, DONELL    Education and Discussions with Family / Patient: Updated  (niece) via phone.    Current Length of Stay: 10 day(s)  Current Patient Status: Inpatient   Certification Statement: The patient will continue to require additional inpatient hospital stay due to diuresis  Discharge Plan: Anticipate discharge in >72 hrs to rehab facility.    Code Status: Level 2 - DNAR: but accepts endotracheal intubation    Subjective   Reports that she continues to have abdominal pain. No BM today, refusing laxative at this time.    Objective :  Temp:  [97.4 °F (36.3 °C)-98.5 °F (36.9 °C)] 98.5 °F (36.9 °C)  HR:  [66-78] 78  BP: (106-166)/(52-72) 133/56  Resp:  [16-21] 16  SpO2:  [95 %-98 %] 96 %  O2 Device: None (Room air)    Body mass index is 53.69 kg/m².     Input and Output Summary (last 24 hours):     Intake/Output Summary (Last 24 hours) at 12/19/2024 1542  Last data filed at 12/19/2024 1301  Gross per 24 hour   Intake 1645.33 ml   Output 2025 ml   Net -379.67 ml       Physical Exam  Constitutional:       Appearance: Normal appearance.   HENT:      Head: Normocephalic and atraumatic.      Nose: Nose  normal.   Eyes:      Extraocular Movements: Extraocular movements intact.   Cardiovascular:      Rate and Rhythm: Normal rate and regular rhythm.   Pulmonary:      Effort: Pulmonary effort is normal.      Breath sounds: No wheezing or rales.   Abdominal:      General: There is no distension.      Tenderness: There is abdominal tenderness.   Musculoskeletal:      Right lower leg: Edema present.      Left lower leg: Edema present.   Skin:     General: Skin is warm and dry.   Neurological:      General: No focal deficit present.      Mental Status: She is alert and oriented to person, place, and time.   Psychiatric:         Mood and Affect: Mood normal.         Behavior: Behavior normal.           Lines/Drains:  Lines/Drains/Airways       Active Status       Name Placement date Placement time Site Days    PICC Line 12/18/24 12/18/24  0922  --  1    Gastrostomy/Enterostomy PEG- Jejunostomy 20 Fr. LUQ 12/03/24  1553  LUQ  15    Urethral Catheter Straight-tip 18 Fr. 12/18/24  1025  Straight-tip  1                  Urinary Catheter:  Goal for removal: Voiding trial when ambulation improves         Central Line:  Goal for removal: Will discontinue when peripheral access obtained.          Telemetry:  Telemetry Orders (From admission, onward)               24 Hour Telemetry Monitoring  Continuous x 24 Hours (Telem)        Expiring   Question:  Reason for 24 Hour Telemetry  Answer:  Decompensated CHF- and any one of the following: continuous diuretic infusion or total diuretic dose >200 mg daily, associated electrolyte derangement (I.e. K < 3.0), inotropic drip (continuous infusion), hx of ventricular arrhythmia, or new EF < 35%                     Telemetry Reviewed: Normal Sinus Rhythm  Indication for Continued Telemetry Use: Acute CHF on >200 mg lasix/day or equivalent dose or with new reduced EF.                Lab Results: I have reviewed the following results:   Results from last 7 days   Lab Units 12/19/24  1560  12/18/24  1016 12/18/24  0506 12/16/24  0614 12/14/24  0539   WBC Thousand/uL 6.06  --  5.78   < > 9.34   HEMOGLOBIN g/dL 8.3*   < > 6.5*   < > 8.9*   HEMATOCRIT % 25.9*   < > 21.0*   < > 27.5*   PLATELETS Thousands/uL 75*  --  77*   < > 143*   BANDS PCT %  --   --  5   < >  --    SEGS PCT %  --   --   --   --  86*   LYMPHO PCT %  --   --  1   < > 5*   MONO PCT %  --   --  3*   < > 7   EOS PCT %  --   --  1   < > 0    < > = values in this interval not displayed.     Results from last 7 days   Lab Units 12/19/24  0609   SODIUM mmol/L 126*   POTASSIUM mmol/L 5.1   CHLORIDE mmol/L 94*   CO2 mmol/L 29   BUN mg/dL 29*   CREATININE mg/dL 0.62   ANION GAP mmol/L 3*   CALCIUM mg/dL 7.9*   ALBUMIN g/dL 2.5*   TOTAL BILIRUBIN mg/dL 0.53   ALK PHOS U/L 42   ALT U/L 5*   AST U/L 6*   GLUCOSE RANDOM mg/dL 172*         Results from last 7 days   Lab Units 12/19/24  1039 12/19/24  0605 12/18/24  2050 12/18/24  1556 12/18/24  1027 12/18/24  0558 12/17/24  2033 12/17/24  1622 12/17/24  1051 12/17/24  0632 12/16/24  2124 12/16/24  1556   POC GLUCOSE mg/dl 178* 164* 145* 102 197* 231* 169* 205* 232* 253* 217* 258*               Recent Cultures (last 7 days):   Results from last 7 days   Lab Units 12/18/24  1635   GRAM STAIN RESULT  Rare Mononuclear Cells  No bacteria seen   BODY FLUID CULTURE, STERILE  No growth       Imaging Results Review: No pertinent imaging studies reviewed.  Other Study Results Review: No additional pertinent studies reviewed.    Last 24 Hours Medication List:     Current Facility-Administered Medications:     acetaminophen (TYLENOL) tablet 975 mg, Q8H SAM    albumin human (FLEXBUMIN) 25 % injection 25 g, BID (diuretic)    albuterol (PROVENTIL HFA,VENTOLIN HFA) inhaler 2 puff, Q6H PRN    aluminum-magnesium hydroxide-simethicone (MAALOX) oral suspension 30 mL, Q6H PRN    amLODIPine (NORVASC) tablet 5 mg, Daily    aspirin (ECOTRIN LOW STRENGTH) EC tablet 81 mg, Daily    bisacodyl (DULCOLAX) rectal suppository 10  mg, Daily PRN    carvedilol (COREG) tablet 25 mg, BID With Meals    cyanocobalamin (VITAMIN B-12) tablet 1,000 mcg, Daily    dicyclomine (BENTYL) capsule 10 mg, BID PRN    enoxaparin (LOVENOX) subcutaneous injection 120 mg, Q12H SAM    folic acid (FOLVITE) tablet 1 mg, Daily    furosemide (LASIX) injection 40 mg, BID (diuretic)    gabapentin (NEURONTIN) capsule 100 mg, Daily    HYDROmorphone (DILAUDID) injection 0.5 mg, Q3H PRN    insulin glargine (LANTUS) subcutaneous injection 20 Units 0.2 mL, HS    insulin lispro (HumALOG/ADMELOG) 100 units/mL subcutaneous injection 1-5 Units, TID AC **AND** Fingerstick Glucose (POCT), TID AC    insulin lispro (HumALOG/ADMELOG) 100 units/mL subcutaneous injection 10 Units, TID With Meals    iohexol (OMNIPAQUE) 240 MG/ML solution 50 mL, Once in imaging    levothyroxine tablet 150 mcg, Daily    naloxone (NARCAN) 0.04 mg/mL syringe 0.04 mg, Q1MIN PRN    nitroglycerin (NITROSTAT) SL tablet 0.4 mg, Q5 Min PRN    omeprazole (PRILOSEC) suspension 2 mg/mL, Daily    ondansetron (ZOFRAN) injection 4 mg, Q6H PRN    oxyCODONE (ROXICODONE) oral solution 5 mg, Q4H PRN **OR** oxyCODONE (ROXICODONE) oral solution 10 mg, Q4H PRN    polyethylene glycol (MIRALAX) packet 17 g, Daily    senna oral syrup 17.6 mg, BID    valACYclovir (VALTREX) tablet 1,000 mg, Q8H SAM    [START ON 12/20/2024] vancomycin (VANCOCIN) 1,250 mg in sodium chloride 0.9 % 250 mL IVPB, Q24H    Administrative Statements   Today, Patient Was Seen By: Paul Ríos MD  I have spent a total time of 40 minutes in caring for this patient on the day of the visit/encounter including Diagnostic results, Instructions for management, Patient and family education, Impressions, Counseling / Coordination of care, Documenting in the medical record, Reviewing / ordering tests, medicine, procedures  , Obtaining or reviewing history  , and Communicating with other healthcare professionals .    **Please Note: This note may have been  constructed using a voice recognition system.**

## 2024-12-19 NOTE — PLAN OF CARE
Problem: Prexisting or High Potential for Compromised Skin Integrity  Goal: Skin integrity is maintained or improved  Description: INTERVENTIONS:  - Identify patients at risk for skin breakdown  - Assess and monitor skin integrity  - Assess and monitor nutrition and hydration status  - Monitor labs   - Assess for incontinence   - Turn and reposition patient  - Assist with mobility/ambulation  - Relieve pressure over bony prominences  - Avoid friction and shearing  - Provide appropriate hygiene as needed including keeping skin clean and dry  - Evaluate need for skin moisturizer/barrier cream  - Collaborate with interdisciplinary team   - Patient/family teaching  - Consider wound care consult   Outcome: Progressing     Problem: PAIN - ADULT  Goal: Verbalizes/displays adequate comfort level or baseline comfort level  Description: Interventions:  - Encourage patient to monitor pain and request assistance  - Assess pain using appropriate pain scale  - Administer analgesics based on type and severity of pain and evaluate response  - Implement non-pharmacological measures as appropriate and evaluate response  - Consider cultural and social influences on pain and pain management  - Notify physician/advanced practitioner if interventions unsuccessful or patient reports new pain  Outcome: Progressing     Problem: INFECTION - ADULT  Goal: Absence or prevention of progression during hospitalization  Description: INTERVENTIONS:  - Assess and monitor for signs and symptoms of infection  - Monitor lab/diagnostic results  - Monitor all insertion sites, i.e. indwelling lines, tubes, and drains  - Monitor endotracheal if appropriate and nasal secretions for changes in amount and color  - [place] appropriate cooling/warming therapies per order  - Administer medications as ordered  - Instruct and encourage patient and family to use good hand hygiene technique  - Identify and instruct in appropriate isolation precautions for  identified infection/condition  Outcome: Progressing     Problem: SAFETY ADULT  Goal: Patient will remain free of falls  Description: INTERVENTIONS:  - Educate patient/family on patient safety including physical limitations  - Instruct patient to call for assistance with activity   - Consult OT/PT to assist with strengthening/mobility   - Keep Call bell within reach  - Keep bed low and locked with side rails adjusted as appropriate  - Keep care items and personal belongings within reach  - Initiate and maintain comfort rounds  - Make Fall Risk Sign visible to staff  - Offer Toileting every 2 Hours, in advance of need  - Initiate/Maintain bed/chair alarm  - Obtain necessary fall risk management equipment: nonskid footwear  - Apply yellow socks and bracelet for high fall risk patients  - Consider moving patient to room near nurses station  Outcome: Progressing  Goal: Maintain or return to baseline ADL function  Description: INTERVENTIONS:  -  Assess patient's ability to carry out ADLs; assess patient's baseline for ADL function and identify physical deficits which impact ability to perform ADLs (bathing, care of mouth/teeth, toileting, grooming, dressing, etc.)  - Assess/evaluate cause of self-care deficits   - Assess range of motion  - Assess patient's mobility; develop plan if impaired  - Assess patient's need for assistive devices and provide as appropriate  - Encourage maximum independence but intervene and supervise when necessary  - Involve family in performance of ADLs  - Assess for home care needs following discharge   - Consider OT consult to assist with ADL evaluation and planning for discharge  - Provide patient education as appropriate  Outcome: Progressing  Goal: Maintains/Returns to pre admission functional level  Description: INTERVENTIONS:  - Perform AM-PAC 6 Click Basic Mobility/ Daily Activity assessment daily.  - Set and communicate daily mobility goal to care team and patient/family/caregiver.   -  Collaborate with rehabilitation services on mobility goals if consulted  - Perform Range of Motion 3 times a day.  - Reposition patient every 2 hours.  - Dangle patient 3 times a day  - Stand patient 3 times a day  - Ambulate patient 3 times a day  - Out of bed to chair 3 times a day   - Out of bed for meals 3 times a day  - Out of bed for toileting  - Record patient progress and toleration of activity level   Outcome: Progressing     Problem: DISCHARGE PLANNING  Goal: Discharge to home or other facility with appropriate resources  Description: INTERVENTIONS:  - Identify barriers to discharge w/patient and caregiver  - Arrange for needed discharge resources and transportation as appropriate  - Identify discharge learning needs (meds, wound care, etc.)  - Arrange for interpretive services to assist at discharge as needed  - Refer to Case Management Department for coordinating discharge planning if the patient needs post-hospital services based on physician/advanced practitioner order or complex needs related to functional status, cognitive ability, or social support system  Outcome: Progressing     Problem: Knowledge Deficit  Goal: Patient/family/caregiver demonstrates understanding of disease process, treatment plan, medications, and discharge instructions  Description: Complete learning assessment and assess knowledge base.  Interventions:  - Provide teaching at level of understanding  - Provide teaching via preferred learning methods  Outcome: Progressing     Problem: Nutrition/Hydration-ADULT  Goal: Nutrient/Hydration intake appropriate for improving, restoring or maintaining nutritional needs  Description: Monitor and assess patient's nutrition/hydration status for malnutrition. Collaborate with interdisciplinary team and initiate plan and interventions as ordered.  Monitor patient's weight and dietary intake as ordered or per policy. Utilize nutrition screening tool and intervene as necessary. Determine  patient's food preferences and provide high-protein, high-caloric foods as appropriate.     INTERVENTIONS:  - Monitor oral intake, urinary output, labs, and treatment plans  - Assess nutrition and hydration status and recommend course of action  - Evaluate amount of meals eaten  - Assist patient with eating if necessary   - Allow adequate time for meals  - Recommend/ encourage appropriate diets, oral nutritional supplements, and vitamin/mineral supplements  - Order, calculate, and assess calorie counts as needed  - Recommend, monitor, and adjust tube feedings and TPN/PPN based on assessed needs  - Assess need for intravenous fluids  - Provide specific nutrition/hydration education as appropriate  - Include patient/family/caregiver in decisions related to nutrition  Outcome: Progressing     Problem: GASTROINTESTINAL - ADULT  Goal: Minimal or absence of nausea and/or vomiting  Description: INTERVENTIONS:  - Administer IV fluids if ordered to ensure adequate hydration  - Maintain NPO status until nausea and vomiting are resolved  - Nasogastric tube if ordered  - Administer ordered antiemetic medications as needed  - Provide nonpharmacologic comfort measures as appropriate  - Advance diet as tolerated, if ordered  - Consider nutrition services referral to assist patient with adequate nutrition and appropriate food choices  Outcome: Progressing  Goal: Maintains or returns to baseline bowel function  Description: INTERVENTIONS:  - Assess bowel function  - Encourage oral fluids to ensure adequate hydration  - Administer IV fluids if ordered to ensure adequate hydration  - Administer ordered medications as needed  - Encourage mobilization and activity  - Consider nutritional services referral to assist patient with adequate nutrition and appropriate food choices  Outcome: Progressing  Goal: Maintains adequate nutritional intake  Description: INTERVENTIONS:  - Monitor percentage of each meal consumed  - Identify factors  contributing to decreased intake, treat as appropriate  - Assist with meals as needed  - Monitor I&O, weight, and lab values if indicated  - Obtain nutrition services referral as needed  Outcome: Progressing  Goal: Oral mucous membranes remain intact  Description: INTERVENTIONS  - Assess oral mucosa and hygiene practices  - Implement preventative oral hygiene regimen  - Implement oral medicated treatments as ordered  - Initiate Nutrition services referral as needed  Outcome: Progressing     Problem: GENITOURINARY - ADULT  Goal: Maintains or returns to baseline urinary function  Description: INTERVENTIONS:  - Assess urinary function  - Encourage oral fluids to ensure adequate hydration if ordered  - Administer IV fluids as ordered to ensure adequate hydration  - Administer ordered medications as needed  - Offer frequent toileting  - Follow urinary retention protocol if ordered  Outcome: Progressing  Goal: Absence of urinary retention  Description: INTERVENTIONS:  - Assess patient’s ability to void and empty bladder  - Monitor I/O  - Bladder scan as needed  - Discuss with physician/AP medications to alleviate retention as needed  - Discuss catheterization for long term situations as appropriate  Outcome: Progressing

## 2024-12-19 NOTE — SOCIAL WORK
Palliative Inpatient Assessment Note    LSW appreciates the opportunity to provide patient with inpatient emotional support and guidance while they continue to receive medical attention from a Palliative provider.    LSW completed an assessment of need which was completed with pt in her room. Pt was alert and oriented and presented in her bed.     Relationship status: single  Duration of relationship: NA  Name of significant other: NA  Children and Ages: son, lives in [place], but no contact due to his phone being shut off  Pets: cat. Francois  Other important family information:   Living situation (place and with whom): resides with Sharda heath and 1 other roommate in [place]    Patient's primary caregiver:  Sharda heath  Any limitations: limited mobility  Environmental concerns or barriers: 2 story home with tub shower   history: none  Employment history/ Source of income:  [employer]  Disability: yes  Concerns regarding literacy: none  Spirituality/ [employer]ar[employer]:  Shinto  Cultural information: none  Mental Health and/or Drug and Alcohol history: none  Advanced Directives:  Level 2, niece is proxy  Patient's strengths, social supports, and resources: supportive niece  Patient/family current level of coping:  struggling  Level of understanding: hopeful  Patient/family concerns and areas of need: pain management, tub transfer bench, caregivers    I have spent 15 minutes with Patient  today in which greater than 50% of this time was spent in counseling/coordination of care regarding Counseling / Coordination of care.    Pt very upset about uncontrolled pain.  She is hopeful to return home after her rehab stay [facility]  She is not sleeping or eating well and today was only able to eat jello.  LSW will follow for pt needs.

## 2024-12-19 NOTE — ASSESSMENT & PLAN NOTE
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  Persistent third spacing with slight increase in moderate volume ascites, and no significant change in moderate size right and small left pleural effusions with bibasilar atelectasis. 2.  New percutaneous enteric tube with tip in the distal duodenum.\"  GI following. S/p EGD 12/10 with J-tube exchange, postop patient had worsening hypoxia required transfer to ICU and using BiPAP  Concern for possible infection surrounding J-tube site, completed 7 days of ceftriaxone and vancomycin  developed clogged J-tube,   Status post EGD on 12/13 with J-tube replacement   Resumed on tube feeding  On clear liquid diet per speech/swallow therapist  "

## 2024-12-19 NOTE — ASSESSMENT & PLAN NOTE
Lab Results   Component Value Date    HGBA1C 6.9 (H) 11/05/2024       Recent Labs     12/18/24  1027 12/18/24  1556 12/18/24 2050 12/19/24  0605   POCGLU 197* 102 145* 164*       Blood Sugar Average: Last 72 hrs:  (P) [id number]

## 2024-12-19 NOTE — PROGRESS NOTES
Lety Botello is a 64 y.o. female who is currently ordered Vancomycin IV with management by the Pharmacy Consult service.  Relevant clinical data and objective / subjective history reviewed.  Vancomycin Assessment:  Indication and Goal AUC/Trough: Bone/joint infection (goal -600, trough >10), -600, trough >10  Clinical Status: stable  Micro:     Renal Function:  SCr: 0.62 mg/dL  CrCl: 114.8 mL/min  Renal replacement: Not on dialysis  Days of Therapy: 9  Current Dose: 750mg q12h  Vancomycin Plan:  New Dosinmg q24h  Estimated AUC: 476 mcg*hr/mL  Estimated Trough: 12.5 mcg/mL  Next Level: random  AM  Renal Function Monitoring: Daily BMP and UOP  Pharmacy will continue to follow closely for s/sx of nephrotoxicity, infusion reactions and appropriateness of therapy.  BMP and CBC will be ordered per protocol. We will continue to follow the patient’s culture results and clinical progress daily.    Terrie Calvillo, Pharmacist

## 2024-12-19 NOTE — ASSESSMENT & PLAN NOTE
Received IV iron x 2  Vitamin B12 deficiency  Continue vitamin B12 supplement  Transfuse 1 unit of PRBCs for hb of 6.5 on 12/18  Continue to monitor hb closely

## 2024-12-19 NOTE — ASSESSMENT & PLAN NOTE
Palliative Diagnosis: endometrial cancer    Goals:  Discussed code status. Currently a level 2.  Explained CPR and intubation in depth and discussed likelihood of survival.  Patient would not want CPR and has elected to be level 2 DNR at this time. Further considering if she would want intubation (to downgrade to Level 3 DNR/DNI), though she is not ready to decide this today.  Spoke with patient's HCR, Sharda who is understanding of patient's decision at this time. She is planning to visit tomorrow versus Friday.  Will continue discussions regarding GOC as patient's clinical presentation evolves.    Decisional apparatus:  Patient does have capacity on exam today.  If capacity is lost, patient's substitute decision maker would default to Sharda Carver per AD on file  ER contacts:  Sharda Carver (Niece)  [phone] (Work Phone)   Advance Directive/Living Will/POLST: on file and reviewed    Social Support:  Patient's support system:   Supportive listening provided  Normalized experience of patient  Advocated for patient/family with interdisciplinary team    Care Coordination  Case discussed with RN, SLIM    Follow-up  We appreciate the opportunity to participate in this patient's care.   We will continue to follow while admitted.    Please do not hesitate to contact our on-call provider through [facility] Secure Chat or contact [phone] should there be an acute change or other symptom control concerns.

## 2024-12-19 NOTE — PHYSICAL THERAPY NOTE
"                                                                                  PHYSICAL THERAPY NOTE          Patient Name: Lety Botello  Today's Date: 12/19/2024 12/19/24 1028   PT Last Visit   PT Visit Date 12/19/24   Note Type   Note Type Treatment   End of Consult   Patient Position at End of Consult Supine;All needs within reach;Bed/Chair alarm activated   Pain Assessment   Pain Assessment Tool 0-10   Pain Score 10 - Worst Possible Pain   Pain Location/Orientation Location: Abdomen   Pain Onset/Description Onset: Ongoing   Effect of Pain on Daily Activities limits mobility   Patient's Stated Pain Goal No pain   Hospital Pain Intervention(s) Repositioned;Emotional support;Rest   Restrictions/Precautions   Weight Bearing Precautions Per Order No   Other Precautions Chair Alarm;Bed Alarm;Multiple lines;Telemetry;Fall Risk;Pain   General   Chart Reviewed Yes   Response to Previous Treatment Patient reporting fatigue but able to participate.   Family/Caregiver Present No   Cognition   Overall Cognitive Status WFL   Arousal/Participation Responsive;Alert;Cooperative   Attention Within functional limits   Orientation Level Oriented X4   Memory Within functional limits   Following Commands Follows all commands and directions without difficulty   Comments patient pleasant and cooperative, requires encouragement to participate due to pain   Subjective   Subjective \"I can't do it today, i'm in so much pain i'm sorry\"   Bed Mobility   Rolling R 2  Maximal assistance   Additional items Assist x 2;Bedrails;Increased time required;Verbal cues;LE management   Rolling L 2  Maximal assistance   Additional items Assist x 2;Bedrails;Increased time required;Verbal cues;LE management   Additional Comments patient declined sitting at EOB at this time due to increase in pain and fatigue   Ambulation/Elevation   Gait pattern Not appropriate;Not tested   Endurance Deficit   Endurance Deficit Yes   Endurance Deficit Description " generalized weakness, fatigue, 10/10 pain   Activity Tolerance   Activity Tolerance Patient limited by pain;Patient limited by fatigue   Medical Staff Made Aware OT Deanne; Co-treatment with occupational therapy was performed today. This patient's participation in therapy services was limited by decreased tolerance for activity and current medical status. For these reasons, co-treatment was performed so that patient could optimally participate in therapy services and maximize their rehabilitation during treatment time. PT and OT disciplines addressed separate goals of patient's individualized care plan.   Nurse Made Aware RN cleared and updated   Assessment   Prognosis Fair   Problem List Decreased strength;Decreased range of motion;Decreased endurance;Impaired balance;Decreased mobility;Decreased coordination;Decreased cognition;Impaired judgement;Decreased safety awareness;Impaired sensation;Obesity;Decreased skin integrity;Pain   Assessment PT initiated treatment session in order to assist patient in achieving goals to improve transfers, gait training, and overall activity tolerance.  Patient pleasant, cooperative, and agreeable to today's treatment session. Patient received supine in bed. Patient is currently MaxAx2 bed mobility. Throughout treatment session patient required both verbal and tactile cuing to improve safety, efficiency, and mechanics of mobility in addition to hands on assistance for all aspects of functional mobility. Additionally, pt required increased time to execute specific mobility tasks with rest breaks in between secondary to gross fatigue and weakness. Performed rolling x2 for repositioning and hygiene to reduce skin breakdown. Patient required a boost in bed to improve patient positioning requiring MaxAx2. Patient declined further functional mobility and sitting at EOB at this time due to 10/10 abdominal pain limiting mobility and willingness to tolerate in PT treatment session. Received  pain meds from RN, and politely declined to participate further due to pain. Patient left supine in bed with alarm and all needs met. Patient will benefit from continued skilled physical therapy to address gait / balance dysfunction, decreased activity tolerance, and generalized weakness. The patients AM-PAC Basic Mobility Inpatient Short From Raw Score is 7 .  Based on AM-PAC scoring and patient presentation, PT currently recommending Level II (Moderate Resource Intensity). Please also refer to the recommendation of the Physical Therapist for safe discharge planning.   Goals   Patient Goals to reduce pain   STG Expiration Date 12/25/24   PT Treatment Day 2   Plan   Treatment/Interventions ADL retraining;Functional transfer training;LE strengthening/ROM;Elevations;Therapeutic exercise;Endurance training;Patient/family training;Gait training;Bed mobility;Spoke to nursing;Spoke to case management;OT   Progress Slow progress, decreased activity tolerance   PT Frequency 2-3x/wk   Discharge Recommendation   Rehab Resource Intensity Level, PT II (Moderate Resource Intensity)   Equipment Recommended Walker;Wheelchair   AM-PAC Basic Mobility Inpatient   Turning in Flat Bed Without Bedrails 2   Lying on Back to Sitting on Edge of Flat Bed Without Bedrails 1   Moving Bed to Chair 1   Standing Up From Chair Using Arms 1   Walk in Room 1   Climb 3-5 Stairs With Railing 1   Basic Mobility Inpatient Raw Score 7   Turning Head Towards Sound 4   Follow Simple Instructions 3   Low Function Basic Mobility Raw Score  14   Low Function Basic Mobility Standardized Score  22.01   [facility] Highest Level Of Mobility   -HLM Goal 2: Bed activities/Dependent transfer   -HLM Achieved 2: Bed activities/Dependent transfer   Education   Education Provided Mobility training   Patient Demonstrates acceptance/verbal understanding   End of Consult   Patient Position at End of Consult Supine;Bed/Chair alarm activated;All needs within reach        Cristina Gabriel PT, DPT

## 2024-12-19 NOTE — ASSESSMENT & PLAN NOTE
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

## 2024-12-19 NOTE — PLAN OF CARE
Problem: PHYSICAL THERAPY ADULT  Goal: Performs mobility at highest level of function for planned discharge setting.  See evaluation for individualized goals.  Description: Treatment/Interventions: ADL retraining, Functional transfer training, LE strengthening/ROM, Elevations, Therapeutic exercise, Endurance training, Cognitive reorientation, Patient/family training, Equipment eval/education, Bed mobility, Compensatory technique education, Continued evaluation, Spoke to nursing, Spoke to case management, Spoke to advanced practitioner, OT  Equipment Recommended: Wheelchair       See flowsheet documentation for full assessment, interventions and recommendations.  Outcome: Progressing  Note: Prognosis: Fair  Problem List: Decreased strength, Decreased range of motion, Decreased endurance, Impaired balance, Decreased mobility, Decreased coordination, Decreased cognition, Impaired judgement, Decreased safety awareness, Impaired sensation, Obesity, Decreased skin integrity, Pain  Assessment: PT initiated treatment session in order to assist patient in achieving goals to improve transfers, gait training, and overall activity tolerance.  Patient pleasant, cooperative, and agreeable to today's treatment session. Patient received supine in bed. Patient is currently MaxAx2 bed mobility. Throughout treatment session patient required both verbal and tactile cuing to improve safety, efficiency, and mechanics of mobility in addition to hands on assistance for all aspects of functional mobility. Additionally, pt required increased time to execute specific mobility tasks with rest breaks in between secondary to gross fatigue and weakness. Performed rolling x2 for repositioning and hygiene to reduce skin breakdown. Patient required a boost in bed to improve patient positioning requiring MaxAx2. Patient declined further functional mobility and sitting at EOB at this time due to 10/10 abdominal pain limiting mobility and willingness  to tolerate in PT treatment session. Received pain meds from RN, and politely declined to participate further due to pain. Patient left supine in bed with alarm and all needs met. Patient will benefit from continued skilled physical therapy to address gait / balance dysfunction, decreased activity tolerance, and generalized weakness. The patients AM-PAC Basic Mobility Inpatient Short From Raw Score is 7 .  Based on AM-PAC scoring and patient presentation, PT currently recommending Level II (Moderate Resource Intensity). Please also refer to the recommendation of the Physical Therapist for safe discharge planning.        Rehab Resource Intensity Level, PT: II (Moderate Resource Intensity)    See flowsheet documentation for full assessment.

## 2024-12-19 NOTE — ASSESSMENT & PLAN NOTE
Located on the right lower abdomen, well crusted, no active lesion  Derm consulted and completed 5 days of valacyclovir  Continue with Neurontin 100 mg BID via G tube

## 2024-12-19 NOTE — QUICK NOTE
Biopsy results of lower abdomen revealed:    Final Diagnosis   A. Skin, right lower abdomen, punch biopsy:     METASTATIC ADENOCARCINOMA, consistent with gynecologic/Mullerian origin; extends to the tissue edges (see note).     Note: These findings are similar to those seen in previous case [id number]. Increased mucin within the tumor is seen with mucicarmine stain. The lesional cells are positive for CK7, Robin-EP4, PAX8, ER, and MD immunostains, and they are negative for SOX10, p40, CK20, SATB2, GATA3, and TTF1 immunostains. Tissue is available for any additional testing that is requested (block A1). This case has been reviewed by pathologist Dr. Mancia, who agrees with the diagnosis.        Plan:  -Please refer to hematology oncology if have not already, or gyn/onc  -Continue Palliative care   -Reach out to dermatology with any further questions or concerns    Karuna Guillen MD  Dermatology, PGY-2

## 2024-12-19 NOTE — PLAN OF CARE
Problem: OCCUPATIONAL THERAPY ADULT  Goal: Performs self-care activities at highest level of function for planned discharge setting.  See evaluation for individualized goals.  Description: Treatment Interventions: ADL retraining, Functional transfer training, UE strengthening/ROM, Endurance training, Cognitive reorientation, Patient/family training, Equipment evaluation/education, Compensatory technique education, Continued evaluation, Activityengagement, Energy conservation          See flowsheet documentation for full assessment, interventions and recommendations.   Outcome: Progressing  Note: Limitation: Decreased ADL status, Decreased UE strength, Decreased Safe judgement during ADL, Decreased cognition, Decreased endurance, Decreased self-care trans, Decreased high-level ADLs, Decreased sensation  Prognosis: Fair  Assessment: Pt seen on this date for OT session focusing on ADL retraining,  body mechanics, transfer retraining, increasing activity tolerance/endurance and EOB sitting to increase ability to participate in ADL/functional tasks. Pt was found in bed and was left in bed w/ all needs within reach, bed alarm on. Pt completed grooming tasks w S while supine in bed. Attempted further adl tasks, however, pt in 10/10 pain and requesting to defer. RN administered pain meds, pt agreeable to repositioning in bed, attempted to roll to the L however this greatly exacerbated her pain. Session was terminated at this point 2' pt's request. Will attempt to f/u as able. Pt w/ improvements in activity tolerance, however is still limited 2* decreased ADL/High-level ADL status, decreased activity tolerance/endurance, decreased self-care trans, and insight to condition.   The patient's raw score on the AM-PAC Daily Activity Inpatient Short Form is 14. A raw score of less than 19 suggests the patient may benefit from discharge to post-acute rehabilitation services. Please refer to the recommendation of the Occupational  Therapist for safe discharge planning. Recommending pt D/C to STR when medically stable. Pt will continue to benefit from acute OT services to meet goals.     Rehab Resource Intensity Level, OT: II (Moderate Resource Intensity)

## 2024-12-19 NOTE — PROGRESS NOTES
Progress Note - Palliative Care   Name: Lety Botello 64 y.o. 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   Abdominal pain  Current regimen:  OxyIR 5/10 mg Q4hrs PRN for moderate/severe pain   IV dilaudid to 0.2 mg q4H PRN for BT pain  Schedule Tylenol 975 mg TID  Minimize opioids- given recurrent SBO  Failed therapies:  NA    12/19 : Total OME over last 24 hours: 64 mg   IV dilaudid 0.2 mg  x 4 doses  Oxycodone oral solution 10 mg x 4 doses    Pt still complaining of / abdominal pain in the setting of h/o endometrial cancer S/P FLORECITA/BSO , recurrent SBO, shingles and ascites.  Increase IV dilaudid  0.5mg Q3 Hr PTN for breakthrough pain  Bowel regimen to prevent OIC: senna 17.6 mg bid, prn dulcolax suppository. Last BM yesterday 12/18  Opioid agreement. Not on file, will need outpatient follow up  IR consult placed for paracentesis   Palliative care encounter  Palliative Diagnosis: endometrial cancer    Goals:  Discussed code status. Currently a level 2.  Explained CPR and intubation in depth and discussed likelihood of survival.  Patient would not want CPR and has elected to be level 2 DNR at this time. Further considering if she would want intubation (to downgrade to Level 3 DNR/DNI), though she is not ready to decide this today.  Spoke with patient's HCR, Sharda who is understanding of patient's decision at this time. She is planning to visit tomorrow versus Friday.  Will continue discussions regarding GOC as patient's clinical presentation  evolves.    Decisional apparatus:  Patient does have capacity on exam today.  If capacity is lost, patient's substitute decision maker would default to Sharda Carver per AD on file  ER contacts:  Sharda Carver (Niece)  [phone] (Work Phone)   Advance Directive/Living Will/POLST: on file and reviewed    Social Support:  Patient's support system:   Supportive listening provided  Normalized experience of patient  Advocated for patient/family with interdisciplinary team    Care Coordination  Case discussed with RN, SLIM    Follow-up  We appreciate the opportunity to participate in this patient's care.   We will continue to follow while admitted.    Please do not hesitate to contact our on-call provider through EPIC Secure Chat or contact [phone] should there be an acute change or other symptom control concerns.      Endometrial cancer (HCC)  Recurrent endometrial cancer, initial diagnosis 2020  [facility] onc  S/p FLORECITA SBO SLND 6/2020 c/b splenic laceration  NAYE 1/2023, though presented for a fall in 10/2023 was incidentally found to have RP lymphadenopathy  Bx 12/2023 confirm metastatic adenocarcinoma  S/p pelvic RT and treatment with femara  NAYE 5/2024  Presented 10/18, 11/5, 11/14 with SBO - s/p ex lap, small bowel resection, partial omentectomy, DAVID 11/22/24 with biopsy confirming metastatic adenocarcinoma  Hypertension  Current blood pressure medicines Norvasc 5 mg daily and Coreg 25 mg twice daily  BP elevated today  Monitor BP  Pain control  Type 2 diabetes mellitus (HCC)  Lab Results   Component Value Date    HGBA1C 6.9 (H) 11/05/2024       Recent Labs     12/18/24  1027 12/18/24  1556 12/18/24  2050 12/19/24  0605   POCGLU 197* 102 145* 164*       Blood Sugar Average: Last 72 hrs:  (P) [id number]    Gastroparesis    Brief HPI: Lety Botello is a 64 y.o. female with a PMH of T2DM, HTN, CAD, history of DVT/PE on Eliquis, endometrial carcinoma status post FLORECITA/SBO  with recurrence and status post RT to lymph nodes, recent admission for recurrent SBO s/p ex lap w/ small bowel resection and PEG tube placement (12/3) who presented due to PEG-J malfunction on 12/9. EGD w/ J tube exchange on 12/10. Post op pt had worsening hypoxia satting mid 80s on 10L NC. Was given nebs, lasix and subsequently placed on BiPAP and upgraded to critical care for acute hypoxic respiratory failure, , but was quickly weaned down to nasal cannula and transferred him the medical floor. Palliative care was consulted to assist with pain control. Gen surg and GI following for GJ tube malfunction.  Dermatology consulted for shingles.  IR consult placed for paracentesis.    Subjective   Patient seen and examined at bedside this morning. AAO x3. Pt reports having 10/10 abdominal pain at baseline. She couldn't sleep well last night due to abd pain. She passes flatus and had a BM yesterday. Patient denied any fever, chills, chest pain, shortness of breath. No significant overnight event. She is able to eat only jello today. Pt reports IV dilaudid works better for her.     Care was coordinated with patients nurse and case management.        Objective :  Temp:  [97.4 °F (36.3 °C)-98.3 °F (36.8 °C)] 98.3 °F (36.8 °C)  HR:  [66-76] 75  BP: (106-166)/(52-75) 166/65  Resp:  [18-21] 21  SpO2:  [96 %-99 %] 96 %  O2 Device: None (Room air)    Physical Exam  Vitals and nursing note reviewed.   Constitutional:       General: She is not in acute distress.     Appearance: She is well-developed. She is ill-appearing.   HENT:      Head: Normocephalic and atraumatic.      Mouth/Throat:      Mouth: Mucous membranes are moist.   Eyes:      Extraocular Movements: Extraocular movements intact.      Conjunctiva/sclera: Conjunctivae normal.      Pupils: Pupils are equal, round, and reactive to light.   Cardiovascular:      Rate and Rhythm: Normal rate and regular rhythm.      Heart sounds: Normal heart sounds. No murmur  heard.  Pulmonary:      Effort: Pulmonary effort is normal. No respiratory distress.      Breath sounds: Normal breath sounds. No wheezing.   Abdominal:      General: Bowel sounds are normal.      Palpations: Abdomen is soft.      Tenderness: There is no abdominal tenderness.      Comments: Abdominal binder intact, clean and dry   Musculoskeletal:         General: No swelling.      Cervical back: Neck supple.      Right lower leg: No edema.      Left lower leg: No edema.   Skin:     General: Skin is warm and dry.      Capillary Refill: Capillary refill takes less than 2 seconds.      Coloration: Skin is not jaundiced or pale.      Findings: Rash present.      Comments: Pt reports shingle rash on the belly   Neurological:      Mental Status: She is alert and oriented to person, place, and time.   Psychiatric:         Mood and Affect: Mood normal.            Lab Results: I have reviewed the following results:  Lab Results   Component Value Date/Time    SODIUM 126 (L) 12/19/2024 06:09 AM    SODIUM 132 (L) 11/04/2024 03:33 AM    K 5.1 12/19/2024 06:09 AM    K 4.7 11/04/2024 03:33 AM    BUN 29 (H) 12/19/2024 06:09 AM    BUN 19 11/04/2024 03:33 AM    CREATININE 0.62 12/19/2024 06:09 AM    CREATININE 0.76 11/04/2024 03:33 AM    GLUC 172 (H) 12/19/2024 06:09 AM    GLUC 98 11/04/2024 03:33 AM    CALCIUM 7.9 (L) 12/19/2024 06:09 AM    CALCIUM 8.6 11/04/2024 03:33 AM    AST 6 (L) 12/19/2024 06:09 AM    AST 9 11/04/2024 03:33 AM    ALT 5 (L) 12/19/2024 06:09 AM    ALT 6 11/04/2024 03:33 AM    ALB 2.5 (L) 12/19/2024 06:09 AM    ALB 3.1 (L) 11/04/2024 03:33 AM    TP 3.9 (L) 12/19/2024 06:09 AM    TP 5.4 (L) 11/04/2024 03:33 AM    EGFR 96 12/19/2024 06:09 AM    EGFR 89 11/04/2024 03:33 AM    EGFR 97 12/11/2020 03:15 AM     Lab Results   Component Value Date/Time    HGB 8.3 (L) 12/19/2024 06:09 AM    WBC 6.06 12/19/2024 06:09 AM    PLT 75 (L) 12/19/2024 06:09 AM     12/01/2023 08:30 AM    INR 1.49 (H) 11/23/2024 10:01 AM     INR 1.1 12/01/2023 08:30 AM    PTT 49 (H) 11/27/2024 06:19 AM     Lab Results   Component Value Date/Time    [id number] 21.122 (H) 12/18/2024 03:02 PM       Code Status: Level 2 - DNAR: but accepts endotracheal intubation  Advance Directive and Living Will: Yes    Power of :    POLST:      Administrative Statements   I have spent a total time of 30 minutes in caring for this patient on the day of the visit/encounter including Patient and family education, Counseling / Coordination of care, Documenting in the medical record, Reviewing / ordering tests, medicine, procedures  , Obtaining or reviewing history  , and Communicating with other healthcare professionals .

## 2024-12-19 NOTE — ASSESSMENT & PLAN NOTE
Hyponatremia likely multifactorial - due to volume overload  [place] catheter placed due to retention  Monitor closely with diuresis  Urine studies consistent with diuretic effect

## 2024-12-19 NOTE — ASSESSMENT & PLAN NOTE
Lab Results   Component Value Date    HGBA1C 6.9 (H) 11/05/2024       Recent Labs     12/18/24  1556 12/18/24 2050 12/19/24  0605 12/19/24  1039   POCGLU 102 145* 164* 178*       Blood Sugar Average: Last 72 hrs:  (P) [id number]    Farxiga on hold while inpatient  Hyperglycemia secondary to steroids and tube feeding  Continue Lantus nightly, continue Humalog tid, adjust the doses as needed  Continue SSI

## 2024-12-19 NOTE — ASSESSMENT & PLAN NOTE
Current blood pressure medicines Norvasc 5 mg daily and Coreg 25 mg twice daily  BP elevated today  Monitor BP  Pain control

## 2024-12-19 NOTE — OCCUPATIONAL THERAPY NOTE
Occupational Therapy Progress Note     Patient Name: Lety Botello  Today's Date: 12/19/2024  Problem List  Principal Problem:    Encounter for gastrojejunal (GJ) tube placement issues  Active Problems:    Hypertension    Type 2 diabetes mellitus (HCC)    Endometrial cancer (HCC)    History of pulmonary embolism    Hypothyroidism    Gastroparesis    Morbid obesity (HCC)    Abdominal pain    Shingles    Palliative care encounter    Hyponatremia    Anemia    Acute respiratory failure with hypoxia (HCC)    Hypomagnesemia    NSVT (nonsustained ventricular tachycardia) (HCC)    Coronary artery disease involving native coronary artery    Herpes zoster without complication           12/19/24 1029   OT Last Visit   OT Visit Date 12/19/24   Note Type   Note Type Treatment   Pain Assessment   Pain Assessment Tool 0-10   Pain Score 10 - Worst Possible Pain   Pain Location/Orientation Location: Abdomen   Restrictions/Precautions   Weight Bearing Precautions Per Order No   Other Precautions Multiple lines;Telemetry;Fall Risk;Pain;Bed Alarm;Chair Alarm   ADL   Where Assessed Supine, bed   Grooming Assistance 5  Supervision/Setup   Grooming Deficit Wash/dry hands;Wash/dry face   Grooming Comments pt completing grooming tasks including washing face,attempted to brush teeth however with extreme pain in abdomen and deferred any further engagement in ADL tasks. RN aware and adminstered pain meds.   Bed Mobility   Additional Comments pt boosted with max ax2, and rolled slightly to the L for bed linen mgmt/repositioning. Unfortunately, she was unable to tolerate additional intervention today due to immense abdominal pain. RN adminstered pain meds, pt politely requesting to end therapy session today and will attempt in future.   Cognition   Overall Cognitive Status WFL   Arousal/Participation Responsive;Alert   Attention Within functional limits   Orientation Level Oriented X4   Memory Within functional limits   Following Commands  Follows all commands and directions without difficulty   Comments pt cooperative w overall fair safety awareness and insight to condition.   Activity Tolerance   Activity Tolerance Patient limited by pain   Medical Staff Made Aware dpt, RN   Assessment   Assessment Pt seen on this date for OT session focusing on ADL retraining,  body mechanics, transfer retraining, increasing activity tolerance/endurance and EOB sitting to increase ability to participate in ADL/functional tasks. Pt was found in bed and was left in bed w/ all needs within reach, bed alarm on. Pt completed grooming tasks w S while supine in bed. Attempted further adl tasks, however, pt in 10/10 pain and requesting to defer. RN administered pain meds, pt agreeable to repositioning in bed, attempted to roll to the L however this greatly exacerbated her pain. Session was terminated at this point 2' pt's request. Will attempt to f/u as able. Pt w/ improvements in activity tolerance, however is still limited 2* decreased ADL/High-level ADL status, decreased activity tolerance/endurance, decreased self-care trans, and insight to condition.   The patient's raw score on the AM-PAC Daily Activity Inpatient Short Form is 14. A raw score of less than 19 suggests the patient may benefit from discharge to post-acute rehabilitation services. Please refer to the recommendation of the Occupational Therapist for safe discharge planning. Recommending pt D/C to STR when medically stable. Pt will continue to benefit from acute OT services to meet goals.   Plan   Treatment Interventions ADL retraining;Endurance training;Activityengagement;Energy conservation   Goal Expiration Date 12/25/24   OT Treatment Day 2   OT Frequency 2-3x/wk   Discharge Recommendation   Rehab Resource Intensity Level, OT II (Moderate Resource Intensity)   AM-PAC Daily Activity Inpatient   Lower Body Dressing 2   Bathing 2   Toileting 2   Upper Body Dressing 2   Grooming 3   Eating 3   Daily  Activity Raw Score 14   Daily Activity Standardized Score (Calc for Raw Score >=11) 33.39   AM-PAC Applied Cognition Inpatient   Following a Speech/Presentation 4   Understanding Ordinary Conversation 4   Taking Medications 4   Remembering Where Things Are Placed or Put Away 4   Remembering List of 4-5 Errands 4   Taking Care of Complicated Tasks 4   Applied Cognition Raw Score 24   Applied Cognition Standardized Score 62.21   Modified Melvin Scale   Modified Melvin Scale 4   End of Consult   Education Provided Yes   Patient Position at End of Consult Supine;Bed/Chair alarm activated;All needs within reach   Nurse Communication Nurse aware of consult         OLIVERIO Rey, OTR/L

## 2024-12-19 NOTE — ASSESSMENT & PLAN NOTE
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

## 2024-12-20 PROBLEM — R60.1 ANASARCA: Status: ACTIVE | Noted: 2024-12-20

## 2024-12-20 NOTE — ASSESSMENT & PLAN NOTE
Body mass index is 50.55 kg/m².  Encourage lifestyle modification and weight loss once acute issues improved/resolved

## 2024-12-20 NOTE — PROGRESS NOTES
Progress Note - Gastroenterology   Name: Lety Botello 64 y.o. female I MRN: [id number]  Unit/Bed#: [facility] 511-01 I Date of Admission: 12/9/2024   Date of Service: 12/20/2024 I Hospital Day: 11    Assessment & Plan  Encounter for gastrojejunal (GJ) tube placement issues  H/o recurrent SBO 2/2 jejunal stricture from recurrent adenoCA gyn primary, s/p PEGJ 12/3/24. Admitted with malfunctioning J tube. Underwent EGD 12/10 with ulcerated mucosa and pus below bumper, J tube looped in stomach which was replaced into distal duodenum. J tube malfunctioned again leading to replacement 12/13. Has been functioning and tolerating feeds since replacement, though does report intermittent generalized abdominal pain. Has been on CTX/Vancomycin for PEG infection since 12/11.     GI reengaged to comment on timing and duration of Abx. PEG site appears to be erythematous near insertion site with granulation tissue, expression of gastric fluid from site. No leukocytosis, has been afebrile since admission. Bumper snug, was loosened during evaluation and freely rotated afterwards. Dressing replaced. Tube was taped in a fashion to prevent further traction.  Completed 7 days abx    GI called back 12/20 for clogged G port.       Plan:  Plan for PEG-J tube exchange early next week  Continue Tube feeds through J tube and medications PO.   DO NOT give meds through the J port  CLD as tolerated per primary   Recommend judicious use of opioids if this is contributing to generalized slowing of her GI system  Change dressing daily and when soiled, discussed with nursing to ensure tube is taped straight so that it prevents traction   Additional pain and symptom management per primary team   Morbid obesity (HCC)  Chronic, body habitus may be contributing to tight PEG bumper which was loosened   Abdominal pain    Palliative care encounter    Anasarca          Subjective   Patient denies any acute complaints including worsening abdominal pain, nausea,  vomiting, fever or chills.    Objective :  Temp:  [97.9 °F (36.6 °C)] 97.9 °F (36.6 °C)  HR:  [66-76] 68  BP: (106-138)/(53-63) 138/63  Resp:  [18-20] 18  SpO2:  [94 %-98 %] 97 %  O2 Device: None (Room air)    Physical Exam  Vitals and nursing note reviewed.   Constitutional:       General: She is not in acute distress.     Appearance: She is well-developed. She is obese.   HENT:      Head: Normocephalic and atraumatic.   Eyes:      Conjunctiva/sclera: Conjunctivae normal.   Cardiovascular:      Rate and Rhythm: Normal rate and regular rhythm.      Heart sounds: No murmur heard.  Pulmonary:      Effort: Pulmonary effort is normal. No respiratory distress.      Breath sounds: Normal breath sounds.   Abdominal:      General: There is distension.      Palpations: Abdomen is soft.      Tenderness: There is no abdominal tenderness. There is no guarding or rebound.      Comments: Abdominal binder in place    PEG tube site with mild surrounding erythema and mild drainage of gastric contents.  No purulence noted.    Midline staple lines present    Multiple areas of erythematous crusted over lesions in lower abdomen   Musculoskeletal:         General: No swelling.      Cervical back: Neck supple.      Right lower leg: Edema present.      Left lower leg: Edema present.   Skin:     General: Skin is warm and dry.      Capillary Refill: Capillary refill takes less than 2 seconds.   Neurological:      Mental Status: She is alert and oriented to person, place, and time.   Psychiatric:         Mood and Affect: Mood normal.           Lab Results: I have reviewed the following results:CBC/BMP:   .     12/20/24  0528   WBC 6.10   HGB 8.3*   HCT 26.0*   PLT 76*   SODIUM 125*   K 5.1   CL 93*   CO2 26   BUN 28*   CREATININE 0.68   GLUC 235*   MG 1.8*   PHOS 2.9        Imaging Results Review: I reviewed radiology reports from this admission including: CT abdomen/pelvis.  Other Study Results Review: No additional pertinent studies  reviewed.    Kasia Perez MD  Gastroenterology Fellow, PGY- 4  Available on EPIC Secure Chat  12/20/2024 5:29 PM

## 2024-12-20 NOTE — ASSESSMENT & PLAN NOTE
Elevated TSH  Normal free T4 at 0.65  Patient was not receiving levothyroxine due to  PEG tube malfunctioning   Crease levothyroxine dose to 150 mcg daily  Repeat thyroid study in 4 to 6 weeks   Patient is a 28-year-old female with a past medical history significant for polycystic ovary disease, morbid obesity, hypothyroid secondary to thyroidectomy he was ambulatory to the ED today with complaints of vaginal bleeding. Patient states she's had heavy vaginal bleeding with clots off and on since December. Patient states at times she has bleeding so heavy she soaks through tampons and pads every 30-60 minutes. Patient states she feels weak, tired and is always cold. She also notes nausea and dyspnea on exertion. Patient denies fevers, vomiting, diarrhea, constipation. Patient states she last had heavy bleeding this morning where she soaked through multiple tampons and pads ultimately soaking her clothes. Patient had labs done at her Endocrinologist earlier this week. Shared the results:   WBC: 9.4  Hgb: 12.2  PLT: 360    Patient is concerned that her hgb dropped since she has had such heavy bleeds over the last 2 days. Patient has no further complaints at this time. Primary care provider:Bertha Clark MD      The history is provided by the patient and the spouse. No  was used. Past Medical History:   Diagnosis Date    Polycystic disease, ovaries     Thyroid disease      Past Surgical History:   Procedure Laterality Date    HX GYN      cesarian    HX HEENT      eye surgery     History reviewed. No pertinent family history.     Social History     Socioeconomic History    Marital status:      Spouse name: Not on file    Number of children: Not on file    Years of education: Not on file    Highest education level: Not on file   Occupational History    Not on file   Social Needs    Financial resource strain: Not on file    Food insecurity:     Worry: Not on file     Inability: Not on file    Transportation needs:     Medical: Not on file     Non-medical: Not on file   Tobacco Use    Smoking status: Current Every Day Smoker     Packs/day: 0.50    Smokeless tobacco: Never Used   Substance and Sexual Activity    Alcohol use: Yes     Comment: occasional    Drug use: Not on file    Sexual activity: Not on file   Lifestyle    Physical activity:     Days per week: Not on file     Minutes per session: Not on file    Stress: Not on file   Relationships    Social connections:     Talks on phone: Not on file     Gets together: Not on file     Attends [employer] service: Not on file     Active member of club or organization: Not on file     Attends meetings of clubs or organizations: Not on file     Relationship status: Not on file    Intimate partner violence:     Fear of current or ex partner: Not on file     Emotionally abused: Not on file     Physically abused: Not on file     Forced sexual activity: Not on file   Other Topics Concern    Not on file   Social History Narrative    Not on file     ALLERGIES: Sulfa (sulfonamide antibiotics)    Review of Systems   Constitutional: Positive for chills. Negative for appetite change and fever. HENT: Negative. Respiratory: Negative for cough, shortness of breath and wheezing. Cardiovascular: Negative for chest pain. Gastrointestinal: Positive for nausea. Negative for abdominal pain, constipation, diarrhea and vomiting. Genitourinary: Positive for vaginal bleeding. Negative for dysuria and urgency. Musculoskeletal: Negative for back pain. Skin: Negative for color change and rash. Neurological: Negative for dizziness and headaches. Psychiatric/Behavioral: Negative. All other systems reviewed and are negative. Vitals:    03/21/19 1356   BP: 185/81   Pulse: 86   Resp: 15   Temp: 97.7 °F (36.5 °C)   SpO2: 99%   Weight: (!) 190.6 kg (420 lb 3.2 oz)   Height: 5' 7\" (1.702 m)          Physical Exam   Constitutional: She is oriented to person, place, and time. Vital signs are normal. She appears well-developed and well-nourished. She is active and cooperative.    30year old female in NAD   HENT:   Head: Normocephalic and atraumatic. Neck: Normal range of motion. Neck supple. Cardiovascular: Normal rate, regular rhythm, normal heart sounds and intact distal pulses. Pulmonary/Chest: Effort normal and breath sounds normal. No respiratory distress. She has no wheezes. She has no rales. She exhibits no tenderness. Abdominal: Soft. Bowel sounds are normal. There is no tenderness. There is no guarding. Genitourinary: Pelvic exam was performed with patient supine. There is no rash, tenderness, lesion or injury on the right labia. There is no rash, tenderness, lesion or injury on the left labia. Cervix exhibits no motion tenderness. Right adnexum displays tenderness. Right adnexum displays no mass and no fullness. Left adnexum displays tenderness. Left adnexum displays no mass and no fullness. There is bleeding in the vagina. Musculoskeletal: Normal range of motion. Neurological: She is alert and oriented to person, place, and time. Skin: Skin is warm and dry. No erythema. Psychiatric: She has a normal mood and affect. Her behavior is normal. Judgment and thought content normal.   Nursing note and vitals reviewed. [facility]     Pelvic Exam  Date/Time: 3/21/2019 3:00 PM  Procedure duration:  4 minutes. Exam assisted by:  Nico Kang. Type of exam performed: bimanual and speculum. External genitalia appearance: normal.    Vaginal exam:  odor and bleeding. Bleeding: mild  Cervical exam:  discharge from cervix and minimal cervical motion tenderness. Specimen(s) collected:  chlamydia, GC and vaginal culture. Bimanual exam:  right adenexal tenderness and left adenexal tenderness.     Patient tolerance: Patient tolerated the procedure well with no immediate complications  Comments: Difficulty with adnexal exam due to body habitus        Assessment & Plan:     Orders Placed This Encounter    CBC WITH AUTOMATED DIFF    METABOLIC PANEL, COMPREHENSIVE    Hold Sample    PELVIC EXAM SET UP    levothyroxine (SYNTHROID) 175 mcg tablet    spironolactone (ALDACTONE) 25 mg tablet    FLUoxetine (PROZAC) 40 mg capsule    omeprazole (PRILOSEC) 40 mg capsule    topiramate (TOPAMAX) 25 mg tablet    butalbital-acetaminophen-caff (FIORICET) -40 mg per capsule    fluticasone propionate (FLONASE ALLERGY RELIEF) 50 mcg/actuation nasal spray       Discussed with Melinda Xiong MD,ED Provider    Lesia Brown NP  03/21/19  2:35 PM    Swabs consistent with BV and vaginal yeast. Labs and imaging otherwise without acute findings. Follow-up with PCP. Discussed return precautions. 3:55 PM  The patient has been reevaluated. The patient is ready for discharge. The patient's signs, symptoms, diagnosis, and discharge instructions have been discussed and the patient/ family has conveyed their understanding. The patient is to follow up as recommended or return to the ED should their symptoms worsen. Plan has been discussed and the patient is in agreement. LABORATORY TESTS:  Labs Reviewed   CBC WITH AUTOMATED DIFF - Abnormal; Notable for the following components:       Result Value    RDW 16.4 (*)     All other components within normal limits   METABOLIC PANEL, COMPREHENSIVE - Abnormal; Notable for the following components:    Calcium 8.2 (*)     Albumin 3.3 (*)     Globulin 4.3 (*)     A-G Ratio 0.8 (*)     All other components within normal limits   URINALYSIS W/MICROSCOPIC - Abnormal; Notable for the following components:    Blood MODERATE (*)     Urobilinogen 2.0 (*)     Leukocyte Esterase TRACE (*)     Yeast PRESENT (*)     All other components within normal limits   KOH, OTHER SOURCES   WET PREP   CHLAMYDIA/GC PCR   URINE CULTURE HOLD SAMPLE   SAMPLES BEING HELD       IMAGING RESULTS:  Us Transvaginal    Result Date: 3/21/2019  INDICATION: Heavy vaginal bleeding for months. History of polycystic ovarian syndrome.  TECHNIQUE: Routine transpelvic ultrasound images were obtained as well as transvaginal images for better definition of the uterus and adnexa. FINDINGS: The transpelvic images demonstrate an incompletely distended urinary bladder without evidence of filling defect. The uterus measures 10.4 x 5.6 x 4.5 cm. Myometrial echogenicity is normal. The endometrial stripe is not well visualized but measures approximately 8 mm by transpelvic technique. The right ovary is 3 by overlying bowel gas. The left ovary measures 2.2 x 1.5 x 2.2 cm. It demonstrates normal echogenicity and no evidence of mass. Transvaginal images demonstrate a normal endometrial stripe measuring 14 mm in thickness. No myometrial mass is seen. There is a 1.4 cm nabothian cyst in the cervix. The right ovary is obscured by intervening bowel gas. The left ovary is obscured by intervening bowel gas. No adnexal mass or pathological cyst is seen. There is no pelvic free fluid. IMPRESSION: Normal uterus and endometrial stripe. Small nabothian cyst in the cervix. Normal right ovary. Nonvisualization of the left ovary. No evidence of adnexal mass or pelvic free fluid. Us Pelv Non Obs    Result Date: 3/21/2019  INDICATION: Heavy vaginal bleeding for months. History of polycystic ovarian syndrome. TECHNIQUE: Routine transpelvic ultrasound images were obtained as well as transvaginal images for better definition of the uterus and adnexa. FINDINGS: The transpelvic images demonstrate an incompletely distended urinary bladder without evidence of filling defect. The uterus measures 10.4 x 5.6 x 4.5 cm. Myometrial echogenicity is normal. The endometrial stripe is not well visualized but measures approximately 8 mm by transpelvic technique. The right ovary is 3 by overlying bowel gas. The left ovary measures 2.2 x 1.5 x 2.2 cm. It demonstrates normal echogenicity and no evidence of mass. Transvaginal images demonstrate a normal endometrial stripe measuring 14 mm in thickness. No myometrial mass is seen. There is a 1.4 cm nabothian cyst in the cervix.  The right ovary is obscured by intervening bowel gas. The left ovary is obscured by intervening bowel gas. No adnexal mass or pathological cyst is seen. There is no pelvic free fluid. IMPRESSION: Normal uterus and endometrial stripe. Small nabothian cyst in the cervix. Normal right ovary. Nonvisualization of the left ovary. No evidence of adnexal mass or pelvic free fluid. MEDICATIONS GIVEN:  Medications - No data to display    IMPRESSION:  1. BV (bacterial vaginosis)    2. DUB (dysfunctional uterine bleeding)    3. Vaginal yeast infection        PLAN:  1. Current Discharge Medication List      START taking these medications    Details   metroNIDAZOLE (FLAGYL) 500 mg tablet Take 1 Tab by mouth two (2) times a day for 7 days. Indications: vaginosis caused by bacteria  Qty: 14 Tab, Refills: 0      fluconazole (DIFLUCAN) 150 mg tablet Take 1 Tab by mouth once for 1 dose. Once after completing the flagyl on 3/28/19. FDA advises cautious prescribing of oral fluconazole in pregnancy. Qty: 1 Tab, Refills: 0         CONTINUE these medications which have NOT CHANGED    Details   levothyroxine (SYNTHROID) 175 mcg tablet Take 175 mcg by mouth Daily (before breakfast). spironolactone (ALDACTONE) 25 mg tablet Take 25 mg by mouth daily. FLUoxetine (PROZAC) 40 mg capsule Take 40 mg by mouth daily. omeprazole (PRILOSEC) 40 mg capsule Take 40 mg by mouth daily. topiramate (TOPAMAX) 25 mg tablet Take 25 mg by mouth two (2) times daily (with meals). butalbital-acetaminophen-caff (FIORICET) -40 mg per capsule Take  by mouth. fluticasone propionate (FLONASE ALLERGY RELIEF) 50 mcg/actuation nasal spray by Nasal route.            2.   Follow-up Information     Follow up With Specialties Details Why Contact Info    Clementina Amaro MD [facility] Schedule an appointment as soon as possible for a visit  [address] [place] [address] Drive      Andrzej Boland MD Obstetrics & Gynecology, Gynecology, Obstetrics Schedule an appointment as soon as possible for a visit GYN care. Call to be sure they take your insurance [address] [id number] [phone] [facility] DEPT Emergency Medicine  As needed, If symptoms worsen [address] [place] [id number] [phone]        3.      Return to ED for new or worsening symptoms       Yi Merida NP

## 2024-12-20 NOTE — PROGRESS NOTES
"Progress Note - Hospitalist   Name: Lety Botello 64 y.o. 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  Persistent third spacing with slight increase in moderate volume ascites, and no significant change in moderate size right and small left pleural effusions with bibasilar atelectasis. 2.  New percutaneous enteric tube with tip in the distal duodenum.\"  GI following. S/p EGD 12/10 with J-tube exchange, postop patient had worsening hypoxia required transfer to ICU and using BiPAP  Concern for possible infection surrounding J-tube site, completed 7 days of ceftriaxone and vancomycin  developed clogged J-tube,   Status post EGD on 12/13 with J-tube replacement   Resumed on tube feeding  On clear liquid diet per speech/swallow therapist  Abdominal pain  Patient with recent hospitalization due to small bowel obstruction s/p exploratory laparotomy with small bowel resection on 11/22/2024  presented with worsening abdominal pain and distention  Abdominal pelvis CT scan, negative for obstruction, stable bilateral pleural effusion and anasarca  Continue supportive care  Coronado catheter placed due to retention  S/p paracentesis for ascites 5.5 L  Monitor BMs  Acute respiratory failure with hypoxia (HCC)  Patient had worsening hypoxia post EGD on 12/10. Was satting mid-80s on 10L  Was given nebs, Lasix and subsequently placed on BiPAP and upgraded to critical care for further treatment  Currently in stable condition and transferred out of ICU  Currently on " room air  NSVT (nonsustained ventricular tachycardia) (HCC)  25 run of nonsustained V. tach noted on telemetry, patient was asymptomatic  Cardiology consulted  Cardiac echo with normal EF and mild aortic stenosis  Coreg dose was increased to 25 mg twice daily  Monitor  Cardiology signed off    Hypothyroidism  Elevated TSH  Normal free T4 at 0.65  Patient was not receiving levothyroxine due to  PEG tube malfunctioning   Crease levothyroxine dose to 150 mcg daily  Repeat thyroid study in 4 to 6 weeks  Anemia  Received IV iron x 2  Vitamin B12 deficiency  Continue vitamin B12 supplement  Transfuse 1 unit of PRBCs for hb of 6.5 on 12/18  Continue to monitor hb closely  abdominal rash  Located on the right lower abdomen with oozing and bleeding  Seen by dermatology who performed a biopsy  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, consistent with gynecologic/Mullerian origin   Discontinue valtrex  Outpatient follow up with medical oncology when stable  Hyponatremia  Hyponatremia likely multifactorial - due to volume overload  Coronado catheter placed due to urinary retention  Monitor closely with diuresis  Hypertension  Monitor blood pressures  Continue carvedilol, Norvasc was added  Avoid hypotension  Coronary artery disease involving native coronary artery  History of MI in 2016 status post KARY  Stable  Type 2 diabetes mellitus (HCC)  Lab Results   Component Value Date    HGBA1C 6.9 (H) 11/05/2024       Recent Labs     12/19/24  1611 12/19/24  2110 12/20/24  0829 12/20/24  1112   POCGLU 228* 179* 191* 160*       Blood Sugar Average: Last 72 hrs:  (P) [id number]    Farxiga on hold while inpatient  Hyperglycemia secondary to steroids and tube feeding  Continue Lantus nightly, continue Humalog tid, adjust the doses as needed  Continue SSI  Endometrial cancer (HCC)  History of endometrial cancer noted  History of pulmonary embolism  Continue Eliquis 5 mg twice daily  Gastroparesis  History of   With PEG-J as  above  GI following  Morbid obesity (HCC)  Body mass index is 50.55 kg/m².  Encourage lifestyle modification and weight loss once acute issues improved/resolved  Anasarca  Continue with albumin assisted diuresis.  Increase lasix to 40mg TID  Will reduce water in tube feed flushes to 30ml  Continue to monitor daily weights, BMPs, telemetry  If hyponatremia does not improve will consult with nephrology for an opinion on tolvaptan use    VTE Pharmacologic Prophylaxis: VTE Score: 7 High Risk (Score >/= 5) - Pharmacological DVT Prophylaxis Ordered: enoxaparin (Lovenox). Sequential Compression Devices Ordered.    Mobility:   Basic Mobility Inpatient Raw Score: 6  JH-HLM Goal: 2: Bed activities/Dependent transfer  JH-HLM Achieved: 1: Laying in bed  JH-HLM Goal NOT achieved. Continue with multidisciplinary rounding and encourage appropriate mobility to improve upon JH-HLM goals.    Patient Centered Rounds: I performed bedside rounds with nursing staff today.   Discussions with Specialists or Other Care Team Provider: nurseDONELL      Current Length of Stay: 11 day(s)  Current Patient Status: Inpatient   Certification Statement: The patient will continue to require additional inpatient hospital stay due to continue diuresis  Discharge Plan: Anticipate discharge in >72 hrs to rehab facility.    Code Status: Level 2 - DNAR: but accepts endotracheal intubation    Subjective   Denies any new complaints. Reports that her chronic abdominal pain is not adequately controlled    Objective :  Temp:  [97.9 °F (36.6 °C)-98.5 °F (36.9 °C)] 97.9 °F (36.6 °C)  HR:  [66-80] 68  BP: (106-148)/(53-63) 138/63  Resp:  [16-20] 19  SpO2:  [94 %-98 %] 97 %  O2 Device: None (Room air)    Body mass index is 50.55 kg/m².     Input and Output Summary (last 24 hours):     Intake/Output Summary (Last 24 hours) at 12/20/2024 1414  Last data filed at 12/20/2024 1255  Gross per 24 hour   Intake 2169 ml   Output 3750 ml   Net -1581 ml       Physical  Exam  Constitutional:       Appearance: Normal appearance.   HENT:      Head: Normocephalic and atraumatic.      Nose: Nose normal.   Eyes:      Extraocular Movements: Extraocular movements intact.   Cardiovascular:      Rate and Rhythm: Normal rate and regular rhythm.   Pulmonary:      Effort: Pulmonary effort is normal.      Breath sounds: No wheezing or rales.   Abdominal:      Palpations: Abdomen is soft.   Musculoskeletal:      Right lower leg: Edema present.      Left lower leg: Edema present.   Skin:     General: Skin is warm and dry.   Neurological:      General: No focal deficit present.      Mental Status: She is alert and oriented to person, place, and time.   Psychiatric:         Mood and Affect: Mood normal.         Behavior: Behavior normal.           Lines/Drains:  Lines/Drains/Airways       Active Status       Name Placement date Placement time Site Days    PICC Line 12/18/24 12/18/24  0922  --  2    Gastrostomy/Enterostomy PEG- Jejunostomy 20 Fr. LUQ 12/03/24  1553  LUQ  16    Urethral Catheter Straight-tip 18 Fr. 12/18/24  1025  Straight-tip  2                  Urinary Catheter:  Goal for removal: Voiding trial when ambulation improves         Central Line:  Goal for removal: Will discontinue when peripheral access obtained.          Telemetry:  Telemetry Orders (From admission, onward)               24 Hour Telemetry Monitoring  Continuous x 24 Hours (Telem)        Expiring   Question:  Reason for 24 Hour Telemetry  Answer:  Decompensated CHF- and any one of the following: continuous diuretic infusion or total diuretic dose >200 mg daily, associated electrolyte derangement (I.e. K < 3.0), inotropic drip (continuous infusion), hx of ventricular arrhythmia, or new EF < 35%                     Telemetry Reviewed: Normal Sinus Rhythm  Indication for Continued Telemetry Use: Acute CHF on >200 mg lasix/day or equivalent dose or with new reduced EF.                Lab Results: I have reviewed the  following results:   Results from last 7 days   Lab Units 12/20/24  0528 12/18/24  1016 12/18/24  0506 12/16/24  0614 12/14/24  0539   WBC Thousand/uL 6.10   < > 5.78   < > 9.34   HEMOGLOBIN g/dL 8.3*   < > 6.5*   < > 8.9*   HEMATOCRIT % 26.0*   < > 21.0*   < > 27.5*   PLATELETS Thousands/uL 76*   < > 77*   < > 143*   BANDS PCT %  --   --  5   < >  --    SEGS PCT %  --   --   --   --  86*   LYMPHO PCT %  --   --  1   < > 5*   MONO PCT %  --   --  3*   < > 7   EOS PCT %  --   --  1   < > 0    < > = values in this interval not displayed.     Results from last 7 days   Lab Units 12/20/24  0528   SODIUM mmol/L 125*   POTASSIUM mmol/L 5.1   CHLORIDE mmol/L 93*   CO2 mmol/L 26   BUN mg/dL 28*   CREATININE mg/dL 0.68   ANION GAP mmol/L 6   CALCIUM mg/dL 7.6*   ALBUMIN g/dL 2.5*   TOTAL BILIRUBIN mg/dL 0.37   ALK PHOS U/L 45   ALT U/L 5*   AST U/L 6*   GLUCOSE RANDOM mg/dL 235*         Results from last 7 days   Lab Units 12/20/24  1112 12/20/24  0829 12/19/24  2110 12/19/24  1611 12/19/24  1039 12/19/24  0605 12/18/24  2050 12/18/24  1556 12/18/24  1027 12/18/24  0558 12/17/24  2033 12/17/24  1622   POC GLUCOSE mg/dl 160* 191* 179* 228* 178* 164* 145* 102 197* 231* 169* 205*               Recent Cultures (last 7 days):   Results from last 7 days   Lab Units 12/18/24  1635   GRAM STAIN RESULT  Rare Mononuclear Cells  No bacteria seen   BODY FLUID CULTURE, STERILE  No growth       Imaging Results Review: No pertinent imaging studies reviewed.  Other Study Results Review: No additional pertinent studies reviewed.    Last 24 Hours Medication List:     Current Facility-Administered Medications:     acetaminophen (TYLENOL) tablet 975 mg, Q8H SAM    albumin human (FLEXBUMIN) 25 % injection 25 g, BID (diuretic)    albuterol (PROVENTIL HFA,VENTOLIN HFA) inhaler 2 puff, Q6H PRN    aluminum-magnesium hydroxide-simethicone (MAALOX) oral suspension 30 mL, Q6H PRN    amLODIPine (NORVASC) tablet 5 mg, Daily    aspirin (ECOTRIN LOW  STRENGTH) EC tablet 81 mg, Daily    bisacodyl (DULCOLAX) rectal suppository 10 mg, Daily PRN    carvedilol (COREG) tablet 25 mg, BID With Meals    cyanocobalamin (VITAMIN B-12) tablet 1,000 mcg, Daily    dicyclomine (BENTYL) capsule 10 mg, BID PRN    enoxaparin (LOVENOX) subcutaneous injection 120 mg, Q12H SAM    folic acid (FOLVITE) tablet 1 mg, Daily    furosemide (LASIX) injection 40 mg, TID (diuretic)    gabapentin (NEURONTIN) capsule 100 mg, Daily    HYDROmorphone (DILAUDID) injection 0.5 mg, Q3H PRN    insulin glargine (LANTUS) subcutaneous injection 20 Units 0.2 mL, HS    insulin lispro (HumALOG/ADMELOG) 100 units/mL subcutaneous injection 1-5 Units, TID AC **AND** Fingerstick Glucose (POCT), TID AC    insulin lispro (HumALOG/ADMELOG) 100 units/mL subcutaneous injection 10 Units, TID With Meals    iohexol (OMNIPAQUE) 240 MG/ML solution 50 mL, Once in imaging    levothyroxine tablet 150 mcg, Daily    naloxone (NARCAN) 0.04 mg/mL syringe 0.04 mg, Q1MIN PRN    nitroglycerin (NITROSTAT) SL tablet 0.4 mg, Q5 Min PRN    omeprazole (PRILOSEC) suspension 2 mg/mL, Daily    ondansetron (ZOFRAN) injection 4 mg, Q6H PRN    oxyCODONE (ROXICODONE) oral solution 5 mg, Q4H PRN **OR** oxyCODONE (ROXICODONE) oral solution 10 mg, Q4H PRN    polyethylene glycol (MIRALAX) packet 17 g, Daily    senna oral syrup 17.6 mg, BID    vancomycin (VANCOCIN) 1,250 mg in sodium chloride 0.9 % 250 mL IVPB, Q24H, Last Rate: 1,250 mg (12/20/24 1208)    Administrative Statements   Today, Patient Was Seen By: Paul Ríos MD  I have spent a total time of 40 minutes in caring for this patient on the day of the visit/encounter including Diagnostic results, Instructions for management, Patient and family education, Impressions, Counseling / Coordination of care, Documenting in the medical record, Reviewing / ordering tests, medicine, procedures  , Obtaining or reviewing history  , and Communicating with other healthcare professionals  .    **Please Note: This note may have been constructed using a voice recognition system.**

## 2024-12-20 NOTE — ASSESSMENT & PLAN NOTE
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 S/P FLORECITA/BSO  PEG placed on 12/3/24  Admitted due to malfunctioning J tube  EGD on 12/10   12/13 - J tube malfunctioned again leading to replacement   GI following   DC plan: to return home after her rehab stay with [facility] via [facility].

## 2024-12-20 NOTE — ASSESSMENT & PLAN NOTE
Lab Results   Component Value Date    HGBA1C 6.9 (H) 11/05/2024       Recent Labs     12/19/24  1611 12/19/24  2110 12/20/24  0829 12/20/24  1112   POCGLU 228* 179* 191* 160*       Blood Sugar Average: Last 72 hrs:  (P) 188.8911439045463959    Farxiga on hold while inpatient  Hyperglycemia secondary to steroids and tube feeding  Continue Lantus nightly, continue Humalog tid, adjust the doses as needed  Continue SSI

## 2024-12-20 NOTE — ASSESSMENT & PLAN NOTE
Hyponatremia likely multifactorial - due to volume overload  Coronado catheter placed due to urinary retention  Monitor closely with diuresis

## 2024-12-20 NOTE — ASSESSMENT & PLAN NOTE
Patient with recent hospitalization due to small bowel obstruction s/p exploratory laparotomy with small bowel resection on 11/22/2024  presented with worsening abdominal pain and distention  Abdominal pelvis CT scan, negative for obstruction, stable bilateral pleural effusion and anasarca  Continue supportive care  Coronado catheter placed due to retention  S/p paracentesis for ascites 5.5 L  Monitor BMs

## 2024-12-20 NOTE — ASSESSMENT & PLAN NOTE
Lab Results   Component Value Date    HGBA1C 6.9 (H) 11/05/2024       Recent Labs     12/19/24  0605 12/19/24  1039 12/19/24  1611 12/19/24  2110   POCGLU 164* 178* 228* 179*       Blood Sugar Average: Last 72 hrs:  (P) 190.25

## 2024-12-20 NOTE — ASSESSMENT & PLAN NOTE
Current regimen:  OxyIR 5/10 mg Q4hrs PRN for moderate/severe pain   IV dilaudid to 0.2 mg q4H PRN for BT pain  Schedule Tylenol 975 mg TID  Minimize opioids- given recurrent SBO  Failed therapies:  NA    12/20 : Total OME over last 24 hours: 59 mg   IV dilaudid 0.5 mg  x 1  IV Dilaudid 0.2 mg x 1  Oxycodone oral solution 10 mg x 3 doses    Pt still complaining of / abdominal pain in the setting of h/o endometrial cancer S/P FLORECITA/BSO , recurrent SBO, shingles and ascites.  Increased IV dilaudid  0.5mg Q3 Hr PRN for breakthrough pain (12/19)  Bowel regimen to prevent OIC: senna 17.6 mg bid, prn dulcolax suppository. Last BM yesterday 12/18  Opioid agreement. Not on file, will need outpatient follow up  IR consult placed for paracentesis

## 2024-12-20 NOTE — PLAN OF CARE
Problem: Prexisting or High Potential for Compromised Skin Integrity  Goal: Skin integrity is maintained or improved  Description: INTERVENTIONS:  - Identify patients at risk for skin breakdown  - Assess and monitor skin integrity  - Assess and monitor nutrition and hydration status  - Monitor labs   - Assess for incontinence   - Turn and reposition patient  - Assist with mobility/ambulation  - Relieve pressure over bony prominences  - Avoid friction and shearing  - Provide appropriate hygiene as needed including keeping skin clean and dry  - Evaluate need for skin moisturizer/barrier cream  - Collaborate with interdisciplinary team   - Patient/family teaching  - Consider wound care consult   Outcome: Progressing     Problem: PAIN - ADULT  Goal: Verbalizes/displays adequate comfort level or baseline comfort level  Description: Interventions:  - Encourage patient to monitor pain and request assistance  - Assess pain using appropriate pain scale  - Administer analgesics based on type and severity of pain and evaluate response  - Implement non-pharmacological measures as appropriate and evaluate response  - Consider cultural and social influences on pain and pain management  - Notify physician/advanced practitioner if interventions unsuccessful or patient reports new pain  Outcome: Progressing     Problem: INFECTION - ADULT  Goal: Absence or prevention of progression during hospitalization  Description: INTERVENTIONS:  - Assess and monitor for signs and symptoms of infection  - Monitor lab/diagnostic results  - Monitor all insertion sites, i.e. indwelling lines, tubes, and drains  - Monitor endotracheal if appropriate and nasal secretions for changes in amount and color  - [place] appropriate cooling/warming therapies per order  - Administer medications as ordered  - Instruct and encourage patient and family to use good hand hygiene technique  - Identify and instruct in appropriate isolation precautions for  identified infection/condition  Outcome: Progressing     Problem: SAFETY ADULT  Goal: Patient will remain free of falls  Description: INTERVENTIONS:  - Educate patient/family on patient safety including physical limitations  - Instruct patient to call for assistance with activity   - Consult OT/PT to assist with strengthening/mobility   - Keep Call bell within reach  - Keep bed low and locked with side rails adjusted as appropriate  - Keep care items and personal belongings within reach  - Initiate and maintain comfort rounds  - Make Fall Risk Sign visible to staff  - Offer Toileting every 2 Hours, in advance of need  - Initiate/Maintain bed/chair alarm  - Obtain necessary fall risk management equipment: nonskid footwear  - Apply yellow socks and bracelet for high fall risk patients  - Consider moving patient to room near nurses station  Outcome: Progressing  Goal: Maintain or return to baseline ADL function  Description: INTERVENTIONS:  -  Assess patient's ability to carry out ADLs; assess patient's baseline for ADL function and identify physical deficits which impact ability to perform ADLs (bathing, care of mouth/teeth, toileting, grooming, dressing, etc.)  - Assess/evaluate cause of self-care deficits   - Assess range of motion  - Assess patient's mobility; develop plan if impaired  - Assess patient's need for assistive devices and provide as appropriate  - Encourage maximum independence but intervene and supervise when necessary  - Involve family in performance of ADLs  - Assess for home care needs following discharge   - Consider OT consult to assist with ADL evaluation and planning for discharge  - Provide patient education as appropriate  Outcome: Progressing  Goal: Maintains/Returns to pre admission functional level  Description: INTERVENTIONS:  - Perform AM-PAC 6 Click Basic Mobility/ Daily Activity assessment daily.  - Set and communicate daily mobility goal to care team and patient/family/caregiver.   -  Collaborate with rehabilitation services on mobility goals if consulted  - Perform Range of Motion 3 times a day.  - Reposition patient every 2 hours.  - Dangle patient 3 times a day  - Stand patient 3 times a day  - Ambulate patient 3 times a day  - Out of bed to chair 3 times a day   - Out of bed for meals 3 times a day  - Out of bed for toileting  - Record patient progress and toleration of activity level   Outcome: Progressing     Problem: DISCHARGE PLANNING  Goal: Discharge to home or other facility with appropriate resources  Description: INTERVENTIONS:  - Identify barriers to discharge w/patient and caregiver  - Arrange for needed discharge resources and transportation as appropriate  - Identify discharge learning needs (meds, wound care, etc.)  - Arrange for interpretive services to assist at discharge as needed  - Refer to Case Management Department for coordinating discharge planning if the patient needs post-hospital services based on physician/advanced practitioner order or complex needs related to functional status, cognitive ability, or social support system  Outcome: Progressing     Problem: Knowledge Deficit  Goal: Patient/family/caregiver demonstrates understanding of disease process, treatment plan, medications, and discharge instructions  Description: Complete learning assessment and assess knowledge base.  Interventions:  - Provide teaching at level of understanding  - Provide teaching via preferred learning methods  Outcome: Progressing     Problem: Nutrition/Hydration-ADULT  Goal: Nutrient/Hydration intake appropriate for improving, restoring or maintaining nutritional needs  Description: Monitor and assess patient's nutrition/hydration status for malnutrition. Collaborate with interdisciplinary team and initiate plan and interventions as ordered.  Monitor patient's weight and dietary intake as ordered or per policy. Utilize nutrition screening tool and intervene as necessary. Determine  patient's food preferences and provide high-protein, high-caloric foods as appropriate.     INTERVENTIONS:  - Monitor oral intake, urinary output, labs, and treatment plans  - Assess nutrition and hydration status and recommend course of action  - Evaluate amount of meals eaten  - Assist patient with eating if necessary   - Allow adequate time for meals  - Recommend/ encourage appropriate diets, oral nutritional supplements, and vitamin/mineral supplements  - Order, calculate, and assess calorie counts as needed  - Recommend, monitor, and adjust tube feedings and TPN/PPN based on assessed needs  - Assess need for intravenous fluids  - Provide specific nutrition/hydration education as appropriate  - Include patient/family/caregiver in decisions related to nutrition  Outcome: Progressing     Problem: GASTROINTESTINAL - ADULT  Goal: Minimal or absence of nausea and/or vomiting  Description: INTERVENTIONS:  - Administer IV fluids if ordered to ensure adequate hydration  - Maintain NPO status until nausea and vomiting are resolved  - Nasogastric tube if ordered  - Administer ordered antiemetic medications as needed  - Provide nonpharmacologic comfort measures as appropriate  - Advance diet as tolerated, if ordered  - Consider nutrition services referral to assist patient with adequate nutrition and appropriate food choices  Outcome: Progressing  Goal: Maintains or returns to baseline bowel function  Description: INTERVENTIONS:  - Assess bowel function  - Encourage oral fluids to ensure adequate hydration  - Administer IV fluids if ordered to ensure adequate hydration  - Administer ordered medications as needed  - Encourage mobilization and activity  - Consider nutritional services referral to assist patient with adequate nutrition and appropriate food choices  Outcome: Progressing  Goal: Maintains adequate nutritional intake  Description: INTERVENTIONS:  - Monitor percentage of each meal consumed  - Identify factors  contributing to decreased intake, treat as appropriate  - Assist with meals as needed  - Monitor I&O, weight, and lab values if indicated  - Obtain nutrition services referral as needed  Outcome: Progressing  Goal: Oral mucous membranes remain intact  Description: INTERVENTIONS  - Assess oral mucosa and hygiene practices  - Implement preventative oral hygiene regimen  - Implement oral medicated treatments as ordered  - Initiate Nutrition services referral as needed  Outcome: Progressing     Problem: GENITOURINARY - ADULT  Goal: Maintains or returns to baseline urinary function  Description: INTERVENTIONS:  - Assess urinary function  - Encourage oral fluids to ensure adequate hydration if ordered  - Administer IV fluids as ordered to ensure adequate hydration  - Administer ordered medications as needed  - Offer frequent toileting  - Follow urinary retention protocol if ordered  Outcome: Progressing  Goal: Absence of urinary retention  Description: INTERVENTIONS:  - Assess patient’s ability to void and empty bladder  - Monitor I/O  - Bladder scan as needed  - Discuss with physician/AP medications to alleviate retention as needed  - Discuss catheterization for long term situations as appropriate  Outcome: Progressing

## 2024-12-20 NOTE — QUICK NOTE
Dressing taken down and midline surgical incision evaluated at bedside. Patient tolerated well. No signs of wound dehiscence, drainage, or infection. Dressing applied. Please see photo below.

## 2024-12-20 NOTE — ASSESSMENT & PLAN NOTE
Continue with albumin assisted diuresis.  Increase lasix to 40mg TID  Will reduce water in tube feed flushes to 30ml  Continue to monitor daily weights, BMPs, telemetry  If hyponatremia does not improve will consult with nephrology for an opinion on tolvaptan use

## 2024-12-20 NOTE — ASSESSMENT & PLAN NOTE
Palliative Diagnosis: endometrial cancer    Goals:  Discussed code status. Currently a level 2.  Explained CPR and intubation in depth and discussed likelihood of survival.  Patient would not want CPR and has elected to be level 2 DNR at this time. Further considering if she would want intubation (to downgrade to Level 3 DNR/DNI), though she is not ready to decide this today.  Spoke with patient's HCR, Sharda who is understanding of patient's decision at this time. She is planning to visit today or the weekend.  Will continue discussions regarding GOC as patient's clinical presentation evolves.    Decisional apparatus:  Patient does have capacity on exam today.  If capacity is lost, patient's substitute decision maker would default to Sharda Carver per AD on file  ER contacts:  Sharda Carver (Niece)  [phone] (Work Phone)   Advance Directive/Living Will/POLST: on file and reviewed    Social Support:  Patient's support system:   Supportive listening provided  Normalized experience of patient  Advocated for patient/family with interdisciplinary team    Care Coordination  Case discussed with RN, SLIM    Follow-up  We appreciate the opportunity to participate in this patient's care.   We will continue to follow while admitted.    Please do not hesitate to contact our on-call provider through EPIC Secure Chat or contact [phone] should there be an acute change or other symptom control concerns.

## 2024-12-20 NOTE — ASSESSMENT & PLAN NOTE
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 Admitted with malfunctioning J tube. Underwent EGD 12/10 with ulcerated mucosa and pus below bumper, J tube looped in stomach which was replaced into distal duodenum. J tube malfunctioned again leading to replacement 12/13. Has been functioning and tolerating feeds since replacement, though does report intermittent generalized abdominal pain. Has been on CTX/Vancomycin for PEG infection since 12/11.     GI reengaged to comment on timing and duration of Abx. PEG site appears to be erythematous near insertion site with granulation tissue, expression of gastric fluid from site. No leukocytosis, has been afebrile since admission. Bumper snug, was loosened during evaluation and freely rotated afterwards. Dressing replaced. Tube was taped in a fashion to prevent further traction.  Completed 7 days abx    GI called back 12/20 for clogged G port.       Plan:  Plan for PEG-J tube exchange early next week  Continue Tube feeds through J tube and medications PO.   DO NOT give meds through the J port  CLD as tolerated per primary   Recommend judicious use of opioids if this is contributing to generalized slowing of her GI system  Change dressing daily and when soiled, discussed with nursing to ensure tube is taped straight so that it prevents traction   Additional pain and symptom management per primary team

## 2024-12-20 NOTE — PROGRESS NOTES
Progress Note - Palliative Care   Name: Lety Botello 64 y.o. 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   Abdominal pain  Current regimen:  OxyIR 5/10 mg Q4hrs PRN for moderate/severe pain   IV dilaudid to 0.2 mg q4H PRN for BT pain  Schedule Tylenol 975 mg TID  Minimize opioids- given recurrent SBO  Failed therapies:  NA    12/20 : Total OME over last 24 hours: 59 mg   IV dilaudid 0.5 mg  x 1  IV Dilaudid 0.2 mg x 1  Oxycodone oral solution 10 mg x 3 doses    Pt still complaining of / abdominal pain in the setting of h/o endometrial cancer S/P FLORECITA/BSO , recurrent SBO, shingles and ascites.  Increased IV dilaudid  0.5mg Q3 Hr PRN for breakthrough pain (12/19)  Bowel regimen to prevent OIC: senna 17.6 mg bid, prn dulcolax suppository. Last BM yesterday 12/18  Opioid agreement. Not on file, will need outpatient follow up  IR consult placed for paracentesis   Palliative care encounter  Palliative Diagnosis: endometrial cancer    Goals:  Discussed code status. Currently a level 2.  Explained CPR and intubation in depth and discussed likelihood of survival.  Patient would not want CPR and has elected to be level 2 DNR at this time. Further considering if she would want intubation (to downgrade to Level 3 DNR/DNI), though she is not ready to decide this today.  Spoke with patient's HCR, Sharda who is understanding of patient's decision at this time. She is planning to visit today or the weekend.  Will continue discussions regarding GOC as patient's clinical  presentation evolves.    Decisional apparatus:  Patient does have capacity on exam today.  If capacity is lost, patient's substitute decision maker would default to Sharda Carver per AD on file  ER contacts:  Sharda Carver (Niece)  [phone] (Work Phone)   Advance Directive/Living Will/POLST: on file and reviewed    Social Support:  Patient's support system:   Supportive listening provided  Normalized experience of patient  Advocated for patient/family with interdisciplinary team    Care Coordination  Case discussed with RN, SLIM    Follow-up  We appreciate the opportunity to participate in this patient's care.   We will continue to follow while admitted.    Please do not hesitate to contact our on-call provider through EPIC Secure Chat or contact [phone] should there be an acute change or other symptom control concerns.      Endometrial cancer (HCC)  Recurrent endometrial cancer, initial diagnosis 2020  [facility] with [facility]  S/p FLORECITA SBO SLND 6/2020 c/b splenic laceration  NAYE 1/2023, though presented for a fall in 10/2023 was incidentally found to have RP lymphadenopathy  Bx 12/2023 confirm metastatic adenocarcinoma  S/p pelvic RT and treatment with femara  NAYE 5/2024  Presented 10/18, 11/5, 11/14 with SBO - s/p ex lap, small bowel resection, partial omentectomy, DAVID 11/22/24 with biopsy confirming metastatic adenocarcinoma  Hypertension  Current blood pressure medicines Norvasc 5 mg daily and Coreg 25 mg twice daily  BP elevated today  Monitor BP  Pain control  Type 2 diabetes mellitus (HCC)  Lab Results   Component Value Date    HGBA1C 6.9 (H) 11/05/2024       Recent Labs     12/19/24  0605 12/19/24  1039 12/19/24  1611 12/19/24  2110   POCGLU 164* 178* 228* 179*       Blood Sugar Average: Last 72 hrs:  (P) 190.25    Gastroparesis    Summary of recommendations  Continue scheduled Tylenol 975 mg every 8 hour  Continue as needed pain medications  IV Dilaudid 0.5 mg every 3 hours  PRN for breakthrough pain  Oxycodone oral solution 5 mg Q4Hr for moderate pain   Oxycodone oral solution 10 mg Q4Hr for severe pain  Patient has a bowel movement this morning. May increase patient's frequency of oxycodone as needed to Q3Hr PRN.  Diuresis for anasarca per primary team  DC plan to rehab in 72 hr    24 Hr history  64 y.o. female with a PMH of T2DM, HTN, CAD, history of DVT/PE on Eliquis, endometrial carcinoma status post FLORECITA/SBO with recurrence, recurrent SBO s/p ex lap with small bowel resection and PEG tube placement (12/03) admitted for G-tube malfunction on 12/09. Status post EGD on 12/13 with J-tube replacement.  Patient still reporting severe abdominal pain in the setting of G-tube malfunction, shingles on the abdominal wall, ascites with anasarca. S/p paracentesis with 5 L fluid removal yesterday(12/19).  Patient's PRN IV Dilaudid was increased yesterday.  Patient is receiving IV albumin for anasarca. Concern for possible infection surrounding J-tube site, completed 7 days of ceftriaxone and vancomycin. Completed vancomycin yesterday. Today is last day of Valtrex for shingles.  Patient has capacity to make decision.  Tried to contact patient's healthcare representative (josr Robin).  Patient is currently level 2 CODE STATUS.  She needs time to discuss CODE STATUS again.    Subjective   Patient seen and examined at bedside this morning.  She is alert awake and oriented x 3.  She is still complaining of abdominal pain due to GJ-tube malfunction. Patient denied fever, chills, chest pain, shortness of breath, nausea, vomiting, abdominal pain. No significant overnight event.  She ate some Jell-O this morning.  She has a bowel movement this morning.    Care was coordinated with patients nurse  and primary team.    Unable to contact patient's healthcare representative on phone today.    Objective :  Temp:  [97.9 °F (36.6 °C)-98.5 °F (36.9 °C)] 97.9 °F (36.6 °C)  HR:  [66-80] 68  BP: (106-148)/(53-60)  114/60  Resp:  [16-20] 19  SpO2:  [94 %-98 %] 97 %  O2 Device: None (Room air)    Physical Exam  Vitals and nursing note reviewed.   Constitutional:       General: She is not in acute distress.     Appearance: She is well-developed. She is ill-appearing.      Comments: Anasarca   HENT:      Head: Normocephalic and atraumatic.      Mouth/Throat:      Mouth: Mucous membranes are moist.   Eyes:      Conjunctiva/sclera: Conjunctivae normal.   Cardiovascular:      Rate and Rhythm: Normal rate and regular rhythm.      Heart sounds: Normal heart sounds. No murmur heard.  Pulmonary:      Effort: Pulmonary effort is normal. No respiratory distress.      Breath sounds: Normal breath sounds.   Abdominal:      Tenderness: There is abdominal tenderness. There is guarding.      Comments: Abdominal binder in place dry and clean   Musculoskeletal:         General: Swelling and tenderness present.      Cervical back: Neck supple.      Right lower leg: Edema present.      Left lower leg: Edema present.   Skin:     General: Skin is warm and dry.      Capillary Refill: Capillary refill takes less than 2 seconds.   Neurological:      Mental Status: She is alert and oriented to person, place, and time.   Psychiatric:         Mood and Affect: Mood normal.            Lab Results: I have reviewed the following results:  Lab Results   Component Value Date/Time    SODIUM 125 (L) 12/20/2024 05:28 AM    SODIUM 132 (L) 11/04/2024 03:33 AM    K 5.1 12/20/2024 05:28 AM    K 4.7 11/04/2024 03:33 AM    BUN 28 (H) 12/20/2024 05:28 AM    BUN 19 11/04/2024 03:33 AM    CREATININE 0.68 12/20/2024 05:28 AM    CREATININE 0.76 11/04/2024 03:33 AM    GLUC 235 (H) 12/20/2024 05:28 AM    GLUC 98 11/04/2024 03:33 AM    CALCIUM 7.6 (L) 12/20/2024 05:28 AM    CALCIUM 8.6 11/04/2024 03:33 AM    AST 6 (L) 12/20/2024 05:28 AM    AST 9 11/04/2024 03:33 AM    ALT 5 (L) 12/20/2024 05:28 AM    ALT 6 11/04/2024 03:33 AM    ALB 2.5 (L) 12/20/2024 05:28 AM    ALB 3.1 (L)  11/04/2024 03:33 AM    TP 3.9 (L) 12/20/2024 05:28 AM    TP 5.4 (L) 11/04/2024 03:33 AM    EGFR 94 12/20/2024 05:28 AM    EGFR 89 11/04/2024 03:33 AM    EGFR 97 12/11/2020 03:15 AM     Lab Results   Component Value Date/Time    HGB 8.3 (L) 12/20/2024 05:28 AM    WBC 6.10 12/20/2024 05:28 AM    PLT 76 (L) 12/20/2024 05:28 AM     12/01/2023 08:30 AM    INR 1.49 (H) 11/23/2024 10:01 AM    INR 1.1 12/01/2023 08:30 AM    PTT 49 (H) 11/27/2024 06:19 AM     Lab Results   Component Value Date/Time    PAJ3VKWVQWDC 21.122 (H) 12/18/2024 03:02 PM       Code Status: Level 2 - DNAR: but accepts endotracheal intubation  Advance Directive and Living Will: Yes    Power of :    POLST:      Administrative Statements   I have spent a total time of 30 minutes in caring for this patient on the day of the visit/encounter including Patient and family education, Importance of tx compliance, Counseling / Coordination of care, Documenting in the medical record, Reviewing / ordering tests, medicine, procedures  , Obtaining or reviewing history  , and Communicating with other healthcare professionals .

## 2024-12-21 NOTE — ASSESSMENT & PLAN NOTE
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  Persistent third spacing with slight increase in moderate volume ascites, and no significant change in moderate size right and small left pleural effusions with bibasilar atelectasis. 2.  New percutaneous enteric tube with tip in the distal duodenum.\"  GI following. S/p EGD 12/10 with J-tube exchange, postop patient had worsening hypoxia required transfer to ICU and using BiPAP  Concern for possible infection surrounding J-tube site, completed 7 days of ceftriaxone and vancomycin  developed clogged J-tube,   Status post EGD on 12/13 with J-tube replacement   Resumed on tube feeding  On clear liquid diet per speech/swallow therapist  Will need the GJ tube replaced again on Monday  "

## 2024-12-21 NOTE — ASSESSMENT & PLAN NOTE
Lab Results   Component Value Date    HGBA1C 6.9 (H) 11/05/2024       Recent Labs     12/20/24  1620 12/20/24  2110 12/21/24  0555 12/21/24  1058   POCGLU 148* 168* 146* 96       Blood Sugar Average: Last 72 hrs:  (P) 166.5380450101776944    Farxiga on hold while inpatient  Hyperglycemia secondary to steroids and tube feeding  Continue Lantus nightly, continue Humalog tid, adjust the doses as needed  Continue SSI

## 2024-12-21 NOTE — ASSESSMENT & PLAN NOTE
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 S/P FLORECITA/BSO  PEG placed on 12/3/24  Admitted due to malfunctioning J tube  EGD on 12/10   12/13 - J tube malfunctioned again leading to replacement   GI following   DC plan: to return home after her rehab stay with [facility] via [facility].

## 2024-12-21 NOTE — PLAN OF CARE
Problem: Prexisting or High Potential for Compromised Skin Integrity  Goal: Skin integrity is maintained or improved  Description: INTERVENTIONS:  - Identify patients at risk for skin breakdown  - Assess and monitor skin integrity  - Assess and monitor nutrition and hydration status  - Monitor labs   - Assess for incontinence   - Turn and reposition patient  - Assist with mobility/ambulation  - Relieve pressure over bony prominences  - Avoid friction and shearing  - Provide appropriate hygiene as needed including keeping skin clean and dry  - Evaluate need for skin moisturizer/barrier cream  - Collaborate with interdisciplinary team   - Patient/family teaching  - Consider wound care consult   Outcome: Progressing     Problem: PAIN - ADULT  Goal: Verbalizes/displays adequate comfort level or baseline comfort level  Description: Interventions:  - Encourage patient to monitor pain and request assistance  - Assess pain using appropriate pain scale  - Administer analgesics based on type and severity of pain and evaluate response  - Implement non-pharmacological measures as appropriate and evaluate response  - Consider cultural and social influences on pain and pain management  - Notify physician/advanced practitioner if interventions unsuccessful or patient reports new pain  Outcome: Progressing     Problem: INFECTION - ADULT  Goal: Absence or prevention of progression during hospitalization  Description: INTERVENTIONS:  - Assess and monitor for signs and symptoms of infection  - Monitor lab/diagnostic results  - Monitor all insertion sites, i.e. indwelling lines, tubes, and drains  - Monitor endotracheal if appropriate and nasal secretions for changes in amount and color  - [place] appropriate cooling/warming therapies per order  - Administer medications as ordered  - Instruct and encourage patient and family to use good hand hygiene technique  - Identify and instruct in appropriate isolation precautions for  identified infection/condition  Outcome: Progressing     Problem: SAFETY ADULT  Goal: Patient will remain free of falls  Description: INTERVENTIONS:  - Educate patient/family on patient safety including physical limitations  - Instruct patient to call for assistance with activity   - Consult OT/PT to assist with strengthening/mobility   - Keep Call bell within reach  - Keep bed low and locked with side rails adjusted as appropriate  - Keep care items and personal belongings within reach  - Initiate and maintain comfort rounds  - Make Fall Risk Sign visible to staff  - Offer Toileting every 2 Hours, in advance of need  - Initiate/Maintain bed/chair alarm  - Obtain necessary fall risk management equipment: nonskid footwear  - Apply yellow socks and bracelet for high fall risk patients  - Consider moving patient to room near nurses station  Outcome: Progressing  Goal: Maintain or return to baseline ADL function  Description: INTERVENTIONS:  -  Assess patient's ability to carry out ADLs; assess patient's baseline for ADL function and identify physical deficits which impact ability to perform ADLs (bathing, care of mouth/teeth, toileting, grooming, dressing, etc.)  - Assess/evaluate cause of self-care deficits   - Assess range of motion  - Assess patient's mobility; develop plan if impaired  - Assess patient's need for assistive devices and provide as appropriate  - Encourage maximum independence but intervene and supervise when necessary  - Involve family in performance of ADLs  - Assess for home care needs following discharge   - Consider OT consult to assist with ADL evaluation and planning for discharge  - Provide patient education as appropriate  Outcome: Progressing  Goal: Maintains/Returns to pre admission functional level  Description: INTERVENTIONS:  - Perform AM-PAC 6 Click Basic Mobility/ Daily Activity assessment daily.  - Set and communicate daily mobility goal to care team and patient/family/caregiver.   -  Collaborate with rehabilitation services on mobility goals if consulted  - Perform Range of Motion 3 times a day.  - Reposition patient every 2 hours.  - Dangle patient 3 times a day  - Stand patient 3 times a day  - Ambulate patient 3 times a day  - Out of bed to chair 3 times a day   - Out of bed for meals 3 times a day  - Out of bed for toileting  - Record patient progress and toleration of activity level   Outcome: Progressing     Problem: DISCHARGE PLANNING  Goal: Discharge to home or other facility with appropriate resources  Description: INTERVENTIONS:  - Identify barriers to discharge w/patient and caregiver  - Arrange for needed discharge resources and transportation as appropriate  - Identify discharge learning needs (meds, wound care, etc.)  - Arrange for interpretive services to assist at discharge as needed  - Refer to Case Management Department for coordinating discharge planning if the patient needs post-hospital services based on physician/advanced practitioner order or complex needs related to functional status, cognitive ability, or social support system  Outcome: Progressing     Problem: Knowledge Deficit  Goal: Patient/family/caregiver demonstrates understanding of disease process, treatment plan, medications, and discharge instructions  Description: Complete learning assessment and assess knowledge base.  Interventions:  - Provide teaching at level of understanding  - Provide teaching via preferred learning methods  Outcome: Progressing     Problem: Nutrition/Hydration-ADULT  Goal: Nutrient/Hydration intake appropriate for improving, restoring or maintaining nutritional needs  Description: Monitor and assess patient's nutrition/hydration status for malnutrition. Collaborate with interdisciplinary team and initiate plan and interventions as ordered.  Monitor patient's weight and dietary intake as ordered or per policy. Utilize nutrition screening tool and intervene as necessary. Determine  patient's food preferences and provide high-protein, high-caloric foods as appropriate.     INTERVENTIONS:  - Monitor oral intake, urinary output, labs, and treatment plans  - Assess nutrition and hydration status and recommend course of action  - Evaluate amount of meals eaten  - Assist patient with eating if necessary   - Allow adequate time for meals  - Recommend/ encourage appropriate diets, oral nutritional supplements, and vitamin/mineral supplements  - Order, calculate, and assess calorie counts as needed  - Recommend, monitor, and adjust tube feedings and TPN/PPN based on assessed needs  - Assess need for intravenous fluids  - Provide specific nutrition/hydration education as appropriate  - Include patient/family/caregiver in decisions related to nutrition  Outcome: Progressing     Problem: GASTROINTESTINAL - ADULT  Goal: Minimal or absence of nausea and/or vomiting  Description: INTERVENTIONS:  - Administer IV fluids if ordered to ensure adequate hydration  - Maintain NPO status until nausea and vomiting are resolved  - Nasogastric tube if ordered  - Administer ordered antiemetic medications as needed  - Provide nonpharmacologic comfort measures as appropriate  - Advance diet as tolerated, if ordered  - Consider nutrition services referral to assist patient with adequate nutrition and appropriate food choices  Outcome: Progressing  Goal: Maintains or returns to baseline bowel function  Description: INTERVENTIONS:  - Assess bowel function  - Encourage oral fluids to ensure adequate hydration  - Administer IV fluids if ordered to ensure adequate hydration  - Administer ordered medications as needed  - Encourage mobilization and activity  - Consider nutritional services referral to assist patient with adequate nutrition and appropriate food choices  Outcome: Progressing  Goal: Maintains adequate nutritional intake  Description: INTERVENTIONS:  - Monitor percentage of each meal consumed  - Identify factors  contributing to decreased intake, treat as appropriate  - Assist with meals as needed  - Monitor I&O, weight, and lab values if indicated  - Obtain nutrition services referral as needed  Outcome: Progressing  Goal: Oral mucous membranes remain intact  Description: INTERVENTIONS  - Assess oral mucosa and hygiene practices  - Implement preventative oral hygiene regimen  - Implement oral medicated treatments as ordered  - Initiate Nutrition services referral as needed  Outcome: Progressing     Problem: GENITOURINARY - ADULT  Goal: Maintains or returns to baseline urinary function  Description: INTERVENTIONS:  - Assess urinary function  - Encourage oral fluids to ensure adequate hydration if ordered  - Administer IV fluids as ordered to ensure adequate hydration  - Administer ordered medications as needed  - Offer frequent toileting  - Follow urinary retention protocol if ordered  Outcome: Progressing  Goal: Absence of urinary retention  Description: INTERVENTIONS:  - Assess patient’s ability to void and empty bladder  - Monitor I/O  - Bladder scan as needed  - Discuss with physician/AP medications to alleviate retention as needed  - Discuss catheterization for long term situations as appropriate  Outcome: Progressing

## 2024-12-21 NOTE — PROGRESS NOTES
"Progress Note - Hospitalist   Name: Lety Botello 64 y.o. 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  Persistent third spacing with slight increase in moderate volume ascites, and no significant change in moderate size right and small left pleural effusions with bibasilar atelectasis. 2.  New percutaneous enteric tube with tip in the distal duodenum.\"  GI following. S/p EGD 12/10 with J-tube exchange, postop patient had worsening hypoxia required transfer to ICU and using BiPAP  Concern for possible infection surrounding J-tube site, completed 7 days of ceftriaxone and vancomycin  developed clogged J-tube,   Status post EGD on 12/13 with J-tube replacement   Resumed on tube feeding  On clear liquid diet per speech/swallow therapist  Will need the GJ tube replaced again on Monday  Abdominal pain  Patient with recent hospitalization due to small bowel obstruction s/p exploratory laparotomy with small bowel resection on 11/22/2024  presented with worsening abdominal pain and distention  Abdominal pelvis CT scan, negative for obstruction, stable bilateral pleural effusion and anasarca  Continue supportive care  Croonado catheter placed due to retention  S/p paracentesis for ascites 5.5 L  Monitor BMs  Acute respiratory failure with hypoxia (HCC)  Patient had worsening hypoxia post EGD on 12/10. Was satting mid-80s on 10L  Was given nebs, Lasix and subsequently placed on BiPAP and upgraded to critical care for further treatment  Currently in stable " condition and transferred out of ICU  Currently on room air  NSVT (nonsustained ventricular tachycardia) (HCC)  25 run of nonsustained V. tach noted on telemetry, patient was asymptomatic  Cardiology consulted  Cardiac echo with normal EF and mild aortic stenosis  Coreg dose was increased to 25 mg twice daily  Monitor  Cardiology signed off    Hypothyroidism  Elevated TSH  Normal free T4 at 0.65  Patient was not receiving levothyroxine due to  PEG tube malfunctioning   Crease levothyroxine dose to 150 mcg daily  Repeat thyroid study in 4 to 6 weeks  Anemia  Received IV iron x 2  Vitamin B12 deficiency  Continue vitamin B12 supplement  Transfuse 1 unit of PRBCs for hb of 6.5 on 12/18  Continue to monitor hb closely  abdominal rash  Located on the right lower abdomen with oozing and bleeding  Seen by dermatology who performed a biopsy  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, consistent with gynecologic/Mullerian origin   Discontinue valtrex  Outpatient follow up with medical oncology when stable  Hyponatremia  Hyponatremia likely multifactorial - due to volume overload  Coronado catheter placed due to urinary retention  Monitor closely with diuresis  Hypertension  Monitor blood pressures  Continue carvedilol, Norvasc was added  Avoid hypotension  Coronary artery disease involving native coronary artery  History of MI in 2016 status post KARY  Stable  Type 2 diabetes mellitus (HCC)  Lab Results   Component Value Date    HGBA1C 6.9 (H) 11/05/2024       Recent Labs     12/20/24  1620 12/20/24  2110 12/21/24  0555 12/21/24  1058   POCGLU 148* 168* 146* 96       Blood Sugar Average: Last 72 hrs:  (P) [id number]    Farxiga on hold while inpatient  Hyperglycemia secondary to steroids and tube feeding  Continue Lantus nightly, continue Humalog tid, adjust the doses as needed  Continue SSI  Endometrial cancer (HCC)  History of endometrial cancer noted  History of pulmonary embolism  Continue Eliquis 5 mg twice  "daily  Gastroparesis  History of   With PEG-J as above  GI following  Morbid obesity (HCC)  Body mass index is 50.55 kg/m².  Encourage lifestyle modification and weight loss once acute issues improved/resolved  Anasarca  Continue with albumin assisted diuresis.  Increase lasix to 40mg TID  Will reduce water in tube feed flushes to 30ml  Continue to monitor daily weights, BMPs, telemetry  If hyponatremia does not improve will consult with nephrology for an opinion on tolvaptan use    VTE Pharmacologic Prophylaxis: VTE Score: 7 High Risk (Score >/= 5) - Pharmacological DVT Prophylaxis Ordered: enoxaparin (Lovenox). Sequential Compression Devices Ordered.    Mobility:   Basic Mobility Inpatient Raw Score: 6  JH-HLM Goal: 2: Bed activities/Dependent transfer  JH-HLM Achieved: 2: Bed activities/Dependent transfer  JH-HLM Goal NOT achieved. Continue with multidisciplinary rounding and encourage appropriate mobility to improve upon JH-HLM goals.    Patient Centered Rounds: I performed bedside rounds with nursing staff today.   Discussions with Specialists or Other Care Team Provider: nurseDONELL      Current Length of Stay: 12 day(s)  Current Patient Status: Inpatient   Certification Statement: The patient will continue to require additional inpatient hospital stay due to continue diuresis  Discharge Plan: Anticipate discharge in >72 hrs to rehab facility.    Code Status: Level 2 - DNAR: but accepts endotracheal intubation    Subjective   \"I'm hanging in there\"    Objective :  Temp:  [97.8 °F (36.6 °C)-99 °F (37.2 °C)] 98.5 °F (36.9 °C)  HR:  [73-81] 73  BP: (111-139)/(56-69) 113/69  Resp:  [18] 18  SpO2:  [93 %-96 %] 96 %  O2 Device: None (Room air)    Body mass index is 50.55 kg/m².     Input and Output Summary (last 24 hours):     Intake/Output Summary (Last 24 hours) at 12/21/2024 1706  Last data filed at 12/21/2024 1653  Gross per 24 hour   Intake 2211 ml   Output 3450 ml   Net -1239 ml       Physical " Exam  Constitutional:       Appearance: Normal appearance.   HENT:      Head: Normocephalic and atraumatic.      Nose: Nose normal.   Eyes:      Extraocular Movements: Extraocular movements intact.   Cardiovascular:      Rate and Rhythm: Normal rate and regular rhythm.   Pulmonary:      Effort: Pulmonary effort is normal.      Breath sounds: No wheezing or rales.   Abdominal:      General: There is no distension.      Palpations: Abdomen is soft.      Tenderness: There is abdominal tenderness.   Musculoskeletal:      Right lower leg: Edema present.      Left lower leg: Edema present.   Skin:     General: Skin is warm and dry.   Neurological:      Mental Status: She is alert and oriented to person, place, and time.   Psychiatric:         Mood and Affect: Mood normal.         Behavior: Behavior normal.           Lines/Drains:  Lines/Drains/Airways       Active Status       Name Placement date Placement time Site Days    PICC Line 12/18/24 12/18/24  0922  --  3    Gastrostomy/Enterostomy PEG- Jejunostomy 20 Fr. LUQ 12/03/24  1553  LUQ  18    Urethral Catheter Straight-tip 18 Fr. 12/18/24  1025  Straight-tip  3                  Urinary Catheter:  Goal for removal: Voiding trial when ambulation improves         Central Line:  Goal for removal: Will discontinue when peripheral access obtained.          Telemetry:  Telemetry Orders (From admission, onward)               24 Hour Telemetry Monitoring  Continuous x 24 Hours (Telem)        Expiring   Question:  Reason for 24 Hour Telemetry  Answer:  Decompensated CHF- and any one of the following: continuous diuretic infusion or total diuretic dose >200 mg daily, associated electrolyte derangement (I.e. K < 3.0), inotropic drip (continuous infusion), hx of ventricular arrhythmia, or new EF < 35%                     Telemetry Reviewed: Normal Sinus Rhythm  Indication for Continued Telemetry Use: Acute CHF on >200 mg lasix/day or equivalent dose or with new reduced EF.                 Lab Results: I have reviewed the following results:   Results from last 7 days   Lab Units 12/20/24  0528 12/18/24  1016 12/18/24  0506   WBC Thousand/uL 6.10   < > 5.78   HEMOGLOBIN g/dL 8.3*   < > 6.5*   HEMATOCRIT % 26.0*   < > 21.0*   PLATELETS Thousands/uL 76*   < > 77*   BANDS PCT %  --   --  5   LYMPHO PCT %  --   --  1   MONO PCT %  --   --  3*   EOS PCT %  --   --  1    < > = values in this interval not displayed.     Results from last 7 days   Lab Units 12/21/24  0855 12/20/24  1832 12/20/24  0528   SODIUM mmol/L 127*   < > 125*   POTASSIUM mmol/L 5.0   < > 5.1   CHLORIDE mmol/L 92*   < > 93*   CO2 mmol/L 32   < > 26   BUN mg/dL 28*   < > 28*   CREATININE mg/dL 0.68   < > 0.68   ANION GAP mmol/L 3*   < > 6   CALCIUM mg/dL 8.2*   < > 7.6*   ALBUMIN g/dL  --   --  2.5*   TOTAL BILIRUBIN mg/dL  --   --  0.37   ALK PHOS U/L  --   --  45   ALT U/L  --   --  5*   AST U/L  --   --  6*   GLUCOSE RANDOM mg/dL 122   < > 235*    < > = values in this interval not displayed.         Results from last 7 days   Lab Units 12/21/24  1058 12/21/24  0555 12/20/24  2110 12/20/24  1620 12/20/24  1112 12/20/24  0829 12/19/24  2110 12/19/24  1611 12/19/24  1039 12/19/24  0605 12/18/24  2050 12/18/24  1556   POC GLUCOSE mg/dl 96 146* 168* 148* 160* 191* 179* 228* 178* 164* 145* 102               Recent Cultures (last 7 days):   Results from last 7 days   Lab Units 12/18/24  1635   GRAM STAIN RESULT  Rare Mononuclear Cells  No bacteria seen   BODY FLUID CULTURE, STERILE  No growth       Imaging Results Review: No pertinent imaging studies reviewed.  Other Study Results Review: No additional pertinent studies reviewed.    Last 24 Hours Medication List:     Current Facility-Administered Medications:     acetaminophen (TYLENOL) tablet 975 mg, Q8H SAM    albumin human (FLEXBUMIN) 25 % injection 25 g, BID (diuretic), Last Rate: 0 g (12/21/24 1200)    albuterol (PROVENTIL HFA,VENTOLIN HFA) inhaler 2 puff, Q6H PRN     aluminum-magnesium hydroxide-simethicone (MAALOX) oral suspension 30 mL, Q6H PRN    amLODIPine (NORVASC) tablet 5 mg, Daily    aspirin (ECOTRIN LOW STRENGTH) EC tablet 81 mg, Daily    bisacodyl (DULCOLAX) rectal suppository 10 mg, Daily PRN    carvedilol (COREG) tablet 25 mg, BID With Meals    cyanocobalamin (VITAMIN B-12) tablet 1,000 mcg, Daily    dicyclomine (BENTYL) capsule 10 mg, BID PRN    enoxaparin (LOVENOX) subcutaneous injection 120 mg, Q12H SAM    folic acid (FOLVITE) tablet 1 mg, Daily    furosemide (LASIX) injection 40 mg, TID (diuretic)    gabapentin (NEURONTIN) capsule 100 mg, Daily    HYDROmorphone (DILAUDID) injection 0.5 mg, Q3H PRN    insulin glargine (LANTUS) subcutaneous injection 20 Units 0.2 mL, HS    insulin lispro (HumALOG/ADMELOG) 100 units/mL subcutaneous injection 1-5 Units, TID AC **AND** Fingerstick Glucose (POCT), TID AC    insulin lispro (HumALOG/ADMELOG) 100 units/mL subcutaneous injection 5 Units, TID With Meals    iohexol (OMNIPAQUE) 240 MG/ML solution 50 mL, Once in imaging    levothyroxine tablet 150 mcg, Daily    naloxone (NARCAN) 0.04 mg/mL syringe 0.04 mg, Q1MIN PRN    nitroglycerin (NITROSTAT) SL tablet 0.4 mg, Q5 Min PRN    omeprazole (PRILOSEC) suspension 2 mg/mL, Daily    ondansetron (ZOFRAN) injection 4 mg, Q6H PRN    oxyCODONE (ROXICODONE) immediate release tablet 10 mg, Q6H    oxyCODONE (ROXICODONE) oral solution 5 mg, Q4H PRN **OR** oxyCODONE (ROXICODONE) oral solution 10 mg, Q4H PRN    polyethylene glycol (MIRALAX) packet 17 g, Daily    senna oral syrup 17.6 mg, BID    Administrative Statements   Today, Patient Was Seen By: Paul Ríos MD  I have spent a total time of 40 minutes in caring for this patient on the day of the visit/encounter including Diagnostic results, Instructions for management, Patient and family education, Impressions, Counseling / Coordination of care, Documenting in the medical record, Reviewing / ordering tests, medicine, procedures  ,  Obtaining or reviewing history  , and Communicating with other healthcare professionals .    **Please Note: This note may have been constructed using a voice recognition system.**

## 2024-12-21 NOTE — ASSESSMENT & PLAN NOTE
Lab Results   Component Value Date    HGBA1C 6.9 (H) 11/05/2024       Recent Labs     12/20/24  1112 12/20/24  1620 12/20/24  2110 12/21/24  0555   POCGLU 160* 148* 168* 146*       Blood Sugar Average: Last 72 hrs:  (P) [id number]

## 2024-12-21 NOTE — ASSESSMENT & PLAN NOTE
Palliative Diagnosis: endometrial cancer    Goals:  Discussed code status. Currently a level 2.  Explained CPR and intubation in depth and discussed likelihood of survival.  Patient would not want CPR and has elected to be level 2 DNR at this time. Further considering if she would want intubation (to downgrade to Level 3 DNR/DNI), though she is not ready to decide this today.  Spoke with patient's HCR, Sharda who is understanding of patient's decision at this time. She is planning to visit today or the weekend.  Will continue discussions regarding GOC as patient's clinical presentation evolves.    Decisional apparatus:  Patient does have capacity on exam today.  If capacity is lost, patient's substitute decision maker would default to Sharda Carver per AD on file  ER contacts:  Sharda Carver (Niece)  [phone] (Work Phone)   Advance Directive/Living Will/POLST: on file and reviewed    Social Support:  Patient's support system:   Supportive listening provided  Normalized experience of patient  Advocated for patient/family with interdisciplinary team    Care Coordination  Case discussed with RN, SLIM    Follow-up  We appreciate the opportunity to participate in this patient's care.   We will continue to follow while admitted.    Please do not hesitate to contact our on-call provider through [employer] Secure Chat or contact [phone] should there be an acute change or other symptom control concerns.

## 2024-12-21 NOTE — ASSESSMENT & PLAN NOTE
Current regimen:  OxyIR 5/10 mg Q4hrs PRN for moderate/severe pain   IV dilaudid to 0.2 mg q4H PRN for BT pain  Schedule Tylenol 975 mg TID  Minimize opioids- given recurrent SBO  Failed therapies:  NA    12/20 : Total OME over last 24 hours: 59 mg   IV dilaudid 0.5 mg  x 1  IV Dilaudid 0.2 mg x 1  Oxycodone oral solution 10 mg x 3 doses    Pt still complaining of / abdominal pain in the setting of h/o endometrial cancer S/P FLORECITA/BSO , recurrent SBO, shingles and ascites.  ADD PO oxycodone 10mg q6h SCHEDULED.     IV dilaudid  0.5mg Q3 Hr PRN for breakthrough pain (12/19)  Bowel regimen to prevent OIC: senna 17.6 mg bid, prn dulcolax suppository. Last BM yesterday 12/18  Opioid agreement. Not on file, will need outpatient follow up  IR consult placed for paracentesis

## 2024-12-22 NOTE — ASSESSMENT & PLAN NOTE
Hyponatremia likely multifactorial - due to volume overload  Coron[place] catheter placed due to urinary retention  Monitor closely with diuresis  Gradually improving

## 2024-12-22 NOTE — ASSESSMENT & PLAN NOTE
Lab Results   Component Value Date    HGBA1C 6.9 (H) 11/05/2024       Recent Labs     12/21/24  0555 12/21/24  1058 12/22/24  0603 12/22/24  1043   POCGLU 146* 96 198* 229*       Blood Sugar Average: Last 72 hrs:  (P) 173.75    Farxiga on hold while inpatient  Hyperglycemia secondary to steroids and tube feeding  Continue Lantus nightly, continue Humalog tid, adjust the doses as needed  Continue SSI

## 2024-12-22 NOTE — ASSESSMENT & PLAN NOTE
Lab Results   Component Value Date    HGBA1C 6.9 (H) 11/05/2024       Recent Labs     12/21/24  0555 12/21/24  1058 12/22/24  0603 12/22/24  1043   POCGLU 146* 96 198* 229*       Blood Sugar Average: Last 72 hrs:  (P) 173.75

## 2024-12-22 NOTE — ASSESSMENT & PLAN NOTE
Palliative Diagnosis: endometrial cancer    Goals:  Discussed code status. Currently a level 2.  Explained CPR and intubation in depth and discussed likelihood of survival.  Patient would not want CPR and has elected to be level 2 DNR at this time. Further considering if she would want intubation (to downgrade to Level 3 DNR/DNI), though she is not ready to decide this today.  Spoke with patient's HCR, Sharda who is understanding of patient's decision at this time. She is planning to visit today or the weekend.  Will continue discussions regarding GOC as patient's clinical presentation evolves.    Decisional apparatus:  Patient does have capacity on exam today.  If capacity is lost, patient's substitute decision maker would default to Sharda Carver per AD on file  ER contacts:  Sharda Carver (Niece)  [phone] (Work Phone)   Advance Directive/Living Will/POLST: on file and reviewed    Social Support:  Patient's support system:   Supportive listening provided  Normalized experience of patient  Advocated for patient/family with interdisciplinary team    Care Coordination  Case discussed with RN, SLIM    Follow-up  We appreciate the opportunity to participate in this patient's care.   We will continue to follow while admitted.    Please do not hesitate to contact our on-call provider through [employer] Secure Chat or contact [phone] should there be an acute change or other symptom control concerns.

## 2024-12-22 NOTE — PROGRESS NOTES
Progress Note - Palliative Care   Name: Lety Botello 64 y.o. 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 via [facility].   Abdominal pain  Current regimen:  OxyIR 5/10 mg Q4hrs PRN for moderate/severe pain   IV dilaudid to 0.2 mg q4H PRN for BT pain  Schedule Tylenol 975 mg TID  Minimize opioids- given recurrent SBO  Failed therapies:  NA    12/20 : Total OME over last 24 hours: 59 mg   IV dilaudid 0.5 mg  x 1  IV Dilaudid 0.2 mg x 1  Oxycodone oral solution 10 mg x 3 doses    Pt still complaining of / abdominal pain in the setting of h/o endometrial cancer S/P FLORECITA/BSO , recurrent SBO, shingles and ascites.  Continue PO oxycodone 10mg q6h SCHEDULED.     IV dilaudid  0.5mg Q3 Hr PRN for breakthrough pain (12/19)  Bowel regimen to prevent OIC: senna 17.6 mg bid, prn dulcolax suppository. Last BM yesterday 12/18  Opioid agreement. Not on file, will need outpatient follow up  IR consult placed for paracentesis   Palliative care encounter  Palliative Diagnosis: endometrial cancer    Goals:  Discussed code status. Currently a level 2.  Explained CPR and intubation in depth and discussed likelihood of survival.  Patient would not want CPR and has elected to be level 2 DNR at this time. Further considering if she would want intubation (to downgrade to Level 3 DNR/DNI), though she is not ready to decide this today.  Spoke with patient's HCR, Sharda who is understanding of patient's decision at this time. She is planning to visit today or the weekend.  Will continue discussions  regarding GOC as patient's clinical presentation evolves.    Decisional apparatus:  Patient does have capacity on exam today.  If capacity is lost, patient's substitute decision maker would default to Sharda Carver per AD on file  ER contacts:  Sharda Carver (Niece)  [phone] (Work Phone)   Advance Directive/Living Will/POLST: on file and reviewed    Social Support:  Patient's support system:   Supportive listening provided  Normalized experience of patient  Advocated for patient/family with interdisciplinary team    Care Coordination  Case discussed with RN, SLIM    Follow-up  We appreciate the opportunity to participate in this patient's care.   We will continue to follow while admitted.    Please do not hesitate to contact our on-call provider through [employer] Secure Chat or contact [phone] should there be an acute change or other symptom control concerns.      Endometrial cancer (HCC)  Recurrent endometrial cancer, initial diagnosis 2020  Follows with [facility] gyn onc  S/p FLORECITA SBO SLND 6/2020 c/b splenic laceration  NAYE 1/2023, though presented for a fall in 10/2023 was incidentally found to have RP lymphadenopathy  Bx 12/2023 confirm metastatic adenocarcinoma  S/p pelvic RT and treatment with femara  NAYE 5/2024  Presented 10/18, 11/5, 11/14 with SBO - s/p ex lap, small bowel resection, partial omentectomy, DAVID 11/22/24 with biopsy confirming metastatic adenocarcinoma  Hypertension  Current blood pressure medicines Norvasc 5 mg daily and Coreg 25 mg twice daily  BP elevated today  Monitor BP  Pain control  Type 2 diabetes mellitus (HCC)  Lab Results   Component Value Date    HGBA1C 6.9 (H) 11/05/2024       Recent Labs     12/21/24  0555 12/21/24  1058 12/22/24  0603 12/22/24  1043   POCGLU 146* 96 198* 229*       Blood Sugar Average: Last 72 hrs:  (P) 173.75    Gastroparesis    Anasarca      Interval history:       Resting comfortably this AM used several PRNs in addition to scheduled.   States pain is much better and looks lethargic.  No other complaints.     MEDICATIONS / ALLERGIES:     all current active meds have been reviewed and current meds:   Current Facility-Administered Medications:     acetaminophen (TYLENOL) tablet 975 mg, Q8H SAM    albumin human (FLEXBUMIN) 25 % injection 25 g, BID (diuretic), Last Rate: Stopped (12/22/24 0921)    albuterol (PROVENTIL HFA,VENTOLIN HFA) inhaler 2 puff, Q6H PRN    aluminum-magnesium hydroxide-simethicone (MAALOX) oral suspension 30 mL, Q6H PRN    amLODIPine (NORVASC) tablet 5 mg, Daily    aspirin (ECOTRIN LOW STRENGTH) EC tablet 81 mg, Daily    bisacodyl (DULCOLAX) rectal suppository 10 mg, Daily PRN    carvedilol (COREG) tablet 25 mg, BID With Meals    cyanocobalamin (VITAMIN B-12) tablet 1,000 mcg, Daily    dicyclomine (BENTYL) capsule 10 mg, BID PRN    enoxaparin (LOVENOX) subcutaneous injection 120 mg, Q12H SAM    folic acid (FOLVITE) tablet 1 mg, Daily    furosemide (LASIX) injection 40 mg, TID (diuretic)    gabapentin (NEURONTIN) capsule 100 mg, Daily    HYDROmorphone (DILAUDID) injection 0.5 mg, Q3H PRN    insulin glargine (LANTUS) subcutaneous injection 20 Units 0.2 mL, HS    insulin lispro (HumALOG/ADMELOG) 100 units/mL subcutaneous injection 1-5 Units, TID AC **AND** Fingerstick Glucose (POCT), TID AC    insulin lispro (HumALOG/ADMELOG) 100 units/mL subcutaneous injection 5 Units, TID With Meals    iohexol (OMNIPAQUE) 240 MG/ML solution 50 mL, Once in imaging    levothyroxine tablet 150 mcg, Daily    naloxone (NARCAN) 0.04 mg/mL syringe 0.04 mg, Q1MIN PRN    nitroglycerin (NITROSTAT) SL tablet 0.4 mg, Q5 Min PRN    omeprazole (PRILOSEC) suspension 2 mg/mL, Daily    ondansetron (ZOFRAN) injection 4 mg, Q6H PRN    oxyCODONE (ROXICODONE) immediate release tablet 10 mg, Q6H    oxyCODONE (ROXICODONE) oral solution 5 mg, Q4H PRN **OR** oxyCODONE (ROXICODONE) oral solution 10 mg, Q4H PRN    polyethylene glycol (MIRALAX) packet 17 g, Daily    senna  oral syrup 17.6 mg, BID    Allergies   Allergen Reactions    Bee Pollen Anaphylaxis    Azithromycin Hives    Metformin Diarrhea    Other Other (See Comments)     hayfever with inhaler   hayfever with inhaler    Pollen Extract Other (See Comments)     hayfever with inhaler    Sulfamethizole Hives    Sulfamethoxazole-Trimethoprim Hives       OBJECTIVE:    Physical Exam  Physical Exam  Vitals and nursing note reviewed.   Constitutional:       General: She is not in acute distress.     Appearance: She is ill-appearing. She is not toxic-appearing or diaphoretic.   HENT:      Head: Normocephalic and atraumatic.      Right Ear: External ear normal.      Left Ear: External ear normal.      Nose: Nose normal.      Mouth/Throat:      Mouth: Mucous membranes are moist.   Eyes:      General:         Right eye: No discharge.         Left eye: No discharge.   Cardiovascular:      Rate and Rhythm: Normal rate.   Pulmonary:      Effort: No respiratory distress.   Abdominal:      General: There is no distension.   Musculoskeletal:         General: Swelling present. No deformity.   Skin:     General: Skin is warm and dry.      Coloration: Skin is not jaundiced.   Neurological:      Mental Status: She is alert.      Comments: Lethargic but awakens easily and answers symptom questions   Psychiatric:         Mood and Affect: Mood normal.         Behavior: Behavior normal.         Lab Results:   Results from last 7 days   Lab Units 12/20/24  0528 12/19/24  0609 12/18/24  2046 12/18/24  1016 12/18/24  0506 12/16/24  0614   WBC Thousand/uL 6.10 6.06  --   --  5.78 11.45*   HEMOGLOBIN g/dL 8.3* 8.3* 6.9*   < > 6.5* 8.6*   HEMATOCRIT % 26.0* 25.9* 21.2*   < > 21.0* 26.8*   PLATELETS Thousands/uL 76* 75*  --   --  77* 100*   MONO PCT %  --   --   --   --  3* 3*   EOS PCT %  --   --   --   --  1 0    < > = values in this interval not displayed.     Results from last 7 days   Lab Units 12/22/24  0600 12/21/24  0855 12/20/24  1832 12/20/24  0528  12/19/24  0609   POTASSIUM mmol/L 5.0 5.0 4.9 5.1 5.1   CHLORIDE mmol/L 91* 92* 93* 93* 94*   CO2 mmol/L 32 32 28 26 29   BUN mg/dL 35* 28* 28* 28* 29*   CREATININE mg/dL 0.81 0.68 0.63 0.68 0.62   CALCIUM mg/dL 8.3* 8.2* 7.8* 7.6* 7.9*   ALK PHOS U/L  --   --   --  45 42   ALT U/L  --   --   --  5* 5*   AST U/L  --   --   --  6* 6*       Imaging Studies: reviewed pertinent studies   EKG, Pathology, and Other Studies: reviewed pertinent studies    Counseling / Coordination of Care    Total floor / unit time spent today 30 minutes. Greater than 50% of total time was spent with the patient and / or family counseling and / or coordination of care. A description of the counseling / coordination of care: symptom assessment and management, medication review, chart review, opioid titration, supportive listening, and anticipatory guidance

## 2024-12-22 NOTE — PROGRESS NOTES
"Progress Note - Hospitalist   Name: Lety Botello 64 y.o. female I MRN: [id number]  Unit/Bed#: [facility] 511-01 I Date of Admission: 12/9/2024   Date of Service: 12/22/2024 I Hospital Day: 13    Assessment & Plan  Encounter for gastrojejunal (GJ) tube placement issues  Patient with recent (12/3/24) PEG-J placement, after she had admission for recurrent SBO, also found to have jejunal stricture significant for recurrent adenocarcinoma of GYN primary, also component of gastroparesis  Patient was admitted on 12/9/24 with abdominal pain and unable to flush J portion  CT revealed, \"1.  Persistent third spacing with slight increase in moderate volume ascites, and no significant change in moderate size right and small left pleural effusions with bibasilar atelectasis. 2.  New percutaneous enteric tube with tip in the distal duodenum.\"  GI following. S/p EGD 12/10 with J-tube exchange, postop patient had worsening hypoxia required transfer to ICU and using BiPAP  Concern for possible infection surrounding J-tube site, completed 7 days of ceftriaxone and vancomycin  developed clogged J-tube,   Status post EGD on 12/13 with J-tube replacement   Resumed on tube feeding  On clear liquid diet per speech/swallow therapist  Will need the GJ tube replaced again on Monday  Abdominal pain  Patient with recent hospitalization due to small bowel obstruction s/p exploratory laparotomy with small bowel resection on 11/22/2024  presented with worsening abdominal pain and distention  Abdominal pelvis CT scan, negative for obstruction, stable bilateral pleural effusion and anasarca  Continue supportive care  Coronado catheter placed due to retention  S/p paracentesis for ascites 5.5 L  Monitor BMs  Acute respiratory failure with hypoxia (HCC)  Patient had worsening hypoxia post EGD on 12/10. Was satting mid-80s on 10L  Was given nebs, Lasix and subsequently placed on BiPAP and upgraded to critical care for further treatment  Currently in stable " condition and transferred out of ICU  Currently on room air  NSVT (nonsustained ventricular tachycardia) (HCC)  25 run of nonsustained V. tach noted on telemetry, patient was asymptomatic  Cardiology consulted  Cardiac echo with normal EF and mild aortic stenosis  Coreg dose was increased to 25 mg twice daily  Monitor  Cardiology signed off    Hypothyroidism  Elevated TSH  Normal free T4 at 0.65  Patient was not receiving levothyroxine due to  PEG tube malfunctioning   Crease levothyroxine dose to 150 mcg daily  Repeat thyroid study in 4 to 6 weeks  Anemia  Received IV iron x 2  Vitamin B12 deficiency  Continue vitamin B12 supplement  Transfuse 1 unit of PRBCs for hb of 6.5 on 12/18  Continue to monitor hb closely  abdominal rash  Located on the right lower abdomen with oozing and bleeding  Seen by dermatology who performed a biopsy  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, consistent with gynecologic/Mullerian origin   Discontinue valtrex  Outpatient follow up with medical oncology when stable  Hyponatremia  Hyponatremia likely multifactorial - due to volume overload  Coronado catheter placed due to urinary retention  Monitor closely with diuresis  Gradually improving  Hypertension  Monitor blood pressures  Continue carvedilol, Norvasc was added  Avoid hypotension  Coronary artery disease involving native coronary artery  History of MI in 2016 status post KARY  Stable  Type 2 diabetes mellitus (HCC)  Lab Results   Component Value Date    HGBA1C 6.9 (H) 11/05/2024       Recent Labs     12/21/24  0555 12/21/24  1058 12/22/24  0603 12/22/24  1043   POCGLU 146* 96 198* 229*       Blood Sugar Average: Last 72 hrs:  (P) 173.75    Farxiga on hold while inpatient  Hyperglycemia secondary to steroids and tube feeding  Continue Lantus nightly, continue Humalog tid, adjust the doses as needed  Continue SSI  Endometrial cancer (HCC)  History of endometrial cancer noted  History of pulmonary embolism  Continue Eliquis 5 mg  twice daily  Gastroparesis  History of   With PEG-J as above  GI following  Morbid obesity (HCC)  Body mass index is 50.5 kg/m².  Encourage lifestyle modification and weight loss once acute issues improved/resolved  Anasarca  Continue with albumin assisted diuresis.  Continue lasix 40mg TID  Continue to monitor daily weights, BMPs, telemetry  If hyponatremia does not improve will consult with nephrology for an opinion on tolvaptan use    VTE Pharmacologic Prophylaxis: VTE Score: 7 High Risk (Score >/= 5) - Pharmacological DVT Prophylaxis Ordered: enoxaparin (Lovenox). Sequential Compression Devices Ordered.    Mobility:   Basic Mobility Inpatient Raw Score: 7  JH-HLM Goal: 2: Bed activities/Dependent transfer  JH-HLM Achieved: 2: Bed activities/Dependent transfer  JH-HLM Goal achieved. Continue to encourage appropriate mobility.    Patient Centered Rounds: I performed bedside rounds with nursing staff today.   Discussions with Specialists or Other Care Team Provider: nurse, CM, palliative care    Current Length of Stay: 13 day(s)  Current Patient Status: Inpatient   Certification Statement: The patient will continue to require additional inpatient hospital stay due to continue diuresis  Discharge Plan: Anticipate discharge in >72 hrs to rehab facility.    Code Status: Level 2 - DNAR: but accepts endotracheal intubation    Subjective   Denies any new complaints. Has chronic abdominal pain    Objective :  Temp:  [97.4 °F (36.3 °C)-98.5 °F (36.9 °C)] 98.1 °F (36.7 °C)  HR:  [72-82] 82  BP: (107-142)/(53-69) 117/55  Resp:  [16-18] 18  SpO2:  [96 %-98 %] 98 %  O2 Device: Nasal cannula    Body mass index is 50.5 kg/m².     Input and Output Summary (last 24 hours):     Intake/Output Summary (Last 24 hours) at 12/22/2024 1417  Last data filed at 12/22/2024 1147  Gross per 24 hour   Intake 2248 ml   Output 1850 ml   Net 398 ml       Physical Exam  Constitutional:       Appearance: She is obese.   HENT:      Head:  Normocephalic and atraumatic.      Nose: Nose normal.   Eyes:      Extraocular Movements: Extraocular movements intact.   Cardiovascular:      Rate and Rhythm: Normal rate and regular rhythm.   Pulmonary:      Effort: Pulmonary effort is normal.   Abdominal:      General: There is distension.      Tenderness: There is abdominal tenderness.   Musculoskeletal:      Right lower leg: Edema present.      Left lower leg: Edema present.   Neurological:      Mental Status: She is alert and oriented to person, place, and time.   Psychiatric:         Mood and Affect: Mood normal.         Behavior: Behavior normal.           Lines/Drains:  Lines/Drains/Airways       Active Status       Name Placement date Placement time Site Days    PICC Line 12/18/24 12/18/24  0922  --  4    Gastrostomy/Enterostomy PEG- Jejunostomy 20 Fr. LUQ 12/03/24  1553  LUQ  18    Urethral Catheter Straight-tip 18 Fr. 12/18/24  1025  Straight-tip  4                  Urinary Catheter:  Goal for removal: Voiding trial when ambulation improves         Central Line:  Goal for removal: Will discontinue when peripheral access obtained.          Telemetry:  Telemetry Orders (From admission, onward)               24 Hour Telemetry Monitoring  Continuous x 24 Hours (Telem)        Expiring   Question:  Reason for 24 Hour Telemetry  Answer:  Decompensated CHF- and any one of the following: continuous diuretic infusion or total diuretic dose >200 mg daily, associated electrolyte derangement (I.e. K < 3.0), inotropic drip (continuous infusion), hx of ventricular arrhythmia, or new EF < 35%                     Telemetry Reviewed: Normal Sinus Rhythm  Indication for Continued Telemetry Use: Acute CHF on >200 mg lasix/day or equivalent dose or with new reduced EF.                Lab Results: I have reviewed the following results:   Results from last 7 days   Lab Units 12/20/24  0528 12/18/24  1016 12/18/24  0506   WBC Thousand/uL 6.10   < > 5.78   HEMOGLOBIN g/dL 8.3*    < > 6.5*   HEMATOCRIT % 26.0*   < > 21.0*   PLATELETS Thousands/uL 76*   < > 77*   BANDS PCT %  --   --  5   LYMPHO PCT %  --   --  1   MONO PCT %  --   --  3*   EOS PCT %  --   --  1    < > = values in this interval not displayed.     Results from last 7 days   Lab Units 12/22/24  0600 12/20/24  1832 12/20/24  0528   SODIUM mmol/L 127*   < > 125*   POTASSIUM mmol/L 5.0   < > 5.1   CHLORIDE mmol/L 91*   < > 93*   CO2 mmol/L 32   < > 26   BUN mg/dL 35*   < > 28*   CREATININE mg/dL 0.81   < > 0.68   ANION GAP mmol/L 4   < > 6   CALCIUM mg/dL 8.3*   < > 7.6*   ALBUMIN g/dL  --   --  2.5*   TOTAL BILIRUBIN mg/dL  --   --  0.37   ALK PHOS U/L  --   --  45   ALT U/L  --   --  5*   AST U/L  --   --  6*   GLUCOSE RANDOM mg/dL 200*   < > 235*    < > = values in this interval not displayed.         Results from last 7 days   Lab Units 12/22/24  1043 12/22/24  0603 12/21/24  1058 12/21/24  0555 12/20/24  2110 12/20/24  1620 12/20/24  1112 12/20/24  0829 12/19/24  2110 12/19/24  1611 12/19/24  1039 12/19/24  0605   POC GLUCOSE mg/dl 229* 198* 96 146* 168* 148* 160* 191* 179* 228* 178* 164*               Recent Cultures (last 7 days):   Results from last 7 days   Lab Units 12/18/24  1635   GRAM STAIN RESULT  Rare Mononuclear Cells  No bacteria seen   BODY FLUID CULTURE, STERILE  No growth       Imaging Results Review: No pertinent imaging studies reviewed.  Other Study Results Review: No additional pertinent studies reviewed.    Last 24 Hours Medication List:     Current Facility-Administered Medications:     acetaminophen (TYLENOL) tablet 975 mg, Q8H SAM    albumin human (FLEXBUMIN) 25 % injection 25 g, BID (diuretic), Last Rate: Stopped (12/22/24 0937)    albuterol (PROVENTIL HFA,VENTOLIN HFA) inhaler 2 puff, Q6H PRN    aluminum-magnesium hydroxide-simethicone (MAALOX) oral suspension 30 mL, Q6H PRN    amLODIPine (NORVASC) tablet 5 mg, Daily    aspirin (ECOTRIN LOW STRENGTH) EC tablet 81 mg, Daily    bisacodyl (DULCOLAX)  rectal suppository 10 mg, Daily PRN    carvedilol (COREG) tablet 25 mg, BID With Meals    cyanocobalamin (VITAMIN B-12) tablet 1,000 mcg, Daily    dicyclomine (BENTYL) capsule 10 mg, BID PRN    enoxaparin (LOVENOX) subcutaneous injection 120 mg, Q12H SAM    folic acid (FOLVITE) tablet 1 mg, Daily    furosemide (LASIX) injection 40 mg, TID (diuretic)    gabapentin (NEURONTIN) capsule 100 mg, Daily    HYDROmorphone (DILAUDID) injection 0.5 mg, Q3H PRN    insulin glargine (LANTUS) subcutaneous injection 20 Units 0.2 mL, HS    insulin lispro (HumALOG/ADMELOG) 100 units/mL subcutaneous injection 1-5 Units, TID AC **AND** Fingerstick Glucose (POCT), TID AC    insulin lispro (HumALOG/ADMELOG) 100 units/mL subcutaneous injection 5 Units, TID With Meals    iohexol (OMNIPAQUE) 240 MG/ML solution 50 mL, Once in imaging    levothyroxine tablet 150 mcg, Daily    naloxone (NARCAN) 0.04 mg/mL syringe 0.04 mg, Q1MIN PRN    nitroglycerin (NITROSTAT) SL tablet 0.4 mg, Q5 Min PRN    omeprazole (PRILOSEC) suspension 2 mg/mL, Daily    ondansetron (ZOFRAN) injection 4 mg, Q6H PRN    oxyCODONE (ROXICODONE) immediate release tablet 10 mg, Q6H    oxyCODONE (ROXICODONE) oral solution 5 mg, Q4H PRN **OR** oxyCODONE (ROXICODONE) oral solution 10 mg, Q4H PRN    polyethylene glycol (MIRALAX) packet 17 g, Daily    senna oral syrup 17.6 mg, BID    Administrative Statements   Today, Patient Was Seen By: Paul Ríos MD  I have spent a total time of 40 minutes in caring for this patient on the day of the visit/encounter including Diagnostic results, Instructions for management, Patient and family education, Impressions, Counseling / Coordination of care, Documenting in the medical record, Reviewing / ordering tests, medicine, procedures  , Obtaining or reviewing history  , and Communicating with other healthcare professionals .    **Please Note: This note may have been constructed using a voice recognition system.**

## 2024-12-22 NOTE — ASSESSMENT & PLAN NOTE
Body mass index is 50.5 kg/m².  Encourage lifestyle modification and weight loss once acute issues improved/resolved

## 2024-12-22 NOTE — ASSESSMENT & PLAN NOTE
Continue with albumin assisted diuresis.  Continue lasix 40mg TID  Continue to monitor daily weights, BMPs, telemetry  If hyponatremia does not improve will consult with nephrology for an opinion on tolvaptan use

## 2024-12-22 NOTE — ASSESSMENT & PLAN NOTE
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

## 2024-12-22 NOTE — ASSESSMENT & PLAN NOTE
Current regimen:  OxyIR 5/10 mg Q4hrs PRN for moderate/severe pain   IV dilaudid to 0.2 mg q4H PRN for BT pain  Schedule Tylenol 975 mg TID  Minimize opioids- given recurrent SBO  Failed therapies:  NA    12/20 : Total OME over last 24 hours: 59 mg   IV dilaudid 0.5 mg  x 1  IV Dilaudid 0.2 mg x 1  Oxycodone oral solution 10 mg x 3 doses    Pt still complaining of / abdominal pain in the setting of h/o endometrial cancer S/P FLORECITA/BSO , recurrent SBO, shingles and ascites.  Continue PO oxycodone 10mg q6h SCHEDULED.     IV dilaudid  0.5mg Q3 Hr PRN for breakthrough pain (12/19)  Bowel regimen to prevent OIC: senna 17.6 mg bid, prn dulcolax suppository. Last BM yesterday 12/18  Opioid agreement. Not on file, will need outpatient follow up  IR consult placed for paracentesis

## 2024-12-22 NOTE — PLAN OF CARE
Problem: Prexisting or High Potential for Compromised Skin Integrity  Goal: Skin integrity is maintained or improved  Description: INTERVENTIONS:  - Identify patients at risk for skin breakdown  - Assess and monitor skin integrity  - Assess and monitor nutrition and hydration status  - Monitor labs   - Assess for incontinence   - Turn and reposition patient  - Assist with mobility/ambulation  - Relieve pressure over bony prominences  - Avoid friction and shearing  - Provide appropriate hygiene as needed including keeping skin clean and dry  - Evaluate need for skin moisturizer/barrier cream  - Collaborate with interdisciplinary team   - Patient/family teaching  - Consider wound care consult   Outcome: Progressing     Problem: PAIN - ADULT  Goal: Verbalizes/displays adequate comfort level or baseline comfort level  Description: Interventions:  - Encourage patient to monitor pain and request assistance  - Assess pain using appropriate pain scale  - Administer analgesics based on type and severity of pain and evaluate response  - Implement non-pharmacological measures as appropriate and evaluate response  - Consider cultural and social influences on pain and pain management  - Notify physician/advanced practitioner if interventions unsuccessful or patient reports new pain  Outcome: Progressing     Problem: INFECTION - ADULT  Goal: Absence or prevention of progression during hospitalization  Description: INTERVENTIONS:  - Assess and monitor for signs and symptoms of infection  - Monitor lab/diagnostic results  - Monitor all insertion sites, i.e. indwelling lines, tubes, and drains  - Monitor endotracheal if appropriate and nasal secretions for changes in amount and color  - [place] appropriate cooling/warming therapies per order  - Administer medications as ordered  - Instruct and encourage patient and family to use good hand hygiene technique  - Identify and instruct in appropriate isolation precautions for  identified infection/condition  Outcome: Progressing     Problem: SAFETY ADULT  Goal: Patient will remain free of falls  Description: INTERVENTIONS:  - Educate patient/family on patient safety including physical limitations  - Instruct patient to call for assistance with activity   - Consult OT/PT to assist with strengthening/mobility   - Keep Call bell within reach  - Keep bed low and locked with side rails adjusted as appropriate  - Keep care items and personal belongings within reach  - Initiate and maintain comfort rounds  - Make Fall Risk Sign visible to staff  - Offer Toileting every 2 Hours, in advance of need  - Initiate/Maintain bed/chair alarm  - Obtain necessary fall risk management equipment: nonskid footwear  - Apply yellow socks and bracelet for high fall risk patients  - Consider moving patient to room near nurses station  Outcome: Progressing  Goal: Maintain or return to baseline ADL function  Description: INTERVENTIONS:  -  Assess patient's ability to carry out ADLs; assess patient's baseline for ADL function and identify physical deficits which impact ability to perform ADLs (bathing, care of mouth/teeth, toileting, grooming, dressing, etc.)  - Assess/evaluate cause of self-care deficits   - Assess range of motion  - Assess patient's mobility; develop plan if impaired  - Assess patient's need for assistive devices and provide as appropriate  - Encourage maximum independence but intervene and supervise when necessary  - Involve family in performance of ADLs  - Assess for home care needs following discharge   - Consider OT consult to assist with ADL evaluation and planning for discharge  - Provide patient education as appropriate  Outcome: Progressing  Goal: Maintains/Returns to pre admission functional level  Description: INTERVENTIONS:  - Perform AM-PAC 6 Click Basic Mobility/ Daily Activity assessment daily.  - Set and communicate daily mobility goal to care team and patient/family/caregiver.   -  Collaborate with rehabilitation services on mobility goals if consulted  - Perform Range of Motion 3 times a day.  - Reposition patient every 2 hours.  - Dangle patient 3 times a day  - Stand patient 3 times a day  - Ambulate patient 3 times a day  - Out of bed to chair 3 times a day   - Out of bed for meals 3 times a day  - Out of bed for toileting  - Record patient progress and toleration of activity level   Outcome: Progressing     Problem: DISCHARGE PLANNING  Goal: Discharge to home or other facility with appropriate resources  Description: INTERVENTIONS:  - Identify barriers to discharge w/patient and caregiver  - Arrange for needed discharge resources and transportation as appropriate  - Identify discharge learning needs (meds, wound care, etc.)  - Arrange for interpretive services to assist at discharge as needed  - Refer to Case Management Department for coordinating discharge planning if the patient needs post-hospital services based on physician/advanced practitioner order or complex needs related to functional status, cognitive ability, or social support system  Outcome: Progressing     Problem: Knowledge Deficit  Goal: Patient/family/caregiver demonstrates understanding of disease process, treatment plan, medications, and discharge instructions  Description: Complete learning assessment and assess knowledge base.  Interventions:  - Provide teaching at level of understanding  - Provide teaching via preferred learning methods  Outcome: Progressing     Problem: Nutrition/Hydration-ADULT  Goal: Nutrient/Hydration intake appropriate for improving, restoring or maintaining nutritional needs  Description: Monitor and assess patient's nutrition/hydration status for malnutrition. Collaborate with interdisciplinary team and initiate plan and interventions as ordered.  Monitor patient's weight and dietary intake as ordered or per policy. Utilize nutrition screening tool and intervene as necessary. Determine  patient's food preferences and provide high-protein, high-caloric foods as appropriate.     INTERVENTIONS:  - Monitor oral intake, urinary output, labs, and treatment plans  - Assess nutrition and hydration status and recommend course of action  - Evaluate amount of meals eaten  - Assist patient with eating if necessary   - Allow adequate time for meals  - Recommend/ encourage appropriate diets, oral nutritional supplements, and vitamin/mineral supplements  - Order, calculate, and assess calorie counts as needed  - Recommend, monitor, and adjust tube feedings and TPN/PPN based on assessed needs  - Assess need for intravenous fluids  - Provide specific nutrition/hydration education as appropriate  - Include patient/family/caregiver in decisions related to nutrition  Outcome: Progressing     Problem: GASTROINTESTINAL - ADULT  Goal: Minimal or absence of nausea and/or vomiting  Description: INTERVENTIONS:  - Administer IV fluids if ordered to ensure adequate hydration  - Maintain NPO status until nausea and vomiting are resolved  - Nasogastric tube if ordered  - Administer ordered antiemetic medications as needed  - Provide nonpharmacologic comfort measures as appropriate  - Advance diet as tolerated, if ordered  - Consider nutrition services referral to assist patient with adequate nutrition and appropriate food choices  Outcome: Progressing  Goal: Maintains or returns to baseline bowel function  Description: INTERVENTIONS:  - Assess bowel function  - Encourage oral fluids to ensure adequate hydration  - Administer IV fluids if ordered to ensure adequate hydration  - Administer ordered medications as needed  - Encourage mobilization and activity  - Consider nutritional services referral to assist patient with adequate nutrition and appropriate food choices  Outcome: Progressing  Goal: Maintains adequate nutritional intake  Description: INTERVENTIONS:  - Monitor percentage of each meal consumed  - Identify factors  contributing to decreased intake, treat as appropriate  - Assist with meals as needed  - Monitor I&O, weight, and lab values if indicated  - Obtain nutrition services referral as needed  Outcome: Progressing  Goal: Oral mucous membranes remain intact  Description: INTERVENTIONS  - Assess oral mucosa and hygiene practices  - Implement preventative oral hygiene regimen  - Implement oral medicated treatments as ordered  - Initiate Nutrition services referral as needed  Outcome: Progressing     Problem: GENITOURINARY - ADULT  Goal: Maintains or returns to baseline urinary function  Description: INTERVENTIONS:  - Assess urinary function  - Encourage oral fluids to ensure adequate hydration if ordered  - Administer IV fluids as ordered to ensure adequate hydration  - Administer ordered medications as needed  - Offer frequent toileting  - Follow urinary retention protocol if ordered  Outcome: Progressing  Goal: Absence of urinary retention  Description: INTERVENTIONS:  - Assess patient’s ability to void and empty bladder  - Monitor I/O  - Bladder scan as needed  - Discuss with physician/AP medications to alleviate retention as needed  - Discuss catheterization for long term situations as appropriate  Outcome: Progressing

## 2024-12-23 PROBLEM — E87.5 HYPERKALEMIA: Status: ACTIVE | Noted: 2024-12-23

## 2024-12-23 NOTE — PROGRESS NOTES
Progress Note - Gastroenterology   Name: Lety Botello 64 y.o. 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 J tube malfunctioned again leading to replacement 12/13. Has been functioning and tolerating feeds since replacement, though does report intermittent generalized abdominal pain as well as nausea and vomiting with concern for delayed motility and gastric outlet obstruction with difficulty venting from G port due to large J extension.     GI reengaged to comment on timing and duration of Abx. PEG site appears to be erythematous near insertion site with granulation tissue, expression of gastric fluid from site. No leukocytosis, has been afebrile since admission. Bumper snug, was loosened during evaluation and freely rotated afterwards. Dressing replaced. Tube was taped in a fashion to prevent further traction.  Completed 7 days abx    GI called back 12/20 for clogged G port.       Plan:  PEG-J functioning appropriately and able to draw back air frin G-port (side port , mid-tube)  Continue Tube feeds through J tube and medications PO  DO NOT give meds through the J port  CLD as tolerated per primary  [place] bag to gravity from G port intermittently for venting   Recommend judicious use of opioids if this is contributing to generalized slowing of her GI system  Change dressing daily and when soiled, ensure tube is taped straight so that it prevents traction   Additional pain and symptom management per primary team   Morbid obesity (HCC)  Chronic, body  habitus may be contributing to tight PEG bumper which was loosened   Abdominal pain    Palliative care encounter    [place]      I have discussed the above management plan in detail with RN.      Subjective   Bloating, but denies nausea or vomiting.  Some abdominal pain on palpation.    Objective :  Temp:  [97.9 °F (36.6 °C)-100 °F (37.8 °C)] 99.2 °F (37.3 °C)  HR:  [79-89] 81  BP: (119-137)/(48-68) 131/68  Resp:  [15-20] 20  SpO2:  [96 %-98 %] 96 %  O2 Device: Nasal cannula    Physical Exam  Vitals and nursing note reviewed.   Constitutional:       General: She is not in acute distress.     Appearance: She is well-developed. She is obese. She is ill-appearing.   Abdominal:      General: Abdomen is flat. There is no distension.      Palpations: Abdomen is soft.      Tenderness: There is no abdominal tenderness.   Musculoskeletal:         General: No swelling.   Neurological:      Mental Status: She is alert.           Lab Results: I have reviewed the following results:none    Imaging Results Review: No pertinent imaging studies reviewed.  Other Study Results Review: No additional pertinent studies reviewed.    Administrative Statements   I have spent a total time of 32 minutes in caring for this patient on the day of the visit/encounter including Diagnostic results, Prognosis, Risks and benefits of tx options, and Patient and family education.

## 2024-12-23 NOTE — UTILIZATION REVIEW
Continued Stay Review    Date: 12/21                          Current Patient Class: Inpatient  Current Level of Care: Level 2 stepdown/HOT    HPI:64 y.o. female initially admitted on 12/09     Assessment/Plan: Pt's Gport clogged, not draining. Plan to replace GJ tube on Monday. TF through J tube. CLD as tolerated. Mon labs. Cont IV Albumin. Cont IV Lasix. Palliative care ff for pain management and GOC.     Medications:   Scheduled Medications:  acetaminophen, 975 mg, Oral, Q8H SAM  Albumin 25%, 25 g, Intravenous, bid  [START ON 12/24/2024] amLODIPine, 5 mg, Oral, Daily  aspirin, 81 mg, Oral, Daily  furosemide (LASIX) injection 40 mg IV TID  carvedilol, 25 mg, Oral, BID With Meals  vitamin B-12, 1,000 mcg, Oral, Daily  enoxaparin, 1 mg/kg, Subcutaneous, Q12H SAM  folic acid, 1 mg, Per G Tube, Daily  gabapentin, 100 mg, Per G Tube, Daily  insulin glargine, 20 Units, Subcutaneous, HS  insulin lispro, 1-5 Units, Subcutaneous, TID AC  insulin lispro, 5 Units, Subcutaneous, TID With Meals  levothyroxine, 150 mcg, Oral, Daily  omeprazole (PRILOSEC) suspension 2 mg/mL, 40 mg, Per G Tube, Daily  oxyCODONE, 10 mg, Oral, Q6H  polyethylene glycol, 17 g, Oral, Daily  senna, 17.6 mg, Oral, BID      Continuous IV Infusions: none     PRN Meds:  albuterol, 2 puff, Inhalation, Q6H PRN  aluminum-magnesium hydroxide-simethicone, 30 mL, Oral, Q6H PRN  bisacodyl, 10 mg, Rectal, Daily PRN  dicyclomine, 10 mg, Oral, BID PRN  HYDROmorphone, 0.5 mg, Intravenous, Q3H PRN 12/21 x 2  iohexol, 50 mL, Oral, Once in imaging  naloxone, 0.04 mg, Intravenous, Q1MIN PRN  nitroglycerin, 0.4 mg, Sublingual, Q5 Min PRN  ondansetron, 4 mg, Intravenous, Q6H PRN 12/21 x 1  oxyCODONE, 5 mg, Oral, Q4H PRN   Or  oxyCODONE, 10 mg, Oral, Q4H PRN 12/21 x 1      Discharge Plan: TBD    Vital Signs (last 3 days)       Date/Time Temp Pulse Resp BP MAP (mmHg) SpO2 O2 Flow Rate (L/min) O2 Device Patient Position - Orthostatic VS Claudia Coma Scale Score Pain     12/23/24 15:41:42 99.5 °F (37.5 °C) 86 16 126/56 79 96 % -- -- -- -- --    12/23/24 13:26:55 -- 82 -- 136/58 84 96 % -- -- -- -- --    12/23/24 10:40:13 99.2 °F (37.3 °C) 81 20 131/68 89 96 % -- -- -- -- --    12/23/24 09:14:06 98.9 °F (37.2 °C) 86 15 119/50 73 98 % -- -- -- -- --    12/23/24 0903 98.7 °F (37.1 °C) 79 16 126/49 -- -- -- -- -- -- --    12/23/24 08:58:53 98.7 °F (37.1 °C) 80 -- 137/51 80 98 % -- -- -- -- --    12/23/24 0858 98.7 °F (37.1 °C) 81 16 137/51 80 -- -- -- -- -- --    12/23/24 0814 -- -- -- -- -- -- -- -- -- -- 10 - Worst Possible Pain    12/23/24 0800 -- -- -- -- -- 97 % 2 L/min Nasal cannula -- 15 --    12/23/24 07:22:21 98.7 °F (37.1 °C) 82 18 130/54 79 97 % -- -- -- -- --    12/23/24 0704 -- -- -- -- -- -- -- -- -- -- 10 - Worst Possible Pain    12/23/24 0544 -- -- -- -- -- -- -- -- -- -- 10 - Worst Possible Pain    12/22/24 22:54:19 97.9 °F (36.6 °C) 86 18 119/59 79 96 % -- -- Lying -- 10 - Worst Possible Pain    12/22/24 2139 -- -- -- -- -- -- -- -- -- -- 10 - Worst Possible Pain    12/22/24 2000 -- -- -- -- -- -- 2 L/min Nasal cannula -- 15 --    12/22/24 19:15:28 99.2 °F (37.3 °C) 89 18 125/48 74 97 % -- -- Lying -- 10 - Worst Possible Pain    12/22/24 1847 -- -- -- -- -- -- -- -- -- -- 10 - Worst Possible Pain    12/22/24 1646 -- -- -- -- -- -- -- -- -- -- 10 - Worst Possible Pain    12/22/24 15:53:19 100 °F (37.8 °C) 83 18 129/54 79 96 % 2 L/min Nasal cannula Lying -- 10 - Worst Possible Pain    12/22/24 1434 -- -- -- -- -- -- -- -- -- -- 10 - Worst Possible Pain    12/22/24 1321 -- -- -- -- -- -- -- -- -- -- 10 - Worst Possible Pain    12/22/24 1320 -- -- -- -- -- -- -- -- -- -- 10 - Worst Possible Pain    12/22/24 1138 -- -- -- -- -- -- -- -- -- -- 10 - Worst Possible Pain    12/22/24 1055 -- 82 18 117/55 79 98 % 2 L/min Nasal cannula Lying -- --    12/22/24 0800 -- -- -- -- -- -- 2 L/min Nasal cannula -- 15 --    12/22/24 07:55:37 98.1 °F (36.7 °C) 75 16 126/58 83 98 % 2 L/min  Nasal cannula Sitting -- --    12/22/24 0723 -- -- -- -- -- -- -- -- -- -- 10 - Worst Possible Pain    12/22/24 0536 -- -- -- -- -- -- -- -- -- -- 10 - Worst Possible Pain    12/22/24 0530 -- -- -- -- -- -- -- -- -- 15 --    12/22/24 05:17:14 97.6 °F (36.4 °C) 80 18 142/64 92 -- -- -- -- -- --    12/22/24 0130 -- -- -- -- -- -- -- -- -- 15 --    12/22/24 0121 -- -- -- -- -- -- -- -- -- -- 10 - Worst Possible Pain    12/21/24 2230 97.8 °F (36.6 °C) 72 16 107/53 77 98 % 2 L/min Nasal cannula Lying -- --    12/21/24 2207 -- -- -- -- -- -- -- -- -- -- 10 - Worst Possible Pain    12/21/24 1943 -- -- -- -- -- -- -- -- -- -- 10 - Worst Possible Pain    12/21/24 1930 -- -- -- -- -- 98 % -- -- -- 15 10 - Worst Possible Pain    12/21/24 1911 97.4 °F (36.3 °C) 77 16 112/56 80 98 % 2 L/min Nasal cannula Lying -- --    12/21/24 1640 -- -- -- -- -- -- -- -- -- -- 10 - Worst Possible Pain    12/21/24 1600 -- -- -- -- -- -- 2 L/min Nasal cannula -- -- --    12/21/24 1534 98.5 °F (36.9 °C) 73 18 113/69 84 96 % -- -- Lying -- --    12/21/24 1400 -- -- -- -- -- -- -- -- -- -- 9    12/21/24 10:56:38 98.1 °F (36.7 °C) -- -- -- -- -- -- -- -- -- --    12/21/24 1031 -- -- -- -- -- -- -- -- -- -- 9    12/21/24 0825 -- -- -- -- -- -- -- -- -- -- 10 - Worst Possible Pain    12/21/24 0800 -- -- -- -- -- -- -- None (Room air) -- -- --    12/21/24 07:48:06 98.7 °F (37.1 °C) -- -- -- -- -- -- -- -- -- --    12/21/24 0555 -- -- -- 111/56 -- -- -- -- -- -- 10 - Worst Possible Pain    12/21/24 0400 -- -- -- -- -- -- -- -- -- -- 10 - Worst Possible Pain    12/21/24 02:54:11 97.8 °F (36.6 °C) 81 18 139/67 67 93 % -- -- Lying -- --    12/21/24 0235 -- -- -- -- -- -- -- -- -- -- 10 - Worst Possible Pain    12/20/24 22:44:32 99 °F (37.2 °C) 77 18 129/69 96 95 % -- -- Lying -- --    12/20/24 2216 -- -- -- -- -- -- -- -- -- -- 10 - Worst Possible Pain    12/20/24 1939 -- -- -- -- -- -- -- -- -- -- 10 - Worst Possible Pain    12/20/24 1826 -- 77 -- 135/64  -- -- -- -- -- -- --    12/20/24 1816 -- -- -- -- -- -- -- -- -- -- 10 - Worst Possible Pain    12/20/24 1600 -- -- -- -- -- -- -- None (Room air) -- 15 --    12/20/24 15:23:13 97.9 °F (36.6 °C) -- 18 -- -- -- -- -- -- -- --    12/20/24 1354 -- -- -- -- -- -- -- -- -- -- 10 - Worst Possible Pain    12/20/24 1205 -- -- -- 138/63 90 -- -- -- -- -- --    12/20/24 1200 -- -- -- -- -- -- -- -- -- 15 --    12/20/24 0851 -- 68 -- 114/60 -- -- -- -- -- -- --    12/20/24 0835 -- 69 -- 114/60 79 97 % -- -- -- -- --    12/20/24 0800 -- -- -- -- -- -- -- -- -- 15 No Pain    12/20/24 07:13:18 97.9 °F (36.6 °C) 66 19 129/58 84 94 % -- -- Lying -- --    12/20/24 03:41:33 97.9 °F (36.6 °C) 70 20 106/53 76 96 % -- None (Room air) Lying -- --          Weight (last 2 days)       Date/Time Weight    12/22/24 0600 117 (258.6)            Pertinent Labs/Diagnostic Results:   Radiology:  IR INPATIENT Paracentesis   Final Interpretation by Rishabh Johnson MD (12/20 1640)   Therapeutic paracentesis.      Procedure was performed by:   Live Mayo PA-C      Supervising Physician: Dr. Johnson      Workstation performed: [id number]         CT abdomen pelvis w contrast   Final Interpretation by Riki Reina MD (12/15 1529)      No bowel obstruction. Gastrojejunostomy tube in appropriate position.      Stable bilateral pleural effusions with bibasilar atelectasis right worse than left.      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      Severe body wall anasarca and large volume abdominopelvic ascites unchanged likely due to third spacing.         Workstation performed: [id number]         XR abdomen 1 view kub   Final Interpretation by Arslan Parish MD (12/12 1002)      No acute abdominal findings.      The gastrojejunostomy tube projects over the mid abdomen.               Resident: Romy Blunt I, the attending radiologist, have reviewed the images and agree with the final report above.      Workstation  performed: [id number]         XR chest portable ICU   Final Interpretation by Andrea Anderson MD (12/11 0830)      Minimal increase in pulmonary edema and no significant change in the bilateral pleural effusions (right larger than left).            Workstation performed: [id number]         XR chest portable   Final Interpretation by Kalie Nova MD (12/10 1450)      Bilateral lung disease.            Workstation performed: [id number]         CT abdomen pelvis w contrast   Final Interpretation by Andrea Anderson MD (12/09 1001)      Since November 30, 2024:   1.  Persistent third spacing with slight increase in moderate volume ascites, and no significant change in moderate size right and small left pleural effusions with bibasilar atelectasis.   2.  New percutaneous enteric tube with tip in the distal duodenum.         Workstation performed: [id number]           Cardiology:  Echo complete w/ contrast if indicated   Final Result by Prakash Spring MD (12/12 1420)        Left Ventricle: Left ventricular cavity size is normal. Wall thickness    is mildly increased. There is mild concentric hypertrophy. The left    ventricular ejection fraction is 65% by visual estimation. Systolic    function is normal. Wall motion is normal. Diastolic function is mildly    abnormal, consistent with grade I (abnormal) relaxation.     Aortic Valve: The aortic valve is trileaflet. The leaflets are    moderately thickened. The leaflets are mildly calcified. There is mildly    reduced mobility. There is trace regurgitation. There is mild stenosis.    The aortic valve peak velocity is 2.18 m/s. The aortic valve mean gradient    is 11 mmHg. The dimensionless velocity index is 0.41. The aortic valve    area is 1.54 cm2.         ECG 12 lead   Final Result by Justin Trinidad MD (12/12 9827)   Normal sinus rhythm   Normal ECG   When compared with ECG of 10-Dec-2024 13:09, (unconfirmed)   T wave inversion no longer evident in Lateral  leads   Confirmed by Justin Trinidad ([id number]) on 12/12/2024 11:26:04 PM      ECG 12 lead   Final Result by Rishabh Aburto MD (12/14 7627)   Sinus tachycardia   Low voltage QRS   Cannot rule out Anterior infarct , age undetermined   Abnormal ECG   When compared with ECG of 14-Nov-2024 21:45,   No significant change was found   Confirmed by Rishabh Aburto ([id number]) on 12/14/2024 8:42:48 AM        GI:  EGD   Final Result by Dennise Garcia MD (12/13 6101)   The esophagus appeared normal.   Mild erythematous mucosa in the antrum, prepyloric region and pylorus,    consistent with gastritis   The internal bolster of PEG was pushed away from the mucosa revealing an    area of ulceration with no surrounding purulence   Removed tube from the stomach. The tube was replaced.   The duodenal bulb, 1st part of the duodenum, 2nd part of the duodenum and    3rd part of the duodenum appeared normal.         RECOMMENDATION:   Return to the medical surgical floors   Okay to begin tube feeds via J port and medication via the G port   Okay to resume diet per primary team    GI will sign off, please reach out with question or concerns                     Dennise Garcia MD       EGD   Final Result by Dennise Garcia MD (12/13 1218)   The esophagus appeared normal.   Upon entry into the stomach, erythema was noted around the internal    bolster of the PEG tube.  The internal bolster was pushed away from the    mucosa revealing an area of ulceration with some surrounding purulence and    erythema.  2 g of cefazolin was administered intraprocedural.    Removed jejunal extension externally from the stomach and a new jejunal    extension was replaced using a rat-tooth forceps to extend the tubing as    far distal into the duodenum as we could.  The J port was successfully    flushed. External tubing 4 cm at the skin surface.    The duodenal bulb, 1st part of the duodenum, 2nd part of the duodenum and    3rd part of the duodenum appeared normal.  "     RECOMMENDATION:   Start 7-day course of Augmentin   Okay to use PEG-J tube for medications, water flushes, and tube feeds now   Continue tube care.   Make sure to use the correct ports. DO NOT USE ports for any other purpose    than as labeled:   \"Feed\" port is for tube feeds ONLY.   \"Rx\" port is for medications ONLY.   \"Suction\" port is for gastric venting/emptying.    Please make sure to flush ports with water before and after use to prevent    clogging.                      Dennise Garcia MD               Results from last 7 days   Lab Units 12/23/24  0709 12/23/24  0559 12/20/24  0528 12/19/24  0609 12/18/24  2046 12/18/24  1016 12/18/24  0506   WBC Thousand/uL  --  5.17 6.10 6.06  --   --  5.78   HEMOGLOBIN g/dL 6.9* 6.7* 8.3* 8.3* 6.9*   < > 6.5*   HEMATOCRIT % 21.9* 21.3* 26.0* 25.9* 21.2*   < > 21.0*   PLATELETS Thousands/uL  --  79* 76* 75*  --   --  77*   TOTAL NEUT ABS Thousand/uL  --  4.96  --   --   --   --  5.43   BANDS PCT %  --  2  --   --   --   --  5    < > = values in this interval not displayed.     Results from last 7 days   Lab Units 12/18/24  0506   RETIC CT ABS  83,800   RETIC CT PCT % 4.01*     Results from last 7 days   Lab Units 12/23/24  0559 12/22/24  1659 12/22/24  0600 12/21/24  0855 12/20/24  1832 12/20/24  0528 12/19/24  0609 12/18/24  1016 12/18/24  0506   SODIUM mmol/L 129* 128* 127* 127* 126* 125* 126*   < > 124*   POTASSIUM mmol/L 5.2 5.0 5.0 5.0 4.9 5.1 5.1   < > 5.8*   CHLORIDE mmol/L 91* 92* 91* 92* 93* 93* 94*   < > 96   CO2 mmol/L 33* 32 32 32 28 26 29   < > 25   ANION GAP mmol/L 5 4 4 3* 5 6 3*   < > 3*   BUN mg/dL 47* 41* 35* 28* 28* 28* 29*   < > 30*   CREATININE mg/dL 0.83 0.83 0.81 0.68 0.63 0.68 0.62   < > 0.76   EGFR ml/min/1.73sq m 75 75 78 94 96 94 96   < > 84   CALCIUM mg/dL 8.2* 8.0* 8.3* 8.2* 7.8* 7.6* 7.9*   < > 7.5*   MAGNESIUM mg/dL 2.1  --  2.5 1.8*  --  1.8* 1.8*  --  2.0   PHOSPHORUS mg/dL 2.6  --   --   --   --  2.9 2.9  --  3.2    < > = values in " "this interval not displayed.     Results from last 7 days   Lab Units 12/23/24  0559 12/20/24  0528 12/19/24  0609   AST U/L 6* 6* 6*   ALT U/L 5* 5* 5*   ALK PHOS U/L 63 45 42   TOTAL PROTEIN g/dL 4.4* 3.9* 3.9*   ALBUMIN g/dL 2.8* 2.5* 2.5*   TOTAL BILIRUBIN mg/dL 0.48 0.37 0.53   BILIRUBIN DIRECT mg/dL 0.14 0.14 0.16     Results from last 7 days   Lab Units 12/23/24  1350 12/23/24  1038 12/23/24  0648 12/22/24  2050 12/22/24  1607 12/22/24  1043 12/22/24  0603 12/21/24  2051 12/21/24  1603 12/21/24  1058 12/21/24  0555 12/20/24  2110   POC GLUCOSE mg/dl 202* 215* 247* 209* 200* 229* 198* 135 89 96 146* 168*     Results from last 7 days   Lab Units 12/23/24  0559 12/22/24  1659 12/22/24  0600 12/21/24  0855 12/20/24  1832 12/20/24  0528 12/19/24  0609 12/18/24  2046 12/18/24  1502 12/18/24  1016 12/18/24  0506 12/17/24  1442   GLUCOSE RANDOM mg/dL 250* 203* 200* 122 185* 235* 172* 125 88 214* 210* 201*     Results from last 7 days   Lab Units 12/18/24  1502   OSMOLALITY, SERUM mmol/*         No results found for: \"BETA-HYDROXYBUTYRATE\"                               Results from last 7 days   Lab Units 12/18/24  1502   TSH 3RD GENERATON uIU/mL 21.122*                         Results from last 7 days   Lab Units 12/18/24  1016   FERRITIN ng/mL 371*   IRON SATURATION % 24   IRON ug/dL 36*   TIBC ug/dL 152.6*     Results from last 7 days   Lab Units 12/18/24  1016   TRANSFERRIN mg/dL 109*     Results from last 7 days   Lab Units 12/23/24  0833 12/19/24  0555   UNIT PRODUCT CODE  [id number]  [id number]   UNIT NUMBER  [id number]  [id number]   UNITABO  O O  O   UNITRH  POS POS  POS   CROSSMATCH  Compatible Compatible  Compatible   UNIT DISPENSE STATUS  Issued Presumed Trans  Presumed Trans   UNIT PRODUCT VOL mL 350 350  350                     Results from last 7 days   Lab Units 12/18/24  1502 12/18/24  1445   OSMOLALITY, SERUM mmol/*  --    OSMO UR mmol/KG  --  288 "     Results from last 7 days   Lab Units 12/18/24  1445   SODIUM UR  61                                 Results from last 7 days   Lab Units 12/18/24  1635   GRAM STAIN RESULT  Rare Mononuclear Cells  No bacteria seen   BODY FLUID CULTURE, STERILE  No growth     Results from last 7 days   Lab Units 12/23/24  0559 12/18/24  1635 12/18/24  0506   TOTAL COUNTED  100 100 100   WBC FLUID /ul  --  131  --          Results from last 7 days   Lab Units 12/19/24  0609   VANCOMYCIN RM ug/mL 36.7*       Network Utilization Review Department  ATTENTION: Please call with any questions or concerns to [phone] and carefully listen to the prompts so that you are directed to the right person. All voicemails are confidential.   For Discharge needs, contact Care Management DC Support Team at [phone] opt. 2  Send all requests for admission clinical reviews, approved or denied determinations and any other requests to dedicated fax number below belonging to the campus where the patient is receiving treatment. List of dedicated fax numbers for the Facilities:  FACILITY NAME UR FAX NUMBER   ADMISSION DENIALS (Administrative/Medical Necessity) [phone]   DISCHARGE SUPPORT TEAM (NETWORK) [phone]   PARENT CHILD HEALTH (Maternity/NICU/Pediatrics) [phone]   [facility] [phone]   [facility] [phone]   [facility] [phone]   [facility] [phone]   [facility] [phone]   [facility] [phone]   [facility] [phone]   [facility] [phone]   [facility] [phone]   [facility] [phone]   [facility] [phone]   [facility] [phone]

## 2024-12-23 NOTE — ASSESSMENT & PLAN NOTE
BP stable  Currently on Norvasc 5 mg daily and Coreg 25 mg twice daily  BP stable  Monitor BP  Pain control

## 2024-12-23 NOTE — ASSESSMENT & PLAN NOTE
Hyponatremia likely multifactorial - due to volume overload  [place] catheter placed due to urinary retention  Monitor closely with diuresis  Gradually improving with diuresis  Due to persistent hyponatremia will consult with nephrology to evaluate for possible tolvaptan therapy

## 2024-12-23 NOTE — ASSESSMENT & PLAN NOTE
Received IV iron x 2  Vitamin B12 and folate deficiency  Continue vitamin B12 and folate supplement  Transfuse 1 unit of PRBCs 12/18 and 12/23  Continue to monitor hb closely

## 2024-12-23 NOTE — ASSESSMENT & PLAN NOTE
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

## 2024-12-23 NOTE — ASSESSMENT & PLAN NOTE
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  Persistent third spacing with slight increase in moderate volume ascites, and no significant change in moderate size right and small left pleural effusions with bibasilar atelectasis. 2.  New percutaneous enteric tube with tip in the distal duodenum.\"  GI following. S/p EGD 12/10 with J-tube exchange, postop patient had worsening hypoxia required transfer to ICU and using BiPAP  Concern for possible infection surrounding J-tube site, completed 7 days of ceftriaxone and vancomycin  developed clogged J-tube,   Status post EGD on 12/13 with J-tube replacement   Resumed on tube feeding  On clear liquid diet per speech/swallow therapist  G tube port is working sufficiently per gastroenterology  "

## 2024-12-23 NOTE — ASSESSMENT & PLAN NOTE
Continue with albumin assisted diuresis.  Continue lasix 40mg TID  Continue to monitor daily weights, BMPs, telemetry  Urine output is decreasing and hyponatremia is not improving sufficiently - will consult with nephrology for assistance with management

## 2024-12-23 NOTE — ASSESSMENT & PLAN NOTE
-Blood pressure acceptable.  Avoid hypotension.  Currently on amlodipine, Coreg  -Will change holding parameter for amlodipine.  Also remains on IV diuretics as above

## 2024-12-23 NOTE — QUICK NOTE
Patient seen and evaluated at bedside for midline abdominal wound check. Previous dressing removed. Please see below picture of wound. No surrounding erythema or drainage. Incision covered with ABD pad secured with tape and abdominal binder was secured. Patient tolerated the dressing change well.         Debi Salazar PA-C

## 2024-12-23 NOTE — ASSESSMENT & PLAN NOTE
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   GI and Gen Sx on board  Completed 7 days abx (Vancomycin and ceftriaxone)  Completed 5 days of valacyclovir  DC plan: to return home after her rehab stay with [facility] via [facility].

## 2024-12-23 NOTE — PROGRESS NOTES
"Progress Note - Hospitalist   Name: Lety Botello 64 y.o. 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  Persistent third spacing with slight increase in moderate volume ascites, and no significant change in moderate size right and small left pleural effusions with bibasilar atelectasis. 2.  New percutaneous enteric tube with tip in the distal duodenum.\"  GI following. S/p EGD 12/10 with J-tube exchange, postop patient had worsening hypoxia required transfer to ICU and using BiPAP  Concern for possible infection surrounding J-tube site, completed 7 days of ceftriaxone and vancomycin  developed clogged J-tube,   Status post EGD on 12/13 with J-tube replacement   Resumed on tube feeding  On clear liquid diet per speech/swallow therapist  G tube port is working sufficiently per gastroenterology  Abdominal pain  Patient with recent hospitalization due to small bowel obstruction s/p exploratory laparotomy with small bowel resection on 11/22/2024  presented with worsening abdominal pain and distention  Abdominal pelvis CT scan, negative for obstruction, stable bilateral pleural effusion and anasarca  Continue supportive care  Coronado catheter placed due to retention  S/p paracentesis 12/18 for ascites 5.5 L  Monitor Bms  Palliative care is following and adjusting analgesics  Acute respiratory failure with hypoxia (HCC)  Patient had worsening hypoxia post EGD on 12/10. Was satting mid-80s on 10L  Was given nebs, Lasix and subsequently placed on BiPAP and " upgraded to critical care for further treatment  Currently in stable condition and transferred out of ICU  Currently on room air  NSVT (nonsustained ventricular tachycardia) (HCC)  25 run of nonsustained V. tach noted on telemetry, patient was asymptomatic  Cardiology consulted  Cardiac echo with normal EF and mild aortic stenosis  Coreg dose was increased to 25 mg twice daily  Monitor  Cardiology signed off    Hypothyroidism  Elevated TSH  Normal free T4 at 0.65  Patient was not receiving levothyroxine due to  PEG tube malfunctioning   Crease levothyroxine dose to 150 mcg daily  Repeat thyroid study in 4 to 6 weeks  Anemia  Received IV iron x 2  Vitamin B12 and folate deficiency  Continue vitamin B12 and folate supplement  Transfuse 1 unit of PRBCs 12/18 and 12/23  Continue to monitor hb closely  abdominal rash  Located on the right lower abdomen with oozing and bleeding  Seen by dermatology who performed a biopsy  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, consistent with gynecologic/Mullerian origin   Discontinue valtrex  Outpatient follow up with medical oncology when stable  Hyponatremia  Hyponatremia likely multifactorial - due to volume overload  Coronado catheter placed due to urinary retention  Monitor closely with diuresis  Gradually improving with diuresis  Due to persistent hyponatremia will consult with nephrology to evaluate for possible tolvaptan therapy  Hypertension  Monitor blood pressures  Continue carvedilol, Norvasc was added  Avoid hypotension  Coronary artery disease involving native coronary artery  History of MI in 2016 status post KARY  Stable  Type 2 diabetes mellitus (HCC)  Lab Results   Component Value Date    HGBA1C 6.9 (H) 11/05/2024       Recent Labs     12/21/24  0555 12/21/24  1058 12/22/24  0603 12/22/24  1043   POCGLU 146* 96 198* 229*       Blood Sugar Average: Last 72 hrs:  (P) 167    Farxiga on hold while inpatient  Hyperglycemia secondary to steroids and tube  feeding  Continue Lantus nightly, continue Humalog tid, adjust the doses as needed  Continue SSI  Endometrial cancer (HCC)  History of endometrial cancer noted  History of pulmonary embolism  Continue Eliquis 5 mg twice daily  Gastroparesis  History of   With PEG-J as above  GI following  Morbid obesity (HCC)  Body mass index is 50.5 kg/m².  Encourage lifestyle modification and weight loss once acute issues improved/resolved  Anasarca  Continue with albumin assisted diuresis.  Continue lasix 40mg TID  Continue to monitor daily weights, BMPs, telemetry  Urine output is decreasing and hyponatremia is not improving sufficiently - will consult with nephrology for assistance with management    VTE Pharmacologic Prophylaxis: VTE Score: 7 Moderate Risk (Score 3-4) - Pharmacological DVT Prophylaxis Ordered: enoxaparin (Lovenox).    Mobility:   Basic Mobility Inpatient Raw Score: 7  JH-HLM Goal: 2: Bed activities/Dependent transfer  JH-HLM Achieved: 2: Bed activities/Dependent transfer  JH-HLM Goal achieved. Continue to encourage appropriate mobility.    Patient Centered Rounds: I performed bedside rounds with nursing staff today.   Discussions with Specialists or Other Care Team Provider: nurse, CM, nephrology      Current Length of Stay: 14 day(s)  Current Patient Status: Inpatient   Certification Statement: The patient will continue to require additional inpatient hospital stay due to diuresis  Discharge Plan: Anticipate discharge in >72 hrs to rehab facility.    Code Status: Level 2 - DNAR: but accepts endotracheal intubation    Subjective   Reports chronic abdominal pain. Had a BM today    Objective :  Temp:  [97.9 °F (36.6 °C)-100 °F (37.8 °C)] 99.2 °F (37.3 °C)  HR:  [79-89] 81  BP: (119-137)/(48-68) 131/68  Resp:  [15-20] 20  SpO2:  [96 %-98 %] 96 %  O2 Device: Nasal cannula    Body mass index is 50.5 kg/m².     Input and Output Summary (last 24 hours):     Intake/Output Summary (Last 24 hours) at 12/23/2024  1302  Last data filed at 12/23/2024 0831  Gross per 24 hour   Intake 1647 ml   Output 1975 ml   Net -328 ml       Physical Exam  Constitutional:       Appearance: Normal appearance.   HENT:      Head: Normocephalic and atraumatic.      Nose: Nose normal.   Eyes:      Extraocular Movements: Extraocular movements intact.   Cardiovascular:      Rate and Rhythm: Normal rate and regular rhythm.   Pulmonary:      Effort: Pulmonary effort is normal.      Breath sounds: No wheezing or rales.   Abdominal:      General: There is distension.      Tenderness: There is abdominal tenderness. There is no guarding or rebound.      Hernia: No hernia is present.   Musculoskeletal:      Right lower leg: Edema present.      Left lower leg: Edema present.   Skin:     General: Skin is warm and dry.      Findings: Rash present.   Neurological:      Mental Status: She is alert and oriented to person, place, and time.   Psychiatric:         Mood and Affect: Mood normal.         Behavior: Behavior normal.           Lines/Drains:  Lines/Drains/Airways       Active Status       Name Placement date Placement time Site Days    PICC Line 12/18/24 12/18/24  0922  --  5    Gastrostomy/Enterostomy PEG- Jejunostomy 20 Fr. LUQ 12/03/24  1553  LUQ  19    Urethral Catheter Straight-tip 18 Fr. 12/18/24  1025  Straight-tip  5                  Urinary Catheter:  Goal for removal: Voiding trial when ambulation improves         Central Line:  Goal for removal: Will discontinue when peripheral access obtained.          Telemetry:  Telemetry Orders (From admission, onward)               24 Hour Telemetry Monitoring  Continuous x 24 Hours (Telem)        Expiring   Question:  Reason for 24 Hour Telemetry  Answer:  Decompensated CHF- and any one of the following: continuous diuretic infusion or total diuretic dose >200 mg daily, associated electrolyte derangement (I.e. K < 3.0), inotropic drip (continuous infusion), hx of ventricular arrhythmia, or new EF < 35%                      Telemetry Reviewed: Normal Sinus Rhythm  Indication for Continued Telemetry Use: Acute CHF on >200 mg lasix/day or equivalent dose or with new reduced EF.                Lab Results: I have reviewed the following results:   Results from last 7 days   Lab Units 12/23/24  0709 12/23/24  0559 12/18/24  1016 12/18/24  0506   WBC Thousand/uL  --  5.17   < > 5.78   HEMOGLOBIN g/dL 6.9* 6.7*   < > 6.5*   HEMATOCRIT % 21.9* 21.3*   < > 21.0*   PLATELETS Thousands/uL  --  79*   < > 77*   BANDS PCT %  --  2  --  5   LYMPHO PCT %  --  1  --  1   MONO PCT %  --  2*  --  3*   EOS PCT %  --   --   --  1    < > = values in this interval not displayed.     Results from last 7 days   Lab Units 12/23/24  0559   SODIUM mmol/L 129*   POTASSIUM mmol/L 5.2   CHLORIDE mmol/L 91*   CO2 mmol/L 33*   BUN mg/dL 47*   CREATININE mg/dL 0.83   ANION GAP mmol/L 5   CALCIUM mg/dL 8.2*   ALBUMIN g/dL 2.8*   TOTAL BILIRUBIN mg/dL 0.48   ALK PHOS U/L 63   ALT U/L 5*   AST U/L 6*   GLUCOSE RANDOM mg/dL 250*         Results from last 7 days   Lab Units 12/22/24  1043 12/22/24  0603 12/21/24  1058 12/21/24  0555 12/20/24  2110 12/20/24  1620 12/20/24  1112 12/20/24  0829 12/19/24  2110 12/19/24  1611 12/19/24  1039 12/19/24  0605   POC GLUCOSE mg/dl 229* 198* 96 146* 168* 148* 160* 191* 179* 228* 178* 164*               Recent Cultures (last 7 days):   Results from last 7 days   Lab Units 12/18/24  1635   GRAM STAIN RESULT  Rare Mononuclear Cells  No bacteria seen   BODY FLUID CULTURE, STERILE  No growth       Imaging Results Review: No pertinent imaging studies reviewed.  Other Study Results Review: No additional pertinent studies reviewed.    Last 24 Hours Medication List:     Current Facility-Administered Medications:     acetaminophen (TYLENOL) tablet 975 mg, Q8H SAM    albumin human (FLEXBUMIN) 25 % injection 25 g, BID (diuretic), Last Rate: 0 g (12/22/24 1917)    albuterol (PROVENTIL HFA,VENTOLIN HFA) inhaler 2 puff, Q6H PRN     aluminum-magnesium hydroxide-simethicone (MAALOX) oral suspension 30 mL, Q6H PRN    amLODIPine (NORVASC) tablet 5 mg, Daily    aspirin (ECOTRIN LOW STRENGTH) EC tablet 81 mg, Daily    bisacodyl (DULCOLAX) rectal suppository 10 mg, Daily PRN    carvedilol (COREG) tablet 25 mg, BID With Meals    cyanocobalamin (VITAMIN B-12) tablet 1,000 mcg, Daily    dicyclomine (BENTYL) capsule 10 mg, BID PRN    enoxaparin (LOVENOX) subcutaneous injection 120 mg, Q12H SAM    folic acid (FOLVITE) tablet 1 mg, Daily    furosemide (LASIX) injection 40 mg, TID (diuretic)    gabapentin (NEURONTIN) capsule 100 mg, Daily    HYDROmorphone (DILAUDID) injection 0.5 mg, Q3H PRN    insulin glargine (LANTUS) subcutaneous injection 20 Units 0.2 mL, HS    insulin lispro (HumALOG/ADMELOG) 100 units/mL subcutaneous injection 1-5 Units, TID AC **AND** Fingerstick Glucose (POCT), TID AC    insulin lispro (HumALOG/ADMELOG) 100 units/mL subcutaneous injection 5 Units, TID With Meals    iohexol (OMNIPAQUE) 240 MG/ML solution 50 mL, Once in imaging    levothyroxine tablet 150 mcg, Daily    naloxone (NARCAN) 0.04 mg/mL syringe 0.04 mg, Q1MIN PRN    nitroglycerin (NITROSTAT) SL tablet 0.4 mg, Q5 Min PRN    omeprazole (PRILOSEC) suspension 2 mg/mL, Daily    ondansetron (ZOFRAN) injection 4 mg, Q6H PRN    oxyCODONE (ROXICODONE) immediate release tablet 10 mg, Q6H    oxyCODONE (ROXICODONE) oral solution 5 mg, Q4H PRN **OR** oxyCODONE (ROXICODONE) oral solution 10 mg, Q4H PRN    polyethylene glycol (MIRALAX) packet 17 g, Daily    senna oral syrup 17.6 mg, BID    Administrative Statements   Today, Patient Was Seen By: Paul Ríos MD  I have spent a total time of 40 minutes in caring for this patient on the day of the visit/encounter including Diagnostic results, Instructions for management, Patient and family education, Impressions, Counseling / Coordination of care, Documenting in the medical record, Reviewing / ordering tests, medicine, procedures  ,  Obtaining or reviewing history  , and Communicating with other healthcare professionals .    **Please Note: This note may have been constructed using a voice recognition system.**

## 2024-12-23 NOTE — ASSESSMENT & PLAN NOTE
Lab Results   Component Value Date    HGBA1C 6.9 (H) 11/05/2024       Recent Labs     12/21/24  0555 12/21/24  1058 12/22/24  0603 12/22/24  1043   POCGLU 146* 96 198* 229*       Blood Sugar Average: Last 72 hrs:  (P) 167

## 2024-12-23 NOTE — CONSULTS
Consultation - Nephrology   Lety Botello 64 y.o. female MRN: [id number]  Unit/Bed#: [facility] 511-01 Encounter: [id number]    ASSESSMENT AND PLAN:  Assessment & Plan  Hyponatremia  -Noted chronic hyponatremia issues going back to 2022  -Sodium 129 on admission dropped to 125 on 12/20/2024 and since then now slowly increasing up to corrected value around 131 most recent today AM.  -Suspect related to component of hypervolemia, underlying SIADH given malignancy history, pain issues, pleural effusion/atelectasis  -Recommend to change free water flushes to normal saline 30 mL every 6 hourly with tube feed  -Agree with diuretics, will change IV Lasix to IV Bumex 2 mg 3 times daily with albumin  -Clinically seems significant third spacing, body wall anasarca, prior CT scan showed bilateral pleural effusion.  -Workup this admission shows serum osmolality 278, urine sodium 61, urine osmolality 288 on 12/18/2024  -Will repeat urine sodium, urine osmolality  -BMP in AM.  Goal sodium correction no more than 8 meq in 24 hours.  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  Acute respiratory failure with hypoxia (HCC)  -Currently remains on O2 via nasal cannula  -Echo this admission shows EF 65%, grade 1 diastolic dysfunction, normal IVC, blunted respirophasic changes  -Hypervolemic with significant third spacing  -IV diuresis as above.  Hypertension  -Blood pressure acceptable.  Avoid hypotension.  Currently on amlodipine, Coreg  -Will change holding parameter for amlodipine.  Also remains on IV diuretics as above  Endometrial cancer (HCC)  Management as per primary team  Hyperkalemia  -Potassium level slightly higher, currently on tube feeds.  Monitor with ongoing diuresis.    Severe anemia,  blood transfusion as per primary team today.    Discussed above plan in detail with primary team regarding changing Lasix to IV Bumex, continue IV albumin, changed flushes to normal saline flush with tube feeds and they agree with above recommendations.      HISTORY OF PRESENT ILLNESS:  Requesting Physician: Paul Ríos MD  Reason for Consult: Hyponatremia    Lety Botello is a 64 y.o. year old female who was admitted to [facility] after presenting with J-tube malfunction. A renal consultation is requested today for assistance in the management of hyponatremia.  Old medical records including prior lab values were reviewed from [facility] records.  Patient currently remains on O2 via nasal cannula.  She had prolonged hospitalization course, initially presented with J-tube malfunction, was evaluated by GI and had replacement.  Currently remains on tube feeds.    Also has significant third spacing, volume overload, hyponatremia prompting renal consultation.  She has been having pain issues.  Denies any urinary complaint.  Currently has Coron[place] catheter    PAST MEDICAL HISTORY:  Past Medical History:   Diagnosis Date    Diverticulitis     Endometrial cancer (HCC)     History of shingles     Right hemiabdomen (inactive)    Hypertension     IBS (irritable bowel syndrome)     Obesity     Small bowel obstruction (HCC)     Type 2 diabetes mellitus (HCC)        PAST SURGICAL HISTORY:  Past Surgical History:   Procedure Laterality Date    ABDOMINAL ADHESION SURGERY N/A 11/22/2024    Procedure: EXTENSIVE LYSIS OF ADHESIONS >90 MINUTES;  Surgeon: Alejo Ward MD;  Location: [facility];  Service: General    APPENDECTOMY      CHOLECYSTECTOMY      IR PARACENTESIS  12/18/2024    LAPAROTOMY N/A 11/22/2024    Procedure: LAPAROTOMY EXPLORATORY;  Surgeon: Alejo Ward MD;  Location: [facility];  Service: General    SIGMOIDECTOMY N/A     SMALL INTESTINE SURGERY N/A 11/22/2024    Procedure: RESECTION SMALL BOWEL WITH PRIMARY  ANASTAMOSIS,PARTIAL OMENTECTOMY;  Surgeon: Alejo Ward MD;  Location: [facility];  Service: General       ALLERGIES:  Allergies   Allergen Reactions    Bee Pollen Anaphylaxis    Azithromycin Hives    Metformin Diarrhea    Other Other (See Comments)     hayfever with inhaler   hayfever with inhaler    Pollen Extract Other (See Comments)     hayfever with inhaler    Sulfamethizole Hives    Sulfamethoxazole-Trimethoprim Hives       SOCIAL HISTORY:  Social History     Substance and Sexual Activity   Alcohol Use Not Currently     Social History     Substance and Sexual Activity   Drug Use Never     Social History     Tobacco Use   Smoking Status Never   Smokeless Tobacco Never       FAMILY HISTORY:  History reviewed. No pertinent family history.    MEDICATIONS:    Current Facility-Administered Medications:     acetaminophen (TYLENOL) tablet 975 mg, 975 mg, Oral, Q8H SAM, Pauly Grissom, DO, 975 mg at 12/23/24 1306    albumin human (FLEXBUMIN) 25 % injection 25 g, 25 g, Intravenous, BID (diuretic), Paul Ríos MD, Last Rate: 0 mL/hr at 12/22/24 1917, 25 g at 12/23/24 0825    albuterol (PROVENTIL HFA,VENTOLIN HFA) inhaler 2 puff, 2 puff, Inhalation, Q6H PRN, Kelvin Sheppard DO    aluminum-magnesium hydroxide-simethicone (MAALOX) oral suspension 30 mL, 30 mL, Oral, Q6H PRN, Kelvin Sheppard DO    amLODIPine (NORVASC) tablet 5 mg, 5 mg, Oral, Daily, Kelvin Sheppard DO, 5 mg at 12/23/24 0812    aspirin (ECOTRIN LOW STRENGTH) EC tablet 81 mg, 81 mg, Oral, Daily, Kelvin Sheppard DO, 81 mg at 12/23/24 0812    bisacodyl (DULCOLAX) rectal suppository 10 mg, 10 mg, Rectal, Daily PRN, Kelvin Sheppard DO    carvedilol (COREG) tablet 25 mg, 25 mg, Oral, BID With Meals, Kelvin Sheppard DO, 25 mg at 12/23/24 0812    cyanocobalamin (VITAMIN B-12) tablet 1,000 mcg, 1,000 mcg, Oral, Daily, Kelvin Sheppard DO, 1,000 mcg at 12/23/24 0812    dicyclomine (BENTYL) capsule 10 mg, 10 mg, Oral, BID PRN, Kelvin Sheppard DO, 10 mg at 12/18/24 0947     enoxaparin (LOVENOX) subcutaneous injection 120 mg, 1 mg/kg, Subcutaneous, Q12H SAM, Paul Ríos MD, 120 mg at 12/23/24 0815    folic acid (FOLVITE) tablet 1 mg, 1 mg, Per G Tube, Daily, Paul Ríos MD, 1 mg at 12/23/24 0812    furosemide (LASIX) injection 40 mg, 40 mg, Intravenous, TID (diuretic), Paul Ríos MD, 40 mg at 12/23/24 1306    gabapentin (NEURONTIN) capsule 100 mg, 100 mg, Per G Tube, Daily, Kelvin Sheppard DO, 100 mg at 12/23/24 0812    HYDROmorphone (DILAUDID) injection 0.5 mg, 0.5 mg, Intravenous, Q3H PRN, May Sherry Monterroso MD, 0.5 mg at 12/23/24 0704    insulin glargine (LANTUS) subcutaneous injection 20 Units 0.2 mL, 20 Units, Subcutaneous, HS, Kelvin Sheppard DO, 20 Units at 12/22/24 2139    insulin lispro (HumALOG/ADMELOG) 100 units/mL subcutaneous injection 1-5 Units, 1-5 Units, Subcutaneous, TID AC, 2 Units at 12/23/24 1306 **AND** Fingerstick Glucose (POCT), , , TID AC, Kelvin Sheppard DO    insulin lispro (HumALOG/ADMELOG) 100 units/mL subcutaneous injection 5 Units, 5 Units, Subcutaneous, TID With Meals, Paul Ríos MD, 5 Units at 12/23/24 1307    iohexol (OMNIPAQUE) 240 MG/ML solution 50 mL, 50 mL, Oral, Once in imaging, Bj Jones MD    levothyroxine tablet 150 mcg, 150 mcg, Oral, Daily, Kelvin Sheppard DO, 150 mcg at 12/23/24 0544    naloxone (NARCAN) 0.04 mg/mL syringe 0.04 mg, 0.04 mg, Intravenous, Q1MIN PRN, JASON Cooley    nitroglycerin (NITROSTAT) SL tablet 0.4 mg, 0.4 mg, Sublingual, Q5 Min PRN, Kelvin Sheppard DO    omeprazole (PRILOSEC) suspension 2 mg/mL, 40 mg, Per G Tube, Daily, Kelvin Sheppard DO, 40 mg at 12/23/24 0545    ondansetron (ZOFRAN) injection 4 mg, 4 mg, Intravenous, Q6H PRN, Kelvin Sheppard DO, 4 mg at 12/21/24 0242    oxyCODONE (ROXICODONE) immediate release tablet 10 mg, 10 mg, Oral, Q6H, Michael Moore MD, 10 mg at 12/23/24 0544    oxyCODONE (ROXICODONE) oral solution 5 mg, 5 mg, Oral, Q4H PRN **OR** oxyCODONE (ROXICODONE) oral solution 10 mg, 10  mg, Oral, Q4H PRN, JASON Cooley, 10 mg at 12/23/24 1305    polyethylene glycol (MIRALAX) packet 17 g, 17 g, Oral, Daily, Kelvin Sheppard, DO, 17 g at 12/22/24 1136    senna oral syrup 17.6 mg, 17.6 mg, Oral, BID, Kelvin Virgend, DO, 17.6 mg at 12/15/24 1721    REVIEW OF SYSTEMS:  Overall poor historian.  More than 10 review of systems were attempted, no other pertinent positive findings other than mentioned in the note.     PHYSICAL EXAM:  Current Weight: Weight - Scale: 117 kg (258 lb 9.6 oz)  First Weight: Weight - Scale: 120 kg (263 lb 7.2 oz)  Vitals:    12/23/24 1040   BP: 131/68   Pulse: 81   Resp: 20   Temp: 99.2 °F (37.3 °C)   SpO2: 96%       Intake/Output Summary (Last 24 hours) at 12/23/2024 1318  Last data filed at 12/23/2024 0831  Gross per 24 hour   Intake 1647 ml   Output 1975 ml   Net -328 ml     Wt Readings from Last 3 Encounters:   12/22/24 117 kg (258 lb 9.6 oz)   12/05/24 123 kg (270 lb 8.1 oz)   11/05/24 107 kg (236 lb 12.4 oz)     Temp Readings from Last 3 Encounters:   12/23/24 99.2 °F (37.3 °C)   12/05/24 99 °F (37.2 °C) (Oral)   11/12/24 97.5 °F (36.4 °C) (Oral)     BP Readings from Last 3 Encounters:   12/23/24 131/68   12/05/24 169/68   11/12/24 139/61     Pulse Readings from Last 3 Encounters:   12/23/24 81   12/05/24 82   11/12/24 85        Physical Examination:  Eyes: Mild conjunctival pallor present  ENT: External examination of ear and nose unremarkable  Neck: No obvious lymphadenopathy appreciated  Respiratory: Decreased breath sound at base  CVS: 2-3+ edema in legs  GI: Soft, mild distended  CNS: Active, alert, follows commands  Skin: No new rash  Musculoskeletal: No obvious new gross deformity noted    Invasive Devices:   Urethral Catheter Straight-tip 18 Fr. (Active)   Amt returned on insertion(mL) 675 mL 12/18/24 1028   Reasons to continue Urinary Catheter  Acute urinary retention/obstruction failing urinary retention protocol 12/22/24 0801   Goal for Removal Remove after 48  hrs of I/O monitoring 12/22/24 0801   Site Assessment Clean;Skin intact 12/22/24 0801   Coronado Care Done 12/22/24 2058   Collection Container Standard drainage bag 12/22/24 0801   Securement Method Securing device (Describe) 12/22/24 0801   Output (mL) 125 mL 12/23/24 0831     Lab Results:   Results from last 7 days   Lab Units 12/23/24  0709 12/23/24  0559 12/22/24  1659 12/22/24  0600 12/21/24  0855 12/20/24  1832 12/20/24  0528 12/19/24  0609 12/18/24  2046 12/18/24  1502 12/18/24  1016 12/18/24  0506 12/17/24  1442   WBC Thousand/uL  --  5.17  --   --   --   --  6.10 6.06  --   --   --  5.78  --    HEMOGLOBIN g/dL 6.9* 6.7*  --   --   --   --  8.3* 8.3* 6.9*  --  6.5* 6.5*  --    HEMATOCRIT % 21.9* 21.3*  --   --   --   --  26.0* 25.9* 21.2*  --  20.4* 21.0*  --    PLATELETS Thousands/uL  --  79*  --   --   --   --  76* 75*  --   --   --  77*  --    POTASSIUM mmol/L  --  5.2 5.0 5.0 5.0 4.9 5.1 5.1 5.0   < > 5.2 5.8* 4.9   CHLORIDE mmol/L  --  91* 92* 91* 92* 93* 93* 94* 94*   < > 95* 96 96   CO2 mmol/L  --  33* 32 32 32 28 26 29 28   < > 26 25 26   BUN mg/dL  --  47* 41* 35* 28* 28* 28* 29* 31*   < > 32* 30* 28*   CREATININE mg/dL  --  0.83 0.83 0.81 0.68 0.63 0.68 0.62 0.75   < > 0.82 0.76 0.69   CALCIUM mg/dL  --  8.2* 8.0* 8.3* 8.2* 7.8* 7.6* 7.9* 7.8*   < > 7.4* 7.5* 7.7*   MAGNESIUM mg/dL  --  2.1  --  2.5 1.8*  --  1.8* 1.8*  --   --   --  2.0 2.0   PHOSPHORUS mg/dL  --  2.6  --   --   --   --  2.9 2.9  --   --   --  3.2  --     < > = values in this interval not displayed.       Other Studies:   IR INPATIENT Paracentesis   Final Result by Rishabh Johnson MD (12/20 1640)   Therapeutic paracentesis.      Procedure was performed by:   Live Mayo PA-C      Supervising Physician: Dr. Johnson      Workstation performed: [id number]         CT abdomen pelvis w contrast   Final Result by Riki Reina MD (12/15 4330)      No bowel obstruction. Gastrojejunostomy tube in appropriate position.      Stable  "bilateral pleural effusions with bibasilar atelectasis right worse than left.      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      Severe body wall anasarca and large volume abdominopelvic ascites unchanged likely due to third spacing.         Workstation performed: [id number]         XR abdomen 1 view kub   Final Result by Arslan Parish MD (12/12 1002)      No acute abdominal findings.      The gastrojejunostomy tube projects over the mid abdomen.               Resident: Romy Blunt I, the attending radiologist, have reviewed the images and agree with the final report above.      Workstation performed: [id number]         XR chest portable ICU   Final Result by Andrea Anderson MD (12/11 0830)      Minimal increase in pulmonary edema and no significant change in the bilateral pleural effusions (right larger than left).            Workstation performed: [id number]         XR chest portable   Final Result by Kalie Nova MD (12/10 1450)      Bilateral lung disease.            Workstation performed: [id number]         CT abdomen pelvis w contrast   Final Result by Andrea Anderson MD (12/09 1829)      Since November 30, 2024:   1.  Persistent third spacing with slight increase in moderate volume ascites, and no significant change in moderate size right and small left pleural effusions with bibasilar atelectasis.   2.  New percutaneous enteric tube with tip in the distal duodenum.         Workstation performed: [id number]             Portions of the record may have been created with voice recognition software. Occasional wrong word or \"sound a like\" substitutions may have occurred due to the inherent limitations of voice recognition software. Read the chart carefully and recognize, using context, where substitutions have occurred.  "

## 2024-12-23 NOTE — ASSESSMENT & PLAN NOTE
64-year-old female with history of recurrent SBO 2/2 jejunal stricture from recurrent adenoCA gyn primary, s/p PEGJ 12/3/24 who presented on 12/9 due to malfunctioning J tube undergoing EGD 12/10 with ulcerated mucosa and pus below bumper, J tube looped in stomach which was replaced into distal duodenum. J tube malfunctioned again leading to replacement 12/13. Has been functioning and tolerating feeds since replacement, though does report intermittent generalized abdominal pain as well as nausea and vomiting with concern for delayed motility and gastric outlet obstruction with difficulty venting from G port due to large J extension.     GI reengaged to comment on timing and duration of Abx. PEG site appears to be erythematous near insertion site with granulation tissue, expression of gastric fluid from site. No leukocytosis, has been afebrile since admission. Bumper snug, was loosened during evaluation and freely rotated afterwards. Dressing replaced. Tube was taped in a fashion to prevent further traction.  Completed 7 days abx    GI called back 12/20 for clogged G port.       Plan:  PEG-J functioning appropriately and able to draw back air frin G-port (side port , mid-tube)  Continue Tube feeds through J tube and medications PO  DO NOT give meds through the J port  CLD as tolerated per primary  [place]onado bag to gravity from G port intermittently for venting   Recommend judicious use of opioids if this is contributing to generalized slowing of her GI system  Change dressing daily and when soiled, ensure tube is taped straight so that it prevents traction   Additional pain and symptom management per primary team

## 2024-12-23 NOTE — ASSESSMENT & PLAN NOTE
Patient with recent hospitalization due to small bowel obstruction s/p exploratory laparotomy with small bowel resection on 11/22/2024  presented with worsening abdominal pain and distention  Abdominal pelvis CT scan, negative for obstruction, stable bilateral pleural effusion and anasarca  Continue supportive care  [place] catheter placed due to retention  S/p paracentesis 12/18 for ascites 5.5 L  Monitor Bms  Palliative care is following and adjusting analgesics

## 2024-12-23 NOTE — PHYSICAL THERAPY NOTE
Physical Therapy Cancellation Note         12/23/24 1130   PT Last Visit   PT Visit Date 12/23/24   Note Type   Note Type Cancelled Session   Cancel Reasons Refusal  (PT attempted treatment session, patient refused at this time due to pain. PT will continue to follow as able and appropriate.)         Cristina Gabriel PT, DPT

## 2024-12-23 NOTE — PROGRESS NOTES
Progress Note - Palliative Care   Name: Lety Botello 64 y.o. 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   GI and Gen Sx on board  Completed 7 days abx (Vancomycin and ceftriaxone)  Completed 5 days of valacyclovir  DC plan: to return home after her rehab stay with [facility] via [facility].   Abdominal pain  Current Multimodal pain regimen:  Schedule Tylenol 975 mg TID  Scheduled Oxycodone IR 10 mg oral Q6hr  Gabapentin 100 mg OD  OxyIR 5mg Q4hrs PRN for moderate pain   OxyIR 10mg Q4hrs PRN for severe pain   IV dilaudid to 0.5 mg q3H PRN for BT pain  Minimize opioids- given recurrent SBO  Failed therapies:  NA    12/23 ( Day 14) : Total OME over last 24 hours: 130 mg   IV dilaudid 0.5 mg  x 4 doses  Oxycodone oral 10 mg x 6 doses    Pt still complaining of 10/10 abdominal pain (endometrial cancer S/P FLORECITA/BSO , recurrent SBO, shingles and ascites)  Continue current pain regimen  Bowel regimen to prevent OIC: senna 17.6 mg bid, prn dulcolax suppository. Last BM yesterday 12/22  Opioid agreement. Not on file, will need outpatient follow up  S/P paracentesis with 5L fluid removal   Palliative care encounter  Palliative Diagnosis: endometrial cancer    Goals:  Discussed code status. Currently a level 2.  Explained CPR and intubation in depth and discussed likelihood of survival.  Patient would not want CPR and has elected to be level 2 DNR at this time. Further considering if she would want intubation (to downgrade to Level 3 DNR/DNI), though she is not ready to decide this today.  Spoke with  patient's HCR, Sharda who is understanding of patient's decision at this time. She is planning to visit in this week.  Will continue discussions regarding GOC as patient's clinical presentation evolves.    Decisional apparatus:  Patient does have capacity on exam today.  If capacity is lost, patient's substitute decision maker would default to Sharda Carver per AD on file  ER contacts:  WenSharda (Niece)  [phone] (Work Phone)   Advance Directive/Living Will/POLST: on file and reviewed    Social Support:  Patient's support system:   Supportive listening provided  Normalized experience of patient  Advocated for patient/family with interdisciplinary team    Care Coordination  Case discussed with RN, SLIM    Follow-up  We appreciate the opportunity to participate in this patient's care.   We will continue to follow while admitted.    Please do not hesitate to contact our on-call provider through EPIC Secure Chat or contact [phone] should there be an acute change or other symptom control concerns.    Discharge Plan: Anticipate discharge in 48-72 hrs to rehab facility per primary team  Endometrial cancer (HCC)  Recurrent endometrial cancer, initial diagnosis 2020  [facility] g[facility] onc  S/p FLORECITA SBO SLND 6/2020 c/b splenic laceration  NAYE 1/2023, though presented for a fall in 10/2023 was incidentally found to have RP lymphadenopathy  Bx 12/2023 confirm metastatic adenocarcinoma  S/p pelvic RT and treatment with femara  NAYE 5/2024  Presented 10/18, 11/5, 11/14 with SBO - s/p ex lap, small bowel resection, partial omentectomy, DAVID 11/22/24 with biopsy confirming metastatic adenocarcinoma  Hypertension  BP stable  Currently on Norvasc 5 mg daily and Coreg 25 mg twice daily  BP stable  Monitor BP  Pain control  Type 2 diabetes mellitus (HCC)  Lab Results   Component Value Date    HGBA1C 6.9 (H) 11/05/2024       Recent Labs     12/21/24  0555 12/21/24  1058 12/22/24  0603 12/22/24  1043    POCGLU 146* 96 198* 229*       Blood Sugar Average: Last 72 hrs:  (P) 167    Gastroparesis    Anasarca  Receiving IV Lasix 40 mg daily    Shingles  Located on the right lower abdomen, well crusted, no active lesion  Derm consulted and completed 5 days of valacyclovir  Continue with Neurontin 100 mg BID via G tube    Urinary retention  Coronado's in place  Urinary retention protocol    Decisional apparatus: Patient is competent on my exam today. If competence is lost, patient's substitute decision maker would default to Sharda Carver (patient's niece who is healthcare representative) by [place] Act 169.       Subjective   Patient seen and examined this morning.  Patient was alert awake and oriented x 4.  She is receiving blood transfusion and 2 L/min of oxygen via nasal cannula.  She reports 10/10 abdominal pain. Patient denied any fever, chills, chest pain, shortness of breath, nausea, vomiting. No significant overnight event. Pt did not meet her niece on the weekend.     Care was coordinated with patients nurse and case management.      Objective :  Temp:  [97.9 °F (36.6 °C)-100 °F (37.8 °C)] 98.9 °F (37.2 °C)  HR:  [79-89] 86  BP: (117-137)/(48-59) 119/50  Resp:  [15-18] 15  SpO2:  [96 %-98 %] 98 %  O2 Device: Nasal cannula    Physical Exam  Vitals and nursing note reviewed.   Constitutional:       General: She is not in acute distress.     Appearance: She is well-developed. She is obese. She is ill-appearing.      Comments: Generalized edema anasarca +   HENT:      Head: Normocephalic and atraumatic.      Mouth/Throat:      Mouth: Mucous membranes are dry.   Eyes:      Conjunctiva/sclera: Conjunctivae normal.      Pupils: Pupils are equal, round, and reactive to light.   Cardiovascular:      Rate and Rhythm: Normal rate and regular rhythm.      Heart sounds: Normal heart sounds. No murmur heard.  Pulmonary:      Effort: Pulmonary effort is normal. No respiratory distress.      Breath sounds: Normal breath sounds. No  wheezing or rales.      Comments: Patient receiving 3 L/min of oxygen via nasal cannula.  Abdominal:      Palpations: Abdomen is soft.      Tenderness: There is no abdominal tenderness.      Comments: Abdominal binder in place.  Checked patient's surgical incision and PEG tube together with Eduarda search team.  Incision dry and clean.  No signs of infection.   Genitourinary:     Comments: Coronado's in place.  Musculoskeletal:         General: Swelling and tenderness present.      Cervical back: Neck supple.      Right lower leg: Edema present.      Left lower leg: Edema present.   Skin:     General: Skin is warm and dry.      Capillary Refill: Capillary refill takes less than 2 seconds.      Coloration: Skin is pale.      Findings: Lesion present.      Comments: Patient is receiving 1 unit of PRBC transfusion.   Neurological:      Mental Status: She is alert and oriented to person, place, and time.   Psychiatric:         Mood and Affect: Mood normal.            Lab Results: I have reviewed the following results:  Lab Results   Component Value Date/Time    SODIUM 129 (L) 12/23/2024 05:59 AM    SODIUM 132 (L) 11/04/2024 03:33 AM    K 5.2 12/23/2024 05:59 AM    K 4.7 11/04/2024 03:33 AM    BUN 47 (H) 12/23/2024 05:59 AM    BUN 19 11/04/2024 03:33 AM    CREATININE 0.83 12/23/2024 05:59 AM    CREATININE 0.76 11/04/2024 03:33 AM    GLUC 250 (H) 12/23/2024 05:59 AM    GLUC 98 11/04/2024 03:33 AM    CALCIUM 8.2 (L) 12/23/2024 05:59 AM    CALCIUM 8.6 11/04/2024 03:33 AM    AST 6 (L) 12/23/2024 05:59 AM    AST 9 11/04/2024 03:33 AM    ALT 5 (L) 12/23/2024 05:59 AM    ALT 6 11/04/2024 03:33 AM    ALB 2.8 (L) 12/23/2024 05:59 AM    ALB 3.1 (L) 11/04/2024 03:33 AM    TP 4.4 (L) 12/23/2024 05:59 AM    TP 5.4 (L) 11/04/2024 03:33 AM    EGFR 75 12/23/2024 05:59 AM    EGFR 89 11/04/2024 03:33 AM    EGFR 97 12/11/2020 03:15 AM     Lab Results   Component Value Date/Time    HGB 6.9 (L) 12/23/2024 07:09 AM    WBC 5.17 12/23/2024 05:59 AM     PLT 79 (L) 12/23/2024 05:59 AM     12/01/2023 08:30 AM    INR 1.49 (H) 11/23/2024 10:01 AM    INR 1.1 12/01/2023 08:30 AM    PTT 49 (H) 11/27/2024 06:19 AM     Lab Results   Component Value Date/Time    HNF0AIMHWCDZ 21.122 (H) 12/18/2024 03:02 PM       Code Status: Level 2 - DNAR: but accepts endotracheal intubation  Advance Directive and Living Will: Yes    Power of :    POLST:      Administrative Statements   I have spent a total time of 30 minutes in caring for this patient on the day of the visit/encounter including Instructions for management, Patient and family education, Counseling / Coordination of care, Documenting in the medical record, Reviewing / ordering tests, medicine, procedures  , Obtaining or reviewing history  , and Communicating with other healthcare professionals .

## 2024-12-23 NOTE — PROGRESS NOTES
Pastoral Care Progress Note             12/23/24 1600   Clinical Encounter Type   Visited With Patient   Routine Visit Follow-up      found pt asking for a visit at another time because she was tired.  remains available

## 2024-12-23 NOTE — ASSESSMENT & PLAN NOTE
Palliative Diagnosis: endometrial cancer    Goals:  Discussed code status. Currently a level 2.  Explained CPR and intubation in depth and discussed likelihood of survival.  Patient would not want CPR and has elected to be level 2 DNR at this time. Further considering if she would want intubation (to downgrade to Level 3 DNR/DNI), though she is not ready to decide this today.  Spoke with patient's HCR, Sharda who is understanding of patient's decision at this time. She is planning to visit in this week.  Will continue discussions regarding GOC as patient's clinical presentation evolves.    Decisional apparatus:  Patient does have capacity on exam today.  If capacity is lost, patient's substitute decision maker would default to Sharda Carver per AD on file  ER contacts:  [facility]arda [facility]  [phone] (Work Phone)   Advance Directive/Living Will/POLST: on file and reviewed    Social Support:  Patient's support system:   Supportive listening provided  Normalized experience of patient  Advocated for patient/family with interdisciplinary team    Care Coordination  Case discussed with RN, SLIM    Follow-up  We appreciate the opportunity to participate in this patient's care.   We will continue to follow while admitted.    Please do not hesitate to contact our on-call provider through [facility] Secure Chat or contact [phone] should there be an acute change or other symptom control concerns.    Discharge Plan: Anticipate discharge in 48-72 hrs to rehab facility per primary team

## 2024-12-23 NOTE — ASSESSMENT & PLAN NOTE
Current Multimodal pain regimen:  Schedule Tylenol 975 mg TID  Scheduled Oxycodone IR 10 mg oral Q6hr  Gabapentin 100 mg OD  OxyIR 5mg Q4hrs PRN for moderate pain   OxyIR 10mg Q4hrs PRN for severe pain   IV dilaudid to 0.5 mg q3H PRN for BT pain  Minimize opioids- given recurrent SBO  Failed therapies:  NA    12/23 ( Day 14) : Total OME over last 24 hours: 130 mg   IV dilaudid 0.5 mg  x 4 doses  Oxycodone oral 10 mg x 6 doses    Pt still complaining of 10/10 abdominal pain (endometrial cancer S/P FLORECITA/BSO , recurrent SBO, shingles and ascites)  Continue current pain regimen  Bowel regimen to prevent OIC: senna 17.6 mg bid, prn dulcolax suppository. Last BM yesterday 12/22  Opioid agreement. Not on file, will need outpatient follow up  S/P paracentesis with 5L fluid removal

## 2024-12-23 NOTE — OCCUPATIONAL THERAPY NOTE
Occupational Therapy cx        Patient Name: Lety Botello  Today's Date: 12/23/2024 12/23/24 1137   OT Last Visit   OT Visit Date 12/23/24   Note Type   Note Type Treatment   Cancel Reasons Refusal  (pt reports 10/10 pain, just got comfortable in bed and does not want to move at this time. will f/u as able.)         Deanne Nguyễn, OLIVERIO, OTR/L

## 2024-12-23 NOTE — ASSESSMENT & PLAN NOTE
-Currently remains on O2 via nasal cannula  -Echo this admission shows EF 65%, grade 1 diastolic dysfunction, normal IVC, blunted respirophasic changes  -Hypervolemic with significant third spacing  -IV diuresis as above.

## 2024-12-23 NOTE — ASSESSMENT & PLAN NOTE
-Noted chronic hyponatremia issues going back to 2022  -Sodium 129 on admission dropped to 125 on 12/20/2024 and since then now slowly increasing up to corrected value around 131 most recent today AM.  -Suspect related to component of hypervolemia, underlying SIADH given malignancy history, pain issues, pleural effusion/atelectasis  -Recommend to change free water flushes to normal saline 30 mL every 6 hourly with tube feed  -Agree with diuretics, will change IV Lasix to IV Bumex 2 mg 3 times daily with albumin  -Clinically seems significant third spacing, body wall anasarca, prior CT scan showed bilateral pleural effusion.  -Workup this admission shows serum osmolality 278, urine sodium 61, urine osmolality 288 on 12/18/2024  -Will repeat urine sodium, urine osmolality  -BMP in AM.  Goal sodium correction no more than 8 meq in 24 hours.

## 2024-12-23 NOTE — ASSESSMENT & PLAN NOTE
Lab Results   Component Value Date    HGBA1C 6.9 (H) 11/05/2024       Recent Labs     12/21/24  0555 12/21/24  1058 12/22/24  0603 12/22/24  1043   POCGLU 146* 96 198* 229*       Blood Sugar Average: Last 72 hrs:  (P) 167    Farxiga on hold while inpatient  Hyperglycemia secondary to steroids and tube feeding  Continue Lantus nightly, continue Humalog tid, adjust the doses as needed  Continue SSI

## 2024-12-24 NOTE — ASSESSMENT & PLAN NOTE
Lab Results   Component Value Date    HGBA1C 6.9 (H) 11/05/2024       Recent Labs     12/23/24  2118 12/24/24  0716 12/24/24  1035 12/24/24  1554   POCGLU 210* 314* 232* 202*       Blood Sugar Average: Last 72 hrs:  (P) 195.8305635527129234    Farxiga on hold while inpatient  Hyperglycemia secondary to steroids and tube feeding  Continue Lantus nightly, continue Humalog tid, adjust the doses as needed  Continue SSI

## 2024-12-24 NOTE — PLAN OF CARE
Problem: OCCUPATIONAL THERAPY ADULT  Goal: Performs self-care activities at highest level of function for planned discharge setting.  See evaluation for individualized goals.  Description: Treatment Interventions: ADL retraining, Functional transfer training, UE strengthening/ROM, Endurance training, Cognitive reorientation, Patient/family training, Equipment evaluation/education, Compensatory technique education, Continued evaluation, Activityengagement, Energy conservation          See flowsheet documentation for full assessment, interventions and recommendations.   Outcome: Progressing  Note: Limitation: Decreased ADL status, Decreased UE strength, Decreased Safe judgement during ADL, Decreased cognition, Decreased endurance, Decreased self-care trans, Decreased high-level ADLs, Decreased sensation  Prognosis: Fair  Assessment: Pt seen on this date for OT session focusing on ADL retraining, cognitive reorientation, body mechanics, transfer retraining, increasing activity tolerance/endurance and EOB sitting to increase ability to participate in ADL/functional tasks. Pt was found in bed and was left in bed w/ all needs within reach, bed alarm on. Pt completed bed mob w max ax2, sat EOB w mod Ax1 for about 9 mins. Pt w 10/10 pain in abdomen and reports dizziness EOB- /41. Returned to bed and /48. Pt did receive dilaudid prior to therapy entry. Pt w improvement in endurance, however is still limited 2* decreased ADL/High-level ADL status, decreased activity tolerance/endurance, decreased cognition, decreased self-care trans, decreased safety awareness and insight to condition.   The patient's raw score on the AM-PAC Daily Activity Inpatient Short Form is 11. A raw score of less than 19 suggests the patient may benefit from discharge to post-acute rehabilitation services. Please refer to the recommendation of the Occupational Therapist for safe discharge planning.   Recommending pt D/C to level 2  when  medically stable. Pt will continue to benefit from acute OT services to meet goals.     Rehab Resource Intensity Level, OT: II (Moderate Resource Intensity)

## 2024-12-24 NOTE — ASSESSMENT & PLAN NOTE
Pt still complaining of 10/10 abdominal pain (endometrial cancer S/P FLORECITA/BSO , recurrent SBO, shingles and ascites S/P paracentesis with 5L fluid removal)  Current Multimodal pain regimen:  Schedule Tylenol 975 mg TID  Scheduled Oxycodone IR 10 mg oral tablet Q6hr  Gabapentin 100 mg daily  OxyIR 5mg Q4hrs PRN for moderate pain   OxyIR 10mg Q4hrs PRN for severe pain   IV dilaudid to 0.5 mg q3H PRN for BT pain  Minimize opioids- given recurrent SBO  Failed therapies:  NA    12/24 : Total OME over last 24 hours: 85 mg   IV dilaudid 0.5 mg  x 1 dose  Oxycodone oral 10 mg x 5 doses    12/24 : Discussed adding long acting opioid pain med fentanyl patch with benefits and possible side effects.  Patient agreeable to switch to fentanyl patch.  Bowel regimen to prevent OIC: senna 17.6 mg bid, prn dulcolax suppository. Last BM 12/23  Opioid agreement. Not on file, will need outpatient follow up

## 2024-12-24 NOTE — ASSESSMENT & PLAN NOTE
-Potassium level slightly higher, currently on tube feeds.  Monitor with ongoing diuresis.  -Suspect hyperglycemia contributing  Need better control of blood glucose

## 2024-12-24 NOTE — ASSESSMENT & PLAN NOTE
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  Persistent third spacing with slight increase in moderate volume ascites, and no significant change in moderate size right and small left pleural effusions with bibasilar atelectasis. 2.  New percutaneous enteric tube with tip in the distal duodenum.\"  GI following. S/p EGD 12/10 with J-tube exchange, postop patient had worsening hypoxia required transfer to ICU and using BiPAP  Concern for possible infection surrounding J-tube site, completed 7 days of ceftriaxone and vancomycin  developed clogged J-tube,   Status post EGD on 12/13 with J-tube replacement   Resumed on tube feeding  On clear liquid diet per speech/swallow therapist  G tube port is working sufficiently per gastroenterology  Patient vomited x 1.  Abdominal exam benign.  Will start back on the tube feeding and monitor.  If the patient continues to have persistent vomiting need obstructions  "

## 2024-12-24 NOTE — PROGRESS NOTES
Nutrition Note    Nephrology requesting reassessment of TF for consideration of changing to lower protein, lower K, and lower CHO formula.   Current TF regimen: Vital 1.5 @ 60mL/hr x22 hrs/day - provides 1980 kcals, 89g protein, 2896mg K, 246g CHO.     Can adjust to Nepro with goal rate of 45mL/hr x22 hrs/day - provides 1782 kcals, 80g protein, 940mg K, 158g CHO.   This will still be within estimated ranges for calorie and protein needs as calculated by RD yesterday.     Is also ordered Vimal one packet BID and 30mL saline q6 hrs.     Pt scheduled for full RD follow-up/reassessment 12/27/24    Prescription refilled. Please ask her why she needs a prescrtiption for diflucan.

## 2024-12-24 NOTE — ASSESSMENT & PLAN NOTE
Potassium 5.3  Patient denied chest pain, palpitation  Management per primary team    Shingles  Located on the right lower abdomen, well crusted, no active lesion  Derm consulted and completed 5 days of valacyclovir  Continue with Neurontin 100 mg BID via G tube     Urinary retention  Coronado's in place  Urinary retention protocol

## 2024-12-24 NOTE — OCCUPATIONAL THERAPY NOTE
Occupational Therapy Progress Note     Patient Name: Lety Botello  Today's Date: 12/24/2024  Problem List  Principal Problem:    Encounter for gastrojejunal (GJ) tube placement issues  Active Problems:    Hypertension    Type 2 diabetes mellitus (HCC)    Endometrial cancer (HCC)    History of pulmonary embolism    Hypothyroidism    Gastroparesis    Morbid obesity (HCC)    Abdominal pain    Shingles    Palliative care encounter    Hyponatremia    Anemia    Acute respiratory failure with hypoxia (HCC)    Hypomagnesemia    NSVT (nonsustained ventricular tachycardia) (HCC)    Coronary artery disease involving native coronary artery    abdominal rash    Anasarca    Hyperkalemia           12/24/24 1059   OT Last Visit   OT Visit Date 12/24/24   Note Type   Note Type Treatment   Pain Assessment   Pain Assessment Tool 0-10   Pain Score 10 - Worst Possible Pain   Pain Location/Orientation Location: Abdomen   Patient's Stated Pain Goal No pain   Hospital Pain Intervention(s) Repositioned;Ambulation/increased activity;Emotional support   Restrictions/Precautions   Weight Bearing Precautions Per Order No   Other Precautions Chair Alarm;Bed Alarm;Cognitive;Multiple lines;Telemetry;Fall Risk;O2   ADL   Where Assessed Edge of bed   Grooming Assistance 3  Moderate Assistance   Grooming Deficit Wash/dry face   Bed Mobility   Supine to Sit 2  Maximal assistance   Additional items Assist x 2;Increased time required;Verbal cues;LE management   Sit to Supine 2  Maximal assistance   Additional items Assist x 2;Increased time required;Verbal cues;LE management   Additional Comments pt found and left supine in bed w all needs in reach and alarm on. Pt required max ax2 to sit EOB and required mod A to maintain midline @ EOB, mostly 2' pain. Pt tolerated EOB sitting ~9 mins   Transfers   Sit to Stand Unable to assess   Cognition   Overall Cognitive Status WFL   Arousal/Participation Alert;Cooperative;Responsive   Attention Within  functional limits   Orientation Level Oriented X4   Memory Within functional limits   Following Commands Follows all commands and directions without difficulty   Comments pt cooperative, at times w waxing/waning cognition 2' having received Dilauded prior to therapy entry. Follows one step commands w inc time, has dec processing speeds.   Activity Tolerance   Activity Tolerance Patient limited by pain   Medical Staff Made Aware dpt 2' pts med complexity, comorbidities and regression from baseline.   Assessment   Assessment Pt seen on this date for OT session focusing on ADL retraining, cognitive reorientation, body mechanics, transfer retraining, increasing activity tolerance/endurance and EOB sitting to increase ability to participate in ADL/functional tasks. Pt was found in bed and was left in bed w/ all needs within reach, bed alarm on. Pt completed bed mob w max ax2, sat EOB w mod Ax1 for about 9 mins. Pt w 10/10 pain in abdomen and reports dizziness EOB- /41. Returned to bed and /48. Pt did receive dilaudid prior to therapy entry. Pt w improvement in endurance, however is still limited 2* decreased ADL/High-level ADL status, decreased activity tolerance/endurance, decreased cognition, decreased self-care trans, decreased safety awareness and insight to condition.   The patient's raw score on the AM-PAC Daily Activity Inpatient Short Form is 11. A raw score of less than 19 suggests the patient may benefit from discharge to post-acute rehabilitation services. Please refer to the recommendation of the Occupational Therapist for safe discharge planning.   Recommending pt D/C to level 2  when medically stable. Pt will continue to benefit from acute OT services to meet goals.   Plan   Treatment Interventions ADL retraining;Functional transfer training;Endurance training;Patient/family training;Equipment evaluation/education;Compensatory technique education;Activityengagement;Energy conservation   Goal  Expiration Date 01/07/25   OT Treatment Day 3   OT Frequency 2-3x/wk   Discharge Recommendation   Rehab Resource Intensity Level, OT II (Moderate Resource Intensity)   AM-PAC Daily Activity Inpatient   Lower Body Dressing 1   Bathing 2   Toileting 1   Upper Body Dressing 2   Grooming 2   Eating 3   Daily Activity Raw Score 11   Daily Activity Standardized Score (Calc for Raw Score >=11) 29.04   AM-PAC Applied Cognition Inpatient   Following a Speech/Presentation 3   Understanding Ordinary Conversation 4   Taking Medications 2   Remembering Where Things Are Placed or Put Away 2   Remembering List of 4-5 Errands 2   Taking Care of Complicated Tasks 2   Applied Cognition Raw Score 15   Applied Cognition Standardized Score 33.54   Modified Arroyo Scale   Modified Arroyo Scale 4   End of Consult   Education Provided Yes   Patient Position at End of Consult Supine;Bed/Chair alarm activated;All needs within reach   Nurse Communication Nurse aware of consult         OLIVERIO Rey, OTR/L

## 2024-12-24 NOTE — PROGRESS NOTES
Progress Note - Palliative Care   Name: Lety Botello 64 y.o. 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   GI and Gen Sx on board  Completed 7 days abx (Vancomycin and ceftriaxone)  Completed 5 days of valacyclovir    Plans:  TF through J tube. CLD as tolerated.   DC plan: to return home after her rehab stay with [facility] via [facility].   Abdominal pain  Pt still complaining of 10/10 abdominal pain (endometrial cancer S/P FLORECITA/BSO , recurrent SBO, shingles and ascites S/P paracentesis with 5L fluid removal)  Current Multimodal pain regimen:  Schedule Tylenol 975 mg TID  Scheduled Oxycodone IR 10 mg oral tablet Q6hr  Gabapentin 100 mg daily  OxyIR 5mg Q4hrs PRN for moderate pain   OxyIR 10mg Q4hrs PRN for severe pain   IV dilaudid to 0.5 mg q3H PRN for BT pain  Minimize opioids- given recurrent SBO  Failed therapies:  NA    12/24 : Total OME over last 24 hours: 85 mg   IV dilaudid 0.5 mg  x 1 dose  Oxycodone oral 10 mg x 5 doses    12/24 : Discussed adding long acting opioid pain med fentanyl patch with benefits and possible side effects.  Patient agreeable to switch to fentanyl patch.  Bowel regimen to prevent OIC: senna 17.6 mg bid, prn dulcolax suppository. Last BM 12/23  Opioid agreement. Not on file, will need outpatient follow up    Palliative care encounter  Palliative Diagnosis: endometrial cancer    Goals:  Discussed code status. Currently a level 2.  Explained CPR and intubation in depth and discussed likelihood of survival.  Patient would not want CPR and has elected to be level 2 DNR  at this time. Further considering if she would want intubation (to downgrade to Level 3 DNR/DNI), though she is not ready to decide this today.  Spoke with patient's HCR, Sharda who is understanding of patient's decision at this time. She is planning to visit in this week.  Will continue discussions regarding GOC as patient's clinical presentation evolves.    Decisional apparatus:  Patient does have capacity on exam today.  If capacity is lost, patient's substitute decision maker would default to Sharda Carver per AD on file  ER contacts:  Sharda Carver (Niece)  [phone] (Work Phone)   Advance Directive/Living Will/POLST: on file and reviewed    Social Support:  Patient's support system:   Supportive listening provided  Normalized experience of patient  Advocated for patient/family with interdisciplinary team    Care Coordination  Case discussed with RN, SLIM    Follow-up  We appreciate the opportunity to participate in this patient's care.   We will continue to follow while admitted.    Please do not hesitate to contact our on-call provider through [employer] Secure Chat or contact [phone] should there be an acute change or other symptom control concerns.    Discharge Plan: Anticipate discharge in 48-72 hrs to rehab facility per primary team  Endometrial cancer (HCC)  Recurrent endometrial cancer, initial diagnosis 2020  [facility]  S/p FLORECITA SBO SLND 6/2020 c/b splenic laceration  NAYE 1/2023, though presented for a fall in 10/2023 was incidentally found to have RP lymphadenopathy  Bx 12/2023 confirm metastatic adenocarcinoma  S/p pelvic RT and treatment with femara  NAYE 5/2024  Presented 10/18, 11/5, 11/14 with SBO - s/p ex lap, small bowel resection, partial omentectomy, DAVID 11/22/24 with biopsy confirming metastatic adenocarcinoma  Hypertension  BP stable  Currently on Norvasc 5 mg daily and Coreg 25 mg twice daily  Monitor BP  Pain control  Type 2 diabetes mellitus  ([facility])  Lab Results   Component Value Date    HGBA1C 6.9 (H) 11/05/2024       Recent Labs     12/23/24  1350 12/23/24  1811 12/23/24  2118 12/24/24  0716   POCGLU 202* 208* 210* 314*       Blood Sugar Average: Last 72 hrs:  (P) [id number]    Gastroparesis    Anasarca  Receiving IV Bumex and IV albumin  Hyperkalemia  Potassium 5.3  Patient denied chest pain, palpitation  Management per primary team    Shingles  Located on the right lower abdomen, well crusted, no active lesion  Derm consulted and completed 5 days of valacyclovir  Continue with Neurontin 100 mg BID via G tube     Urinary retention  Coronado's in place  Urinary retention protocol    Hyponatremia  Na between 125-129  Nephrology consulted  Most likely SIAHD in the setting of volume overload and pain  Currently receiving IV Bumex 2 mg tid and IV albumin 25% 12.5g tid  12/24- Corrected Na 131 (in the setting of glucose 302)  Management per primary team.     Summary of recommendations  Currently receiving scheduled Tylenol and scheduled oxycodone every 6 hour and Oxy 5 to 10 mg every 4 as needed for moderate to severe pain and Dilaudid 0.5 mg every 3 as needed for breakthrough pain.  12/24: Added long-acting opioid pain meds fentanyl patch 12 mcg Q 72 hr. ( Avoid placing fentanyl patch on skin lesion, avoid using head pad on the fentanyl patch)          Decisional apparatus: Patient is competent on my exam today. If competence is lost, patient's substitute decision maker would default to Sharda Carver (patient's niece who is healthcare representative) by [place] Act 169.     Interval History  64 y.o. female with a PMH of T2DM, HTN, CAD, history of DVT/PE on Eliquis, endometrial carcinoma status post FLORECITA/SBO with recurrence, recurrent SBO s/p ex lap with small bowel resection and PEG tube placement (12/03) admitted for G-tube malfunction on 12/09. Status post EGD on 12/13 with J-tube replacement. Completed 7 days of ceftriaxone and vancomycin. Completed  Valtrex for shingles. S/p paracentesis with 5 L fluid removal on 12/19.  Patient is receiving IV Bumex and IV albumin for hyponatremia. Patient still reporting severe abdominal pain. Receiving TF through J tube. CLD as tolerated.     Subjective   Patient seen and examined.  She did not sleep well last night due to abdominal pain.  Still reporting 9-10/10 abdominal pain.  She ate clear liquid diet and had a bowel movement yesterday.  Denied fever, chills, chest pain, shortness of breath.    Objective :  Temp:  [99.2 °F (37.3 °C)-99.5 °F (37.5 °C)] 99.2 °F (37.3 °C)  HR:  [80-89] 80  BP: (126-146)/(55-68) 144/64  Resp:  [16-22] 18  SpO2:  [90 %-96 %] 95 %  O2 Device: None (Room air)    Physical Exam  Vitals and nursing note reviewed.   Constitutional:       General: She is not in acute distress.     Appearance: She is well-developed. She is obese. She is ill-appearing.      Comments: Anasarca   HENT:      Head: Normocephalic and atraumatic.      Mouth/Throat:      Mouth: Mucous membranes are moist.   Eyes:      Conjunctiva/sclera: Conjunctivae normal.      Pupils: Pupils are equal, round, and reactive to light.   Cardiovascular:      Rate and Rhythm: Normal rate and regular rhythm.      Heart sounds: Normal heart sounds. No murmur heard.  Pulmonary:      Effort: Pulmonary effort is normal. No respiratory distress.      Breath sounds: Normal breath sounds. No wheezing.      Comments: Receiving 2 L/min of oxygen via nasal cannula  Abdominal:      General: There is no distension.      Palpations: Abdomen is soft.      Tenderness: There is no abdominal tenderness.      Comments: Abdominal binder in place   Genitourinary:     Comments: Coronado's in place  Musculoskeletal:         General: Swelling present.      Cervical back: Neck supple.      Right lower leg: Edema present.      Left lower leg: Edema present.   Skin:     General: Skin is warm and dry.      Capillary Refill: Capillary refill takes less than 2 seconds.       Coloration: Skin is pale. Skin is not jaundiced.   Neurological:      Mental Status: She is alert and oriented to person, place, and time.   Psychiatric:         Mood and Affect: Mood normal.            Lab Results: I have reviewed the following results:  Lab Results   Component Value Date/Time    SODIUM 128 (L) 12/24/2024 05:09 AM    SODIUM 132 (L) 11/04/2024 03:33 AM    K 5.3 12/24/2024 05:09 AM    K 4.7 11/04/2024 03:33 AM    BUN 60 (H) 12/24/2024 05:09 AM    BUN 19 11/04/2024 03:33 AM    CREATININE 0.79 12/24/2024 05:09 AM    CREATININE 0.76 11/04/2024 03:33 AM    GLUC 302 (H) 12/24/2024 05:09 AM    GLUC 98 11/04/2024 03:33 AM    CALCIUM 8.5 12/24/2024 05:09 AM    CALCIUM 8.6 11/04/2024 03:33 AM    AST 6 (L) 12/23/2024 05:59 AM    AST 9 11/04/2024 03:33 AM    ALT 5 (L) 12/23/2024 05:59 AM    ALT 6 11/04/2024 03:33 AM    ALB 2.8 (L) 12/23/2024 05:59 AM    ALB 3.1 (L) 11/04/2024 03:33 AM    TP 4.4 (L) 12/23/2024 05:59 AM    TP 5.4 (L) 11/04/2024 03:33 AM    EGFR 80 12/24/2024 05:09 AM    EGFR 89 11/04/2024 03:33 AM    EGFR 97 12/11/2020 03:15 AM     Lab Results   Component Value Date/Time    HGB 6.9 (L) 12/23/2024 07:09 AM    WBC 5.17 12/23/2024 05:59 AM    PLT 79 (L) 12/23/2024 05:59 AM     12/01/2023 08:30 AM    INR 1.49 (H) 11/23/2024 10:01 AM    INR 1.1 12/01/2023 08:30 AM    PTT 49 (H) 11/27/2024 06:19 AM     Lab Results   Component Value Date/Time    VVB2GHQBEOXT 21.122 (H) 12/18/2024 03:02 PM       Code Status: Level 2 - DNAR: but accepts endotracheal intubation  Advance Directive and Living Will: Yes    Power of :    POLST:      Administrative Statements   I have spent a total time of 30 minutes in caring for this patient on the day of the visit/encounter including Patient and family education, Risk factor reductions, Counseling / Coordination of care, Documenting in the medical record, Reviewing / ordering tests, medicine, procedures  , Obtaining or reviewing history  , and Communicating  with other healthcare professionals .

## 2024-12-24 NOTE — ASSESSMENT & PLAN NOTE
-Currently remains on O2 via nasal cannula  -Echo this admission shows EF 65%, grade 1 diastolic dysfunction, normal IVC, blunted respirophasic changes  -Hypervolemic with significant third spacing  -IV diuresis as above.  -Good urine output over last 24 hours noted.

## 2024-12-24 NOTE — ASSESSMENT & PLAN NOTE
-Noted chronic hyponatremia issues going back to 2022  -Sodium 129 on admission dropped to 125 on 12/20/2024 and since then increasing and seems to be stable with corrected value around 131 today.   -Suspect related to component of hypervolemia, underlying SIADH given malignancy history, pain issues, pleural effusion/atelectasis  -Continue IV Bumex 2 mg 3 times daily with albumin  -Clinically seems significant third spacing, body wall anasarca, prior CT scan showed bilateral pleural effusion.  -Workup this admission shows serum osmolality 278, urine sodium 61, urine osmolality 288 on 12/18/2024  -Will repeat urine sodium, urine osmolality  -BMP in AM.  Goal sodium correction no more than 8 meq in 24 hours.

## 2024-12-24 NOTE — PROGRESS NOTES
NEPHROLOGY PROGRESS NOTE   Lety Botello 64 y.o. female MRN: [id number]  Unit/Bed#: [facility] 511-01 Encounter: [id number]  Reason for Consult: hyponatremia    ASSESSMENT AND PLAN:  Assessment & Plan  Hyponatremia  -Noted chronic hyponatremia issues going back to 2022  -Sodium 129 on admission dropped to 125 on 12/20/2024 and since then increasing and seems to be stable with corrected value around 131 today.   -Suspect related to component of hypervolemia, underlying SIADH given malignancy history, pain issues, pleural effusion/atelectasis  -Continue IV Bumex 2 mg 3 times daily with albumin  -Clinically seems significant third spacing, body wall anasarca, prior CT scan showed bilateral pleural effusion.  -Workup this admission shows serum osmolality 278, urine sodium 61, urine osmolality 288 on 12/18/2024  -Will repeat urine sodium, urine osmolality  -BMP in AM.  Goal sodium correction no more than 8 meq in 24 hours.  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  Acute respiratory failure with hypoxia (HCC)  -Currently remains on O2 via nasal cannula  -Echo this admission shows EF 65%, grade 1 diastolic dysfunction, normal IVC, blunted respirophasic changes  -Hypervolemic with significant third spacing  -IV diuresis as above.  -Good urine output over last 24 hours noted.  Hypertension  -Blood pressure acceptable with low normal reading today.  Avoid hypotension.  Continue Coreg, amlodipine  -Monitor blood pressure and if remains lower, consider holding amlodipine.  Endometrial cancer (HCC)  Management as per primary team  Hyperkalemia  -Potassium level slightly higher, currently on tube feeds.  Monitor with ongoing diuresis.  -Suspect hyperglycemia contributing  Need better control of  blood glucose     Discussed above plan in detail with primary team regarding continue current IV Bumex, holding amlodipine and they agree with above recommendations.      SUBJECTIVE:  Patient seen and examined at bedside.  Good urine output over last 24 hours with IV Bumex noted.  Remains on 2 L O2 via nasal cannula    OBJECTIVE:  Current Weight: Weight - Scale: 114 kg (251 lb 5.2 oz)  Vitals:    12/24/24 1049   BP: (!) 123/48   Pulse:    Resp:    Temp:    SpO2:        Intake/Output Summary (Last 24 hours) at 12/24/2024 1230  Last data filed at 12/24/2024 1101  Gross per 24 hour   Intake 2136 ml   Output 2550 ml   Net -414 ml     Wt Readings from Last 3 Encounters:   12/24/24 114 kg (251 lb 5.2 oz)   12/05/24 123 kg (270 lb 8.1 oz)   11/05/24 107 kg (236 lb 12.4 oz)     Temp Readings from Last 3 Encounters:   12/24/24 98.5 °F (36.9 °C)   12/05/24 99 °F (37.2 °C) (Oral)   11/12/24 97.5 °F (36.4 °C) (Oral)     BP Readings from Last 3 Encounters:   12/24/24 (!) 123/48   12/05/24 169/68   11/12/24 139/61     Pulse Readings from Last 3 Encounters:   12/24/24 80   12/05/24 82   11/12/24 85        Physical Examination:  Eyes: Mild conjunctival pallor present  Neck: No obvious lymphadenopathy appreciated  Respiratory: Decreased breath sound at base  CVS: 2+ edema in legs  GI: Soft, nondistended  Skin: No new rash  Musculoskeletal: No obvious new gross deformity noted    Medications:    Current Facility-Administered Medications:     acetaminophen (TYLENOL) tablet 975 mg, 975 mg, Oral, Q8H SAM, Pauly Grissom DO, 975 mg at 12/23/24 2130    albumin human (FLEXBUMIN) 25 % injection 12.5 g, 12.5 g, Intravenous, TID, Nando Birmingham MD, Stopped at 12/24/24 1101    albuterol (PROVENTIL HFA,VENTOLIN HFA) inhaler 2 puff, 2 puff, Inhalation, Q6H PRN, Kelvin Silver, DO    aluminum-magnesium hydroxide-simethicone (MAALOX) oral suspension 30 mL, 30 mL, Oral, Q6H PRN, Kelvin Sheppard, DO    amLODIPine (NORVASC) tablet 5 mg, 5 mg,  Oral, Daily, Nando Birmingham MD, 5 mg at 12/24/24 0921    aspirin (ECOTRIN LOW STRENGTH) EC tablet 81 mg, 81 mg, Oral, Daily, Kelvin Sheppard DO, 81 mg at 12/24/24 0921    bisacodyl (DULCOLAX) rectal suppository 10 mg, 10 mg, Rectal, Daily PRN, Kelvin Sheppard DO    bumetanide (BUMEX) injection 2 mg, 2 mg, Intravenous, TID, Nando Birmingham MD, 2 mg at 12/24/24 0921    carvedilol (COREG) tablet 25 mg, 25 mg, Oral, BID With Meals, Kelvin Sheppard DO, 25 mg at 12/24/24 0928    cyanocobalamin (VITAMIN B-12) tablet 1,000 mcg, 1,000 mcg, Oral, Daily, Kelvin Sheppard DO, 1,000 mcg at 12/24/24 0921    dicyclomine (BENTYL) capsule 10 mg, 10 mg, Oral, BID PRN, Kelvin Sheppard DO, 10 mg at 12/18/24 0947    enoxaparin (LOVENOX) subcutaneous injection 120 mg, 1 mg/kg, Subcutaneous, Q12H SAM, Paul Ríos MD, 120 mg at 12/24/24 0929    fentaNYL (DURAGESIC) 12 mcg/hr TD 72 hr patch 12 mcg, 12 mcg, Transdermal, Q72H, May Sherry Monterroso MD    folic acid (FOLVITE) tablet 1 mg, 1 mg, Per G Tube, Daily, Paul Ríos MD, 1 mg at 12/23/24 0812    gabapentin (NEURONTIN) capsule 100 mg, 100 mg, Per G Tube, Daily, Kelvin Sheppard DO, 100 mg at 12/24/24 0921    HYDROmorphone (DILAUDID) injection 0.5 mg, 0.5 mg, Intravenous, Q3H PRN, May Sherry Monterroso MD, 0.5 mg at 12/24/24 1031    insulin glargine (LANTUS) subcutaneous injection 20 Units 0.2 mL, 20 Units, Subcutaneous, HS, Kelvin Sheppard DO, 20 Units at 12/23/24 2128    insulin lispro (HumALOG/ADMELOG) 100 units/mL subcutaneous injection 1-5 Units, 1-5 Units, Subcutaneous, TID AC, 2 Units at 12/23/24 1805 **AND** Fingerstick Glucose (POCT), , , TID AC, Kelvin Sheppard DO    insulin lispro (HumALOG/ADMELOG) 100 units/mL subcutaneous injection 5 Units, 5 Units, Subcutaneous, TID With Meals, Paul Ríos MD, 5 Units at 12/23/24 1307    iohexol (OMNIPAQUE) 240 MG/ML solution 50 mL, 50 mL, Oral, Once in imaging, Bj Jones MD    levothyroxine tablet 150 mcg, 150 mcg, Oral, Daily, Kelvin Sheppard DO, 150 mcg at  12/23/24 0544    naloxone (NARCAN) 0.04 mg/mL syringe 0.04 mg, 0.04 mg, Intravenous, Q1MIN PRN, JASON Cooley    nitroglycerin (NITROSTAT) SL tablet 0.4 mg, 0.4 mg, Sublingual, Q5 Min PRN, Kelvin Sheppard DO    omeprazole (PRILOSEC) suspension 2 mg/mL, 40 mg, Per G Tube, Daily, Kelvin Sheppard DO, 40 mg at 12/24/24 0626    ondansetron (ZOFRAN) injection 4 mg, 4 mg, Intravenous, Q6H PRN, Kelvin Sheppard DO, 4 mg at 12/24/24 0500    oxyCODONE (ROXICODONE) immediate release tablet 10 mg, 10 mg, Oral, Q6H, Michael Moore MD, 10 mg at 12/24/24 0928    oxyCODONE (ROXICODONE) oral solution 5 mg, 5 mg, Oral, Q4H PRN **OR** oxyCODONE (ROXICODONE) oral solution 10 mg, 10 mg, Oral, Q4H PRN, JASON Cooley, 10 mg at 12/23/24 1305    polyethylene glycol (MIRALAX) packet 17 g, 17 g, Oral, Daily, Kelvin Sheppard DO, 17 g at 12/22/24 1136    senna oral syrup 17.6 mg, 17.6 mg, Oral, BID, Kelvin Sheppard DO, 17.6 mg at 12/15/24 1721    Laboratory Results:  Results from last 7 days   Lab Units 12/24/24  0509 12/23/24  0709 12/23/24  0559 12/22/24  1659 12/22/24  0600 12/21/24  0855 12/20/24  1832 12/20/24  0528 12/19/24  0609 12/18/24  2046 12/18/24  1502 12/18/24  1016 12/18/24  0506   WBC Thousand/uL  --   --  5.17  --   --   --   --  6.10 6.06  --   --   --  5.78   HEMOGLOBIN g/dL  --  6.9* 6.7*  --   --   --   --  8.3* 8.3* 6.9*  --  6.5* 6.5*   HEMATOCRIT %  --  21.9* 21.3*  --   --   --   --  26.0* 25.9* 21.2*  --  20.4* 21.0*   PLATELETS Thousands/uL  --   --  79*  --   --   --   --  76* 75*  --   --   --  77*   SODIUM mmol/L 128*  --  129* 128* 127* 127* 126* 125* 126* 126*   < > 124* 124*   POTASSIUM mmol/L 5.3  --  5.2 5.0 5.0 5.0 4.9 5.1 5.1 5.0   < > 5.2 5.8*   CHLORIDE mmol/L 91*  --  91* 92* 91* 92* 93* 93* 94* 94*   < > 95* 96   CO2 mmol/L 32  --  33* 32 32 32 28 26 29 28   < > 26 25   BUN mg/dL 60*  --  47* 41* 35* 28* 28* 28* 29* 31*   < > 32* 30*   CREATININE mg/dL 0.79  --  0.83 0.83 0.81 0.68 0.63 0.68  0.62 0.75   < > 0.82 0.76   CALCIUM mg/dL 8.5  --  8.2* 8.0* 8.3* 8.2* 7.8* 7.6* 7.9* 7.8*   < > 7.4* 7.5*   MAGNESIUM mg/dL 1.9  --  2.1  --  2.5 1.8*  --  1.8* 1.8*  --   --   --  2.0   PHOSPHORUS mg/dL  --   --  2.6  --   --   --   --  2.9 2.9  --   --   --  3.2    < > = values in this interval not displayed.       IR INPATIENT Paracentesis   Final Result by Rishabh Johnson MD (12/20 1640)   Therapeutic paracentesis.      Procedure was performed by:   Live Mayo PA-C      Supervising Physician: Dr. Johnson      Workstation performed: [id number]         CT abdomen pelvis w contrast   Final Result by Riki Reina MD (12/15 1527)      No bowel obstruction. Gastrojejunostomy tube in appropriate position.      Stable bilateral pleural effusions with bibasilar atelectasis right worse than left.      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      Severe body wall anasarca and large volume abdominopelvic ascites unchanged likely due to third spacing.         Workstation performed: [id number]         XR abdomen 1 view kub   Final Result by Arslan Parish MD (12/12 1002)      No acute abdominal findings.      The gastrojejunostomy tube projects over the mid abdomen.               Resident: Romy Blunt I, the attending radiologist, have reviewed the images and agree with the final report above.      Workstation performed: [id number]         XR chest portable ICU   Final Result by Andrea Anderson MD (12/11 0830)      Minimal increase in pulmonary edema and no significant change in the bilateral pleural effusions (right larger than left).            Workstation performed: [id number]         XR chest portable   Final Result by Kalie Nova MD (12/10 1450)      Bilateral lung disease.            Workstation performed: [id number]         CT abdomen pelvis w contrast   Final Result by Andrea Anderson MD (12/09 4274)      Since November 30, 2024:   1.  Persistent third spacing  "with slight increase in moderate volume ascites, and no significant change in moderate size right and small left pleural effusions with bibasilar atelectasis.   2.  New percutaneous enteric tube with tip in the distal duodenum.         Workstation performed: [id number]             Portions of the record may have been created with voice recognition software. Occasional wrong word or \"sound a like\" substitutions may have occurred due to the inherent limitations of voice recognition software. Read the chart carefully and recognize, using context, where substitutions have occurred.  "

## 2024-12-24 NOTE — ASSESSMENT & PLAN NOTE
Lab Results   Component Value Date    HGBA1C 6.9 (H) 11/05/2024       Recent Labs     12/23/24  1350 12/23/24  1811 12/23/24  2118 12/24/24  0716   POCGLU 202* 208* 210* 314*       Blood Sugar Average: Last 72 hrs:  (P) [id number]

## 2024-12-24 NOTE — ASSESSMENT & PLAN NOTE
Body mass index is 49.08 kg/m².  Encourage lifestyle modification and weight loss once acute issues improved/resolved

## 2024-12-24 NOTE — ASSESSMENT & PLAN NOTE
Palliative Diagnosis: endometrial cancer    Goals:  Discussed code status. Currently a level 2.  Explained CPR and intubation in depth and discussed likelihood of survival.  Patient would not want CPR and has elected to be level 2 DNR at this time. Further considering if she would want intubation (to downgrade to Level 3 DNR/DNI), though she is not ready to decide this today.  Spoke with patient's HCR, Sharda who is understanding of patient's decision at this time. She is planning to visit in this week.  Will continue discussions regarding GOC as patient's clinical presentation evolves.    Decisional apparatus:  Patient does have capacity on exam today.  If capacity is lost, patient's substitute decision maker would default to Sharda Carver per AD on file  ER contacts:  [facility]  [phone] (Work Phone)   Advance Directive/Living Will/POLST: on file and reviewed    Social Support:  Patient's support system:   Supportive listening provided  Normalized experience of patient  Advocated for patient/family with interdisciplinary team    Care Coordination  Case discussed with RN, SLIM    Follow-up  We appreciate the opportunity to participate in this patient's care.   We will continue to follow while admitted.    Please do not hesitate to contact our on-call provider through [employer] Secure Chat or contact [phone] should there be an acute change or other symptom control concerns.    Discharge Plan: Anticipate discharge in 48-72 hrs to rehab facility per primary team

## 2024-12-24 NOTE — ASSESSMENT & PLAN NOTE
-Blood pressure acceptable with low normal reading today.  Avoid hypotension.  Continue Coreg, amlodipine  -Monitor blood pressure and if remains lower, consider holding amlodipine.

## 2024-12-24 NOTE — PLAN OF CARE
Problem: PHYSICAL THERAPY ADULT  Goal: Performs mobility at highest level of function for planned discharge setting.  See evaluation for individualized goals.  Description: Treatment/Interventions: ADL retraining, Functional transfer training, LE strengthening/ROM, Elevations, Therapeutic exercise, Endurance training, Cognitive reorientation, Patient/family training, Equipment eval/education, Bed mobility, Compensatory technique education, Continued evaluation, Spoke to nursing, Spoke to case management, Spoke to advanced practitioner, OT  Equipment Recommended: Wheelchair       See flowsheet documentation for full assessment, interventions and recommendations.  Outcome: Progressing  Note: Prognosis: Fair  Problem List: Decreased strength, Decreased range of motion, Decreased endurance, Impaired balance, Decreased mobility, Decreased coordination, Decreased cognition, Impaired judgement, Decreased safety awareness, Impaired sensation, Obesity, Decreased skin integrity, Pain  Assessment: PT initiated treatment session in order to assist patient in achieving goals to improve transfers, gait training, and overall activity tolerance. Patient demonstrated progress toward achieving functional mobility goals as evidenced by improving activity tolerance, and overall mobility. Patient pleasant, cooperative, and agreeable to today's treatment session. Patient received supine in bed. Patient is currently MaxAx2 bed mobility. Throughout treatment session patient required both verbal and tactile cuing to improve safety, efficiency, and mechanics of mobility in addition to hands on assistance for all aspects of functional mobility. Additionally, pt required increased time to execute specific mobility tasks with rest breaks in between secondary to gross fatigue and weakness. BP taken prior to functional mobility; 112/41. Patient demonstrated the ability to sit at EOB ~5 mins with MaxAx1 for trunk support to maintain balance due  to constant backwards and sway to the R. Patient required constant verbal cues to utilize b/l UE for support sitting at EOB. Patient lethargic during treatment session, requiring constant verbal and tactile cues to improve arousal and eye opening. Patient noted with dizziness following supine to sit; BP taken; 123/48. Increased time required for repositioning supine in bed, placed wedges on R side to reduce skin breakdown. Patient left supine in bed with alarm and all needs met. Patient will benefit from continued skilled physical therapy to address gait / balance dysfunction, decreased activity tolerance, and generalized weakness. The patients AM-PAC Basic Mobility Inpatient Short From Raw Score is 7 .  Based on AM-PAC scoring and patient presentation, PT currently recommending Level II (Moderate Resource Intensity). Please also refer to the recommendation of the Physical Therapist for safe discharge planning.  Barriers to Discharge: Inaccessible home environment, Decreased caregiver support     Rehab Resource Intensity Level, PT: II (Moderate Resource Intensity)    See flowsheet documentation for full assessment.

## 2024-12-24 NOTE — PROGRESS NOTES
"Progress Note - Hospitalist   Name: Lety Botello 64 y.o. 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  Persistent third spacing with slight increase in moderate volume ascites, and no significant change in moderate size right and small left pleural effusions with bibasilar atelectasis. 2.  New percutaneous enteric tube with tip in the distal duodenum.\"  GI following. S/p EGD 12/10 with J-tube exchange, postop patient had worsening hypoxia required transfer to ICU and using BiPAP  Concern for possible infection surrounding J-tube site, completed 7 days of ceftriaxone and vancomycin  developed clogged J-tube,   Status post EGD on 12/13 with J-tube replacement   Resumed on tube feeding  On clear liquid diet per speech/swallow therapist  G tube port is working sufficiently per gastroenterology  Patient vomited x 1.  Abdominal exam benign.  Will start back on the tube feeding and monitor.  If the patient continues to have persistent vomiting need obstructions  Abdominal pain  Patient with recent hospitalization due to small bowel obstruction s/p exploratory laparotomy with small bowel resection on 11/22/2024  presented with worsening abdominal pain and distention  Abdominal pelvis CT scan, negative for obstruction, stable bilateral pleural effusion and anasarca  Continue supportive care  Coronado catheter placed due to retention  S/p paracentesis 12/18 for ascites 5.5 L  Monitor Bms  Palliative care is following and adjusting analgesics  Acute " respiratory failure with hypoxia (HCC)  Patient had worsening hypoxia post EGD on 12/10. Was satting mid-80s on 10L  Was given nebs, Lasix and subsequently placed on BiPAP and upgraded to critical care for further treatment  Currently in stable condition and transferred out of ICU  Currently on room air  NSVT (nonsustained ventricular tachycardia) (HCC)  25 run of nonsustained V. tach noted on telemetry, patient was asymptomatic  Cardiology consulted  Cardiac echo with normal EF and mild aortic stenosis  Coreg dose was increased to 25 mg twice daily  Monitor  Cardiology signed off    Hypothyroidism  Elevated TSH  Normal free T4 at 0.65  Patient was not receiving levothyroxine due to  PEG tube malfunctioning   Crease levothyroxine dose to 150 mcg daily  Repeat thyroid study in 4 to 6 weeks  Anemia  Received IV iron x 2  Vitamin B12 and folate deficiency  Continue vitamin B12 and folate supplement  Transfuse 1 unit of PRBCs 12/18 and 12/23  Continue to monitor hb closely  abdominal rash  Located on the right lower abdomen with oozing and bleeding  Seen by dermatology who performed a biopsy  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, consistent with gynecologic/Mullerian origin   Discontinue valtrex  Outpatient follow up with medical oncology when stable  Hyponatremia  Hyponatremia likely multifactorial - due to volume overload  Coronado catheter placed due to urinary retention  Monitor closely with diuresis  Gradually improving with diuresis  Due to persistent hyponatremia will consult with nephrology to evaluate for possible tolvaptan therapy  Hypertension  Monitor blood pressures  Continue carvedilol, Norvasc was added  Avoid hypotension  Coronary artery disease involving native coronary artery  History of MI in 2016 status post KARY  Stable  Type 2 diabetes mellitus (HCC)  Lab Results   Component Value Date    HGBA1C 6.9 (H) 11/05/2024       Recent Labs     12/23/24  2118 12/24/24  0716 12/24/24  1035  12/24/24  1554   POCGLU 210* 314* 232* 202*       Blood Sugar Average: Last 72 hrs:  (P) [id number]    Farxiga on hold while inpatient  Hyperglycemia secondary to steroids and tube feeding  Continue Lantus nightly, continue Humalog tid, adjust the doses as needed  Continue SSI  Endometrial cancer (HCC)  History of endometrial cancer noted  History of pulmonary embolism  Continue Eliquis 5 mg twice daily  Gastroparesis  History of   With PEG-J as above  GI following  Morbid obesity (HCC)  Body mass index is 49.08 kg/m².  Encourage lifestyle modification and weight loss once acute issues improved/resolved  Anasarca  Continue with albumin assisted diuresis.  Continue lasix 40mg TID  Continue to monitor daily weights, BMPs, telemetry  Urine output is decreasing and hyponatremia is not improving sufficiently - will consult with nephrology for assistance with management  Hyperkalemia      VTE Pharmacologic Prophylaxis: VTE Score: 7 Moderate Risk (Score 3-4) - Pharmacological DVT Prophylaxis Ordered: enoxaparin (Lovenox).    Mobility:   Basic Mobility Inpatient Raw Score: 7  -HLM Goal: 2: Bed activities/Dependent transfer  JH-HLM Achieved: 1: Laying in bed  JH-HLM Goal NOT achieved. Continue with multidisciplinary rounding and encourage appropriate mobility to improve upon -HLM goals.    Patient Centered Rounds: I performed bedside rounds with nursing staff today.   Discussions with Specialists or Other Care Team Provider:     Education and Discussions with Family / Patient: Leta heath  Current Length of Stay: 15 day(s)  Current Patient Status: Inpatient   Certification Statement: The patient will continue to require additional inpatient hospital stay due to diuresis  Discharge Plan: Anticipate discharge in >72 hrs to rehab facility.    Code Status: Level 2 - DNAR: but accepts endotracheal intubation    Subjective   Patient seen and examined.  Patient vomited x 1 earlier today.  Tube feeding was on on  hold.  No further vomiting and restarted back on the tube feeding slowly and uptitrating the dose    Objective :  Temp:  [98.4 °F (36.9 °C)-99.4 °F (37.4 °C)] 98.4 °F (36.9 °C)  HR:  [80-89] 80  BP: (112-164)/(41-64) 130/56  Resp:  [16-22] 16  SpO2:  [90 %-98 %] 95 %  O2 Device: None (Room air)    Body mass index is 49.08 kg/m².     Input and Output Summary (last 24 hours):     Intake/Output Summary (Last 24 hours) at 12/24/2024 1751  Last data filed at 12/24/2024 1719  Gross per 24 hour   Intake 1912 ml   Output 2750 ml   Net -838 ml       Physical Exam  Constitutional:       General: She is not in acute distress.     Appearance: She is obese. She is not ill-appearing.   HENT:      Head: Normocephalic and atraumatic.      Nose: Nose normal.   Eyes:      General: No scleral icterus.  Cardiovascular:      Rate and Rhythm: Regular rhythm.      Heart sounds: No murmur heard.  Pulmonary:      Comments: Decreased breath sounds   Abdominal:      Comments: Abdominal binder present.   Musculoskeletal:      Right lower leg: Edema present.      Left lower leg: Edema present.   Neurological:      Mental Status: She is alert. Mental status is at baseline.           Lines/Drains:  Lines/Drains/Airways       Active Status       Name Placement date Placement time Site Days    PICC Line 12/18/24 12/18/24  0922  --  6    Gastrostomy/Enterostomy PEG- Jejunostomy 20 Fr. LUQ 12/03/24  1553  LUQ  21    Urethral Catheter Straight-tip 18 Fr. 12/18/24  1025  Straight-tip  6                  Urinary Catheter:  Goal for removal: Remove after 48 hrs of I/O monitoring         Central Line:  Goal for removal: Will discontinue when peripheral access obtained.                Lab Results: I have reviewed the following results:   Results from last 7 days   Lab Units 12/24/24  1538 12/23/24  0709 12/23/24  0559   WBC Thousand/uL 5.20  --  5.17   HEMOGLOBIN g/dL 7.5*   < > 6.7*   HEMATOCRIT % 23.7*   < > 21.3*   PLATELETS Thousands/uL 86*  --  79*    BANDS PCT %  --   --  2   SEGS PCT % 83*  --   --    LYMPHO PCT % 8*  --  1   MONO PCT % 6  --  2*   EOS PCT % 2  --   --     < > = values in this interval not displayed.     Results from last 7 days   Lab Units 12/24/24  0509 12/23/24  0559   SODIUM mmol/L 128* 129*   POTASSIUM mmol/L 5.3 5.2   CHLORIDE mmol/L 91* 91*   CO2 mmol/L 32 33*   BUN mg/dL 60* 47*   CREATININE mg/dL 0.79 0.83   ANION GAP mmol/L 5 5   CALCIUM mg/dL 8.5 8.2*   ALBUMIN g/dL  --  2.8*   TOTAL BILIRUBIN mg/dL  --  0.48   ALK PHOS U/L  --  63   ALT U/L  --  5*   AST U/L  --  6*   GLUCOSE RANDOM mg/dL 302* 250*         Results from last 7 days   Lab Units 12/24/24  1554 12/24/24  1035 12/24/24  0716 12/23/24  2118 12/23/24  1811 12/23/24  1539 12/23/24  1350 12/23/24  1038 12/23/24  0648 12/22/24  2050 12/22/24  1607 12/22/24  1043   POC GLUCOSE mg/dl 202* 232* 314* 210* 208* 190* 202* 215* 247* 209* 200* 229*               Recent Cultures (last 7 days):   Results from last 7 days   Lab Units 12/18/24  1635   GRAM STAIN RESULT  Rare Mononuclear Cells  No bacteria seen   BODY FLUID CULTURE, STERILE  No growth             Last 24 Hours Medication List:     Current Facility-Administered Medications:     acetaminophen (TYLENOL) tablet 975 mg, Q8H SAM    albumin human (FLEXBUMIN) 25 % injection 12.5 g, TID, Last Rate: Stopped (12/24/24 1459)    albuterol (PROVENTIL HFA,VENTOLIN HFA) inhaler 2 puff, Q6H PRN    aluminum-magnesium hydroxide-simethicone (MAALOX) oral suspension 30 mL, Q6H PRN    amLODIPine (NORVASC) tablet 5 mg, Daily    aspirin (ECOTRIN LOW STRENGTH) EC tablet 81 mg, Daily    bisacodyl (DULCOLAX) rectal suppository 10 mg, Daily PRN    bumetanide (BUMEX) injection 2 mg, TID    carvedilol (COREG) tablet 25 mg, BID With Meals    cyanocobalamin (VITAMIN B-12) tablet 1,000 mcg, Daily    dicyclomine (BENTYL) capsule 10 mg, BID PRN    enoxaparin (LOVENOX) subcutaneous injection 120 mg, Q12H SAM    fentaNYL (DURAGESIC) 12 mcg/hr TD 72 hr  patch 12 mcg, Q72H    folic acid (FOLVITE) tablet 1 mg, Daily    gabapentin (NEURONTIN) capsule 300 mg, HS    HYDROmorphone (DILAUDID) injection 0.5 mg, Q3H PRN    insulin glargine (LANTUS) subcutaneous injection 20 Units 0.2 mL, HS    insulin lispro (HumALOG/ADMELOG) 100 units/mL subcutaneous injection 1-5 Units, TID AC **AND** Fingerstick Glucose (POCT), TID AC    insulin lispro (HumALOG/ADMELOG) 100 units/mL subcutaneous injection 5 Units, TID With Meals    iohexol (OMNIPAQUE) 240 MG/ML solution 50 mL, Once in imaging    levothyroxine tablet 150 mcg, Daily    naloxone (NARCAN) 0.04 mg/mL syringe 0.04 mg, Q1MIN PRN    nitroglycerin (NITROSTAT) SL tablet 0.4 mg, Q5 Min PRN    omeprazole (PRILOSEC) suspension 2 mg/mL, Daily    ondansetron (ZOFRAN) injection 4 mg, Q6H PRN    oxyCODONE (ROXICODONE) immediate release tablet 10 mg, Q6H    oxyCODONE (ROXICODONE) oral solution 5 mg, Q4H PRN **OR** oxyCODONE (ROXICODONE) oral solution 10 mg, Q4H PRN    polyethylene glycol (MIRALAX) packet 17 g, Daily    senna oral syrup 17.6 mg, BID    Administrative Statements   Today, Patient Was Seen By: Jackelyn Felder MD      **Please Note: This note may have been constructed using a voice recognition system.**

## 2024-12-24 NOTE — ASSESSMENT & PLAN NOTE
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   GI and Gen Sx on board  Completed 7 days abx (Vancomycin and ceftriaxone)  Completed 5 days of valacyclovir    Plans:  TF through J tube. CLD as tolerated.   DC plan: to return home after her rehab stay with [facility] via [facility].

## 2024-12-24 NOTE — PHYSICAL THERAPY NOTE
"                                                                                  PHYSICAL THERAPY NOTE          Patient Name: Lety Botello  Today's Date: 12/24/2024 12/24/24 1100   PT Last Visit   PT Visit Date 12/24/24   Note Type   Note Type Treatment   End of Consult   Patient Position at End of Consult Supine;All needs within reach;Bed/Chair alarm activated   Pain Assessment   Pain Assessment Tool 0-10   Pain Score 10 - Worst Possible Pain   Pain Location/Orientation Location: Abdomen   Pain Onset/Description Onset: Ongoing   Effect of Pain on Daily Activities limits mobility   Patient's Stated Pain Goal No pain   Hospital Pain Intervention(s) Repositioned;Emotional support;Rest   Restrictions/Precautions   Weight Bearing Precautions Per Order No   Other Precautions Chair Alarm;Bed Alarm;Cognitive;Multiple lines;Telemetry;O2;Fall Risk;Pain   General   Chart Reviewed Yes   Response to Previous Treatment Patient reporting fatigue but able to participate.   Family/Caregiver Present No   Cognition   Overall Cognitive Status Impaired   Arousal/Participation Lethargic;Responsive   Attention Attends with cues to redirect   Orientation Level Oriented X4   Memory Within functional limits   Following Commands Follows one step commands with increased time or repetition   Comments patient pleasant and cooperative, lethargic requiring verbal/tactile cues to improve arousal, fear avoidance and pain limiting functional mobility at this time   Subjective   Subjective \"I don't want to do it\"   Bed Mobility   Rolling R 2  Maximal assistance   Additional items Assist x 2;HOB elevated;Bedrails;Increased time required;Verbal cues   Rolling L 2  Maximal assistance   Additional items Assist x 2;HOB elevated;Bedrails;Increased time required;Verbal cues   Supine to Sit 2  Maximal assistance   Additional items Assist x 2;HOB elevated;Bedrails;Increased time required;Verbal cues;LE management   Sit to Supine 2  Maximal assistance "   Additional items Assist x 2;Increased time required;Verbal cues;LE management;Bedrails;HOB elevated  (increased time required, for boost back into bed for proper patient positioning and comfort)   Additional Comments sat EOB for ~5 mins with MaxAx2, declined further mobility at this time due to pain and dizziness/lightheadedness. BP taken; 123/48   Transfers   Sit to Stand Unable to assess   Ambulation/Elevation   Gait pattern Not appropriate;Not tested   Balance   Static Sitting Poor   Dynamic Sitting Poor -   Static Standing Zero   Endurance Deficit   Endurance Deficit Yes   Endurance Deficit Description generalized weakness, pain, fatigue   Activity Tolerance   Activity Tolerance Patient limited by fatigue;Patient limited by pain   Medical Staff Made Aware OT Deanne; Co-treatment with occupational therapy was performed today. This patient's participation in therapy services was limited by decreased tolerance for activity and current medical status. For these reasons, co-treatment was performed so that patient could optimally participate in therapy services and maximize their rehabilitation during treatment time. PT and OT disciplines addressed separate goals of patient's individualized care plan.   Nurse Made Aware RN cleared and updated   Assessment   Prognosis Fair   Problem List Decreased strength;Decreased range of motion;Decreased endurance;Impaired balance;Decreased mobility;Decreased coordination;Decreased cognition;Impaired judgement;Decreased safety awareness;Impaired sensation;Obesity;Decreased skin integrity;Pain   Assessment PT initiated treatment session in order to assist patient in achieving goals to improve transfers, gait training, and overall activity tolerance. Patient demonstrated progress toward achieving functional mobility goals as evidenced by improving activity tolerance, and overall mobility. Patient pleasant, cooperative, and agreeable to today's treatment session. Patient received  supine in bed. Patient is currently MaxAx2 bed mobility. Throughout treatment session patient required both verbal and tactile cuing to improve safety, efficiency, and mechanics of mobility in addition to hands on assistance for all aspects of functional mobility. Additionally, pt required increased time to execute specific mobility tasks with rest breaks in between secondary to gross fatigue and weakness. BP taken prior to functional mobility; 112/41. Patient demonstrated the ability to sit at EOB ~5 mins with MaxAx1 for trunk support to maintain balance due to constant backwards and sway to the R. Patient required constant verbal cues to utilize b/l UE for support sitting at EOB. Patient lethargic during treatment session, requiring constant verbal and tactile cues to improve arousal and eye opening. Patient noted with dizziness following supine to sit; BP taken; 123/48. Increased time required for repositioning supine in bed, placed wedges on R side to reduce skin breakdown. Patient left supine in bed with alarm and all needs met.       Patient will benefit from continued skilled physical therapy to address gait / balance dysfunction, decreased activity tolerance, and generalized weakness. The patients AM-PAC Basic Mobility Inpatient Short From Raw Score is 7 .  Based on AM-PAC scoring and patient presentation, PT currently recommending Level II (Moderate Resource Intensity). Please also refer to the recommendation of the Physical Therapist for safe discharge planning.   Barriers to Discharge Inaccessible home environment;Decreased caregiver support   Goals   Patient Goals to reduce pain   STG Expiration Date 01/07/25   Short Term Goal #1 In 14 days pt will: Complete bed mobility skills with minAx1 to facilitate safe return to previous living environment and decrease burden on caregivers. Perform all functional transfers with minAx1 to facilitate safe return to previous living environment. Perform sit<>stand w/  Darrell x1 1 min each. PT to see for gait assessment and to establish STG at that time. ' Improve sitting balance by 1 grade in order to decrease fall risk. Improve LE strength grades by 1 to increase independence w/ all functional mobility, transfers and gait. Actively participate w/ PT for ongoing pt and family education; DME needs and D/C planning to promote highest level of function in least restrictive environment.  (goals extended; remain applicable and appropriate)   PT Treatment Day 3   Plan   Treatment/Interventions Functional transfer training;ADL retraining;LE strengthening/ROM;Therapeutic exercise;Endurance training;Patient/family training;Bed mobility;Gait training;Spoke to nursing;Spoke to case management;OT   Progress Slow progress, decreased activity tolerance   PT Frequency 2-3x/wk   Discharge Recommendation   Rehab Resource Intensity Level, PT II (Moderate Resource Intensity)   Equipment Recommended Wheelchair;Walker   AM-PAC Basic Mobility Inpatient   Turning in Flat Bed Without Bedrails 2   Lying on Back to Sitting on Edge of Flat Bed Without Bedrails 1   Moving Bed to Chair 1   Standing Up From Chair Using Arms 1   Walk in Room 1   Climb 3-5 Stairs With Railing 1   Basic Mobility Inpatient Raw Score 7   Turning Head Towards Sound 3   Follow Simple Instructions 3   Low Function Basic Mobility Raw Score  13   Low Function Basic Mobility Standardized Score  20.14   [facility] Highest Level Of Mobility   JH-HLM Goal 2: Bed activities/Dependent transfer   -HLM Achieved 3: Sit at edge of bed   Education   Education Provided Mobility training   Patient Demonstrates acceptance/verbal understanding   End of Consult   Patient Position at End of Consult Supine;Bed/Chair alarm activated;All needs within reach     Cristina Gabriel PT, DPT

## 2024-12-25 NOTE — PROGRESS NOTES
Progress Note - Palliative Care   Name: Lety Botello 64 y.o. 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 CLD as tolerated.   DC plan: to return home after her rehab stay with [facility] via [facility].   Abdominal pain    Current Multimodal pain regimen:  Fentanyl patch 12 mcg/hr started on 12.24  Schedule Tylenol 975 mg TID  Scheduled Oxycodone IR 10 mg oral tablet Q6hr --> D/C today 12.25  Gabapentin 300 mg HS   OxyIR 5mg Q4hrs PRN for moderate pain   OxyIR 10mg Q4hrs PRN for severe pain   IV dilaudid to 0.5 mg q3H PRN for BT pain  Failed therapies:  NA    Bowel regimen to prevent OIC: senna 17.6 mg bid, prn dulcolax suppository.   Opioid agreement. Not on file, will need outpatient follow up    Palliative care encounter  Palliative Diagnosis: endometrial cancer    Goals:  Currently a level 2.    Will continue discussions regarding GOC as patient's clinical presentation evolves.    Decisional apparatus:  Patient does have capacity on exam today.  If capacity is lost, patient's substitute decision maker would default to Sharda Carver per AD on file  ER contacts:  Sharda Carver (Niece)  [phone] (Work Phone)   Advance Directive/Living Will/POLST: on file and reviewed    Social Support:  Patient's support system:   Supportive listening provided  Normalized experience of patient  Advocated for patient/family with interdisciplinary team    Care Coordination  Case discussed with Dr. Felder    Follow-up  We appreciate the opportunity to participate in this patient's care.   We will continue to follow while  admitted.    Please do not hesitate to contact our on-call provider through EPIC Secure Chat or contact [phone] should there be an acute change or other symptom control concerns.    Discharge Plan: Anticipate discharge in 48-72 hrs to rehab facility per primary team  Endometrial cancer (HCC)  Recurrent endometrial cancer, initial diagnosis 2020  Follows with JASON Dukes of [facility] gyn onc  S/p FLORECITA SBO SLND 6/2020 c/b splenic laceration  NAYE 1/2023, though presented for a fall in 10/2023 was incidentally found to have RP lymphadenopathy  Bx 12/2023 confirm metastatic adenocarcinoma  S/p pelvic RT and treatment with femara  NAYE 5/2024  Presented 10/18, 11/5, 11/14 with SBO - s/p ex lap, small bowel resection, partial omentectomy, DAVID 11/22/24 with biopsy confirming metastatic adenocarcinoma  Hypertension    Type 2 diabetes mellitus (HCC)    Gastroparesis    Anasarca    Hyperkalemia          PDMP Review: I have reviewed the patient's controlled substance dispensing history in the Prescription Drug Monitoring Program in compliance with the [facility] regulations before prescribing any controlled substances.    Subjective   Patient appears to be doing better today.  She states that her pain seems better controlled.  She tells me that her goal for tomorrow is to stand and maybe take a few steps.    Objective :  Temp:  [98.4 °F (36.9 °C)-100.4 °F (38 °C)] 100.4 °F (38 °C)  HR:  [80-89] 83  BP: (112-164)/(41-59) 123/53  Resp:  [16-17] 17  SpO2:  [95 %-98 %] 98 %  O2 Device: Nasal cannula    Physical Exam  Vitals and nursing note reviewed.   Constitutional:       General: She is not in acute distress.     Appearance: She is obese. She is ill-appearing.   HENT:      Head: Normocephalic and atraumatic.      Right Ear: External ear normal.      Left Ear: External ear normal.   Eyes:      General:         Right eye: No discharge.         Left eye: No discharge.   Cardiovascular:      Rate and Rhythm: Normal rate.    Pulmonary:      Effort: Pulmonary effort is normal. No respiratory distress.   Abdominal:      General: There is no distension.      Palpations: Abdomen is soft.   Skin:     Coloration: Skin is pale.   Neurological:      Mental Status: She is oriented to person, place, and time.   Psychiatric:         Mood and Affect: Mood normal.         Behavior: Behavior normal.            Lab Results: I have reviewed the following results:  Lab Results   Component Value Date/Time    SODIUM 129 (L) 12/25/2024 04:53 AM    SODIUM 132 (L) 11/04/2024 03:33 AM    K 5.4 (H) 12/25/2024 04:53 AM    K 4.7 11/04/2024 03:33 AM    BUN 63 (H) 12/25/2024 04:53 AM    BUN 19 11/04/2024 03:33 AM    CREATININE 0.88 12/25/2024 04:53 AM    CREATININE 0.76 11/04/2024 03:33 AM    GLUC 231 (H) 12/25/2024 04:53 AM    GLUC 98 11/04/2024 03:33 AM    CALCIUM 8.8 12/25/2024 04:53 AM    CALCIUM 8.6 11/04/2024 03:33 AM    AST 6 (L) 12/23/2024 05:59 AM    AST 9 11/04/2024 03:33 AM    ALT 5 (L) 12/23/2024 05:59 AM    ALT 6 11/04/2024 03:33 AM    ALB 2.8 (L) 12/23/2024 05:59 AM    ALB 3.1 (L) 11/04/2024 03:33 AM    TP 4.4 (L) 12/23/2024 05:59 AM    TP 5.4 (L) 11/04/2024 03:33 AM    EGFR 70 12/25/2024 04:53 AM    EGFR 89 11/04/2024 03:33 AM    EGFR 97 12/11/2020 03:15 AM     Lab Results   Component Value Date/Time    HGB 7.7 (L) 12/25/2024 04:53 AM    WBC 4.82 12/25/2024 04:53 AM    PLT 96 (L) 12/25/2024 04:53 AM     12/01/2023 08:30 AM    INR 1.49 (H) 11/23/2024 10:01 AM    INR 1.1 12/01/2023 08:30 AM    PTT 49 (H) 11/27/2024 06:19 AM     Lab Results   Component Value Date/Time    JWJ2LIMSCKAE 21.122 (H) 12/18/2024 03:02 PM       Code Status: Level 2 - DNAR: but accepts endotracheal intubation  Advance Directive and Living Will: Yes    Power of :    POLST:      Administrative Statements   I have spent a total time of 25 minutes in caring for this patient on the day of the visit/encounter including Risks and benefits of tx options, Instructions  for management, Patient and family education, Importance of tx compliance, Risk factor reductions, Impressions, Counseling / Coordination of care, Documenting in the medical record, Reviewing / ordering tests, medicine, procedures  , Obtaining or reviewing history  , and Communicating with other healthcare professionals . Topics discussed with the patient / family include symptom assessment and management, medication review, medication adjustment, psychosocial support, and supportive listening.

## 2024-12-25 NOTE — ASSESSMENT & PLAN NOTE
Palliative Diagnosis: endometrial cancer    Goals:  Currently a level 2.    Will continue discussions regarding GOC as patient's clinical presentation evolves.    Decisional apparatus:  Patient does have capacity on exam today.  If capacity is lost, patient's substitute decision maker would default to Sharda Carver per AD on file  ER contacts:  Sharda Carver (Niece)  [phone] (Work Phone)   Advance Directive/Living Will/POLST: on file and reviewed    Social Support:  Patient's support system:   Supportive listening provided  Normalized experience of patient  Advocated for patient/family with interdisciplinary team    Care Coordination  Case discussed with Dr. Felder    Follow-up  We appreciate the opportunity to participate in this patient's care.   We will continue to follow while admitted.    Please do not hesitate to contact our on-call provider through EPIC Secure Chat or contact [phone] should there be an acute change or other symptom control concerns.    Discharge Plan: Anticipate discharge in 48-72 hrs to rehab facility per primary team

## 2024-12-25 NOTE — ASSESSMENT & PLAN NOTE
Lab Results   Component Value Date    HGBA1C 6.9 (H) 11/05/2024       Recent Labs     12/24/24  1035 12/24/24  1554 12/24/24 2055 12/25/24  0745   POCGLU 232* 202* 201* 186*       Blood Sugar Average: Last 72 hrs:  (P) 216.2

## 2024-12-25 NOTE — ASSESSMENT & PLAN NOTE
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 CLD as tolerated.   DC plan: to return home after her rehab stay with [facility] via [facility].

## 2024-12-25 NOTE — PROGRESS NOTES
NEPHROLOGY PROGRESS NOTE   Lety Botello 64 y.o. female MRN: [id number]  Unit/Bed#: [facility] 511-01 Encounter: [id number]  Reason for Consult: hyponatremia    ASSESSMENT AND PLAN:  Assessment & Plan  Hyponatremia  -Noted chronic hyponatremia issues going back to 2022  -Sodium 129 on admission dropped to 125 on 12/20/2024 and since then improved and stable around corrected value of 131 again today.    -Suspect related to component of hypervolemia, underlying SIADH given malignancy history, pain issues, pleural effusion/atelectasis  -Continue IV Bumex 2 mg 3 times daily with albumin  -Clinically seems significant third spacing, body wall anasarca, prior CT scan showed bilateral pleural effusion.  -Workup this admission shows serum osmolality 278, urine sodium 61, urine osmolality 288 on 12/18/2024  -Will repeat urine sodium, urine osmolality  -BMP in AM.  Goal sodium correction no more than 8 meq in 24 hours.  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  Acute respiratory failure with hypoxia (HCC)  -Currently remains on O2 via nasal cannula  -Echo this admission shows EF 65%, grade 1 diastolic dysfunction, normal IVC, blunted respirophasic changes  -Hypervolemic with significant third spacing  -IV diuresis as above.  -Remains negative fluid balance in last 24 hours.  -Good urine output over last 24 hours noted.  -Check UACR  Hypertension  -Blood pressure acceptable with low normal reading today.  Avoid hypotension.  Continue Coreg, amlodipine  -Monitor blood pressure and if remains lower, consider holding amlodipine.  Endometrial cancer (HCC)  Management as per primary team  Hyperkalemia  -Potassium level slightly higher 5.4  -Discussed with primary team, dietitian regarding changing  tube feeds to Nepro  -Suspect hyperglycemia contributing  Need better control of blood glucose  Monitor with ongoing diuresis     Discussed above plan in detail with primary team regarding continue current Bumex, change tube feeds, monitor BMP and they agree with above recommendations.      SUBJECTIVE:  Patient seen and examined at bedside.  Remains on 2 L O2 via nasal cannula.  Good urine output noted    OBJECTIVE:  Current Weight: Weight - Scale: 113 kg (248 lb 3.8 oz)  Vitals:    12/25/24 0810   BP: 123/53   Pulse: 83   Resp:    Temp:    SpO2: 98%       Intake/Output Summary (Last 24 hours) at 12/25/2024 0900  Last data filed at 12/25/2024 0859  Gross per 24 hour   Intake 852 ml   Output 2675 ml   Net -1823 ml     Wt Readings from Last 3 Encounters:   12/25/24 113 kg (248 lb 3.8 oz)   12/05/24 123 kg (270 lb 8.1 oz)   11/05/24 107 kg (236 lb 12.4 oz)     Temp Readings from Last 3 Encounters:   12/25/24 100.4 °F (38 °C) (Oral)   12/05/24 99 °F (37.2 °C) (Oral)   11/12/24 97.5 °F (36.4 °C) (Oral)     BP Readings from Last 3 Encounters:   12/25/24 123/53   12/05/24 169/68   11/12/24 139/61     Pulse Readings from Last 3 Encounters:   12/25/24 83   12/05/24 82   11/12/24 85        Physical Examination:  Eyes: Mild conjunctival pallor present  Neck: No obvious lymphadenopathy appreciated  Respiratory: Bilateral air entry present  CVS: 2+ edema in legs  GI: Soft, nondistended  CNS: Active, alert, follows commands  Skin: No new rash  Musculoskeletal: No obvious new gross deformity noted    Medications:    Current Facility-Administered Medications:     acetaminophen (TYLENOL) tablet 975 mg, 975 mg, Oral, Q8H SAM, Pauly Grissom DO, 975 mg at 12/25/24 0522    albumin human (FLEXBUMIN) 25 % injection 12.5 g, 12.5 g, Intravenous, TID, Nando Birmingham MD, Last Rate: 0 mL/hr at 12/24/24 1459, 12.5 g at 12/25/24 0752    albuterol (PROVENTIL HFA,VENTOLIN HFA) inhaler 2 puff, 2 puff, Inhalation, Q6H PRN, Kelvin Sheppard,  DO    aluminum-magnesium hydroxide-simethicone (MAALOX) oral suspension 30 mL, 30 mL, Oral, Q6H PRN, Kelvin Sheppard DO    amLODIPine (NORVASC) tablet 5 mg, 5 mg, Oral, Daily, Nando Birmingham MD, 5 mg at 12/24/24 0921    aspirin (ECOTRIN LOW STRENGTH) EC tablet 81 mg, 81 mg, Oral, Daily, Kelvin Sheppard DO, 81 mg at 12/25/24 0813    bisacodyl (DULCOLAX) rectal suppository 10 mg, 10 mg, Rectal, Daily PRN, Kelvin Sheppard DO    bumetanide (BUMEX) injection 2 mg, 2 mg, Intravenous, TID, Nando Birmingham MD, 2 mg at 12/25/24 0814    carvedilol (COREG) tablet 25 mg, 25 mg, Oral, BID With Meals, Kelvin Sheppard DO, 25 mg at 12/25/24 0751    cyanocobalamin (VITAMIN B-12) tablet 1,000 mcg, 1,000 mcg, Oral, Daily, Kelvin Sheppard DO, 1,000 mcg at 12/25/24 0814    dicyclomine (BENTYL) capsule 10 mg, 10 mg, Oral, BID PRN, Kelvin Sheppard DO, 10 mg at 12/18/24 0947    enoxaparin (LOVENOX) subcutaneous injection 120 mg, 1 mg/kg, Subcutaneous, Q12H SAM, Paul Ríos MD, 120 mg at 12/25/24 0828    fentaNYL (DURAGESIC) 12 mcg/hr TD 72 hr patch 12 mcg, 12 mcg, Transdermal, Q72H, Pema Monterroso MD, 12 mcg at 12/24/24 1244    folic acid (FOLVITE) tablet 1 mg, 1 mg, Per G Tube, Daily, Paul Ríos MD, 1 mg at 12/25/24 0813    gabapentin (NEURONTIN) capsule 300 mg, 300 mg, Per G Tube, HS, Pauly Grissom, , 300 mg at 12/24/24 2139    HYDROmorphone (DILAUDID) injection 0.5 mg, 0.5 mg, Intravenous, Q3H PRN, Pema Monterroso MD, 0.5 mg at 12/24/24 1031    insulin glargine (LANTUS) subcutaneous injection 20 Units 0.2 mL, 20 Units, Subcutaneous, HS, Kelvin Sheppard DO, 20 Units at 12/24/24 2129    insulin lispro (HumALOG/ADMELOG) 100 units/mL subcutaneous injection 1-5 Units, 1-5 Units, Subcutaneous, TID AC, 1 Units at 12/25/24 0800 **AND** Fingerstick Glucose (POCT), , , TID AC, Kelvin Sheppard DO    insulin lispro (HumALOG/ADMELOG) 100 units/mL subcutaneous injection 5 Units, 5 Units, Subcutaneous, TID With Meals, Paul Ríos MD, 5 Units at  12/23/24 1307    iohexol (OMNIPAQUE) 240 MG/ML solution 50 mL, 50 mL, Oral, Once in imaging, Bj Jones MD    levothyroxine tablet 150 mcg, 150 mcg, Oral, Daily, Kelvin Sheppard DO, 150 mcg at 12/25/24 0522    naloxone (NARCAN) 0.04 mg/mL syringe 0.04 mg, 0.04 mg, Intravenous, Q1MIN PRN, JASON Cooley    nitroglycerin (NITROSTAT) SL tablet 0.4 mg, 0.4 mg, Sublingual, Q5 Min PRN, Kelvin Sheppard DO    omeprazole (PRILOSEC) suspension 2 mg/mL, 40 mg, Per G Tube, Daily, Kelvin Sheppard DO, 40 mg at 12/25/24 0556    ondansetron (ZOFRAN) injection 4 mg, 4 mg, Intravenous, Q6H PRN, Kelvin Sheppard DO, 4 mg at 12/24/24 0500    oxyCODONE (ROXICODONE) immediate release tablet 10 mg, 10 mg, Oral, Q6H, Michael Moore MD, 10 mg at 12/25/24 0452    oxyCODONE (ROXICODONE) oral solution 5 mg, 5 mg, Oral, Q4H PRN **OR** oxyCODONE (ROXICODONE) oral solution 10 mg, 10 mg, Oral, Q4H PRN, JASON Cooley, 10 mg at 12/24/24 1947    polyethylene glycol (MIRALAX) packet 17 g, 17 g, Oral, Daily, Kelvin Sheppard DO, 17 g at 12/25/24 0814    senna oral syrup 17.6 mg, 17.6 mg, Oral, BID, eKlvin Sheppard DO, 17.6 mg at 12/15/24 1721    Laboratory Results:  Results from last 7 days   Lab Units 12/25/24  0453 12/24/24  1538 12/24/24  0509 12/23/24  0709 12/23/24  0559 12/22/24  1659 12/22/24  0600 12/21/24  0855 12/20/24  1832 12/20/24  0528 12/19/24  0609 12/18/24  2046   WBC Thousand/uL 4.82 5.20  --   --  5.17  --   --   --   --  6.10 6.06  --    HEMOGLOBIN g/dL 7.7* 7.5*  --  6.9* 6.7*  --   --   --   --  8.3* 8.3* 6.9*   HEMATOCRIT % 24.8* 23.7*  --  21.9* 21.3*  --   --   --   --  26.0* 25.9* 21.2*   PLATELETS Thousands/uL 96* 86*  --   --  79*  --   --   --   --  76* 75*  --    SODIUM mmol/L 129*  --  128*  --  129* 128* 127* 127* 126* 125* 126* 126*   POTASSIUM mmol/L 5.4*  --  5.3  --  5.2 5.0 5.0 5.0 4.9 5.1 5.1 5.0   CHLORIDE mmol/L 89*  --  91*  --  91* 92* 91* 92* 93* 93* 94* 94*   CO2 mmol/L 36*  --  32  --  33* 32  32 32 28 26 29 28   BUN mg/dL 63*  --  60*  --  47* 41* 35* 28* 28* 28* 29* 31*   CREATININE mg/dL 0.88  --  0.79  --  0.83 0.83 0.81 0.68 0.63 0.68 0.62 0.75   CALCIUM mg/dL 8.8  --  8.5  --  8.2* 8.0* 8.3* 8.2* 7.8* 7.6* 7.9* 7.8*   MAGNESIUM mg/dL 1.8*  --  1.9  --  2.1  --  2.5 1.8*  --  1.8* 1.8*  --    PHOSPHORUS mg/dL  --   --   --   --  2.6  --   --   --   --  2.9 2.9  --        IR INPATIENT Paracentesis   Final Result by Rishabh Johnson MD (12/20 1640)   Therapeutic paracentesis.      Procedure was performed by:   Live Mayo PA-C      Supervising Physician: Dr. Johnson      Workstation performed: [id number]         CT abdomen pelvis w contrast   Final Result by Riki Reina MD (12/15 1527)      No bowel obstruction. Gastrojejunostomy tube in appropriate position.      Stable bilateral pleural effusions with bibasilar atelectasis right worse than left.      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      Severe body wall anasarca and large volume abdominopelvic ascites unchanged likely due to third spacing.         Workstation performed: [id number]         XR abdomen 1 view kub   Final Result by Arslan Parish MD (12/12 1002)      No acute abdominal findings.      The gastrojejunostomy tube projects over the mid abdomen.               Resident: Romy Blunt I, the attending radiologist, have reviewed the images and agree with the final report above.      Workstation performed: [id number]         XR chest portable ICU   Final Result by Andrea Anderson MD (12/11 0830)      Minimal increase in pulmonary edema and no significant change in the bilateral pleural effusions (right larger than left).            Workstation performed: [id number]         XR chest portable   Final Result by Kalie Nova MD (12/10 1450)      Bilateral lung disease.            Workstation performed: [id number]         CT abdomen pelvis w contrast   Final Result by Andrea Anderson MD  "(12/09 1829)      Since November 30, 2024:   1.  Persistent third spacing with slight increase in moderate volume ascites, and no significant change in moderate size right and small left pleural effusions with bibasilar atelectasis.   2.  New percutaneous enteric tube with tip in the distal duodenum.         Workstation performed: [id number]             Portions of the record may have been created with voice recognition software. Occasional wrong word or \"sound a like\" substitutions may have occurred due to the inherent limitations of voice recognition software. Read the chart carefully and recognize, using context, where substitutions have occurred.  "

## 2024-12-25 NOTE — ASSESSMENT & PLAN NOTE
-Potassium level slightly higher 5.4  -Discussed with primary team, dietitian regarding changing tube feeds to Nepro  -Suspect hyperglycemia contributing  Need better control of blood glucose  Monitor with ongoing diuresis

## 2024-12-25 NOTE — ASSESSMENT & PLAN NOTE
-Noted chronic hyponatremia issues going back to 2022  -Sodium 129 on admission dropped to 125 on 12/20/2024 and since then improved and stable around corrected value of 131 again today.    -Suspect related to component of hypervolemia, underlying SIADH given malignancy history, pain issues, pleural effusion/atelectasis  -Continue IV Bumex 2 mg 3 times daily with albumin  -Clinically seems significant third spacing, body wall anasarca, prior CT scan showed bilateral pleural effusion.  -Workup this admission shows serum osmolality 278, urine sodium 61, urine osmolality 288 on 12/18/2024  -Will repeat urine sodium, urine osmolality  -BMP in AM.  Goal sodium correction no more than 8 meq in 24 hours.

## 2024-12-25 NOTE — ASSESSMENT & PLAN NOTE
-Currently remains on O2 via nasal cannula  -Echo this admission shows EF 65%, grade 1 diastolic dysfunction, normal IVC, blunted respirophasic changes  -Hypervolemic with significant third spacing  -IV diuresis as above.  -Remains negative fluid balance in last 24 hours.  -Good urine output over last 24 hours noted.  -Check UACR

## 2024-12-25 NOTE — ASSESSMENT & PLAN NOTE
Current Multimodal pain regimen:  Fentanyl patch 12 mcg/hr started on 12.24  Schedule Tylenol 975 mg TID  Scheduled Oxycodone IR 10 mg oral tablet Q6hr --> D/C today 12.25  Gabapentin 300 mg HS   OxyIR 5mg Q4hrs PRN for moderate pain   OxyIR 10mg Q4hrs PRN for severe pain   IV dilaudid to 0.5 mg q3H PRN for BT pain  Failed therapies:  NA    Bowel regimen to prevent OIC: senna 17.6 mg bid, prn dulcolax suppository.   Opioid agreement. Not on file, will need outpatient follow up

## 2024-12-26 NOTE — ASSESSMENT & PLAN NOTE
Current Multimodal pain regimen:  Fentanyl patch 12 mcg/hr started on 12.24 --> May consider increase 12/27   Schedule Tylenol 975 mg TID  Gabapentin 300 mg HS   OxyIR 5mg Q4hrs PRN for moderate pain   OxyIR 10mg Q4hrs PRN for severe pain   IV dilaudid to 0.5 mg q3H PRN for BT pain  Failed therapies:  NA    Bowel regimen to prevent OIC: senna 17.6 mg bid, prn dulcolax suppository.   Opioid agreement. Not on file, will need outpatient follow up

## 2024-12-26 NOTE — PROGRESS NOTES
Progress Note - Palliative Care   Name: Lety Botello 64 y.o. 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 CLD as tolerated.   DC plan: to return home after her rehab stay with [facility] via [facility].   Abdominal pain    Current Multimodal pain regimen:  Fentanyl patch 12 mcg/hr started on 12.24 --> May consider increase 12/27   Schedule Tylenol 975 mg TID  Gabapentin 300 mg HS   OxyIR 5mg Q4hrs PRN for moderate pain   OxyIR 10mg Q4hrs PRN for severe pain   IV dilaudid to 0.5 mg q3H PRN for BT pain  Failed therapies:  NA    Bowel regimen to prevent OIC: senna 17.6 mg bid, prn dulcolax suppository.   Opioid agreement. Not on file, will need outpatient follow up    Palliative care encounter  Palliative Diagnosis: endometrial cancer    Goals:  Currently a level 2.    Will continue discussions regarding GOC as patient's clinical presentation evolves.    Decisional apparatus:  Patient does have capacity on exam today.  If capacity is lost, patient's substitute decision maker would default to Sharda Carver per AD on file  ER contacts:  Sharda Carver (Niece)  [phone] (Work Phone)   Advance Directive/Living Will/POLST: on file and reviewed    Social Support:  Patient's support system:   Supportive listening provided  Normalized experience of patient  Advocated for patient/family with interdisciplinary team    Care Coordination  Case discussed with Dr. Felder    Follow-up  We appreciate the opportunity to participate in this patient's care.   We will continue to follow while admitted.    Please do not hesitate to  contact our on-call provider through EPIC Secure Chat or contact [phone] should there be an acute change or other symptom control concerns.    Discharge Plan: Anticipate discharge in 48-72 hrs to rehab facility per primary team  Endometrial cancer (HCC)  Recurrent endometrial cancer, initial diagnosis 2020  Follows with JASON Dukes of [facility] gyn onc  S/p FLORECITA SBO SLND 6/2020 c/b splenic laceration  NAYE 1/2023, though presented for a fall in 10/2023 was incidentally found to have RP lymphadenopathy  Bx 12/2023 confirm metastatic adenocarcinoma  S/p pelvic RT and treatment with femara  NAYE 5/2024  Presented 10/18, 11/5, 11/14 with SBO - s/p ex lap, small bowel resection, partial omentectomy, DAVID 11/22/24 with biopsy confirming metastatic adenocarcinoma  Hypertension  BP stable  Currently on Norvasc 5 mg daily and Coreg 25 mg twice daily  Monitor BP  Pain control  Type 2 diabetes mellitus (HCC)  Lab Results   Component Value Date    HGBA1C 6.9 (H) 11/05/2024       Recent Labs     12/25/24  1603 12/25/24  2048 12/26/24  0027 12/26/24  0635   POCGLU 195* 185* 171* 158*       Blood Sugar Average: Last 72 hrs:  (P) 210    Gastroparesis    Anasarca  Receiving IV Bumex and IV albumin  Hyperkalemia        Interval history:       Patient seen up in bed.  More alert today. Reports pain control has definitely improved, though still not optimal.  Used 4x oxycodone PRNs and 2 IV dilaudid. No nausea, vomiting.  Having BMs.  Hopeful to get OOB today.     MEDICATIONS / ALLERGIES:     all current active meds have been reviewed and current meds:   Current Facility-Administered Medications:     acetaminophen (TYLENOL) tablet 975 mg, Q8H SAM    albumin human (FLEXBUMIN) 25 % injection 12.5 g, TID, Last Rate: 0 g (12/25/24 2155)    albuterol (PROVENTIL HFA,VENTOLIN HFA) inhaler 2 puff, Q6H PRN    aluminum-magnesium hydroxide-simethicone (MAALOX) oral suspension 30 mL, Q6H PRN    amLODIPine (NORVASC) tablet 5 mg, Daily     aspirin (ECOTRIN LOW STRENGTH) EC tablet 81 mg, Daily    bisacodyl (DULCOLAX) rectal suppository 10 mg, Daily PRN    bumetanide (BUMEX) injection 2 mg, TID    carvedilol (COREG) tablet 25 mg, BID With Meals    ceFAZolin (ANCEF) IVPB (premix in dextrose) 1,000 mg 50 mL, Q8H, Last Rate: 1,000 mg (12/26/24 3681)    cyanocobalamin (VITAMIN B-12) tablet 1,000 mcg, Daily    dicyclomine (BENTYL) capsule 10 mg, BID PRN    enoxaparin (LOVENOX) subcutaneous injection 120 mg, Q12H SAM    fentaNYL (DURAGESIC) 12 mcg/hr TD 72 hr patch 12 mcg, Q72H    folic acid (FOLVITE) tablet 1 mg, Daily    gabapentin (NEURONTIN) capsule 300 mg, HS    HYDROmorphone (DILAUDID) injection 0.5 mg, Q3H PRN    insulin glargine (LANTUS) subcutaneous injection 20 Units 0.2 mL, HS    insulin lispro (HumALOG/ADMELOG) 100 units/mL subcutaneous injection 1-5 Units, TID AC **AND** Fingerstick Glucose (POCT), TID AC    insulin lispro (HumALOG/ADMELOG) 100 units/mL subcutaneous injection 5 Units, TID With Meals    iohexol (OMNIPAQUE) 240 MG/ML solution 50 mL, Once in imaging    levothyroxine tablet 150 mcg, Daily    magnesium sulfate 2 g/50 mL IVPB (premix) 2 g, Once, Last Rate: 2 g (12/26/24 0920)    naloxone (NARCAN) 0.04 mg/mL syringe 0.04 mg, Q1MIN PRN    nitroglycerin (NITROSTAT) SL tablet 0.4 mg, Q5 Min PRN    omeprazole (PRILOSEC) suspension 2 mg/mL, Daily    ondansetron (ZOFRAN) injection 4 mg, Q6H PRN    oxyCODONE (ROXICODONE) oral solution 5 mg, Q4H PRN **OR** oxyCODONE (ROXICODONE) oral solution 10 mg, Q4H PRN    polyethylene glycol (MIRALAX) packet 17 g, Daily    senna oral syrup 17.6 mg, BID    Allergies   Allergen Reactions    Bee Pollen Anaphylaxis    Azithromycin Hives    Metformin Diarrhea    Other Other (See Comments)     hayfever with inhaler   hayfever with inhaler    Pollen Extract Other (See Comments)     hayfever with inhaler    Sulfamethizole Hives    Sulfamethoxazole-Trimethoprim Hives       OBJECTIVE:    Physical Exam  Physical  Exam  Vitals and nursing note reviewed.   Constitutional:       General: She is not in acute distress.     Appearance: She is ill-appearing. She is not toxic-appearing or diaphoretic.   HENT:      Head: Normocephalic and atraumatic.      Right Ear: External ear normal.      Left Ear: External ear normal.      Nose: Nose normal.      Mouth/Throat:      Mouth: Mucous membranes are moist.   Eyes:      General:         Right eye: No discharge.         Left eye: No discharge.   Cardiovascular:      Rate and Rhythm: Normal rate.   Pulmonary:      Effort: No respiratory distress.   Abdominal:      General: There is no distension.   Musculoskeletal:         General: Swelling present. No deformity.      Right lower leg: Edema present.      Left lower leg: Edema present.   Skin:     General: Skin is warm and dry.      Coloration: Skin is not jaundiced.   Neurological:      General: No focal deficit present.      Mental Status: She is alert. Mental status is at baseline.   Psychiatric:         Mood and Affect: Mood normal.         Behavior: Behavior normal.         Lab Results:   Results from last 7 days   Lab Units 12/26/24  0350 12/25/24  0453 12/24/24  1538 12/23/24  0709 12/23/24  0559   WBC Thousand/uL 4.41 4.82 5.20  --  5.17   HEMOGLOBIN g/dL 7.4* 7.7* 7.5*   < > 6.7*   HEMATOCRIT % 23.6* 24.8* 23.7*   < > 21.3*   PLATELETS Thousands/uL 101* 96* 86*  --  79*   SEGS PCT %  --   --  83*  --   --    MONO PCT %  --   --  6  --  2*   EOS PCT %  --   --  2  --   --     < > = values in this interval not displayed.     Results from last 7 days   Lab Units 12/26/24  0350 12/25/24  0453 12/24/24  0509 12/23/24  0559 12/20/24  1832 12/20/24  0528   POTASSIUM mmol/L 5.1 5.4* 5.3 5.2   < > 5.1   CHLORIDE mmol/L 88* 89* 91* 91*   < > 93*   CO2 mmol/L 39* 36* 32 33*   < > 26   BUN mg/dL 72* 63* 60* 47*   < > 28*   CREATININE mg/dL 0.88 0.88 0.79 0.83   < > 0.68   CALCIUM mg/dL 9.0 8.8 8.5 8.2*   < > 7.6*   ALK PHOS U/L  --   --   --   63  --  45   ALT U/L  --   --   --  5*  --  5*   AST U/L  --   --   --  6*  --  6*    < > = values in this interval not displayed.       Imaging Studies: reviewed pertinent studies   EKG, Pathology, and Other Studies: reviewed pertinent studies    Counseling / Coordination of Care    Total floor / unit time spent today 25 minutes. Greater than 50% of total time was spent with the patient and / or family counseling and / or coordination of care. A description of the counseling / coordination of care: symptom assessment and management, medication review, psychosocial support, chart review, lab review, opioid titration, supportive listening, and anticipatory guidance

## 2024-12-26 NOTE — ASSESSMENT & PLAN NOTE
64-year-old female with history of recurrent SBO 2/2 jejunal stricture from recurrent adenoCA gyn primary, s/p PEGJ 12/3/24 who presented on 12/9 due to malfunctioning J tube undergoing EGD 12/10 with ulcerated mucosa and pus below bumper, J tube looped in stomach which was replaced into distal duodenum. J tube malfunctioned again leading to replacement on 12/13. Has been functioning and tolerating feeds since replacement, though does report intermittent generalized abdominal pain as well as nausea and vomiting with concern for delayed motility and gastric outlet obstruction with difficulty venting from G port due to large J extension.     GI reengaged to comment on timing and duration of abx. PEG site appears to be erythematous near insertion site with granulation tissue, expression of gastric fluid from site. No leukocytosis, has been afebrile since admission. Bumper snug, was loosened during evaluation and freely rotated afterwards. Dressing replaced. Tube was taped in a fashion to prevent further traction.  Completed 7 days abx.    GI called back 12/20 for poorly venting G port. Able to pull back and vent, but J-extension is a 10 Fr, which is limiting venting somewhat. Pt with some irritation around PEG site given lateral traction again from binder as well as anemia due to chronic illness.       Plan:  PEG-J functioning appropriately and able to draw back air frin G-port (side port , mid-tube)  Continue Tube feeds through J tube and medications PO  Please DO NOT give meds through the J port  CLD as tolerated per primary  [place] bag to gravity from G port intermittently for venting   Recommend judicious use of opioids as this is contributing to generalized slowing of her GI system  Change dressing daily and when soiled, ensure tube is taped straight so that it prevents traction. RN repositioned PEG and binder to prevent further traction   Additional pain and symptom management per primary team

## 2024-12-26 NOTE — ASSESSMENT & PLAN NOTE
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  Persistent third spacing with slight increase in moderate volume ascites, and no significant change in moderate size right and small left pleural effusions with bibasilar atelectasis. 2.  New percutaneous enteric tube with tip in the distal duodenum.\"  GI following. S/p EGD 12/10 with J-tube exchange, postop patient had worsening hypoxia required transfer to ICU and using BiPAP  Concern for possible infection surrounding J-tube site, completed 7 days of ceftriaxone and vancomycin  developed clogged J-tube,   Status post EGD on 12/13 with J-tube replacement   Resumed on tube feeding  On clear liquid diet per speech/swallow therapist  G tube port is working sufficiently per gastroenterology  Tube feeding changed to Nepro due to hyperkalemia and hyperglycemia after discussion with nutrition\  Patient with leakage around the PEG tube site.  GI reevaluated.  Okay to continue with tube feeding  "

## 2024-12-26 NOTE — ASSESSMENT & PLAN NOTE
Lab Results   Component Value Date    HGBA1C 6.9 (H) 11/05/2024       Recent Labs     12/25/24  2048 12/26/24  0027 12/26/24  0635 12/26/24  1537   POCGLU 185* 171* 158* 155*       Blood Sugar Average: Last 72 hrs:  (P) 206.8334627499323183    Farxiga on hold while inpatient  Hyperglycemia secondary to steroids and tube feeding  Continue Lantus nightly, continue Humalog tid, adjust the doses as needed  Continue SSI

## 2024-12-26 NOTE — ASSESSMENT & PLAN NOTE
Palliative Diagnosis: endometrial cancer    Goals:  Currently a level 2.    Will continue discussions regarding GOC as patient's clinical presentation evolves.    Decisional apparatus:  Patient does have capacity on exam today.  If capacity is lost, patient's substitute decision maker would default to Sharda Carver per AD on file  ER contacts:  Sharda Carver (Niece)  [phone] (Work Phone)   Advance Directive/Living Will/POLST: on file and reviewed    Social Support:  Patient's support system:   Supportive listening provided  Normalized experience of patient  Advocated for patient/family with interdisciplinary team    Care Coordination  Case discussed with Dr. Felder    Follow-up  We appreciate the opportunity to participate in this patient's care.   We will continue to follow while admitted.    Please do not hesitate to contact our on-call provider through EPIC Secure Chat or contact [phone] should there be an acute change or other symptom control concerns.    Discharge Plan: Anticipate discharge in 48-72 hrs to rehab facility per primary team

## 2024-12-26 NOTE — PROGRESS NOTES
"Progress Note - Hospitalist   Name: Lety Botello 64 y.o. 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  Persistent third spacing with slight increase in moderate volume ascites, and no significant change in moderate size right and small left pleural effusions with bibasilar atelectasis. 2.  New percutaneous enteric tube with tip in the distal duodenum.\"  GI following. S/p EGD 12/10 with J-tube exchange, postop patient had worsening hypoxia required transfer to ICU and using BiPAP  Concern for possible infection surrounding J-tube site, completed 7 days of ceftriaxone and vancomycin  developed clogged J-tube,   Status post EGD on 12/13 with J-tube replacement   Resumed on tube feeding  On clear liquid diet per speech/swallow therapist  G tube port is working sufficiently per gastroenterology  Patient vomited x 1.  Abdominal exam benign.  Will start back on the tube feeding and monitor.  If the patient continues to have persistent vomiting need obstructions  Tube feeding changed to Nepro due to hyperkalemia and hyperglycemia after discussion with nutrition  Abdominal pain  Patient with recent hospitalization due to small bowel obstruction s/p exploratory laparotomy with small bowel resection on 11/22/2024  presented with worsening abdominal pain and distention  Abdominal pelvis CT scan, negative for obstruction, stable bilateral pleural effusion and anasarca  Continue supportive care  Coronado catheter placed due to retention  S/p paracentesis 12/18 " for ascites 5.5 L  Monitor Bms  Palliative care is following and adjusting analgesics  Acute respiratory failure with hypoxia (HCC)  Patient had worsening hypoxia post EGD on 12/10. Was satting mid-80s on 10L  Was given nebs, Lasix and subsequently placed on BiPAP and upgraded to critical care for further treatment  Currently in stable condition and transferred out of ICU  Currently on room air  NSVT (nonsustained ventricular tachycardia) (HCC)  25 run of nonsustained V. tach noted on telemetry, patient was asymptomatic  Cardiology consulted  Cardiac echo with normal EF and mild aortic stenosis  Coreg dose was increased to 25 mg twice daily  Monitor  Cardiology signed off    Hypothyroidism  Elevated TSH  Normal free T4 at 0.65  Patient was not receiving levothyroxine due to  PEG tube malfunctioning   Crease levothyroxine dose to 150 mcg daily  Repeat thyroid study in 4 to 6 weeks  Anemia  Received IV iron x 2  Vitamin B12 and folate deficiency  Continue vitamin B12 and folate supplement  Transfuse 1 unit of PRBCs 12/18 and 12/23  Continue to monitor hb closely  abdominal rash  Located on the right lower abdomen with oozing and bleeding  Seen by dermatology who performed a biopsy  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, consistent with gynecologic/Mullerian origin   Discontinue valtrex  Outpatient follow up with medical oncology when stable  Hyponatremia  Hyponatremia likely multifactorial - due to volume overload  Coronado catheter placed due to urinary retention  Monitor closely with diuresis  Gradually improving with diuresis  Due to persistent hyponatremia will consult with nephrology to evaluate for possible tolvaptan therapy  Hypertension  Monitor blood pressures  Continue carvedilol, Norvasc was added  Avoid hypotension  Coronary artery disease involving native coronary artery  History of MI in 2016 status post KARY  Stable  Type 2 diabetes mellitus (HCC)  Lab Results   Component Value Date    HGBA1C 6.9  (H) 11/05/2024       Recent Labs     12/24/24  2055 12/25/24  0745 12/25/24  1139 12/25/24  1603   POCGLU 201* 186* 244* 195*       Blood Sugar Average: Last 72 hrs:  (P) [id number]    Farxiga on hold while inpatient  Hyperglycemia secondary to steroids and tube feeding  Continue Lantus nightly, continue Humalog tid, adjust the doses as needed  Continue SSI  Endometrial cancer (HCC)  History of endometrial cancer noted  History of pulmonary embolism  Continue Eliquis 5 mg twice daily  Gastroparesis  History of   With PEG-J as above  GI following  Morbid obesity (HCC)  Body mass index is 48.48 kg/m².  Encourage lifestyle modification and weight loss once acute issues improved/resolved  Anasarca  Continue with albumin assisted diuresis.  Continue lasix 40mg TID  Continue to monitor daily weights, BMPs, telemetry  Urine output is decreasing and hyponatremia is not improving sufficiently - will consult with nephrology for assistance with management  Hyperkalemia      VTE Pharmacologic Prophylaxis: VTE Score: 7 Moderate Risk (Score 3-4) - Pharmacological DVT Prophylaxis Ordered: enoxaparin (Lovenox).    Mobility:   Basic Mobility Inpatient Raw Score: 7  JH-HLM Goal: 2: Bed activities/Dependent transfer  JH-HLM Achieved: 1: Laying in bed  JH-HLM Goal achieved. Continue to encourage appropriate mobility.    Patient Centered Rounds:    Discussions with Specialists or Other Care Team Provider:     Education and Discussions with Family / Patient:  Updated patient however you.     Current Length of Stay: 16 day(s)  Current Patient Status: Inpatient   Certification Statement: The patient will continue to require additional inpatient hospital stay due to iv abx days ago working this  Discharge Plan: Follow-up right    Code Status: Level 2 - DNAR: but accepts endotracheal intubation    Subjective   Patient seen and examined.  No events overnight.  Nursing reported that there is purulent drainage around the PEG tube  site.  Plan is to start on antibiotics and obtain cultures.  Discussed with GI will reevaluate tomorrow    Objective :  Temp:  [98.4 °F (36.9 °C)-100.4 °F (38 °C)] 99.1 °F (37.3 °C)  HR:  [82-89] 85  BP: (123-145)/(47-58) 134/51  Resp:  [16-20] 20  SpO2:  [96 %-98 %] 96 %  O2 Device: Nasal cannula    Body mass index is 48.48 kg/m².     Input and Output Summary (last 24 hours):     Intake/Output Summary (Last 24 hours) at 12/25/2024 1927  Last data filed at 12/25/2024 1701  Gross per 24 hour   Intake 1311 ml   Output 2300 ml   Net -989 ml       Physical Exam  Constitutional:       General: She is not in acute distress.  HENT:      Head: Normocephalic.      Nose: Nose normal.   Eyes:      General: No scleral icterus.  Cardiovascular:      Rate and Rhythm: Normal rate.   Pulmonary:      Comments: Decreased breath sounds bilateral  Abdominal:      Comments: Purulent drainage from the PEG tube site  Surgical site C/D/I   Musculoskeletal:      Right lower leg: Edema present.      Left lower leg: Edema present.   Skin:     General: Skin is warm.   Neurological:      Mental Status: She is alert. Mental status is at baseline.           Lines/Drains:  Lines/Drains/Airways       Active Status       Name Placement date Placement time Site Days    PICC Line 12/18/24 12/18/24  0922  --  7    Gastrostomy/Enterostomy PEG- Jejunostomy 20 Fr. LUQ 12/03/24  1553  LUQ  22    Urethral Catheter Straight-tip 18 Fr. 12/18/24  1025  Straight-tip  7                  Urinary Catheter:  Goal for removal: Voiding trial when ambulation improves         Central Line:  Goal for removal: -Patient without peripheral access               Lab Results: I have reviewed the following results:   Results from last 7 days   Lab Units 12/25/24  0453 12/24/24  1538 12/23/24  0709 12/23/24  0559   WBC Thousand/uL 4.82 5.20  --  5.17   HEMOGLOBIN g/dL 7.7* 7.5*   < > 6.7*   HEMATOCRIT % 24.8* 23.7*   < > 21.3*   PLATELETS Thousands/uL 96* 86*  --  79*   BANDS  PCT %  --   --   --  2   SEGS PCT %  --  83*  --   --    LYMPHO PCT %  --  8*  --  1   MONO PCT %  --  6  --  2*   EOS PCT %  --  2  --   --     < > = values in this interval not displayed.     Results from last 7 days   Lab Units 12/25/24  0453 12/24/24  0509 12/23/24  0559   SODIUM mmol/L 129*   < > 129*   POTASSIUM mmol/L 5.4*   < > 5.2   CHLORIDE mmol/L 89*   < > 91*   CO2 mmol/L 36*   < > 33*   BUN mg/dL 63*   < > 47*   CREATININE mg/dL 0.88   < > 0.83   ANION GAP mmol/L 4   < > 5   CALCIUM mg/dL 8.8   < > 8.2*   ALBUMIN g/dL  --   --  2.8*   TOTAL BILIRUBIN mg/dL  --   --  0.48   ALK PHOS U/L  --   --  63   ALT U/L  --   --  5*   AST U/L  --   --  6*   GLUCOSE RANDOM mg/dL 231*   < > 250*    < > = values in this interval not displayed.         Results from last 7 days   Lab Units 12/25/24  1603 12/25/24  1139 12/25/24  0745 12/24/24  2055 12/24/24  1554 12/24/24  1035 12/24/24  0716 12/23/24  2118 12/23/24  1811 12/23/24  1539 12/23/24  1350 12/23/24  1038   POC GLUCOSE mg/dl 195* 244* 186* 201* 202* 232* 314* 210* 208* 190* 202* 215*               Recent Cultures (last 7 days):         Imaging Results Review: No pertinent imaging studies reviewed.      Last 24 Hours Medication List:     Current Facility-Administered Medications:     acetaminophen (TYLENOL) tablet 975 mg, Q8H SAM    albumin human (FLEXBUMIN) 25 % injection 12.5 g, TID, Last Rate: Stopped (12/25/24 1701)    albuterol (PROVENTIL HFA,VENTOLIN HFA) inhaler 2 puff, Q6H PRN    aluminum-magnesium hydroxide-simethicone (MAALOX) oral suspension 30 mL, Q6H PRN    amLODIPine (NORVASC) tablet 5 mg, Daily    aspirin (ECOTRIN LOW STRENGTH) EC tablet 81 mg, Daily    bisacodyl (DULCOLAX) rectal suppository 10 mg, Daily PRN    bumetanide (BUMEX) injection 2 mg, TID    carvedilol (COREG) tablet 25 mg, BID With Meals    ceFAZolin (ANCEF) IVPB (premix in dextrose) 1,000 mg 50 mL, Q8H    cyanocobalamin (VITAMIN B-12) tablet 1,000 mcg, Daily    dicyclomine (BENTYL)  capsule 10 mg, BID PRN    enoxaparin (LOVENOX) subcutaneous injection 120 mg, Q12H SAM    fentaNYL (DURAGESIC) 12 mcg/hr TD 72 hr patch 12 mcg, Q72H    folic acid (FOLVITE) tablet 1 mg, Daily    gabapentin (NEURONTIN) capsule 300 mg, HS    HYDROmorphone (DILAUDID) injection 0.5 mg, Q3H PRN    insulin glargine (LANTUS) subcutaneous injection 20 Units 0.2 mL, HS    insulin lispro (HumALOG/ADMELOG) 100 units/mL subcutaneous injection 1-5 Units, TID AC **AND** Fingerstick Glucose (POCT), TID AC    insulin lispro (HumALOG/ADMELOG) 100 units/mL subcutaneous injection 5 Units, TID With Meals    iohexol (OMNIPAQUE) 240 MG/ML solution 50 mL, Once in imaging    levothyroxine tablet 150 mcg, Daily    naloxone (NARCAN) 0.04 mg/mL syringe 0.04 mg, Q1MIN PRN    nitroglycerin (NITROSTAT) SL tablet 0.4 mg, Q5 Min PRN    omeprazole (PRILOSEC) suspension 2 mg/mL, Daily    ondansetron (ZOFRAN) injection 4 mg, Q6H PRN    oxyCODONE (ROXICODONE) oral solution 5 mg, Q4H PRN **OR** oxyCODONE (ROXICODONE) oral solution 10 mg, Q4H PRN    polyethylene glycol (MIRALAX) packet 17 g, Daily    senna oral syrup 17.6 mg, BID    Administrative Statements   Today, Patient Was Seen By: Jackelyn Felder MD      **Please Note: This note may have been constructed using a voice recognition system.**

## 2024-12-26 NOTE — ASSESSMENT & PLAN NOTE
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  Persistent third spacing with slight increase in moderate volume ascites, and no significant change in moderate size right and small left pleural effusions with bibasilar atelectasis. 2.  New percutaneous enteric tube with tip in the distal duodenum.\"  GI following. S/p EGD 12/10 with J-tube exchange, postop patient had worsening hypoxia required transfer to ICU and using BiPAP  Concern for possible infection surrounding J-tube site, completed 7 days of ceftriaxone and vancomycin  developed clogged J-tube,   Status post EGD on 12/13 with J-tube replacement   Resumed on tube feeding  On clear liquid diet per speech/swallow therapist  G tube port is working sufficiently per gastroenterology  Patient vomited x 1.  Abdominal exam benign.  Will start back on the tube feeding and monitor.  If the patient continues to have persistent vomiting need obstructions  Tube feeding changed to Nepro due to hyperkalemia and hyperglycemia after discussion with nutrition  "

## 2024-12-26 NOTE — ASSESSMENT & PLAN NOTE
Body mass index is 47.53 kg/m².  Encourage lifestyle modification and weight loss once acute issues improved/resolved

## 2024-12-26 NOTE — PROGRESS NOTES
"Progress Note - Hospitalist   Name: Lety Botello 64 y.o. 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  Persistent third spacing with slight increase in moderate volume ascites, and no significant change in moderate size right and small left pleural effusions with bibasilar atelectasis. 2.  New percutaneous enteric tube with tip in the distal duodenum.\"  GI following. S/p EGD 12/10 with J-tube exchange, postop patient had worsening hypoxia required transfer to ICU and using BiPAP  Concern for possible infection surrounding J-tube site, completed 7 days of ceftriaxone and vancomycin  developed clogged J-tube,   Status post EGD on 12/13 with J-tube replacement   Resumed on tube feeding  On clear liquid diet per speech/swallow therapist  G tube port is working sufficiently per gastroenterology  Tube feeding changed to Nepro due to hyperkalemia and hyperglycemia after discussion with nutrition\  Patient with leakage around the PEG tube site.  GI reevaluated.  Okay to continue with tube feeding  Abdominal pain  Patient with recent hospitalization due to small bowel obstruction s/p exploratory laparotomy with small bowel resection on 11/22/2024  presented with worsening abdominal pain and distention  Abdominal pelvis CT scan, negative for obstruction, stable bilateral pleural effusion and anasarca  Continue supportive care  Coronado catheter placed due to retention  S/p paracentesis 12/18 for ascites 5.5 L  Monitor Bms  Palliative care is following and " adjusting analgesics  CT scan reviewed  Acute respiratory failure with hypoxia (HCC)  Patient had worsening hypoxia post EGD on 12/10. Was satting mid-80s on 10L  Was given nebs, Lasix and subsequently placed on BiPAP and upgraded to critical care for further treatment  Currently in stable condition and transferred out of ICU  Currently on room air  NSVT (nonsustained ventricular tachycardia) (HCC)  25 run of nonsustained V. tach noted on telemetry, patient was asymptomatic  Cardiology consulted  Cardiac echo with normal EF and mild aortic stenosis  Coreg dose was increased to 25 mg twice daily  Monitor  Cardiology signed off    Hypothyroidism  Elevated TSH  Normal free T4 at 0.65  Patient was not receiving levothyroxine due to  PEG tube malfunctioning   Crease levothyroxine dose to 150 mcg daily  Repeat thyroid study in 4 to 6 weeks  Anemia  Received IV iron x 2  Vitamin B12 and folate deficiency  Continue vitamin B12 and folate supplement  Transfuse 1 unit of PRBCs 12/18 and 12/23  Continue to monitor hb closely  abdominal rash  Located on the right lower abdomen with oozing and bleeding  Seen by dermatology who performed a biopsy  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, consistent with gynecologic/Mullerian origin   Discontinue valtrex  Outpatient follow up with medical oncology when stable  Hyponatremia  Hyponatremia likely multifactorial - due to volume overload  Coronado catheter placed due to urinary retention  Monitor closely with diuresis  Gradually improving with diuresis  Due to persistent hyponatremia will consult with nephrology to evaluate for possible tolvaptan therapy  Hypertension  Monitor blood pressures  Continue carvedilol, Norvasc was added  Avoid hypotension  Coronary artery disease involving native coronary artery  History of MI in 2016 status post KARY  Stable  Type 2 diabetes mellitus (HCC)  Lab Results   Component Value Date    HGBA1C 6.9 (H) 11/05/2024       Recent Labs      12/25/24 2048 12/26/24  0027 12/26/24  0635 12/26/24  1537   POCGLU 185* 171* 158* 155*       Blood Sugar Average: Last 72 hrs:  (P) [id number]    Farxiga on hold while inpatient  Hyperglycemia secondary to steroids and tube feeding  Continue Lantus nightly, continue Humalog tid, adjust the doses as needed  Continue SSI  Endometrial cancer (HCC)  History of endometrial cancer noted  History of pulmonary embolism  Continue Eliquis 5 mg twice daily  Gastroparesis  History of   With PEG-J as above  GI following  Morbid obesity (HCC)  Body mass index is 47.53 kg/m².  Encourage lifestyle modification and weight loss once acute issues improved/resolved  Anasarca  Continue with albumin assisted diuresis.  Continue lasix 40mg TID  Continue to monitor daily weights, BMPs, telemetry  Urine output is decreasing and hyponatremia is not improving sufficiently - will consult with nephrology for assistance with management  Hyperkalemia      VTE Pharmacologic Prophylaxis: VTE Score: 7 Moderate Risk (Score 3-4) - Pharmacological DVT Prophylaxis Ordered: enoxaparin (Lovenox).    Mobility:   Basic Mobility Inpatient Raw Score: 7  JH-HLM Goal: 2: Bed activities/Dependent transfer  JH-HLM Achieved: 2: Bed activities/Dependent transfer  JH-HLM Goal achieved. Continue to encourage appropriate mobility.    Patient Centered Rounds: I performed bedside rounds with nursing staff today.   Discussions with Specialists or Other Care Team Provider:     Education and Discussions with Family / Patient: Updated patient    Current Length of Stay: 17 day(s)  Current Patient Status: Inpatient   Certification Statement: The patient will continue to require additional inpatient hospital stay due to pain control  Discharge Plan: Anticipate discharge in 48-72 hrs to rehab facility.    Code Status: Level 2 - DNAR: but accepts endotracheal intubation    Subjective   Patient seen and examined.  Patient is endorsing abdominal pain.  CAT scan  reviewed.  GI evaluation appreciated.    Objective :  Temp:  [97.7 °F (36.5 °C)-100.6 °F (38.1 °C)] 97.7 °F (36.5 °C)  HR:  [76-90] 76  BP: (111-143)/(43-57) 135/51  Resp:  [18-20] 18  SpO2:  [92 %-96 %] 96 %  O2 Device: None (Room air)    Body mass index is 47.53 kg/m².     Input and Output Summary (last 24 hours):     Intake/Output Summary (Last 24 hours) at 12/26/2024 1829  Last data filed at 12/26/2024 1802  Gross per 24 hour   Intake 789 ml   Output 2550 ml   Net -1761 ml       Physical Exam  Constitutional:       Appearance: She is obese.   HENT:      Head: Normocephalic and atraumatic.      Nose: Nose normal.   Eyes:      General: No scleral icterus.  Cardiovascular:      Rate and Rhythm: Normal rate and regular rhythm.   Pulmonary:      Comments: Decreased breath sounds bilateral  Abdominal:      General: There is distension (Anasarca).      Tenderness: There is no guarding or rebound.      Comments: Feeding tube present.  Drainage around the tube noted    Abdominal wall wounds covered in dressing   Musculoskeletal:         General: Normal range of motion.   Skin:     Coloration: Skin is not jaundiced.   Neurological:      Mental Status: She is alert. Mental status is at baseline.           Lines/Drains:  Lines/Drains/Airways       Active Status       Name Placement date Placement time Site Days    PICC Line 12/18/24 12/18/24  0922  --  8    Gastrostomy/Enterostomy PEG- Jejunostomy 20 Fr. LUQ 12/03/24  1553  LUQ  23    Urethral Catheter Straight-tip 18 Fr. 12/18/24  1025  Straight-tip  8                  Urinary Catheter:  Goal for removal: N/A - Chronic Coronado         Central Line:  Goal for removal: Will discontinue when peripheral access obtained.                Lab Results: I have reviewed the following results:   Results from last 7 days   Lab Units 12/26/24  0350 12/25/24  0453 12/24/24  1538 12/23/24  0709 12/23/24  0559   WBC Thousand/uL 4.41   < > 5.20  --  5.17   HEMOGLOBIN g/dL 7.4*   < > 7.5*   <  > 6.7*   HEMATOCRIT % 23.6*   < > 23.7*   < > 21.3*   PLATELETS Thousands/uL 101*   < > 86*  --  79*   BANDS PCT %  --   --   --   --  2   SEGS PCT %  --   --  83*  --   --    LYMPHO PCT %  --   --  8*  --  1   MONO PCT %  --   --  6  --  2*   EOS PCT %  --   --  2  --   --     < > = values in this interval not displayed.     Results from last 7 days   Lab Units 12/26/24  0350 12/24/24  0509 12/23/24  0559   SODIUM mmol/L 130*   < > 129*   POTASSIUM mmol/L 5.1   < > 5.2   CHLORIDE mmol/L 88*   < > 91*   CO2 mmol/L 39*   < > 33*   BUN mg/dL 72*   < > 47*   CREATININE mg/dL 0.88   < > 0.83   ANION GAP mmol/L 3*   < > 5   CALCIUM mg/dL 9.0   < > 8.2*   ALBUMIN g/dL  --   --  2.8*   TOTAL BILIRUBIN mg/dL  --   --  0.48   ALK PHOS U/L  --   --  63   ALT U/L  --   --  5*   AST U/L  --   --  6*   GLUCOSE RANDOM mg/dL 148*   < > 250*    < > = values in this interval not displayed.         Results from last 7 days   Lab Units 12/26/24  1537 12/26/24  0635 12/26/24  0027 12/25/24  2048 12/25/24  1603 12/25/24  1139 12/25/24  0745 12/25/24  0621 12/24/24  2055 12/24/24  1554 12/24/24  1035 12/24/24  0716   POC GLUCOSE mg/dl 155* 158* 171* 185* 195* 244* 186* 205* 201* 202* 232* 314*               Recent Cultures (last 7 days):   Results from last 7 days   Lab Units 12/25/24  1648   GRAM STAIN RESULT  1+ Polys*  4+ Gram negative rods*  4+ Gram positive rods*  1+ Gram positive cocci in pairs*  Rare Budding yeast*             Last 24 Hours Medication List:     Current Facility-Administered Medications:     acetaminophen (TYLENOL) tablet 975 mg, Q8H SAM    albumin human (FLEXBUMIN) 25 % injection 12.5 g, BID    albuterol (PROVENTIL HFA,VENTOLIN HFA) inhaler 2 puff, Q6H PRN    aluminum-magnesium hydroxide-simethicone (MAALOX) oral suspension 30 mL, Q6H PRN    amLODIPine (NORVASC) tablet 5 mg, Daily    aspirin (ECOTRIN LOW STRENGTH) EC tablet 81 mg, Daily    bisacodyl (DULCOLAX) rectal suppository 10 mg, Daily PRN    bumetanide  (BUMEX) injection 2 mg, BID    carvedilol (COREG) tablet 25 mg, BID With Meals    ceFAZolin (ANCEF) IVPB (premix in dextrose) 1,000 mg 50 mL, Q8H, Last Rate: Stopped (12/26/24 1501)    cyanocobalamin (VITAMIN B-12) tablet 1,000 mcg, Daily    dicyclomine (BENTYL) capsule 10 mg, BID PRN    enoxaparin (LOVENOX) subcutaneous injection 120 mg, Q12H SAM    fentaNYL (DURAGESIC) 12 mcg/hr TD 72 hr patch 12 mcg, Q72H    folic acid (FOLVITE) tablet 1 mg, Daily    gabapentin (NEURONTIN) capsule 300 mg, HS    HYDROmorphone (DILAUDID) injection 0.5 mg, Q3H PRN    insulin glargine (LANTUS) subcutaneous injection 20 Units 0.2 mL, HS    insulin lispro (HumALOG/ADMELOG) 100 units/mL subcutaneous injection 1-5 Units, TID AC **AND** Fingerstick Glucose (POCT), TID AC    insulin lispro (HumALOG/ADMELOG) 100 units/mL subcutaneous injection 5 Units, TID With Meals    iohexol (OMNIPAQUE) 240 MG/ML solution 50 mL, Once in imaging    levothyroxine tablet 150 mcg, Daily    naloxone (NARCAN) 0.04 mg/mL syringe 0.04 mg, Q1MIN PRN    nitroglycerin (NITROSTAT) SL tablet 0.4 mg, Q5 Min PRN    omeprazole (PRILOSEC) suspension 2 mg/mL, Daily    ondansetron (ZOFRAN) injection 4 mg, Q6H PRN    oxyCODONE (ROXICODONE) oral solution 5 mg, Q4H PRN **OR** oxyCODONE (ROXICODONE) oral solution 10 mg, Q4H PRN    polyethylene glycol (MIRALAX) packet 17 g, Daily    senna oral syrup 17.6 mg, BID    Administrative Statements   Today, Patient Was Seen By: Jackelyn Felder MD      **Please Note: This note may have been constructed using a voice recognition system.**

## 2024-12-26 NOTE — ASSESSMENT & PLAN NOTE
Lab Results   Component Value Date    HGBA1C 6.9 (H) 11/05/2024       Recent Labs     12/25/24  1603 12/25/24  2048 12/26/24  0027 12/26/24  0635   POCGLU 195* 185* 171* 158*       Blood Sugar Average: Last 72 hrs:  (P) 210

## 2024-12-26 NOTE — WOUND OSTOMY CARE
Progress Note - Wound   Lety Botello 64 y.o. female MRN: [id number]  Unit/Bed#: [facility] 511-01 Encounter: [id number]        Assessment:   Patient is seen for wound care follow-up. Seen lying on regular mattress. [facility] specialty mattress re-ordered for patient. Mod/max assist for turning and repositioning. Recommend continuing with ATR turning and repositioning system. Incontinent of bowel and bladder.     Findings:  B/L heels and sacro-buttocks are dry intact and aliyah with no skin loss or wounds present. Recommend preventative Hydraguard Cream to sacro-buttocks and preventative foam dressings to b/l heels. Recommend proper offloading/ repositioning.      RLQ: with resolving shingles. Scattered areas of partial thickness skin loss measured together. Wound beds are red in color. Katya-wounds are fragile, hyperpigmented, and pink in color. Scant serosanguineous drainage noted. Recommend silicone bordered foam dressings to areas.   Left Upper Abdomen Peg Tube Site: irregular in shape area of full thickness skin loss located around tube site. Wound bed with moist yellow slough tissue. Katya-wound is fragile, hyperpigmented, and pink in color. Moderate to large serosanguineous drainage noted. Recommend silver alginate and mepilex up to area- recommend increasing frequency of changes. Primary RN made aware that if output is large/copious, patient will likely require tube site to pouch.       No induration, fluctuance, odor, warmth/temperature differences, redness, or purulence noted to the above noted wounds and skin areas assessed. New dressings applied per orders listed below. Patient tolerated well- no s/s of non-verbal pain or discomfort observed during the encounter. Bedside nurse aware of plan of care. See flow sheets for more detailed assessment findings.      Orders listed below and wound care will continue to follow, call or Secure Chat Message with questions.     Wound Care Plan:   1-Apply silicone bordered foam  dressings to bilateral heels for prevention.  Jason with P.  Peel back for skin assessments at least daily and re-apply.  Change dressings every 3 days and as needed.  2-Elevate heels off of bed/chair surface--offloading heel boots.  3-Offloading air cushion in chair when out of bed.  4-Apply moisturizing skin cream to body daily and as needed.  5-Turn/reposition every 2 hours while in bed and weight shift frequently while in chair for pressure re-distribution on skin.   6-Silicone Cream/Hydraguard lotion to bilateral buttocks and sacrum three times daily and as needed.  7-Right distal abdomen (shingles)--Cleanse with NSS and pat dry. Cover wounds with silicone bordered foam dressings to areas. Jason with T for Treatment and change every other day or PRN  8. Peg site- cleanse with Normal saline then apply melgisorb ag as split gauze then a mepilex up as a split gauze change daily   9. KREG low air loss mattress re-ordered for patient   10. ATR turning system with wedges            WOUNDS:  Wound 11/20/24 Abdomen Right;Distal (Active)   Wound Image   12/26/24 1143   Wound Description Beefy red;Fragile 12/26/24 1143   Katya-wound Assessment Fragile;Hyperpigmented 12/26/24 1143   Wound Length (cm) 14 cm 12/26/24 1143   Wound Width (cm) 29 cm 12/26/24 1143   Wound Depth (cm) 0.1 cm 12/26/24 1143   Wound Surface Area (cm^2) 406 cm^2 12/26/24 1143   Wound Volume (cm^3) 40.6 cm^3 12/26/24 1143   Calculated Wound Volume (cm^3) 40.6 cm^3 12/26/24 1143   Wound Site Closure BERENICE 12/26/24 1143   Drainage Amount Scant 12/26/24 1143   Drainage Description Serosanguineous 12/26/24 1143   Non-staged Wound Description Partial thickness 12/26/24 1143   Treatments Cleansed;Irrigation with NSS;Site care 12/26/24 1143   Dressing Foam, Silicon (eg. Allevyn, etc) 12/26/24 1143   Dressing Changed Reinforced 12/26/24 1143   Patient Tolerance Tolerated well 12/26/24 1143   Dressing Status Clean;Intact;Dry 12/26/24 1143       Wound 12/18/24  Abdomen Left;Upper (Active)   Wound Image   12/26/24 1140   Wound Description Yellow;Slough 12/26/24 1140   Katya-wound Assessment Fragile;Hyperpigmented;Pink 12/26/24 1140   Wound Length (cm) 1.5 cm 12/26/24 1140   Wound Width (cm) 2.2 cm 12/26/24 1140   Wound Depth (cm) 0.2 cm 12/26/24 1140   Wound Surface Area (cm^2) 3.3 cm^2 12/26/24 1140   Wound Volume (cm^3) 0.66 cm^3 12/26/24 1140   Calculated Wound Volume (cm^3) 0.66 cm^3 12/26/24 1140   Change in Wound Size % -37.5 12/26/24 1140   Drainage Amount Moderate 12/26/24 1140   Drainage Description Yellow;Serosanguineous 12/26/24 1140   Non-staged Wound Description Full thickness 12/26/24 1140   Treatments Cleansed;Irrigation with NSS;Site care 12/26/24 1140   Dressing Calcium Alginate with Silver;Foam, Silicon (eg. Allevyn, etc) 12/26/24 1140   Wound packed? No 12/26/24 1140   Dressing Changed New 12/26/24 1140   Patient Tolerance Tolerated well 12/26/24 1140   Dressing Status Clean;Dry;Intact 12/26/24 1140                Deanne Hoang RN, BSN, CWOCN

## 2024-12-26 NOTE — UTILIZATION REVIEW
Continued Stay Review    Date: 12/25/24                          Current Patient Class: Inpatient  Current Level of Care: Med/Surg    HPI:64 y.o. female initially admitted on 12/9 for GJ tube issues.     Assessment/Plan: Purulent drainage around PEG tube site. Exam: decreased breath sounds, purulent drainage from PEG tube site, b/l LE edema. K 5.4. Plan: concern for infection surrounding J-tube site. Tube feeding changed to Nepro s/t hyperkalemia and hyperglycemia. Telemetry, IV albumin TID, IV bumex 2 mg TID, IV Ancef q8h, fentanyl patch, SSI w/ BG checks ACHS, Lantus, encourage OOB, local wound care, DW, I&O, Trend labs, replete electrolytes as needed.    Medications:   Scheduled Medications:  acetaminophen, 975 mg, Oral, Q8H SAM  Albumin 25%, 12.5 g, Intravenous, TID  amLODIPine, 5 mg, Oral, Daily  aspirin, 81 mg, Oral, Daily  bumetanide, 2 mg, Intravenous, TID  carvedilol, 25 mg, Oral, BID With Meals  cefazolin, 1,000 mg, Intravenous, Q8H  vitamin B-12, 1,000 mcg, Oral, Daily  enoxaparin, 1 mg/kg, Subcutaneous, Q12H SAM  fentaNYL, 12 mcg, Transdermal, Q72H  folic acid, 1 mg, Per G Tube, Daily  gabapentin, 300 mg, Per G Tube, HS  insulin glargine, 20 Units, Subcutaneous, HS  insulin lispro, 1-5 Units, Subcutaneous, TID AC  insulin lispro, 5 Units, Subcutaneous, TID With Meals  levothyroxine, 150 mcg, Oral, Daily  omeprazole (PRILOSEC) suspension 2 mg/mL, 40 mg, Per G Tube, Daily  oxyCODONE, 10 mg, Oral, Q6H SAM  polyethylene glycol, 17 g, Oral, Daily  senna, 17.6 mg, Oral, BID    Continuous IV Infusions: none    PRN Meds:  albuterol, 2 puff, Inhalation, Q6H PRN  aluminum-magnesium hydroxide-simethicone, 30 mL, Oral, Q6H PRN  bisacodyl, 10 mg, Rectal, Daily PRN  dicyclomine, 10 mg, Oral, BID PRN  HYDROmorphone, 0.5 mg, Intravenous, Q3H PRN; 12/25 x2  naloxone, 0.04 mg, Intravenous, Q1MIN PRN  nitroglycerin, 0.4 mg, Sublingual, Q5 Min PRN  ondansetron, 4 mg, Intravenous, Q6H PRN  oxyCODONE, 5 mg, Oral, Q4H  PRN  oxyCODONE, 10 mg, Oral, Q4H PRN; 12/25 x1      Discharge Plan: TBD, remains on IV ABX    Vital Signs (last 3 days)       Date/Time Temp Pulse Resp BP MAP (mmHg) SpO2 O2 Flow Rate (L/min) O2 Device Patient Position - Orthostatic VS [place] Coma Scale Score Pain    12/26/24 1151 -- -- -- -- -- -- -- -- -- -- 10 - Worst Possible Pain    12/26/24 0913 -- -- -- 123/45 -- -- -- -- -- -- --    12/26/24 07:31:54 98.8 °F (37.1 °C) 80 20 143/52 82 93 % -- None (Room air) Lying -- --    12/26/24 0729 -- 79 -- 143/52 -- -- -- -- -- -- 10 - Worst Possible Pain    12/26/24 07:27:24 99.1 °F (37.3 °C) 79 18 114/55 75 92 % -- -- -- -- --    12/26/24 0353 -- -- -- -- -- -- -- -- -- -- 10 - Worst Possible Pain    12/26/24 02:09:53 98.2 °F (36.8 °C) -- 20 125/57 80 -- -- -- -- -- --    12/25/24 23:01:01 -- 90 -- 115/55 75 95 % -- -- -- -- --    12/25/24 22:24:37 99.2 °F (37.3 °C) -- 20 111/46 68 -- -- -- -- -- --    12/25/24 2100 99.1 °F (37.3 °C) -- -- -- -- -- -- -- -- -- --    12/25/24 20:58:32 100.6 °F (38.1 °C) 85 20 119/43 68 93 % -- -- -- -- --    12/25/24 2022 -- -- -- -- -- -- -- -- -- -- 10 - Worst Possible Pain    12/25/24 2015 -- -- -- -- -- 94 % 1 L/min Nasal cannula -- 15 --    12/25/24 1651 -- 85 -- 134/51 -- -- -- -- -- -- --    12/25/24 15:16:14 99.1 °F (37.3 °C) 87 20 132/51 78 96 % -- -- -- -- --    12/25/24 1450 -- -- -- -- -- -- -- -- -- -- 10 - Worst Possible Pain    12/25/24 1217 -- -- -- -- -- -- -- -- -- -- 10 - Worst Possible Pain    12/25/24 11:13:28 98.4 °F (36.9 °C) 87 17 125/47 73 96 % -- -- Lying -- --    12/25/24 08:10:33 -- 83 -- 123/53 76 98 % -- -- -- -- --    12/25/24 0800 -- -- -- -- -- -- -- -- -- 15 --    12/25/24 07:29:44 100.4 °F (38 °C) 82 17 123/54 77 96 % -- -- Lying -- --    12/25/24 0452 -- -- -- -- -- -- -- -- -- -- 10 - Worst Possible Pain    12/24/24 22:31:47 99.3 °F (37.4 °C) 83 16 145/53 84 96 % -- -- Lying -- --    12/24/24 2135 -- -- -- -- -- -- -- -- -- -- 8    12/24/24 2100 --  83 -- 134/58 83 97 % -- -- -- -- --    12/24/24 2000 -- 84 -- 131/56 81 98 % -- -- -- -- --    12/24/24 19:47:40 -- 89 -- 131/56 81 97 % -- -- -- -- --    12/24/24 1947 -- -- -- -- -- -- -- -- -- -- 10 - Worst Possible Pain    12/24/24 1938 -- -- -- -- -- -- 2 L/min Nasal cannula -- 15 --    12/24/24 19:20:59 98.7 °F (37.1 °C) 84 17 149/59 89 95 % -- -- Lying -- --    12/24/24 1531 -- -- -- -- -- -- -- -- -- -- 4    12/24/24 15:21:12 98.4 °F (36.9 °C) 80 16 130/56 81 95 % -- -- Lying -- --    12/24/24 1307 -- -- -- -- -- -- -- -- -- -- 10 - Worst Possible Pain    12/24/24 12:44:31 -- 81 -- 164/58 93 98 % -- -- -- -- --    12/24/24 1244 -- -- -- -- -- -- -- -- -- -- 10 - Worst Possible Pain    12/24/24 1100 -- -- -- -- -- -- -- -- -- -- 10 - Worst Possible Pain    12/24/24 1059 -- -- -- -- -- -- -- -- -- -- 10 - Worst Possible Pain    12/24/24 10:49:30 -- -- -- 123/48 73 -- -- -- -- -- --    12/24/24 10:39:37 98.5 °F (36.9 °C) 80 -- 112/41 65 97 % -- None (Room air) -- -- --    12/24/24 1031 -- -- -- -- -- -- -- -- -- -- 10 - Worst Possible Pain    12/24/24 0928 -- -- -- -- -- -- -- -- -- -- 10 - Worst Possible Pain    12/24/24 0800 -- -- -- -- -- -- -- -- -- 15 --    12/24/24 07:33:28 99.2 °F (37.3 °C) 80 18 144/64 91 95 % -- -- -- -- --    12/24/24 0656 -- -- -- -- -- -- -- -- -- -- No Pain    12/24/24 0626 -- -- -- -- -- -- -- -- -- -- 10 - Worst Possible Pain    12/24/24 0335 -- -- -- -- -- -- -- -- -- -- 10 - Worst Possible Pain    12/24/24 02:01:05 99.4 °F (37.4 °C) 89 22 146/55 85 95 % -- -- -- -- --    12/23/24 21:48:24 -- 88 -- 139/55 83 90 % -- -- -- -- --    12/23/24 2130 -- -- -- -- -- -- -- -- -- -- 10 - Worst Possible Pain    12/23/24 2027 -- -- -- -- -- -- -- None (Room air) -- 15 --    12/23/24 19:25:34 99.4 °F (37.4 °C) 89 22 -- -- 90 % -- -- -- -- --    12/23/24 1756 -- -- -- -- -- -- -- -- -- -- 9    12/23/24 15:41:42 99.5 °F (37.5 °C) 86 16 126/56 79 96 % -- -- -- -- --    12/23/24 13:26:55 -- 82  -- 136/58 84 96 % -- -- -- -- --    12/23/24 10:40:13 99.2 °F (37.3 °C) 81 20 131/68 89 96 % -- -- -- -- --    12/23/24 09:14:06 98.9 °F (37.2 °C) 86 15 119/50 73 98 % -- -- -- -- --    12/23/24 0903 98.7 °F (37.1 °C) 79 16 126/49 -- -- -- -- -- -- --    12/23/24 08:58:53 98.7 °F (37.1 °C) 80 -- 137/51 80 98 % -- -- -- -- --    12/23/24 0858 98.7 °F (37.1 °C) 81 16 137/51 80 -- -- -- -- -- --    12/23/24 0814 -- -- -- -- -- -- -- -- -- -- 10 - Worst Possible Pain    12/23/24 0800 -- -- -- -- -- 97 % 2 L/min Nasal cannula -- 15 --    12/23/24 07:22:21 98.7 °F (37.1 °C) 82 18 130/54 79 97 % -- -- -- -- --    12/23/24 0704 -- -- -- -- -- -- -- -- -- -- 10 - Worst Possible Pain    12/23/24 0544 -- -- -- -- -- -- -- -- -- -- 10 - Worst Possible Pain          Weight (last 2 days)       Date/Time Weight    12/26/24 0300 110 (243.39)    12/25/24 0549 113 (248.24)    12/24/24 0515 114 (251.32)            Pertinent Labs/Diagnostic Results:      Results from last 7 days   Lab Units 12/26/24  0350 12/25/24  0453 12/24/24  1538 12/23/24  0709 12/23/24  0559   WBC Thousand/uL 4.41 4.82 5.20  --  5.17   HEMOGLOBIN g/dL 7.4* 7.7* 7.5* 6.9* 6.7*   HEMATOCRIT % 23.6* 24.8* 23.7* 21.9* 21.3*   PLATELETS Thousands/uL 101* 96* 86*  --  79*   TOTAL NEUT ABS Thousands/µL  --   --  4.35  --  4.96   BANDS PCT %  --   --   --   --  2         Results from last 7 days   Lab Units 12/26/24  0350 12/25/24  0453 12/24/24  0509 12/23/24  0559 12/22/24  1659 12/22/24  0600 12/20/24  1832 12/20/24  0528   SODIUM mmol/L 130* 129* 128* 129* 128* 127*   < > 125*   POTASSIUM mmol/L 5.1 5.4* 5.3 5.2 5.0 5.0   < > 5.1   CHLORIDE mmol/L 88* 89* 91* 91* 92* 91*   < > 93*   CO2 mmol/L 39* 36* 32 33* 32 32   < > 26   ANION GAP mmol/L 3* 4 5 5 4 4   < > 6   BUN mg/dL 72* 63* 60* 47* 41* 35*   < > 28*   CREATININE mg/dL 0.88 0.88 0.79 0.83 0.83 0.81   < > 0.68   EGFR ml/min/1.73sq m 70 70 80 75 75 78   < > 94   CALCIUM mg/dL 9.0 8.8 8.5 8.2* 8.0* 8.3*   < >  7.6*   MAGNESIUM mg/dL 1.8* 1.8* 1.9 2.1  --  2.5   < > 1.8*   PHOSPHORUS mg/dL  --   --   --  2.6  --   --   --  2.9    < > = values in this interval not displayed.     Results from last 7 days   Lab Units 12/23/24  0559 12/20/24  0528   AST U/L 6* 6*   ALT U/L 5* 5*   ALK PHOS U/L 63 45   TOTAL PROTEIN g/dL 4.4* 3.9*   ALBUMIN g/dL 2.8* 2.5*   TOTAL BILIRUBIN mg/dL 0.48 0.37   BILIRUBIN DIRECT mg/dL 0.14 0.14     Results from last 7 days   Lab Units 12/26/24  0635 12/26/24  0027 12/25/24  2048 12/25/24  1603 12/25/24  1139 12/25/24  0745 12/25/24  0621 12/24/24  2055 12/24/24  1554 12/24/24  1035 12/24/24  0716 12/23/24  2118   POC GLUCOSE mg/dl 158* 171* 185* 195* 244* 186* 205* 201* 202* 232* 314* 210*     Results from last 7 days   Lab Units 12/26/24  0350 12/25/24  0453 12/24/24  0509 12/23/24  0559 12/22/24  1659 12/22/24  0600 12/21/24  0855 12/20/24  1832 12/20/24  0528   GLUCOSE RANDOM mg/dL 148* 231* 302* 250* 203* 200* 122 185* 235*       Results from last 7 days   Lab Units 12/25/24  1214   OSMO UR mmol/     Results from last 7 days   Lab Units 12/25/24  1214   SODIUM UR  68   CREATININE UR mg/dL 26.3      Results from last 7 days   Lab Units 12/25/24  1648   GRAM STAIN RESULT  1+ Polys*  4+ Gram negative rods*  4+ Gram positive rods*  1+ Gram positive cocci in pairs*  Rare Budding yeast*     Results from last 7 days   Lab Units 12/23/24  0559   TOTAL COUNTED  100               Network Utilization Review Department  ATTENTION: Please call with any questions or concerns to [phone] and carefully listen to the prompts so that you are directed to the right person. All voicemails are confidential.   For Discharge needs, contact Care Management DC Support Team at [phone] opt. 2  Send all requests for admission clinical reviews, approved or denied determinations and any other requests to dedicated fax number below belonging to the campus where the patient is receiving treatment. List of  dedicated fax numbers for the Facilities:  FACILITY NAME UR FAX NUMBER   ADMISSION DENIALS (Administrative/Medical Necessity) [phone]   DISCHARGE SUPPORT TEAM (NETWORK) [phone]   PARENT CHILD HEALTH (Maternity/NICU/Pediatrics) [phone]   [facility] [phone]   [facility] [phone]   [facility] [phone]   [facility] [phone]   [facility] [phone]   [facility] [phone]   [facility] [phone]   [facility] [phone]   [facility] [phone]   [facility] [phone]   [facility] [phone]   [facility] [phone]

## 2024-12-26 NOTE — ASSESSMENT & PLAN NOTE
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 CLD as tolerated.   DC plan: to return home after her rehab stay with [facility] via [facility].

## 2024-12-26 NOTE — ASSESSMENT & PLAN NOTE
Lab Results   Component Value Date    HGBA1C 6.9 (H) 11/05/2024       Recent Labs     12/24/24  2055 12/25/24  0745 12/25/24  1139 12/25/24  1603   POCGLU 201* 186* 244* 195*       Blood Sugar Average: Last 72 hrs:  (P) [id number]    Farxiga on hold while inpatient  Hyperglycemia secondary to steroids and tube feeding  Continue Lantus nightly, continue Humalog tid, adjust the doses as needed  Continue SSI

## 2024-12-26 NOTE — ASSESSMENT & PLAN NOTE
Body mass index is 48.48 kg/m².  Encourage lifestyle modification and weight loss once acute issues improved/resolved

## 2024-12-26 NOTE — ASSESSMENT & PLAN NOTE
Patient with recent hospitalization due to small bowel obstruction s/p exploratory laparotomy with small bowel resection on 11/22/2024  presented with worsening abdominal pain and distention  Abdominal pelvis CT scan, negative for obstruction, stable bilateral pleural effusion and anasarca  Continue supportive care  Coronado catheter placed due to retention  S/p paracentesis 12/18 for ascites 5.5 L  Monitor Bms  Palliative care is following and adjusting analgesics  CT scan reviewed

## 2024-12-26 NOTE — ASSESSMENT & PLAN NOTE
-Blood pressure acceptable.  Avoid hypotension.  Continue Coreg, amlodipine  -Monitor blood pressure and if remains lower, consider holding amlodipine.

## 2024-12-26 NOTE — ASSESSMENT & PLAN NOTE
-Noted chronic hyponatremia issues going back to 2022  -Sodium 129 on admission dropped to 125 on 12/20/2024 and since then improved and stable around 130 today.    -Suspect related to component of hypervolemia, underlying SIADH given malignancy history, pain issues, pleural effusion/atelectasis  -Will reduce IV Bumex to 2 mg twice daily with albumin given overall increasing bicarb and concern for contraction alkalosis.  Check VBG in a.m.  -Clinically seems significant third spacing, body wall anasarca, prior CT scan showed bilateral pleural effusion.  -Workup this admission shows serum osmolality 278, urine sodium 61, urine osmolality 288 on 12/18/2024  -Repeat urine sodium 68, urine osmolality 311  -BMP in AM.

## 2024-12-26 NOTE — ASSESSMENT & PLAN NOTE
-O2 requirement improving.  -Echo this admission shows EF 65%, grade 1 diastolic dysfunction, normal IVC, blunted respirophasic changes  -Hypervolemic with significant third spacing  -Given increasing bicarb, reducing IV Bumex as above.  -Remains negative fluid balance in last 24 hours.  Weight overall slowly reducing.  -Good urine output over last 24 hours noted.  -UACR 236 mg.

## 2024-12-26 NOTE — PROGRESS NOTES
Progress Note - Gastroenterology   Name: Lety Botello 64 y.o. female I MRN: [id number]  Unit/Bed#: [facility] 511-01 I Date of Admission: 12/9/2024   Date of Service: 12/26/2024 I Hospital Day: 17    Assessment & Plan  Encounter for gastrojejunal (GJ) tube placement issues  64-year-old female with history of recurrent SBO 2/2 jejunal stricture from recurrent adenoCA gyn primary, s/p PEGJ 12/3/24 who presented on 12/9 due to malfunctioning J tube undergoing EGD 12/10 with ulcerated mucosa and pus below bumper, J tube looped in stomach which was replaced into distal duodenum. J tube malfunctioned again leading to replacement on 12/13. Has been functioning and tolerating feeds since replacement, though does report intermittent generalized abdominal pain as well as nausea and vomiting with concern for delayed motility and gastric outlet obstruction with difficulty venting from G port due to large J extension.     GI reengaged to comment on timing and duration of abx. PEG site appears to be erythematous near insertion site with granulation tissue, expression of gastric fluid from site. No leukocytosis, has been afebrile since admission. Bumper snug, was loosened during evaluation and freely rotated afterwards. Dressing replaced. Tube was taped in a fashion to prevent further traction.  Completed 7 days abx.    GI called back 12/20 for poorly venting G port. Able to pull back and vent, but J-extension is a 10 Fr, which is limiting venting somewhat. Pt with some irritation around PEG site given lateral traction again from binder as well as anemia due to chronic illness.       Plan:  PEG-J functioning appropriately and able to draw back air frin G-port (side port , mid-tube)  Continue Tube feeds through J tube and medications PO  Please DO NOT give meds through the J port  CLD as tolerated per primary  Coronado bag to gravity from G port intermittently for venting   Recommend judicious use of opioids as this is contributing to  generalized slowing of her GI system  Change dressing daily and when soiled, ensure tube is taped straight so that it prevents traction. RN repositioned PEG and binder to prevent further traction   Additional pain and symptom management per primary team   Morbid obesity (HCC)  Chronic, body habitus may be contributing to tight PEG bumper which was loosened   Abdominal pain    Anasarca      I have discussed the above management plan in detail with the primary service.     Subjective   Pt with abd discomfort and occasional nausea.     Objective :  Temp:  [98.2 °F (36.8 °C)-100.6 °F (38.1 °C)] 98.8 °F (37.1 °C)  HR:  [79-90] 80  BP: (111-143)/(43-57) 123/45  Resp:  [17-20] 20  SpO2:  [92 %-96 %] 93 %  O2 Device: None (Room air)    Physical Exam  Vitals and nursing note reviewed.   Constitutional:       General: She is not in acute distress.     Appearance: She is well-developed and normal weight.   Abdominal:      General: Abdomen is flat. There is no distension.      Palpations: Abdomen is soft.      Tenderness: There is no abdominal tenderness.      Comments: PEG in place with some erythema and serous drainage   Musculoskeletal:         General: No swelling.   Neurological:      Mental Status: She is alert.         Lab Results: I have reviewed the following results:none    Imaging Results Review: No pertinent imaging studies reviewed.  Other Study Results Review: No additional pertinent studies reviewed.    Administrative Statements   I have spent a total time of 32 minutes in caring for this patient on the day of the visit/encounter including Diagnostic results, Prognosis, Risks and benefits of tx options, and Instructions for management.

## 2024-12-26 NOTE — PROGRESS NOTES
NEPHROLOGY PROGRESS NOTE   Lety Botello 64 y.o. female MRN: [id number]  Unit/Bed#: [facility] 511-01 Encounter: [id number]  Reason for Consult: hyponatremia    ASSESSMENT AND PLAN:  Assessment & Plan  Hyponatremia  -Noted chronic hyponatremia issues going back to 2022  -Sodium 129 on admission dropped to 125 on 12/20/2024 and since then improved and stable around 130 today.    -Suspect related to component of hypervolemia, underlying SIADH given malignancy history, pain issues, pleural effusion/atelectasis  -Will reduce IV Bumex to 2 mg twice daily with albumin given overall increasing bicarb and concern for contraction alkalosis.  Check VBG in a.m.  -Clinically seems significant third spacing, body wall anasarca, prior CT scan showed bilateral pleural effusion.  -Workup this admission shows serum osmolality 278, urine sodium 61, urine osmolality 288 on 12/18/2024  -Repeat urine sodium 68, urine osmolality 311  -BMP in AM.   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  Acute respiratory failure with hypoxia (HCC)  -O2 requirement improving.  -Echo this admission shows EF 65%, grade 1 diastolic dysfunction, normal IVC, blunted respirophasic changes  -Hypervolemic with significant third spacing  -Given increasing bicarb, reducing IV Bumex as above.  -Remains negative fluid balance in last 24 hours.  Weight overall slowly reducing.  -Good urine output over last 24 hours noted.  -UACR 236 mg.  Hypertension  -Blood pressure acceptable.  Avoid hypotension.  Continue Coreg, amlodipine  -Monitor blood pressure and if remains lower, consider holding amlodipine.  Endometrial cancer (HCC)  Management as per primary team  Hyperkalemia  -Potassium level improving 5.1.  -Now remains on  Nepro.  Monitor with ongoing diuresis     Discussed above plan in detail with primary team regarding reducing Bumex, monitoring BMP and they agree with above recommendations.      SUBJECTIVE:  Patient seen and examined at bedside.  Still has ongoing leg edema, weight reducing    OBJECTIVE:  Current Weight: Weight - Scale: 110 kg (243 lb 6.2 oz)  Vitals:    12/26/24 0913   BP: (!) 123/45   Pulse:    Resp:    Temp:    SpO2:        Intake/Output Summary (Last 24 hours) at 12/26/2024 1200  Last data filed at 12/26/2024 0701  Gross per 24 hour   Intake 715 ml   Output 2075 ml   Net -1360 ml     Wt Readings from Last 3 Encounters:   12/26/24 110 kg (243 lb 6.2 oz)   12/05/24 123 kg (270 lb 8.1 oz)   11/05/24 107 kg (236 lb 12.4 oz)     Temp Readings from Last 3 Encounters:   12/26/24 98.8 °F (37.1 °C) (Oral)   12/05/24 99 °F (37.2 °C) (Oral)   11/12/24 97.5 °F (36.4 °C) (Oral)     BP Readings from Last 3 Encounters:   12/26/24 (!) 123/45   12/05/24 169/68   11/12/24 139/61     Pulse Readings from Last 3 Encounters:   12/26/24 80   12/05/24 82   11/12/24 85        Physical Examination:  Eyes: Mild conjunctival pallor present  Neck: No obvious lymphadenopathy appreciated  Respiratory: Bilateral air entry present  CVS: 2+ edema in legs  GI: Soft, nondistended  CNS: Active, alert, follows commands  Skin: No new rash  Musculoskeletal: No obvious new gross deformity noted    Medications:    Current Facility-Administered Medications:     acetaminophen (TYLENOL) tablet 975 mg, 975 mg, Oral, Q8H [facility], Pauly Grissom, , 975 mg at 12/26/24 0729    albumin human (FLEXBUMIN) 25 % injection 12.5 g, 12.5 g, Intravenous, TID, Nando Birmingham MD, Last Rate: 0 mL/hr at 12/25/24 2155, 12.5 g at 12/26/24 0828    albuterol (PROVENTIL HFA,VENTOLIN HFA) inhaler 2 puff, 2 puff, Inhalation, Q6H PRN, Kelvin Sheppard DO    aluminum-magnesium hydroxide-simethicone (MAALOX) oral suspension 30 mL, 30 mL, Oral, Q6H PRN, Kelvin Sheppard, DO     amLODIPine (NORVASC) tablet 5 mg, 5 mg, Oral, Daily, Nando Birmingham MD, 5 mg at 12/26/24 0907    aspirin (ECOTRIN LOW STRENGTH) EC tablet 81 mg, 81 mg, Oral, Daily, Kelvin Sheppard DO, 81 mg at 12/26/24 0908    bisacodyl (DULCOLAX) rectal suppository 10 mg, 10 mg, Rectal, Daily PRN, Kelvin Sheppard DO    bumetanide (BUMEX) injection 2 mg, 2 mg, Intravenous, TID, Nando Birmingham MD, 2 mg at 12/26/24 0907    carvedilol (COREG) tablet 25 mg, 25 mg, Oral, BID With Meals, Kelvin Sheppard DO, 25 mg at 12/26/24 0732    ceFAZolin (ANCEF) IVPB (premix in dextrose) 1,000 mg 50 mL, 1,000 mg, Intravenous, Q8H, Jackelyn Felder MD, Last Rate: 100 mL/hr at 12/26/24 1151, 1,000 mg at 12/26/24 1151    cyanocobalamin (VITAMIN B-12) tablet 1,000 mcg, 1,000 mcg, Oral, Daily, Kelvin Sheppard DO, 1,000 mcg at 12/26/24 0907    dicyclomine (BENTYL) capsule 10 mg, 10 mg, Oral, BID PRN, Kelvin Sheppard DO, 10 mg at 12/18/24 0947    enoxaparin (LOVENOX) subcutaneous injection 120 mg, 1 mg/kg, Subcutaneous, Q12H [facility], Paul Ríos MD, 120 mg at 12/26/24 0908    fentaNYL (DURAGESIC) 12 mcg/hr TD 72 hr patch 12 mcg, 12 mcg, Transdermal, Q72H, May Sherry Monterroso MD, 12 mcg at 12/24/24 1244    folic acid (FOLVITE) tablet 1 mg, 1 mg, Per G Tube, Daily, Paul Ríos MD, 1 mg at 12/26/24 0907    gabapentin (NEURONTIN) capsule 300 mg, 300 mg, Per G Tube, HS, Pauly Grissom DO, 300 mg at 12/24/24 2139    HYDROmorphone (DILAUDID) injection 0.5 mg, 0.5 mg, Intravenous, Q3H PRN, May Sherry Monterroso MD, 0.5 mg at 12/26/24 1151    insulin glargine (LANTUS) subcutaneous injection 20 Units 0.2 mL, 20 Units, Subcutaneous, HS, Kelvin Sheppard DO, 20 Units at 12/25/24 2300    insulin lispro (HumALOG/ADMELOG) 100 units/mL subcutaneous injection 1-5 Units, 1-5 Units, Subcutaneous, TID AC, 1 Units at 12/26/24 1154 **AND** Fingerstick Glucose (POCT), , , TID AC, Kelvin Sheppard DO    insulin lispro (HumALOG/ADMELOG) 100 units/mL subcutaneous injection 5 Units, 5 Units,  Subcutaneous, TID With Meals, Paul Ríos MD, 5 Units at 12/26/24 1154    iohexol (OMNIPAQUE) 240 MG/ML solution 50 mL, 50 mL, Oral, Once in imaging, Bj Jones MD    levothyroxine tablet 150 mcg, 150 mcg, Oral, Daily, Kelvin Sheppard DO, 150 mcg at 12/26/24 0729    naloxone (NARCAN) 0.04 mg/mL syringe 0.04 mg, 0.04 mg, Intravenous, Q1MIN PRN, JASON Cooley    nitroglycerin (NITROSTAT) SL tablet 0.4 mg, 0.4 mg, Sublingual, Q5 Min PRN, Kelvin Sheppard DO    omeprazole (PRILOSEC) suspension 2 mg/mL, 40 mg, Per G Tube, Daily, Kelvin Sheppard DO, 40 mg at 12/26/24 0733    ondansetron (ZOFRAN) injection 4 mg, 4 mg, Intravenous, Q6H PRN, Kelvin Sheppard DO, 4 mg at 12/24/24 0500    oxyCODONE (ROXICODONE) oral solution 5 mg, 5 mg, Oral, Q4H PRN **OR** oxyCODONE (ROXICODONE) oral solution 10 mg, 10 mg, Oral, Q4H PRN, JASON Cooley, 10 mg at 12/26/24 0907    polyethylene glycol (MIRALAX) packet 17 g, 17 g, Oral, Daily, Kelvin Sheppard DO, 17 g at 12/25/24 0814    senna oral syrup 17.6 mg, 17.6 mg, Oral, BID, Kelvin Sheppard DO, 17.6 mg at 12/26/24 0908    Laboratory Results:  Results from last 7 days   Lab Units 12/26/24  0350 12/25/24  0453 12/24/24  1538 12/24/24  0509 12/23/24  0709 12/23/24  0559 12/22/24  1659 12/22/24  0600 12/21/24  0855 12/20/24  1832 12/20/24  0528   WBC Thousand/uL 4.41 4.82 5.20  --   --  5.17  --   --   --   --  6.10   HEMOGLOBIN g/dL 7.4* 7.7* 7.5*  --  6.9* 6.7*  --   --   --   --  8.3*   HEMATOCRIT % 23.6* 24.8* 23.7*  --  21.9* 21.3*  --   --   --   --  26.0*   PLATELETS Thousands/uL 101* 96* 86*  --   --  79*  --   --   --   --  76*   SODIUM mmol/L 130* 129*  --  128*  --  129* 128* 127* 127*   < > 125*   POTASSIUM mmol/L 5.1 5.4*  --  5.3  --  5.2 5.0 5.0 5.0   < > 5.1   CHLORIDE mmol/L 88* 89*  --  91*  --  91* 92* 91* 92*   < > 93*   CO2 mmol/L 39* 36*  --  32  --  33* 32 32 32   < > 26   BUN mg/dL 72* 63*  --  60*  --  47* 41* 35* 28*   < > 28*   CREATININE mg/dL 0.88  0.88  --  0.79  --  0.83 0.83 0.81 0.68   < > 0.68   CALCIUM mg/dL 9.0 8.8  --  8.5  --  8.2* 8.0* 8.3* 8.2*   < > 7.6*   MAGNESIUM mg/dL 1.8* 1.8*  --  1.9  --  2.1  --  2.5 1.8*  --  1.8*   PHOSPHORUS mg/dL  --   --   --   --   --  2.6  --   --   --   --  2.9    < > = values in this interval not displayed.       IR INPATIENT Paracentesis   Final Result by Rishabh Johnson MD (12/20 1640)   Therapeutic paracentesis.      Procedure was performed by:   Live Mayo PA-C      Supervising Physician: Dr. Johnson      Workstation performed: [id number]         CT abdomen pelvis w contrast   Final Result by Riki Reina MD (12/15 1527)      No bowel obstruction. Gastrojejunostomy tube in appropriate position.      Stable bilateral pleural effusions with bibasilar atelectasis right worse than left.      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      Severe body wall anasarca and large volume abdominopelvic ascites unchanged likely due to third spacing.         Workstation performed: [id number]         XR abdomen 1 view kub   Final Result by Arslan Parish MD (12/12 1002)      No acute abdominal findings.      The gastrojejunostomy tube projects over the mid abdomen.               Resident: Romy Blunt I, the attending radiologist, have reviewed the images and agree with the final report above.      Workstation performed: [id number]         XR chest portable ICU   Final Result by Andrea Anderson MD (12/11 0830)      Minimal increase in pulmonary edema and no significant change in the bilateral pleural effusions (right larger than left).            Workstation performed: [id number]         XR chest portable   Final Result by Kalie Nova MD (12/10 1450)      Bilateral lung disease.            Workstation performed: [id number]         CT abdomen pelvis w contrast   Final Result by Andrea Anderson MD (12/09 0036)      Since November 30, 2024:   1.  Persistent third spacing with  "slight increase in moderate volume ascites, and no significant change in moderate size right and small left pleural effusions with bibasilar atelectasis.   2.  New percutaneous enteric tube with tip in the distal duodenum.         Workstation performed: [id number]         CT abdomen pelvis w contrast    (Results Pending)       Portions of the record may have been created with voice recognition software. Occasional wrong word or \"sound a like\" substitutions may have occurred due to the inherent limitations of voice recognition software. Read the chart carefully and recognize, using context, where substitutions have occurred.  "

## 2024-12-27 NOTE — ASSESSMENT & PLAN NOTE
-Noted chronic hyponatremia issues going back to 2022  -Sodium 129 on admission dropped to 125 on 12/20/2024 and since then improved and stable around 130 until yesterday, sodium today drop again 127.    -Suspect related to component of hypervolemia, underlying SIADH given malignancy history, pain issues, pleural effusion/atelectasis  -Bumex IV was previously decreased to 2 mg twice daily with albumin given overall increasing bicarb and concern for contraction alkalosis on 12/26.   -Clinically seems significant third spacing, body wall anasarca, prior CT scan showed bilateral pleural effusion.  -Workup this admission shows serum osmolality 278, urine sodium 61, urine osmolality 288 on 12/18/2024  -Repeat urine sodium 68, urine osmolality 311  -Plan to give Samsca 15 g x 1 today, follow daily labs

## 2024-12-27 NOTE — PLAN OF CARE
Problem: Prexisting or High Potential for Compromised Skin Integrity  Goal: Skin integrity is maintained or improved  Description: INTERVENTIONS:  - Identify patients at risk for skin breakdown  - Assess and monitor skin integrity  - Assess and monitor nutrition and hydration status  - Monitor labs   - Assess for incontinence   - Turn and reposition patient  - Assist with mobility/ambulation  - Relieve pressure over bony prominences  - Avoid friction and shearing  - Provide appropriate hygiene as needed including keeping skin clean and dry  - Evaluate need for skin moisturizer/barrier cream  - Collaborate with interdisciplinary team   - Patient/family teaching  - Consider wound care consult   Outcome: Progressing     Problem: PAIN - ADULT  Goal: Verbalizes/displays adequate comfort level or baseline comfort level  Description: Interventions:  - Encourage patient to monitor pain and request assistance  - Assess pain using appropriate pain scale  - Administer analgesics based on type and severity of pain and evaluate response  - Implement non-pharmacological measures as appropriate and evaluate response  - Consider cultural and social influences on pain and pain management  - Notify physician/advanced practitioner if interventions unsuccessful or patient reports new pain  Outcome: Progressing     Problem: INFECTION - ADULT  Goal: Absence or prevention of progression during hospitalization  Description: INTERVENTIONS:  - Assess and monitor for signs and symptoms of infection  - Monitor lab/diagnostic results  - Monitor all insertion sites, i.e. indwelling lines, tubes, and drains  - Monitor endotracheal if appropriate and nasal secretions for changes in amount and color  - [place] appropriate cooling/warming therapies per order  - Administer medications as ordered  - Instruct and encourage patient and family to use good hand hygiene technique  - Identify and instruct in appropriate isolation precautions for  identified infection/condition  Outcome: Progressing     Problem: SAFETY ADULT  Goal: Patient will remain free of falls  Description: INTERVENTIONS:  - Educate patient/family on patient safety including physical limitations  - Instruct patient to call for assistance with activity   - Consult OT/PT to assist with strengthening/mobility   - Keep Call bell within reach  - Keep bed low and locked with side rails adjusted as appropriate  - Keep care items and personal belongings within reach  - Initiate and maintain comfort rounds  - Make Fall Risk Sign visible to staff  - Offer Toileting every 2 Hours, in advance of need  - Initiate/Maintain bed/chair alarm  - Obtain necessary fall risk management equipment: nonskid footwear  - Apply yellow socks and bracelet for high fall risk patients  - Consider moving patient to room near nurses station  Outcome: Progressing  Goal: Maintain or return to baseline ADL function  Description: INTERVENTIONS:  -  Assess patient's ability to carry out ADLs; assess patient's baseline for ADL function and identify physical deficits which impact ability to perform ADLs (bathing, care of mouth/teeth, toileting, grooming, dressing, etc.)  - Assess/evaluate cause of self-care deficits   - Assess range of motion  - Assess patient's mobility; develop plan if impaired  - Assess patient's need for assistive devices and provide as appropriate  - Encourage maximum independence but intervene and supervise when necessary  - Involve family in performance of ADLs  - Assess for home care needs following discharge   - Consider OT consult to assist with ADL evaluation and planning for discharge  - Provide patient education as appropriate  Outcome: Progressing  Goal: Maintains/Returns to pre admission functional level  Description: INTERVENTIONS:  - Perform AM-PAC 6 Click Basic Mobility/ Daily Activity assessment daily.  - Set and communicate daily mobility goal to care team and patient/family/caregiver.   -  Collaborate with rehabilitation services on mobility goals if consulted  - Perform Range of Motion 3 times a day.  - Reposition patient every 2 hours.  - Dangle patient 3 times a day  - Stand patient 3 times a day  - Ambulate patient 3 times a day  - Out of bed to chair 3 times a day   - Out of bed for meals 3 times a day  - Out of bed for toileting  - Record patient progress and toleration of activity level   Outcome: Progressing     Problem: DISCHARGE PLANNING  Goal: Discharge to home or other facility with appropriate resources  Description: INTERVENTIONS:  - Identify barriers to discharge w/patient and caregiver  - Arrange for needed discharge resources and transportation as appropriate  - Identify discharge learning needs (meds, wound care, etc.)  - Arrange for interpretive services to assist at discharge as needed  - Refer to Case Management Department for coordinating discharge planning if the patient needs post-hospital services based on physician/advanced practitioner order or complex needs related to functional status, cognitive ability, or social support system  Outcome: Progressing     Problem: Knowledge Deficit  Goal: Patient/family/caregiver demonstrates understanding of disease process, treatment plan, medications, and discharge instructions  Description: Complete learning assessment and assess knowledge base.  Interventions:  - Provide teaching at level of understanding  - Provide teaching via preferred learning methods  Outcome: Progressing     Problem: Nutrition/Hydration-ADULT  Goal: Nutrient/Hydration intake appropriate for improving, restoring or maintaining nutritional needs  Description: Monitor and assess patient's nutrition/hydration status for malnutrition. Collaborate with interdisciplinary team and initiate plan and interventions as ordered.  Monitor patient's weight and dietary intake as ordered or per policy. Utilize nutrition screening tool and intervene as necessary. Determine  patient's food preferences and provide high-protein, high-caloric foods as appropriate.     INTERVENTIONS:  - Monitor oral intake, urinary output, labs, and treatment plans  - Assess nutrition and hydration status and recommend course of action  - Evaluate amount of meals eaten  - Assist patient with eating if necessary   - Allow adequate time for meals  - Recommend/ encourage appropriate diets, oral nutritional supplements, and vitamin/mineral supplements  - Order, calculate, and assess calorie counts as needed  - Recommend, monitor, and adjust tube feedings and TPN/PPN based on assessed needs  - Assess need for intravenous fluids  - Provide specific nutrition/hydration education as appropriate  - Include patient/family/caregiver in decisions related to nutrition  Outcome: Progressing     Problem: GASTROINTESTINAL - ADULT  Goal: Minimal or absence of nausea and/or vomiting  Description: INTERVENTIONS:  - Administer IV fluids if ordered to ensure adequate hydration  - Maintain NPO status until nausea and vomiting are resolved  - Nasogastric tube if ordered  - Administer ordered antiemetic medications as needed  - Provide nonpharmacologic comfort measures as appropriate  - Advance diet as tolerated, if ordered  - Consider nutrition services referral to assist patient with adequate nutrition and appropriate food choices  Outcome: Progressing  Goal: Maintains or returns to baseline bowel function  Description: INTERVENTIONS:  - Assess bowel function  - Encourage oral fluids to ensure adequate hydration  - Administer IV fluids if ordered to ensure adequate hydration  - Administer ordered medications as needed  - Encourage mobilization and activity  - Consider nutritional services referral to assist patient with adequate nutrition and   Description: INTERVENTIONS:  - Monitor percentage of each meal consumed  - Identify factors contributing to decreased intake, treat as appropriate  - Assist with meals as needed  -  Monitor I&O, weight, and lab values if indicated  - Obtain nutrition services referral as needed  Outcome: Progressing  Goal: Oral mucous membranes remain intact  Description: INTERVENTIONS  - Assess oral mucosa and hygiene practices  - Implement preventative oral hygiene regimen  - Implement oral medicated treatments as ordered  - Initiate Nutrition services referral as needed  Outcome: Progressing     Problem: GENITOURINARY - ADULT  Goal: Maintains or returns to baseline urinary function  Description: INTERVENTIONS:  - Assess urinary function  - Encourage oral fluids to ensure adequate hydration if ordered  - Administer IV fluids as ordered to ensure adequate hydration  - Administer ordered medications as needed  - Offer frequent toileting  - Follow urinary retention protocol if ordered  Outcome: Progressing  Goal: Absence of urinary retention  Description: INTERVENTIONS:  - Assess patient’s ability to void and empty bladder  - Monitor I/O  - Bladder scan as needed  - Discuss with physician/AP medications to alleviate retention as needed  - Discuss catheterization for long term situations as appropriate  Outcome: Progressing

## 2024-12-27 NOTE — ASSESSMENT & PLAN NOTE
Summary of recommendations  Increase fentanyl patch 25 mcg Q72 hr 12/27   Continue scheduled Tylenol   Oxycodone 5 to10 mg every 4 PRN for moderate to severe pain   Dilaudid 0.5 mg every 3 as needed for breakthrough pain.

## 2024-12-27 NOTE — PROGRESS NOTES
Progress Note - Palliative Care   Name: Lety Botlelo 64 y.o. 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 CLD as tolerated.   DC plan: to return home after her rehab stay with [facility] via [facility].   Abdominal pain    Current Multimodal pain regimen:  Fentanyl patch 12 mcg/hr started on 12.24 --> Increase fentanyl 25 mcg 12/27   Schedule Tylenol 975 mg TID  Gabapentin 300 mg HS   OxyIR 5mg Q4hrs PRN for moderate pain   OxyIR 10mg Q4hrs PRN for severe pain   IV dilaudid to 0.5 mg q3H PRN for BT pain  Failed therapies:  NA  24 Hr   0.5 mg Dilaudid x4 doses  Oxycodone 10mg x5 doses    Bowel regimen to prevent OIC: senna 17.6 mg bid, prn dulcolax suppository.   Last bowel movement yesterday  Opioid agreement. Not on file, will need outpatient follow up    Palliative care encounter  Palliative Diagnosis: endometrial cancer    Goals:  Currently a level 2.    Will continue discussions regarding GOC as patient's clinical presentation evolves.    Decisional apparatus:  Patient does have capacity on exam today.  If capacity is lost, patient's substitute decision maker would default to Sharda Carver per AD on file  ER contacts:  Sharda aCrver (Niece)  [phone] (Work Phone)   Advance Directive/Living Will/POLST: on file and reviewed    Social Support:  Patient's support system:   Supportive listening provided  Normalized experience of patient  Advocated for patient/family with interdisciplinary team    Care Coordination  Case discussed with Dr. Felder    Follow-up  We appreciate the opportunity to participate  in this patient's care.   We will continue to follow while admitted.    Please do not hesitate to contact our on-call provider through [employer] Secure Chat or contact [phone] should there be an acute change or other symptom control concerns.    Discharge Plan: Anticipate discharge in 48-72 hrs to rehab facility per primary team  Endometrial cancer (HCC)  Recurrent endometrial cancer, initial diagnosis 2020  [facility]  S/p FLORECITA SBO SLND 6/2020 c/b splenic laceration  NAYE 1/2023, though presented for a fall in 10/2023 was incidentally found to have RP lymphadenopathy  Bx 12/2023 confirm metastatic adenocarcinoma  S/p pelvic RT and treatment with femara  NAYE 5/2024  Presented 10/18, 11/5, 11/14 with SBO - s/p ex lap, small bowel resection, partial omentectomy, DAVID 11/22/24 with biopsy confirming metastatic adenocarcinoma  Hypertension  BP stable  Currently on Norvasc 5 mg daily and Coreg 25 mg twice daily  Monitor BP  Pain control  Type 2 diabetes mellitus (HCC)  Lab Results   Component Value Date    HGBA1C 6.9 (H) 11/05/2024       Recent Labs     12/26/24  0635 12/26/24  1537 12/26/24  2121 12/27/24  0644   POCGLU 158* 155* 176* 166*       Blood Sugar Average: Last 72 hrs:  (P) [id number]    Gastroparesis    Anasarca  Receiving IV Bumex and IV albumin  Hyperkalemia    Summary of recommendations  Increase fentanyl patch 25 mcg Q72 hr 12/27   Continue scheduled Tylenol   Oxycodone 5 to10 mg every 4 PRN for moderate to severe pain   Dilaudid 0.5 mg every 3 as needed for breakthrough pain.    Decisional apparatus: Patient is competent on my exam today. If competence is lost, patient's substitute decision maker would default to  Sharda Carver (patient's niece who is healthcare representative)  by [place] Act 169.       Subjective   Pt seen and examined. She was eating jello. She is still having severe abdominal pain. Fentanyl patch works better for her. Patient denied fever,  chills, chest pain, shortness of breath, nausea, vomiting. No significant overnight event.    Care was coordinated with patients nurse     Objective :  Temp:  [97.7 °F (36.5 °C)-98.7 °F (37.1 °C)] 97.8 °F (36.6 °C)  HR:  [76-83] 80  BP: (109-140)/(42-51) 119/42  Resp:  [18] 18  SpO2:  [92 %-97 %] 97 %    Physical Exam  Vitals and nursing note reviewed.   Constitutional:       General: She is not in acute distress.     Appearance: She is well-developed. She is obese. She is ill-appearing.   HENT:      Head: Normocephalic and atraumatic.      Mouth/Throat:      Mouth: Mucous membranes are moist.   Eyes:      Extraocular Movements: Extraocular movements intact.      Conjunctiva/sclera: Conjunctivae normal.   Cardiovascular:      Rate and Rhythm: Normal rate and regular rhythm.      Heart sounds: Normal heart sounds. No murmur heard.  Pulmonary:      Effort: Pulmonary effort is normal. No respiratory distress.      Breath sounds: Normal breath sounds. No wheezing or rales.   Abdominal:      General: Bowel sounds are normal. There is no distension.      Palpations: Abdomen is soft.      Tenderness: There is abdominal tenderness.      Comments: Diffuse abdominal tenderness. PEG tube in place   Genitourinary:     Comments: Coronado's in place  Musculoskeletal:         General: Swelling present.      Cervical back: Neck supple.      Right lower leg: Edema present.      Left lower leg: Edema present.   Skin:     General: Skin is warm and dry.      Capillary Refill: Capillary refill takes less than 2 seconds.      Coloration: Skin is not jaundiced.   Neurological:      Mental Status: She is alert and oriented to person, place, and time.   Psychiatric:         Mood and Affect: Mood normal.            Lab Results: I have reviewed the following results:  Lab Results   Component Value Date/Time    SODIUM 127 (L) 12/27/2024 03:57 AM    SODIUM 132 (L) 11/04/2024 03:33 AM    K 4.6 12/27/2024 03:57 AM    K 4.7 11/04/2024 03:33 AM    BUN  75 (H) 12/27/2024 03:57 AM    BUN 19 11/04/2024 03:33 AM    CREATININE 1.06 12/27/2024 03:57 AM    CREATININE 0.76 11/04/2024 03:33 AM    GLUC 160 (H) 12/27/2024 03:57 AM    GLUC 98 11/04/2024 03:33 AM    CALCIUM 8.9 12/27/2024 03:57 AM    CALCIUM 8.6 11/04/2024 03:33 AM    AST 8 (L) 12/27/2024 03:57 AM    AST 9 11/04/2024 03:33 AM    ALT 5 (L) 12/27/2024 03:57 AM    ALT 6 11/04/2024 03:33 AM    ALB 3.0 (L) 12/27/2024 03:57 AM    ALB 3.1 (L) 11/04/2024 03:33 AM    TP 4.9 (L) 12/27/2024 03:57 AM    TP 5.4 (L) 11/04/2024 03:33 AM    EGFR 56 12/27/2024 03:57 AM    EGFR 89 11/04/2024 03:33 AM    EGFR 97 12/11/2020 03:15 AM     Lab Results   Component Value Date/Time    HGB 7.4 (L) 12/26/2024 03:50 AM    WBC 4.41 12/26/2024 03:50 AM     (L) 12/26/2024 03:50 AM     12/01/2023 08:30 AM    INR 1.49 (H) 11/23/2024 10:01 AM    INR 1.1 12/01/2023 08:30 AM    PTT 49 (H) 11/27/2024 06:19 AM     Lab Results   Component Value Date/Time    [id number] 21.122 (H) 12/18/2024 03:02 PM       Code Status: Level 2 - DNAR: but accepts endotracheal intubation  Advance Directive and Living Will: Yes    Power of :    POLST:      Administrative Statements   I have spent a total time of 20 minutes in caring for this patient on the day of the visit/encounter including Patient and family education, Counseling / Coordination of care, Documenting in the medical record, Reviewing / ordering tests, medicine, procedures  , Obtaining or reviewing history  , and Communicating with other healthcare professionals .

## 2024-12-27 NOTE — ASSESSMENT & PLAN NOTE
Palliative Diagnosis: endometrial cancer    Goals:  Currently a level 2.    Will continue discussions regarding GOC as patient's clinical presentation evolves.    Decisional apparatus:  Patient does have capacity on exam today.  If capacity is lost, patient's substitute decision maker would default to Sharda Carver per AD on file  ER contacts:  Sharda Carver (Niece)  [phone] (Work Phone)   Advance Directive/Living Will/POLST: on file and reviewed    Social Support:  Patient's support system:   Supportive listening provided  Normalized experience of patient  Advocated for patient/family with interdisciplinary team    Care Coordination  Case discussed with Dr. Felder    Follow-up  We appreciate the opportunity to participate in this patient's care.   We will continue to follow while admitted.    Please do not hesitate to contact our on-call provider through EPIC Secure Chat or contact [phone] should there be an acute change or other symptom control concerns.    Discharge Plan: Anticipate discharge in 48-72 hrs to rehab facility per primary team

## 2024-12-27 NOTE — PROGRESS NOTES
Progress Note - Nephrology   Name: Lety Botello 64 y.o. female I MRN: [id number]  Unit/Bed#: [facility] 511-01 I Date of Admission: 12/9/2024   Date of Service: 12/27/2024 I Hospital Day: 18    Assessment & Plan  Hyponatremia  -Noted chronic hyponatremia issues going back to 2022  -Sodium 129 on admission dropped to 125 on 12/20/2024 and since then improved and stable around 130 until yesterday, sodium today drop again 127.    -Suspect related to component of hypervolemia, underlying SIADH given malignancy history, pain issues, pleural effusion/atelectasis  -Bumex IV was previously decreased to 2 mg twice daily with albumin given overall increasing bicarb and concern for contraction alkalosis on 12/26.   -Clinically seems significant third spacing, body wall anasarca, prior CT scan showed bilateral pleural effusion.  -Workup this admission shows serum osmolality 278, urine sodium 61, urine osmolality 288 on 12/18/2024  -Repeat urine sodium 68, urine osmolality 311  -Plan to give Samsca 15 g x 1 today, follow daily labs  Encounter for gastrojejunal (GJ) tube placement issues  Recent small bowel obstruction secondary to malignant duodenal stricture from metastatic adenocarcinoma GYN primary  -Earlier J-tube malfunctioning, underwent EGD with ulcerated mucosa and J-tube was replaced  -Close GI follow-up.  Acute respiratory failure with hypoxia (HCC)  -O2 requirement improving.  -Echo this admission shows EF 65%, grade 1 diastolic dysfunction, normal IVC, blunted respirophasic changes  -Hypervolemic with significant third spacing  -Bumex IV was decreased to twice daily on 12/26  -Good urine output over last 24 hours noted.  -UACR 236 mg.  Hypertension  -Blood pressure acceptable.  Avoid hypotension.  Continue Coreg, amlodipine  -Monitor blood pressure and if remains lower, consider holding amlodipine.  Endometrial cancer (HCC)  Management as per primary team  Hyperkalemia  -Improving, potassium 4.6 this morning  -Now remains  on Nepro.  Monitor with ongoing diuresis    I have reviewed the nephrology recommendations including continue IV Bumex with albumin, plan to give Samsca 15 g x 1 today, follow daily labs, with internal medicine attending Dr. Felder, and we are in agreement with renal plan including the information outlined above.     Subjective   Brief History of Admission -     Seen and examined, feeling tired and weak, denies significant chest pain or shortness of breath, continues with abdominal pain, no significant vomiting or nausea    Objective :  Temp:  [97.7 °F (36.5 °C)-98.7 °F (37.1 °C)] 97.8 °F (36.6 °C)  HR:  [76-83] 80  BP: (109-140)/(42-51) 119/42  Resp:  [18] 18  SpO2:  [92 %-97 %] 97 %    Current Weight: Weight - Scale: 111 kg (244 lb 14.9 oz)  First Weight: Weight - Scale: 120 kg (263 lb 7.2 oz)  I/O         12/25 0701 12/26 0700 12/26 0701  12/27 0700 12/27 0701  12/28 0700    P.O.  460     NG/ 200 30    IV Piggyback 150 200     Feedings [phone]    Total Intake(mL/kg) 745 (6.8) 1854 (16.7) 195 (1.8)    Urine (mL/kg/hr) 2375 (0.9) 1625 (0.6) 250 (0.7)    Emesis/NG output 0      Stool 0      Total Output 2375 1625 250    Net -1630 +229 -55           Unmeasured Stool Occurrence 1 x            Physical Exam  General: Chronically ill-appearing, morbidly obese, cooperative, in not acute distress  Eyes: conjunctivae pale, anicteric sclerae  ENT: lips and mucous membranes moist  Neck: supple, no JVD  Chest: clear breath sounds bilateral, no crackles, ronchus or wheezings  CVS: distinct S1 & S2, normal rate, regular rhythm  Abdomen: soft, non-tender, non-distended, normoactive bowel sounds  Extremities: 2+ edema of both legs  Skin: no rash  Neuro: awake, alert, oriented    Medications:    Current Facility-Administered Medications:     acetaminophen (TYLENOL) tablet 975 mg, 975 mg, Oral, Q8H Pauly VARGAS DO, 975 mg at 12/27/24 0535    albumin human (FLEXBUMIN) 25 % injection 12.5 g, 12.5 g,  Intravenous, BID, Nando Birmingham MD, Last Rate: 0 mL/hr at 12/26/24 1901, 12.5 g at 12/27/24 1010    albuterol (PROVENTIL HFA,VENTOLIN HFA) inhaler 2 puff, 2 puff, Inhalation, Q6H PRN, Kelvin Sheppard DO    aluminum-magnesium hydroxide-simethicone (MAALOX) oral suspension 30 mL, 30 mL, Oral, Q6H PRN, Kelvin Sheppard DO    amLODIPine (NORVASC) tablet 5 mg, 5 mg, Oral, Daily, Nando Birmingham MD, 5 mg at 12/26/24 0907    aspirin (ECOTRIN LOW STRENGTH) EC tablet 81 mg, 81 mg, Oral, Daily, Kelvin Sheppard DO, 81 mg at 12/27/24 0940    bisacodyl (DULCOLAX) rectal suppository 10 mg, 10 mg, Rectal, Daily PRN, Kelvin Sheppard DO    bumetanide (BUMEX) injection 2 mg, 2 mg, Intravenous, BID, Nando Birmingham MD, 2 mg at 12/27/24 0943    carvedilol (COREG) tablet 25 mg, 25 mg, Oral, BID With Meals, Kelvin Sheppard DO, 25 mg at 12/27/24 0946    ceFAZolin (ANCEF) IVPB (premix in dextrose) 1,000 mg 50 mL, 1,000 mg, Intravenous, Q8H, Jackelyn Felder MD, Stopped at 12/27/24 0353    cyanocobalamin (VITAMIN B-12) tablet 1,000 mcg, 1,000 mcg, Oral, Daily, Klevin Sheppard DO, 1,000 mcg at 12/27/24 0940    dicyclomine (BENTYL) capsule 10 mg, 10 mg, Oral, BID PRN, Kelvin Sheppard DO, 10 mg at 12/18/24 0947    enoxaparin (LOVENOX) subcutaneous injection 120 mg, 1 mg/kg, Subcutaneous, Q12H SAM, Paul Ríos MD, 120 mg at 12/27/24 0952    fentaNYL (DURAGESIC) 12 mcg/hr TD 72 hr patch 12 mcg, 12 mcg, Transdermal, Q72H, Pema Monterroso MD, 12 mcg at 12/24/24 1244    folic acid (FOLVITE) tablet 1 mg, 1 mg, Per G Tube, Daily, Paul Ríos MD, 1 mg at 12/27/24 0941    gabapentin (NEURONTIN) capsule 300 mg, 300 mg, Per G Tube, HS, Pauly Grissom DO, 300 mg at 12/26/24 2241    HYDROmorphone (DILAUDID) injection 0.5 mg, 0.5 mg, Intravenous, Q3H PRN, May Sherry Monterroso MD, 0.5 mg at 12/26/24 1928    insulin glargine (LANTUS) subcutaneous injection 20 Units 0.2 mL, 20 Units, Subcutaneous, HS, Kelvin Sheppard DO, 20 Units at 12/26/24 2242    insulin lispro  (HumALOG/ADMELOG) 100 units/mL subcutaneous injection 1-5 Units, 1-5 Units, Subcutaneous, TID AC, 1 Units at 12/27/24 0950 **AND** Fingerstick Glucose (POCT), , , TID AC, Kelvin Sheppard DO    insulin lispro (HumALOG/ADMELOG) 100 units/mL subcutaneous injection 5 Units, 5 Units, Subcutaneous, TID With Meals, Palu Ríos MD, 5 Units at 12/27/24 0950    iohexol (OMNIPAQUE) 240 MG/ML solution 50 mL, 50 mL, Oral, Once in imaging, Bj Jones MD    levothyroxine tablet 150 mcg, 150 mcg, Oral, Daily, Kelvin Sheppard DO, 150 mcg at 12/27/24 0535    naloxone (NARCAN) 0.04 mg/mL syringe 0.04 mg, 0.04 mg, Intravenous, Q1MIN PRN, JASON Cooley    nitroglycerin (NITROSTAT) SL tablet 0.4 mg, 0.4 mg, Sublingual, Q5 Min PRN, Kelvin Sheppard DO    omeprazole (PRILOSEC) suspension 2 mg/mL, 40 mg, Per G Tube, Daily, Kelvin Sheppard DO, 40 mg at 12/27/24 0536    ondansetron (ZOFRAN) injection 4 mg, 4 mg, Intravenous, Q6H PRN, Kelvin Sheppard DO, 4 mg at 12/24/24 0500    oxyCODONE (ROXICODONE) oral solution 5 mg, 5 mg, Oral, Q4H PRN **OR** oxyCODONE (ROXICODONE) oral solution 10 mg, 10 mg, Oral, Q4H PRN, JASON Cooley, 10 mg at 12/27/24 0946    polyethylene glycol (MIRALAX) packet 17 g, 17 g, Oral, Daily, Kelvin Sheppard DO, 17 g at 12/27/24 0952    senna oral syrup 17.6 mg, 17.6 mg, Oral, BID, Kelvin Sheppard DO, 17.6 mg at 12/27/24 0958    tolvaptan (Samsca) tablet 15 mg, 15 mg, Oral, Once, Yosvany Ames MD      Lab Results: I have reviewed the following results:  Results from last 7 days   Lab Units 12/27/24  0357 12/26/24  0350 12/25/24  0453 12/24/24  1538 12/24/24  0509 12/23/24  0709 12/23/24  0559 12/22/24  1659 12/22/24  0600 12/21/24  0855   WBC Thousand/uL  --  4.41 4.82 5.20  --   --  5.17  --   --   --    HEMOGLOBIN g/dL  --  7.4* 7.7* 7.5*  --  6.9* 6.7*  --   --   --    HEMATOCRIT %  --  23.6* 24.8* 23.7*  --  21.9* 21.3*  --   --   --    PLATELETS Thousands/uL  --  101* 96* 86*  --   --  79*  --   --    "--    POTASSIUM mmol/L 4.6 5.1 5.4*  --  5.3  --  5.2 5.0 5.0 5.0   CHLORIDE mmol/L 86* 88* 89*  --  91*  --  91* 92* 91* 92*   CO2 mmol/L 35* 39* 36*  --  32  --  33* 32 32 32   BUN mg/dL 75* 72* 63*  --  60*  --  47* 41* 35* 28*   CREATININE mg/dL 1.06 0.88 0.88  --  0.79  --  0.83 0.83 0.81 0.68   CALCIUM mg/dL 8.9 9.0 8.8  --  8.5  --  8.2* 8.0* 8.3* 8.2*   MAGNESIUM mg/dL  --  1.8* 1.8*  --  1.9  --  2.1  --  2.5 1.8*   PHOSPHORUS mg/dL  --   --   --   --   --   --  2.6  --   --   --    ALBUMIN g/dL 3.0*  --   --   --   --   --  2.8*  --   --   --        Administrative Statements     Portions of the record may have been created with voice recognition software. Occasional wrong word or \"sound a like\" substitutions may have occurred due to the inherent limitations of voice recognition software. Read the chart carefully and recognize, using context, where substitutions have occurred.If you have any questions, please contact the dictating provider.  "

## 2024-12-27 NOTE — ASSESSMENT & PLAN NOTE
Current Multimodal pain regimen:  Fentanyl patch 12 mcg/hr started on 12.24 --> Increase fentanyl 25 mcg 12/27   Schedule Tylenol 975 mg TID  Gabapentin 300 mg HS   OxyIR 5mg Q4hrs PRN for moderate pain   OxyIR 10mg Q4hrs PRN for severe pain   IV dilaudid to 0.5 mg q3H PRN for BT pain  Failed therapies:  NA  24 Hr   0.5 mg Dilaudid x4 doses  Oxycodone 10mg x5 doses    Bowel regimen to prevent OIC: senna 17.6 mg bid, prn dulcolax suppository.   Last bowel movement yesterday  Opioid agreement. Not on file, will need outpatient follow up

## 2024-12-27 NOTE — ASSESSMENT & PLAN NOTE
Lab Results   Component Value Date    HGBA1C 6.9 (H) 11/05/2024       Recent Labs     12/26/24  0635 12/26/24  1537 12/26/24  2121 12/27/24  0644   POCGLU 158* 155* 176* 166*       Blood Sugar Average: Last 72 hrs:  (P) [id number]

## 2024-12-27 NOTE — ASSESSMENT & PLAN NOTE
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 CLD as tolerated.   DC plan: to return home after her rehab stay with [facility] via [facility].

## 2024-12-27 NOTE — ASSESSMENT & PLAN NOTE
-O2 requirement improving.  -Echo this admission shows EF 65%, grade 1 diastolic dysfunction, normal IVC, blunted respirophasic changes  -Hypervolemic with significant third spacing  -Bumex IV was decreased to twice daily on 12/26  -Good urine output over last 24 hours noted.  -UACR 236 mg.

## 2024-12-28 NOTE — ASSESSMENT & PLAN NOTE
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 CLD as tolerated.   DC plan: to return home after her rehab stay with [facility] via [facility].

## 2024-12-28 NOTE — ASSESSMENT & PLAN NOTE
-O2 requirement improving.  -Echo this admission shows EF 65%, grade 1 diastolic dysfunction, normal IVC, blunted respirophasic changes  -Hypervolemic with significant third spacing  -Bumex IV was decreased to twice daily on 12/26  -Plan to hold Bumex today due to increasing creatinine  -UACR 236 mg.

## 2024-12-28 NOTE — ASSESSMENT & PLAN NOTE
-Pressure is stable with some occasional low readings, but relative hypotension.  Continue Coreg, amlodipine  -Monitor blood pressure and if remains lower, consider holding amlodipine.

## 2024-12-28 NOTE — PROGRESS NOTES
Progress Note - Nephrology   Name: Lety Botello 64 y.o. female I MRN: [id number]  Unit/Bed#: [facility] 511-01 I Date of Admission: 12/9/2024   Date of Service: 12/28/2024 I Hospital Day: 19    Assessment & Plan  Hyponatremia  -Noted chronic hyponatremia issues going back to 2022  -Sodium 129 on admission dropped to 125 on 12/20/2024 and since then improved and stable around 130 until 12/26, sodium today drop again 127 on 12/27, received Samsca x 1, sodium up to 129 this morning.    -Suspect related to component of hypervolemia, underlying SIADH given malignancy history, pain issues, pleural effusion/atelectasis  -Bumex IV was previously decreased to 2 mg twice daily with albumin given overall increasing bicarb and concern for contraction alkalosis on 12/26.   -Clinically seems significant third spacing, body wall anasarca, prior CT scan showed bilateral pleural effusion.  -Workup this admission shows serum osmolality 278, urine sodium 61, urine osmolality 288 on 12/18/2024  -Repeat urine sodium 68, urine osmolality 311  -Will give Samsca 30 g x 1 today.  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  Acute respiratory failure with hypoxia (HCC)  -O2 requirement improving.  -Echo this admission shows EF 65%, grade 1 diastolic dysfunction, normal IVC, blunted respirophasic changes  -Hypervolemic with significant third spacing  -Bumex IV was decreased to twice daily on 12/26  -Plan to hold Bumex today due to increasing creatinine  -UACR 236 mg.  Hypertension  -Pressure is stable with some occasional low readings, but relative hypotension.  Continue Coreg, amlodipine  -Monitor blood  pressure and if remains lower, consider holding amlodipine.  Endometrial cancer (HCC)  Management as per primary team  Hyperkalemia  -Improving, potassium 4.5 this morning  -Now remains on Nepro.  Monitor with ongoing diuresis    I have reviewed the nephrology recommendations including hold Bumex today, Samsca 30 g x 1, follow daily labs, with internal medicine attending, and we are in agreement with renal plan including the information outlined above.     Subjective   Brief History of Admission -     Patient seen and examined, complaining of hard to move her legs but able to wiggle her toes, denies significant chest pain or shortness of breath, continues with ongoing abdominal pain, no nausea vomiting    Objective :  Temp:  [97.5 °F (36.4 °C)-97.8 °F (36.6 °C)] 97.5 °F (36.4 °C)  HR:  [75-82] 75  BP: (105-136)/(47-78) 109/54  Resp:  [16-17] 16  SpO2:  [90 %-99 %] 97 %    Current Weight: Weight - Scale: 113 kg (249 lb 12.5 oz)  First Weight: Weight - Scale: 120 kg (263 lb 7.2 oz)  I/O         12/26 0701  12/27 0700 12/27 0701  12/28 0700 12/28 0701  12/29 0700    P.O. 460 1200     NG/ 120 0    IV Piggyback 200 50     Feedings 994 1085 127    Total Intake(mL/kg) 1854 (16.7) 2455 (21.7) 127 (1.1)    Urine (mL/kg/hr) 1625 (0.6) 1125 (0.4)     Emesis/NG output       Stool       Total Output 1625 1125     Net +229 +1330 +127                 Physical Exam  General: Chronically ill-appearing, morbidly obese, cooperative, in not acute distress  Eyes: conjunctivae pale, anicteric sclerae  ENT: lips and mucous membranes moist  Neck: supple, no JVD  Chest: clear breath sounds bilateral, no crackles, ronchus or wheezings  CVS: distinct S1 & S2, normal rate, regular rhythm  Abdomen: soft, non-tender, mildly-distended, normoactive bowel sounds  Extremities: 2-3+ edema of both legs  Skin: no rash  Neuro: awake, alert, oriented      Medications:    Current Facility-Administered Medications:     acetaminophen (TYLENOL) tablet  975 mg, 975 mg, Oral, Q8H SAM, Pauly Grissom, DO, 975 mg at 12/28/24 0522    albumin human (FLEXBUMIN) 25 % injection 12.5 g, 12.5 g, Intravenous, BID, Nando Birmingham MD, Last Rate: 0 mL/hr at 12/26/24 1901, 12.5 g at 12/28/24 0941    albuterol (PROVENTIL HFA,VENTOLIN HFA) inhaler 2 puff, 2 puff, Inhalation, Q6H PRN, Kelvin Sheppard DO    aluminum-magnesium hydroxide-simethicone (MAALOX) oral suspension 30 mL, 30 mL, Oral, Q6H PRN, Kelvin Sheppard DO    amLODIPine (NORVASC) tablet 5 mg, 5 mg, Oral, Daily, Nando Birmingham MD, 5 mg at 12/26/24 0907    aspirin (ECOTRIN LOW STRENGTH) EC tablet 81 mg, 81 mg, Oral, Daily, Kelvin Sheppard DO, 81 mg at 12/28/24 0937    bisacodyl (DULCOLAX) rectal suppository 10 mg, 10 mg, Rectal, Daily PRN, Kelvin Sheppard DO    carvedilol (COREG) tablet 25 mg, 25 mg, Oral, BID With Meals, Kelvin Sheppard DO, 25 mg at 12/28/24 0716    ceFAZolin (ANCEF) IVPB (premix in dextrose) 1,000 mg 50 mL, 1,000 mg, Intravenous, Q8H, Jackelyn Felder MD, Last Rate: 100 mL/hr at 12/28/24 0418, 1,000 mg at 12/28/24 0418    cyanocobalamin (VITAMIN B-12) tablet 1,000 mcg, 1,000 mcg, Oral, Daily, Kelvin Sheppard DO, 1,000 mcg at 12/28/24 0937    dicyclomine (BENTYL) capsule 10 mg, 10 mg, Oral, BID PRN, Kelvin Sheppard DO, 10 mg at 12/18/24 0947    enoxaparin (LOVENOX) subcutaneous injection 120 mg, 1 mg/kg, Subcutaneous, Q12H SAM, Palu Ríos MD, 120 mg at 12/28/24 0939    fentaNYL (DURAGESIC) 25 mcg/hr TD 72 hr patch 25 mcg, 25 mcg, Transdermal, Q72H, May Sherry Monterroso MD, 25 mcg at 12/27/24 1211    folic acid (FOLVITE) tablet 1 mg, 1 mg, Per G Tube, Daily, Paul Ríos MD, 1 mg at 12/28/24 0937    gabapentin (NEURONTIN) capsule 300 mg, 300 mg, Per G Tube, HS, Pauly Grissom DO, 300 mg at 12/27/24 2145    HYDROmorphone (DILAUDID) injection 0.5 mg, 0.5 mg, Intravenous, Q3H PRN, May Sherry Monterroso MD, 0.5 mg at 12/28/24 0716    insulin glargine (LANTUS) subcutaneous injection 20 Units 0.2 mL, 20 Units,  Subcutaneous, HS, Kelvin Sheppard DO, 20 Units at 12/27/24 2145    insulin lispro (HumALOG/ADMELOG) 100 units/mL subcutaneous injection 1-5 Units, 1-5 Units, Subcutaneous, TID AC, 1 Units at 12/28/24 0716 **AND** Fingerstick Glucose (POCT), , , TID AC, Kelvin Sheppard,     insulin lispro (HumALOG/ADMELOG) 100 units/mL subcutaneous injection 5 Units, 5 Units, Subcutaneous, TID With Meals, Paul Ríos MD, 5 Units at 12/28/24 0716    iohexol (OMNIPAQUE) 240 MG/ML solution 50 mL, 50 mL, Oral, Once in imaging, Bj Jones MD    levothyroxine tablet 150 mcg, 150 mcg, Oral, Daily, Kelvin Sheppard DO, 150 mcg at 12/28/24 0522    naloxone (NARCAN) 0.04 mg/mL syringe 0.04 mg, 0.04 mg, Intravenous, Q1MIN PRN, JASON Cooley    nitroglycerin (NITROSTAT) SL tablet 0.4 mg, 0.4 mg, Sublingual, Q5 Min PRN, Kelvin Sheppard DO    omeprazole (PRILOSEC) suspension 2 mg/mL, 40 mg, Per G Tube, Daily, Kelvin Sheppard DO, 40 mg at 12/28/24 0522    ondansetron (ZOFRAN) injection 4 mg, 4 mg, Intravenous, Q6H PRN, Kelvin Sheppard DO, 4 mg at 12/24/24 0500    oxyCODONE (ROXICODONE) oral solution 5 mg, 5 mg, Oral, Q4H PRN **OR** oxyCODONE (ROXICODONE) oral solution 10 mg, 10 mg, Oral, Q4H PRN, JASON Cooley, 10 mg at 12/28/24 0518    polyethylene glycol (MIRALAX) packet 17 g, 17 g, Oral, Daily, Kelvin Sheppard DO, 17 g at 12/27/24 0952    senna oral syrup 17.6 mg, 17.6 mg, Oral, BID, Kelvin Sheppard DO, 17.6 mg at 12/28/24 0938    tolvaptan (Samsca) tablet 30 mg, 30 mg, Oral, Once, Yosvany Ames MD      Lab Results: I have reviewed the following results:  Results from last 7 days   Lab Units 12/28/24  0518 12/27/24  0357 12/26/24  0350 12/25/24  0453 12/24/24  1538 12/24/24  0509 12/23/24  0709 12/23/24  0559 12/22/24  1659 12/22/24  0600   WBC Thousand/uL  --   --  4.41 4.82 5.20  --   --  5.17  --   --    HEMOGLOBIN g/dL  --   --  7.4* 7.7* 7.5*  --  6.9* 6.7*  --   --    HEMATOCRIT %  --   --  23.6* 24.8* 23.7*  --  21.9*  "21.3*  --   --    PLATELETS Thousands/uL  --   --  101* 96* 86*  --   --  79*  --   --    POTASSIUM mmol/L 4.5 4.6 5.1 5.4*  --  5.3  --  5.2 5.0 5.0   CHLORIDE mmol/L 86* 86* 88* 89*  --  91*  --  91* 92* 91*   CO2 mmol/L 35* 35* 39* 36*  --  32  --  33* 32 32   BUN mg/dL 81* 75* 72* 63*  --  60*  --  47* 41* 35*   CREATININE mg/dL 1.26 1.06 0.88 0.88  --  0.79  --  0.83 0.83 0.81   CALCIUM mg/dL 8.8 8.9 9.0 8.8  --  8.5  --  8.2* 8.0* 8.3*   MAGNESIUM mg/dL  --   --  1.8* 1.8*  --  1.9  --  2.1  --  2.5   PHOSPHORUS mg/dL  --   --   --   --   --   --   --  2.6  --   --    ALBUMIN g/dL  --  3.0*  --   --   --   --   --  2.8*  --   --        Administrative Statements     Portions of the record may have been created with voice recognition software. Occasional wrong word or \"sound a like\" substitutions may have occurred due to the inherent limitations of voice recognition software. Read the chart carefully and recognize, using context, where substitutions have occurred.If you have any questions, please contact the dictating provider.  "

## 2024-12-28 NOTE — ASSESSMENT & PLAN NOTE
Body mass index is 47.83 kg/m².  Encourage lifestyle modification and weight loss once acute issues improved/resolved

## 2024-12-28 NOTE — ASSESSMENT & PLAN NOTE
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  Persistent third spacing with slight increase in moderate volume ascites, and no significant change in moderate size right and small left pleural effusions with bibasilar atelectasis. 2.  New percutaneous enteric tube with tip in the distal duodenum.\"  GI following. S/p EGD 12/10 with J-tube exchange, postop patient had worsening hypoxia required transfer to ICU and using BiPAP  Concern for possible infection surrounding J-tube site, completed 7 days of ceftriaxone and vancomycin  developed clogged J-tube,   Status post EGD on 12/13 with J-tube replacement   Resumed on tube feeding  On clear liquid diet per speech/swallow therapist  G tube port is working sufficiently per gastroenterology  Tube feeding changed to Nepro due to hyperkalemia and hyperglycemia after discussion with nutrition\  Patient with leakage around the PEG tube site.  GI reevaluated.  Okay to continue with tube feeding-current on antibiotics.  Culture is growing Klebsiella  "

## 2024-12-28 NOTE — ASSESSMENT & PLAN NOTE
-Noted chronic hyponatremia issues going back to 2022  -Sodium 129 on admission dropped to 125 on 12/20/2024 and since then improved and stable around 130 until 12/26, sodium today drop again 127 on 12/27, received Samsca x 1, sodium up to 129 this morning.    -Suspect related to component of hypervolemia, underlying SIADH given malignancy history, pain issues, pleural effusion/atelectasis  -Bumex IV was previously decreased to 2 mg twice daily with albumin given overall increasing bicarb and concern for contraction alkalosis on 12/26.   -Clinically seems significant third spacing, body wall anasarca, prior CT scan showed bilateral pleural effusion.  -Workup this admission shows serum osmolality 278, urine sodium 61, urine osmolality 288 on 12/18/2024  -Repeat urine sodium 68, urine osmolality 311  -Will give Samsca 30 g x 1 today.  Noted creatinine increasing to 1.26, hold Bumex today to let her reequilibrated, follow daily labs

## 2024-12-28 NOTE — ASSESSMENT & PLAN NOTE
Lab Results   Component Value Date    HGBA1C 6.9 (H) 11/05/2024       Recent Labs     12/27/24  1027 12/27/24  1605 12/27/24 2051 12/28/24  0632   POCGLU 177* 147* 162* 173*       Blood Sugar Average: Last 72 hrs:  (P) [id number]

## 2024-12-28 NOTE — ASSESSMENT & PLAN NOTE
Palliative Diagnosis: endometrial cancer    Goals:  Currently a level 2.    Will continue discussions regarding GOC as patient's clinical presentation evolves.    Decisional apparatus:  Patient does have capacity on exam today.  If capacity is lost, patient's substitute decision maker would default to Sharda Carver per AD on file  ER contacts:  Sharda Carver (Niece)  [phone] (Work Phone)   Advance Directive/Living Will/POLST: on file and reviewed    Social Support:  Patient's support system:   Supportive listening provided  Normalized experience of patient  Advocated for patient/family with interdisciplinary team    Care Coordination  Case discussed with Dr. Felder    Follow-up  We appreciate the opportunity to participate in this patient's care.   We will continue to follow while admitted.    Please do not hesitate to contact our on-call provider through [employer] Secure Chat or contact [phone] should there be an acute change or other symptom control concerns.    Discharge Plan: Anticipate discharge in 48-72 hrs to rehab facility per primary team

## 2024-12-28 NOTE — ASSESSMENT & PLAN NOTE
Hyponatremia likely multifactorial - due to volume overload  [place] catheter placed due to urinary retention  Monitor closely with diuresis  Gradually improving with diuresis  Due to persistent hyponatremia will consult with nephrology to evaluate for possible tolvaptan therapy  Nephrology is adding Samsca

## 2024-12-28 NOTE — PROGRESS NOTES
"Progress Note - Hospitalist   Name: Lety Botello 64 y.o. 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  Persistent third spacing with slight increase in moderate volume ascites, and no significant change in moderate size right and small left pleural effusions with bibasilar atelectasis. 2.  New percutaneous enteric tube with tip in the distal duodenum.\"  GI following. S/p EGD 12/10 with J-tube exchange, postop patient had worsening hypoxia required transfer to ICU and using BiPAP  Concern for possible infection surrounding J-tube site, completed 7 days of ceftriaxone and vancomycin  developed clogged J-tube,   Status post EGD on 12/13 with J-tube replacement   Resumed on tube feeding  On clear liquid diet per speech/swallow therapist  G tube port is working sufficiently per gastroenterology  Tube feeding changed to Nepro due to hyperkalemia and hyperglycemia after discussion with nutrition\  Patient with leakage around the PEG tube site.  GI reevaluated.  Okay to continue with tube feeding-current on antibiotics.  Culture is growing Klebsiella  Abdominal pain  Patient with recent hospitalization due to small bowel obstruction s/p exploratory laparotomy with small bowel resection on 11/22/2024  presented with worsening abdominal pain and distention  Abdominal pelvis CT scan, negative for obstruction, stable bilateral pleural effusion and anasarca  Continue supportive care  Coronado catheter placed due to retention  S/p paracentesis 12/18 for ascites " 5.5 L  Monitor Bms  Palliative care is following and adjusting analgesics  CT scan reviewed  Acute respiratory failure with hypoxia (HCC)  Patient had worsening hypoxia post EGD on 12/10. Was satting mid-80s on 10L  Was given nebs, Lasix and subsequently placed on BiPAP and upgraded to critical care for further treatment  Currently in stable condition and transferred out of ICU  Currently on room air  NSVT (nonsustained ventricular tachycardia) (HCC)  25 run of nonsustained V. tach noted on telemetry, patient was asymptomatic  Cardiology consulted  Cardiac echo with normal EF and mild aortic stenosis  Coreg dose was increased to 25 mg twice daily  Monitor  Cardiology signed off    Hypothyroidism  Elevated TSH  Normal free T4 at 0.65  Patient was not receiving levothyroxine due to  PEG tube malfunctioning   Crease levothyroxine dose to 150 mcg daily  Repeat thyroid study in 4 to 6 weeks  Anemia  Received IV iron x 2  Vitamin B12 and folate deficiency  Continue vitamin B12 and folate supplement  Transfuse 1 unit of PRBCs 12/18 and 12/23  Continue to monitor hb closely  abdominal rash  Located on the right lower abdomen with oozing and bleeding  Seen by dermatology who performed a biopsy  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, consistent with gynecologic/Mullerian origin   Discontinue valtrex  Outpatient follow up with medical oncology when stable  Hyponatremia  Hyponatremia likely multifactorial - due to volume overload  Coronado catheter placed due to urinary retention  Monitor closely with diuresis  Gradually improving with diuresis  Due to persistent hyponatremia will consult with nephrology to evaluate for possible tolvaptan therapy  Nephrology is adding Samsca  Hypertension  Monitor blood pressures  Continue carvedilol, Norvasc was added  Avoid hypotension  Coronary artery disease involving native coronary artery  History of MI in 2016 status post KARY  Stable  Type 2 diabetes mellitus (HCC)  Lab Results    Component Value Date    HGBA1C 6.9 (H) 11/05/2024       Recent Labs     12/26/24  2121 12/27/24  0644 12/27/24  1027 12/27/24  1605   POCGLU 176* 166* 177* 147*       Blood Sugar Average: Last 72 hrs:  (P) 194.625    Farxiga on hold while inpatient  Hyperglycemia secondary to steroids and tube feeding  Continue Lantus nightly, continue Humalog tid, adjust the doses as needed  Continue SSI  Endometrial cancer (HCC)  History of endometrial cancer noted  History of pulmonary embolism  Continue Eliquis 5 mg twice daily  Gastroparesis  History of   With PEG-J as above  GI following  Morbid obesity (HCC)  Body mass index is 47.83 kg/m².  Encourage lifestyle modification and weight loss once acute issues improved/resolved  Anasarca  Continue with albumin assisted diuresis.  Continue lasix 40mg TID  Continue to monitor daily weights, BMPs, telemetry  Urine output is decreasing and hyponatremia is not improving sufficiently - will consult with nephrology for assistance with management  Hyperkalemia      VTE Pharmacologic Prophylaxis: VTE Score: 7 Moderate Risk (Score 3-4) - Pharmacological DVT Prophylaxis Ordered: enoxaparin (Lovenox).    Mobility:   Basic Mobility Inpatient Raw Score: 6  JH-HLM Goal: 2: Bed activities/Dependent transfer  JH-HLM Achieved: 2: Bed activities/Dependent transfer  JH-HLM Goal achieved. Continue to encourage appropriate mobility.    Patient Centered Rounds: I performed bedside rounds with nursing staff today.   Discussions with Specialists or Other Care Team Provider: Nephrology    Education and Discussions with Family / Patient: Updated patient    Current Length of Stay: 18 day(s)  Current Patient Status: Inpatient   Certification Statement: The patient will continue to require additional inpatient hospital stay due to hyponatremia  Discharge Plan: Code Status: Level 2 - DNAR: but accepts endotracheal intubation    Subjective   Patient seen and examined.  Still with abdominal pain.  Currently  on antibiotics for purulence noted about the PEG tube site  Discussed with GI.  Will attempt paracentesis with peritoneal fluid culture  Coronado cath was replaced    Objective :  Temp:  [97.8 °F (36.6 °C)-98.7 °F (37.1 °C)] 97.8 °F (36.6 °C)  HR:  [76-83] 82  BP: (109-140)/(42-78) 136/59  Resp:  [17-18] 17  SpO2:  [90 %-98 %] 90 %    Body mass index is 47.83 kg/m².     Input and Output Summary (last 24 hours):     Intake/Output Summary (Last 24 hours) at 12/27/2024 1953  Last data filed at 12/27/2024 1932  Gross per 24 hour   Intake 1953 ml   Output 1200 ml   Net 753 ml       Physical Exam  Constitutional:       Appearance: She is obese.   HENT:      Head: Normocephalic and atraumatic.      Nose: Nose normal.   Eyes:      General: No scleral icterus.  Cardiovascular:      Rate and Rhythm: Normal rate and regular rhythm.      Heart sounds: No murmur heard.  Pulmonary:      Comments: Decreased breath sounds bilateral  Abdominal:      General: There is distension.      Comments: Abdominal wound covered in dressing   Musculoskeletal:         General: No swelling. Normal range of motion.   Skin:     General: Skin is warm.   Neurological:      Mental Status: She is alert. Mental status is at baseline.           Lines/Drains:  Lines/Drains/Airways       Active Status       Name Placement date Placement time Site Days    PICC Line 12/18/24 12/18/24  0922  --  9    Gastrostomy/Enterostomy PEG- Jejunostomy 20 Fr. LUQ 12/03/24  1553  LUQ  24    Urethral Catheter Latex 16 Fr. 12/26/24  2335  Latex  less than 1                  Urinary Catheter:  Goal for removal: Voiding trial when ambulation improves         Central Line:  Goal for removal:  No peripheral access               Lab Results: I have reviewed the following results:   Results from last 7 days   Lab Units 12/26/24  0350 12/25/24  0453 12/24/24  1538 12/23/24  0709 12/23/24  0559   WBC Thousand/uL 4.41   < > 5.20  --  5.17   HEMOGLOBIN g/dL 7.4*   < > 7.5*   < > 6.7*    HEMATOCRIT % 23.6*   < > 23.7*   < > 21.3*   PLATELETS Thousands/uL 101*   < > 86*  --  79*   BANDS PCT %  --   --   --   --  2   SEGS PCT %  --   --  83*  --   --    LYMPHO PCT %  --   --  8*  --  1   MONO PCT %  --   --  6  --  2*   EOS PCT %  --   --  2  --   --     < > = values in this interval not displayed.     Results from last 7 days   Lab Units 12/27/24  0357   SODIUM mmol/L 127*   POTASSIUM mmol/L 4.6   CHLORIDE mmol/L 86*   CO2 mmol/L 35*   BUN mg/dL 75*   CREATININE mg/dL 1.06   ANION GAP mmol/L 6   CALCIUM mg/dL 8.9   ALBUMIN g/dL 3.0*   TOTAL BILIRUBIN mg/dL 0.31   ALK PHOS U/L 70   ALT U/L 5*   AST U/L 8*   GLUCOSE RANDOM mg/dL 160*         Results from last 7 days   Lab Units 12/27/24  1605 12/27/24  1027 12/27/24  0644 12/26/24  2121 12/26/24  1537 12/26/24  0635 12/26/24  0027 12/25/24  2048 12/25/24  1603 12/25/24  1139 12/25/24  0745 12/25/24  0621   POC GLUCOSE mg/dl 147* 177* 166* 176* 155* 158* 171* 185* 195* 244* 186* 205*               Recent Cultures (last 7 days):   Results from last 7 days   Lab Units 12/25/24  1648   GRAM STAIN RESULT  1+ Polys*  4+ Gram negative rods*  4+ Gram positive rods*  1+ Gram positive cocci in pairs*  Rare Budding yeast*   WOUND CULTURE  4+ Growth of Klebsiella pneumoniae*             Last 24 Hours Medication List:     Current Facility-Administered Medications:     acetaminophen (TYLENOL) tablet 975 mg, Q8H SAM    albumin human (FLEXBUMIN) 25 % injection 12.5 g, BID, Last Rate: 0 g (12/26/24 1901)    albuterol (PROVENTIL HFA,VENTOLIN HFA) inhaler 2 puff, Q6H PRN    aluminum-magnesium hydroxide-simethicone (MAALOX) oral suspension 30 mL, Q6H PRN    amLODIPine (NORVASC) tablet 5 mg, Daily    aspirin (ECOTRIN LOW STRENGTH) EC tablet 81 mg, Daily    bisacodyl (DULCOLAX) rectal suppository 10 mg, Daily PRN    bumetanide (BUMEX) injection 2 mg, BID    carvedilol (COREG) tablet 25 mg, BID With Meals    ceFAZolin (ANCEF) IVPB (premix in dextrose) 1,000 mg 50 mL,  Q8H, Last Rate: Stopped (12/27/24 1932)    cyanocobalamin (VITAMIN B-12) tablet 1,000 mcg, Daily    dicyclomine (BENTYL) capsule 10 mg, BID PRN    enoxaparin (LOVENOX) subcutaneous injection 120 mg, Q12H SAM    fentaNYL (DURAGESIC) 25 mcg/hr TD 72 hr patch 25 mcg, Q72H    folic acid (FOLVITE) tablet 1 mg, Daily    gabapentin (NEURONTIN) capsule 300 mg, HS    HYDROmorphone (DILAUDID) injection 0.5 mg, Q3H PRN    insulin glargine (LANTUS) subcutaneous injection 20 Units 0.2 mL, HS    insulin lispro (HumALOG/ADMELOG) 100 units/mL subcutaneous injection 1-5 Units, TID AC **AND** Fingerstick Glucose (POCT), TID AC    insulin lispro (HumALOG/ADMELOG) 100 units/mL subcutaneous injection 5 Units, TID With Meals    iohexol (OMNIPAQUE) 240 MG/ML solution 50 mL, Once in imaging    levothyroxine tablet 150 mcg, Daily    naloxone (NARCAN) 0.04 mg/mL syringe 0.04 mg, Q1MIN PRN    nitroglycerin (NITROSTAT) SL tablet 0.4 mg, Q5 Min PRN    omeprazole (PRILOSEC) suspension 2 mg/mL, Daily    ondansetron (ZOFRAN) injection 4 mg, Q6H PRN    oxyCODONE (ROXICODONE) oral solution 5 mg, Q4H PRN **OR** oxyCODONE (ROXICODONE) oral solution 10 mg, Q4H PRN    polyethylene glycol (MIRALAX) packet 17 g, Daily    senna oral syrup 17.6 mg, BID    Administrative Statements   Today, Patient Was Seen By: Jackelyn Felder MD      **Please Note: This note may have been constructed using a voice recognition system.**

## 2024-12-28 NOTE — ASSESSMENT & PLAN NOTE
Lab Results   Component Value Date    HGBA1C 6.9 (H) 11/05/2024       Recent Labs     12/26/24  2121 12/27/24  0644 12/27/24  1027 12/27/24  1605   POCGLU 176* 166* 177* 147*       Blood Sugar Average: Last 72 hrs:  (P) 194.625    Farxiga on hold while inpatient  Hyperglycemia secondary to steroids and tube feeding  Continue Lantus nightly, continue Humalog tid, adjust the doses as needed  Continue SSI

## 2024-12-29 NOTE — PROGRESS NOTES
Progress Note - Nephrology   Name: Lety Botello 64 y.o. female I MRN: [id number]  Unit/Bed#: [facility] 511-01 I Date of Admission: 12/9/2024   Date of Service: 12/29/2024 I Hospital Day: 20    Assessment & Plan  Hyponatremia  -Noted chronic hyponatremia issues going back to 2022  -Sodium 129 on admission dropped to 125 on 12/20/2024 and since then improved and stable around 130 until 12/26, sodium today drop again 127 on 12/27, received Samsca 15 g x 1 on 12/27 and Samsca 30 g x 12/28.    Sodium lower at 127 this morning, plan to give another dose of Samsca dose 30 g x 1 today  -Suspect related to component of hypervolemia, underlying SIADH given malignancy history, pain issues, pleural effusion/atelectasis  -Bumex IV was previously decreased to 2 mg twice daily with albumin given overall increasing bicarb and concern for contraction alkalosis on 12/26.  Bumex was discontinued on 12/28 due to rising creatinine, creatinine up to 1.43, keep holding Bumex for today  -Clinically seems significant third spacing, body wall anasarca, prior CT scan showed bilateral pleural effusion.  -Workup this admission shows serum osmolality 278, urine sodium 61, urine osmolality 288 on 12/18/2024  -Repeat urine sodium 68, urine osmolality 311    Encounter for gastrojejunal (GJ) tube placement issues  Recent small bowel obstruction secondary to malignant duodenal stricture from metastatic adenocarcinoma GYN primary  -Earlier J-tube malfunctioning, underwent EGD with ulcerated mucosa and J-tube was replaced  -Close GI follow-up.  Acute respiratory failure with hypoxia (HCC)  -O2 requirement improving.  -Echo this admission shows EF 65%, grade 1 diastolic dysfunction, normal IVC, blunted respirophasic changes  -Hypervolemic with significant third spacing  -Bumex IV was decreased to twice daily on 12/26  -Bumex was discontinued on 12/28 due to rising creatinine, keep holding Bumex for now  -UACR 236 mg.  Hypertension  -Blood pressure overall is  stable.  Continue Coreg, amlodipine  -Avoid relative hypotension given rising creatinine  Endometrial cancer (HCC)  Management as per primary team  Hyperkalemia  -Improving, potassium 4.1 this morning  -Now remains on Nepro.  Monitor with ongoing diuresis    I have reviewed the nephrology recommendations including hold Bumex today, Samsca 3 g x 1 today, follow daily labs , with internal medicine attending Dr. Felder, and we are in agreement with renal plan including the information outlined above.     Subjective   Brief History of Admission -     Seen and examined, denies any frequent chest pain or shortness of breath, continue with on and off abdominal pain, denies nausea vomiting, feeling tired    Objective :  Temp:  [97.7 °F (36.5 °C)-97.9 °F (36.6 °C)] 97.9 °F (36.6 °C)  HR:  [71-75] 75  BP: (110-121)/(45-54) 121/51  Resp:  [16-17] 17  SpO2:  [97 %-100 %] 97 %  O2 Device: None (Room air)    Current Weight: Weight - Scale: 117 kg (258 lb 6.1 oz)  First Weight: Weight - Scale: 120 kg (263 lb 7.2 oz)  I/O         12/27 0701  12/28 0700 12/28 0701  12/29 0700 12/29 0701  12/30 0700    P.O. 1200 938     NG/ 60     IV Piggyback 50 75     Feedings 1085 798     Total Intake(mL/kg) 2455 (21.7) 1871 (16)     Urine (mL/kg/hr) 1125 (0.4) 950 (0.3)     Total Output 1125 950     Net +1330 +921                  Physical Exam  General: Ill-appearing, cooperative, in not acute distress  Eyes: conjunctivae pale, anicteric sclerae  ENT: lips and mucous membranes moist  Neck: supple, no JVD  Chest: clear breath sounds bilateral, no crackles, ronchus or wheezings  CVS: distinct S1 & S2, normal rate, regular rhythm  Abdomen: soft, non-tender, non-distended, normoactive bowel sounds  Extremities: 2+ edema of both legs  Skin: no rash  Neuro: awake, alert, oriented    Medications:    Current Facility-Administered Medications:     acetaminophen (TYLENOL) tablet 975 mg, 975 mg, Oral, Q8H SAM, Pauly Grissom DO, 975 mg at  12/29/24 0553    albuterol (PROVENTIL HFA,VENTOLIN HFA) inhaler 2 puff, 2 puff, Inhalation, Q6H PRN, Kelvin Sheppard DO    aluminum-magnesium hydroxide-simethicone (MAALOX) oral suspension 30 mL, 30 mL, Oral, Q6H PRN, Kelvin Sheppard DO    amLODIPine (NORVASC) tablet 5 mg, 5 mg, Oral, Daily, Nando Birmingham MD, 5 mg at 12/26/24 0907    aspirin (ECOTRIN LOW STRENGTH) EC tablet 81 mg, 81 mg, Oral, Daily, Kelvin Sheppard DO, 81 mg at 12/29/24 0827    bisacodyl (DULCOLAX) rectal suppository 10 mg, 10 mg, Rectal, Daily PRN, Kelvin Sheppard DO    carvedilol (COREG) tablet 25 mg, 25 mg, Oral, BID With Meals, Kelvin Sheppard DO, 25 mg at 12/29/24 0826    ceFAZolin (ANCEF) IVPB (premix in dextrose) 1,000 mg 50 mL, 1,000 mg, Intravenous, Q8H, Jackelyn Felder MD, Last Rate: 100 mL/hr at 12/29/24 0327, 1,000 mg at 12/29/24 0327    cyanocobalamin (VITAMIN B-12) tablet 1,000 mcg, 1,000 mcg, Oral, Daily, Kelvin Sheppard DO, 1,000 mcg at 12/29/24 0827    dicyclomine (BENTYL) capsule 10 mg, 10 mg, Oral, BID PRN, Kelvin Sheppard DO, 10 mg at 12/18/24 0947    enoxaparin (LOVENOX) subcutaneous injection 120 mg, 1 mg/kg, Subcutaneous, Q12H SAM, Paul Ríos MD, 120 mg at 12/29/24 0838    fentaNYL (DURAGESIC) 25 mcg/hr TD 72 hr patch 25 mcg, 25 mcg, Transdermal, Q72H, Pema Monterroso MD, 25 mcg at 12/27/24 1211    folic acid (FOLVITE) tablet 1 mg, 1 mg, Per G Tube, Daily, Paul Ríos MD, 1 mg at 12/29/24 0831    gabapentin (NEURONTIN) capsule 300 mg, 300 mg, Per G Tube, HS, Pauly Grissom, DO, 300 mg at 12/28/24 2114    HYDROmorphone (DILAUDID) injection 0.5 mg, 0.5 mg, Intravenous, Q3H PRN, May Sherry Monterroso MD, 0.5 mg at 12/29/24 0325    insulin glargine (LANTUS) subcutaneous injection 20 Units 0.2 mL, 20 Units, Subcutaneous, HS, Kelvin Sheppard DO, 20 Units at 12/28/24 2114    insulin lispro (HumALOG/ADMELOG) 100 units/mL subcutaneous injection 1-5 Units, 1-5 Units, Subcutaneous, TID AC, 1 Units at 12/28/24 0716 **AND** Fingerstick Glucose  (POCT), , , TID AC, Kelvin Sheppard DO    insulin lispro (HumALOG/ADMELOG) 100 units/mL subcutaneous injection 5 Units, 5 Units, Subcutaneous, TID With Meals, Paul Ríos MD, 5 Units at 12/28/24 1724    iohexol (OMNIPAQUE) 240 MG/ML solution 50 mL, 50 mL, Oral, Once in imaging, Bj Jones MD    levothyroxine tablet 150 mcg, 150 mcg, Oral, Daily, Kelvin Sheppard DO, 150 mcg at 12/29/24 0553    naloxone (NARCAN) 0.04 mg/mL syringe 0.04 mg, 0.04 mg, Intravenous, Q1MIN PRN, JASON Cooley    nitroglycerin (NITROSTAT) SL tablet 0.4 mg, 0.4 mg, Sublingual, Q5 Min PRN, Kelvin Sheppard DO    omeprazole (PRILOSEC) suspension 2 mg/mL, 40 mg, Per G Tube, Daily, Kelvin Sheppard DO, 40 mg at 12/29/24 0549    ondansetron (ZOFRAN) injection 4 mg, 4 mg, Intravenous, Q6H PRN, Kelvin Sheppard DO, 4 mg at 12/24/24 0500    oxyCODONE (ROXICODONE) oral solution 5 mg, 5 mg, Oral, Q4H PRN **OR** oxyCODONE (ROXICODONE) oral solution 10 mg, 10 mg, Oral, Q4H PRN, JASON Cooley, 10 mg at 12/29/24 0839    polyethylene glycol (MIRALAX) packet 17 g, 17 g, Oral, Daily, Kelvin Sheppard DO, 17 g at 12/29/24 0828    senna oral syrup 17.6 mg, 17.6 mg, Oral, BID, Kelvin Sheppard DO, 17.6 mg at 12/29/24 0830    tolvaptan (Samsca) tablet 30 mg, 30 mg, Oral, Once, Yosvany Ames MD      Lab Results: I have reviewed the following results:  Results from last 7 days   Lab Units 12/29/24  0456 12/28/24  0518 12/27/24  0357 12/26/24  0350 12/25/24  0453 12/24/24  1538 12/24/24  0509 12/23/24  0709 12/23/24  0559   WBC Thousand/uL  --   --   --  4.41 4.82 5.20  --   --  5.17   HEMOGLOBIN g/dL  --   --   --  7.4* 7.7* 7.5*  --  6.9* 6.7*   HEMATOCRIT %  --   --   --  23.6* 24.8* 23.7*  --  21.9* 21.3*   PLATELETS Thousands/uL  --   --   --  101* 96* 86*  --   --  79*   POTASSIUM mmol/L 4.1 4.5 4.6 5.1 5.4*  --  5.3  --  5.2   CHLORIDE mmol/L 85* 86* 86* 88* 89*  --  91*  --  91*   CO2 mmol/L 36* 35* 35* 39* 36*  --  32  --  33*   BUN mg/dL 86*  "81* 75* 72* 63*  --  60*  --  47*   CREATININE mg/dL 1.43* 1.26 1.06 0.88 0.88  --  0.79  --  0.83   CALCIUM mg/dL 8.8 8.8 8.9 9.0 8.8  --  8.5  --  8.2*   MAGNESIUM mg/dL  --   --   --  1.8* 1.8*  --  1.9  --  2.1   PHOSPHORUS mg/dL  --   --   --   --   --   --   --   --  2.6   ALBUMIN g/dL  --   --  3.0*  --   --   --   --   --  2.8*       Administrative Statements     Portions of the record may have been created with voice recognition software. Occasional wrong word or \"sound a like\" substitutions may have occurred due to the inherent limitations of voice recognition software. Read the chart carefully and recognize, using context, where substitutions have occurred.If you have any questions, please contact the dictating provider.  "

## 2024-12-29 NOTE — ASSESSMENT & PLAN NOTE
-O2 requirement improving.  -Echo this admission shows EF 65%, grade 1 diastolic dysfunction, normal IVC, blunted respirophasic changes  -Hypervolemic with significant third spacing  -Bumex IV was decreased to twice daily on 12/26  -Bumex was discontinued on 12/28 due to rising creatinine, keep holding Bumex for now  -UACR 236 mg.

## 2024-12-29 NOTE — ASSESSMENT & PLAN NOTE
Most recent echo with EF of 65% grade 1 diastolic dysfunction  Hypervolemic with third spacing  IV Bumex discontinued 12/20 due to concerns for rising creatinine

## 2024-12-29 NOTE — ASSESSMENT & PLAN NOTE
-Blood pressure overall is stable.  Continue Coreg, amlodipine  -Avoid relative hypotension given rising creatinine

## 2024-12-29 NOTE — ASSESSMENT & PLAN NOTE
Management as per primary team   Patient here today for nurse blood pressure check.    BP Readings from Last 1 Encounters:   12/01/20 1240 134/78   12/01/20 1231 (!) 154/94     Pulse Readings from Last 1 Encounters:   05/28/20 60     Patient Reported Vitals     There is no flowsheet data to display.           Last Clinician Visit for this condition: 5/28/2020   Next office visit is scheduled for: Visit date not found        Please review and advise if there are any changes to current plan of care.

## 2024-12-29 NOTE — ASSESSMENT & PLAN NOTE
Controlled  Continue current Rx medication  Return to clinic with any concerns       Elevated TSH  Normal free T4 at 0.65  Patient was not receiving levothyroxine due to  PEG tube malfunctioning   Crease levothyroxine dose to 150 mcg daily  Repeat thyroid study in 4 to 6 weeks

## 2024-12-29 NOTE — RESTORATIVE TECHNICIAN NOTE
Restorative Technician Note      Patient Name: Lety Botello     Restorative Tech Visit Date: 12/29/24  Note Type: Mobility  Patient Position Upon Consult: Supine  Activity Performed: Repositioned  Patient Position at End of Consult: Supine; All needs within reach; Bed/Chair alarm activated

## 2024-12-29 NOTE — ASSESSMENT & PLAN NOTE
Appears to have had chronic hyponatremia dating as far back as 2022  Hyponatremia likely secondary to underlying malignancy plus some component of intravascular volume depletion with third spacing and increased ADH with pain and nausea  Sodium admission of 129 mEq  Most recent sodium at 128 mEq stable and slowly improving  Sodium dropped down to 125 mEq on 12/20/2024  Status post Samsca 15 g on 12/27 and 30 g on 12/28 and 30 g on 12/29  Will give tolvaptan 45 mg p.o. x 1 today and albumin 25 g every 6 for 2 doses  Previously on IV Bumex and dose discontinued 12/20 due to concerns for elevation in creatinine  Patient has a significant third spacing  Maintain off of fluid restriction  Repeat workup showing serum osmolality 278 urine sodium 61 urine osmolality 311

## 2024-12-29 NOTE — ASSESSMENT & PLAN NOTE
Bexsero pended- please advise Received IV iron x 2  Vitamin B12 and folate deficiency  Continue vitamin B12 and folate supplement  Transfuse 1 unit of PRBCs 12/18 and 12/23  Continue to monitor hb closely

## 2024-12-29 NOTE — ASSESSMENT & PLAN NOTE
Recent small bowel obstruction secondary to malignant duodenal stricture from metastatic adenocarcinoma with GYN primary   Earlier G-tube malfunction underwent EGD and J-tube replaced   Follow-up with GI

## 2024-12-29 NOTE — ASSESSMENT & PLAN NOTE
Current Multimodal pain regimen:  Fentanyl 25 mcg increased on 12/27   Schedule Tylenol 975 mg TID  Gabapentin 300 mg HS   OxyIR 5mg Q4hrs PRN for moderate pain   OxyIR 10mg Q4hrs PRN for severe pain   IV dilaudid to 0.5 mg q3H PRN for BT pain  Failed therapies:  NA    Bowel regimen to prevent OIC: senna 17.6 mg bid, prn dulcolax suppository.   Opioid agreement. Not on file, will need outpatient follow up

## 2024-12-29 NOTE — ASSESSMENT & PLAN NOTE
Palliative Diagnosis: endometrial cancer    Goals:  Currently a level 2.    Will continue discussions regarding GOC as patient's clinical presentation evolves.    Decisional apparatus:  Patient does have capacity on exam today.  If capacity is lost, patient's substitute decision maker would default to Sharda Carver per AD on file  ER contacts:  Sharda Carver (Niece)  [phone] (Work Phone)   Advance Directive/Living Will/POLST: on file and reviewed    Social Support:  Patient's support system:   Supportive listening provided      Care Coordination      Follow-up  We appreciate the opportunity to participate in this patient's care.   We will continue to follow while admitted.    Please do not hesitate to contact our on-call provider through [employer] Secure Chat or contact [phone] should there be an acute change or other symptom control concerns.

## 2024-12-29 NOTE — ASSESSMENT & PLAN NOTE
Currently on Coreg 25 mg p.o. twice daily, amlodipine 5 mg p.o. daily  Avoid diuretics for now  DC Norvasc for now as blood pressure is low  Give albumin 25 g x 2 doses today  Recommend decreasing Coreg dosage if okay with primary team

## 2024-12-29 NOTE — PROGRESS NOTES
Progress Note - Nephrology   Name: Lety Botello 64 y.o. female I MRN: [id number]  [facility] I Date of Admission: 12/9/2024   Date of Service: 12/29/2024 I Hospital Day: 20    64-year-old female with history of multiple comorbidities including obesity, chronic hyponatremia, diabetes, hypertension, IBS and endometrial cancer admitted with J-tube malfunction nephrology consulted for hyponatremia management.  Assessment & Plan  Hyponatremia  Appears to have had chronic hyponatremia dating as far back as 2022  Hyponatremia likely secondary to underlying malignancy plus some component of intravascular volume depletion with third spacing and increased ADH with pain and nausea  Sodium admission of 129 mEq  Most recent sodium at 128 mEq stable and slowly improving  Sodium dropped down to 125 mEq on 12/20/2024  Status post Samsca 15 g on 12/27 and 30 g on 12/28 and 30 g on 12/29  Will give tolvaptan 45 mg p.o. 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 twice daily, amlodipine 5 mg p.o. daily  Avoid diuretics for now  DC Norvasc for now as blood pressure is low  Give albumin 25 g x 2 doses today  Recommend decreasing Coreg dosage if okay with primary  team  Endometrial cancer (HCC)  Follow-up with primary team and hematology   Hyperkalemia  Most recent potassium at 3.9 mEq stable and resolved  Check BMP in a.m.  TERRA (acute kidney injury) (HCC)  Baseline creatinine 0.7 to 0.9 mg/dL  Admitted with creatinine 0.39 mg/dL on 12/9/2024  Creatinine today 1.31 mg/dL stable and improving  Avoid diuretics  Likely TERRA secondary to ischemic injury from hypotension plus intravascular volume depletion secondary to third spacing  DC Norvasc consider decreasing Coreg dosage  Give albumin 25 g IV every 6 for 2 doses  Optimize hemodynamics  Monitor for renal recovery    I have reviewed the nephrology recommendations including albumin 25 g x 2 doses and tolvaptan 45 mg p.o. x 1 maintain off of fluid restriction hold off on diuretics, with medicine team, and we are in agreement with renal plan including the information outlined above.     Subjective   Brief History of Admission -     Patient seen and examined in her room blood pressure stable afebrile still with nausea and significant abdominal pain, urine output 1.1 L / 24 hours documented.    Objective :  Temp:  [97.7 °F (36.5 °C)-97.9 °F (36.6 °C)] 97.7 °F (36.5 °C)  HR:  [73-75] 73  BP: (107-121)/(45-51) 120/51  Resp:  [16-17] 16  SpO2:  [97 %-100 %] 100 %  O2 Device: None (Room air)    Current Weight: Weight - Scale: 117 kg (258 lb 6.1 oz)  First Weight: Weight - Scale: 120 kg (263 lb 7.2 oz)  I/O         12/27 0701  12/28 0700 12/28 0701  12/29 0700 12/29 0701  12/30 0700    P.O. 1200 938     NG/ 60     IV Piggyback 50 75 130    Feedings 1085 798     Total Intake(mL/kg) 2455 (21.7) 1871 (16) 130 (1.1)    Urine (mL/kg/hr) 1125 (0.4) 950 (0.3)     Total Output 1125 950     Net +1330 +921 +130           Unmeasured Stool Occurrence   1 x          Physical Exam  Vitals and nursing note reviewed.   Constitutional:       General: She is not in acute distress.     Appearance: Normal appearance. She is obese. She is  ill-appearing. She is not toxic-appearing.   HENT:      Head: Normocephalic and atraumatic.      Mouth/Throat:      Mouth: Mucous membranes are dry.   Eyes:      General: No scleral icterus.  Cardiovascular:      Rate and Rhythm: Normal rate.   Pulmonary:      Breath sounds: No stridor. No wheezing.   Abdominal:      General: There is distension.      Tenderness: There is abdominal tenderness.   Musculoskeletal:         General: Swelling present.      Cervical back: No rigidity.      Comments: +1 edema upper lower extremities   Skin:     Coloration: Skin is not jaundiced.   Neurological:      General: No focal deficit present.      Mental Status: She is alert.   Psychiatric:         Mood and Affect: Mood normal.       ROS:  Constitutional:  No chills, no fever.   HENT:  No sore throat  Respiratory:  No cough, no hemoptysis, no wheezing.    Cardiovascular:  + leg swelling.   Gastrointestinal:  No constipation, no diarrhea.  Positive abdominal pain  Musculoskeletal:  No back pain.   Neurological:  no dizziness, No headaches.   Psychiatric/Behavioral:  No agitation, no confusion.    Wounds: positive,   All other systems reviewed and are negative.        Medications:    Current Facility-Administered Medications:     acetaminophen (TYLENOL) tablet 975 mg, 975 mg, Oral, Q8H [facility], Pauly Grissom, DO, 975 mg at 12/29/24 1608    albuterol (PROVENTIL HFA,VENTOLIN HFA) inhaler 2 puff, 2 puff, Inhalation, Q6H PRN, Kelvin Sheppard DO    aluminum-magnesium hydroxide-simethicone (MAALOX) oral suspension 30 mL, 30 mL, Oral, Q6H PRN, Kelvin Sheppard DO    amLODIPine (NORVASC) tablet 5 mg, 5 mg, Oral, Daily, Nando Birmingham MD, 5 mg at 12/26/24 0907    aspirin (ECOTRIN LOW STRENGTH) EC tablet 81 mg, 81 mg, Oral, Daily, Kelvin Sheppard DO, 81 mg at 12/29/24 0827    bisacodyl (DULCOLAX) rectal suppository 10 mg, 10 mg, Rectal, Daily PRN, Kelvin Sheppard DO    carvedilol (COREG) tablet 25 mg, 25 mg, Oral, BID With Meals, Kelvin Sheppard DO,  25 mg at 12/29/24 1610    ceFAZolin (ANCEF) IVPB (premix in dextrose) 1,000 mg 50 mL, 1,000 mg, Intravenous, Q8H, Jackelyn Felder MD, Last Rate: 100 mL/hr at 12/29/24 1110, 1,000 mg at 12/29/24 1110    cyanocobalamin (VITAMIN B-12) tablet 1,000 mcg, 1,000 mcg, Oral, Daily, Kelvin Sheppard DO, 1,000 mcg at 12/29/24 0827    dicyclomine (BENTYL) capsule 10 mg, 10 mg, Oral, BID PRN, Kelvin Sheppard DO, 10 mg at 12/18/24 0947    enoxaparin (LOVENOX) subcutaneous injection 120 mg, 1 mg/kg, Subcutaneous, Q12H SAM, Paul Ríos MD, 120 mg at 12/29/24 0838    fentaNYL (DURAGESIC) 25 mcg/hr TD 72 hr patch 25 mcg, 25 mcg, Transdermal, Q72H, May Sherry Monterroso MD, 25 mcg at 12/27/24 1211    folic acid (FOLVITE) tablet 1 mg, 1 mg, Per G Tube, Daily, Paul Ríos MD, 1 mg at 12/29/24 0831    gabapentin (NEURONTIN) capsule 300 mg, 300 mg, Per G Tube, HS, Pauly Grissom, , 300 mg at 12/28/24 2114    HYDROmorphone (DILAUDID) injection 0.5 mg, 0.5 mg, Intravenous, Q3H PRN, May Sherry Monterroso MD, 0.5 mg at 12/29/24 0325    insulin glargine (LANTUS) subcutaneous injection 20 Units 0.2 mL, 20 Units, Subcutaneous, HS, Kelvin Sheppard DO, 20 Units at 12/28/24 2114    insulin lispro (HumALOG/ADMELOG) 100 units/mL subcutaneous injection 1-5 Units, 1-5 Units, Subcutaneous, TID AC, 1 Units at 12/28/24 0716 **AND** Fingerstick Glucose (POCT), , , TID AC, Kelvin Sheppard DO    insulin lispro (HumALOG/ADMELOG) 100 units/mL subcutaneous injection 5 Units, 5 Units, Subcutaneous, TID With Meals, Paul Ríos MD, 5 Units at 12/29/24 1639    iohexol (OMNIPAQUE) 240 MG/ML solution 50 mL, 50 mL, Oral, Once in imaging, Bj Jones MD    levothyroxine tablet 150 mcg, 150 mcg, Oral, Daily, Kelvin Sheppard DO, 150 mcg at 12/29/24 0553    naloxone (NARCAN) 0.04 mg/mL syringe 0.04 mg, 0.04 mg, Intravenous, Q1MIN PRN, JASON Cooley    nitroglycerin (NITROSTAT) SL tablet 0.4 mg, 0.4 mg, Sublingual, Q5 Min PRN, Kelvin Sheppard DO    omeprazole (PRILOSEC)  "suspension 2 mg/mL, 40 mg, Per G Tube, Daily, Kelvin Sheppard DO, 40 mg at 12/29/24 0549    ondansetron (ZOFRAN) injection 4 mg, 4 mg, Intravenous, Q6H PRN, Kelvin Sheppard DO, 4 mg at 12/24/24 0500    oxyCODONE (ROXICODONE) oral solution 5 mg, 5 mg, Oral, Q4H PRN **OR** oxyCODONE (ROXICODONE) oral solution 10 mg, 10 mg, Oral, Q4H PRN, JASON Cooley, 10 mg at 12/29/24 1608    polyethylene glycol (MIRALAX) packet 17 g, 17 g, Oral, Daily, Kelvin Sheppard DO, 17 g at 12/29/24 0828    senna oral syrup 17.6 mg, 17.6 mg, Oral, BID, Kelvin Sheppard DO, 17.6 mg at 12/29/24 0830      Lab Results: I have reviewed the following results:  Results from last 7 days   Lab Units 12/29/24  0456 12/28/24  0518 12/27/24  0357 12/26/24  0350 12/25/24  0453 12/24/24  1538 12/24/24  0509 12/23/24  0709 12/23/24  0559   WBC Thousand/uL  --   --   --  4.41 4.82 5.20  --   --  5.17   HEMOGLOBIN g/dL  --   --   --  7.4* 7.7* 7.5*  --  6.9* 6.7*   HEMATOCRIT %  --   --   --  23.6* 24.8* 23.7*  --  21.9* 21.3*   PLATELETS Thousands/uL  --   --   --  101* 96* 86*  --   --  79*   POTASSIUM mmol/L 4.1 4.5 4.6 5.1 5.4*  --  5.3  --  5.2   CHLORIDE mmol/L 85* 86* 86* 88* 89*  --  91*  --  91*   CO2 mmol/L 36* 35* 35* 39* 36*  --  32  --  33*   BUN mg/dL 86* 81* 75* 72* 63*  --  60*  --  47*   CREATININE mg/dL 1.43* 1.26 1.06 0.88 0.88  --  0.79  --  0.83   CALCIUM mg/dL 8.8 8.8 8.9 9.0 8.8  --  8.5  --  8.2*   MAGNESIUM mg/dL  --   --   --  1.8* 1.8*  --  1.9  --  2.1   PHOSPHORUS mg/dL  --   --   --   --   --   --   --   --  2.6   ALBUMIN g/dL  --   --  3.0*  --   --   --   --   --  2.8*       Administrative Statements     Portions of the record may have been created with voice recognition software. Occasional wrong word or \"sound a like\" substitutions may have occurred due to the inherent limitations of voice recognition software. Read the chart carefully and recognize, using context, where substitutions have occurred.If you have any questions, " please contact the dictating provider.

## 2024-12-29 NOTE — PROGRESS NOTES
"Progress Note - Hospitalist   Name: Lety Botello 64 y.o. female I MRN: [id number]  Unit/Bed#: [facility] 511-01 I Date of Admission: 12/9/2024   Date of Service: 12/28/2024 I Hospital Day: 19    Assessment & Plan  Encounter for gastrojejunal (GJ) tube placement issues  Patient with recent (12/3/24) PEG-J placement, after she had admission for recurrent SBO, also found to have jejunal stricture significant for recurrent adenocarcinoma of GYN primary, also component of gastroparesis  Patient was admitted on 12/9/24 with abdominal pain and unable to flush J portion  CT revealed, \"1.  Persistent third spacing with slight increase in moderate volume ascites, and no significant change in moderate size right and small left pleural effusions with bibasilar atelectasis. 2.  New percutaneous enteric tube with tip in the distal duodenum.\"  GI following. S/p EGD 12/10 with J-tube exchange, postop patient had worsening hypoxia required transfer to ICU and using BiPAP  Concern for possible infection surrounding J-tube site, completed 7 days of ceftriaxone and vancomycin  developed clogged J-tube,   Status post EGD on 12/13 with J-tube replacement   Resumed on tube feeding  On clear liquid diet per speech/swallow therapist  G tube port is working sufficiently per gastroenterology  Tube feeding changed to Nepro due to hyperkalemia and hyperglycemia after discussion with nutrition\  Patient with leakage around the PEG tube site.  GI reevaluated.  Okay to continue with tube feeding-current on antibiotics.  Culture is growing Klebsiella  Abdominal pain  Patient with recent hospitalization due to small bowel obstruction s/p exploratory laparotomy with small bowel resection on 11/22/2024  presented with worsening abdominal pain and distention  Abdominal pelvis CT scan, negative for obstruction, stable bilateral pleural effusion and anasarca  Continue supportive care  Coronado catheter placed due to retention  S/p paracentesis 12/18 for ascites " 5.5 L  Monitor Bms  Palliative care is following and adjusting analgesics  CT scan reviewed-consulted IR for paracentesis  Acute respiratory failure with hypoxia (HCC)  Patient had worsening hypoxia post EGD on 12/10. Was satting mid-80s on 10L  Was given nebs, Lasix and subsequently placed on BiPAP and upgraded to critical care for further treatment  Currently in stable condition and transferred out of ICU  Currently on room air  NSVT (nonsustained ventricular tachycardia) (HCC)  25 run of nonsustained V. tach noted on telemetry, patient was asymptomatic  Cardiology consulted  Cardiac echo with normal EF and mild aortic stenosis  Coreg dose was increased to 25 mg twice daily  Monitor  Cardiology signed off    Hypothyroidism  Elevated TSH  Normal free T4 at 0.65  Patient was not receiving levothyroxine due to  PEG tube malfunctioning   Crease levothyroxine dose to 150 mcg daily  Repeat thyroid study in 4 to 6 weeks  Anemia  Received IV iron x 2  Vitamin B12 and folate deficiency  Continue vitamin B12 and folate supplement  Transfuse 1 unit of PRBCs 12/18 and 12/23  Continue to monitor hb closely  abdominal rash  Located on the right lower abdomen with oozing and bleeding  Seen by dermatology who performed a biopsy  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, consistent with gynecologic/Mullerian origin   Discontinue valtrex  Outpatient follow up with medical oncology when stable  Hyponatremia  Hyponatremia likely multifactorial - due to volume overload  Coronado catheter placed due to urinary retention  Monitor closely with diuresis  Gradually improving with diuresis  Due to persistent hyponatremia will consult with nephrology to evaluate for possible tolvaptan therapy  Nephrology is adding Samsca  Holding Lasix today  Hypertension  Monitor blood pressures  Continue carvedilol, Norvasc was added  Avoid hypotension  Coronary artery disease involving native coronary artery  History of MI in 2016 status post  KARY  Stable  Type 2 diabetes mellitus (HCC)  Lab Results   Component Value Date    HGBA1C 6.9 (H) 11/05/2024       Recent Labs     12/27/24  2051 12/28/24  0632 12/28/24  1100 12/28/24  1606   POCGLU 162* 173* 134 135       Blood Sugar Average: Last 72 hrs:  (P) 173.0625    Farxiga on hold while inpatient  Hyperglycemia secondary to steroids and tube feeding  Continue Lantus nightly, continue Humalog tid, adjust the doses as needed  Continue SSI  Endometrial cancer (HCC)  History of endometrial cancer noted  History of pulmonary embolism  Continue Eliquis 5 mg twice daily  Gastroparesis  History of   With PEG-J as above  GI following  Morbid obesity (HCC)  Body mass index is 48.78 kg/m².  Encourage lifestyle modification and weight loss once acute issues improved/resolved  Anasarca  Continue with albumin assisted diuresis.  Continue lasix 40mg TID  Continue to monitor daily weights, BMPs, telemetry  Urine output is decreasing and hyponatremia is not improving sufficiently - will consult with nephrology for assistance with management  Hyperkalemia      VTE Pharmacologic Prophylaxis: VTE Score: 7 Moderate Risk (Score 3-4) - Pharmacological DVT Prophylaxis Ordered: enoxaparin (Lovenox).    Mobility:   Basic Mobility Inpatient Raw Score: 6  JH-HLM Goal: 2: Bed activities/Dependent transfer  JH-HLM Achieved: 2: Bed activities/Dependent transfer  JH-HLM Goal achieved. Continue to encourage appropriate mobility.    Patient Centered Rounds:    Discussions with Specialists or Other Care Team Provider:     Education and Discussions with Family / Patient: Patient declined call to .     Current Length of Stay: 19 day(s)  Current Patient Status: Inpatient   Certification Statement: The patient will continue to require additional inpatient hospital stay due to hyponatremia  Discharge Plan: Anticipate discharge in 48-72 hrs to rehab facility.    Code Status: Level 2 - DNAR: but accepts endotracheal  intubation    Subjective   Patient seen and examined.  Still with abdominal pain.  Nephrology is giving her another dose of Samsca.  Patient still with drainage from around the PEG tube site.  Currently on antibiotics.  GI to evaluate.  Ordered paracentesis    Objective :  Temp:  [97.5 °F (36.4 °C)-97.7 °F (36.5 °C)] 97.7 °F (36.5 °C)  HR:  [71-75] 73  BP: (105-116)/(48-56) 116/54  Resp:  [16-17] 17  SpO2:  [97 %-100 %] 100 %  O2 Device: None (Room air)    Body mass index is 48.78 kg/m².     Input and Output Summary (last 24 hours):     Intake/Output Summary (Last 24 hours) at 12/28/2024 1928  Last data filed at 12/28/2024 1726  Gross per 24 hour   Intake 2975 ml   Output 1350 ml   Net 1625 ml       Physical Exam  Constitutional:       Appearance: She is obese.   HENT:      Head: Normocephalic and atraumatic.      Nose: Nose normal.   Eyes:      General: No scleral icterus.  Cardiovascular:      Rate and Rhythm: Normal rate and regular rhythm.      Heart sounds: No murmur heard.  Pulmonary:      Effort: No respiratory distress.      Comments: Decreased breath bilateral  Abdominal:      General: There is no distension.      Tenderness: There is no abdominal tenderness.   Musculoskeletal:         General: Normal range of motion.      Right lower leg: Edema present.      Left lower leg: Edema present.   Skin:     General: Skin is warm.   Neurological:      Mental Status: She is alert. Mental status is at baseline.         Lines/Drains:  Lines/Drains/Airways       Active Status       Name Placement date Placement time Site Days    PICC Line 12/18/24 12/18/24  0922  --  10    Gastrostomy/Enterostomy PEG- Jejunostomy 20 Fr. LUQ 12/03/24  1553  LUQ  25    Urethral Catheter Latex 16 Fr. 12/26/24  2335  Latex  1                  Urinary Catheter:  Goal for removal: Voiding trial when ambulation improves         Central Line:  Goal for removal:   no Peripheral access               Lab Results: I have reviewed the following  results:   Results from last 7 days   Lab Units 12/26/24  0350 12/25/24  0453 12/24/24  1538 12/23/24  0709 12/23/24  0559   WBC Thousand/uL 4.41   < > 5.20  --  5.17   HEMOGLOBIN g/dL 7.4*   < > 7.5*   < > 6.7*   HEMATOCRIT % 23.6*   < > 23.7*   < > 21.3*   PLATELETS Thousands/uL 101*   < > 86*  --  79*   BANDS PCT %  --   --   --   --  2   SEGS PCT %  --   --  83*  --   --    LYMPHO PCT %  --   --  8*  --  1   MONO PCT %  --   --  6  --  2*   EOS PCT %  --   --  2  --   --     < > = values in this interval not displayed.     Results from last 7 days   Lab Units 12/28/24  0518 12/27/24  0357   SODIUM mmol/L 129* 127*   POTASSIUM mmol/L 4.5 4.6   CHLORIDE mmol/L 86* 86*   CO2 mmol/L 35* 35*   BUN mg/dL 81* 75*   CREATININE mg/dL 1.26 1.06   ANION GAP mmol/L 8 6   CALCIUM mg/dL 8.8 8.9   ALBUMIN g/dL  --  3.0*   TOTAL BILIRUBIN mg/dL  --  0.31   ALK PHOS U/L  --  70   ALT U/L  --  5*   AST U/L  --  8*   GLUCOSE RANDOM mg/dL 159* 160*         Results from last 7 days   Lab Units 12/28/24  1606 12/28/24  1100 12/28/24  0632 12/27/24  2051 12/27/24  1605 12/27/24  1027 12/27/24  0644 12/26/24  2121 12/26/24  1537 12/26/24  0635 12/26/24  0027 12/25/24  2048   POC GLUCOSE mg/dl 135 134 173* 162* 147* 177* 166* 176* 155* 158* 171* 185*               Recent Cultures (last 7 days):   Results from last 7 days   Lab Units 12/25/24  1648   GRAM STAIN RESULT  1+ Polys*  4+ Gram negative rods*  4+ Gram positive rods*  1+ Gram positive cocci in pairs*  Rare Budding yeast*   WOUND CULTURE  4+ Growth of Klebsiella oxytoca*  4+ Growth of Citrobacter freundii*             Last 24 Hours Medication List:     Current Facility-Administered Medications:     acetaminophen (TYLENOL) tablet 975 mg, Q8H SAM    albumin human (FLEXBUMIN) 25 % injection 12.5 g, BID, Last Rate: 0 g (12/26/24 1901)    albuterol (PROVENTIL HFA,VENTOLIN HFA) inhaler 2 puff, Q6H PRN    aluminum-magnesium hydroxide-simethicone (MAALOX) oral suspension 30 mL, Q6H  PRN    amLODIPine (NORVASC) tablet 5 mg, Daily    aspirin (ECOTRIN LOW STRENGTH) EC tablet 81 mg, Daily    bisacodyl (DULCOLAX) rectal suppository 10 mg, Daily PRN    carvedilol (COREG) tablet 25 mg, BID With Meals    ceFAZolin (ANCEF) IVPB (premix in dextrose) 1,000 mg 50 mL, Q8H, Last Rate: 1,000 mg (12/28/24 1207)    cyanocobalamin (VITAMIN B-12) tablet 1,000 mcg, Daily    dicyclomine (BENTYL) capsule 10 mg, BID PRN    enoxaparin (LOVENOX) subcutaneous injection 120 mg, Q12H SAM    fentaNYL (DURAGESIC) 25 mcg/hr TD 72 hr patch 25 mcg, Q72H    folic acid (FOLVITE) tablet 1 mg, Daily    gabapentin (NEURONTIN) capsule 300 mg, HS    HYDROmorphone (DILAUDID) injection 0.5 mg, Q3H PRN    insulin glargine (LANTUS) subcutaneous injection 20 Units 0.2 mL, HS    insulin lispro (HumALOG/ADMELOG) 100 units/mL subcutaneous injection 1-5 Units, TID AC **AND** Fingerstick Glucose (POCT), TID AC    insulin lispro (HumALOG/ADMELOG) 100 units/mL subcutaneous injection 5 Units, TID With Meals    iohexol (OMNIPAQUE) 240 MG/ML solution 50 mL, Once in imaging    levothyroxine tablet 150 mcg, Daily    naloxone (NARCAN) 0.04 mg/mL syringe 0.04 mg, Q1MIN PRN    nitroglycerin (NITROSTAT) SL tablet 0.4 mg, Q5 Min PRN    omeprazole (PRILOSEC) suspension 2 mg/mL, Daily    ondansetron (ZOFRAN) injection 4 mg, Q6H PRN    oxyCODONE (ROXICODONE) oral solution 5 mg, Q4H PRN **OR** oxyCODONE (ROXICODONE) oral solution 10 mg, Q4H PRN    polyethylene glycol (MIRALAX) packet 17 g, Daily    senna oral syrup 17.6 mg, BID    Administrative Statements   Today, Patient Was Seen By: Jackelyn Felder MD      **Please Note: This note may have been constructed using a voice recognition system.**

## 2024-12-29 NOTE — ASSESSMENT & PLAN NOTE
-Noted chronic hyponatremia issues going back to 2022  -Sodium 129 on admission dropped to 125 on 12/20/2024 and since then improved and stable around 130 until 12/26, sodium today drop again 127 on 12/27, received Samsca 15 g x 1 on 12/27 and Samsca 30 g x 12/28.    Sodium lower at 127 this morning, plan to give another dose of Samsca dose 30 g x 1 today  -Suspect related to component of hypervolemia, underlying SIADH given malignancy history, pain issues, pleural effusion/atelectasis  -Bumex IV was previously decreased to 2 mg twice daily with albumin given overall increasing bicarb and concern for contraction alkalosis on 12/26.  Bumex was discontinued on 12/28 due to rising creatinine, creatinine up to 1.43, keep holding Bumex for today  -Clinically seems significant third spacing, body wall anasarca, prior CT scan showed bilateral pleural effusion.  -Workup this admission shows serum osmolality 278, urine sodium 61, urine osmolality 288 on 12/18/2024  -Repeat urine sodium 68, urine osmolality 311

## 2024-12-29 NOTE — ASSESSMENT & PLAN NOTE
Lab Results   Component Value Date    HGBA1C 6.9 (H) 11/05/2024       Recent Labs     12/27/24  2051 12/28/24  0632 12/28/24  1100 12/28/24  1606   POCGLU 162* 173* 134 135       Blood Sugar Average: Last 72 hrs:  (P) 173.0625    Farxiga on hold while inpatient  Hyperglycemia secondary to steroids and tube feeding  Continue Lantus nightly, continue Humalog tid, adjust the doses as needed  Continue SSI

## 2024-12-29 NOTE — ASSESSMENT & PLAN NOTE
Patient with recent hospitalization due to small bowel obstruction s/p exploratory laparotomy with small bowel resection on 11/22/2024  presented with worsening abdominal pain and distention  Abdominal pelvis CT scan, negative for obstruction, stable bilateral pleural effusion and anasarca  Continue supportive care  [place]on[place] catheter placed due to retention  S/p paracentesis 12/18 for ascites 5.5 L  Monitor Bms  Palliative care is following and adjusting analgesics  CT scan reviewed-consulted IR for paracentesis

## 2024-12-29 NOTE — PROGRESS NOTES
Progress Note - Palliative Care   Name: Lety Botello 64 y.o. 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 CLD as tolerated.   DC plan: to return home after her rehab stay with [facility] via [facility].   Abdominal pain    Current Multimodal pain regimen:  Fentanyl 25 mcg increased on 12/27   Schedule Tylenol 975 mg TID  Gabapentin 300 mg HS   OxyIR 5mg Q4hrs PRN for moderate pain   OxyIR 10mg Q4hrs PRN for severe pain   IV dilaudid to 0.5 mg q3H PRN for BT pain  Failed therapies:  NA    Bowel regimen to prevent OIC: senna 17.6 mg bid, prn dulcolax suppository.   Opioid agreement. Not on file, will need outpatient follow up    Palliative care encounter  Palliative Diagnosis: endometrial cancer    Goals:  Currently a level 2.    Will continue discussions regarding GOC as patient's clinical presentation evolves.    Decisional apparatus:  Patient does have capacity on exam today.  If capacity is lost, patient's substitute decision maker would default to Sharda Carver per AD on file  ER contacts:  Sharda Carver (Niece)  [phone] (Work Phone)   Advance Directive/Living Will/POLST: on file and reviewed    Social Support:  Patient's support system:   Supportive listening provided      Care Coordination      Follow-up  We appreciate the opportunity to participate in this patient's care.   We will continue to follow while admitted.    Please do not hesitate to contact our on-call provider through EP[employer] Secure Chat or contact [phone] should there be an acute change or other symptom control concerns.    Endometrial  cancer (HCC)  Recurrent endometrial cancer, initial diagnosis 2020  [facility]  S/p FLORECITA SBO SLND 6/2020 c/b splenic laceration  NAYE 1/2023, though presented for a fall in 10/2023 was incidentally found to have RP lymphadenopathy  Bx 12/2023 confirm metastatic adenocarcinoma  S/p pelvic RT and treatment with femara  NAYE 5/2024  Presented 10/18, 11/5, 11/14 with SBO - s/p ex lap, small bowel resection, partial omentectomy, DAVID 11/22/24 with biopsy confirming metastatic adenocarcinoma  Hypertension    Type 2 diabetes mellitus (HCC)      Gastroparesis    Anasarca    Hyperkalemia        Decisional apparatus: Patient is competent on my exam today. If competence is lost, patient's substitute decision maker would default to niece by [place] Act 169.   Advance Directive / Living Will / POLST: yes     PDMP Review: I have reviewed the patient's controlled substance dispensing history in the Prescription Drug Monitoring Program in compliance with the [facility] regulations before prescribing any controlled substances.    Subjective   Patient resting comfortably.  Total OME's have decreased in the last 24 hours.    Objective :  Temp:  [97.7 °F (36.5 °C)-97.9 °F (36.6 °C)] 97.9 °F (36.6 °C)  HR:  [71-75] 75  BP: (109-121)/(45-54) 121/51  Resp:  [16-17] 17  SpO2:  [97 %-100 %] 97 %  O2 Device: None (Room air)    Physical Exam  Vitals and nursing note reviewed.   Constitutional:       General: She is not in acute distress.     Appearance: She is obese. She is ill-appearing.   HENT:      Head: Normocephalic and atraumatic.      Right Ear: External ear normal.      Left Ear: External ear normal.   Eyes:      General:         Right eye: No discharge.         Left eye: No discharge.   Cardiovascular:      Rate and Rhythm: Normal rate.   Pulmonary:      Effort: Pulmonary effort is normal. No respiratory distress.   Abdominal:      General: There is no distension.      Palpations: Abdomen is soft.   Skin:      Coloration: Skin is pale.   Neurological:      Mental Status: She is oriented to person, place, and time.   Psychiatric:         Mood and Affect: Mood normal.         Behavior: Behavior normal.            Lab Results: I have reviewed the following results:  Lab Results   Component Value Date/Time    SODIUM 127 (L) 12/29/2024 04:56 AM    SODIUM 132 (L) 11/04/2024 03:33 AM    K 4.1 12/29/2024 04:56 AM    K 4.7 11/04/2024 03:33 AM    BUN 86 (H) 12/29/2024 04:56 AM    BUN 19 11/04/2024 03:33 AM    CREATININE 1.43 (H) 12/29/2024 04:56 AM    CREATININE 0.76 11/04/2024 03:33 AM    GLUC 89 12/29/2024 04:56 AM    GLUC 98 11/04/2024 03:33 AM    CALCIUM 8.8 12/29/2024 04:56 AM    CALCIUM 8.6 11/04/2024 03:33 AM    AST 8 (L) 12/27/2024 03:57 AM    AST 9 11/04/2024 03:33 AM    ALT 5 (L) 12/27/2024 03:57 AM    ALT 6 11/04/2024 03:33 AM    ALB 3.0 (L) 12/27/2024 03:57 AM    ALB 3.1 (L) 11/04/2024 03:33 AM    TP 4.9 (L) 12/27/2024 03:57 AM    TP 5.4 (L) 11/04/2024 03:33 AM    EGFR 39 12/29/2024 04:56 AM    EGFR 89 11/04/2024 03:33 AM    EGFR 97 12/11/2020 03:15 AM     Lab Results   Component Value Date/Time    HGB 7.4 (L) 12/26/2024 03:50 AM    WBC 4.41 12/26/2024 03:50 AM     (L) 12/26/2024 03:50 AM     12/01/2023 08:30 AM    INR 1.49 (H) 11/23/2024 10:01 AM    INR 1.1 12/01/2023 08:30 AM    PTT 49 (H) 11/27/2024 06:19 AM     Lab Results   Component Value Date/Time    CQF4XXWNSKHL 21.122 (H) 12/18/2024 03:02 PM       Code Status: Level 2 - DNAR: but accepts endotracheal intubation  Advance Directive and Living Will: Yes    Power of :    POLST:      Administrative Statements   I have spent a total time of 25 minutes in caring for this patient on the day of the visit/encounter including Risk factor reductions, Impressions, Counseling / Coordination of care, Documenting in the medical record, Reviewing / ordering tests, medicine, procedures  , and Obtaining or reviewing history  . Topics discussed with the  patient / family include symptom assessment and management, medication review, and psychosocial support.

## 2024-12-29 NOTE — PLAN OF CARE
Problem: Prexisting or High Potential for Compromised Skin Integrity  Goal: Skin integrity is maintained or improved  Description: INTERVENTIONS:  - Identify patients at risk for skin breakdown  - Assess and monitor skin integrity  - Assess and monitor nutrition and hydration status  - Monitor labs   - Assess for incontinence   - Turn and reposition patient  - Assist with mobility/ambulation  - Relieve pressure over bony prominences  - Avoid friction and shearing  - Provide appropriate hygiene as needed including keeping skin clean and dry  - Evaluate need for skin moisturizer/barrier cream  - Collaborate with interdisciplinary team   - Patient/family teaching  - Consider wound care consult   Outcome: Progressing     Problem: PAIN - ADULT  Goal: Verbalizes/displays adequate comfort level or baseline comfort level  Description: Interventions:  - Encourage patient to monitor pain and request assistance  - Assess pain using appropriate pain scale  - Administer analgesics based on type and severity of pain and evaluate response  - Implement non-pharmacological measures as appropriate and evaluate response  - Consider cultural and social influences on pain and pain management  - Notify physician/advanced practitioner if interventions unsuccessful or patient reports new pain  Outcome: Progressing     Problem: INFECTION - ADULT  Goal: Absence or prevention of progression during hospitalization  Description: INTERVENTIONS:  - Assess and monitor for signs and symptoms of infection  - Monitor lab/diagnostic results  - Monitor all insertion sites, i.e. indwelling lines, tubes, and drains  - Monitor endotracheal if appropriate and nasal secretions for changes in amount and color  - [place] appropriate cooling/warming therapies per order  - Administer medications as ordered  - Instruct and encourage patient and family to use good hand hygiene technique  - Identify and instruct in appropriate isolation precautions for  identified infection/condition  Outcome: Progressing     Problem: SAFETY ADULT  Goal: Patient will remain free of falls  Description: INTERVENTIONS:  - Educate patient/family on patient safety including physical limitations  - Instruct patient to call for assistance with activity   - Consult OT/PT to assist with strengthening/mobility   - Keep Call bell within reach  - Keep bed low and locked with side rails adjusted as appropriate  - Keep care items and personal belongings within reach  - Initiate and maintain comfort rounds  - Make Fall Risk Sign visible to staff  - Offer Toileting every 2 Hours, in advance of need  - Initiate/Maintain bed/chair alarm  - Obtain necessary fall risk management equipment: nonskid footwear  - Apply yellow socks and bracelet for high fall risk patients  - Consider moving patient to room near nurses station  Outcome: Progressing  Goal: Maintain or return to baseline ADL function  Description: INTERVENTIONS:  -  Assess patient's ability to carry out ADLs; assess patient's baseline for ADL function and identify physical deficits which impact ability to perform ADLs (bathing, care of mouth/teeth, toileting, grooming, dressing, etc.)  - Assess/evaluate cause of self-care deficits   - Assess range of motion  - Assess patient's mobility; develop plan if impaired  - Assess patient's need for assistive devices and provide as appropriate  - Encourage maximum independence but intervene and supervise when necessary  - Involve family in performance of ADLs  - Assess for home care needs following discharge   - Consider OT consult to assist with ADL evaluation and planning for discharge  - Provide patient education as appropriate  Outcome: Progressing  Goal: Maintains/Returns to pre admission functional level  Description: INTERVENTIONS:  - Perform AM-PAC 6 Click Basic Mobility/ Daily Activity assessment daily.  - Set and communicate daily mobility goal to care team and patient/family/caregiver.   -  Collaborate with rehabilitation services on mobility goals if consulted  - Perform Range of Motion 3 times a day.  - Reposition patient every 2 hours.  - Dangle patient 3 times a day  - Stand patient 3 times a day  - Ambulate patient 3 times a day  - Out of bed to chair 3 times a day   - Out of bed for meals 3 times a day  - Out of bed for toileting  - Record patient progress and toleration of activity level   Outcome: Progressing     Problem: DISCHARGE PLANNING  Goal: Discharge to home or other facility with appropriate resources  Description: INTERVENTIONS:  - Identify barriers to discharge w/patient and caregiver  - Arrange for needed discharge resources and transportation as appropriate  - Identify discharge learning needs (meds, wound care, etc.)  - Arrange for interpretive services to assist at discharge as needed  - Refer to Case Management Department for coordinating discharge planning if the patient needs post-hospital services based on physician/advanced practitioner order or complex needs related to functional status, cognitive ability, or social support system  Outcome: Progressing     Problem: Knowledge Deficit  Goal: Patient/family/caregiver demonstrates understanding of disease process, treatment plan, medications, and discharge instructions  Description: Complete learning assessment and assess knowledge base.  Interventions:  - Provide teaching at level of understanding  - Provide teaching via preferred learning methods  Outcome: Progressing     Problem: Nutrition/Hydration-ADULT  Goal: Nutrient/Hydration intake appropriate for improving, restoring or maintaining nutritional needs  Description: Monitor and assess patient's nutrition/hydration status for malnutrition. Collaborate with interdisciplinary team and initiate plan and interventions as ordered.  Monitor patient's weight and dietary intake as ordered or per policy. Utilize nutrition screening tool and intervene as necessary. Determine  patient's food preferences and provide high-protein, high-caloric foods as appropriate.     INTERVENTIONS:  - Monitor oral intake, urinary output, labs, and treatment plans  - Assess nutrition and hydration status and recommend course of action  - Evaluate amount of meals eaten  - Assist patient with eating if necessary   - Allow adequate time for meals  - Recommend/ encourage appropriate diets, oral nutritional supplements, and vitamin/mineral supplements  - Order, calculate, and assess calorie counts as needed  - Recommend, monitor, and adjust tube feedings and TPN/PPN based on assessed needs  - Assess need for intravenous fluids  - Provide specific nutrition/hydration education as appropriate  - Include patient/family/caregiver in decisions related to nutrition  Outcome: Progressing     Problem: GASTROINTESTINAL - ADULT  Goal: Minimal or absence of nausea and/or vomiting  Description: INTERVENTIONS:  - Administer IV fluids if ordered to ensure adequate hydration  - Maintain NPO status until nausea and vomiting are resolved  - Nasogastric tube if ordered  - Administer ordered antiemetic medications as needed  - Provide nonpharmacologic comfort measures as appropriate  - Advance diet as tolerated, if ordered  - Consider nutrition services referral to assist patient with adequate nutrition and appropriate food choices  Outcome: Progressing  Goal: Maintains or returns to baseline bowel function  Description: INTERVENTIONS:  - Assess bowel function  - Encourage oral fluids to ensure adequate hydration  - Administer IV fluids if ordered to ensure adequate hydration  - Administer ordered medications as needed  - Encourage mobilization and activity  - Consider nutritional services referral to assist patient with adequate nutrition and appropriate food choices  Outcome: Progressing  Goal: Maintains adequate nutritional intake  Description: INTERVENTIONS:  - Monitor percentage of each meal consumed  - Identify factors  contributing to decreased intake, treat as appropriate  - Assist with meals as needed  - Monitor I&O, weight, and lab values if indicated  - Obtain nutrition services referral as needed  Outcome: Progressing  Goal: Oral mucous membranes remain intact  Description: INTERVENTIONS  - Assess oral mucosa and hygiene practices  - Implement preventative oral hygiene regimen  - Implement oral medicated treatments as ordered  - Initiate Nutrition services referral as needed  Outcome: Progressing     Problem: GENITOURINARY - ADULT  Goal: Maintains or returns to baseline urinary function  Description: INTERVENTIONS:  - Assess urinary function  - Encourage oral fluids to ensure adequate hydration if ordered  - Administer IV fluids as ordered to ensure adequate hydration  - Administer ordered medications as needed  - Offer frequent toileting  - Follow urinary retention protocol if ordered  Outcome: Progressing  Goal: Absence of urinary retention  Description: INTERVENTIONS:  - Assess patient’s ability to void and empty bladder  - Monitor I/O  - Bladder scan as needed  - Discuss with physician/AP medications to alleviate retention as needed  - Discuss catheterization for long term situations as appropriate  Outcome: Progressing

## 2024-12-29 NOTE — ASSESSMENT & PLAN NOTE
Hyponatremia likely multifactorial - due to volume overload  [place] catheter placed due to urinary retention  Monitor closely with diuresis  Gradually improving with diuresis  Due to persistent hyponatremia will consult with nephrology to evaluate for possible tolvaptan therapy  Nephrology is adding Samsca  Holding Lasix today

## 2024-12-29 NOTE — ASSESSMENT & PLAN NOTE
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 CLD as tolerated.   DC plan: to return home after her rehab stay with [facility] via [facility].

## 2024-12-30 PROBLEM — N17.9 AKI (ACUTE KIDNEY INJURY) (HCC): Status: ACTIVE | Noted: 2024-01-01

## 2024-12-30 NOTE — UTILIZATION REVIEW
Continued Stay Review    Date: 12/29                          Current Patient Class: Inpatient  Current Level of Care: MS    HPI:64 y.o. female initially admitted on 12/09     Assessment/Plan: Pt reports continue with on and off abdominal pain. On exam clear breath sounds, +2 b/l LE edema, abd soft, nt, nd, normoactive bowel sounds. On 3L NC. Bumex was discontinued on 12/28 due to rising creatinine, creatinine up to 1.43 today, keep holding Bumex for today. Sodium lower at 127 this morning, plan to give another dose of Samsca dose 30 g x 1 today. K improving, 4.1 this am. Cont to mon renal function. Resp status. Mon electrolytes. Cont TF w/ nepro.     Palliative Care Notes: cont current multimodal pain regimen. TF through J tube. CLD as tolerated.     Medications:   Scheduled Medications:  acetaminophen, 975 mg, Oral, Q8H SAM  Albumin 25%, 25 g, Intravenous, Q6H  aspirin, 81 mg, Oral, Daily  carvedilol, 25 mg, Oral, BID With Meals  ceFAZolin (ANCEF) IVPB (premix in dextrose) 1,000 mg 50 mL q8h IV  vitamin B-12, 1,000 mcg, Oral, Daily  enoxaparin, 1 mg/kg, Subcutaneous, Q12H SAM  fentaNYL, 25 mcg, Transdermal, Q72H  folic acid, 1 mg, Per G Tube, Daily  gabapentin, 300 mg, Per G Tube, HS  insulin glargine, 20 Units, Subcutaneous, HS  insulin lispro, 1-5 Units, Subcutaneous, TID AC  insulin lispro, 5 Units, Subcutaneous, TID With Meals  levothyroxine, 150 mcg, Oral, Daily  omeprazole (PRILOSEC) suspension 2 mg/mL, 40 mg, Per G Tube, Daily  polyethylene glycol, 17 g, Oral, Daily  senna, 17.6 mg, Oral, BID      Continuous IV Infusions: none     PRN Meds:  albuterol, 2 puff, Inhalation, Q6H PRN  aluminum-magnesium hydroxide-simethicone, 30 mL, Oral, Q6H PRN  bisacodyl, 10 mg, Rectal, Daily PRN  dicyclomine, 10 mg, Oral, BID PRN  HYDROmorphone, 0.5 mg, Intravenous, Q3H PRN 12/29 x 1  iohexol, 50 mL, Oral, Once in imaging  naloxone, 0.04 mg, Intravenous, Q1MIN PRN  nitroglycerin, 0.4 mg, Sublingual, Q5 Min PRN  ondansetron,  4 mg, Intravenous, Q6H PRN  oxyCODONE, 5 mg, Oral, Q4H PRN   Or  oxyCODONE, 10 mg, Oral, Q4H PRN 12/29 x 5      Discharge Plan: TBD    Vital Signs (last 3 days)       Date/Time Temp Pulse Resp BP MAP (mmHg) SpO2 O2 Flow Rate (L/min) O2 Device Patient Position - Orthostatic VS [place] Coma Scale Score Pain    12/30/24 1611 -- -- -- -- -- -- -- -- -- -- 8    12/30/24 15:45:57 -- 70 -- 103/43 63 98 % -- -- -- -- --    12/30/24 14:44:33 97.2 °F (36.2 °C) 71 16 98/39 59 99 % -- -- Lying -- --    12/30/24 12:02:24 -- 68 -- 100/51 -- 98 % -- -- -- -- --    12/30/24 10:57:05 -- -- -- 102/50 -- -- -- -- -- -- --    12/30/24 10:55:58 -- 68 -- -- -- 97 % -- -- -- -- --    12/30/24 1016 -- -- -- -- -- -- -- -- -- -- 10 - Worst Possible Pain    12/30/24 1002 -- -- -- -- -- -- -- -- -- -- 10 - Worst Possible Pain    12/30/24 1001 -- -- -- -- -- -- -- -- -- -- 10 - Worst Possible Pain    12/30/24 0800 -- -- -- -- -- -- -- -- -- 15 --    12/30/24 0742 -- -- -- -- -- -- -- -- -- -- 10 - Worst Possible Pain    12/30/24 07:10:17 97.9 °F (36.6 °C) 74 17 121/51 74 98 % 3 L/min Nasal cannula -- -- --    12/30/24 0624 -- -- -- -- -- -- -- -- -- -- 10 - Worst Possible Pain    12/30/24 0324 -- -- -- -- -- -- -- -- -- -- 10 - Worst Possible Pain    12/29/24 2247 -- -- -- -- -- -- -- -- -- -- 10 - Worst Possible Pain    12/29/24 22:15:20 98 °F (36.7 °C) 77 16 112/52 72 96 % -- -- Lying -- --    12/29/24 2051 -- -- -- -- -- -- -- -- -- -- 10 - Worst Possible Pain    12/29/24 2002 -- -- -- -- -- -- 3 L/min Nasal cannula -- -- 10 - Worst Possible Pain    12/29/24 16:10:24 97.7 °F (36.5 °C) 73 16 120/51 74 100 % -- None (Room air) Lying -- --    12/29/24 1608 -- -- -- -- -- -- -- -- -- -- 10 - Worst Possible Pain    12/29/24 15:10:06 97.7 °F (36.5 °C) 74 16 107/49 68 98 % -- -- -- -- --    12/29/24 1233 -- -- -- -- -- -- -- -- -- -- 10 - Worst Possible Pain    12/29/24 0839 -- -- -- -- -- -- -- -- -- -- 10 - Worst Possible Pain    12/29/24  07:16:10 97.9 °F (36.6 °C) 75 17 121/51 74 97 % -- None (Room air) Lying -- --    12/29/24 0325 -- -- -- -- -- -- -- -- -- -- 10 - Worst Possible Pain    12/29/24 0052 -- -- -- -- -- -- -- -- -- -- 10 - Worst Possible Pain    12/28/24 2334 -- -- -- -- -- -- -- -- -- -- 10 - Worst Possible Pain    12/28/24 22:22:33 97.8 °F (36.6 °C) 75 16 110/45 67 99 % -- -- Lying -- --    12/28/24 2020 -- -- -- -- -- -- 3 L/min Nasal cannula -- -- 10 - Worst Possible Pain    12/28/24 1723 -- 73 -- 116/54 -- -- -- -- -- -- --    12/28/24 15:33:03 97.7 °F (36.5 °C) 71 17 112/48 69 100 % -- None (Room air) Lying -- --    12/28/24 1447 -- -- -- -- -- -- -- -- -- -- 10 - Worst Possible Pain    12/28/24 1203 -- -- -- -- -- -- -- -- -- -- 10 - Worst Possible Pain    12/28/24 1100 -- -- -- -- -- -- -- -- -- -- 10 - Worst Possible Pain    12/28/24 0937 -- -- -- 109/54 -- -- -- -- -- -- --    12/28/24 0930 -- -- -- -- -- -- -- -- -- 15 --    12/28/24 0716 -- -- -- -- -- -- -- -- -- -- 10 - Worst Possible Pain    12/28/24 07:15:37 97.5 °F (36.4 °C) 75 -- 116/56 76 97 % -- -- -- -- --    12/28/24 0518 -- -- -- -- -- -- -- -- -- -- 10 - Worst Possible Pain    12/28/24 0017 -- -- -- -- -- -- -- -- -- -- 10 - Worst Possible Pain    12/27/24 23:34:35 97.6 °F (36.4 °C) 75 16 105/49 68 99 % -- -- Lying -- --    12/27/24 2012 -- -- -- -- -- -- -- -- -- -- 10 - Worst Possible Pain    12/27/24 2000 -- -- -- -- -- -- -- -- -- 15 --    12/27/24 18:27:45 -- 82 -- 136/59 85 90 % -- -- -- -- --    12/27/24 1639 -- -- -- -- -- -- -- -- -- -- 10 - Worst Possible Pain    12/27/24 15:44:04 97.8 °F (36.6 °C) 78 17 132/78 96 98 % -- -- Lying -- --    12/27/24 1531 -- 80 -- 121/47 -- -- -- -- -- -- 10 - Worst Possible Pain    12/27/24 1500 -- -- -- -- -- -- -- -- -- -- No Pain    12/27/24 1211 -- -- -- -- -- -- -- -- -- -- 10 - Worst Possible Pain    12/27/24 1115 -- -- -- -- -- -- -- -- -- -- 10 - Worst Possible Pain    12/27/24 1114 -- -- -- -- -- -- -- -- -- --  10 - Worst Possible Pain    12/27/24 10:27:33 -- 81 -- 112/48 69 95 % -- -- -- -- --    12/27/24 0946 -- -- -- -- -- -- -- -- -- 15 10 - Worst Possible Pain    12/27/24 09:42:04 -- 80 -- 119/42 68 97 % -- -- -- -- --    12/27/24 0942 -- -- -- 119/42 -- -- -- -- -- -- --    12/27/24 07:10:46 97.8 °F (36.6 °C) 76 18 109/44 66 96 % -- -- Lying -- --    12/27/24 0534 -- -- -- -- -- -- -- -- -- -- 10 - Worst Possible Pain          Weight (last 2 days)       Date/Time Weight    12/30/24 0600 116 (256.84)    12/29/24 0554 117 (258.38)    12/28/24 0300 113 (249.78)            Pertinent Labs/Diagnostic Results:   Radiology:  CT abdomen pelvis w contrast   Final Interpretation by Jaylin Magaña MD (12/26 1610)      1) No bowel obstruction. Oral contrast from the 12/15/2024 CT seen throughout the colon and rectum, indicating delayed gastrointestinal transit.   2) Percutaneous gastrojejunostomy tube in the stomach, with tip in duodenum. Duodenum just distal to the tube tip takes a sharp turn caudad as on the prior studies, likely due to presence of an adhesion.      3) Moderate to large abdominopelvic ascites.      4) Findings concerning for peritoneal metastasis in central pelvis at site of the prior small bowel obstruction transition point, corresponding to the intra-operative and pathologic findings. Associated apparent tethering of the vaginal cuff, and likely    at least partial tethering of the right distal ureter and some loops of small bowel. Additional peritoneal carcinomatosis in left side of the pelvis adjacent to the sigmoid colon and inseparable from left wall of the urinary bladder.      5) Coronado catheter balloon appears somewhat low-lying, possibly at least partially in the urethra. Recommend repositioning.      6) Some fluid deep to the midline supraumbilical anterior abdominal wall incision, similar to 12/15/2024, however no also with a droplet of air. No  definite rim enhancement to suggest an abscess. The  droplet of air may be related to a portion of the    incision still being open. Recommend correlation with physical exam.      7) Mildly enlarged left external iliac lymph node, similar to the initial study from 11/5/2024. Recommend correlation with older outside prior studies to assess for the possibility of metastatic disease.      8) Additional findings as above, without interval change from 12/15/2024.      A preliminary report was provided by [employer] after the completion of the study.      The study was marked in EPIC for immediate notification.      Workstation performed: [id number]         IR INPATIENT Paracentesis   Final Interpretation by Rishabh Johnson MD (12/20 1640)   Therapeutic paracentesis.      Procedure was performed by:   Live Mayo PA-C      Supervising Physician: Dr. Johnson      Workstation performed: [id number]         CT abdomen pelvis w contrast   Final Interpretation by Riki Reina MD (12/15 1527)      No bowel obstruction. Gastrojejunostomy tube in appropriate position.      Stable bilateral pleural effusions with bibasilar atelectasis right worse than left.      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      Severe body wall anasarca and large volume abdominopelvic ascites unchanged likely due to third spacing.         Workstation performed: [id number]         XR abdomen 1 view kub   Final Interpretation by Arslan Parish MD (12/12 1002)      No acute abdominal findings.      The gastrojejunostomy tube projects over the mid abdomen.               Resident: Romy Blunt I, the attending radiologist, have reviewed the images and agree with the final report above.      Workstation performed: [id number]         XR chest portable ICU   Final Interpretation by Andrea Anderson MD (12/11 0830)      Minimal increase in pulmonary edema and no significant change in the bilateral pleural effusions (right larger than left).            Workstation  performed: [id number]         XR chest portable   Final Interpretation by Kalie Nova MD (12/10 0200)      Bilateral lung disease.            Workstation performed: [id number]         CT abdomen pelvis w contrast   Final Interpretation by Andrea Anderson MD (12/09 0131)      Since November 30, 2024:   1.  Persistent third spacing with slight increase in moderate volume ascites, and no significant change in moderate size right and small left pleural effusions with bibasilar atelectasis.   2.  New percutaneous enteric tube with tip in the distal duodenum.         Workstation performed: [id number]         IR INPATIENT Paracentesis    (Results Pending)     Cardiology:  Echo complete w/ contrast if indicated   Final Result by Prakash Spring MD (12/12 1420)        Left Ventricle: Left ventricular cavity size is normal. Wall thickness    is mildly increased. There is mild concentric hypertrophy. The left    ventricular ejection fraction is 65% by visual estimation. Systolic    function is normal. Wall motion is normal. Diastolic function is mildly    abnormal, consistent with grade I (abnormal) relaxation.     Aortic Valve: The aortic valve is trileaflet. The leaflets are    moderately thickened. The leaflets are mildly calcified. There is mildly    reduced mobility. There is trace regurgitation. There is mild stenosis.    The aortic valve peak velocity is 2.18 m/s. The aortic valve mean gradient    is 11 mmHg. The dimensionless velocity index is 0.41. The aortic valve    area is 1.54 cm2.         ECG 12 lead   Final Result by Justin Trinidad MD (12/12 4341)   Normal sinus rhythm   Normal ECG   When compared with ECG of 10-Dec-2024 13:09, (unconfirmed)   T wave inversion no longer evident in Lateral leads   Confirmed by Justin Trinidad ([id number]) on 12/12/2024 11:26:04 PM      ECG 12 lead   Final Result by Rishabh Aburto MD (12/14 5308)   Sinus tachycardia   Low voltage QRS   Cannot rule out Anterior infarct , age  undetermined   Abnormal ECG   When compared with ECG of 14-Nov-2024 21:45,   No significant change was found   Confirmed by Rishabh Aburto ([id number]) on 12/14/2024 8:42:48 AM        GI:  EGD   Final Result by Dennise Garcia MD (12/13 5030)   The esophagus appeared normal.   Mild erythematous mucosa in the antrum, prepyloric region and pylorus,    consistent with gastritis   The internal bolster of PEG was pushed away from the mucosa revealing an    area of ulceration with no surrounding purulence   Removed tube from the stomach. The tube was replaced.   The duodenal bulb, 1st part of the duodenum, 2nd part of the duodenum and    3rd part of the duodenum appeared normal.         RECOMMENDATION:   Return to the medical surgical floors   Okay to begin tube feeds via J port and medication via the G port   Okay to resume diet per primary team    GI will sign off, please reach out with question or concerns                     Dennise Garcia MD       EGD   Final Result by Dennise Garcia MD (12/13 1218)   The esophagus appeared normal.   Upon entry into the stomach, erythema was noted around the internal    bolster of the PEG tube.  The internal bolster was pushed away from the    mucosa revealing an area of ulceration with some surrounding purulence and    erythema.  2 g of cefazolin was administered intraprocedural.    Removed jejunal extension externally from the stomach and a new jejunal    extension was replaced using a rat-tooth forceps to extend the tubing as    far distal into the duodenum as we could.  The J port was successfully    flushed. External tubing 4 cm at the skin surface.    The duodenal bulb, 1st part of the duodenum, 2nd part of the duodenum and    3rd part of the duodenum appeared normal.      RECOMMENDATION:   Start 7-day course of Augmentin   Okay to use PEG-J tube for medications, water flushes, and tube feeds now   Continue tube care.   Make sure to use the correct ports. DO NOT USE ports for any  "other purpose    than as labeled:   \"Feed\" port is for tube feeds ONLY.   \"Rx\" port is for medications ONLY.   \"Suction\" port is for gastric venting/emptying.    Please make sure to flush ports with water before and after use to prevent    clogging.                      Dennise Garcia MD               Results from last 7 days   Lab Units 12/26/24  0350 12/25/24  0453 12/24/24  1538   WBC Thousand/uL 4.41 4.82 5.20   HEMOGLOBIN g/dL 7.4* 7.7* 7.5*   HEMATOCRIT % 23.6* 24.8* 23.7*   PLATELETS Thousands/uL 101* 96* 86*   TOTAL NEUT ABS Thousands/µL  --   --  4.35         Results from last 7 days   Lab Units 12/30/24  0841 12/29/24  0456 12/28/24  0518 12/27/24  0357 12/26/24  0350 12/25/24  0453 12/24/24  0509   SODIUM mmol/L 128* 127* 129* 127* 130* 129* 128*   POTASSIUM mmol/L 3.9 4.1 4.5 4.6 5.1 5.4* 5.3   CHLORIDE mmol/L 85* 85* 86* 86* 88* 89* 91*   CO2 mmol/L 35* 36* 35* 35* 39* 36* 32   ANION GAP mmol/L 8 6 8 6 3* 4 5   BUN mg/dL 88* 86* 81* 75* 72* 63* 60*   CREATININE mg/dL 1.31* 1.43* 1.26 1.06 0.88 0.88 0.79   EGFR ml/min/1.73sq m 43 39 45 56 70 70 80   CALCIUM mg/dL 8.5 8.8 8.8 8.9 9.0 8.8 8.5   MAGNESIUM mg/dL 2.4  --   --   --  1.8* 1.8* 1.9   PHOSPHORUS mg/dL 3.3  --   --   --   --   --   --      Results from last 7 days   Lab Units 12/27/24  0357   AST U/L 8*   ALT U/L 5*   ALK PHOS U/L 70   TOTAL PROTEIN g/dL 4.9*   ALBUMIN g/dL 3.0*   TOTAL BILIRUBIN mg/dL 0.31     Results from last 7 days   Lab Units 12/30/24  1555 12/30/24  1245 12/30/24  1028 12/30/24  0734 12/29/24 2007 12/29/24  1616 12/29/24  1049 12/29/24  0624 12/28/24 2005 12/28/24  1606 12/28/24  1100 12/28/24  0632   POC GLUCOSE mg/dl 121 151* 165* 161* 117 123 109 92 133 135 134 173*     Results from last 7 days   Lab Units 12/30/24  0841 12/29/24  0456 12/28/24  0518 12/27/24  0357 12/26/24  0350 12/25/24  0453 12/24/24  0509   GLUCOSE RANDOM mg/dL 162* 89 159* 160* 148* 231* 302*             No results found for: " "\"BETA-HYDROXYBUTYRATE\"       Results from last 7 days   Lab Units 12/27/24  0357   PH TIFFANIE  7.422*   PCO2 TIFFANIE mm Hg 56.5*   PO2 TIFFANIE mm Hg 40.0   HCO3 TIFFANIE mmol/L 36.0*   BASE EXC TIFFANIE mmol/L 10.3   O2 CONTENT TIFFANIE ml/dL 8.6   O2 HGB, VENOUS % 75.5                                                         Results from last 7 days   Lab Units 12/24/24  0555   UNIT PRODUCT CODE  [id number]   UNIT NUMBER  [id number]   UNITABO  O   UNITRH  POS   CROSSMATCH  Compatible   UNIT DISPENSE STATUS  Presumed Trans   UNIT PRODUCT VOL mL 350                     Results from last 7 days   Lab Units 12/25/24  1214   OSMO UR mmol/     Results from last 7 days   Lab Units 12/25/24  1214   SODIUM UR  68   CREATININE UR mg/dL 26.3                                 Results from last 7 days   Lab Units 12/30/24  1132 12/25/24  1648   GRAM STAIN RESULT  1+ Polys  No organisms seen 1+ Polys*  4+ Gram negative rods*  4+ Gram positive rods*  1+ Gram positive cocci in pairs*  Rare Budding yeast*   WOUND CULTURE   --  4+ Growth of Klebsiella oxytoca*  4+ Growth of Citrobacter freundii*  4+ Growth of  Growth in Broth culture only Candida glabrata*     Results from last 7 days   Lab Units 12/30/24  1132   TOTAL COUNTED  100   WBC FLUID /ul 310               Network Utilization Review Department  ATTENTION: Please call with any questions or concerns to [phone] and carefully listen to the prompts so that you are directed to the right person. All voicemails are confidential.   For Discharge needs, contact Care Management DC Support Team at [phone] opt. [phone]  Send all requests for admission clinical reviews, approved or denied determinations and any other requests to dedicated fax number below belonging to the campus where the patient is receiving treatment. List of dedicated fax numbers for the Facilities:  FACILITY NAME UR FAX NUMBER   ADMISSION DENIALS (Administrative/Medical Necessity) [phone]   DISCHARGE SUPPORT TEAM " (NETWORK) [phone]   PARENT CHILD HEALTH (Maternity/NICU/Pediatrics) [phone]   [facility] [phone]   [facility] [phone]   formerly [facility] [phone]   [facility] [phone]   [facility] [phone]   [facility] [phone]   [facility] [phone]   [facility] [phone]   [facility] [phone]   [facility] [phone]   [facility] [phone]   [facility] [phone]

## 2024-12-30 NOTE — ASSESSMENT & PLAN NOTE
Appears to have had chronic hyponatremia dating as far back as 2022  Hyponatremia likely secondary to underlying malignancy plus some component of intravascular volume depletion with third spacing and increased ADH with pain and nausea  Sodium admission of 129 mEq  Most recent sodium at 128 mEq remains stable no significant improvement  Sodium dropped down to 125 mEq on 12/20/2024  Status post Samsca 15 g on 12/27 and 30 g on 12/28 and 30 g on 12/29, tolvaptan 45 mg x 1 on 12/30  Will give albumin 25 g IV every 6 for 2 doses and Bumex 2 mg IV x 1 to help with intravascular volume shifts as clinically is hypervolemic   Patient has a significant third spacing  Maintain off of fluid restriction  Repeat workup showing serum osmolality 278 urine sodium 61 urine osmolality 311

## 2024-12-30 NOTE — PROGRESS NOTES
Progress Note - Nephrology   Name: Lety Botello 64 y.o. female I MRN: [id number]  [facility] I Date of Admission: 12/9/2024   Date of Service: 12/30/2024 I Hospital Day: 21    64-year-old female with history of multiple comorbidities including obesity, chronic hyponatremia, diabetes, hypertension, IBS and endometrial cancer admitted with J-tube malfunction nephrology consulted for hyponatremia management.   Assessment & Plan  Hyponatremia  Appears to have had chronic hyponatremia dating as far back as 2022  Hyponatremia likely secondary to underlying malignancy plus some component of intravascular volume depletion with third spacing and increased ADH with pain and nausea  Sodium admission of 129 mEq  Most recent sodium at 128 mEq remains stable no significant improvement  Sodium dropped down to 125 mEq on 12/20/2024  Status post Samsca 15 g on 12/27 and 30 g on 12/28 and 30 g on 12/29, tolvaptan 45 mg x 1 on 12/30  Will give albumin 25 g IV every 6 for 2 doses and Bumex 2 mg IV x 1 to help with intravascular volume shifts as clinically is hypervolemic   Patient has a significant third spacing  Maintain off of fluid restriction  Repeat workup showing serum osmolality 278 urine sodium 61 urine osmolality 311  Encounter for gastrojejunal (GJ) tube placement issues  Recent small bowel obstruction secondary to 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 twice daily, amlodipine 5 mg p.o. daily  DC Norvasc for now as blood pressure is low  Give albumin 25 g x 2 doses today and Bumex 2 mg IV x 1  Decrease Coreg down to  3.125 mg p.o. twice daily  Endometrial cancer (HCC)  Follow-up with primary team and hematology   Hyperkalemia  Most recent potassium at 3.8 mEq stable and resolved  We will give K-Dur or 40 mill equivalents x 1 today  Check BMP in a.m.  TERRA (acute kidney injury) (HCC)  Baseline creatinine 0.7 to 0.9 mg/dL  Admitted with creatinine 0.39 mg/dL on 12/9/2024  Creatinine today 1.33 mg/dL stable   May need to except higher creatinine for euvolemia  Likely TERRA secondary to ischemic injury from hypotension plus intravascular volume depletion secondary to third spacing  Decrease Coreg dosage  Give albumin 25 g IV every 6 for 2 doses and Bumex 2 mg IV x 1  Optimize hemodynamics  Monitor for renal recovery    I have reviewed the nephrology recommendations including decrease Coreg and Bumex x 1 give albumin x 2, with medicine team, and we are in agreement with renal plan including the information outlined above.     Subjective   Brief History of Admission -     Patient seen and examined in the room status post paracentesis yesterday blood pressure slightly improved afebrile, urine output 1.3 L paracentesis fluid removal 4.6 L    Objective :  Temp:  [97.2 °F (36.2 °C)-98 °F (36.7 °C)] 97.2 °F (36.2 °C)  HR:  [68-77] 70  BP: ()/(39-52) 103/43  Resp:  [16-17] 16  SpO2:  [96 %-100 %] 98 %  O2 Device: Nasal cannula    Current Weight: Weight - Scale: 116 kg (256 lb 13.4 oz)  First Weight: Weight - Scale: 120 kg (263 lb 7.2 oz)  I/O         12/28 0701 12/29 0700 12/29 0701  12/30 0700 12/30 0701  12/31 0700    P.O. 938      NG/GT 60 120 30    IV Piggyback 75 130 100    Feedings 798 873 75    Total Intake(mL/kg) 1871 (16) 1123 (9.7) 205 (1.8)    Urine (mL/kg/hr) 950 (0.3) 1125 (0.4) 550 (0.5)    Other   4620    Total Output 950 1125 5170    Net +921 -2 -4965           Unmeasured Stool Occurrence  1 x           Physical Exam  Vitals and nursing note reviewed.   Constitutional:       General: She is not in acute distress.      Appearance: Normal appearance. She is obese. She is ill-appearing. She is not toxic-appearing.   HENT:      Head: Normocephalic and atraumatic.      Mouth/Throat:      Pharynx: No oropharyngeal exudate.   Eyes:      General: No scleral icterus.  Cardiovascular:      Rate and Rhythm: Normal rate.   Pulmonary:      Effort: No respiratory distress.      Breath sounds: No stridor. No wheezing.   Abdominal:      Tenderness: There is abdominal tenderness.   Musculoskeletal:         General: Swelling present.      Cervical back: No rigidity.   Skin:     Coloration: Skin is not jaundiced.   Neurological:      General: No focal deficit present.      Mental Status: She is alert.   Psychiatric:         Mood and Affect: Mood normal.         Behavior: Behavior normal.       ROS:  Constitutional:  No chills, no fever.   HENT:  No sore throat  Respiratory:  No cough, no hemoptysis, no wheezing.    Cardiovascular: + leg swelling.   Gastrointestinal:  No constipation, no diarrhea. + pain  Musculoskeletal:  No back pain.   Neurological:  no dizziness, No headaches.   Psychiatric/Behavioral:  No agitation, no confusion.    Wounds: positive,   All other systems reviewed and are negative.        Medications:    Current Facility-Administered Medications:     acetaminophen (TYLENOL) tablet 975 mg, 975 mg, Oral, Q8H SAM, Pauly Almonteas, DO, 975 mg at 12/30/24 1307    albumin human (FLEXBUMIN) 25 % injection 25 g, 25 g, Intravenous, Q6H, Sarai Mcknight MD, Stopped at 12/30/24 1300    albuterol (PROVENTIL HFA,VENTOLIN HFA) inhaler 2 puff, 2 puff, Inhalation, Q6H PRN, Kelvin Sheppard DO    aluminum-magnesium hydroxide-simethicone (MAALOX) oral suspension 30 mL, 30 mL, Oral, Q6H PRN, Kelvin Sheppard DO    aspirin (ECOTRIN LOW STRENGTH) EC tablet 81 mg, 81 mg, Oral, Daily, Kelvin Sheppard DO, 81 mg at 12/30/24 0807    bisacodyl (DULCOLAX) rectal suppository 10 mg, 10 mg, Rectal, Daily PRN, Kelvin Sheppard DO    carvedilol (COREG) tablet 25 mg,  25 mg, Oral, BID With Meals, Kelvin Sheppard DO, 25 mg at 12/30/24 0807    cyanocobalamin (VITAMIN B-12) tablet 1,000 mcg, 1,000 mcg, Oral, Daily, Kelvin Sheppard DO, 1,000 mcg at 12/30/24 0807    dicyclomine (BENTYL) capsule 10 mg, 10 mg, Oral, BID PRN, Kelvin Sheppard DO, 10 mg at 12/18/24 0947    enoxaparin (LOVENOX) subcutaneous injection 120 mg, 1 mg/kg, Subcutaneous, Q12H SAM, Paul Ríos MD, 120 mg at 12/30/24 0808    fentaNYL (DURAGESIC) 25 mcg/hr TD 72 hr patch 25 mcg, 25 mcg, Transdermal, Q72H, May Sherry Monterroso MD, 25 mcg at 12/30/24 1245    folic acid (FOLVITE) tablet 1 mg, 1 mg, Per G Tube, Daily, Paul Ríos MD, 1 mg at 12/30/24 0807    gabapentin (NEURONTIN) capsule 300 mg, 300 mg, Per G Tube, HS, Pauly Grissom DO, 300 mg at 12/29/24 2247    HYDROmorphone (DILAUDID) injection 0.5 mg, 0.5 mg, Intravenous, Q3H PRN, May Sherry Monterroso MD, 0.5 mg at 12/30/24 1016    insulin glargine (LANTUS) subcutaneous injection 20 Units 0.2 mL, 20 Units, Subcutaneous, HS, Kelvin Sheppard DO, 20 Units at 12/29/24 2247    insulin lispro (HumALOG/ADMELOG) 100 units/mL subcutaneous injection 1-5 Units, 1-5 Units, Subcutaneous, TID AC, 1 Units at 12/30/24 1308 **AND** Fingerstick Glucose (POCT), , , TID AC, Kelvin Sheppard DO    insulin lispro (HumALOG/ADMELOG) 100 units/mL subcutaneous injection 5 Units, 5 Units, Subcutaneous, TID With Meals, Paul Ríos MD, 5 Units at 12/29/24 1639    iohexol (OMNIPAQUE) 240 MG/ML solution 50 mL, 50 mL, Oral, Once in imaging, Bj Jones MD    levothyroxine tablet 150 mcg, 150 mcg, Oral, Daily, Kelvin Sheppard DO, 150 mcg at 12/30/24 0623    naloxone (NARCAN) 0.04 mg/mL syringe 0.04 mg, 0.04 mg, Intravenous, Q1MIN PRN, JASON Cooley    nitroglycerin (NITROSTAT) SL tablet 0.4 mg, 0.4 mg, Sublingual, Q5 Min PRN, Kelvin Sheppard DO    omeprazole (PRILOSEC) suspension 2 mg/mL, 40 mg, Per G Tube, Daily, Kelvin Sheppard DO, 40 mg at 12/30/24 0743    ondansetron (ZOFRAN) injection 4 mg, 4  "mg, Intravenous, Q6H PRN, Kelvin Sheppard DO, 4 mg at 12/30/24 0742    oxyCODONE (ROXICODONE) oral solution 5 mg, 5 mg, Oral, Q4H PRN **OR** oxyCODONE (ROXICODONE) oral solution 10 mg, 10 mg, Oral, Q4H PRN, JASON Cooley, 10 mg at 12/30/24 0742    polyethylene glycol (MIRALAX) packet 17 g, 17 g, Oral, Daily, Kelvin Sheppard DO, 17 g at 12/29/24 0828    senna oral syrup 17.6 mg, 17.6 mg, Oral, BID, Kelvin Sheppard DO, 17.6 mg at 12/29/24 0830      Lab Results: I have reviewed the following results:  Results from last 7 days   Lab Units 12/30/24  0841 12/29/24  0456 12/28/24  0518 12/27/24  0357 12/26/24  0350 12/25/24  0453 12/24/24  1538 12/24/24  0509   WBC Thousand/uL  --   --   --   --  4.41 4.82 5.20  --    HEMOGLOBIN g/dL  --   --   --   --  7.4* 7.7* 7.5*  --    HEMATOCRIT %  --   --   --   --  23.6* 24.8* 23.7*  --    PLATELETS Thousands/uL  --   --   --   --  101* 96* 86*  --    POTASSIUM mmol/L 3.9 4.1 4.5 4.6 5.1 5.4*  --  5.3   CHLORIDE mmol/L 85* 85* 86* 86* 88* 89*  --  91*   CO2 mmol/L 35* 36* 35* 35* 39* 36*  --  32   BUN mg/dL 88* 86* 81* 75* 72* 63*  --  60*   CREATININE mg/dL 1.31* 1.43* 1.26 1.06 0.88 0.88  --  0.79   CALCIUM mg/dL 8.5 8.8 8.8 8.9 9.0 8.8  --  8.5   MAGNESIUM mg/dL 2.4  --   --   --  1.8* 1.8*  --  1.9   PHOSPHORUS mg/dL 3.3  --   --   --   --   --   --   --    ALBUMIN g/dL  --   --   --  3.0*  --   --   --   --        Administrative Statements     Portions of the record may have been created with voice recognition software. Occasional wrong word or \"sound a like\" substitutions may have occurred due to the inherent limitations of voice recognition software. Read the chart carefully and recognize, using context, where substitutions have occurred.If you have any questions, please contact the dictating provider.  "

## 2024-12-30 NOTE — PLAN OF CARE
Problem: PHYSICAL THERAPY ADULT  Goal: Performs mobility at highest level of function for planned discharge setting.  See evaluation for individualized goals.  Description: Treatment/Interventions: ADL retraining, Functional transfer training, LE strengthening/ROM, Elevations, Therapeutic exercise, Endurance training, Cognitive reorientation, Patient/family training, Equipment eval/education, Bed mobility, Compensatory technique education, Continued evaluation, Spoke to nursing, Spoke to case management, Spoke to advanced practitioner, OT  Equipment Recommended: Wheelchair       See flowsheet documentation for full assessment, interventions and recommendations.  Outcome: Progressing  Note: Prognosis: Fair  Problem List: Decreased strength, Decreased range of motion, Decreased endurance, Impaired balance, Decreased mobility, Decreased coordination, Decreased cognition, Impaired judgement, Decreased safety awareness, Impaired sensation, Obesity, Decreased skin integrity, Pain  Assessment: Initiated session w/ supine ther ex instruction. Pt required cues to attend to task and put forth best effort w/ ea rep. Pt able to progress w/ sitting balance from ModA - CS for a couple minutes, did revert back to requiring Jodi w/ fatigue. Encouragement required to participate in t/f training. Pt able to achieve 75% upright standing w/ MaxAx2. Returned to supine for safety due to fatigue w/ activities. Left pt in chair position. Pt pleased w/ progress and expressed gratitude to therapy staff. Continue to recommend STR upon d/c.  Barriers to Discharge: Inaccessible home environment, Decreased caregiver support     Rehab Resource Intensity Level, PT: II (Moderate Resource Intensity)    See flowsheet documentation for full assessment.

## 2024-12-30 NOTE — ASSESSMENT & PLAN NOTE
S/p paracentesis for 4620 cc today (12/30)    Current Multimodal pain regimen:  Tolerating well, no changes at this time  Fentanyl 25 mcg increased on 12/27   Schedule Tylenol 975 mg TID  Gabapentin 300 mg HS   OxyIR 5mg Q4hrs PRN for moderate pain   OxyIR 10mg Q4hrs PRN for severe pain   IV dilaudid to 0.5 mg q3H PRN for BT pain  Total 24 hour ome requirements: approximately 75 in addition to 25mcg/hr TDF  Failed therapies:  NA    Bowel regimen to prevent OIC: senna 17.6 mg bid, prn dulcolax suppository. Last bowel movement 12/29  Opioid agreement. Not on file, will need outpatient follow up

## 2024-12-30 NOTE — WOUND OSTOMY CARE
Progress Note - Wound   Lety Botello 64 y.o. female MRN: [id number]  Unit/Bed#: [facility] 511-01 Encounter: [id number]        Assessment:   Patient is seen for wound care follow-up. Patient is on [facility] mattress. Patient is max assist for turning and repositioning. Incontinent of bowel and bladder.       Sacral/buttocks and B/L heels intact and blanching, preventative skin care orders placed.      Right lower abdomen: with resolving shingles, scattered areas of partial thickness skin loss measured together. Scant serosanguineous drainage noted.   Left Upper Abdomen Peg Tube Site: full thickness moist yellow slough tissue. Katya-wound is fragile, hyperpigmented, and pink in color. Large foul-smelling serosanguineous drainage noted.   Left posterior thigh- partial thickness skin loss with pink tissue, periwound skin is fragile, likely friction and moisture, scant serosanguineous drainage.         No induration, fluctuance, warmth/temperature differences, redness, or purulence noted to the above noted wounds and skin areas assessed. New dressings applied per orders listed below. Patient tolerated well- no s/s of non-verbal pain or discomfort observed during the encounter. Bedside nurse aware of plan of care. See flow sheets for more detailed assessment findings.  Wound care will continue to follow, call or Secure Chat Message with questions.          Wound Care Plan:   1-Apply silicone bordered foam dressings to bilateral heels for prevention.  Jason with P.  Peel back for skin assessments at least daily and re-apply.  Change dressings every 3 days and as needed.  2-Elevate heels off of bed/chair surface--offloading heel boots.  3-Offloading air cushion in chair when out of bed.  4-Apply moisturizing skin cream to body daily and as needed.  5-Turn/reposition every 2 hours while in bed and weight shift frequently while in chair for pressure re-distribution on skin.   6-Silicone Cream/Hydraguard lotion to bilateral  buttocks and sacrum three times daily and as needed.  7-Right distal abdomen (shingles)--Cleanse with NSS and pat dry. Cover wounds with silicone bordered foam dressings to areas. Mary Carmen with T for Treatment and change every other day or PRN  8. Peg site- cleanse with Normal saline then apply melgisorb ag as split gauze then a mepilex up as a split gauze change daily   9. KREG low air loss mattress re-ordered for patient   10. ATR turning system with wedges   11. Cleanse left posterior thigh with soap and water. Apply Silicone bordered foam, mary carmen T for treatment, and change every other day and PRN soilage/displacement    Wound 11/20/24 Abdomen Right;Distal (Active)   Wound Image   12/30/24 1017   Wound Description Beefy red 12/30/24 1017   Katya-wound Assessment Hyperpigmented 12/30/24 1017   Wound Length (cm) 2 cm 12/30/24 1017   Wound Width (cm) 7 cm 12/30/24 1017   Wound Depth (cm) 0.1 cm 12/30/24 1017   Wound Surface Area (cm^2) 14 cm^2 12/30/24 1017   Wound Volume (cm^3) 1.4 cm^3 12/30/24 1017   Calculated Wound Volume (cm^3) 1.4 cm^3 12/30/24 1017   Wound Site Closure BERENICE 12/30/24 1017   Drainage Amount Small 12/30/24 1017   Drainage Description Serosanguineous 12/30/24 1017   Non-staged Wound Description Partial thickness 12/30/24 1017   Treatments Cleansed 12/30/24 1017   Dressing Foam, Silicon (eg. Allevyn, etc) 12/30/24 1017   Wound packed? No 12/30/24 1017   Packing- # removed 0 12/30/24 1017   Packing- # inserted 0 12/30/24 1017   Dressing Changed New 12/30/24 1017   Patient Tolerance Tolerated well 12/30/24 1017   Dressing Status Clean;Dry;Intact 12/30/24 1017       Wound 11/22/24 Incision Abdomen Mid (Active)   Wound Description Pink;Clean;Dry 12/30/24 0800   Katya-wound Assessment Clean;Dry;Intact 12/30/24 0800   Wound Site Closure Surface sutures 12/30/24 0800   Drainage Amount None 12/30/24 0800   Drainage Description Bloody 12/19/24 2005   Treatments Cleansed 12/28/24 1450   Dressing ABD 12/30/24 0800    Wound packed? No 12/26/24 0800   Dressing Changed Changed 12/29/24 2002   Patient Tolerance Tolerated well 12/29/24 2002   Dressing Status Clean;Dry;Intact 12/30/24 0800       Wound 12/18/24 Abdomen Left;Upper (Active)   Wound Image   12/30/24 1015   Wound Description Slough 12/30/24 1015   Katya-wound Assessment Erythema 12/30/24 1015   Wound Length (cm) 1.5 cm 12/30/24 1015   Wound Width (cm) 2.2 cm 12/30/24 1015   Wound Depth (cm) 0.2 cm 12/30/24 1015   Wound Surface Area (cm^2) 3.3 cm^2 12/30/24 1015   Wound Volume (cm^3) 0.66 cm^3 12/30/24 1015   Calculated Wound Volume (cm^3) 0.66 cm^3 12/30/24 1015   Change in Wound Size % -37.5 12/30/24 1015   Wound Site Closure BERENICE 12/30/24 1015   Drainage Amount Large 12/30/24 1015   Drainage Description Foul smelling;Tan;Green 12/30/24 1015   Non-staged Wound Description Full thickness 12/30/24 1015   Treatments Cleansed 12/30/24 1015   Dressing ABD;Calcium Alginate with Silver 12/30/24 1015   Wound packed? No 12/30/24 1015   Packing- # removed 0 12/30/24 1015   Packing- # inserted 0 12/30/24 1015   Dressing Changed New 12/30/24 1015   Patient Tolerance Tolerated well 12/30/24 1015   Dressing Status Clean;Dry;Intact 12/30/24 1015       Wound 12/30/24 Thigh Left;Posterior;Proximal (Active)   Wound Image   12/30/24 1436   Wound Description Beefy red;Light purple 12/30/24 1436   Katya-wound Assessment Fragile 12/30/24 1436   Wound Length (cm) 0.5 cm 12/30/24 1436   Wound Width (cm) 1 cm 12/30/24 1436   Wound Depth (cm) 0.1 cm 12/30/24 1436   Wound Surface Area (cm^2) 0.5 cm^2 12/30/24 1436   Wound Volume (cm^3) 0.05 cm^3 12/30/24 1436   Calculated Wound Volume (cm^3) 0.05 cm^3 12/30/24 1436   Wound Site Closure BERENICE 12/30/24 1436   Drainage Amount Scant 12/30/24 1436   Drainage Description Serosanguineous 12/30/24 1436   Non-staged Wound Description Partial thickness 12/30/24 1436   Treatments Cleansed 12/30/24 1436   Dressing Foam, Silicon (eg. Allevyn, etc) 12/30/24 1436    Wound packed? No 12/30/24 1436   Packing- # removed 0 12/30/24 1436   Packing- # inserted 0 12/30/24 1436   Dressing Changed New 12/30/24 1436   Patient Tolerance Tolerated well 12/30/24 1436   Dressing Status Clean;Dry;Intact 12/30/24 1436         Tricia STONEN, RN, CWOCN

## 2024-12-30 NOTE — SPEECH THERAPY NOTE
"Speech Language/Pathology    Speech-Language Pathology Progress Note      Patient Name: Lety Botello    Today's Date: 12/30/2024      Subjective:  The pt was upright in her bed.  She indicates that she has been avoiding solid foods due to vomiting every 2-3 days if she does consume solids.  She is mainly drinking thin water only.  She states that she needs to drink slow/consume slowly to avoid vomiting.  Nurse states that she vomited this am.     Objective:  The pt took sips of thins via small water bottle.  She holds the liquid in her mouth prior to swallowing.  She states the slow rate is necessary to avoid vomiting.  No overt s/s aspiration or penetration.  Laryngeal movement observed.  No retrograde flow or reflux/vomiting noted.     Assessment:  GI issues continued to exacerbate intake and tolerance of PO.  She is currently limited to thin liquids per her preference of decreasing instances of vomiting.  Maximal support provided.  She indicates she will trial solids \"when she feels better\".    Plan:  Continue thin liquids- pt preference.    Slow rate to decrease vomiting/reflux aspiration risk.  Will sign off as pt is tolerating thin liquids and wishes to remain on this diet at this time to decrease vomiting.  Reconsult as needed.   "

## 2024-12-30 NOTE — ASSESSMENT & PLAN NOTE
Most recent echo with EF of 65% grade 1 diastolic dysfunction  Hypervolemic with third spacing  Will give albumin x 2 doses and Bumex 2 mg IV x 1 to help with intravascular volume shifts

## 2024-12-30 NOTE — ASSESSMENT & PLAN NOTE
Baseline creatinine 0.7 to 0.9 mg/dL  Admitted with creatinine 0.39 mg/dL on 12/9/2024  Creatinine today 1.31 mg/dL stable and improving  Avoid diuretics  Likely TERRA secondary to ischemic injury from hypotension plus intravascular volume depletion secondary to third spacing  DC Norvasc consider decreasing Coreg dosage  Give albumin 25 g IV every 6 for 2 doses  Optimize hemodynamics  Monitor for renal recovery

## 2024-12-30 NOTE — OCCUPATIONAL THERAPY NOTE
Occupational Therapy Progress Note     Patient Name: Lety Botello  Today's Date: 12/30/2024  Problem List  Principal Problem:    Encounter for gastrojejunal (GJ) tube placement issues  Active Problems:    Hypertension    Type 2 diabetes mellitus (HCC)    Endometrial cancer (HCC)    History of pulmonary embolism    Hypothyroidism    Gastroparesis    Morbid obesity (HCC)    Abdominal pain    Shingles    Palliative care encounter    Hyponatremia    Anemia    Acute respiratory failure with hypoxia (HCC)    Hypomagnesemia    NSVT (nonsustained ventricular tachycardia) (HCC)    Coronary artery disease involving native coronary artery    abdominal rash    Anasarca    Hyperkalemia    TERRA (acute kidney injury) (HCC)             12/30/24 1001   OT Last Visit   OT Visit Date 12/30/24   Note Type   Note Type Treatment   Pain Assessment   Pain Assessment Tool 0-10   Pain Score 10 - Worst Possible Pain   Pain Location/Orientation Location: Abdomen   Patient's Stated Pain Goal No pain   Hospital Pain Intervention(s) Repositioned;Ambulation/increased activity;Emotional support   Restrictions/Precautions   Weight Bearing Precautions Per Order No   Other Precautions Bed Alarm;Cognitive;Multiple lines;Fall Risk;Pain   ADL   Where Assessed Edge of bed   Grooming Assistance 5  Supervision/Setup   Grooming Deficit Wash/dry face   UB Bathing Assistance 3  Moderate Assistance   UB Bathing Deficit Setup;Increased time to complete;Chest;Right arm;Left arm;Abdomen   UB Bathing Comments pt able to complete chest, required a for arms, underarms and lower abdomen   LB Bathing Assistance 2  Maximal Assistance   LB Bathing Deficit Setup;Increased time to complete;Perineal area;Buttocks;Right upper leg;Right lower leg including foot;Left lower leg including foot;Left upper leg   LB Bathing Comments pt able to wash upper LE, otherwise requires assistance for all other aspects of LB bathing   UB Dressing Assistance 3  Moderate Assistance   UB  Dressing Deficit Increased time to complete;Thread RUE;Thread LUE;Pull around back   LB Dressing Assistance 1  Total Assistance   LB Dressing Deficit Don/doff R sock;Don/doff L sock   Bed Mobility   Rolling R 3  Moderate assistance   Additional items Assist x 2;Increased time required;Verbal cues;LE management   Rolling L 3  Moderate assistance   Additional items Assist x 2;Increased time required;Verbal cues;LE management   Supine to Sit 2  Maximal assistance   Additional items Assist x 2;Increased time required;Verbal cues;LE management   Sit to Supine 2  Maximal assistance   Additional items Assist x 2;Increased time required;LE management;Verbal cues   Additional Comments found and left in bed w all needs in reach. pt required max a x2 to achieve EOB sitting, mod ax1 to maintain upright at edge of bed; able to hold about 15 mins. pt rolled to r and L w mod ax2 for change of pads/perineal hygiene.   Transfers   Sit to Stand 2  Maximal assistance   Additional items Assist x 2;Increased time required;Verbal cues   Stand to Sit 2  Maximal assistance   Additional items Assist x 2;Increased time required;Verbal cues   Additional Comments b/l HHA and knee blocking, vc for forward weight shifting. able to shift hips toward HOB for forward progression up head of bed.   Cognition   Overall Cognitive Status Impaired   Arousal/Participation Alert;Responsive   Attention Attends with cues to redirect   Orientation Level Oriented X4   Memory Within functional limits   Following Commands Follows one step commands with increased time or repetition   Comments pt cooperative, continues to require inc time to follow one step commands. has overall fair safety awareness and insight to condition t/o session.   Activity Tolerance   Activity Tolerance Patient limited by fatigue   Medical Staff Made Aware dpt 2' pts med complexity, comorbidities and regression from baseline.   Assessment   Assessment Pt seen on this date for OT session  focusing on ADL retraining, cognitive reorientation, body mechanics, transfer retraining, increasing activity tolerance/endurance and EOB sitting to increase ability to participate in ADL/functional tasks. Pt was found in bed and was left in bed w/ all needs within reach. Pt required max a x2 for sup<>sit transfers. Sat eob w min-mod a for about 15 mins while completing adls; ub w mod a, lb w max a. Sts x2 w b/l hha and knee blocking, max ax2. Rolled to r and l w mod ax2 for perineal hygiene/bed linen mgmt. Pt w/ improvements in endurance, transfer ability, adl task completion, however is still limited 2* decreased ADL/High-level ADL status, decreased activity tolerance/endurance, decreased cognition, decreased self-care trans, decreased safety awareness and insight to condition. Recommending pt D/C to STR when medically stable. Pt will continue to benefit from acute OT services to meet goals.     The patient's raw score on the AM-PAC Daily Activity Inpatient Short Form is 14. A raw score of less than 19 suggests the patient may benefit from discharge to post-acute rehabilitation services. Please refer to the recommendation of the Occupational Therapist for safe discharge planning.   Plan   Treatment Interventions ADL retraining;Functional transfer training;Endurance training;Patient/family training;Cognitive reorientation;Energy conservation;Activityengagement   Goal Expiration Date 01/07/25   OT Treatment Day 3   OT Frequency 2-3x/wk   Discharge Recommendation   Rehab Resource Intensity Level, OT II (Moderate Resource Intensity)   AM-PAC Daily Activity Inpatient   Lower Body Dressing 2   Bathing 2   Toileting 1   Upper Body Dressing 3   Grooming 3   Eating 3   Daily Activity Raw Score 14   Daily Activity Standardized Score (Calc for Raw Score >=11) 33.39   AM-PAC Applied Cognition Inpatient   Following a Speech/Presentation 4   Understanding Ordinary Conversation 4   Taking Medications 3   Remembering Where Things  Are Placed or Put Away 3   Remembering List of 4-5 Errands 3   Taking Care of Complicated Tasks 3   Applied Cognition Raw Score 20   Applied Cognition Standardized Score 41.76   Modified Lucinda Scale   Modified Lucinda Scale 4   End of Consult   Education Provided Yes   Patient Position at End of Consult Supine;All needs within reach  (Kreg bed does not have bed alarm)   Nurse Communication Nurse aware of consult       Deanne Nguyễn, OLIVERIO, OTR/L

## 2024-12-30 NOTE — ASSESSMENT & PLAN NOTE
Most recent potassium at 3.8 mEq stable and resolved  We will give K-Dur or 40 mill equivalents x 1 today  Check BMP in a.m.

## 2024-12-30 NOTE — PHYSICAL THERAPY NOTE
Physical Therapy Treatment Note    Patient's Name: Lety Botello  : 1960 1002   PT Last Visit   PT Visit Date 24   Note Type   Note Type Treatment   Pain Assessment   Pain Assessment Tool 0-10   Pain Score 10 - Worst Possible Pain   Pain Location/Orientation Location: [facility]   Hospital Pain Intervention(s) Ambulation/increased activity;Emotional support;Repositioned   Restrictions/Precautions   Weight Bearing Precautions Per Order No   Other Precautions Cognitive;Multiple lines;O2;Fall Risk;Pain  (3L)   General   Chart Reviewed Yes   Response to Previous Treatment Patient reporting fatigue but able to participate.   Family/Caregiver Present No   Subjective   Subjective Agreeable to mobilize.   Bed Mobility   Rolling R 3  Moderate assistance   Additional items Assist x 2;Bedrails;Increased time required;Verbal cues;LE management   Rolling L 3  Moderate assistance   Additional items Assist x 2;Increased time required;Verbal cues;LE management;Bedrails   Supine to Sit 2  Maximal assistance   Additional items Assist x 2;HOB elevated;Increased time required;Verbal cues;LE management   Sit to Supine 2  Maximal assistance   Additional items Assist x 2;Increased time required;Verbal cues;LE management   Additional Comments Pt greeted in supine. Instructed pt in rolling to either side for replacement of pads and for hygiene.   Transfers   Sit to Stand 2  Maximal assistance   Additional items Assist x 2;Increased time required;Verbal cues   Stand to Sit 2  Maximal assistance   Additional items Assist x 2;Increased time required;Verbal cues   Other 2  Maximal assistance   Additional items Assist x 2;Increased time required;Verbal cues  (for scooting along EOB (x2))   Additional Comments B HHA + B knee block, cues for hand + feet placement, forward weight shift w/ sit>stand, and for full trunk extension upon standing; pt able to stand ~75% upright.   Ambulation/Elevation   Gait pattern Not  appropriate;Not tested   Balance   Static Sitting Poor +   Dynamic Sitting Poor   Static Standing Zero   Dynamic Standing   (zero)   Ambulatory   (unable)   Endurance Deficit   Endurance Deficit Yes   Endurance Deficit Description sitting posture degradation w/ fatigue, deconditioning   Activity Tolerance   Activity Tolerance Patient limited by fatigue;Patient limited by pain  (+ fear of falling/pain)   Medical Staff Made Aware SARITA Alicea   Nurse Made Aware yes - cleared + updated   Exercises   Heelslides Supine;5 reps;AAROM;Bilateral   Knee AROM Long Arc Quad Sitting;10 reps;AROM;Bilateral   Ankle Pumps Supine;10 reps;AROM;Bilateral   Neuro re-ed Sitting EOB x15 min w/ progression from static-> dynamic sitting balance activities w/ multidirectional reaching. Cues required for forward flexion at hips.   Assessment   Prognosis Fair   Problem List Decreased strength;Decreased range of motion;Decreased endurance;Impaired balance;Decreased mobility;Decreased coordination;Decreased cognition;Impaired judgement;Decreased safety awareness;Impaired sensation;Obesity;Decreased skin integrity;Pain   Assessment Initiated session w/ supine ther ex instruction. Pt required cues to attend to task and put forth best effort w/ ea rep. Pt able to progress w/ sitting balance from ModA - CS for a couple minutes, did revert back to requiring Jodi w/ fatigue. Encouragement required to participate in t/f training. Pt able to achieve 75% upright standing w/ MaxAx2. Returned to supine for safety due to fatigue w/ activities. Left pt in chair position. Pt pleased w/ progress and expressed gratitude to therapy staff. Continue to recommend STR upon d/c.   Barriers to Discharge Inaccessible home environment;Decreased caregiver support   Goals   Patient Goals get strong enouch to be able to get to a BSC   PT Treatment Day 4   Plan   Treatment/Interventions Functional transfer training;LE strengthening/ROM;Therapeutic exercise;Endurance  training;Patient/family training;Equipment eval/education;Bed mobility;Compensatory technique education;Spoke to nursing;OT   Progress Slow progress, decreased activity tolerance   PT Frequency 2-3x/wk   Discharge Recommendation   Rehab Resource Intensity Level, PT II (Moderate Resource Intensity)   AM-PAC Basic Mobility Inpatient   Turning in Flat Bed Without Bedrails 1   Lying on Back to Sitting on Edge of Flat Bed Without Bedrails 1   Moving Bed to Chair 1   Standing Up From Chair Using Arms 1   Walk in Room 1   Climb 3-5 Stairs With Railing 1   Basic Mobility Inpatient Raw Score 6   [facility] Highest Level Of Mobility   -HLM Goal 2: Bed activities/Dependent transfer   -HLM Achieved 3: Sit at edge of bed   Education   Education Provided Mobility training;Home exercise program   Patient Demonstrates acceptance/verbal understanding;Reinforcement needed   End of Consult   Patient Position at End of Consult All needs within reach  (in bed in chair position, all lines in tact)     Shirley Gomez, PT, DPT

## 2024-12-30 NOTE — QUICK NOTE
Patient has upper midline sutures for wound dehiscence.  If she is still admitted at that time please reach out to red surgery resident role on epic for removal of sutures.  Otherwise patient can follow-up outpatient, information will be in AVS.    Titi Cordon, DO  Surgery PGY-3

## 2024-12-30 NOTE — BRIEF OP NOTE (RAD/CATH)
Right IR PARACENTESIS Procedure Note    PATIENT NAME: Lety Botello  : 1960  MRN: [id number]    Pre-op Diagnosis:   1. Encounter for gastrojejunal (GJ) tube placement    2. SBO (small bowel obstruction) (HCC)    3. Goals of care, counseling/discussion    4. Encounter for gastrojejunal (GJ) tube placement issues    5. Cellulitis    6. V-tach (HCC)    7. Generalized abdominal pain    8. Herpes zoster without complication    9. Skin lesion    10. Endometrial cancer (HCC)    11. Anasarca      Post-op Diagnosis:   1. Encounter for gastrojejunal (GJ) tube placement    2. SBO (small bowel obstruction) (HCC)    3. Goals of care, counseling/discussion    4. Encounter for gastrojejunal (GJ) tube placement issues    5. Cellulitis    6. V-tach (HCC)    7. Generalized abdominal pain    8. Herpes zoster without complication    9. Skin lesion    10. Endometrial cancer (HCC)    11. Anasarca        Provider:  Veronica Whitaker PA-C    Supervising physician:  Dr. Lamb    No qualified resident was available, Resident is only observing    Estimated Blood Loss: minimal    Findings: right sided paracentesis. 4620cc clear yellow fluid removed    Specimens: sent     Complications:  none immediate    Anesthesia: local    Veronica Whitaker PA-C     Date: 2024  Time: 12:43 PM

## 2024-12-30 NOTE — ASSESSMENT & PLAN NOTE
Baseline creatinine 0.7 to 0.9 mg/dL  Admitted with creatinine 0.39 mg/dL on 12/9/2024  Creatinine today 1.33 mg/dL stable   May need to except higher creatinine for euvolemia  Likely TERRA secondary to ischemic injury from hypotension plus intravascular volume depletion secondary to third spacing  Decrease Coreg dosage  Give albumin 25 g IV every 6 for 2 doses and Bumex 2 mg IV x 1  Optimize hemodynamics  Monitor for renal recovery

## 2024-12-30 NOTE — ASSESSMENT & PLAN NOTE
Currently on Coreg 25 mg p.o. twice daily, amlodipine 5 mg p.o. daily  DC Norvasc for now as blood pressure is low  Give albumin 25 g x 2 doses today and Bumex 2 mg IV x 1  Decrease Coreg down to 3.125 mg p.o. twice daily   Alert-The patient is alert, awake and responds to voice. The patient is oriented to time, place, and person. The triage nurse is able to obtain subjective information.

## 2024-12-30 NOTE — PROGRESS NOTES
Progress Note - Palliative Care   Name: Lety Botello 64 y.o. female I MRN: [id number]  [facility] Date of Admission: 12/9/2024   Date of Service: 12/30/2024 I Hospital Day: 21     Assessment & Plan  Abdominal pain  S/p paracentesis for 4620 cc today (12/30)    Current Multimodal pain regimen:  Tolerating well, no changes at this time  Fentanyl 25 mcg increased on 12/27   Schedule Tylenol 975 mg TID  Gabapentin 300 mg HS   OxyIR 5mg Q4hrs PRN for moderate pain   OxyIR 10mg Q4hrs PRN for severe pain   IV dilaudid to 0.5 mg q3H PRN for BT pain  Total 24 hour ome requirements: approximately 75 in addition to 25mcg/hr TDF  Failed therapies:  NA    Bowel regimen to prevent OIC: senna 17.6 mg bid, prn dulcolax suppository. Last bowel movement 12/29  Opioid agreement. Not on file, will need outpatient follow up  Palliative care encounter  Palliative Diagnosis: endometrial cancer    Goals:  Currently a level 2.    Will continue discussions regarding GOC as patient's clinical presentation evolves.    Decisional apparatus:  Patient does have capacity on exam today.  If capacity is lost, patient's substitute decision maker would default to Sharda Carver per AD on file  ER contacts:  Sharda Carver (Niece)  [phone] (Work Phone)   Advance Directive/Living Will/POLST: on file and reviewed    Social Support:  Patient's support system:   Supportive listening provided    Follow-up  We appreciate the opportunity to participate in this patient's care.   We will continue to follow while admitted.    Please do not hesitate to contact our on-call provider through EPIC Secure Chat or contact [phone] should there be an acute change or other symptom control concerns.    Endometrial cancer (HCC)  Recurrent endometrial cancer, initial diagnosis 2020  [facility]  S/p FLORECITA SBO SLND 6/2020 c/b splenic laceration  NAYE 1/2023, though presented for a fall in 10/2023 was incidentally found  to have RP lymphadenopathy  Bx 12/2023 confirm metastatic adenocarcinoma  S/p pelvic RT and treatment with femara  NAYE 5/2024  Presented 10/18, 11/5, 11/14 with SBO - s/p ex lap, small bowel resection, partial omentectomy, DAVID 11/22/24 with biopsy confirming metastatic adenocarcinoma  Encounter for gastrojejunal (GJ) tube placement issues  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 CLD as tolerated.   DC plan: to return home after her rehab stay with [facility] via [facility].   Hypertension  BP stable  Currently on Norvasc 5 mg daily and Coreg 25 mg twice daily  Monitor BP  Pain control  Anasarca  Receiving IV Bumex and IV albumin    24 Hour History  Chart reviewed before visit. No acute overnight events. Patient seen s/p paracentesis today (4620 cc removed). She is drowsy, though wakes easily to voice and remains awake/interactive throughout exam. She reports pain control is improved post paracentesis. Had an episode of vomiting this morning with oral intake. Last bowel movement yesterday. Sleeping well. Offers no additional questions or concerns at this time.      Medications    Current Facility-Administered Medications:     acetaminophen (TYLENOL) tablet 975 mg, 975 mg, Oral, Q8H SAM, Pauly Grissom, DO, 975 mg at 12/30/24 0624    albuterol (PROVENTIL HFA,VENTOLIN HFA) inhaler 2 puff, 2 puff, Inhalation, Q6H PRN, Kelvin Sheppard DO    aluminum-magnesium hydroxide-simethicone (MAALOX) oral suspension 30 mL, 30 mL, Oral, Q6H PRN, Kelvin Sheppard DO    amLODIPine (NORVASC) tablet 5 mg, 5 mg, Oral, Daily, Nando Birmingham MD, 5 mg at 12/26/24 0907    aspirin (ECOTRIN LOW STRENGTH) EC tablet 81 mg, 81 mg, Oral, Daily, Kelvin Sheppard DO, 81 mg at 12/30/24 0807    bisacodyl (DULCOLAX) rectal suppository 10 mg, 10  mg, Rectal, Daily PRN, Kelvin Sheppard DO    carvedilol (COREG) tablet 25 mg, 25 mg, Oral, BID With Meals, Kelvin Sheppard DO, 25 mg at 12/30/24 0807    cyanocobalamin (VITAMIN B-12) tablet 1,000 mcg, 1,000 mcg, Oral, Daily, Kelvin Sheppard DO, 1,000 mcg at 12/30/24 0807    dicyclomine (BENTYL) capsule 10 mg, 10 mg, Oral, BID PRN, Kelvin Sheppard DO, 10 mg at 12/18/24 0947    enoxaparin (LOVENOX) subcutaneous injection 120 mg, 1 mg/kg, Subcutaneous, Q12H SAM, Paul Ríos MD, 120 mg at 12/30/24 0808    fentaNYL (DURAGESIC) 25 mcg/hr TD 72 hr patch 25 mcg, 25 mcg, Transdermal, Q72H, May Sherry Monterroso MD, 25 mcg at 12/27/24 1211    folic acid (FOLVITE) tablet 1 mg, 1 mg, Per G Tube, Daily, Paul Ríos MD, 1 mg at 12/30/24 0807    gabapentin (NEURONTIN) capsule 300 mg, 300 mg, Per G Tube, HS, Pauly Grissom, , 300 mg at 12/29/24 2247    HYDROmorphone (DILAUDID) injection 0.5 mg, 0.5 mg, Intravenous, Q3H PRN, May Sherry Monterroso MD, 0.5 mg at 12/29/24 0325    insulin glargine (LANTUS) subcutaneous injection 20 Units 0.2 mL, 20 Units, Subcutaneous, HS, Kelvin Sheppard DO, 20 Units at 12/29/24 2247    insulin lispro (HumALOG/ADMELOG) 100 units/mL subcutaneous injection 1-5 Units, 1-5 Units, Subcutaneous, TID AC, 1 Units at 12/30/24 0750 **AND** Fingerstick Glucose (POCT), , , TID AC, Kelvin Sheppard DO    insulin lispro (HumALOG/ADMELOG) 100 units/mL subcutaneous injection 5 Units, 5 Units, Subcutaneous, TID With Meals, Paul Ríos MD, 5 Units at 12/29/24 1639    iohexol (OMNIPAQUE) 240 MG/ML solution 50 mL, 50 mL, Oral, Once in imaging, Bj Jones MD    levothyroxine tablet 150 mcg, 150 mcg, Oral, Daily, Kelvin Sheppard DO, 150 mcg at 12/30/24 0623    naloxone (NARCAN) 0.04 mg/mL syringe 0.04 mg, 0.04 mg, Intravenous, Q1MIN PRN, JASON Cooley    nitroglycerin (NITROSTAT) SL tablet 0.4 mg, 0.4 mg, Sublingual, Q5 Min PRN, Kelvin Sheppard DO    omeprazole (PRILOSEC) suspension 2 mg/mL, 40 mg, Per G Tube, Daily, Kelvin  Silver, DO, 40 mg at 12/30/24 0743    ondansetron (ZOFRAN) injection 4 mg, 4 mg, Intravenous, Q6H PRN, Kelvin Virgend, DO, 4 mg at 12/30/24 0742    oxyCODONE (ROXICODONE) oral solution 5 mg, 5 mg, Oral, Q4H PRN **OR** oxyCODONE (ROXICODONE) oral solution 10 mg, 10 mg, Oral, Q4H PRN, JASON Cooley, 10 mg at 12/30/24 0742    polyethylene glycol (MIRALAX) packet 17 g, 17 g, Oral, Daily, Kelvin Virgend, DO, 17 g at 12/29/24 0828    senna oral syrup 17.6 mg, 17.6 mg, Oral, BID, Kelvin Virgend, DO, 17.6 mg at 12/29/24 0830    Objective  /51   Pulse 74   Temp 97.9 °F (36.6 °C)   Resp 17   Ht 5' (1.524 m)   Wt 116 kg (256 lb 13.4 oz)   SpO2 98%   BMI 50.16 kg/m²   Physical Exam  Vitals and nursing note reviewed.   Constitutional:       General: She is not in acute distress.     Appearance: She is obese. She is ill-appearing.      Interventions: Nasal cannula in place.   HENT:      Mouth/Throat:      Mouth: Mucous membranes are moist.   Cardiovascular:      Rate and Rhythm: Normal rate.   Pulmonary:      Effort: No respiratory distress.      Comments: 4L via nc  Abdominal:      Palpations: Abdomen is soft.      Tenderness: There is no abdominal tenderness. There is no guarding.   Musculoskeletal:      Right lower leg: Edema present.      Left lower leg: Edema present.   Skin:     General: Skin is warm.      Coloration: Skin is pale.   Neurological:      General: No focal deficit present.      Comments: Drowsy, wakes easily to voice and remains awake/interactive throughout exam       Lab Results: I have personally reviewed pertinent labs.  Imaging Studies: I have personally reviewed pertinent reports.  EKG, Pathology, and Other Studies: I have personally reviewed pertinent reports.    Counseling / Coordination of Care  Total floor / unit time spent today 20 minutes. Greater than 50% of total time was spent with the patient and / or family counseling and / or coordinating of care. A description of the  "counseling / coordination of care: Chart reviewed,  provided medical updates, determined goals of care, discussed palliative care and symptom management, provided anticipatory guidance, determined competency and POA/HCA, determined social/family support, provided psychosocial support.     JASON Carias  Palliative & Supportive Care    Portions of this document may have been created using dictation software and as such some \"sound alike\" terms may have been generated by the system. Do not hesitate to contact me with any questions or clarifications.    " Cheiloplasty (Complex) Text: A decision was made to reconstruct the defect with a  cheiloplasty.  The defect was undermined extensively.  Additional orbicularis oris muscle was excised with a 15 blade scalpel.  The defect was converted into a full thickness wedge to facilite a better cosmetic result.  Small vessels were then tied off with 5-0 monocyrl. The orbicularis oris, superficial fascia, adipose and dermis were then reapproximated.  After the deeper layers were approximated the epidermis was reapproximated with particular care given to realign the vermilion border.

## 2024-12-30 NOTE — ASSESSMENT & PLAN NOTE
Palliative Diagnosis: endometrial cancer    Goals:  Currently a level 2.    Will continue discussions regarding GOC as patient's clinical presentation evolves.    Decisional apparatus:  Patient does have capacity on exam today.  If capacity is lost, patient's substitute decision maker would default to Sharda Carver per AD on file  ER contacts:  Sharda Carver (Niece)  [phone] (Work Phone)   Advance Directive/Living Will/POLST: on file and reviewed    Social Support:  Patient's support system:   Supportive listening provided    Follow-up  We appreciate the opportunity to participate in this patient's care.   We will continue to follow while admitted.    Please do not hesitate to contact our on-call provider through [facility] Secure Chat or contact [phone] should there be an acute change or other symptom control concerns.

## 2024-12-30 NOTE — SEDATION DOCUMENTATION
Right side paracentesis performed buy Veronica Whitaker PA-C. 4620 mL clear yellow fluid obtained. Patient tolerated well. Ultrasound images saved. Labs collected.

## 2024-12-30 NOTE — ASSESSMENT & PLAN NOTE
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 CLD as tolerated.   DC plan: to return home after her rehab stay with [facility] via [facility].

## 2024-12-30 NOTE — PLAN OF CARE
Problem: OCCUPATIONAL THERAPY ADULT  Goal: Performs self-care activities at highest level of function for planned discharge setting.  See evaluation for individualized goals.  Description: Treatment Interventions: ADL retraining, Functional transfer training, UE strengthening/ROM, Endurance training, Cognitive reorientation, Patient/family training, Equipment evaluation/education, Compensatory technique education, Continued evaluation, Activityengagement, Energy conservation          See flowsheet documentation for full assessment, interventions and recommendations.   Outcome: Progressing  Note: Limitation: Decreased ADL status, Decreased UE strength, Decreased Safe judgement during ADL, Decreased cognition, Decreased endurance, Decreased self-care trans, Decreased high-level ADLs, Decreased sensation  Prognosis: Fair  Assessment: Pt seen on this date for OT session focusing on ADL retraining, cognitive reorientation, body mechanics, transfer retraining, increasing activity tolerance/endurance and EOB sitting to increase ability to participate in ADL/functional tasks. Pt was found in bed and was left in bed w/ all needs within reach. Pt required max a x2 for sup<>sit transfers. Sat eob w min-mod a for about 15 mins while completing adls; ub w mod a, lb w max a. Sts x2 w b/l hha and knee blocking, max ax2. Rolled to r and l w mod ax2 for perineal hygiene/bed linen mgmt. Pt w/ improvements in endurance, transfer ability, adl task completion, however is still limited 2* decreased ADL/High-level ADL status, decreased activity tolerance/endurance, decreased cognition, decreased self-care trans, decreased safety awareness and insight to condition. Recommending pt D/C to STR when medically stable. Pt will continue to benefit from acute OT services to meet goals.     The patient's raw score on the AM-PAC Daily Activity Inpatient Short Form is 14. A raw score of less than 19 suggests the patient may benefit from discharge  to post-acute rehabilitation services. Please refer to the recommendation of the Occupational Therapist for safe discharge planning.     Rehab Resource Intensity Level, OT: II (Moderate Resource Intensity)

## 2024-12-31 NOTE — ASSESSMENT & PLAN NOTE
25 run of nonsustained V. tach noted on telemetry, patient was asymptomatic  Cardiology consulted  Cardiac echo with normal EF and mild aortic stenosis  Coreg dose was increased to 25 mg twice daily  Monitor  Cardiology signed off-due to low hypertension decrease the Coreg to 12.5 and monitor

## 2024-12-31 NOTE — ASSESSMENT & PLAN NOTE
Lab Results   Component Value Date    HGBA1C 6.9 (H) 11/05/2024       Recent Labs     12/30/24  0734 12/30/24  1028 12/30/24  1245 12/30/24  1555   POCGLU 161* 165* 151* 121       Blood Sugar Average: Last 72 hrs:  (P) 141.625    Farxiga on hold while inpatient  Hyperglycemia secondary to steroids and tube feeding  Continue Lantus nightly, continue Humalog tid, adjust the doses as needed  Continue SSI

## 2024-12-31 NOTE — ASSESSMENT & PLAN NOTE
S/p paracentesis for 4620 cc (12/30)    Current Multimodal pain regimen:  Fentanyl 25 mcg increased on 12/27   Schedule Tylenol 975 mg TID  Gabapentin 300 mg HS   OxyIR 5mg Q4hrs PRN for moderate pain   OxyIR 10mg Q4hrs PRN for severe pain   IV dilaudid to 0.5 mg q3H PRN for BT pain  Total 24 hour ome requirements: approximately 85 in addition to 25mcg/hr TDF  Failed therapies:  NA    Bowel regimen to prevent OIC: senna 17.6 mg bid, prn dulcolax suppository. Last bowel movement 12/29  Opioid agreement. Not on file, will need outpatient follow up

## 2024-12-31 NOTE — PHYSICAL THERAPY NOTE
"                                                                                  PHYSICAL THERAPY NOTE          Patient Name: Lety Botello  Today's Date: 12/31/2024 12/31/24 1009   PT Last Visit   PT Visit Date 12/31/24   Note Type   Note Type Treatment   End of Consult   Patient Position at End of Consult Supine;All needs within reach;Bed/Chair alarm activated   Pain Assessment   Pain Assessment Tool 0-10   Pain Score 10 - Worst Possible Pain   Pain Location/Orientation Location: Abdomen   Pain Onset/Description Onset: Ongoing   Effect of Pain on Daily Activities limits mobility   Patient's Stated Pain Goal No pain   Hospital Pain Intervention(s) Repositioned;Emotional support;Rest   Restrictions/Precautions   Weight Bearing Precautions Per Order No   Other Precautions Chair Alarm;Bed Alarm;Cognitive;Contact/isolation;Multiple lines;Fall Risk;Pain   General   Chart Reviewed Yes   Response to Previous Treatment Patient reporting fatigue but able to participate.   Family/Caregiver Present No   Cognition   Overall Cognitive Status Impaired   Arousal/Participation Alert;Responsive   Attention Attends with cues to redirect   Orientation Level Oriented X4   Memory Within functional limits   Following Commands Follows one step commands with increased time or repetition   Comments patient pleasant and cooperative, requires encouragement to participate   Subjective   Subjective \"Can you just help me get more comfortable in bed\"   Bed Mobility   Rolling R 3  Moderate assistance   Additional items Assist x 2;HOB elevated;Bedrails;Increased time required;Verbal cues;LE management   Rolling L 3  Moderate assistance   Additional items Assist x 2;HOB elevated;Bedrails;Increased time required;Verbal cues;LE management   Supine to Sit Unable to assess   Sit to Supine Unable to assess   Additional Comments patient declined sitting at edge of the bed at this time, agreeable to repositioning to improve comfort and reduce pain "   Transfers   Sit to Stand Unable to assess   Ambulation/Elevation   Gait pattern Not appropriate;Not tested   Balance   Static Sitting Poor   Dynamic Sitting Poor -   Endurance Deficit   Endurance Deficit Yes   Endurance Deficit Description generalized weakness, pain, fatigue   Activity Tolerance   Activity Tolerance Patient limited by fatigue;Patient limited by pain   Nurse Made Aware RN cleared and present during treatment session, PCA assisted with rolling   Assessment   Prognosis Fair   Problem List Decreased strength;Decreased range of motion;Decreased endurance;Impaired balance;Decreased mobility;Decreased coordination;Decreased cognition;Impaired judgement;Decreased safety awareness;Impaired sensation;Obesity;Decreased skin integrity;Pain   Assessment PT initiated treatment session in order to assist patient in achieving goals to improve transfers, gait training, and overall activity tolerance. Patient pleasant, cooperative, and agreeable to today's treatment session. Patient received supine in bed. Patient is currently MaxAx2 bed mobility. Throughout treatment session patient required both verbal and tactile cuing to improve safety, efficiency, and mechanics of mobility in addition to hands on assistance for all aspects of functional mobility. Additionally, pt required increased time to execute specific mobility tasks with rest breaks in between secondary to gross fatigue and weakness. Completed rolling x4 for pericare, hygiene, and to change/re-adjust the sheets. Required boost back into bed for better patient positioning and comfort. Placed wedges x2 on L side to reduce further skin breakdown. Patient declined further functional mobility at this time due to fatigue and pain. Patient left supine in bed with alarm and all needs met.       Patient will benefit from continued skilled physical therapy to address gait / balance dysfunction, decreased activity tolerance, and generalized weakness. The patients  AM-PAC Basic Mobility Inpatient Short From Raw Score is 6 .  Based on AM-PAC scoring and patient presentation, PT currently recommending Level II (Moderate Resource Intensity). Please also refer to the recommendation of the Physical Therapist for safe discharge planning.   Barriers to Discharge Inaccessible home environment;Decreased caregiver support   Goals   Patient Goals to reduce pain   STG Expiration Date 01/07/25   PT Treatment Day 5   Plan   Treatment/Interventions ADL retraining;Functional transfer training;LE strengthening/ROM;Therapeutic exercise;Endurance training;Patient/family training;Bed mobility;Gait training;Spoke to nursing;Spoke to case management;OT   Progress Slow progress, decreased activity tolerance   PT Frequency 2-3x/wk   Discharge Recommendation   Rehab Resource Intensity Level, PT II (Moderate Resource Intensity)   Equipment Recommended Walker;Wheelchair   AM-PAC Basic Mobility Inpatient   Turning in Flat Bed Without Bedrails 1   Lying on Back to Sitting on Edge of Flat Bed Without Bedrails 1   Moving Bed to Chair 1   Standing Up From Chair Using Arms 1   Walk in Room 1   Climb 3-5 Stairs With Railing 1   Basic Mobility Inpatient Raw Score 6   Turning Head Towards Sound 3   Follow Simple Instructions 3   Low Function Basic Mobility Raw Score  12   Low Function Basic Mobility Standardized Score  18.33   [facility] Highest Level Of Mobility   JH-HLM Goal 2: Bed activities/Dependent transfer   -HLM Achieved 2: Bed activities/Dependent transfer   Education   Education Provided Mobility training;Home exercise program   Patient Demonstrates acceptance/verbal understanding;Reinforcement needed   End of Consult   Patient Position at End of Consult Supine;Bed/Chair alarm activated;All needs within reach  (x2 wedges placed on L side and underneath b/l LE, for pressure relief and reduce further skin breakdown)     Cristina Gabriel PT, DPT

## 2024-12-31 NOTE — PROGRESS NOTES
Progress Note - Palliative Care   Name: Lety Botello 64 y.o. female I MRN: [id number]  Unit/Bed#: [facility] 511-01 I Date of Admission: 12/9/2024   Date of Service: 12/31/2024 I Hospital Day: 22     Assessment & Plan  Abdominal pain  S/p paracentesis for 4620 cc (12/30)    Current Multimodal pain regimen:  Fentanyl 25 mcg increased on 12/27   Schedule Tylenol 975 mg TID  Gabapentin 300 mg HS   OxyIR 5mg Q4hrs PRN for moderate pain   OxyIR 10mg Q4hrs PRN for severe pain   IV dilaudid to 0.5 mg q3H PRN for BT pain  Total 24 hour ome requirements: approximately 85 in addition to 25mcg/hr TDF  Failed therapies:  NA    Bowel regimen to prevent OIC: senna 17.6 mg bid, prn dulcolax suppository. Last bowel movement 12/29  Opioid agreement. Not on file, will need outpatient follow up  Palliative care encounter  Palliative Diagnosis: endometrial cancer    Goals:  Currently a level 2.    Will continue discussions regarding GOC as patient's clinical presentation evolves.    Decisional apparatus:  Patient does have capacity on exam today.  If capacity is lost, patient's substitute decision maker would default to Sharda Carver per AD on file  ER contacts:  Sharda Carver (Niece)  [phone] (Work Phone)   Advance Directive/Living Will/POLST: on file and reviewed    Social Support:  Patient's support system:   Supportive listening provided    Follow-up  We appreciate the opportunity to participate in this patient's care.   We will continue to follow while admitted.    Please do not hesitate to contact our on-call provider through EP[facility] Secure Chat or contact [phone] should there be an acute change or other symptom control concerns.    Endometrial cancer (HCC)  Recurrent endometrial cancer, initial diagnosis 2020  [facility]  S/p FLORECITA SBO SLND 6/2020 c/b splenic laceration  NAYE 1/2023, though presented for a fall in 10/2023 was incidentally found to have RP lymphadenopathy  Bx 12/2023 confirm  "metastatic adenocarcinoma  S/p pelvic RT and treatment with femara  NAYE 5/2024  Presented 10/18, 11/5, 11/14 with SBO - s/p ex lap, small bowel resection, partial omentectomy, DAVID 11/22/24 with biopsy confirming metastatic adenocarcinoma  Encounter for gastrojejunal (GJ) tube placement issues  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 CLD as tolerated.   DC plan: to return home after her rehab stay with [facility] via [facility].   Hypertension  BP stable  Currently on Norvasc 5 mg daily and Coreg 25 mg twice daily  Monitor BP  Pain control  Anasarca  Receiving IV Bumex and IV albumin    24 Hour History  Chart reviewed before visit. No acute overnight events. Patient seen resting in bed, wakes easily to voice. She is feeling \"so/so\" overall. Denies nausea today. Pain well controlled on current regimen, though does endorse some abdominal \"bloating\". Moving her bowels routinely, last bowel movement this morning. Endorsing some shortness of breath, improved with positional changes, oxygen saturation stable on 3L nasal cannula. Reports her niece will be coming to visit with her tomorrow afternoon. Offers no additional question or concerns at this time.      Medications    Current Facility-Administered Medications:     acetaminophen (TYLENOL) tablet 975 mg, 975 mg, Oral, Q8H [facility], Pauly Grissom DO, 975 mg at 12/31/24 0605    albumin human (FLEXBUMIN) 25 % injection 25 g, 25 g, Intravenous, Q6H, Sarai Mcknight MD, 25 g at 12/31/24 1108    albuterol (PROVENTIL HFA,VENTOLIN HFA) inhaler 2 puff, 2 puff, Inhalation, Q6H PRN, Kelvin Sheppard DO    aluminum-magnesium hydroxide-simethicone (MAALOX) oral suspension 30 mL, 30 mL, Oral, Q6H PRN, Kelvin Sheppard DO    aspirin (ECOTRIN LOW STRENGTH) EC tablet 81 mg, 81 mg, " Oral, Daily, Kelvin Sheppard DO, 81 mg at 12/31/24 0834    bisacodyl (DULCOLAX) rectal suppository 10 mg, 10 mg, Rectal, Daily PRN, Kelvni Sheppard DO    bumetanide (BUMEX) injection 2 mg, 2 mg, Intravenous, Once, Sarai Mcknight MD    carvedilol (COREG) tablet 3.125 mg, 3.125 mg, Oral, BID With Meals, Sarai Mcknight MD    cyanocobalamin (VITAMIN B-12) tablet 1,000 mcg, 1,000 mcg, Oral, Daily, Kelvin Sheppard DO, 1,000 mcg at 12/31/24 0834    dicyclomine (BENTYL) capsule 10 mg, 10 mg, Oral, BID PRN, Kelvin Sheppard DO, 10 mg at 12/18/24 0947    enoxaparin (LOVENOX) subcutaneous injection 120 mg, 1 mg/kg, Subcutaneous, Q12H SAM, Paul Ríos MD, 120 mg at 12/31/24 0835    fentaNYL (DURAGESIC) 25 mcg/hr TD 72 hr patch 25 mcg, 25 mcg, Transdermal, Q72H, May Sherry Monterroso MD, 25 mcg at 12/30/24 1245    folic acid (FOLVITE) tablet 1 mg, 1 mg, Per G Tube, Daily, Paul Ríos MD, 1 mg at 12/31/24 0834    gabapentin (NEURONTIN) capsule 300 mg, 300 mg, Per G Tube, HS, Pauly Grissom, , 300 mg at 12/30/24 2129    HYDROmorphone (DILAUDID) injection 0.5 mg, 0.5 mg, Intravenous, Q3H PRN, May Sherry Monterroso MD, 0.5 mg at 12/31/24 0035    insulin glargine (LANTUS) subcutaneous injection 20 Units 0.2 mL, 20 Units, Subcutaneous, HS, Kelvin Sehppard DO, 20 Units at 12/30/24 2130    insulin lispro (HumALOG/ADMELOG) 100 units/mL subcutaneous injection 1-5 Units, 1-5 Units, Subcutaneous, TID AC, 1 Units at 12/31/24 1108 **AND** Fingerstick Glucose (POCT), , , TID AC, Kelvin Sheppard DO    insulin lispro (HumALOG/ADMELOG) 100 units/mL subcutaneous injection 5 Units, 5 Units, Subcutaneous, TID With Meals, Paul Ríos MD, 5 Units at 12/29/24 1639    iohexol (OMNIPAQUE) 240 MG/ML solution 50 mL, 50 mL, Oral, Once in imaging, Bj Jones MD    levothyroxine tablet 150 mcg, 150 mcg, Oral, Daily, Kelvin Sheppard DO, 150 mcg at 12/31/24 0605    naloxone (NARCAN) 0.04 mg/mL syringe 0.04 mg, 0.04 mg, Intravenous, Q1MIN PRN, JASON Cooley     nitroglycerin (NITROSTAT) SL tablet 0.4 mg, 0.4 mg, Sublingual, Q5 Min PRN, Kelvin Sheppard DO    omeprazole (PRILOSEC) suspension 2 mg/mL, 40 mg, Per G Tube, Daily, Kelvin Sheppard DO, 40 mg at 12/31/24 0605    ondansetron (ZOFRAN) injection 4 mg, 4 mg, Intravenous, Q6H PRN, Kelvin Sheppard DO, 4 mg at 12/30/24 2334    oxyCODONE (ROXICODONE) oral solution 5 mg, 5 mg, Oral, Q4H PRN **OR** oxyCODONE (ROXICODONE) oral solution 10 mg, 10 mg, Oral, Q4H PRN, JASON Cooley, 10 mg at 12/31/24 0834    polyethylene glycol (MIRALAX) packet 17 g, 17 g, Oral, Daily, Kelvin Sheppard, , 17 g at 12/29/24 0828    senna oral syrup 17.6 mg, 17.6 mg, Oral, BID, Kelvin Sheppard DO, 17.6 mg at 12/31/24 0834    Objective  /57 (BP Location: Left arm)   Pulse 75   Temp 97.8 °F (36.6 °C) (Oral)   Resp 16   Ht 5' (1.524 m)   Wt 113 kg (250 lb 3.6 oz) Comment: 1 pillow, sheet, and green slide up pad  SpO2 97%   BMI 48.87 kg/m²   Physical Exam  Vitals and nursing note reviewed.   Constitutional:       General: She is not in acute distress.     Appearance: She is obese. She is ill-appearing.      Interventions: Nasal cannula in place.      Comments: Wakes easily to voice, remains alert throughout visit   HENT:      Mouth/Throat:      Mouth: Mucous membranes are moist.   Cardiovascular:      Rate and Rhythm: Normal rate.   Pulmonary:      Effort: Pulmonary effort is normal. No respiratory distress.      Comments: 3L via nc  Abdominal:      Palpations: Abdomen is soft.      Tenderness: There is no abdominal tenderness. There is no guarding.   Musculoskeletal:      Right lower leg: Edema present.      Left lower leg: Edema present.   Skin:     General: Skin is warm.      Coloration: Skin is pale.   Neurological:      General: No focal deficit present.      Mental Status: She is alert.       Lab Results: I have personally reviewed pertinent labs.  Imaging Studies: I have personally reviewed pertinent reports.  EKG, Pathology, and  "Other Studies: I have personally reviewed pertinent reports.    Counseling / Coordination of Care  Total floor / unit time spent today 30 minutes. Greater than 50% of total time was spent with the patient and / or family counseling and / or coordinating of care. A description of the counseling / coordination of care: Chart reviewed, provided medical updates, determined goals of care, discussed palliative care and symptom management, provided anticipatory guidance, determined competency and POA/HCA, determined social/family support, provided psychosocial support.     JASON Carias  Palliative & Supportive Care    Portions of this document may have been created using dictation software and as such some \"sound alike\" terms may have been generated by the system. Do not hesitate to contact me with any questions or clarifications.    "

## 2024-12-31 NOTE — ASSESSMENT & PLAN NOTE
Elevated TSH  Normal free T4 at 0.65  Patient was not receiving levothyroxine due to  PEG tube malfunctioning   Increase levothyroxine dose to 150 mcg daily  Repeat thyroid study in 4 to 6 weeks

## 2024-12-31 NOTE — ASSESSMENT & PLAN NOTE
Most recent potassium at 3.5 mEq stable and resolved  We will give K-Dur or 40 mill equivalents x 2 today as patient will be getting further diuretics  Check BMP in a.m.

## 2024-12-31 NOTE — ASSESSMENT & PLAN NOTE
Currently on Coreg 3.125 mg p.o. twice daily,   Give albumin 25 g x 2 doses today and Bumex 2 mg IV x 1 again today  Decrease Coreg down to 3.125 mg p.o. twice daily  Blood pressures improving

## 2024-12-31 NOTE — PROGRESS NOTES
Progress Note - Nephrology   Name: Lety Botello 64 y.o. female I MRN: [id number]  [facility] I Date of Admission: 12/9/2024   Date of Service: 1/1/2025 I Hospital Day: 23    64-year-old female with history of multiple comorbidities including obesity, chronic hyponatremia, diabetes, hypertension, IBS and endometrial cancer admitted with J-tube malfunction nephrology consulted for hyponatremia management.   Assessment & Plan  Hyponatremia  Appears to have had chronic hyponatremia dating as far back as 2022  Hyponatremia likely secondary to underlying malignancy plus some component of intravascular volume depletion with third spacing and increased ADH with pain and nausea  Sodium admission of 129 mEq  Sodium dropped down to 125 mEq on 12/20/2024  Most recent sodium at 131 mEq  stable and improving  Status post Samsca 15 g on 12/27 and 30 g on 12/28 and 30 g on 12/29, tolvaptan 45 mg x 1 on 12/30  We will give albumin 25 g IV every 6 for additional 2 doses and Bumex 2 mg IV x 1 again today to help with intravascular volume shifts as clinically still appears to be hypervolemic  Hold off on tolvaptan for now  Patient has a significant third spacing  Placed on fluid restriction 1.8 L  Repeat workup showing serum osmolality 278 urine sodium 61 urine osmolality 311  Encounter for gastrojejunal (GJ) tube placement issues  Recent small bowel obstruction secondary to 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 twice daily,   Give albumin 25 g x 2 doses today and Bumex 2 mg IV x 1 again today  Decrease Coreg down  to 3.125 mg p.o. twice daily  Blood pressures improving  Endometrial cancer (HCC)  Follow-up with primary team and hematology   Hyperkalemia  Most recent potassium at 3.5 mEq stable and resolved  We will give K-Dur or 40 mill equivalents x 2 today as patient will be getting further diuretics  Check BMP in a.m.  TERRA (acute kidney injury) (HCC)  Baseline creatinine 0.7 to 0.9 mg/dL  Admitted with creatinine 0.39 mg/dL on 12/9/2024  Creatinine today 1.11 mg/dL stable stable and improving towards baseline  May need to except higher creatinine for euvolemia  Likely TERRA secondary to ischemic injury from hypotension plus intravascular volume depletion secondary to third spacing  Give albumin 25 g IV every 6 for 2 doses and Bumex 2 mg IV x 1 to help with intravascular volume shifts and diuresis  Optimize hemodynamics  Monitor for renal recovery    I have reviewed the nephrology recommendations including repeat albumin and Bumex today check blood work in a.m. placed on fluid restriction 1.8 L, with medicine team, and we are in agreement with renal plan including the information outlined above.     Subjective   Brief History of Admission -     Patient seen and examined in his room blood pressure stable afebrile happy renal parameters are improving had good urine output close to 2.2 L in response albumin and Bumex    Objective :  Temp:  [97.7 °F (36.5 °C)-98.4 °F (36.9 °C)] 97.8 °F (36.6 °C)  HR:  [79-88] 80  BP: ()/(43-60) 119/51  Resp:  [13-20] 16  SpO2:  [94 %-98 %] 97 %  O2 Device: None (Room air)  Nasal Cannula O2 Flow Rate (L/min):  [3 L/min] 3 L/min    Current Weight: Weight - Scale: 111 kg (245 lb 2.4 oz)  First Weight: Weight - Scale: 120 kg (263 lb 7.2 oz)  I/O         12/29 0701 12/30 0700 12/30 0701 12/31 0700 12/31 0701 01/01 0700    P.O.  180 0    NG/ 90 80    IV Piggyback 130 200 100    Feedings 873 645 299    Total Intake(mL/kg) 1123 (9.7) 1115 (9.9) 479 (4.2)    Urine (mL/kg/hr) 1125 (0.4) 1350  (0.5) 550 (0.6)    Other  4620     Stool  0     Total Output 1125 5970 550    Net -2 -4855 -71           Unmeasured Stool Occurrence 1 x 1 x           Physical Exam  Vitals and nursing note reviewed.   Constitutional:       General: She is not in acute distress.     Appearance: She is obese. She is ill-appearing. She is not toxic-appearing.   HENT:      Head: Normocephalic and atraumatic.      Mouth/Throat:      Pharynx: No oropharyngeal exudate.   Eyes:      General: No scleral icterus.  Cardiovascular:      Rate and Rhythm: Normal rate.   Pulmonary:      Effort: No respiratory distress.   Abdominal:      Palpations: Abdomen is soft.      Tenderness: There is abdominal tenderness.   Musculoskeletal:         General: Swelling present.      Cervical back: No rigidity.      Comments: +2 edema bilateral upper lower extremity improving   Skin:     Coloration: Skin is not jaundiced.   Neurological:      General: No focal deficit present.      Mental Status: She is alert.   Psychiatric:         Mood and Affect: Mood normal.       ROS:    Constitutional:  No chills, no fever.   HENT:  No sore throat  Respiratory:  No cough, no hemoptysis, no wheezing.    Cardiovascular:  + leg swelling.   Gastrointestinal:  No constipation, no diarrhea.   Genitourinary:  No decreased urine output.  Musculoskeletal:  No back pain.   Neurological:  no dizziness, No headaches.   Psychiatric/Behavioral:  No agitation, no confusion.    Wounds: positive,   All other systems reviewed and are negative.        Medications:    Current Facility-Administered Medications:     acetaminophen (TYLENOL) tablet 975 mg, 975 mg, Oral, Q8H [facility], Pauly Grissom, DO, 975 mg at 01/01/25 0612    albuterol (PROVENTIL HFA,VENTOLIN HFA) inhaler 2 puff, 2 puff, Inhalation, Q6H PRN, Kelvin Sheppard DO    aluminum-magnesium hydroxide-simethicone (MAALOX) oral suspension 30 mL, 30 mL, Oral, Q6H PRN, Kelvin Sheppard DO    aspirin (ECOTRIN LOW STRENGTH) EC tablet 81 mg,  81 mg, Oral, Daily, Kelvin Sheppard DO, 81 mg at 01/01/25 0914    bisacodyl (DULCOLAX) rectal suppository 10 mg, 10 mg, Rectal, Daily PRN, Kelvin Sheppard DO    carvedilol (COREG) tablet 3.125 mg, 3.125 mg, Oral, BID With Meals, Sarai Mcknight MD, 3.125 mg at 01/01/25 0917    cyanocobalamin (VITAMIN B-12) tablet 1,000 mcg, 1,000 mcg, Oral, Daily, Kelvin Sheppard DO, 1,000 mcg at 01/01/25 0914    dicyclomine (BENTYL) capsule 10 mg, 10 mg, Oral, BID PRN, Kelvin Sheppard DO, 10 mg at 12/18/24 0947    enoxaparin (LOVENOX) subcutaneous injection 120 mg, 1 mg/kg, Subcutaneous, Q12H SAM, Paul Ríos MD, 120 mg at 01/01/25 0914    fentaNYL (DURAGESIC) 25 mcg/hr TD 72 hr patch 25 mcg, 25 mcg, Transdermal, Q72H, May Sherry Monterroso MD, 25 mcg at 12/30/24 1245    folic acid (FOLVITE) tablet 1 mg, 1 mg, Per G Tube, Daily, Paul Ríos MD, 1 mg at 01/01/25 0914    gabapentin (NEURONTIN) capsule 300 mg, 300 mg, Per G Tube, HS, Pauly Grissom, DO, 300 mg at 12/31/24 2155    HYDROmorphone (DILAUDID) injection 0.5 mg, 0.5 mg, Intravenous, Q3H PRN, Pema Monterroso MD, 0.5 mg at 01/01/25 0045    insulin glargine (LANTUS) subcutaneous injection 20 Units 0.2 mL, 20 Units, Subcutaneous, HS, Kelvin Sheppard DO, 20 Units at 12/31/24 2155    insulin lispro (HumALOG/ADMELOG) 100 units/mL subcutaneous injection 1-5 Units, 1-5 Units, Subcutaneous, TID AC, 1 Units at 01/01/25 0636 **AND** Fingerstick Glucose (POCT), , , TID AC, Kelvin Silver, DO    insulin lispro (HumALOG/ADMELOG) 100 units/mL subcutaneous injection 5 Units, 5 Units, Subcutaneous, TID With Meals, Paul Ríos MD, 5 Units at 01/01/25 0914    iohexol (OMNIPAQUE) 240 MG/ML solution 50 mL, 50 mL, Oral, Once in imaging, Bj Jones MD    levothyroxine tablet 150 mcg, 150 mcg, Oral, Daily, Kelvin Shpepard DO, 150 mcg at 01/01/25 0612    naloxone (NARCAN) 0.04 mg/mL syringe 0.04 mg, 0.04 mg, Intravenous, Q1MIN PRN, JASON Cooley    nitroglycerin (NITROSTAT) SL tablet 0.4 mg, 0.4  "mg, Sublingual, Q5 Min PRN, Kelvin Sheppard DO    omeprazole (PRILOSEC) suspension 2 mg/mL, 40 mg, Per G Tube, Daily, Kelvin Sheppard DO, 40 mg at 01/01/25 0612    ondansetron (ZOFRAN) injection 4 mg, 4 mg, Intravenous, Q6H PRN, Kelvin Sheppard DO, 4 mg at 12/30/24 2334    oxyCODONE (ROXICODONE) oral solution 5 mg, 5 mg, Oral, Q4H PRN **OR** oxyCODONE (ROXICODONE) oral solution 10 mg, 10 mg, Oral, Q4H PRN, JASON Cooley, 10 mg at 01/01/25 0911    polyethylene glycol (MIRALAX) packet 17 g, 17 g, Oral, Daily, Kelvin Sheppard DO, 17 g at 12/29/24 0828    senna oral syrup 17.6 mg, 17.6 mg, Oral, BID, Kelvin Sheppard DO, 17.6 mg at 12/31/24 0834      Lab Results: I have reviewed the following results:  Results from last 7 days   Lab Units 01/01/25  0247 12/31/24  0914 12/31/24  0320 12/30/24  0841 12/29/24  0456 12/28/24  0518 12/27/24  0357 12/26/24  0350   WBC Thousand/uL 5.54 5.93 5.40  --   --   --   --  4.41   HEMOGLOBIN g/dL 7.4* 6.8* 6.7*  --   --   --   --  7.4*   HEMATOCRIT % 23.0* 21.3* 21.3*  --   --   --   --  23.6*   PLATELETS Thousands/uL 146* 151 152  --   --   --   --  101*   POTASSIUM mmol/L 3.5  --  3.8 3.9 4.1 4.5 4.6 5.1   CHLORIDE mmol/L 87*  --  86* 85* 85* 86* 86* 88*   CO2 mmol/L 36*  --  36* 35* 36* 35* 35* 39*   BUN mg/dL 91*  --  92* 88* 86* 81* 75* 72*   CREATININE mg/dL 1.11  --  1.33* 1.31* 1.43* 1.26 1.06 0.88   CALCIUM mg/dL 8.6  --  8.4 8.5 8.8 8.8 8.9 9.0   MAGNESIUM mg/dL 2.5  --  2.5 2.4  --   --   --  1.8*   PHOSPHORUS mg/dL 2.9  --  3.2 3.3  --   --   --   --    ALBUMIN g/dL  --   --   --   --   --   --  3.0*  --        Administrative Statements     Portions of the record may have been created with voice recognition software. Occasional wrong word or \"sound a like\" substitutions may have occurred due to the inherent limitations of voice recognition software. Read the chart carefully and recognize, using context, where substitutions have occurred.If you have any questions, please " contact the dictating provider.

## 2024-12-31 NOTE — ASSESSMENT & PLAN NOTE
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  Persistent third spacing with slight increase in moderate volume ascites, and no significant change in moderate size right and small left pleural effusions with bibasilar atelectasis. 2.  New percutaneous enteric tube with tip in the distal duodenum.\"  GI following. S/p EGD 12/10 with J-tube exchange, postop patient had worsening hypoxia required transfer to ICU and using BiPAP  Had concern for possible infection surrounding J-tube site, completed 7 days of ceftriaxone and vancomycin on 12/17  developed clogged J-tube,   Status post EGD on 12/13 with J-tube replacement   Resumed on tube feeding  On clear liquid diet per speech/swallow therapist  G tube port is working sufficiently per gastroenterology  Tube feeding changed to Nepro due to hyperkalemia and hyperglycemia after discussion with nutrition\  Patient with leakage around the PEG tube site.  GI reevaluated.  Okay to continue with tube feeding-current on antibiotics.  Culture was growing Klebsiella from 12/25- completed 5 days of Ancef on 12/29  "

## 2024-12-31 NOTE — PROGRESS NOTES
"Progress Note - Hospitalist   Name: Lety Botello 64 y.o. 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  Persistent third spacing with slight increase in moderate volume ascites, and no significant change in moderate size right and small left pleural effusions with bibasilar atelectasis. 2.  New percutaneous enteric tube with tip in the distal duodenum.\"  GI following. S/p EGD 12/10 with J-tube exchange, postop patient had worsening hypoxia required transfer to ICU and using BiPAP  Had concern for possible infection surrounding J-tube site, completed 7 days of ceftriaxone and vancomycin on 12/17  developed clogged J-tube,   Status post EGD on 12/13 with J-tube replacement   Resumed on tube feeding  On clear liquid diet per speech/swallow therapist  G tube port is working sufficiently per gastroenterology  Tube feeding changed to Nepro due to hyperkalemia and hyperglycemia after discussion with nutrition\  Patient with leakage around the PEG tube site.  GI reevaluated.  Okay to continue with tube feeding-current on antibiotics.  Culture was growing Klebsiella from 12/25- completed 5 days of Ancef on 12/29  Abdominal pain  Patient with recent hospitalization due to small bowel obstruction s/p exploratory laparotomy with small bowel resection on 11/22/2024  presented with worsening abdominal pain and distention  Abdominal pelvis CT scan, negative for obstruction, stable bilateral pleural effusion and anasarca  Continue supportive care  [place] catheter " placed due to retention  S/p paracentesis 12/18 for ascites 5.5 L  Monitor Bms  Palliative care is following and adjusting analgesics  CT scan reviewed-consulted IR for paracentesis-status post paracentesis 12/30.  4.6 L out, culture- no growth up to date.  PMN count is not high enough to suspect SBP  Anemia  Received IV iron x 2  Vitamin B12 and folate deficiency  Continue vitamin B12 and folate supplement  Transfuse 1 unit of PRBCs 12/18 and 12/23  Continue to monitor hb closely  Hg is 6.8 on 12/31 transfuse 1 U RBC  Hypertension  Monitor blood pressures  Continue carvedilol, Norvasc was added  Avoid hypotension  Blood pressure is on the lower side.  Decrease in the dose of Coreg.  Patient received albumin after the paracentesis  Acute respiratory failure with hypoxia (HCC)  resolved  Patient had worsening hypoxia post EGD on 12/10. Was satting mid-80s on 10L, Was given nebs, Lasix and subsequently placed on BiPAP and upgraded to critical care for further treatment  Currently on room air  NSVT (nonsustained ventricular tachycardia) (HCC)  25 run of nonsustained V. tach noted on telemetry, patient was asymptomatic  Cardiology consulted  Cardiac echo with normal EF and mild aortic stenosis  Coreg dose was increased to 25 mg twice daily  Monitor  Cardiology signed off-due to low hypertension decrease the Coreg to 12.5 and monitor    Hypothyroidism  Elevated TSH  Normal free T4 at 0.65  Patient was not receiving levothyroxine due to  PEG tube malfunctioning   Increase levothyroxine dose to 150 mcg daily  Repeat thyroid study in 4 to 6 weeks  abdominal rash  Located on the right lower abdomen with oozing and bleeding  Seen by dermatology who performed a biopsy  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, consistent with gynecologic/Mullerian origin   Discontinue valtrex  Outpatient follow up with medical oncology when stable  Hyponatremia  Hyponatremia likely multifactorial - due to volume overload  Cliff  catheter placed due to urinary retention  Monitor closely with diuresis  Gradually improving with diuresis  Due to persistent hyponatremia will consult with nephrology to evaluate for possible tolvaptan therapy  Nephrology is adding Samsca  Holding diuretics  Coronary artery disease involving native coronary artery  History of MI in 2016 status post KARY  Stable  Type 2 diabetes mellitus (HCC)  Lab Results   Component Value Date    HGBA1C 6.9 (H) 11/05/2024       Recent Labs     12/30/24  2050 12/31/24  0624 12/31/24  1042 12/31/24  1540   POCGLU 153* 137 158* 145*       Blood Sugar Average: Last 72 hrs:  (P) 137.9375    Farxiga on hold while inpatient  Hyperglycemia secondary to steroids and tube feeding  Continue Lantus nightly, continue Humalog tid, adjust the doses as needed  Continue SSI  Endometrial cancer (HCC)  History of endometrial cancer noted  History of pulmonary embolism  Continue Eliquis 5 mg twice daily  Gastroparesis  History of   With PEG-J as above  GI following  Morbid obesity (HCC)  Body mass index is 48.87 kg/m².  Encourage lifestyle modification and weight loss once acute issues improved/resolved  Anasarca  Continue with albumin assisted diuresis.  Nephrology managing diuretics  Continue to monitor daily weights, BMPs, telemetry  Urine output is decreasing and hyponatremia is not improving sufficiently - will consult with nephrology for assistance with management  Hyperkalemia    TERRA (acute kidney injury) ([facility])      VTE Pharmacologic Prophylaxis: VTE Score: 7 High Risk (Score >/= 5) - Pharmacological DVT Prophylaxis Ordered: enoxaparin (Lovenox). Sequential Compression Devices Ordered.    Mobility:   Basic Mobility Inpatient Raw Score: 6  JH-HLM Goal: 2: Bed activities/Dependent transfer  JH-HLM Achieved: 2: Bed activities/Dependent transfer  JH-HLM Goal achieved. Continue to encourage appropriate mobility.    Patient Centered Rounds: I performed bedside rounds with nursing staff today.    Discussions with Specialists or Other Care Team Provider: nephrology, case management    Education and Discussions with Family / Patient: Patient declined call to .     Current Length of Stay: 22 day(s)  Current Patient Status: Inpatient   Certification Statement: The patient will continue to require additional inpatient hospital stay due to management as above  Discharge Plan:  pending clinical improvement    Code Status: Level 2 - DNAR: but accepts endotracheal intubation    Subjective   Patient seen and examined.  She reports feeling very tired but, otherwise no new complaints    Objective :  Temp:  [97.7 °F (36.5 °C)-98.9 °F (37.2 °C)] 98.4 °F (36.9 °C)  HR:  [75-86] 86  BP: ()/(39-75) 139/52  Resp:  [13-20] 15  SpO2:  [90 %-98 %] 95 %  O2 Device: Nasal cannula  Nasal Cannula O2 Flow Rate (L/min):  [3 L/min-4 L/min] 3 L/min    Body mass index is 48.87 kg/m².     Input and Output Summary (last 24 hours):     Intake/Output Summary (Last 24 hours) at 12/31/2024 1836  Last data filed at 12/31/2024 1757  Gross per 24 hour   Intake 1868 ml   Output 2200 ml   Net -332 ml       Physical Exam  Constitutional:       Appearance: She is obese.   HENT:      Head: Normocephalic and atraumatic.      Nose: Nose normal.   Eyes:      General: No scleral icterus.  Cardiovascular:      Rate and Rhythm: Normal rate and regular rhythm.      Heart sounds: No murmur heard.  Pulmonary:      Effort: Pulmonary effort is normal. No respiratory distress.   Abdominal:      General: There is distension.      Tenderness: There is abdominal tenderness.      Comments: Abdominal wall wounds covered in dressing   Neurological:      Mental Status: She is alert.           Lines/Drains:  Lines/Drains/Airways       Active Status       Name Placement date Placement time Site Days    PICC Line 12/18/24 12/18/24  0922  --  13    Gastrostomy/Enterostomy PEG- Jejunostomy 20 Fr. LUQ 12/03/24  1553  LUQ  28    Urethral Catheter Latex 16  Fr. 12/26/24  2335  Latex  4                  Urinary Catheter:  Goal for removal: Voiding trial when ambulation improves         Central Line:  Goal for removal: Will discontinue when peripheral access obtained.                Lab Results: I have reviewed the following results:   Results from last 7 days   Lab Units 12/31/24  0914   WBC Thousand/uL 5.93   HEMOGLOBIN g/dL 6.8*   HEMATOCRIT % 21.3*   PLATELETS Thousands/uL 151   SEGS PCT % 83*   LYMPHO PCT % 6*   MONO PCT % 6   EOS PCT % 3     Results from last 7 days   Lab Units 12/31/24  0320 12/28/24  0518 12/27/24  0357   SODIUM mmol/L 128*   < > 127*   POTASSIUM mmol/L 3.8   < > 4.6   CHLORIDE mmol/L 86*   < > 86*   CO2 mmol/L 36*   < > 35*   BUN mg/dL 92*   < > 75*   CREATININE mg/dL 1.33*   < > 1.06   ANION GAP mmol/L 6   < > 6   CALCIUM mg/dL 8.4   < > 8.9   ALBUMIN g/dL  --   --  3.0*   TOTAL BILIRUBIN mg/dL  --   --  0.31   ALK PHOS U/L  --   --  70   ALT U/L  --   --  5*   AST U/L  --   --  8*   GLUCOSE RANDOM mg/dL 155*   < > 160*    < > = values in this interval not displayed.         Results from last 7 days   Lab Units 12/31/24  1540 12/31/24  1042 12/31/24  0624 12/30/24  2050 12/30/24  1555 12/30/24  1245 12/30/24  1028 12/30/24  0734 12/29/24  2007 12/29/24  1616 12/29/24  1049 12/29/24  0624   POC GLUCOSE mg/dl 145* 158* 137 153* 121 151* 165* 161* 117 123 109 92               Recent Cultures (last 7 days):   Results from last 7 days   Lab Units 12/30/24  1132 12/25/24  1648   GRAM STAIN RESULT  1+ Polys  No organisms seen 1+ Polys*  4+ Gram negative rods*  4+ Gram positive rods*  1+ Gram positive cocci in pairs*  Rare Budding yeast*   WOUND CULTURE   --  4+ Growth of Klebsiella oxytoca*  4+ Growth of Citrobacter freundii*  4+ Growth of  Growth in Broth culture only Candida glabrata*   BODY FLUID CULTURE, STERILE  No growth  --              Last 24 Hours Medication List:     Current Facility-Administered Medications:     acetaminophen  (TYLENOL) tablet 975 mg, Q8H SAM    albuterol (PROVENTIL HFA,VENTOLIN HFA) inhaler 2 puff, Q6H PRN    aluminum-magnesium hydroxide-simethicone (MAALOX) oral suspension 30 mL, Q6H PRN    aspirin (ECOTRIN LOW STRENGTH) EC tablet 81 mg, Daily    bisacodyl (DULCOLAX) rectal suppository 10 mg, Daily PRN    carvedilol (COREG) tablet 3.125 mg, BID With Meals    cyanocobalamin (VITAMIN B-12) tablet 1,000 mcg, Daily    dicyclomine (BENTYL) capsule 10 mg, BID PRN    enoxaparin (LOVENOX) subcutaneous injection 120 mg, Q12H SAM    fentaNYL (DURAGESIC) 25 mcg/hr TD 72 hr patch 25 mcg, Q72H    folic acid (FOLVITE) tablet 1 mg, Daily    gabapentin (NEURONTIN) capsule 300 mg, HS    HYDROmorphone (DILAUDID) injection 0.5 mg, Q3H PRN    insulin glargine (LANTUS) subcutaneous injection 20 Units 0.2 mL, HS    insulin lispro (HumALOG/ADMELOG) 100 units/mL subcutaneous injection 1-5 Units, TID AC **AND** Fingerstick Glucose (POCT), TID AC    insulin lispro (HumALOG/ADMELOG) 100 units/mL subcutaneous injection 5 Units, TID With Meals    iohexol (OMNIPAQUE) 240 MG/ML solution 50 mL, Once in imaging    levothyroxine tablet 150 mcg, Daily    naloxone (NARCAN) 0.04 mg/mL syringe 0.04 mg, Q1MIN PRN    nitroglycerin (NITROSTAT) SL tablet 0.4 mg, Q5 Min PRN    omeprazole (PRILOSEC) suspension 2 mg/mL, Daily    ondansetron (ZOFRAN) injection 4 mg, Q6H PRN    oxyCODONE (ROXICODONE) oral solution 5 mg, Q4H PRN **OR** oxyCODONE (ROXICODONE) oral solution 10 mg, Q4H PRN    polyethylene glycol (MIRALAX) packet 17 g, Daily    senna oral syrup 17.6 mg, BID    Administrative Statements   Today, Patient Was Seen By: Park Mitchell MD      **Please Note: This note may have been constructed using a voice recognition system.**

## 2024-12-31 NOTE — ASSESSMENT & PLAN NOTE
Body mass index is 48.87 kg/m².  Encourage lifestyle modification and weight loss once acute issues improved/resolved

## 2024-12-31 NOTE — ASSESSMENT & PLAN NOTE
Appears to have had chronic hyponatremia dating as far back as 2022  Hyponatremia likely secondary to underlying malignancy plus some component of intravascular volume depletion with third spacing and increased ADH with pain and nausea  Sodium admission of 129 mEq  Sodium dropped down to 125 mEq on 12/20/2024  Most recent sodium at 131 mEq  stable and improving  Status post Samsca 15 g on 12/27 and 30 g on 12/28 and 30 g on 12/29, tolvaptan 45 mg x 1 on 12/30  We will give albumin 25 g IV every 6 for additional 2 doses and Bumex 2 mg IV x 1 again today to help with intravascular volume shifts as clinically still appears to be hypervolemic  Hold off on tolvaptan for now  Patient has a significant third spacing  Placed on fluid restriction 1.8 L  Repeat workup showing serum osmolality 278 urine sodium 61 urine osmolality 311

## 2024-12-31 NOTE — PLAN OF CARE
Problem: PHYSICAL THERAPY ADULT  Goal: Performs mobility at highest level of function for planned discharge setting.  See evaluation for individualized goals.  Description: Treatment/Interventions: ADL retraining, Functional transfer training, LE strengthening/ROM, Elevations, Therapeutic exercise, Endurance training, Cognitive reorientation, Patient/family training, Equipment eval/education, Bed mobility, Compensatory technique education, Continued evaluation, Spoke to nursing, Spoke to case management, Spoke to advanced practitioner, OT  Equipment Recommended: Wheelchair       See flowsheet documentation for full assessment, interventions and recommendations.  Outcome: Progressing  Note: Prognosis: Fair  Problem List: Decreased strength, Decreased range of motion, Decreased endurance, Impaired balance, Decreased mobility, Decreased coordination, Decreased cognition, Impaired judgement, Decreased safety awareness, Impaired sensation, Obesity, Decreased skin integrity, Pain  Assessment: PT initiated treatment session in order to assist patient in achieving goals to improve transfers, gait training, and overall activity tolerance. Patient pleasant, cooperative, and agreeable to today's treatment session. Patient received supine in bed. Patient is currently MaxAx2 bed mobility. Throughout treatment session patient required both verbal and tactile cuing to improve safety, efficiency, and mechanics of mobility in addition to hands on assistance for all aspects of functional mobility. Additionally, pt required increased time to execute specific mobility tasks with rest breaks in between secondary to gross fatigue and weakness. Completed rolling x4 for pericare and hygiene. Required boost back into bed for better patient positioning and comfort. Placed wedges x2 on L side to reduce further skin breakdown. Patient declined further functional mobility at this time due to fatigue and pain. Patient left supine in bed with  alarm and all needs met. Patient will benefit from continued skilled physical therapy to address gait / balance dysfunction, decreased activity tolerance, and generalized weakness. The patients AM-PAC Basic Mobility Inpatient Short From Raw Score is 6 .  Based on AM-PAC scoring and patient presentation, PT currently recommending Level II (Moderate Resource Intensity). Please also refer to the recommendation of the Physical Therapist for safe discharge planning.  Barriers to Discharge: Inaccessible home environment, Decreased caregiver support     Rehab Resource Intensity Level, PT: II (Moderate Resource Intensity)    See flowsheet documentation for full assessment.

## 2024-12-31 NOTE — ASSESSMENT & PLAN NOTE
Hyponatremia likely multifactorial - due to volume overload  Coronado catheter placed due to urinary retention  Monitor closely with diuresis  Gradually improving with diuresis  Due to persistent hyponatremia will consult with nephrology to evaluate for possible tolvaptan therapy  Nephrology is adding Samsca  Holding diuretics

## 2024-12-31 NOTE — ASSESSMENT & PLAN NOTE
Patient with recent hospitalization due to small bowel obstruction s/p exploratory laparotomy with small bowel resection on 11/22/2024  presented with worsening abdominal pain and distention  Abdominal pelvis CT scan, negative for obstruction, stable bilateral pleural effusion and anasarca  Continue supportive care  Coronado catheter placed due to retention  S/p paracentesis 12/18 for ascites 5.5 L  Monitor Bms  Palliative care is following and adjusting analgesics  CT scan reviewed-consulted IR for paracentesis-status post paracentesis 12/30.  4.6 L out, culture- no growth up to date.  PMN count is not high enough to suspect SBP

## 2024-12-31 NOTE — ASSESSMENT & PLAN NOTE
Body mass index is 50.16 kg/m².  Encourage lifestyle modification and weight loss once acute issues improved/resolved

## 2024-12-31 NOTE — PROGRESS NOTES
"Progress Note - Hospitalist   Name: Lety Botello 64 y.o. 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  Persistent third spacing with slight increase in moderate volume ascites, and no significant change in moderate size right and small left pleural effusions with bibasilar atelectasis. 2.  New percutaneous enteric tube with tip in the distal duodenum.\"  GI following. S/p EGD 12/10 with J-tube exchange, postop patient had worsening hypoxia required transfer to ICU and using BiPAP  Concern for possible infection surrounding J-tube site, completed 7 days of ceftriaxone and vancomycin  developed clogged J-tube,   Status post EGD on 12/13 with J-tube replacement   Resumed on tube feeding  On clear liquid diet per speech/swallow therapist  G tube port is working sufficiently per gastroenterology  Tube feeding changed to Nepro due to hyperkalemia and hyperglycemia after discussion with nutrition\  Patient with leakage around the PEG tube site.  GI reevaluated.  Okay to continue with tube feeding-current on antibiotics.  Culture is growing Klebsiella  Abdominal pain  Patient with recent hospitalization due to small bowel obstruction s/p exploratory laparotomy with small bowel resection on 11/22/2024  presented with worsening abdominal pain and distention  Abdominal pelvis CT scan, negative for obstruction, stable bilateral pleural effusion and anasarca  Continue supportive care  Coronado catheter placed due to retention  S/p paracentesis 12/18 for ascites " 5.5 L  Monitor Bms  Palliative care is following and adjusting analgesics  CT scan reviewed-consulted IR for paracentesis-status post paracentesis today.  4.6 L out.  Follow-up on the culture.  PMN count is not high enough to suspect SBP  Acute respiratory failure with hypoxia (HCC)  Patient had worsening hypoxia post EGD on 12/10. Was satting mid-80s on 10L  Was given nebs, Lasix and subsequently placed on BiPAP and upgraded to critical care for further treatment  Currently in stable condition and transferred out of ICU  Currently on room air  NSVT (nonsustained ventricular tachycardia) (HCC)  25 run of nonsustained V. tach noted on telemetry, patient was asymptomatic  Cardiology consulted  Cardiac echo with normal EF and mild aortic stenosis  Coreg dose was increased to 25 mg twice daily  Monitor  Cardiology signed off-due to low hypertension decrease the Coreg to 12.5 and monitor    Hypothyroidism  Elevated TSH  Normal free T4 at 0.65  Patient was not receiving levothyroxine due to  PEG tube malfunctioning   Increase levothyroxine dose to 150 mcg daily  Repeat thyroid study in 4 to 6 weeks  Anemia  Received IV iron x 2  Vitamin B12 and folate deficiency  Continue vitamin B12 and folate supplement  Transfuse 1 unit of PRBCs 12/18 and 12/23  Continue to monitor hb closely  Recheck CBC tomorrow  abdominal rash  Located on the right lower abdomen with oozing and bleeding  Seen by dermatology who performed a biopsy  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, consistent with gynecologic/Mullerian origin   Discontinue valtrex  Outpatient follow up with medical oncology when stable  Hyponatremia  Hyponatremia likely multifactorial - due to volume overload  Coronado catheter placed due to urinary retention  Monitor closely with diuresis  Gradually improving with diuresis  Due to persistent hyponatremia will consult with nephrology to evaluate for possible tolvaptan therapy  Nephrology is adding Cyril Amado  diuretics  Hypertension  Monitor blood pressures  Continue carvedilol, Norvasc was added  Avoid hypotension  Blood pressure is on the lower side.  Decrease in the dose of Coreg.  Patient received albumin after the paracentesis  Coronary artery disease involving native coronary artery  History of MI in 2016 status post KARY  Stable  Type 2 diabetes mellitus (HCC)  Lab Results   Component Value Date    HGBA1C 6.9 (H) 11/05/2024       Recent Labs     12/30/24  0734 12/30/24  1028 12/30/24  1245 12/30/24  1555   POCGLU 161* 165* 151* 121       Blood Sugar Average: Last 72 hrs:  (P) 141.625    Farxiga on hold while inpatient  Hyperglycemia secondary to steroids and tube feeding  Continue Lantus nightly, continue Humalog tid, adjust the doses as needed  Continue SSI  Endometrial cancer (HCC)  History of endometrial cancer noted  History of pulmonary embolism  Continue Eliquis 5 mg twice daily  Gastroparesis  History of   With PEG-J as above  GI following  Morbid obesity (HCC)  Body mass index is 50.16 kg/m².  Encourage lifestyle modification and weight loss once acute issues improved/resolved  Anasarca  Continue with albumin assisted diuresis.  Continue lasix 40mg TID  Continue to monitor daily weights, BMPs, telemetry  Urine output is decreasing and hyponatremia is not improving sufficiently - will consult with nephrology for assistance with management  Hyperkalemia    TERRA (acute kidney injury) ([facility])      VTE Pharmacologic Prophylaxis: VTE Score: 7 High Risk (Score >/= 5) - Pharmacological DVT Prophylaxis Ordered: enoxaparin (Lovenox). Sequential Compression Devices Ordered.    Mobility:   Basic Mobility Inpatient Raw Score: 6  JH-HLM Goal: 2: Bed activities/Dependent transfer  JH-HLM Achieved: 3: Sit at edge of bed  JH-HLM Goal achieved. Continue to encourage appropriate mobility.    Patient Centered Rounds: I performed bedside rounds with nursing staff today.   Discussions with Specialists or Other Care Team Provider:      Education and Discussions with Family / Patient: Patient declined call to .     Current Length of Stay: 21 day(s)  Current Patient Status: Inpatient   Certification Statement: The patient will continue to require additional inpatient hospital stay due to hyponatremia   Discharge Plan:     Code Status: Level 2 - DNAR: but accepts endotracheal intubation    Subjective   Patient seen and examined.  Status post paracentesis removing 4.6 L.  IV albumin was given postprocedure.  Follow-up on the cultures.  Currently off of antibiotics.    Objective :  Temp:  [97.2 °F (36.2 °C)-98 °F (36.7 °C)] 97.2 °F (36.2 °C)  HR:  [68-77] 70  BP: ()/(39-52) 103/43  Resp:  [16-17] 16  SpO2:  [96 %-99 %] 98 %  O2 Device: Nasal cannula    Body mass index is 50.16 kg/m².     Input and Output Summary (last 24 hours):     Intake/Output Summary (Last 24 hours) at 12/30/2024 1950  Last data filed at 12/30/2024 1947  Gross per 24 hour   Intake 1031 ml   Output 5670 ml   Net -4639 ml       Physical Exam  Constitutional:       Appearance: She is obese.   HENT:      Head: Normocephalic and atraumatic.      Nose: Nose normal.   Eyes:      General: No scleral icterus.  Cardiovascular:      Rate and Rhythm: Normal rate and regular rhythm.      Heart sounds: No murmur heard.  Pulmonary:      Effort: Pulmonary effort is normal. No respiratory distress.   Abdominal:      General: There is distension.      Tenderness: There is abdominal tenderness.      Comments: Abdominal wall wounds covered in dressing   Neurological:      Mental Status: She is alert.           Lines/Drains:  Lines/Drains/Airways       Active Status       Name Placement date Placement time Site Days    PICC Line 12/18/24 12/18/24  0922  --  12    Gastrostomy/Enterostomy PEG- Jejunostomy 20 Fr. LUQ 12/03/24  1553  LUQ  27    Urethral Catheter Latex 16 Fr. 12/26/24  2335  Latex  3                  Urinary Catheter:  Goal for removal: Voiding trial when ambulation  improves         Central Line:  Goal for removal:  Patient did not have any peripheral access               Lab Results: I have reviewed the following results:   Results from last 7 days   Lab Units 12/26/24  0350 12/25/24  0453 12/24/24  1538   WBC Thousand/uL 4.41   < > 5.20   HEMOGLOBIN g/dL 7.4*   < > 7.5*   HEMATOCRIT % 23.6*   < > 23.7*   PLATELETS Thousands/uL 101*   < > 86*   SEGS PCT %  --   --  83*   LYMPHO PCT %  --   --  8*   MONO PCT %  --   --  6   EOS PCT %  --   --  2    < > = values in this interval not displayed.     Results from last 7 days   Lab Units 12/30/24  0841 12/28/24  0518 12/27/24  0357   SODIUM mmol/L 128*   < > 127*   POTASSIUM mmol/L 3.9   < > 4.6   CHLORIDE mmol/L 85*   < > 86*   CO2 mmol/L 35*   < > 35*   BUN mg/dL 88*   < > 75*   CREATININE mg/dL 1.31*   < > 1.06   ANION GAP mmol/L 8   < > 6   CALCIUM mg/dL 8.5   < > 8.9   ALBUMIN g/dL  --   --  3.0*   TOTAL BILIRUBIN mg/dL  --   --  0.31   ALK PHOS U/L  --   --  70   ALT U/L  --   --  5*   AST U/L  --   --  8*   GLUCOSE RANDOM mg/dL 162*   < > 160*    < > = values in this interval not displayed.         Results from last 7 days   Lab Units 12/30/24  1555 12/30/24  1245 12/30/24  1028 12/30/24  0734 12/29/24 2007 12/29/24  1616 12/29/24  1049 12/29/24  0624 12/28/24  2005 12/28/24  1606 12/28/24  1100 12/28/24  0632   POC GLUCOSE mg/dl 121 151* 165* 161* 117 123 109 92 133 135 134 173*               Recent Cultures (last 7 days):   Results from last 7 days   Lab Units 12/30/24  1132 12/25/24  1648   GRAM STAIN RESULT  1+ Polys  No organisms seen 1+ Polys*  4+ Gram negative rods*  4+ Gram positive rods*  1+ Gram positive cocci in pairs*  Rare Budding yeast*   WOUND CULTURE   --  4+ Growth of Klebsiella oxytoca*  4+ Growth of Citrobacter freundii*  4+ Growth of  Growth in Broth culture only Candida glabrata*             Last 24 Hours Medication List:     Current Facility-Administered Medications:     acetaminophen (TYLENOL)  tablet 975 mg, Q8H SAM    albuterol (PROVENTIL HFA,VENTOLIN HFA) inhaler 2 puff, Q6H PRN    aluminum-magnesium hydroxide-simethicone (MAALOX) oral suspension 30 mL, Q6H PRN    aspirin (ECOTRIN LOW STRENGTH) EC tablet 81 mg, Daily    bisacodyl (DULCOLAX) rectal suppository 10 mg, Daily PRN    carvedilol (COREG) tablet 25 mg, BID With Meals    cyanocobalamin (VITAMIN B-12) tablet 1,000 mcg, Daily    dicyclomine (BENTYL) capsule 10 mg, BID PRN    enoxaparin (LOVENOX) subcutaneous injection 120 mg, Q12H SAM    fentaNYL (DURAGESIC) 25 mcg/hr TD 72 hr patch 25 mcg, Q72H    folic acid (FOLVITE) tablet 1 mg, Daily    gabapentin (NEURONTIN) capsule 300 mg, HS    HYDROmorphone (DILAUDID) injection 0.5 mg, Q3H PRN    insulin glargine (LANTUS) subcutaneous injection 20 Units 0.2 mL, HS    insulin lispro (HumALOG/ADMELOG) 100 units/mL subcutaneous injection 1-5 Units, TID AC **AND** Fingerstick Glucose (POCT), TID AC    insulin lispro (HumALOG/ADMELOG) 100 units/mL subcutaneous injection 5 Units, TID With Meals    iohexol (OMNIPAQUE) 240 MG/ML solution 50 mL, Once in imaging    levothyroxine tablet 150 mcg, Daily    naloxone (NARCAN) 0.04 mg/mL syringe 0.04 mg, Q1MIN PRN    nitroglycerin (NITROSTAT) SL tablet 0.4 mg, Q5 Min PRN    omeprazole (PRILOSEC) suspension 2 mg/mL, Daily    ondansetron (ZOFRAN) injection 4 mg, Q6H PRN    oxyCODONE (ROXICODONE) oral solution 5 mg, Q4H PRN **OR** oxyCODONE (ROXICODONE) oral solution 10 mg, Q4H PRN    polyethylene glycol (MIRALAX) packet 17 g, Daily    senna oral syrup 17.6 mg, BID    Administrative Statements   Today, Patient Was Seen By: Jackelyn Felder MD      **Please Note: This note may have been constructed using a voice recognition system.**

## 2024-12-31 NOTE — ASSESSMENT & PLAN NOTE
Palliative Diagnosis: endometrial cancer    Goals:  Currently a level 2.    Will continue discussions regarding GOC as patient's clinical presentation evolves.    Decisional apparatus:  Patient does have capacity on exam today.  If capacity is lost, patient's substitute decision maker would default to Sharda Carver per AD on file  ER contacts:  Sharda Carver (Niece)  [phone] (Work Phone)   Advance Directive/Living Will/POLST: on file and reviewed    Social Support:  Patient's support system:   Supportive listening provided    Follow-up  We appreciate the opportunity to participate in this patient's care.   We will continue to follow while admitted.    Please do not hesitate to contact our on-call provider through [employer] Secure Chat or contact [phone] should there be an acute change or other symptom control concerns.

## 2024-12-31 NOTE — ASSESSMENT & PLAN NOTE
Baseline creatinine 0.7 to 0.9 mg/dL  Admitted with creatinine 0.39 mg/dL on 12/9/2024  Creatinine today 1.11 mg/dL stable stable and improving towards baseline  May need to except higher creatinine for euvolemia  Likely TERRA secondary to ischemic injury from hypotension plus intravascular volume depletion secondary to third spacing  Give albumin 25 g IV every 6 for 2 doses and Bumex 2 mg IV x 1 to help with intravascular volume shifts and diuresis  Optimize hemodynamics  Monitor for renal recovery

## 2024-12-31 NOTE — ASSESSMENT & PLAN NOTE
Patient with recent hospitalization due to small bowel obstruction s/p exploratory laparotomy with small bowel resection on 11/22/2024  presented with worsening abdominal pain and distention  Abdominal pelvis CT scan, negative for obstruction, stable bilateral pleural effusion and anasarca  Continue supportive care  Coronado catheter placed due to retention  S/p paracentesis 12/18 for ascites 5.5 L  Monitor Bms  Palliative care is following and adjusting analgesics  CT scan reviewed-consulted IR for paracentesis-status post paracentesis today.  4.6 L out.  Follow-up on the culture.  PMN count is not high enough to suspect SBP

## 2024-12-31 NOTE — ASSESSMENT & PLAN NOTE
Lab Results   Component Value Date    HGBA1C 6.9 (H) 11/05/2024       Recent Labs     12/30/24 2050 12/31/24  0624 12/31/24  1042 12/31/24  1540   POCGLU 153* 137 158* 145*       Blood Sugar Average: Last 72 hrs:  (P) 137.9375    Farxiga on hold while inpatient  Hyperglycemia secondary to steroids and tube feeding  Continue Lantus nightly, continue Humalog tid, adjust the doses as needed  Continue SSI

## 2024-12-31 NOTE — ASSESSMENT & PLAN NOTE
Most recent echo with EF of 65% grade 1 diastolic dysfunction  Hypervolemic with third spacing  Will give albumin x 2 doses and Bumex 2 mg IV x 1 to help with intravascular volume shifts again today

## 2024-12-31 NOTE — ASSESSMENT & PLAN NOTE
resolved  Patient had worsening hypoxia post EGD on 12/10. Was satting mid-80s on 10L, Was given nebs, Lasix and subsequently placed on BiPAP and upgraded to critical care for further treatment  Currently on room air

## 2024-12-31 NOTE — ASSESSMENT & PLAN NOTE
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 CLD as tolerated.   DC plan: to return home after her rehab stay with [facility] via [facility].

## 2024-12-31 NOTE — ASSESSMENT & PLAN NOTE
Continue with albumin assisted diuresis.  Nephrology managing diuretics  Continue to monitor daily weights, BMPs, telemetry  Urine output is decreasing and hyponatremia is not improving sufficiently - will consult with nephrology for assistance with management

## 2024-12-31 NOTE — ASSESSMENT & PLAN NOTE
Received IV iron x 2  Vitamin B12 and folate deficiency  Continue vitamin B12 and folate supplement  Transfuse 1 unit of PRBCs 12/18 and 12/23  Continue to monitor hb closely  Recheck CBC tomorrow

## 2024-12-31 NOTE — ASSESSMENT & PLAN NOTE
Received IV iron x 2  Vitamin B12 and folate deficiency  Continue vitamin B12 and folate supplement  Transfuse 1 unit of PRBCs 12/18 and 12/23  Continue to monitor hb closely  Hg is 6.8 on 12/31 transfuse 1 U RBC

## 2024-12-31 NOTE — ASSESSMENT & PLAN NOTE
Monitor blood pressures  Continue carvedilol, Norvasc was added  Avoid hypotension  Blood pressure is on the lower side.  Decrease in the dose of Coreg.  Patient received albumin after the paracentesis

## 2025-01-01 ENCOUNTER — APPOINTMENT (INPATIENT)
Dept: RADIOLOGY | Facility: HOSPITAL | Age: 65
DRG: 252 | End: 2025-01-01
Payer: COMMERCIAL

## 2025-01-01 VITALS
SYSTOLIC BLOOD PRESSURE: 134 MMHG | DIASTOLIC BLOOD PRESSURE: 61 MMHG | HEIGHT: 60 IN | TEMPERATURE: 97.4 F | BODY MASS INDEX: 48.13 KG/M2 | WEIGHT: 245.15 LBS

## 2025-01-01 LAB
ABO GROUP BLD BPU: NORMAL
ALBUMIN SERPL BCG-MCNC: 3.6 G/DL (ref 3.5–5)
ALP SERPL-CCNC: 94 U/L (ref 34–104)
ALT SERPL W P-5'-P-CCNC: 3 U/L (ref 7–52)
ANION GAP SERPL CALCULATED.3IONS-SCNC: 6 MMOL/L (ref 4–13)
ANION GAP SERPL CALCULATED.3IONS-SCNC: 7 MMOL/L (ref 4–13)
ANION GAP SERPL CALCULATED.3IONS-SCNC: 8 MMOL/L (ref 4–13)
ANION GAP SERPL CALCULATED.3IONS-SCNC: 8 MMOL/L (ref 4–13)
ANISOCYTOSIS BLD QL SMEAR: PRESENT
APTT PPP: 31 SECONDS (ref 23–34)
APTT PPP: 41 SECONDS (ref 23–34)
APTT PPP: 53 SECONDS (ref 23–34)
APTT PPP: 55 SECONDS (ref 23–34)
AST SERPL W P-5'-P-CCNC: 5 U/L (ref 13–39)
BACTERIA SPEC ANAEROBE CULT: NO GROWTH
BACTERIA SPEC BFLD CULT: NO GROWTH
BASOPHILS # BLD AUTO: 0.02 THOUSANDS/ΜL (ref 0–0.1)
BASOPHILS # BLD MANUAL: 0.06 THOUSAND/UL (ref 0–0.1)
BASOPHILS NFR BLD AUTO: 0 % (ref 0–1)
BASOPHILS NFR MAR MANUAL: 1 % (ref 0–1)
BILIRUB SERPL-MCNC: 0.55 MG/DL (ref 0.2–1)
BPU ID: NORMAL
BUN SERPL-MCNC: 108 MG/DL (ref 5–25)
BUN SERPL-MCNC: 113 MG/DL (ref 5–25)
BUN SERPL-MCNC: 120 MG/DL (ref 5–25)
BUN SERPL-MCNC: 91 MG/DL (ref 5–25)
CALCIUM SERPL-MCNC: 8.6 MG/DL (ref 8.4–10.2)
CALCIUM SERPL-MCNC: 8.8 MG/DL (ref 8.4–10.2)
CALCIUM SERPL-MCNC: 9.2 MG/DL (ref 8.4–10.2)
CALCIUM SERPL-MCNC: 9.5 MG/DL (ref 8.4–10.2)
CHLORIDE SERPL-SCNC: 86 MMOL/L (ref 96–108)
CHLORIDE SERPL-SCNC: 86 MMOL/L (ref 96–108)
CHLORIDE SERPL-SCNC: 87 MMOL/L (ref 96–108)
CHLORIDE SERPL-SCNC: 88 MMOL/L (ref 96–108)
CO2 SERPL-SCNC: 36 MMOL/L (ref 21–32)
CO2 SERPL-SCNC: 36 MMOL/L (ref 21–32)
CO2 SERPL-SCNC: 38 MMOL/L (ref 21–32)
CO2 SERPL-SCNC: 38 MMOL/L (ref 21–32)
CREAT SERPL-MCNC: 1.11 MG/DL (ref 0.6–1.3)
CREAT SERPL-MCNC: 1.18 MG/DL (ref 0.6–1.3)
CREAT SERPL-MCNC: 1.21 MG/DL (ref 0.6–1.3)
CREAT SERPL-MCNC: 1.27 MG/DL (ref 0.6–1.3)
CROSSMATCH: NORMAL
EOSINOPHIL # BLD AUTO: 0.18 THOUSAND/ΜL (ref 0–0.61)
EOSINOPHIL # BLD MANUAL: 0.18 THOUSAND/UL (ref 0–0.4)
EOSINOPHIL NFR BLD AUTO: 3 % (ref 0–6)
EOSINOPHIL NFR BLD MANUAL: 3 % (ref 0–6)
ERYTHROCYTE [DISTWIDTH] IN BLOOD BY AUTOMATED COUNT: 19.6 % (ref 11.6–15.1)
ERYTHROCYTE [DISTWIDTH] IN BLOOD BY AUTOMATED COUNT: 19.9 % (ref 11.6–15.1)
ERYTHROCYTE [DISTWIDTH] IN BLOOD BY AUTOMATED COUNT: 20.1 % (ref 11.6–15.1)
ERYTHROCYTE [DISTWIDTH] IN BLOOD BY AUTOMATED COUNT: 20.2 % (ref 11.6–15.1)
FLUAV RNA RESP QL NAA+PROBE: NEGATIVE
FLUBV RNA RESP QL NAA+PROBE: NEGATIVE
GFR SERPL CREATININE-BSD FRML MDRD: 45 ML/MIN/1.73SQ M
GFR SERPL CREATININE-BSD FRML MDRD: 48 ML/MIN/1.73SQ M
GFR SERPL CREATININE-BSD FRML MDRD: 49 ML/MIN/1.73SQ M
GFR SERPL CREATININE-BSD FRML MDRD: 53 ML/MIN/1.73SQ M
GLUCOSE SERPL-MCNC: 139 MG/DL (ref 65–140)
GLUCOSE SERPL-MCNC: 151 MG/DL (ref 65–140)
GLUCOSE SERPL-MCNC: 159 MG/DL (ref 65–140)
GLUCOSE SERPL-MCNC: 168 MG/DL (ref 65–140)
GLUCOSE SERPL-MCNC: 176 MG/DL (ref 65–140)
GLUCOSE SERPL-MCNC: 181 MG/DL (ref 65–140)
GLUCOSE SERPL-MCNC: 189 MG/DL (ref 65–140)
GLUCOSE SERPL-MCNC: 190 MG/DL (ref 65–140)
GLUCOSE SERPL-MCNC: 212 MG/DL (ref 65–140)
GLUCOSE SERPL-MCNC: 215 MG/DL (ref 65–140)
GLUCOSE SERPL-MCNC: 219 MG/DL (ref 65–140)
GLUCOSE SERPL-MCNC: 225 MG/DL (ref 65–140)
GLUCOSE SERPL-MCNC: 225 MG/DL (ref 65–140)
GLUCOSE SERPL-MCNC: 239 MG/DL (ref 65–140)
GLUCOSE SERPL-MCNC: 248 MG/DL (ref 65–140)
GLUCOSE SERPL-MCNC: 249 MG/DL (ref 65–140)
GLUCOSE SERPL-MCNC: 258 MG/DL (ref 65–140)
GLUCOSE SERPL-MCNC: 98 MG/DL (ref 65–140)
GRAM STN SPEC: NORMAL
GRAM STN SPEC: NORMAL
HCT VFR BLD AUTO: 23 % (ref 34.8–46.1)
HCT VFR BLD AUTO: 23.6 % (ref 34.8–46.1)
HCT VFR BLD AUTO: 24.7 % (ref 34.8–46.1)
HCT VFR BLD AUTO: 27.1 % (ref 34.8–46.1)
HGB BLD-MCNC: 7.4 G/DL (ref 11.5–15.4)
HGB BLD-MCNC: 8.1 G/DL (ref 11.5–15.4)
HYPERCHROMIA BLD QL SMEAR: PRESENT
IMM GRANULOCYTES # BLD AUTO: 0.12 THOUSAND/UL (ref 0–0.2)
IMM GRANULOCYTES NFR BLD AUTO: 2 % (ref 0–2)
INR PPP: 1.16 (ref 0.85–1.19)
LYMPHOCYTES # BLD AUTO: 0.39 THOUSANDS/ΜL (ref 0.6–4.47)
LYMPHOCYTES # BLD AUTO: 0.49 THOUSAND/UL (ref 0.6–4.47)
LYMPHOCYTES # BLD AUTO: 6 % (ref 14–44)
LYMPHOCYTES NFR BLD AUTO: 7 % (ref 14–44)
MAGNESIUM SERPL-MCNC: 2.5 MG/DL (ref 1.9–2.7)
MAGNESIUM SERPL-MCNC: 2.5 MG/DL (ref 1.9–2.7)
MAGNESIUM SERPL-MCNC: 2.9 MG/DL (ref 1.9–2.7)
MCH RBC QN AUTO: 29.3 PG (ref 26.8–34.3)
MCH RBC QN AUTO: 29.5 PG (ref 26.8–34.3)
MCH RBC QN AUTO: 30.3 PG (ref 26.8–34.3)
MCH RBC QN AUTO: 30.3 PG (ref 26.8–34.3)
MCHC RBC AUTO-ENTMCNC: 29.9 G/DL (ref 31.4–37.4)
MCHC RBC AUTO-ENTMCNC: 30 G/DL (ref 31.4–37.4)
MCHC RBC AUTO-ENTMCNC: 31.4 G/DL (ref 31.4–37.4)
MCHC RBC AUTO-ENTMCNC: 32.2 G/DL (ref 31.4–37.4)
MCV RBC AUTO: 94 FL (ref 82–98)
MCV RBC AUTO: 97 FL (ref 82–98)
MCV RBC AUTO: 98 FL (ref 82–98)
MCV RBC AUTO: 98 FL (ref 82–98)
MONOCYTES # BLD AUTO: 0.37 THOUSAND/UL (ref 0–1.22)
MONOCYTES # BLD AUTO: 0.5 THOUSAND/ΜL (ref 0.17–1.22)
MONOCYTES NFR BLD AUTO: 9 % (ref 4–12)
MONOCYTES NFR BLD: 6 % (ref 4–12)
NEUTROPHILS # BLD AUTO: 4.33 THOUSANDS/ΜL (ref 1.85–7.62)
NEUTROPHILS # BLD MANUAL: 5.05 THOUSAND/UL (ref 1.85–7.62)
NEUTS BAND NFR BLD MANUAL: 6 % (ref 0–8)
NEUTS SEG NFR BLD AUTO: 76 % (ref 43–75)
NEUTS SEG NFR BLD AUTO: 79 % (ref 43–75)
NRBC BLD AUTO-RTO: 0 /100 WBCS
NRBC BLD AUTO-RTO: 0 /100 WBCS
PHOSPHATE SERPL-MCNC: 2.9 MG/DL (ref 2.3–4.1)
PHOSPHATE SERPL-MCNC: 3.1 MG/DL (ref 2.3–4.1)
PHOSPHATE SERPL-MCNC: 3.7 MG/DL (ref 2.3–4.1)
PHOSPHATE SERPL-MCNC: 4.6 MG/DL (ref 2.3–4.1)
PLATELET # BLD AUTO: 146 THOUSANDS/UL (ref 149–390)
PLATELET # BLD AUTO: 164 THOUSANDS/UL (ref 149–390)
PLATELET # BLD AUTO: 177 THOUSANDS/UL (ref 149–390)
PLATELET # BLD AUTO: 196 THOUSANDS/UL (ref 149–390)
PLATELET BLD QL SMEAR: ADEQUATE
PMV BLD AUTO: 10.5 FL (ref 8.9–12.7)
PMV BLD AUTO: 10.6 FL (ref 8.9–12.7)
PMV BLD AUTO: 10.7 FL (ref 8.9–12.7)
PMV BLD AUTO: 9.7 FL (ref 8.9–12.7)
POLYCHROMASIA BLD QL SMEAR: PRESENT
POTASSIUM SERPL-SCNC: 3.5 MMOL/L (ref 3.5–5.3)
POTASSIUM SERPL-SCNC: 3.6 MMOL/L (ref 3.5–5.3)
POTASSIUM SERPL-SCNC: 3.9 MMOL/L (ref 3.5–5.3)
POTASSIUM SERPL-SCNC: 4.4 MMOL/L (ref 3.5–5.3)
PROT SERPL-MCNC: 6 G/DL (ref 6.4–8.4)
PROTHROMBIN TIME: 15.1 SECONDS (ref 12.3–15)
RBC # BLD AUTO: 2.44 MILLION/UL (ref 3.81–5.12)
RBC # BLD AUTO: 2.44 MILLION/UL (ref 3.81–5.12)
RBC # BLD AUTO: 2.51 MILLION/UL (ref 3.81–5.12)
RBC # BLD AUTO: 2.76 MILLION/UL (ref 3.81–5.12)
RBC MORPH BLD: PRESENT
RSV RNA RESP QL NAA+PROBE: NEGATIVE
SARS-COV-2 RNA RESP QL NAA+PROBE: NEGATIVE
SODIUM SERPL-SCNC: 130 MMOL/L (ref 135–147)
SODIUM SERPL-SCNC: 131 MMOL/L (ref 135–147)
SODIUM SERPL-SCNC: 131 MMOL/L (ref 135–147)
SODIUM SERPL-SCNC: 132 MMOL/L (ref 135–147)
UNIT DISPENSE STATUS: NORMAL
UNIT PRODUCT CODE: NORMAL
UNIT PRODUCT VOLUME: 250 ML
UNIT RH: NORMAL
VARIANT LYMPHS # BLD AUTO: 2 %
WBC # BLD AUTO: 5.54 THOUSAND/UL (ref 4.31–10.16)
WBC # BLD AUTO: 6.16 THOUSAND/UL (ref 4.31–10.16)
WBC # BLD AUTO: 6.88 THOUSAND/UL (ref 4.31–10.16)
WBC # BLD AUTO: 9.35 THOUSAND/UL (ref 4.31–10.16)

## 2025-01-01 PROCEDURE — 82948 REAGENT STRIP/BLOOD GLUCOSE: CPT

## 2025-01-01 PROCEDURE — 99232 SBSQ HOSP IP/OBS MODERATE 35: CPT | Performed by: FAMILY MEDICINE

## 2025-01-01 PROCEDURE — 84100 ASSAY OF PHOSPHORUS: CPT | Performed by: INTERNAL MEDICINE

## 2025-01-01 PROCEDURE — 99233 SBSQ HOSP IP/OBS HIGH 50: CPT

## 2025-01-01 PROCEDURE — 84100 ASSAY OF PHOSPHORUS: CPT | Performed by: NURSE PRACTITIONER

## 2025-01-01 PROCEDURE — 85610 PROTHROMBIN TIME: CPT | Performed by: FAMILY MEDICINE

## 2025-01-01 PROCEDURE — 0241U HB NFCT DS VIR RESP RNA 4 TRGT: CPT | Performed by: NURSE PRACTITIONER

## 2025-01-01 PROCEDURE — 99232 SBSQ HOSP IP/OBS MODERATE 35: CPT | Performed by: INTERNAL MEDICINE

## 2025-01-01 PROCEDURE — 97530 THERAPEUTIC ACTIVITIES: CPT

## 2025-01-01 PROCEDURE — 83735 ASSAY OF MAGNESIUM: CPT | Performed by: NURSE PRACTITIONER

## 2025-01-01 PROCEDURE — 92610 EVALUATE SWALLOWING FUNCTION: CPT

## 2025-01-01 PROCEDURE — 85027 COMPLETE CBC AUTOMATED: CPT | Performed by: FAMILY MEDICINE

## 2025-01-01 PROCEDURE — 85027 COMPLETE CBC AUTOMATED: CPT | Performed by: NURSE PRACTITIONER

## 2025-01-01 PROCEDURE — 85730 THROMBOPLASTIN TIME PARTIAL: CPT | Performed by: FAMILY MEDICINE

## 2025-01-01 PROCEDURE — 83735 ASSAY OF MAGNESIUM: CPT | Performed by: INTERNAL MEDICINE

## 2025-01-01 PROCEDURE — 80048 BASIC METABOLIC PNL TOTAL CA: CPT | Performed by: INTERNAL MEDICINE

## 2025-01-01 PROCEDURE — 99239 HOSP IP/OBS DSCHRG MGMT >30: CPT | Performed by: FAMILY MEDICINE

## 2025-01-01 PROCEDURE — 94640 AIRWAY INHALATION TREATMENT: CPT

## 2025-01-01 PROCEDURE — 85730 THROMBOPLASTIN TIME PARTIAL: CPT | Performed by: THORACIC SURGERY (CARDIOTHORACIC VASCULAR SURGERY)

## 2025-01-01 PROCEDURE — 94760 N-INVAS EAR/PLS OXIMETRY 1: CPT

## 2025-01-01 PROCEDURE — 85007 BL SMEAR W/DIFF WBC COUNT: CPT | Performed by: FAMILY MEDICINE

## 2025-01-01 PROCEDURE — NC001 PR NO CHARGE: Performed by: INTERNAL MEDICINE

## 2025-01-01 PROCEDURE — 85025 COMPLETE CBC W/AUTO DIFF WBC: CPT | Performed by: INTERNAL MEDICINE

## 2025-01-01 PROCEDURE — 80053 COMPREHEN METABOLIC PANEL: CPT | Performed by: NURSE PRACTITIONER

## 2025-01-01 PROCEDURE — 94664 DEMO&/EVAL PT USE INHALER: CPT

## 2025-01-01 PROCEDURE — 70450 CT HEAD/BRAIN W/O DYE: CPT

## 2025-01-01 PROCEDURE — 99223 1ST HOSP IP/OBS HIGH 75: CPT | Performed by: INTERNAL MEDICINE

## 2025-01-01 PROCEDURE — 99233 SBSQ HOSP IP/OBS HIGH 50: CPT | Performed by: INTERNAL MEDICINE

## 2025-01-01 PROCEDURE — 99233 SBSQ HOSP IP/OBS HIGH 50: CPT | Performed by: FAMILY MEDICINE

## 2025-01-01 PROCEDURE — 74177 CT ABD & PELVIS W/CONTRAST: CPT

## 2025-01-01 RX ORDER — HYDROMORPHONE HCL/PF 1 MG/ML
0.5 SYRINGE (ML) INJECTION EVERY 4 HOURS PRN
Status: DISCONTINUED | OUTPATIENT
Start: 2025-01-01 | End: 2025-01-01 | Stop reason: HOSPADM

## 2025-01-01 RX ORDER — LEVALBUTEROL INHALATION SOLUTION 1.25 MG/3ML
1.25 SOLUTION RESPIRATORY (INHALATION)
Status: DISCONTINUED | OUTPATIENT
Start: 2025-01-01 | End: 2025-01-01

## 2025-01-01 RX ORDER — OXYCODONE HCL 5 MG/5 ML
5 SOLUTION, ORAL ORAL EVERY 4 HOURS
Refills: 0 | Status: DISCONTINUED | OUTPATIENT
Start: 2025-01-01 | End: 2025-01-01 | Stop reason: HOSPADM

## 2025-01-01 RX ORDER — HYDROMORPHONE HCL/PF 1 MG/ML
0.5 SYRINGE (ML) INJECTION
Status: DISCONTINUED | OUTPATIENT
Start: 2025-01-01 | End: 2025-01-01

## 2025-01-01 RX ORDER — ALBUMIN (HUMAN) 12.5 G/50ML
25 SOLUTION INTRAVENOUS EVERY 6 HOURS
Status: DISPENSED | OUTPATIENT
Start: 2025-01-01 | End: 2025-01-01

## 2025-01-01 RX ORDER — FUROSEMIDE 10 MG/ML
20 INJECTION INTRAMUSCULAR; INTRAVENOUS ONCE
Status: COMPLETED | OUTPATIENT
Start: 2025-01-01 | End: 2025-01-01

## 2025-01-01 RX ORDER — ALBUMIN (HUMAN) 12.5 G/50ML
25 SOLUTION INTRAVENOUS EVERY 6 HOURS
Status: COMPLETED | OUTPATIENT
Start: 2025-01-01 | End: 2025-01-01

## 2025-01-01 RX ORDER — OXYCODONE HCL 5 MG/5 ML
5 SOLUTION, ORAL ORAL EVERY 2 HOUR PRN
Refills: 0 | Status: DISCONTINUED | OUTPATIENT
Start: 2025-01-01 | End: 2025-01-01 | Stop reason: HOSPADM

## 2025-01-01 RX ORDER — BUMETANIDE 0.25 MG/ML
2 INJECTION, SOLUTION INTRAMUSCULAR; INTRAVENOUS ONCE
Status: COMPLETED | OUTPATIENT
Start: 2025-01-01 | End: 2025-01-01

## 2025-01-01 RX ORDER — GLYCOPYRROLATE 0.2 MG/ML
0.1 INJECTION INTRAMUSCULAR; INTRAVENOUS EVERY 4 HOURS PRN
Status: DISCONTINUED | OUTPATIENT
Start: 2025-01-01 | End: 2025-01-01 | Stop reason: HOSPADM

## 2025-01-01 RX ORDER — METOPROLOL TARTRATE 1 MG/ML
2.5 INJECTION, SOLUTION INTRAVENOUS EVERY 12 HOURS
Status: DISCONTINUED | OUTPATIENT
Start: 2025-01-01 | End: 2025-01-01

## 2025-01-01 RX ORDER — LORAZEPAM 2 MG/ML
1 INJECTION INTRAMUSCULAR
Status: DISCONTINUED | OUTPATIENT
Start: 2025-01-01 | End: 2025-01-01 | Stop reason: HOSPADM

## 2025-01-01 RX ORDER — HYDROMORPHONE HCL/PF 1 MG/ML
1 SYRINGE (ML) INJECTION
Status: DISCONTINUED | OUTPATIENT
Start: 2025-01-01 | End: 2025-01-01

## 2025-01-01 RX ORDER — INSULIN GLARGINE 100 [IU]/ML
10 INJECTION, SOLUTION SUBCUTANEOUS
Status: DISCONTINUED | OUTPATIENT
Start: 2025-01-01 | End: 2025-01-01

## 2025-01-01 RX ORDER — SODIUM CHLORIDE 9 MG/ML
50 INJECTION, SOLUTION INTRAVENOUS CONTINUOUS
Status: DISPENSED | OUTPATIENT
Start: 2025-01-01 | End: 2025-01-01

## 2025-01-01 RX ORDER — LORAZEPAM 2 MG/ML
0.5 INJECTION INTRAMUSCULAR EVERY 4 HOURS PRN
Status: DISCONTINUED | OUTPATIENT
Start: 2025-01-01 | End: 2025-01-01 | Stop reason: HOSPADM

## 2025-01-01 RX ORDER — HEPARIN SODIUM 10000 [USP'U]/100ML
3-20 INJECTION, SOLUTION INTRAVENOUS
Status: DISCONTINUED | OUTPATIENT
Start: 2025-01-01 | End: 2025-01-01

## 2025-01-01 RX ORDER — POTASSIUM CHLORIDE 1500 MG/1
40 TABLET, EXTENDED RELEASE ORAL ONCE
Status: COMPLETED | OUTPATIENT
Start: 2025-01-01 | End: 2025-01-01

## 2025-01-01 RX ORDER — ALBUTEROL SULFATE 0.83 MG/ML
2.5 SOLUTION RESPIRATORY (INHALATION) EVERY 4 HOURS PRN
Status: DISCONTINUED | OUTPATIENT
Start: 2025-01-01 | End: 2025-01-01

## 2025-01-01 RX ORDER — BUMETANIDE 0.25 MG/ML
1 INJECTION, SOLUTION INTRAMUSCULAR; INTRAVENOUS
Status: DISPENSED | OUTPATIENT
Start: 2025-01-01 | End: 2025-01-01

## 2025-01-01 RX ADMIN — INSULIN LISPRO 2 UNITS: 100 INJECTION, SOLUTION INTRAVENOUS; SUBCUTANEOUS at 11:02

## 2025-01-01 RX ADMIN — ENOXAPARIN SODIUM 120 MG: 120 INJECTION SUBCUTANEOUS at 22:28

## 2025-01-01 RX ADMIN — ASPIRIN 81 MG: 81 TABLET, COATED ORAL at 09:14

## 2025-01-01 RX ADMIN — ALBUMIN (HUMAN) 25 G: 0.25 INJECTION, SOLUTION INTRAVENOUS at 17:41

## 2025-01-01 RX ADMIN — INSULIN LISPRO 5 UNITS: 100 INJECTION, SOLUTION INTRAVENOUS; SUBCUTANEOUS at 16:58

## 2025-01-01 RX ADMIN — IOHEXOL 100 ML: 350 INJECTION, SOLUTION INTRAVENOUS at 11:31

## 2025-01-01 RX ADMIN — ENOXAPARIN SODIUM 120 MG: 120 INJECTION SUBCUTANEOUS at 21:10

## 2025-01-01 RX ADMIN — INSULIN LISPRO 1 UNITS: 100 INJECTION, SOLUTION INTRAVENOUS; SUBCUTANEOUS at 06:36

## 2025-01-01 RX ADMIN — Medication 40 MG: at 06:12

## 2025-01-01 RX ADMIN — OXYCODONE HYDROCHLORIDE 10 MG: 5 SOLUTION ORAL at 22:31

## 2025-01-01 RX ADMIN — CYANOCOBALAMIN TAB 500 MCG 1000 MCG: 500 TAB at 09:14

## 2025-01-01 RX ADMIN — INSULIN LISPRO 2 UNITS: 100 INJECTION, SOLUTION INTRAVENOUS; SUBCUTANEOUS at 07:45

## 2025-01-01 RX ADMIN — ALBUTEROL SULFATE 2.5 MG: 2.5 SOLUTION RESPIRATORY (INHALATION) at 17:00

## 2025-01-01 RX ADMIN — OXYCODONE HYDROCHLORIDE 5 MG: 5 SOLUTION ORAL at 23:26

## 2025-01-01 RX ADMIN — ONDANSETRON 4 MG: 2 INJECTION INTRAMUSCULAR; INTRAVENOUS at 12:28

## 2025-01-01 RX ADMIN — HYDROMORPHONE HYDROCHLORIDE 1 MG: 1 INJECTION, SOLUTION INTRAMUSCULAR; INTRAVENOUS; SUBCUTANEOUS at 01:49

## 2025-01-01 RX ADMIN — CYANOCOBALAMIN TAB 500 MCG 1000 MCG: 500 TAB at 08:20

## 2025-01-01 RX ADMIN — FOLIC ACID 1 MG: 1 TABLET ORAL at 09:14

## 2025-01-01 RX ADMIN — ACETAMINOPHEN 975 MG: 325 TABLET, FILM COATED ORAL at 14:37

## 2025-01-01 RX ADMIN — ACETAMINOPHEN 975 MG: 325 TABLET, FILM COATED ORAL at 22:28

## 2025-01-01 RX ADMIN — IOHEXOL 50 ML: 240 INJECTION, SOLUTION INTRATHECAL; INTRAVASCULAR; INTRAVENOUS; ORAL at 07:00

## 2025-01-01 RX ADMIN — HYDROMORPHONE HYDROCHLORIDE 0.5 MG: 1 INJECTION, SOLUTION INTRAMUSCULAR; INTRAVENOUS; SUBCUTANEOUS at 21:38

## 2025-01-01 RX ADMIN — OXYCODONE HYDROCHLORIDE 5 MG: 5 SOLUTION ORAL at 00:57

## 2025-01-01 RX ADMIN — ALBUMIN (HUMAN) 25 G: 0.25 INJECTION, SOLUTION INTRAVENOUS at 11:32

## 2025-01-01 RX ADMIN — ASPIRIN 81 MG: 81 TABLET, COATED ORAL at 08:20

## 2025-01-01 RX ADMIN — FOLIC ACID 1 MG: 1 TABLET ORAL at 08:20

## 2025-01-01 RX ADMIN — BUMETANIDE 2 MG: 0.25 INJECTION INTRAMUSCULAR; INTRAVENOUS at 11:24

## 2025-01-01 RX ADMIN — SENNOSIDES 17.6 MG: 8.8 LIQUID ORAL at 08:26

## 2025-01-01 RX ADMIN — OXYCODONE HYDROCHLORIDE 5 MG: 5 SOLUTION ORAL at 19:35

## 2025-01-01 RX ADMIN — HYDROMORPHONE HYDROCHLORIDE 0.5 MG: 1 INJECTION, SOLUTION INTRAMUSCULAR; INTRAVENOUS; SUBCUTANEOUS at 10:28

## 2025-01-01 RX ADMIN — BUMETANIDE 1 MG: 0.25 INJECTION INTRAMUSCULAR; INTRAVENOUS at 10:37

## 2025-01-01 RX ADMIN — SENNOSIDES 17.6 MG: 8.8 LIQUID ORAL at 17:41

## 2025-01-01 RX ADMIN — INSULIN LISPRO 1 UNITS: 100 INJECTION, SOLUTION INTRAVENOUS; SUBCUTANEOUS at 06:48

## 2025-01-01 RX ADMIN — CARVEDILOL 3.12 MG: 3.12 TABLET, FILM COATED ORAL at 09:17

## 2025-01-01 RX ADMIN — INSULIN GLARGINE 10 UNITS: 100 INJECTION, SOLUTION SUBCUTANEOUS at 21:38

## 2025-01-01 RX ADMIN — LEVALBUTEROL HYDROCHLORIDE 1.25 MG: 1.25 SOLUTION RESPIRATORY (INHALATION) at 08:20

## 2025-01-01 RX ADMIN — INSULIN GLARGINE 20 UNITS: 100 INJECTION, SOLUTION SUBCUTANEOUS at 21:10

## 2025-01-01 RX ADMIN — METOPROLOL TARTRATE 2.5 MG: 1 INJECTION, SOLUTION INTRAVENOUS at 05:41

## 2025-01-01 RX ADMIN — OXYCODONE HYDROCHLORIDE 5 MG: 5 SOLUTION ORAL at 04:44

## 2025-01-01 RX ADMIN — LEVALBUTEROL HYDROCHLORIDE 1.25 MG: 1.25 SOLUTION RESPIRATORY (INHALATION) at 14:49

## 2025-01-01 RX ADMIN — OXYCODONE HYDROCHLORIDE 5 MG: 5 SOLUTION ORAL at 16:36

## 2025-01-01 RX ADMIN — HYDROMORPHONE HYDROCHLORIDE 0.5 MG: 1 INJECTION, SOLUTION INTRAMUSCULAR; INTRAVENOUS; SUBCUTANEOUS at 12:27

## 2025-01-01 RX ADMIN — HEPARIN SODIUM 19.1 UNITS/KG/HR: 10000 INJECTION, SOLUTION INTRAVENOUS at 12:54

## 2025-01-01 RX ADMIN — LEVALBUTEROL HYDROCHLORIDE 1.25 MG: 1.25 SOLUTION RESPIRATORY (INHALATION) at 20:53

## 2025-01-01 RX ADMIN — HYDROMORPHONE HYDROCHLORIDE 0.5 MG: 1 INJECTION, SOLUTION INTRAMUSCULAR; INTRAVENOUS; SUBCUTANEOUS at 11:43

## 2025-01-01 RX ADMIN — IPRATROPIUM BROMIDE 0.5 MG: 0.5 SOLUTION RESPIRATORY (INHALATION) at 20:53

## 2025-01-01 RX ADMIN — ENOXAPARIN SODIUM 120 MG: 120 INJECTION SUBCUTANEOUS at 08:20

## 2025-01-01 RX ADMIN — ALBUMIN (HUMAN) 25 G: 0.25 INJECTION, SOLUTION INTRAVENOUS at 22:35

## 2025-01-01 RX ADMIN — GABAPENTIN 300 MG: 300 CAPSULE ORAL at 21:11

## 2025-01-01 RX ADMIN — ACETAMINOPHEN 975 MG: 325 TABLET, FILM COATED ORAL at 21:11

## 2025-01-01 RX ADMIN — FENTANYL 25 MCG: 25 PATCH TRANSDERMAL at 11:35

## 2025-01-01 RX ADMIN — ALBUMIN (HUMAN) 25 G: 0.25 INJECTION, SOLUTION INTRAVENOUS at 10:57

## 2025-01-01 RX ADMIN — POTASSIUM CHLORIDE 40 MEQ: 1500 TABLET, EXTENDED RELEASE ORAL at 11:33

## 2025-01-01 RX ADMIN — INSULIN LISPRO 5 UNITS: 100 INJECTION, SOLUTION INTRAVENOUS; SUBCUTANEOUS at 08:21

## 2025-01-01 RX ADMIN — CARVEDILOL 3.12 MG: 3.12 TABLET, FILM COATED ORAL at 16:58

## 2025-01-01 RX ADMIN — INSULIN LISPRO 1 UNITS: 100 INJECTION, SOLUTION INTRAVENOUS; SUBCUTANEOUS at 17:37

## 2025-01-01 RX ADMIN — OXYCODONE HYDROCHLORIDE 10 MG: 5 SOLUTION ORAL at 20:23

## 2025-01-01 RX ADMIN — CARVEDILOL 3.12 MG: 3.12 TABLET, FILM COATED ORAL at 17:41

## 2025-01-01 RX ADMIN — METOPROLOL TARTRATE 2.5 MG: 1 INJECTION, SOLUTION INTRAVENOUS at 17:21

## 2025-01-01 RX ADMIN — HYDROMORPHONE HYDROCHLORIDE 0.5 MG: 1 INJECTION, SOLUTION INTRAMUSCULAR; INTRAVENOUS; SUBCUTANEOUS at 17:26

## 2025-01-01 RX ADMIN — HYDROMORPHONE HYDROCHLORIDE 1 MG: 1 INJECTION, SOLUTION INTRAMUSCULAR; INTRAVENOUS; SUBCUTANEOUS at 10:10

## 2025-01-01 RX ADMIN — INSULIN GLARGINE 20 UNITS: 100 INJECTION, SOLUTION SUBCUTANEOUS at 22:29

## 2025-01-01 RX ADMIN — HYDROMORPHONE HYDROCHLORIDE 0.5 MG: 1 INJECTION, SOLUTION INTRAMUSCULAR; INTRAVENOUS; SUBCUTANEOUS at 05:24

## 2025-01-01 RX ADMIN — ENOXAPARIN SODIUM 120 MG: 120 INJECTION SUBCUTANEOUS at 09:14

## 2025-01-01 RX ADMIN — DICYCLOMINE HYDROCHLORIDE 10 MG: 10 CAPSULE ORAL at 18:24

## 2025-01-01 RX ADMIN — BUMETANIDE 2 MG: 0.25 INJECTION INTRAMUSCULAR; INTRAVENOUS at 14:37

## 2025-01-01 RX ADMIN — OXYCODONE HYDROCHLORIDE 10 MG: 5 SOLUTION ORAL at 09:11

## 2025-01-01 RX ADMIN — INSULIN LISPRO 5 UNITS: 100 INJECTION, SOLUTION INTRAVENOUS; SUBCUTANEOUS at 09:14

## 2025-01-01 RX ADMIN — ALBUMIN (HUMAN) 25 G: 0.25 INJECTION, SOLUTION INTRAVENOUS at 06:38

## 2025-01-01 RX ADMIN — IPRATROPIUM BROMIDE 0.5 MG: 0.5 SOLUTION RESPIRATORY (INHALATION) at 14:49

## 2025-01-01 RX ADMIN — INSULIN LISPRO 1 UNITS: 100 INJECTION, SOLUTION INTRAVENOUS; SUBCUTANEOUS at 16:59

## 2025-01-01 RX ADMIN — INSULIN LISPRO 5 UNITS: 100 INJECTION, SOLUTION INTRAVENOUS; SUBCUTANEOUS at 11:40

## 2025-01-01 RX ADMIN — ALBUMIN (HUMAN) 25 G: 0.25 INJECTION, SOLUTION INTRAVENOUS at 17:54

## 2025-01-01 RX ADMIN — GABAPENTIN 300 MG: 300 CAPSULE ORAL at 22:29

## 2025-01-01 RX ADMIN — INSULIN LISPRO 1 UNITS: 100 INJECTION, SOLUTION INTRAVENOUS; SUBCUTANEOUS at 11:39

## 2025-01-01 RX ADMIN — ACETAMINOPHEN 975 MG: 325 TABLET, FILM COATED ORAL at 06:12

## 2025-01-01 RX ADMIN — HEPARIN SODIUM 11.1 UNITS/KG/HR: 10000 INJECTION, SOLUTION INTRAVENOUS at 17:22

## 2025-01-01 RX ADMIN — SODIUM CHLORIDE 50 ML/HR: 0.9 INJECTION, SOLUTION INTRAVENOUS at 08:15

## 2025-01-01 RX ADMIN — LEVOTHYROXINE SODIUM 150 MCG: 75 TABLET ORAL at 06:12

## 2025-01-01 RX ADMIN — INSULIN LISPRO 5 UNITS: 100 INJECTION, SOLUTION INTRAVENOUS; SUBCUTANEOUS at 12:58

## 2025-01-01 RX ADMIN — CARVEDILOL 3.12 MG: 3.12 TABLET, FILM COATED ORAL at 08:26

## 2025-01-01 RX ADMIN — BUMETANIDE 2 MG: 0.25 INJECTION INTRAMUSCULAR; INTRAVENOUS at 13:18

## 2025-01-01 RX ADMIN — HYDROMORPHONE HYDROCHLORIDE 1 MG: 1 INJECTION, SOLUTION INTRAMUSCULAR; INTRAVENOUS; SUBCUTANEOUS at 05:41

## 2025-01-01 RX ADMIN — FUROSEMIDE 20 MG: 10 INJECTION, SOLUTION INTRAMUSCULAR; INTRAVENOUS at 22:06

## 2025-01-01 RX ADMIN — HYDROMORPHONE HYDROCHLORIDE 0.5 MG: 1 INJECTION, SOLUTION INTRAMUSCULAR; INTRAVENOUS; SUBCUTANEOUS at 00:45

## 2025-01-01 RX ADMIN — SENNOSIDES 17.6 MG: 8.8 LIQUID ORAL at 17:54

## 2025-01-01 RX ADMIN — IPRATROPIUM BROMIDE 0.5 MG: 0.5 SOLUTION RESPIRATORY (INHALATION) at 08:20

## 2025-01-01 RX ADMIN — ALBUMIN (HUMAN) 25 G: 0.25 INJECTION, SOLUTION INTRAVENOUS at 11:27

## 2025-01-01 RX ADMIN — ALBUTEROL SULFATE 2.5 MG: 2.5 SOLUTION RESPIRATORY (INHALATION) at 13:32

## 2025-01-01 RX ADMIN — OXYCODONE HYDROCHLORIDE 10 MG: 5 SOLUTION ORAL at 14:37

## 2025-01-01 RX ADMIN — ONDANSETRON 4 MG: 2 INJECTION INTRAMUSCULAR; INTRAVENOUS at 18:10

## 2025-01-01 NOTE — ASSESSMENT & PLAN NOTE
Appears to have had chronic hyponatremia dating as far back as 2022  Hyponatremia likely secondary to underlying malignancy plus some component of intravascular volume depletion with third spacing and increased ADH with pain and nausea  Sodium admission of 129 mEq  Sodium dropped down to 125 mEq on 12/20/2024  Most recent sodium at 130 mEq  stable however corrected for hyperglycemia around 132 mEq stable and improving  Status post Samsca 15 g on 12/27 and 30 g on 12/28 and 30 g on 12/29, tolvaptan 45 mg x 1 on 12/30  We will give albumin 25 g IV every 6 for additional 4 doses and Bumex 2 mg IV x 1 again today to help with intravascular volume shifts as clinically still appears to be hypervolemic however significantly improved from prior  Hold off on tolvaptan for now  Patient has a significant third spacing  Continue on fluid restriction 1.8 L  Repeat workup showing serum osmolality 278 urine sodium 61 urine osmolality 311

## 2025-01-01 NOTE — PROGRESS NOTES
Progress Note - Nephrology   Name: Lety Botello 64 y.o. female I MRN: [id number]  [facility] I Date of Admission: 12/9/2024   Date of Service: 1/2/2025 I Hospital Day: 24    64-year-old female with history of multiple comorbidities including obesity, chronic hyponatremia, diabetes, hypertension, IBS and endometrial cancer admitted with J-tube malfunction nephrology consulted for hyponatremia management.   Assessment & Plan  Hyponatremia  Appears to have had chronic hyponatremia dating as far back as 2022  Hyponatremia likely secondary to underlying malignancy plus some component of intravascular volume depletion with third spacing and increased ADH with pain and nausea  Sodium admission of 129 mEq  Sodium dropped down to 125 mEq on 12/20/2024  Most recent sodium at 130 mEq  stable however corrected for hyperglycemia around 132 mEq stable and improving  Status post Samsca 15 g on 12/27 and 30 g on 12/28 and 30 g on 12/29, tolvaptan 45 mg x 1 on 12/30  We will give albumin 25 g IV every 6 for additional 4 doses and Bumex 2 mg IV x 1 again today to help with intravascular volume shifts as clinically still appears to be hypervolemic however significantly improved from prior  Hold off on tolvaptan for now  Patient has a significant third spacing  Continue on fluid restriction 1.8 L  Repeat workup showing serum osmolality 278 urine sodium 61 urine osmolality 311  Encounter for gastrojejunal (GJ) tube placement issues  Recent small bowel obstruction secondary to 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  twice daily,   Give albumin 25 g x 2 doses today and Bumex 2 mg IV x 1 again today  Decrease Coreg down to 3.125 mg p.o. twice daily  Blood pressures improving  Endometrial cancer (HCC)  Follow-up with primary team and hematology   Hyperkalemia  Most recent potassium at 3.6 mEq stable and resolved  We will give K-Dur or 40 mill equivalents x 2 today as patient will be getting further diuretics today again  Check BMP in a.m.  TERRA (acute kidney injury) (HCC)  Baseline creatinine 0.7 to 0.9 mg/dL  Admitted with creatinine 0.39 mg/dL on 12/9/2024  Creatinine today 1.27 mg/dL stable for now  May need to except higher creatinine for euvolemia  Likely TERRA secondary to ischemic injury from hypotension plus intravascular volume depletion secondary to third spacing  Give albumin 25 g IV every 6 for 4 doses and Bumex 2 mg IV x 1 to help with intravascular volume shifts and diuresis.  Volume status stable and improving  Optimize hemodynamics  Monitor for renal recovery    I have reviewed the nephrology recommendations including give 4 doses of albumin and 1 dose of Bumex, with medicine team, and we are in agreement with renal plan including the information outlined above.     Subjective   Brief History of Admission -     Patient seen and examined in her room continues to have significant pain afebrile hemodynamically stable happy sodium and volume status is overall improving, urine output 1.1 L last 24 hours    Objective :  Temp:  [97.3 °F (36.3 °C)-99.6 °F (37.6 °C)] 99.6 °F (37.6 °C)  HR:  [77-93] 90  BP: (106-138)/(47-58) 128/52  Resp:  [14-17] 17  SpO2:  [92 %-98 %] 92 %  O2 Device: Nasal cannula  Nasal Cannula O2 Flow Rate (L/min):  [3 L/min] 3 L/min    Current Weight: Weight - Scale: 111 kg (245 lb 9.5 oz)  First Weight: Weight - Scale: 120 kg (263 lb 7.2 oz)  I/O         12/30 0701 12/31 0700 12/31 0701 01/01 0700 01/01 0701  01/02 0700    P.O. 180 120 600    Blood  400     NG/GT 90 140 420    IV Piggyback 200 100      Feedings 645 848     Total Intake(mL/kg) 1115 (9.9) 1608 (14.5) 1020 (9.2)    Urine (mL/kg/hr) 1350 (0.5) 2200 (0.8) 325 (0.4)    Other 4620      Stool 0  0    Total Output 5970 2200 325    Net -4855 -592 +695           Unmeasured Stool Occurrence 1 x  1 x          Physical Exam  Vitals and nursing note reviewed.   Constitutional:       General: She is not in acute distress.     Appearance: She is obese. She is ill-appearing. She is not toxic-appearing.   HENT:      Head: Normocephalic and atraumatic.      Mouth/Throat:      Pharynx: No oropharyngeal exudate.   Eyes:      General: No scleral icterus.  Cardiovascular:      Rate and Rhythm: Normal rate.   Pulmonary:      Effort: No respiratory distress.      Breath sounds: No wheezing.   Abdominal:      Palpations: Abdomen is soft.      Tenderness: There is abdominal tenderness.   Musculoskeletal:         General: Swelling present.      Cervical back: No rigidity.      Comments: No edema upper extremity +1 edema lower extremity significantly improved from prior   Skin:     Coloration: Skin is not jaundiced.   Neurological:      General: No focal deficit present.      Mental Status: She is alert.       ROS:  Constitutional:  No chills, no fever.   HENT:  No sore throat  Respiratory:  No cough, no hemoptysis, no wheezing.    Cardiovascular:  +leg swelling.   Gastrointestinal:  No constipation, no diarrhea.   Genitourinary:  No decreased urine output.  Musculoskeletal:  No back pain.   Neurological:  no dizziness, No headaches.   Psychiatric/Behavioral:  No agitation, no confusion.    Wounds: positive,   All other systems reviewed and are negative.        Medications:    Current Facility-Administered Medications:     acetaminophen (TYLENOL) tablet 975 mg, 975 mg, Oral, Q8H [facility], Pauly Grissom DO, 975 mg at 01/01/25 2111    albuterol (PROVENTIL HFA,VENTOLIN HFA) inhaler 2 puff, 2 puff, Inhalation, Q6H PRN, Kelvin Sheppard DO    aluminum-magnesium  hydroxide-simethicone (MAALOX) oral suspension 30 mL, 30 mL, Oral, Q6H PRN, Kelvin Sheppard DO    aspirin (ECOTRIN LOW STRENGTH) EC tablet 81 mg, 81 mg, Oral, Daily, Kelvin Sheppard DO, 81 mg at 01/02/25 0820    bisacodyl (DULCOLAX) rectal suppository 10 mg, 10 mg, Rectal, Daily PRN, Kelvin Sheppard DO    carvedilol (COREG) tablet 3.125 mg, 3.125 mg, Oral, BID With Meals, Sarai Mcknight MD, 3.125 mg at 01/02/25 0826    cyanocobalamin (VITAMIN B-12) tablet 1,000 mcg, 1,000 mcg, Oral, Daily, Kelvin Sheppard DO, 1,000 mcg at 01/02/25 0820    dicyclomine (BENTYL) capsule 10 mg, 10 mg, Oral, BID PRN, Kelvin Sheppard DO, 10 mg at 01/01/25 1824    enoxaparin (LOVENOX) subcutaneous injection 120 mg, 1 mg/kg, Subcutaneous, Q12H SAM, Paul Ríos MD, 120 mg at 01/02/25 0820    fentaNYL (DURAGESIC) 25 mcg/hr TD 72 hr patch 25 mcg, 25 mcg, Transdermal, Q72H, May Sherry Monterroso MD, 25 mcg at 12/30/24 1245    folic acid (FOLVITE) tablet 1 mg, 1 mg, Per G Tube, Daily, Paul Ríos MD, 1 mg at 01/02/25 0820    gabapentin (NEURONTIN) capsule 300 mg, 300 mg, Per G Tube, HS, Pauly Grissom, DO, 300 mg at 01/01/25 2111    HYDROmorphone (DILAUDID) injection 0.5 mg, 0.5 mg, Intravenous, Q3H PRN, May Sherry Monterroso MD, 0.5 mg at 01/01/25 1227    insulin glargine (LANTUS) subcutaneous injection 20 Units 0.2 mL, 20 Units, Subcutaneous, HS, Kelvin Sheppard DO, 20 Units at 01/01/25 2110    insulin lispro (HumALOG/ADMELOG) 100 units/mL subcutaneous injection 1-5 Units, 1-5 Units, Subcutaneous, TID AC, 1 Units at 01/02/25 0648 **AND** Fingerstick Glucose (POCT), , , TID AC, Kelvin Sheppard DO    insulin lispro (HumALOG/ADMELOG) 100 units/mL subcutaneous injection 5 Units, 5 Units, Subcutaneous, TID With Meals, Paul Ríos MD, 5 Units at 01/02/25 0821    iohexol (OMNIPAQUE) 240 MG/ML solution 50 mL, 50 mL, Oral, Once in imaging, Bj Jones MD    levothyroxine tablet 150 mcg, 150 mcg, Oral, Daily, Kelvin Sheppard DO, 150 mcg at 01/01/25 0612    naloxone  "(NARCAN) 0.04 mg/mL syringe 0.04 mg, 0.04 mg, Intravenous, Q1MIN PRN, JASON Cooley    nitroglycerin (NITROSTAT) SL tablet 0.4 mg, 0.4 mg, Sublingual, Q5 Min PRN, Kelvin Sheppard DO    omeprazole (PRILOSEC) suspension 2 mg/mL, 40 mg, Per G Tube, Daily, Kelvin Sheppard DO, 40 mg at 01/01/25 0612    ondansetron (ZOFRAN) injection 4 mg, 4 mg, Intravenous, Q6H PRN, Kelvin Sheppard DO, 4 mg at 01/01/25 1810    oxyCODONE (ROXICODONE) oral solution 5 mg, 5 mg, Oral, Q4H PRN, 5 mg at 01/02/25 0057 **OR** oxyCODONE (ROXICODONE) oral solution 10 mg, 10 mg, Oral, Q4H PRN, JASON Cooley, 10 mg at 01/01/25 2023    polyethylene glycol (MIRALAX) packet 17 g, 17 g, Oral, Daily, Kelvin Sheppard DO, 17 g at 12/29/24 0828    senna oral syrup 17.6 mg, 17.6 mg, Oral, BID, Kelvin Sheppard DO, 17.6 mg at 01/02/25 0826      Lab Results: I have reviewed the following results:  Results from last 7 days   Lab Units 01/02/25  0511 01/01/25  0247 12/31/24  0914 12/31/24  0320 12/30/24  0841 12/29/24  0456 12/28/24  0518 12/27/24  0357   WBC Thousand/uL 6.16 5.54 5.93 5.40  --   --   --   --    HEMOGLOBIN g/dL 7.4* 7.4* 6.8* 6.7*  --   --   --   --    HEMATOCRIT % 23.6* 23.0* 21.3* 21.3*  --   --   --   --    PLATELETS Thousands/uL 164 146* 151 152  --   --   --   --    POTASSIUM mmol/L 3.6 3.5  --  3.8 3.9 4.1 4.5 4.6   CHLORIDE mmol/L 86* 87*  --  86* 85* 85* 86* 86*   CO2 mmol/L 36* 36*  --  36* 35* 36* 35* 35*   BUN mg/dL 108* 91*  --  92* 88* 86* 81* 75*   CREATININE mg/dL 1.27 1.11  --  1.33* 1.31* 1.43* 1.26 1.06   CALCIUM mg/dL 8.8 8.6  --  8.4 8.5 8.8 8.8 8.9   MAGNESIUM mg/dL 2.5 2.5  --  2.5 2.4  --   --   --    PHOSPHORUS mg/dL 3.1 2.9  --  3.2 3.3  --   --   --    ALBUMIN g/dL  --   --   --   --   --   --   --  3.0*       Administrative Statements     Portions of the record may have been created with voice recognition software. Occasional wrong word or \"sound a like\" substitutions may have occurred due to the inherent " limitations of voice recognition software. Read the chart carefully and recognize, using context, where substitutions have occurred.If you have any questions, please contact the dictating provider.

## 2025-01-01 NOTE — ASSESSMENT & PLAN NOTE
Most recent potassium at 3.6 mEq stable and resolved  We will give K-Dur or 40 mill equivalents x 2 today as patient will be getting further diuretics today again  Check BMP in a.m.

## 2025-01-01 NOTE — ASSESSMENT & PLAN NOTE
Baseline creatinine 0.7 to 0.9 mg/dL  Admitted with creatinine 0.39 mg/dL on 12/9/2024  Creatinine today 1.27 mg/dL stable for now  May need to except higher creatinine for euvolemia  Likely TERRA secondary to ischemic injury from hypotension plus intravascular volume depletion secondary to third spacing  Give albumin 25 g IV every 6 for 4 doses and Bumex 2 mg IV x 1 to help with intravascular volume shifts and diuresis.  Volume status stable and improving  Optimize hemodynamics  Monitor for renal recovery

## 2025-01-01 NOTE — PROGRESS NOTES
" Pastoral Care Progress Note             01/01/25 1600   Clinical Encounter Type   Visited With Patient   Routine Visit Follow-up      check-in with pt who reported no interest in a visit. Pt depressed so  invited pt to give thought to \"what will give life meaning.\"  remains available.  "

## 2025-01-02 NOTE — ASSESSMENT & PLAN NOTE
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  Persistent third spacing with slight increase in moderate volume ascites, and no significant change in moderate size right and small left pleural effusions with bibasilar atelectasis. 2.  New percutaneous enteric tube with tip in the distal duodenum.\"  GI following. S/p EGD 12/10 with J-tube exchange, postop patient had worsening hypoxia required transfer to ICU and using BiPAP  Had concern for possible infection surrounding J-tube site, completed 7 days of ceftriaxone and vancomycin on 12/17  developed clogged J-tube,   Status post EGD on 12/13 with J-tube replacement   Patient with leakage around the PEG tube site.  GI reevaluated.  Okay to continue with tube feeding-current on antibiotics.  Culture was growing Klebsiella from 12/25- completed 5 days of Ancef on 12/29  Resumed on tube feeding  On clear liquid diet per speech/swallow therapist  G tube port is working sufficiently per gastroenterology  Tube feeding changed to Nepro due to hyperkalemia and hyperglycemia after discussion with nutrition\    "

## 2025-01-02 NOTE — ASSESSMENT & PLAN NOTE
Body mass index is 47.88 kg/m².  Encourage lifestyle modification and weight loss once acute issues improved/resolved

## 2025-01-02 NOTE — ASSESSMENT & PLAN NOTE
Received IV iron x 2  Vitamin B12 and folate deficiency  Continue vitamin B12 and folate supplement  Transfuse 1 unit of PRBCs 12/18 and 12/23  Continue to monitor hb closely, transfuse if less than 7  Hg is 6.8 on 12/31 transfuse 1 U RBC

## 2025-01-02 NOTE — PLAN OF CARE
Problem: OCCUPATIONAL THERAPY ADULT  Goal: Performs self-care activities at highest level of function for planned discharge setting.  See evaluation for individualized goals.  Description: Treatment Interventions: ADL retraining, Functional transfer training, UE strengthening/ROM, Endurance training, Cognitive reorientation, Patient/family training, Equipment evaluation/education, Compensatory technique education, Continued evaluation, Activityengagement, Energy conservation          See flowsheet documentation for full assessment, interventions and recommendations.   Outcome: Progressing  Note: Limitation: Decreased ADL status, Decreased UE strength, Decreased Safe judgement during ADL, Decreased cognition, Decreased endurance, Decreased self-care trans, Decreased high-level ADLs, Decreased sensation  Prognosis: Fair  Assessment: Pt seen on this date for OT session focusing on ADL retraining, cognitive reorientation, body mechanics, transfer retraining, increasing activity tolerance/endurance and EOB sitting to increase ability to participate in ADL/functional tasks. Pt was found in bed and was left in bed w/ all needs within reach, bed alarm on. Pt completed bed mob w max ax2, required max ax1 -2 to maintain eob sitting. Pt w strong lean to the R side, which was able to self correct to midline, but was unable to maintain midline. also with very poor trunk control/UE control- questionable behavioral aspect to session today. At times, she leans anteriorally w little to no core activation, requiring max ax2 to return to supine. rolled to r and L for linen mgmt. . Pt remains limited 2* decreased ADL/High-level ADL status, decreased activity tolerance/endurance, decreased cognition, decreased self-care trans, decreased safety awareness and insight to condition.   The patient's raw score on the -PAC Daily Activity Inpatient Short Form is 10. A raw score of less than 19 suggests the patient may benefit from discharge  to post-acute rehabilitation services. Please refer to the recommendation of the Occupational Therapist for safe discharge planning.  Recommending pt D/C to level 2  when medically stable. Pt will continue to benefit from acute OT services to meet goals.     Rehab Resource Intensity Level, OT: II (Moderate Resource Intensity)

## 2025-01-02 NOTE — ASSESSMENT & PLAN NOTE
Appears to have had chronic hyponatremia dating as far back as 2022  Hyponatremia likely secondary to underlying malignancy plus some component of intravascular volume depletion with third spacing and increased ADH with pain and nausea  Sodium admission of 129 mEq  Sodium dropped down to 125 mEq on 12/20/2024  Most recent sodium at 131 mEq  stable however corrected for hyperglycemia around 134 mEq stable and improving  Status post Samsca 15 g on 12/27 and 30 g on 12/28 and 30 g on 12/29, tolvaptan 45 mg x 1 on 12/30  Improved with albumin and IV Bumex hold off for today in light of patient getting CT with IV contrast.    Hold off on tolvaptan for now  Patient has a significant third spacing  Continue on fluid restriction 1.8 L  Repeat workup showing serum osmolality 278 urine sodium 61 urine osmolality 311

## 2025-01-02 NOTE — ASSESSMENT & PLAN NOTE
Continue with albumin assisted diuresis.  Nephrology managing diuretics  Continue to monitor daily weights, BMPs, telemetry  nephrology following, recommendations appreciated

## 2025-01-02 NOTE — SOCIAL WORK
Palliative LSW saw patient at the bedside today. LSW appreciates the opportunity to provider patient with inpatient emotional support and guidance while patient continues to receive medical attention from the medical team.    Topics discussed: SW met with Pt and attempted to provide support and encouragement. Pt did answer a few questions with one word answers. However, when asked if she wanted to talk further she responded that she did not. SW wished Pt a good day.    I have spent 15 minutes with Patient  today in which greater than 50% of this time was spent in counseling/coordination of care regarding .     LSW will continue to follow as requested by the medical team, patient, or family

## 2025-01-02 NOTE — PROGRESS NOTES
Progress Note - Nephrology   Name: Lety Botello 64 y.o. female I MRN: [id number]  [facility] I Date of Admission: 12/9/2024   Date of Service: 1/3/2025 I Hospital Day: 25    64-year-old female with history of multiple comorbidities including obesity, chronic hyponatremia, diabetes, hypertension, IBS and endometrial cancer admitted with J-tube malfunction nephrology consulted for hyponatremia management.   Assessment & Plan  Hyponatremia  Appears to have had chronic hyponatremia dating as far back as 2022  Hyponatremia likely secondary to underlying malignancy plus some component of intravascular volume depletion with third spacing and increased ADH with pain and nausea  Sodium admission of 129 mEq  Sodium dropped down to 125 mEq on 12/20/2024  Most recent sodium at 131 mEq  stable however corrected for hyperglycemia around 134 mEq stable and improving  Status post Samsca 15 g on 12/27 and 30 g on 12/28 and 30 g on 12/29, tolvaptan 45 mg x 1 on 12/30  Improved with albumin and IV Bumex hold off for today in light of patient getting CT with IV contrast.    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 twice daily,   Blood pressure stable for now  May give albumin as needed for worsening  hypotension  Hold off on diuretics for now in light of planned CT with IV contrast today  Endometrial cancer (HCC)  Follow-up with primary team and hematology   Hyperkalemia  Most recent potassium at 3.9 mEq stable and resolved  Monitor for now  Check BMP in a.m.  TERRA (acute kidney injury) (HCC)  Baseline creatinine 0.7 to 0.9 mg/dL  Admitted with creatinine 0.39 mg/dL on 12/9/2024  Creatinine today 1.21 mg/dL stable for now  May need to except higher creatinine for euvolemia  Likely TERRA secondary to ischemic injury from hypotension plus intravascular volume depletion secondary to third spacing   Volume status stable and improving  Hold off on diuretics today as patient for CT with IV contrast  Recommend saline 50 cc an hour for 2 hours pre and 4 hours post CT then DC  Discussed with patient risk for TERRA secondary to contrast exposure  Optimize hemodynamics  Monitor for renal recovery    I have reviewed the nephrology recommendations including recommend IV fluids pre and post CT with IV contrast hold diuretics for today, with medicine team, and we are in agreement with renal plan including the information outlined above.     Subjective   Brief History of Admission -     Patient seen and examined in her room for CT with IV contrast today no overt complaints of abdominal pain afebrile, urine output 1.2 L    Objective :  Temp:  [97.2 °F (36.2 °C)-97.5 °F (36.4 °C)] 97.4 °F (36.3 °C)  HR:  [86-92] 88  BP: (126-160)/(53-63) 160/63  Resp:  [16-18] 16  SpO2:  [94 %-96 %] 95 %  O2 Device: Nasal cannula  Nasal Cannula O2 Flow Rate (L/min):  [3 L/min] 3 L/min    Current Weight: Weight - Scale: 115 kg (254 lb 10.1 oz)  First Weight: Weight - Scale: 120 kg (263 lb 7.2 oz)  I/O         12/31 0701  01/01 0700 01/01 0701  01/02 0700 01/02 0701  01/03 0700    P.O. 120 840 0    Blood 400      NG/ 780 0    IV Piggyback 100 100     Feedings 848 1133 175    Total Intake(mL/kg) 1608 (14.5) 2853 (25.7) 175 (1.6)    Urine (mL/kg/hr)  2200 (0.8) 1100 (0.4) 365 (0.5)    Emesis/NG output  15 0    Other       Stool  0 0    Total Output 2200 1115 365    Net -592 +1738 -190           Unmeasured Stool Occurrence  2 x 0 x          Physical Exam  Vitals and nursing note reviewed.   Constitutional:       General: She is not in acute distress.     Appearance: Normal appearance. She is obese. She is ill-appearing. She is not toxic-appearing.   HENT:      Head: Normocephalic and atraumatic.      Mouth/Throat:      Pharynx: No oropharyngeal exudate.   Eyes:      General: No scleral icterus.  Cardiovascular:      Rate and Rhythm: Normal rate.   Pulmonary:      Effort: No respiratory distress.      Breath sounds: No wheezing.   Abdominal:      Tenderness: There is abdominal tenderness.   Musculoskeletal:         General: Swelling present.      Cervical back: No rigidity.   Skin:     Coloration: Skin is not jaundiced.   Neurological:      General: No focal deficit present.      Mental Status: She is alert and oriented to person, place, and time.   Psychiatric:         Mood and Affect: Mood normal.       ROS:  Constitutional:  No chills, no fever.   HENT:  No sore throat  Respiratory:  No cough, no hemoptysis, no wheezing.    Cardiovascular:  + leg swelling.   Gastrointestinal:  No constipation, no diarrhea.   Genitourinary:  No decreased urine output.  Musculoskeletal:  No back pain.   Neurological:  no dizziness, No headaches.   Psychiatric/Behavioral:  No agitation, no confusion.    Wounds: positive,   All other systems reviewed and are negative.        Medications:    Current Facility-Administered Medications:     acetaminophen (TYLENOL) tablet 975 mg, 975 mg, Oral, [facility]8H [facility], 975 mg at 01/02/25 2228    albuterol (PROVENTIL HFA,VENTOLIN HFA) inhaler 2 puff, 2 puff, Inhalation, Q6H PRN, Kelvin Sheppard DO    aluminum-magnesium hydroxide-simethicone (MAALOX) oral suspension 30 mL, 30 mL, Oral, Q6H PRN, Kelvin Sheppard DO    aspirin (ECOTRIN  LOW STRENGTH) EC tablet 81 mg, 81 mg, Oral, Daily, Kelvin Sheppard DO, 81 mg at 01/02/25 0820    bisacodyl (DULCOLAX) rectal suppository 10 mg, 10 mg, Rectal, Daily PRN, Kelvin Sheppard DO    carvedilol (COREG) tablet 3.125 mg, 3.125 mg, Oral, BID With Meals, Sarai Mcknight MD, 3.125 mg at 01/02/25 1658    cyanocobalamin (VITAMIN B-12) tablet 1,000 mcg, 1,000 mcg, Oral, Daily, Kelvin Sheppard DO, 1,000 mcg at 01/02/25 0820    dicyclomine (BENTYL) capsule 10 mg, 10 mg, Oral, BID PRN, Kelvin Sheppard DO, 10 mg at 01/01/25 1824    enoxaparin (LOVENOX) subcutaneous injection 120 mg, 1 mg/kg, Subcutaneous, Q12H SAM, Paul Ríos MD, 120 mg at 01/02/25 2228    fentaNYL (DURAGESIC) 25 mcg/hr TD 72 hr patch 25 mcg, 25 mcg, Transdermal, Q72H, May Sherry Monterroso MD, 25 mcg at 01/02/25 1135    folic acid (FOLVITE) tablet 1 mg, 1 mg, Per G Tube, Daily, Paul Ríos MD, 1 mg at 01/02/25 0820    gabapentin (NEURONTIN) capsule 300 mg, 300 mg, Per G Tube, HS, Pauly Grissom, DO, 300 mg at 01/02/25 2229    HYDROmorphone (DILAUDID) injection 0.5 mg, 0.5 mg, Intravenous, Q3H PRN, May Sherry Monterroso MD, 0.5 mg at 01/03/25 1028    insulin glargine (LANTUS) subcutaneous injection 20 Units 0.2 mL, 20 Units, Subcutaneous, HS, Kelvin Sheppard DO, 20 Units at 01/02/25 2229    insulin lispro (HumALOG/ADMELOG) 100 units/mL subcutaneous injection 1-5 Units, 1-5 Units, Subcutaneous, TID AC, 1 Units at 01/02/25 1659 **AND** Fingerstick Glucose (POCT), , , TID AC, Kelvin Sheppard DO    insulin lispro (HumALOG/ADMELOG) 100 units/mL subcutaneous injection 5 Units, 5 Units, Subcutaneous, TID With Meals, Paul Ríos MD, 5 Units at 01/02/25 1658    iohexol (OMNIPAQUE) 240 MG/ML solution 50 mL, 50 mL, Oral, Once in imaging, Bj Jones MD    iohexol (OMNIPAQUE) 240 MG/ML solution 50 mL, 50 mL, Oral, Once in imaging, Park Mitchell MD    levothyroxine tablet 150 mcg, 150 mcg, Oral, Daily, Kelvin Sheppard DO, 150 mcg at 01/01/25 0612    naloxone (NARCAN) 0.04  mg/mL syringe 0.04 mg, 0.04 mg, Intravenous, Q1MIN PRN, JASON Cooley    nitroglycerin (NITROSTAT) SL tablet 0.4 mg, 0.4 mg, Sublingual, Q5 Min PRN, Kelvin Sheppard DO    omeprazole (PRILOSEC) suspension 2 mg/mL, 40 mg, Per G Tube, Daily, Kelvin Sheppard DO, 40 mg at 01/01/25 0612    ondansetron (ZOFRAN) injection 4 mg, 4 mg, Intravenous, Q6H PRN, Kelvin Sheppard DO, 4 mg at 01/01/25 1810    oxyCODONE (ROXICODONE) oral solution 5 mg, 5 mg, Oral, Q4H PRN, 5 mg at 01/02/25 0057 **OR** oxyCODONE (ROXICODONE) oral solution 10 mg, 10 mg, Oral, Q4H PRN, JASON Cooley, 10 mg at 01/02/25 2231    polyethylene glycol (MIRALAX) packet 17 g, 17 g, Oral, Daily, Kelvin Sheppard DO, 17 g at 12/29/24 0828    senna oral syrup 17.6 mg, 17.6 mg, Oral, BID, Kelvin Sheppard DO, 17.6 mg at 01/02/25 1754    sodium chloride 0.9 % infusion, 50 mL/hr, Intravenous, Continuous, Park Mitchell MD, Last Rate: 50 mL/hr at 01/03/25 0815, 50 mL/hr at 01/03/25 0815      Lab Results: I have reviewed the following results:  Results from last 7 days   Lab Units 01/03/25  0629 01/02/25  0511 01/01/25  0247 12/31/24  0914 12/31/24  0320 12/30/24  0841 12/29/24  0456 12/28/24  0518   WBC Thousand/uL 6.88 6.16 5.54 5.93 5.40  --   --   --    HEMOGLOBIN g/dL 7.4* 7.4* 7.4* 6.8* 6.7*  --   --   --    HEMATOCRIT % 24.7* 23.6* 23.0* 21.3* 21.3*  --   --   --    PLATELETS Thousands/uL 177 164 146* 151 152  --   --   --    POTASSIUM mmol/L 3.9 3.6 3.5  --  3.8 3.9 4.1 4.5   CHLORIDE mmol/L 86* 86* 87*  --  86* 85* 85* 86*   CO2 mmol/L 38* 36* 36*  --  36* 35* 36* 35*   BUN mg/dL 113* 108* 91*  --  92* 88* 86* 81*   CREATININE mg/dL 1.21 1.27 1.11  --  1.33* 1.31* 1.43* 1.26   CALCIUM mg/dL 9.2 8.8 8.6  --  8.4 8.5 8.8 8.8   MAGNESIUM mg/dL  --  2.5 2.5  --  2.5 2.4  --   --    PHOSPHORUS mg/dL 3.7 3.1 2.9  --  3.2 3.3  --   --        Administrative Statements     Portions of the record may have been created with voice recognition software. Occasional  "wrong word or \"sound a like\" substitutions may have occurred due to the inherent limitations of voice recognition software. Read the chart carefully and recognize, using context, where substitutions have occurred.If you have any questions, please contact the dictating provider.  "

## 2025-01-02 NOTE — PLAN OF CARE
Problem: PHYSICAL THERAPY ADULT  Goal: Performs mobility at highest level of function for planned discharge setting.  See evaluation for individualized goals.  Description: Treatment/Interventions: ADL retraining, Functional transfer training, LE strengthening/ROM, Elevations, Therapeutic exercise, Endurance training, Cognitive reorientation, Patient/family training, Equipment eval/education, Bed mobility, Compensatory technique education, Continued evaluation, Spoke to nursing, Spoke to case management, Spoke to advanced practitioner, OT  Equipment Recommended: Wheelchair       See flowsheet documentation for full assessment, interventions and recommendations.  Outcome: Progressing  Note: Prognosis: Fair  Problem List: Decreased strength, Decreased range of motion, Decreased endurance, Impaired balance, Decreased mobility, Decreased coordination, Decreased cognition, Impaired judgement, Decreased safety awareness, Impaired sensation, Obesity, Decreased skin integrity, Pain  Assessment: PT initiated treatment session in order to assist patient in achieving goals to improve transfers, gait training, and overall activity tolerance. Patient demonstrated progress toward achieving functional mobility goals as evidenced by improving activity tolerance, and overall mobility. Patient pleasant, cooperative, and agreeable to today's treatment session. Patient received supine in bed. Patient is currently MaxAx2 bed mobility, transfers. Throughout treatment session patient required both verbal and tactile cuing to improve safety, efficiency, and mechanics of mobility in addition to hands on assistance for all aspects of functional mobility. Additionally, pt required increased time to execute specific mobility tasks with rest breaks in between secondary to gross fatigue and weakness. Patient demonstrated the ability to sit at EOB ~5 mins, requiring MaxAx1 and constant verbal cues to reduce R lateral lean and sit upright.  Patient unable to maintain static sitting balance at this time without MaxAx1. Attempted x5 STS at EOB, unable to maintain full stand at this time due to b/l knee buckling, generalized weakness, and lack of motivation. Required boost to return supine in bed, increased time for proper patient positioning and placement of wedges to reduce skin breakdown and improve patient comfort. Patient left supine in bed with alarm and all needs met. Patient will benefit from continued skilled physical therapy to address gait / balance dysfunction, decreased activity tolerance, and generalized weakness. The patients AM-PAC Basic Mobility Inpatient Short From Raw Score is 8 .  Based on AM-PAC scoring and patient presentation, PT currently recommending Level II (Moderate Resource Intensity). Please also refer to the recommendation of the Physical Therapist for safe discharge planning.  Barriers to Discharge: Inaccessible home environment, Decreased caregiver support     Rehab Resource Intensity Level, PT: II (Moderate Resource Intensity)    See flowsheet documentation for full assessment.

## 2025-01-02 NOTE — OCCUPATIONAL THERAPY NOTE
Occupational Therapy Progress Note     Patient Name: Lety Botello  Today's Date: 1/2/2025  Problem List  Principal Problem:    Encounter for gastrojejunal (GJ) tube placement issues  Active Problems:    Hypertension    Type 2 diabetes mellitus (HCC)    Endometrial cancer (HCC)    History of pulmonary embolism    Hypothyroidism    Gastroparesis    Morbid obesity (HCC)    Abdominal pain    Shingles    Palliative care encounter    Hyponatremia    Anemia    Acute respiratory failure with hypoxia (HCC)    Hypomagnesemia    NSVT (nonsustained ventricular tachycardia) (HCC)    Coronary artery disease involving native coronary artery    abdominal rash    Anasarca    Hyperkalemia    TERRA (acute kidney injury) (HCC)             01/02/25 0938   OT Last Visit   OT Visit Date 01/02/25   Note Type   Note Type Treatment   Pain Assessment   Pain Assessment Tool 0-10   Pain Score 10 - Worst Possible Pain   Patient's Stated Pain Goal No pain   Hospital Pain Intervention(s) Repositioned;Ambulation/increased activity;Emotional support   Restrictions/Precautions   Weight Bearing Precautions Per Order No   Other Precautions Cognitive;Bed Alarm;Chair Alarm;Multiple lines;Fall Risk  (GJ tube)   ADL   Where Assessed Supine, bed   Grooming Assistance 4  Minimal Assistance   Grooming Deficit Wash/dry face   Grooming Comments completed  while supine w max cues.   Bed Mobility   Rolling R 3  Moderate assistance   Additional items Assist x 2;Increased time required;Verbal cues;LE management   Rolling L 3  Moderate assistance   Additional items Assist x 2;Increased time required;Verbal cues;LE management   Supine to Sit 2  Maximal assistance   Additional items Assist x 2;Increased time required;LE management;Verbal cues   Sit to Supine 2  Maximal assistance   Additional items Assist x 2;Increased time required;Verbal cues;LE management   Additional Comments found and left supine in bed w all needs in reach and bed alarm on. pt required max ax2  to acheive eob sitting, and once eob required max  a x 1-2 to maintain upright posture. Pt w strong lean to the R side, which was able to self correct to midline, but was unable to maintain midline. also with very poor trunk control/UE control- questionable behavioral aspect to session today. At times, she leans anteriorally w little to no core activation, requiring max ax2  to return to supine. rolled to r and L for linen mgmt.   Transfers   Sit to Stand 1  Dependent   Additional items Assist x 2;Increased time required;Verbal cues   Stand to Sit 1  Dependent   Additional items Assist x 2;Increased time required;Verbal cues   Additional Comments b/l HHA. Pt w little to no effort in assisting w standing today.   Cognition   Overall Cognitive Status Impaired   Arousal/Participation Uncooperative;Persistent stimuli required   Attention Difficulty attending to directions   Orientation Level Oriented to person;Oriented to place;Oriented to time;Disoriented to situation   Memory Within functional limits;Decreased recall of precautions   Following Commands Follows one step commands inconsistently   Comments pt poorly responsive, at times appears to be completely withdrawn from session. this seemed to be exacerbated when asked to engage in bed mobility/sit EOB. Has very poor safety awareness and insight to condition as compared to previous session.   Activity Tolerance   Activity Tolerance Patient limited by fatigue   Medical Staff Made Aware DPT 2' pts med complexity, comorbidities and regression from baseline.   Assessment   Assessment Pt seen on this date for OT session focusing on ADL retraining, cognitive reorientation, body mechanics, transfer retraining, increasing activity tolerance/endurance and EOB sitting to increase ability to participate in ADL/functional tasks. Pt was found in bed and was left in bed w/ all needs within reach, bed alarm on. Pt completed bed mob w max ax2, required max ax1 -2 to maintain eob  sitting. Pt w strong lean to the R side, which was able to self correct to midline, but was unable to maintain midline. also with very poor trunk control/UE control- questionable behavioral aspect to session today. At times, she leans anteriorally w little to no core activation, requiring max ax2 to return to supine. rolled to r and L for linen mgmt. . Pt remains limited 2* decreased ADL/High-level ADL status, decreased activity tolerance/endurance, decreased cognition, decreased self-care trans, decreased safety awareness and insight to condition.   The patient's raw score on the AM-PAC Daily Activity Inpatient Short Form is 10. A raw score of less than 19 suggests the patient may benefit from discharge to post-acute rehabilitation services. Please refer to the recommendation of the Occupational Therapist for safe discharge planning.  Recommending pt D/C to level 2  when medically stable. Pt will continue to benefit from acute OT services to meet goals.   Plan   Treatment Interventions ADL retraining;Functional transfer training;Endurance training;Patient/family training;Equipment evaluation/education;Compensatory technique education;Activityengagement;Energy conservation   Goal Expiration Date 01/07/25   OT Treatment Day 4   OT Frequency 2-3x/wk   Discharge Recommendation   Rehab Resource Intensity Level, OT II (Moderate Resource Intensity)   AM-PAC Daily Activity Inpatient   Lower Body Dressing 1   Bathing 2   Toileting 1   Upper Body Dressing 2   Grooming 2   Eating 2   Daily Activity Raw Score 10   AM-PAC Applied Cognition Inpatient   Following a Speech/Presentation 2   Understanding Ordinary Conversation 3   Taking Medications 1   Remembering Where Things Are Placed or Put Away 1   Remembering List of 4-5 Errands 1   Taking Care of Complicated Tasks 1   Applied Cognition Raw Score 9   Applied Cognition Standardized Score 22.48   Modified Lucinda Scale   Modified Geneva Scale 4   End of Consult   Education  Provided Yes   Patient Position at End of Consult Supine;Bed/Chair alarm activated;All needs within reach   Nurse Communication Nurse aware of consult         OLIVERIO Rey, OTR/L

## 2025-01-02 NOTE — ASSESSMENT & PLAN NOTE
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 CLD as tolerated.   DC plan: to return home after her rehab stay with [facility] via [facility].

## 2025-01-02 NOTE — ASSESSMENT & PLAN NOTE
Baseline creatinine 0.7 to 0.9 mg/dL  Admitted with creatinine 0.39 mg/dL on 12/9/2024  Creatinine today 1.21 mg/dL stable for now  May need to except higher creatinine for euvolemia  Likely TERRA secondary to ischemic injury from hypotension plus intravascular volume depletion secondary to third spacing   Volume status stable and improving  Hold off on diuretics today as patient for CT with IV contrast  Recommend saline 50 cc an hour for 2 hours pre and 4 hours post CT then DC  Discussed with patient risk for TERRA secondary to contrast exposure  Optimize hemodynamics  Monitor for renal recovery

## 2025-01-02 NOTE — ASSESSMENT & PLAN NOTE
Lab Results   Component Value Date    HGBA1C 6.9 (H) 11/05/2024       Recent Labs     12/31/24 2003 01/01/25  0634 01/01/25  1059 01/01/25  1548   POCGLU 173* 151* 139 159*       Blood Sugar Average: Last 72 hrs:  (P) 140.875    Farxiga on hold while inpatient  Hyperglycemia secondary to steroids and tube feeding  Continue Lantus 20 Unightly, continue Humalog 5 U tid, adjust the doses as needed  Continue SSI

## 2025-01-02 NOTE — PLAN OF CARE
Problem: Prexisting or High Potential for Compromised Skin Integrity  Goal: Skin integrity is maintained or improved  Description: INTERVENTIONS:  - Identify patients at risk for skin breakdown  - Assess and monitor skin integrity  - Assess and monitor nutrition and hydration status  - Monitor labs   - Assess for incontinence   - Turn and reposition patient  - Assist with mobility/ambulation  - Relieve pressure over bony prominences  - Avoid friction and shearing  - Provide appropriate hygiene as needed including keeping skin clean and dry  - Evaluate need for skin moisturizer/barrier cream  - Collaborate with interdisciplinary team   - Patient/family teaching  - Consider wound care consult   Outcome: Not Progressing     Problem: PAIN - ADULT  Goal: Verbalizes/displays adequate comfort level or baseline comfort level  Description: Interventions:  - Encourage patient to monitor pain and request assistance  - Assess pain using appropriate pain scale  - Administer analgesics based on type and severity of pain and evaluate response  - Implement non-pharmacological measures as appropriate and evaluate response  - Consider cultural and social influences on pain and pain management  - Notify physician/advanced practitioner if interventions unsuccessful or patient reports new pain  Outcome: Not Progressing     Problem: INFECTION - ADULT  Goal: Absence or prevention of progression during hospitalization  Description: INTERVENTIONS:  - Assess and monitor for signs and symptoms of infection  - Monitor lab/diagnostic results  - Monitor all insertion sites, i.e. indwelling lines, tubes, and drains  - Monitor endotracheal if appropriate and nasal secretions for changes in amount and color  - [place] appropriate cooling/warming therapies per order  - Administer medications as ordered  - Instruct and encourage patient and family to use good hand hygiene technique  - Identify and instruct in appropriate isolation precautions for  identified infection/condition  Outcome: Not Progressing     Problem: SAFETY ADULT  Goal: Patient will remain free of falls  Description: INTERVENTIONS:  - Educate patient/family on patient safety including physical limitations  - Instruct patient to call for assistance with activity   - Consult OT/PT to assist with strengthening/mobility   - Keep Call bell within reach  - Keep bed low and locked with side rails adjusted as appropriate  - Keep care items and personal belongings within reach  - Initiate and maintain comfort rounds  - Make Fall Risk Sign visible to staff  - Offer Toileting every 2 Hours, in advance of need  - Initiate/Maintain bed/chair alarm  - Obtain necessary fall risk management equipment: nonskid footwear  - Apply yellow socks and bracelet for high fall risk patients  - Consider moving patient to room near nurses station  Outcome: Not Progressing  Goal: Maintain or return to baseline ADL function  Description: INTERVENTIONS:  -  Assess patient's ability to carry out ADLs; assess patient's baseline for ADL function and identify physical deficits which impact ability to perform ADLs (bathing, care of mouth/teeth, toileting, grooming, dressing, etc.)  - Assess/evaluate cause of self-care deficits   - Assess range of motion  - Assess patient's mobility; develop plan if impaired  - Assess patient's need for assistive devices and provide as appropriate  - Encourage maximum independence but intervene and supervise when necessary  - Involve family in performance of ADLs  - Assess for home care needs following discharge   - Consider OT consult to assist with ADL evaluation and planning for discharge  - Provide patient education as appropriate  Outcome: Not Progressing  Goal: Maintains/Returns to pre admission functional level  Description: INTERVENTIONS:  - Perform AM-PAC 6 Click Basic Mobility/ Daily Activity assessment daily.  - Set and communicate daily mobility goal to care team and  patient/family/caregiver.   - Collaborate with rehabilitation services on mobility goals if consulted  - Perform Range of Motion 3 times a day.  - Reposition patient every 2 hours.  - Dangle patient 3 times a day  - Stand patient 3 times a day  - Ambulate patient 3 times a day  - Out of bed to chair 3 times a day   - Out of bed for meals 3 times a day  - Out of bed for toileting  - Record patient progress and toleration of activity level   Outcome: Not Progressing     Problem: DISCHARGE PLANNING  Goal: Discharge to home or other facility with appropriate resources  Description: INTERVENTIONS:  - Identify barriers to discharge w/patient and caregiver  - Arrange for needed discharge resources and transportation as appropriate  - Identify discharge learning needs (meds, wound care, etc.)  - Arrange for interpretive services to assist at discharge as needed  - Refer to Case Management Department for coordinating discharge planning if the patient needs post-hospital services based on physician/advanced practitioner order or complex needs related to functional status, cognitive ability, or social support system  Outcome: Not Progressing     Problem: Knowledge Deficit  Goal: Patient/family/caregiver demonstrates understanding of disease process, treatment plan, medications, and discharge instructions  Description: Complete learning assessment and assess knowledge base.  Interventions:  - Provide teaching at level of understanding  - Provide teaching via preferred learning methods  Outcome: Not Progressing     Problem: Nutrition/Hydration-ADULT  Goal: Nutrient/Hydration intake appropriate for improving, restoring or maintaining nutritional needs  Description: Monitor and assess patient's nutrition/hydration status for malnutrition. Collaborate with interdisciplinary team and initiate plan and interventions as ordered.  Monitor patient's weight and dietary intake as ordered or per policy. Utilize nutrition screening tool and  intervene as necessary. Determine patient's food preferences and provide high-protein, high-caloric foods as appropriate.     INTERVENTIONS:  - Monitor oral intake, urinary output, labs, and treatment plans  - Assess nutrition and hydration status and recommend course of action  - Evaluate amount of meals eaten  - Assist patient with eating if necessary   - Allow adequate time for meals  - Recommend/ encourage appropriate diets, oral nutritional supplements, and vitamin/mineral supplements  - Order, calculate, and assess calorie counts as needed  - Recommend, monitor, and adjust tube feedings and TPN/PPN based on assessed needs  - Assess need for intravenous fluids  - Provide specific nutrition/hydration education as appropriate  - Include patient/family/caregiver in decisions related to nutrition  Outcome: Not Progressing     Problem: GASTROINTESTINAL - ADULT  Goal: Minimal or absence of nausea and/or vomiting  Description: INTERVENTIONS:  - Administer IV fluids if ordered to ensure adequate hydration  - Maintain NPO status until nausea and vomiting are resolved  - Nasogastric tube if ordered  - Administer ordered antiemetic medications as needed  - Provide nonpharmacologic comfort measures as appropriate  - Advance diet as tolerated, if ordered  - Consider nutrition services referral to assist patient with adequate nutrition and appropriate food choices  Outcome: Not Progressing  Goal: Maintains or returns to baseline bowel function  Description: INTERVENTIONS:  - Assess bowel function  - Encourage oral fluids to ensure adequate hydration  - Administer IV fluids if ordered to ensure adequate hydration  - Administer ordered medications as needed  - Encourage mobilization and activity  - Consider nutritional services referral to assist patient with adequate nutrition and appropriate food choices  Outcome: Not Progressing  Goal: Maintains adequate nutritional intake  Description: INTERVENTIONS:  - Monitor percentage  of each meal consumed  - Identify factors contributing to decreased intake, treat as appropriate  - Assist with meals as needed  - Monitor I&O, weight, and lab values if indicated  - Obtain nutrition services referral as needed  Outcome: Not Progressing  Goal: Oral mucous membranes remain intact  Description: INTERVENTIONS  - Assess oral mucosa and hygiene practices  - Implement preventative oral hygiene regimen  - Implement oral medicated treatments as ordered  - Initiate Nutrition services referral as needed  Outcome: Not Progressing     Problem: GENITOURINARY - ADULT  Goal: Maintains or returns to baseline urinary function  Description: INTERVENTIONS:  - Assess urinary function  - Encourage oral fluids to ensure adequate hydration if ordered  - Administer IV fluids as ordered to ensure adequate hydration  - Administer ordered medications as needed  - Offer frequent toileting  - Follow urinary retention protocol if ordered  Outcome: Not Progressing  Goal: Absence of urinary retention  Description: INTERVENTIONS:  - Assess patient’s ability to void and empty bladder  - Monitor I/O  - Bladder scan as needed  - Discuss with physician/AP medications to alleviate retention as needed  - Discuss catheterization for long term situations as appropriate  Outcome: Not Progressing

## 2025-01-02 NOTE — PROGRESS NOTES
Progress Note - Palliative Care   Name: Lety Botello 64 y.o. female I MRN: [id number]  Unit/Bed#: [facility] 511-01 I Date of Admission: 12/9/2024   Date of Service: 1/2/2025 I Hospital Day: 24     Assessment & Plan  Abdominal pain  S/p paracentesis for 4620 cc (12/30)    Current Multimodal pain regimen:  Fentanyl 25 mcg increased on 12/27   Schedule Tylenol 975 mg TID  Gabapentin 300 mg HS   OxyIR 5mg Q4hrs PRN for moderate pain   OxyIR 10mg Q4hrs PRN for severe pain   IV dilaudid to 0.5 mg q3H PRN for BT pain  Total 24 hour ome requirements: approximately 47.5 in addition to 25mcg/hr TDF  Failed therapies:  NA    Bowel regimen to prevent OIC: senna 17.6 mg bid, prn dulcolax suppository. Last bowel movement 12/29  Opioid agreement. Not on file, will need outpatient follow up  Palliative care encounter  Palliative Diagnosis: endometrial cancer    Goals:  Currently a level 2.    Will continue discussions regarding GOC as patient's clinical presentation evolves.    Decisional apparatus:  Patient does have capacity on exam today.  If capacity is lost, patient's substitute decision maker would default to Sharda Carver per AD on file  ER contacts:  Sharda Carver (Niece)  [phone] (Work Phone)   Advance Directive/Living Will/POLST: on file and reviewed    Social Support:  Patient's support system:   Supportive listening provided    Follow-up  We appreciate the opportunity to participate in this patient's care.   We will continue to follow while admitted.    Please do not hesitate to contact our on-call provider through [employer] Secure Chat or contact [phone] should there be an acute change or other symptom control concerns.    Endometrial cancer (HCC)  Recurrent endometrial cancer, initial diagnosis 2020  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 CLD as tolerated.   DC plan: to return home after her rehab stay with [facility] via [facility].     24 Hour History  Chart reviewed before visit.  No acute overnight events.  Patient seen sitting in chair position in the bed, in NAD.  Patient reports she is \"so-so\".  Pain is well-controlled at this time, infrequent use of PRNs.  Denies dyspnea.  Tolerating liquids without nausea or vomiting today.  Last bowel movement yesterday.  Unfortunately, her niece was unable to visit over the holiday.  She offers no additional questions or concerns at this time.    Medications    Current Facility-Administered Medications:     acetaminophen (TYLENOL) tablet 975 mg, 975 mg, Oral, Q8H Pauly VARGAS DO, 975 mg at 01/01/25 2111    albumin human (FLEXBUMIN) 25 % injection 25 g, 25 g, Intravenous, Q6H, Sarai Mcknight MD    albuterol (PROVENTIL HFA,VENTOLIN HFA) inhaler 2 puff, 2 puff, Inhalation, Q6H PRN, Kelvin Sheppard DO    aluminum-magnesium hydroxide-simethicone (MAALOX) oral suspension 30 mL, 30 mL, Oral, Q6H PRN, Kelvin Sheppard DO    aspirin (ECOTRIN LOW STRENGTH) EC tablet 81 mg, 81 mg, Oral, Daily, Kelvin Sheppard DO, 81 mg at 01/02/25 0820    bisacodyl (DULCOLAX) rectal suppository 10 mg, 10 mg, Rectal, Daily PRN, Kelvin Sheppard DO    bumetanide (BUMEX) injection 2 mg, 2 mg, Intravenous, Once, Sarai Mcknight MD    carvedilol (COREG) tablet 3.125 mg, 3.125 mg, Oral, BID With " Meals, Sarai Mcknight MD, 3.125 mg at 01/02/25 0826    cyanocobalamin (VITAMIN B-12) tablet 1,000 mcg, 1,000 mcg, Oral, Daily, Kelvin Sheppard DO, 1,000 mcg at 01/02/25 0820    dicyclomine (BENTYL) capsule 10 mg, 10 mg, Oral, BID PRN, Kelvin Sheppard DO, 10 mg at 01/01/25 1824    enoxaparin (LOVENOX) subcutaneous injection 120 mg, 1 mg/kg, Subcutaneous, Q12H SAM, Paul Ríos MD, 120 mg at 01/02/25 0820    fentaNYL (DURAGESIC) 25 mcg/hr TD 72 hr patch 25 mcg, 25 mcg, Transdermal, Q72H, May Sherry Monterroso MD, 25 mcg at 12/30/24 1245    folic acid (FOLVITE) tablet 1 mg, 1 mg, Per G Tube, Daily, Paul Ríos MD, 1 mg at 01/02/25 0820    gabapentin (NEURONTIN) capsule 300 mg, 300 mg, Per G Tube, HS, Pauly Grissom DO, 300 mg at 01/01/25 2111    HYDROmorphone (DILAUDID) injection 0.5 mg, 0.5 mg, Intravenous, Q3H PRN, May Sherry Monterroso MD, 0.5 mg at 01/01/25 1227    insulin glargine (LANTUS) subcutaneous injection 20 Units 0.2 mL, 20 Units, Subcutaneous, HS, Kelvin Sheppard DO, 20 Units at 01/01/25 2110    insulin lispro (HumALOG/ADMELOG) 100 units/mL subcutaneous injection 1-5 Units, 1-5 Units, Subcutaneous, TID AC, 1 Units at 01/02/25 0648 **AND** Fingerstick Glucose (POCT), , , TID AC, Kelvin Sheppard DO    insulin lispro (HumALOG/ADMELOG) 100 units/mL subcutaneous injection 5 Units, 5 Units, Subcutaneous, TID With Meals, Paul Ríos MD, 5 Units at 01/02/25 0821    iohexol (OMNIPAQUE) 240 MG/ML solution 50 mL, 50 mL, Oral, Once in imaging, Bj Jones MD    levothyroxine tablet 150 mcg, 150 mcg, Oral, Daily, Kelvin Sheppard DO, 150 mcg at 01/01/25 0612    naloxone (NARCAN) 0.04 mg/mL syringe 0.04 mg, 0.04 mg, Intravenous, Q1MIN PRN, JASON Cooley    nitroglycerin (NITROSTAT) SL tablet 0.4 mg, 0.4 mg, Sublingual, Q5 Min PRN, Kelvin Sheppard DO    omeprazole (PRILOSEC) suspension 2 mg/mL, 40 mg, Per G Tube, Daily, Kelvin Sheppard DO, 40 mg at 01/01/25 0612    ondansetron (ZOFRAN) injection 4 mg, 4 mg, Intravenous,  Q6H PRN, Kelvin Silver, DO, 4 mg at 01/01/25 1810    oxyCODONE (ROXICODONE) oral solution 5 mg, 5 mg, Oral, Q4H PRN, 5 mg at 01/02/25 0057 **OR** oxyCODONE (ROXICODONE) oral solution 10 mg, 10 mg, Oral, Q4H PRN, Viola Emanuel, EUNICENP, 10 mg at 01/01/25 2023    polyethylene glycol (MIRALAX) packet 17 g, 17 g, Oral, Daily, Kelvin Silver, DO, 17 g at 12/29/24 0828    potassium chloride (Klor-Con M20) CR tablet 40 mEq, 40 mEq, Oral, Once, Sarai Mcknight MD    senna oral syrup 17.6 mg, 17.6 mg, Oral, BID, Kelvin Silver, DO, 17.6 mg at 01/02/25 0826    Objective  /52 (BP Location: Left arm)   Pulse 90   Temp 99.6 °F (37.6 °C) (Oral)   Resp 17   Ht 5' (1.524 m)   Wt 111 kg (245 lb 9.5 oz)   SpO2 92%   BMI 47.96 kg/m²   Physical Exam  Vitals and nursing note reviewed.   Constitutional:       General: She is not in acute distress.     Appearance: She is obese. She is ill-appearing.      Interventions: Nasal cannula in place.   HENT:      Mouth/Throat:      Mouth: Mucous membranes are moist.   Cardiovascular:      Rate and Rhythm: Normal rate.   Pulmonary:      Effort: Pulmonary effort is normal. No respiratory distress.      Comments: 3L via nc  Abdominal:      Palpations: Abdomen is soft.      Tenderness: There is no abdominal tenderness. There is no guarding.   Musculoskeletal:      Right lower leg: Edema present.      Left lower leg: Edema present.   Skin:     General: Skin is warm.      Coloration: Skin is pale.   Neurological:      General: No focal deficit present.      Mental Status: She is alert.       Lab Results: I have personally reviewed pertinent labs.  Imaging Studies: I have personally reviewed pertinent reports.  EKG, Pathology, and Other Studies: I have personally reviewed pertinent reports.    Counseling / Coordination of Care  Total floor / unit time spent today 20 minutes. Greater than 50% of total time was spent with the patient and / or family counseling and / or coordinating of care. A  "description of the counseling / coordination of care: Chart reviewed,  provided medical updates, determined goals of care, discussed palliative care and symptom management, provided anticipatory guidance, determined competency and POA/HCA, determined social/family support, provided psychosocial support.     JASON Carias  Palliative & Supportive Care    Portions of this document may have been created using dictation software and as such some \"sound alike\" terms may have been generated by the system. Do not hesitate to contact me with any questions or clarifications.    "

## 2025-01-02 NOTE — PHYSICAL THERAPY NOTE
"                                                                                  PHYSICAL THERAPY NOTE          Patient Name: Lety Botello  Today's Date: 1/2/2025 01/02/25 0934   PT Last Visit   PT Visit Date 01/02/25   Note Type   Note Type Treatment   End of Consult   Patient Position at End of Consult Supine;All needs within reach;Bed/Chair alarm activated   Pain Assessment   Pain Assessment Tool 0-10   Pain Score 10 - Worst Possible Pain   Pain Location/Orientation Location: Abdomen   Pain Onset/Description Onset: Ongoing   Effect of Pain on Daily Activities limits mobility and participation   Patient's Stated Pain Goal No pain   Hospital Pain Intervention(s) Repositioned;Emotional support;Rest   Restrictions/Precautions   Weight Bearing Precautions Per Order No   Other Precautions Cognitive;Chair Alarm;Bed Alarm;Multiple lines;Fall Risk;Pain;O2;Contact/isolation   General   Chart Reviewed Yes   Response to Previous Treatment Patient reporting fatigue but able to participate.   Family/Caregiver Present No   Cognition   Overall Cognitive Status Impaired   Arousal/Participation Alert;Responsive   Attention Attends with cues to redirect   Orientation Level Oriented to person;Oriented to place;Oriented to time;Disoriented to situation   Memory Within functional limits   Following Commands Follows one step commands with increased time or repetition   Comments patient pleasant and cooperative, constant verbal cues to improve mobility and  safety awareness, requires constant encouragement to particicpate   Subjective   Subjective \"I cant do it\"   Bed Mobility   Rolling R 3  Moderate assistance   Additional items Assist x 2;HOB elevated;Bedrails;Increased time required;Verbal cues;LE management   Rolling L 3  Moderate assistance   Additional items Assist x 2;HOB elevated;Bedrails;Increased time required;Verbal cues;LE management   Supine to Sit 2  Maximal assistance   Additional items Assist x 2;HOB " elevated;Bedrails;Increased time required;Verbal cues;LE management   Sit to Supine 2  Maximal assistance   Additional items Assist x 2;HOB elevated;Bedrails;Increased time required;Verbal cues;LE management   Additional Comments patient found and left supine in bed with alarm and all needs met   Transfers   Sit to Stand 2  Maximal assistance   Additional items Assist x 2;Increased time required;Verbal cues   Stand to Sit 2  Maximal assistance   Additional items Assist x 2;Increased time required;Verbal cues   Additional Comments attempted x5 STS with b/l HHA, unable to achieve full stand at this time, +knee buckling   Ambulation/Elevation   Gait pattern Not appropriate;Not tested   Balance   Static Sitting Poor   Dynamic Sitting Poor -   Static Standing Poor -   Endurance Deficit   Endurance Deficit Yes   Endurance Deficit Description generalized weakness, fatigue, pain, lack of motivation to participate   Activity Tolerance   Activity Tolerance Patient limited by fatigue;Patient limited by pain   Medical Staff Made Aware OT Deanne;Co-treatment with occupational therapy was performed today. This patient's participation in therapy services was limited by decreased tolerance for activity and current medical status. For these reasons, co-treatment was performed so that patient could optimally participate in therapy services and maximize their rehabilitation during treatment time. PT and OT disciplines addressed separate goals of patient's individualized care plan.  ,restorative Felicia   Nurse Made Aware RN cleared and updated   Exercises   Neuro re-ed Patient demonstrated the ability to sit at EOB ~5 mins, requiring MaxAx1 and constant verbal cues to reduce R lateral lean and sit upright. Patient unable to maintain static sitting balance at this time without MaxAx1.   Assessment   Prognosis Fair   Problem List Decreased strength;Decreased range of motion;Decreased endurance;Impaired balance;Decreased mobility;Decreased  coordination;Decreased cognition;Impaired judgement;Decreased safety awareness;Impaired sensation;Obesity;Decreased skin integrity;Pain   Assessment PT initiated treatment session in order to assist patient in achieving goals to improve transfers, gait training, and overall activity tolerance. Patient demonstrated progress toward achieving functional mobility goals as evidenced by improving activity tolerance, and overall mobility. Patient pleasant, cooperative, and agreeable to today's treatment session. Patient received supine in bed. Patient is currently MaxAx2 bed mobility, transfers. Throughout treatment session patient required both verbal and tactile cuing to improve safety, efficiency, and mechanics of mobility in addition to hands on assistance for all aspects of functional mobility. Additionally, pt required increased time to execute specific mobility tasks with rest breaks in between secondary to gross fatigue and weakness. Patient demonstrated the ability to sit at EOB ~5 mins, requiring MaxAx1 and constant verbal cues to reduce R lateral lean and sit upright. Patient unable to maintain static sitting balance at this time without MaxAx1. Attempted x5 STS at EOB, unable to maintain full stand at this time due to b/l knee buckling, generalized weakness, and lack of motivation. Required boost to return supine in bed, increased time for proper patient positioning and placement of wedges to reduce skin breakdown and improve patient comfort. Patient left supine in bed with alarm and all needs met.       Patient will benefit from continued skilled physical therapy to address gait / balance dysfunction, decreased activity tolerance, and generalized weakness. The patients AM-PAC Basic Mobility Inpatient Short From Raw Score is 8 .  Based on AM-PAC scoring and patient presentation, PT currently recommending Level II (Moderate Resource Intensity). Please also refer to the recommendation of the Physical Therapist  for safe discharge planning.   Barriers to Discharge Inaccessible home environment;Decreased caregiver support   Goals   Patient Goals to reduce pain   STG Expiration Date 01/07/25   PT Treatment Day 6   Plan   Treatment/Interventions Functional transfer training;ADL retraining;LE strengthening/ROM;Therapeutic exercise;Endurance training;Patient/family training;Bed mobility;Gait training;Spoke to nursing;Spoke to case management;OT   Progress Slow progress, decreased activity tolerance   PT Frequency 2-3x/wk   Discharge Recommendation   Rehab Resource Intensity Level, PT II (Moderate Resource Intensity)   Equipment Recommended Walker;Wheelchair   AM-PAC Basic Mobility Inpatient   Turning in Flat Bed Without Bedrails 2   Lying on Back to Sitting on Edge of Flat Bed Without Bedrails 2   Moving Bed to Chair 1   Standing Up From Chair Using Arms 1   Walk in Room 1   Climb 3-5 Stairs With Railing 1   Basic Mobility Inpatient Raw Score 8   Turning Head Towards Sound 3   Follow Simple Instructions 3   Low Function Basic Mobility Raw Score  14   Low Function Basic Mobility Standardized Score  22.01   Michael Kelley Highest Level Of Mobility   JH-HLM Goal 3: Sit at edge of bed   -HLM Achieved 3: Sit at edge of bed   Education   Education Provided Mobility training   Patient Reinforcement needed   End of Consult   Patient Position at End of Consult Supine;Bed/Chair alarm activated;All needs within reach     Cristina Gabriel PT, DPT

## 2025-01-02 NOTE — ASSESSMENT & PLAN NOTE
Palliative Diagnosis: endometrial cancer    Goals:  Currently a level 2.    Will continue discussions regarding GOC as patient's clinical presentation evolves.    Decisional apparatus:  Patient does have capacity on exam today.  If capacity is lost, patient's substitute decision maker would default to Sharda Carver per AD on file  ER contacts:  Sharda Carver (Niece)  [phone] (Work Phone)   Advance Directive/Living Will/POLST: on file and reviewed    Social Support:  Patient's support system:   Supportive listening provided    Follow-up  We appreciate the opportunity to participate in this patient's care.   We will continue to follow while admitted.    Please do not hesitate to contact our on-call provider through [facility] Secure Chat or contact [phone] should there be an acute change or other symptom control concerns.

## 2025-01-02 NOTE — ASSESSMENT & PLAN NOTE
Hyponatremia likely multifactorial - due to volume overload  Coronado catheter placed due to urinary retention  Monitor closely with diuresis  Gradually improving with diuresis  nephrology following, recommendations appreciated

## 2025-01-02 NOTE — PROGRESS NOTES
"Progress Note - Hospitalist   Name: Lety Botello 64 y.o. 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  Persistent third spacing with slight increase in moderate volume ascites, and no significant change in moderate size right and small left pleural effusions with bibasilar atelectasis. 2.  New percutaneous enteric tube with tip in the distal duodenum.\"  GI following. S/p EGD 12/10 with J-tube exchange, postop patient had worsening hypoxia required transfer to ICU and using BiPAP  Had concern for possible infection surrounding J-tube site, completed 7 days of ceftriaxone and vancomycin on 12/17  developed clogged J-tube,   Status post EGD on 12/13 with J-tube replacement   Patient with leakage around the PEG tube site.  GI reevaluated.  Okay to continue with tube feeding-current on antibiotics.  Culture was growing Klebsiella from 12/25- completed 5 days of Ancef on 12/29  Resumed on tube feeding  On clear liquid diet per speech/swallow therapist  G tube port is working sufficiently per gastroenterology  Tube feeding changed to Nepro due to hyperkalemia and hyperglycemia after discussion with nutrition\    Abdominal pain  Patient with recent hospitalization due to small bowel obstruction s/p exploratory laparotomy with small bowel resection on 11/22/2024  presented with worsening abdominal pain and distention  Abdominal pelvis CT scan, negative for obstruction, stable bilateral pleural effusion and anasarca  Continue supportive care  Coronado catheter " placed due to retention  S/p paracentesis 12/18 for ascites 5.5 L  Monitor Bms  Palliative care is following and adjusting analgesics  CT scan reviewed-consulted IR for paracentesis-status post paracentesis 12/30.  4.6 L out, culture- no growth up to date.  PMN count is not high enough to suspect SBP  Anemia  Received IV iron x 2  Vitamin B12 and folate deficiency  Continue vitamin B12 and folate supplement  Transfuse 1 unit of PRBCs 12/18 and 12/23  Continue to monitor hb closely, transfuse if less than 7  Hg is 6.8 on 12/31 transfuse 1 U RBC    Hypertension  Monitor blood pressures  Continue carvedilol, Norvasc was added  Avoid hypotension  Blood pressure is on the lower side.  Decrease in the dose of Coreg.  Patient received albumin after the paracentesis  Acute respiratory failure with hypoxia (HCC)  resolved  Patient had worsening hypoxia post EGD on 12/10. Was satting mid-80s on 10L, Was given nebs, Lasix and subsequently placed on BiPAP and upgraded to critical care for further treatment  Currently on room air  NSVT (nonsustained ventricular tachycardia) (HCC)  25 run of nonsustained V. tach noted on telemetry, patient was asymptomatic  Cardiology consulted  Cardiac echo with normal EF and mild aortic stenosis  Coreg dose was increased to 25 mg twice daily  Monitor  Cardiology signed off-due to low hypertension decrease the Coreg to 12.5 and monitor    Hypothyroidism  Elevated TSH  Normal free T4 at 0.65  Patient was not receiving levothyroxine due to  PEG tube malfunctioning   Increase levothyroxine dose to 150 mcg daily  Repeat thyroid study in 4 to 6 weeks  abdominal rash  Located on the right lower abdomen with oozing and bleeding  Seen by dermatology who performed a biopsy  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, consistent with gynecologic/Mullerian origin   Discontinue valtrex  Outpatient follow up with medical oncology when stable  Hyponatremia  Hyponatremia likely multifactorial - due to  volume overload  Coronado catheter placed due to urinary retention  Monitor closely with diuresis  Gradually improving with diuresis  nephrology following, recommendations appreciated  Coronary artery disease involving native coronary artery  History of MI in 2016 status post KARY  Stable  Type 2 diabetes mellitus (HCC)  Lab Results   Component Value Date    HGBA1C 6.9 (H) 11/05/2024       Recent Labs     12/31/24 2003 01/01/25  0634 01/01/25  1059 01/01/25  1548   POCGLU 173* 151* 139 159*       Blood Sugar Average: Last 72 hrs:  (P) 140.875    Farxiga on hold while inpatient  Hyperglycemia secondary to steroids and tube feeding  Continue Lantus 20 Unightly, continue Humalog 5 U tid, adjust the doses as needed  Continue SSI  Endometrial cancer (HCC)  History of endometrial cancer noted  History of pulmonary embolism  Continue Eliquis 5 mg twice daily  Gastroparesis    With PEG-J as above  GI following  Morbid obesity (HCC)  Body mass index is 47.88 kg/m².  Encourage lifestyle modification and weight loss once acute issues improved/resolved  Anasarca  Continue with albumin assisted diuresis.  Nephrology managing diuretics  Continue to monitor daily weights, BMPs, telemetry  nephrology following, recommendations appreciated  Hyperkalemia    TERRA (acute kidney injury) (HCC)      VTE Pharmacologic Prophylaxis: VTE Score: 7 High Risk (Score >/= 5) - Pharmacological DVT Prophylaxis Ordered: enoxaparin (Lovenox). Sequential Compression Devices Ordered.    Mobility:   Basic Mobility Inpatient Raw Score: 6  JH-HLM Goal: 2: Bed activities/Dependent transfer  JH-HLM Achieved: 2: Bed activities/Dependent transfer  JH-HLM Goal achieved. Continue to encourage appropriate mobility.    Patient Centered Rounds: I performed bedside rounds with nursing staff today.   Discussions with Specialists or Other Care Team Provider: nephrology, case management    Education and Discussions with Family / Patient: Patient declined call to contact  person.     Current Length of Stay: 23 day(s)  Current Patient Status: Inpatient   Certification Statement: The patient will continue to require additional inpatient hospital stay due to management as above  Discharge Plan:  pending clinical improvement    Code Status: Level 2 - DNAR: but accepts endotracheal intubation    Subjective   Patient seen and examined. She she reports not feeling well, in general very fatigue    Objective :  Temp:  [97.3 °F (36.3 °C)-98.3 °F (36.8 °C)] 97.3 °F (36.3 °C)  HR:  [77-88] 84  BP: (106-134)/(43-55) 117/53  Resp:  [14-16] 14  SpO2:  [95 %-98 %] 98 %  O2 Device: None (Room air)  Nasal Cannula O2 Flow Rate (L/min):  [3 L/min] 3 L/min    Body mass index is 47.88 kg/m².     Input and Output Summary (last 24 hours):     Intake/Output Summary (Last 24 hours) at 1/1/2025 1914  Last data filed at 1/1/2025 1816  Gross per 24 hour   Intake 2270 ml   Output 1090 ml   Net 1180 ml       Physical Exam  Constitutional:       Appearance: She is obese.   HENT:      Head: Normocephalic and atraumatic.      Nose: Nose normal.   Eyes:      General: No scleral icterus.  Cardiovascular:      Rate and Rhythm: Normal rate and regular rhythm.      Heart sounds: No murmur heard.  Pulmonary:      Effort: Pulmonary effort is normal. No respiratory distress.   Abdominal:      General: There is distension.      Tenderness: There is abdominal tenderness.      Comments: Abdominal wall wounds covered in dressing   Neurological:      Mental Status: She is alert.           Lines/Drains:  Lines/Drains/Airways       Active Status       Name Placement date Placement time Site Days    PICC Line 12/18/24 12/18/24  0922  --  14    Gastrostomy/Enterostomy PEG- Jejunostomy 20 Fr. LUQ 12/03/24  1553  LUQ  29    Urethral Catheter Latex 16 Fr. 12/26/24  2335  Latex  5                  Urinary Catheter:  Goal for removal: Voiding trial when ambulation improves         Central Line:  Goal for removal: Will discontinue when  peripheral access obtained.                Lab Results: I have reviewed the following results:   Results from last 7 days   Lab Units 01/01/25  0247   WBC Thousand/uL 5.54   HEMOGLOBIN g/dL 7.4*   HEMATOCRIT % 23.0*   PLATELETS Thousands/uL 146*   SEGS PCT % 79*   LYMPHO PCT % 7*   MONO PCT % 9   EOS PCT % 3     Results from last 7 days   Lab Units 01/01/25  0247 12/28/24  0518 12/27/24  0357   SODIUM mmol/L 131*   < > 127*   POTASSIUM mmol/L 3.5   < > 4.6   CHLORIDE mmol/L 87*   < > 86*   CO2 mmol/L 36*   < > 35*   BUN mg/dL 91*   < > 75*   CREATININE mg/dL 1.11   < > 1.06   ANION GAP mmol/L 8   < > 6   CALCIUM mg/dL 8.6   < > 8.9   ALBUMIN g/dL  --   --  3.0*   TOTAL BILIRUBIN mg/dL  --   --  0.31   ALK PHOS U/L  --   --  70   ALT U/L  --   --  5*   AST U/L  --   --  8*   GLUCOSE RANDOM mg/dL 176*   < > 160*    < > = values in this interval not displayed.         Results from last 7 days   Lab Units 01/01/25  1548 01/01/25  1059 01/01/25  0634 12/31/24 2003 12/31/24  1540 12/31/24  1042 12/31/24  0624 12/30/24  2050 12/30/24  1555 12/30/24  1245 12/30/24  1028 12/30/24  0734   POC GLUCOSE mg/dl 159* 139 151* 173* 145* 158* 137 153* 121 151* 165* 161*               Recent Cultures (last 7 days):   Results from last 7 days   Lab Units 12/30/24  1132   GRAM STAIN RESULT  1+ Polys  No organisms seen   BODY FLUID CULTURE, STERILE  No growth             Last 24 Hours Medication List:     Current Facility-Administered Medications:     acetaminophen (TYLENOL) tablet 975 mg, Q8H SAM    albuterol (PROVENTIL HFA,VENTOLIN HFA) inhaler 2 puff, Q6H PRN    aluminum-magnesium hydroxide-simethicone (MAALOX) oral suspension 30 mL, Q6H PRN    aspirin (ECOTRIN LOW STRENGTH) EC tablet 81 mg, Daily    bisacodyl (DULCOLAX) rectal suppository 10 mg, Daily PRN    carvedilol (COREG) tablet 3.125 mg, BID With Meals    cyanocobalamin (VITAMIN B-12) tablet 1,000 mcg, Daily    dicyclomine (BENTYL) capsule 10 mg, BID PRN    enoxaparin  (LOVENOX) subcutaneous injection 120 mg, Q12H SAM    fentaNYL (DURAGESIC) 25 mcg/hr TD 72 hr patch 25 mcg, Q72H    folic acid (FOLVITE) tablet 1 mg, Daily    gabapentin (NEURONTIN) capsule 300 mg, HS    HYDROmorphone (DILAUDID) injection 0.5 mg, Q3H PRN    insulin glargine (LANTUS) subcutaneous injection 20 Units 0.2 mL, HS    insulin lispro (HumALOG/ADMELOG) 100 units/mL subcutaneous injection 1-5 Units, TID AC **AND** Fingerstick Glucose (POCT), TID AC    insulin lispro (HumALOG/ADMELOG) 100 units/mL subcutaneous injection 5 Units, TID With Meals    iohexol (OMNIPAQUE) 240 MG/ML solution 50 mL, Once in imaging    levothyroxine tablet 150 mcg, Daily    naloxone (NARCAN) 0.04 mg/mL syringe 0.04 mg, Q1MIN PRN    nitroglycerin (NITROSTAT) SL tablet 0.4 mg, Q5 Min PRN    omeprazole (PRILOSEC) suspension 2 mg/mL, Daily    ondansetron (ZOFRAN) injection 4 mg, Q6H PRN    oxyCODONE (ROXICODONE) oral solution 5 mg, Q4H PRN **OR** oxyCODONE (ROXICODONE) oral solution 10 mg, Q4H PRN    polyethylene glycol (MIRALAX) packet 17 g, Daily    senna oral syrup 17.6 mg, BID    Administrative Statements   Today, Patient Was Seen By: Park Mitchell MD      **Please Note: This note may have been constructed using a voice recognition system.**

## 2025-01-02 NOTE — ASSESSMENT & PLAN NOTE
S/p paracentesis for 4620 cc (12/30)    Current Multimodal pain regimen:  Fentanyl 25 mcg increased on 12/27   Schedule Tylenol 975 mg TID  Gabapentin 300 mg HS   OxyIR 5mg Q4hrs PRN for moderate pain   OxyIR 10mg Q4hrs PRN for severe pain   IV dilaudid to 0.5 mg q3H PRN for BT pain  Total 24 hour ome requirements: approximately 47.5 in addition to 25mcg/hr TDF  Failed therapies:  NA    Bowel regimen to prevent OIC: senna 17.6 mg bid, prn dulcolax suppository. Last bowel movement 12/29  Opioid agreement. Not on file, will need outpatient follow up

## 2025-01-02 NOTE — ASSESSMENT & PLAN NOTE
Currently on Coreg 3.125 mg p.o. twice daily,   Blood pressure stable for now  May give albumin as needed for worsening hypotension  Hold off on diuretics for now in light of planned CT with IV contrast today

## 2025-01-03 PROBLEM — J90 PLEURAL EFFUSION: Status: ACTIVE | Noted: 2025-01-01

## 2025-01-03 NOTE — ASSESSMENT & PLAN NOTE
Patient had worsening hypoxia post EGD on 12/10. Was satting mid-80s on 10L, Was given nebs, Lasix and subsequently placed on BiPAP and upgraded to critical care for further treatment  Has been on room air  Desaturated to 70s on 1/3/25 during CAT scan, required supplementation O2 up to 15 L, subsequently weaned down to 2 L  CT from 1/3/25 demonstrated bilateral pleural effusion, worse on the right, pulmonology consulted for further recommendations

## 2025-01-03 NOTE — ASSESSMENT & PLAN NOTE
Has an acute hypoxic respiratory failure likely due to volume overload, pleural effusions and deconditioning  Has been on 2.5-3 LPM  Not on oxygen previously

## 2025-01-03 NOTE — ASSESSMENT & PLAN NOTE
Body mass index is 49.73 kg/m².  Encourage lifestyle modification and weight loss once acute issues improved/resolved

## 2025-01-03 NOTE — ASSESSMENT & PLAN NOTE
Despite Creatinine being in a normal range, patient's baseline is much lower than the current creatinine  Also BUN has been trending up as a sign of worsening kidney function  Management per Neprhology

## 2025-01-03 NOTE — ASSESSMENT & PLAN NOTE
History of PE and DVT in May 2024  Has been on Eliquis 5 mg twice daily, switched to Lovenox during this admission  Given rising creatinine, will switch to heparin drip for now, continue to monitor hemoglobin closely (1/3/25)

## 2025-01-03 NOTE — ASSESSMENT & PLAN NOTE
Lab Results   Component Value Date    HGBA1C 6.9 (H) 11/05/2024       Recent Labs     01/02/25  1602 01/02/25 2059 01/03/25  0628 01/03/25  1052   POCGLU 168* 190* 98 225*       Blood Sugar Average: Last 72 hrs:  (P) 166.6568963802231440    Farxiga on hold while inpatient  Hyperglycemia secondary to steroids and tube feeding  Continue Lantus 20 Unightly, continue Humalog 5 U tid, adjust the doses as needed  Continue SSI

## 2025-01-03 NOTE — SOCIAL WORK
"Palliative LSW saw patient at the bedside today. LSW appreciates the opportunity to provider patient with inpatient emotional support and guidance while patient continues to receive medical attention from the medical team.    Topics discussed:  pain    Areas that need follow-up:  son's contact information  Resources given: none  Others present:  none    LSW met with pt in her room; she presented in bed, yelling \"HELP\".  LSW introduced self and asked what pt needed.  Pt reported she was in pain and needs to get up and out.  LSW reported pt concerns to nurse.    Pt reports pain all over her entire abdomen.  She states she has had no relief.  She reports she is unable to sleep.  LSW advised that nurse was notified of pain.     Pt reports her son Kaan has come to visit and she would like him to be in her contact information.  LSW called pt's niece to obtain information as pt did not have his number.  VM left for niece. I have spent 20 minutes with Patient  today in which greater than 50% of this time was spent in counseling/coordination of care regarding Counseling / Coordination of care.     LSW will continue to follow as requested by the medical team, patient, or family.    "

## 2025-01-03 NOTE — ASSESSMENT & PLAN NOTE
Lab Results   Component Value Date    HGBA1C 6.9 (H) 11/05/2024       Recent Labs     01/02/25  1602 01/02/25 2059 01/03/25  0628 01/03/25  1052   POCGLU 168* 190* 98 225*       Blood Sugar Average: Last 72 hrs:  (P) [id number]

## 2025-01-03 NOTE — PROGRESS NOTES
"Progress Note - Hospitalist   Name: Lety Botello 64 y.o. 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  Persistent third spacing with slight increase in moderate volume ascites, and no significant change in moderate size right and small left pleural effusions with bibasilar atelectasis. 2.  New percutaneous enteric tube with tip in the distal duodenum.\"  GI following. S/p EGD 12/10 with J-tube exchange, postop patient had worsening hypoxia required transfer to ICU and using BiPAP  Had concern for possible infection surrounding J-tube site, completed 7 days of ceftriaxone and vancomycin on 12/17  developed clogged J-tube,   Status post EGD on 12/13 with J-tube replacement   Patient with leakage around the PEG tube site.  GI reevaluated.  Okay to continue with tube feeding-current on antibiotics.  Culture was growing Klebsiella from 12/25- completed 5 days of Ancef on 12/29  Resumed on tube feeding  On clear liquid diet per speech/swallow therapist  G tube port is working sufficiently per gastroenterology  Tube feeding changed to Nepro due to hyperkalemia and hyperglycemia after discussion with nutrition  Patient seems more lethargic today, reports intermittent double vision, follows commands , no focal neurodeficit noted ; will do CT head to rule out intracranial process, avoid contrast due to TERRA    Abdominal pain  Patient with recent hospitalization due to small bowel obstruction s/p exploratory laparotomy with small bowel resection on " 11/22/2024  presented with worsening abdominal pain and distention  Abdominal pelvis CT scan, negative for obstruction, stable bilateral pleural effusion and anasarca  Continue supportive care  Coronado catheter placed due to retention  S/p paracentesis 12/18 for ascites 5.5 L  Monitor Bms  Palliative care is following and adjusting analgesics  CT scan reviewed-consulted IR for paracentesis-status post paracentesis 12/30.  4.6 L out, culture- no growth up to date.  PMN count is not high enough to suspect SBP  Anemia  Received IV iron x 2  Vitamin B12 and folate deficiency  Continue vitamin B12 and folate supplement  Transfuse 1 unit of PRBCs 12/18 and 12/23  Hg is 6.8 on 12/31 transfuse 1 U RBC  Continue to monitor hb closely, transfuse if less than 7      Hypertension  Monitor blood pressures  Continue carvedilol, Norvasc was added  Avoid hypotension  Blood pressure is on the lower side.  Decrease in the dose of Coreg.  Patient received albumin after the paracentesis  Acute respiratory failure with hypoxia (HCC)  resolved  Patient had worsening hypoxia post EGD on 12/10. Was satting mid-80s on 10L, Was given nebs, Lasix and subsequently placed on BiPAP and upgraded to critical care for further treatment  Currently on room air  NSVT (nonsustained ventricular tachycardia) (HCC)  25 run of nonsustained V. tach noted on telemetry, patient was asymptomatic  Cardiology consulted  Cardiac echo with normal EF and mild aortic stenosis  Coreg dose was increased to 25 mg twice daily  Monitor  Cardiology signed off-due to low hypertension decrease the Coreg to 12.5 and monitor    Hypothyroidism  Elevated TSH  Normal free T4 at 0.65  Patient was not receiving levothyroxine due to  PEG tube malfunctioning   Increase levothyroxine dose to 150 mcg daily  Repeat thyroid study in 4 to 6 weeks  abdominal rash  Located on the right lower abdomen with oozing and bleeding  Seen by dermatology who performed a biopsy  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, consistent with gynecologic/Mullerian origin   Discontinue valtrex  Outpatient follow up with medical oncology when stable  Hyponatremia  Hyponatremia likely multifactorial - due to volume overload  Coronado catheter placed due to urinary retention  Monitor closely with diuresis  Gradually improving with diuresis  nephrology following, recommendations appreciated  Coronary artery disease involving native coronary artery  History of MI in 2016 status post KARY  Stable  Type 2 diabetes mellitus (HCC)  Lab Results   Component Value Date    HGBA1C 6.9 (H) 11/05/2024       Recent Labs     01/01/25  2049 01/02/25  0618 01/02/25  1100 01/02/25  1602   POCGLU 181* 212* 189* 168*       Blood Sugar Average: Last 72 hrs:  (P) 160.1875    Farxiga on hold while inpatient  Hyperglycemia secondary to steroids and tube feeding  Continue Lantus 20 Unightly, continue Humalog 5 U tid, adjust the doses as needed  Continue SSI  Endometrial cancer (HCC)  History of endometrial cancer noted  History of pulmonary embolism  Continue Eliquis 5 mg twice daily  Gastroparesis    With PEG-J as above  GI following  Morbid obesity (HCC)  Body mass index is 47.96 kg/m².  Encourage lifestyle modification and weight loss once acute issues improved/resolved  Anasarca  Continue with albumin assisted diuresis.  Nephrology managing diuretics  Continue to monitor daily weights, BMPs, telemetry  nephrology following, recommendations appreciated  Hyperkalemia    TERRA (acute kidney injury) (HCC)      VTE Pharmacologic Prophylaxis: VTE Score: 7 High Risk (Score >/= 5) - Pharmacological DVT Prophylaxis contraindicated due to anemia. Sequential Compression Devices Ordered.    Mobility:   Basic Mobility Inpatient Raw Score: 8  JH-HLM Goal: 3: Sit at edge of bed  JH-HLM Achieved: 3: Sit at edge of bed  JH-HLM Goal achieved. Continue to encourage appropriate mobility.    Patient Centered Rounds: I performed bedside rounds with nursing staff  today.   Discussions with Specialists or Other Care Team Provider: nephrology, case management    Education and Discussions with Family / Patient: Patient declined call to .  Will attempt to call needs tomorrow to discuss patient's status as she continues to decline    Current Length of Stay: 24 day(s)  Current Patient Status: Inpatient   Certification Statement: The patient will continue to require additional inpatient hospital stay due to management as above  Discharge Plan:  pending clinical improvement    Code Status: Level 2 - DNAR: but accepts endotracheal intubation    Subjective   Patient seen and examined. She reports ongoing fatigue, reports double vision, but unable to further characterize, she is not sure if it present with both eyes open versus by closing one eye at a time.    Objective :  Temp:  [97.8 °F (36.6 °C)-99.6 °F (37.6 °C)] 99.6 °F (37.6 °C)  HR:  [86-93] 86  BP: (128-138)/(47-58) 130/54  Resp:  [16-17] 17  SpO2:  [92 %-96 %] 94 %  O2 Device: Nasal cannula  Nasal Cannula O2 Flow Rate (L/min):  [3 L/min] 3 L/min    Body mass index is 47.96 kg/m².     Input and Output Summary (last 24 hours):     Intake/Output Summary (Last 24 hours) at 1/2/2025 1929  Last data filed at 1/2/2025 1657  Gross per 24 hour   Intake 1484 ml   Output 1090 ml   Net 394 ml       Physical Exam  Constitutional:       Appearance: She is obese.   HENT:      Head: Normocephalic and atraumatic.      Nose: Nose normal.   Eyes:      General: No scleral icterus.  Cardiovascular:      Rate and Rhythm: Normal rate and regular rhythm.      Heart sounds: No murmur heard.  Pulmonary:      Effort: Pulmonary effort is normal. No respiratory distress.   Abdominal:      General: There is distension.      Tenderness: There is abdominal tenderness.      Comments: Abdominal wall wounds covered in dressing   Neurological:      Mental Status: She is alert.           Lines/Drains:  Lines/Drains/Airways       Active Status        Name Placement date Placement time Site Days    PICC Line 12/18/24 12/18/24  0922  --  15    Gastrostomy/Enterostomy PEG- Jejunostomy 20 Fr. LUQ 12/03/24  1553  LUQ  30    Urethral Catheter Latex 16 Fr. 12/26/24  2335  Latex  6                  Urinary Catheter:  Goal for removal: Voiding trial when ambulation improves         Central Line:  Goal for removal: Will discontinue when peripheral access obtained.                Lab Results: I have reviewed the following results:   Results from last 7 days   Lab Units 01/02/25  0511 01/01/25  0247   WBC Thousand/uL 6.16 5.54   HEMOGLOBIN g/dL 7.4* 7.4*   HEMATOCRIT % 23.6* 23.0*   PLATELETS Thousands/uL 164 146*   BANDS PCT % 6  --    SEGS PCT %  --  79*   LYMPHO PCT % 6* 7*   MONO PCT % 6 9   EOS PCT % 3 3     Results from last 7 days   Lab Units 01/02/25  0511 12/28/24  0518 12/27/24  0357   SODIUM mmol/L 130*   < > 127*   POTASSIUM mmol/L 3.6   < > 4.6   CHLORIDE mmol/L 86*   < > 86*   CO2 mmol/L 36*   < > 35*   BUN mg/dL 108*   < > 75*   CREATININE mg/dL 1.27   < > 1.06   ANION GAP mmol/L 8   < > 6   CALCIUM mg/dL 8.8   < > 8.9   ALBUMIN g/dL  --   --  3.0*   TOTAL BILIRUBIN mg/dL  --   --  0.31   ALK PHOS U/L  --   --  70   ALT U/L  --   --  5*   AST U/L  --   --  8*   GLUCOSE RANDOM mg/dL 239*   < > 160*    < > = values in this interval not displayed.         Results from last 7 days   Lab Units 01/02/25  1602 01/02/25  1100 01/02/25  0618 01/01/25  2049 01/01/25  1548 01/01/25  1059 01/01/25  0634 12/31/24  2003 12/31/24  1540 12/31/24  1042 12/31/24  0624 12/30/24  2050   POC GLUCOSE mg/dl 168* 189* 212* 181* 159* 139 151* 173* 145* 158* 137 153*               Recent Cultures (last 7 days):   Results from last 7 days   Lab Units 12/30/24  1132   GRAM STAIN RESULT  1+ Polys  No organisms seen   BODY FLUID CULTURE, STERILE  No growth             Last 24 Hours Medication List:     Current Facility-Administered Medications:     acetaminophen (TYLENOL) tablet 975 mg,  Q8H SAM    albumin human (FLEXBUMIN) 25 % injection 25 g, Q6H    albuterol (PROVENTIL HFA,VENTOLIN HFA) inhaler 2 puff, Q6H PRN    aluminum-magnesium hydroxide-simethicone (MAALOX) oral suspension 30 mL, Q6H PRN    aspirin (ECOTRIN LOW STRENGTH) EC tablet 81 mg, Daily    bisacodyl (DULCOLAX) rectal suppository 10 mg, Daily PRN    carvedilol (COREG) tablet 3.125 mg, BID With Meals    cyanocobalamin (VITAMIN B-12) tablet 1,000 mcg, Daily    dicyclomine (BENTYL) capsule 10 mg, BID PRN    enoxaparin (LOVENOX) subcutaneous injection 120 mg, Q12H SAM    fentaNYL (DURAGESIC) 25 mcg/hr TD 72 hr patch 25 mcg, Q72H    folic acid (FOLVITE) tablet 1 mg, Daily    gabapentin (NEURONTIN) capsule 300 mg, HS    HYDROmorphone (DILAUDID) injection 0.5 mg, Q3H PRN    insulin glargine (LANTUS) subcutaneous injection 20 Units 0.2 mL, HS    insulin lispro (HumALOG/ADMELOG) 100 units/mL subcutaneous injection 1-5 Units, TID AC **AND** Fingerstick Glucose (POCT), TID AC    insulin lispro (HumALOG/ADMELOG) 100 units/mL subcutaneous injection 5 Units, TID With Meals    iohexol (OMNIPAQUE) 240 MG/ML solution 50 mL, Once in imaging    levothyroxine tablet 150 mcg, Daily    naloxone (NARCAN) 0.04 mg/mL syringe 0.04 mg, Q1MIN PRN    nitroglycerin (NITROSTAT) SL tablet 0.4 mg, Q5 Min PRN    omeprazole (PRILOSEC) suspension 2 mg/mL, Daily    ondansetron (ZOFRAN) injection 4 mg, Q6H PRN    oxyCODONE (ROXICODONE) oral solution 5 mg, Q4H PRN **OR** oxyCODONE (ROXICODONE) oral solution 10 mg, Q4H PRN    polyethylene glycol (MIRALAX) packet 17 g, Daily    senna oral syrup 17.6 mg, BID    Administrative Statements   Today, Patient Was Seen By: Park Mitchell MD      **Please Note: This note may have been constructed using a voice recognition system.**

## 2025-01-03 NOTE — ASSESSMENT & PLAN NOTE
Baseline creatinine 0.7 to 0.9 mg/dL  Admitted with creatinine 0.39 mg/dL on 12/9/2024  Creatinine today 1.18 mg/dL stable for now and slightly improved  May need to except higher creatinine for euvolemia  Likely TERRA secondary to ischemic injury from hypotension plus intravascular volume depletion secondary to third spacing   Volume status stable and improving  Will redose albumin and Bumex today to help with intravascular volume depletion  At risk for QUINN secondary to contrast exposure 1/3/2025  Optimize hemodynamics  Monitor for renal recovery

## 2025-01-03 NOTE — ASSESSMENT & PLAN NOTE
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  Persistent third spacing with slight increase in moderate volume ascites, and no significant change in moderate size right and small left pleural effusions with bibasilar atelectasis. 2.  New percutaneous enteric tube with tip in the distal duodenum.\"  GI following. S/p EGD 12/10 with J-tube exchange, postop patient had worsening hypoxia required transfer to ICU and using BiPAP  Had concern for possible infection surrounding J-tube site, completed 7 days of ceftriaxone and vancomycin on 12/17  developed clogged J-tube,   Status post EGD on 12/13 with J-tube replacement   Patient with leakage around the PEG tube site.  GI reevaluated.  Okay to continue with tube feeding-current on antibiotics.  Culture was growing Klebsiella from 12/25- completed 5 days of Ancef on 12/29  Resumed on tube feeding  On clear liquid diet per speech/swallow therapist  G tube port is working sufficiently per gastroenterology  Tube feeding changed to Nepro due to hyperkalemia and hyperglycemia after discussion with nutrition  Patient continues to be lethargic, but arousable, CT head from 1/2/25 showed: No acute intracranial hemorrhage. A few scattered periventricular hypodensities are indeterminate and most likely microangiopathy. Recommend contrast-enhanced MRI of the brain or post contrast head CT to assess for pathologic enhancement/small metastases. No large mass lesions. Mild to moderate sphenoid sinusitis.\"   Noted copious amount of drainage from around J-tube; CT of abdomen done 1/3/25: good position of the current tube  Surgery suggested further management of J-tube as per GI, recommended to change tube to PEGJ; GI " consulted, will evaluate patient tomorrow

## 2025-01-03 NOTE — UTILIZATION REVIEW
Continued Stay Review    Date: 01/02                          Current Patient Class: Inpatient  Current Level of Care: MS    HPI:64 y.o. female initially admitted on 12/09     Assessment/Plan: Pt reports ongoing fatigue, more lethargic today. Reports intermittent double vision but unable to further characterize, not sure if it present with both eyes open versus by closing one eye at a time. Follows command, no focal neuro deficit on exam. Creatinine up to 1.27 from 1.11 yesterday. Na 130. Hgb 7.4. Plan for  CT head to rule out intracranial process, avoid contrast due to TERRA. Continue to monitor hb closely, transfuse if less than 7. Continue vitamin B12 and folate supplement Palliative cCare ff. Cont pain regimen. Cont TF.    Nephrology Notes: Pt continues to have significant pain. Na improving. UOP 1.1 L last 24 hrs. Creatinine stable for now.  Give albumin 25 g IV every 6 for 4 doses and Bumex 2 mg IV x 1 to help with intravascular volume shifts and diuresis.  Volume status stable and improving. Optimize hemodynamics. Monitor for renal recovery. Hold off on tolvaptan for now. Continue on fluid restriction 1.8 L. give K-Dur or 40 mill equivalents x 2 today as patient will be getting further diuretics today again. Check BMP in a.m.        Medications:   Scheduled Medications:  acetaminophen, 975 mg, Oral, Q8H SAM  albumin human (FLEXBUMIN) 25 % injection 25 g q6h IV  aspirin, 81 mg, Oral, Daily  bumetanide (BUMEX) injection 2 mg IV once  carvedilol, 3.125 mg, Oral, BID With Meals  vitamin B-12, 1,000 mcg, Oral, Daily  enoxaparin, 1 mg/kg, Subcutaneous, Q12H SAM  fentaNYL, 25 mcg, Transdermal, Q72H  folic acid, 1 mg, Per G Tube, Daily  gabapentin, 300 mg, Per G Tube, HS  insulin glargine, 20 Units, Subcutaneous, HS  insulin lispro, 1-5 Units, Subcutaneous, TID AC  insulin lispro, 5 Units, Subcutaneous, TID With Meals  ipratropium, 0.5 mg, Nebulization, TID  levalbuterol, 1.25 mg, Nebulization, TID  levothyroxine, 150  mcg, Oral, Daily  omeprazole (PRILOSEC) suspension 2 mg/mL, 40 mg, Per G Tube, Daily  polyethylene glycol, 17 g, Oral, Daily  senna, 17.6 mg, Oral, BID      Continuous IV Infusions: none     PRN Meds:  albuterol, 2 puff, Inhalation, Q6H PRN  albuterol, 2.5 mg, Nebulization, Q4H PRN  aluminum-magnesium hydroxide-simethicone, 30 mL, Oral, Q6H PRN  bisacodyl, 10 mg, Rectal, Daily PRN  dicyclomine, 10 mg, Oral, BID PRN  HYDROmorphone, 0.5 mg, Intravenous, Q3H PRN 01/02 x 1  iohexol, 50 mL, Oral, Once in imaging  iohexol, 50 mL, Oral, Once in imaging  naloxone, 0.04 mg, Intravenous, Q1MIN PRN  nitroglycerin, 0.4 mg, Sublingual, Q5 Min PRN  ondansetron, 4 mg, Intravenous, Q6H PRN  oxyCODONE, 5 mg, Oral, Q4H PRN 01/02 x 1   Or  oxyCODONE, 10 mg, Oral, Q4H PRN 01/02 x 2      Discharge Plan: TBD    Vital Signs (last 3 days)       Date/Time Temp Pulse Resp BP MAP (mmHg) SpO2 Calculated FIO2 (%) - Nasal Cannula O2 Flow Rate (L/min) Nasal Cannula O2 Flow Rate (L/min) O2 Device Patient Position - Orthostatic VS Claudia Coma Scale Score Pain    01/02/25 2231 -- -- -- -- -- -- -- -- -- -- -- -- 9 01/02/25 22:28:29 97.2 °F (36.2 °C) 89 -- 126/56 79 95 % -- -- -- -- -- -- --    01/02/25 2228 -- -- -- -- -- -- -- -- -- -- -- -- 9 01/02/25 2220 -- -- -- -- -- -- -- -- -- -- -- 15 --    01/02/25 22:10:02 97.5 °F (36.4 °C) 92 18 150/54 86 96 % -- -- -- -- -- -- --    01/02/25 16:56:36 -- 86 -- 130/54 79 94 % -- -- -- -- -- -- --    01/02/25 14:43:41 -- 87 -- 138/53 81 95 % -- -- -- -- -- -- --    01/02/25 1437 -- -- -- -- -- -- -- -- -- -- -- -- 10 - Worst Possible Pain    01/02/25 1143 -- -- -- -- -- -- -- -- -- -- -- -- 10 - Worst Possible Pain    01/02/25 1135 -- -- -- -- -- -- -- -- -- -- -- -- 10 - Worst Possible Pain    01/02/25 0938 -- -- -- -- -- -- -- -- -- -- -- -- 10 - Worst Possible Pain    01/02/25 0934 -- -- -- -- -- -- -- -- -- -- -- -- 10 - Worst Possible Pain    01/02/25 0800 -- -- -- -- -- -- -- -- -- -- -- 15 --     01/02/25 07:22:23 99.6 °F (37.6 °C) -- 17 128/52 77 -- 32 -- 3 L/min Nasal cannula Lying -- 10 - Worst Possible Pain    01/02/25 0700 -- -- -- 135/58 84 -- -- -- -- -- -- -- --    01/02/25 06:47:18 -- -- -- 135/58 84 -- -- -- -- -- -- -- --    01/02/25 0642 -- -- -- -- -- -- -- -- -- -- -- -- 8    01/02/25 00:59:26 -- 90 -- 133/54 80 92 % -- -- -- -- -- -- --    01/02/25 0057 -- -- -- -- -- -- -- -- -- -- -- -- 5    01/01/25 22:12:05 97.8 °F (36.6 °C) 93 16 138/53 81 92 % -- -- -- -- Lying -- --    01/01/25 2111 -- -- -- -- -- -- -- -- -- -- -- -- 10 - Worst Possible Pain    01/01/25 2026 -- -- -- -- -- -- -- -- -- -- -- 15 --    01/01/25 2023 -- -- -- -- -- -- -- -- -- -- -- -- 10 - Worst Possible Pain    01/01/25 20:17:53 97.9 °F (36.6 °C) 92 -- 130/47 75 96 % -- -- -- -- -- -- --    01/01/25 15:49:38 97.3 °F (36.3 °C) 84 14 117/53 74 98 % -- -- -- -- -- -- --    01/01/25 1227 -- -- -- -- -- -- -- -- -- -- -- -- 10 - Worst Possible Pain    01/01/25 1127 -- 77 -- 106/53 -- -- -- -- -- -- -- -- --    01/01/25 0911 -- -- -- -- -- -- -- -- -- -- -- -- 10 - Worst Possible Pain    01/01/25 0800 -- -- -- -- -- -- -- -- -- None (Room air) -- 14 --    01/01/25 07:14:29 97.8 °F (36.6 °C) 80 16 119/51 74 97 % -- -- -- None (Room air) Lying -- --    01/01/25 0045 -- -- -- -- -- -- -- -- -- -- -- -- 10 - Worst Possible Pain    01/01/25 00:44:20 -- 83 -- 125/51 76 97 % -- -- -- -- -- -- --    12/31/24 2323 -- -- -- -- -- -- -- -- -- -- -- -- 10 - Worst Possible Pain    12/31/24 22:28:46 98.3 °F (36.8 °C) 87 16 120/43 69 95 % -- -- -- -- Lying -- --    12/31/24 22:02:28 -- 88 -- 134/55 81 95 % -- -- -- -- -- -- --    12/31/24 2000 -- -- -- -- -- 97 % 32 -- 3 L/min Nasal cannula -- 15 10 - Worst Possible Pain    12/31/24 1818 -- -- -- -- -- -- -- -- -- -- -- -- 10 - Worst Possible Pain    12/31/24 1747 -- -- -- -- -- -- -- -- -- -- -- -- 10 - Worst Possible Pain    12/31/24 17:01:34 98.4 °F (36.9 °C) 86 15 139/52 81 95 % 32 3  L/min 3 L/min Nasal cannula Lying -- 10 - Worst Possible Pain    12/31/24 16:56:47 -- 86 13 137/52 80 96 % 32 3 L/min 3 L/min Nasal cannula Lying -- --    12/31/24 1651 -- 85 14 137/52 80 96 % 32 3 L/min 3 L/min Nasal cannula Lying -- --    12/31/24 1620 -- 84 16 130/56 81 97 % 32 3 L/min 3 L/min Nasal cannula Lying -- --    12/31/24 15:41:08 -- 82 14 112/52 72 95 % 32 3 L/min 3 L/min Nasal cannula Lying -- --    12/31/24 1517 -- -- -- -- -- -- -- -- -- -- -- -- 10 - Worst Possible Pain    12/31/24 1515 -- 83 15 123/60 81 95 % 32 3 L/min 3 L/min Nasal cannula Lying -- --    12/31/24 1500 -- 85 14 76/47 57 96 % 32 3 L/min 3 L/min Nasal cannula Lying -- --    12/31/24 14:47:48 98 °F (36.7 °C) 81 13 123/48 73 94 % 32 3 L/min 3 L/min Nasal cannula Lying -- 10 - Worst Possible Pain    12/31/24 1430 -- 79 -- 125/43 70 95 % -- -- -- -- -- -- --    12/31/24 1415 -- 80 -- 119/47 71 95 % -- -- -- -- -- -- --    12/31/24 1400 -- 79 14 133/58 83 95 % -- -- -- -- Lying -- --    12/31/24 13:46:15 -- -- -- -- 76 -- -- -- -- -- -- -- --    12/31/24 1345 97.7 °F (36.5 °C) 79 -- 113/58 -- 97 % -- -- -- -- -- -- --    12/31/24 1340 -- -- -- -- -- -- -- -- -- -- -- -- 10 - Worst Possible Pain    12/31/24 1318 97.8 °F (36.6 °C) 79 20 112/45 -- 97 % -- -- -- -- -- -- --    12/31/24 13:16:54 97.8 °F (36.6 °C) 84 -- 112/45 67 98 % -- -- -- -- -- -- --    12/31/24 1009 -- -- -- -- -- -- -- -- -- -- -- -- 10 - Worst Possible Pain    12/31/24 0834 -- -- -- -- -- -- -- -- -- -- -- -- 10 - Worst Possible Pain    12/31/24 0830 -- -- -- -- -- -- -- -- -- -- -- 15 --    12/31/24 08:29:58 -- 75 16 107/57 74 97 % 32 3 L/min 3 L/min Nasal cannula Lying -- --    12/31/24 07:09:15 97.8 °F (36.6 °C) 78 15 117/50 72 96 % 36 -- 4 L/min Nasal cannula Sitting -- 10 - Worst Possible Pain    12/31/24 0035 -- -- -- -- -- -- -- -- -- -- -- -- 10 - Worst Possible Pain    12/31/24 00:21:57 -- 82 -- 108/75 86 95 % -- -- -- -- -- -- --          Weight (last 2 days)        Date/Time Weight    01/03/25 0600 115 (254.63)    01/02/25 0600 111 (245.59)    01/01/25 0600 111 (245.15)            Pertinent Labs/Diagnostic Results:   Radiology:  CT abdomen pelvis w contrast   Final Interpretation by Edis Medina MD (01/03 1301)      1.  G-J tube in stable position with anchor in the stomach and tip of the tube in the second portion of the duodenum. No evidence of contrast extravasation.      2.  Overall decreased volume of ascites though residual pockets of fluid appear loculated. There is no rim enhancement or gas to suggest abscess.      3.  Stable matting of small bowel and bladder in the lower abdomen/pelvis without bowel obstruction. It is uncertain if this is secondary to tumor, radiation, or postoperative adhesions.      4.  Left inguinal lymphadenopathy and enlarged left external iliac lymph node, reactive versus metastatic.      5.  Irregular skin thickening along the right anterior abdominal wall with underlying subcutaneous edema. No gas or fluid collection. Clinical correlation for skin ulceration recommended.      6.  Increased bilateral pleural effusions with overlying atelectasis.      7.  Stable splenomegaly with splenic infarct.      The study was marked in EPIC for immediate notification.                  Workstation performed: [id number]         CT head wo contrast   Final Interpretation by Riki Ny MD (01/02 1704)         1. No acute intracranial hemorrhage.   2. A few scattered periventricular hypodensities are indeterminate and most likely microangiopathy. Recommend contrast-enhanced MRI of the brain or post contrast head CT to assess for pathologic enhancement/small metastases. No large mass lesions.   3. Mild to moderate sphenoid sinusitis.                  Workstation performed: [id number]         IR INPATIENT Paracentesis   Final Interpretation by Sen Lamb MD (01/02 1341)   Right ultrasound-guided paracentesis.      Signed and  dictated by Veronica Whitaker PA-C under the supervision of Dr. Lamb.         Workstation performed: [id number]         CT abdomen pelvis w contrast   Final Interpretation by Jaylin Magaña MD (12/26 1610)      1) No bowel obstruction. Oral contrast from the 12/15/2024 CT seen throughout the colon and rectum, indicating delayed gastrointestinal transit.   2) Percutaneous gastrojejunostomy tube in the stomach, with tip in duodenum. Duodenum just distal to the tube tip takes a sharp turn caudad as on the prior studies, likely due to presence of an adhesion.      3) Moderate to large abdominopelvic ascites.      4) Findings concerning for peritoneal metastasis in central pelvis at site of the prior small bowel obstruction transition point, corresponding to the intra-operative and pathologic findings. Associated apparent tethering of the vaginal cuff, and likely    at least partial tethering of the right distal ureter and some loops of small bowel. Additional peritoneal carcinomatosis in left side of the pelvis adjacent to the sigmoid colon and inseparable from left wall of the urinary bladder.      5) Coronado catheter balloon appears somewhat low-lying, possibly at least partially in the urethra. Recommend repositioning.      6) Some fluid deep to the midline supraumbilical anterior abdominal wall incision, similar to 12/15/2024, however no also with a droplet of air. No  definite rim enhancement to suggest an abscess. The droplet of air may be related to a portion of the    incision still being open. Recommend correlation with physical exam.      7) Mildly enlarged left external iliac lymph node, similar to the initial study from 11/5/2024. Recommend correlation with older outside prior studies to assess for the possibility of metastatic disease.      8) Additional findings as above, without interval change from 12/15/2024.      A preliminary report was provided by [facility] after the completion of the  study.      The study was marked in EPIC for immediate notification.      Workstation performed: [id number]         IR INPATIENT Paracentesis   Final Interpretation by Rishabh Johnson MD (12/20 1640)   Therapeutic paracentesis.      Procedure was performed by:   Live Mayo PA-C      Supervising Physician: Dr. Johnson      Workstation performed: [id number]         CT abdomen pelvis w contrast   Final Interpretation by Riki Reina MD (12/15 1527)      No bowel obstruction. Gastrojejunostomy tube in appropriate position.      Stable bilateral pleural effusions with bibasilar atelectasis right worse than left.      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      Severe body wall anasarca and large volume abdominopelvic ascites unchanged likely due to third spacing.         Workstation performed: [id number]         XR abdomen 1 view kub   Final Interpretation by Arslan Parish MD (12/12 1002)      No acute abdominal findings.      The gastrojejunostomy tube projects over the mid abdomen.               Resident: Romy Blunt I, the attending radiologist, have reviewed the images and agree with the final report above.      Workstation performed: [id number]         XR chest portable ICU   Final Interpretation by Andrea Anderson MD (12/11 0830)      Minimal increase in pulmonary edema and no significant change in the bilateral pleural effusions (right larger than left).            Workstation performed: [id number]         XR chest portable   Final Interpretation by Kalie Nova MD (12/10 1450)      Bilateral lung disease.            Workstation performed: [id number]         CT abdomen pelvis w contrast   Final Interpretation by Andrea Anderson MD (12/09 8989)      Since November 30, 2024:   1.  Persistent third spacing with slight increase in moderate volume ascites, and no significant change in moderate size right and small left pleural effusions with bibasilar atelectasis.    2.  New percutaneous enteric tube with tip in the distal duodenum.         Workstation performed: [id number]           Cardiology:  Echo complete w/ contrast if indicated   Final Result by Prakash Spring MD (12/12 3472)        Left Ventricle: Left ventricular cavity size is normal. Wall thickness    is mildly increased. There is mild concentric hypertrophy. The left    ventricular ejection fraction is 65% by visual estimation. Systolic    function is normal. Wall motion is normal. Diastolic function is mildly    abnormal, consistent with grade I (abnormal) relaxation.     Aortic Valve: The aortic valve is trileaflet. The leaflets are    moderately thickened. The leaflets are mildly calcified. There is mildly    reduced mobility. There is trace regurgitation. There is mild stenosis.    The aortic valve peak velocity is 2.18 m/s. The aortic valve mean gradient    is 11 mmHg. The dimensionless velocity index is 0.41. The aortic valve    area is 1.54 cm2.         ECG 12 lead   Final Result by Justin Trinidad MD (12/12 4397)   Normal sinus rhythm   Normal ECG   When compared with ECG of 10-Dec-2024 13:09, (unconfirmed)   T wave inversion no longer evident in Lateral leads   Confirmed by Justin Trinidad ([id number]) on 12/12/2024 11:26:04 PM      ECG 12 lead   Final Result by Rishabh Aburto MD (12/14 0681)   Sinus tachycardia   Low voltage QRS   Cannot rule out Anterior infarct , age undetermined   Abnormal ECG   When compared with ECG of 14-Nov-2024 21:45,   No significant change was found   Confirmed by Rishabh Aburto ([id number]) on 12/14/2024 8:42:48 AM        GI:  EGD   Final Result by Dennise Garcia MD (12/13 8810)   The esophagus appeared normal.   Mild erythematous mucosa in the antrum, prepyloric region and pylorus,    consistent with gastritis   The internal bolster of PEG was pushed away from the mucosa revealing an    area of ulceration with no surrounding purulence   Removed tube from the stomach. The tube was  "replaced.   The duodenal bulb, 1st part of the duodenum, 2nd part of the duodenum and    3rd part of the duodenum appeared normal.         RECOMMENDATION:   Return to the medical surgical floors   Okay to begin tube feeds via J port and medication via the G port   Okay to resume diet per primary team    GI will sign off, please reach out with question or concerns                     Dennise Garcia MD       EGD   Final Result by Dennise Garcia MD (12/13 1218)   The esophagus appeared normal.   Upon entry into the stomach, erythema was noted around the internal    bolster of the PEG tube.  The internal bolster was pushed away from the    mucosa revealing an area of ulceration with some surrounding purulence and    erythema.  2 g of cefazolin was administered intraprocedural.    Removed jejunal extension externally from the stomach and a new jejunal    extension was replaced using a rat-tooth forceps to extend the tubing as    far distal into the duodenum as we could.  The J port was successfully    flushed. External tubing 4 cm at the skin surface.    The duodenal bulb, 1st part of the duodenum, 2nd part of the duodenum and    3rd part of the duodenum appeared normal.      RECOMMENDATION:   Start 7-day course of Augmentin   Okay to use PEG-J tube for medications, water flushes, and tube feeds now   Continue tube care.   Make sure to use the correct ports. DO NOT USE ports for any other purpose    than as labeled:   \"Feed\" port is for tube feeds ONLY.   \"Rx\" port is for medications ONLY.   \"Suction\" port is for gastric venting/emptying.    Please make sure to flush ports with water before and after use to prevent    clogging.                      Dennise Garcia MD               Results from last 7 days   Lab Units 01/03/25  0629 01/02/25  0511 01/01/25  0247 12/31/24  0914 12/31/24  0320   WBC Thousand/uL 6.88 6.16 5.54 5.93 5.40   HEMOGLOBIN g/dL 7.4* 7.4* 7.4* 6.8* 6.7*   HEMATOCRIT % 24.7* 23.6* 23.0* 21.3* 21.3* "   PLATELETS Thousands/uL 177 164 146* 151 152   TOTAL NEUT ABS Thousands/µL  --   --  4.33 4.83  --    BANDS PCT %  --  6  --   --   --          Results from last 7 days   Lab Units 01/03/25  0629 01/02/25  0511 01/01/25  0247 12/31/24  0320 12/30/24  0841   SODIUM mmol/L 131* 130* 131* 128* 128*   POTASSIUM mmol/L 3.9 3.6 3.5 3.8 3.9   CHLORIDE mmol/L 86* 86* 87* 86* 85*   CO2 mmol/L 38* 36* 36* 36* 35*   ANION GAP mmol/L 7 8 8 6 8   BUN mg/dL 113* 108* 91* 92* 88*   CREATININE mg/dL 1.21 1.27 1.11 1.33* 1.31*   EGFR ml/min/1.73sq m 48 45 53 42 43   CALCIUM mg/dL 9.2 8.8 8.6 8.4 8.5   MAGNESIUM mg/dL  --  2.5 2.5 2.5 2.4   PHOSPHORUS mg/dL 3.7 3.1 2.9 3.2 3.3         Results from last 7 days   Lab Units 01/03/25  1052 01/03/25  0628 01/02/25 2059 01/02/25  1602 01/02/25  1100 01/02/25  0618 01/01/25  2049 01/01/25  1548 01/01/25  1059 01/01/25  0634 12/31/24  2003 12/31/24  1540   POC GLUCOSE mg/dl 225* 98 190* 168* 189* 212* 181* 159* 139 151* 173* 145*     Results from last 7 days   Lab Units 01/03/25  0629 01/02/25  0511 01/01/25  0247 12/31/24  0320 12/30/24  0841 12/29/24  0456 12/28/24  0518   GLUCOSE RANDOM mg/dL 219* 239* 176* 155* 162* 89 159*             Results from last 7 days   Lab Units 01/01/25  0555   UNIT PRODUCT CODE  L4065P20   UNIT NUMBER  [id number]   UNITABO  O   UNITRH  NEG   CROSSMATCH  Compatible   UNIT DISPENSE STATUS  Presumed Trans   UNIT PRODUCT VOL mL 250                                                         Results from last 7 days   Lab Units 12/30/24  1132   GRAM STAIN RESULT  1+ Polys  No organisms seen   BODY FLUID CULTURE, STERILE  No growth     Results from last 7 days   Lab Units 12/30/24  1132   TOTAL COUNTED  100   WBC FLUID /ul 310               Network Utilization Review Department  ATTENTION: Please call with any questions or concerns to [phone] and carefully listen to the prompts so that you are directed to the right person. All voicemails are confidential.    For Discharge needs, contact Care Management DC Support Team at [phone] opt. 2  Send all requests for admission clinical reviews, approved or denied determinations and any other requests to dedicated fax number below belonging to the campus where the patient is receiving treatment. List of dedicated fax numbers for the Facilities:  FACILITY NAME UR FAX NUMBER   ADMISSION DENIALS (Administrative/Medical Necessity) [phone]   DISCHARGE SUPPORT TEAM (NETWORK) [phone]   PARENT CHILD HEALTH (Maternity/NICU/Pediatrics) [phone]   [facility] [phone]   [facility] [phone]   formerly [facility] [phone]   [facility] [phone]   [facility] [phone]   [facility] [phone]   [facility] [phone]   [facility] [phone]   [facility] [phone]   Critical access hospital [phone]   [facility] [phone]   [facility] [phone]

## 2025-01-03 NOTE — PROGRESS NOTES
Progress Note - Pulmonology   Name: Lety Botello 64 y.o. female I MRN: [id number]  Unit/Bed#: [facility] 511-01 I Date of Admission: 12/9/2024   Date of Service: 1/3/2025 I Hospital Day: 25       PULMONARY ATTENDING NOTE - FULL CONSULT TO FOLLOW WITH FELLOW    Called regarding possible thoracentesis for enlarging pleural effusions.  Pt 64 year old woman with widely metastatic endometrial cancer (malignant ascites, cutaneous, RP) with recurrent SBO recetn ex-lap with PEG/J tube, prior PE previously on eliquis and noted TERRA, morbid obesity. Noted prolonged hospitalization was reviewed.  Drainage for PEG-J tube noted today and taken for CT but with noted hypoxia and increasing O2 needs. Noted transition to DNAR Level III per HP.      Pt offers limited to no history, denied any pleurisy or added dyspnea    Vital Signs Reviewed, SpO2 97% 2LNC during my exam  Exam  GEN morbidly obese, chronically ill woman in bed, sleeping but arouses and follows simple commands  HEENT MMM, no thrush, no oral lesions  Neck No accessory muscle use, JVP not elevated  CV reg, single s1/2, no m/r  Pulm mildly diminshed at bases  ABD enteral tube in place, obese, midline incision with stables in place, no gross purulence, hypoactive BS, nonrigid  EXT warm, brisk cap refill, 2+ LE edema, very poor muscle mass    Labs reviewed SCr 1.21  and rising, WBC 6.9, Hgb 7.4    CT Abd/pelvis - slightly increased right effusion - small - mod in size, small left sided effusion compressive atelectasis, decreased ascites, splenic infarct    TTE EF 65%, grade I DD    Assessment  Acute hypoxic respiratory failure  Bilateral pleural effusions in setting of anasarca and malignant ascites  Endometrial cancer with known malignant ascites and cutaneous metastasis   History of PE previously on eliquis  TERRA with very poor muscle mass and rising BUN  Toxic/metabolic encephalopathy  Recurrent SBO with PEG-J tube    Recommendations  POCUS with noted right sided  effusion - smaller overall pocket and would be very difficult for bedside given pt's body habitus and lack of mobility  Given low O2 needs and diffuse anasarca and malignant ascites will hold on thoracentesis at this time, also concerning is recent LMWH use in setting of progressive renal dysfunction  Would consider transitioning to UFH gtt if AC is to be restarted as pt's baseline low SCr and poor muscle mass under estimates degree of renal dysfunction  Would consider DDAVP prior to any pleural procedures  Will continue to monitor  DNAR Level III noted, agree with further HPM involvement and GOC discussions, she is hospice appropriate    Wesley Cummings, DO

## 2025-01-03 NOTE — ASSESSMENT & PLAN NOTE
Patient has a history of endometrial carcinoma s/p FLORECITA/SBO  Has recurrence of the endometrial carcinoma  Has metastasis and peritoneal carcinomatosis

## 2025-01-03 NOTE — ASSESSMENT & PLAN NOTE
History of PE was on Eliquis before which is on hold  Then was transitioned to Lovenox  Recommend holding Lovenox given the worsening kidney function since Lovenox would likely be supra-therapeutic given worsening kidney function with increasing BUN

## 2025-01-03 NOTE — CONSULTS
Progress Note - Pulmonology   Name: Lety Botello 64 y.o. female I MRN: [id number]  Unit/Bed#: [facility] 511-01 I Date of Admission: 12/9/2024   Date of Service: 1/3/2025 I Hospital Day: 25     Assessment & Plan  Pleural effusion  Patient has a pleural effusion, growing in amount compared to before  Most recent CT abdomen/pelvis showed increase in pleural effusion amount bilaterally, more on the right, and atelectasis of the lower lobes especially on the right  Unclear etiology, but likely multifactorial due to anasarca and kidney failure, as well as possibility for malignant pleural effusion given the history  On 1/3/2025 no significant pocket for drainage of the fluid  Endometrial cancer (HCC)  Patient has a history of endometrial carcinoma s/p FLORECITA/SBO  Has recurrence of the endometrial carcinoma  Has metastasis and peritoneal carcinomatosis  History of pulmonary embolism  History of PE was on Eliquis before which is on hold  Then was transitioned to Lovenox  Recommend holding Lovenox given the worsening kidney function since Lovenox would likely be supra-therapeutic given worsening kidney function with increasing BUN  Anemia  Monitor hemoglobin given the kidney function and elevated BUN making her more prone to bleeding  On anticoagulation  Acute respiratory failure with hypoxia (HCC)  Has an acute hypoxic respiratory failure likely due to volume overload, pleural effusions and deconditioning  Has been on 2.5-3 LPM  Not on oxygen previously  TERRA (acute kidney injury) (HCC)  Despite Creatinine being in a normal range, patient's baseline is much lower than the current creatinine  Also BUN has been trending up as a sign of worsening kidney function  Management per Neprhology      HPI: Patient is a 64 y.o. female with PMH of diabetes, hyperlipidemia, hypertension, DVT/PE on Eliquis outpatient, currently on heparin drip, history of omental infarct, metastatic endometrial carcinoma, CHF, sigmoid resection, recurrent SBO who  initially presented for the issue with GJ tube. Pulmonary was consulted for pleural effusion that has been increasing in size. At bedside US was performed showing fluid on the right, but did not have a great window, image available in media.     Objective :  Temp:  [97.2 °F (36.2 °C)-97.5 °F (36.4 °C)] 97.4 °F (36.3 °C)  HR:  [86-92] 88  BP: (126-160)/(53-63) 132/62  Resp:  [16-18] 16  SpO2:  [94 %-96 %] 96 %  O2 Device: Nasal cannula  Nasal Cannula O2 Flow Rate (L/min):  [2.5 L/min-3 L/min] 2.5 L/min    Physical Exam  Vitals and nursing note reviewed.   Constitutional:       General: She is not in acute distress.     Appearance: She is well-developed. She is obese. She is ill-appearing.   HENT:      Head: Normocephalic and atraumatic.   Eyes:      Conjunctiva/sclera: Conjunctivae normal.   Cardiovascular:      Rate and Rhythm: Normal rate and regular rhythm.      Heart sounds: No murmur heard.  Pulmonary:      Effort: Pulmonary effort is normal. No respiratory distress.      Breath sounds: Rales present.   Abdominal:      Palpations: Abdomen is soft.      Tenderness: There is no abdominal tenderness.   Musculoskeletal:         General: Swelling present.      Cervical back: Neck supple.      Right lower leg: Edema present.      Left lower leg: Edema present.   Skin:     General: Skin is warm and dry.      Capillary Refill: Capillary refill takes less than 2 seconds.   Neurological:      Mental Status: She is alert.   Psychiatric:         Mood and Affect: Mood normal.           Lab Results: I have reviewed the following results:   .     01/03/25  0629   WBC 6.88   HGB 7.4*   HCT 24.7*      SODIUM 131*   K 3.9   CL 86*   CO2 38*   *   CREATININE 1.21   GLUC 219*   PHOS 3.7     ABG: No new results in last 24 hours.    Imaging Results Review: I personally reviewed the following image studies in PACS and associated radiology reports: CT chest and CT abdomen/pelvis. My interpretation of the radiology  images/reports is: Increasing pleural effusions bilaterally, especially on the right. Bibasilar atelectasis.

## 2025-01-03 NOTE — ASSESSMENT & PLAN NOTE
Palliative Diagnosis: endometrial cancer    Goals:  Spoke with HCR Sharda Carver regarding acute desaturation event and requirement for increased oxygen support. Sharda reports that Lety would not wish to be kept alive on machines and would not be agreeable to intubation/MV if further decline in her respiratory status. Will transition to level 3 DNR/DNI at this time. Additionally, we discusses continued treatment focus versus comfort cares. Sharda would like to continue medical management at this time with DNR/DNI limits, though if further decline in Lety's clinical status, Sharda would be available via telephone to discuss transition to comfort cares.  Will continue discussions regarding GOC as patient's clinical presentation evolves.    Decisional apparatus:  Patient does not have capacity on exam today.  If capacity is lost, patient's substitute decision maker would default to Sharda Carver per AD on file  ER contacts:  Sharda Carver (Niece)  [phone] (Work Phone)   Advance Directive/Living Will/POLST: on file and reviewed    Social Support:  Patient's support system:   Supportive listening provided    Follow-up  We appreciate the opportunity to participate in this patient's care.   We will continue to follow while admitted.    Please do not hesitate to contact our on-call provider through [employer] Secure Chat or contact [phone] should there be an acute change or other symptom control concerns.

## 2025-01-03 NOTE — ASSESSMENT & PLAN NOTE
Monitor hemoglobin given the kidney function and elevated BUN making her more prone to bleeding  On anticoagulation

## 2025-01-03 NOTE — ASSESSMENT & PLAN NOTE
Body mass index is 47.96 kg/m².  Encourage lifestyle modification and weight loss once acute issues improved/resolved

## 2025-01-03 NOTE — ASSESSMENT & PLAN NOTE
Appears to have had chronic hyponatremia dating as far back as 2022  Hyponatremia likely secondary to underlying malignancy plus some component of intravascular volume depletion with third spacing and increased ADH with pain and nausea  Sodium admission of 129 mEq  Sodium dropped down to 125 mEq on 12/20/2024  Most recent sodium at 132 mEq  stable however corrected for hyperglycemia around 134 mEq stable   Status post Samsca 15 g on 12/27 and 30 g on 12/28 and 30 g on 12/29, tolvaptan 45 mg x 1 on 12/30  Will give additional Bumex 1 mg IV twice daily and albumin x 2 doses today to help with intravascular volume depletion  Hold off on tolvaptan for now  Patient has a significant third spacing  Continue on fluid restriction 1.8 L  Repeat workup showing serum osmolality 278 urine sodium 61 urine osmolality 311  Overall poor prognosis

## 2025-01-03 NOTE — QUICK NOTE
Was contacted by the medicine team earlier today regarding drainage from current feeding tube apparatus. Nursing and medicine team concerned about drainage and distention. On exam patient soft, nontender, minimally distended but protuberant with drainage around the feeding tube apparatus that was being collected successfully with an ostomy apparatus. CT scan with PO and IV contrast demonstrated good position of the current tube without intra abdominal contrast extravasation. Bumper appears to be in good position. Recommendations are to have GI switch the current tube for a true PEGJ given the current venting access is not effective. Also defer to GI and wound care team regarding drainage around the current feeding tube access site. Please reach out with any additional questions or concerns.

## 2025-01-03 NOTE — ASSESSMENT & PLAN NOTE
25 run of nonsustained V. tach noted on telemetry, patient was asymptomatic  Cardiology consulted, placed on coreg  Cardiac echo with normal EF and mild aortic stenosis  Monitor  Cardiology signed off

## 2025-01-03 NOTE — ASSESSMENT & PLAN NOTE
S/p paracentesis for 4620 cc (12/30)    Current Multimodal pain regimen:  Fentanyl 25 mcg increased on 12/27   Schedule Tylenol 975 mg TID  Gabapentin 300 mg HS   OxyIR 5mg Q4hrs PRN for moderate pain   OxyIR 10mg Q4hrs PRN for severe pain   IV dilaudid to 0.5 mg q3H PRN for BT pain  Total 24 hour ome requirements: approximately 45 in addition to 25mcg/hr TDF  Failed therapies:  NA    Bowel regimen to prevent OIC: senna 17.6 mg bid, prn dulcolax suppository. Last bowel movement 12/29  Opioid agreement. Not on file, will need outpatient follow up

## 2025-01-03 NOTE — ASSESSMENT & PLAN NOTE
Received IV iron x 2  Vitamin B12 and folate deficiency  Continue vitamin B12 and folate supplement  Transfuse 1 unit of PRBCs 12/18 and 12/23  Hg is 6.8 on 12/31 transfuse 1 U RBC  Continue to monitor hb closely, transfuse if less than 7

## 2025-01-03 NOTE — ASSESSMENT & PLAN NOTE
Currently on Coreg 3.125 mg p.o. twice daily,   Blood pressure stable for now  We will give albumin x 2 and Bumex x 2 doses today

## 2025-01-03 NOTE — CASE MANAGEMENT
Case Management Discharge Planning Note    Patient name Lety Botello  [facility] MRN [id number]  : 1960 Date 1/3/2025       Current Admission Date: 2024  Current Admission Diagnosis:Encounter for gastrojejunal (GJ) tube placement issues   Patient Active Problem List    Diagnosis Date Noted Date Diagnosed    TERRA (acute kidney injury) ([facility]) 2024     Hyperkalemia 2024     Anasarca 2024     NSVT (nonsustained ventricular tachycardia) ([facility]) 2024     Coronary artery disease involving native coronary artery 2024     abdominal rash 2024     Acute respiratory failure with hypoxia ([facility]) 12/10/2024     Hypomagnesemia 12/10/2024     Encounter for gastrojejunal (GJ) tube placement issues 2024     Hyponatremia 2024     Anemia 2024     Goals of care, counseling/discussion 2024     Palliative care encounter 2024     Hypotension after procedure 2024     Shingles 2024     On total parenteral nutrition (TPN) 2024     Abdominal pain 2024     Nausea & vomiting 2024     Constipation 11/10/2024     Morbid obesity ([facility]) 2024     Gastroparesis 2024     Gastric dilation 2024     History of pulmonary embolism 2024     Hypothyroidism 2024     Mild intermittent asthma 2024     Hypertension      Type 2 diabetes mellitus (HCC)      Endometrial cancer (HCC)      Obesity      SBO (small bowel obstruction) ([facility]) 2024       LOS (days): 25  Geometric Mean LOS (GMLOS) (days):   Days to GMLOS:     OBJECTIVE:  Risk of Unplanned Readmission Score: 45.08         Current admission status: Inpatient   Preferred Pharmacy:   [id number] - [place] - [address]  [address]  [place] [address]  Phone: [phone] Fax: [phone]    Primary Care Provider: Tai Martinez    Primary Insurance: POPPY TSANG  Secondary Insurance:     DISCHARGE DETAILS:            Additional Comments: Per update in rounds, pt with leaking from PEG site.  Pt to have STAT CT abdomen.

## 2025-01-03 NOTE — PROGRESS NOTES
Progress Note - Palliative Care   Name: Lety Botello 64 y.o. female I MRN: [id number]  [facility] I Date of Admission: 12/9/2024   Date of Service: 1/3/2025 I Hospital Day: 25     Assessment & Plan  Palliative care encounter  Palliative Diagnosis: endometrial cancer    Goals:  Spoke with HCR Sharda Carver regarding acute desaturation event and requirement for increased oxygen support. Sharda reports that Lety would not wish to be kept alive on machines and would not be agreeable to intubation/MV if further decline in her respiratory status. Will transition to level 3 DNR/DNI at this time. Additionally, we discusses continued treatment focus versus comfort cares. Sharda would like to continue medical management at this time with DNR/DNI limits, though if further decline in Lety's clinical status, Sharda would be available via telephone to discuss transition to comfort cares.  Will continue discussions regarding GOC as patient's clinical presentation evolves.    Decisional apparatus:  Patient does not have capacity on exam today.  If capacity is lost, patient's substitute decision maker would default to Sharda Carver per AD on file  ER contacts:  Sharda Carver (Niece)  [phone] (Work Phone)   Advance Directive/Living Will/POLST: on file and reviewed    Social Support:  Patient's support system:   Supportive listening provided    Follow-up  We appreciate the opportunity to participate in this patient's care.   We will continue to follow while admitted.    Please do not hesitate to contact our on-call provider through EP[facility] Secure Chat or contact [phone] should there be an acute change or other symptom control concerns.    Abdominal pain  S/p paracentesis for 4620 cc (12/30)    Current Multimodal pain regimen:  Fentanyl 25 mcg increased on 12/27   Schedule Tylenol 975 mg TID  Gabapentin 300 mg HS   OxyIR 5mg Q4hrs PRN for moderate pain   OxyIR 10mg Q4hrs PRN for severe pain   IV dilaudid to 0.5 mg q3H  PRN for BT pain  Total 24 hour ome requirements: approximately 45 in addition to 25mcg/hr TDF  Failed therapies:  NA    Bowel regimen to prevent OIC: senna 17.6 mg bid, prn dulcolax suppository. Last bowel movement 12/29  Opioid agreement. Not on file, will need outpatient follow up  Endometrial cancer (HCC)  Recurrent endometrial cancer, initial diagnosis 2020  [facility]  S/p FLORECITA SBO SLND 6/2020 c/b splenic laceration  NAYE 1/2023, though presented for a fall in 10/2023 was incidentally found to have RP lymphadenopathy  Bx 12/2023 confirm metastatic adenocarcinoma  S/p pelvic RT and treatment with femara  NAYE 5/2024  Presented 10/18, 11/5, 11/14 with SBO - s/p ex lap, small bowel resection, partial omentectomy, DAVID 11/22/24 with biopsy confirming metastatic adenocarcinoma  Encounter for gastrojejunal (GJ) tube placement issues  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 CLD as tolerated.   Today with bilious drainage at GJ site, CT a/p pending  Hypertension  BP stable  Currently on Norvasc 5 mg daily and Coreg 25 mg twice daily  Monitor BP  Pain control  Anasarca  Receiving IV Bumex and IV albumin    24 Hour History  Chart reviewed before visit. This morning with bilious drainage from PEG-J tube, plan for CT a/p today. While in CT, patient acute desaturated to 70% and required 15L supplemental oxygen to recover to mid 80s. Upon sitting up, patient stabilized. Patient seen following CT scan, currently requiring 5-6L via nc. She notes dyspnea at this time, respirations appear labored, wheezing noted. Confused on exam. Spoke with patient's HCR, Sharda Carver, via telephone. Medical update provided. She expressed understanding and reports that Lety would not wish  to be kept alive on machines and would not want intubation/MV if her respiratory status declined further. Sharda has elected for level 3 DNR/DNI status at this time. Further discussed continued disease directed treatments versus comfort cares. Sharda is open to considering if further decline to align with Lety's stated and written wishes, though at this point would like to continue medical management with DNR/DNI limits.    Medications    Current Facility-Administered Medications:     acetaminophen (TYLENOL) tablet 975 mg, 975 mg, Oral, Q8H [facility], Pauly Grissom, , 975 mg at 01/02/25 2228    albuterol (PROVENTIL HFA,VENTOLIN HFA) inhaler 2 puff, 2 puff, Inhalation, Q6H PRN, Kelvin Sheppard DO    aluminum-magnesium hydroxide-simethicone (MAALOX) oral suspension 30 mL, 30 mL, Oral, Q6H PRN, Kelvin Sheppard DO    aspirin (ECOTRIN LOW STRENGTH) EC tablet 81 mg, 81 mg, Oral, Daily, Kelvin Sheppard DO, 81 mg at 01/02/25 0820    bisacodyl (DULCOLAX) rectal suppository 10 mg, 10 mg, Rectal, Daily PRN, Kelvin Sheppard DO    carvedilol (COREG) tablet 3.125 mg, 3.125 mg, Oral, BID With Meals, Sarai Mcknight MD, 3.125 mg at 01/02/25 1658    cyanocobalamin (VITAMIN B-12) tablet 1,000 mcg, 1,000 mcg, Oral, Daily, Kelvin Sheppard DO, 1,000 mcg at 01/02/25 0820    dicyclomine (BENTYL) capsule 10 mg, 10 mg, Oral, BID PRN, Kelvin Sheppard DO, 10 mg at 01/01/25 1824    enoxaparin (LOVENOX) subcutaneous injection 120 mg, 1 mg/kg, Subcutaneous, Q12H [facility], Paul Ríos MD, 120 mg at 01/02/25 2228    fentaNYL (DURAGESIC) 25 mcg/hr TD 72 hr patch 25 mcg, 25 mcg, Transdermal, Q72H, May Sherry Monterroso MD, 25 mcg at 01/02/25 1135    folic acid (FOLVITE) tablet 1 mg, 1 mg, Per G Tube, Daily, Paul Ríos MD, 1 mg at 01/02/25 0820    gabapentin (NEURONTIN) capsule 300 mg, 300 mg, Per G Tube, HS, Pauly Grissom DO, 300 mg at 01/02/25 2229    HYDROmorphone (DILAUDID) injection 0.5 mg, 0.5 mg, Intravenous, Q3H PRN, May Sherry Monterroso MD, 0.5 mg at  01/03/25 1028    insulin glargine (LANTUS) subcutaneous injection 20 Units 0.2 mL, 20 Units, Subcutaneous, HS, Kelvin Sheppard DO, 20 Units at 01/02/25 2229    insulin lispro (HumALOG/ADMELOG) 100 units/mL subcutaneous injection 1-5 Units, 1-5 Units, Subcutaneous, TID AC, 1 Units at 01/02/25 1659 **AND** Fingerstick Glucose (POCT), , , TID AC, Kelvin Sheppard DO    insulin lispro (HumALOG/ADMELOG) 100 units/mL subcutaneous injection 5 Units, 5 Units, Subcutaneous, TID With Meals, Paul Ríos MD, 5 Units at 01/02/25 1658    iohexol (OMNIPAQUE) 240 MG/ML solution 50 mL, 50 mL, Oral, Once in imaging, Bj Jones MD    iohexol (OMNIPAQUE) 240 MG/ML solution 50 mL, 50 mL, Oral, Once in imaging, Park Mitchell MD    levothyroxine tablet 150 mcg, 150 mcg, Oral, Daily, Kelvin Sheppard DO, 150 mcg at 01/01/25 0612    naloxone (NARCAN) 0.04 mg/mL syringe 0.04 mg, 0.04 mg, Intravenous, Q1MIN PRN, JASON Cooley    nitroglycerin (NITROSTAT) SL tablet 0.4 mg, 0.4 mg, Sublingual, Q5 Min PRN, Kelvin Sheppard DO    omeprazole (PRILOSEC) suspension 2 mg/mL, 40 mg, Per G Tube, Daily, Kelvin Sheppard DO, 40 mg at 01/01/25 0612    ondansetron (ZOFRAN) injection 4 mg, 4 mg, Intravenous, Q6H PRN, Kelvin Sheppard DO, 4 mg at 01/01/25 1810    oxyCODONE (ROXICODONE) oral solution 5 mg, 5 mg, Oral, Q4H PRN, 5 mg at 01/02/25 0057 **OR** oxyCODONE (ROXICODONE) oral solution 10 mg, 10 mg, Oral, Q4H PRN, JASON Cooley, 10 mg at 01/02/25 2231    polyethylene glycol (MIRALAX) packet 17 g, 17 g, Oral, Daily, Kelvin Sheppard DO, 17 g at 12/29/24 0828    senna oral syrup 17.6 mg, 17.6 mg, Oral, BID, Kelvin Sheppard DO, 17.6 mg at 01/02/25 1754    sodium chloride 0.9 % infusion, 50 mL/hr, Intravenous, Continuous, Park Mitchell MD, Last Rate: 50 mL/hr at 01/03/25 0815, 50 mL/hr at 01/03/25 0815    Objective  /63   Pulse 88   Temp (!) 97.4 °F (36.3 °C) (Oral)   Resp 16   Ht 5' (1.524 m)   Wt 115 kg (254 lb 10.1 oz)   SpO2 95%    "BMI 49.73 kg/m²   Physical Exam  Vitals and nursing note reviewed.   Constitutional:       General: She is not in acute distress.     Appearance: She is obese. She is ill-appearing.      Interventions: Nasal cannula in place.   HENT:      Mouth/Throat:      Mouth: Mucous membranes are moist.   Cardiovascular:      Rate and Rhythm: Normal rate.   Pulmonary:      Effort: Tachypnea present.      Breath sounds: Wheezing present.      Comments: 5-6L via nc  Abdominal:      Palpations: Abdomen is soft.      Tenderness: There is no abdominal tenderness. There is no guarding.   Musculoskeletal:      Right lower leg: Edema present.      Left lower leg: Edema present.   Skin:     General: Skin is warm and moist.      Coloration: Skin is pale.   Neurological:      Mental Status: She is confused.       Lab Results: I have personally reviewed pertinent labs.  Imaging Studies: I have personally reviewed pertinent reports.  EKG, Pathology, and Other Studies: I have personally reviewed pertinent reports.    Counseling / Coordination of Care  Total floor / unit time spent today 40+ minutes. Greater than 50% of total time was spent with the patient and / or family counseling and / or coordinating of care. A description of the counseling / coordination of care: Chart reviewed,  provided medical updates, determined goals of care, discussed palliative care and symptom management, discussed code status, discussed comfort care briefly, provided anticipatory guidance, determined competency and POA/HCA, determined social/family support, provided psychosocial support.     JASON Carias  Palliative & Supportive Care    Portions of this document may have been created using dictation software and as such some \"sound alike\" terms may have been generated by the system. Do not hesitate to contact me with any questions or clarifications.    "

## 2025-01-03 NOTE — ASSESSMENT & PLAN NOTE
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  Persistent third spacing with slight increase in moderate volume ascites, and no significant change in moderate size right and small left pleural effusions with bibasilar atelectasis. 2.  New percutaneous enteric tube with tip in the distal duodenum.\"  GI following. S/p EGD 12/10 with J-tube exchange, postop patient had worsening hypoxia required transfer to ICU and using BiPAP  Had concern for possible infection surrounding J-tube site, completed 7 days of ceftriaxone and vancomycin on 12/17  developed clogged J-tube,   Status post EGD on 12/13 with J-tube replacement   Patient with leakage around the PEG tube site.  GI reevaluated.  Okay to continue with tube feeding-current on antibiotics.  Culture was growing Klebsiella from 12/25- completed 5 days of Ancef on 12/29  Resumed on tube feeding  On clear liquid diet per speech/swallow therapist  G tube port is working sufficiently per gastroenterology  Tube feeding changed to Nepro due to hyperkalemia and hyperglycemia after discussion with nutrition  Patient seems more lethargic today, reports intermittent double vision, follows commands , no focal neurodeficit noted ; will do CT head to rule out intracranial process, avoid contrast due to TERRA    "

## 2025-01-03 NOTE — QUICK NOTE
Notified by nursing that pt with copious drainage from around peg-j.    Personally examined pt at bedside. Abdomen rounded but otherwise soft, ND/NT. Bowel sounds hypoactive PEG-J with green/bilious drainage. Ostomy appliance placed overtop by nursing. Output near 300mL.    D/w surgery at bedside. Will obtain CT abdomen/pelvis w IV and oral contrast (to be instilled through the G tube). Will sign out to day team.

## 2025-01-03 NOTE — ASSESSMENT & PLAN NOTE
Most recent echo with EF of 65% grade 1 diastolic dysfunction  Hypervolemic with third spacing  Volume status improving with albumin and Bumex  Will redose albumin and Bumex today  Recommend bilateral thoracentesis Supplee with IR or pulmonary

## 2025-01-03 NOTE — PROGRESS NOTES
Progress Note - Nephrology   Name: Lety Botello 64 y.o. female I MRN: [id number]  [facility] I Date of Admission: 12/9/2024   Date of Service: 1/4/2025 I Hospital Day: 26    64-year-old female with history of multiple comorbidities including obesity, chronic hyponatremia, diabetes, hypertension, IBS and endometrial cancer admitted with J-tube malfunction nephrology consulted for hyponatremia management.   Assessment & Plan  Hyponatremia  Appears to have had chronic hyponatremia dating as far back as 2022  Hyponatremia likely secondary to underlying malignancy plus some component of intravascular volume depletion with third spacing and increased ADH with pain and nausea  Sodium admission of 129 mEq  Sodium dropped down to 125 mEq on 12/20/2024  Most recent sodium at 132 mEq  stable however corrected for hyperglycemia around 134 mEq stable   Status post Samsca 15 g on 12/27 and 30 g on 12/28 and 30 g on 12/29, tolvaptan 45 mg x 1 on 12/30  Will give additional Bumex 1 mg IV twice daily and albumin x 2 doses today to help with intravascular volume depletion  Hold off on tolvaptan for now  Patient has a significant third spacing  Continue on fluid restriction 1.8 L  Repeat workup showing serum osmolality 278 urine sodium 61 urine osmolality 311  Overall poor prognosis  Encounter for gastrojejunal (GJ) tube placement issues  Recent small bowel obstruction secondary to 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 twice daily,   Blood pressure stable for now  We  will give albumin x 2 and Bumex x 2 doses today  Endometrial cancer (HCC)  Follow-up with primary team and hematology   Overall poor prognosis  Hyperkalemia  Most recent potassium at 4.4 mEq stable and resolved  Monitor for now  Check BMP in a.m.  TERRA (acute kidney injury) (HCC)  Baseline creatinine 0.7 to 0.9 mg/dL  Admitted with creatinine 0.39 mg/dL on 12/9/2024  Creatinine today 1.18 mg/dL stable for now and slightly improved  May need to except higher creatinine for euvolemia  Likely TERRA secondary to ischemic injury from hypotension plus intravascular volume depletion secondary to third spacing   Volume status stable and improving  Will redose albumin and Bumex today to help with intravascular volume depletion  At risk for QUINN secondary to contrast exposure 1/3/2025  Optimize hemodynamics  Monitor for renal recovery  Pleural effusion  Recommend thoracentesis    I have reviewed the nephrology recommendations including albumin and Bumex, low phosphorus diet, thoracentesis, with medicine team, and we are in agreement with renal plan including the information outlined above.     Subjective   Brief History of Admission -     Patient seen and examined in her room hemodynamically stable remains afebrile still with significant pain.  Urine output 1.2 L / 24 hours slight decreased in the last 6 hours as per nursing    Objective :  Temp:  [97.3 °F (36.3 °C)-98.3 °F (36.8 °C)] 98.3 °F (36.8 °C)  HR:  [84-88] 84  BP: (132-167)/(62-72) 167/72  Resp:  [17-28] 28  SpO2:  [92 %-97 %] 96 %  O2 Device: Nasal cannula  Nasal Cannula O2 Flow Rate (L/min):  [2.5 L/min-4 L/min] 3 L/min    Current Weight: Weight - Scale: 111 kg (245 lb 2.4 oz)  First Weight: Weight - Scale: 120 kg (263 lb 7.2 oz)  I/O         01/01 0701  01/02 0700 01/02 0701  01/03 0700 01/03 0701  01/04 0700    P.O. 840 0 0    I.V. (mL/kg)   330 (2.9)    Blood       NG/ 29 7894    IV Piggyback 100  100    Feedings 1133 852     Total Intake(mL/kg) 2853 (25.7)  942 (8.2) 1430 (12.4)    Urine (mL/kg/hr) 1100 (0.4) 1190 (0.4) 400 (0.4)    Emesis/NG output 15 380 450    Stool 0 0 0    Total Output 1115 1570 850    Net +1738 -628 +580           Unmeasured Stool Occurrence 2 x 1 x 1 x          Physical Exam  Vitals and nursing note reviewed.   Constitutional:       General: She is not in acute distress.     Appearance: She is obese. She is ill-appearing. She is not toxic-appearing.   HENT:      Head: Normocephalic and atraumatic.      Mouth/Throat:      Pharynx: No oropharyngeal exudate.   Eyes:      General: No scleral icterus.  Cardiovascular:      Rate and Rhythm: Normal rate.   Pulmonary:      Breath sounds: No wheezing.   Abdominal:      Palpations: Abdomen is soft.      Tenderness: There is abdominal tenderness.   Musculoskeletal:         General: Swelling present.      Cervical back: No rigidity.   Skin:     Coloration: Skin is not jaundiced.   Neurological:      General: No focal deficit present.      Mental Status: She is alert.   Psychiatric:         Mood and Affect: Mood normal.       ROS:  HENT:  No sore throat  Respiratory:  No cough, no hemoptysis, no wheezing.    Cardiovascular:  + leg swelling.   Gastrointestinal:  No constipation, no diarrhea.   Musculoskeletal:  No back pain.   Neurological:  no dizziness, No headaches.   Psychiatric/Behavioral:  No agitation, no confusion.    Wounds: positive,   All other systems reviewed and are negative.        Medications:    Current Facility-Administered Medications:     acetaminophen (TYLENOL) tablet 975 mg, 975 mg, Oral, Q8H [facility], Pauly Grissom DO, 975 mg at 01/02/25 2228    albuterol (PROVENTIL HFA,VENTOLIN HFA) inhaler 2 puff, 2 puff, Inhalation, Q6H PRN, Kelvin Sheppard DO    albuterol inhalation solution 2.5 mg, 2.5 mg, Nebulization, Q4H PRN, Park Mitchell MD, 2.5 mg at 01/03/25 1700    aluminum-magnesium hydroxide-simethicone (MAALOX) oral suspension 30 mL, 30 mL, Oral, Q6H PRN, Kelvin Sheppard DO    aspirin  (ECOTRIN LOW STRENGTH) EC tablet 81 mg, 81 mg, Oral, Daily, Kelvin Sheppard DO, 81 mg at 01/02/25 0820    bisacodyl (DULCOLAX) rectal suppository 10 mg, 10 mg, Rectal, Daily PRN, Kelvin Sheppard DO    [Held by provider] carvedilol (COREG) tablet 3.125 mg, 3.125 mg, Oral, BID With Meals, Sarai Mcknight MD, 3.125 mg at 01/02/25 1658    cyanocobalamin (VITAMIN B-12) tablet 1,000 mcg, 1,000 mcg, Oral, Daily, Kelvin Sheppard DO, 1,000 mcg at 01/02/25 0820    dicyclomine (BENTYL) capsule 10 mg, 10 mg, Oral, BID PRN, Kelvin Sheppard DO, 10 mg at 01/01/25 1824    fentaNYL (DURAGESIC) 25 mcg/hr TD 72 hr patch 25 mcg, 25 mcg, Transdermal, Q72H, May Sherry Monterroso MD, 25 mcg at 01/02/25 1135    folic acid (FOLVITE) tablet 1 mg, 1 mg, Per G Tube, Daily, Paul Ríos MD, 1 mg at 01/02/25 0820    gabapentin (NEURONTIN) capsule 300 mg, 300 mg, Per G Tube, HS, Pauly Grissom DO, 300 mg at 01/02/25 2229    heparin (porcine) 25,000 units in 0.45% NaCl 250 mL infusion (premix), 3-20 Units/kg/hr (Order-Specific), Intravenous, Titrated, Park Mitchell MD, Last Rate: 15.4 mL/hr at 01/04/25 0629, 17.1 Units/kg/hr at 01/04/25 0629    HYDROmorphone (DILAUDID) injection 1 mg, 1 mg, Intravenous, Q3H PRN, JASON Paez, 1 mg at 01/04/25 1010    insulin glargine (LANTUS) subcutaneous injection 10 Units 0.1 mL, 10 Units, Subcutaneous, HS, JASON Paez, 10 Units at 01/03/25 2138    insulin lispro (HumALOG/ADMELOG) 100 units/mL subcutaneous injection 1-5 Units, 1-5 Units, Subcutaneous, TID AC, 2 Units at 01/04/25 0745 **AND** Fingerstick Glucose (POCT), , , TID AC, Kelvin Sheppard,     insulin lispro (HumALOG/ADMELOG) 100 units/mL subcutaneous injection 5 Units, 5 Units, Subcutaneous, TID With Meals, Paul Ríos MD, 5 Units at 01/02/25 1658    iohexol (OMNIPAQUE) 240 MG/ML solution 50 mL, 50 mL, Oral, Once in imaging, Bj Jones MD    iohexol (OMNIPAQUE) 240 MG/ML solution 50 mL, 50 mL, Oral, Once in imaging, Park Mitchell MD     ipratropium (ATROVENT) 0.02 % inhalation solution 0.5 mg, 0.5 mg, Nebulization, TID, Park Mitchell MD, 0.5 mg at 01/04/25 0820    levalbuterol (XOPENEX) inhalation solution 1.25 mg, 1.25 mg, Nebulization, TID, Park Mitchell MD, 1.25 mg at 01/04/25 0820    levothyroxine tablet 150 mcg, 150 mcg, Oral, Daily, Kelvin Sheppard DO, 150 mcg at 01/01/25 0612    metoprolol (LOPRESSOR) injection 2.5 mg, 2.5 mg, Intravenous, Q12H, Park Mitchell MD, 2.5 mg at 01/04/25 0541    naloxone (NARCAN) 0.04 mg/mL syringe 0.04 mg, 0.04 mg, Intravenous, Q1MIN PRN, JASON Cooley    nitroglycerin (NITROSTAT) SL tablet 0.4 mg, 0.4 mg, Sublingual, Q5 Min PRN, Kelvin Sheppard DO    omeprazole (PRILOSEC) suspension 2 mg/mL, 40 mg, Per G Tube, Daily, Kelvin Sheppard DO, 40 mg at 01/01/25 0612    ondansetron (ZOFRAN) injection 4 mg, 4 mg, Intravenous, Q6H PRN, Kelvin Sheppard DO, 4 mg at 01/01/25 1810    oxyCODONE (ROXICODONE) oral solution 5 mg, 5 mg, Oral, Q4H PRN, 5 mg at 01/02/25 0057 **OR** oxyCODONE (ROXICODONE) oral solution 10 mg, 10 mg, Oral, Q4H PRN, JASON Cooley, 10 mg at 01/02/25 2231    polyethylene glycol (MIRALAX) packet 17 g, 17 g, Oral, Daily, Kelvin Sheppard DO, 17 g at 12/29/24 0828    senna oral syrup 17.6 mg, 17.6 mg, Oral, BID, Kelvin Sheppard DO, 17.6 mg at 01/02/25 1754      Lab Results: I have reviewed the following results:  Results from last 7 days   Lab Units 01/04/25  0217 01/03/25  0629 01/02/25  0511 01/01/25  0247 12/31/24  0914 12/31/24  0320 12/30/24  0841 12/29/24  0456   WBC Thousand/uL 9.35 6.88 6.16 5.54 5.93 5.40  --   --    HEMOGLOBIN g/dL 8.1* 7.4* 7.4* 7.4* 6.8* 6.7*  --   --    HEMATOCRIT % 27.1* 24.7* 23.6* 23.0* 21.3* 21.3*  --   --    PLATELETS Thousands/uL 196 177 164 146* 151 152  --   --    POTASSIUM mmol/L 4.4 3.9 3.6 3.5  --  3.8 3.9 4.1   CHLORIDE mmol/L 88* 86* 86* 87*  --  86* 85* 85*   CO2 mmol/L 38* 38* 36* 36*  --  36* 35* 36*   BUN mg/dL 120* 113* 108* 91*  --  92* 88* 86*  "  CREATININE mg/dL 1.18 1.21 1.27 1.11  --  1.33* 1.31* 1.43*   CALCIUM mg/dL 9.5 9.2 8.8 8.6  --  8.4 8.5 8.8   MAGNESIUM mg/dL 2.9*  --  2.5 2.5  --  2.5 2.4  --    PHOSPHORUS mg/dL 4.6* 3.7 3.1 2.9  --  3.2 3.3  --    ALBUMIN g/dL 3.6  --   --   --   --   --   --   --        Administrative Statements     Portions of the record may have been created with voice recognition software. Occasional wrong word or \"sound a like\" substitutions may have occurred due to the inherent limitations of voice recognition software. Read the chart carefully and recognize, using context, where substitutions have occurred.If you have any questions, please contact the dictating provider.  "

## 2025-01-03 NOTE — ASSESSMENT & PLAN NOTE
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 CLD as tolerated.   Today with bilious drainage at GJ site, CT a/p pending

## 2025-01-03 NOTE — PROGRESS NOTES
"Progress Note - Hospitalist   Name: Lety Botello 64 y.o. female I MRN: [id number]  Unit/Bed#: [facility] 511-01 I Date of Admission: 12/9/2024   Date of Service: 1/3/2025 I Hospital Day: 25    Assessment & Plan  Encounter for gastrojejunal (GJ) tube placement issues  Patient with recent (12/3/24) PEG-J placement, after she had admission for recurrent SBO, also found to have jejunal stricture significant for recurrent adenocarcinoma of GYN primary, also component of gastroparesis  Patient was admitted on 12/9/24 with abdominal pain and unable to flush J portion  CT revealed, \"1.  Persistent third spacing with slight increase in moderate volume ascites, and no significant change in moderate size right and small left pleural effusions with bibasilar atelectasis. 2.  New percutaneous enteric tube with tip in the distal duodenum.\"  GI following. S/p EGD 12/10 with J-tube exchange, postop patient had worsening hypoxia required transfer to ICU and using BiPAP  Had concern for possible infection surrounding J-tube site, completed 7 days of ceftriaxone and vancomycin on 12/17  developed clogged J-tube,   Status post EGD on 12/13 with J-tube replacement   Patient with leakage around the PEG tube site.  GI reevaluated.  Okay to continue with tube feeding-current on antibiotics.  Culture was growing Klebsiella from 12/25- completed 5 days of Ancef on 12/29  Resumed on tube feeding  On clear liquid diet per speech/swallow therapist  G tube port is working sufficiently per gastroenterology  Tube feeding changed to Nepro due to hyperkalemia and hyperglycemia after discussion with nutrition  Patient continues to be lethargic, but arousable, CT head from 1/2/25 showed: No acute intracranial hemorrhage. A few scattered periventricular hypodensities are indeterminate and most likely microangiopathy. Recommend contrast-enhanced MRI of the brain or post contrast head CT to assess for pathologic enhancement/small metastases. No large mass " "lesions. Mild to moderate sphenoid sinusitis.\"   Noted copious amount of drainage from around J-tube; CT of abdomen done 1/3/25: good position of the current tube  Surgery suggested further management of J-tube as per GI, recommended to change tube to PEGJ; GI consulted, will evaluate patient tomorrow    Abdominal pain  Patient with recent hospitalization due to small bowel obstruction s/p exploratory laparotomy with small bowel resection on 11/22/2024  presented with worsening abdominal pain and distention  Abdominal pelvis CT scan, negative for obstruction, stable bilateral pleural effusion and anasarca  Continue supportive care  Coronado catheter placed due to retention  S/p paracentesis 12/18 for ascites 5.5 L  Monitor Bms  Palliative care is following and adjusting analgesics  status post paracentesis 12/30.  4.6 L out, culture- no growth up to date.  PMN count is not high enough to suspect SBP  Anemia  Received IV iron x 2  Vitamin B12 and folate deficiency  Continue vitamin B12 and folate supplement  Transfuse 1 unit of PRBCs 12/18 and 12/23  Hg is 6.8 on 12/31 transfuse 1 U RBC  Continue to monitor hb closely, transfuse if less than 7      Acute respiratory failure with hypoxia (HCC)  Patient had worsening hypoxia post EGD on 12/10. Was satting mid-80s on 10L, Was given nebs, Lasix and subsequently placed on BiPAP and upgraded to critical care for further treatment  Has been on room air  Desaturated to 70s on 1/3/25 during CAT scan, required supplementation O2 up to 15 L, subsequently weaned down to 2 L  CT from 1/3/25 demonstrated bilateral pleural effusion, worse on the right, pulmonology consulted for further recommendations  Hypertension  Monitor blood pressures  Has been on carvedilol, Norvasc was added  Avoid hypotension  Blood pressure is on the lower side.  Decrease in the dose of Coreg.  Patient received albumin after the paracentesis  Needs to remain beta-blockers due to NSVT previously, patient " lethargic recently, did not take oral meds today, will have speech and swallow therapy to reevaluate; Coreg 3.125 mg changed to metoprolol 2.5 mg for now with holding parameters  History of pulmonary embolism  History of PE and DVT in May 2024  Has been on Eliquis 5 mg twice daily, switched to Lovenox during this admission  Given rising creatinine, will switch to heparin drip for now, continue to monitor hemoglobin closely (1/3/25)  NSVT (nonsustained ventricular tachycardia) (HCC)  25 run of nonsustained V. tach noted on telemetry, patient was asymptomatic  Cardiology consulted, placed on coreg  Cardiac echo with normal EF and mild aortic stenosis  Monitor  Cardiology signed off    Anasarca  Continue with albumin assisted diuresis.  Nephrology managing diuretics  Continue to monitor daily weights, BMPs, telemetry  nephrology following, recommendations appreciated  Hypothyroidism  Elevated TSH  Normal free T4 at 0.65  Patient was not receiving levothyroxine due to  PEG tube malfunctioning   Increase levothyroxine dose to 150 mcg daily  Repeat thyroid study in 4 to 6 weeks  abdominal rash  Located on the right lower abdomen with oozing and bleeding  Seen by dermatology who performed a biopsy  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, consistent with gynecologic/Mullerian origin   Discontinue valtrex  Outpatient follow up with medical oncology when stable  Hyponatremia  Hyponatremia likely multifactorial - due to volume overload  Coronado catheter placed due to urinary retention  Monitor closely with diuresis  Gradually improving with diuresis  nephrology following, recommendations appreciated  Coronary artery disease involving native coronary artery  History of MI in 2016 status post KARY  Stable  Type 2 diabetes mellitus (HCC)  Lab Results   Component Value Date    HGBA1C 6.9 (H) 11/05/2024       Recent Labs     01/02/25  1602 01/02/25  2059 01/03/25  0628 01/03/25  1052   POCGLU 168* 190* 98 225*       Blood  Sugar Average: Last 72 hrs:  (P) [id number]    Farxiga on hold while inpatient  Hyperglycemia secondary to steroids and tube feeding  Continue Lantus 20 Unightly, continue Humalog 5 U tid, adjust the doses as needed  Continue SSI  Endometrial cancer (HCC)  History of endometrial cancer noted  Gastroparesis    With PEG-J as above  GI following  Morbid obesity (HCC)  Body mass index is 49.73 kg/m².  Encourage lifestyle modification and weight loss once acute issues improved/resolved  Hyperkalemia    TERRA (acute kidney injury) (HCC)      VTE Pharmacologic Prophylaxis: VTE Score: 7 High Risk (Score >/= 5) - Pharmacological DVT Prophylaxis Ordered: heparin drip. Sequential Compression Devices Ordered.    Mobility:   Basic Mobility Inpatient Raw Score: 6  JH-HLM Goal: 2: Bed activities/Dependent transfer  JH-HLM Achieved: 2: Bed activities/Dependent transfer  JH-HLM Goal achieved. Continue to encourage appropriate mobility.    Patient Centered Rounds: I performed bedside rounds with nursing staff today.   Discussions with Specialists or Other Care Team Provider: nephrology, surgery, pulmonology, case management    Education and Discussions with Family / Patient:  palliative care discussed with the niece today.     Current Length of Stay: 25 day(s)  Current Patient Status: Inpatient   Certification Statement: The patient will continue to require additional inpatient hospital stay due to above  Discharge Plan:  pending clinical improvement    Code Status: Level 3 - DNAR and DNI    Subjective   Patient seen examined.  She is lethargic, opens eyes to voice but does not want to answer questions.    Objective :  Temp:  [97.2 °F (36.2 °C)-97.6 °F (36.4 °C)] 97.6 °F (36.4 °C)  HR:  [86-92] 88  BP: (126-167)/(54-69) 167/69  Resp:  [16-18] 17  SpO2:  [94 %-96 %] 96 %  O2 Device: Nasal cannula  Nasal Cannula O2 Flow Rate (L/min):  [2.5 L/min-3 L/min] 2.5 L/min    Body mass index is 49.73 kg/m².     Input and Output Summary  (last 24 hours):     Intake/Output Summary (Last 24 hours) at 1/3/2025 1610  Last data filed at 1/3/2025 1451  Gross per 24 hour   Intake 2197 ml   Output 2055 ml   Net 142 ml       Physical Exam  Constitutional:       Appearance: She is obese.   Cardiovascular:      Rate and Rhythm: Normal rate and regular rhythm.      Heart sounds: No murmur heard.  Pulmonary:      Effort: Respiratory distress (on 2L) present.      Breath sounds: Rales present.   Abdominal:      General: There is distension.      Tenderness: There is abdominal tenderness.      Comments: Abdominal wall wounds covered in dressing; J tube in place, ostomy bag around it draining liquid brown drainage   Musculoskeletal:         General: Swelling present.   Neurological:      Mental Status: She is alert.           Lines/Drains:  Lines/Drains/Airways       Active Status       Name Placement date Placement time Site Days    PICC Line 12/18/24 12/18/24  0922  --  16    Gastrostomy/Enterostomy PEG- Jejunostomy 20 Fr. LUQ 12/03/24  1553  LUQ  31    Urethral Catheter Latex 16 Fr. 12/26/24  2335  Latex  7                  Urinary Catheter:  Goal for removal: Voiding trial when ambulation improves         Central Line:  Goal for removal: Will discontinue when peripheral access obtained.                Lab Results: I have reviewed the following results:   Results from last 7 days   Lab Units 01/03/25  0629 01/02/25  0511 01/01/25  0247   WBC Thousand/uL 6.88 6.16 5.54   HEMOGLOBIN g/dL 7.4* 7.4* 7.4*   HEMATOCRIT % 24.7* 23.6* 23.0*   PLATELETS Thousands/uL 177 164 146*   BANDS PCT %  --  6  --    SEGS PCT %  --   --  79*   LYMPHO PCT %  --  6* 7*   MONO PCT %  --  6 9   EOS PCT %  --  3 3     Results from last 7 days   Lab Units 01/03/25  0629   SODIUM mmol/L 131*   POTASSIUM mmol/L 3.9   CHLORIDE mmol/L 86*   CO2 mmol/L 38*   BUN mg/dL 113*   CREATININE mg/dL 1.21   ANION GAP mmol/L 7   CALCIUM mg/dL 9.2   GLUCOSE RANDOM mg/dL 219*         Results from last 7  days   Lab Units 01/03/25  1052 01/03/25  0628 01/02/25 2059 01/02/25  1602 01/02/25  1100 01/02/25  0618 01/01/25 2049 01/01/25  1548 01/01/25  1059 01/01/25  0634 12/31/24 2003 12/31/24  1540   POC GLUCOSE mg/dl 225* 98 190* 168* 189* 212* 181* 159* 139 151* 173* 145*               Recent Cultures (last 7 days):   Results from last 7 days   Lab Units 12/30/24  1132   GRAM STAIN RESULT  1+ Polys  No organisms seen   BODY FLUID CULTURE, STERILE  No growth       Imaging Results Review: I reviewed radiology reports from this admission including: CT abdomen/pelvis.      Last 24 Hours Medication List:     Current Facility-Administered Medications:     acetaminophen (TYLENOL) tablet 975 mg, Q8H SAM    albuterol (PROVENTIL HFA,VENTOLIN HFA) inhaler 2 puff, Q6H PRN    albuterol inhalation solution 2.5 mg, Q4H PRN    aluminum-magnesium hydroxide-simethicone (MAALOX) oral suspension 30 mL, Q6H PRN    aspirin (ECOTRIN LOW STRENGTH) EC tablet 81 mg, Daily    bisacodyl (DULCOLAX) rectal suppository 10 mg, Daily PRN    carvedilol (COREG) tablet 3.125 mg, BID With Meals    cyanocobalamin (VITAMIN B-12) tablet 1,000 mcg, Daily    dicyclomine (BENTYL) capsule 10 mg, BID PRN    enoxaparin (LOVENOX) subcutaneous injection 120 mg, Q12H SAM    fentaNYL (DURAGESIC) 25 mcg/hr TD 72 hr patch 25 mcg, Q72H    folic acid (FOLVITE) tablet 1 mg, Daily    gabapentin (NEURONTIN) capsule 300 mg, HS    HYDROmorphone (DILAUDID) injection 0.5 mg, Q3H PRN    insulin glargine (LANTUS) subcutaneous injection 20 Units 0.2 mL, HS    insulin lispro (HumALOG/ADMELOG) 100 units/mL subcutaneous injection 1-5 Units, TID AC **AND** Fingerstick Glucose (POCT), TID AC    insulin lispro (HumALOG/ADMELOG) 100 units/mL subcutaneous injection 5 Units, TID With Meals    iohexol (OMNIPAQUE) 240 MG/ML solution 50 mL, Once in imaging    iohexol (OMNIPAQUE) 240 MG/ML solution 50 mL, Once in imaging    ipratropium (ATROVENT) 0.02 % inhalation solution 0.5 mg, TID     levalbuterol (XOPENEX) inhalation solution 1.25 mg, TID    levothyroxine tablet 150 mcg, Daily    naloxone (NARCAN) 0.04 mg/mL syringe 0.04 mg, Q1MIN PRN    nitroglycerin (NITROSTAT) SL tablet 0.4 mg, Q5 Min PRN    omeprazole (PRILOSEC) suspension 2 mg/mL, Daily    ondansetron (ZOFRAN) injection 4 mg, Q6H PRN    oxyCODONE (ROXICODONE) oral solution 5 mg, Q4H PRN **OR** oxyCODONE (ROXICODONE) oral solution 10 mg, Q4H PRN    polyethylene glycol (MIRALAX) packet 17 g, Daily    senna oral syrup 17.6 mg, BID    Administrative Statements   Today, Patient Was Seen By: Park Mitchell MD      **Please Note: This note may have been constructed using a voice recognition system.**

## 2025-01-03 NOTE — ASSESSMENT & PLAN NOTE
Monitor blood pressures  Has been on carvedilol, Norvasc was added  Avoid hypotension  Blood pressure is on the lower side.  Decrease in the dose of Coreg.  Patient received albumin after the paracentesis  Needs to remain beta-blockers due to NSVT previously, patient lethargic recently, did not take oral meds today, will have speech and swallow therapy to reevaluate; Coreg 3.125 mg changed to metoprolol 2.5 mg for now with holding parameters

## 2025-01-03 NOTE — ASSESSMENT & PLAN NOTE
Lab Results   Component Value Date    HGBA1C 6.9 (H) 11/05/2024       Recent Labs     01/01/25  2049 01/02/25  0618 01/02/25  1100 01/02/25  1602   POCGLU 181* 212* 189* 168*       Blood Sugar Average: Last 72 hrs:  (P) 160.1875    Farxiga on hold while inpatient  Hyperglycemia secondary to steroids and tube feeding  Continue Lantus 20 Unightly, continue Humalog 5 U tid, adjust the doses as needed  Continue SSI

## 2025-01-03 NOTE — PLAN OF CARE
Problem: Prexisting or High Potential for Compromised Skin Integrity  Goal: Skin integrity is maintained or improved  Description: INTERVENTIONS:  - Identify patients at risk for skin breakdown  - Assess and monitor skin integrity  - Assess and monitor nutrition and hydration status  - Monitor labs   - Assess for incontinence   - Turn and reposition patient  - Assist with mobility/ambulation  - Relieve pressure over bony prominences  - Avoid friction and shearing  - Provide appropriate hygiene as needed including keeping skin clean and dry  - Evaluate need for skin moisturizer/barrier cream  - Collaborate with interdisciplinary team   - Patient/family teaching  - Consider wound care consult   Outcome: Progressing     Problem: PAIN - ADULT  Goal: Verbalizes/displays adequate comfort level or baseline comfort level  Description: Interventions:  - Encourage patient to monitor pain and request assistance  - Assess pain using appropriate pain scale  - Administer analgesics based on type and severity of pain and evaluate response  - Implement non-pharmacological measures as appropriate and evaluate response  - Consider cultural and social influences on pain and pain management  - Notify physician/advanced practitioner if interventions unsuccessful or patient reports new pain  Outcome: Progressing     Problem: INFECTION - ADULT  Goal: Absence or prevention of progression during hospitalization  Description: INTERVENTIONS:  - Assess and monitor for signs and symptoms of infection  - Monitor lab/diagnostic results  - Monitor all insertion sites, i.e. indwelling lines, tubes, and drains  - Monitor endotracheal if appropriate and nasal secretions for changes in amount and color  - Collins appropriate cooling/warming therapies per order  - Administer medications as ordered  - Instruct and encourage patient and family to use good hand hygiene technique  - Identify and instruct in appropriate isolation precautions for  identified infection/condition  Outcome: Progressing     Problem: SAFETY ADULT  Goal: Patient will remain free of falls  Description: INTERVENTIONS:  - Educate patient/family on patient safety including physical limitations  - Instruct patient to call for assistance with activity   - Consult OT/PT to assist with strengthening/mobility   - Keep Call bell within reach  - Keep bed low and locked with side rails adjusted as appropriate  - Keep care items and personal belongings within reach  - Initiate and maintain comfort rounds  - Make Fall Risk Sign visible to staff  - Offer Toileting every 2 Hours, in advance of need  - Initiate/Maintain bed/chair alarm  - Obtain necessary fall risk management equipment: nonskid footwear  - Apply yellow socks and bracelet for high fall risk patients  - Consider moving patient to room near nurses station  Outcome: Progressing  Goal: Maintain or return to baseline ADL function  Description: INTERVENTIONS:  -  Assess patient's ability to carry out ADLs; assess patient's baseline for ADL function and identify physical deficits which impact ability to perform ADLs (bathing, care of mouth/teeth, toileting, grooming, dressing, etc.)  - Assess/evaluate cause of self-care deficits   - Assess range of motion  - Assess patient's mobility; develop plan if impaired  - Assess patient's need for assistive devices and provide as appropriate  - Encourage maximum independence but intervene and supervise when necessary  - Involve family in performance of ADLs  - Assess for home care needs following discharge   - Consider OT consult to assist with ADL evaluation and planning for discharge  - Provide patient education as appropriate  Outcome: Progressing  Goal: Maintains/Returns to pre admission functional level  Description: INTERVENTIONS:  - Perform AM-PAC 6 Click Basic Mobility/ Daily Activity assessment daily.  - Set and communicate daily mobility goal to care team and patient/family/caregiver.   -  Collaborate with rehabilitation services on mobility goals if consulted  - Perform Range of Motion 3 times a day.  - Reposition patient every 2 hours.  - Dangle patient 3 times a day  - Stand patient 3 times a day  - Ambulate patient 3 times a day  - Out of bed to chair 3 times a day   - Out of bed for meals 3 times a day  - Out of bed for toileting  - Record patient progress and toleration of activity level   Outcome: Progressing     Problem: DISCHARGE PLANNING  Goal: Discharge to home or other facility with appropriate resources  Description: INTERVENTIONS:  - Identify barriers to discharge w/patient and caregiver  - Arrange for needed discharge resources and transportation as appropriate  - Identify discharge learning needs (meds, wound care, etc.)  - Arrange for interpretive services to assist at discharge as needed  - Refer to Case Management Department for coordinating discharge planning if the patient needs post-hospital services based on physician/advanced practitioner order or complex needs related to functional status, cognitive ability, or social support system  Outcome: Progressing     Problem: Knowledge Deficit  Goal: Patient/family/caregiver demonstrates understanding of disease process, treatment plan, medications, and discharge instructions  Description: Complete learning assessment and assess knowledge base.  Interventions:  - Provide teaching at level of understanding  - Provide teaching via preferred learning methods  Outcome: Progressing     Problem: Nutrition/Hydration-ADULT  Goal: Nutrient/Hydration intake appropriate for improving, restoring or maintaining nutritional needs  Description: Monitor and assess patient's nutrition/hydration status for malnutrition. Collaborate with interdisciplinary team and initiate plan and interventions as ordered.  Monitor patient's weight and dietary intake as ordered or per policy. Utilize nutrition screening tool and intervene as necessary. Determine  patient's food preferences and provide high-protein, high-caloric foods as appropriate.     INTERVENTIONS:  - Monitor oral intake, urinary output, labs, and treatment plans  - Assess nutrition and hydration status and recommend course of action  - Evaluate amount of meals eaten  - Assist patient with eating if necessary   - Allow adequate time for meals  - Recommend/ encourage appropriate diets, oral nutritional supplements, and vitamin/mineral supplements  - Order, calculate, and assess calorie counts as needed  - Recommend, monitor, and adjust tube feedings and TPN/PPN based on assessed needs  - Assess need for intravenous fluids  - Provide specific nutrition/hydration education as appropriate  - Include patient/family/caregiver in decisions related to nutrition  Outcome: Progressing     Problem: GASTROINTESTINAL - ADULT  Goal: Minimal or absence of nausea and/or vomiting  Description: INTERVENTIONS:  - Administer IV fluids if ordered to ensure adequate hydration  - Maintain NPO status until nausea and vomiting are resolved  - Nasogastric tube if ordered  - Administer ordered antiemetic medications as needed  - Provide nonpharmacologic comfort measures as appropriate  - Advance diet as tolerated, if ordered  - Consider nutrition services referral to assist patient with adequate nutrition and appropriate food choices  Outcome: Progressing  Goal: Maintains or returns to baseline bowel function  Description: INTERVENTIONS:  - Assess bowel function  - Encourage oral fluids to ensure adequate hydration  - Administer IV fluids if ordered to ensure adequate hydration  - Administer ordered medications as needed  - Encourage mobilization and activity  - Consider nutritional services referral to assist patient with adequate nutrition and appropriate food choices  Outcome: Progressing  Goal: Maintains adequate nutritional intake  Description: INTERVENTIONS:  - Monitor percentage of each meal consumed  - Identify factors  contributing to decreased intake, treat as appropriate  - Assist with meals as needed  - Monitor I&O, weight, and lab values if indicated  - Obtain nutrition services referral as needed  Outcome: Progressing  Goal: Oral mucous membranes remain intact  Description: INTERVENTIONS  - Assess oral mucosa and hygiene practices  - Implement preventative oral hygiene regimen  - Implement oral medicated treatments as ordered  - Initiate Nutrition services referral as needed  Outcome: Progressing     Problem: GENITOURINARY - ADULT  Goal: Maintains or returns to baseline urinary function  Description: INTERVENTIONS:  - Assess urinary function  - Encourage oral fluids to ensure adequate hydration if ordered  - Administer IV fluids as ordered to ensure adequate hydration  - Administer ordered medications as needed  - Offer frequent toileting  - Follow urinary retention protocol if ordered  Outcome: Progressing  Goal: Absence of urinary retention  Description: INTERVENTIONS:  - Assess patient’s ability to void and empty bladder  - Monitor I/O  - Bladder scan as needed  - Discuss with physician/AP medications to alleviate retention as needed  - Discuss catheterization for long term situations as appropriate  Outcome: Progressing

## 2025-01-03 NOTE — ASSESSMENT & PLAN NOTE
Patient has a pleural effusion, growing in amount compared to before  Most recent CT abdomen/pelvis showed increase in pleural effusion amount bilaterally, more on the right, and atelectasis of the lower lobes especially on the right  Unclear etiology, but likely multifactorial due to anasarca and kidney failure, as well as possibility for malignant pleural effusion given the history  On 1/3/2025 no significant pocket for drainage of the fluid

## 2025-01-03 NOTE — ASSESSMENT & PLAN NOTE
Patient with recent hospitalization due to small bowel obstruction s/p exploratory laparotomy with small bowel resection on 11/22/2024  presented with worsening abdominal pain and distention  Abdominal pelvis CT scan, negative for obstruction, stable bilateral pleural effusion and anasarca  Continue supportive care  Coronado catheter placed due to retention  S/p paracentesis 12/18 for ascites 5.5 L  Monitor Bms  Palliative care is following and adjusting analgesics  status post paracentesis 12/30.  4.6 L out, culture- no growth up to date.  PMN count is not high enough to suspect SBP

## 2025-01-04 PROBLEM — Z51.5 END OF LIFE CARE: Status: ACTIVE | Noted: 2025-01-01

## 2025-01-04 NOTE — ASSESSMENT & PLAN NOTE
Palliative Diagnosis: endometrial cancer    Goals: PSC spoke with HCP on Friday, pt made Level 3 with continued medical cares    Decisional apparatus:  Patient does not have capacity on exam today.  If capacity is lost, patient's substitute decision maker would default to Sharda Carver per AD on file  ER contacts:  Sharda Carver (Niece)  [phone] (Work Phone)   Advance Directive/Living Will/POLST: on file and reviewed    Social Support:  Patient's support system:   Supportive listening provided    Follow-up  We appreciate the opportunity to participate in this patient's care.   We will continue to follow while admitted.    Please do not hesitate to contact our on-call provider through [facility] Secure Chat or contact [phone] should there be an acute change or other symptom control concerns.

## 2025-01-04 NOTE — PROGRESS NOTES
Progress Note - Gastroenterology   Name: Lety Botello 64 y.o. 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 J tube malfunctioned again leading to replacement on 12/13. Has been functioning and tolerating feeds since replacement, though does report intermittent generalized abdominal pain as well as nausea and vomiting with concern for delayed motility and gastric outlet obstruction with difficulty venting from G port due to large J extension.  Previously completed 7 days of antibiotics due to concern for infection around PEG site. GI called back 12/20 for poorly venting G port. Able to pull back and vent, but J-extension is a 10 Fr, which is limiting venting somewhat. Pt with some irritation around PEG site given lateral traction again from binder as well as anemia due to chronic illness.  Patient now with dark bilious secretions around PEG site requiring ostomy bag to be placed over top of it.       Plan:  PEG-J functioning appropriately tolerating tube feeds  Continue Tube feeds through J tube and medications PO  Coronado bag to gravity from G port intermittently for venting   Recommend judicious use of opioids as this is contributing to generalized slowing of her GI system  Change dressing daily and when soiled, ensure tube is taped straight so that it prevents traction  Additional pain and symptom management per primary team   Morbid obesity (HCC)    Abdominal pain    Anasarca    TERRA (acute kidney injury) ([facility])    Pleural effusion      I have discussed the  above management plan in detail with the primary service.     Subjective   Patient with lethargic responding at most with yes or no answers or questions.  She demonstrated regular PEG tube site with dark bilious contents being excreted.  Tube feeds on hold this morning.  After discussion by primary team patient now electing for comfort care.    Objective :  Temp:  [97.2 °F (36.2 °C)-98.3 °F (36.8 °C)] 97.2 °F (36.2 °C)  HR:  [84-86] 85  BP: (135-167)/(63-72) 135/63  Resp:  [17-28] 26  SpO2:  [92 %-97 %] 94 %  O2 Device: Nasal cannula  Nasal Cannula O2 Flow Rate (L/min):  [3 L/min-5 L/min] 5 L/min    Physical Exam  Vitals and nursing note reviewed.   Constitutional:       Appearance: She is well-developed.   Cardiovascular:      Rate and Rhythm: Normal rate and regular rhythm.   Abdominal:      General: Abdomen is flat.      Palpations: Abdomen is soft.      Tenderness: There is abdominal tenderness.   Musculoskeletal:         General: Swelling present.      Cervical back: Neck supple.   Skin:     General: Skin is warm and dry.      Capillary Refill: Capillary refill takes less than 2 seconds.   Psychiatric:         Mood and Affect: Mood normal.         Lab Results: I have reviewed the following results:none    Imaging Results Review: No pertinent imaging studies reviewed.  Other Study Results Review: No additional pertinent studies reviewed.    Administrative Statements   I have spent a total time of 32 minutes in caring for this patient on the day of the visit/encounter including Diagnostic results, Prognosis, Risks and benefits of tx options, Instructions for management, and Patient and family education.

## 2025-01-04 NOTE — CASE MANAGEMENT
Case Management Discharge Planning Note    Patient name Lety Botello  [facility] [id number]  : 1960 Date 2025       Current Admission Date: 2024  Current Admission Diagnosis:Encounter for gastrojejunal (GJ) tube placement issues   Patient Active Problem List    Diagnosis Date Noted Date Diagnosed    End of life care 2025     Pleural effusion 2025     TERRA (acute kidney injury) ([facility]) 2024     Hyperkalemia 2024     Anasarca 2024     NSVT (nonsustained ventricular tachycardia) ([facility]) 2024     Coronary artery disease involving native coronary artery 2024     abdominal rash 2024     Acute respiratory failure with hypoxia ([facility]) 12/10/2024     Hypomagnesemia 12/10/2024     Encounter for gastrojejunal (GJ) tube placement issues 2024     Hyponatremia 2024     Anemia 2024     Goals of care, counseling/discussion 2024     Palliative care encounter 2024     Hypotension after procedure 2024     Shingles 2024     On total parenteral nutrition (TPN) 2024     Abdominal pain 2024     Nausea & vomiting 2024     Constipation 11/10/2024     Morbid obesity ([facility]) 2024     Gastroparesis 2024     Gastric dilation 2024     History of pulmonary embolism 2024     Hypothyroidism 2024     Mild intermittent asthma 2024     Hypertension      Type 2 diabetes mellitus (HCC)      Endometrial cancer ([facility])      Obesity      SBO (small bowel obstruction) ([facility]) 2024       LOS (days): 26  Geometric Mean LOS (GMLOS) (days):   Days to GMLOS:     OBJECTIVE:  Risk of Unplanned Readmission Score: 41.28         Current admission status: Inpatient   Preferred Pharmacy:   [id number] - [place] - [address]  [address] [address]  Phone: [phone] Fax: [phone]    Primary Care Provider: Tai Martinez    Primary Insurance:  [employer] [facility]  Secondary Insurance:     DISCHARGE DETAILS:    Discharge planning discussed with:: BOB Robin  Freedom of Choice: Yes  Comments - Freedom of Choice: Discussed FOC  CM contacted family/caregiver?: Yes (niadriane and son)  Were Treatment Team discharge recommendations reviewed with patient/caregiver?: Yes  Did patient/caregiver verbalize understanding of patient care needs?: N/A- going to facility  Were patient/caregiver advised of the risks associated with not following Treatment Team discharge recommendations?: Yes    Contacts  Patient Contacts: Sharda Carver (Lupe)  Relationship to Patient:: Family  Contact Method: Phone  Phone Number: [phone]  Reason/Outcome: Continuity of Care, Emergency Contact, Discharge Planning    Other Referral/Resources/Interventions Provided:  Referral Comments: TC to umairSharda forrest, and son, who are both agreeable to [facility] hospice referral, referral entered in [facility]

## 2025-01-04 NOTE — PROGRESS NOTES
Progress Note - Palliative Care   Name: Lety Botello 64 y.o. female I MRN: [id number]  Unit/Bed#: [facility] 511-01 I Date of Admission: 12/9/2024   Date of Service: 1/4/2025 I Hospital Day: 26    Assessment & Plan  Abdominal pain  S/p paracentesis for 4620 cc (12/30)    Current Multimodal pain regimen:  Fentanyl 25 mcg increased on 12/27   Schedule Tylenol 975 mg TID  Gabapentin 300 mg HS   OxyIR 5mg Q4hrs PRN for moderate pain   OxyIR 10mg Q4hrs PRN for severe pain   IV dilaudid to 0.5 mg q3H PRN for BT pain  Pt was temporarily NPO on 1/3-1/4 and IV hydromorphone was increased to 1 mg - after 10:10 dose on 1/4, noted to be lethargic and more difficult to arouse, no respiratory depression noted and BP stable  Reduced dose to 0.5 mg q3H PRN  If NPO again, can consider increasing frequency if needed, would not recommend increasing to full 1mg     Failed therapies:  NA    Bowel regimen to prevent OIC: senna 17.6 mg bid, prn dulcolax suppository. Last bowel movement 12/29  Opioid agreement. Not on file, will need outpatient follow up  Endometrial cancer (HCC)  Recurrent endometrial cancer, initial diagnosis 2020  Follows with [facility] gyn onc  S/p FLORECITA SBO SLND 6/2020 c/b splenic laceration  NAYE 1/2023, though presented for a fall in 10/2023 was incidentally found to have RP lymphadenopathy  Bx 12/2023 confirm metastatic adenocarcinoma  S/p pelvic RT and treatment with femara  NAYE 5/2024  Presented 10/18, 11/5, 11/14 with SBO - s/p ex lap, small bowel resection, partial omentectomy, DAVID 11/22/24 with biopsy confirming metastatic adenocarcinoma  Encounter for gastrojejunal (GJ) tube placement issues  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 S/P FLORECITA/BSO  12/03/24 PEG placed   12/09 Admitted due to malfunctioning J tube  12/10 EGD done   12/13 - J tube malfunctioned again leading to replacement   1/3 - copious drainage around tube, CT A/P showed tube in good  position      1/4 - GI at bedside, feels drainage is likely due to ascites, states plans to okay resuming po as tolerated and meds via po/tube    Hypertension  BP stable  Currently on Norvasc 5 mg daily and Coreg 25 mg twice daily  Monitor BP  Pain control  Anasarca  Receiving IV Bumex and IV albumin  Palliative care encounter  Palliative Diagnosis: endometrial cancer    Goals: PSC spoke with HCP on Friday, pt made Level 3 with continued medical cares    Decisional apparatus:  Patient does not have capacity on exam today.  If capacity is lost, patient's substitute decision maker would default to Sharda Carver per AD on file  ER contacts:  Sharda Carver (Niece)  [phone] (Work Phone)   Advance Directive/Living Will/POLST: on file and reviewed    Social Support:  Patient's support system:   Supportive listening provided    Follow-up  We appreciate the opportunity to participate in this patient's care.   We will continue to follow while admitted.    Please do not hesitate to contact our on-call provider through [employer] Secure Chat or contact [phone] should there be an acute change or other symptom control concerns.      PDMP Review: I have reviewed the patient's controlled substance dispensing history in the Prescription Drug Monitoring Program in compliance with the [place] regulations before prescribing any controlled substances.    Subjective   24 hour history  Chart reviewed prior to visit  Noone present at today's visit    Patient was made n.p.o. on 1/3 due to concerns for copious drainage around GJ tube site.  CT showed stable placement of tube.  Due to inability to use oral oxycodone solution due to n.p.o. status, IV Dilaudid dose was increased by night team to 1 mg every 3 hours as needed.  She received 2 doses overnight.  Additional dose was administered at 1010 this morning and on reevaluation approximately 30 minutes after this dose, the patient was lethargic and difficult to arouse.  She was awake but  quickly fell back to sleep.  There were no signs of respiratory depression and her VS were stable.  During my visit today, GI was present at bedside assessing tube, felt that drainage is due to ascites and said they will be okay resuming use of tube for medications.  Prior Dilaudid dose resumed.    Objective :  Temp:  [97.2 °F (36.2 °C)-98.3 °F (36.8 °C)] 97.2 °F (36.2 °C)  HR:  [84-88] 85  BP: (132-167)/(62-72) 135/63  Resp:  [17-28] 26  SpO2:  [92 %-97 %] 92 %  O2 Device: Nasal cannula  Nasal Cannula O2 Flow Rate (L/min):  [2.5 L/min-4 L/min] 3 L/min    Physical Exam  Vitals and nursing note reviewed.   Constitutional:       General: She is not in acute distress.     Appearance: She is ill-appearing.      Comments: Arousable, but lethargic   Cardiovascular:      Rate and Rhythm: Normal rate. Rhythm irregular.   Pulmonary:      Effort: Pulmonary effort is normal. No bradypnea or respiratory distress.   Abdominal:      Comments: Dressing with copious brown drainage around G-J tube, ostomy bag placed at tube insertion site with brown drainage   Skin:     General: Skin is warm and moist.      Comments: Temp checked, pt afebrile   Neurological:      Mental Status: She is lethargic and disoriented.            Lab Results: I have reviewed the following results:  Lab Results   Component Value Date/Time    SODIUM 132 (L) 01/04/2025 02:17 AM    SODIUM 132 (L) 11/04/2024 03:33 AM    K 4.4 01/04/2025 02:17 AM    K 4.7 11/04/2024 03:33 AM     (H) 01/04/2025 02:17 AM    BUN 19 11/04/2024 03:33 AM    CREATININE 1.18 01/04/2025 02:17 AM    CREATININE 0.76 11/04/2024 03:33 AM    GLUC 258 (H) 01/04/2025 02:17 AM    GLUC 98 11/04/2024 03:33 AM    CALCIUM 9.5 01/04/2025 02:17 AM    CALCIUM 8.6 11/04/2024 03:33 AM    AST 5 (L) 01/04/2025 02:17 AM    AST 9 11/04/2024 03:33 AM    ALT 3 (L) 01/04/2025 02:17 AM    ALT 6 11/04/2024 03:33 AM    ALB 3.6 01/04/2025 02:17 AM    ALB 3.1 (L) 11/04/2024 03:33 AM    TP 6.0 (L) 01/04/2025  02:17 AM    TP 5.4 (L) 11/04/2024 03:33 AM    EGFR 49 01/04/2025 02:17 AM    EGFR 89 11/04/2024 03:33 AM    EGFR 97 12/11/2020 03:15 AM     Lab Results   Component Value Date/Time    HGB 8.1 (L) 01/04/2025 02:17 AM    WBC 9.35 01/04/2025 02:17 AM     01/04/2025 02:17 AM     12/01/2023 08:30 AM    INR 1.16 01/03/2025 04:37 PM    INR 1.1 12/01/2023 08:30 AM    PTT 55 (H) 01/04/2025 05:45 AM     Lab Results   Component Value Date/Time    WJV5HHOSTNXW 21.122 (H) 12/18/2024 03:02 PM       Code Status: Level 3 - DNAR and DNI  Advance Directive and Living Will: Yes    Power of :    POLST:      Administrative Statements   I have spent a total time of 20 minutes in caring for this patient on the day of the visit/encounter including Documenting in the medical record, Reviewing / ordering tests, medicine, procedures  , Obtaining or reviewing history  , and Communicating with other healthcare professionals .

## 2025-01-04 NOTE — ASSESSMENT & PLAN NOTE
64-year-old female with history of recurrent SBO 2/2 jejunal stricture from recurrent adenoCA gyn primary, s/p PEGJ 12/3/24 who presented on 12/9 due to malfunctioning J tube undergoing EGD 12/10 with ulcerated mucosa and pus below bumper, J tube looped in stomach which was replaced into distal duodenum. J tube malfunctioned again leading to replacement on 12/13. Has been functioning and tolerating feeds since replacement, though does report intermittent generalized abdominal pain as well as nausea and vomiting with concern for delayed motility and gastric outlet obstruction with difficulty venting from G port due to large J extension.  Previously completed 7 days of antibiotics due to concern for infection around PEG site. GI called back 12/20 for poorly venting G port. Able to pull back and vent, but J-extension is a 10 Fr, which is limiting venting somewhat. Pt with some irritation around PEG site given lateral traction again from binder as well as anemia due to chronic illness.  Patient now with dark bilious secretions around PEG site requiring ostomy bag to be placed over top of it.       Plan:  PEG-J functioning appropriately tolerating tube feeds  Continue Tube feeds through J tube and medications PO  Coronado bag to gravity from G port intermittently for venting   Recommend judicious use of opioids as this is contributing to generalized slowing of her GI system  Change dressing daily and when soiled, ensure tube is taped straight so that it prevents traction  Additional pain and symptom management per primary team

## 2025-01-04 NOTE — PROGRESS NOTES
"Progress Note - Hospitalist   Name: Lety Botello 64 y.o. female I MRN: [id number]  Unit/Bed#: [facility] 511-01 I Date of Admission: 12/9/2024   Date of Service: 1/4/2025 I Hospital Day: 26    Assessment & Plan  End of life care  Patient continues to decline with worsening respiratory status, worsening mental status, patient opens eyes to voice but she does answer questions, decreased urine output-patient's healthcare representative, Sharda Carver was contacted and patient was made a level 4 -comfort care    I also spoke to patient's son, Alexander as per Sharda's request ([phone]) to update him, he is in agreement with comfort care  Encounter for gastrojejunal (GJ) tube placement issues  Patient with recent (12/3/24) PEG-J placement, after she had admission for recurrent SBO, also found to have jejunal stricture significant for recurrent adenocarcinoma of GYN primary, also component of gastroparesis  Patient was admitted on 12/9/24 with abdominal pain and unable to flush J portion  CT revealed, \"1.  Persistent third spacing with slight increase in moderate volume ascites, and no significant change in moderate size right and small left pleural effusions with bibasilar atelectasis. 2.  New percutaneous enteric tube with tip in the distal duodenum.\"  GI following. S/p EGD 12/10 with J-tube exchange, postop patient had worsening hypoxia required transfer to ICU and using BiPAP  Had concern for possible infection surrounding J-tube site, completed 7 days of ceftriaxone and vancomycin on 12/17  developed clogged J-tube,   Status post EGD on 12/13 with J-tube replacement   Patient with leakage around the PEG tube site.  GI reevaluated.  Okay to continue with tube feeding-current on antibiotics.  Culture was growing Klebsiella from 12/25- completed 5 days of Ancef on 12/29  Resumed on tube feeding  On clear liquid diet per speech/swallow therapist  G tube port is working sufficiently per gastroenterology  Tube feeding changed to " "Nepro due to hyperkalemia and hyperglycemia after discussion with nutrition  Patient continues to be lethargic, but arousable, CT head from 1/2/25 showed: No acute intracranial hemorrhage. A few scattered periventricular hypodensities are indeterminate and most likely microangiopathy. Recommend contrast-enhanced MRI of the brain or post contrast head CT to assess for pathologic enhancement/small metastases. No large mass lesions. Mild to moderate sphenoid sinusitis.\"   Noted copious amount of drainage from around J-tube; CT of abdomen done 1/3/25: good position of the current tube, surgery and GI involved  On 1/4/25 patient was made comfort care by POA    Abdominal pain  Patient with recent hospitalization due to small bowel obstruction s/p exploratory laparotomy with small bowel resection on 11/22/2024  presented with worsening abdominal pain and distention  Abdominal pelvis CT scan, negative for obstruction, stable bilateral pleural effusion and anasarca  Continue supportive care  Coronado catheter placed due to retention  S/p paracentesis 12/18 for ascites 5.5 L  Monitor Bms  Palliative care is following and adjusting analgesics  status post paracentesis 12/30.  4.6 L out, culture- no growth up to date.  PMN count is not high enough to suspect SBP  Anemia  Received IV iron x 2  Vitamin B12 and folate deficiency  Continue vitamin B12 and folate supplement  Transfuse 1 unit of PRBCs 12/18 and 12/23  Hg is 6.8 on 12/31 transfuse 1 U RBC  Patient is now comfort care      Acute respiratory failure with hypoxia (HCC)  Patient had worsening hypoxia post EGD on 12/10. Was satting mid-80s on 10L, Was given nebs, Lasix and subsequently placed on BiPAP and upgraded to critical care for further treatment  Has been on room air  Desaturated to 70s on 1/3/25 during CAT scan, required supplementation O2 up to 15 L, subsequently weaned down to 2 L  CT from 1/3/25 demonstrated bilateral pleural effusion, worse on the right, " pulmonology consulted for further recommendations-no large enough pocket to drain fluid  Patient was made comfort care on 1/4/25  Hypertension  Monitor blood pressures  Has been on carvedilol, Norvasc was added  Avoid hypotension  Blood pressure is on the lower side.  Decrease in the dose of Coreg.  Patient received albumin after the paracentesis  Needs to remain beta-blockers due to NSVT previously, patient lethargic recently, did not take oral meds today, will have speech and swallow therapy to reevaluate; Coreg 3.125 mg changed to metoprolol 2.5 mg for now with holding parameters  1/4/25: Patient made comfort care  History of pulmonary embolism  History of PE and DVT in May 2024  Has been on Eliquis 5 mg twice daily, switched to Lovenox during this admission  Given rising creatinine, will switch to heparin drip for now, continue to monitor hemoglobin closely (1/3/25)  Patient made comfort care, orders discontinued  Anasarca  Continue with albumin assisted diuresis.  Nephrology managing diuretics  Continue to monitor daily weights, BMPs, telemetry  nephrology following, recommendations appreciated  NSVT (nonsustained ventricular tachycardia) (HCC)  25 run of nonsustained V. tach noted on telemetry, patient was asymptomatic  Cardiology consulted, placed on coreg  Cardiac echo with normal EF and mild aortic stenosis  Monitor  Cardiology signed off    Hypothyroidism  Elevated TSH  Normal free T4 at 0.65  Patient was not receiving levothyroxine due to  PEG tube malfunctioning   Increase levothyroxine dose to 150 mcg daily  Repeat thyroid study in 4 to 6 weeks  abdominal rash  Located on the right lower abdomen with oozing and bleeding  Seen by dermatology who performed a biopsy  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, consistent with gynecologic/Mullerian origin   Discontinue valtrex  Outpatient follow up with medical oncology when stable  Hyponatremia  Hyponatremia likely multifactorial - due to volume  overload  Coronado catheter placed due to urinary retention  Monitor closely with diuresis  Gradually improving with diuresis  nephrology following, recommendations appreciated  Coronary artery disease involving native coronary artery  History of MI in 2016 status post KARY  Stable  Type 2 diabetes mellitus (HCC)  Lab Results   Component Value Date    HGBA1C 6.9 (H) 11/05/2024       Recent Labs     01/03/25  1052 01/03/25  1610 01/03/25  2038 01/04/25  0647   POCGLU 225* 225* 215* 249*       Blood Sugar Average: Last 72 hrs:  (P) [id number]    Farxiga on hold while inpatient  Hyperglycemia secondary to steroids and tube feeding  Continue Lantus 20 Unightly, continue Humalog 5 U tid, adjust the doses as needed  Continue SSI  Endometrial cancer (HCC)  History of endometrial cancer noted  Gastroparesis    With PEG-J as above  GI following  Morbid obesity (HCC)  Body mass index is 47.88 kg/m².  Encourage lifestyle modification and weight loss once acute issues improved/resolved  Hyperkalemia    TERRA (acute kidney injury) (HCC)    Pleural effusion      VTE Pharmacologic Prophylaxis: VTE Score: 7  n/a, made comfort care    Mobility:   Basic Mobility Inpatient Raw Score: 6  JH-HLM Goal: 2: Bed activities/Dependent transfer  -HLM Achieved: 2: Bed activities/Dependent transfer  N/a    Patient Centered Rounds: I performed bedside rounds with nursing staff today.   Discussions with Specialists or Other Care Team Provider: palliative care, GI, nephrology, RN    Education and Discussions with Family / Patient:  niece Sharda and son Alexander.     Current Length of Stay: 26 day(s)  Current Patient Status: Inpatient   Certification Statement:  made [facility] care  Discharge Plan:  arranging hospice    Code Status: Level 3 - DNAR and DNI    Subjective   Patient seen and examined multiple times today, she responds to voice and touch, but does not answer questions    Objective :  Temp:  [97.3 °F (36.3 °C)-98.3 °F (36.8 °C)] 98.3 °F  (36.8 °C)  HR:  [84-88] 84  BP: (132-167)/(62-72) 167/72  Resp:  [17-28] 28  SpO2:  [92 %-97 %] 96 %  O2 Device: Nasal cannula  Nasal Cannula O2 Flow Rate (L/min):  [2.5 L/min-4 L/min] 3 L/min    Body mass index is 47.88 kg/m².     Input and Output Summary (last 24 hours):     Intake/Output Summary (Last 24 hours) at 1/4/2025 0920  Last data filed at 1/4/2025 0835  Gross per 24 hour   Intake 1481.99 ml   Output 1950 ml   Net -468.01 ml       Physical Exam  Constitutional:       Appearance: She is obese. She is ill-appearing.      Comments: lethargic   Cardiovascular:      Rate and Rhythm: Normal rate and regular rhythm.      Heart sounds: No murmur heard.  Pulmonary:      Effort: Respiratory distress (on 2L) present.      Breath sounds: Rales present.   Abdominal:      General: There is distension.      Tenderness: There is abdominal tenderness.      Comments: Abdominal wall wounds covered in dressing; J tube in place, ostomy bag around it draining liquid brown drainage   Musculoskeletal:         General: Swelling present.           Lines/Drains:  Lines/Drains/Airways       Active Status       Name Placement date Placement time Site Days    PICC Line 12/18/24 12/18/24  0922  --  16    Gastrostomy/Enterostomy PEG- Jejunostomy 20 Fr. LUQ 12/03/24  1553  LUQ  31    Urethral Catheter Latex 16 Fr. 12/26/24  2335  Latex  8                  Urinary Catheter:  Goal for removal: N/A- Discharging with Coronado         Central Line:  Goal for removal:  Needs IV access for comfort care medications IV               Lab Results: I have reviewed the following results:   Results from last 7 days   Lab Units 01/04/25  0217 01/03/25  0629 01/02/25  0511 01/01/25  0247   WBC Thousand/uL 9.35   < > 6.16 5.54   HEMOGLOBIN g/dL 8.1*   < > 7.4* 7.4*   HEMATOCRIT % 27.1*   < > 23.6* 23.0*   PLATELETS Thousands/uL 196   < > 164 146*   BANDS PCT %  --   --  6  --    SEGS PCT %  --   --   --  79*   LYMPHO PCT %  --   --  6* 7*   MONO PCT %  --    --  6 9   EOS PCT %  --   --  3 3    < > = values in this interval not displayed.     Results from last 7 days   Lab Units 01/04/25  0217   SODIUM mmol/L 132*   POTASSIUM mmol/L 4.4   CHLORIDE mmol/L 88*   CO2 mmol/L 38*   BUN mg/dL 120*   CREATININE mg/dL 1.18   ANION GAP mmol/L 6   CALCIUM mg/dL 9.5   ALBUMIN g/dL 3.6   TOTAL BILIRUBIN mg/dL 0.55   ALK PHOS U/L 94   ALT U/L 3*   AST U/L 5*   GLUCOSE RANDOM mg/dL 258*     Results from last 7 days   Lab Units 01/03/25  1637   INR  1.16     Results from last 7 days   Lab Units 01/04/25  0647 01/03/25  2038 01/03/25  1610 01/03/25  1052 01/03/25  0628 01/02/25  2059 01/02/25  1602 01/02/25  1100 01/02/25  0618 01/01/25  2049 01/01/25  1548 01/01/25  1059   POC GLUCOSE mg/dl 249* 215* 225* 225* 98 190* 168* 189* 212* 181* 159* 139               Recent Cultures (last 7 days):   Results from last 7 days   Lab Units 12/30/24  1132   GRAM STAIN RESULT  1+ Polys  No organisms seen   BODY FLUID CULTURE, STERILE  No growth             Last 24 Hours Medication List:     Current Facility-Administered Medications:     acetaminophen (TYLENOL) tablet 975 mg, Q8H SAM    albuterol (PROVENTIL HFA,VENTOLIN HFA) inhaler 2 puff, Q6H PRN    albuterol inhalation solution 2.5 mg, Q4H PRN    aluminum-magnesium hydroxide-simethicone (MAALOX) oral suspension 30 mL, Q6H PRN    aspirin (ECOTRIN LOW STRENGTH) EC tablet 81 mg, Daily    bisacodyl (DULCOLAX) rectal suppository 10 mg, Daily PRN    [Held by provider] carvedilol (COREG) tablet 3.125 mg, BID With Meals    cyanocobalamin (VITAMIN B-12) tablet 1,000 mcg, Daily    dicyclomine (BENTYL) capsule 10 mg, BID PRN    fentaNYL (DURAGESIC) 25 mcg/hr TD 72 hr patch 25 mcg, Q72H    folic acid (FOLVITE) tablet 1 mg, Daily    gabapentin (NEURONTIN) capsule 300 mg, HS    heparin (porcine) 25,000 units in 0.45% NaCl 250 mL infusion (premix), Titrated, Last Rate: 17.1 Units/kg/hr (01/04/25 0629)    HYDROmorphone (DILAUDID) injection 1 mg, Q3H PRN     insulin glargine (LANTUS) subcutaneous injection 10 Units 0.1 mL, HS    insulin lispro (HumALOG/ADMELOG) 100 units/mL subcutaneous injection 1-5 Units, TID AC **AND** Fingerstick Glucose (POCT), TID AC    insulin lispro (HumALOG/ADMELOG) 100 units/mL subcutaneous injection 5 Units, TID With Meals    iohexol (OMNIPAQUE) 240 MG/ML solution 50 mL, Once in imaging    iohexol (OMNIPAQUE) 240 MG/ML solution 50 mL, Once in imaging    ipratropium (ATROVENT) 0.02 % inhalation solution 0.5 mg, TID    levalbuterol (XOPENEX) inhalation solution 1.25 mg, TID    levothyroxine tablet 150 mcg, Daily    metoprolol (LOPRESSOR) injection 2.5 mg, Q12H    naloxone (NARCAN) 0.04 mg/mL syringe 0.04 mg, Q1MIN PRN    nitroglycerin (NITROSTAT) SL tablet 0.4 mg, Q5 Min PRN    omeprazole (PRILOSEC) suspension 2 mg/mL, Daily    ondansetron (ZOFRAN) injection 4 mg, Q6H PRN    oxyCODONE (ROXICODONE) oral solution 5 mg, Q4H PRN **OR** oxyCODONE (ROXICODONE) oral solution 10 mg, Q4H PRN    polyethylene glycol (MIRALAX) packet 17 g, Daily    senna oral syrup 17.6 mg, BID    Administrative Statements   Today, Patient Was Seen By: Park Mitchell MD  I have spent a total time of 70 minutes in caring for this patient on the day of the visit/encounter including Patient and family education, Documenting in the medical record, Reviewing / ordering tests, medicine, procedures  , and Communicating with other healthcare professionals .    **Please Note: This note may have been constructed using a voice recognition system.**

## 2025-01-04 NOTE — ASSESSMENT & PLAN NOTE
S/p paracentesis for 4620 cc (12/30)    Current Multimodal pain regimen:  Fentanyl 25 mcg increased on 12/27   Schedule Tylenol 975 mg TID  Gabapentin 300 mg HS   OxyIR 5mg Q4hrs PRN for moderate pain   OxyIR 10mg Q4hrs PRN for severe pain   IV dilaudid to 0.5 mg q3H PRN for BT pain  Pt was temporarily NPO on 1/3-1/4 and IV hydromorphone was increased to 1 mg - after 10:10 dose on 1/4, noted to be lethargic and more difficult to arouse, no respiratory depression noted and BP stable  Reduced dose to 0.5 mg q3H PRN  If NPO again, can consider increasing frequency if needed, would not recommend increasing to full 1mg     Failed therapies:  NA    Bowel regimen to prevent OIC: senna 17.6 mg bid, prn dulcolax suppository. Last bowel movement 12/29  Opioid agreement. Not on file, will need outpatient follow up

## 2025-01-04 NOTE — ASSESSMENT & PLAN NOTE
Monitor blood pressures  Has been on carvedilol, Norvasc was added  Avoid hypotension  Blood pressure is on the lower side.  Decrease in the dose of Coreg.  Patient received albumin after the paracentesis  Needs to remain beta-blockers due to NSVT previously, patient lethargic recently, did not take oral meds today, will have speech and swallow therapy to reevaluate; Coreg 3.125 mg changed to metoprolol 2.5 mg for now with holding parameters  1/4/25: Patient made comfort care

## 2025-01-04 NOTE — PROGRESS NOTES
1700: RN laid pt's HOB to ~30 degrees to boost pt. Pt becoming tachypenic, O2 saturations decreasing to mid 70%. When O2 sats were recovered, pt still tachypenic and having increased WOB. RT messaged by RN and asked to give pt a neb, and Park Mitchell w/ [facility] notified. Post-neb pt's WOB normalized    2130: RN noting that pt WOB was increased again, and pt lungs audibly wheezing but O2 sats maintaining low-mid 90's. RT stating it was too soon for a neb tx as pt had recently gotten one. Terrie Johnson w/ [facility]ON[facility] on floor and verbally notified; Alex came to bedside to eval pt.

## 2025-01-04 NOTE — ASSESSMENT & PLAN NOTE
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  Persistent third spacing with slight increase in moderate volume ascites, and no significant change in moderate size right and small left pleural effusions with bibasilar atelectasis. 2.  New percutaneous enteric tube with tip in the distal duodenum.\"  GI following. S/p EGD 12/10 with J-tube exchange, postop patient had worsening hypoxia required transfer to ICU and using BiPAP  Had concern for possible infection surrounding J-tube site, completed 7 days of ceftriaxone and vancomycin on 12/17  developed clogged J-tube,   Status post EGD on 12/13 with J-tube replacement   Patient with leakage around the PEG tube site.  GI reevaluated.  Okay to continue with tube feeding-current on antibiotics.  Culture was growing Klebsiella from 12/25- completed 5 days of Ancef on 12/29  Resumed on tube feeding  On clear liquid diet per speech/swallow therapist  G tube port is working sufficiently per gastroenterology  Tube feeding changed to Nepro due to hyperkalemia and hyperglycemia after discussion with nutrition  Patient continues to be lethargic, but arousable, CT head from 1/2/25 showed: No acute intracranial hemorrhage. A few scattered periventricular hypodensities are indeterminate and most likely microangiopathy. Recommend contrast-enhanced MRI of the brain or post contrast head CT to assess for pathologic enhancement/small metastases. No large mass lesions. Mild to moderate sphenoid sinusitis.\"   Noted copious amount of drainage from around J-tube; CT of abdomen done 1/3/25: good position of the current tube, surgery and GI involved  On 1/4/25 patient was made comfort care by POA    "

## 2025-01-04 NOTE — ASSESSMENT & PLAN NOTE
History of PE and DVT in May 2024  Has been on Eliquis 5 mg twice daily, switched to Lovenox during this admission  Given rising creatinine, will switch to heparin drip for now, continue to monitor hemoglobin closely (1/3/25)  Patient made comfort care, orders discontinued

## 2025-01-04 NOTE — ASSESSMENT & PLAN NOTE
Lab Results   Component Value Date    HGBA1C 6.9 (H) 11/05/2024       Recent Labs     01/03/25  1052 01/03/25  1610 01/03/25 2038 01/04/25  0647   POCGLU 225* 225* 215* 249*       Blood Sugar Average: Last 72 hrs:  (P) [id number]    Farxiga on hold while inpatient  Hyperglycemia secondary to steroids and tube feeding  Continue Lantus 20 Unightly, continue Humalog 5 U tid, adjust the doses as needed  Continue SSI

## 2025-01-04 NOTE — QUICK NOTE
Patient continued to decline, decreased expected urine output, worsening mental status. I called patient's POA and health care representative, Wencathy Robin to discuss. She stated that she had a conversation in the past with the patient that if poor prognosis, she wishes to be comfort care, no aggressive measures. Patient was made a comfort care by Sharda, interested in hospice, but not at the facility she came from.  Orders placed, palliative care team notified.    Sharda asked to update patient's son, Alexander at [phone]. I called and updated him, he is in agreement with comfort care

## 2025-01-04 NOTE — ASSESSMENT & PLAN NOTE
Patient continues to decline with worsening respiratory status, worsening mental status, patient opens eyes to voice but she does answer questions, decreased urine output-patient's healthcare representative, Sharda Carver was contacted and patient was made a level 4 -comfort care    I also spoke to patient's son, Alexander as per Sharda's request ([phone]) to update him, he is in agreement with comfort care

## 2025-01-04 NOTE — QUICK NOTE
Up to see wilbert pt and nursing request I see pt . Upon entry to room pt is mildly tachypneic on oxygen 3 liters satting 94 % . Pt is conversant and does ell me she feels a little short of breath   At this time will administer low dose diuretic 20mg iv lasix and monitor response .   Sat by the room pt seemed to have some improvement in her breathing . Sleeping when I left

## 2025-01-04 NOTE — ASSESSMENT & PLAN NOTE
Patient had worsening hypoxia post EGD on 12/10. Was satting mid-80s on 10L, Was given nebs, Lasix and subsequently placed on BiPAP and upgraded to critical care for further treatment  Has been on room air  Desaturated to 70s on 1/3/25 during CAT scan, required supplementation O2 up to 15 L, subsequently weaned down to 2 L  CT from 1/3/25 demonstrated bilateral pleural effusion, worse on the right, pulmonology consulted for further recommendations-no large enough pocket to drain fluid  Patient was made comfort care on 1/4/25

## 2025-01-04 NOTE — ASSESSMENT & PLAN NOTE
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 S/P FLORECITA/BSO  12/03/24 PEG placed   12/09 Admitted due to malfunctioning J tube  12/10 EGD done   12/13 - J tube malfunctioned again leading to replacement   1/3 - copious drainage around tube, CT A/P showed tube in good position      1/4 - GI at bedside, feels drainage is likely due to ascites, states plans to okay resuming po as tolerated and meds via po/tube

## 2025-01-04 NOTE — ASSESSMENT & PLAN NOTE
Received IV iron x 2  Vitamin B12 and folate deficiency  Continue vitamin B12 and folate supplement  Transfuse 1 unit of PRBCs 12/18 and 12/23  Hg is 6.8 on 12/31 transfuse 1 U RBC  Patient is now comfort care

## 2025-01-04 NOTE — SPEECH THERAPY NOTE
Speech-Language Pathology Bedside Swallow Evaluation      Patient Name: Lety Botello    Today's Date: 1/4/2025     Problem List  Principal Problem:    Encounter for gastrojejunal (GJ) tube placement issues  Active Problems:    Hypertension    Type 2 diabetes mellitus (HCC)    Endometrial cancer (HCC)    History of pulmonary embolism    Hypothyroidism    Gastroparesis    Morbid obesity (HCC)    Abdominal pain    Shingles    Palliative care encounter    Hyponatremia    Anemia    Acute respiratory failure with hypoxia (HCC)    Hypomagnesemia    NSVT (nonsustained ventricular tachycardia) (HCC)    Coronary artery disease involving native coronary artery    abdominal rash    Anasarca    Hyperkalemia    TERRA (acute kidney injury) (HCC)    Pleural effusion    Past Medical History  Past Medical History:   Diagnosis Date    Diverticulitis     Endometrial cancer (HCC)     History of shingles     Right hemiabdomen (inactive)    Hypertension     IBS (irritable bowel syndrome)     Obesity     Small bowel obstruction (HCC)     Type 2 diabetes mellitus (HCC)        Past Surgical History  Past Surgical History:   Procedure Laterality Date    ABDOMINAL ADHESION SURGERY N/A 11/22/2024    Procedure: EXTENSIVE LYSIS OF ADHESIONS >90 MINUTES;  Surgeon: Alejo Ward MD;  Location: [facility];  Service: General    APPENDECTOMY      CHOLECYSTECTOMY      IR PARACENTESIS  12/18/2024    IR PARACENTESIS  12/30/2024    LAPAROTOMY N/A 11/22/2024    Procedure: LAPAROTOMY EXPLORATORY;  Surgeon: Alejo Ward MD;  Location: [facility];  Service: General    SIGMOIDECTOMY N/A     SMALL INTESTINE SURGERY N/A 11/22/2024    Procedure: RESECTION SMALL BOWEL WITH PRIMARY ANASTAMOSIS,PARTIAL OMENTECTOMY;  Surgeon: Alejo Ward MD;  Location: [facility];  Service: General       Summary  Pt presented s/s suggestive of significant oropharyngeal dysphagia, exacerbated by AMS. Symptoms or concerns included decreased bolus acceptance and  "decreased bolus propulsion, suspected pharyngeal swallow delay, suspected decreased hyolaryngeal elevation upon palpation, and effortful swallow. Pt had limited verbal responses aside from \"help\" on occasion, when asked if she was okay while taking PO trials she shook her head no, and when asked if it hurt to swallow she nodded head yes. No immediate coughing with trials, however she had delayed wet and non productive cough, and increased breath sounds. Rec NPO now (had only been approved for clears by GI) with ST f/u for re assessment as able/appropriate.     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  Persistent third spacing with slight increase in moderate volume ascites, and no significant change in moderate size right and small left pleural effusions with bibasilar atelectasis. 2.  New percutaneous enteric tube with tip in the distal duodenum.\"  GI following. S/p EGD 12/10 with J-tube exchange, postop patient had worsening hypoxia required transfer to ICU and using BiPAP  Had concern for possible infection surrounding J-tube site, completed 7 days of ceftriaxone and vancomycin on 12/17  developed clogged J-tube,   Status post EGD on 12/13 with J-tube replacement   Patient with leakage around the PEG tube site.  GI reevaluated.  Okay to continue with tube feeding-current on " "antibiotics.  Culture was growing Klebsiella from 12/25- completed 5 days of Ancef on 12/29  Resumed on tube feeding  On clear liquid diet per speech/swallow therapist  G tube port is working sufficiently per gastroenterology  Tube feeding changed to Nepro due to hyperkalemia and hyperglycemia after discussion with nutrition  Patient continues to be lethargic, but arousable, CT head from 1/2/25 showed: No acute intracranial hemorrhage. A few scattered periventricular hypodensities are indeterminate and most likely microangiopathy. Recommend contrast-enhanced MRI of the brain or post contrast head CT to assess for pathologic enhancement/small metastases. No large mass lesions. Mild to moderate sphenoid sinusitis.\"   Noted copious amount of drainage from around J-tube; CT of abdomen done 1/3/25: good position of the current tube  Surgery suggested further management of J-tube as per GI, recommended to change tube to PEGJ; GI consulted, will evaluate patient tomorrow      Special Studies:  CT head 1/2/25 IMPRESSION:  1. No acute intracranial hemorrhage.  2. A few scattered periventricular hypodensities are indeterminate and most likely microangiopathy. Recommend contrast-enhanced MRI of the brain or post contrast head CT to assess for pathologic enhancement/small metastases. No large mass lesions.  3. Mild to moderate sphenoid sinusitis.       Social/Education/Vocational Hx:  Pt lives  with a friend    Swallow Information   Current Risks for Dysphagia & Aspiration: known history of dysphagia and AMS  Current Symptoms/Concerns: choking incident and change in respiratory status  Current Diet: NPO   Baseline Diet:  most recently on clear liquid diet (thins)      Baseline Assessment   Behavior/Cognition: alert and decreased attention  Speech/Language Status: not able to to follow commands and limited verbal output  Patient Positioning: upright in bed  Pain Status/Interventions/Response to Interventions: No report of or " nonverbal indications of pain.       Swallow Mechanism Exam  Facial: symmetrical  Labial: unable to test 2/2 limited command following  Lingual: unable to test 2/2 limited command following  Velum: symmetrical  Mandible: adequate ROM  Dentition: adequate  Vocal quality:clear/adequate   Volitional Cough: unable to initiate volitional cough   Respiratory Status: on 5L O2    Consistencies Assessed and Performance   Consistencies Administered: ice chips, thin liquids, and honey thick    Oral Stage: moderate, decreased bolus acceptance, decreased bolus propulsion, and suspected decreased control of liquids     Pharyngeal Stage: mild-moderate, suspected pharyngeal swallow delay, suspected decreased hyolaryngeal elevation upon palpation, and effortful swallow    Esophageal/GI Concerns:  gastroparesis      Summary and Recommendations (see above)    Results Reviewed with: patient, RN, and MD     Treatment Recommended: yes     Frequency of treatment: 2-3x/week      Dysphagia LTG  -Patient will demonstrate safe and effective oral intake (without overt s/s significant oral/pharyngeal dysphagia including s/s penetration or aspiration) for the highest appropriate diet level.     Short Term Goals:  -Pt will tolerate the least restrictive diet with the least restrictive liquid consistency without s/s of aspiration x72hrs.    -Patient will tolerate trials of upgraded food and/or liquid texture with no significant s/s of oral or pharyngeal dysphagia including aspiration across 1-3 diagnostic sessions.    -If indicated, patient will comply with a Video/Modified Barium Swallow study for more complete assessment of swallowing anatomy/physiology/aspiration risk and to assess efficacy of treatment techniques so as to best guide treatment plan

## 2025-01-05 NOTE — ASSESSMENT & PLAN NOTE
Patient continues to decline with worsening respiratory status, worsening mental status, patient opens eyes to voice but she does answer questions, decreased urine output-patient's healthcare representative, Sharda Carver was contacted and patient was made a level 4 -comfort care    I also spoke to patient's son, Alexander as per Sharda's request ([phone]) to update him, he is in agreement with comfort care

## 2025-01-05 NOTE — ASSESSMENT & PLAN NOTE
Monitor blood pressures  Has been on carvedilol, Norvasc was added  Avoid hypotension  Blood pressure is on the lower side.  Decrease in the dose of Coreg.  Patient received albumin after the paracentesis  Needs to remain beta-blockers due to NSVT previously, patient lethargic recently, did not take oral meds today, will have speech and swallow therapy to reevaluate; Coreg 3.125 mg changed to metoprolol 2.5 mg for now with holding parameters  1/4/25: Patient made comfort care   On chronic oxygen at 2.5 lit/min at home- discharged on 3 lit/min. Diuretics held during the stay secondary to Acute kidney injury. At discharge changed lasix to prn for weight increase 2 lb/day or 5 lb/week or shortness of breath. Daily weights. Repeat CBC,BMP and Magnesium at rehab. Rehab physician to follow up on labs. Repeat T3 in 4 weeks with PCP.

## 2025-01-05 NOTE — DEATH NOTE
PRONOUNCEMENT OF DEATH     DOA: 2024    DOD: 2025    TOD: 07:11    CODE STATUS AT TOD: Comfort Care    PATIENT ON HOSPICE?: yes    FAMILY NOTIFIED?: yes    AUTOPSY DESIRED?: no    SUSPECTED COD: Endometrial Cancer    HOSPITAL PROBLEM LIST:   Patient Active Problem List   Diagnosis    SBO (small bowel obstruction) (HCC)    Hypertension    Type 2 diabetes mellitus (HCC)    Endometrial cancer (HCC)    Obesity    History of pulmonary embolism    Hypothyroidism    Mild intermittent asthma    Gastroparesis    Gastric dilation    Morbid obesity (HCC)    Constipation    Abdominal pain    Nausea & vomiting    Shingles    On total parenteral nutrition (TPN)    Hypotension after procedure    Goals of care, counseling/discussion    Palliative care encounter    Encounter for gastrojejunal (GJ) tube placement issues    Hyponatremia    Anemia    Acute respiratory failure with hypoxia (HCC)    Hypomagnesemia    NSVT (nonsustained ventricular tachycardia) ([facility])    Coronary artery disease involving native coronary artery    abdominal rash    Anasarca    Hyperkalemia    TERRA (acute kidney injury) ([facility])    Pleural effusion    End of life care       PRONOUNCEMENT OF DEATH    I was contacted by nursing staff to evaluate the patient found without vital signs. Patient was identified visually and identification was confirmed by hospital ID bracelet. Patient was found laying in bed with nursing at bedside. Personally examined the patient without heart sounds auscultated on exam nor were pulses detected x 2. No breath sounds were appreciated after 2 minutes of auscultation. Pupils were fixed and non-reactive to light. Patient was pronounced dead at this date and time.      home is [facility]. Per son will be using a direct cremation service located in [place], [place]    Please kindly review hospital chart for all details of this hospitalization and circumstances leading to death.     Death certificate will be signed my attending  physician (Park Mitchell MD) at a later time.

## 2025-01-05 NOTE — ASSESSMENT & PLAN NOTE
Lab Results   Component Value Date    HGBA1C 6.9 (H) 11/05/2024       Recent Labs     01/03/25  1610 01/03/25 2038 01/04/25  0647 01/04/25  1058   POCGLU 225* 215* 249* 248*         Blood Sugar Average: Last 72 hrs:  (P) 201.9    Farxiga on hold while inpatient  Hyperglycemia secondary to steroids and tube feeding  Continue Lantus 20 Unightly, continue Humalog 5 U tid, adjust the doses as needed  Continue SSI

## 2025-01-05 NOTE — DISCHARGE SUMMARY
"Admission Date: 2024   Admitting Diagnosis: SBO (small bowel obstruction) ([facility]) [K56.609]  Encounter for gastrojejunal (GJ) tube placement [Z78.9]  Discharge Date: 25    HPI: \"64-year-old with history of morbid obesity coronary artery disease hypertension history of PE/DVT on chronic anticoagulation, endometrial carcinoma status post total abdominal hysterectomy and bilateral salpingo-oophorectomy with recurrence and status post radiation therapy recently admitted for small bowel obstruction status post exploratory laparotomy with a small bowel resection and PEG tube insertion presented today for PEG tube dysfunction and underwent EGD with G-tube exchange today postoperatively the patient was noted to have increased work of breathing worsening hypoxia with desaturations despite being on mid flow anesthesia contacted medical ICU team for evaluation at that time the patient was already started on BiPAP received 20 mg of Lasix, chest x-ray ordered noted worsening of right elevated diaphragm and bilateral pulmonary infiltrates in the setting of possible volume overload recommended extra 20 mg of Lasix, DuoNeb nebulizers, 125 mg of Solu-Medrol IV push and 4 g of magnesium \"      Summary of Hospital Course: Had a multiple specialist involved included nephrology, gastroenterology, surgery, pulmonology with actively diuresing, managing G-tube and tube feeds; regardless of active management, patient continue clinically deteriorate with developing kidney and respiratory failure as evidenced by decreased urine output and requirement of oxygen.  On 25 decision was made to transition patient to comfort care by POA.      Disposition:      Final Diagnosis: Acute respiratory failure due to multiple organ failure from endometrial carcinoma    Medical Problems       Resolved Problems  Date Reviewed: 12/10/2024   None           Date, Time and Cause of Death    Date of Death: 25  Time of Death:  7:11 " AM  Preliminary Cause of Death: Endometrial cancer (HCC)  Entered by: Renetta Lombardo PA-C[AF1.1]       Attribution       AF1.1 Renetta Lombardo PA-C 25 07:23            Death Note:  PRONOUNCEMENT OF DEATH     DOA: 2024    DOD: 2025    TOD: 07:11    CODE STATUS AT TOD: Comfort Care    PATIENT ON HOSPICE?: yes    FAMILY NOTIFIED?: yes    AUTOPSY DESIRED?: no    SUSPECTED COD: Endometrial Cancer    HOSPITAL PROBLEM LIST:   Patient Active Problem List   Diagnosis    SBO (small bowel obstruction) (HCC)    Hypertension    Type 2 diabetes mellitus (HCC)    Endometrial cancer (HCC)    Obesity    History of pulmonary embolism    Hypothyroidism    Mild intermittent asthma    Gastroparesis    Gastric dilation    Morbid obesity (HCC)    Constipation    Abdominal pain    Nausea & vomiting    Shingles    On total parenteral nutrition (TPN)    Hypotension after procedure    Goals of care, counseling/discussion    Palliative care encounter    Encounter for gastrojejunal (GJ) tube placement issues    Hyponatremia    Anemia    Acute respiratory failure with hypoxia (HCC)    Hypomagnesemia    NSVT (nonsustained ventricular tachycardia) (HCC)    Coronary artery disease involving native coronary artery    abdominal rash    Anasarca    Hyperkalemia    TERRA (acute kidney injury) (HCC)    Pleural effusion    End of life care       PRONOUNCEMENT OF DEATH    I was contacted by nursing staff to evaluate the patient found without vital signs. Patient was identified visually and identification was confirmed by hospital ID bracelet. Patient was found laying in bed with nursing at bedside. Personally examined the patient without heart sounds auscultated on exam nor were pulses detected x 2. No breath sounds were appreciated after 2 minutes of auscultation. Pupils were fixed and non-reactive to light. Patient was pronounced dead at this date and time.      home is TBD. Per son will be using a direct cremation service  located in [place]    Please kindly review hospital chart for all details of this hospitalization and circumstances leading to death.     Death certificate will be signed my attending physician (Park Mitchell MD) at a later time.

## 2025-01-06 NOTE — UTILIZATION REVIEW
NOTIFICATION OF ADMISSION DISCHARGE   This is a Notification of Discharge from [facility]. Please be advised that this patient has been discharge from our facility. Below you will find the admission and discharge date and time including the patient’s disposition.   UTILIZATION REVIEW CONTACT:  Sebastian Arzate  Utilization   Network Utilization Review Department  Phone: [phone] x carefully listen to the prompts. All voicemails are confidential.  Email: [email or web address]     ADMISSION INFORMATION  PRESENTATION DATE: 2024  1:48 PM  OBERVATION ADMISSION DATE: N/A  INPATIENT ADMISSION DATE: 24  6:47 PM   DISCHARGE DATE: 2025 11:15 AM   DISPOSITION:    Network Utilization Review Department  ATTENTION: Please call with any questions or concerns to [phone] and carefully listen to the prompts so that you are directed to the right person. All voicemails are confidential.   For Discharge needs, contact Care Management DC Support Team at [phone] opt. 2  Send all requests for admission clinical reviews, approved or denied determinations and any other requests to dedicated fax number below belonging to the campus where the patient is receiving treatment. List of dedicated fax numbers for the Facilities:  FACILITY NAME UR FAX NUMBER   ADMISSION DENIALS (Administrative/Medical Necessity) [phone]   DISCHARGE SUPPORT TEAM ([facility]) [phone]   PARENT CHILD HEALTH (Maternity/NICU/Pediatrics) [phone]   General acute hospital [phone]   [facility] [phone]   [facility] [phone]   [facility] [phone]   [facility] [phone]   [facility] [phone]   [facility] [phone]   [facility] [phone]   [facility]  [facility] [phone]   [facility] [phone]   [facility] [phone]   [facility] [phone]

## 2025-01-09 NOTE — CASE MANAGEMENT
Case Management Progress Note    Patient name Lety Botello  Location [facility]-01 MRN [id number]  : 1960 Date 2025       LOS (days): 27  Geometric Mean LOS (GMLOS) (days):   Days to GMLOS:        OBJECTIVE:        Current admission status: Inpatient  Preferred Pharmacy:   [id number] [place] - [address]  [address] [facility] [address]  Phone: [phone] Fax: [phone]    Primary Care Provider: Tai Martinez    Primary Insurance: [employer]  Secondary Insurance:     PROGRESS NOTE:  Received  SC message from Frederick Buchanan RN inquiring about final arrangements for this pt. TC to ECSharda/ivana. She informed this CM that patient's son, Alexander Botello [phone] is taking care of all final arrangements. Frederick Buchanan messaged back with this information.

## 2025-05-30 NOTE — ASSESSMENT & PLAN NOTE
Body mass index is 48.78 kg/m².  Encourage lifestyle modification and weight loss once acute issues improved/resolved   no

## (undated) DEVICE — TRAY FOLEY 16FR URIMETER SURESTEP

## (undated) DEVICE — INTENDED FOR TISSUE SEPARATION, AND OTHER PROCEDURES THAT REQUIRE A SHARP SURGICAL BLADE TO PUNCTURE OR CUT.: Brand: BARD-PARKER SAFETY BLADES SIZE 15, STERILE

## (undated) DEVICE — SUT VICRYL 2-0 CT-2 18 IN J726D

## (undated) DEVICE — 3000CC GUARDIAN II: Brand: GUARDIAN

## (undated) DEVICE — BETHLEHEM MAJOR GENERAL PACK: Brand: CARDINAL HEALTH

## (undated) DEVICE — PROXIMATE LINEAR CUTTER RELOADS (STANDARD)-100MM: Brand: PROXIMATE

## (undated) DEVICE — PROXIMATE LINEAR STAPLER RELOADS: Brand: PROXIMATE

## (undated) DEVICE — ENSEAL 20 CM SHAFT, LARGE JAW: Brand: ENSEAL X1

## (undated) DEVICE — POOLE SUCTION HANDLE: Brand: CARDINAL HEALTH

## (undated) DEVICE — CLIP SURGI M-11.5 WITH 30 MEDIUM TITANIUM CLIPS BLUE

## (undated) DEVICE — GLOVE SRG BIOGEL ORTHOPEDIC 7.5

## (undated) DEVICE — SUT SILK 3-0 SH 30 IN K832H

## (undated) DEVICE — TOWEL SET X-RAY

## (undated) DEVICE — SUT SILK 3-0 SH CR/8 18 IN C013D

## (undated) DEVICE — ENDOPATH 5MM ENDOSCOPIC BLUNT TIP DISSECTORS (12 POUCHES CONTAINING 3 DISSECTORS EACH): Brand: ENDOPATH

## (undated) DEVICE — PROXIMATE RELOADABLE LINEAR CUTTER WITH SAFETY LOCK-OUT, 100MM: Brand: PROXIMATE

## (undated) DEVICE — PLUMEPEN PRO 10FT

## (undated) DEVICE — DRESSING MEPILEX AG BORDER POST-OP 4 X 12 IN

## (undated) DEVICE — PROXIMATE RELOADABLE LINEAR STAPLER: Brand: PROXIMATE

## (undated) DEVICE — INTENDED FOR TISSUE SEPARATION, AND OTHER PROCEDURES THAT REQUIRE A SHARP SURGICAL BLADE TO PUNCTURE OR CUT.: Brand: BARD-PARKER SAFETY BLADES SIZE 10, STERILE

## (undated) DEVICE — SPONGE LAP 18 X 18 IN STRL RFD

## (undated) DEVICE — SCD SEQUENTIAL COMPRESSION COMFORT SLEEVE MEDIUM KNEE LENGTH: Brand: KENDALL SCD

## (undated) DEVICE — ANTIBACTERIAL UNDYED BRAIDED (POLYGLACTIN 910), SYNTHETIC ABSORBABLE SUTURE: Brand: COATED VICRYL

## (undated) DEVICE — SUT SILK 2-0 SH 30 IN K833H